# Patient Record
Sex: FEMALE | Race: WHITE | NOT HISPANIC OR LATINO | Employment: FULL TIME | URBAN - METROPOLITAN AREA
[De-identification: names, ages, dates, MRNs, and addresses within clinical notes are randomized per-mention and may not be internally consistent; named-entity substitution may affect disease eponyms.]

---

## 2017-01-03 ENCOUNTER — TELEPHONE (OUTPATIENT)
Dept: PSYCHIATRY | Facility: CLINIC | Age: 37
End: 2017-01-03

## 2017-01-03 DIAGNOSIS — F33.41 RECURRENT MAJOR DEPRESSION IN PARTIAL REMISSION: Primary | ICD-10-CM

## 2017-01-03 NOTE — TELEPHONE ENCOUNTER
Added patient to schedule for ECT tomorrow AM 1/4/17 after consulting with Dr. Boyce about patient reported worsening symptoms of depression. Patient in agreement with plan.

## 2017-01-04 ENCOUNTER — TELEPHONE (OUTPATIENT)
Dept: PSYCHIATRY | Facility: CLINIC | Age: 37
End: 2017-01-04

## 2017-01-04 DIAGNOSIS — F33.2 MAJOR DEPRESSIVE DISORDER, RECURRENT EPISODE, SEVERE, WITHOUT MENTION OF PSYCHOTIC BEHAVIOR: Primary | ICD-10-CM

## 2017-01-04 NOTE — TELEPHONE ENCOUNTER
Patient's father called clinic to notify of cancelled ECT treatment scheduled morning of 1/4/17. States he called [facility] early AM to notify staff there. Reports patient had an episode of nausea and vomiting overnight from increased anxiety. Plans to attend treatments scheduled 1/5/17 and 1/6/17 as of now. States he will notify both departments if plan changes.

## 2017-01-05 ENCOUNTER — ANESTHESIA EVENT (OUTPATIENT)
Dept: ELECTROPHYSIOLOGY | Facility: HOSPITAL | Age: 37
End: 2017-01-05
Payer: COMMERCIAL

## 2017-01-06 ENCOUNTER — ANESTHESIA (OUTPATIENT)
Dept: ELECTROPHYSIOLOGY | Facility: HOSPITAL | Age: 37
End: 2017-01-06
Payer: COMMERCIAL

## 2017-01-09 ENCOUNTER — SURGERY (OUTPATIENT)
Age: 37
End: 2017-01-09

## 2017-01-09 ENCOUNTER — ANESTHESIA EVENT (OUTPATIENT)
Dept: ELECTROPHYSIOLOGY | Facility: HOSPITAL | Age: 37
End: 2017-01-09
Payer: COMMERCIAL

## 2017-01-09 ENCOUNTER — ANESTHESIA (OUTPATIENT)
Dept: ELECTROPHYSIOLOGY | Facility: HOSPITAL | Age: 37
End: 2017-01-09
Payer: COMMERCIAL

## 2017-01-09 ENCOUNTER — HOSPITAL ENCOUNTER (OUTPATIENT)
Facility: HOSPITAL | Age: 37
Discharge: HOME | End: 2017-01-09
Attending: PSYCHIATRY & NEUROLOGY | Admitting: PSYCHIATRY & NEUROLOGY
Payer: COMMERCIAL

## 2017-01-09 VITALS
HEIGHT: 65 IN | BODY MASS INDEX: 26.49 KG/M2 | TEMPERATURE: 98 F | OXYGEN SATURATION: 99 % | RESPIRATION RATE: 18 BRPM | HEART RATE: 84 BPM | DIASTOLIC BLOOD PRESSURE: 81 MMHG | SYSTOLIC BLOOD PRESSURE: 114 MMHG | WEIGHT: 159 LBS

## 2017-01-09 DIAGNOSIS — F32.9 MAJOR DEPRESSIVE DISORDER: ICD-10-CM

## 2017-01-09 DIAGNOSIS — F33.41 MDD (MAJOR DEPRESSIVE DISORDER), RECURRENT, IN PARTIAL REMISSION: ICD-10-CM

## 2017-01-09 DIAGNOSIS — F33.41 RECURRENT MAJOR DEPRESSION IN PARTIAL REMISSION: Primary | ICD-10-CM

## 2017-01-09 LAB
B-HCG UR QL: NEGATIVE
CTP QC/QA: YES

## 2017-01-09 PROCEDURE — 90870 ELECTROCONVULSIVE THERAPY: CPT | Mod: ,,, | Performed by: PSYCHIATRY & NEUROLOGY

## 2017-01-09 PROCEDURE — 25000003 PHARM REV CODE 250: Performed by: PSYCHIATRY & NEUROLOGY

## 2017-01-09 PROCEDURE — 25000003 PHARM REV CODE 250: Performed by: STUDENT IN AN ORGANIZED HEALTH CARE EDUCATION/TRAINING PROGRAM

## 2017-01-09 PROCEDURE — 63600175 PHARM REV CODE 636 W HCPCS: Performed by: STUDENT IN AN ORGANIZED HEALTH CARE EDUCATION/TRAINING PROGRAM

## 2017-01-09 PROCEDURE — D9220A PRA ANESTHESIA: Mod: ,,, | Performed by: ANESTHESIOLOGY

## 2017-01-09 PROCEDURE — 63600175 PHARM REV CODE 636 W HCPCS: Performed by: PSYCHIATRY & NEUROLOGY

## 2017-01-09 PROCEDURE — 90870 ELECTROCONVULSIVE THERAPY: CPT | Performed by: ANESTHESIOLOGY

## 2017-01-09 PROCEDURE — 81025 URINE PREGNANCY TEST: CPT | Performed by: PSYCHIATRY & NEUROLOGY

## 2017-01-09 RX ORDER — KETOROLAC TROMETHAMINE 30 MG/ML
INJECTION, SOLUTION INTRAMUSCULAR; INTRAVENOUS
Status: DISCONTINUED
Start: 2017-01-09 | End: 2017-01-09 | Stop reason: HOSPADM

## 2017-01-09 RX ORDER — SUCCINYLCHOLINE CHLORIDE 20 MG/ML
INJECTION INTRAMUSCULAR; INTRAVENOUS
Status: DISCONTINUED
Start: 2017-01-09 | End: 2017-01-09 | Stop reason: HOSPADM

## 2017-01-09 RX ORDER — SODIUM CHLORIDE 9 MG/ML
INJECTION, SOLUTION INTRAVENOUS CONTINUOUS
Status: DISCONTINUED | OUTPATIENT
Start: 2017-01-10 | End: 2017-01-09 | Stop reason: HOSPADM

## 2017-01-09 RX ORDER — LIDOCAINE HYDROCHLORIDE 10 MG/ML
1 INJECTION, SOLUTION EPIDURAL; INFILTRATION; INTRACAUDAL; PERINEURAL ONCE
Status: COMPLETED | OUTPATIENT
Start: 2017-01-10 | End: 2017-01-09

## 2017-01-09 RX ORDER — ONDANSETRON 2 MG/ML
INJECTION INTRAMUSCULAR; INTRAVENOUS
Status: DISCONTINUED
Start: 2017-01-09 | End: 2017-01-09 | Stop reason: HOSPADM

## 2017-01-09 RX ORDER — METHOHEXITAL IN WATER/PF 100MG/10ML
SYRINGE (ML) INTRAVENOUS
Status: DISCONTINUED
Start: 2017-01-09 | End: 2017-01-09 | Stop reason: HOSPADM

## 2017-01-09 RX ORDER — LABETALOL HYDROCHLORIDE 5 MG/ML
INJECTION, SOLUTION INTRAVENOUS
Status: DISCONTINUED | OUTPATIENT
Start: 2017-01-09 | End: 2017-01-09

## 2017-01-09 RX ORDER — SODIUM CHLORIDE 0.9 % (FLUSH) 0.9 %
3 SYRINGE (ML) INJECTION
Status: DISCONTINUED | OUTPATIENT
Start: 2017-01-09 | End: 2017-01-09 | Stop reason: HOSPADM

## 2017-01-09 RX ORDER — KETOROLAC TROMETHAMINE 15 MG/ML
30 INJECTION, SOLUTION INTRAMUSCULAR; INTRAVENOUS EVERY 6 HOURS PRN
Status: DISCONTINUED | OUTPATIENT
Start: 2017-01-09 | End: 2017-01-09 | Stop reason: HOSPADM

## 2017-01-09 RX ORDER — SUCCINYLCHOLINE CHLORIDE 20 MG/ML
INJECTION INTRAMUSCULAR; INTRAVENOUS
Status: DISCONTINUED | OUTPATIENT
Start: 2017-01-09 | End: 2017-01-09

## 2017-01-09 RX ORDER — ONDANSETRON 4 MG/1
4 TABLET, FILM COATED ORAL EVERY 6 HOURS PRN
Status: DISCONTINUED | OUTPATIENT
Start: 2017-01-09 | End: 2017-01-09 | Stop reason: HOSPADM

## 2017-01-09 RX ORDER — SODIUM CHLORIDE 9 MG/ML
500 INJECTION, SOLUTION INTRAVENOUS ONCE
Status: DISCONTINUED | OUTPATIENT
Start: 2017-01-09 | End: 2017-01-09 | Stop reason: HOSPADM

## 2017-01-09 RX ADMIN — LABETALOL HYDROCHLORIDE 5 MG: 5 INJECTION INTRAVENOUS at 07:01

## 2017-01-09 RX ADMIN — SUCCINYLCHOLINE CHLORIDE 120 MG: 20 INJECTION, SOLUTION INTRAMUSCULAR; INTRAVENOUS at 07:01

## 2017-01-09 RX ADMIN — SODIUM CHLORIDE 500 ML: 0.9 INJECTION, SOLUTION INTRAVENOUS at 06:01

## 2017-01-09 RX ADMIN — ONDANSETRON HYDROCHLORIDE 4 MG: 4 TABLET, FILM COATED ORAL at 07:01

## 2017-01-09 RX ADMIN — METHOHEXITAL SODIUM 150 MG: 500 INJECTION, POWDER, LYOPHILIZED, FOR SOLUTION INTRAMUSCULAR; INTRAVENOUS; RECTAL at 07:01

## 2017-01-09 RX ADMIN — SODIUM CHLORIDE: 0.9 INJECTION, SOLUTION INTRAVENOUS at 07:01

## 2017-01-09 RX ADMIN — LIDOCAINE HYDROCHLORIDE 10 MG: 10 INJECTION, SOLUTION EPIDURAL; INFILTRATION; INTRACAUDAL; PERINEURAL at 06:01

## 2017-01-09 RX ADMIN — KETOROLAC TROMETHAMINE 30 MG: 15 INJECTION, SOLUTION INTRAMUSCULAR; INTRAVENOUS at 07:01

## 2017-01-09 RX ADMIN — Medication 500 MG: at 06:01

## 2017-01-09 NOTE — ANESTHESIA PROCEDURE NOTES
ECT    Treatment Number: 19  Procedure start time: 1/9/2017 7:30 AM  Timeout performed at: 1/9/2017 7:23 AM  Procedure end time: 1/9/2017 7:35 AM    Staffing  Anesthesiologist: JOSE MORRISON JR  CRNA/Resident: RASHI HOUSE  Performed by: resident/CRNA     Preanesthetic Checklist  The following were completed as part of the preanesthetic checklist: patient identified, procedural consent, pre-op evaluation, timeout performed, risks and benefits discussed, monitors and equipment checked, anesthesia consent given, oxygen available, suction available, hand hygiene performed and patient being monitored.    Setup & Induction  Patient Monitoring: heart rate, cardiac monitor, continuous pulse ox, continuous capnometry and NIBP  Patient preparation: bite block inserted, extremities padded, mandibular stabilization and patient hyperventilated    The patient was moved to the ECT therapy room after being assessed and consented for ECT. After standard ASA monitors were applied and timeout performed, the patient was adequately preoxygenated. After induction of general anesthesia, adequate oxygenation and ventilation were confirmed with pulse oxymetry and end tidal CO2 monitoring via bag-mask ventilation. End tidal CO2 was monitored throughout the case and moderate hyperventilation was performed prior to beginning ECT treatment. All extremities were padded, biteblock was inserted, and mandibular stabilization was done prior to initiating ECT therapy.    Procedure      Stimulus Number 3: Setting Number = III; 576 millicoulombs; Seizure Duration = 71 seconds        Recovery  After adequate recovery from general anesthesia, the patient was transported to recovery.  Baseline Blood Pressure: 121/81  Peak Blood Pressure: 189/102  Time to Recovery of Respirations: 4 minutes    ECT Findings  ECT associated findings of: Moderate Hypertension (SBP >160 or DBP >100)

## 2017-01-09 NOTE — H&P
"Allyssa Wright  : 1980   MRN: [id number]  Date: 2017     Psychiatry - ECT  History & Physical    Chief complaint: Major Depressive Disorder, recurrent, in partial remission   Procedure: ECT #19    SUBJECTIVE:     HPI:   Allyssa Wright is a 36 y.o. female with MDD (major depressive disorder), recurrent, in partial remission who presents for ECT. Patient had last ECT session on 16. Reports that she has not been doing too good since then.  States that she did not notice any difference following that treatment.  States that she plans to have 3 treatments performed this week as a "booster".  Patient reports that she has been receiving ECT since she was 17 and believes that she has had it performed about 140 times in the past.  Patient reports sleeping 10 hours per night.  States that her appetite is "too good".  Reports problems with binge eating.  Endorses feelings of helplessness. Denies SI/HI or AH/VH.    Patient reports that she is scheduled to have surgery next week to remove dermoid cysts from her ovaries.  Patient also reports recent medication changes.  States that her Effexor has been increased to 225 mg PO daily and that she is being weaned off her Wellbutrin and it is down to 150 mg PO daily.       Psychiatric Review of Systems:  Mood: "not too good"   Appetite: reports problems with binge eating  Psychomotor: none  Cognitive Impairment: mild to be expected with ECT  Insomnia: None  Psychosis: absent   Diurnal Variation: absent   Suicidal Ideation: absent     Medical Review Of Systems:  Constitutional: negative for chills, fevers and sweats  Eyes: negative for irritation and visual disturbance  Ears, nose, mouth, throat, and face: negative for nasal congestion and sore throat  Respiratory: negative for cough, sputum and wheezing  Cardiovascular: negative for chest pain and dyspnea  Gastrointestinal: negative for N/V  Musculoskeletal:negative for arthralgias and back pain    Current Medications: "   No current facility-administered medications on file prior to encounter.      Current Outpatient Prescriptions on File Prior to Encounter   Medication Sig Dispense Refill    buPROPion (WELLBUTRIN XL) 150 MG TB24 tablet Take 1 tablet (150 mg total) by mouth once daily. 90 tablet 1    dapsone 7.5 % GlwP Apply topically once daily.      famciclovir (FAMVIR) 500 MG tablet Take 1 tablet (500 mg total) by mouth 2 (two) times daily. 30 tablet 12    hydrOXYzine pamoate (VISTARIL) 25 MG Cap Take 1 capsule (25 mg total) by mouth every 8 (eight) hours as needed (anxiety). 90 capsule 1    quetiapine (SEROQUEL) 50 MG tablet Take 150 mg by mouth every evening.       spironolactone (ALDACTONE) 50 MG tablet 50 mg 2 (two) times daily. 50  mg qAM, 50 mg qHS.      thyroid (ARMOUR THYROID) 30 mg Tab Take 1 tablet (30 mg total) by mouth every morning. 30 tablet 11    trazodone (DESYREL) 100 MG tablet Take 300 mg by mouth every evening.       venlafaxine (EFFEXOR-XR) 75 MG 24 hr capsule Take 1 capsule (75 mg total) by mouth nightly. Take with 150 mg for total daily dose of 225 mg. (Patient taking differently: Take 150 mg by mouth nightly. Pt taking 250 mg) 30 capsule 2    ZOVIRAX 5 % Crea   5      Allergies:   Ampicillin; Erythromycin; Levaquin [levofloxacin]; Penicillins; Pristiq [desvenlafaxine]; Sulfa (sulfonamide antibiotics); and Azithromycin    Past Medical/Surgical History:   Past Medical History   Diagnosis Date    Anxiety     Depression     History of psychiatric hospitalization     HSV-1 (herpes simplex virus 1) infection     Hx of psychiatric care     Moderate depressed bipolar II disorder 06/13/2016     reports no history of bipolar    Obsessive-compulsive disorder     Psychiatric problem     Self-harming behavior     Therapy      Past Surgical History   Procedure Laterality Date    Breast augmentation      Ankle surgery Right       OBJECTIVE:     Vitals (pre-procedure):  Vitals:    01/09/17 0611   BP:  "(!) 128/56   Pulse: 88   Resp: 16   Temp: 98.3 °F (36.8 °C)        Labs/Imaging/Studies:   No results found for this or any previous visit (from the past 48 hour(s)).   No results found for: PHENYTOIN, PHENOBARB, VALPROATE, CBMZ    Physical Exam:   Gen: AAOx4, NAD  CV: RRR   Chest: Clear to ausculation, no wheezing  Abd: Soft, non-tender, non-distended   Ext: No clubbing, cyanosis      Mental Status Exam:   Appearance: normal weight, younger than stated age, neatly groomed, lying in bed  Behavior: friendly and cooperative  Speech: normal rate, normal volume  Mood: "not too good"  Affect: full and reactive   Thought Process: linear, goal directed   Thought Content: not suicidal or homicidal   Sensorium: grossly intact  Cognition: grossly intact  Insight: good  Judgment: good    ASSESSMENT/PLAN:     Allyssa Wright is a 36 y.o. female with MDD (major depressive disorder), recurrent, in partial remission who presents for ECT.    Recommendations:   Proceed with ECT #19.      Andreas Paulino MD  1/9/2017  "

## 2017-01-09 NOTE — ANESTHESIA RELEASE NOTE
"Anesthesia Release from PACU Note    Patient: Allyssa Wright    Procedure(s) Performed: Procedure(s) (LRB):  ELECTROCONVULSIVE THERAPY (ECT) - SINGLE SEIZURE (N/A)    Anesthesia type: GEN    Post pain: Adequate analgesia reported    Post assessment: no apparent anesthetic complications, tolerated procedure well and no evidence of recall    Post vital signs:   Visit Vitals    /81    Pulse 87    Temp 36.6 °C (97.8 °F) (Axillary)    Resp 18    Ht 5' 5" (1.651 m)    Wt 72.1 kg (159 lb)    SpO2 96%    Breastfeeding No    BMI 26.46 kg/m2       Level of consciousness: awake, alert and oriented    Nausea/Vomiting: no nausea/no vomiting    Complications: none    Airway Patency: patent    Respiratory: unassisted, spontaneous ventilation, room air    Cardiovascular: stable and blood pressure at baseline    Hydration: euvolemic    "

## 2017-01-09 NOTE — IP AVS SNAPSHOT
[address]  [place] [address]  Phone: [phone]           I have received a copy of my After Visit Summary and discharge instructions from [facility].    INSTRUCTIONS RECEIVED AND UNDERSTOOD BY:                     Patient/Patient Representative: ________________________________________________________________     Date/Time: ________________________________________________________________                     Instructions Given By: ________________________________________________________________     Date/Time: ________________________________________________________________

## 2017-01-09 NOTE — ANESTHESIA POSTPROCEDURE EVALUATION
"Anesthesia Post Evaluation    Patient: Allyssa Wright    Procedure(s) Performed: Procedure(s) (LRB):  ELECTROCONVULSIVE THERAPY (ECT) - SINGLE SEIZURE (N/A)    Final Anesthesia Type: general  Patient location during evaluation: PACU  Patient participation: Yes- Able to Participate  Level of consciousness: awake and alert  Post-procedure vital signs: reviewed and stable  Pain management: adequate  Airway patency: patent  PONV status at discharge: No PONV  Anesthetic complications: no      Cardiovascular status: blood pressure returned to baseline  Respiratory status: unassisted, spontaneous ventilation and room air  Hydration status: euvolemic  Follow-up not needed.        Visit Vitals    /81    Pulse 87    Temp 36.6 °C (97.8 °F) (Axillary)    Resp 18    Ht 5' 5" (1.651 m)    Wt 72.1 kg (159 lb)    SpO2 96%    Breastfeeding No    BMI 26.46 kg/m2       Pain/Roma Score: Pain Assessment Performed: Yes (1/9/2017  7:40 AM)  Presence of Pain: denies (1/9/2017  7:40 AM)  Roma Score: 9 (1/9/2017  7:40 AM)      "

## 2017-01-09 NOTE — ANESTHESIA PREPROCEDURE EVALUATION
01/09/2017  Pre-operative evaluation for Procedure(s):  ELECTROCONVULSIVE THERAPY (ECT) - SINGLE SEIZURE    Allyssa Wright is a 36 y.o. female with MDD, recurrent severe, who is scheduled for continuance of ECT. This is ECT #19. No no issues with last session.         Prev airway: - None on file    Patient Active Problem List   Diagnosis    MDD (major depressive disorder), recurrent, in partial remission    Major depressive disorder    Major depressive disorder in partial remission    Major depressive disorder, recurrent, in partial remission       Review of patient's allergies indicates:   Allergen Reactions    Ampicillin      Mom says so    Erythromycin     Levaquin [levofloxacin] Other (See Comments)     Depression side effects    Penicillins      Mom says so    Pristiq [desvenlafaxine]      psycotic      Sulfa (sulfonamide antibiotics)      Rash      Azithromycin Anxiety        Current Facility-Administered Medications on File Prior to Visit   Medication Dose Route Frequency Provider Last Rate Last Dose    [START ON 1/10/2017] 0.9%  NaCl infusion   Intravenous Continuous Andreas Paulino MD   500 mL at 01/09/17 0617    ketorolac (TORADOL) 30 mg/mL (1 mL) injection             ketorolac injection 30 mg  30 mg Intravenous Q6H PRN Andreas Paulino MD        methohexital in sterile water (PF) 100 mg/10 mL (10 mg/mL) Syrg             ondansetron 4 mg/2 mL injection             ondansetron tablet 4 mg  4 mg Oral Q6H PRN Andreas Paulino MD        sodium chloride 0.9% flush 3 mL  3 mL Intravenous PRN Felix Barboza MD        succinylcholine (ANECTINE) 20 mg/mL injection              Current Outpatient Prescriptions on File Prior to Visit   Medication Sig Dispense Refill    buPROPion (WELLBUTRIN XL) 150 MG TB24 tablet Take 1 tablet (150 mg total) by  mouth once daily. 90 tablet 1    dapsone 7.5 % GlwP Apply topically once daily.      famciclovir (FAMVIR) 500 MG tablet Take 1 tablet (500 mg total) by mouth 2 (two) times daily. 30 tablet 12    hydrOXYzine pamoate (VISTARIL) 25 MG Cap Take 1 capsule (25 mg total) by mouth every 8 (eight) hours as needed (anxiety). 90 capsule 1    quetiapine (SEROQUEL) 50 MG tablet Take 150 mg by mouth every evening.       spironolactone (ALDACTONE) 50 MG tablet 50 mg 2 (two) times daily. 50  mg qAM, 50 mg qHS.      thyroid (ARMOUR THYROID) 30 mg Tab Take 1 tablet (30 mg total) by mouth every morning. 30 tablet 11    trazodone (DESYREL) 100 MG tablet Take 300 mg by mouth every evening.       venlafaxine (EFFEXOR-XR) 75 MG 24 hr capsule Take 1 capsule (75 mg total) by mouth nightly. Take with 150 mg for total daily dose of 225 mg. (Patient taking differently: Take 150 mg by mouth nightly. Pt taking 250 mg) 30 capsule 2    ZOVIRAX 5 % Crea   5       Past Surgical History   Procedure Laterality Date    Breast augmentation      Ankle surgery Right        Social History     Social History    Marital status: Single     Spouse name: N/A    Number of children: N/A    Years of education: N/A     Occupational History    unemployed      Social History Main Topics    Smoking status: Former Smoker     Quit date: 4/6/2011    Smokeless tobacco: Not on file    Alcohol use No      Comment: Alcohol misuse in remote past    Drug use: No      Comment: Experimental use in high school    Sexual activity: Not on file     Other Topics Concern    Not on file     Social History Narrative    Pt has 1 older brother from an intact family until the death of her mother in 2012.  She completed 1 year of college, was never in the , and has never been employed.  She was engaged once, but never , has no children, and lives with her father plus 2 dogs.  She denies any hobbies and is spiritual but not Buddhism.  She does date and  returns to college during rare periods when depression and anxiety [place].         Vital Signs Range (Last 24H):  Temp:  [36.8 °C (98.3 °F)]   Pulse:  [88]   Resp:  [16]   BP: (128)/(56)   SpO2:  [100 %]       CBC: No results for input(s): WBC, RBC, HGB, HCT, PLT, MCV, MCH, MCHC in the last 72 hours.    CMP: No results for input(s): NA, K, CL, CO2, BUN, CREATININE, GLU, MG, PHOS, CALCIUM, ALBUMIN, PROT, ALKPHOS, ALT, AST, BILITOT in the last 72 hours.    INR  No results for input(s): INR, PROTIME, APTT in the last 72 hours.    Invalid input(s): PT      EKG:  Normal sinus rhythm  Normal ECG  When compared with ECG of 03-DEC-2015 11:38,  Questionable change in The axis  T wave inversion no longer evident in Inferior leads  Confirmed by Flaco Sr MD ([id number]) on 6/9/2016 1:30:13 PM    Referred By: SELF REFERRAL           Confirmed By:Flaco Sr MD      2D Echo: None on File      [facility] Pre-op Assessment    I have reviewed the Patient Summary Reports.     I have reviewed the Nursing Notes.   I have reviewed the Medications.     Review of Systems  Anesthesia Hx:  No problems with previous Anesthesia Denies Hx of Anesthetic complications  History of prior surgery of interest to airway management or planning: Previous anesthesia: General 7/22/2016 with general anesthesia.  Denies Family Hx of Anesthesia complications.   Denies Personal Hx of Anesthesia complications.   Social:  Former smoker-- quit 2011   Hematology/Oncology:  Hematology Normal   Oncology Normal     EENT/Dental:EENT/Dental Normal   Cardiovascular:  Cardiovascular Normal     Pulmonary:  Pulmonary Normal    Renal/:  Renal/ Normal     Hepatic/GI:  Hepatic/GI Normal    Musculoskeletal:  Musculoskeletal Normal    Neurological:  Neurology Normal    Endocrine:  Endocrine Normal    Dermatological:  Skin Normal    Psych:   Psychiatric History anxiety depression          Physical Exam  General:  Well nourished    Airway/Jaw/Neck:  Airway Findings: Mouth Opening: Normal  Tongue: Normal  General Airway Assessment: Adult  Mallampati: II  Improves to I with phonation.  TM Distance: Normal, at least 6 cm  Jaw/Neck Findings:  Neck ROM: Normal ROM     Eyes/Ears/Nose:  EYES/EARS/NOSE FINDINGS: Normal   Dental:  Dental Findings: In tact   Chest/Lungs:  Chest/Lungs Findings: Normal Respiratory Rate, Clear to auscultation     Heart/Vascular:  Heart Findings: Rate: Normal  Rhythm: Regular Rhythm  Sounds: Normal  Heart murmur: negative    Abdomen:  Abdomen Findings: Normal    Musculoskeletal:  Musculoskeletal Findings: Normal   Skin:  Skin Findings: Normal    Mental Status:  Mental Status Findings:  Cooperative, Alert and Oriented         Anesthesia Plan  Type of Anesthesia, risks & benefits discussed:  Anesthesia Type:  general  Patient's Preference:   Intra-op Monitoring Plan:   Intra-op Monitoring Plan Comments:   Post Op Pain Control Plan:   Post Op Pain Control Plan Comments:   Induction:   IV  Beta Blocker:  Patient is not currently on a Beta-Blocker (No further documentation required).       Informed Consent: Patient understands risks and agrees with Anesthesia plan.  Questions answered.   ASA Score: 2     Day of Surgery Review of History & Physical: I have interviewed and examined the patient. I have reviewed the patient's H&P dated:    H&P update referred to the provider.         Ready For Surgery From Anesthesia Perspective.

## 2017-01-09 NOTE — TRANSFER OF CARE
"Anesthesia Transfer of Care Note    Patient: Allyssa Wright    Procedure(s) Performed: Procedure(s) (LRB):  ELECTROCONVULSIVE THERAPY (ECT) - SINGLE SEIZURE (N/A)    Patient location: PACU    Anesthesia Type: general    Transport from OR: Transported from OR on 2-3 L/min O2 by NC with adequate spontaneous ventilation    Post pain: adequate analgesia    Post assessment: no apparent anesthetic complications    Post vital signs: stable    Level of consciousness: awake    Nausea/Vomiting: no nausea/vomiting    Complications: none          Last vitals:   Visit Vitals    /85    Pulse 82    Temp 36.6 °C (97.8 °F) (Axillary)    Resp 18    Ht 5' 5" (1.651 m)    Wt 72.1 kg (159 lb)    SpO2 100%    Breastfeeding No    BMI 26.46 kg/m2     "

## 2017-01-09 NOTE — IP AVS SNAPSHOT
[facility]  [address]  [facility] [address]  Phone: [phone]           Patient Discharge Instructions     Our goal is to set you up for success. This packet includes information on your condition, medications, and your home care. It will help you to care for yourself so you don't get sicker and need to go back to the hospital.     Please ask your nurse if you have any questions.        There are many details to remember when preparing to leave the hospital. Here is what you will need to do:    1. Take your medicine. If you are prescribed medications, review your Medication List in the following pages. You may have new medications to  at the pharmacy and others that you'll need to stop taking. Review the instructions for how and when to take your medications. Talk with your doctor or nurses if you are unsure of what to do.     2. Go to your follow-up appointments. Specific follow-up information is listed in the following pages. Your may be contacted by a transition nurse or clinical provider about future appointments. Be sure we have all of the phone numbers to reach you, if needed. Please contact your provider's office if you are unable to make an appointment.     3. Watch for warning signs. Your doctor or nurse will give you detailed warning signs to watch for and when to call for assistance. These instructions may also include educational information about your condition. If you experience any of warning signs to your health, call your doctor.               [facility]  Unless otherwise directed by your provider, please contact [facility], our nurse care line that is available for 24/7 assistance.     [phone] (toll-free)    Registered nurses in the [facility] provide clinical advisement, health education, appointment booking, and other advisory services.                    ** Verify the list of medication(s) below is accurate and up  to date. Carry this with you in case of emergency. If your medications have changed, please notify your healthcare provider.             Medication List      ASK your doctor about these medications        Additional Info                      buPROPion 150 MG TB24 tablet   Commonly known as:  WELLBUTRIN XL   Quantity:  90 tablet   Refills:  1   Dose:  150 mg    Instructions:  Take 1 tablet (150 mg total) by mouth once daily.     Begin Date    AM    Noon    PM    Bedtime       dapsone 7.5 % Glwp   Refills:  0    Instructions:  Apply topically once daily.     Begin Date    AM    Noon    PM    Bedtime       famciclovir 500 MG tablet   Commonly known as:  FAMVIR   Quantity:  30 tablet   Refills:  12   Dose:  500 mg    Instructions:  Take 1 tablet (500 mg total) by mouth 2 (two) times daily.     Begin Date    AM    Noon    PM    Bedtime       hydrOXYzine pamoate 25 MG Cap   Commonly known as:  VISTARIL   Quantity:  90 capsule   Refills:  1   Dose:  25 mg    Instructions:  Take 1 capsule (25 mg total) by mouth every 8 (eight) hours as needed (anxiety).     Begin Date    AM    Noon    PM    Bedtime       quetiapine 50 MG tablet   Commonly known as:  SEROQUEL   Refills:  0   Dose:  150 mg   Indications:  25 mg po in am    Instructions:  Take 150 mg by mouth every evening.     Begin Date    AM    Noon    PM    Bedtime       spironolactone 50 MG tablet   Commonly known as:  ALDACTONE   Refills:  0   Dose:  50 mg    Instructions:  50 mg 2 (two) times daily. 50  mg qAM, 50 mg qHS.     Begin Date    AM    Noon    PM    Bedtime       thyroid 30 mg Tab   Commonly known as:  ARMOUR THYROID   Quantity:  30 tablet   Refills:  11   Dose:  30 mg    Instructions:  Take 1 tablet (30 mg total) by mouth every morning.     Begin Date    AM    Noon    PM    Bedtime       trazodone 100 MG tablet   Commonly known as:  DESYREL   Refills:  0   Dose:  300 mg    Instructions:  Take 300 mg by mouth every evening.     Begin Date    AM    Noon    PM     Bedtime       venlafaxine 75 MG 24 hr capsule   Commonly known as:  EFFEXOR-XR   Quantity:  30 capsule   Refills:  2   Dose:  75 mg    Instructions:  Take 1 capsule (75 mg total) by mouth nightly. Take with 150 mg for total daily dose of 225 mg.     Begin Date    AM    Noon    PM    Bedtime       ZOVIRAX 5 % Crea   Refills:  5   Generic drug:  acyclovir 5%      Begin Date    AM    Noon    PM    Bedtime                  Please bring to all follow up appointments:    1. A copy of your discharge instructions.  2. All medicines you are currently taking in their original bottles.  3. Identification and insurance card.    Please arrive 15 minutes ahead of scheduled appointment time.    Please call 24 hours in advance if you must reschedule your appointment and/or time.        Your Future Surgeries/Procedures     Jan 11, 2017   Surgery with Shoaib Boyce Jr., MD   [facility]    [address] [address]   [phone]              Follow-up Information     Follow up with Shoaib Boyce Jr, MD On 1/11/2017.    Specialty:  Psychiatry    Why:  to [facility] for next ECT at 7am.     Contact information:    [address] [address]  [phone]            Discharge Instructions       E.C.T. Home Care Instructions    ACTIVITY LEVEL:    You may feel sleepy for several hours. It is best to rest until you are more awake and then gradually resume your normal activities in one day. It is recommended, because of the possibility of memory loss and slight confusion post ECT, that you rest in the company of a responsible adult. This confusion and memory loss is expected and should diminish over time. It is also recommended that you do not drive or operate any electrical appliances or machinery during the ECT treatment series. Ask your Doctor, at the time of your treatment, any questions you may have about specific activities.    DIET:    You may wish to  "start with liquids after your ECT treatment and gradually resume your normal diet. Do not consume alcoholic beverages during the ECT treatment series.    BATHING:    You may shower or bathe as desired.    MEDICATIONS:    Resume all home medications starting with the AM doses not taken prior to ECT treatment. If you experience a headache, it is okay to take your usual pain reliever.    WHEN TO CALL THE DOCTOR:     Headache, memory loss or confusion that does not begin to resolve within 24 hours.    RETURN APPOINTMENT:    Remember you may not eat or drink after midnight, but please take heart or high blood pressure medicines with a sip of water in the morning prior to leaving home.    FOR EMERGENCIES:    If any unusual problems or difficulties occur:    Contact the office [phone] Monday-Friday until 3:00 p.m. After hours, call the hospital  [phone] and ask for the Psychiatry Resident On-call.      Primary Diagnosis     Your primary diagnosis was:  Mood Problem      Admission Information     Date & Time Provider Department [facility]    1/9/2017  5:50 AM Shoaib Boyce Jr., MD [facility] [id number]      Care Providers     Provider Role Specialty Primary office phone    Shoaib Boyce Jr., MD Attending Provider Psychiatry [phone]    Shoaib Boyce Jr., MD Surgeon  Psychiatry [phone]      Your Vitals Were     BP Pulse Temp Resp Height Weight    116/82 85 97.8 °F (36.6 °C) (Axillary) 18 5' 5" (1.651 m) 72.1 kg (159 lb)    SpO2 BMI             95% 26.46 kg/m2         Recent Lab Values     No lab values to display.      Allergies as of 1/9/2017        Reactions    Ampicillin     Mom says so    Erythromycin     Levaquin [Levofloxacin] Other (See Comments)    Depression side effects    Penicillins     Mom says so    Pristiq [Desvenlafaxine]     psycotic    Sulfa (Sulfonamide Antibiotics)     Rash    Azithromycin Anxiety      Advance Directives     An advance directive is a " document which, in the event you are no longer able to make decisions for yourself, tells your healthcare team what kind of treatment you do or do not want to receive, or who you would like to make those decisions for you.  If you do not currently have an advance directive, [facility] encourages you to create one.  For more information call:  [phone]), [phone] ([phone]),  or log on to [email or web address].        Smoking Cessation     If you would like to quit smoking:   You may be eligible for free services if you are a [place] resident and started smoking cigarettes before September 1, 1988.  Call the Smoking Cessation Trust (SCT) toll free at (652) [phone] or ([phone]) 255-1924.   Call 6-259-QUIT-NOW if you do not meet the above criteria.            Language Assistance Services     ATTENTION: Language assistance services are available, free of charge. Please call [phone].      ATENCIÓN: Si habla español, tiene a rodriguez disposición servicios gratuitos de asistencia lingüística. Llame al [phone].     CHÚ Ý: N?u b?n nói Ti?ng Vi?t, có các d?ch v? h? tr? ngôn ng? mi?n phí dành cho b?n. G?i s? [phone].        [facility] Sign-Up     Activating your [facility] account is as easy as 1-2-3!     1) Visit [email or web address], select Sign Up Now, enter this activation code and your date of birth, then select Next.  [email or web address]  Expires: 2/23/2017  7:48 AM      2) Create a username and password to use when you visit [facility] in the future and select a security question in case you lose your password and select Next.    3) Enter your e-mail address and click Sign Up!    Additional Information  If you have questions, please e-mail [email or web address] or call [phone] to talk to our [facility] staff. Remember, [facility] is NOT to be used for urgent needs. For medical emergencies, dial 911.          [facility] complies with applicable Federal civil rights  laws and does not discriminate on the basis of race, color, national origin, age, disability, or sex.

## 2017-01-09 NOTE — PLAN OF CARE
Problem: Patient Care Overview  Goal: Plan of Care Review  Outcome: Outcome(s) achieved Date Met:  01/09/17     Discharge instructions given to patient and verbalized understanding. Patient stable, tolerating fluids. No complaints at this time. Dr. Abdalla notified for a release. Patient adequate for discharge.

## 2017-01-09 NOTE — DISCHARGE SUMMARY
Allyssa Wright  : 1980   MRN: [id number]  Date: 2017     Psychiatry - ECT  Discharge Summary    Admit Date: 2017  5:50 AM  Discharge Date: 2017    Attending Physician: Shoaib Boyce MD    Discharge Provider: Andreas Paulino MD    History of Present Illness: Allyssa Wright is a 36 y.o. female with Major Depressive Disorder, recurrent, in partial remission presented for ECT #19. See H&P dated 2017 for full HPI. For further details, see Dr. Boyce's pre-ECT evaluation.    Hospital Course: The patient tolerated the ECT treatment well without complication. Patient was stable post-procedure. See OP note dated 2017 for more details.     Disposition: Home or Self Care    Medications:  Current Discharge Medication List      CONTINUE these medications which have NOT CHANGED    Details   buPROPion (WELLBUTRIN XL) 150 MG TB24 tablet Take 1 tablet (150 mg total) by mouth once daily.  Qty: 90 tablet, Refills: 1      dapsone 7.5 % GlwP Apply topically once daily.      famciclovir (FAMVIR) 500 MG tablet Take 1 tablet (500 mg total) by mouth 2 (two) times daily.  Qty: 30 tablet, Refills: 12    Associated Diagnoses: Major depressive disorder, recurrent episode, moderate degree      hydrOXYzine pamoate (VISTARIL) 25 MG Cap Take 1 capsule (25 mg total) by mouth every 8 (eight) hours as needed (anxiety).  Qty: 90 capsule, Refills: 1      quetiapine (SEROQUEL) 50 MG tablet Take 150 mg by mouth every evening.       spironolactone (ALDACTONE) 50 MG tablet 50 mg 2 (two) times daily. 50  mg qAM, 50 mg qHS.      thyroid (ARMOUR THYROID) 30 mg Tab Take 1 tablet (30 mg total) by mouth every morning.  Qty: 30 tablet, Refills: 11    Associated Diagnoses: Major depressive disorder, recurrent episode, moderate degree      trazodone (DESYREL) 100 MG tablet Take 300 mg by mouth every evening.       venlafaxine (EFFEXOR-XR) 75 MG 24 hr capsule Take 1 capsule (75 mg total) by mouth nightly. Take with 150 mg for  total daily dose of 225 mg.  Qty: 30 capsule, Refills: 2      ZOVIRAX 5 % Crea Refills: 5           Status at Discharge: Alert and medically stable    Discharge Diagnoses:  Major Depressive Disorder, recurrent, in partial remission    Diet: Resume previous outpatient diet  Activity: Ambulate with assistance - patient is a fall risk  Instructions: Please do not eat or drink anything after midnight prior to procedure. Please do not drive on day of ECT.    Med Changes:  None    Next ECT:  1/11/17 for ECT #20    Andreas Paulino MD  PGY II  1/9/2017

## 2017-01-09 NOTE — OP NOTE
Allyssa Wright  : 1980   MRN: [id number]  Date: 2017       Psychiatry - ECT  Operative Note             Date of Admission: 2017  5:50 AM    Site: [facility] [address]    Attending: Shoaib Boyce MD  Residents: Andreas Paulino MD  Pre-op Diagnosis: MDD, recurrent, in partial remission    ECT Treatment Number: 19  Machine Type: Laiyaoyaota 5000    Patient Status: medically stable    Vitals (pre-procedure):  Vitals:    17 0611   BP: (!) 128/56   Pulse: 88   Resp: 16   Temp: 98.3 °F (36.8 °C)       Electrode Placement: Bitemporal    Stimulus Number Charge (mC) Level Pulse Width (msec) Frequency  (Hz) Duration of Stimulus (sec) Current (mA) Duration of Seizure (sec)   1 576 3 1 60 6 800 71                                   Complications: HTN    Maximum Blood Pressure: 189/102    Medications Given:  Caffeine 500 mg  PO  Before  Methohexital (Brevital).   150mg  IV  During   Succinylcholine (Anectine).   120mg  IV  During   Ondansetron (Zofran).   4mg  IV  During  Toradol 30mg IV During     Treatment Course:  Pt tolerate procedure well. After adequate recovery from general anesthesia, the patient was transported to recovery.    Post-op Diagnosis: Same as above    Recommended for next ECT:  Next ECT #20 on 17    Andreas Paulino  2017

## 2017-01-10 NOTE — ANESTHESIA PREPROCEDURE EVALUATION
01/10/2017  Allyssa Wright is a 36 y.o., female.    Pre-operative evaluation for Procedure(s) (LRB):  ELECTROCONVULSIVE THERAPY (ECT) - SINGLE SEIZURE (N/A)    Allyssa Wright is a 36 y.o. female with MDD, recurrent severe, who is scheduled for continuance of ECT. This is ECT #20. No no issues with last session.     Pt states slight head cold last week, no current issues. Pt last ate yesterday evening.       Patient Active Problem List   Diagnosis    MDD (major depressive disorder), recurrent, in partial remission    Major depressive disorder    Major depressive disorder in partial remission    Major depressive disorder, recurrent, in partial remission       Review of patient's allergies indicates:   Allergen Reactions    Ampicillin      Mom says so    Erythromycin     Levaquin [levofloxacin] Other (See Comments)     Depression side effects    Penicillins      Mom says so    Pristiq [desvenlafaxine]      psycotic      Sulfa (sulfonamide antibiotics)      Rash      Azithromycin Anxiety        No current facility-administered medications on file prior to encounter.      Current Outpatient Prescriptions on File Prior to Encounter   Medication Sig Dispense Refill    buPROPion (WELLBUTRIN XL) 150 MG TB24 tablet Take 1 tablet (150 mg total) by mouth once daily. 90 tablet 1    dapsone 7.5 % GlwP Apply topically once daily.      famciclovir (FAMVIR) 500 MG tablet Take 1 tablet (500 mg total) by mouth 2 (two) times daily. 30 tablet 12    hydrOXYzine pamoate (VISTARIL) 25 MG Cap Take 1 capsule (25 mg total) by mouth every 8 (eight) hours as needed (anxiety). 90 capsule 1    quetiapine (SEROQUEL) 50 MG tablet Take 150 mg by mouth every evening.       spironolactone (ALDACTONE) 50 MG tablet 50 mg 2 (two) times daily. 50  mg qAM, 50 mg qHS.      thyroid (ARMOUR THYROID) 30 mg Tab Take 1 tablet (30 mg  total) by mouth every morning. 30 tablet 11    trazodone (DESYREL) 100 MG tablet Take 300 mg by mouth every evening.       venlafaxine (EFFEXOR-XR) 75 MG 24 hr capsule Take 1 capsule (75 mg total) by mouth nightly. Take with 150 mg for total daily dose of 225 mg. (Patient taking differently: Take 150 mg by mouth nightly. Pt taking 250 mg) 30 capsule 2    ZOVIRAX 5 % Crea   5       Past Surgical History   Procedure Laterality Date    Breast augmentation      Ankle surgery Right        Social History     Social History    Marital status: Single     Spouse name: N/A    Number of children: N/A    Years of education: N/A     Occupational History    unemployed      Social History Main Topics    Smoking status: Former Smoker     Quit date: 4/6/2011    Smokeless tobacco: Not on file    Alcohol use No      Comment: Alcohol misuse in remote past    Drug use: No      Comment: Experimental use in high school    Sexual activity: Not on file     Other Topics Concern    Not on file     Social History Narrative    Pt has 1 older brother from an intact family until the death of her mother in 2012.  She completed 1 year of college, was never in the , and has never been employed.  She was engaged once, but never , has no children, and lives with her father plus 2 dogs.  She denies any hobbies and is spiritual but not Quaker.  She does date and returns to college during rare periods when depression and anxiety [place].         Vital Signs Range (Last 24H):  BP: ()/()   Arterial Line BP: ()/()         Diagnostic Studies:      EKG:  Normal sinus rhythm  Normal ECG  When compared with ECG of 03-DEC-2015 11:38,  Questionable change in The axis  T wave inversion no longer evident in Inferior leads  Confirmed by Flaco Sr MD ([id number]) on 6/9/2016 1:30:13 PM    Referred By: SELF REFERRAL           Confirmed By:Flaco Sr MD          [facility] Anesthesia Evaluation    I have reviewed the Patient Summary Reports.    I have  reviewed the Nursing Notes.   I have reviewed the Medications.     Review of Systems  Anesthesia Hx:  No problems with previous Anesthesia Denies Hx of Anesthetic complications  History of prior surgery of interest to airway management or planning: Previous anesthesia: General 7/22/2016 with general anesthesia.  Denies Family Hx of Anesthesia complications.   Denies Personal Hx of Anesthesia complications.   Social:  Former smoker-- quit 2011   Hematology/Oncology:  Hematology Normal   Oncology Normal     EENT/Dental:EENT/Dental Normal   Cardiovascular:  Cardiovascular Normal     Pulmonary:  Pulmonary Normal    Renal/:  Renal/ Normal     Hepatic/GI:  Hepatic/GI Normal    Musculoskeletal:  Musculoskeletal Normal    Neurological:  Neurology Normal    Endocrine:  Endocrine Normal    Dermatological:  Skin Normal    Psych:   Psychiatric History anxiety depression          Physical Exam  General:  Well nourished    Airway/Jaw/Neck:  Airway Findings: Mouth Opening: Normal Tongue: Normal  General Airway Assessment: Adult  Mallampati: II  Improves to I with phonation.  TM Distance: Normal, at least 6 cm  Jaw/Neck Findings:  Neck ROM: Normal ROM     Eyes/Ears/Nose:  EYES/EARS/NOSE FINDINGS: Normal   Dental:  Dental Findings: In tact   Chest/Lungs:  Chest/Lungs Findings: Normal Respiratory Rate, Clear to auscultation     Heart/Vascular:  Heart Findings: Rate: Normal  Rhythm: Regular Rhythm  Sounds: Normal  Heart murmur: negative    Abdomen:  Abdomen Findings: Normal    Musculoskeletal:  Musculoskeletal Findings: Normal   Skin:  Skin Findings: Normal    Mental Status:  Mental Status Findings:  Cooperative, Alert and Oriented         Anesthesia Plan  Type of Anesthesia, risks & benefits discussed:  Anesthesia Type:  general  Patient's Preference:   Intra-op Monitoring Plan:   Intra-op Monitoring Plan Comments:   Post Op Pain Control Plan:   Post Op Pain Control Plan Comments:   Induction:   IV  Beta Blocker:  Patient is not  currently on a Beta-Blocker (No further documentation required).       Informed Consent: Patient understands risks and agrees with Anesthesia plan.  Questions answered.   ASA Score: 2     Day of Surgery Review of History & Physical: I have interviewed and examined the patient. I have reviewed the patient's H&P dated:    H&P update referred to the provider.         Ready For Surgery From Anesthesia Perspective.

## 2017-01-11 ENCOUNTER — HOSPITAL ENCOUNTER (OUTPATIENT)
Facility: HOSPITAL | Age: 37
Discharge: HOME | End: 2017-01-11
Attending: PSYCHIATRY & NEUROLOGY | Admitting: PSYCHIATRY & NEUROLOGY
Payer: COMMERCIAL

## 2017-01-11 ENCOUNTER — SURGERY (OUTPATIENT)
Age: 37
End: 2017-01-11

## 2017-01-11 VITALS
HEART RATE: 81 BPM | OXYGEN SATURATION: 99 % | BODY MASS INDEX: 26.33 KG/M2 | RESPIRATION RATE: 18 BRPM | HEIGHT: 65 IN | SYSTOLIC BLOOD PRESSURE: 118 MMHG | TEMPERATURE: 98 F | WEIGHT: 158 LBS | DIASTOLIC BLOOD PRESSURE: 79 MMHG

## 2017-01-11 DIAGNOSIS — F33.9 RECURRENT MAJOR DEPRESSIVE DISORDER: ICD-10-CM

## 2017-01-11 DIAGNOSIS — F33.41 MAJOR DEPRESSIVE DISORDER, RECURRENT, IN PARTIAL REMISSION: Primary | ICD-10-CM

## 2017-01-11 LAB
B-HCG UR QL: NEGATIVE
CTP QC/QA: YES

## 2017-01-11 PROCEDURE — 81025 URINE PREGNANCY TEST: CPT | Performed by: PSYCHIATRY & NEUROLOGY

## 2017-01-11 PROCEDURE — D9220A PRA ANESTHESIA: Mod: ,,, | Performed by: ANESTHESIOLOGY

## 2017-01-11 PROCEDURE — 90870 ELECTROCONVULSIVE THERAPY: CPT | Mod: ,,, | Performed by: PSYCHIATRY & NEUROLOGY

## 2017-01-11 PROCEDURE — 90870 ELECTROCONVULSIVE THERAPY: CPT | Performed by: ANESTHESIOLOGY

## 2017-01-11 PROCEDURE — 63600175 PHARM REV CODE 636 W HCPCS

## 2017-01-11 PROCEDURE — 63600175 PHARM REV CODE 636 W HCPCS: Performed by: PSYCHIATRY & NEUROLOGY

## 2017-01-11 PROCEDURE — 25000003 PHARM REV CODE 250: Performed by: PSYCHIATRY & NEUROLOGY

## 2017-01-11 PROCEDURE — 25000003 PHARM REV CODE 250

## 2017-01-11 RX ORDER — LABETALOL HYDROCHLORIDE 5 MG/ML
INJECTION, SOLUTION INTRAVENOUS
Status: DISCONTINUED
Start: 2017-01-11 | End: 2017-01-11 | Stop reason: HOSPADM

## 2017-01-11 RX ORDER — SUCCINYLCHOLINE CHLORIDE 20 MG/ML
INJECTION INTRAMUSCULAR; INTRAVENOUS
Status: COMPLETED
Start: 2017-01-11 | End: 2017-01-11

## 2017-01-11 RX ORDER — ONDANSETRON 2 MG/ML
4 INJECTION INTRAMUSCULAR; INTRAVENOUS ONCE AS NEEDED
Status: COMPLETED | OUTPATIENT
Start: 2017-01-11 | End: 2017-01-11

## 2017-01-11 RX ORDER — LIDOCAINE HYDROCHLORIDE 10 MG/ML
1 INJECTION, SOLUTION EPIDURAL; INFILTRATION; INTRACAUDAL; PERINEURAL ONCE
Status: COMPLETED | OUTPATIENT
Start: 2017-01-12 | End: 2017-01-11

## 2017-01-11 RX ORDER — METHOHEXITAL IN WATER/PF 100MG/10ML
SYRINGE (ML) INTRAVENOUS
Status: COMPLETED
Start: 2017-01-11 | End: 2017-01-11

## 2017-01-11 RX ORDER — SODIUM CHLORIDE 9 MG/ML
500 INJECTION, SOLUTION INTRAVENOUS ONCE
Status: DISCONTINUED | OUTPATIENT
Start: 2017-01-11 | End: 2017-01-11 | Stop reason: HOSPADM

## 2017-01-11 RX ORDER — ONDANSETRON 2 MG/ML
INJECTION INTRAMUSCULAR; INTRAVENOUS
Status: DISCONTINUED
Start: 2017-01-11 | End: 2017-01-11 | Stop reason: HOSPADM

## 2017-01-11 RX ORDER — KETOROLAC TROMETHAMINE 30 MG/ML
INJECTION, SOLUTION INTRAMUSCULAR; INTRAVENOUS
Status: DISCONTINUED
Start: 2017-01-11 | End: 2017-01-11 | Stop reason: HOSPADM

## 2017-01-11 RX ORDER — SODIUM CHLORIDE 9 MG/ML
INJECTION, SOLUTION INTRAVENOUS CONTINUOUS
Status: DISCONTINUED | OUTPATIENT
Start: 2017-01-12 | End: 2017-01-11 | Stop reason: HOSPADM

## 2017-01-11 RX ORDER — KETOROLAC TROMETHAMINE 15 MG/ML
30 INJECTION, SOLUTION INTRAMUSCULAR; INTRAVENOUS ONCE AS NEEDED
Status: COMPLETED | OUTPATIENT
Start: 2017-01-11 | End: 2017-01-11

## 2017-01-11 RX ADMIN — KETOROLAC TROMETHAMINE 30 MG: 15 INJECTION, SOLUTION INTRAMUSCULAR; INTRAVENOUS at 08:01

## 2017-01-11 RX ADMIN — SODIUM CHLORIDE: 0.9 INJECTION, SOLUTION INTRAVENOUS at 08:01

## 2017-01-11 RX ADMIN — Medication 150 MG: at 08:01

## 2017-01-11 RX ADMIN — SODIUM CHLORIDE 500 ML: 0.9 INJECTION, SOLUTION INTRAVENOUS at 06:01

## 2017-01-11 RX ADMIN — SUCCINYLCHOLINE CHLORIDE 5 MG: 20 INJECTION, SOLUTION INTRAMUSCULAR; INTRAVENOUS at 08:01

## 2017-01-11 RX ADMIN — LIDOCAINE HYDROCHLORIDE 0.2 MG: 10 INJECTION, SOLUTION EPIDURAL; INFILTRATION; INTRACAUDAL; PERINEURAL at 06:01

## 2017-01-11 RX ADMIN — SUCCINYLCHOLINE CHLORIDE 120 MG: 20 INJECTION, SOLUTION INTRAMUSCULAR; INTRAVENOUS at 08:01

## 2017-01-11 RX ADMIN — ONDANSETRON 4 MG: 2 INJECTION INTRAMUSCULAR; INTRAVENOUS at 08:01

## 2017-01-11 RX ADMIN — Medication 500 MG: at 07:01

## 2017-01-11 NOTE — PLAN OF CARE
D/C instructions given to pt and family. Pt. Tolerating po fluids and denies pain and n/v at this time. IV dc'd. VSS. Family at bs for d/c. All consents and AVS in chart at time of discharge.

## 2017-01-11 NOTE — H&P
"Allyssa Wright  : 1980   MRN: [id number]  Date: 2017     Psychiatry - ECT  History & Physical    Chief complaint: Major Depressive Disorder, recurrent, in partial remission   Procedure: ECT #20    SUBJECTIVE:     HPI:   Allyssa Wright is a 36 y.o. female with MDD (major depressive disorder), recurrent, in partial remission who presents for ECT. Patient had last ECT session on 17.  Patient reports that she has been feeling a little bit better since her ECT treatment on Monday.  Patient states that she feels that her mood has improved.  Reports sleeping well throughout the night.  Patient continues to state that her appetite is "too good" and that she has issues with binge eating in the evening before bed.  Patient has not had any medication changes since Monday.  Patient reports some short term memory difficulty as well as problems with word-finding.  Patient denies any headaches, CP, SOB, fevers, chills, N/V.  Denies SI/HI or AH/VH.    Psychiatric Review of Systems:  Mood: "little bit better"  Appetite: reports problems with binge eating in the evening  Psychomotor: none  Cognitive Impairment: mild to be expected with ECT  Insomnia: None  Psychosis: absent   Diurnal Variation: absent   Suicidal Ideation: absent     Medical Review Of Systems:  Constitutional: negative for chills, fevers and sweats  Eyes: negative for irritation and visual disturbance  Ears, nose, mouth, throat, and face: negative for nasal congestion and sore throat  Respiratory: negative for cough, sputum and wheezing  Cardiovascular: negative for chest pain and dyspnea  Gastrointestinal: negative for N/V  Musculoskeletal:negative for arthralgias and back pain    Current Medications:   No current facility-administered medications on file prior to encounter.      Current Outpatient Prescriptions on File Prior to Encounter   Medication Sig Dispense Refill    buPROPion (WELLBUTRIN XL) 150 MG TB24 tablet Take 1 tablet (150 mg total) by " mouth once daily. 90 tablet 1    famciclovir (FAMVIR) 500 MG tablet Take 1 tablet (500 mg total) by mouth 2 (two) times daily. 30 tablet 12    hydrOXYzine pamoate (VISTARIL) 25 MG Cap Take 1 capsule (25 mg total) by mouth every 8 (eight) hours as needed (anxiety). 90 capsule 1    quetiapine (SEROQUEL) 50 MG tablet Take 150 mg by mouth 2 (two) times daily. 150 mg nightly and 25 mg in the morning      spironolactone (ALDACTONE) 50 MG tablet 50 mg 2 (two) times daily. 50  mg qAM, 50 mg qHS.      thyroid (ARMOUR THYROID) 30 mg Tab Take 1 tablet (30 mg total) by mouth every morning. 30 tablet 11    trazodone (DESYREL) 100 MG tablet Take 300 mg by mouth every evening.       venlafaxine (EFFEXOR-XR) 75 MG 24 hr capsule Take 1 capsule (75 mg total) by mouth nightly. Take with 150 mg for total daily dose of 225 mg. (Patient taking differently: Take 225 mg by mouth nightly. Pt taking 250 mg) 30 capsule 2    dapsone 7.5 % GlwP Apply topically once daily.      ZOVIRAX 5 % Crea   5      Allergies:   Ampicillin; Erythromycin; Levaquin [levofloxacin]; Penicillins; Pristiq [desvenlafaxine]; Sulfa (sulfonamide antibiotics); and Azithromycin    Past Medical/Surgical History:   Past Medical History   Diagnosis Date    Anxiety     Depression     History of psychiatric hospitalization     HSV-1 (herpes simplex virus 1) infection     Hx of psychiatric care     Moderate depressed bipolar II disorder 06/13/2016     reports no history of bipolar    Obsessive-compulsive disorder     Psychiatric problem     Self-harming behavior     Therapy      Past Surgical History   Procedure Laterality Date    Breast augmentation      Ankle surgery Right       OBJECTIVE:     Vitals (pre-procedure):  Vitals:    01/11/17 0627   BP: 107/63   Pulse: 85   Resp: 18   Temp: 97.9 °F (36.6 °C)        Labs/Imaging/Studies:   Recent Results (from the past 48 hour(s))   POCT urine pregnancy    Collection Time: 01/11/17  6:23 AM   Result Value Ref  "Range    POC Preg Test, Ur Negative Negative     Acceptable Yes       No results found for: PHENYTOIN, PHENOBARB, VALPROATE, CBMZ    Physical Exam:   Gen: AAOx4, NAD  CV: RRR, no murmurs  Chest: Clear to ausculation bilaterally, no wheezing  Abd: Soft, non-tender, non-distended   Ext: No clubbing, cyanosis      Mental Status Exam:   Appearance: normal weight, younger than stated age, neatly groomed, lying in bed  Behavior: friendly and cooperative  Speech: normal rate, normal volume, normal tone  Mood: "little bit better", improving  Affect: full and reactive   Thought Process: linear, goal directed   Thought Content: not suicidal or homicidal   Sensorium: grossly intact  Cognition: grossly intact  Insight: good  Judgment: good    ASSESSMENT/PLAN:     Allyssa Wright is a 36 y.o. female with MDD (major depressive disorder), recurrent, in partial remission who presents for ECT.    Recommendations:   Proceed with ECT #20.      Andreas Paulino MD  1/11/2017  "

## 2017-01-11 NOTE — IP AVS SNAPSHOT
[facility]  [address]  [facility] [address]  Phone: [phone]           Patient Discharge Instructions     Our goal is to set you up for success. This packet includes information on your condition, medications, and your home care. It will help you to care for yourself so you don't get sicker and need to go back to the hospital.     Please ask your nurse if you have any questions.        There are many details to remember when preparing to leave the hospital. Here is what you will need to do:    1. Take your medicine. If you are prescribed medications, review your Medication List in the following pages. You may have new medications to  at the pharmacy and others that you'll need to stop taking. Review the instructions for how and when to take your medications. Talk with your doctor or nurses if you are unsure of what to do.     2. Go to your follow-up appointments. Specific follow-up information is listed in the following pages. Your may be contacted by a transition nurse or clinical provider about future appointments. Be sure we have all of the phone numbers to reach you, if needed. Please contact your provider's office if you are unable to make an appointment.     3. Watch for warning signs. Your doctor or nurse will give you detailed warning signs to watch for and when to call for assistance. These instructions may also include educational information about your condition. If you experience any of warning signs to your health, call your doctor.               [facility]  Unless otherwise directed by your provider, please contact [facility], our nurse care line that is available for 24/7 assistance.     [phone] (toll-free)    Registered nurses in the [facility] provide clinical advisement, health education, appointment booking, and other advisory services.                    ** Verify the list of medication(s) below is accurate and up  to date. Carry this with you in case of emergency. If your medications have changed, please notify your healthcare provider.             Medication List      ASK your doctor about these medications        Additional Info                      buPROPion 150 MG TB24 tablet   Commonly known as:  WELLBUTRIN XL   Quantity:  90 tablet   Refills:  1   Dose:  150 mg    Instructions:  Take 1 tablet (150 mg total) by mouth once daily.     Begin Date    AM    Noon    PM    Bedtime       dapsone 7.5 % Glwp   Refills:  0    Instructions:  Apply topically once daily.     Begin Date    AM    Noon    PM    Bedtime       famciclovir 500 MG tablet   Commonly known as:  FAMVIR   Quantity:  30 tablet   Refills:  12   Dose:  500 mg    Instructions:  Take 1 tablet (500 mg total) by mouth 2 (two) times daily.     Begin Date    AM    Noon    PM    Bedtime       hydrOXYzine pamoate 25 MG Cap   Commonly known as:  VISTARIL   Quantity:  90 capsule   Refills:  1   Dose:  25 mg    Instructions:  Take 1 capsule (25 mg total) by mouth every 8 (eight) hours as needed (anxiety).     Begin Date    AM    Noon    PM    Bedtime       quetiapine 50 MG tablet   Commonly known as:  SEROQUEL   Refills:  0   Dose:  150 mg   Indications:  25 mg po in am    Instructions:  Take 150 mg by mouth 2 (two) times daily. 150 mg nightly and 25 mg in the morning     Begin Date    AM    Noon    PM    Bedtime       spironolactone 50 MG tablet   Commonly known as:  ALDACTONE   Refills:  0   Dose:  50 mg    Instructions:  50 mg 2 (two) times daily. 50  mg qAM, 50 mg qHS.     Begin Date    AM    Noon    PM    Bedtime       thyroid 30 mg Tab   Commonly known as:  ARMOUR THYROID   Quantity:  30 tablet   Refills:  11   Dose:  30 mg    Instructions:  Take 1 tablet (30 mg total) by mouth every morning.     Begin Date    AM    Noon    PM    Bedtime       trazodone 100 MG tablet   Commonly known as:  DESYREL   Refills:  0   Dose:  300 mg    Instructions:  Take 300 mg by mouth every  evening.     Begin Date    AM    Noon    PM    Bedtime       venlafaxine 75 MG 24 hr capsule   Commonly known as:  EFFEXOR-XR   Quantity:  30 capsule   Refills:  2   Dose:  75 mg    Instructions:  Take 1 capsule (75 mg total) by mouth nightly. Take with 150 mg for total daily dose of 225 mg.     Begin Date    AM    Noon    PM    Bedtime       ZOVIRAX 5 % Crea   Refills:  5   Generic drug:  acyclovir 5%      Begin Date    AM    Noon    PM    Bedtime                  Please bring to all follow up appointments:    1. A copy of your discharge instructions.  2. All medicines you are currently taking in their original bottles.  3. Identification and insurance card.    Please arrive 15 minutes ahead of scheduled appointment time.    Please call 24 hours in advance if you must reschedule your appointment and/or time.        Your Future Surgeries/Procedures     Jan 11, 2017   Surgery with Shoaib Boyce Jr., MD   [facility]    [address] [facility] [address]   [phone]            Jan 13, 2017   Surgery with Shoaib Boyce Jr., MD   [facility]    [address] [facility] [address]   [phone]                  Discharge Instructions         Understanding Electroconvulsive Therapy  Electroconvulsive therapy (ECT) is sometimes called shock therapy. This may sound painful, but ECT doesnt hurt. Its often the safest and best treatment for severe depression. It can treat other mental disorders as well.  What is electroconvulsive therapy?  ECT is used to treat people who are very depressed. Its mainly used when other treatments, such as antidepressant medications, have failed. Often, it may relieve feelings of sadness and despair in just a few days.  Common symptoms of major depression  Symptoms of major depression include:  · Feeling a deep sadness that doesnt go away  · Losing all pleasure in  life  · Feeling hopeless or helpless  · Feeling guilty  · Sleeping more or less than normal  · Eating more or less than normal  · Having headaches or stomachaches, or other pains that dont go away  · Feeling nervous, empty, or worthless  · Crying a great deal  · Thinking or talking about suicide or death  How is ECT therapy done?  Before an ECT treatment, youll receive anesthesia to keep you pain-free. Youll also be given medication to relax your muscles and control your heart rate. Your health care provider then places electrodes on your head. You may have one above each temple (bilateral ECT). Or, you may have electrodes on one temple and on your forehead (unilateral ECT). While you are asleep, your brain is stimulated very briefly with an electric current. This causes a  seizure, usually lasting less than a minute. Because you are under anesthesia, your body will not move even as your brain goes through great changes.  What are the risks?  When done properly, ECT is quite safe. Right after the treatment, you may be confused. This typically lasts for less than half an hour. You may have a headache or stiff muscles. But these symptoms often go away quickly. A more serious potential  side effect is memory loss. Commonly, patients have temporary difficulty remembering information that they learned recently, and may have little recall of the period during which they received treatment. Less commonly, patients may have permanent, spotty recall for significant past events or, rarely, loss of memory for larger blocks of time.  Looking to the future  In most cases, ECT does not cure depression, but it can improve symptoms for a period of time. You may need a series of ECT treatments to continue feeling the benefit. You may also need to take antidepressant medications to help prevent symptoms from returning. But with ongoing treatment, you can have a full and healthy life.  Resources  [facility]  858.312.5288  www.[facility].nih.gov  [facility]  206.353.8669  www.[facility].org     © 7914-5972 [employer]. [address] [place] [address], [place] [address]. All rights reserved. This information is not intended as a substitute for professional medical care. Always follow your healthcare professional's instructions.            Primary Diagnosis     Your primary diagnosis was:  Mood Problem      Admission Information     Date & Time Provider Department CSN    1/11/2017  5:50 AM Shoaib Boyce Jr., MD [facility] [id number]      Care Providers     Provider Role Specialty Primary office phone    Shoaib Boyce Jr., MD Attending Provider Psychiatry [phone]    Shoaib Boyce Jr., MD Surgeon  Psychiatry [phone]      Your Vitals Were     BP Pulse Temp Resp Height Weight    119/77 (BP Location: Left arm, Patient Position: Lying, BP Method: Automatic) 81 98.2 °F (36.8 °C) (Axillary) 18 5' 5" (1.651 m) 71.7 kg (158 lb)    SpO2 BMI             98% 26.29 kg/m2         Recent Lab Values     No lab values to display.      Allergies as of 1/11/2017        Reactions    Ampicillin     Mom says so    Erythromycin     Levaquin [Levofloxacin] Other (See Comments)    Depression side effects    Penicillins     Mom says so    Pristiq [Desvenlafaxine]     psycotic    Sulfa (Sulfonamide Antibiotics)     Rash    Azithromycin Anxiety      Advance Directives     An advance directive is a document which, in the event you are no longer able to make decisions for yourself, tells your healthcare team what kind of treatment you do or do not want to receive, or who you would like to make those decisions for you.  If you do not currently have an advance directive, [facility] encourages you to create one.  For more information call:  [phone]), [phone] ([phone]),  or log on to [email or web address].        Smoking Cessation     If you would like to quit smoking:   You " may be eligible for free services if you are a [place] resident and started smoking cigarettes before September 1, 1988.  Call the Smoking Cessation Trust (SCT) toll free at (509) 983-4019 or (991) 861-3380.   Call 1-800-QUIT-NOW if you do not meet the above criteria.            Language Assistance Services     ATTENTION: Language assistance services are available, free of charge. Please call [phone].      ATENCIÓN: Si habla español, tiene a rodriguez disposición servicios gratuitos de asistencia lingüística. Llame al [phone].     CHÚ Ý: N?u b?n nói Ti?ng Vi?t, có các d?ch v? h? tr? ngôn ng? mi?n phí dành cho b?n. G?i s? [phone].        [facility] Sign-Up     Activating your [facility] account is as easy as 1-2-3!     1) Visit [email or web address], select Sign Up Now, enter this activation code and your date of birth, then select Next.  [email or web address]  Expires: 2/23/2017  7:48 AM      2) Create a username and password to use when you visit [facility] in the future and select a security question in case you lose your password and select Next.    3) Enter your e-mail address and click Sign Up!    Additional Information  If you have questions, please e-mail [email or web address] or call [phone] to talk to our [facility] staff. Remember, [facility] is NOT to be used for urgent needs. For medical emergencies, dial 911.          [facility] complies with applicable Federal civil rights laws and does not discriminate on the basis of race, color, national origin, age, disability, or sex.

## 2017-01-11 NOTE — DISCHARGE INSTRUCTIONS
Understanding Electroconvulsive Therapy  Electroconvulsive therapy (ECT) is sometimes called shock therapy. This may sound painful, but ECT doesnt hurt. Its often the safest and best treatment for severe depression. It can treat other mental disorders as well.  What is electroconvulsive therapy?  ECT is used to treat people who are very depressed. Its mainly used when other treatments, such as antidepressant medications, have failed. Often, it may relieve feelings of sadness and despair in just a few days.  Common symptoms of major depression  Symptoms of major depression include:  · Feeling a deep sadness that doesnt go away  · Losing all pleasure in life  · Feeling hopeless or helpless  · Feeling guilty  · Sleeping more or less than normal  · Eating more or less than normal  · Having headaches or stomachaches, or other pains that dont go away  · Feeling nervous, empty, or worthless  · Crying a great deal  · Thinking or talking about suicide or death  How is ECT therapy done?  Before an ECT treatment, youll receive anesthesia to keep you pain-free. Youll also be given medication to relax your muscles and control your heart rate. Your health care provider then places electrodes on your head. You may have one above each temple (bilateral ECT). Or, you may have electrodes on one temple and on your forehead (unilateral ECT). While you are asleep, your brain is stimulated very briefly with an electric current. This causes a  seizure, usually lasting less than a minute. Because you are under anesthesia, your body will not move even as your brain goes through great changes.  What are the risks?  When done properly, ECT is quite safe. Right after the treatment, you may be confused. This typically lasts for less than half an hour. You may have a headache or stiff muscles. But these symptoms often go away quickly. A more serious potential  side effect is memory loss. Commonly, patients have temporary difficulty  remembering information that they learned recently, and may have little recall of the period during which they received treatment. Less commonly, patients may have permanent, spotty recall for significant past events or, rarely, loss of memory for larger blocks of time.  Looking to the future  In most cases, ECT does not cure depression, but it can improve symptoms for a period of time. You may need a series of ECT treatments to continue feeling the benefit. You may also need to take antidepressant medications to help prevent symptoms from returning. But with ongoing treatment, you can have a full and healthy life.  Resources  The National [employer] [facility] Mental [employer]  820.703.7317  www.nim.nih.gov  [facility]  716.339.8040  www.[facility].org     © 7335-4644 [employer]. [address] [place] [address], [place] [address]. All rights reserved. This information is not intended as a substitute for professional medical care. Always follow your healthcare professional's instructions.

## 2017-01-11 NOTE — ANESTHESIA RELEASE NOTE
"Anesthesia Release from PACU Note    Patient: Allyssa Wright    Procedure(s) Performed: Procedure(s) (LRB):  ELECTROCONVULSIVE THERAPY (ECT) - SINGLE SEIZURE (N/A)    Anesthesia type: general    Post pain: Adequate analgesia    Post assessment: no apparent anesthetic complications, tolerated procedure well and no evidence of recall    Last Vitals:   Visit Vitals    /76    Pulse 82    Temp 36.8 °C (98.2 °F) (Axillary)    Resp 18    Ht 5' 5" (1.651 m)    Wt 71.7 kg (158 lb)    SpO2 98%    Breastfeeding No    BMI 26.29 kg/m2       Post vital signs: stable    Level of consciousness: awake, alert  and oriented    Nausea/Vomiting: no nausea/no vomiting    Complications: none    Airway Patency: patent    Respiratory: unassisted, spontaneous ventilation, room air    Cardiovascular: stable and blood pressure at baseline    Hydration: euvolemic  "

## 2017-01-11 NOTE — ANESTHESIA POSTPROCEDURE EVALUATION
"Anesthesia Post Evaluation    Patient: Allyssa Wright    Procedure(s) Performed: Procedure(s) (LRB):  ELECTROCONVULSIVE THERAPY (ECT) - SINGLE SEIZURE (N/A)    Final Anesthesia Type: general  Patient location during evaluation: PACU  Patient participation: Yes- Able to Participate  Level of consciousness: awake and alert and oriented  Post-procedure vital signs: reviewed and stable  Pain management: adequate  Airway patency: patent  PONV status at discharge: No PONV  Anesthetic complications: no      Cardiovascular status: blood pressure returned to baseline and hemodynamically stable  Respiratory status: spontaneous ventilation, unassisted and room air  Hydration status: euvolemic  Follow-up not needed.        Visit Vitals    /76    Pulse 82    Temp 36.8 °C (98.2 °F) (Axillary)    Resp 18    Ht 5' 5" (1.651 m)    Wt 71.7 kg (158 lb)    SpO2 98%    Breastfeeding No    BMI 26.29 kg/m2       Pain/Roma Score: Pain Assessment Performed: Yes (1/11/2017  8:33 AM)  Presence of Pain: denies (1/11/2017  8:33 AM)  Roma Score: 10 (1/11/2017  8:33 AM)      "

## 2017-01-11 NOTE — ANESTHESIA PROCEDURE NOTES
ECT    Treatment Number: 20  Procedure start time: 1/11/2017 8:16 AM  Timeout performed at: 1/11/2017 8:16 AM  Procedure end time: 1/11/2017 8:25 AM    Staffing  Anesthesiologist: EMMA TOLENTINO  CRNA/Resident: INDIRA TUCKER  Performed by: resident/CRNA     Preanesthetic Checklist  The following were completed as part of the preanesthetic checklist: patient identified, procedural consent, pre-op evaluation, timeout performed, risks and benefits discussed, monitors and equipment checked, anesthesia consent given, oxygen available, suction available, hand hygiene performed and patient being monitored.    Setup & Induction  Patient Monitoring: heart rate, cardiac monitor, continuous pulse ox, continuous capnometry, NIBP and gas analyzer  Patient preparation: bite block inserted, extremities padded, mandibular stabilization and patient hyperventilated  Electrode Placement: Bitemporal    The patient was moved to the ECT therapy room after being assessed and consented for ECT. After standard ASA monitors were applied and timeout performed, the patient was adequately preoxygenated. After induction of general anesthesia, adequate oxygenation and ventilation were confirmed with pulse oxymetry and end tidal CO2 monitoring via bag-mask ventilation. End tidal CO2 was monitored throughout the case and moderate hyperventilation was performed prior to beginning ECT treatment. All extremities were padded, biteblock was inserted, and mandibular stabilization was done prior to initiating ECT therapy.    Procedure      Stimulus Number 3: Setting Number = III; 576 millicoulombs; Seizure Duration = 59 seconds        Recovery  After adequate recovery from general anesthesia, the patient was transported to recovery.  Baseline Blood Pressure: 121/76  Peak Blood Pressure: 152/90  Time to Recovery of Respirations: 5 minutes    ECT Findings  ECT associated findings of: None

## 2017-01-11 NOTE — DISCHARGE SUMMARY
Allyssa Wright  : 1980   MRN: [id number]  Date: 2017     Psychiatry - ECT  Discharge Summary    Admit Date: 2017  5:50 AM  Discharge Date: 2017    Attending Physician: Shoaib Boyce MD    Discharge Provider: Andreas Paulino MD    History of Present Illness: Allyssa Wright is a 36 y.o. female with Major Depressive Disorder, recurrent, in partial remission presented for ECT #20. See H&P dated 2017 for full HPI. For further details, see Dr. Boyce's pre-ECT evaluation.    Hospital Course: The patient tolerated the ECT treatment well without complication. Patient was stable post-procedure. See OP note dated 2017 for more details.     Disposition: Home or Self Care    Medications:  Current Discharge Medication List      CONTINUE these medications which have NOT CHANGED    Details   buPROPion (WELLBUTRIN XL) 150 MG TB24 tablet Take 1 tablet (150 mg total) by mouth once daily.  Qty: 90 tablet, Refills: 1      famciclovir (FAMVIR) 500 MG tablet Take 1 tablet (500 mg total) by mouth 2 (two) times daily.  Qty: 30 tablet, Refills: 12    Associated Diagnoses: Major depressive disorder, recurrent episode, moderate degree      hydrOXYzine pamoate (VISTARIL) 25 MG Cap Take 1 capsule (25 mg total) by mouth every 8 (eight) hours as needed (anxiety).  Qty: 90 capsule, Refills: 1      quetiapine (SEROQUEL) 50 MG tablet Take 150 mg by mouth 2 (two) times daily. 150 mg nightly and 25 mg in the morning      spironolactone (ALDACTONE) 50 MG tablet 50 mg 2 (two) times daily. 50  mg qAM, 50 mg qHS.      thyroid (ARMOUR THYROID) 30 mg Tab Take 1 tablet (30 mg total) by mouth every morning.  Qty: 30 tablet, Refills: 11    Associated Diagnoses: Major depressive disorder, recurrent episode, moderate degree      trazodone (DESYREL) 100 MG tablet Take 300 mg by mouth every evening.       venlafaxine (EFFEXOR-XR) 75 MG 24 hr capsule Take 1 capsule (75 mg total) by mouth nightly. Take with 150 mg for total  daily dose of 225 mg.  Qty: 30 capsule, Refills: 2      dapsone 7.5 % GlwP Apply topically once daily.      ZOVIRAX 5 % Crea Refills: 5           Status at Discharge: Alert and medically stable    Discharge Diagnoses:  Major Depressive Disorder, recurrent, in partial remission    Diet: Resume previous outpatient diet  Activity: Ambulate with assistance - patient is a fall risk  Instructions: Please do not eat or drink anything after midnight prior to procedure. Please do not drive on day of ECT.    Med Changes:  None    Next ECT:   ECT #21 on 1/13/17    Andreas Paulino MD  PGY II  1/11/2017

## 2017-01-11 NOTE — IP AVS SNAPSHOT
[address]  [place] [address]  Phone: [phone]           I have received a copy of my After Visit Summary and discharge instructions from [facility].    INSTRUCTIONS RECEIVED AND UNDERSTOOD BY:                     Patient/Patient Representative: ________________________________________________________________     Date/Time: ________________________________________________________________                     Instructions Given By: ________________________________________________________________     Date/Time: ________________________________________________________________

## 2017-01-11 NOTE — OP NOTE
Allyssa Wright  : 1980   MRN: [id number]  Date: 2017       Psychiatry - ECT  Operative Note             Date of Admission: 2017  5:50 AM    Site: [facility] [address]    Attending: Shoaib Boyce MD  Residents: Andreas Paulino MD  Pre-op Diagnosis: MDD, recurrent, in partial remission     ECT Treatment Number: 20  Machine Type: [employer][email or web address]    Patient Status: medically stable    Vitals (pre-procedure):  Vitals:    17 0627   BP: 107/63   Pulse: 85   Resp: 18   Temp: 97.9 °F (36.6 °C)       Electrode Placement: Bitemporal    Stimulus Number Charge (mC) Level Pulse Width (msec) Frequency  (Hz) Duration of Stimulus (sec) Current (mA) Duration of Seizure (sec)   1 576 3 1 60 6 800 59                                   Complications: HTN    Maximum Blood Pressure: 152/90    Medications Given:  Caffeine 500 mg  PO  Before  Methohexital (Brevital).   150mg  IV  During   Succinylcholine (Anectine).   120mg  IV  During   Ondansetron (Zofran).   4mg  IV  During  Toradol 30mg IV During   Labetalol 5 mg IV After    Treatment Course:  Pt tolerate procedure well. After adequate recovery from general anesthesia, the patient was transported to recovery.    Post-op Diagnosis: Same as above    Recommended for next ECT:  Next ECT #21 on 17    Andreas Paulino  2017

## 2017-01-11 NOTE — TRANSFER OF CARE
"Anesthesia Transfer of Care Note    Patient: Allyssa Wright    Procedure(s) Performed: Procedure(s) (LRB):  ELECTROCONVULSIVE THERAPY (ECT) - SINGLE SEIZURE (N/A)    Patient location: [facility]    Anesthesia Type: general    Transport from OR: Transported from OR on 6-10 L/min O2 by face mask with adequate spontaneous ventilation    Post pain: adequate analgesia    Post assessment: no apparent anesthetic complications    Post vital signs: stable    Level of consciousness: alert, oriented and awake    Nausea/Vomiting: no nausea/vomiting    Complications: none          Last vitals:   Visit Vitals    /65 (BP Location: Left arm, Patient Position: Lying, BP Method: Automatic)    Pulse 74    Temp 36.8 °C (98.2 °F) (Axillary)    Resp 18    Ht 5' 5" (1.651 m)    Wt 71.7 kg (158 lb)    SpO2 100%    Breastfeeding No    BMI 26.29 kg/m2     "

## 2017-01-13 ENCOUNTER — TELEPHONE (OUTPATIENT)
Dept: PSYCHIATRY | Facility: CLINIC | Age: 37
End: 2017-01-13

## 2017-01-13 NOTE — TELEPHONE ENCOUNTER
Patient's father called clinic afternoon of 1/13/17. States they had to cancel ECT treatment scheduled this morning due to Allyssa not feeling well in the AM. Nausea/  vomiting episode. Scheduled for surgery on Monday 1/16/17 with another doctor. Father told me they discussed a plan to resume ECT 2 weeks later on 1/30/17 once cleared.

## 2017-01-25 ENCOUNTER — TELEPHONE (OUTPATIENT)
Dept: PSYCHIATRY | Facility: CLINIC | Age: 37
End: 2017-01-25

## 2017-01-25 DIAGNOSIS — F33.41 RECURRENT MAJOR DEPRESSION IN PARTIAL REMISSION: Primary | ICD-10-CM

## 2017-01-25 NOTE — TELEPHONE ENCOUNTER
Received a call from Tania at Dr. Villasenor's office to notify Dr. Boyce that patient's surgery on 1/16/17 was able to be done successfully with no complications. He is clearing her to resume ECT treatments. Patient placed on the schedule 1/30/17. Spoke with patient as well and reports no complaints or concerns.

## 2017-01-30 ENCOUNTER — HOSPITAL ENCOUNTER (OUTPATIENT)
Facility: HOSPITAL | Age: 37
Discharge: HOME | End: 2017-01-30
Attending: PSYCHIATRY & NEUROLOGY | Admitting: PSYCHIATRY & NEUROLOGY
Payer: COMMERCIAL

## 2017-01-30 ENCOUNTER — ANESTHESIA EVENT (OUTPATIENT)
Dept: ELECTROPHYSIOLOGY | Facility: HOSPITAL | Age: 37
End: 2017-01-30
Payer: COMMERCIAL

## 2017-01-30 ENCOUNTER — ANESTHESIA (OUTPATIENT)
Dept: ELECTROPHYSIOLOGY | Facility: HOSPITAL | Age: 37
End: 2017-01-30
Payer: COMMERCIAL

## 2017-01-30 VITALS
RESPIRATION RATE: 18 BRPM | WEIGHT: 155 LBS | HEIGHT: 65 IN | SYSTOLIC BLOOD PRESSURE: 128 MMHG | TEMPERATURE: 98 F | HEART RATE: 94 BPM | BODY MASS INDEX: 25.83 KG/M2 | OXYGEN SATURATION: 100 % | DIASTOLIC BLOOD PRESSURE: 74 MMHG

## 2017-01-30 DIAGNOSIS — F33.41 MDD (MAJOR DEPRESSIVE DISORDER), RECURRENT, IN PARTIAL REMISSION: ICD-10-CM

## 2017-01-30 DIAGNOSIS — F33.40 RECURRENT MAJOR DEPRESSIVE DISORDER, IN REMISSION: Primary | ICD-10-CM

## 2017-01-30 PROCEDURE — 90870 ELECTROCONVULSIVE THERAPY: CPT | Performed by: ANESTHESIOLOGY

## 2017-01-30 PROCEDURE — 90870 ELECTROCONVULSIVE THERAPY: CPT | Mod: ,,, | Performed by: PSYCHIATRY & NEUROLOGY

## 2017-01-30 PROCEDURE — D9220A PRA ANESTHESIA: Mod: ,,, | Performed by: ANESTHESIOLOGY

## 2017-01-30 PROCEDURE — 63600175 PHARM REV CODE 636 W HCPCS

## 2017-01-30 PROCEDURE — 25000003 PHARM REV CODE 250

## 2017-01-30 PROCEDURE — 25000003 PHARM REV CODE 250: Performed by: PSYCHIATRY & NEUROLOGY

## 2017-01-30 RX ORDER — LIDOCAINE HYDROCHLORIDE 10 MG/ML
1 INJECTION, SOLUTION EPIDURAL; INFILTRATION; INTRACAUDAL; PERINEURAL ONCE
Status: DISCONTINUED | OUTPATIENT
Start: 2017-01-31 | End: 2017-01-30 | Stop reason: HOSPADM

## 2017-01-30 RX ORDER — METHOHEXITAL IN WATER/PF 100MG/10ML
SYRINGE (ML) INTRAVENOUS
Status: DISCONTINUED
Start: 2017-01-30 | End: 2017-01-30 | Stop reason: HOSPADM

## 2017-01-30 RX ORDER — SUCCINYLCHOLINE CHLORIDE 20 MG/ML
INJECTION INTRAMUSCULAR; INTRAVENOUS
Status: COMPLETED
Start: 2017-01-30 | End: 2017-01-30

## 2017-01-30 RX ORDER — SODIUM CHLORIDE 9 MG/ML
500 INJECTION, SOLUTION INTRAVENOUS ONCE
Status: DISCONTINUED | OUTPATIENT
Start: 2017-01-30 | End: 2017-01-30 | Stop reason: HOSPADM

## 2017-01-30 RX ORDER — LABETALOL HYDROCHLORIDE 5 MG/ML
INJECTION, SOLUTION INTRAVENOUS
Status: DISCONTINUED
Start: 2017-01-30 | End: 2017-01-30 | Stop reason: HOSPADM

## 2017-01-30 RX ORDER — METHOHEXITAL IN WATER/PF 100MG/10ML
SYRINGE (ML) INTRAVENOUS
Status: COMPLETED
Start: 2017-01-30 | End: 2017-01-30

## 2017-01-30 RX ORDER — ONDANSETRON 2 MG/ML
INJECTION INTRAMUSCULAR; INTRAVENOUS
Status: COMPLETED
Start: 2017-01-30 | End: 2017-01-30

## 2017-01-30 RX ORDER — OXYCODONE AND ACETAMINOPHEN 10; 325 MG/1; MG/1
1 TABLET ORAL EVERY 4 HOURS PRN
Status: ON HOLD | COMMUNITY
End: 2017-02-21 | Stop reason: HOSPADM

## 2017-01-30 RX ORDER — SODIUM CHLORIDE 9 MG/ML
INJECTION, SOLUTION INTRAVENOUS CONTINUOUS
Status: DISCONTINUED | OUTPATIENT
Start: 2017-01-31 | End: 2017-01-30 | Stop reason: HOSPADM

## 2017-01-30 RX ORDER — KETOROLAC TROMETHAMINE 30 MG/ML
INJECTION, SOLUTION INTRAMUSCULAR; INTRAVENOUS
Status: COMPLETED
Start: 2017-01-30 | End: 2017-01-30

## 2017-01-30 RX ADMIN — ONDANSETRON 4 MG: 2 INJECTION INTRAMUSCULAR; INTRAVENOUS at 08:01

## 2017-01-30 RX ADMIN — SUCCINYLCHOLINE CHLORIDE 120 MG: 20 INJECTION, SOLUTION INTRAMUSCULAR; INTRAVENOUS at 08:01

## 2017-01-30 RX ADMIN — SODIUM CHLORIDE 10 ML/HR: 0.9 INJECTION, SOLUTION INTRAVENOUS at 06:01

## 2017-01-30 RX ADMIN — Medication 500 MG: at 07:01

## 2017-01-30 RX ADMIN — KETOROLAC TROMETHAMINE 30 MG: 30 INJECTION, SOLUTION INTRAMUSCULAR; INTRAVENOUS at 08:01

## 2017-01-30 RX ADMIN — Medication 150 MG: at 08:01

## 2017-01-30 NOTE — ANESTHESIA PROCEDURE NOTES
ECT    Treatment Number: 21  Procedure start time: 1/30/2017 8:02 AM  Timeout performed at: 1/30/2017 8:01 AM  Procedure end time: 1/30/2017 8:13 AM    Staffing  Anesthesiologist: JUAN LOPEZ  CRNA/Resident: MITESH MOSS  Performed by: anesthesiologist and resident/CRNA     Preanesthetic Checklist  The following were completed as part of the preanesthetic checklist: patient identified, procedural consent, pre-op evaluation, timeout performed, risks and benefits discussed, monitors and equipment checked, anesthesia consent given, oxygen available, suction available, hand hygiene performed and patient being monitored.    Setup & Induction  Patient Monitoring: heart rate, cardiac monitor, continuous pulse ox, continuous capnometry, NIBP and gas analyzer  Patient preparation: bite block inserted, extremities padded, mandibular stabilization and patient hyperventilated  Electrode Placement: Bitemporal    The patient was moved to the ECT therapy room after being assessed and consented for ECT. After standard ASA monitors were applied and timeout performed, the patient was adequately preoxygenated. After induction of general anesthesia, adequate oxygenation and ventilation were confirmed with pulse oxymetry and end tidal CO2 monitoring via bag-mask ventilation. End tidal CO2 was monitored throughout the case and moderate hyperventilation was performed prior to beginning ECT treatment. All extremities were padded, biteblock was inserted, and mandibular stabilization was done prior to initiating ECT therapy.    Procedure  Stimulus Number 1: Setting Number = III; 576 millicoulombs; Seizure Duration = 80 seconds            Recovery  Baseline Blood Pressure: 121/71  Peak Blood Pressure: 190/91  Time to Recovery of Respirations: 2 minutes    ECT Findings  ECT associated findings of: Tachycardia - Sinus (HR >120)

## 2017-01-30 NOTE — TRANSFER OF CARE
"Anesthesia Transfer of Care Note    Patient: Allyssa Wright    Procedure(s) Performed: Procedure(s) (LRB):  ELECTROCONVULSIVE THERAPY (ECT) - SINGLE SEIZURE (N/A)    Patient location: [facility]    Anesthesia Type: general    Transport from OR: Transported from OR on room air with adequate spontaneous ventilation    Post pain: adequate analgesia    Post assessment: no apparent anesthetic complications    Post vital signs: stable    Level of consciousness: awake    Nausea/Vomiting: no nausea/vomiting    Complications: none          Last vitals:   Visit Vitals    BP (!) 118/52 (BP Location: Left arm, Patient Position: Lying, BP Method: Automatic)    Pulse 92    Temp 36.6 °C (97.9 °F) (Axillary)    Resp 16    Ht 5' 5" (1.651 m)    Wt 70.3 kg (155 lb)    LMP     SpO2 100%    Breastfeeding No    BMI 25.79 kg/m2     "

## 2017-01-30 NOTE — DISCHARGE INSTRUCTIONS
Understanding Electroconvulsive Therapy  Electroconvulsive therapy (ECT) is sometimes called shock therapy. This may sound painful, but ECT doesnt hurt. Its often the safest and best treatment for severe depression. It can treat other mental disorders as well.  What is electroconvulsive therapy?  ECT is used to treat people who are very depressed. Its mainly used when other treatments, such as antidepressant medications, have failed. Often, it may relieve feelings of sadness and despair in just a few days.  Common symptoms of major depression  Symptoms of major depression include:  · Feeling a deep sadness that doesnt go away  · Losing all pleasure in life  · Feeling hopeless or helpless  · Feeling guilty  · Sleeping more or less than normal  · Eating more or less than normal  · Having headaches or stomachaches, or other pains that dont go away  · Feeling nervous, empty, or worthless  · Crying a great deal  · Thinking or talking about suicide or death  How is ECT therapy done?  Before an ECT treatment, youll receive anesthesia to keep you pain-free. Youll also be given medication to relax your muscles and control your heart rate. Your health care provider then places electrodes on your head. You may have one above each temple (bilateral ECT). Or, you may have electrodes on one temple and on your forehead (unilateral ECT). While you are asleep, your brain is stimulated very briefly with an electric current. This causes a  seizure, usually lasting less than a minute. Because you are under anesthesia, your body will not move even as your brain goes through great changes.  What are the risks?  When done properly, ECT is quite safe. Right after the treatment, you may be confused. This typically lasts for less than half an hour. You may have a headache or stiff muscles. But these symptoms often go away quickly. A more serious potential  side effect is memory loss. Commonly, patients have temporary difficulty  remembering information that they learned recently, and may have little recall of the period during which they received treatment. Less commonly, patients may have permanent, spotty recall for significant past events or, rarely, loss of memory for larger blocks of time.  Looking to the future  In most cases, ECT does not cure depression, but it can improve symptoms for a period of time. You may need a series of ECT treatments to continue feeling the benefit. You may also need to take antidepressant medications to help prevent symptoms from returning. But with ongoing treatment, you can have a full and healthy life.  Resources  The [employer] [place] [employer]  417.574.1333  www.nim.nih.gov  [facility]  932.242.5638  www.[facility].org     © 6763-0941 [employer]. [address] [place] [address], [place] [address]. All rights reserved. This information is not intended as a substitute for professional medical care. Always follow your healthcare professional's instructions.

## 2017-01-30 NOTE — H&P
"Allyssa Wright  : 1980   MRN: [id number]  Date: 2017     Psychiatry - ECT  History & Physical    Chief complaint: Major Depressive Disorder, recurrent, in partial remission   Procedure: ECT #21    SUBJECTIVE:     HPI:   Allyssa Wright is a 36 y.o. female with MDD (major depressive disorder), recurrent, in partial remission who presents for ECT. Patient had last ECT session on 17.  Patient reports that she has been feeling "better" since her last ECT treatment.  Patient states that she feels that her mood has improved, and denies any current SI, HI, AVH, delusions, or paranoia.  Reports sleeping well throughout the night.  Patient continues to state that her appetite is "too good" and that she has issues with binge eating in the evening before bed.  Patient has not had any medication changes.  Patient reports some short term memory difficulty as well as problems with word-finding.  Patient denies any headaches, CP, SOB, fevers, chills, N/V.    Psychiatric Review of Systems:  Mood: "better"  Appetite: reports problems with binge eating in the evening  Psychomotor: none  Cognitive Impairment: mild to be expected with ECT  Insomnia: None  Psychosis: absent   Diurnal Variation: absent   Suicidal Ideation: absent     Medical Review Of Systems:  Constitutional: negative for chills, fevers and sweats  Eyes: negative for irritation and visual disturbance  Ears, nose, mouth, throat, and face: negative for nasal congestion and sore throat  Respiratory: negative for cough, sputum and wheezing  Cardiovascular: negative for chest pain and dyspnea  Gastrointestinal: negative for N/V  Musculoskeletal:negative for arthralgias and back pain    Current Medications:   No current facility-administered medications on file prior to encounter.      Current Outpatient Prescriptions on File Prior to Encounter   Medication Sig Dispense Refill    buPROPion (WELLBUTRIN XL) 150 MG TB24 tablet Take 1 tablet (150 mg total) by mouth " once daily. 90 tablet 1    dapsone 7.5 % GlwP Apply topically once daily.      famciclovir (FAMVIR) 500 MG tablet Take 1 tablet (500 mg total) by mouth 2 (two) times daily. 30 tablet 12    hydrOXYzine pamoate (VISTARIL) 25 MG Cap Take 1 capsule (25 mg total) by mouth every 8 (eight) hours as needed (anxiety). 90 capsule 1    quetiapine (SEROQUEL) 50 MG tablet Take 150 mg by mouth 2 (two) times daily. 150 mg nightly and 25 mg in the morning      spironolactone (ALDACTONE) 50 MG tablet 50 mg 2 (two) times daily. 50  mg qAM, 50 mg qHS.      thyroid (ARMOUR THYROID) 30 mg Tab Take 1 tablet (30 mg total) by mouth every morning. 30 tablet 11    trazodone (DESYREL) 100 MG tablet Take 300 mg by mouth every evening.       venlafaxine (EFFEXOR-XR) 75 MG 24 hr capsule Take 1 capsule (75 mg total) by mouth nightly. Take with 150 mg for total daily dose of 225 mg. (Patient taking differently: Take 225 mg by mouth nightly. Pt taking 250 mg) 30 capsule 2    ZOVIRAX 5 % Crea   5      Allergies:   Ampicillin; Erythromycin; Levaquin [levofloxacin]; Penicillins; Pristiq [desvenlafaxine]; Sulfa (sulfonamide antibiotics); and Azithromycin    Past Medical/Surgical History:   Past Medical History   Diagnosis Date    Anxiety     Depression     History of psychiatric hospitalization     HSV-1 (herpes simplex virus 1) infection     Hx of psychiatric care     Moderate depressed bipolar II disorder 06/13/2016     reports no history of bipolar    Obsessive-compulsive disorder     Psychiatric problem     Self-harming behavior     Therapy      Past Surgical History   Procedure Laterality Date    Breast augmentation      Ankle surgery Right     Ovarian cyst removal Bilateral       OBJECTIVE:     Vitals (pre-procedure):  Vitals:    01/30/17 0621   BP: 117/68   Pulse: 92   Resp: 16   Temp: 98.1 °F (36.7 °C)        Labs/Imaging/Studies:   No results found for this or any previous visit (from the past 48 hour(s)).   No results  "found for: PHENYTOIN, PHENOBARB, VALPROATE, CBMZ    Physical Exam:   Gen: AAOx4, NAD  CV: RRR, no murmurs  Chest: Clear to ausculation bilaterally, no wheezing  Abd: Soft, non-tender, non-distended   Ext: No clubbing, cyanosis      Mental Status Exam:   Appearance: normal weight, younger than stated age, neatly groomed, lying in bed  Behavior: friendly and cooperative  Speech: normal rate, normal volume, normal tone  Mood: "better"  Affect: full and reactive   Thought Process: linear, goal directed   Thought Content: not suicidal or homicidal   Sensorium: grossly intact  Cognition: grossly intact  Insight: good  Judgment: good    ASSESSMENT/PLAN:     Allyssa Wright is a 36 y.o. female with MDD (major depressive disorder), recurrent, in partial remission who presents for ECT.    Recommendations:   Proceed with ECT #21    Jeffy Pierre MD  1/30/2017  "

## 2017-01-30 NOTE — IP AVS SNAPSHOT
[facility]  [address]  [facility] [address]  Phone: [phone]           Patient Discharge Instructions     Our goal is to set you up for success. This packet includes information on your condition, medications, and your home care. It will help you to care for yourself so you don't get sicker and need to go back to the hospital.     Please ask your nurse if you have any questions.        There are many details to remember when preparing to leave the hospital. Here is what you will need to do:    1. Take your medicine. If you are prescribed medications, review your Medication List in the following pages. You may have new medications to  at the pharmacy and others that you'll need to stop taking. Review the instructions for how and when to take your medications. Talk with your doctor or nurses if you are unsure of what to do.     2. Go to your follow-up appointments. Specific follow-up information is listed in the following pages. Your may be contacted by a transition nurse or clinical provider about future appointments. Be sure we have all of the phone numbers to reach you, if needed. Please contact your provider's office if you are unable to make an appointment.     3. Watch for warning signs. Your doctor or nurse will give you detailed warning signs to watch for and when to call for assistance. These instructions may also include educational information about your condition. If you experience any of warning signs to your health, call your doctor.               [facility]  Unless otherwise directed by your provider, please contact [facility], our nurse care line that is available for 24/7 assistance.     [phone] (toll-free)    Registered nurses in the [facility] provide clinical advisement, health education, appointment booking, and other advisory services.                    ** Verify the list of medication(s) below is accurate and up  to date. Carry this with you in case of emergency. If your medications have changed, please notify your healthcare provider.             Medication List      CHANGE how you take these medications        Additional Info                      venlafaxine 75 MG 24 hr capsule   Commonly known as:  EFFEXOR-XR   Quantity:  30 capsule   Refills:  2   Dose:  75 mg   What changed:    - how much to take  - additional instructions    Instructions:  Take 1 capsule (75 mg total) by mouth nightly. Take with 150 mg for total daily dose of 225 mg.     Begin Date    AM    Noon    PM    Bedtime         CONTINUE taking these medications        Additional Info                      dapsone 7.5 % Glwp   Refills:  0    Instructions:  Apply topically once daily.     Begin Date    AM    Noon    PM    Bedtime       famciclovir 500 MG tablet   Commonly known as:  FAMVIR   Quantity:  30 tablet   Refills:  12   Dose:  500 mg    Instructions:  Take 1 tablet (500 mg total) by mouth 2 (two) times daily.     Begin Date    AM    Noon    PM    Bedtime       hydrOXYzine pamoate 25 MG Cap   Commonly known as:  VISTARIL   Quantity:  90 capsule   Refills:  1   Dose:  25 mg    Instructions:  Take 1 capsule (25 mg total) by mouth every 8 (eight) hours as needed (anxiety).     Begin Date    AM    Noon    PM    Bedtime       oxycodone-acetaminophen  mg per tablet   Commonly known as:  PERCOCET   Refills:  0   Dose:  1 tablet    Instructions:  Take 1 tablet by mouth every 4 (four) hours as needed for Pain.     Begin Date    AM    Noon    PM    Bedtime       quetiapine 50 MG tablet   Commonly known as:  SEROQUEL   Refills:  0   Dose:  150 mg   Indications:  25 mg po in am    Instructions:  Take 150 mg by mouth 2 (two) times daily. 150 mg nightly and 25 mg in the morning     Begin Date    AM    Noon    PM    Bedtime       spironolactone 50 MG tablet   Commonly known as:  ALDACTONE   Refills:  0   Dose:  50 mg    Instructions:  50 mg 2 (two) times daily. 50   mg qAM, 50 mg qHS.     Begin Date    AM    Noon    PM    Bedtime       thyroid Tab   Commonly known as:  ARMOUR THYROID   Quantity:  30 tablet   Refills:  11   Dose:  30 mg    Instructions:  Take 1 tablet (30 mg total) by mouth every morning.     Begin Date    AM    Noon    PM    Bedtime       trazodone 100 MG tablet   Commonly known as:  DESYREL   Refills:  0   Dose:  300 mg    Instructions:  Take 300 mg by mouth every evening.     Begin Date    AM    Noon    PM    Bedtime       ZOVIRAX 5 % Crea   Refills:  5   Generic drug:  acyclovir 5%      Begin Date    AM    Noon    PM    Bedtime         STOP taking these medications     buPROPion 150 MG TB24 tablet   Commonly known as:  WELLBUTRIN XL                  Please bring to all follow up appointments:    1. A copy of your discharge instructions.  2. All medicines you are currently taking in their original bottles.  3. Identification and insurance card.    Please arrive 15 minutes ahead of scheduled appointment time.    Please call 24 hours in advance if you must reschedule your appointment and/or time.        Follow-up Information     Follow up with [facility] On 2/21/2017.    Why:  for ECT    Contact information:    [facility] [address]  [facility] [address]          Discharge Instructions     Future Orders    Activity as tolerated     Diet general     Questions:    Total calories:      Fat restriction, if any:      Protein restriction, if any:      Na restriction, if any:      Fluid restriction:      Additional restrictions:          Discharge Instructions         Understanding Electroconvulsive Therapy  Electroconvulsive therapy (ECT) is sometimes called shock therapy. This may sound painful, but ECT doesnt hurt. Its often the safest and best treatment for severe depression. It can treat other mental disorders as well.  What is electroconvulsive therapy?  ECT is used to treat people who are very depressed. Its mainly used when other treatments,  such as antidepressant medications, have failed. Often, it may relieve feelings of sadness and despair in just a few days.  Common symptoms of major depression  Symptoms of major depression include:  · Feeling a deep sadness that doesnt go away  · Losing all pleasure in life  · Feeling hopeless or helpless  · Feeling guilty  · Sleeping more or less than normal  · Eating more or less than normal  · Having headaches or stomachaches, or other pains that dont go away  · Feeling nervous, empty, or worthless  · Crying a great deal  · Thinking or talking about suicide or death  How is ECT therapy done?  Before an ECT treatment, youll receive anesthesia to keep you pain-free. Youll also be given medication to relax your muscles and control your heart rate. Your health care provider then places electrodes on your head. You may have one above each temple (bilateral ECT). Or, you may have electrodes on one temple and on your forehead (unilateral ECT). While you are asleep, your brain is stimulated very briefly with an electric current. This causes a  seizure, usually lasting less than a minute. Because you are under anesthesia, your body will not move even as your brain goes through great changes.  What are the risks?  When done properly, ECT is quite safe. Right after the treatment, you may be confused. This typically lasts for less than half an hour. You may have a headache or stiff muscles. But these symptoms often go away quickly. A more serious potential  side effect is memory loss. Commonly, patients have temporary difficulty remembering information that they learned recently, and may have little recall of the period during which they received treatment. Less commonly, patients may have permanent, spotty recall for significant past events or, rarely, loss of memory for larger blocks of time.  Looking to the future  In most cases, ECT does not cure depression, but it can improve symptoms for a period of time. You  "may need a series of ECT treatments to continue feeling the benefit. You may also need to take antidepressant medications to help prevent symptoms from returning. But with ongoing treatment, you can have a full and healthy life.  Resources  The National [place] of Mental Health  349.519.8646  www.[facility].nih.gov  [facility]  223.574.6256  www.[email or web address].org     © 8683-7578 [employer]. [address] [place] [address], [place] [address]. All rights reserved. This information is not intended as a substitute for professional medical care. Always follow your healthcare professional's instructions.            Admission Information     Date & Time Provider Department CSN    1/30/2017  5:52 AM Shoaib Boyce Jr., MD [facility] [id number]      Care Providers     Provider Role Specialty Primary office phone    Shoaib Boyce Jr., MD Attending Provider Psychiatry [phone]    Shoaib Boyce Jr., MD Surgeon  Psychiatry [phone]      Your Vitals Were     BP Pulse Temp Resp Height Weight    128/74 94 98.1 °F (36.7 °C) (Oral) 18 5' 5" (1.651 m) 70.3 kg (155 lb)    SpO2 BMI             100% 25.79 kg/m2         Recent Lab Values     No lab values to display.      Allergies as of 1/30/2017        Reactions    Ampicillin     Mom says so    Erythromycin     Levaquin [Levofloxacin] Other (See Comments)    Depression side effects    Penicillins     Mom says so    Pristiq [Desvenlafaxine]     psycotic    Sulfa (Sulfonamide Antibiotics)     Rash    Azithromycin Anxiety      Advance Directives     An advance directive is a document which, in the event you are no longer able to make decisions for yourself, tells your healthcare team what kind of treatment you do or do not want to receive, or who you would like to make those decisions for you.  If you do not currently have an advance directive, [facility] encourages you to create one.  For more information call:  [phone] ([phone]), " [phone] ([phone]),  or log on to [email or web address].        Smoking Cessation     If you would like to quit smoking:   You may be eligible for free services if you are a [place] resident and started smoking cigarettes before September 1, 1988.  Call the Smoking Cessation Trust (SCT) toll free at (272) 812-3344 or (970) 890-3176.   Call -088-QUIT-NOW if you do not meet the above criteria.            Language Assistance Services     ATTENTION: Language assistance services are available, free of charge. Please call [phone].      ATENCIÓN: Si habla español, tiene a rodriguez disposición servicios gratuitos de asistencia lingüística. Llame al [phone].     CHÚ Ý: N?u b?n nói Ti?ng Vi?t, có các d?ch v? h? tr? ngôn ng? mi?n phí dành cho b?n. G?i s? [phone].        [facility] Sign-Up     Activating your [facility] account is as easy as 1-2-3!     1) Visit [email or web address], select Sign Up Now, enter this activation code and your date of birth, then select Next.  [email or web address]  Expires: 2/23/2017  7:48 AM      2) Create a username and password to use when you visit [facility] in the future and select a security question in case you lose your password and select Next.    3) Enter your e-mail address and click Sign Up!    Additional Information  If you have questions, please e-mail [email or web address] or call [phone] to talk to our [facility] staff. Remember, [facility] is NOT to be used for urgent needs. For medical emergencies, dial 911.          [facility] complies with applicable Federal civil rights laws and does not discriminate on the basis of race, color, national origin, age, disability, or sex.

## 2017-01-30 NOTE — ANESTHESIA POSTPROCEDURE EVALUATION
"Anesthesia Post Evaluation    Patient: Allyssa Wright    Procedure(s) Performed: Procedure(s) (LRB):  ELECTROCONVULSIVE THERAPY (ECT) - SINGLE SEIZURE (N/A)    Final Anesthesia Type: general  Patient location during evaluation: [facility]  Patient participation: Yes- Able to Participate  Level of consciousness: awake and alert  Post-procedure vital signs: reviewed and stable  Pain management: adequate  Airway patency: patent  PONV status at discharge: No PONV  Anesthetic complications: no      Cardiovascular status: blood pressure returned to baseline  Respiratory status: unassisted, spontaneous ventilation and room air  Hydration status: euvolemic  Follow-up not needed.        Visit Vitals    /73    Pulse 99    Temp 36.6 °C (97.9 °F) (Axillary)    Resp 16    Ht 5' 5" (1.651 m)    Wt 70.3 kg (155 lb)    LMP     SpO2 100%    Breastfeeding No    BMI 25.79 kg/m2       Pain/Roma Score: Pain Assessment Performed: Yes (1/30/2017  8:20 AM)  Presence of Pain: denies (1/30/2017  8:20 AM)  Roma Score: 10 (1/30/2017  8:20 AM)      "

## 2017-01-30 NOTE — DISCHARGE SUMMARY
Allyssa Wright  : 1980   MRN: [id number]  Date: 2017     Psychiatry - ECT  Discharge Summary    Admit Date: 2017  5:52 AM  Discharge Date: 2017    Attending Physician: Shoaib Boyce MD    Discharge Provider: Jeffy Pierre MD    History of Present Illness: Allyssa Wright is a 36 y.o. female with Major Depressive Disorder, recurrent, in partial remission presented for ECT #21. See H&P dated 2017 for full HPI. For further details, see Dr. Boyce's pre-ECT evaluation.    Hospital Course: The patient tolerated the ECT treatment well without complication. Patient was stable post-procedure. See OP note dated 2017 for more details.     Disposition: Home or Self Care    Medications:  Current Discharge Medication List      CONTINUE these medications which have NOT CHANGED    Details   dapsone 7.5 % GlwP Apply topically once daily.      famciclovir (FAMVIR) 500 MG tablet Take 1 tablet (500 mg total) by mouth 2 (two) times daily.  Qty: 30 tablet, Refills: 12    Associated Diagnoses: Major depressive disorder, recurrent episode, moderate degree      hydrOXYzine pamoate (VISTARIL) 25 MG Cap Take 1 capsule (25 mg total) by mouth every 8 (eight) hours as needed (anxiety).  Qty: 90 capsule, Refills: 1      oxycodone-acetaminophen (PERCOCET)  mg per tablet Take 1 tablet by mouth every 4 (four) hours as needed for Pain.      quetiapine (SEROQUEL) 50 MG tablet Take 150 mg by mouth 2 (two) times daily. 150 mg nightly and 25 mg in the morning      spironolactone (ALDACTONE) 50 MG tablet 50 mg 2 (two) times daily. 50  mg qAM, 50 mg qHS.      thyroid (ARMOUR THYROID) 30 mg Tab Take 1 tablet (30 mg total) by mouth every morning.  Qty: 30 tablet, Refills: 11    Associated Diagnoses: Major depressive disorder, recurrent episode, moderate degree      trazodone (DESYREL) 100 MG tablet Take 300 mg by mouth every evening.       venlafaxine (EFFEXOR-XR) 75 MG 24 hr capsule Take 1 capsule (75 mg total)  by mouth nightly. Take with 150 mg for total daily dose of 225 mg.  Qty: 30 capsule, Refills: 2      ZOVIRAX 5 % Crea Refills: 5         STOP taking these medications       buPROPion (WELLBUTRIN XL) 150 MG TB24 tablet Comments:   Reason for Stopping:             Status at Discharge: Alert and medically stable    Discharge Diagnoses:  Major Depressive Disorder, recurrent, in partial remission    Diet: Resume previous outpatient diet  Activity: Ambulate with assistance - patient is a fall risk  Instructions: Please do not eat or drink anything after midnight prior to procedure. Please do not drive on day of ECT.    Med Changes:  None    Next ECT:  Follow-up Information     Follow up with [facility] On 2/21/2017.    Why:  for ECT    Contact information:    [address] [place] [id number]         ECT #22 on 2/21/17    Jeffy Pierre MD  [facility]  PGY-2  1/30/2017 8:18 AM

## 2017-01-30 NOTE — ANESTHESIA PREPROCEDURE EVALUATION
01/30/2017  Allyssa Wright is a 36 y.o., female.    Pre-operative evaluation for Procedure(s) (LRB):  ELECTROCONVULSIVE THERAPY (ECT) - SINGLE SEIZURE (N/A)    Allyssa Wright is a 36 y.o. female with MDD, recurrent severe, who is scheduled for continuance of ECT. This is ECT #21. No no issues with last session.           Patient Active Problem List   Diagnosis    MDD (major depressive disorder), recurrent, in partial remission    Major depressive disorder    Major depressive disorder in partial remission    Major depressive disorder, recurrent, in partial remission    Recurrent major depressive disorder       Review of patient's allergies indicates:   Allergen Reactions    Ampicillin      Mom says so    Erythromycin     Levaquin [levofloxacin] Other (See Comments)     Depression side effects    Penicillins      Mom says so    Pristiq [desvenlafaxine]      psycotic      Sulfa (sulfonamide antibiotics)      Rash      Azithromycin Anxiety        No current facility-administered medications on file prior to encounter.      Current Outpatient Prescriptions on File Prior to Encounter   Medication Sig Dispense Refill    buPROPion (WELLBUTRIN XL) 150 MG TB24 tablet Take 1 tablet (150 mg total) by mouth once daily. 90 tablet 1    dapsone 7.5 % GlwP Apply topically once daily.      famciclovir (FAMVIR) 500 MG tablet Take 1 tablet (500 mg total) by mouth 2 (two) times daily. 30 tablet 12    hydrOXYzine pamoate (VISTARIL) 25 MG Cap Take 1 capsule (25 mg total) by mouth every 8 (eight) hours as needed (anxiety). 90 capsule 1    quetiapine (SEROQUEL) 50 MG tablet Take 150 mg by mouth 2 (two) times daily. 150 mg nightly and 25 mg in the morning      spironolactone (ALDACTONE) 50 MG tablet 50 mg 2 (two) times daily. 50  mg qAM, 50 mg qHS.      thyroid (ARMOUR THYROID) 30 mg Tab Take 1 tablet (30 mg total)  by mouth every morning. 30 tablet 11    trazodone (DESYREL) 100 MG tablet Take 300 mg by mouth every evening.       venlafaxine (EFFEXOR-XR) 75 MG 24 hr capsule Take 1 capsule (75 mg total) by mouth nightly. Take with 150 mg for total daily dose of 225 mg. (Patient taking differently: Take 225 mg by mouth nightly. Pt taking 250 mg) 30 capsule 2    ZOVIRAX 5 % Crea   5       Past Surgical History   Procedure Laterality Date    Breast augmentation      Ankle surgery Right     Ovarian cyst removal Bilateral        Social History     Social History    Marital status: Single     Spouse name: N/A    Number of children: N/A    Years of education: N/A     Occupational History    unemployed      Social History Main Topics    Smoking status: Former Smoker     Quit date: 4/6/2011    Smokeless tobacco: Not on file    Alcohol use No      Comment: Alcohol misuse in remote past    Drug use: No      Comment: Experimental use in high school    Sexual activity: Not on file     Other Topics Concern    Not on file     Social History Narrative    Pt has 1 older brother from an intact family until the death of her mother in 2012.  She completed 1 year of college, was never in the , and has never been employed.  She was engaged once, but never , has no children, and lives with her father plus 2 dogs.  She denies any hobbies and is spiritual but not Baptism.  She does date and returns to college during rare periods when depression and anxiety [place].         Vital Signs Range (Last 24H):  Temp:  [36.7 °C (98.1 °F)]   Pulse:  [92]   Resp:  [16]   BP: (117)/(68)   SpO2:  [98 %]         Diagnostic Studies:      EKG:  Normal sinus rhythm  Normal ECG  When compared with ECG of 03-DEC-2015 11:38,  Questionable change in The axis  T wave inversion no longer evident in Inferior leads  Confirmed by Flaco Sr MD ([id number]) on 6/9/2016 1:30:13 PM    Referred By: SELF REFERRAL           Confirmed By:Flaco Sr MD          [facility]  Anesthesia Evaluation    I have reviewed the Patient Summary Reports.    I have reviewed the Nursing Notes.   I have reviewed the Medications.     Review of Systems  Anesthesia Hx:  No problems with previous Anesthesia Denies Hx of Anesthetic complications  History of prior surgery of interest to airway management or planning: Previous anesthesia: General 7/22/2016 with general anesthesia.  Denies Family Hx of Anesthesia complications.   Denies Personal Hx of Anesthesia complications.   Social:  Former smoker-- quit 2011   Hematology/Oncology:  Hematology Normal   Oncology Normal     EENT/Dental:EENT/Dental Normal   Cardiovascular:  Cardiovascular Normal     Pulmonary:  Pulmonary Normal    Renal/:  Renal/ Normal     Hepatic/GI:  Hepatic/GI Normal    Musculoskeletal:  Musculoskeletal Normal    Neurological:  Neurology Normal    Endocrine:  Endocrine Normal    Dermatological:  Skin Normal    Psych:   Psychiatric History anxiety depression          Physical Exam  General:  Well nourished    Airway/Jaw/Neck:  Airway Findings: Mouth Opening: Normal Tongue: Normal  General Airway Assessment: Adult  Mallampati: II  Improves to I with phonation.  TM Distance: Normal, at least 6 cm  Jaw/Neck Findings:  Neck ROM: Normal ROM     Eyes/Ears/Nose:  EYES/EARS/NOSE FINDINGS: Normal   Dental:  Dental Findings: In tact   Chest/Lungs:  Chest/Lungs Findings: Normal Respiratory Rate, Clear to auscultation     Heart/Vascular:  Heart Findings: Rate: Normal  Rhythm: Regular Rhythm  Sounds: Normal  Heart murmur: negative    Abdomen:  Abdomen Findings: Normal    Musculoskeletal:  Musculoskeletal Findings: Normal   Skin:  Skin Findings: Normal    Mental Status:  Mental Status Findings:  Cooperative, Alert and Oriented         Anesthesia Plan  Type of Anesthesia, risks & benefits discussed:  Anesthesia Type:  general  Patient's Preference: same   Intra-op Monitoring Plan: standard ASA monitors  Intra-op Monitoring Plan Comments:   Post Op  Pain Control Plan:   Post Op Pain Control Plan Comments:   Induction:   IV  Beta Blocker:  Patient is not currently on a Beta-Blocker (No further documentation required).       Informed Consent: Patient understands risks and agrees with Anesthesia plan.  Questions answered. Anesthesia consent signed with patient.  ASA Score: 2     Day of Surgery Review of History & Physical:    H&P update referred to the provider.         Ready For Surgery From Anesthesia Perspective.

## 2017-01-30 NOTE — ANESTHESIA RELEASE NOTE
"Anesthesia Release from PACU Note    Patient: Allyssa Wright    Procedure(s) Performed: Procedure(s) (LRB):  ELECTROCONVULSIVE THERAPY (ECT) - SINGLE SEIZURE (N/A)    Anesthesia type: general    Post pain: Adequate analgesia    Post assessment: no apparent anesthetic complications, tolerated procedure well and no evidence of recall    Last Vitals:   Visit Vitals    /73    Pulse 99    Temp 36.6 °C (97.9 °F) (Axillary)    Resp 16    Ht 5' 5" (1.651 m)    Wt 70.3 kg (155 lb)    LMP     SpO2 100%    Breastfeeding No    BMI 25.79 kg/m2       Post vital signs: stable    Level of consciousness: awake, alert  and oriented    Nausea/Vomiting: no nausea/no vomiting    Complications: none    Airway Patency: patent    Respiratory: unassisted, spontaneous ventilation, room air    Cardiovascular: stable and blood pressure at baseline    Hydration: euvolemic  "

## 2017-01-30 NOTE — OP NOTE
Allyssa Wright  : 1980   MRN: [id number]  Date: 2017       Psychiatry - ECT  Operative Note             Date of Admission: 2017  5:52 AM    Site: [facility], [address]    Attending: Shoaib Boyce MD  Residents: Jeffy Pierre MD  Pre-op Diagnosis: MDD, recurrent, in partial remission     ECT Treatment Number: 21  Machine Type: [employer] 5000    Patient Status: medically stable    Vitals (pre-procedure):  Vitals:    17 0621   BP: 117/68   Pulse: 92   Resp: 16   Temp: 98.1 °F (36.7 °C)       Electrode Placement: Bitemporal    Stimulus Number Charge (mC) Level Pulse Width (msec) Frequency  (Hz) Duration of Stimulus (sec) Current (mA) Duration of Seizure (sec)   1 576 3 1 60 6 800 80                                   Complications: HTN    Maximum Blood Pressure: 190/91    Medications Given:  Caffeine 500 mg  PO  Before  Methohexital (Brevital).   150mg  IV  During   Succinylcholine (Anectine).   120mg  IV  During   Ondansetron (Zofran).   4mg  IV  During  Toradol 30mg IV During   Labetalol: none required    Treatment Course:  Pt tolerate procedure well. After adequate recovery from general anesthesia, the patient was transported to recovery.    Post-op Diagnosis: Same as above    Recommended for next ECT:  Next ECT #21 on 17    Jeffy Pierre MD  \[facility] [place] [facility]\""-[facility]  PGY-2  2017 8:15 AM

## 2017-02-21 ENCOUNTER — SURGERY (OUTPATIENT)
Age: 37
End: 2017-02-21

## 2017-02-21 ENCOUNTER — ANESTHESIA EVENT (OUTPATIENT)
Dept: ELECTROPHYSIOLOGY | Facility: HOSPITAL | Age: 37
End: 2017-02-21
Payer: COMMERCIAL

## 2017-02-21 ENCOUNTER — HOSPITAL ENCOUNTER (OUTPATIENT)
Facility: HOSPITAL | Age: 37
Discharge: HOME | End: 2017-02-21
Attending: PSYCHIATRY & NEUROLOGY | Admitting: PSYCHIATRY & NEUROLOGY
Payer: COMMERCIAL

## 2017-02-21 ENCOUNTER — ANESTHESIA (OUTPATIENT)
Dept: ELECTROPHYSIOLOGY | Facility: HOSPITAL | Age: 37
End: 2017-02-21
Payer: COMMERCIAL

## 2017-02-21 VITALS
OXYGEN SATURATION: 100 % | RESPIRATION RATE: 23 BRPM | HEART RATE: 93 BPM | WEIGHT: 155 LBS | HEIGHT: 65 IN | SYSTOLIC BLOOD PRESSURE: 114 MMHG | TEMPERATURE: 99 F | BODY MASS INDEX: 25.83 KG/M2 | DIASTOLIC BLOOD PRESSURE: 75 MMHG

## 2017-02-21 DIAGNOSIS — F33.41 RECURRENT MAJOR DEPRESSION IN PARTIAL REMISSION: Primary | ICD-10-CM

## 2017-02-21 DIAGNOSIS — F33.41 MDD (MAJOR DEPRESSIVE DISORDER), RECURRENT, IN PARTIAL REMISSION: ICD-10-CM

## 2017-02-21 LAB
B-HCG UR QL: NEGATIVE
CTP QC/QA: YES

## 2017-02-21 PROCEDURE — 25000003 PHARM REV CODE 250: Performed by: PSYCHIATRY & NEUROLOGY

## 2017-02-21 PROCEDURE — 63600175 PHARM REV CODE 636 W HCPCS: Performed by: ANESTHESIOLOGY

## 2017-02-21 PROCEDURE — 81025 URINE PREGNANCY TEST: CPT | Performed by: PSYCHIATRY & NEUROLOGY

## 2017-02-21 PROCEDURE — D9220A PRA ANESTHESIA: Mod: ,,, | Performed by: ANESTHESIOLOGY

## 2017-02-21 PROCEDURE — 25000003 PHARM REV CODE 250: Performed by: ANESTHESIOLOGY

## 2017-02-21 PROCEDURE — 90870 ELECTROCONVULSIVE THERAPY: CPT | Performed by: ANESTHESIOLOGY

## 2017-02-21 PROCEDURE — 90870 ELECTROCONVULSIVE THERAPY: CPT | Mod: ,,, | Performed by: PSYCHIATRY & NEUROLOGY

## 2017-02-21 RX ORDER — KETOROLAC TROMETHAMINE 30 MG/ML
INJECTION, SOLUTION INTRAMUSCULAR; INTRAVENOUS
Status: DISCONTINUED | OUTPATIENT
Start: 2017-02-21 | End: 2017-02-21

## 2017-02-21 RX ORDER — SUCCINYLCHOLINE CHLORIDE 20 MG/ML
INJECTION INTRAMUSCULAR; INTRAVENOUS
Status: DISCONTINUED | OUTPATIENT
Start: 2017-02-21 | End: 2017-02-21

## 2017-02-21 RX ORDER — SODIUM CHLORIDE 9 MG/ML
500 INJECTION, SOLUTION INTRAVENOUS ONCE
Status: DISCONTINUED | OUTPATIENT
Start: 2017-02-21 | End: 2017-02-21 | Stop reason: HOSPADM

## 2017-02-21 RX ORDER — METHOHEXITAL IN WATER/PF 100MG/10ML
SYRINGE (ML) INTRAVENOUS
Status: DISCONTINUED
Start: 2017-02-21 | End: 2017-02-21 | Stop reason: HOSPADM

## 2017-02-21 RX ORDER — NITROGLYCERIN 5 MG/ML
INJECTION, SOLUTION INTRAVENOUS
Status: DISCONTINUED | OUTPATIENT
Start: 2017-02-21 | End: 2017-02-21

## 2017-02-21 RX ORDER — NAFTIFINE HYDROCHLORIDE 1 MG/G
CREAM TOPICAL DAILY PRN
Status: ON HOLD | COMMUNITY
End: 2018-02-15 | Stop reason: CLARIF

## 2017-02-21 RX ORDER — KETOROLAC TROMETHAMINE 30 MG/ML
INJECTION, SOLUTION INTRAMUSCULAR; INTRAVENOUS
Status: DISCONTINUED
Start: 2017-02-21 | End: 2017-02-21 | Stop reason: HOSPADM

## 2017-02-21 RX ORDER — SUCCINYLCHOLINE CHLORIDE 20 MG/ML
INJECTION INTRAMUSCULAR; INTRAVENOUS
Status: DISCONTINUED
Start: 2017-02-21 | End: 2017-02-21 | Stop reason: HOSPADM

## 2017-02-21 RX ORDER — ONDANSETRON 2 MG/ML
INJECTION INTRAMUSCULAR; INTRAVENOUS
Status: DISCONTINUED | OUTPATIENT
Start: 2017-02-21 | End: 2017-02-21

## 2017-02-21 RX ORDER — LIDOCAINE HYDROCHLORIDE 10 MG/ML
1 INJECTION, SOLUTION EPIDURAL; INFILTRATION; INTRACAUDAL; PERINEURAL ONCE
Status: COMPLETED | OUTPATIENT
Start: 2017-02-22 | End: 2017-02-21

## 2017-02-21 RX ORDER — SODIUM CHLORIDE 9 MG/ML
INJECTION, SOLUTION INTRAVENOUS CONTINUOUS
Status: DISCONTINUED | OUTPATIENT
Start: 2017-02-22 | End: 2017-02-21 | Stop reason: HOSPADM

## 2017-02-21 RX ORDER — ONDANSETRON 2 MG/ML
INJECTION INTRAMUSCULAR; INTRAVENOUS
Status: DISCONTINUED
Start: 2017-02-21 | End: 2017-02-21 | Stop reason: HOSPADM

## 2017-02-21 RX ADMIN — SODIUM CHLORIDE: 0.9 INJECTION, SOLUTION INTRAVENOUS at 07:02

## 2017-02-21 RX ADMIN — KETOROLAC TROMETHAMINE 30 MG: 30 INJECTION, SOLUTION INTRAMUSCULAR; INTRAVENOUS at 07:02

## 2017-02-21 RX ADMIN — LIDOCAINE HYDROCHLORIDE 0.2 MG: 10 INJECTION, SOLUTION EPIDURAL; INFILTRATION; INTRACAUDAL; PERINEURAL at 06:02

## 2017-02-21 RX ADMIN — Medication 250 MG: at 06:02

## 2017-02-21 RX ADMIN — NITROGLYCERIN 50 MCG: 5 INJECTION, SOLUTION INTRAVENOUS at 07:02

## 2017-02-21 RX ADMIN — METHOHEXITAL SODIUM 150 MG: 500 INJECTION, POWDER, LYOPHILIZED, FOR SOLUTION INTRAMUSCULAR; INTRAVENOUS; RECTAL at 07:02

## 2017-02-21 RX ADMIN — ONDANSETRON 4 MG: 2 INJECTION, SOLUTION INTRAMUSCULAR; INTRAVENOUS at 07:02

## 2017-02-21 RX ADMIN — SUCCINYLCHOLINE CHLORIDE 120 MG: 20 INJECTION, SOLUTION INTRAMUSCULAR; INTRAVENOUS at 07:02

## 2017-02-21 RX ADMIN — SODIUM CHLORIDE 500 ML: 0.9 INJECTION, SOLUTION INTRAVENOUS at 06:02

## 2017-02-21 NOTE — ANESTHESIA PREPROCEDURE EVALUATION
02/21/2017  Allyssa Wright is a 37 y.o., female.    Pre-operative evaluation for Procedure(s) (LRB):  ELECTROCONVULSIVE THERAPY (ECT) - SINGLE SEIZURE (N/A)    Allyssa Wright is a 37 y.o. female with MDD, recurrent severe, who is scheduled for continuance of ECT. This is ECT #22. No no issues with last session.           Patient Active Problem List   Diagnosis    MDD (major depressive disorder), recurrent, in partial remission    Major depressive disorder    Major depressive disorder in partial remission    Major depressive disorder, recurrent, in partial remission    Recurrent major depressive disorder       Review of patient's allergies indicates:   Allergen Reactions    Ampicillin      Mom says so    Erythromycin     Levaquin [levofloxacin] Other (See Comments)     Depression side effects    Penicillins      Mom says so    Pristiq [desvenlafaxine]      psycotic      Sulfa (sulfonamide antibiotics)      Rash      Azithromycin Anxiety        Current Facility-Administered Medications on File Prior to Visit   Medication Dose Route Frequency Provider Last Rate Last Dose    [START ON 2/22/2017] 0.9%  NaCl infusion   Intravenous Continuous Jeffy Pierre MD        caffeine 250mg powder  250 mg Oral Once Jeffy Pierre MD        caffeine 250mg powder  250 mg Oral Once Jeffy Pierre MD        [START ON 2/22/2017] lidocaine (PF) 10 mg/ml (1%) injection 10 mg  1 mL Intradermal Once Jeffy Pierre MD         Current Outpatient Prescriptions on File Prior to Visit   Medication Sig Dispense Refill    dapsone 7.5 % GlwP Apply topically once daily.      famciclovir (FAMVIR) 500 MG tablet Take 1 tablet (500 mg total) by mouth 2 (two) times daily. 30 tablet 12    hydrOXYzine pamoate (VISTARIL) 25 MG Cap Take 1 capsule (25 mg total) by mouth every 8 (eight) hours as needed  (anxiety). 90 capsule 1    naftifine (NAFTIN) 1 % cream Apply topically daily as needed. Apply to feet      oxycodone-acetaminophen (PERCOCET)  mg per tablet Take 1 tablet by mouth every 4 (four) hours as needed for Pain.      quetiapine (SEROQUEL) 50 MG tablet Take 150 mg by mouth 2 (two) times daily. 150 mg nightly and 25 mg in the morning      spironolactone (ALDACTONE) 50 MG tablet 50 mg 2 (two) times daily. 50  mg qAM, 50 mg qHS.      thyroid (ARMOUR THYROID) 30 mg Tab Take 1 tablet (30 mg total) by mouth every morning. 30 tablet 11    trazodone (DESYREL) 100 MG tablet Take 300 mg by mouth every evening.       venlafaxine (EFFEXOR-XR) 75 MG 24 hr capsule Take 1 capsule (75 mg total) by mouth nightly. Take with 150 mg for total daily dose of 225 mg. (Patient taking differently: Take 225 mg by mouth nightly. Pt taking 250 mg) 30 capsule 2    ZOVIRAX 5 % Crea   5       Past Surgical History   Procedure Laterality Date    Breast augmentation      Ankle surgery Right     Ovarian cyst removal Bilateral        Social History     Social History    Marital status: Single     Spouse name: N/A    Number of children: N/A    Years of education: N/A     Occupational History    unemployed      Social History Main Topics    Smoking status: Former Smoker     Quit date: 4/6/2011    Smokeless tobacco: Not on file    Alcohol use No      Comment: Alcohol misuse in remote past    Drug use: No      Comment: Experimental use in high school    Sexual activity: Not on file     Other Topics Concern    Not on file     Social History Narrative    Pt has 1 older brother from an intact family until the death of her mother in 2012.  She completed 1 year of college, was never in the , and has never been employed.  She was engaged once, but never , has no children, and lives with her father plus 2 dogs.  She denies any hobbies and is spiritual but not Jehovah's witness.  She does date and returns to college  during rare periods when depression and anxiety [place].         Vital Signs Range (Last 24H):  Temp:  [36.8 °C (98.3 °F)]   Pulse:  [89]   Resp:  [18]   BP: (115)/(72)   SpO2:  [100 %]         Diagnostic Studies:      EKG:  Normal sinus rhythm  Normal ECG  When compared with ECG of 03-DEC-2015 11:38,  Questionable change in The axis  T wave inversion no longer evident in Inferior leads  Confirmed by Flaco Sr MD ([id number]) on 6/9/2016 1:30:13 PM    Referred By: SELF REFERRAL           Confirmed By:Flaco Sr MD          [facility] Pre-op Assessment    I have reviewed the Patient Summary Reports.     I have reviewed the Nursing Notes.   I have reviewed the Medications.     Review of Systems  Anesthesia Hx:  No problems with previous Anesthesia Denies Hx of Anesthetic complications  History of prior surgery of interest to airway management or planning: Previous anesthesia: General 7/22/2016 with general anesthesia.  Denies Family Hx of Anesthesia complications.   Denies Personal Hx of Anesthesia complications.   Social:  Former smoker-- quit 2011   Hematology/Oncology:  Hematology Normal   Oncology Normal     EENT/Dental:EENT/Dental Normal   Cardiovascular:  Cardiovascular Normal     Pulmonary:  Pulmonary Normal    Renal/:  Renal/ Normal     Hepatic/GI:  Hepatic/GI Normal    Musculoskeletal:  Musculoskeletal Normal    Neurological:  Neurology Normal    Endocrine:  Endocrine Normal    Dermatological:  Skin Normal    Psych:   Psychiatric History anxiety depression          Physical Exam  General:  Well nourished    Airway/Jaw/Neck:  Airway Findings: Mouth Opening: Normal Tongue: Normal  General Airway Assessment: Adult  Mallampati: II  Improves to I with phonation.  TM Distance: Normal, at least 6 cm  Jaw/Neck Findings:  Neck ROM: Normal ROM     Eyes/Ears/Nose:  EYES/EARS/NOSE FINDINGS: Normal   Dental:  Dental Findings: In tact   Chest/Lungs:  Chest/Lungs Findings: Normal Respiratory Rate, Clear to auscultation      Heart/Vascular:  Heart Findings: Rate: Normal  Rhythm: Regular Rhythm  Sounds: Normal  Heart murmur: negative    Abdomen:  Abdomen Findings: Normal    Musculoskeletal:  Musculoskeletal Findings: Normal   Skin:  Skin Findings: Normal    Mental Status:  Mental Status Findings:  Cooperative, Alert and Oriented         Anesthesia Plan  Type of Anesthesia, risks & benefits discussed:  Anesthesia Type:  general  Patient's Preference: same   Intra-op Monitoring Plan: standard ASA monitors  Intra-op Monitoring Plan Comments:   Post Op Pain Control Plan:   Post Op Pain Control Plan Comments:   Induction:   IV  Beta Blocker:  Patient is not currently on a Beta-Blocker (No further documentation required).       Informed Consent: Patient understands risks and agrees with Anesthesia plan.  Questions answered. Anesthesia consent signed with patient.  ASA Score: 2     Day of Surgery Review of History & Physical:    H&P update referred to the provider.         Ready For Surgery From Anesthesia Perspective.

## 2017-02-21 NOTE — ANESTHESIA PROCEDURE NOTES
ECT    Treatment Number: 22  Procedure start time: 2/21/2017 7:25 AM  Timeout performed at: 2/21/2017 7:10 AM  Procedure end time: 2/21/2017 7:27 AM    Staffing  Anesthesiologist: OLIVA SEWELL  CRNA/Resident: ROSSANA DHILLON  Performed by: resident/CRNA     Preanesthetic Checklist  The following were completed as part of the preanesthetic checklist: patient identified, procedural consent, pre-op evaluation, timeout performed, risks and benefits discussed, monitors and equipment checked, anesthesia consent given, oxygen available, suction available, hand hygiene performed and patient being monitored.    Setup & Induction  Patient Monitoring: heart rate, cardiac monitor, continuous pulse ox and NIBP  Patient preparation: bite block inserted, extremities padded, patient hyperventilated and mandibular stabilization  Electrode Placement: Bitemporal    The patient was moved to the ECT therapy room after being assessed and consented for ECT. After standard ASA monitors were applied and timeout performed, the patient was adequately preoxygenated. After induction of general anesthesia, adequate oxygenation and ventilation were confirmed with pulse oxymetry and end tidal CO2 monitoring via bag-mask ventilation. End tidal CO2 was monitored throughout the case and moderate hyperventilation was performed prior to beginning ECT treatment. All extremities were padded, biteblock was inserted, and mandibular stabilization was done prior to initiating ECT therapy.    Procedure      Stimulus Number 3: Setting Number = III; 576 millicoulombs; Seizure Duration = 74 seconds        Recovery  After adequate recovery from general anesthesia, the patient was transported to recovery.  Baseline Blood Pressure: 114/70  Peak Blood Pressure: 210/100  Time to Recovery of Respirations: 3 minutes    ECT Findings  ECT associated findings of: None

## 2017-02-21 NOTE — TRANSFER OF CARE
"Anesthesia Transfer of Care Note    Patient: Allyssa Wright    Procedure(s) Performed: Procedure(s) (LRB):  ELECTROCONVULSIVE THERAPY (ECT) - SINGLE SEIZURE (N/A)    Patient location: PACU    Anesthesia Type: general    Transport from OR: Transported from OR on 6-10 L/min O2 by face mask with adequate spontaneous ventilation    Post pain: adequate analgesia    Post assessment: no apparent anesthetic complications    Post vital signs: stable    Level of consciousness: awake and alert    Nausea/Vomiting: no nausea/vomiting    Complications: none          Last vitals:   Visit Vitals    /72 (BP Location: Left arm, Patient Position: Lying, BP Method: Automatic)    Pulse 89    Temp 36.8 °C (98.3 °F) (Oral)    Resp 18    Ht 5' 5" (1.651 m)    Wt 70.3 kg (155 lb)    SpO2 100%    Breastfeeding No    BMI 25.79 kg/m2     "

## 2017-02-21 NOTE — DISCHARGE INSTRUCTIONS
Home Care Instructions  E.C.T     ACTIVITY LEVEL:  You may feel sleepy for several hours. It is best to rest until you are more awake and then gradually resume  your normal activities in one day. It is recommended, because of the possibility of memory loss and slight  confusion post ECT, that you rest in the company of a responsible adult. This confusion and memory loss is  expected and should diminish over time. It is also recommended that you do not drive or operate any electrical  appliances or machinery during the ECT treatment series. Ask your Doctor, at the time of your treatment, any  questions you may have about specific activities.  DIET:  You may wish to start with liquids after your ECT treatment and gradually resume your normal diet. Do not  consume alcoholic beverages during the ECT treatment series.  BATHING:  You may shower or bathe as desired.  MEDICATIONS:  Resume all home medications starting with the AM doses not taken prior to ECT treatment. If you experience a  headache, it is okay to take your usual pain reliever.  WHEN TO CALL THE DOCTOR:   Headache, memory loss or confusion that does not begin to resolve within 24 hours.  RETURN APPOINTMENT:  Report to Day Surgery ([facility]) on Tuesday March 21, 2017.  Remember you may not eat or drink after midnight, but please take heart or high blood pressure  medicines with a sip of water in the morning prior to leaving home.  FOR EMERGENCIES:  If any unusual problems or difficulties occur, contact the office [phone] Monday-Friday until 3:00 p.m.  After hours, call the hospital  [phone] and ask for the Psychiatry Resident On-call.

## 2017-02-21 NOTE — PLAN OF CARE
Pt. AAOx3, post procedure void completed. Able to sit up at bedside with no complaints of N/V. Able to tolerate fluids with no issues. Discharge instructions discussed with patient and father. Verbalized understanding. All questions and concerns answered. Adequate for discharge at this time.

## 2017-02-21 NOTE — ANESTHESIA RELEASE NOTE
"Anesthesia Release from PACU Note    Patient: Allyssa Wright    Procedure(s) Performed: Procedure(s) (LRB):  ELECTROCONVULSIVE THERAPY (ECT) - SINGLE SEIZURE (N/A)    Anesthesia type: general    Post pain: Adequate analgesia    Post assessment: no apparent anesthetic complications, tolerated procedure well and no evidence of recall    Last Vitals:   Visit Vitals    /89 (BP Location: Right arm, Patient Position: Lying, BP Method: Automatic)    Pulse 95    Temp (!) 38 °C (100.4 °F) (Temporal)    Resp 17    Ht 5' 5" (1.651 m)    Wt 70.3 kg (155 lb)    SpO2 100%    Breastfeeding No    BMI 25.79 kg/m2       Post vital signs: stable    Level of consciousness: awake, alert  and oriented    Nausea/Vomiting: no nausea/no vomiting    Complications: none    Airway Patency: patent    Respiratory: unassisted    Cardiovascular: stable and blood pressure at baseline    Hydration: euvolemic  "

## 2017-02-21 NOTE — ANESTHESIA POSTPROCEDURE EVALUATION
"Anesthesia Post Evaluation    Patient: Allyssa Wright    Procedure(s) Performed: Procedure(s) (LRB):  ELECTROCONVULSIVE THERAPY (ECT) - SINGLE SEIZURE (N/A)    Final Anesthesia Type: general  Patient location during evaluation: PACU  Patient participation: Yes- Able to Participate  Level of consciousness: awake and alert and oriented  Post-procedure vital signs: reviewed and stable  Pain management: adequate  Airway patency: patent  PONV status at discharge: No PONV  Anesthetic complications: no      Cardiovascular status: blood pressure returned to baseline and hemodynamically stable  Respiratory status: spontaneous ventilation, unassisted and room air  Hydration status: euvolemic  Follow-up not needed.        Visit Vitals    /89 (BP Location: Right arm, Patient Position: Lying, BP Method: Automatic)    Pulse 95    Temp (!) 38 °C (100.4 °F) (Temporal)    Resp 17    Ht 5' 5" (1.651 m)    Wt 70.3 kg (155 lb)    SpO2 100%    Breastfeeding No    BMI 25.79 kg/m2       Pain/Roma Score: Pain Assessment Performed: Yes (2/21/2017  7:38 AM)  Presence of Pain: denies (2/21/2017  7:38 AM)  Roma Score: 9 (2/21/2017  7:38 AM)      "

## 2017-02-21 NOTE — H&P
"Allyssa Wright  : 1980   MRN: [id number]  Date: 2017     Psychiatry - ECT  History & Physical    Chief complaint: Major Depressive Disorder, recurrent, in partial remission   Procedure: ECT #22    SUBJECTIVE:     HPI:   Allyssa Wright is a 37 y.o. female with MDD (major depressive disorder), recurrent, in partial remission who presents for ECT. Patient had last ECT session on 17.  Patient reports that she has been feeling well since her last ECT treatment.  Patient states that she feels that her mood has improved, and denies any current SI, HI, AVH, delusions, or paranoia.  Reports sleeping throughout the night.  Patient continues to state that her appetite is "too good" and that she has issues with binge eating in the evening before bed.  Patient has not had any medication changes.  Patient reports some short term memory difficulty as well as problems with word-finding.  Patient denies any headaches, CP, SOB, fevers, chills, N/V.    Psychiatric Review of Systems:  Mood: "good"  Appetite: reports problems with binge eating in the evening  Psychomotor: none  Cognitive Impairment: mild to be expected with ECT  Insomnia: None  Psychosis: absent   Diurnal Variation: absent   Suicidal Ideation: absent     Medical Review Of Systems:  Constitutional: negative for chills, fevers and sweats  Eyes: negative for visual disturbance  Respiratory: negative for cough, sputum and wheezing  Cardiovascular: negative for chest pain, palpitations, and dyspnea  Gastrointestinal: negative for abdominal pain, N/V  Musculoskeletal:negative for arthralgias and back pain    Current Medications:   No current facility-administered medications on file prior to encounter.      Current Outpatient Prescriptions on File Prior to Encounter   Medication Sig Dispense Refill    dapsone 7.5 % GlwP Apply topically once daily.      famciclovir (FAMVIR) 500 MG tablet Take 1 tablet (500 mg total) by mouth 2 (two) times daily. 30 tablet 12 "    hydrOXYzine pamoate (VISTARIL) 25 MG Cap Take 1 capsule (25 mg total) by mouth every 8 (eight) hours as needed (anxiety). 90 capsule 1    quetiapine (SEROQUEL) 50 MG tablet Take 150 mg by mouth 2 (two) times daily. 150 mg nightly and 25 mg in the morning      spironolactone (ALDACTONE) 50 MG tablet 50 mg 2 (two) times daily. 50  mg qAM, 50 mg qHS.      thyroid (ARMOUR THYROID) 30 mg Tab Take 1 tablet (30 mg total) by mouth every morning. 30 tablet 11    trazodone (DESYREL) 100 MG tablet Take 300 mg by mouth every evening.       venlafaxine (EFFEXOR-XR) 75 MG 24 hr capsule Take 1 capsule (75 mg total) by mouth nightly. Take with 150 mg for total daily dose of 225 mg. (Patient taking differently: Take 225 mg by mouth nightly. Pt taking 250 mg) 30 capsule 2    oxycodone-acetaminophen (PERCOCET)  mg per tablet Take 1 tablet by mouth every 4 (four) hours as needed for Pain.      ZOVIRAX 5 % Crea   5      Allergies:   Ampicillin; Erythromycin; Levaquin [levofloxacin]; Penicillins; Pristiq [desvenlafaxine]; Sulfa (sulfonamide antibiotics); and Azithromycin    Past Medical/Surgical History:   Past Medical History   Diagnosis Date    Anxiety     Depression     History of psychiatric hospitalization     HSV-1 (herpes simplex virus 1) infection     Hx of psychiatric care     Moderate depressed bipolar II disorder 06/13/2016     reports no history of bipolar    Obsessive-compulsive disorder     Psychiatric problem     Self-harming behavior     Therapy      Past Surgical History   Procedure Laterality Date    Breast augmentation      Ankle surgery Right     Ovarian cyst removal Bilateral       OBJECTIVE:     Vitals (pre-procedure):  Vitals:    02/21/17 0614   BP: 115/72   Pulse: 89   Resp: 18   Temp: 98.3 °F (36.8 °C)        Labs/Imaging/Studies:   Recent Results (from the past 48 hour(s))   POCT urine pregnancy    Collection Time: 02/21/17  6:07 AM   Result Value Ref Range    POC Preg Test, Ur  "Negative Negative     Acceptable Yes       No results found for: PHENYTOIN, PHENOBARB, VALPROATE, CBMZ    Physical Exam:   Gen: AAOx4, NAD  CV: RRR, no murmurs  Chest: Clear to ausculation bilaterally, no wheezing  Abd: Soft, non-tender, non-distended   Ext: No clubbing, cyanosis      Mental Status Exam:   Appearance: normal weight, neatly groomed, lying in bed  Behavior: friendly and cooperative  Speech: Conversational rate, tone, and volume  Mood: "good"  Affect: full and reactive   Thought Process: linear, goal directed   Thought Content: not suicidal or homicidal   Sensorium: grossly intact  Cognition: grossly intact  Insight: good  Judgment: good    ASSESSMENT/PLAN:     Allyssa Wright is a 37 y.o. female with MDD (major depressive disorder), recurrent, in partial remission who presents for ECT.    Recommendations:   Proceed with ECT #22    Lenny Dukes MD  2/21/2017  "

## 2017-02-21 NOTE — OP NOTE
Allyssa Wright  : 1980   MRN: [id number]  Date: 2017       Psychiatry - ECT  Operative Note             Date of Admission: 2017  5:55 AM    Site: [facility], [address]    Attending: Shoaib Boyce MD  Residents: Lneny Dukes MD  Pre-op Diagnosis: MDD, recurrent, in partial remission     ECT Treatment Number: 22  Machine Type: [employer] 5000    Patient Status: medically stable    Vitals (pre-procedure):  Vitals:    17 0614   BP: 115/72   Pulse: 89   Resp: 18   Temp: 98.3 °F (36.8 °C)       Electrode Placement: Bitemporal    Stimulus Number Charge (mC) Level Pulse Width (msec) Frequency  (Hz) Duration of Stimulus (sec) Current (mA) Duration of Seizure (sec)   1 576 3 1 60 6 800 74                                   Complications: HTN    Maximum Blood Pressure: 202/132    Medications Given:  Caffeine 500 mg  PO  Before  Methohexital (Brevital).   160mg  IV  During   Succinylcholine (Anectine).   140mg  IV  During   Ondansetron (Zofran).   4mg  IV  During  Toradol 30mg IV During   Nitroglycerin 50 mcg IV During    Treatment Course:  Pt tolerated procedure well. After adequate recovery from general anesthesia, the patient was transported to recovery.    Post-op Diagnosis: Same as above    Recommended for next ECT:  Next ECT #23 on 3/21/17    Lenny Dukes MD  Psychiatry PGY-2    2017

## 2017-02-21 NOTE — DISCHARGE SUMMARY
Allyssa Wright  : 1980   MRN: [id number]  Date: 2017     Psychiatry - ECT  Discharge Summary    Admit Date: 2017  5:55 AM  Discharge Date: 2017    Attending Physician: Shoaib Boyce MD    Discharge Provider: Lenny Dukes MD    History of Present Illness: Allyssa Wright is a 37 y.o. female with Major Depressive Disorder, recurrent, in partial remission presented for ECT #22. See H&P dated 2017 for full HPI. For further details, see Dr. Boyce's pre-ECT evaluation.    Hospital Course: The patient tolerated the ECT treatment well without complication. Patient was stable post-procedure. See OP note dated 2017 for more details.     Disposition: Home or Self Care    Medications:  Current Discharge Medication List      CONTINUE these medications which have NOT CHANGED    Details   dapsone 7.5 % GlwP Apply topically once daily.      famciclovir (FAMVIR) 500 MG tablet Take 1 tablet (500 mg total) by mouth 2 (two) times daily.  Qty: 30 tablet, Refills: 12    Associated Diagnoses: Major depressive disorder, recurrent episode, moderate degree      hydrOXYzine pamoate (VISTARIL) 25 MG Cap Take 1 capsule (25 mg total) by mouth every 8 (eight) hours as needed (anxiety).  Qty: 90 capsule, Refills: 1      naftifine (NAFTIN) 1 % cream Apply topically daily as needed. Apply to feet      quetiapine (SEROQUEL) 50 MG tablet Take 150 mg by mouth 2 (two) times daily. 150 mg nightly and 25 mg in the morning      spironolactone (ALDACTONE) 50 MG tablet 50 mg 2 (two) times daily. 50  mg qAM, 50 mg qHS.      thyroid (ARMOUR THYROID) 30 mg Tab Take 1 tablet (30 mg total) by mouth every morning.  Qty: 30 tablet, Refills: 11    Associated Diagnoses: Major depressive disorder, recurrent episode, moderate degree      trazodone (DESYREL) 100 MG tablet Take 300 mg by mouth every evening.       venlafaxine (EFFEXOR-XR) 75 MG 24 hr capsule Take 1 capsule (75 mg total) by mouth nightly. Take with 150 mg for total  daily dose of 225 mg.  Qty: 30 capsule, Refills: 2      ZOVIRAX 5 % Crea Refills: 5         STOP taking these medications       oxycodone-acetaminophen (PERCOCET)  mg per tablet Comments:   Reason for Stopping:             Status at Discharge: Alert and medically stable    Discharge Diagnoses:  Major Depressive Disorder, recurrent, in partial remission    Diet: Resume previous outpatient diet  Activity: Ambulate with assistance - patient is a fall risk  Instructions: Please do not eat or drink anything after midnight prior to procedure. Please do not drive on day of ECT.    Med Changes:  None    Next ECT:  Follow-up Information     Follow up with [facility] On 3/21/2017.    Why:  for ECT    Contact information:    [address] [facility] [address]            Lenny Dukes MD  Psychiatry PGY-2  2/21/2017

## 2017-02-21 NOTE — ANESTHESIA POSTPROCEDURE EVALUATION
"Anesthesia Post Evaluation    Patient: Allyssa Wright    Procedure(s) Performed: Procedure(s) (LRB):  ELECTROCONVULSIVE THERAPY (ECT) - SINGLE SEIZURE (N/A)    Final Anesthesia Type: general  Patient location during evaluation: PACU  Patient participation: Yes- Able to Participate  Level of consciousness: awake and alert and oriented  Post-procedure vital signs: reviewed and stable  Pain management: adequate  Airway patency: patent  PONV status at discharge: No PONV  Anesthetic complications: no      Cardiovascular status: blood pressure returned to baseline and hemodynamically stable  Respiratory status: unassisted, spontaneous ventilation and room air  Hydration status: euvolemic  Follow-up not needed.        Visit Vitals    /75 (BP Location: Right arm, Patient Position: Lying, BP Method: Automatic)    Pulse 93    Temp 37.1 °C (98.8 °F) (Temporal)    Resp (!) 23    Ht 5' 5" (1.651 m)    Wt 70.3 kg (155 lb)    SpO2 100%    Breastfeeding No    BMI 25.79 kg/m2       Pain/Roma Score: Pain Assessment Performed: Yes (2/21/2017  8:13 AM)  Presence of Pain: denies (2/21/2017  8:13 AM)  Roma Score: 10 (2/21/2017  8:13 AM)  Modified Roma Score: 20 (2/21/2017  8:13 AM)      "

## 2017-02-21 NOTE — IP AVS SNAPSHOT
[facility]  [address]  [facility] [address]  Phone: [phone]           Patient Discharge Instructions     Our goal is to set you up for success. This packet includes information on your condition, medications, and your home care. It will help you to care for yourself so you don't get sicker and need to go back to the hospital.     Please ask your nurse if you have any questions.        There are many details to remember when preparing to leave the hospital. Here is what you will need to do:    1. Take your medicine. If you are prescribed medications, review your Medication List in the following pages. You may have new medications to  at the pharmacy and others that you'll need to stop taking. Review the instructions for how and when to take your medications. Talk with your doctor or nurses if you are unsure of what to do.     2. Go to your follow-up appointments. Specific follow-up information is listed in the following pages. Your may be contacted by a transition nurse or clinical provider about future appointments. Be sure we have all of the phone numbers to reach you, if needed. Please contact your provider's office if you are unable to make an appointment.     3. Watch for warning signs. Your doctor or nurse will give you detailed warning signs to watch for and when to call for assistance. These instructions may also include educational information about your condition. If you experience any of warning signs to your health, call your doctor.               [facility]  Unless otherwise directed by your provider, please contact [facility], our nurse care line that is available for 24/7 assistance.     [phone] (toll-free)    Registered nurses in the [facility] provide clinical advisement, health education, appointment booking, and other advisory services.                    ** Verify the list of medication(s) below is accurate and up  to date. Carry this with you in case of emergency. If your medications have changed, please notify your healthcare provider.             Medication List      CHANGE how you take these medications        Additional Info                      venlafaxine 75 MG 24 hr capsule   Commonly known as:  EFFEXOR-XR   Quantity:  30 capsule   Refills:  2   Dose:  75 mg   What changed:    - how much to take  - additional instructions    Instructions:  Take 1 capsule (75 mg total) by mouth nightly. Take with 150 mg for total daily dose of 225 mg.     Begin Date    AM    Noon    PM    Bedtime         CONTINUE taking these medications        Additional Info                      dapsone 7.5 % Glwp   Refills:  0    Instructions:  Apply topically once daily.     Begin Date    AM    Noon    PM    Bedtime       famciclovir 500 MG tablet   Commonly known as:  FAMVIR   Quantity:  30 tablet   Refills:  12   Dose:  500 mg    Instructions:  Take 1 tablet (500 mg total) by mouth 2 (two) times daily.     Begin Date    AM    Noon    PM    Bedtime       hydrOXYzine pamoate 25 MG Cap   Commonly known as:  VISTARIL   Quantity:  90 capsule   Refills:  1   Dose:  25 mg    Instructions:  Take 1 capsule (25 mg total) by mouth every 8 (eight) hours as needed (anxiety).     Begin Date    AM    Noon    PM    Bedtime       naftifine 1 % cream   Commonly known as:  NAFTIN   Refills:  0    Instructions:  Apply topically daily as needed. Apply to feet     Begin Date    AM    Noon    PM    Bedtime       quetiapine 50 MG tablet   Commonly known as:  SEROQUEL   Refills:  0   Dose:  150 mg   Indications:  25 mg po in am    Instructions:  Take 150 mg by mouth 2 (two) times daily. 150 mg nightly and 25 mg in the morning     Begin Date    AM    Noon    PM    Bedtime       spironolactone 50 MG tablet   Commonly known as:  ALDACTONE   Refills:  0   Dose:  50 mg    Instructions:  50 mg 2 (two) times daily. 50  mg qAM, 50 mg qHS.     Begin Date    AM    Noon    PM     Bedtime       thyroid Tab   Commonly known as:  ARMOUR THYROID   Quantity:  30 tablet   Refills:  11   Dose:  30 mg    Instructions:  Take 1 tablet (30 mg total) by mouth every morning.     Begin Date    AM    Noon    PM    Bedtime       trazodone 100 MG tablet   Commonly known as:  DESYREL   Refills:  0   Dose:  300 mg    Instructions:  Take 300 mg by mouth every evening.     Begin Date    AM    Noon    PM    Bedtime       ZOVIRAX 5 % Crea   Refills:  5   Generic drug:  acyclovir 5%      Begin Date    AM    Noon    PM    Bedtime         STOP taking these medications     oxycodone-acetaminophen  mg per tablet   Commonly known as:  PERCOCET                  Please bring to all follow up appointments:    1. A copy of your discharge instructions.  2. All medicines you are currently taking in their original bottles.  3. Identification and insurance card.    Please arrive 15 minutes ahead of scheduled appointment time.    Please call 24 hours in advance if you must reschedule your appointment and/or time.        Follow-up Information     Follow up with [facility] On 3/21/2017.    Why:  for ECT    Contact information:    [facility] [address]  [facility] [address]          Discharge Instructions     Future Orders    Diet general     Questions:    Total calories:      Fat restriction, if any:      Protein restriction, if any:      Na restriction, if any:      Fluid restriction:      Additional restrictions:          Discharge Instructions       Home Care Instructions  E.C.T     ACTIVITY LEVEL:  You may feel sleepy for several hours. It is best to rest until you are more awake and then gradually resume  your normal activities in one day. It is recommended, because of the possibility of memory loss and slight  confusion post ECT, that you rest in the company of a responsible adult. This confusion and memory loss is  expected and should diminish over time. It is also recommended that you do not drive or  "operate any electrical  appliances or machinery during the ECT treatment series. Ask your Doctor, at the time of your treatment, any  questions you may have about specific activities.  DIET:  You may wish to start with liquids after your ECT treatment and gradually resume your normal diet. Do not  consume alcoholic beverages during the ECT treatment series.  BATHING:  You may shower or bathe as desired.  MEDICATIONS:  Resume all home medications starting with the AM doses not taken prior to ECT treatment. If you experience a  headache, it is okay to take your usual pain reliever.  WHEN TO CALL THE DOCTOR:   Headache, memory loss or confusion that does not begin to resolve within 24 hours.  RETURN APPOINTMENT:  Report to Day Surgery ([facility]) on Tuesday March 21, 2017.  Remember you may not eat or drink after midnight, but please take heart or high blood pressure  medicines with a sip of water in the morning prior to leaving home.  FOR EMERGENCIES:  If any unusual problems or difficulties occur, contact the office [phone] Monday-Friday until 3:00 p.m.  After hours, call the hospital  [phone] and ask for the Psychiatry Resident On-call.        Primary Diagnosis     Your primary diagnosis was:  Mood Problem      Admission Information     Date & Time Provider Department CSN    2/21/2017  5:55 AM Shoaib Boyce Jr., MD [facility] [id number]      Care Providers     Provider Role Specialty Primary office phone    Shoaib Boyce Jr., MD Attending Provider Psychiatry [phone]    Shoaib Boyce Jr., MD Surgeon  Psychiatry [phone]      Your Vitals Were     BP Pulse Temp Resp Height Weight    131/89 (BP Location: Right arm, Patient Position: Lying, BP Method: Automatic) 95 100.4 °F (38 °C) (Temporal) 17 5' 5" (1.651 m) 70.3 kg (155 lb)    SpO2 BMI             100% 25.79 kg/m2         Recent Lab Values     No lab values to display.      Allergies as of 2/21/2017        " Reactions    Ampicillin     Mom says so    Erythromycin     Levaquin [Levofloxacin] Other (See Comments)    Depression side effects    Penicillins     Mom says so    Pristiq [Desvenlafaxine]     psycotic    Sulfa (Sulfonamide Antibiotics)     Rash    Azithromycin Anxiety      Advance Directives     An advance directive is a document which, in the event you are no longer able to make decisions for yourself, tells your healthcare team what kind of treatment you do or do not want to receive, or who you would like to make those decisions for you.  If you do not currently have an advance directive, [facility] encourages you to create one.  For more information call:  [phone]), [phone] ([phone]),  or log on to [email or web address].        Smoking Cessation     If you would like to quit smoking:   You may be eligible for free services if you are a [place] resident and started smoking cigarettes before September 1, 1988.  Call the Smoking Cessation Trust ([facility]) toll free at ([phone] or [phone] [phone].   Call 3-459-QUIT-NOW if you do not meet the above criteria.            Language Assistance Services     ATTENTION: Language assistance services are available, free of charge. Please call [phone].      ATENCIÓN: Si davela ravin, tiene a rodriguez disposición servicios gratuitos de asistencia lingüística. Llame al [phone].     [facility] Ý: N?u b?n nói Ti?ng Vi?t, có các d?ch v? h? tr? ngôn ng? mi?n phí dành cho b?n. G?i s? [phone].        [facility] Sign-Up     Activating your [facility] account is as easy as 1-2-3!     1) Visit [email or web address], select Sign Up Now, enter this activation code and your date of birth, then select Next.  [email or web address]  Expires: 2/23/2017  7:48 AM      2) Create a username and password to use when you visit [facility] in the future and select a security question in case you lose your password and select Next.    3) Enter your e-mail address and click  Sign Up!    Additional Information  If you have questions, please e-mail [email or web address] or call [phone] to talk to our [facility] staff. Remember, [facility] is NOT to be used for urgent needs. For medical emergencies, dial 911.          [facility] complies with applicable Federal civil rights laws and does not discriminate on the basis of race, color, national origin, age, disability, or sex.

## 2017-02-21 NOTE — ANESTHESIA RELEASE NOTE
"Anesthesia Release from PACU Note    Patient: Allyssa Wright    Procedure(s) Performed: Procedure(s) (LRB):  ELECTROCONVULSIVE THERAPY (ECT) - SINGLE SEIZURE (N/A)    Anesthesia type: general    Post pain: Adequate analgesia    Post assessment: no apparent anesthetic complications    Last Vitals:   Visit Vitals    /75 (BP Location: Right arm, Patient Position: Lying, BP Method: Automatic)    Pulse 93    Temp 37.1 °C (98.8 °F) (Temporal)    Resp (!) 23    Ht 5' 5" (1.651 m)    Wt 70.3 kg (155 lb)    SpO2 100%    Breastfeeding No    BMI 25.79 kg/m2       Post vital signs: stable    Level of consciousness: awake, alert  and oriented    Nausea/Vomiting: no nausea/no vomiting    Complications: none    Airway Patency: patent    Respiratory: unassisted, spontaneous ventilation, room air    Cardiovascular: stable and blood pressure at baseline    Hydration: euvolemic  "

## 2017-03-20 ENCOUNTER — TELEPHONE (OUTPATIENT)
Dept: PSYCHIATRY | Facility: CLINIC | Age: 37
End: 2017-03-20

## 2017-03-20 NOTE — TELEPHONE ENCOUNTER
Received a call from patient inquiring if she would need to postpone ECT procedure due to being put on Adderall 20mg BID by primary psychiatrist. Last dose this AM. Dr. Boyce notified and verbalized that patient can continue with scheduled ECT 3/21/17. Reviewed time to arrive and NPO status with patient. Verbalized understanding.

## 2017-03-21 ENCOUNTER — TELEPHONE (OUTPATIENT)
Dept: PSYCHIATRY | Facility: CLINIC | Age: 37
End: 2017-03-21

## 2017-03-21 ENCOUNTER — ANESTHESIA (OUTPATIENT)
Dept: ELECTROPHYSIOLOGY | Facility: HOSPITAL | Age: 37
End: 2017-03-21
Payer: COMMERCIAL

## 2017-03-21 ENCOUNTER — ANESTHESIA EVENT (OUTPATIENT)
Dept: ELECTROPHYSIOLOGY | Facility: HOSPITAL | Age: 37
End: 2017-03-21
Payer: COMMERCIAL

## 2017-03-21 NOTE — TELEPHONE ENCOUNTER
Received a call from patient today after lunch to reschedule ECT procedure originally scheduled for today 3/21/17 to be moved to 3/24/17. States she fell ill last night (off note this frequently happens, patient attributes symptoms to her anxiety). Candida in scheduling notified and adjustment made.

## 2017-03-24 ENCOUNTER — HOSPITAL ENCOUNTER (OUTPATIENT)
Facility: HOSPITAL | Age: 37
Discharge: HOME | End: 2017-03-24
Attending: PSYCHIATRY & NEUROLOGY | Admitting: PSYCHIATRY & NEUROLOGY
Payer: COMMERCIAL

## 2017-03-24 ENCOUNTER — SURGERY (OUTPATIENT)
Age: 37
End: 2017-03-24

## 2017-03-24 VITALS
TEMPERATURE: 98 F | OXYGEN SATURATION: 100 % | BODY MASS INDEX: 25.83 KG/M2 | HEIGHT: 65 IN | HEART RATE: 90 BPM | RESPIRATION RATE: 16 BRPM | DIASTOLIC BLOOD PRESSURE: 68 MMHG | SYSTOLIC BLOOD PRESSURE: 107 MMHG | WEIGHT: 155 LBS

## 2017-03-24 DIAGNOSIS — F33.9 EPISODE OF RECURRENT MAJOR DEPRESSIVE DISORDER, UNSPECIFIED DEPRESSION EPISODE SEVERITY: Primary | ICD-10-CM

## 2017-03-24 DIAGNOSIS — F33.41 MAJOR DEPRESSIVE DISORDER, RECURRENT, IN PARTIAL REMISSION: ICD-10-CM

## 2017-03-24 LAB
B-HCG UR QL: NEGATIVE
CTP QC/QA: YES

## 2017-03-24 PROCEDURE — 25000003 PHARM REV CODE 250: Performed by: PSYCHIATRY & NEUROLOGY

## 2017-03-24 PROCEDURE — 81025 URINE PREGNANCY TEST: CPT | Performed by: PSYCHIATRY & NEUROLOGY

## 2017-03-24 PROCEDURE — D9220A PRA ANESTHESIA: Mod: ,,, | Performed by: ANESTHESIOLOGY

## 2017-03-24 PROCEDURE — 25000003 PHARM REV CODE 250: Performed by: ANESTHESIOLOGY

## 2017-03-24 PROCEDURE — 90870 ELECTROCONVULSIVE THERAPY: CPT | Mod: ,,, | Performed by: PSYCHIATRY & NEUROLOGY

## 2017-03-24 PROCEDURE — 63600175 PHARM REV CODE 636 W HCPCS: Performed by: ANESTHESIOLOGY

## 2017-03-24 PROCEDURE — 90870 ELECTROCONVULSIVE THERAPY: CPT | Performed by: ANESTHESIOLOGY

## 2017-03-24 RX ORDER — ONDANSETRON 2 MG/ML
INJECTION INTRAMUSCULAR; INTRAVENOUS
Status: DISCONTINUED | OUTPATIENT
Start: 2017-03-24 | End: 2017-03-24

## 2017-03-24 RX ORDER — LIDOCAINE HYDROCHLORIDE 10 MG/ML
1 INJECTION, SOLUTION EPIDURAL; INFILTRATION; INTRACAUDAL; PERINEURAL ONCE
Status: COMPLETED | OUTPATIENT
Start: 2017-03-24 | End: 2017-03-24

## 2017-03-24 RX ORDER — SODIUM CHLORIDE 9 MG/ML
500 INJECTION, SOLUTION INTRAVENOUS ONCE
Status: DISCONTINUED | OUTPATIENT
Start: 2017-03-24 | End: 2017-03-24 | Stop reason: HOSPADM

## 2017-03-24 RX ORDER — SUCCINYLCHOLINE CHLORIDE 20 MG/ML
INJECTION INTRAMUSCULAR; INTRAVENOUS
Status: DISCONTINUED | OUTPATIENT
Start: 2017-03-24 | End: 2017-03-24

## 2017-03-24 RX ORDER — KETOROLAC TROMETHAMINE 30 MG/ML
INJECTION, SOLUTION INTRAMUSCULAR; INTRAVENOUS
Status: DISCONTINUED | OUTPATIENT
Start: 2017-03-24 | End: 2017-03-24

## 2017-03-24 RX ORDER — NITROGLYCERIN 5 MG/ML
INJECTION, SOLUTION INTRAVENOUS
Status: DISCONTINUED | OUTPATIENT
Start: 2017-03-24 | End: 2017-03-24

## 2017-03-24 RX ORDER — SODIUM CHLORIDE 9 MG/ML
INJECTION, SOLUTION INTRAVENOUS CONTINUOUS
Status: DISCONTINUED | OUTPATIENT
Start: 2017-03-24 | End: 2017-03-24 | Stop reason: HOSPADM

## 2017-03-24 RX ORDER — DEXTROAMPHETAMINE SACCHARATE, AMPHETAMINE ASPARTATE MONOHYDRATE, DEXTROAMPHETAMINE SULFATE AND AMPHETAMINE SULFATE 5; 5; 5; 5 MG/1; MG/1; MG/1; MG/1
20 CAPSULE, EXTENDED RELEASE ORAL 2 TIMES DAILY
COMMUNITY
End: 2017-10-03

## 2017-03-24 RX ADMIN — KETOROLAC TROMETHAMINE 30 MG: 30 INJECTION, SOLUTION INTRAMUSCULAR; INTRAVENOUS at 07:03

## 2017-03-24 RX ADMIN — ONDANSETRON 4 MG: 2 INJECTION, SOLUTION INTRAMUSCULAR; INTRAVENOUS at 07:03

## 2017-03-24 RX ADMIN — Medication 500 MG: at 07:03

## 2017-03-24 RX ADMIN — NITROGLYCERIN 50 MCG: 5 INJECTION, SOLUTION INTRAVENOUS at 07:03

## 2017-03-24 RX ADMIN — LIDOCAINE HYDROCHLORIDE 10 MG: 10 INJECTION, SOLUTION EPIDURAL; INFILTRATION; INTRACAUDAL; PERINEURAL at 07:03

## 2017-03-24 RX ADMIN — SUCCINYLCHOLINE CHLORIDE 120 MG: 20 INJECTION, SOLUTION INTRAMUSCULAR; INTRAVENOUS at 07:03

## 2017-03-24 RX ADMIN — SODIUM CHLORIDE: 0.9 INJECTION, SOLUTION INTRAVENOUS at 07:03

## 2017-03-24 RX ADMIN — SODIUM CHLORIDE 500 ML: 0.9 INJECTION, SOLUTION INTRAVENOUS at 07:03

## 2017-03-24 RX ADMIN — METHOHEXITAL SODIUM 150 MG: 500 INJECTION, POWDER, LYOPHILIZED, FOR SOLUTION INTRAMUSCULAR; INTRAVENOUS; RECTAL at 07:03

## 2017-03-24 NOTE — H&P
"Allyssa Wright  : 1980   MRN: [id number]  Date: 3/24/2017     Psychiatry - ECT  History & Physical    Chief complaint: Major Depressive Disorder, recurrent, in partial remission   Procedure: ECT #23    SUBJECTIVE:     HPI:   Allyssa Wright is a 37 y.o. female with MDD (major depressive disorder), recurrent, in partial remission who presents for ECT. Patient had last ECT session on 17.  Patient reports that she has been feeling fine since her last ECT treatment.  Patient was scheduled to return for ECT treatment on 3/21/17 but postponed due to being started on Adderall by her outpatient psychiatrist, Dr. Jc. Patient reports that she had been taking Adderall 20 mg PO BID for about 2 1/2 weeks to help with her energy level, however patient states that she noticed some increased thoughts of suicide while taking the medication.  Reports that the last time she had thoughts of suicide was on Wednesday but states that she did not have a plan and would not act on those thoughts.  States that she has now been off the Adderall for the past 2 days and that she has a follow up appointment with Dr. Jc next Thursday and will discuss whether or not she will continue the Adderall.  States that she has been sleeping about 12 hours per day which she states is too much so she is trying not to sleep as much.  Reports appetite is "too good" and continues to struggle with binge eating but reports that Adderall did help some with her binge eating.  Patient reports some mild cognitive problems with ECT.  Describes her mood as "okay" this morning.  Denies any SI/HI or AH/VH at this time.      Patient denies any headaches, CP, SOB, fevers, chills, N/V.    Psychiatric Review of Systems:  Mood: "fine"  Appetite: reports problems with binge eating in the evening, improved on Adderall  Psychomotor: none  Cognitive Impairment: mild to be expected with ECT  Insomnia: None  Psychosis: absent   Diurnal Variation: absent   Suicidal " Ideation: absent      Medical Review Of Systems:  Constitutional: negative for chills, fevers and sweats  Eyes: negative for visual disturbance  Respiratory: negative for cough, sputum and wheezing  Cardiovascular: negative for chest pain, palpitations, and dyspnea  Gastrointestinal: negative for abdominal pain, N/V  Musculoskeletal:negative for arthralgias and back pain    Current Medications:   No current facility-administered medications on file prior to encounter.      Current Outpatient Prescriptions on File Prior to Encounter   Medication Sig Dispense Refill    dapsone 7.5 % GlwP Apply topically once daily.      famciclovir (FAMVIR) 500 MG tablet Take 1 tablet (500 mg total) by mouth 2 (two) times daily. 30 tablet 12    hydrOXYzine pamoate (VISTARIL) 25 MG Cap Take 1 capsule (25 mg total) by mouth every 8 (eight) hours as needed (anxiety). 90 capsule 1    naftifine (NAFTIN) 1 % cream Apply topically daily as needed. Apply to feet      quetiapine (SEROQUEL) 50 MG tablet Take 150 mg by mouth 2 (two) times daily. 150 mg nightly and 25 mg in the morning      spironolactone (ALDACTONE) 50 MG tablet 50 mg 2 (two) times daily. 50  mg qAM, 50 mg qHS.      thyroid (ARMOUR THYROID) 30 mg Tab Take 1 tablet (30 mg total) by mouth every morning. 30 tablet 11    trazodone (DESYREL) 100 MG tablet Take 300 mg by mouth every evening.       venlafaxine (EFFEXOR-XR) 75 MG 24 hr capsule Take 1 capsule (75 mg total) by mouth nightly. Take with 150 mg for total daily dose of 225 mg. (Patient taking differently: Take 225 mg by mouth nightly. Pt taking 250 mg) 30 capsule 2    ZOVIRAX 5 % Crea   5      Allergies:   Ampicillin; Erythromycin; Levaquin [levofloxacin]; Penicillins; Pristiq [desvenlafaxine]; Sulfa (sulfonamide antibiotics); and Azithromycin    Past Medical/Surgical History:   Past Medical History:   Diagnosis Date    Anxiety     Depression     History of psychiatric hospitalization     HSV-1 (herpes simplex  "virus 1) infection     Hx of psychiatric care     Moderate depressed bipolar II disorder 06/13/2016    reports no history of bipolar    Obsessive-compulsive disorder     Psychiatric problem     Self-harming behavior     Therapy      Past Surgical History:   Procedure Laterality Date    ANKLE SURGERY Right     BREAST augmentation      OVARIAN CYST REMOVAL Bilateral       OBJECTIVE:     Vitals (pre-procedure):  Vitals:    03/24/17 0616   BP: 106/67   Pulse: 92   Resp: 16   Temp: 98.1 °F (36.7 °C)        Labs/Imaging/Studies:   Recent Results (from the past 48 hour(s))   POCT urine pregnancy    Collection Time: 03/24/17  6:37 AM   Result Value Ref Range    POC Preg Test, Ur Negative Negative     Acceptable Yes       No results found for: PHENYTOIN, PHENOBARB, VALPROATE, CBMZ    Physical Exam:   Gen: AAOx4, NAD  HEENT: NCAT, PERRL, EOMI  CV: RRR, no murmurs  Chest: Clear to ausculation bilaterally, no wheezing  Abd: Soft, non-tender, non-distended   Ext: No clubbing, cyanosis      Mental Status Exam:   Appearance: normal weight, neatly groomed, lying in bed  Behavior: friendly and cooperative  Speech: Conversational rate, tone, and volume  Mood: "fine"  Affect: full and reactive   Thought Process: linear, goal directed   Thought Content: not suicidal or homicidal   Sensorium: grossly intact  Cognition: grossly intact  Insight: good  Judgment: good    ASSESSMENT/PLAN:     Allyssa Wright is a 37 y.o. female with MDD (major depressive disorder), recurrent, in partial remission who presents for ECT.    Recommendations:   Proceed with ECT #23    Andreas Paulino MD  3/24/2017  "

## 2017-03-24 NOTE — ANESTHESIA POSTPROCEDURE EVALUATION
"Anesthesia Post Evaluation    Patient: Allyssa Wright    Procedure(s) Performed: Procedure(s) (LRB):  ELECTROCONVULSIVE THERAPY (ECT) - SINGLE SEIZURE (N/A)    Final Anesthesia Type: general  Patient location during evaluation: PACU  Patient participation: Yes- Able to Participate  Level of consciousness: awake and alert and oriented  Post-procedure vital signs: reviewed and stable  Pain management: adequate  Airway patency: patent  PONV status at discharge: No PONV  Anesthetic complications: no      Cardiovascular status: blood pressure returned to baseline and hemodynamically stable  Respiratory status: unassisted  Hydration status: euvolemic  Follow-up not needed.        Visit Vitals    /74    Pulse 96    Temp 36.7 °C (98.1 °F) (Oral)    Resp 17    Ht 5' 5" (1.651 m)    Wt 70.3 kg (155 lb)    SpO2 100%    Breastfeeding No    BMI 25.79 kg/m2       Pain/Roma Score: Pain Assessment Performed: Yes (3/24/2017  8:06 AM)  Presence of Pain: denies (3/24/2017  8:06 AM)      "

## 2017-03-24 NOTE — ANESTHESIA PREPROCEDURE EVALUATION
03/24/2017  Allyssa Wright is a 37 y.o., female.    Pre-operative evaluation for Procedure(s) (LRB):  ELECTROCONVULSIVE THERAPY (ECT) - SINGLE SEIZURE (N/A)    Allyssa Wright is a 37 y.o. female with MDD, recurrent severe, who is scheduled for continuance of ECT. This is ECT #23. No no issues with last session.           Patient Active Problem List   Diagnosis    MDD (major depressive disorder), recurrent, in partial remission    Major depressive disorder    Major depressive disorder in partial remission    Major depressive disorder, recurrent, in partial remission    Recurrent major depressive disorder       Review of patient's allergies indicates:   Allergen Reactions    Ampicillin      Mom says so    Erythromycin     Levaquin [levofloxacin] Other (See Comments)     Depression side effects    Penicillins      Mom says so    Pristiq [desvenlafaxine]      psycotic      Sulfa (sulfonamide antibiotics)      Rash      Azithromycin Anxiety        Current Facility-Administered Medications on File Prior to Visit   Medication Dose Route Frequency Provider Last Rate Last Dose    0.9%  NaCl infusion   Intravenous Continuous Lenny Dukes MD        caffeine 500mg powder  500 mg Oral On Call Procedure Lenny Dukes MD        lidocaine (PF) 10 mg/ml (1%) injection 10 mg  1 mL Intradermal Once Lenny Dukes MD         Current Outpatient Prescriptions on File Prior to Visit   Medication Sig Dispense Refill    dapsone 7.5 % GlwP Apply topically once daily.      dextroamphetamine-amphetamine (ADDERALL XR) 20 MG 24 hr capsule Take 20 mg by mouth 2 (two) times daily.      famciclovir (FAMVIR) 500 MG tablet Take 1 tablet (500 mg total) by mouth 2 (two) times daily. 30 tablet 12    hydrOXYzine pamoate (VISTARIL) 25 MG Cap Take 1 capsule (25 mg total) by mouth every 8 (eight) hours as needed  (anxiety). 90 capsule 1    naftifine (NAFTIN) 1 % cream Apply topically daily as needed. Apply to feet      quetiapine (SEROQUEL) 50 MG tablet Take 150 mg by mouth 2 (two) times daily. 150 mg nightly and 25 mg in the morning      spironolactone (ALDACTONE) 50 MG tablet 50 mg 2 (two) times daily. 50  mg qAM, 50 mg qHS.      thyroid (ARMOUR THYROID) 30 mg Tab Take 1 tablet (30 mg total) by mouth every morning. 30 tablet 11    trazodone (DESYREL) 100 MG tablet Take 300 mg by mouth every evening.       venlafaxine (EFFEXOR-XR) 75 MG 24 hr capsule Take 1 capsule (75 mg total) by mouth nightly. Take with 150 mg for total daily dose of 225 mg. (Patient taking differently: Take 225 mg by mouth nightly. Pt taking 250 mg) 30 capsule 2    ZOVIRAX 5 % Crea   5       Past Surgical History:   Procedure Laterality Date    ANKLE SURGERY Right     BREAST augmentation      OVARIAN CYST REMOVAL Bilateral        Social History     Social History    Marital status: Single     Spouse name: N/A    Number of children: N/A    Years of education: N/A     Occupational History    unemployed      Social History Main Topics    Smoking status: Former Smoker     Quit date: 4/6/2011    Smokeless tobacco: Not on file    Alcohol use No      Comment: Alcohol misuse in remote past    Drug use: No      Comment: Experimental use in high school    Sexual activity: Not on file     Other Topics Concern    Not on file     Social History Narrative    Pt has 1 older brother from an intact family until the death of her mother in 2012.  She completed 1 year of college, was never in the , and has never been employed.  She was engaged once, but never , has no children, and lives with her father plus 2 dogs.  She denies any hobbies and is spiritual but not Confucianist.  She does date and returns to college during rare periods when depression and anxiety [place].         Vital Signs Range (Last 24H):  Temp:  [36.7 °C (98.1 °F)]    Pulse:  [92]   Resp:  [16]   BP: (106)/(67)   SpO2:  [100 %]         Diagnostic Studies:      EKG:  Normal sinus rhythm  Normal ECG  When compared with ECG of 03-DEC-2015 11:38,  Questionable change in The axis  T wave inversion no longer evident in Inferior leads  Confirmed by Flaco Sr MD ([id number]) on 6/9/2016 1:30:13 PM    Referred By: SELF REFERRAL           Confirmed By:Flaco Sr MD          [facility] Pre-op Assessment    I have reviewed the Patient Summary Reports.     I have reviewed the Nursing Notes.   I have reviewed the Medications.     Review of Systems  Anesthesia Hx:  No problems with previous Anesthesia Denies Hx of Anesthetic complications  History of prior surgery of interest to airway management or planning: Previous anesthesia: General 7/22/2016 with general anesthesia.  Denies Family Hx of Anesthesia complications.   Denies Personal Hx of Anesthesia complications.   Social:  Former smoker-- quit 2011   Hematology/Oncology:  Hematology Normal   Oncology Normal     EENT/Dental:EENT/Dental Normal   Cardiovascular:  Cardiovascular Normal     Pulmonary:  Pulmonary Normal    Renal/:  Renal/ Normal     Hepatic/GI:  Hepatic/GI Normal    Musculoskeletal:  Musculoskeletal Normal    Neurological:  Neurology Normal    Endocrine:  Endocrine Normal    Dermatological:  Skin Normal    Psych:   Psychiatric History anxiety depression          Physical Exam  General:  Well nourished    Airway/Jaw/Neck:  Airway Findings: Mouth Opening: Normal Tongue: Normal  General Airway Assessment: Adult  Mallampati: II  Improves to I with phonation.  TM Distance: Normal, at least 6 cm  Jaw/Neck Findings:  Neck ROM: Normal ROM     Eyes/Ears/Nose:  EYES/EARS/NOSE FINDINGS: Normal   Dental:  Dental Findings: In tact   Chest/Lungs:  Chest/Lungs Findings: Normal Respiratory Rate, Clear to auscultation     Heart/Vascular:  Heart Findings: Rate: Normal  Rhythm: Regular Rhythm  Sounds: Normal  Heart murmur: negative    Abdomen:  Abdomen  Findings: Normal    Musculoskeletal:  Musculoskeletal Findings: Normal   Skin:  Skin Findings: Normal    Mental Status:  Mental Status Findings:  Cooperative, Alert and Oriented         Anesthesia Plan  Type of Anesthesia, risks & benefits discussed:  Anesthesia Type:  general  Patient's Preference: same   Intra-op Monitoring Plan: standard ASA monitors  Intra-op Monitoring Plan Comments:   Post Op Pain Control Plan:   Post Op Pain Control Plan Comments:   Induction:   IV  Beta Blocker:  Patient is not currently on a Beta-Blocker (No further documentation required).       Informed Consent: Patient understands risks and agrees with Anesthesia plan.  Questions answered. Anesthesia consent signed with patient.  ASA Score: 2     Day of Surgery Review of History & Physical:    H&P update referred to the provider.         Ready For Surgery From Anesthesia Perspective.

## 2017-03-24 NOTE — ANESTHESIA PROCEDURE NOTES
ECT    Treatment Number: 23  Procedure start time: 3/24/2017 7:44 AM  Timeout performed at: 3/24/2017 7:43 AM  Procedure end time: 3/24/2017 7:51 AM    Staffing  Anesthesiologist: TONY DELONG III  CRNA/Resident: ROSSANA DHILLON  Performed by: resident/CRNA     Preanesthetic Checklist  The following were completed as part of the preanesthetic checklist: patient identified, procedural consent, pre-op evaluation, timeout performed, risks and benefits discussed, monitors and equipment checked, anesthesia consent given, oxygen available, suction available, hand hygiene performed and patient being monitored.    Setup & Induction  Patient Monitoring: heart rate, continuous pulse ox and NIBP  Patient preparation: bite block inserted, extremities padded, mandibular stabilization and patient hyperventilated  Electrode Placement: Bitemporal    The patient was moved to the ECT therapy room after being assessed and consented for ECT. After standard ASA monitors were applied and timeout performed, the patient was adequately preoxygenated. After induction of general anesthesia, adequate oxygenation and ventilation were confirmed with pulse oxymetry and end tidal CO2 monitoring via bag-mask ventilation. End tidal CO2 was monitored throughout the case and moderate hyperventilation was performed prior to beginning ECT treatment. All extremities were padded, biteblock was inserted, and mandibular stabilization was done prior to initiating ECT therapy.    Procedure      Stimulus Number 3: Setting Number = III; 576 millicoulombs; Seizure Duration = 122 seconds        Recovery  Baseline Blood Pressure: 129/71  Peak Blood Pressure: 239/100  Time to Recovery of Respirations: 4 minutes    ECT Findings  ECT associated findings of: None

## 2017-03-24 NOTE — DISCHARGE SUMMARY
Allyssa Wright  : 1980   MRN: [id number]  Date: 3/24/2017     Psychiatry - ECT  Discharge Summary    Admit Date: 3/24/2017  5:50 AM  Discharge Date: 2017    Attending Physician: Ruel Benson MD    Discharge Provider: Andreas Paulino MD    History of Present Illness: Allyssa Wright is a 37 y.o. female with Major Depressive Disorder, recurrent, in partial remission presented for ECT #23. See H&P dated 2017 for full HPI. For further details, see Dr. Boyce's pre-ECT evaluation.    Hospital Course: The patient tolerated the ECT treatment well without complication. Patient was stable post-procedure. See OP note dated 2017 for more details.     Disposition: Home or Self Care    Medications:  Current Discharge Medication List      CONTINUE these medications which have NOT CHANGED    Details   dapsone 7.5 % GlwP Apply topically once daily.      dextroamphetamine-amphetamine (ADDERALL XR) 20 MG 24 hr capsule Take 20 mg by mouth 2 (two) times daily.      famciclovir (FAMVIR) 500 MG tablet Take 1 tablet (500 mg total) by mouth 2 (two) times daily.  Qty: 30 tablet, Refills: 12    Associated Diagnoses: Major depressive disorder, recurrent episode, moderate degree      hydrOXYzine pamoate (VISTARIL) 25 MG Cap Take 1 capsule (25 mg total) by mouth every 8 (eight) hours as needed (anxiety).  Qty: 90 capsule, Refills: 1      naftifine (NAFTIN) 1 % cream Apply topically daily as needed. Apply to feet      quetiapine (SEROQUEL) 50 MG tablet Take 150 mg by mouth 2 (two) times daily. 150 mg nightly and 25 mg in the morning      spironolactone (ALDACTONE) 50 MG tablet 50 mg 2 (two) times daily. 50  mg qAM, 50 mg qHS.      thyroid (ARMOUR THYROID) 30 mg Tab Take 1 tablet (30 mg total) by mouth every morning.  Qty: 30 tablet, Refills: 11    Associated Diagnoses: Major depressive disorder, recurrent episode, moderate degree      trazodone (DESYREL) 100 MG tablet Take 300 mg by mouth every evening.        venlafaxine (EFFEXOR-XR) 75 MG 24 hr capsule Take 1 capsule (75 mg total) by mouth nightly. Take with 150 mg for total daily dose of 225 mg.  Qty: 30 capsule, Refills: 2      ZOVIRAX 5 % Crea Refills: 5           Status at Discharge: Alert and medically stable    Discharge Diagnoses:  Major Depressive Disorder, recurrent, in partial remission    Diet: Resume previous outpatient diet  Activity: Ambulate with assistance - patient is a fall risk  Instructions: Please do not eat or drink anything after midnight prior to procedure. Please do not drive on day of ECT.    Med Changes:  None    Next ECT:  Follow-up Information     Follow up with [facility] On 4/18/2017.    Why:  [facility] to [facility] on 4/18/17 at 6:00 AM for next ECT    Contact information:    [address] [facility] [address]            Andreas Paulino MD  Psychiatry PGY-2  3/24/2017        STAFF NOTE:  I agree with above Discharge Summary as noted by Dr. Paulino.      IMP:  Major Depressive Disorder, Recurrent, In partial remission     REC:  Patient is to follow up with next ECT to be scheduled on 4/18/17 as noted.    I agreed pt should HOLD Adderall at this time due to AE of SI.  Can f/u with Dr. Jc, outpt psychiatrist, as scheduled.    Pt or family members were urged to call our ECT coordinator prn in meantime for any questions or concerns.

## 2017-03-24 NOTE — ANESTHESIA RELEASE NOTE
"Anesthesia Release from PACU Note    Patient: Allyssa Wright    Procedure(s) Performed: Procedure(s) (LRB):  ELECTROCONVULSIVE THERAPY (ECT) - SINGLE SEIZURE (N/A)    Anesthesia type: general    Post pain: Adequate analgesia    Post assessment: no apparent anesthetic complications    Last Vitals:   Visit Vitals    /74    Pulse 96    Temp 36.7 °C (98.1 °F) (Oral)    Resp 17    Ht 5' 5" (1.651 m)    Wt 70.3 kg (155 lb)    SpO2 100%    Breastfeeding No    BMI 25.79 kg/m2       Post vital signs: stable    Level of consciousness: awake, alert  and oriented    Nausea/Vomiting: no nausea/no vomiting    Complications: none    Airway Patency: patent    Respiratory: unassisted    Cardiovascular: stable and blood pressure at baseline    Hydration: euvolemic  "

## 2017-03-24 NOTE — TRANSFER OF CARE
"Anesthesia Transfer of Care Note    Patient: Allyssa Wright    Procedure(s) Performed: Procedure(s) (LRB):  ELECTROCONVULSIVE THERAPY (ECT) - SINGLE SEIZURE (N/A)    Patient location: PACU    Anesthesia Type: general    Transport from OR: Transported from OR on room air with adequate spontaneous ventilation    Post pain: adequate analgesia    Post assessment: no apparent anesthetic complications    Level of consciousness: awake    Nausea/Vomiting: no nausea/vomiting    Complications: none          Last vitals:   Visit Vitals    /67 (BP Location: Left arm, Patient Position: Lying, BP Method: Automatic)    Pulse 92    Temp 36.7 °C (98.1 °F) (Oral)    Resp 16    Ht 5' 5" (1.651 m)    Wt 70.3 kg (155 lb)    SpO2 100%    Breastfeeding No    BMI 25.79 kg/m2     "

## 2017-03-24 NOTE — IP AVS SNAPSHOT
[facility]  [address]  [facility] [address]  Phone: [phone]           Patient Discharge Instructions     Our goal is to set you up for success. This packet includes information on your condition, medications, and your home care. It will help you to care for yourself so you don't get sicker and need to go back to the hospital.     Please ask your nurse if you have any questions.        There are many details to remember when preparing to leave the hospital. Here is what you will need to do:    1. Take your medicine. If you are prescribed medications, review your Medication List in the following pages. You may have new medications to  at the pharmacy and others that you'll need to stop taking. Review the instructions for how and when to take your medications. Talk with your doctor or nurses if you are unsure of what to do.     2. Go to your follow-up appointments. Specific follow-up information is listed in the following pages. Your may be contacted by a transition nurse or clinical provider about future appointments. Be sure we have all of the phone numbers to reach you, if needed. Please contact your provider's office if you are unable to make an appointment.     3. Watch for warning signs. Your doctor or nurse will give you detailed warning signs to watch for and when to call for assistance. These instructions may also include educational information about your condition. If you experience any of warning signs to your health, call your doctor.               [facility]  Unless otherwise directed by your provider, please contact [facility], our nurse care line that is available for 24/7 assistance.     [phone] (toll-free)    Registered nurses in the [facility] provide clinical advisement, health education, appointment booking, and other advisory services.                    ** Verify the list of medication(s) below is accurate and up  to date. Carry this with you in case of emergency. If your medications have changed, please notify your healthcare provider.             Medication List      CHANGE how you take these medications        Additional Info                      venlafaxine 75 MG 24 hr capsule   Commonly known as:  EFFEXOR-XR   Quantity:  30 capsule   Refills:  2   Dose:  75 mg   What changed:    - how much to take  - additional instructions    Instructions:  Take 1 capsule (75 mg total) by mouth nightly. Take with 150 mg for total daily dose of 225 mg.     Begin Date    AM    Noon    PM    Bedtime         CONTINUE taking these medications        Additional Info                      dapsone 7.5 % Glwp   Refills:  0    Instructions:  Apply topically once daily.     Begin Date    AM    Noon    PM    Bedtime       dextroamphetamine-amphetamine 20 MG 24 hr capsule   Commonly known as:  ADDERALL XR   Refills:  0   Dose:  20 mg    Instructions:  Take 20 mg by mouth 2 (two) times daily.     Begin Date    AM    Noon    PM    Bedtime       famciclovir 500 MG tablet   Commonly known as:  FAMVIR   Quantity:  30 tablet   Refills:  12   Dose:  500 mg    Instructions:  Take 1 tablet (500 mg total) by mouth 2 (two) times daily.     Begin Date    AM    Noon    PM    Bedtime       hydrOXYzine pamoate 25 MG Cap   Commonly known as:  VISTARIL   Quantity:  90 capsule   Refills:  1   Dose:  25 mg    Instructions:  Take 1 capsule (25 mg total) by mouth every 8 (eight) hours as needed (anxiety).     Begin Date    AM    Noon    PM    Bedtime       naftifine 1 % cream   Commonly known as:  NAFTIN   Refills:  0    Instructions:  Apply topically daily as needed. Apply to feet     Begin Date    AM    Noon    PM    Bedtime       quetiapine 50 MG tablet   Commonly known as:  SEROQUEL   Refills:  0   Dose:  150 mg   Indications:  25 mg po in am    Instructions:  Take 150 mg by mouth 2 (two) times daily. 150 mg nightly and 25 mg in the morning     Begin Date    AM     Noon    PM    Bedtime       spironolactone 50 MG tablet   Commonly known as:  ALDACTONE   Refills:  0   Dose:  50 mg    Instructions:  50 mg 2 (two) times daily. 50  mg qAM, 50 mg qHS.     Begin Date    AM    Noon    PM    Bedtime       thyroid Tab   Commonly known as:  ARMOUR THYROID   Quantity:  30 tablet   Refills:  11   Dose:  30 mg    Instructions:  Take 1 tablet (30 mg total) by mouth every morning.     Begin Date    AM    Noon    PM    Bedtime       trazodone 100 MG tablet   Commonly known as:  DESYREL   Refills:  0   Dose:  300 mg    Instructions:  Take 300 mg by mouth every evening.     Begin Date    AM    Noon    PM    Bedtime       ZOVIRAX 5 % Crea   Refills:  5   Generic drug:  acyclovir 5%      Begin Date    AM    Noon    PM    Bedtime                  Please bring to all follow up appointments:    1. A copy of your discharge instructions.  2. All medicines you are currently taking in their original bottles.  3. Identification and insurance card.    Please arrive 15 minutes ahead of scheduled appointment time.    Please call 24 hours in advance if you must reschedule your appointment and/or time.        Follow-up Information     Follow up with [facility] On 4/18/2017.    Why:  [facility] on 4/18/17 at 6:00 AM for next ECT    Contact information:    [facility] [address]          Discharge Instructions     Future Orders    Activity as tolerated     Diet general     Questions:    Total calories:      Fat restriction, if any:      Protein restriction, if any:      Na restriction, if any:      Fluid restriction:      Additional restrictions:          Discharge Instructions         Understanding Electroconvulsive Therapy  Electroconvulsive therapy (ECT) is sometimes called shock therapy. This may sound painful, but ECT doesnt hurt. Its often the safest and best treatment for severe depression. It can treat other mental disorders as well.  What is  electroconvulsive therapy?  ECT is used to treat people who are very depressed. Its mainly used when other treatments, such as antidepressant medications, have failed. Often, it may relieve feelings of sadness and despair in just a few days.  Common symptoms of major depression  Symptoms of major depression include:  · Feeling a deep sadness that doesnt go away  · Losing all pleasure in life  · Feeling hopeless or helpless  · Feeling guilty  · Sleeping more or less than normal  · Eating more or less than normal  · Having headaches or stomachaches, or other pains that dont go away  · Feeling nervous, empty, or worthless  · Crying a great deal  · Thinking or talking about suicide or death  How is ECT therapy done?  Before an ECT treatment, youll receive anesthesia to keep you pain-free. Youll also be given medication to relax your muscles and control your heart rate. Your health care provider then places electrodes on your head. You may have one above each temple (bilateral ECT). Or, you may have electrodes on one temple and on your forehead (unilateral ECT). While you are asleep, your brain is stimulated very briefly with an electric current. This causes a  seizure, usually lasting less than a minute. Because you are under anesthesia, your body will not move even as your brain goes through great changes.  What are the risks?  When done properly, ECT is quite safe. Right after the treatment, you may be confused. This typically lasts for less than half an hour. You may have a headache or stiff muscles. But these symptoms often go away quickly. A more serious potential  side effect is memory loss. Commonly, patients have temporary difficulty remembering information that they learned recently, and may have little recall of the period during which they received treatment. Less commonly, patients may have permanent, spotty recall for significant past events or, rarely, loss of memory for larger blocks of  "time.  Looking to the future  In most cases, ECT does not cure depression, but it can improve symptoms for a period of time. You may need a series of ECT treatments to continue feeling the benefit. You may also need to take antidepressant medications to help prevent symptoms from returning. But with ongoing treatment, you can have a full and healthy life.  Resources  The National [place] of Mental Health  200.979.2193  www.[facility].nih.gov  [facility]  421.143.8346  www.[facility].org     Date Last Reviewed: 3/23/2015  © 4646-5359 [employer]. [address] [place] [address]. All rights reserved. This information is not intended as a substitute for professional medical care. Always follow your healthcare professional's instructions.            Admission Information     Date & Time Provider Department CSN    3/24/2017  5:50 AM Shoaib Boyce Jr., MD [facility] [id number]      Care Providers     Provider Role Specialty Primary office phone    Shoaib Boyce Jr., MD Attending Provider Psychiatry [phone]    Shoaib Boyce Jr., MD Surgeon  Psychiatry [phone]      Your Vitals Were     BP Pulse Temp Resp Height Weight    103/65 99 98.1 °F (36.7 °C) (Oral) 14 5' 5" (1.651 m) 70.3 kg (155 lb)    SpO2 BMI             100% 25.79 kg/m2         Recent Lab Values     No lab values to display.      Allergies as of 3/24/2017        Reactions    Ampicillin     Mom says so    Erythromycin     Levaquin [Levofloxacin] Other (See Comments)    Depression side effects    Penicillins     Mom says so    Pristiq [Desvenlafaxine]     psycotic    Sulfa (Sulfonamide Antibiotics)     Rash    Azithromycin Anxiety      Advance Directives     An advance directive is a document which, in the event you are no longer able to make decisions for yourself, tells your healthcare team what kind of treatment you do or do not want to receive, or who you would like to make those decisions for " you.  If you do not currently have an advance directive, [facility] encourages you to create one.  For more information call:  [phone] ([phone]), [phone] ([phone]),  or log on to [email or web address].        Smoking Cessation     If you would like to quit smoking:   You may be eligible for free services if you are a [place] resident and started smoking cigarettes before September 1, 1988.  Call the Smoking Cessation Trust (SCT) toll free at (686) 965-0869 or (516) 579-0473.   Call 7-047-QUIT-NOW if you do not meet the above criteria.            Language Assistance Services     ATTENTION: Language assistance services are available, free of charge. Please call [phone].      ATENCIÓN: Si habla ravin, tiene a rodriguez disposición servicios gratuitos de asistencia lingüística. Llame al [phone].     CHÚ Ý: N?u b?n nói Ti?ng Vi?t, có các d?ch v? h? tr? ngôn ng? mi?n phí dành cho b?n. G?i s? [phone].        [facility] Sign-Up     Activating your [facility] account is as easy as 1-2-3!     1) Visit [email or web address], select Sign Up Now, enter this activation code and your date of birth, then select Next.  [email or web address]  Expires: 5/8/2017  8:21 AM      2) Create a username and password to use when you visit [facility] in the future and select a security question in case you lose your password and select Next.    3) Enter your e-mail address and click Sign Up!    Additional Information  If you have questions, please e-mail [email or web address] or call [phone] to talk to our [facility] staff. Remember, [facility] is NOT to be used for urgent needs. For medical emergencies, dial 911.          [facility] complies with applicable Federal civil rights laws and does not discriminate on the basis of race, color, national origin, age, disability, or sex.

## 2017-03-24 NOTE — PLAN OF CARE
Problem: Patient Care Overview  Goal: Plan of Care Review  Outcome: Outcome(s) achieved Date Met:  03/24/17  Awake and alert. VSS. Denies pain or nausea. Tolerating liquids well. Voiding well. DC instructions given to patient and family and they verbalize understanding.

## 2017-03-24 NOTE — DISCHARGE INSTRUCTIONS
Understanding Electroconvulsive Therapy  Electroconvulsive therapy (ECT) is sometimes called shock therapy. This may sound painful, but ECT doesnt hurt. Its often the safest and best treatment for severe depression. It can treat other mental disorders as well.  What is electroconvulsive therapy?  ECT is used to treat people who are very depressed. Its mainly used when other treatments, such as antidepressant medications, have failed. Often, it may relieve feelings of sadness and despair in just a few days.  Common symptoms of major depression  Symptoms of major depression include:  · Feeling a deep sadness that doesnt go away  · Losing all pleasure in life  · Feeling hopeless or helpless  · Feeling guilty  · Sleeping more or less than normal  · Eating more or less than normal  · Having headaches or stomachaches, or other pains that dont go away  · Feeling nervous, empty, or worthless  · Crying a great deal  · Thinking or talking about suicide or death  How is ECT therapy done?  Before an ECT treatment, youll receive anesthesia to keep you pain-free. Youll also be given medication to relax your muscles and control your heart rate. Your health care provider then places electrodes on your head. You may have one above each temple (bilateral ECT). Or, you may have electrodes on one temple and on your forehead (unilateral ECT). While you are asleep, your brain is stimulated very briefly with an electric current. This causes a  seizure, usually lasting less than a minute. Because you are under anesthesia, your body will not move even as your brain goes through great changes.  What are the risks?  When done properly, ECT is quite safe. Right after the treatment, you may be confused. This typically lasts for less than half an hour. You may have a headache or stiff muscles. But these symptoms often go away quickly. A more serious potential  side effect is memory loss. Commonly, patients have temporary difficulty  remembering information that they learned recently, and may have little recall of the period during which they received treatment. Less commonly, patients may have permanent, spotty recall for significant past events or, rarely, loss of memory for larger blocks of time.  Looking to the future  In most cases, ECT does not cure depression, but it can improve symptoms for a period of time. You may need a series of ECT treatments to continue feeling the benefit. You may also need to take antidepressant medications to help prevent symptoms from returning. But with ongoing treatment, you can have a full and healthy life.  Resources  The National [place] of Mental Health  614.516.2050  www.[facility].nih.gov  [facility]  272.487.1092  www.[facility].org     Date Last Reviewed: 3/23/2015  © 0299-7850 [employer]. [address] [place] [address], [place] [address]. All rights reserved. This information is not intended as a substitute for professional medical care. Always follow your healthcare professional's instructions.

## 2017-03-24 NOTE — OP NOTE
Allyssa Wright  : 1980   MRN: [id number]  Date: 3/24/2017       Psychiatry - ECT  Operative Note             Date of Admission: 3/24/2017  5:50 AM    Site: [facility] [address]    Attending: Ruel Benson MD  Residents: Andreas Paulino MD  Pre-op Diagnosis: MDD, recurrent, in partial remission     ECT Treatment Number: 23  Machine Type: Bioinceptta 5000    Patient Status: medically stable    Vitals (pre-procedure):  Vitals:    17 0616   BP: 106/67   Pulse: 92   Resp: 16   Temp: 98.1 °F (36.7 °C)       Electrode Placement: Bitemporal    Stimulus Number Charge (mC) Level Pulse Width (msec) Frequency  (Hz) Duration of Stimulus (sec) Current (mA) Duration of Seizure (sec)   1 576 3 1 60 6 800 122                                   Complications: HTN    Maximum Blood Pressure: 239/134    Medications Given:  Caffeine 500 mg  PO  Before  Methohexital (Brevital).   150mg  IV  During   Succinylcholine (Anectine).   120mg  IV  During   Ondansetron (Zofran).   4mg  IV  During  Toradol 30mg IV During   Nitroglycerin 50 mcg IV During    Treatment Course:  Pt tolerated procedure well. After adequate recovery from general anesthesia, the patient was transported to recovery.    Post-op Diagnosis: Same as above    Recommended for next ECT:  17    Andreas Paulino MD  Psychiatry PGY-2    3/24/2017

## 2017-04-04 ENCOUNTER — OFFICE VISIT (OUTPATIENT)
Dept: PSYCHIATRY | Facility: CLINIC | Age: 37
End: 2017-04-04
Payer: COMMERCIAL

## 2017-04-04 VITALS
HEIGHT: 65 IN | SYSTOLIC BLOOD PRESSURE: 112 MMHG | BODY MASS INDEX: 25.49 KG/M2 | DIASTOLIC BLOOD PRESSURE: 69 MMHG | WEIGHT: 153 LBS | HEART RATE: 95 BPM

## 2017-04-04 DIAGNOSIS — F33.41 RECURRENT MAJOR DEPRESSION IN PARTIAL REMISSION: Primary | ICD-10-CM

## 2017-04-04 PROCEDURE — 99999 PR PBB SHADOW E&M-EST. PATIENT-LVL III: CPT | Mod: PBBFAC,,, | Performed by: PSYCHIATRY & NEUROLOGY

## 2017-04-04 PROCEDURE — 99213 OFFICE O/P EST LOW 20 MIN: CPT | Mod: S$GLB,,, | Performed by: PSYCHIATRY & NEUROLOGY

## 2017-04-08 NOTE — PROGRESS NOTES
Outpatient Psychiatry Follow-Up Visit (MD/NP)    4/4/2017    Clinical Status of Patient:  Outpatient (Ambulatory)    Chief Complaint:  Allyssa Wright is a 37 y.o. female who presents today for follow-up of depression and anxiety.  Met with patient and father.      Interval History and Content of Current Session:  Interim Events/Subjective Report/Content of Current Session:  Pt has been followed off and on in outpatient maintenance ECT since 2011.  She and her father returned for an office visit to discuss her plan of care unaffected by short-term memory loss on the day of a treatment.  Pt has been treated with nearly all classes of antidepressants plus augmentation strategies for adequate periods.  She has achieved remission of depression for brief periods before the medicines stop working or have to be discontinued due to adverse effects.  TMS was used with only partial benefit.  Remaining untried options including Vagal Nerve Stimulation and Deep Brain Implantation were discussed for the sake of completeness.    At the end of the session, it was decided that Pt will continue maintenance ECT at a frequency required to span treatments without relapse.  Medications will continue to be managed by her referring psychiatrist.  Behavioral interventions of early rising, bright light, and exercise were presented again, and Pt was encouraged to make these a part of her daily routine.    Psychotherapy:  · Target symptoms: recurrent depression  · Why chosen therapy is appropriate versus another modality: relevant to diagnosis, patient responds to this modality  · Outcome monitoring methods: self-report, observation, feedback from family  · Therapeutic intervention type: insight oriented psychotherapy, supportive psychotherapy, Family therapy with primary caregiver actively involved.  · Topics discussed/themes: stress related to medical comorbidities, building skills sets for symptom management, symptom recognition  · The  "patient's response to the intervention is motivated. The patient's progress toward treatment goals is not progressing.   · Duration of intervention: 32 minutes.    Review of Systems   · PSYCHIATRIC: Pertinant items are noted in the narrative.    Past Medical, Family and Social History: The patient's past medical, family and social history have been reviewed and updated as appropriate within the electronic medical record - see encounter notes.    Compliance: yes    Side effects: None    Risk Parameters:  Patient reports suicidal ideation: Pt describes thoughts of wanting to die without plan or desire for self-harm.  Patient reports no homicidal ideation  Patient reports no self-injurious behavior  Patient reports no violent behavior    Exam (detailed: at least 9 elements; comprehensive: all 15 elements)   Constitutional  Vitals:  Most recent vital signs, dated greater than 90 days prior to this appointment, were reviewed.   Vitals:    04/04/17 1541   BP: 112/69   Pulse: 95   Weight: 69.4 kg (153 lb)   Height: 5' 5" (1.651 m)        General:  age appropriate, well nourished, casually dressed     Musculoskeletal  Muscle Strength/Tone:  no dyskinesia, no dystonia, no tremor   Gait & Station:  non-ataxic     Psychiatric  Speech:  no latency; no press, spontaneous   Mood & Affect:  neutral  mood-congruent   Thought Process:  goal-directed, logical   Associations:  intact   Thought Content:  No hallucinations, delusions, or flight of ideas.  Content is depressed.  Occasional thoughts of death without plan.   Insight:  has awareness of illness   Judgement: behavior is adequate to circumstances   Orientation:  grossly intact   Memory: intact for content of interview   Language: grossly intact   Attention Span & Concentration:  able to focus   Fund of Knowledge:  intact and appropriate to age and level of education     Assessment and Diagnosis   Status/Progress: Based on the examination today, the patient's problem(s) is/are " inadequately controlled.  New problems have not been presented today.   Co-morbidities are not complicating management of the primary condition.  There are no active rule-out diagnoses for this patient at this time.     General Impression:  Pt will continue maintenance ECT with medication and behavioral augmentation.      ICD-10-CM ICD-9-CM   1. Recurrent major depression in partial remission F33.41 296.35       Intervention/Counseling/Treatment Plan   · Medication Management: Continue current medications.      Return to Clinic: as directed for ECT.

## 2017-04-10 ENCOUNTER — TELEPHONE (OUTPATIENT)
Dept: PSYCHIATRY | Facility: CLINIC | Age: 37
End: 2017-04-10

## 2017-04-10 NOTE — TELEPHONE ENCOUNTER
Patient called requesting ECT procedure to be rescheduled form Tuesday 4/18 to Thursday 4/20. Surgery scheduling notified.

## 2017-04-20 ENCOUNTER — HOSPITAL ENCOUNTER (OUTPATIENT)
Facility: HOSPITAL | Age: 37
Discharge: HOME | End: 2017-04-20
Attending: PSYCHIATRY & NEUROLOGY | Admitting: PSYCHIATRY & NEUROLOGY
Payer: COMMERCIAL

## 2017-04-20 ENCOUNTER — ANESTHESIA (OUTPATIENT)
Dept: ELECTROPHYSIOLOGY | Facility: HOSPITAL | Age: 37
End: 2017-04-20
Payer: COMMERCIAL

## 2017-04-20 ENCOUNTER — ANESTHESIA EVENT (OUTPATIENT)
Dept: ELECTROPHYSIOLOGY | Facility: HOSPITAL | Age: 37
End: 2017-04-20
Payer: COMMERCIAL

## 2017-04-20 ENCOUNTER — SURGERY (OUTPATIENT)
Age: 37
End: 2017-04-20

## 2017-04-20 VITALS
WEIGHT: 153 LBS | HEIGHT: 65 IN | OXYGEN SATURATION: 100 % | TEMPERATURE: 99 F | DIASTOLIC BLOOD PRESSURE: 50 MMHG | SYSTOLIC BLOOD PRESSURE: 107 MMHG | BODY MASS INDEX: 25.49 KG/M2 | HEART RATE: 73 BPM | RESPIRATION RATE: 16 BRPM

## 2017-04-20 DIAGNOSIS — F32.2 MDD (MAJOR DEPRESSIVE DISORDER), SEVERE: ICD-10-CM

## 2017-04-20 DIAGNOSIS — F33.41 MDD (MAJOR DEPRESSIVE DISORDER), RECURRENT, IN PARTIAL REMISSION: ICD-10-CM

## 2017-04-20 LAB
B-HCG UR QL: NEGATIVE
CTP QC/QA: YES

## 2017-04-20 PROCEDURE — 37000008 HC ANESTHESIA 1ST 15 MINUTES: Performed by: PSYCHIATRY & NEUROLOGY

## 2017-04-20 PROCEDURE — 90870 ELECTROCONVULSIVE THERAPY: CPT | Mod: ,,, | Performed by: PSYCHIATRY & NEUROLOGY

## 2017-04-20 PROCEDURE — 25000003 PHARM REV CODE 250: Performed by: STUDENT IN AN ORGANIZED HEALTH CARE EDUCATION/TRAINING PROGRAM

## 2017-04-20 PROCEDURE — 71000044 HC DOSC ROUTINE RECOVERY FIRST HOUR: Performed by: PSYCHIATRY & NEUROLOGY

## 2017-04-20 PROCEDURE — 25000003 PHARM REV CODE 250: Performed by: PSYCHIATRY & NEUROLOGY

## 2017-04-20 PROCEDURE — 25000003 PHARM REV CODE 250

## 2017-04-20 PROCEDURE — D9220A PRA ANESTHESIA: Mod: ,,, | Performed by: ANESTHESIOLOGY

## 2017-04-20 PROCEDURE — 37000009 HC ANESTHESIA EA ADD 15 MINS: Performed by: PSYCHIATRY & NEUROLOGY

## 2017-04-20 PROCEDURE — 63600175 PHARM REV CODE 636 W HCPCS

## 2017-04-20 PROCEDURE — 81025 URINE PREGNANCY TEST: CPT | Performed by: PSYCHIATRY & NEUROLOGY

## 2017-04-20 PROCEDURE — 90870 ELECTROCONVULSIVE THERAPY: CPT | Performed by: ANESTHESIOLOGY

## 2017-04-20 RX ORDER — LIDOCAINE HYDROCHLORIDE 10 MG/ML
1 INJECTION, SOLUTION EPIDURAL; INFILTRATION; INTRACAUDAL; PERINEURAL ONCE
Status: COMPLETED | OUTPATIENT
Start: 2017-04-20 | End: 2017-04-20

## 2017-04-20 RX ORDER — NITROGLYCERIN 5 MG/ML
INJECTION, SOLUTION INTRAVENOUS
Status: DISCONTINUED
Start: 2017-04-20 | End: 2017-04-20 | Stop reason: HOSPADM

## 2017-04-20 RX ORDER — SUCCINYLCHOLINE CHLORIDE 20 MG/ML
INJECTION INTRAMUSCULAR; INTRAVENOUS
Status: COMPLETED
Start: 2017-04-20 | End: 2017-04-20

## 2017-04-20 RX ORDER — LABETALOL HYDROCHLORIDE 5 MG/ML
INJECTION, SOLUTION INTRAVENOUS
Status: COMPLETED
Start: 2017-04-20 | End: 2017-04-20

## 2017-04-20 RX ORDER — KETOROLAC TROMETHAMINE 30 MG/ML
INJECTION, SOLUTION INTRAMUSCULAR; INTRAVENOUS
Status: COMPLETED
Start: 2017-04-20 | End: 2017-04-20

## 2017-04-20 RX ORDER — SODIUM CHLORIDE 9 MG/ML
INJECTION, SOLUTION INTRAVENOUS CONTINUOUS
Status: DISCONTINUED | OUTPATIENT
Start: 2017-04-20 | End: 2017-04-20 | Stop reason: HOSPADM

## 2017-04-20 RX ORDER — ONDANSETRON 2 MG/ML
INJECTION INTRAMUSCULAR; INTRAVENOUS
Status: COMPLETED
Start: 2017-04-20 | End: 2017-04-20

## 2017-04-20 RX ADMIN — ONDANSETRON 4 MG: 2 INJECTION INTRAMUSCULAR; INTRAVENOUS at 07:04

## 2017-04-20 RX ADMIN — LIDOCAINE HYDROCHLORIDE 10 MG: 10 INJECTION, SOLUTION EPIDURAL; INFILTRATION; INTRACAUDAL; PERINEURAL at 06:04

## 2017-04-20 RX ADMIN — KETOROLAC TROMETHAMINE 30 MG: 30 INJECTION, SOLUTION INTRAMUSCULAR; INTRAVENOUS at 07:04

## 2017-04-20 RX ADMIN — METHOHEXITAL SODIUM 150 MG: 500 INJECTION, POWDER, LYOPHILIZED, FOR SOLUTION INTRAMUSCULAR; INTRAVENOUS; RECTAL at 07:04

## 2017-04-20 RX ADMIN — Medication 500 MG: at 06:04

## 2017-04-20 RX ADMIN — SODIUM CHLORIDE 500 ML: 0.9 INJECTION, SOLUTION INTRAVENOUS at 06:04

## 2017-04-20 RX ADMIN — LABETALOL HYDROCHLORIDE 5 MG: 5 INJECTION, SOLUTION INTRAVENOUS at 07:04

## 2017-04-20 RX ADMIN — SUCCINYLCHOLINE CHLORIDE 100 MG: 20 INJECTION, SOLUTION INTRAMUSCULAR; INTRAVENOUS at 07:04

## 2017-04-20 NOTE — ANESTHESIA POSTPROCEDURE EVALUATION
"Anesthesia Post Evaluation    Patient: Allyssa Wright    Procedure(s) Performed: Procedure(s) (LRB):  ELECTROCONVULSIVE THERAPY (ECT) - SINGLE SEIZURE (N/A)    Final Anesthesia Type: general  Patient location during evaluation: [facility]  Patient participation: Yes- Able to Participate  Level of consciousness: awake and alert  Post-procedure vital signs: reviewed and stable  Pain management: adequate  Airway patency: patent  PONV status at discharge: No PONV  Anesthetic complications: no      Cardiovascular status: blood pressure returned to baseline  Respiratory status: unassisted  Hydration status: euvolemic  Follow-up not needed.        Visit Vitals    /66 (BP Location: Left arm, Patient Position: Lying, BP Method: Automatic)    Pulse 81    Temp 37.3 °C (99.1 °F) (Temporal)    Resp 16    Ht 5' 5" (1.651 m)    Wt 69.4 kg (153 lb)    SpO2 100%    Breastfeeding No    BMI 25.46 kg/m2       Pain/Roma Score: Pain Assessment Performed: Yes (4/20/2017  7:27 AM)  Presence of Pain: denies (4/20/2017  7:27 AM)  Roma Score: 7 (4/20/2017  7:27 AM)      "

## 2017-04-20 NOTE — ANESTHESIA RELEASE NOTE
"Anesthesia Release from PACU Note    Patient: Allyssa Wright    Procedure(s) Performed: Procedure(s) (LRB):  ELECTROCONVULSIVE THERAPY (ECT) - SINGLE SEIZURE (N/A)    Anesthesia type: general    Post pain: Adequate analgesia    Post assessment: no apparent anesthetic complications, tolerated procedure well and no evidence of recall    Last Vitals:   Visit Vitals    /66 (BP Location: Left arm, Patient Position: Lying, BP Method: Automatic)    Pulse 81    Temp 37.3 °C (99.1 °F) (Temporal)    Resp 16    Ht 5' 5" (1.651 m)    Wt 69.4 kg (153 lb)    SpO2 100%    Breastfeeding No    BMI 25.46 kg/m2       Post vital signs: stable    Level of consciousness: awake, alert  and oriented    Nausea/Vomiting: no nausea/no vomiting    Complications: none    Airway Patency: patent    Respiratory: unassisted    Cardiovascular: stable and blood pressure at baseline    Hydration: euvolemic  "

## 2017-04-20 NOTE — OP NOTE
Allyssa Wright  : 1980   MRN: [id number]  Date: 2017       Psychiatry - ECT  Operative Note             Date of Admission: 2017  5:50 AM    Site: [facility], [address]    Attending: Shoaib Boyce MD  Residents: Flaco Walsh MD  Pre-op Diagnosis: MDD, recurrent, in partial remission     ECT Treatment Number: 24  Machine Type: [employer] 5000    Patient Status: medically stable    Vitals (pre-procedure):  Vitals:    17 0615   BP: 97/62   Pulse: 74   Resp: 16   Temp: 98 °F (36.7 °C)       Electrode Placement: Bitemporal    Stimulus Number Charge (mC) Level Pulse Width (msec) Frequency  (Hz) Duration of Stimulus (sec) Current (mA) Duration of Seizure (sec)   1 576 3 1 60 6 800 65                                   Complications: HTN    Maximum Blood Pressure: 148/94    Medications Given:  Caffeine 500 mg  PO  Before  Methohexital (Brevital).   150mg  IV  During   Succinylcholine (Anectine).   100mg  IV  During   Ondansetron (Zofran).   4mg  IV  During  Toradol 30mg IV During   Labetalol 5 mg IV during    Treatment Course:  Patient tolerated procedure well. After adequate recovery from general anesthesia, the patient was transported to recovery.    Post-op Diagnosis: Same as above    Recommended for next ECT:  May 17 2017    Flaco Walsh IV, MD  PGY-2 Psychiatry, \[facility] [place] [facility]\""/[facility]  2017 7:20 AM

## 2017-04-20 NOTE — TRANSFER OF CARE
"Anesthesia Transfer of Care Note    Patient: Allyssa Wright    Procedure(s) Performed: Procedure(s) (LRB):  ELECTROCONVULSIVE THERAPY (ECT) - SINGLE SEIZURE (N/A)    Patient location: [facility]    Anesthesia Type: general    Transport from OR: Transported from OR on 6-10 L/min O2 by face mask with adequate spontaneous ventilation    Post pain: adequate analgesia    Post assessment: no apparent anesthetic complications    Post vital signs: stable    Level of consciousness: awake and alert    Nausea/Vomiting: no nausea/vomiting    Complications: none          Last vitals:   Visit Vitals    /73 (BP Location: Left arm, Patient Position: Lying, BP Method: Automatic)    Pulse 78    Temp 37.3 °C (99.1 °F) (Temporal)    Resp 17    Ht 5' 5" (1.651 m)    Wt 69.4 kg (153 lb)    SpO2 100%    Breastfeeding No    BMI 25.46 kg/m2     "

## 2017-04-20 NOTE — PLAN OF CARE
Discharge instructions reviewed with pt and father, handouts given, verbalized understanding with no further questions at this time. Next ECT scheduled for May 17, 2017, per AVS sheet with MD telephone number provided. VSS on RA, no pain or nausea noted, tolerating po fluids without difficulty, no other complaints noted. Fall precautions reviewed, PIV removed.

## 2017-04-20 NOTE — H&P
"Allyssa Wright  : 1980   MRN: [id number]  Date: 2017     Psychiatry - ECT  History & Physical    Chief complaint: Major Depressive Disorder, recurrent, in partial remission   Procedure: ECT #24    SUBJECTIVE:     HPI:     Ms. Wright is a 37 year old white female with past psychiatric history of MDD, recurrent, severe who returns for ECT. Met with patient and father at bedside. Patient has been feeling well lately, no medical issues. Patient takes spironolactone for hormone-induced acne and she briefly increased her dose in order to increase control of her acne, but it caused her to feel depressed so she stopped. This has been a problem for her in the past. Otherwise her mood has been stable. No issues with feeling suicidal. Has been compliant with medications, which are managed by Dr. Jc. She takes Adderal 20 mg PO daily (hasn't been taking lately), Effexor  mg PO qHS, trazodone 300 mg PO qHS, and Seroquel 150 mg PO qHS. She has been having a hard time falling asleep despite her regimen. I recommended discussing switching the Effexor to morning dosing with her primary psychiatrist. Has been having memory issues, which the patient and her father believe might be related to the monthly ECT. Reviewed consent with patient and answered all questions to the patient and her father's satisfaction, and she agreed to undergo ECT on this occasion.     Patient denies any headaches, CP, SOB, fevers, chills, N/V.    Psychiatric Review of Systems:   Mood: "ok; normal"  Appetite: reports problems with binge eating in the evening, improved on Adderall  Psychomotor: none  Cognitive Impairment: mild to be expected with ECT  Insomnia: None  Psychosis: absent   Diurnal Variation: absent   Suicidal Ideation: absent      Medical Review Of Systems:   Constitutional: negative for chills, fevers and sweats  Eyes: negative for visual disturbance  Respiratory: negative for cough, sputum and wheezing  Cardiovascular: negative " for chest pain, palpitations, and dyspnea  Gastrointestinal: negative for abdominal pain, N/V  Musculoskeletal:negative for arthralgias and back pain    Current Medications:   No current facility-administered medications on file prior to encounter.      Current Outpatient Prescriptions on File Prior to Encounter   Medication Sig Dispense Refill    dapsone 7.5 % GlwP Apply topically once daily.      famciclovir (FAMVIR) 500 MG tablet Take 1 tablet (500 mg total) by mouth 2 (two) times daily. 30 tablet 12    quetiapine (SEROQUEL) 50 MG tablet Take 150 mg by mouth 2 (two) times daily. 150 mg nightly and 25 mg in the morning      spironolactone (ALDACTONE) 50 MG tablet 50 mg 2 (two) times daily. 50  mg qAM, 50 mg qHS.      thyroid (ARMOUR THYROID) 30 mg Tab Take 1 tablet (30 mg total) by mouth every morning. 30 tablet 11    trazodone (DESYREL) 100 MG tablet Take 300 mg by mouth every evening.       venlafaxine (EFFEXOR-XR) 75 MG 24 hr capsule Take 1 capsule (75 mg total) by mouth nightly. Take with 150 mg for total daily dose of 225 mg. (Patient taking differently: Take 225 mg by mouth nightly. Pt taking 250 mg) 30 capsule 2    dextroamphetamine-amphetamine (ADDERALL XR) 20 MG 24 hr capsule Take 20 mg by mouth 2 (two) times daily.      hydrOXYzine pamoate (VISTARIL) 25 MG Cap Take 1 capsule (25 mg total) by mouth every 8 (eight) hours as needed (anxiety). 90 capsule 1    naftifine (NAFTIN) 1 % cream Apply topically daily as needed. Apply to feet      ZOVIRAX 5 % Crea   5      Allergies:   Ampicillin; Erythromycin; Levaquin [levofloxacin]; Penicillins; Pristiq [desvenlafaxine]; Sulfa (sulfonamide antibiotics); and Azithromycin    Past Medical/Surgical History:   Past Medical History:   Diagnosis Date    Anxiety     Depression     History of psychiatric hospitalization     HSV-1 (herpes simplex virus 1) infection     Hx of psychiatric care     Moderate depressed bipolar II disorder 06/13/2016    reports  "no history of bipolar    Obsessive-compulsive disorder     Psychiatric problem     Self-harming behavior     Therapy      Past Surgical History:   Procedure Laterality Date    ANKLE SURGERY Right     BREAST augmentation      OVARIAN CYST REMOVAL Bilateral       OBJECTIVE:     Vitals (pre-procedure):  Vitals:    04/20/17 0615   BP: 97/62   Pulse: 74   Resp: 16   Temp: 98 °F (36.7 °C)        Labs/Imaging/Studies:   No results found for this or any previous visit (from the past 48 hour(s)).   No results found for: PHENYTOIN, PHENOBARB, VALPROATE, CBMZ    Physical Exam:     General: resting in bed in NAD  HEENT: EOMI, benign OP  Respiratory: CTAB, unlabored breathing  Cardiovascular: RRR, no murmurs  Abdominal: abdomen soft, non-tender, non-distended  Extremities: no cyanosis or clubbing  Neurological: no involuntary movements observed, no focal neurological deficits        Mental Status Exam:   Appearance: normal weight, neatly groomed, lying in bed  Behavior: calm, cooperative; friendly  Speech: Conversational rate, tone, and volume  Mood: "ok; normal"  Affect: full; appropriate for stated mood  Thought Process: linear   Thought Perceptions: denied AVH  Thought Content: no SI or HI; no delusions appreciated  Sensorium: awake, alert  Cognition: concentration intact to conversation; memory intact conversation (no overt issues with recent or remote memory); fully oriented  Insight: good  Judgment: good    ASSESSMENT/PLAN:     Allyssa Wright is a 37 y.o. female with MDD (major depressive disorder), recurrent, in partial remission who presents for ECT.    Recommendations:   Proceed with ECT #24    Flaco Walsh IV, MD  PGY-2 Psychiatry, [facility]  04/20/2017 6:54 AM      "

## 2017-04-20 NOTE — ANESTHESIA PREPROCEDURE EVALUATION
04/20/2017    Pre-operative evaluation for Procedure(s) (LRB):  ELECTROCONVULSIVE THERAPY (ECT) - SINGLE SEIZURE (N/A)    Allyssa Wright is a 37 y.o. female with MDD, recurrent severe, who is scheduled for continuance of ECT. This is ECT #24. No no issues with last session. No recent URI.           Patient Active Problem List   Diagnosis    MDD (major depressive disorder), recurrent, in partial remission    Major depressive disorder    Major depressive disorder in partial remission    Major depressive disorder, recurrent, in partial remission    Recurrent major depressive disorder    MDD (major depressive disorder), severe       Review of patient's allergies indicates:   Allergen Reactions    Ampicillin      Mom says so    Erythromycin     Levaquin [levofloxacin] Other (See Comments)     Depression side effects    Penicillins      Mom says so    Pristiq [desvenlafaxine]      psycotic      Sulfa (sulfonamide antibiotics)      Rash      Azithromycin Anxiety        Current Facility-Administered Medications on File Prior to Visit   Medication Dose Route Frequency Provider Last Rate Last Dose    0.9%  NaCl infusion   Intravenous Continuous Flaco Walsh MD   500 mL at 04/20/17 0630    ketorolac (TORADOL) 30 mg/mL (1 mL) injection             nitroGLYCERIN 50 mg/10 mL (5 mg/mL) injection             ondansetron 4 mg/2 mL injection             succinylcholine (ANECTINE) 20 mg/mL injection              Current Outpatient Prescriptions on File Prior to Visit   Medication Sig Dispense Refill    brimonidine (MIRVASO) 0.33 % Gel Apply topically nightly as needed.      dapsone 7.5 % GlwP Apply topically once daily.      dextroamphetamine-amphetamine (ADDERALL XR) 20 MG 24 hr capsule Take 20 mg by mouth 2 (two) times daily.      famciclovir (FAMVIR) 500 MG tablet Take 1 tablet (500 mg total) by  mouth 2 (two) times daily. 30 tablet 12    hydrOXYzine pamoate (VISTARIL) 25 MG Cap Take 1 capsule (25 mg total) by mouth every 8 (eight) hours as needed (anxiety). 90 capsule 1    naftifine (NAFTIN) 1 % cream Apply topically daily as needed. Apply to feet      quetiapine (SEROQUEL) 50 MG tablet Take 150 mg by mouth 2 (two) times daily. 150 mg nightly and 25 mg in the morning      spironolactone (ALDACTONE) 50 MG tablet 50 mg 2 (two) times daily. 50  mg qAM, 50 mg qHS.      thyroid (ARMOUR THYROID) 30 mg Tab Take 1 tablet (30 mg total) by mouth every morning. 30 tablet 11    trazodone (DESYREL) 100 MG tablet Take 300 mg by mouth every evening.       venlafaxine (EFFEXOR-XR) 75 MG 24 hr capsule Take 1 capsule (75 mg total) by mouth nightly. Take with 150 mg for total daily dose of 225 mg. (Patient taking differently: Take 225 mg by mouth nightly. Pt taking 250 mg) 30 capsule 2    ZOVIRAX 5 % Crea   5       Past Surgical History:   Procedure Laterality Date    ANKLE SURGERY Right     BREAST augmentation      OVARIAN CYST REMOVAL Bilateral        Social History     Social History    Marital status: Single     Spouse name: N/A    Number of children: N/A    Years of education: N/A     Occupational History    unemployed      Social History Main Topics    Smoking status: Former Smoker     Quit date: 4/6/2011    Smokeless tobacco: Not on file    Alcohol use No      Comment: Alcohol misuse in remote past    Drug use: No      Comment: Experimental use in high school    Sexual activity: Not on file     Other Topics Concern    Not on file     Social History Narrative    Pt has 1 older brother from an intact family until the death of her mother in 2012.  She completed 1 year of college, was never in the , and has never been employed.  She was engaged once, but never , has no children, and lives with her father plus 2 dogs.  She denies any hobbies and is spiritual but not Episcopal.  She does  date and returns to college during rare periods when depression and anxiety [place].         Vital Signs Range (Last 24H):  Temp:  [36.7 °C (98 °F)]   Pulse:  [74]   Resp:  [16]   BP: (97)/(62)   SpO2:  [100 %]         Diagnostic Studies:      EKG:  Normal sinus rhythm  Normal ECG  When compared with ECG of 03-DEC-2015 11:38,  Questionable change in The axis  T wave inversion no longer evident in Inferior leads  Confirmed by Flaco Sr MD ([id number]) on 6/9/2016 1:30:13 PM    Referred By: SELF REFERRAL           Confirmed By:Flaco Sr MD          Pre-op Assessment    I have reviewed the Patient Summary Reports.     I have reviewed the Nursing Notes.   I have reviewed the Medications.     Review of Systems  Anesthesia Hx:  No problems with previous Anesthesia Denies Hx of Anesthetic complications  History of prior surgery of interest to airway management or planning: Previous anesthesia: General 7/22/2016 with general anesthesia.  Denies Family Hx of Anesthesia complications.   Denies Personal Hx of Anesthesia complications.   Social:  Former smoker-- quit 2011   Hematology/Oncology:  Hematology Normal   Oncology Normal     EENT/Dental:EENT/Dental Normal   Cardiovascular:  Cardiovascular Normal     Pulmonary:  Pulmonary Normal    Renal/:  Renal/ Normal     Hepatic/GI:  Hepatic/GI Normal    Musculoskeletal:  Musculoskeletal Normal    Neurological:  Neurology Normal    Endocrine:  Endocrine Normal    Dermatological:  Skin Normal    Psych:   Psychiatric History anxiety depression          Physical Exam  General:  Well nourished    Airway/Jaw/Neck:  Airway Findings: Mouth Opening: Normal Tongue: Normal  General Airway Assessment: Adult  Mallampati: II  Improves to I with phonation.  TM Distance: Normal, at least 6 cm  Jaw/Neck Findings:  Neck ROM: Normal ROM     Eyes/Ears/Nose:  EYES/EARS/NOSE FINDINGS: Normal   Dental:  Dental Findings: In tact   Chest/Lungs:  Chest/Lungs Findings: Normal Respiratory Rate, Clear to  auscultation     Heart/Vascular:  Heart Findings: Rate: Normal  Rhythm: Regular Rhythm  Sounds: Normal  Heart murmur: negative    Abdomen:  Abdomen Findings: Normal    Musculoskeletal:  Musculoskeletal Findings: Normal   Skin:  Skin Findings: Normal    Mental Status:  Mental Status Findings:  Cooperative, Alert and Oriented         Anesthesia Plan  Type of Anesthesia, risks & benefits discussed:  Anesthesia Type:  general  Patient's Preference: same   Intra-op Monitoring Plan: standard ASA monitors  Intra-op Monitoring Plan Comments:   Post Op Pain Control Plan:   Post Op Pain Control Plan Comments:   Induction:   IV  Beta Blocker:  Patient is not currently on a Beta-Blocker (No further documentation required).       Informed Consent: Patient understands risks and agrees with Anesthesia plan.  Questions answered. Anesthesia consent signed with patient.  ASA Score: 2     Day of Surgery Review of History & Physical:    H&P update referred to the provider.         Ready For Surgery From Anesthesia Perspective.

## 2017-04-20 NOTE — ANESTHESIA PROCEDURE NOTES
ECT    Treatment Number: 24  Procedure start time: 4/20/2017 7:15 AM  Timeout performed at: 4/20/2017 7:11 AM  Procedure end time: 4/20/2017 7:18 AM    Staffing  Anesthesiologist: SHARLENE BRICENO  CRNA/Resident: MITESH MOSS  Performed by: resident/CRNA     Preanesthetic Checklist  The following were completed as part of the preanesthetic checklist: patient identified, procedural consent, pre-op evaluation, timeout performed, risks and benefits discussed, monitors and equipment checked, anesthesia consent given, oxygen available, suction available, hand hygiene performed and patient being monitored.    Setup & Induction  Patient Monitoring: heart rate, cardiac monitor, continuous pulse ox, continuous capnometry and NIBP  Patient preparation: bite block inserted, extremities padded, mandibular stabilization and patient hyperventilated  Electrode Placement: Bitemporal    The patient was moved to the ECT therapy room after being assessed and consented for ECT. After standard ASA monitors were applied and timeout performed, the patient was adequately preoxygenated. After induction of general anesthesia, adequate oxygenation and ventilation were confirmed with pulse oxymetry and end tidal CO2 monitoring via bag-mask ventilation. End tidal CO2 was monitored throughout the case and moderate hyperventilation was performed prior to beginning ECT treatment. All extremities were padded, biteblock was inserted, and mandibular stabilization was done prior to initiating ECT therapy.    Procedure  Stimulus Number 1: Setting Number = III; 576 millicoulombs; Seizure Duration = 65 seconds            Recovery  After adequate recovery from general anesthesia, the patient was transported to recovery.  Peak Blood Pressure: 148/91  Time to Recovery of Respirations: 8 minutes    ECT Findings  ECT associated findings of: None

## 2017-04-20 NOTE — IP AVS SNAPSHOT
[facility]  [address]  [facility] [address]  Phone: [phone]           Patient Discharge Instructions   Our goal is to set you up for success. This packet includes information on your condition, medications, and your home care.  It will help you care for yourself to prevent having to return to the hospital.     Please ask your nurse if you have any questions.      There are many details to remember when preparing to leave the hospital. Here is what you will need to do:    1. Take your medicine. If you are prescribed medications, review your Medication List on the following pages. You may have new medications to  at the pharmacy and others that you'll need to stop taking. Review the instructions for how and when to take your medications. Talk with your doctor or nurses if you are unsure of what to do.     2. Go to your follow-up appointments. Specific follow-up information is listed in the following pages. Your may be contacted by a nurse or clinical provider about future appointments. Be sure we have all of the phone numbers to reach you. Please contact your provider's office if you are unable to make an appointment.     3. Watch for warning signs. Your doctor or nurse will give you detailed warning signs to watch for and when to call for assistance. These instructions may also include educational information about your condition. If you experience any of warning signs to your health, call your doctor.           [facility]  Unless otherwise directed by your provider, please   contact [facility], our nurse care line   that is available for 24/7 assistance.     [phone] (toll-free)     Registered nurses in the [facility]   provide: appointment scheduling, clinical advisement, health education, and other advisory services.                  ** Verify the list of medication(s) below is accurate and up to date. Carry this with you in case of  emergency. If your medications have changed, please notify your healthcare provider.             Medication List      CHANGE how you take these medications        Additional Info                      venlafaxine 75 MG 24 hr capsule   Commonly known as:  EFFEXOR-XR   Quantity:  30 capsule   Refills:  2   Dose:  75 mg   What changed:    - how much to take  - additional instructions    Instructions:  Take 1 capsule (75 mg total) by mouth nightly. Take with 150 mg for total daily dose of 225 mg.     Begin Date    AM    Noon    PM    Bedtime         CONTINUE taking these medications        Additional Info                      dapsone 7.5 % Glwp   Refills:  0    Instructions:  Apply topically once daily.     Begin Date    AM    Noon    PM    Bedtime       dextroamphetamine-amphetamine 20 MG 24 hr capsule   Commonly known as:  ADDERALL XR   Refills:  0   Dose:  20 mg    Instructions:  Take 20 mg by mouth 2 (two) times daily.     Begin Date    AM    Noon    PM    Bedtime       famciclovir 500 MG tablet   Commonly known as:  FAMVIR   Quantity:  30 tablet   Refills:  12   Dose:  500 mg    Instructions:  Take 1 tablet (500 mg total) by mouth 2 (two) times daily.     Begin Date    AM    Noon    PM    Bedtime       hydrOXYzine pamoate 25 MG Cap   Commonly known as:  VISTARIL   Quantity:  90 capsule   Refills:  1   Dose:  25 mg    Instructions:  Take 1 capsule (25 mg total) by mouth every 8 (eight) hours as needed (anxiety).     Begin Date    AM    Noon    PM    Bedtime       MIRVASO 0.33 % Gel   Refills:  0   Generic drug:  brimonidine    Instructions:  Apply topically nightly as needed.     Begin Date    AM    Noon    PM    Bedtime       naftifine 1 % cream   Commonly known as:  NAFTIN   Refills:  0    Instructions:  Apply topically daily as needed. Apply to feet     Begin Date    AM    Noon    PM    Bedtime       quetiapine 50 MG tablet   Commonly known as:  SEROQUEL   Refills:  0   Dose:  150 mg   Indications:  25 mg po in am     Instructions:  Take 150 mg by mouth 2 (two) times daily. 150 mg nightly and 25 mg in the morning     Begin Date    AM    Noon    PM    Bedtime       spironolactone 50 MG tablet   Commonly known as:  ALDACTONE   Refills:  0   Dose:  50 mg    Instructions:  50 mg 2 (two) times daily. 50  mg qAM, 50 mg qHS.     Begin Date    AM    Noon    PM    Bedtime       thyroid Tab   Commonly known as:  ARMOUR THYROID   Quantity:  30 tablet   Refills:  11   Dose:  30 mg    Instructions:  Take 1 tablet (30 mg total) by mouth every morning.     Begin Date    AM    Noon    PM    Bedtime       trazodone 100 MG tablet   Commonly known as:  DESYREL   Refills:  0   Dose:  300 mg    Instructions:  Take 300 mg by mouth every evening.     Begin Date    AM    Noon    PM    Bedtime       ZOVIRAX 5 % Crea   Refills:  5   Generic drug:  acyclovir 5%      Begin Date    AM    Noon    PM    Bedtime                  Please bring to all follow up appointments:    1. A copy of your discharge instructions.  2. All medicines you are currently taking in their original bottles.  3. Identification and insurance card.    Please arrive 15 minutes ahead of scheduled appointment time.    Please call 24 hours in advance if you must reschedule your appointment and/or time.        Follow-up Information     Follow up with with Dr. Boyce for ECT on May 17 2017.        Discharge Instructions     Future Orders    Activity as tolerated     Call MD for:  increased confusion or weakness     Call MD for:  persistent dizziness, light-headedness, or visual disturbances     Diet general     Questions:    Total calories:      Fat restriction, if any:      Protein restriction, if any:      Na restriction, if any:      Fluid restriction:      Additional restrictions:          Discharge Instructions           Understanding Electroconvulsive Therapy  Electroconvulsive therapy (ECT) is sometimes called shock therapy. This may sound painful, but ECT doesnt hurt. Its often  the safest and best treatment for severe depression. It can treat other mental disorders as well.  What is electroconvulsive therapy?  ECT is used to treat people who are very depressed. Its mainly used when other treatments, such as antidepressant medications, have failed. Often, it may relieve feelings of sadness and despair in just a few days.  Common symptoms of major depression  Symptoms of major depression include:  · Feeling a deep sadness that doesnt go away  · Losing all pleasure in life  · Feeling hopeless or helpless  · Feeling guilty  · Sleeping more or less than normal  · Eating more or less than normal  · Having headaches or stomachaches, or other pains that dont go away  · Feeling nervous, empty, or worthless  · Crying a great deal  · Thinking or talking about suicide or death  How is ECT therapy done?  Before an ECT treatment, youll receive anesthesia to keep you pain-free. Youll also be given medication to relax your muscles and control your heart rate. Your health care provider then places electrodes on your head. You may have one above each temple (bilateral ECT). Or, you may have electrodes on one temple and on your forehead (unilateral ECT). While you are asleep, your brain is stimulated very briefly with an electric current. This causes a  seizure, usually lasting less than a minute. Because you are under anesthesia, your body will not move even as your brain goes through great changes.  What are the risks?  When done properly, ECT is quite safe. Right after the treatment, you may be confused. This typically lasts for less than half an hour. You may have a headache or stiff muscles. But these symptoms often go away quickly. A more serious potential  side effect is memory loss. Commonly, patients have temporary difficulty remembering information that they learned recently, and may have little recall of the period during which they received treatment. Less commonly, patients  "may have permanent, spotty recall for significant past events or, rarely, loss of memory for larger blocks of time.  Looking to the future  In most cases, ECT does not cure depression, but it can improve symptoms for a period of time. You may need a series of ECT treatments to continue feeling the benefit. You may also need to take antidepressant medications to help prevent symptoms from returning. But with ongoing treatment, you can have a full and healthy life.  Resources  The National [place] of Mental Health  750.692.1571  www.[facility].nih.gov  [facility]  407.291.6542  [email or web address]     Date Last Reviewed: 3/23/2015  © 0874-9074 [employer]. [address] [place] [address], [place] [address]. All rights reserved. This information is not intended as a substitute for professional medical care. Always follow your healthcare professional's instructions.            Admission Information     Date & Time Provider Department CSN    4/20/2017  5:50 AM Shoaib Boyce Jr., MD [facility] [id number]      Care Providers     Provider Role Specialty Primary office phone    Shoaib Boyce Jr., MD Attending Provider Psychiatry [phone]    Shoaib Boyce Jr., MD Surgeon  Psychiatry [phone]      Your Vitals Were     BP Pulse Temp Resp Height Weight    112/73 (BP Location: Left arm, Patient Position: Lying, BP Method: Automatic) 78 99.1 °F (37.3 °C) (Temporal) 17 5' 5" (1.651 m) 69.4 kg (153 lb)    SpO2 BMI             100% 25.46 kg/m2         Recent Lab Values     No lab values to display.      Allergies as of 4/20/2017        Reactions    Ampicillin     Mom says so    Erythromycin     Levaquin [Levofloxacin] Other (See Comments)    Depression side effects    Penicillins     Mom says so    Pristiq [Desvenlafaxine]     psycotic    Sulfa (Sulfonamide Antibiotics)     Rash    Azithromycin Anxiety      Advance Directives     An advance directive is a document which, in the event you are " no longer able to make decisions for yourself, tells your healthcare team what kind of treatment you do or do not want to receive, or who you would like to make those decisions for you.  If you do not currently have an advance directive, [facility] encourages you to create one.  For more information call:  [phone] ([phone]), [phone] ([phone]),  or log on to [email or web address].        Smoking Cessation     If you would like to quit smoking:   You may be eligible for free services if you are a [place] resident and started smoking cigarettes before September 1, 1988.  Call the Smoking Cessation Trust (SCT) toll free at (590) 680-9059 or (259) 536-3725.   Call 6-199-QUIT-NOW if you do not meet the above criteria.   Contact us via email: [email or web address]   View our website for more information: [email or web address]        Language Assistance Services     ATTENTION: Language assistance services are available, free of charge. Please call [phone].      ATENCIÓN: Si habla español, tiene a rodriguez disposición servicios gratuitos de asistencia lingüística. Llame al [phone].     CHÚ Ý: N?u b?n nói Ti?ng Vi?t, có các d?ch v? h? tr? ngôn ng? mi?n phí dành cho b?n. G?i s? [phone].        [facility] Sign-Up     Activating your [facility] account is as easy as 1-2-3!     1) Visit [email or web address], select Sign Up Now, enter this activation code and your date of birth, then select Next.  [email or web address]  Expires: 5/8/2017  8:21 AM      2) Create a username and password to use when you visit [facility] in the future and select a security question in case you lose your password and select Next.    3) Enter your e-mail address and click Sign Up!    Additional Information  If you have questions, please e-mail [email or web address] or call [phone] to talk to our [facility] staff. Remember, [facility] is NOT to be used for urgent needs. For medical emergencies, dial 911.           [facility] complies with applicable Federal civil rights laws and does not discriminate on the basis of race, color, national origin, age, disability, or sex.

## 2017-04-26 DIAGNOSIS — F33.9 RECURRENT MAJOR DEPRESSIVE DISORDER, REMISSION STATUS UNSPECIFIED: Primary | ICD-10-CM

## 2017-05-17 ENCOUNTER — ANESTHESIA (OUTPATIENT)
Dept: ELECTROPHYSIOLOGY | Facility: HOSPITAL | Age: 37
End: 2017-05-17
Payer: COMMERCIAL

## 2017-05-17 ENCOUNTER — ANESTHESIA EVENT (OUTPATIENT)
Dept: ELECTROPHYSIOLOGY | Facility: HOSPITAL | Age: 37
End: 2017-05-17
Payer: COMMERCIAL

## 2017-05-17 ENCOUNTER — HOSPITAL ENCOUNTER (OUTPATIENT)
Facility: HOSPITAL | Age: 37
Discharge: HOME | End: 2017-05-17
Attending: PSYCHIATRY & NEUROLOGY | Admitting: PSYCHIATRY & NEUROLOGY
Payer: COMMERCIAL

## 2017-05-17 ENCOUNTER — SURGERY (OUTPATIENT)
Age: 37
End: 2017-05-17

## 2017-05-17 VITALS
DIASTOLIC BLOOD PRESSURE: 50 MMHG | TEMPERATURE: 99 F | RESPIRATION RATE: 20 BRPM | HEIGHT: 65 IN | HEART RATE: 76 BPM | BODY MASS INDEX: 25.33 KG/M2 | WEIGHT: 152 LBS | SYSTOLIC BLOOD PRESSURE: 120 MMHG | OXYGEN SATURATION: 100 %

## 2017-05-17 DIAGNOSIS — F33.41 MAJOR DEPRESSIVE DISORDER, RECURRENT, IN PARTIAL REMISSION: ICD-10-CM

## 2017-05-17 DIAGNOSIS — F33.9 RECURRENT MAJOR DEPRESSIVE DISORDER, REMISSION STATUS UNSPECIFIED: Primary | ICD-10-CM

## 2017-05-17 DIAGNOSIS — F33.41 MDD (MAJOR DEPRESSIVE DISORDER), RECURRENT, IN PARTIAL REMISSION: ICD-10-CM

## 2017-05-17 PROCEDURE — 63600175 PHARM REV CODE 636 W HCPCS

## 2017-05-17 PROCEDURE — 37000009 HC ANESTHESIA EA ADD 15 MINS: Performed by: PSYCHIATRY & NEUROLOGY

## 2017-05-17 PROCEDURE — 90870 ELECTROCONVULSIVE THERAPY: CPT | Performed by: ANESTHESIOLOGY

## 2017-05-17 PROCEDURE — 25000003 PHARM REV CODE 250: Performed by: PSYCHIATRY & NEUROLOGY

## 2017-05-17 PROCEDURE — D9220A PRA ANESTHESIA: Mod: ,,, | Performed by: ANESTHESIOLOGY

## 2017-05-17 PROCEDURE — 71000044 HC DOSC ROUTINE RECOVERY FIRST HOUR: Performed by: PSYCHIATRY & NEUROLOGY

## 2017-05-17 PROCEDURE — 25000003 PHARM REV CODE 250: Performed by: STUDENT IN AN ORGANIZED HEALTH CARE EDUCATION/TRAINING PROGRAM

## 2017-05-17 PROCEDURE — 90870 ELECTROCONVULSIVE THERAPY: CPT | Mod: ,,, | Performed by: PSYCHIATRY & NEUROLOGY

## 2017-05-17 PROCEDURE — 37000008 HC ANESTHESIA 1ST 15 MINUTES: Performed by: PSYCHIATRY & NEUROLOGY

## 2017-05-17 RX ORDER — SUCCINYLCHOLINE CHLORIDE 20 MG/ML
INJECTION INTRAMUSCULAR; INTRAVENOUS
Status: COMPLETED
Start: 2017-05-17 | End: 2017-05-17

## 2017-05-17 RX ORDER — SODIUM CHLORIDE 9 MG/ML
INJECTION, SOLUTION INTRAVENOUS CONTINUOUS
Status: DISCONTINUED | OUTPATIENT
Start: 2017-05-17 | End: 2017-05-17 | Stop reason: HOSPADM

## 2017-05-17 RX ORDER — KETOROLAC TROMETHAMINE 30 MG/ML
INJECTION, SOLUTION INTRAMUSCULAR; INTRAVENOUS
Status: COMPLETED
Start: 2017-05-17 | End: 2017-05-17

## 2017-05-17 RX ORDER — LABETALOL HYDROCHLORIDE 5 MG/ML
INJECTION, SOLUTION INTRAVENOUS
Status: DISCONTINUED
Start: 2017-05-17 | End: 2017-05-17 | Stop reason: HOSPADM

## 2017-05-17 RX ORDER — ONDANSETRON 2 MG/ML
INJECTION INTRAMUSCULAR; INTRAVENOUS
Status: COMPLETED
Start: 2017-05-17 | End: 2017-05-17

## 2017-05-17 RX ORDER — LABETALOL HYDROCHLORIDE 5 MG/ML
INJECTION, SOLUTION INTRAVENOUS
Status: DISCONTINUED | OUTPATIENT
Start: 2017-05-17 | End: 2017-05-17

## 2017-05-17 RX ORDER — LIDOCAINE HYDROCHLORIDE 10 MG/ML
1 INJECTION, SOLUTION EPIDURAL; INFILTRATION; INTRACAUDAL; PERINEURAL ONCE
Status: COMPLETED | OUTPATIENT
Start: 2017-05-17 | End: 2017-05-17

## 2017-05-17 RX ADMIN — SUCCINYLCHOLINE CHLORIDE 100 MG: 20 INJECTION, SOLUTION INTRAMUSCULAR; INTRAVENOUS at 08:05

## 2017-05-17 RX ADMIN — LIDOCAINE HYDROCHLORIDE 2 MG: 10 INJECTION, SOLUTION EPIDURAL; INFILTRATION; INTRACAUDAL; PERINEURAL at 07:05

## 2017-05-17 RX ADMIN — KETOROLAC TROMETHAMINE 30 MG: 30 INJECTION, SOLUTION INTRAMUSCULAR; INTRAVENOUS at 08:05

## 2017-05-17 RX ADMIN — LABETALOL HYDROCHLORIDE 5 MG: 5 INJECTION INTRAVENOUS at 08:05

## 2017-05-17 RX ADMIN — METHOHEXITAL SODIUM 150 MG: 500 INJECTION, POWDER, LYOPHILIZED, FOR SOLUTION INTRAMUSCULAR; INTRAVENOUS; RECTAL at 08:05

## 2017-05-17 RX ADMIN — SODIUM CHLORIDE 500 ML: 0.9 INJECTION, SOLUTION INTRAVENOUS at 07:05

## 2017-05-17 RX ADMIN — Medication 500 MG: at 08:05

## 2017-05-17 RX ADMIN — ONDANSETRON 4 MG: 2 INJECTION INTRAMUSCULAR; INTRAVENOUS at 08:05

## 2017-05-17 NOTE — ANESTHESIA POSTPROCEDURE EVALUATION
"Anesthesia Post Evaluation    Patient: Allyssa Wright    Procedure(s) Performed: Procedure(s) (LRB):  ELECTROCONVULSIVE THERAPY (ECT) - SINGLE SEIZURE (N/A)    Final Anesthesia Type: general  Patient location during evaluation: PACU  Patient participation: Yes- Able to Participate  Level of consciousness: awake and alert and oriented  Post-procedure vital signs: reviewed and stable  Pain management: adequate  Airway patency: patent  PONV status at discharge: No PONV  Anesthetic complications: no      Cardiovascular status: hemodynamically stable  Respiratory status: unassisted and spontaneous ventilation  Hydration status: euvolemic  Follow-up not needed.        Visit Vitals    BP (!) 125/47    Pulse 75    Temp 37.2 °C (99 °F) (Skin)    Resp 20    Ht 5' 5" (1.651 m)    Wt 68.9 kg (152 lb)    SpO2 100%    Breastfeeding No    BMI 25.29 kg/m2       Pain/Roma Score: Pain Assessment Performed: Yes (5/17/2017  9:00 AM)  Presence of Pain: denies (5/17/2017  9:00 AM)      "

## 2017-05-17 NOTE — PLAN OF CARE
Pre-op care complete. Father at bedside. Anesthesia consent witnessed, new surgery consent pending.

## 2017-05-17 NOTE — DISCHARGE SUMMARY
Allyssa Wright  : 1980   MRN: [id number]  Date: 2017     Psychiatry - ECT  Discharge Summary    Admit Date: 2017  7:07 AM  Discharge Date: 2017    Attending Physician: Shoaib Boyce MD    Discharge Provider: Delvis Farmer MD    History of Present Illness: Allyssa Wright is a 37 y.o. female with Major Depressive Disorder, recurrent, in partial remission presented for ECT #25. See H&P dated 2017 for full HPI. For further details, see Dr. Boyce's pre-ECT evaluation.    Hospital Course: The patient tolerated the ECT treatment well without complication. Patient was stable post-procedure. See OP note dated 2017 for more details.     Disposition: Home or Self Care    Medications:  Current Discharge Medication List      CONTINUE these medications which have NOT CHANGED    Details   dapsone 7.5 % GlwP Apply topically once daily.      famciclovir (FAMVIR) 500 MG tablet Take 1 tablet (500 mg total) by mouth 2 (two) times daily.  Qty: 30 tablet, Refills: 12    Associated Diagnoses: Major depressive disorder, recurrent episode, moderate degree      hydrOXYzine pamoate (VISTARIL) 25 MG Cap Take 1 capsule (25 mg total) by mouth every 8 (eight) hours as needed (anxiety).  Qty: 90 capsule, Refills: 1      quetiapine (SEROQUEL) 50 MG tablet Take 150 mg by mouth 2 (two) times daily. 150 mg nightly and 25 mg in the morning      spironolactone (ALDACTONE) 50 MG tablet 50 mg 2 (two) times daily. 50  mg qAM, 50 mg qHS.      thyroid (ARMOUR THYROID) 30 mg Tab Take 1 tablet (30 mg total) by mouth every morning.  Qty: 30 tablet, Refills: 11    Associated Diagnoses: Major depressive disorder, recurrent episode, moderate degree      trazodone (DESYREL) 100 MG tablet Take 300 mg by mouth every evening.       venlafaxine (EFFEXOR-XR) 75 MG 24 hr capsule Take 1 capsule (75 mg total) by mouth nightly. Take with 150 mg for total daily dose of 225 mg.  Qty: 30 capsule, Refills: 2      brimonidine (MIRVASO) 0.33  % Gel Apply topically nightly as needed.      dextroamphetamine-amphetamine (ADDERALL XR) 20 MG 24 hr capsule Take 20 mg by mouth 2 (two) times daily.      naftifine (NAFTIN) 1 % cream Apply topically daily as needed. Apply to feet      ZOVIRAX 5 % Crea Refills: 5           Status at Discharge: Alert and medically stable    Discharge Diagnoses:  Major Depressive Disorder, recurrent, in partial remission    Diet: Resume previous outpatient diet  Activity: Ambulate with assistance - patient is a fall risk  Instructions: Please do not eat or drink anything after midnight prior to procedure. Please do not drive on day of ECT.    Med Changes:  None    Next ECT:  June 13, 2017      Delvis Farmer MD  [facility]  PGY-2  Pager:  [phone]      05/17/2017 7:23 AM

## 2017-05-17 NOTE — PLAN OF CARE
Discharge instructions given and explained to patient and family with verbalization of understanding all instructions.  Patients v/s stable, denies n/v and tolerating po, rates pain level tolerable, IV removed, and family at bedside for patient discharge home.

## 2017-05-17 NOTE — ANESTHESIA PREPROCEDURE EVALUATION
05/17/2017    Pre-operative evaluation for Procedure(s) (LRB):  ELECTROCONVULSIVE THERAPY (ECT) - SINGLE SEIZURE (N/A)    Allyssa Wright is a 37 y.o. female with MDD, recurrent severe, who is scheduled for continuance of ECT. This is ECT #25. No no issues with last session. No recent URI.           Patient Active Problem List   Diagnosis    MDD (major depressive disorder), recurrent, in partial remission    Major depressive disorder    Major depressive disorder in partial remission    Major depressive disorder, recurrent, in partial remission    Recurrent major depressive disorder    MDD (major depressive disorder), severe       Review of patient's allergies indicates:   Allergen Reactions    Ampicillin      Mom says so    Erythromycin     Levaquin [levofloxacin] Other (See Comments)     Depression side effects    Penicillins      Mom says so    Pristiq [desvenlafaxine]      psycotic      Sulfa (sulfonamide antibiotics)      Rash      Azithromycin Anxiety        Current Outpatient Prescriptions on File Prior to Visit   Medication Sig Dispense Refill    brimonidine (MIRVASO) 0.33 % Gel Apply topically nightly as needed.      dapsone 7.5 % GlwP Apply topically once daily.      dextroamphetamine-amphetamine (ADDERALL XR) 20 MG 24 hr capsule Take 20 mg by mouth 2 (two) times daily.      famciclovir (FAMVIR) 500 MG tablet Take 1 tablet (500 mg total) by mouth 2 (two) times daily. 30 tablet 12    hydrOXYzine pamoate (VISTARIL) 25 MG Cap Take 1 capsule (25 mg total) by mouth every 8 (eight) hours as needed (anxiety). 90 capsule 1    naftifine (NAFTIN) 1 % cream Apply topically daily as needed. Apply to feet      quetiapine (SEROQUEL) 50 MG tablet Take 150 mg by mouth 2 (two) times daily. 150 mg nightly and 25 mg in the morning      spironolactone (ALDACTONE) 50 MG tablet 50 mg 2 (two) times  daily. 50  mg qAM, 50 mg qHS.      thyroid (ARMOUR THYROID) 30 mg Tab Take 1 tablet (30 mg total) by mouth every morning. 30 tablet 11    trazodone (DESYREL) 100 MG tablet Take 300 mg by mouth every evening.       venlafaxine (EFFEXOR-XR) 75 MG 24 hr capsule Take 1 capsule (75 mg total) by mouth nightly. Take with 150 mg for total daily dose of 225 mg. (Patient taking differently: Take 225 mg by mouth nightly. Pt taking 250 mg) 30 capsule 2    ZOVIRAX 5 % Crea   5     No current facility-administered medications on file prior to visit.        Past Surgical History:   Procedure Laterality Date    ANKLE SURGERY Right     BREAST augmentation      OVARIAN CYST REMOVAL Bilateral        Social History     Social History    Marital status: Single     Spouse name: N/A    Number of children: N/A    Years of education: N/A     Occupational History    unemployed      Social History Main Topics    Smoking status: Former Smoker     Quit date: 4/6/2011    Smokeless tobacco: Not on file    Alcohol use No      Comment: Alcohol misuse in remote past    Drug use: No      Comment: Experimental use in high school    Sexual activity: Not on file     Other Topics Concern    Not on file     Social History Narrative    Pt has 1 older brother from an intact family until the death of her mother in 2012.  She completed 1 year of college, was never in the , and has never been employed.  She was engaged once, but never , has no children, and lives with her father plus 2 dogs.  She denies any hobbies and is spiritual but not Spiritism.  She does date and returns to college during rare periods when depression and anxiety [place].         Vital Signs Range (Last 24H):  BP: ()/()   Arterial Line BP: ()/()         Diagnostic Studies:      EKG:  Normal sinus rhythm  Normal ECG  When compared with ECG of 03-DEC-2015 11:38,  Questionable change in The axis  T wave inversion no longer evident in Inferior leads  Confirmed by  Flaco Sr MD ([id number]) on 6/9/2016 1:30:13 PM    Referred By: SELF REFERRAL           Confirmed By:Flaco Sr MD          Pre-op Assessment    I have reviewed the Patient Summary Reports.     I have reviewed the Nursing Notes.   I have reviewed the Medications.     Review of Systems  Anesthesia Hx:  No problems with previous Anesthesia Denies Hx of Anesthetic complications  History of prior surgery of interest to airway management or planning: Previous anesthesia: General 7/22/2016 with general anesthesia.  Denies Family Hx of Anesthesia complications.   Denies Personal Hx of Anesthesia complications.   Social:  Former smoker-- quit 2011   Hematology/Oncology:  Hematology Normal   Oncology Normal     EENT/Dental:EENT/Dental Normal   Cardiovascular:  Cardiovascular Normal     Pulmonary:  Pulmonary Normal    Renal/:  Renal/ Normal     Hepatic/GI:  Hepatic/GI Normal    Musculoskeletal:  Musculoskeletal Normal    Neurological:  Neurology Normal    Endocrine:  Endocrine Normal    Dermatological:  Skin Normal    Psych:   Psychiatric History anxiety depression          Physical Exam  General:  Well nourished    Airway/Jaw/Neck:  Airway Findings: Mouth Opening: Normal Tongue: Normal  General Airway Assessment: Adult  Mallampati: II  Improves to I with phonation.  TM Distance: Normal, at least 6 cm  Jaw/Neck Findings:  Neck ROM: Normal ROM     Eyes/Ears/Nose:  EYES/EARS/NOSE FINDINGS: Normal   Dental:  Dental Findings: In tact   Chest/Lungs:  Chest/Lungs Findings: Normal Respiratory Rate, Clear to auscultation     Heart/Vascular:  Heart Findings: Rate: Normal  Rhythm: Regular Rhythm  Sounds: Normal  Heart murmur: negative    Abdomen:  Abdomen Findings: Normal    Musculoskeletal:  Musculoskeletal Findings: Normal   Skin:  Skin Findings: Normal    Mental Status:  Mental Status Findings:  Cooperative, Alert and Oriented         Anesthesia Plan  Type of Anesthesia, risks & benefits discussed:  Anesthesia Type:  general  Patient's  Preference: same   Intra-op Monitoring Plan: standard ASA monitors  Intra-op Monitoring Plan Comments:   Post Op Pain Control Plan:   Post Op Pain Control Plan Comments:   Induction:   IV  Beta Blocker:  Patient is not currently on a Beta-Blocker (No further documentation required).       Informed Consent: Patient understands risks and agrees with Anesthesia plan.  Questions answered. Anesthesia consent signed with patient.  ASA Score: 2     Day of Surgery Review of History & Physical:    H&P update referred to the provider.         Ready For Surgery From Anesthesia Perspective.

## 2017-05-17 NOTE — ANESTHESIA RELEASE NOTE
"Anesthesia Release from PACU Note    Patient: Allyssa Wright    Procedure(s) Performed: Procedure(s) (LRB):  ELECTROCONVULSIVE THERAPY (ECT) - SINGLE SEIZURE (N/A)    Anesthesia type: general    Post pain: Adequate analgesia    Post assessment: no apparent anesthetic complications    Last Vitals:   Visit Vitals    BP (!) 125/47    Pulse 75    Temp 37.2 °C (99 °F) (Skin)    Resp 20    Ht 5' 5" (1.651 m)    Wt 68.9 kg (152 lb)    SpO2 100%    Breastfeeding No    BMI 25.29 kg/m2       Post vital signs: stable    Level of consciousness: awake    Nausea/Vomiting: no nausea/no vomiting    Complications: none    Airway Patency: patent    Respiratory: unassisted    Cardiovascular: stable and blood pressure at baseline    Hydration: euvolemic  "

## 2017-05-17 NOTE — OP NOTE
Allyssa Wright  : 1980   MRN: [id number]  Date: 2017       Psychiatry - ECT  Operative Note             Date of Admission: 2017  7:07 AM    Site: [facility] [address]    Attending: Shoaib Boyce MD  Residents: Delvis Farmer MD  Pre-op Diagnosis: MDD, recurrent, in partial remission     ECT Treatment Number: 25  Machine Type: Mecta 5000    Patient Status: medically stable    Vitals (pre-procedure):  Vitals:    17 0730   BP: 107/63   Pulse: 69   Resp: 20   Temp: 98.5 °F (36.9 °C)       Electrode Placement: Bitemporal    Stimulus Number Charge (mC) Level Pulse Width (msec) Frequency  (Hz) Duration of Stimulus (sec) Current (mA) Duration of Seizure (sec)   1 576 3 1 60 6 800 67                                   Complications: HTN    Maximum Blood Pressure: 151/94  Medications Given:  Caffeine 500 mg  PO  Before  Methohexital (Brevital) 150mg  IV During   Succinylcholine (Anectine) 100mg IV During   Ondansetron (Zofran) 4mg IV During  Toradol 30mg IV During   Labetalol 5 mg IV during    Treatment Course:  Patient tolerated procedure well. After adequate recovery from general anesthesia, the patient was transported to recovery.    Post-op Diagnosis: Same as above    Recommended for next ECT:  4 weeks, 2017    Delvis Farmer MD  [facility]  PGY-2  Pager:  [phone]    2017 7:20 AM

## 2017-05-17 NOTE — TRANSFER OF CARE
"Anesthesia Transfer of Care Note    Patient: Allyssa Wright    Procedure(s) Performed: Procedure(s) (LRB):  ELECTROCONVULSIVE THERAPY (ECT) - SINGLE SEIZURE (N/A)    Patient location: PACU    Anesthesia Type: general    Transport from OR: Transported from OR on 6-10 L/min O2 by face mask with adequate spontaneous ventilation    Post pain: adequate analgesia    Post assessment: no apparent anesthetic complications and tolerated procedure well    Post vital signs: stable    Level of consciousness: awake and alert    Nausea/Vomiting: no nausea/vomiting    Complications: none          Last vitals:   Visit Vitals    /84 (BP Location: Right arm, Patient Position: Lying, BP Method: Automatic)    Pulse 73    Temp 37.2 °C (99 °F) (Skin)    Resp 20    Ht 5' 5" (1.651 m)    Wt 68.9 kg (152 lb)    SpO2 100%    Breastfeeding No    BMI 25.29 kg/m2     "

## 2017-05-17 NOTE — H&P
"Allyssa Wright  : 1980   MRN: [id number]  Date: 2017     Psychiatry - ECT  History & Physical    Chief complaint: Major Depressive Disorder, recurrent, in partial remission   Procedure: ECT #24    SUBJECTIVE:     HPI:     Ms. Wright is a 37 year old white female with past psychiatric history of MDD, recurrent, severe who returns for ECT. Met with patient and father at bedside. Patient has been feeling well lately, no medical issues. Patient reports "ok" mood.  Sometimes she feels the depression "creeping in" for a few days, but it resolves spontaneously.  Patient takes spironolactone for hormone-induced acne. This has been a problem for her in the past. Otherwise her mood has been stable. No issues with feeling suicidal.     Has been compliant with medications, which are managed by Dr. Jc. She saw him yesterday, and he is weaning her off of Adderall 2/2/ concerns of TD ("puckering lips").  She has been instructed to reduce from 20 mg to 10 mg for seven days.  If her mood remains stable, then she will discontinue it.  Continues to take Effexor  mg PO qHS, trazodone 300 mg PO qHS, and Seroquel 150 mg PO qHS.      Sleep and appetite are stable.  C/O short-term memory issues in which she feels her learning has been affected.  She feels that information does not "stick as well as it used to."      Reviewed consent with patient and answered all questions to the patient and her father's satisfaction, and she agreed to undergo ECT on this occasion.     Patient denies any headaches, CP, SOB, fevers, chills, N/V.    Psychiatric Review of Systems:   Mood: "ok"  Appetite: no issues  Psychomotor: none  Cognitive Impairment: mild to be expected with ECT  Insomnia: None  Psychosis: absent   Diurnal Variation: absent   Suicidal Ideation: absent      Medical Review Of Systems:   Constitutional: negative for chills, fevers and sweats  Eyes: negative for visual disturbance  Respiratory: negative for cough, sputum " and wheezing  Cardiovascular: negative for chest pain, palpitations, and dyspnea  Gastrointestinal: negative for abdominal pain, N/V  Musculoskeletal:negative for arthralgias and back pain    Current Medications:   No current facility-administered medications on file prior to encounter.      Current Outpatient Prescriptions on File Prior to Encounter   Medication Sig Dispense Refill    dapsone 7.5 % GlwP Apply topically once daily.      famciclovir (FAMVIR) 500 MG tablet Take 1 tablet (500 mg total) by mouth 2 (two) times daily. 30 tablet 12    hydrOXYzine pamoate (VISTARIL) 25 MG Cap Take 1 capsule (25 mg total) by mouth every 8 (eight) hours as needed (anxiety). 90 capsule 1    quetiapine (SEROQUEL) 50 MG tablet Take 150 mg by mouth 2 (two) times daily. 150 mg nightly and 25 mg in the morning      spironolactone (ALDACTONE) 50 MG tablet 50 mg 2 (two) times daily. 50  mg qAM, 50 mg qHS.      thyroid (ARMOUR THYROID) 30 mg Tab Take 1 tablet (30 mg total) by mouth every morning. 30 tablet 11    trazodone (DESYREL) 100 MG tablet Take 300 mg by mouth every evening.       venlafaxine (EFFEXOR-XR) 75 MG 24 hr capsule Take 1 capsule (75 mg total) by mouth nightly. Take with 150 mg for total daily dose of 225 mg. (Patient taking differently: Take 225 mg by mouth nightly. Pt taking 250 mg) 30 capsule 2    brimonidine (MIRVASO) 0.33 % Gel Apply topically nightly as needed.      dextroamphetamine-amphetamine (ADDERALL XR) 20 MG 24 hr capsule Take 20 mg by mouth 2 (two) times daily.      naftifine (NAFTIN) 1 % cream Apply topically daily as needed. Apply to feet      ZOVIRAX 5 % Crea   5      Allergies:   Ampicillin; Erythromycin; Levaquin [levofloxacin]; Penicillins; Pristiq [desvenlafaxine]; Sulfa (sulfonamide antibiotics); and Azithromycin    Past Medical/Surgical History:   Past Medical History:   Diagnosis Date    Anxiety     Depression     History of psychiatric hospitalization     HSV-1 (herpes simplex  "virus 1) infection     Hx of psychiatric care     Moderate depressed bipolar II disorder 06/13/2016    reports no history of bipolar    Obsessive-compulsive disorder     Psychiatric problem     Self-harming behavior     Therapy      Past Surgical History:   Procedure Laterality Date    ANKLE SURGERY Right     BREAST augmentation      OVARIAN CYST REMOVAL Bilateral       OBJECTIVE:     Vitals (pre-procedure):  Vitals:    05/17/17 0730   BP: 107/63   Pulse: 69   Resp: 20   Temp: 98.5 °F (36.9 °C)        Labs/Imaging/Studies:   No results found for this or any previous visit (from the past 48 hour(s)).   No results found for: PHENYTOIN, PHENOBARB, VALPROATE, CBMZ    Physical Exam:     General: resting in bed in NAD  HEENT: EOMI, benign OP  Respiratory: CTAB, unlabored breathing  Cardiovascular: RRR, no murmurs  Abdominal: abdomen soft, non-tender, non-distended, (+) BS  Extremities: no cyanosis or clubbing, no edema  Neurological: no involuntary movements observed, no focal neurological deficits    Mental Status Exam:   Appearance: normal weight, neatly groomed, lying in bed  Behavior: calm, cooperative; friendly  Speech: Conversational rate, tone, and volume  Mood: "ok"  Affect: full; appropriate for stated mood  Thought Process: linear   Thought Perceptions: denied AVH  Thought Content: no SI or HI; no delusions appreciated  Sensorium: awake, alert  Cognition: concentration intact to conversation; memory intact conversation (no overt issues with recent or remote memory); fully oriented  Insight: good  Judgment: good    ASSESSMENT/PLAN:     Allyssa Wright is a 37 y.o. female with MDD (major depressive disorder), recurrent, in partial remission who presents for ECT.    Recommendations:   Proceed with ECT #25    Delvis Farmer MD  [facility]  PGY-2  Pager:  [phone]    05/17/2017 6:54 AM      "

## 2017-05-17 NOTE — DISCHARGE INSTRUCTIONS
ACTIVITY LEVEL:  You may feel sleepy for several hours. It is best to rest until you are more awake and then gradually resume your normal activities in one day. It is recommended, because of the possibility of memory loss and slight confusion post ECT, that you rest in the company of a responsible adult. This confusion and memory loss is expected and should diminish over time. It is also recommended that you do not drive or operate any electrical appliances or machinery during the ECT treatment series. Ask your Doctor, at the time of your treatment, any questions you may have about specific activities.    DIET:  You may wish to start with liquids after your ECT treatment and gradually resume your normal diet. Do not consume alcoholic beverages during the ECT treatment series.    BATHING:  You may shower or bathe as desired.  MEDICATIONS:    Resume all home medications starting with the AM doses not taken prior to ECT treatment. If you experience a  headache, it is okay to take your usual pain reliever.  WHEN TO CALL THE DOCTOR:   Headache, memory loss or confusion that does not begin to resolve within 24 hours.

## 2017-05-17 NOTE — ANESTHESIA PROCEDURE NOTES
ECT    Treatment Number: 23  Procedure start time: 5/17/2017 8:49 AM  Timeout performed at: 5/17/2017 8:48 AM  Procedure end time: 5/17/2017 9:00 AM    Staffing  Anesthesiologist: YISEL NESS  CRNA/Resident: TONY HOLDEN  Performed by: resident/CRNA     Preanesthetic Checklist  The following were completed as part of the preanesthetic checklist: patient identified, procedural consent, pre-op evaluation, timeout performed, risks and benefits discussed, monitors and equipment checked, anesthesia consent given, oxygen available, suction available, hand hygiene performed and patient being monitored.    Setup & Induction  Patient Monitoring: heart rate, cardiac monitor, continuous pulse ox, continuous capnometry, NIBP and gas analyzer  Patient preparation: bite block inserted, extremities padded, mandibular stabilization and patient hyperventilated  Electrode Placement: Bitemporal    The patient was moved to the ECT therapy room after being assessed and consented for ECT. After standard ASA monitors were applied and timeout performed, the patient was adequately preoxygenated. After induction of general anesthesia, adequate oxygenation and ventilation were confirmed with pulse oxymetry and end tidal CO2 monitoring via bag-mask ventilation. End tidal CO2 was monitored throughout the case and moderate hyperventilation was performed prior to beginning ECT treatment. All extremities were padded, biteblock was inserted, and mandibular stabilization was done prior to initiating ECT therapy.    Procedure  Stimulus Number 1:             Recovery  After adequate recovery from general anesthesia, the patient was transported to recovery.  Peak Blood Pressure: 151/94    ECT Findings  ECT associated findings of: None

## 2017-05-17 NOTE — IP AVS SNAPSHOT
[facility]  [address]  [facility] [address]  Phone: [phone]           Patient Discharge Instructions   Our goal is to set you up for success. This packet includes information on your condition, medications, and your home care.  It will help you care for yourself to prevent having to return to the hospital.     Please ask your nurse if you have any questions.      There are many details to remember when preparing to leave the hospital. Here is what you will need to do:    1. Take your medicine. If you are prescribed medications, review your Medication List on the following pages. You may have new medications to  at the pharmacy and others that you'll need to stop taking. Review the instructions for how and when to take your medications. Talk with your doctor or nurses if you are unsure of what to do.     2. Go to your follow-up appointments. Specific follow-up information is listed in the following pages. Your may be contacted by a nurse or clinical provider about future appointments. Be sure we have all of the phone numbers to reach you. Please contact your provider's office if you are unable to make an appointment.     3. Watch for warning signs. Your doctor or nurse will give you detailed warning signs to watch for and when to call for assistance. These instructions may also include educational information about your condition. If you experience any of warning signs to your health, call your doctor.           [facility]  Unless otherwise directed by your provider, please   contact [facility], our nurse care line   that is available for 24/7 assistance.     [phone] (toll-free)     Registered nurses in the [facility]   provide: appointment scheduling, clinical advisement, health education, and other advisory services.                  ** Verify the list of medication(s) below is accurate and up to date. Carry this with you in case of  emergency. If your medications have changed, please notify your healthcare provider.             Medication List      CHANGE how you take these medications        Additional Info                      venlafaxine 75 MG 24 hr capsule   Commonly known as:  EFFEXOR-XR   Quantity:  30 capsule   Refills:  2   Dose:  75 mg   What changed:    - how much to take  - additional instructions    Instructions:  Take 1 capsule (75 mg total) by mouth nightly. Take with 150 mg for total daily dose of 225 mg.     Begin Date    AM    Noon    PM    Bedtime         CONTINUE taking these medications        Additional Info                      dapsone 7.5 % Glwp   Refills:  0    Instructions:  Apply topically once daily.     Begin Date    AM    Noon    PM    Bedtime       dextroamphetamine-amphetamine 20 MG 24 hr capsule   Commonly known as:  ADDERALL XR   Refills:  0   Dose:  20 mg    Instructions:  Take 20 mg by mouth 2 (two) times daily.     Begin Date    AM    Noon    PM    Bedtime       famciclovir 500 MG tablet   Commonly known as:  FAMVIR   Quantity:  30 tablet   Refills:  12   Dose:  500 mg    Instructions:  Take 1 tablet (500 mg total) by mouth 2 (two) times daily.     Begin Date    AM    Noon    PM    Bedtime       hydrOXYzine pamoate 25 MG Cap   Commonly known as:  VISTARIL   Quantity:  90 capsule   Refills:  1   Dose:  25 mg    Instructions:  Take 1 capsule (25 mg total) by mouth every 8 (eight) hours as needed (anxiety).     Begin Date    AM    Noon    PM    Bedtime       MIRVASO 0.33 % Gel   Refills:  0   Generic drug:  brimonidine    Instructions:  Apply topically nightly as needed.     Begin Date    AM    Noon    PM    Bedtime       naftifine 1 % cream   Commonly known as:  NAFTIN   Refills:  0    Instructions:  Apply topically daily as needed. Apply to feet     Begin Date    AM    Noon    PM    Bedtime       quetiapine 50 MG tablet   Commonly known as:  SEROQUEL   Refills:  0   Dose:  150 mg   Indications:  25 mg po in am     Instructions:  Take 150 mg by mouth 2 (two) times daily. 150 mg nightly and 25 mg in the morning     Begin Date    AM    Noon    PM    Bedtime       spironolactone 50 MG tablet   Commonly known as:  ALDACTONE   Refills:  0   Dose:  50 mg    Instructions:  50 mg 2 (two) times daily. 50  mg qAM, 50 mg qHS.     Begin Date    AM    Noon    PM    Bedtime       thyroid Tab   Commonly known as:  ARMOUR THYROID   Quantity:  30 tablet   Refills:  11   Dose:  30 mg    Instructions:  Take 1 tablet (30 mg total) by mouth every morning.     Begin Date    AM    Noon    PM    Bedtime       trazodone 100 MG tablet   Commonly known as:  DESYREL   Refills:  0   Dose:  300 mg    Instructions:  Take 300 mg by mouth every evening.     Begin Date    AM    Noon    PM    Bedtime       ZOVIRAX 5 % Crea   Refills:  5   Generic drug:  acyclovir 5%      Begin Date    AM    Noon    PM    Bedtime                  Please bring to all follow up appointments:    1. A copy of your discharge instructions.  2. All medicines you are currently taking in their original bottles.  3. Identification and insurance card.    Please arrive 15 minutes ahead of scheduled appointment time.    Please call 24 hours in advance if you must reschedule your appointment and/or time.        Follow-up Information     Follow up with next ECT is June 13, 2017.        Discharge Instructions     Future Orders    Activity as tolerated     Call MD for:  increased confusion or weakness     Call MD for:  persistent dizziness, light-headedness, or visual disturbances     Call MD for:     Scheduling Instructions:    Suicidal or homicidal ideation; change in behavior    Diet general     Questions:    Total calories:      Fat restriction, if any:      Protein restriction, if any:      Na restriction, if any:      Fluid restriction:      Additional restrictions:          Discharge Instructions         ACTIVITY LEVEL:  You may feel sleepy for several hours. It is best to rest until  "you are more awake and then gradually resume your normal activities in one day. It is recommended, because of the possibility of memory loss and slight confusion post ECT, that you rest in the company of a responsible adult. This confusion and memory loss is expected and should diminish over time. It is also recommended that you do not drive or operate any electrical appliances or machinery during the ECT treatment series. Ask your Doctor, at the time of your treatment, any questions you may have about specific activities.    DIET:  You may wish to start with liquids after your ECT treatment and gradually resume your normal diet. Do not consume alcoholic beverages during the ECT treatment series.    BATHING:  You may shower or bathe as desired.  MEDICATIONS:    Resume all home medications starting with the AM doses not taken prior to ECT treatment. If you experience a  headache, it is okay to take your usual pain reliever.  WHEN TO CALL THE DOCTOR:   Headache, memory loss or confusion that does not begin to resolve within 24 hours.          Primary Diagnosis     Your primary diagnosis was:  Mood Problem      Admission Information     Date & Time Provider Department CSN    5/17/2017  7:07 AM Shoaib Boyce Jr., MD [facility] [id number]      Care Providers     Provider Role Specialty Primary office phone    Shoaib Boyce Jr., MD Attending Provider Psychiatry [phone]    Shoaib Boyce Jr., MD Surgeon  Psychiatry [phone]      Your Vitals Were     BP Pulse Temp Resp Height Weight    125/47 75 99 °F (37.2 °C) (Skin) 20 5' 5" (1.651 m) 68.9 kg (152 lb)    SpO2 BMI             100% 25.29 kg/m2         Recent Lab Values     No lab values to display.      Allergies as of 5/17/2017        Reactions    Ampicillin     Mom says so    Erythromycin     Levaquin [Levofloxacin] Other (See Comments)    Depression side effects    Penicillins     Mom says so    Pristiq [Desvenlafaxine]     psycotic "    Sulfa (Sulfonamide Antibiotics)     Rash    Azithromycin Anxiety      Advance Directives     An advance directive is a document which, in the event you are no longer able to make decisions for yourself, tells your healthcare team what kind of treatment you do or do not want to receive, or who you would like to make those decisions for you.  If you do not currently have an advance directive, [facility] encourages you to create one.  For more information call:  [phone] ([phone]), [phone] ([phone]),  or log on to [email or web address].        Smoking Cessation     If you would like to quit smoking:   You may be eligible for free services if you are a [place] resident and started smoking cigarettes before September 1, 1988.  Call the Smoking Cessation Trust (SCT) toll free at (090) 959-2330 or (836) 196-1784.   Call 3-022-QUIT-NOW if you do not meet the above criteria.   Contact us via email: [email or web address]   View our website for more information: [email or web address]        Language Assistance Services     ATTENTION: Language assistance services are available, free of charge. Please call [phone].      ATENCIÓN: Si starr alicia, tiene a rodriguez disposición servicios gratuitos de asistencia lingüística. Llame al [phone].     CHÚ Ý: N?u b?n nói Ti?ng Vi?t, có các d?ch v? h? tr? ngôn ng? mi?n phí dành cho b?n. G?i s? [phone].        [facility] Sign-Up     Activating your [facility] account is as easy as 1-2-3!     1) Visit [email or web address], select Sign Up Now, enter this activation code and your date of birth, then select Next.  [email or web address]  Expires: 7/1/2017  9:07 AM      2) Create a username and password to use when you visit [facility] in the future and select a security question in case you lose your password and select Next.    3) Enter your e-mail address and click Sign Up!    Additional Information  If you have questions, please e-mail  [email or web address] or call [phone] to talk to our [facility] staff. Remember, [facility] is NOT to be used for urgent needs. For medical emergencies, dial 911.          [facility] complies with applicable Federal civil rights laws and does not discriminate on the basis of race, color, national origin, age, disability, or sex.

## 2017-06-13 ENCOUNTER — ANESTHESIA EVENT (OUTPATIENT)
Dept: ELECTROPHYSIOLOGY | Facility: HOSPITAL | Age: 37
End: 2017-06-13
Payer: COMMERCIAL

## 2017-06-13 ENCOUNTER — HOSPITAL ENCOUNTER (OUTPATIENT)
Facility: HOSPITAL | Age: 37
Discharge: HOME | End: 2017-06-13
Attending: PSYCHIATRY & NEUROLOGY | Admitting: PSYCHIATRY & NEUROLOGY
Payer: COMMERCIAL

## 2017-06-13 ENCOUNTER — ANESTHESIA (OUTPATIENT)
Dept: ELECTROPHYSIOLOGY | Facility: HOSPITAL | Age: 37
End: 2017-06-13
Payer: COMMERCIAL

## 2017-06-13 VITALS
DIASTOLIC BLOOD PRESSURE: 76 MMHG | HEIGHT: 65 IN | RESPIRATION RATE: 16 BRPM | OXYGEN SATURATION: 100 % | WEIGHT: 153 LBS | HEART RATE: 74 BPM | SYSTOLIC BLOOD PRESSURE: 111 MMHG | BODY MASS INDEX: 25.49 KG/M2 | TEMPERATURE: 98 F

## 2017-06-13 DIAGNOSIS — F33.41 MDD (MAJOR DEPRESSIVE DISORDER), RECURRENT, IN PARTIAL REMISSION: ICD-10-CM

## 2017-06-13 DIAGNOSIS — F32.9 MAJOR DEPRESSIVE DISORDER: ICD-10-CM

## 2017-06-13 DIAGNOSIS — F33.9 RECURRENT MAJOR DEPRESSIVE DISORDER, REMISSION STATUS UNSPECIFIED: Primary | ICD-10-CM

## 2017-06-13 LAB
B-HCG UR QL: NEGATIVE
CTP QC/QA: YES

## 2017-06-13 PROCEDURE — 90870 ELECTROCONVULSIVE THERAPY: CPT | Performed by: ANESTHESIOLOGY

## 2017-06-13 PROCEDURE — 71000044 HC DOSC ROUTINE RECOVERY FIRST HOUR: Performed by: PSYCHIATRY & NEUROLOGY

## 2017-06-13 PROCEDURE — 90870 ELECTROCONVULSIVE THERAPY: CPT | Mod: ,,, | Performed by: PSYCHIATRY & NEUROLOGY

## 2017-06-13 PROCEDURE — 25000003 PHARM REV CODE 250: Performed by: ANESTHESIOLOGY

## 2017-06-13 PROCEDURE — 63600175 PHARM REV CODE 636 W HCPCS: Performed by: ANESTHESIOLOGY

## 2017-06-13 PROCEDURE — 37000008 HC ANESTHESIA 1ST 15 MINUTES: Performed by: PSYCHIATRY & NEUROLOGY

## 2017-06-13 PROCEDURE — 81025 URINE PREGNANCY TEST: CPT | Performed by: PSYCHIATRY & NEUROLOGY

## 2017-06-13 PROCEDURE — 37000009 HC ANESTHESIA EA ADD 15 MINS: Performed by: PSYCHIATRY & NEUROLOGY

## 2017-06-13 PROCEDURE — 25000003 PHARM REV CODE 250: Performed by: PSYCHIATRY & NEUROLOGY

## 2017-06-13 PROCEDURE — D9220A PRA ANESTHESIA: Mod: ,,, | Performed by: ANESTHESIOLOGY

## 2017-06-13 RX ORDER — SODIUM CHLORIDE 9 MG/ML
INJECTION, SOLUTION INTRAVENOUS CONTINUOUS
Status: DISCONTINUED | OUTPATIENT
Start: 2017-06-13 | End: 2017-06-13 | Stop reason: HOSPADM

## 2017-06-13 RX ORDER — ONDANSETRON 2 MG/ML
INJECTION INTRAMUSCULAR; INTRAVENOUS
Status: DISCONTINUED
Start: 2017-06-13 | End: 2017-06-13 | Stop reason: HOSPADM

## 2017-06-13 RX ORDER — KETOROLAC TROMETHAMINE 30 MG/ML
INJECTION, SOLUTION INTRAMUSCULAR; INTRAVENOUS
Status: DISCONTINUED
Start: 2017-06-13 | End: 2017-06-13 | Stop reason: HOSPADM

## 2017-06-13 RX ORDER — SODIUM CHLORIDE 9 MG/ML
500 INJECTION, SOLUTION INTRAVENOUS ONCE
Status: DISCONTINUED | OUTPATIENT
Start: 2017-06-13 | End: 2017-06-13 | Stop reason: HOSPADM

## 2017-06-13 RX ORDER — SUCCINYLCHOLINE CHLORIDE 20 MG/ML
INJECTION INTRAMUSCULAR; INTRAVENOUS
Status: DISCONTINUED
Start: 2017-06-13 | End: 2017-06-13 | Stop reason: HOSPADM

## 2017-06-13 RX ORDER — SUCCINYLCHOLINE CHLORIDE 20 MG/ML
INJECTION INTRAMUSCULAR; INTRAVENOUS
Status: DISCONTINUED | OUTPATIENT
Start: 2017-06-13 | End: 2017-06-13

## 2017-06-13 RX ORDER — LIDOCAINE HYDROCHLORIDE 10 MG/ML
1 INJECTION, SOLUTION EPIDURAL; INFILTRATION; INTRACAUDAL; PERINEURAL ONCE
Status: COMPLETED | OUTPATIENT
Start: 2017-06-13 | End: 2017-06-13

## 2017-06-13 RX ORDER — KETOROLAC TROMETHAMINE 15 MG/ML
INJECTION, SOLUTION INTRAMUSCULAR; INTRAVENOUS
Status: DISCONTINUED | OUTPATIENT
Start: 2017-06-13 | End: 2017-06-13

## 2017-06-13 RX ORDER — ONDANSETRON 2 MG/ML
INJECTION INTRAMUSCULAR; INTRAVENOUS
Status: DISCONTINUED | OUTPATIENT
Start: 2017-06-13 | End: 2017-06-13

## 2017-06-13 RX ORDER — LABETALOL HYDROCHLORIDE 5 MG/ML
INJECTION, SOLUTION INTRAVENOUS
Status: DISCONTINUED
Start: 2017-06-13 | End: 2017-06-13 | Stop reason: HOSPADM

## 2017-06-13 RX ORDER — LABETALOL HYDROCHLORIDE 5 MG/ML
INJECTION, SOLUTION INTRAVENOUS
Status: DISCONTINUED | OUTPATIENT
Start: 2017-06-13 | End: 2017-06-13

## 2017-06-13 RX ADMIN — LIDOCAINE HYDROCHLORIDE 0.2 MG: 10 INJECTION, SOLUTION EPIDURAL; INFILTRATION; INTRACAUDAL; PERINEURAL at 06:06

## 2017-06-13 RX ADMIN — METHOHEXITAL SODIUM 150 MG: 500 INJECTION, POWDER, LYOPHILIZED, FOR SOLUTION INTRAMUSCULAR; INTRAVENOUS; RECTAL at 07:06

## 2017-06-13 RX ADMIN — KETOROLAC TROMETHAMINE 30 MG: 15 INJECTION, SOLUTION INTRAMUSCULAR; INTRAVENOUS at 07:06

## 2017-06-13 RX ADMIN — Medication 500 MG: at 07:06

## 2017-06-13 RX ADMIN — SUCCINYLCHOLINE CHLORIDE 100 MG: 20 INJECTION, SOLUTION INTRAMUSCULAR; INTRAVENOUS at 07:06

## 2017-06-13 RX ADMIN — ONDANSETRON 4 MG: 2 INJECTION, SOLUTION INTRAMUSCULAR; INTRAVENOUS at 07:06

## 2017-06-13 RX ADMIN — SODIUM CHLORIDE 500 ML: 0.9 INJECTION, SOLUTION INTRAVENOUS at 06:06

## 2017-06-13 RX ADMIN — LABETALOL HYDROCHLORIDE 10 MG: 5 INJECTION INTRAVENOUS at 07:06

## 2017-06-13 NOTE — DISCHARGE SUMMARY
Allyssa Wright  : 1980   MRN: [id number]  Date: 2017     Psychiatry - ECT  Discharge Summary    Admit Date: 2017  5:56 AM  Discharge Date: 2017    Attending Physician: Shoaib Boyce MD    Discharge Provider: Ming Sanchez MD    History of Present Illness: Allyssa Wright is a 37 y.o. female with Major Depressive Disorder, recurrent, in partial remission presented for ECT #26. See H&P dated 2017 for full HPI. For further details, see Dr. Boyce's pre-ECT evaluation.    Hospital Course: The patient tolerated the ECT treatment well without complication. Patient was stable post-procedure. See OP note dated 2017 for more details.     Disposition: Home or Self Care    Medications:  Current Discharge Medication List      CONTINUE these medications which have NOT CHANGED    Details   dapsone 7.5 % GlwP Apply topically once daily.      famciclovir (FAMVIR) 500 MG tablet Take 1 tablet (500 mg total) by mouth 2 (two) times daily.  Qty: 30 tablet, Refills: 12    Associated Diagnoses: Major depressive disorder, recurrent episode, moderate degree      hydrOXYzine pamoate (VISTARIL) 25 MG Cap Take 1 capsule (25 mg total) by mouth every 8 (eight) hours as needed (anxiety).  Qty: 90 capsule, Refills: 1      quetiapine (SEROQUEL) 50 MG tablet Take 150 mg by mouth 2 (two) times daily. 100 mg nightly and 25 mg in the morning      spironolactone (ALDACTONE) 50 MG tablet 50 mg 2 (two) times daily. 50  mg qAM, 50 mg qHS.      thyroid (ARMOUR THYROID) 30 mg Tab Take 1 tablet (30 mg total) by mouth every morning.  Qty: 30 tablet, Refills: 11    Associated Diagnoses: Major depressive disorder, recurrent episode, moderate degree      trazodone (DESYREL) 100 MG tablet Take 300 mg by mouth every evening.       venlafaxine (EFFEXOR-XR) 75 MG 24 hr capsule Take 1 capsule (75 mg total) by mouth nightly. Take with 150 mg for total daily dose of 225 mg.  Qty: 30 capsule, Refills: 2      brimonidine (MIRVASO)  0.33 % Gel Apply topically nightly as needed.      dextroamphetamine-amphetamine (ADDERALL XR) 20 MG 24 hr capsule Take 20 mg by mouth 2 (two) times daily.      naftifine (NAFTIN) 1 % cream Apply topically daily as needed. Apply to feet      ZOVIRAX 5 % Crea Refills: 5           Status at Discharge: Alert and medically stable    Discharge Diagnoses:  Major Depressive Disorder, recurrent, in partial remission    Diet: Resume previous outpatient diet  Activity: Ambulate with assistance - patient is a fall risk  Instructions: Please do not eat or drink anything after midnight prior to procedure. Please do not drive on day of ECT.    Med Changes:  None    Next ECT:  July 12 2017 06/13/2017 7:23 AM

## 2017-06-13 NOTE — ANESTHESIA RELEASE NOTE
"Anesthesia Release from PACU Note    Patient: Allyssa Wright    Procedure(s) Performed: Procedure(s) (LRB):  ELECTROCONVULSIVE THERAPY (ECT) - SINGLE SEIZURE (N/A)    Anesthesia type: general    Post pain: Adequate analgesia    Post assessment: no apparent anesthetic complications    Last Vitals:   Visit Vitals  /69 (BP Location: Left arm, Patient Position: Lying, BP Method: Automatic)   Pulse 77   Temp 36.9 °C (98.5 °F) (Oral)   Resp 16   Ht 5' 5" (1.651 m)   Wt 69.4 kg (153 lb)   SpO2 100%   Breastfeeding? No   BMI 25.46 kg/m²       Post vital signs: stable    Level of consciousness: awake and responds to stimulation    Nausea/Vomiting: no nausea/no vomiting    Complications: none    Airway Patency: patent    Respiratory: unassisted    Cardiovascular: stable and blood pressure at baseline    Hydration: euvolemic       "

## 2017-06-13 NOTE — DISCHARGE INSTRUCTIONS
Home Care Instructions  E.C.T.  ACTIVITY LEVEL:  You may feel sleepy for several hours. It is best to rest until you are more awake and then gradually resume  your normal activities in one day. It is recommended, because of the possibility of memory loss and slight  confusion post ECT, that you rest in the company of a responsible adult. This confusion and memory loss is  expected and should diminish over time. It is also recommended that you do not drive or operate any electrical  appliances or machinery during the ECT treatment series. Ask your Doctor, at the time of your treatment, any  questions you may have about specific activities.  DIET:  You may wish to start with liquids after your ECT treatment and gradually resume your normal diet. Do not  consume alcoholic beverages during the ECT treatment series.  BATHING:  You may shower or bathe as desired.  MEDICATIONS:  Resume all home medications starting with the AM doses not taken prior to ECT treatment. If you experience a  headache, it is okay to take your usual pain reliever.  WHEN TO CALL THE DOCTOR:   Headache, memory loss or confusion that does not begin to resolve within 24 hours.  RETURN APPOINTMENT:  Report to Day Surgery ([facility]) on (date) Wednesday, July 12.  Remember you may not eat or drink after midnight, but please take heart or high blood pressure  medicines with a sip of water in the morning prior to leaving home.    FOR EMERGENCIES:  If any unusual problems or difficulties occur, contact the office [phone] Monday-Friday until 3:00 p.m.  After hours, call the hospital  [phone] and ask for the Psychiatry Resident On-call.

## 2017-06-13 NOTE — OP NOTE
Allyssa Wright  : 1980   MRN: [id number]  Date: 2017       Psychiatry - ECT  Operative Note             Date of Admission: 2017  5:56 AM    Site: [facility], [address]    Attending: Shoaib Boyce MD  Residents: Ming Sanchez MD  Pre-op Diagnosis: MDD, recurrent, in partial remission     ECT Treatment Number: 26  Machine Type: [id number] 5000    Patient Status: medically stable    Vitals (pre-procedure):  Vitals:    17 0625   BP: (!) 99/57   Pulse: 91   Resp: 20   Temp: 98.4 °F (36.9 °C)       Electrode Placement: Bitemporal    Stimulus Number Charge (mC) Level Pulse Width (msec) Frequency  (Hz) Duration of Stimulus (sec) Current (mA) Duration of Seizure (sec)   1 576 3 1 60 6 800 131                                   Complications: HTN    Maximum Blood Pressure: 175/101  Medications Given:  Caffeine 500 mg  PO  Before  Methohexital (Brevital) 150mg  IV During   Succinylcholine (Anectine) 100mg IV During   Ondansetron (Zofran) 4mg IV During  Toradol 30mg IV During   Labetalol 10 mg IV during    Treatment Course:  Patient tolerated procedure well. After adequate recovery from general anesthesia, the patient was transported to recovery.    Post-op Diagnosis: Same as above    Recommended for next ECT:  2017    Ming Sanchez MD    2017 7:20 AM

## 2017-06-13 NOTE — ANESTHESIA POSTPROCEDURE EVALUATION
"Anesthesia Post Evaluation    Patient: Allyssa Wright    Procedure(s) Performed: Procedure(s) (LRB):  ELECTROCONVULSIVE THERAPY (ECT) - SINGLE SEIZURE (N/A)    Final Anesthesia Type: general  Patient location during evaluation: PACU  Patient participation: Yes- Able to Participate  Level of consciousness: awake and responds to stimulation  Post-procedure vital signs: reviewed and stable  Pain management: adequate  Airway patency: patent  PONV status at discharge: No PONV  Anesthetic complications: no      Cardiovascular status: blood pressure returned to baseline  Respiratory status: spontaneous ventilation  Hydration status: euvolemic  Follow-up not needed.        Visit Vitals  /69 (BP Location: Left arm, Patient Position: Lying, BP Method: Automatic)   Pulse 77   Temp 36.9 °C (98.5 °F) (Oral)   Resp 16   Ht 5' 5" (1.651 m)   Wt 69.4 kg (153 lb)   SpO2 100%   Breastfeeding? No   BMI 25.46 kg/m²       Pain/Roma Score: Pain Assessment Performed: Yes (6/13/2017  8:08 AM)  Presence of Pain: denies (6/13/2017  8:08 AM)  Roma Score: 10 (6/13/2017  8:08 AM)      "

## 2017-06-13 NOTE — TRANSFER OF CARE
"Anesthesia Transfer of Care Note    Patient: Allyssa Wright    Procedure(s) Performed: Procedure(s) (LRB):  ELECTROCONVULSIVE THERAPY (ECT) - SINGLE SEIZURE (N/A)    Patient location: PACU    Anesthesia Type: general    Transport from OR: Transported from OR on room air with adequate spontaneous ventilation    Post pain: adequate analgesia    Post assessment: no apparent anesthetic complications    Post vital signs: stable    Level of consciousness: awake and responds to stimulation    Nausea/Vomiting: no nausea/vomiting    Complications: none    Transfer of care protocol was followed      Last vitals:   Visit Vitals  /75 (BP Location: Right arm, Patient Position: Lying, BP Method: Automatic)   Pulse 79   Temp 36.9 °C (98.5 °F) (Oral)   Resp 16   Ht 5' 5" (1.651 m)   Wt 69.4 kg (153 lb)   SpO2 97%   Breastfeeding? No   BMI 25.46 kg/m²     "

## 2017-06-13 NOTE — ANESTHESIA PREPROCEDURE EVALUATION
06/13/2017    Pre-operative evaluation for Procedure(s) (LRB):  ELECTROCONVULSIVE THERAPY (ECT) - SINGLE SEIZURE (N/A)    Allyssa Wright is a 37 y.o. female with MDD, recurrent severe, who is scheduled for continuance of ECT. No issues with last session.           Patient Active Problem List   Diagnosis    MDD (major depressive disorder), recurrent, in partial remission    Major depressive disorder    Major depressive disorder in partial remission    Major depressive disorder, recurrent, in partial remission    Recurrent major depressive disorder    MDD (major depressive disorder), severe       Review of patient's allergies indicates:   Allergen Reactions    Ampicillin      Mom says so    Erythromycin     Levaquin [levofloxacin] Other (See Comments)     Depression side effects    Penicillins      Mom says so    Pristiq [desvenlafaxine]      psycotic      Sulfa (sulfonamide antibiotics)      Rash      Azithromycin Anxiety        Current Facility-Administered Medications on File Prior to Visit   Medication Dose Route Frequency Provider Last Rate Last Dose    0.9%  NaCl infusion   Intravenous Continuous Delvis Farmer MD   500 mL at 06/13/17 0630     Current Outpatient Prescriptions on File Prior to Visit   Medication Sig Dispense Refill    brimonidine (MIRVASO) 0.33 % Gel Apply topically nightly as needed.      dapsone 7.5 % GlwP Apply topically once daily.      dextroamphetamine-amphetamine (ADDERALL XR) 20 MG 24 hr capsule Take 20 mg by mouth 2 (two) times daily.      famciclovir (FAMVIR) 500 MG tablet Take 1 tablet (500 mg total) by mouth 2 (two) times daily. 30 tablet 12    hydrOXYzine pamoate (VISTARIL) 25 MG Cap Take 1 capsule (25 mg total) by mouth every 8 (eight) hours as needed (anxiety). (Patient taking differently: Take 50 mg by mouth every 8 (eight) hours as needed (anxiety).  ) 90 capsule 1    naftifine (NAFTIN) 1 % cream Apply topically daily as needed. Apply to feet      quetiapine (SEROQUEL) 50 MG tablet Take 150 mg by mouth 2 (two) times daily. 100 mg nightly and 25 mg in the morning      spironolactone (ALDACTONE) 50 MG tablet 50 mg 2 (two) times daily. 50  mg qAM, 50 mg qHS.      thyroid (ARMOUR THYROID) 30 mg Tab Take 1 tablet (30 mg total) by mouth every morning. 30 tablet 11    trazodone (DESYREL) 100 MG tablet Take 300 mg by mouth every evening.       venlafaxine (EFFEXOR-XR) 75 MG 24 hr capsule Take 1 capsule (75 mg total) by mouth nightly. Take with 150 mg for total daily dose of 225 mg. (Patient taking differently: Take 225 mg by mouth nightly. Pt taking 250 mg) 30 capsule 2    ZOVIRAX 5 % Crea   5       Past Surgical History:   Procedure Laterality Date    ANKLE SURGERY Right     BREAST augmentation      OVARIAN CYST REMOVAL Bilateral        Social History     Social History    Marital status: Single     Spouse name: N/A    Number of children: N/A    Years of education: N/A     Occupational History    unemployed      Social History Main Topics    Smoking status: Former Smoker     Quit date: 4/6/2011    Smokeless tobacco: Not on file    Alcohol use No      Comment: Alcohol misuse in remote past    Drug use: No      Comment: Experimental use in high school    Sexual activity: Not on file     Other Topics Concern    Not on file     Social History Narrative    Pt has 1 older brother from an intact family until the death of her mother in 2012.  She completed 1 year of college, was never in the , and has never been employed.  She was engaged once, but never , has no children, and lives with her father plus 2 dogs.  She denies any hobbies and is spiritual but not Mandaeism.  She does date and returns to college during rare periods when depression and anxiety [place].         Vital Signs Range (Last 24H):  Temp:  [36.9 °C (98.4 °F)]   Pulse:  [91]    Resp:  [20]   BP: (99)/(57)   SpO2:  [100 %]         Diagnostic Studies:      EKG:  Normal sinus rhythm  Normal ECG  When compared with ECG of 03-DEC-2015 11:38,  Questionable change in The axis  T wave inversion no longer evident in Inferior leads  Confirmed by Flaco Sr MD ([id number]) on 6/9/2016 1:30:13 PM    Referred By: SELF REFERRAL           Confirmed By:Flaco Sr MD          Pre-op Assessment    I have reviewed the Patient Summary Reports.     I have reviewed the Nursing Notes.   I have reviewed the Medications.     Review of Systems  Anesthesia Hx:  No problems with previous Anesthesia Denies Hx of Anesthetic complications  History of prior surgery of interest to airway management or planning: Previous anesthesia: General 7/22/2016 with general anesthesia.  Denies Family Hx of Anesthesia complications.   Denies Personal Hx of Anesthesia complications.   Social:  Former smoker-- quit 2011   Hematology/Oncology:  Hematology Normal   Oncology Normal     EENT/Dental:EENT/Dental Normal   Cardiovascular:  Cardiovascular Normal     Pulmonary:  Pulmonary Normal    Renal/:  Renal/ Normal     Hepatic/GI:  Hepatic/GI Normal    Musculoskeletal:  Musculoskeletal Normal    Neurological:  Neurology Normal    Endocrine:  Endocrine Normal    Dermatological:  Skin Normal    Psych:   Psychiatric History anxiety depression          Physical Exam  General:  Well nourished    Airway/Jaw/Neck:  Airway Findings: Mouth Opening: Normal Tongue: Normal  General Airway Assessment: Adult  Mallampati: II  Improves to I with phonation.  TM Distance: Normal, at least 6 cm  Jaw/Neck Findings:  Neck ROM: Normal ROM     Eyes/Ears/Nose:  EYES/EARS/NOSE FINDINGS: Normal   Dental:  Dental Findings: In tact   Chest/Lungs:  Chest/Lungs Findings: Normal Respiratory Rate, Clear to auscultation     Heart/Vascular:  Heart Findings: Rate: Normal  Rhythm: Regular Rhythm  Sounds: Normal  Heart murmur: negative    Abdomen:  Abdomen Findings: Normal     Musculoskeletal:  Musculoskeletal Findings: Normal   Skin:  Skin Findings: Normal    Mental Status:  Mental Status Findings:  Cooperative, Alert and Oriented         Anesthesia Plan  Type of Anesthesia, risks & benefits discussed:  Anesthesia Type:  general  Patient's Preference: same   Intra-op Monitoring Plan: standard ASA monitors  Intra-op Monitoring Plan Comments:   Post Op Pain Control Plan:   Post Op Pain Control Plan Comments:   Induction:   IV  Beta Blocker:  Patient is not currently on a Beta-Blocker (No further documentation required).       Informed Consent: Patient understands risks and agrees with Anesthesia plan.  Questions answered. Anesthesia consent signed with patient.  ASA Score: 2     Day of Surgery Review of History & Physical:    H&P update referred to the provider.         Ready For Surgery From Anesthesia Perspective.

## 2017-06-13 NOTE — H&P
"Allyssa Wright  : 1980   MRN: [id number]  Date: 2017     Psychiatry - ECT  History & Physical    Chief complaint: Major Depressive Disorder, recurrent, in partial remission   Procedure: ECT #26    SUBJECTIVE:     HPI:     Ms. Wright is a 37 year old white female with past psychiatric history of MDD, recurrent, severe who returns for ECT. Met with patient and father at bedside. Patient has been feeling well lately, no medical issues. Patient reports "ok" mood.  Sometimes she feels the depression "creeping in" for a few days, but it resolves spontaneously.  Patient takes spironolactone for hormone-induced acne. This has been a problem for her in the past. Otherwise her mood has been stable. No issues with feeling suicidal.     Has been compliant with medications, which are managed by Dr. Jc.  She is currently taking Adderall 20mg BID. Continues to take Effexor  mg PO qHS, trazodone 300 mg PO qHS, and Seroquel 150 mg PO qHS.      Sleep and appetite are stable.  C/O short-term memory issues in which she feels her learning has been affected.  She feels that information does not "stick as well as it used to."      Reviewed consent with patient and answered all questions to the patient and her father's satisfaction, and she agreed to undergo ECT on this occasion.     Psychiatric Review of Systems:   Mood: "ok"  Appetite: no issues  Psychomotor: none  Cognitive Impairment: mild to be expected with ECT  Insomnia: None  Psychosis: absent   Diurnal Variation: absent   Suicidal Ideation: absent      Medical Review Of Systems:   Constitutional: negative for chills, fevers and sweats  Eyes: negative for visual disturbance  Respiratory: negative for cough, sputum and wheezing  Cardiovascular: negative for chest pain, palpitations, and dyspnea  Gastrointestinal: negative for abdominal pain, N/V  Musculoskeletal:negative for arthralgias and back pain    Current Medications:   No current facility-administered " medications on file prior to encounter.      Current Outpatient Prescriptions on File Prior to Encounter   Medication Sig Dispense Refill    dapsone 7.5 % GlwP Apply topically once daily.      famciclovir (FAMVIR) 500 MG tablet Take 1 tablet (500 mg total) by mouth 2 (two) times daily. 30 tablet 12    hydrOXYzine pamoate (VISTARIL) 25 MG Cap Take 1 capsule (25 mg total) by mouth every 8 (eight) hours as needed (anxiety). (Patient taking differently: Take 50 mg by mouth every 8 (eight) hours as needed (anxiety). ) 90 capsule 1    quetiapine (SEROQUEL) 50 MG tablet Take 150 mg by mouth 2 (two) times daily. 100 mg nightly and 25 mg in the morning      spironolactone (ALDACTONE) 50 MG tablet 50 mg 2 (two) times daily. 50  mg qAM, 50 mg qHS.      thyroid (ARMOUR THYROID) 30 mg Tab Take 1 tablet (30 mg total) by mouth every morning. 30 tablet 11    trazodone (DESYREL) 100 MG tablet Take 300 mg by mouth every evening.       venlafaxine (EFFEXOR-XR) 75 MG 24 hr capsule Take 1 capsule (75 mg total) by mouth nightly. Take with 150 mg for total daily dose of 225 mg. (Patient taking differently: Take 225 mg by mouth nightly. Pt taking 250 mg) 30 capsule 2    brimonidine (MIRVASO) 0.33 % Gel Apply topically nightly as needed.      dextroamphetamine-amphetamine (ADDERALL XR) 20 MG 24 hr capsule Take 20 mg by mouth 2 (two) times daily.      naftifine (NAFTIN) 1 % cream Apply topically daily as needed. Apply to feet      ZOVIRAX 5 % Crea   5      Allergies:   Ampicillin; Erythromycin; Levaquin [levofloxacin]; Penicillins; Pristiq [desvenlafaxine]; Sulfa (sulfonamide antibiotics); and Azithromycin    Past Medical/Surgical History:   Past Medical History:   Diagnosis Date    Anxiety     Depression     History of psychiatric hospitalization     HSV-1 (herpes simplex virus 1) infection     Hx of psychiatric care     Moderate depressed bipolar II disorder 06/13/2016    reports no history of bipolar     "Obsessive-compulsive disorder     Psychiatric problem     Self-harming behavior     Therapy      Past Surgical History:   Procedure Laterality Date    ANKLE SURGERY Right     BREAST augmentation      OVARIAN CYST REMOVAL Bilateral       OBJECTIVE:     Vitals (pre-procedure):  Vitals:    06/13/17 0625   BP: (!) 99/57   Pulse: 91   Resp: 20   Temp: 98.4 °F (36.9 °C)        Labs/Imaging/Studies:   Recent Results (from the past 48 hour(s))   POCT urine pregnancy    Collection Time: 06/13/17  6:26 AM   Result Value Ref Range    POC Preg Test, Ur Negative Negative     Acceptable Yes       No results found for: PHENYTOIN, PHENOBARB, VALPROATE, CBMZ    Physical Exam:     General: resting in bed in NAD  HEENT: EOMI,  Respiratory: CTAB, unlabored breathing  Cardiovascular: RRR, no murmurs  Abdominal: abdomen soft, non-tender, non-distended, (+) BS  Extremities: no cyanosis or clubbing, no edema  Neurological: no involuntary movements observed, no focal neurological deficits    Mental Status Exam:   Appearance: normal weight, neatly groomed, lying in bed  Behavior: calm, cooperative; friendly  Speech: Conversational rate, tone, and volume  Mood: "ok"  Affect: full; appropriate for stated mood  Thought Process: linear   Thought Perceptions: denied AVH  Thought Content: no SI or HI; no delusions appreciated  Sensorium: awake, alert  Cognition: concentration intact to conversation; memory intact conversation (no overt issues with recent or remote memory); fully oriented  Insight: good  Judgment: good    ASSESSMENT/PLAN:     Allyssa Wright is a 37 y.o. female with MDD (major depressive disorder), recurrent, in partial remission who presents for ECT.    Recommendations:   Proceed with ECT #26    Ming Sanchez MD    06/13/2017 6:54 AM    "

## 2017-07-12 ENCOUNTER — SURGERY (OUTPATIENT)
Age: 37
End: 2017-07-12

## 2017-07-12 ENCOUNTER — ANESTHESIA (OUTPATIENT)
Dept: ELECTROPHYSIOLOGY | Facility: HOSPITAL | Age: 37
End: 2017-07-12
Payer: COMMERCIAL

## 2017-07-12 ENCOUNTER — ANESTHESIA EVENT (OUTPATIENT)
Dept: ELECTROPHYSIOLOGY | Facility: HOSPITAL | Age: 37
End: 2017-07-12
Payer: COMMERCIAL

## 2017-07-12 ENCOUNTER — HOSPITAL ENCOUNTER (OUTPATIENT)
Facility: HOSPITAL | Age: 37
Discharge: HOME | End: 2017-07-12
Attending: PSYCHIATRY & NEUROLOGY | Admitting: PSYCHIATRY & NEUROLOGY
Payer: COMMERCIAL

## 2017-07-12 VITALS
WEIGHT: 153 LBS | BODY MASS INDEX: 25.49 KG/M2 | HEIGHT: 65 IN | DIASTOLIC BLOOD PRESSURE: 79 MMHG | OXYGEN SATURATION: 97 % | SYSTOLIC BLOOD PRESSURE: 107 MMHG | HEART RATE: 74 BPM | TEMPERATURE: 98 F | RESPIRATION RATE: 18 BRPM

## 2017-07-12 DIAGNOSIS — F33.41 RECURRENT MAJOR DEPRESSIVE DISORDER, IN PARTIAL REMISSION: Primary | ICD-10-CM

## 2017-07-12 DIAGNOSIS — F33.9 MAJOR DEPRESSION, RECURRENT, CHRONIC: Primary | ICD-10-CM

## 2017-07-12 LAB
B-HCG UR QL: NEGATIVE
CTP QC/QA: YES

## 2017-07-12 PROCEDURE — 25000003 PHARM REV CODE 250: Performed by: PSYCHIATRY & NEUROLOGY

## 2017-07-12 PROCEDURE — 25000003 PHARM REV CODE 250: Performed by: STUDENT IN AN ORGANIZED HEALTH CARE EDUCATION/TRAINING PROGRAM

## 2017-07-12 PROCEDURE — 81025 URINE PREGNANCY TEST: CPT | Performed by: PSYCHIATRY & NEUROLOGY

## 2017-07-12 PROCEDURE — 71000045 HC DOSC ROUTINE RECOVERY EA ADD'L HR: Performed by: PSYCHIATRY & NEUROLOGY

## 2017-07-12 PROCEDURE — 37000009 HC ANESTHESIA EA ADD 15 MINS: Performed by: PSYCHIATRY & NEUROLOGY

## 2017-07-12 PROCEDURE — 71000044 HC DOSC ROUTINE RECOVERY FIRST HOUR: Performed by: PSYCHIATRY & NEUROLOGY

## 2017-07-12 PROCEDURE — 37000008 HC ANESTHESIA 1ST 15 MINUTES: Performed by: PSYCHIATRY & NEUROLOGY

## 2017-07-12 PROCEDURE — D9220A PRA ANESTHESIA: Mod: ,,, | Performed by: ANESTHESIOLOGY

## 2017-07-12 PROCEDURE — 90870 ELECTROCONVULSIVE THERAPY: CPT | Performed by: STUDENT IN AN ORGANIZED HEALTH CARE EDUCATION/TRAINING PROGRAM

## 2017-07-12 PROCEDURE — 63600175 PHARM REV CODE 636 W HCPCS: Performed by: STUDENT IN AN ORGANIZED HEALTH CARE EDUCATION/TRAINING PROGRAM

## 2017-07-12 RX ORDER — SODIUM CHLORIDE 9 MG/ML
INJECTION, SOLUTION INTRAVENOUS CONTINUOUS PRN
Status: DISCONTINUED | OUTPATIENT
Start: 2017-07-12 | End: 2017-07-12

## 2017-07-12 RX ORDER — ONDANSETRON 2 MG/ML
INJECTION INTRAMUSCULAR; INTRAVENOUS
Status: DISCONTINUED | OUTPATIENT
Start: 2017-07-12 | End: 2017-07-12

## 2017-07-12 RX ORDER — SUCCINYLCHOLINE CHLORIDE 20 MG/ML
INJECTION INTRAMUSCULAR; INTRAVENOUS
Status: DISCONTINUED
Start: 2017-07-12 | End: 2017-07-12 | Stop reason: HOSPADM

## 2017-07-12 RX ORDER — KETOROLAC TROMETHAMINE 30 MG/ML
INJECTION, SOLUTION INTRAMUSCULAR; INTRAVENOUS
Status: DISCONTINUED
Start: 2017-07-12 | End: 2017-07-12 | Stop reason: HOSPADM

## 2017-07-12 RX ORDER — LIDOCAINE HYDROCHLORIDE 10 MG/ML
1 INJECTION, SOLUTION EPIDURAL; INFILTRATION; INTRACAUDAL; PERINEURAL ONCE
Status: DISCONTINUED | OUTPATIENT
Start: 2017-07-12 | End: 2017-07-12 | Stop reason: HOSPADM

## 2017-07-12 RX ORDER — LABETALOL HYDROCHLORIDE 5 MG/ML
INJECTION, SOLUTION INTRAVENOUS
Status: DISCONTINUED | OUTPATIENT
Start: 2017-07-12 | End: 2017-07-12

## 2017-07-12 RX ORDER — KETOROLAC TROMETHAMINE 30 MG/ML
INJECTION, SOLUTION INTRAMUSCULAR; INTRAVENOUS
Status: DISCONTINUED | OUTPATIENT
Start: 2017-07-12 | End: 2017-07-12

## 2017-07-12 RX ORDER — ONDANSETRON 2 MG/ML
INJECTION INTRAMUSCULAR; INTRAVENOUS
Status: DISCONTINUED
Start: 2017-07-12 | End: 2017-07-12 | Stop reason: HOSPADM

## 2017-07-12 RX ORDER — SUCCINYLCHOLINE CHLORIDE 20 MG/ML
INJECTION INTRAMUSCULAR; INTRAVENOUS
Status: DISCONTINUED | OUTPATIENT
Start: 2017-07-12 | End: 2017-07-12

## 2017-07-12 RX ORDER — LABETALOL HYDROCHLORIDE 5 MG/ML
INJECTION, SOLUTION INTRAVENOUS
Status: DISCONTINUED
Start: 2017-07-12 | End: 2017-07-12 | Stop reason: HOSPADM

## 2017-07-12 RX ORDER — METHOHEXITAL IN WATER/PF 100MG/10ML
SYRINGE (ML) INTRAVENOUS
Status: DISCONTINUED
Start: 2017-07-12 | End: 2017-07-12 | Stop reason: HOSPADM

## 2017-07-12 RX ORDER — SODIUM CHLORIDE 9 MG/ML
INJECTION, SOLUTION INTRAVENOUS CONTINUOUS
Status: DISCONTINUED | OUTPATIENT
Start: 2017-07-12 | End: 2017-07-12 | Stop reason: HOSPADM

## 2017-07-12 RX ADMIN — ONDANSETRON 4 MG: 2 INJECTION, SOLUTION INTRAMUSCULAR; INTRAVENOUS at 08:07

## 2017-07-12 RX ADMIN — LABETALOL HYDROCHLORIDE 5 MG: 5 INJECTION INTRAVENOUS at 08:07

## 2017-07-12 RX ADMIN — KETOROLAC TROMETHAMINE 30 MG: 30 INJECTION, SOLUTION INTRAMUSCULAR; INTRAVENOUS at 08:07

## 2017-07-12 RX ADMIN — Medication 500 MG: at 08:07

## 2017-07-12 RX ADMIN — SUCCINYLCHOLINE CHLORIDE 120 MG: 20 INJECTION, SOLUTION INTRAMUSCULAR; INTRAVENOUS at 08:07

## 2017-07-12 RX ADMIN — METHOHEXITAL SODIUM 170 MG: 500 INJECTION, POWDER, LYOPHILIZED, FOR SOLUTION INTRAMUSCULAR; INTRAVENOUS; RECTAL at 08:07

## 2017-07-12 RX ADMIN — SODIUM CHLORIDE: 0.9 INJECTION, SOLUTION INTRAVENOUS at 08:07

## 2017-07-12 NOTE — PROGRESS NOTES
Notified Dr. JUAN Serna that patient has met all criteria for release from Anesthesia's care and VALENTINA Ariza MD, Staff Anesthesia, is not answering her phone.  Dr. JUAN Serna stated that he would release patient from Anesthesia's care.

## 2017-07-12 NOTE — PLAN OF CARE
Patient arrived from ECT Room with JAMI Forrest RN and MART Molina MD.  Patient stable.  Report received at this time.  Assumed care of patient at this time.

## 2017-07-12 NOTE — ANESTHESIA PREPROCEDURE EVALUATION
07/12/2017    Pre-operative evaluation for ELECTROCONVULSIVE THERAPY (ECT) - SINGLE SEIZURE (Bilateral)    Allyssa Wright is a 37 y.o. female with MDD presenting for ECT #27.    Past Medical History:   Diagnosis Date    Anxiety     Depression     History of psychiatric hospitalization     HSV-1 (herpes simplex virus 1) infection     Hx of psychiatric care     Moderate depressed bipolar II disorder 06/13/2016    reports no history of bipolar    Obsessive-compulsive disorder     Psychiatric problem     Self-harming behavior     Therapy      Past Surgical History:   Procedure Laterality Date    ANKLE SURGERY Right     BREAST augmentation      OVARIAN CYST REMOVAL Bilateral          Vital Signs Range (Last 24H):  BP: ()/()   Arterial Line BP: ()/()       CBC:   No results for input(s): WBC, RBC, HGB, HCT, PLT, MCV, MCH, MCHC in the last 720 hours.    CMP: No results for input(s): NA, K, CL, CO2, BUN, CREATININE, GLU, MG, PHOS, CALCIUM, ALBUMIN, PROT, ALKPHOS, ALT, AST, BILITOT in the last 720 hours.    INR:  No results for input(s): INR, PROTIME, APTT in the last 720 hours.    Invalid input(s): PT        Anesthesia Evaluation    I have reviewed the Patient Summary Reports.    I have reviewed the Nursing Notes.   I have reviewed the Medications.     Review of Systems  Anesthesia Hx:  No problems with previous Anesthesia Denies Hx of Anesthetic complications  History of prior surgery of interest to airway management or planning: Previous anesthesia: General 7/22/2016 with general anesthesia.  Denies Family Hx of Anesthesia complications.   Denies Personal Hx of Anesthesia complications.   Social:  Former smoker-- quit 2011   Hematology/Oncology:  Hematology Normal   Oncology Normal     EENT/Dental:EENT/Dental Normal   Cardiovascular:  Cardiovascular Normal     Pulmonary:  Pulmonary Normal     Renal/:  Renal/ Normal     Hepatic/GI:  Hepatic/GI Normal    Musculoskeletal:  Musculoskeletal Normal    Neurological:  Neurology Normal    Endocrine:  Endocrine Normal    Dermatological:  Skin Normal    Psych:   Psychiatric History anxiety depression          Physical Exam  General:  Well nourished    Airway/Jaw/Neck:  Airway Findings: Mouth Opening: Normal Tongue: Normal  General Airway Assessment: Adult  Mallampati: II  Improves to I with phonation.  TM Distance: Normal, at least 6 cm  Jaw/Neck Findings:  Neck ROM: Normal ROM     Eyes/Ears/Nose:  EYES/EARS/NOSE FINDINGS: Normal   Dental:  Dental Findings: In tact   Chest/Lungs:  Chest/Lungs Findings: Normal Respiratory Rate, Clear to auscultation     Heart/Vascular:  Heart Findings: Rate: Normal  Rhythm: Regular Rhythm  Sounds: Normal  Heart murmur: negative    Abdomen:  Abdomen Findings: Normal    Musculoskeletal:  Musculoskeletal Findings: Normal   Skin:  Skin Findings: Normal    Mental Status:  Mental Status Findings:  Cooperative, Alert and Oriented         Anesthesia Plan  Type of Anesthesia, risks & benefits discussed:  Anesthesia Type:  general  Patient's Preference: same   Intra-op Monitoring Plan: standard ASA monitors  Intra-op Monitoring Plan Comments:   Post Op Pain Control Plan:   Post Op Pain Control Plan Comments:   Induction:   IV  Beta Blocker:  Patient is not currently on a Beta-Blocker (No further documentation required).       Informed Consent: Patient understands risks and agrees with Anesthesia plan.  Questions answered. Anesthesia consent signed with patient.  ASA Score: 2     Day of Surgery Review of History & Physical:    H&P update referred to the provider.         Ready For Surgery From Anesthesia Perspective.

## 2017-07-12 NOTE — PROGRESS NOTES
Notified Dr. VALENTINA Ariza that patient has met all criteria for release from Anesthesia's care.  Dr. VALENTINA Ariza stated that he would release patient from Anesthesia's care.

## 2017-07-12 NOTE — OP NOTE
Allyssa Wright  : 1980   MRN: [id number]  Date: 2017       Psychiatry - ECT  Operative Note             Date of Admission: 2017  7:55 AM    Site: [facility], [address]    Attending: Shoaib Boyce MD  Residents: Bruce Leo DO  Pre-op Diagnosis: MDD, recurrent, in partial remission     ECT Treatment Number: 27  Machine Type: [employer] 5000    Patient Status: medically stable    Vitals (pre-procedure):  Vitals:    17 0812   BP: 120/79   Pulse: 78   Resp: 18   Temp: 98.2 °F (36.8 °C)       Electrode Placement: Bitemporal    Stimulus Number Charge (mC) Level Pulse Width (msec) Frequency  (Hz) Duration of Stimulus (sec) Current (mA) Duration of Seizure (sec)   1 576 3 1 60 6 800 64                                   Complications: HTN    Maximum Blood Pressure: 229/159  Medications Given:  Caffeine 500 mg  PO  Before  Methohexital (Brevital) 170mg  IV During   Succinylcholine (Anectine) 120mg IV During   Ondansetron (Zofran) 4mg IV During  Toradol 30mg IV During   Labetalol 15 mg IV during    Treatment Course:  Patient tolerated procedure well. After adequate recovery from general anesthesia, the patient was transported to recovery.    Post-op Diagnosis: Same as above    Recommended for next ECT:      Bruce Leo DO    2017 7:20 AM

## 2017-07-12 NOTE — ANESTHESIA POSTPROCEDURE EVALUATION
"Anesthesia Post Evaluation    Patient: Allyssa Wright    Procedure(s) Performed: Procedure(s) (LRB):  ELECTROCONVULSIVE THERAPY (ECT) - SINGLE SEIZURE (Bilateral)    Final Anesthesia Type: general  Patient location during evaluation: PACU  Patient participation: Yes- Able to Participate  Level of consciousness: awake and alert  Post-procedure vital signs: reviewed and stable  Pain management: adequate  Airway patency: patent  PONV status at discharge: No PONV  Anesthetic complications: no      Cardiovascular status: stable  Respiratory status: unassisted, spontaneous ventilation and room air  Hydration status: euvolemic  Follow-up not needed.        Visit Vitals  /79 (BP Location: Right arm, Patient Position: Lying, BP Method: cNIBP)   Pulse 74   Temp 36.5 °C (97.7 °F) (Temporal)   Resp 18   Ht 5' 5" (1.651 m)   Wt 69.4 kg (153 lb)   SpO2 97%   Breastfeeding? No   BMI 25.46 kg/m²       Pain/Roma Score: Pain Assessment Performed: Yes (7/12/2017  9:45 AM)  Presence of Pain: denies (7/12/2017  9:45 AM)  Roma Score: 4 (7/12/2017  9:00 AM)      "

## 2017-07-12 NOTE — TRANSFER OF CARE
"Anesthesia Transfer of Care Note    Patient: Allyssa Wright    Procedure(s) Performed: Procedure(s) (LRB):  ELECTROCONVULSIVE THERAPY (ECT) - SINGLE SEIZURE (Bilateral)    Patient location: PACU    Anesthesia Type: general    Transport from OR: Transported from OR on 6-10 L/min O2 by face mask with adequate spontaneous ventilation    Post pain: adequate analgesia    Post vital signs: stable    Level of consciousness: awake, oriented and alert    Nausea/Vomiting: no nausea/vomiting    Complications: none          Last vitals:   Visit Vitals  /79 (BP Location: Right arm, Patient Position: Lying, BP Method: Automatic)   Pulse 78   Temp 36.8 °C (98.2 °F) (Skin)   Resp 18   Ht 5' 5" (1.651 m)   Wt 69.4 kg (153 lb)   SpO2 100%   Breastfeeding? No   BMI 25.46 kg/m²     "

## 2017-07-12 NOTE — ANESTHESIA RELEASE NOTE
"Anesthesia Release from PACU Note    Patient: Allyssa Wright    Procedure(s) Performed: Procedure(s) (LRB):  ELECTROCONVULSIVE THERAPY (ECT) - SINGLE SEIZURE (Bilateral)    Anesthesia type: general    Post pain: Adequate analgesia    Post assessment: no apparent anesthetic complications    Last Vitals:   Visit Vitals  /79 (BP Location: Right arm, Patient Position: Lying, BP Method: cNIBP)   Pulse 74   Temp 36.5 °C (97.7 °F) (Temporal)   Resp 18   Ht 5' 5" (1.651 m)   Wt 69.4 kg (153 lb)   SpO2 97%   Breastfeeding? No   BMI 25.46 kg/m²       Post vital signs: stable    Level of consciousness: awake    Nausea/Vomiting: no nausea/no vomiting    Complications: none    Airway Patency: patent    Respiratory: unassisted    Cardiovascular: stable and blood pressure at baseline    Hydration: euvolemic  "

## 2017-07-12 NOTE — ANESTHESIA PROCEDURE NOTES
ECT    Treatment Number: 27  Procedure end time: 7/12/2017 9:02 AM    Procedure  Stimulus Number 1: Setting Number = I; 576 millicoulombs; Seizure Duration = 64 seconds            Recovery  Baseline Blood Pressure: 118/76  Peak Blood Pressure: 229/154  Time to Recovery of Respirations: 2 minutes    ECT Findings  ECT associated findings of: None and Moderate Hypertension (SBP >160 or DBP >100)

## 2017-07-12 NOTE — DISCHARGE SUMMARY
Allyssa Wright  : 1980   MRN: [id number]  Date: 2017     Psychiatry - ECT  Discharge Summary    Admit Date: 2017  7:55 AM  Discharge Date: 2017    Attending Physician: Shoaib Boyce MD    Discharge Provider: Bruce Leo DO    History of Present Illness: Allyssa Wright is a 37 y.o. female with Major Depressive Disorder, recurrent, in partial remission presented for ECT #27. See H&P dated 2017 for full HPI. For further details, see Dr. Boyce's pre-ECT evaluation.    Hospital Course: The patient tolerated the ECT treatment well without complication. Patient was stable post-procedure. See OP note dated 2017 for more details.     Disposition: Home or Self Care    Medications:  Current Discharge Medication List      CONTINUE these medications which have NOT CHANGED    Details   brimonidine (MIRVASO) 0.33 % Gel Apply topically nightly as needed.      dapsone 7.5 % GlwP Apply topically once daily.      dextroamphetamine-amphetamine (ADDERALL XR) 20 MG 24 hr capsule Take 20 mg by mouth 2 (two) times daily.      famciclovir (FAMVIR) 500 MG tablet Take 1 tablet (500 mg total) by mouth 2 (two) times daily.  Qty: 30 tablet, Refills: 12    Associated Diagnoses: Major depressive disorder, recurrent episode, moderate degree      hydrOXYzine pamoate (VISTARIL) 25 MG Cap Take 1 capsule (25 mg total) by mouth every 8 (eight) hours as needed (anxiety).  Qty: 90 capsule, Refills: 1      naftifine (NAFTIN) 1 % cream Apply topically daily as needed. Apply to feet      quetiapine (SEROQUEL) 50 MG tablet Take 150 mg by mouth 2 (two) times daily. 100 mg nightly and 25 mg in the morning      spironolactone (ALDACTONE) 50 MG tablet 50 mg 2 (two) times daily. 50  mg qAM, 50 mg qHS.      thyroid (ARMOUR THYROID) 30 mg Tab Take 1 tablet (30 mg total) by mouth every morning.  Qty: 30 tablet, Refills: 11    Associated Diagnoses: Major depressive disorder, recurrent episode, moderate degree       trazodone (DESYREL) 100 MG tablet Take 300 mg by mouth every evening.       venlafaxine (EFFEXOR-XR) 75 MG 24 hr capsule Take 1 capsule (75 mg total) by mouth nightly. Take with 150 mg for total daily dose of 225 mg.  Qty: 30 capsule, Refills: 2      ZOVIRAX 5 % Crea Refills: 5           Status at Discharge: Alert and medically stable    Discharge Diagnoses:  Major Depressive Disorder, recurrent, in partial remission    Diet: Resume previous outpatient diet  Activity: Ambulate with assistance - patient is a fall risk  Instructions: Please do not eat or drink anything after midnight prior to procedure. Please do not drive on day of ECT.    Med Changes:  None    Next ECT:  August 9th, 2017 07/12/2017 7:23 AM

## 2017-07-12 NOTE — H&P
"Allyssa Wright  : 1980   MRN: [id number]  Date: 2017     Psychiatry - ECT  History & Physical    Chief complaint: Major Depressive Disorder, recurrent, in partial remission   Procedure: ECT #27    SUBJECTIVE:     HPI:     Ms. Wright is a 37 year old white female with past psychiatric history of MDD, recurrent, severe who returns for ECT. This is her 27th ECT treatment. Her father is with her during interview. The patient states that her mood is "good" this morning, 8/10. She denies any physical complaints. Her appetite has been "too good" recently. The patient states that she has episodes of binge eating and this has gotten a bit worse since starting Remeron. She also feels like she is sleeping "too much". She feels very groggy in the morning.    The patient denies SI/HI/AVH. She states that she has experienced some cognitive difficulty due to the treatments. She has some mild memory loss and word-finding difficulty at times. She does not have severe difficulty with memory.    Psychiatric Review of Systems:   Mood: "ok"  Appetite: no issues  Psychomotor: none  Cognitive Impairment: mild to be expected with ECT  Insomnia: None  Psychosis: absent   Diurnal Variation: absent   Suicidal Ideation: absent      Medical Review Of Systems:   Constitutional: negative for chills, fevers and sweats  Eyes: negative for visual disturbance  Respiratory: negative for cough, sputum and wheezing  Cardiovascular: negative for chest pain, palpitations, and dyspnea  Gastrointestinal: negative for abdominal pain, N/V  Musculoskeletal:negative for arthralgias and back pain    Current Medications:   No current facility-administered medications on file prior to encounter.      Current Outpatient Prescriptions on File Prior to Encounter   Medication Sig Dispense Refill    brimonidine (MIRVASO) 0.33 % Gel Apply topically nightly as needed.      dapsone 7.5 % GlwP Apply topically once daily.      dextroamphetamine-amphetamine " (ADDERALL XR) 20 MG 24 hr capsule Take 20 mg by mouth 2 (two) times daily.      famciclovir (FAMVIR) 500 MG tablet Take 1 tablet (500 mg total) by mouth 2 (two) times daily. 30 tablet 12    hydrOXYzine pamoate (VISTARIL) 25 MG Cap Take 1 capsule (25 mg total) by mouth every 8 (eight) hours as needed (anxiety). (Patient taking differently: Take 50 mg by mouth every 8 (eight) hours as needed (anxiety). ) 90 capsule 1    naftifine (NAFTIN) 1 % cream Apply topically daily as needed. Apply to feet      quetiapine (SEROQUEL) 50 MG tablet Take 150 mg by mouth 2 (two) times daily. 100 mg nightly and 25 mg in the morning      spironolactone (ALDACTONE) 50 MG tablet 50 mg 2 (two) times daily. 50  mg qAM, 50 mg qHS.      thyroid (ARMOUR THYROID) 30 mg Tab Take 1 tablet (30 mg total) by mouth every morning. 30 tablet 11    trazodone (DESYREL) 100 MG tablet Take 300 mg by mouth every evening.       venlafaxine (EFFEXOR-XR) 75 MG 24 hr capsule Take 1 capsule (75 mg total) by mouth nightly. Take with 150 mg for total daily dose of 225 mg. (Patient taking differently: Take 225 mg by mouth nightly. Pt taking 250 mg) 30 capsule 2    ZOVIRAX 5 % Crea   5      Allergies:   Ampicillin; Erythromycin; Levaquin [levofloxacin]; Penicillins; Pristiq [desvenlafaxine]; Sulfa (sulfonamide antibiotics); and Azithromycin    Past Medical/Surgical History:   Past Medical History:   Diagnosis Date    Anxiety     Depression     History of psychiatric hospitalization     HSV-1 (herpes simplex virus 1) infection      of psychiatric care     Moderate depressed bipolar II disorder 06/13/2016    reports no history of bipolar    Obsessive-compulsive disorder     Psychiatric problem     Self-harming behavior     Therapy      Past Surgical History:   Procedure Laterality Date    ANKLE SURGERY Right     BREAST augmentation      OVARIAN CYST REMOVAL Bilateral       OBJECTIVE:     Vitals (pre-procedure):  Vitals:    07/12/17 0812   BP:  "120/79   Pulse: 78   Resp: 18   Temp: 98.2 °F (36.8 °C)        Labs/Imaging/Studies:   No results found for this or any previous visit (from the past 48 hour(s)).   No results found for: PHENYTOIN, PHENOBARB, VALPROATE, CBMZ    Physical Exam:     General: resting in bed in NAD  HEENT: EOMI,  Respiratory: CTAB, unlabored breathing  Cardiovascular: RRR, no murmurs  Abdominal: abdomen soft, non-tender, non-distended, (+) BS  Extremities: no cyanosis or clubbing, no edema  Neurological: no involuntary movements observed, no focal neurological deficits    Mental Status Exam:   Appearance: normal weight, neatly groomed, lying in bed  Behavior: calm, cooperative; friendly  Speech: Conversational rate, tone, and volume  Mood: "good"  Affect: full; appropriate for stated mood  Thought Process: linear   Thought Perceptions: denied AVH  Thought Content: no SI or HI; no delusions appreciated  Sensorium: awake, alert  Cognition: concentration intact to conversation; memory intact conversation (no overt issues with recent or remote memory); fully oriented  Insight: good  Judgment: good    ASSESSMENT/PLAN:     Allyssa Wright is a 37 y.o. female with MDD (major depressive disorder), recurrent, in partial remission who presents for ECT.    Recommendations:   Proceed with ECT #27    Nestor Baxter MD    07/12/2017 8:26 AM  "

## 2017-07-12 NOTE — DISCHARGE INSTRUCTIONS
Home Care Instructions  E.C.T.    ACTIVITY LEVEL: You may feel sleepy for several hours. It is best to rest until you are more awake and then gradually resume your normal activities in one day. It is recommended, because of the possibility of memory loss and slight confusion post ECT, that you rest in the company of a responsible adult. This confusion and memory loss is expected and should diminish over time. It is also recommended that you do not drive or operate any electrical appliances or machinery during the ECT treatment series. Ask your Doctor, at the time of your treatment, any questions you may have about specific activities.    DIET: You may wish to start with liquids after your ECT treatment and gradually resume your normal diet. Do not consume alcoholic beverages during the ECT treatment series.    BATHING: You may shower or bathe as desired.    MEDICATIONS: Resume all home medications starting with the AM doses not taken prior to ECT treatment. If you experience a headache, it is okay to take your usual pain reliever.    WHEN TO CALL THE DOCTOR: Headache, memory loss or confusion that does not begin to resolve within 24 hours.    [facility] on Wednesday, August, 9th 2017 for 6:00 AM.    Remember you may not eat or drink after midnight, but please take heart or high blood pressure medicines with a sip of water in the morning prior to leaving home.    FOR EMERGENCIES: If any unusual problems or difficulties occur, contact the office [phone] Monday-Friday until 3:00 p.m.  After hours, call the hospital  [phone] and ask for the Psychiatry Resident on-call.

## 2017-07-12 NOTE — PLAN OF CARE
Patient and patient's father received discharge instructions.  Patient and patient's father verbalized understanding of all instructions given and all questions were addressed prior to patient's discharge.  Patient's vital signs are stable and within patient's baseline.  Patient tolerated clear liquids PO.  Patient voided without difficulty in post-op.  Patient denies pain.  Patient denies nausea and vomiting at this time.  Patient meets all criteria for discharge at this time.  All required consents present in patient's chart upon patient's discharge.

## 2017-07-13 DIAGNOSIS — F33.9 RECURRENT MAJOR DEPRESSIVE DISORDER, REMISSION STATUS UNSPECIFIED: Primary | ICD-10-CM

## 2017-08-13 ENCOUNTER — ANESTHESIA EVENT (OUTPATIENT)
Dept: ELECTROPHYSIOLOGY | Facility: HOSPITAL | Age: 37
End: 2017-08-13
Payer: COMMERCIAL

## 2017-08-13 NOTE — ANESTHESIA PREPROCEDURE EVALUATION
Pre-operative evaluation for Procedure(s) (LRB):  ELECTROCONVULSIVE THERAPY (ECT) - SINGLE SEIZURE (N/A)       Allyssa Wright is a 37 y.o. female with MDD presenting for ECT #28.    Last ECT Medication doses:     Labetalol 15 mg  Succinylcholine 120mg  Odansetron 4 mg  Ketorolac 30 mg  Methohexital 170 mg    IV Access: Peripheral IV    Last Airway:      Present Prior to Hospital Arrival?: No; Placement Date: 04/20/17; Placement Time: 0713; Inserted by: Anesthesia Resident; Airway Device: Mask; Mask Ventilation: Easy    Patient Active Problem List   Diagnosis    MDD (major depressive disorder), recurrent, in partial remission    Major depressive disorder    Major depressive disorder in partial remission    Major depressive disorder, recurrent, in partial remission    Recurrent major depressive disorder    MDD (major depressive disorder), severe    Major depression, recurrent, chronic       Review of patient's allergies indicates:   Allergen Reactions    Ampicillin      Mom says so    Erythromycin     Levaquin [levofloxacin] Other (See Comments)     Depression side effects    Penicillins      Mom says so    Pristiq [desvenlafaxine]      psycotic      Sulfa (sulfonamide antibiotics)      Rash      Azithromycin Anxiety       No current facility-administered medications on file prior to encounter.      Current Outpatient Prescriptions on File Prior to Encounter   Medication Sig Dispense Refill    brimonidine (MIRVASO) 0.33 % Gel Apply topically nightly as needed.      dapsone 7.5 % GlwP Apply topically once daily.      dextroamphetamine-amphetamine (ADDERALL XR) 20 MG 24 hr capsule Take 20 mg by mouth 2 (two) times daily.      famciclovir (FAMVIR) 500 MG tablet Take 1 tablet (500 mg total) by mouth 2 (two) times daily. 30 tablet 12    hydrOXYzine pamoate (VISTARIL) 25 MG Cap Take 1 capsule (25 mg total) by mouth every 8 (eight) hours as needed (anxiety). (Patient taking differently: Take 50 mg by mouth  every 8 (eight) hours as needed (anxiety). ) 90 capsule 1    naftifine (NAFTIN) 1 % cream Apply topically daily as needed. Apply to feet      quetiapine (SEROQUEL) 50 MG tablet Take 150 mg by mouth 2 (two) times daily. 100 mg nightly and 25 mg in the morning      spironolactone (ALDACTONE) 50 MG tablet 50 mg 2 (two) times daily. 50  mg qAM, 50 mg qHS.      thyroid (ARMOUR THYROID) 30 mg Tab Take 1 tablet (30 mg total) by mouth every morning. 30 tablet 11    trazodone (DESYREL) 100 MG tablet Take 300 mg by mouth every evening.       venlafaxine (EFFEXOR-XR) 75 MG 24 hr capsule Take 1 capsule (75 mg total) by mouth nightly. Take with 150 mg for total daily dose of 225 mg. (Patient taking differently: Take 225 mg by mouth nightly. Pt taking 250 mg) 30 capsule 2    ZOVIRAX 5 % Crea   5       Past Surgical History:   Procedure Laterality Date    ANKLE SURGERY Right     BREAST augmentation      OVARIAN CYST REMOVAL Bilateral        Social History     Social History    Marital status: Single     Spouse name: N/A    Number of children: N/A    Years of education: N/A     Occupational History    unemployed      Social History Main Topics    Smoking status: Former Smoker     Quit date: 4/6/2011    Smokeless tobacco: Not on file    Alcohol use No      Comment: Alcohol misuse in remote past    Drug use: No      Comment: Experimental use in high school    Sexual activity: Not on file     Other Topics Concern    Not on file     Social History Narrative    Pt has 1 older brother from an intact family until the death of her mother in 2012.  She completed 1 year of college, was never in the , and has never been employed.  She was engaged once, but never , has no children, and lives with her father plus 2 dogs.  She denies any hobbies and is spiritual but not Congregation.  She does date and returns to college during rare periods when depression and anxiety [place].         Vital Signs Range (Last  24H):  BP: ()/()   Arterial Line BP: ()/()       CBC: No results for input(s): WBC, RBC, HGB, HCT, PLT, MCV, MCH, MCHC in the last 72 hours.    CMP: No results for input(s): NA, K, CL, CO2, BUN, CREATININE, GLU, MG, PHOS, CALCIUM, ALBUMIN, PROT, ALKPHOS, ALT, AST, BILITOT in the last 72 hours.    INR  No results for input(s): INR, PROTIME, APTT in the last 72 hours.    Invalid input(s): PT        Diagnostic Studies:      EK/8/16    Test Reason : F33.9  Vent. Rate : 084 BPM     Atrial Rate : 084 BPM     P-R Int : 154 ms          QRS Dur : 086 ms      QT Int : 378 ms       P-R-T Axes : 067 066 055 degrees     QTc Int : 446 ms    Normal sinus rhythm  Normal ECG  When compared with ECG of 03-DEC-2015 11:38,  Questionable change in The axis  T wave inversion no longer evident in Inferior leads  Confirmed by Flaco Sr MD ([id number]) on 2016 1:30:13 PM      2D Echo: None on file.       Anesthesia Evaluation    I have reviewed the Patient Summary Reports.    I have reviewed the Nursing Notes.   I have reviewed the Medications.     Review of Systems  Anesthesia Hx:  No problems with previous Anesthesia Denies Hx of Anesthetic complications  History of prior surgery of interest to airway management or planning: Previous anesthesia: General 2016 with general anesthesia.  Denies Family Hx of Anesthesia complications.   Denies Personal Hx of Anesthesia complications.   Social:  Former smoker-- quit    Hematology/Oncology:  Hematology Normal   Oncology Normal     EENT/Dental:EENT/Dental Normal   Cardiovascular:  Cardiovascular Normal     Pulmonary:  Pulmonary Normal    Renal/:  Renal/ Normal     Hepatic/GI:  Hepatic/GI Normal    Musculoskeletal:  Musculoskeletal Normal    Neurological:  Neurology Normal    Endocrine:  Endocrine Normal    Dermatological:  Skin Normal    Psych:   Psychiatric History anxiety depression          Physical Exam  General:  Well nourished    Airway/Jaw/Neck:  Airway Findings: Mouth  Opening: Normal Tongue: Normal  General Airway Assessment: Adult  Mallampati: II  Improves to I with phonation.  TM Distance: Normal, at least 6 cm  Jaw/Neck Findings:  Neck ROM: Normal ROM     Eyes/Ears/Nose:  EYES/EARS/NOSE FINDINGS: Normal   Dental:  Dental Findings: In tact   Chest/Lungs:  Chest/Lungs Findings: Normal Respiratory Rate, Clear to auscultation     Heart/Vascular:  Heart Findings: Rate: Normal  Rhythm: Regular Rhythm  Sounds: Normal  Heart murmur: negative    Abdomen:  Abdomen Findings: Normal    Musculoskeletal:  Musculoskeletal Findings: Normal   Skin:  Skin Findings: Normal    Mental Status:  Mental Status Findings:  Cooperative, Alert and Oriented         Anesthesia Plan  Type of Anesthesia, risks & benefits discussed:  Anesthesia Type:  general  Patient's Preference: same   Intra-op Monitoring Plan: standard ASA monitors  Intra-op Monitoring Plan Comments:   Post Op Pain Control Plan:   Post Op Pain Control Plan Comments:   Induction:   IV  Beta Blocker:  Patient is not currently on a Beta-Blocker (No further documentation required).       Informed Consent: Patient understands risks and agrees with Anesthesia plan.  Questions answered. Anesthesia consent signed with patient.  ASA Score: 3     Day of Surgery Review of History & Physical: I have interviewed and examined the patient. I have reviewed the patient's H&P dated:  There are no significant changes.  H&P update referred to the provider.         Ready For Surgery From Anesthesia Perspective.

## 2017-08-14 ENCOUNTER — ANESTHESIA (OUTPATIENT)
Dept: ELECTROPHYSIOLOGY | Facility: HOSPITAL | Age: 37
End: 2017-08-14
Payer: COMMERCIAL

## 2017-08-14 ENCOUNTER — SURGERY (OUTPATIENT)
Age: 37
End: 2017-08-14

## 2017-08-14 ENCOUNTER — HOSPITAL ENCOUNTER (OUTPATIENT)
Facility: HOSPITAL | Age: 37
Discharge: HOME | End: 2017-08-14
Attending: PSYCHIATRY & NEUROLOGY | Admitting: PSYCHIATRY & NEUROLOGY
Payer: COMMERCIAL

## 2017-08-14 VITALS
SYSTOLIC BLOOD PRESSURE: 108 MMHG | BODY MASS INDEX: 25.33 KG/M2 | WEIGHT: 152 LBS | TEMPERATURE: 98 F | HEIGHT: 65 IN | HEART RATE: 65 BPM | DIASTOLIC BLOOD PRESSURE: 75 MMHG | OXYGEN SATURATION: 100 % | RESPIRATION RATE: 18 BRPM

## 2017-08-14 DIAGNOSIS — F33.41 MDD (MAJOR DEPRESSIVE DISORDER), RECURRENT, IN PARTIAL REMISSION: Primary | ICD-10-CM

## 2017-08-14 DIAGNOSIS — F32.4 MAJOR DEPRESSION IN PARTIAL REMISSION: ICD-10-CM

## 2017-08-14 LAB
B-HCG UR QL: NEGATIVE
CTP QC/QA: YES

## 2017-08-14 PROCEDURE — 37000009 HC ANESTHESIA EA ADD 15 MINS: Performed by: PSYCHIATRY & NEUROLOGY

## 2017-08-14 PROCEDURE — 71000044 HC DOSC ROUTINE RECOVERY FIRST HOUR: Performed by: PSYCHIATRY & NEUROLOGY

## 2017-08-14 PROCEDURE — 63600175 PHARM REV CODE 636 W HCPCS: Performed by: STUDENT IN AN ORGANIZED HEALTH CARE EDUCATION/TRAINING PROGRAM

## 2017-08-14 PROCEDURE — 90870 ELECTROCONVULSIVE THERAPY: CPT | Mod: ,,, | Performed by: PSYCHIATRY & NEUROLOGY

## 2017-08-14 PROCEDURE — 81025 URINE PREGNANCY TEST: CPT | Performed by: ANESTHESIOLOGY

## 2017-08-14 PROCEDURE — 25000003 PHARM REV CODE 250: Performed by: STUDENT IN AN ORGANIZED HEALTH CARE EDUCATION/TRAINING PROGRAM

## 2017-08-14 PROCEDURE — 37000008 HC ANESTHESIA 1ST 15 MINUTES: Performed by: PSYCHIATRY & NEUROLOGY

## 2017-08-14 PROCEDURE — D9220A PRA ANESTHESIA: Mod: ,,, | Performed by: ANESTHESIOLOGY

## 2017-08-14 PROCEDURE — 90870 ELECTROCONVULSIVE THERAPY: CPT | Performed by: ANESTHESIOLOGY

## 2017-08-14 RX ORDER — METHOHEXITAL IN WATER/PF 100MG/10ML
SYRINGE (ML) INTRAVENOUS
Status: DISCONTINUED
Start: 2017-08-14 | End: 2017-08-14 | Stop reason: HOSPADM

## 2017-08-14 RX ORDER — ONDANSETRON 2 MG/ML
INJECTION INTRAMUSCULAR; INTRAVENOUS
Status: DISCONTINUED | OUTPATIENT
Start: 2017-08-14 | End: 2017-08-14

## 2017-08-14 RX ORDER — MIRTAZAPINE 7.5 MG/1
15 TABLET, FILM COATED ORAL NIGHTLY
Status: ON HOLD | COMMUNITY
End: 2018-01-22

## 2017-08-14 RX ORDER — SODIUM CHLORIDE 0.9 % (FLUSH) 0.9 %
3 SYRINGE (ML) INJECTION
Status: DISCONTINUED | OUTPATIENT
Start: 2017-08-14 | End: 2017-08-14 | Stop reason: HOSPADM

## 2017-08-14 RX ORDER — LABETALOL HYDROCHLORIDE 5 MG/ML
INJECTION, SOLUTION INTRAVENOUS
Status: DISCONTINUED
Start: 2017-08-14 | End: 2017-08-14 | Stop reason: HOSPADM

## 2017-08-14 RX ORDER — LABETALOL HYDROCHLORIDE 5 MG/ML
INJECTION, SOLUTION INTRAVENOUS
Status: DISCONTINUED | OUTPATIENT
Start: 2017-08-14 | End: 2017-08-14

## 2017-08-14 RX ORDER — SUCCINYLCHOLINE CHLORIDE 20 MG/ML
INJECTION INTRAMUSCULAR; INTRAVENOUS
Status: DISCONTINUED | OUTPATIENT
Start: 2017-08-14 | End: 2017-08-14

## 2017-08-14 RX ORDER — KETOROLAC TROMETHAMINE 30 MG/ML
INJECTION, SOLUTION INTRAMUSCULAR; INTRAVENOUS
Status: DISCONTINUED | OUTPATIENT
Start: 2017-08-14 | End: 2017-08-14

## 2017-08-14 RX ORDER — ONDANSETRON 2 MG/ML
INJECTION INTRAMUSCULAR; INTRAVENOUS
Status: DISCONTINUED
Start: 2017-08-14 | End: 2017-08-14 | Stop reason: HOSPADM

## 2017-08-14 RX ORDER — SUCCINYLCHOLINE CHLORIDE 20 MG/ML
INJECTION INTRAMUSCULAR; INTRAVENOUS
Status: DISCONTINUED
Start: 2017-08-14 | End: 2017-08-14 | Stop reason: HOSPADM

## 2017-08-14 RX ORDER — LIDOCAINE HYDROCHLORIDE 10 MG/ML
1 INJECTION, SOLUTION EPIDURAL; INFILTRATION; INTRACAUDAL; PERINEURAL ONCE
Status: DISCONTINUED | OUTPATIENT
Start: 2017-08-14 | End: 2017-08-14 | Stop reason: HOSPADM

## 2017-08-14 RX ORDER — KETOROLAC TROMETHAMINE 30 MG/ML
INJECTION, SOLUTION INTRAMUSCULAR; INTRAVENOUS
Status: DISCONTINUED
Start: 2017-08-14 | End: 2017-08-14 | Stop reason: HOSPADM

## 2017-08-14 RX ADMIN — Medication 500 MG: at 06:08

## 2017-08-14 RX ADMIN — KETOROLAC TROMETHAMINE 30 MG: 30 INJECTION, SOLUTION INTRAMUSCULAR; INTRAVENOUS at 07:08

## 2017-08-14 RX ADMIN — ONDANSETRON 4 MG: 2 INJECTION, SOLUTION INTRAMUSCULAR; INTRAVENOUS at 07:08

## 2017-08-14 RX ADMIN — SUCCINYLCHOLINE CHLORIDE 120 MG: 20 INJECTION, SOLUTION INTRAMUSCULAR; INTRAVENOUS at 07:08

## 2017-08-14 RX ADMIN — LABETALOL HYDROCHLORIDE 15 MG: 5 INJECTION INTRAVENOUS at 07:08

## 2017-08-14 RX ADMIN — METHOHEXITAL SODIUM 200 MG: 500 INJECTION, POWDER, LYOPHILIZED, FOR SOLUTION INTRAMUSCULAR; INTRAVENOUS; RECTAL at 07:08

## 2017-08-14 NOTE — DISCHARGE SUMMARY
Allyssa Wright  : 1980   MRN: [id number]  Date: 2017     Psychiatry - ECT  Discharge Summary    Admit Date: 2017  6:04 AM  Discharge Date: 2017    Attending Physician: Shoaib Boyce Jr., MD  Discharge Provider: Christina Baker MD    History of Present Illness: Allyssa Wright is a 37 y.o. female with Major Depressive Disorder, recurrent, in partial remission presented for ECT #28. See H&P dated 2017 for full HPI. For further details, see Dr. Boyce's pre-ECT evaluation.    Hospital Course: The patient tolerated the ECT treatment well without complication. Patient was stable post-procedure. See OP note dated 2017 for more details.     Disposition: Home or Self Care    Medications:  Current Discharge Medication List      CONTINUE these medications which have NOT CHANGED    Details   dapsone 7.5 % GlwP Apply topically once daily.      famciclovir (FAMVIR) 500 MG tablet Take 1 tablet (500 mg total) by mouth 2 (two) times daily.  Qty: 30 tablet, Refills: 12    Associated Diagnoses: Major depressive disorder, recurrent episode, moderate degree      hydrOXYzine pamoate (VISTARIL) 25 MG Cap Take 1 capsule (25 mg total) by mouth every 8 (eight) hours as needed (anxiety).  Qty: 90 capsule, Refills: 1      mirtazapine (REMERON) 7.5 MG Tab Take 7.5 mg by mouth every evening.      quetiapine (SEROQUEL) 50 MG tablet Take 100 mg by mouth 2 (two) times daily. 100 mg nightly and 25 mg in the morning      spironolactone (ALDACTONE) 50 MG tablet 50 mg 2 (two) times daily. 50  mg qAM, 50 mg qHS.      thyroid (ARMOUR THYROID) 30 mg Tab Take 1 tablet (30 mg total) by mouth every morning.  Qty: 30 tablet, Refills: 11    Associated Diagnoses: Major depressive disorder, recurrent episode, moderate degree      trazodone (DESYREL) 100 MG tablet Take 300 mg by mouth every evening.       venlafaxine (EFFEXOR-XR) 75 MG 24 hr capsule Take 1 capsule (75 mg total) by mouth nightly. Take with 150 mg  for total daily dose of 225 mg.  Qty: 30 capsule, Refills: 2      dextroamphetamine-amphetamine (ADDERALL XR) 20 MG 24 hr capsule Take 20 mg by mouth 2 (two) times daily.      naftifine (NAFTIN) 1 % cream Apply topically daily as needed. Apply to feet      ZOVIRAX 5 % Crea Refills: 5           Status at Discharge: Alert and medically stable    Discharge Diagnoses: Major Depressive Disorder, recurrent, in partial remission    Diet: Resume previous outpatient diet    Activity: Ambulate with assistance - patient is a fall risk    Instructions: Please do not eat or drink anything after midnight prior to procedure. Please do not drive on day of ECT.    Med Changes: None    Next ECT:  September 11th, 2017      Christina Baker MD  [facility] Psychiatry, PGY-2  08/14/2017 7:29 AM

## 2017-08-14 NOTE — DISCHARGE INSTRUCTIONS
Home Care Instructions  E.C.T.    ACTIVITY LEVEL: You may feel sleepy for several hours. It is best to rest until you are more awake and then gradually resume your normal activities in one day. It is recommended, because of the possibility of memory loss and slight confusion post ECT, that you rest in the company of a responsible adult. This confusion and memory loss is expected and should diminish over time. It is also recommended that you do not drive or operate any electrical appliances or machinery during the ECT treatment series. Ask your Doctor, at the time of your treatment, any questions you may have about specific activities.    DIET: You may wish to start with liquids after your ECT treatment and gradually resume your normal diet. Do not consume alcoholic beverages during the ECT treatment series.    BATHING: You may shower or bathe as desired.    MEDICATIONS: Resume all home medications starting with the AM doses not taken prior to ECT treatment. If you experience a headache, it is okay to take your usual pain reliever.    WHEN TO CALL THE DOCTOR: Headache, memory loss or confusion that does not begin to resolve within 24 hours.    [facility] on Monday, September 11, 2017 for 6:00 AM.    Remember you may not eat or drink after midnight, but please take heart or high blood pressure medicines with a sip of water in the morning prior to leaving home.    FOR EMERGENCIES: If any unusual problems or difficulties occur, contact the office [phone] Monday-Friday until 3:00 p.m.  After hours, call the hospital  [phone] and ask for the Psychiatry Resident on-call.

## 2017-08-14 NOTE — TRANSFER OF CARE
"Anesthesia Transfer of Care Note    Patient: Allyssa Wright    Procedure(s) Performed: Procedure(s) (LRB):  ELECTROCONVULSIVE THERAPY (ECT) - SINGLE SEIZURE (N/A)    Patient location: [facility]    Anesthesia Type: general    Transport from OR: Transported from OR on 6-10 L/min O2 by face mask with adequate spontaneous ventilation    Post pain: adequate analgesia    Post assessment: no apparent anesthetic complications    Post vital signs: stable    Level of consciousness: responds to stimulation    Nausea/Vomiting: no nausea/vomiting    Complications: none    Transfer of care protocol was followed      Last vitals:   Visit Vitals  /74 (BP Location: Left arm, Patient Position: Lying)   Pulse 79   Temp 36.9 °C (98.5 °F) (Oral)   Resp 16   Ht 5' 5" (1.651 m)   Wt 68.9 kg (152 lb)   SpO2 100%   Breastfeeding? No   BMI 25.29 kg/m²     "

## 2017-08-14 NOTE — ANESTHESIA POSTPROCEDURE EVALUATION
"Anesthesia Post Evaluation    Patient: Allyssa Wright    Procedure(s) Performed: Procedure(s) (LRB):  ELECTROCONVULSIVE THERAPY (ECT) - SINGLE SEIZURE (N/A)    Final Anesthesia Type: general  Patient location during evaluation: [facility]  Patient participation: No - Unable to Participate, Sedation  Level of consciousness: sedated  Post-procedure vital signs: reviewed and stable  Pain management: adequate  Airway patency: patent  PONV status at discharge: No PONV  Anesthetic complications: no      Cardiovascular status: hemodynamically stable and blood pressure returned to baseline  Respiratory status: unassisted, spontaneous ventilation and face mask  Hydration status: euvolemic  Follow-up not needed.        Visit Vitals  /74 (BP Location: Left arm, Patient Position: Lying)   Pulse 79   Temp 36.9 °C (98.5 °F) (Oral)   Resp 16   Ht 5' 5" (1.651 m)   Wt 68.9 kg (152 lb)   SpO2 100%   Breastfeeding? No   BMI 25.29 kg/m²       Pain/Roma Score: Pain Assessment Performed: Yes (8/14/2017  6:15 AM)  Presence of Pain: denies (8/14/2017  6:15 AM)      "

## 2017-08-14 NOTE — PLAN OF CARE
Patient arrived from ECT Room [facility] 27 with TRINITY Winter RN and MARY Kirkpatrick MD.  Patient stable.  Report received at this time.  Assumed care of patient at this time.

## 2017-08-14 NOTE — H&P
"Allyssa Wright  : 1980   MRN: [id number]  Date: 2017     Psychiatry - ECT  History & Physical    Chief complaint: Major Depressive Disorder, recurrent, in partial remission   Procedure: ECT #28    SUBJECTIVE:   HPI:     Ms. Wright is a 37 year old white female with past psychiatric history of MDD, recurrent, severe who returns for ECT. States in the interim since her last treatment a month ago her mood has been "good." Denies depressed mood, feelings of hopelessness, worthlessness and guilt. States on average her mood is about a 6/10. Denies any suicidal thoughts and AVH. States her main problems include anxiety and increased sleep. States her outpatient psychiatrist is planning to switch her from Seroquel to Clozaril due to developing signs of tardive dyskinesia. Patient's father who is at bedside agrees she has been stable at home and has been doing "ok."      Psychiatric Review of Systems:   Mood: "good"  Appetite: increased  Psychomotor: none  Cognitive Impairment: mild to be expected with ECT  Insomnia: None  Psychosis: absent   Diurnal Variation: absent   Suicidal Ideation: absent      Medical Review Of Systems:   Constitutional: negative for chills, fevers and sweats  Eyes: negative for visual disturbance  Respiratory: negative for cough, sputum and wheezing  Cardiovascular: negative for chest pain, palpitations, and dyspnea  Gastrointestinal: negative for abdominal pain, N/V  Musculoskeletal:negative for arthralgias and back pain    Current Medications:   No current facility-administered medications on file prior to encounter.      Current Outpatient Prescriptions on File Prior to Encounter   Medication Sig Dispense Refill    dapsone 7.5 % GlwP Apply topically once daily.      famciclovir (FAMVIR) 500 MG tablet Take 1 tablet (500 mg total) by mouth 2 (two) times daily. 30 tablet 12    hydrOXYzine pamoate (VISTARIL) 25 MG Cap Take 1 capsule (25 mg total) by mouth every 8 (eight) hours as needed " (anxiety). (Patient taking differently: Take 50 mg by mouth every 8 (eight) hours as needed (anxiety). ) 90 capsule 1    quetiapine (SEROQUEL) 50 MG tablet Take 100 mg by mouth 2 (two) times daily. 100 mg nightly and 25 mg in the morning      spironolactone (ALDACTONE) 50 MG tablet 50 mg 2 (two) times daily. 50  mg qAM, 50 mg qHS.      thyroid (ARMOUR THYROID) 30 mg Tab Take 1 tablet (30 mg total) by mouth every morning. 30 tablet 11    trazodone (DESYREL) 100 MG tablet Take 300 mg by mouth every evening.       venlafaxine (EFFEXOR-XR) 75 MG 24 hr capsule Take 1 capsule (75 mg total) by mouth nightly. Take with 150 mg for total daily dose of 225 mg. (Patient taking differently: Take 225 mg by mouth nightly. Pt taking 250 mg) 30 capsule 2    dextroamphetamine-amphetamine (ADDERALL XR) 20 MG 24 hr capsule Take 20 mg by mouth 2 (two) times daily.      naftifine (NAFTIN) 1 % cream Apply topically daily as needed. Apply to feet      ZOVIRAX 5 % Crea   5    [DISCONTINUED] brimonidine (MIRVASO) 0.33 % Gel Apply topically nightly as needed.        Allergies:   Ampicillin; Erythromycin; Levaquin [levofloxacin]; Penicillins; Pristiq [desvenlafaxine]; Sulfa (sulfonamide antibiotics); and Azithromycin    Past Medical/Surgical History:   Past Medical History:   Diagnosis Date    Anxiety     Depression     History of psychiatric hospitalization     HSV-1 (herpes simplex virus 1) infection     Hx of psychiatric care     Moderate depressed bipolar II disorder 06/13/2016    reports no history of bipolar    Obsessive-compulsive disorder     Psychiatric problem     Self-harming behavior     Therapy      Past Surgical History:   Procedure Laterality Date    ANKLE SURGERY Right     BREAST augmentation      OVARIAN CYST REMOVAL Bilateral       OBJECTIVE:     Vitals (pre-procedure):  Vitals:    08/14/17 0630   BP: 115/74   Pulse: 79   Resp: 16   Temp: 98.5 °F (36.9 °C)        Labs/Imaging/Studies:   Recent Results  "(from the past 48 hour(s))   POCT urine pregnancy    Collection Time: 08/14/17  6:53 AM   Result Value Ref Range    POC Preg Test, Ur Negative Negative     Acceptable Yes       No results found for: PHENYTOIN, PHENOBARB, VALPROATE, CBMZ    Physical Exam:     General: resting in bed in NAD  HEENT: EOMI  Respiratory: CTAB, unlabored breathing  Cardiovascular: RRR, no murmurs  Abdominal: abdomen soft, non-tender, non-distended, (+) BS  Extremities: no cyanosis or clubbing, no edema  Neurological: no involuntary movements observed, no focal neurological deficits    Mental Status Exam:   Appearance: normal weight, neatly groomed, lying in bed  Behavior: calm, cooperative; friendly  Speech: Conversational rate, tone, and volume  Mood: "good"  Affect: full; appropriate for stated mood  Thought Process: linear   Thought Perceptions: denied AVH  Thought Content: no SI or HI; no delusions appreciated  Sensorium: awake, alert  Cognition: concentration intact to conversation; memory intact conversation (no overt issues with recent or remote memory); fully oriented  Insight: good  Judgment: good    ASSESSMENT/PLAN:     Allyssa Wright is a 37 y.o. female with MDD (major depressive disorder), recurrent, in partial remission who presents for ECT.    Recommendations:   Proceed with ECT #28    Carmelo Núñez III, MD    08/14/2017   "

## 2017-08-14 NOTE — ANESTHESIA PROCEDURE NOTES
ECT    Treatment Number: 28  Procedure start time: 8/14/2017 7:18 AM  Timeout performed at: 8/14/2017 7:13 AM  Procedure end time: 8/14/2017 7:19 AM    Staffing  Anesthesiologist: ANIYA MONET  CRNA/Resident: LALITHA TUCKER  Performed by: anesthesiologist and resident/CRNA     Preanesthetic Checklist  The following were completed as part of the preanesthetic checklist: patient identified, procedural consent, pre-op evaluation, risks and benefits discussed, monitors and equipment checked, anesthesia consent given, oxygen available, suction available, hand hygiene performed and patient being monitored.    Setup & Induction  Patient Monitoring: heart rate, cardiac monitor, continuous pulse ox, continuous capnometry, NIBP and gas analyzer  Patient preparation: bite block inserted, extremities padded, mandibular stabilization and patient hyperventilated  Electrode Placement: Bitemporal    The patient was moved to the ECT therapy room after being assessed and consented for ECT. After standard ASA monitors were applied and timeout performed, the patient was adequately preoxygenated. After induction of general anesthesia, adequate oxygenation and ventilation were confirmed with pulse oxymetry and end tidal CO2 monitoring via bag-mask ventilation. End tidal CO2 was monitored throughout the case and moderate hyperventilation was performed prior to beginning ECT treatment. All extremities were padded, biteblock was inserted, and mandibular stabilization was done prior to initiating ECT therapy.    Procedure  Stimulus Number 1: Setting Number = III; 576 millicoulombs; Seizure Duration = 39 seconds            Recovery  After adequate recovery from general anesthesia, the patient was transported to recovery.  Baseline Blood Pressure: 120/80  Peak Blood Pressure: 143/102  Time to Recovery of Respirations: 3 minutes    ECT Findings  ECT associated findings of: None

## 2017-08-14 NOTE — OP NOTE
Allyssa Wright  : 1980   MRN: [id number]  Date: 2017       Psychiatry - ECT  Operative Note             Date of Admission: 2017  6:04 AM    Site: [facility], [address]    Attending: Shoaib Boyce Jr., MD  Residents: Christina Baker MD  Pre-op Diagnosis: MDD, recurrent, in partial remission     ECT Treatment Number: 28  Machine Type: [employer] 5000    Patient Status: medically stable    Vitals (pre-procedure):  Vitals:    17 0630   BP: 115/74   Pulse: 79   Resp: 16   Temp: 98.5 °F (36.9 °C)       Electrode Placement: Bitemporal    Stimulus Number Charge (mC) Level Pulse Width (msec) Frequency  (Hz) Duration of Stimulus (sec) Current (mA) Duration of Seizure (sec)   1 576 3 1 60 6 800 39                                   Complications: HTN    Maximum Blood Pressure: 143/102    Medications Given:  Caffeine 500 mg PO Before  Methohexital (Brevital) 200 mg IV During   Succinylcholine (Anectine) 120 mg IV During   Ondansetron (Zofran) 4 mg IV During  Toradol 30 mg IV During   Labetalol 15 mg IV during    Treatment Course: Patient tolerated procedure well. After adequate recovery from general anesthesia, the patient was transported to recovery.    Post-op Diagnosis: Same as above    Recommended for next ECT: 2017      Christina Baker MD  [facility] Psychiatry, PGY-2  2017 7:25 AM

## 2017-08-15 DIAGNOSIS — F33.9 RECURRENT MAJOR DEPRESSIVE DISORDER, REMISSION STATUS UNSPECIFIED: Primary | ICD-10-CM

## 2017-09-06 ENCOUNTER — TELEPHONE (OUTPATIENT)
Dept: PSYCHIATRY | Facility: CLINIC | Age: 37
End: 2017-09-06

## 2017-09-06 NOTE — TELEPHONE ENCOUNTER
Received a call from patient requesting to push back ECT procedure date from Monday 9/11 to Friday 9/15. Scheduling notified of changes to be made.

## 2017-09-13 ENCOUNTER — ANESTHESIA EVENT (OUTPATIENT)
Dept: ELECTROPHYSIOLOGY | Facility: HOSPITAL | Age: 37
End: 2017-09-13
Payer: COMMERCIAL

## 2017-09-13 NOTE — ANESTHESIA PREPROCEDURE EVALUATION
Pre-operative evaluation for Procedure(s) (LRB):  ELECTROCONVULSIVE THERAPY (ECT) - SINGLE SEIZURE (N/A)    Allyssa Wright is a 37 y.o. female PMH MDD who presents for ECT #29.    Last ECT Medication doses:      Labetalol 15 mg  Succinylcholine 120mg  Odansetron 4 mg  Ketorolac 30 mg  Methohexital 200 mg    Baseline Blood Pressure: 120/80  Peak Blood Pressure: 143/102    LDA: none     Prev airway: none on file    Drips: none    Patient Active Problem List   Diagnosis    MDD (major depressive disorder), recurrent, in partial remission    Major depressive disorder    Major depressive disorder in partial remission    Major depressive disorder, recurrent, in partial remission    Recurrent major depressive disorder    MDD (major depressive disorder), severe    Major depression, recurrent, chronic    Major depression in partial remission       Review of patient's allergies indicates:   Allergen Reactions    Ampicillin      Mom says so    Erythromycin     Levaquin [levofloxacin] Other (See Comments)     Depression side effects    Penicillins      Mom says so    Pristiq [desvenlafaxine]      psycotic      Sulfa (sulfonamide antibiotics)      Rash      Azithromycin Anxiety        No current facility-administered medications on file prior to encounter.      Current Outpatient Prescriptions on File Prior to Encounter   Medication Sig Dispense Refill    dapsone 7.5 % GlwP Apply topically once daily.      dextroamphetamine-amphetamine (ADDERALL XR) 20 MG 24 hr capsule Take 20 mg by mouth 2 (two) times daily.      famciclovir (FAMVIR) 500 MG tablet Take 1 tablet (500 mg total) by mouth 2 (two) times daily. 30 tablet 12    hydrOXYzine pamoate (VISTARIL) 25 MG Cap Take 1 capsule (25 mg total) by mouth every 8 (eight) hours as needed (anxiety). (Patient taking differently: Take 50 mg by mouth every 8 (eight) hours as needed (anxiety). ) 90 capsule 1    mirtazapine (REMERON) 7.5 MG Tab Take 7.5 mg by mouth every  evening.      naftifine (NAFTIN) 1 % cream Apply topically daily as needed. Apply to feet      quetiapine (SEROQUEL) 50 MG tablet Take 100 mg by mouth 2 (two) times daily. 100 mg nightly and 25 mg in the morning      spironolactone (ALDACTONE) 50 MG tablet 50 mg 2 (two) times daily. 50  mg qAM, 50 mg qHS.      thyroid (ARMOUR THYROID) 30 mg Tab Take 1 tablet (30 mg total) by mouth every morning. 30 tablet 11    trazodone (DESYREL) 100 MG tablet Take 300 mg by mouth every evening.       venlafaxine (EFFEXOR-XR) 75 MG 24 hr capsule Take 1 capsule (75 mg total) by mouth nightly. Take with 150 mg for total daily dose of 225 mg. (Patient taking differently: Take 225 mg by mouth nightly. Pt taking 250 mg) 30 capsule 2    ZOVIRAX 5 % Crea   5       Past Surgical History:   Procedure Laterality Date    ANKLE SURGERY Right     BREAST augmentation      OVARIAN CYST REMOVAL Bilateral        Social History     Social History    Marital status: Single     Spouse name: N/A    Number of children: N/A    Years of education: N/A     Occupational History    unemployed      Social History Main Topics    Smoking status: Former Smoker     Quit date: 4/6/2011    Smokeless tobacco: Not on file    Alcohol use No      Comment: Alcohol misuse in remote past    Drug use: No      Comment: Experimental use in high school    Sexual activity: Not on file     Other Topics Concern    Not on file     Social History Narrative    Pt has 1 older brother from an intact family until the death of her mother in 2012.  She completed 1 year of college, was never in the , and has never been employed.  She was engaged once, but never , has no children, and lives with her father plus 2 dogs.  She denies any hobbies and is spiritual but not Sabianist.  She does date and returns to college during rare periods when depression and anxiety [place].         Vital Signs Range (Last 24H):  BP: ()/()   Arterial Line BP: ()/()       CBC:  No results for input(s): WBC, RBC, HGB, HCT, PLT, MCV, MCH, MCHC in the last 72 hours.    CMP: No results for input(s): NA, K, CL, CO2, BUN, CREATININE, GLU, MG, PHOS, CALCIUM, ALBUMIN, PROT, ALKPHOS, ALT, AST, BILITOT in the last 72 hours.    INR  No results for input(s): INR, PROTIME, APTT in the last 72 hours.    Invalid input(s): PT        Diagnostic Studies: none      EK16  Normal sinus rhythm  Normal ECG    2D Echo: none          Anesthesia Evaluation    I have reviewed the Patient Summary Reports.    I have reviewed the Nursing Notes.   I have reviewed the Medications.     Review of Systems  Anesthesia Hx:  No problems with previous Anesthesia  History of prior surgery of interest to airway management or planning: Previous anesthesia: General 17 ECT with general anesthesia.  Procedure performed at an [facility]. Denies Family Hx of Anesthesia complications.   Denies Personal Hx of Anesthesia complications.   Social:  Former Smoker    Hematology/Oncology:  Hematology Normal   Oncology Normal     EENT/Dental:EENT/Dental Normal   Cardiovascular:   Denies Hypertension.  Denies MI.   Denies Dysrhythmias.   Denies Angina.    Pulmonary:  Pulmonary Normal    Renal/:  Renal/ Normal     Hepatic/GI:  Hepatic/GI Normal    Musculoskeletal:  Musculoskeletal Normal    Neurological:  Neurology Normal    Endocrine:   Hypothyroidism    Psych:   Psychiatric History          Physical Exam  General:  Well nourished       Chest/Lungs:  Chest/Lungs Clear    Heart/Vascular:  Heart Findings: Normal Heart murmur: negative    Abdomen:  Abdomen Findings: Normal    Musculoskeletal:  Musculoskeletal Findings: Normal   Skin:  Skin Findings: Normal    Mental Status:  Mental Status Findings:  Cooperative, Alert and Oriented         Anesthesia Plan  Type of Anesthesia, risks & benefits discussed:  Anesthesia Type:  general  Patient's Preference:   Intra-op Monitoring Plan: standard ASA monitors  Intra-op Monitoring Plan  Comments:   Post Op Pain Control Plan: IV/PO Opioids PRN  Post Op Pain Control Plan Comments:   Induction:   IV  Beta Blocker:  Patient is not currently on a Beta-Blocker (No further documentation required).       Informed Consent:    ASA Score: 3     Day of Surgery Review of History & Physical:            Ready For Surgery From Anesthesia Perspective.

## 2017-09-15 ENCOUNTER — ANESTHESIA (OUTPATIENT)
Dept: ELECTROPHYSIOLOGY | Facility: HOSPITAL | Age: 37
End: 2017-09-15
Payer: COMMERCIAL

## 2017-09-18 DIAGNOSIS — F33.41 MAJOR DEPRESSIVE DISORDER, RECURRENT EPISODE, IN PARTIAL REMISSION: Primary | ICD-10-CM

## 2017-09-19 ENCOUNTER — ANESTHESIA EVENT (OUTPATIENT)
Dept: ELECTROPHYSIOLOGY | Facility: HOSPITAL | Age: 37
End: 2017-09-19
Payer: COMMERCIAL

## 2017-09-19 NOTE — ANESTHESIA PREPROCEDURE EVALUATION
Pre-operative evaluation for Procedure(s) (LRB):  ELECTROCONVULSIVE THERAPY (ECT) - SINGLE SEIZURE (N/A)    Allyssa Wright is a 37 y.o. female PMH MDD who presents for ECT #29.    Last ECT Medication doses:   Methohexital 200 mg  Succinylcholine 120mg  Labetalol 15 mg  Odansetron 4 mg  Ketorolac 30 mg    Baseline Blood Pressure: 120/80  Peak Blood Pressure: 143/102    LDA: none     Prev airway: none on file    Drips: none    Patient Active Problem List   Diagnosis    MDD (major depressive disorder), recurrent, in partial remission    Major depressive disorder    Major depressive disorder in partial remission    Major depressive disorder, recurrent, in partial remission    Recurrent major depressive disorder    MDD (major depressive disorder), severe    Major depression, recurrent, chronic    Major depression in partial remission       Review of patient's allergies indicates:   Allergen Reactions    Ampicillin      Mom says so    Erythromycin     Levaquin [levofloxacin] Other (See Comments)     Depression side effects    Penicillins      Mom says so    Pristiq [desvenlafaxine]      psycotic      Sulfa (sulfonamide antibiotics)      Rash      Azithromycin Anxiety        Current Outpatient Prescriptions on File Prior to Visit   Medication Sig Dispense Refill    dapsone 7.5 % GlwP Apply topically once daily.      dextroamphetamine-amphetamine (ADDERALL XR) 20 MG 24 hr capsule Take 20 mg by mouth 2 (two) times daily.      famciclovir (FAMVIR) 500 MG tablet Take 1 tablet (500 mg total) by mouth 2 (two) times daily. 30 tablet 12    hydrOXYzine pamoate (VISTARIL) 25 MG Cap Take 1 capsule (25 mg total) by mouth every 8 (eight) hours as needed (anxiety). (Patient taking differently: Take 50 mg by mouth every 8 (eight) hours as needed (anxiety). ) 90 capsule 1    mirtazapine (REMERON) 7.5 MG Tab Take 7.5 mg by mouth every evening.      naftifine (NAFTIN) 1 % cream Apply topically daily as needed. Apply to  feet      quetiapine (SEROQUEL) 50 MG tablet Take 100 mg by mouth 2 (two) times daily. 100 mg nightly and 25 mg in the morning      spironolactone (ALDACTONE) 50 MG tablet 50 mg 2 (two) times daily. 50  mg qAM, 50 mg qHS.      thyroid (ARMOUR THYROID) 30 mg Tab Take 1 tablet (30 mg total) by mouth every morning. 30 tablet 11    trazodone (DESYREL) 100 MG tablet Take 300 mg by mouth every evening.       venlafaxine (EFFEXOR-XR) 75 MG 24 hr capsule Take 1 capsule (75 mg total) by mouth nightly. Take with 150 mg for total daily dose of 225 mg. (Patient taking differently: Take 225 mg by mouth nightly. Pt taking 250 mg) 30 capsule 2    ZOVIRAX 5 % Crea   5     No current facility-administered medications on file prior to visit.        Past Surgical History:   Procedure Laterality Date    ANKLE SURGERY Right     BREAST augmentation      OVARIAN CYST REMOVAL Bilateral        Social History     Social History    Marital status: Single     Spouse name: N/A    Number of children: N/A    Years of education: N/A     Occupational History    unemployed      Social History Main Topics    Smoking status: Former Smoker     Quit date: 4/6/2011    Smokeless tobacco: Not on file    Alcohol use No      Comment: Alcohol misuse in remote past    Drug use: No      Comment: Experimental use in high school    Sexual activity: Not on file     Other Topics Concern    Not on file     Social History Narrative    Pt has 1 older brother from an intact family until the death of her mother in 2012.  She completed 1 year of college, was never in the , and has never been employed.  She was engaged once, but never , has no children, and lives with her father plus 2 dogs.  She denies any hobbies and is spiritual but not Oriental orthodox.  She does date and returns to college during rare periods when depression and anxiety [place].         Vital Signs Range (Last 24H):  BP: ()/()   Arterial Line BP: ()/()       CBC: No results  for input(s): WBC, RBC, HGB, HCT, PLT, MCV, MCH, MCHC in the last 72 hours.    CMP: No results for input(s): NA, K, CL, CO2, BUN, CREATININE, GLU, MG, PHOS, CALCIUM, ALBUMIN, PROT, ALKPHOS, ALT, AST, BILITOT in the last 72 hours.    INR  No results for input(s): INR, PROTIME, APTT in the last 72 hours.    Invalid input(s): PT        Diagnostic Studies: none      EK16  Normal sinus rhythm  Normal ECG    2D Echo: none          Anesthesia Evaluation    I have reviewed the Patient Summary Reports.    I have reviewed the Nursing Notes.   I have reviewed the Medications.     Review of Systems  Anesthesia Hx:  No problems with previous Anesthesia  History of prior surgery of interest to airway management or planning: Previous anesthesia: General 17 ECT with general anesthesia.  Procedure performed at an [facility]. Denies Family Hx of Anesthesia complications.   Denies Personal Hx of Anesthesia complications.   Social:  Former Smoker    Hematology/Oncology:  Hematology Normal   Oncology Normal     EENT/Dental:EENT/Dental Normal   Cardiovascular:   Denies Hypertension.  Denies MI.   Denies Dysrhythmias.   Denies Angina.    Pulmonary:  Pulmonary Normal    Renal/:  Renal/ Normal     Hepatic/GI:  Hepatic/GI Normal    Musculoskeletal:  Musculoskeletal Normal    Neurological:  Neurology Normal    Endocrine:   Hypothyroidism    Psych:   Psychiatric History          Physical Exam  General:  Well nourished       Chest/Lungs:  Chest/Lungs Clear    Heart/Vascular:  Heart Findings: Normal Heart murmur: negative    Abdomen:  Abdomen Findings: Normal    Musculoskeletal:  Musculoskeletal Findings: Normal   Skin:  Skin Findings: Normal    Mental Status:  Mental Status Findings:  Cooperative, Alert and Oriented         Anesthesia Plan  Type of Anesthesia, risks & benefits discussed:  Anesthesia Type:  general  Patient's Preference:   Intra-op Monitoring Plan: standard ASA monitors  Intra-op Monitoring Plan Comments:    Post Op Pain Control Plan: IV/PO Opioids PRN  Post Op Pain Control Plan Comments:   Induction:   IV  Beta Blocker:  Patient is not currently on a Beta-Blocker (No further documentation required).       Informed Consent: Patient understands risks and agrees with Anesthesia plan.  Questions answered. Anesthesia consent signed with patient.  ASA Score: 2     Day of Surgery Review of History & Physical: I have interviewed and examined the patient. I have reviewed the patient's H&P dated:  There are no significant changes.  H&P update referred to the provider.         Ready For Surgery From Anesthesia Perspective.

## 2017-09-20 ENCOUNTER — SURGERY (OUTPATIENT)
Age: 37
End: 2017-09-20

## 2017-09-20 ENCOUNTER — ANESTHESIA (OUTPATIENT)
Dept: ELECTROPHYSIOLOGY | Facility: HOSPITAL | Age: 37
End: 2017-09-20
Payer: COMMERCIAL

## 2017-09-20 ENCOUNTER — HOSPITAL ENCOUNTER (OUTPATIENT)
Facility: HOSPITAL | Age: 37
Discharge: HOME | End: 2017-09-20
Attending: PSYCHIATRY & NEUROLOGY | Admitting: PSYCHIATRY & NEUROLOGY
Payer: COMMERCIAL

## 2017-09-20 VITALS
WEIGHT: 155 LBS | RESPIRATION RATE: 16 BRPM | HEIGHT: 65 IN | DIASTOLIC BLOOD PRESSURE: 83 MMHG | HEART RATE: 76 BPM | BODY MASS INDEX: 25.83 KG/M2 | TEMPERATURE: 98 F | OXYGEN SATURATION: 100 % | SYSTOLIC BLOOD PRESSURE: 110 MMHG

## 2017-09-20 DIAGNOSIS — F33.9 MAJOR DEPRESSIVE DISORDER, RECURRENT: ICD-10-CM

## 2017-09-20 DIAGNOSIS — F33.9 RECURRENT MAJOR DEPRESSIVE DISORDER, REMISSION STATUS UNSPECIFIED: Primary | ICD-10-CM

## 2017-09-20 LAB
B-HCG UR QL: NEGATIVE
CTP QC/QA: YES

## 2017-09-20 PROCEDURE — 63600175 PHARM REV CODE 636 W HCPCS: Performed by: STUDENT IN AN ORGANIZED HEALTH CARE EDUCATION/TRAINING PROGRAM

## 2017-09-20 PROCEDURE — 25000003 PHARM REV CODE 250: Performed by: STUDENT IN AN ORGANIZED HEALTH CARE EDUCATION/TRAINING PROGRAM

## 2017-09-20 PROCEDURE — D9220A PRA ANESTHESIA: Mod: ,,, | Performed by: ANESTHESIOLOGY

## 2017-09-20 PROCEDURE — 37000009 HC ANESTHESIA EA ADD 15 MINS: Performed by: PSYCHIATRY & NEUROLOGY

## 2017-09-20 PROCEDURE — 90870 ELECTROCONVULSIVE THERAPY: CPT | Performed by: STUDENT IN AN ORGANIZED HEALTH CARE EDUCATION/TRAINING PROGRAM

## 2017-09-20 PROCEDURE — 37000008 HC ANESTHESIA 1ST 15 MINUTES: Performed by: PSYCHIATRY & NEUROLOGY

## 2017-09-20 PROCEDURE — 71000044 HC DOSC ROUTINE RECOVERY FIRST HOUR: Performed by: PSYCHIATRY & NEUROLOGY

## 2017-09-20 PROCEDURE — 25000003 PHARM REV CODE 250: Performed by: PSYCHIATRY & NEUROLOGY

## 2017-09-20 RX ORDER — SODIUM CHLORIDE 9 MG/ML
INJECTION, SOLUTION INTRAVENOUS CONTINUOUS
Status: DISCONTINUED | OUTPATIENT
Start: 2017-09-20 | End: 2017-09-20 | Stop reason: HOSPADM

## 2017-09-20 RX ORDER — LABETALOL HYDROCHLORIDE 5 MG/ML
INJECTION, SOLUTION INTRAVENOUS
Status: DISCONTINUED | OUTPATIENT
Start: 2017-09-20 | End: 2017-09-20

## 2017-09-20 RX ORDER — LIDOCAINE HYDROCHLORIDE 10 MG/ML
1 INJECTION, SOLUTION EPIDURAL; INFILTRATION; INTRACAUDAL; PERINEURAL ONCE
Status: DISCONTINUED | OUTPATIENT
Start: 2017-09-20 | End: 2017-09-20 | Stop reason: HOSPADM

## 2017-09-20 RX ORDER — CLOZAPINE 50 MG/1
50 TABLET ORAL DAILY
COMMUNITY

## 2017-09-20 RX ORDER — SODIUM CHLORIDE 0.9 % (FLUSH) 0.9 %
3 SYRINGE (ML) INJECTION
Status: DISCONTINUED | OUTPATIENT
Start: 2017-09-20 | End: 2017-09-20 | Stop reason: HOSPADM

## 2017-09-20 RX ORDER — SUCCINYLCHOLINE CHLORIDE 20 MG/ML
INJECTION INTRAMUSCULAR; INTRAVENOUS
Status: DISCONTINUED | OUTPATIENT
Start: 2017-09-20 | End: 2017-09-20

## 2017-09-20 RX ORDER — SUCCINYLCHOLINE CHLORIDE 20 MG/ML
INJECTION INTRAMUSCULAR; INTRAVENOUS
Status: DISCONTINUED
Start: 2017-09-20 | End: 2017-09-20 | Stop reason: HOSPADM

## 2017-09-20 RX ORDER — LIDOCAINE HYDROCHLORIDE 10 MG/ML
1 INJECTION, SOLUTION EPIDURAL; INFILTRATION; INTRACAUDAL; PERINEURAL ONCE
Status: COMPLETED | OUTPATIENT
Start: 2017-09-20 | End: 2017-09-20

## 2017-09-20 RX ORDER — KETOROLAC TROMETHAMINE 30 MG/ML
INJECTION, SOLUTION INTRAMUSCULAR; INTRAVENOUS
Status: DISCONTINUED | OUTPATIENT
Start: 2017-09-20 | End: 2017-09-20

## 2017-09-20 RX ORDER — KETOROLAC TROMETHAMINE 30 MG/ML
INJECTION, SOLUTION INTRAMUSCULAR; INTRAVENOUS
Status: DISCONTINUED
Start: 2017-09-20 | End: 2017-09-20 | Stop reason: HOSPADM

## 2017-09-20 RX ORDER — ONDANSETRON 2 MG/ML
INJECTION INTRAMUSCULAR; INTRAVENOUS
Status: DISCONTINUED | OUTPATIENT
Start: 2017-09-20 | End: 2017-09-20

## 2017-09-20 RX ORDER — ONDANSETRON 2 MG/ML
INJECTION INTRAMUSCULAR; INTRAVENOUS
Status: DISCONTINUED
Start: 2017-09-20 | End: 2017-09-20 | Stop reason: HOSPADM

## 2017-09-20 RX ADMIN — ONDANSETRON 4 MG: 2 INJECTION, SOLUTION INTRAMUSCULAR; INTRAVENOUS at 09:09

## 2017-09-20 RX ADMIN — Medication 500 MG: at 08:09

## 2017-09-20 RX ADMIN — LABETALOL HYDROCHLORIDE 5 MG: 5 INJECTION INTRAVENOUS at 09:09

## 2017-09-20 RX ADMIN — KETOROLAC TROMETHAMINE 30 MG: 30 INJECTION, SOLUTION INTRAMUSCULAR; INTRAVENOUS at 09:09

## 2017-09-20 RX ADMIN — SUCCINYLCHOLINE CHLORIDE 120 MG: 20 INJECTION, SOLUTION INTRAMUSCULAR; INTRAVENOUS at 08:09

## 2017-09-20 RX ADMIN — METHOHEXITAL SODIUM 200 MG: 500 INJECTION, POWDER, LYOPHILIZED, FOR SOLUTION INTRAMUSCULAR; INTRAVENOUS; RECTAL at 08:09

## 2017-09-20 RX ADMIN — LABETALOL HYDROCHLORIDE 15 MG: 5 INJECTION INTRAVENOUS at 08:09

## 2017-09-20 RX ADMIN — LIDOCAINE HYDROCHLORIDE 10 MG: 10 INJECTION, SOLUTION EPIDURAL; INFILTRATION; INTRACAUDAL; PERINEURAL at 07:09

## 2017-09-20 NOTE — H&P
"Allyssa Wright  : 1980   MRN: [id number]  Date: 2017     Psychiatry - ECT  History & Physical    Chief complaint: Major Depressive Disorder, recurrent, in partial remission   Procedure: ECT #29    SUBJECTIVE:   HPI:     Ms. Wright is a 37 year old white female with past psychiatric history of MDD, recurrent, severe who returns for ECT. States that she was tapered off Seroquel to Clozaril. When she was on both medications, the patient said she had a couple of great weeks, but as the Seroquel got out of her system, she had a bad first week of September. Since then, she has stabilized out and says her mood is "6/10." She endorses too much sleep and too much eating, saying that she has a binge-eating disorder. Denies depressed mood, feelings of hopelessness, worthlessness and guilt. Denies SI/HI/AH/VH. Patient says that her memory is mildly impaired, but she thinks that the positive impact of ECT is worth the memory loss.    Psychiatric Review of Systems:   Mood: "6/10"  Appetite: increased  Psychomotor: none  Cognitive Impairment: mild to be expected with ECT  Insomnia: too much sleep  Psychosis: absent   Diurnal Variation: absent   Suicidal Ideation: absent      Medical Review Of Systems:   Constitutional: negative for chills, fevers and sweats  Eyes: negative for visual disturbance  Respiratory: negative for cough, sputum and wheezing  Cardiovascular: negative for chest pain, palpitations, and dyspnea  Gastrointestinal: negative for abdominal pain, N/V  Neurovascular: negative for headache, dizziness, weakness    Current Medications:   No current facility-administered medications on file prior to encounter.      Current Outpatient Prescriptions on File Prior to Encounter   Medication Sig Dispense Refill    dapsone 7.5 % GlwP Apply topically once daily.      famciclovir (FAMVIR) 500 MG tablet Take 1 tablet (500 mg total) by mouth 2 (two) times daily. 30 tablet 12    mirtazapine (REMERON) 7.5 MG Tab Take " 7.5 mg by mouth every evening.      naftifine (NAFTIN) 1 % cream Apply topically daily as needed. Apply to feet      spironolactone (ALDACTONE) 50 MG tablet 50 mg 2 (two) times daily. 50  mg qAM, 50 mg qHS.      thyroid (ARMOUR THYROID) 30 mg Tab Take 1 tablet (30 mg total) by mouth every morning. 30 tablet 11    trazodone (DESYREL) 100 MG tablet Take 300 mg by mouth every evening.       venlafaxine (EFFEXOR-XR) 75 MG 24 hr capsule Take 1 capsule (75 mg total) by mouth nightly. Take with 150 mg for total daily dose of 225 mg. (Patient taking differently: Take 225 mg by mouth nightly. Pt taking 250 mg) 30 capsule 2    dextroamphetamine-amphetamine (ADDERALL XR) 20 MG 24 hr capsule Take 20 mg by mouth 2 (two) times daily.      hydrOXYzine pamoate (VISTARIL) 25 MG Cap Take 1 capsule (25 mg total) by mouth every 8 (eight) hours as needed (anxiety). (Patient taking differently: Take 50 mg by mouth every 8 (eight) hours as needed (anxiety). ) 90 capsule 1    quetiapine (SEROQUEL) 50 MG tablet Take 100 mg by mouth 2 (two) times daily. 100 mg nightly and 25 mg in the morning      ZOVIRAX 5 % Crea   5      Allergies:   Ampicillin; Erythromycin; Levaquin [levofloxacin]; Penicillins; Pristiq [desvenlafaxine]; Sulfa (sulfonamide antibiotics); and Azithromycin    Past Medical/Surgical History:   Past Medical History:   Diagnosis Date    Anxiety     Depression     History of psychiatric hospitalization     HSV-1 (herpes simplex virus 1) infection     Hx of psychiatric care     Moderate depressed bipolar II disorder 06/13/2016    reports no history of bipolar    Obsessive-compulsive disorder     Psychiatric problem     Self-harming behavior     Therapy      Past Surgical History:   Procedure Laterality Date    ANKLE SURGERY Right     BREAST augmentation      OVARIAN CYST REMOVAL Bilateral       OBJECTIVE:     Vitals (pre-procedure):  Vitals:    09/20/17 0727   BP: 116/68   Pulse: 81   Resp: 16   Temp: 97.8 °F  "(36.6 °C)        Labs/Imaging/Studies:   Recent Results (from the past 48 hour(s))   POCT urine pregnancy    Collection Time: 09/20/17  7:40 AM   Result Value Ref Range    POC Preg Test, Ur Negative Negative     Acceptable Yes       No results found for: PHENYTOIN, PHENOBARB, VALPROATE, CBMZ    Physical Exam:     General: resting in bed in NAD  HEENT: EOMI  Respiratory: CTAB, unlabored breathing  Cardiovascular: RRR, no murmurs  Abdominal: abdomen soft, non-tender, non-distended, (+) BS  Extremities: no cyanosis or clubbing, no edema  Neurological: no involuntary movements observed, no focal neurological deficits    Mental Status Exam:   Appearance: normal weight, neatly groomed, lying in bed  Behavior: calm, cooperative; friendly  Speech: Conversational rate, tone, and volume  Mood: "6/10"  Affect: full; appropriate for stated mood  Thought Process: linear, goal-oriented  Thought Perceptions: denied AVH  Thought Content: no SI or HI; no delusions appreciated  Sensorium: awake, alert  Cognition: concentration intact to conversation; memory intact conversation (no overt issues with recent or remote memory); fully oriented  Insight: good  Judgment: good    ASSESSMENT/PLAN:     Allyssa Wright is a 37 y.o. female with MDD (major depressive disorder), recurrent, in partial remission who presents for ECT.    Recommendations:   Proceed with ECT #29    Hanane Remy MD  [facility]\"" Psychiatry, PGY-2  09/20/2017   "

## 2017-09-20 NOTE — ANESTHESIA POSTPROCEDURE EVALUATION
"Anesthesia Post Evaluation    Patient: Allyssa Wright    Procedure(s) Performed: Procedure(s) (LRB):  ELECTROCONVULSIVE THERAPY (ECT) - SINGLE SEIZURE (N/A)    Final Anesthesia Type: general  Patient location during evaluation: PACU  Patient participation: Yes- Able to Participate  Level of consciousness: awake and alert  Post-procedure vital signs: reviewed and stable  Pain management: adequate  Airway patency: patent  PONV status at discharge: No PONV  Anesthetic complications: no      Cardiovascular status: blood pressure returned to baseline  Respiratory status: unassisted, spontaneous ventilation and room air  Hydration status: euvolemic  Follow-up not needed.        Visit Vitals  /83   Pulse 76   Temp 36.7 °C (98.1 °F) (Temporal)   Resp 16   Ht 5' 5" (1.651 m)   Wt 70.3 kg (155 lb)   LMP  (LMP Unknown)   SpO2 100%   Breastfeeding? No   BMI 25.79 kg/m²       Pain/Roma Score: Pain Assessment Performed: Yes (9/20/2017  9:45 AM)  Presence of Pain: denies (9/20/2017  9:45 AM)  Roma Score: 10 (9/20/2017  9:45 AM)      "

## 2017-09-20 NOTE — DISCHARGE SUMMARY
Allyssa Wright  : 1980   MRN: [id number]  Date: 2017     Psychiatry - ECT  Discharge Summary    Admit Date: 2017  7:02 AM  Discharge Date: 2017    Attending Physician: Shoaib Boyce Jr., MD  Discharge Provider: Hanane Remy MD    History of Present Illness: Allyssa Wright is a 37 y.o. female with Major Depressive Disorder, recurrent, in partial remission presented for ECT #29. See H&P dated 2017 for full HPI. For further details, see Dr. Boyce's pre-ECT evaluation.    Hospital Course: The patient tolerated the ECT treatment well without complication. Patient was stable post-procedure. See OP note dated 2017 for more details.     Disposition: Home or Self Care    Medications:  Current Discharge Medication List      CONTINUE these medications which have NOT CHANGED    Details   clozapine (CLOZARIL) 50 MG tablet Take 50 mg by mouth once daily.      dapsone 7.5 % GlwP Apply topically once daily.      famciclovir (FAMVIR) 500 MG tablet Take 1 tablet (500 mg total) by mouth 2 (two) times daily.  Qty: 30 tablet, Refills: 12    Associated Diagnoses: Major depressive disorder, recurrent episode, moderate degree      mirtazapine (REMERON) 7.5 MG Tab Take 7.5 mg by mouth every evening.      naftifine (NAFTIN) 1 % cream Apply topically daily as needed. Apply to feet      spironolactone (ALDACTONE) 50 MG tablet 50 mg 2 (two) times daily. 50  mg qAM, 50 mg qHS.      thyroid (ARMOUR THYROID) 30 mg Tab Take 1 tablet (30 mg total) by mouth every morning.  Qty: 30 tablet, Refills: 11    Associated Diagnoses: Major depressive disorder, recurrent episode, moderate degree      trazodone (DESYREL) 100 MG tablet Take 300 mg by mouth every evening.       UNABLE TO FIND 2 (two) times daily. n-azetyl-cysteine      venlafaxine (EFFEXOR-XR) 75 MG 24 hr capsule Take 1 capsule (75 mg total) by mouth nightly. Take with 150 mg for total daily dose of 225 mg.  Qty: 30 capsule, Refills: 2       dextroamphetamine-amphetamine (ADDERALL XR) 20 MG 24 hr capsule Take 20 mg by mouth 2 (two) times daily.      hydrOXYzine pamoate (VISTARIL) 25 MG Cap Take 1 capsule (25 mg total) by mouth every 8 (eight) hours as needed (anxiety).  Qty: 90 capsule, Refills: 1      quetiapine (SEROQUEL) 50 MG tablet Take 100 mg by mouth 2 (two) times daily. 100 mg nightly and 25 mg in the morning      ZOVIRAX 5 % Crea Refills: 5           Status at Discharge: Alert and medically stable    Discharge Diagnoses: Major Depressive Disorder, recurrent, in partial remission    Diet: Resume previous outpatient diet    Activity: Ambulate with assistance - patient is a fall risk    Instructions: Please do not eat or drink anything after midnight prior to procedure. Please do not drive on day of ECT.    Med Changes: None    Next ECT:  October 25, 2017  Follow-up Information     [facility] In 5 weeks.    Specialty:  Emergency Medicine  Why:  for next ECT treatment  Contact information:  [facility] [address]  [facility] [address] [phone]               Hanane Remy MD  [facility] Psychiatry, PGY-2  09/20/2017 7:29 AM

## 2017-09-20 NOTE — ANESTHESIA PROCEDURE NOTES
ECT    Treatment Number: 29  Procedure start time: 9/20/2017 8:54 AM  Timeout performed at: 9/20/2017 8:54 AM  Staffing  Anesthesiologist: LORRIE MELENDEZ  CRNA/Resident: DAO RIBEIRO  Performed by: resident/CRNA and anesthesiologist     Preanesthetic Checklist  The following were completed as part of the preanesthetic checklist: patient identified, procedural consent, pre-op evaluation, timeout performed, risks and benefits discussed, monitors and equipment checked, anesthesia consent given, oxygen available, suction available, hand hygiene performed and patient being monitored.    Setup & Induction  Patient Monitoring: heart rate, cardiac monitor, continuous pulse ox, continuous capnometry and NIBP  Patient preparation: bite block inserted, extremities padded, mandibular stabilization and patient hyperventilated    The patient was moved to the ECT therapy room after being assessed and consented for ECT. After standard ASA monitors were applied and timeout performed, the patient was adequately preoxygenated. After induction of general anesthesia, adequate oxygenation and ventilation were confirmed with pulse oxymetry and end tidal CO2 monitoring via bag-mask ventilation. End tidal CO2 was monitored throughout the case and moderate hyperventilation was performed prior to beginning ECT treatment. All extremities were padded, biteblock was inserted, and mandibular stabilization was done prior to initiating ECT therapy.    Procedure      Stimulus Number 3: Setting Number = III; 576 millicoulombs; Seizure Duration = 21 seconds        Recovery  After adequate recovery from general anesthesia, the patient was transported to recovery.  Peak Blood Pressure: 157/74    ECT Findings  ECT associated findings of: None

## 2017-09-20 NOTE — PLAN OF CARE
Pt arrived amb a/a nad noted or reported. Pt gave informed consent and voiced intended procedure. Pt gave two verbal patient identifiers, allergies, NPO status and reported meds taken this am. Pt denies ha, sob or cp

## 2017-09-20 NOTE — TRANSFER OF CARE
"Anesthesia Transfer of Care Note    Patient: Allyssa Wright    Procedure(s) Performed: Procedure(s) (LRB):  ELECTROCONVULSIVE THERAPY (ECT) - SINGLE SEIZURE (N/A)    Patient location: PACU    Anesthesia Type: general    Transport from OR: Transported from OR on 6-10 L/min O2 by face mask with adequate spontaneous ventilation    Post pain: adequate analgesia    Post assessment: no apparent anesthetic complications    Post vital signs: stable    Level of consciousness: awake and alert    Nausea/Vomiting: no nausea/vomiting    Complications: none    Transfer of care protocol was followed      Last vitals:   Visit Vitals  /83 (BP Location: Left arm, Patient Position: Lying)   Pulse 70   Temp 37.2 °C (99 °F) (Temporal)   Resp 16   Ht 5' 5" (1.651 m)   Wt 70.3 kg (155 lb)   LMP  (LMP Unknown)   SpO2 99%   Breastfeeding? No   BMI 25.79 kg/m²     "

## 2017-09-20 NOTE — DISCHARGE INSTRUCTIONS
RETURN Wednesday, October 25, 2017 AT 6:00 A.M FOR NEXT TREATMENT.      Understanding Electroconvulsive Therapy  Electroconvulsive therapy (ECT) is sometimes called shock therapy. This may sound painful, but ECT doesnt hurt. Its often the safest and best treatment for severe depression. It can treat other mental disorders as well.  What is electroconvulsive therapy?  ECT is used to treat people who are very depressed. Its mainly used when other treatments, such as antidepressant medicines, have failed. Often it may relieve feelings of sadness and despair after 2 to 4 treatments.  Common symptoms of major depression  Symptoms of major depression include:  · Feeling a deep sadness that doesnt go away  · Losing all pleasure in life  · Feeling hopeless or helpless  · Feeling guilty  · Sleeping more or less than normal  · Eating more or less than normal  · Having headaches or stomachaches, or other pains that dont go away  · Feeling nervous, empty, or worthless  · Crying a great deal  · Thinking or talking about suicide or death  How is ECT therapy done?  Before an ECT treatment, youll be given anesthesia to keep you pain-free. Youll also be given medicine to make you sleep, relax your muscles and control your heart rate. Your healthcare provider then places electrodes on your head. You may have one above each temple (bilateral ECT). Or you may have electrodes on one temple and on your forehead (unilateral ECT). While you are asleep, your brain is stimulated very briefly with an electric current. This causes a seizure, usually lasting less than a minute. Because you are under anesthesia, your body will not move even as your brain goes through great changes.  What are the risks?  When done properly, ECT is quite safe. Right after the treatment, you may be confused. This often lasts for less than half an hour. You may have a headache or stiff muscles. But these symptoms often go away quickly. A more  serious possible side effect is memory loss. Commonly, people have short-term (temporary) trouble remembering information that they learned recently. And they may have little recall of the time when they received treatment. Less commonly, people may have long-lasting (permanent) spotty recall of major past events. In rare cases, there may be memory loss for larger blocks of time.  Looking to the future  In most cases, ECT doesnt cure depression. But it can improve symptoms for a period of time. You may need a series of ECT treatments to continue feeling the benefit. You may also need to take antidepressant medicines to help prevent symptoms from returning. But with ongoing treatment, you can have a full and healthy life.  Resources  · The National [employer] of Mental Health  420.306.7504  www.nimh.nih.gov  · [facility]  827.934.4622  www.[facility].org     Date Last Reviewed: 5/1/2017  © 8568-0663 [employer]. [address] [place] [address], [place] [address]. All rights reserved. This information is not intended as a substitute for professional medical care. Always follow your healthcare professional's instructions.

## 2017-09-20 NOTE — ANESTHESIA RELEASE NOTE
Anesthesia Release from PACU Note    Patient: Allyssa Wright    Procedure(s) Performed: Procedure(s) (LRB):  ELECTROCONVULSIVE THERAPY (ECT) - SINGLE SEIZURE (N/A)    Anesthesia type: general    Post pain: Adequate analgesia    Post assessment: no apparent anesthetic complications and tolerated procedure well    Last Vitals:   Vitals:    09/20/17 0945   BP: 110/83   Pulse: 76   Resp: 16   Temp: 36.7 °C (98.1 °F)         Post vital signs: stable    Level of consciousness: awake and alert     Nausea/Vomiting: no nausea/no vomiting    Complications: none    Airway Patency: patent    Respiratory: unassisted    Cardiovascular: stable and blood pressure at baseline    Hydration: euvolemic

## 2017-09-20 NOTE — OP NOTE
Allyssa Wright  : 1980   MRN: [id number]  Date: 2017       Psychiatry - ECT  Operative Note             Date of Admission: 2017  7:02 AM    Site: [facility], [address]    Attending: Shoaib Boyce Jr., MD  Residents: Hanane Remy MD  Pre-op Diagnosis: MDD, recurrent, in partial remission     ECT Treatment Number: 29  Machine Type: indico 5000    Patient Status: medically stable    Vitals (pre-procedure):  Vitals:    17 0727   BP: 116/68   Pulse: 81   Resp: 16   Temp: 97.8 °F (36.6 °C)       Electrode Placement: Bitemporal    Stimulus Number Charge (mC) Level Pulse Width (msec) Frequency  (Hz) Duration of Stimulus (sec) Current (mA) Duration of Seizure (sec)   1 576 3 1 60 6 800 56                                   Complications: HTN    Maximum Blood Pressure: 140/100    Medications Given:  Caffeine 500 mg PO Before  Methohexital (Brevital) 200 mg IV During   Succinylcholine (Anectine)120 mg IV During   Ondansetron (Zofran) 4 mg IV During  Toradol 30 mg IV During   Labetalol 20 mg IV during    Treatment Course: Patient tolerated procedure well. After adequate recovery from general anesthesia, the patient was transported to recovery.    Post-op Diagnosis: Same as above    Recommended for next ECT: 2017      Hanane Remy MD  [facility] Psychiatry, PGY-2  2017 7:25 AM

## 2017-10-02 ENCOUNTER — DOCUMENTATION ONLY (OUTPATIENT)
Dept: FAMILY MEDICINE | Facility: CLINIC | Age: 37
End: 2017-10-02

## 2017-10-02 NOTE — PROGRESS NOTES
Pre-Visit Chart Review  For Appointment Scheduled on (10/2)    Health Maintenance Due   Topic Date Due    TETANUS VACCINE  06/11/2017    Influenza Vaccine  08/01/2017

## 2017-10-03 ENCOUNTER — OFFICE VISIT (OUTPATIENT)
Dept: FAMILY MEDICINE | Facility: CLINIC | Age: 37
End: 2017-10-03
Payer: COMMERCIAL

## 2017-10-03 VITALS
HEIGHT: 65 IN | DIASTOLIC BLOOD PRESSURE: 68 MMHG | WEIGHT: 154.13 LBS | BODY MASS INDEX: 25.68 KG/M2 | HEART RATE: 95 BPM | SYSTOLIC BLOOD PRESSURE: 107 MMHG

## 2017-10-03 DIAGNOSIS — F33.41 MDD (MAJOR DEPRESSIVE DISORDER), RECURRENT, IN PARTIAL REMISSION: ICD-10-CM

## 2017-10-03 DIAGNOSIS — L70.8 OTHER ACNE: ICD-10-CM

## 2017-10-03 DIAGNOSIS — Z76.89 ESTABLISHING CARE WITH NEW DOCTOR, ENCOUNTER FOR: Primary | ICD-10-CM

## 2017-10-03 DIAGNOSIS — Z00.00 WELLNESS EXAMINATION: ICD-10-CM

## 2017-10-03 PROBLEM — F32.4 MAJOR DEPRESSION IN PARTIAL REMISSION: Status: RESOLVED | Noted: 2017-08-14 | Resolved: 2017-10-03

## 2017-10-03 PROBLEM — F33.9 RECURRENT MAJOR DEPRESSIVE DISORDER: Status: RESOLVED | Noted: 2017-01-11 | Resolved: 2017-10-03

## 2017-10-03 PROBLEM — F33.9 MAJOR DEPRESSION, RECURRENT, CHRONIC: Status: RESOLVED | Noted: 2017-07-12 | Resolved: 2017-10-03

## 2017-10-03 PROCEDURE — 99395 PREV VISIT EST AGE 18-39: CPT | Mod: S$GLB,,, | Performed by: FAMILY MEDICINE

## 2017-10-03 PROCEDURE — 99999 PR PBB SHADOW E&M-EST. PATIENT-LVL III: CPT | Mod: PBBFAC,,, | Performed by: FAMILY MEDICINE

## 2017-10-03 RX ORDER — VENLAFAXINE 100 MG/1
225 TABLET ORAL DAILY
Status: ON HOLD | COMMUNITY
End: 2018-06-25

## 2017-10-03 RX ORDER — DEXTROAMPHETAMINE SACCHARATE, AMPHETAMINE ASPARTATE, DEXTROAMPHETAMINE SULFATE AND AMPHETAMINE SULFATE 7.5; 7.5; 7.5; 7.5 MG/1; MG/1; MG/1; MG/1
30 TABLET ORAL 2 TIMES DAILY
COMMUNITY
Start: 2017-09-28

## 2017-10-03 NOTE — PROGRESS NOTES
Subjective:       Patient ID: Allyssa Wright is a 37 y.o. female.     Chief Complaint: Annual Exam (discuss metformin)     HPI      Pt presents to establish care with PCP. She has long history of mental illness and is being followed by a private practice psychiatrist. She had questions today about starting metformin as she has put on 30lbs over the past 3 years due to side effects of her anti-psychotic meds. She has a family history of DM in her father and mother but has never had high blood sugar. She has no other concerns with any of her medications. She reports no other problems or complaints.      Review of Systems   Constitutional: Negative for chills, diaphoresis, fatigue and fever.   HENT: Negative.    Respiratory: Negative for cough, chest tightness and shortness of breath.    Cardiovascular: Negative.  Negative for chest pain, palpitations and leg swelling.   Gastrointestinal: Negative.  Negative for abdominal pain, constipation, diarrhea, nausea and vomiting.   Endocrine: Negative.    Genitourinary: Negative for dysuria, frequency, hematuria and urgency.   Musculoskeletal: Negative for arthralgias and myalgias.   Skin: Negative.    Neurological: Negative for dizziness, syncope, weakness, light-headedness and headaches.   Psychiatric/Behavioral: Negative for self-injury, sleep disturbance and suicidal ideas.       Objective:      Physical Exam   Constitutional: She is oriented to person, place, and time. She appears well-developed and well-nourished.   HENT:   Head: Normocephalic and atraumatic.   Right Ear: External ear normal.   Left Ear: External ear normal.   Nose: Nose normal.   Mouth/Throat: Oropharynx is clear and moist.   Eyes: Conjunctivae and EOM are normal. Pupils are equal, round, and reactive to light.   Neck: Normal range of motion. Neck supple.   Cardiovascular: Normal rate, regular rhythm, normal heart sounds and intact distal pulses.    Pulmonary/Chest: Effort normal and breath sounds  normal.   Abdominal: Soft. Bowel sounds are normal. There is no tenderness. There is no guarding.   Musculoskeletal: Normal range of motion.   Neurological: She is alert and oriented to person, place, and time.   Psychiatric: She has a normal mood and affect. Her behavior is normal. Judgment and thought content normal.       Assessment:       1. Establishing care with new doctor, encounter for  2. Wellness examination  3. Other acne  4. MDD (major depressive disorder), recurrent, in partial remission    Plan:       1. Establishing care with new doctor, encounter for    2. Wellness examination  Patient has been advised to continue to maintain a healthy lifestyle, including regular exercise and consuming a well balanced diet.   - Lipid panel; Future  - Microalbumin/creatinine urine ratio; Future  - Comprehensive metabolic panel; Future  - Lipid panel  - Microalbumin/creatinine urine ratio  - Comprehensive metabolic panel  - Hemoglobin A1c; Future  - Hemoglobin A1c    3. Other acne  Condition currently stable. No changes to medication regimen on today.     4. MDD (major depressive disorder), recurrent, in partial remission  - dextroamphetamine-amphetamine 30 mg Tab; Take 30 mg by mouth 2 (two) times daily.   - venlafaxine (EFFEXOR) 100 MG Tab; Take 300 mg by mouth once daily.    Portions of this note were created using Dragon voice recognition software. There may be voice recognition errors found in the text, and attempts were made to correct these errors prior to signature    Kevin Lund MD    Family Medicine  10/3/2017

## 2017-10-03 NOTE — MEDICAL/APP STUDENT
Subjective:       Patient ID: Allyssa Wright is a 37 y.o. female.    Chief Complaint: Annual Exam (discuss metformin)    HPI     Pt presents to establish care with PCP. She has long history of mental illness and is being followed by a private practice psychiatrist. She had questions today about starting metformin as she has put on 30lbs over the past 3 years due to side effects of her anti-psychotic meds. She has a family history of DM in her father and mother but has never had high blood sugar. She has no other concerns with any of her medications. She reports no other problems or complaints.     Review of Systems   Constitutional: Negative for chills, diaphoresis, fatigue and fever.   HENT: Negative.    Respiratory: Negative for cough, chest tightness and shortness of breath.    Cardiovascular: Negative.  Negative for chest pain, palpitations and leg swelling.   Gastrointestinal: Negative.  Negative for abdominal pain, constipation, diarrhea, nausea and vomiting.   Endocrine: Negative.    Genitourinary: Negative for dysuria, frequency, hematuria and urgency.   Musculoskeletal: Negative for arthralgias and myalgias.   Skin: Negative.    Neurological: Negative for dizziness, syncope, weakness, light-headedness and headaches.   Psychiatric/Behavioral: Negative for self-injury, sleep disturbance and suicidal ideas.       Objective:      Physical Exam   Constitutional: She is oriented to person, place, and time. She appears well-developed and well-nourished.   HENT:   Head: Normocephalic and atraumatic.   Right Ear: External ear normal.   Left Ear: External ear normal.   Nose: Nose normal.   Mouth/Throat: Oropharynx is clear and moist.   Eyes: Conjunctivae and EOM are normal. Pupils are equal, round, and reactive to light.   Neck: Normal range of motion. Neck supple.   Cardiovascular: Normal rate, regular rhythm, normal heart sounds and intact distal pulses.    Pulmonary/Chest: Effort normal and breath sounds normal.    Abdominal: Soft. Bowel sounds are normal. There is no tenderness. There is no guarding.   Musculoskeletal: Normal range of motion.   Neurological: She is alert and oriented to person, place, and time.   Psychiatric: She has a normal mood and affect. Her behavior is normal. Judgment and thought content normal.       Assessment:       No diagnosis found.    Plan:       There are no diagnoses linked to this encounter.    1. Hypoglycemia  Lab work. CBC, CMP, TSH, blood sugar, HbA1c. Follow up in clinic in 6 months

## 2017-10-23 RX ORDER — METHOHEXITAL IN WATER/PF 100MG/10ML
SYRINGE (ML) INTRAVENOUS
Status: DISPENSED
Start: 2017-10-23 | End: 2017-10-23

## 2017-10-23 RX ORDER — ONDANSETRON 2 MG/ML
INJECTION INTRAMUSCULAR; INTRAVENOUS
Status: DISPENSED
Start: 2017-10-23 | End: 2017-10-23

## 2017-10-23 RX ORDER — SUCCINYLCHOLINE CHLORIDE 20 MG/ML
INJECTION INTRAMUSCULAR; INTRAVENOUS
Status: DISPENSED
Start: 2017-10-23 | End: 2017-10-23

## 2017-10-23 RX ORDER — KETOROLAC TROMETHAMINE 30 MG/ML
INJECTION, SOLUTION INTRAMUSCULAR; INTRAVENOUS
Status: DISPENSED
Start: 2017-10-23 | End: 2017-10-23

## 2017-10-24 ENCOUNTER — HOSPITAL ENCOUNTER (OUTPATIENT)
Facility: HOSPITAL | Age: 37
Discharge: HOME | End: 2017-10-24
Attending: PSYCHIATRY & NEUROLOGY | Admitting: PSYCHIATRY & NEUROLOGY
Payer: COMMERCIAL

## 2017-10-24 ENCOUNTER — SURGERY (OUTPATIENT)
Age: 37
End: 2017-10-24

## 2017-10-24 VITALS
HEART RATE: 75 BPM | WEIGHT: 155 LBS | BODY MASS INDEX: 25.83 KG/M2 | RESPIRATION RATE: 16 BRPM | OXYGEN SATURATION: 98 % | SYSTOLIC BLOOD PRESSURE: 107 MMHG | TEMPERATURE: 98 F | HEIGHT: 65 IN | DIASTOLIC BLOOD PRESSURE: 73 MMHG

## 2017-10-24 DIAGNOSIS — F33.9 RECURRENT MAJOR DEPRESSIVE DISORDER, REMISSION STATUS UNSPECIFIED: Primary | ICD-10-CM

## 2017-10-24 DIAGNOSIS — F33.41 MDD (MAJOR DEPRESSIVE DISORDER), RECURRENT, IN PARTIAL REMISSION: ICD-10-CM

## 2017-10-24 LAB
B-HCG UR QL: NEGATIVE
CTP QC/QA: YES

## 2017-10-24 PROCEDURE — 37000008 HC ANESTHESIA 1ST 15 MINUTES: Performed by: PSYCHIATRY & NEUROLOGY

## 2017-10-24 PROCEDURE — 63600175 PHARM REV CODE 636 W HCPCS: Performed by: ANESTHESIOLOGY

## 2017-10-24 PROCEDURE — 81025 URINE PREGNANCY TEST: CPT | Performed by: PSYCHIATRY & NEUROLOGY

## 2017-10-24 PROCEDURE — 25000003 PHARM REV CODE 250: Performed by: PSYCHIATRY & NEUROLOGY

## 2017-10-24 PROCEDURE — 37000009 HC ANESTHESIA EA ADD 15 MINS: Performed by: PSYCHIATRY & NEUROLOGY

## 2017-10-24 PROCEDURE — 90870 ELECTROCONVULSIVE THERAPY: CPT | Performed by: ANESTHESIOLOGY

## 2017-10-24 PROCEDURE — 71000044 HC DOSC ROUTINE RECOVERY FIRST HOUR: Performed by: PSYCHIATRY & NEUROLOGY

## 2017-10-24 PROCEDURE — 90870 ELECTROCONVULSIVE THERAPY: CPT | Mod: ,,, | Performed by: PSYCHIATRY & NEUROLOGY

## 2017-10-24 PROCEDURE — D9220A PRA ANESTHESIA: Mod: ,,, | Performed by: ANESTHESIOLOGY

## 2017-10-24 PROCEDURE — 25000003 PHARM REV CODE 250: Performed by: ANESTHESIOLOGY

## 2017-10-24 RX ORDER — LIDOCAINE HYDROCHLORIDE 10 MG/ML
1 INJECTION, SOLUTION EPIDURAL; INFILTRATION; INTRACAUDAL; PERINEURAL ONCE
Status: DISCONTINUED | OUTPATIENT
Start: 2017-10-25 | End: 2017-10-24 | Stop reason: HOSPADM

## 2017-10-24 RX ORDER — LABETALOL HYDROCHLORIDE 5 MG/ML
INJECTION, SOLUTION INTRAVENOUS
Status: DISCONTINUED | OUTPATIENT
Start: 2017-10-24 | End: 2017-10-24

## 2017-10-24 RX ORDER — KETOROLAC TROMETHAMINE 30 MG/ML
INJECTION, SOLUTION INTRAMUSCULAR; INTRAVENOUS
Status: DISCONTINUED | OUTPATIENT
Start: 2017-10-24 | End: 2017-10-24

## 2017-10-24 RX ORDER — ONDANSETRON 2 MG/ML
INJECTION INTRAMUSCULAR; INTRAVENOUS
Status: DISCONTINUED | OUTPATIENT
Start: 2017-10-24 | End: 2017-10-24

## 2017-10-24 RX ORDER — SUCCINYLCHOLINE CHLORIDE 20 MG/ML
INJECTION INTRAMUSCULAR; INTRAVENOUS
Status: DISCONTINUED | OUTPATIENT
Start: 2017-10-24 | End: 2017-10-24

## 2017-10-24 RX ORDER — SODIUM CHLORIDE 9 MG/ML
INJECTION, SOLUTION INTRAVENOUS CONTINUOUS
Status: DISCONTINUED | OUTPATIENT
Start: 2017-10-25 | End: 2017-10-24 | Stop reason: HOSPADM

## 2017-10-24 RX ADMIN — SODIUM CHLORIDE: 900 INJECTION, SOLUTION INTRAVENOUS at 08:10

## 2017-10-24 RX ADMIN — LABETALOL HYDROCHLORIDE 15 MG: 5 INJECTION INTRAVENOUS at 08:10

## 2017-10-24 RX ADMIN — METHOHEXITAL SODIUM 200 MG: 500 INJECTION, POWDER, LYOPHILIZED, FOR SOLUTION INTRAMUSCULAR; INTRAVENOUS; RECTAL at 08:10

## 2017-10-24 RX ADMIN — Medication 500 MG: at 08:10

## 2017-10-24 RX ADMIN — ONDANSETRON 4 MG: 2 INJECTION, SOLUTION INTRAMUSCULAR; INTRAVENOUS at 08:10

## 2017-10-24 RX ADMIN — KETOROLAC TROMETHAMINE 30 MG: 30 INJECTION, SOLUTION INTRAMUSCULAR; INTRAVENOUS at 08:10

## 2017-10-24 RX ADMIN — SUCCINYLCHOLINE CHLORIDE 120 MG: 20 INJECTION, SOLUTION INTRAMUSCULAR; INTRAVENOUS at 08:10

## 2017-10-24 NOTE — TRANSFER OF CARE
"Anesthesia Transfer of Care Note    Patient: Allyssa Wright    Procedure(s) Performed: Procedure(s) (LRB):  ELECTROCONVULSIVE THERAPY (ECT) - SINGLE SEIZURE (N/A)    Patient location: PACU    Anesthesia Type: general    Transport from OR: Transported from OR on 100% O2 by closed face mask with adequate spontaneous ventilation    Post pain: adequate analgesia    Post assessment: no apparent anesthetic complications    Post vital signs: stable    Level of consciousness: awake, alert and oriented    Nausea/Vomiting: no nausea/vomiting    Complications: none          Last vitals:   Visit Vitals  /72   Pulse 66   Temp 36.4 °C (97.6 °F) (Oral)   Resp 16   Ht 5' 5" (1.651 m)   Wt 70.3 kg (155 lb)   SpO2 100%   Breastfeeding? No   BMI 25.79 kg/m²     "

## 2017-10-24 NOTE — H&P
"Allyssa Wright  : 1980   MRN: [id number]  Date: 10/24/2017     Psychiatry - ECT  History & Physical    Chief complaint: Major Depressive Disorder, recurrent, in partial remission   Procedure: ECT #30    SUBJECTIVE:   HPI:     Ms. Wright is a 37 year old white female with past psychiatric history of MDD, recurrent, severe who returns for ECT #30. Her only medication changes since last ECT include her Clozaril being increased to 50 mg PO daily and her Effexor XR being decreased to 225 mg PO daily. She states that her mood has been "OK; 6-7/10" since last treatment, but she does endorse notable anergia since starting the Clozaril. She endorses some mild oversleeping and increased appetite. She denies depressed mood, feelings of hopelessness, worthlessness and guilt. Denies SI/HI/AH/VH. Patient says that her memory is mildly impaired (retrograde and verbal), but she continues to think that the positive impact of ECT is worth the memory loss. She denies any issues with dentition. She denies anything to eat or drink after midnight. She denies any acute medical complaints or notable medication AEs.     Psychiatric Review of Systems:   Mood: "OK; 6-10"  Appetite: increased  Psychomotor: none  Cognitive Impairment: mild to be expected with ECT  Insomnia: too much sleep  Psychosis: absent   Diurnal Variation: absent   Suicidal Ideation: absent      Medical Review Of Systems:   Constitutional: negative for chills, fevers and sweats  Eyes: negative for visual disturbance  Respiratory: negative for cough, sputum and wheezing  Cardiovascular: negative for chest pain, palpitations, and dyspnea  Gastrointestinal: negative for abdominal pain, N/V  Neurovascular: negative for headache, dizziness, weakness    Current Medications:   No current facility-administered medications on file prior to encounter.      Current Outpatient Prescriptions on File Prior to Encounter   Medication Sig Dispense Refill    clozapine (CLOZARIL) 50 " MG tablet Take 50 mg by mouth once daily.      dapsone 7.5 % GlwP Apply topically once daily.      famciclovir (FAMVIR) 500 MG tablet Take 1 tablet (500 mg total) by mouth 2 (two) times daily. 30 tablet 12    mirtazapine (REMERON) 7.5 MG Tab Take 7.5 mg by mouth every evening.      spironolactone (ALDACTONE) 50 MG tablet 50 mg 2 (two) times daily. 50  mg qAM, 50 mg qHS.      thyroid (ARMOUR THYROID) 30 mg Tab Take 1 tablet (30 mg total) by mouth every morning. 30 tablet 11    trazodone (DESYREL) 100 MG tablet Take 300 mg by mouth every evening.       hydrOXYzine pamoate (VISTARIL) 25 MG Cap Take 1 capsule (25 mg total) by mouth every 8 (eight) hours as needed (anxiety). (Patient taking differently: Take 50 mg by mouth every 8 (eight) hours as needed (anxiety). ) 90 capsule 1    naftifine (NAFTIN) 1 % cream Apply topically daily as needed. Apply to feet      UNABLE TO FIND 2 (two) times daily. n-azetyl-cysteine      ZOVIRAX 5 % Crea   5      Allergies:   Benzodiazepines; Ampicillin; Erythromycin; Levaquin [levofloxacin]; Penicillins; Pristiq [desvenlafaxine]; Sulfa (sulfonamide antibiotics); and Azithromycin    Past Medical/Surgical History:   Past Medical History:   Diagnosis Date    Anxiety     Depression     History of psychiatric hospitalization     HSV-1 (herpes simplex virus 1) infection     Hx of psychiatric care     Moderate depressed bipolar II disorder 06/13/2016    reports no history of bipolar    Obsessive-compulsive disorder     Psychiatric problem     Self-harming behavior     Therapy      Past Surgical History:   Procedure Laterality Date    ANKLE SURGERY Right     BREAST augmentation      OVARIAN CYST REMOVAL Bilateral       OBJECTIVE:     Vitals (pre-procedure):  Vitals:    10/24/17 0824   BP: 112/72   Pulse:    Resp:    Temp:         Labs/Imaging/Studies:   No results found for this or any previous visit (from the past 48 hour(s)).   No results found for: PHENYTOIN, PHENOBARB,  "VALPROATE, CBMZ    Physical Exam:     General: resting in bed in NAD  HEENT: EOMI  Respiratory: CTAB, unlabored breathing  Cardiovascular: RRR, no murmurs  Abdominal: abdomen soft, non-tender, non-distended, (+) BS  Extremities: no cyanosis or clubbing, no edema  Neurological: no involuntary movements observed, no focal neurological deficits    Mental Status Exam:   Appearance: normal weight, neatly groomed, lying in bed  Behavior: calm, cooperative; friendly  Speech: Conversational rate, tone, and volume  Mood: "OK; 6-7/10"  Affect: minimally constricted; appropriate for stated mood  Thought Process: linear, goal-oriented  Thought Perceptions: denied AVH  Thought Content: no SI or HI; no delusions appreciated  Sensorium: awake, alert  Cognition: concentration intact to conversation; memory intact conversation (no overt issues with recent or remote memory); fully oriented  Insight: good  Judgment: good    ASSESSMENT/PLAN:     Allyssa Wright is a 37 y.o. female with MDD (major depressive disorder), recurrent, in partial remission who presents for ECT.    Recommendations:   Proceed with ECT #30    Ernst Taylor MD  [facility] Psychiatry, PGY-4  10/24/2017   "

## 2017-10-24 NOTE — ANESTHESIA RELEASE NOTE
"Anesthesia Release from PACU Note    Patient: Allyssa Wright    Procedure(s) Performed: Procedure(s) (LRB):  ELECTROCONVULSIVE THERAPY (ECT) - SINGLE SEIZURE (N/A)    Anesthesia type: general    Post pain: Adequate analgesia    Post assessment: no apparent anesthetic complications    Last Vitals:   Visit Vitals  BP (!) 111/94 (BP Location: Right arm, Patient Position: Lying)   Pulse 72   Temp 36.6 °C (97.8 °F) (Temporal)   Resp 16   Ht 5' 5" (1.651 m)   Wt 70.3 kg (155 lb)   SpO2 100%   Breastfeeding? No   BMI 25.79 kg/m²       Post vital signs: stable    Level of consciousness: awake    Nausea/Vomiting: no nausea/no vomiting    Complications: none    Airway Patency: patent    Respiratory: unassisted    Cardiovascular: stable and blood pressure at baseline    Hydration: euvolemic  "

## 2017-10-24 NOTE — ANESTHESIA POSTPROCEDURE EVALUATION
"Anesthesia Post Evaluation    Patient: Allyssa Wright    Procedure(s) Performed: Procedure(s) (LRB):  ELECTROCONVULSIVE THERAPY (ECT) - SINGLE SEIZURE (N/A)    Final Anesthesia Type: general  Patient location during evaluation: PACU  Patient participation: Yes- Able to Participate  Level of consciousness: awake and alert  Post-procedure vital signs: reviewed and stable  Pain management: adequate  Airway patency: patent  PONV status at discharge: No PONV  Anesthetic complications: no      Cardiovascular status: blood pressure returned to baseline and hemodynamically stable  Respiratory status: unassisted  Hydration status: euvolemic  Follow-up not needed.        Visit Vitals  BP (!) 111/94 (BP Location: Right arm, Patient Position: Lying)   Pulse 72   Temp 36.6 °C (97.8 °F) (Temporal)   Resp 16   Ht 5' 5" (1.651 m)   Wt 70.3 kg (155 lb)   SpO2 100%   Breastfeeding? No   BMI 25.79 kg/m²       Pain/Roma Score: Pain Assessment Performed: Yes (10/24/2017  8:24 AM)  Presence of Pain: denies (10/24/2017  8:24 AM)      "

## 2017-10-24 NOTE — DISCHARGE INSTRUCTIONS
Understanding Electroconvulsive Therapy  Electroconvulsive therapy (ECT) is sometimes called shock therapy. This may sound painful, but ECT doesnt hurt. Its often the safest and best treatment for severe depression. It can treat other mental disorders as well.  What is electroconvulsive therapy?  ECT is used to treat people who are very depressed. Its mainly used when other treatments, such as antidepressant medicines, have failed. Often it may relieve feelings of sadness and despair after 2 to 4 treatments.  Common symptoms of major depression  Symptoms of major depression include:  · Feeling a deep sadness that doesnt go away  · Losing all pleasure in life  · Feeling hopeless or helpless  · Feeling guilty  · Sleeping more or less than normal  · Eating more or less than normal  · Having headaches or stomachaches, or other pains that dont go away  · Feeling nervous, empty, or worthless  · Crying a great deal  · Thinking or talking about suicide or death  How is ECT therapy done?  Before an ECT treatment, youll be given anesthesia to keep you pain-free. Youll also be given medicine to make you sleep, relax your muscles and control your heart rate. Your healthcare provider then places electrodes on your head. You may have one above each temple (bilateral ECT). Or you may have electrodes on one temple and on your forehead (unilateral ECT). While you are asleep, your brain is stimulated very briefly with an electric current. This causes a seizure, usually lasting less than a minute. Because you are under anesthesia, your body will not move even as your brain goes through great changes.  What are the risks?  When done properly, ECT is quite safe. Right after the treatment, you may be confused. This often lasts for less than half an hour. You may have a headache or stiff muscles. But these symptoms often go away quickly. A more serious possible side effect is memory loss. Commonly, people have short-term  (temporary) trouble remembering information that they learned recently. And they may have little recall of the time when they received treatment. Less commonly, people may have long-lasting (permanent) spotty recall of major past events. In rare cases, there may be memory loss for larger blocks of time.  Looking to the future  In most cases, ECT doesnt cure depression. But it can improve symptoms for a period of time. You may need a series of ECT treatments to continue feeling the benefit. You may also need to take antidepressant medicines to help prevent symptoms from returning. But with ongoing treatment, you can have a full and healthy life.  Resources  · The National [place] of Mental Health  843.893.9129  www.nimh.nih.gov  · [facility]  687.860.6973  www.[facility].org     Date Last Reviewed: 5/1/2017  © 0041-1858 [employer]. [address] [place] [address], [place] [address]. All rights reserved. This information is not intended as a substitute for professional medical care. Always follow your healthcare professional's instructions.

## 2017-10-24 NOTE — OP NOTE
Allyssa Wright  : 1980   MRN: [id number]  Date: 10/24/2017       Psychiatry - ECT  Operative Note             Date of Admission: 10/24/2017  5:44 AM    Site: [facility], [address]    Attending: Shoaib Boyce Jr., MD  Residents: Ernst Taylor MD  Pre-op Diagnosis: MDD, recurrent, in partial remission     ECT Treatment Number: 30  Machine Type: Mecta 5000    Patient Status: medically stable    Vitals (pre-procedure):  Vitals:    10/24/17 0824   BP: 112/72   Pulse:    Resp:    Temp:        Electrode Placement: Bitemporal    Stimulus Number Charge (mC) Level Pulse Width (msec) Frequency  (Hz) Duration of Stimulus (sec) Current (mA) Duration of Seizure (sec)   1 576 3 1 60 6 800 53                                   Complications: HTN    Maximum Blood Pressure: 151/98    Medications Given:  Caffeine 500 mg PO Before  Methohexital (Brevital) 200 mg IV During   Succinylcholine (Anectine)120 mg IV During   Ondansetron (Zofran) 4 mg IV During  Toradol 30 mg IV During   Labetalol 20 mg IV during    Treatment Course: Patient tolerated procedure well. After adequate recovery from general anesthesia, the patient was transported to recovery.    Post-op Diagnosis: Same as above    Recommended for next ECT: on 2017      Ernst Taylor MD  [facility] Psychiatry, PGY-4  10/24/2017 7:25 AM

## 2017-10-24 NOTE — DISCHARGE SUMMARY
Allyssa Wright  : 1980   MRN: [id number]  Date: 10/24/2017     Psychiatry - ECT  Discharge Summary    Admit Date: 10/24/2017  5:44 AM  Discharge Date: 10/24/2017    Attending Physician: Shoaib Boyce Jr., MD  Discharge Provider: Ernst Taylor MD    History of Present Illness: Allyssa Wright is a 37 y.o. female with Major Depressive Disorder, recurrent, in partial remission presented for ECT #30. See H&P dated 10/24/2017 for full HPI. For further details, see Dr. Boyce's pre-ECT evaluation.    Hospital Course: The patient tolerated the ECT treatment well without complication. Patient was stable post-procedure. See OP note dated 10/24/2017 for more details.     Disposition: Home or Self Care    Medications:  Current Discharge Medication List      CONTINUE these medications which have NOT CHANGED    Details   clozapine (CLOZARIL) 50 MG tablet Take 50 mg by mouth once daily.      dapsone 7.5 % GlwP Apply topically once daily.      famciclovir (FAMVIR) 500 MG tablet Take 1 tablet (500 mg total) by mouth 2 (two) times daily.  Qty: 30 tablet, Refills: 12    Associated Diagnoses: Major depressive disorder, recurrent episode, moderate degree      mirtazapine (REMERON) 7.5 MG Tab Take 7.5 mg by mouth every evening.      spironolactone (ALDACTONE) 50 MG tablet 50 mg 2 (two) times daily. 50  mg qAM, 50 mg qHS.      thyroid (ARMOUR THYROID) 30 mg Tab Take 1 tablet (30 mg total) by mouth every morning.  Qty: 30 tablet, Refills: 11    Associated Diagnoses: Major depressive disorder, recurrent episode, moderate degree      trazodone (DESYREL) 100 MG tablet Take 300 mg by mouth every evening.       Venlafaxine XR (EFFEXOR XR) 75 MG Tab Take 225 mg by mouth once daily.    Associated Diagnoses: MDD (major depressive disorder), recurrent, in partial remission      dextroamphetamine-amphetamine 30 mg Tab Take 30 mg by mouth 2 (two) times daily.     Associated Diagnoses: MDD (major depressive disorder), recurrent, in partial  remission      hydrOXYzine pamoate (VISTARIL) 25 MG Cap Take 1 capsule (25 mg total) by mouth every 8 (eight) hours as needed (anxiety).  Qty: 90 capsule, Refills: 1      naftifine (NAFTIN) 1 % cream Apply topically daily as needed. Apply to feet      UNABLE TO FIND 2 (two) times daily. n-azetyl-cysteine      ZOVIRAX 5 % Crea Refills: 5           Status at Discharge: Alert and medically stable    Discharge Diagnoses: Major Depressive Disorder, recurrent, in partial remission    Diet: Resume previous outpatient diet    Activity: Ambulate with assistance - patient is a fall risk    Instructions: Please do not eat or drink anything after midnight prior to procedure. Please do not drive on day of ECT.    Med Changes: None    Next ECT:  On November 28, 2017     Ernst Taylor MD  [facility] Psychiatry, PGY-4  10/24/2017 7:29 AM

## 2017-12-01 ENCOUNTER — ANESTHESIA (OUTPATIENT)
Dept: ELECTROPHYSIOLOGY | Facility: HOSPITAL | Age: 37
End: 2017-12-01
Payer: COMMERCIAL

## 2017-12-01 ENCOUNTER — ANESTHESIA EVENT (OUTPATIENT)
Dept: ELECTROPHYSIOLOGY | Facility: HOSPITAL | Age: 37
End: 2017-12-01
Payer: COMMERCIAL

## 2017-12-03 NOTE — ANESTHESIA PREPROCEDURE EVALUATION
[facility]  Anesthesia Pre-Operative Evaluation         Patient Name: Allyssa Wright  YOB: 1980  MRN: [id number]    SUBJECTIVE:     Pre-operative evaluation for Procedure(s) (LRB):  ELECTROCONVULSIVE THERAPY (ECT) - SINGLE SEIZURE (N/A)     12/03/2017    Allyssa Wright is a 37 y.o. female w/ a significant PMHx of MDD who presents for ECT #30.     Last ECT Medication doses:      Labetalol 15 mg  Succinylcholine 120mg  Odansetron 4 mg  Ketorolac 30 mg  Methohexital 200 mg     Baseline Blood Pressure: 120/80  Peak Blood Pressure: 143/102     LDA: none      Prev airway: none on file     Drips: none      Patient Active Problem List   Diagnosis    MDD (major depressive disorder), recurrent, in partial remission    Major depressive disorder, recurrent, in partial remission    MDD (major depressive disorder), severe    Major depressive disorder, recurrent    Other acne       Review of patient's allergies indicates:   Allergen Reactions    Benzodiazepines Other (See Comments)     Contraindicated while taking Clozapine    Ampicillin      Mom says so    Erythromycin     Levaquin [levofloxacin] Other (See Comments)     Depression side effects    Penicillins      Mom says so    Pristiq [desvenlafaxine]      psycotic      Sulfa (sulfonamide antibiotics)      Rash      Azithromycin Anxiety       Current Inpatient Medications:      No current facility-administered medications on file prior to encounter.      Current Outpatient Prescriptions on File Prior to Encounter   Medication Sig Dispense Refill    clozapine (CLOZARIL) 50 MG tablet Take 50 mg by mouth once daily.      dapsone 7.5 % GlwP Apply topically once daily.      dextroamphetamine-amphetamine 30 mg Tab Take 30 mg by mouth 2 (two) times daily.       famciclovir (FAMVIR) 500 MG tablet Take 1 tablet (500 mg total) by mouth 2 (two) times daily. 30 tablet 12    hydrOXYzine pamoate (VISTARIL) 25 MG Cap Take 1 capsule (25 mg  total) by mouth every 8 (eight) hours as needed (anxiety). (Patient taking differently: Take 50 mg by mouth every 8 (eight) hours as needed (anxiety). ) 90 capsule 1    mirtazapine (REMERON) 7.5 MG Tab Take 7.5 mg by mouth every evening.      naftifine (NAFTIN) 1 % cream Apply topically daily as needed. Apply to feet      spironolactone (ALDACTONE) 50 MG tablet 50 mg 2 (two) times daily. 50  mg qAM, 50 mg qHS.      thyroid (ARMOUR THYROID) 30 mg Tab Take 1 tablet (30 mg total) by mouth every morning. 30 tablet 11    trazodone (DESYREL) 100 MG tablet Take 300 mg by mouth every evening.       UNABLE TO FIND 2 (two) times daily. n-azetyl-cysteine      venlafaxine (EFFEXOR) 100 MG Tab Take 300 mg by mouth once daily.      ZOVIRAX 5 % Crea   5       Past Surgical History:   Procedure Laterality Date    ANKLE SURGERY Right     BREAST augmentation      OVARIAN CYST REMOVAL Bilateral        Social History     Social History    Marital status: Single     Spouse name: N/A    Number of children: N/A    Years of education: N/A     Occupational History    unemployed      Social History Main Topics    Smoking status: Former Smoker     Quit date: 4/6/2011    Smokeless tobacco: Not on file    Alcohol use No      Comment: Alcohol misuse in remote past    Drug use: No      Comment: Experimental use in high school    Sexual activity: Not on file     Other Topics Concern    Not on file     Social History Narrative    Pt has 1 older brother from an intact family until the death of her mother in 2012.  She completed 1 year of college, was never in the , and has never been employed.  She was engaged once, but never , has no children, and lives with her father plus 2 dogs.  She denies any hobbies and is spiritual but not Hindu.  She does date and returns to college during rare periods when depression and anxiety [place].       OBJECTIVE:     Vital Signs Range (Last 24H):  BP: ()/()   Arterial Line BP:  ()/()       CBC:   Lab Results   Component Value Date    WBC 6.83 06/08/2016    HGB 13.3 06/08/2016    HCT 40.3 06/08/2016    MCV 92 06/08/2016     06/08/2016       CMP:   CMP  Sodium   Date Value Ref Range Status   06/08/2016 137 136 - 145 mmol/L Final     Potassium   Date Value Ref Range Status   06/08/2016 3.8 3.5 - 5.1 mmol/L Final     Chloride   Date Value Ref Range Status   06/08/2016 101 95 - 110 mmol/L Final     CO2   Date Value Ref Range Status   06/08/2016 28 23 - 29 mmol/L Final     Glucose   Date Value Ref Range Status   06/08/2016 81 70 - 110 mg/dL Final     BUN, Bld   Date Value Ref Range Status   06/08/2016 10 6 - 20 mg/dL Final     Creatinine   Date Value Ref Range Status   06/08/2016 0.7 0.5 - 1.4 mg/dL Final   09/12/2012 0.6 0.2 - 1.4 mg/dl Final     Calcium   Date Value Ref Range Status   06/08/2016 9.5 8.7 - 10.5 mg/dL Final   09/12/2012 9.6 8.6 - 10.2 mg/dl Final     Total Protein   Date Value Ref Range Status   06/08/2016 7.3 6.0 - 8.4 g/dL Final     Albumin   Date Value Ref Range Status   06/08/2016 4.5 3.5 - 5.2 g/dL Final     Total Bilirubin   Date Value Ref Range Status   06/08/2016 0.4 0.1 - 1.0 mg/dL Final     Comment:     For infants and newborns, interpretation of results should be based  on gestational age, weight and in agreement with clinical  observations.  Premature Infant recommended reference ranges:  Up to 24 hours.............<8.0 mg/dL  Up to 48 hours............<12.0 mg/dL  3-5 days..................<15.0 mg/dL  6-29 days.................<15.0 mg/dL       Alkaline Phosphatase   Date Value Ref Range Status   06/08/2016 28 (L) 55 - 135 U/L Final   09/12/2012 31 23 - 119 UNIT/L Final     AST   Date Value Ref Range Status   06/08/2016 20 10 - 40 U/L Final   09/12/2012 22 10 - 30 UNIT/L Final     ALT   Date Value Ref Range Status   06/08/2016 14 10 - 44 U/L Final     Anion Gap   Date Value Ref Range Status   06/08/2016 8 8 - 16 mmol/L Final   09/12/2012 8 5 - 15 meq/L Final      eGFR if    Date Value Ref Range Status   06/08/2016 >60.0 >60 mL/min/1.73 m^2 Final     eGFR if non    Date Value Ref Range Status   06/08/2016 >60.0 >60 mL/min/1.73 m^2 Final     Comment:     Calculation used to obtain the estimated glomerular filtration  rate (eGFR) is the CKD-EPI equation. Since race is unknown   in our information system, the eGFR values for   -American and Non--American patients are given   for each creatinine result.         INR:  No results for input(s): INR, PROTIME, APTT in the last 72 hours.    Invalid input(s): PT    Diagnostic Studies: No relevant studies.    EKG: No recent studies available.    2D ECHO:  No results found for this or any previous visit.      ASSESSMENT/PLAN:         Anesthesia Evaluation    I have reviewed the Patient Summary Reports.    I have reviewed the Nursing Notes.   I have reviewed the Medications.     Review of Systems  Anesthesia Hx:  No problems with previous Anesthesia  History of prior surgery of interest to airway management or planning: Previous anesthesia: General 8/14/17 ECT with general anesthesia.  Procedure performed at an [facility]. Denies Family Hx of Anesthesia complications.   Denies Personal Hx of Anesthesia complications.   Social:  Former Smoker    Hematology/Oncology:  Hematology Normal   Oncology Normal     EENT/Dental:EENT/Dental Normal   Cardiovascular:   Denies Hypertension.  Denies MI.   Denies Dysrhythmias.   Denies Angina.    Pulmonary:  Pulmonary Normal    Renal/:  Renal/ Normal     Hepatic/GI:  Hepatic/GI Normal    Musculoskeletal:  Musculoskeletal Normal    Neurological:  Neurology Normal    Endocrine:   Hypothyroidism    Psych:   Psychiatric History          Physical Exam  General:  Well nourished    Airway/Jaw/Neck:  Airway Findings: Mouth Opening: Normal Tongue: Normal  General Airway Assessment: Adult  Mallampati: II  Improves to I with phonation.  TM Distance: Normal,  at least 6 cm  Jaw/Neck Findings:  Neck ROM: Normal ROM     Eyes/Ears/Nose:  EYES/EARS/NOSE FINDINGS: Normal   Dental:  Dental Findings: In tact   Chest/Lungs:  Chest/Lungs Clear    Heart/Vascular:  Heart Findings: Normal Heart murmur: negative    Abdomen:  Abdomen Findings: Normal    Musculoskeletal:  Musculoskeletal Findings: Normal   Skin:  Skin Findings: Normal    Mental Status:  Mental Status Findings:  Cooperative, Alert and Oriented         Anesthesia Plan  Type of Anesthesia, risks & benefits discussed:  Anesthesia Type:  general  Patient's Preference:   Intra-op Monitoring Plan: standard ASA monitors  Intra-op Monitoring Plan Comments:   Post Op Pain Control Plan: IV/PO Opioids PRN  Post Op Pain Control Plan Comments:   Induction:   IV  Beta Blocker:  Patient is not currently on a Beta-Blocker (No further documentation required).       Informed Consent: Patient understands risks and agrees with Anesthesia plan.  Questions answered. Anesthesia consent signed with patient.  ASA Score: 3     Day of Surgery Review of History & Physical:            Ready For Surgery From Anesthesia Perspective.

## 2017-12-11 ENCOUNTER — SURGERY (OUTPATIENT)
Age: 37
End: 2017-12-11

## 2017-12-11 ENCOUNTER — HOSPITAL ENCOUNTER (OUTPATIENT)
Facility: HOSPITAL | Age: 37
Discharge: HOME | End: 2017-12-11
Attending: PSYCHIATRY & NEUROLOGY | Admitting: PSYCHIATRY & NEUROLOGY
Payer: COMMERCIAL

## 2017-12-11 VITALS
TEMPERATURE: 98 F | DIASTOLIC BLOOD PRESSURE: 82 MMHG | OXYGEN SATURATION: 100 % | BODY MASS INDEX: 25.83 KG/M2 | SYSTOLIC BLOOD PRESSURE: 105 MMHG | WEIGHT: 155 LBS | HEIGHT: 65 IN | HEART RATE: 79 BPM | RESPIRATION RATE: 16 BRPM

## 2017-12-11 DIAGNOSIS — F33.9 MAJOR DEPRESSION, RECURRENT, CHRONIC: ICD-10-CM

## 2017-12-11 DIAGNOSIS — F33.41 MDD (MAJOR DEPRESSIVE DISORDER), RECURRENT, IN PARTIAL REMISSION: Primary | ICD-10-CM

## 2017-12-11 DIAGNOSIS — F33.9 RECURRENT MAJOR DEPRESSIVE DISORDER, REMISSION STATUS UNSPECIFIED: Primary | ICD-10-CM

## 2017-12-11 LAB
B-HCG UR QL: NEGATIVE
CTP QC/QA: YES

## 2017-12-11 PROCEDURE — 37000009 HC ANESTHESIA EA ADD 15 MINS: Performed by: PSYCHIATRY & NEUROLOGY

## 2017-12-11 PROCEDURE — 37000008 HC ANESTHESIA 1ST 15 MINUTES: Performed by: PSYCHIATRY & NEUROLOGY

## 2017-12-11 PROCEDURE — D9220A PRA ANESTHESIA: Mod: ,,, | Performed by: ANESTHESIOLOGY

## 2017-12-11 PROCEDURE — 25000003 PHARM REV CODE 250: Performed by: STUDENT IN AN ORGANIZED HEALTH CARE EDUCATION/TRAINING PROGRAM

## 2017-12-11 PROCEDURE — 71000033 HC RECOVERY, INTIAL HOUR: Performed by: PSYCHIATRY & NEUROLOGY

## 2017-12-11 PROCEDURE — 90870 ELECTROCONVULSIVE THERAPY: CPT | Mod: GC,,, | Performed by: PSYCHIATRY & NEUROLOGY

## 2017-12-11 PROCEDURE — 90870 ELECTROCONVULSIVE THERAPY: CPT | Performed by: STUDENT IN AN ORGANIZED HEALTH CARE EDUCATION/TRAINING PROGRAM

## 2017-12-11 PROCEDURE — 63600175 PHARM REV CODE 636 W HCPCS: Performed by: STUDENT IN AN ORGANIZED HEALTH CARE EDUCATION/TRAINING PROGRAM

## 2017-12-11 PROCEDURE — 25000003 PHARM REV CODE 250: Performed by: PSYCHIATRY & NEUROLOGY

## 2017-12-11 PROCEDURE — 81025 URINE PREGNANCY TEST: CPT | Performed by: PSYCHIATRY & NEUROLOGY

## 2017-12-11 RX ORDER — KETOROLAC TROMETHAMINE 30 MG/ML
INJECTION, SOLUTION INTRAMUSCULAR; INTRAVENOUS
Status: COMPLETED
Start: 2017-12-11 | End: 2017-12-11

## 2017-12-11 RX ORDER — SODIUM CHLORIDE 9 MG/ML
INJECTION, SOLUTION INTRAVENOUS CONTINUOUS
Status: DISCONTINUED | OUTPATIENT
Start: 2017-12-11 | End: 2017-12-11 | Stop reason: HOSPADM

## 2017-12-11 RX ORDER — LIDOCAINE HYDROCHLORIDE 10 MG/ML
1 INJECTION, SOLUTION EPIDURAL; INFILTRATION; INTRACAUDAL; PERINEURAL ONCE
Status: DISCONTINUED | OUTPATIENT
Start: 2017-12-11 | End: 2017-12-11 | Stop reason: HOSPADM

## 2017-12-11 RX ORDER — SODIUM CHLORIDE 0.9 % (FLUSH) 0.9 %
3 SYRINGE (ML) INJECTION
Status: DISCONTINUED | OUTPATIENT
Start: 2017-12-11 | End: 2017-12-11 | Stop reason: HOSPADM

## 2017-12-11 RX ORDER — LIDOCAINE HYDROCHLORIDE 10 MG/ML
1 INJECTION, SOLUTION EPIDURAL; INFILTRATION; INTRACAUDAL; PERINEURAL ONCE
Status: COMPLETED | OUTPATIENT
Start: 2017-12-11 | End: 2017-12-11

## 2017-12-11 RX ORDER — SUCCINYLCHOLINE CHLORIDE 20 MG/ML
INJECTION INTRAMUSCULAR; INTRAVENOUS
Status: DISCONTINUED | OUTPATIENT
Start: 2017-12-11 | End: 2017-12-11

## 2017-12-11 RX ORDER — SUCCINYLCHOLINE CHLORIDE 20 MG/ML
INJECTION INTRAMUSCULAR; INTRAVENOUS
Status: COMPLETED
Start: 2017-12-11 | End: 2017-12-11

## 2017-12-11 RX ORDER — ONDANSETRON 2 MG/ML
INJECTION INTRAMUSCULAR; INTRAVENOUS
Status: COMPLETED
Start: 2017-12-11 | End: 2017-12-11

## 2017-12-11 RX ORDER — METHOHEXITAL IN WATER/PF 100MG/10ML
SYRINGE (ML) INTRAVENOUS
Status: DISCONTINUED
Start: 2017-12-11 | End: 2017-12-11 | Stop reason: HOSPADM

## 2017-12-11 RX ORDER — SODIUM CHLORIDE 9 MG/ML
500 INJECTION, SOLUTION INTRAVENOUS ONCE
Status: DISCONTINUED | OUTPATIENT
Start: 2017-12-11 | End: 2017-12-11 | Stop reason: HOSPADM

## 2017-12-11 RX ORDER — KETOROLAC TROMETHAMINE 30 MG/ML
INJECTION, SOLUTION INTRAMUSCULAR; INTRAVENOUS
Status: DISCONTINUED | OUTPATIENT
Start: 2017-12-11 | End: 2017-12-11

## 2017-12-11 RX ORDER — ONDANSETRON 2 MG/ML
INJECTION INTRAMUSCULAR; INTRAVENOUS
Status: DISCONTINUED | OUTPATIENT
Start: 2017-12-11 | End: 2017-12-11

## 2017-12-11 RX ORDER — LABETALOL HYDROCHLORIDE 5 MG/ML
INJECTION, SOLUTION INTRAVENOUS
Status: DISCONTINUED | OUTPATIENT
Start: 2017-12-11 | End: 2017-12-11

## 2017-12-11 RX ADMIN — SUCCINYLCHOLINE CHLORIDE 120 MG: 20 INJECTION, SOLUTION INTRAMUSCULAR; INTRAVENOUS at 07:12

## 2017-12-11 RX ADMIN — SODIUM CHLORIDE: 0.9 INJECTION, SOLUTION INTRAVENOUS at 06:12

## 2017-12-11 RX ADMIN — LABETALOL HYDROCHLORIDE 15 MG: 5 INJECTION INTRAVENOUS at 07:12

## 2017-12-11 RX ADMIN — KETOROLAC TROMETHAMINE 30 MG: 30 INJECTION, SOLUTION INTRAMUSCULAR; INTRAVENOUS at 07:12

## 2017-12-11 RX ADMIN — ONDANSETRON 4 MG: 2 INJECTION, SOLUTION INTRAMUSCULAR; INTRAVENOUS at 07:12

## 2017-12-11 RX ADMIN — LIDOCAINE HYDROCHLORIDE 10 MG: 10 INJECTION, SOLUTION EPIDURAL; INFILTRATION; INTRACAUDAL; PERINEURAL at 06:12

## 2017-12-11 RX ADMIN — Medication 500 MG: at 06:12

## 2017-12-11 RX ADMIN — METHOHEXITAL SODIUM 200 MG: 500 INJECTION, POWDER, LYOPHILIZED, FOR SOLUTION INTRAMUSCULAR; INTRAVENOUS; RECTAL at 07:12

## 2017-12-11 NOTE — DISCHARGE INSTRUCTIONS
F/U January 22nd.     Understanding Electroconvulsive Therapy  Electroconvulsive therapy (ECT) is sometimes called shock therapy. This may sound painful, but ECT doesnt hurt. Its often the safest and best treatment for severe depression. It can treat other mental disorders as well.  What is electroconvulsive therapy?  ECT is used to treat people who are very depressed. Its mainly used when other treatments, such as antidepressant medicines, have failed. Often it may relieve feelings of sadness and despair after 2 to 4 treatments.  Common symptoms of major depression  Symptoms of major depression include:  · Feeling a deep sadness that doesnt go away  · Losing all pleasure in life  · Feeling hopeless or helpless  · Feeling guilty  · Sleeping more or less than normal  · Eating more or less than normal  · Having headaches or stomachaches, or other pains that dont go away  · Feeling nervous, empty, or worthless  · Crying a great deal  · Thinking or talking about suicide or death  How is ECT therapy done?  Before an ECT treatment, youll be given anesthesia to keep you pain-free. Youll also be given medicine to make you sleep, relax your muscles and control your heart rate. Your healthcare provider then places electrodes on your head. You may have one above each temple (bilateral ECT). Or you may have electrodes on one temple and on your forehead (unilateral ECT). While you are asleep, your brain is stimulated very briefly with an electric current. This causes a seizure, usually lasting less than a minute. Because you are under anesthesia, your body will not move even as your brain goes through great changes.  What are the risks?  When done properly, ECT is quite safe. Right after the treatment, you may be confused. This often lasts for less than half an hour. You may have a headache or stiff muscles. But these symptoms often go away quickly. A more serious possible side effect is memory loss. Commonly, people  have short-term (temporary) trouble remembering information that they learned recently. And they may have little recall of the time when they received treatment. Less commonly, people may have long-lasting (permanent) spotty recall of major past events. In rare cases, there may be memory loss for larger blocks of time.  Looking to the future  In most cases, ECT doesnt cure depression. But it can improve symptoms for a period of time. You may need a series of ECT treatments to continue feeling the benefit. You may also need to take antidepressant medicines to help prevent symptoms from returning. But with ongoing treatment, you can have a full and healthy life.  Resources  · The National [employer] of Mental Health  760.173.8090  www.nimh.nih.gov  · [facility]  965.477.8474  www.[facility].org     Date Last Reviewed: 5/1/2017  © 6308-6273 [employer]. [address] [place] [address], [place] [address]. All rights reserved. This information is not intended as a substitute for professional medical care. Always follow your healthcare professional's instructions.

## 2017-12-11 NOTE — ANESTHESIA POSTPROCEDURE EVALUATION
"Anesthesia Post Evaluation    Patient: Allyssa Wright    Procedure(s) Performed: Procedure(s) (LRB):  ELECTROCONVULSIVE THERAPY (ECT) - SINGLE SEIZURE (N/A)    Final Anesthesia Type: general  Patient location during evaluation: PACU  Patient participation: Yes- Able to Participate  Level of consciousness: awake and alert  Post-procedure vital signs: reviewed and stable  Pain management: adequate  Airway patency: patent  PONV status at discharge: No PONV  Anesthetic complications: no      Cardiovascular status: blood pressure returned to baseline and hemodynamically stable  Respiratory status: unassisted, spontaneous ventilation and room air  Hydration status: euvolemic  Follow-up not needed.        Visit Vitals  /77   Pulse 84   Temp 37.3 °C (99.1 °F) (Skin)   Resp 16   Ht 5' 5" (1.651 m)   Wt 70.3 kg (155 lb)   SpO2 100%   Breastfeeding? No   BMI 25.79 kg/m²       Pain/Roma Score: Pain Assessment Performed: Yes (12/11/2017  7:38 AM)  Presence of Pain: non-verbal indicators absent (12/11/2017  7:38 AM)  Roma Score: 10 (12/11/2017  7:50 AM)      "

## 2017-12-11 NOTE — OP NOTE
Allyssa Wright  : 1980   MRN: [id number]  Date: 2017       Psychiatry - ECT  Operative Note             Date of Admission: 2017  5:53 AM    Site: [facility] [address]    Attending: Shoaib Boyce Jr., MD  Residents: Robert Alvarez MD  Pre-op Diagnosis: MDD, recurrent, in partial remission     ECT Treatment Number: 31  Machine Type: Chef [employer]ovunque 5000    Patient Status: medically stable    Vitals (pre-procedure):  Vitals:    17 0613   BP: 120/74   Pulse: 102   Resp: 16   Temp: 97.7 °F (36.5 °C)       Electrode Placement: Bitemporal    Stimulus Number Charge (mC) Level Pulse Width (msec) Frequency  (Hz) Duration of Stimulus (sec) Current (mA) Duration of Seizure (sec)   1 576 3 1 60 6 800 55                                   Complications: HTN    Maximum Blood Pressure: 145/88    Medications Given:  Caffeine 500 mg PO Before  Methohexital (Brevital) 200 mg IV During   Succinylcholine (Anectine)120 mg IV During   Ondansetron (Zofran) 4 mg IV During  Toradol 30 mg IV During   Labetalol 15 mg IV during    Treatment Course: Patient tolerated procedure well. After adequate recovery from general anesthesia, the patient was transported to recovery.    Post-op Diagnosis: Same as above    Recommended for next ECT: on 2018      Robert Alvarez MD  [facility] Psychiatry, PGY-4  2017 7:25 AM

## 2017-12-11 NOTE — TRANSFER OF CARE
"Anesthesia Transfer of Care Note    Patient: Allyssa Wright    Procedure(s) Performed: Procedure(s) (LRB):  ELECTROCONVULSIVE THERAPY (ECT) - SINGLE SEIZURE (N/A)    Patient location: PACU    Anesthesia Type: general    Transport from OR: Transported from OR on 6-10 L/min O2 by face mask with adequate spontaneous ventilation    Post pain: adequate analgesia    Post assessment: no apparent anesthetic complications    Post vital signs: stable    Level of consciousness: awake and alert    Nausea/Vomiting: no nausea/vomiting    Complications: none    Transfer of care protocol was followed      Last vitals:   Visit Vitals  /86   Pulse 78   Temp 36.5 °C (97.7 °F) (Oral)   Resp 16   Ht 5' 5" (1.651 m)   Wt 70.3 kg (155 lb)   SpO2 100%   Breastfeeding? No   BMI 25.79 kg/m²     "

## 2017-12-11 NOTE — DISCHARGE SUMMARY
Allyssa Wright  : 1980   MRN: [id number]  Date: 2017     Psychiatry - ECT  Discharge Summary    Admit Date: 2017  5:53 AM  Discharge Date: 2017    Attending Physician: Shoaib Boyce Jr., MD  Discharge Provider: Robert Alvarez MD    History of Present Illness: Allyssa Wright is a 37 y.o. female with Major Depressive Disorder, recurrent, in partial remission presented for ECT #31. See H&P dated 2017 for full HPI. For further details, see Dr. Boyce's pre-ECT evaluation.    Hospital Course: The patient tolerated the ECT treatment well without complication. Patient was stable post-procedure. See OP note dated 2017 for more details.     Disposition: Home or Self Care    Medications:  Current Discharge Medication List      CONTINUE these medications     Details   clozapine (CLOZARIL) 100 MG tablet Take 100 mg by mouth once daily.      dapsone 7.5 % GlwP Apply topically once daily.      famciclovir (FAMVIR) 500 MG tablet Take 1 tablet (500 mg total) by mouth 2 (two) times daily.  Qty: 30 tablet, Refills: 12    Associated Diagnoses: Major depressive disorder, recurrent episode, moderate degree      mirtazapine (REMERON) 7.5 MG Tab Take 7.5 mg by mouth every evening.      spironolactone (ALDACTONE) 50 MG tablet 50 mg 2 (two) times daily. 50  mg qAM, 50 mg qHS.      thyroid (ARMOUR THYROID) 30 mg Tab Take 1 tablet (30 mg total) by mouth every morning.  Qty: 30 tablet, Refills: 11    Associated Diagnoses: Major depressive disorder, recurrent episode, moderate degree      trazodone (DESYREL) 100 MG tablet Take 300 mg by mouth every evening.       Venlafaxine XR (EFFEXOR XR) 75 MG Tab Take 225 mg by mouth once daily.    Associated Diagnoses: MDD (major depressive disorder), recurrent, in partial remission      dextroamphetamine-amphetamine 30 mg Tab Take 30 mg by mouth 2 (two) times daily.     Associated Diagnoses: MDD (major depressive disorder), recurrent, in partial remission       hydrOXYzine pamoate (VISTARIL) 25 MG Cap Take 1 capsule (25 mg total) by mouth every 8 (eight) hours as needed (anxiety).  Qty: 90 capsule, Refills: 1      naftifine (NAFTIN) 1 % cream Apply topically daily as needed. Apply to feet      UNABLE TO FIND 2 (two) times daily. n-azetyl-cysteine      ZOVIRAX 5 % Crea Refills: 5           Status at Discharge: Alert and medically stable    Discharge Diagnoses: Major Depressive Disorder, recurrent, in partial remission    Diet: Resume previous outpatient diet    Activity: Ambulate with assistance - patient is a fall risk    Instructions: Please do not eat or drink anything after midnight prior to procedure. Please do not drive on day of ECT.    Med Changes: None    Next ECT:  On January 22, 2018     Robert Alvarez MD  [facility], PGY-4  12/11/2017 7:29 AM

## 2017-12-11 NOTE — H&P
"Allyssa Wright  : 1980   MRN: [id number]  Date: 2017     Psychiatry - ECT  History & Physical    Chief complaint: Major Depressive Disorder, recurrent, in partial remission   Procedure: ECT #31    SUBJECTIVE:   HPI:     Ms. Wright is a 37 year old white female with past psychiatric history of MDD, recurrent, severe who returns for ECT #31. She states that her mood has been "good" since last treatment, but she does endorse increased anxiety. She notes that her Clozapine was increased to 100 mg PO daily per Dr. Jc. She notes that she will discuss this issue with her other psychiatrist, Dr. Jc, at their appointment tomorrow. Denies SI/HI/AH/VH. Patient says that her memory is mildly impaired (retrograde and verbal), but she continues to think that the positive impact of ECT is worth the memory loss. She denies any issues with dentition. She denies anything to eat or drink after midnight. She denies any acute medical complaints or notable medication AEs.     Psychiatric Review of Systems:   Mood: "good"  Appetite: no issues  Psychomotor: none  Cognitive Impairment: mild to be expected with ECT  Insomnia: absent  Psychosis: absent   Diurnal Variation: absent   Suicidal Ideation: absent      Medical Review Of Systems:   Constitutional: negative for chills, fevers and sweats  Eyes: negative for visual disturbance  Respiratory: negative for cough, sputum and wheezing  Cardiovascular: negative for chest pain, palpitations, and dyspnea  Gastrointestinal: negative for abdominal pain, N/V  Neurovascular: negative for headache, dizziness, weakness    Current Medications:   No current facility-administered medications on file prior to encounter.      Current Outpatient Prescriptions on File Prior to Encounter   Medication Sig Dispense Refill    clozapine (CLOZARIL) 50 MG tablet Take 100 mg by mouth once daily.       dapsone 7.5 % GlwP Apply topically once daily.      famciclovir (FAMVIR) 500 MG tablet Take 1 " tablet (500 mg total) by mouth 2 (two) times daily. 30 tablet 12    mirtazapine (REMERON) 7.5 MG Tab Take 7.5 mg by mouth every evening.      spironolactone (ALDACTONE) 50 MG tablet 50 mg 2 (two) times daily. 50  mg qAM, 50 mg qHS.      thyroid (ARMOUR THYROID) 30 mg Tab Take 1 tablet (30 mg total) by mouth every morning. 30 tablet 11    trazodone (DESYREL) 100 MG tablet Take 200 mg by mouth every evening.       UNABLE TO FIND 2 (two) times daily. n-azetyl-cysteine      venlafaxine (EFFEXOR) 100 MG Tab Take 300 mg by mouth once daily.      dextroamphetamine-amphetamine 30 mg Tab Take 30 mg by mouth 2 (two) times daily.       hydrOXYzine pamoate (VISTARIL) 25 MG Cap Take 1 capsule (25 mg total) by mouth every 8 (eight) hours as needed (anxiety). (Patient taking differently: Take 50 mg by mouth every 8 (eight) hours as needed (anxiety). ) 90 capsule 1    naftifine (NAFTIN) 1 % cream Apply topically daily as needed. Apply to feet      ZOVIRAX 5 % Crea   5      Allergies:   Benzodiazepines; Ampicillin; Erythromycin; Levaquin [levofloxacin]; Penicillins; Pristiq [desvenlafaxine]; Sulfa (sulfonamide antibiotics); and Azithromycin    Past Medical/Surgical History:   Past Medical History:   Diagnosis Date    Anxiety     Depression     History of psychiatric hospitalization     HSV-1 (herpes simplex virus 1) infection     Hx of psychiatric care     Moderate depressed bipolar II disorder 06/13/2016    reports no history of bipolar    Obsessive-compulsive disorder     Psychiatric problem     Self-harming behavior     Therapy      Past Surgical History:   Procedure Laterality Date    ANKLE SURGERY Right     BREAST augmentation      OVARIAN CYST REMOVAL Bilateral       OBJECTIVE:     Vitals (pre-procedure):  Vitals:    12/11/17 0613   BP: 120/74   Pulse: 102   Resp: 16   Temp: 97.7 °F (36.5 °C)        Labs/Imaging/Studies:   No results found for this or any previous visit (from the past 48 hour(s)).   No  "results found for: PHENYTOIN, PHENOBARB, VALPROATE, CBMZ    Physical Exam:     General: resting in bed in NAD  HEENT: EOMI  Respiratory: CTAB, unlabored breathing  Cardiovascular: RRR, no murmurs  Abdominal: abdomen soft, non-tender, non-distended, (+) BS  Extremities: no cyanosis or clubbing, no edema  Neurological: no involuntary movements observed, no focal neurological deficits    Mental Status Exam:   Appearance: normal weight, neatly groomed, lying in bed  Behavior: calm, cooperative; friendly  Speech: Conversational rate, tone, and volume  Mood: "good"  Affect: minimally constricted; appropriate for stated mood  Thought Process: linear, goal-oriented  Thought Perceptions: denied AVH  Thought Content: no SI or HI; no delusions appreciated  Sensorium: awake, alert  Cognition: concentration intact to conversation; memory intact conversation (no overt issues with recent or remote memory); fully oriented  Insight: good  Judgment: good    ASSESSMENT/PLAN:     Allyssa Wright is a 37 y.o. female with MDD (major depressive disorder), recurrent, in partial remission who presents for ECT.    Recommendations:   Proceed with ECT #31    Robert Alvarez MD  [facility] Psychiatry, PGY-4  12/11/2017   "

## 2017-12-11 NOTE — ANESTHESIA PROCEDURE NOTES
ECT    Treatment Number: 31  Procedure start time: 12/11/2017 7:23 AM  Timeout performed at: 12/11/2017 7:20 AM  Procedure end time: 12/11/2017 7:25 AM    Staffing  Anesthesiologist: CHARLEY ODOM JR  CRNA/Resident: WESTON ROGERS  Performed by: resident/CRNA     Preanesthetic Checklist  The following were completed as part of the preanesthetic checklist: patient identified, procedural consent, pre-op evaluation, timeout performed, risks and benefits discussed, monitors and equipment checked, anesthesia consent given, oxygen available, suction available, hand hygiene performed and patient being monitored.    Setup & Induction  Patient Monitoring: heart rate, cardiac monitor, continuous pulse ox, continuous capnometry, NIBP and gas analyzer  Patient preparation: bite block inserted, extremities padded, mandibular stabilization and patient hyperventilated  Electrode Placement: Bitemporal    The patient was moved to the ECT therapy room after being assessed and consented for ECT. After standard ASA monitors were applied and timeout performed, the patient was adequately preoxygenated. After induction of general anesthesia, adequate oxygenation and ventilation were confirmed with pulse oxymetry and end tidal CO2 monitoring via bag-mask ventilation. End tidal CO2 was monitored throughout the case and moderate hyperventilation was performed prior to beginning ECT treatment. All extremities were padded, biteblock was inserted, and mandibular stabilization was done prior to initiating ECT therapy.    Procedure  Stimulus Number 1: Setting Number = III; 576 millicoulombs; Seizure Duration = 55 seconds            Recovery  After adequate recovery from general anesthesia, the patient was transported to recovery.  Baseline Blood Pressure: 115/78  Peak Blood Pressure: 144/95  Time to Recovery of Respirations: 3 minutes    ECT Findings  ECT associated findings of: None

## 2017-12-11 NOTE — PLAN OF CARE
Discharge instructions reviewed w/ pt and father, verbalized understanding. Pt in NADN.No complaints at this time. Tolerated liquids w/ no issues. To be d/c'd home w/ family.

## 2018-01-15 ENCOUNTER — TELEPHONE (OUTPATIENT)
Dept: PSYCHIATRY | Facility: CLINIC | Age: 38
End: 2018-01-15

## 2018-01-15 NOTE — TELEPHONE ENCOUNTER
"Received a call from patient wanting to push up ECT procedure date that was originally scheduled for next week to this Wednesday 1/17/18. "I have been getting really depressed again over the past few days, and I want to catch it earlier rather than later. I do not know how to describe it other than I know I am getting depressed." Agreeable to this plan. Surgery scheduling notified of change request.   "

## 2018-01-16 ENCOUNTER — TELEPHONE (OUTPATIENT)
Dept: PSYCHIATRY | Facility: CLINIC | Age: 38
End: 2018-01-16

## 2018-01-16 ENCOUNTER — ANESTHESIA EVENT (OUTPATIENT)
Dept: ELECTROPHYSIOLOGY | Facility: HOSPITAL | Age: 38
End: 2018-01-16

## 2018-01-16 NOTE — TELEPHONE ENCOUNTER
Reached out to patient to create a plan for treatment scheduled tomorrow morning pending weather severity. Patient and father still plan to come tomorrow AM for treatment due to decline in patient condition. Given DOSC after hours number to notify surgery if plan changes or unexpected roadblocks occur in the AM. Patient to call me back after 8 AM to reschedule to Thursday if this occurs.

## 2018-01-16 NOTE — ANESTHESIA PREPROCEDURE EVALUATION
[facility]  Anesthesia Pre-Operative Evaluation         Patient Name: Allyssa Wright  YOB: 1980  MRN: [id number]    SUBJECTIVE:     Pre-operative evaluation for Procedure(s) (LRB):  ELECTROCONVULSIVE THERAPY (ECT) - SINGLE SEIZURE (N/A)     01/16/2018    Allyssa Wright is a 37 y.o. female w/ a significant PMHx of MDD who presents for ECT #32.     Last ECT Medication doses:      Labetalol 15 mg  Succinylcholine 120mg  Odansetron 4 mg  Ketorolac 30 mg  Methohexital 200 mg     Baseline Blood Pressure: 120/80  Peak Blood Pressure: 143/102       Patient Active Problem List   Diagnosis    MDD (major depressive disorder), recurrent, in partial remission    Major depressive disorder, recurrent, in partial remission    MDD (major depressive disorder), severe    Major depressive disorder, recurrent    Other acne    Major depression, recurrent, chronic       Review of patient's allergies indicates:   Allergen Reactions    Benzodiazepines Other (See Comments)     Contraindicated while taking Clozapine    Ampicillin      Mom says so    Erythromycin     Levaquin [levofloxacin] Other (See Comments)     Depression side effects    Penicillins      Mom says so    Pristiq [desvenlafaxine]      psycotic      Sulfa (sulfonamide antibiotics)      Rash      Azithromycin Anxiety       Current Inpatient Medications:      Current Outpatient Prescriptions on File Prior to Visit   Medication Sig Dispense Refill    clozapine (CLOZARIL) 50 MG tablet Take 100 mg by mouth once daily.       dapsone 7.5 % GlwP Apply topically once daily.      dextroamphetamine-amphetamine 30 mg Tab Take 30 mg by mouth 2 (two) times daily.       famciclovir (FAMVIR) 500 MG tablet Take 1 tablet (500 mg total) by mouth 2 (two) times daily. 30 tablet 12    hydrOXYzine pamoate (VISTARIL) 25 MG Cap Take 1 capsule (25 mg total) by mouth every 8 (eight) hours as needed (anxiety). (Patient taking differently: Take 50 mg by  mouth every 8 (eight) hours as needed (anxiety). ) 90 capsule 1    mirtazapine (REMERON) 7.5 MG Tab Take 7.5 mg by mouth every evening.      naftifine (NAFTIN) 1 % cream Apply topically daily as needed. Apply to feet      spironolactone (ALDACTONE) 50 MG tablet 50 mg 2 (two) times daily. 50  mg qAM, 50 mg qHS.      thyroid (ARMOUR THYROID) 30 mg Tab Take 1 tablet (30 mg total) by mouth every morning. 30 tablet 11    trazodone (DESYREL) 100 MG tablet Take 200 mg by mouth every evening.       UNABLE TO FIND 2 (two) times daily. n-azetyl-cysteine      venlafaxine (EFFEXOR) 100 MG Tab Take 300 mg by mouth once daily.      ZOVIRAX 5 % Crea   5     No current facility-administered medications on file prior to visit.        Past Surgical History:   Procedure Laterality Date    ANKLE SURGERY Right     BREAST augmentation      OVARIAN CYST REMOVAL Bilateral        Social History     Social History    Marital status: Single     Spouse name: N/A    Number of children: N/A    Years of education: N/A     Occupational History    unemployed      Social History Main Topics    Smoking status: Former Smoker     Quit date: 4/6/2011    Smokeless tobacco: Not on file    Alcohol use No      Comment: Alcohol misuse in remote past    Drug use: No      Comment: Experimental use in high school    Sexual activity: Not on file     Other Topics Concern    Not on file     Social History Narrative    Pt has 1 older brother from an intact family until the death of her mother in 2012.  She completed 1 year of college, was never in the , and has never been employed.  She was engaged once, but never , has no children, and lives with her father plus 2 dogs.  She denies any hobbies and is spiritual but not Christian.  She does date and returns to college during rare periods when depression and anxiety [place].       OBJECTIVE:     Vital Signs Range (Last 24H):  BP: ()/()   Arterial Line BP: ()/()       CBC:   Lab  Results   Component Value Date    WBC 6.83 06/08/2016    HGB 13.3 06/08/2016    HCT 40.3 06/08/2016    MCV 92 06/08/2016     06/08/2016       CMP:   CMP  Sodium   Date Value Ref Range Status   06/08/2016 137 136 - 145 mmol/L Final     Potassium   Date Value Ref Range Status   06/08/2016 3.8 3.5 - 5.1 mmol/L Final     Chloride   Date Value Ref Range Status   06/08/2016 101 95 - 110 mmol/L Final     CO2   Date Value Ref Range Status   06/08/2016 28 23 - 29 mmol/L Final     Glucose   Date Value Ref Range Status   06/08/2016 81 70 - 110 mg/dL Final     BUN, Bld   Date Value Ref Range Status   06/08/2016 10 6 - 20 mg/dL Final     Creatinine   Date Value Ref Range Status   06/08/2016 0.7 0.5 - 1.4 mg/dL Final   09/12/2012 0.6 0.2 - 1.4 mg/dl Final     Calcium   Date Value Ref Range Status   06/08/2016 9.5 8.7 - 10.5 mg/dL Final   09/12/2012 9.6 8.6 - 10.2 mg/dl Final     Total Protein   Date Value Ref Range Status   06/08/2016 7.3 6.0 - 8.4 g/dL Final     Albumin   Date Value Ref Range Status   06/08/2016 4.5 3.5 - 5.2 g/dL Final     Total Bilirubin   Date Value Ref Range Status   06/08/2016 0.4 0.1 - 1.0 mg/dL Final     Comment:     For infants and newborns, interpretation of results should be based  on gestational age, weight and in agreement with clinical  observations.  Premature Infant recommended reference ranges:  Up to 24 hours.............<8.0 mg/dL  Up to 48 hours............<12.0 mg/dL  3-5 days..................<15.0 mg/dL  6-29 days.................<15.0 mg/dL       Alkaline Phosphatase   Date Value Ref Range Status   06/08/2016 28 (L) 55 - 135 U/L Final   09/12/2012 31 23 - 119 UNIT/L Final     AST   Date Value Ref Range Status   06/08/2016 20 10 - 40 U/L Final   09/12/2012 22 10 - 30 UNIT/L Final     ALT   Date Value Ref Range Status   06/08/2016 14 10 - 44 U/L Final     Anion Gap   Date Value Ref Range Status   06/08/2016 8 8 - 16 mmol/L Final   09/12/2012 8 5 - 15 meq/L Final     eGFR if   American   Date Value Ref Range Status   06/08/2016 >60.0 >60 mL/min/1.73 m^2 Final     eGFR if non    Date Value Ref Range Status   06/08/2016 >60.0 >60 mL/min/1.73 m^2 Final     Comment:     Calculation used to obtain the estimated glomerular filtration  rate (eGFR) is the CKD-EPI equation. Since race is unknown   in our information system, the eGFR values for   -American and Non--American patients are given   for each creatinine result.         INR:  No results for input(s): PT, INR, PROTIME, APTT in the last 72 hours.    Diagnostic Studies: No relevant studies.    EKG: No recent studies available.    2D ECHO:  No results found for this or any previous visit.      ASSESSMENT/PLAN:         Pre-op Assessment    I have reviewed the Patient Summary Reports.     I have reviewed the Nursing Notes.   I have reviewed the Medications.     Review of Systems  Anesthesia Hx:  No problems with previous Anesthesia  History of prior surgery of interest to airway management or planning: Previous anesthesia: General 8/14/17 ECT with general anesthesia.  Procedure performed at an [facility]. Denies Family Hx of Anesthesia complications.   Denies Personal Hx of Anesthesia complications.   Social:  Former Smoker    Hematology/Oncology:  Hematology Normal   Oncology Normal     EENT/Dental:EENT/Dental Normal   Cardiovascular:   Denies Hypertension.  Denies MI.   Denies Dysrhythmias.   Denies Angina.    Pulmonary:  Pulmonary Normal    Renal/:  Renal/ Normal     Hepatic/GI:  Hepatic/GI Normal    Musculoskeletal:  Musculoskeletal Normal    Neurological:  Neurology Normal    Endocrine:   Hypothyroidism    Psych:   Psychiatric History          Physical Exam  General:  Well nourished    Airway/Jaw/Neck:  Airway Findings: Mouth Opening: Normal Tongue: Normal  General Airway Assessment: Adult  Mallampati: II  Improves to I with phonation.  TM Distance: Normal, at least 6 cm  Jaw/Neck Findings:  Neck  ROM: Normal ROM     Eyes/Ears/Nose:  EYES/EARS/NOSE FINDINGS: Normal   Dental:  Dental Findings: In tact   Chest/Lungs:  Chest/Lungs Clear    Heart/Vascular:  Heart Findings: Normal Heart murmur: negative    Abdomen:  Abdomen Findings: Normal    Musculoskeletal:  Musculoskeletal Findings: Normal   Skin:  Skin Findings: Normal    Mental Status:  Mental Status Findings:  Cooperative, Alert and Oriented         Anesthesia Plan  Type of Anesthesia, risks & benefits discussed:  Anesthesia Type:  general  Patient's Preference:   Intra-op Monitoring Plan: standard ASA monitors  Intra-op Monitoring Plan Comments:   Post Op Pain Control Plan: IV/PO Opioids PRN  Post Op Pain Control Plan Comments:   Induction:   IV  Beta Blocker:  Patient is not currently on a Beta-Blocker (No further documentation required).       Informed Consent: Patient understands risks and agrees with Anesthesia plan.  Questions answered. Anesthesia consent signed with patient.  ASA Score: 2     Day of Surgery Review of History & Physical:            Ready For Surgery From Anesthesia Perspective.

## 2018-01-17 DIAGNOSIS — F33.9 RECURRENT MAJOR DEPRESSIVE DISORDER, REMISSION STATUS UNSPECIFIED: Primary | ICD-10-CM

## 2018-01-22 ENCOUNTER — HOSPITAL ENCOUNTER (OUTPATIENT)
Facility: HOSPITAL | Age: 38
Discharge: HOME | End: 2018-01-22
Attending: PSYCHIATRY & NEUROLOGY | Admitting: PSYCHIATRY & NEUROLOGY
Payer: COMMERCIAL

## 2018-01-22 ENCOUNTER — ANESTHESIA EVENT (OUTPATIENT)
Dept: ELECTROPHYSIOLOGY | Facility: HOSPITAL | Age: 38
End: 2018-01-22
Payer: COMMERCIAL

## 2018-01-22 ENCOUNTER — ANESTHESIA (OUTPATIENT)
Dept: ELECTROPHYSIOLOGY | Facility: HOSPITAL | Age: 38
End: 2018-01-22
Payer: COMMERCIAL

## 2018-01-22 VITALS
TEMPERATURE: 98 F | HEIGHT: 65 IN | RESPIRATION RATE: 16 BRPM | HEART RATE: 84 BPM | OXYGEN SATURATION: 100 % | BODY MASS INDEX: 25.83 KG/M2 | SYSTOLIC BLOOD PRESSURE: 112 MMHG | WEIGHT: 155 LBS | DIASTOLIC BLOOD PRESSURE: 61 MMHG

## 2018-01-22 DIAGNOSIS — F33.41 RECURRENT MAJOR DEPRESSION IN PARTIAL REMISSION: ICD-10-CM

## 2018-01-22 DIAGNOSIS — F33.41 MDD (MAJOR DEPRESSIVE DISORDER), RECURRENT, IN PARTIAL REMISSION: ICD-10-CM

## 2018-01-22 LAB
B-HCG UR QL: NEGATIVE
CTP QC/QA: YES

## 2018-01-22 PROCEDURE — 25000003 PHARM REV CODE 250: Performed by: STUDENT IN AN ORGANIZED HEALTH CARE EDUCATION/TRAINING PROGRAM

## 2018-01-22 PROCEDURE — 90870 ELECTROCONVULSIVE THERAPY: CPT | Mod: ,,, | Performed by: PSYCHIATRY & NEUROLOGY

## 2018-01-22 PROCEDURE — 71000044 HC DOSC ROUTINE RECOVERY FIRST HOUR: Performed by: PSYCHIATRY & NEUROLOGY

## 2018-01-22 PROCEDURE — 90870 ELECTROCONVULSIVE THERAPY: CPT | Performed by: STUDENT IN AN ORGANIZED HEALTH CARE EDUCATION/TRAINING PROGRAM

## 2018-01-22 PROCEDURE — 25000003 PHARM REV CODE 250: Performed by: PSYCHIATRY & NEUROLOGY

## 2018-01-22 PROCEDURE — 37000009 HC ANESTHESIA EA ADD 15 MINS: Performed by: PSYCHIATRY & NEUROLOGY

## 2018-01-22 PROCEDURE — 81025 URINE PREGNANCY TEST: CPT | Performed by: ANESTHESIOLOGY

## 2018-01-22 PROCEDURE — D9220A PRA ANESTHESIA: Mod: ,,, | Performed by: ANESTHESIOLOGY

## 2018-01-22 PROCEDURE — 63600175 PHARM REV CODE 636 W HCPCS: Performed by: STUDENT IN AN ORGANIZED HEALTH CARE EDUCATION/TRAINING PROGRAM

## 2018-01-22 PROCEDURE — 37000008 HC ANESTHESIA 1ST 15 MINUTES: Performed by: PSYCHIATRY & NEUROLOGY

## 2018-01-22 RX ORDER — LABETALOL HYDROCHLORIDE 5 MG/ML
INJECTION, SOLUTION INTRAVENOUS
Status: DISCONTINUED | OUTPATIENT
Start: 2018-01-22 | End: 2018-01-22

## 2018-01-22 RX ORDER — KETOROLAC TROMETHAMINE 30 MG/ML
INJECTION, SOLUTION INTRAMUSCULAR; INTRAVENOUS
Status: DISCONTINUED | OUTPATIENT
Start: 2018-01-22 | End: 2018-01-22

## 2018-01-22 RX ORDER — MIRTAZAPINE 15 MG/1
15 TABLET, FILM COATED ORAL NIGHTLY
Qty: 90 TABLET | Refills: 0 | Status: ON HOLD | OUTPATIENT
Start: 2018-01-22 | End: 2018-06-25

## 2018-01-22 RX ORDER — LIDOCAINE HYDROCHLORIDE 10 MG/ML
1 INJECTION, SOLUTION EPIDURAL; INFILTRATION; INTRACAUDAL; PERINEURAL ONCE
Status: COMPLETED | OUTPATIENT
Start: 2018-01-23 | End: 2018-01-22

## 2018-01-22 RX ORDER — KETOROLAC TROMETHAMINE 30 MG/ML
INJECTION, SOLUTION INTRAMUSCULAR; INTRAVENOUS
Status: COMPLETED
Start: 2018-01-22 | End: 2018-01-22

## 2018-01-22 RX ORDER — SODIUM CHLORIDE 0.9 % (FLUSH) 0.9 %
3 SYRINGE (ML) INJECTION
Status: DISCONTINUED | OUTPATIENT
Start: 2018-01-22 | End: 2018-01-22 | Stop reason: HOSPADM

## 2018-01-22 RX ORDER — KETOROLAC TROMETHAMINE 30 MG/ML
30 INJECTION, SOLUTION INTRAMUSCULAR; INTRAVENOUS ONCE AS NEEDED
Status: DISCONTINUED | OUTPATIENT
Start: 2018-01-23 | End: 2018-01-22 | Stop reason: HOSPADM

## 2018-01-22 RX ORDER — SODIUM CHLORIDE 9 MG/ML
125 INJECTION, SOLUTION INTRAVENOUS CONTINUOUS
Status: DISCONTINUED | OUTPATIENT
Start: 2018-01-23 | End: 2018-01-22 | Stop reason: HOSPADM

## 2018-01-22 RX ORDER — ONDANSETRON 2 MG/ML
INJECTION INTRAMUSCULAR; INTRAVENOUS
Status: DISCONTINUED | OUTPATIENT
Start: 2018-01-22 | End: 2018-01-22

## 2018-01-22 RX ORDER — SUCCINYLCHOLINE CHLORIDE 20 MG/ML
INJECTION INTRAMUSCULAR; INTRAVENOUS
Status: DISCONTINUED | OUTPATIENT
Start: 2018-01-22 | End: 2018-01-22

## 2018-01-22 RX ORDER — SUCCINYLCHOLINE CHLORIDE 20 MG/ML
INJECTION INTRAMUSCULAR; INTRAVENOUS
Status: COMPLETED
Start: 2018-01-22 | End: 2018-01-22

## 2018-01-22 RX ORDER — LABETALOL HYDROCHLORIDE 5 MG/ML
INJECTION, SOLUTION INTRAVENOUS
Status: COMPLETED
Start: 2018-01-22 | End: 2018-01-22

## 2018-01-22 RX ORDER — ONDANSETRON 2 MG/ML
INJECTION INTRAMUSCULAR; INTRAVENOUS
Status: COMPLETED
Start: 2018-01-22 | End: 2018-01-22

## 2018-01-22 RX ORDER — ONDANSETRON 2 MG/ML
4 INJECTION INTRAMUSCULAR; INTRAVENOUS ONCE AS NEEDED
Status: DISCONTINUED | OUTPATIENT
Start: 2018-01-23 | End: 2018-01-22 | Stop reason: HOSPADM

## 2018-01-22 RX ADMIN — ONDANSETRON 4 MG: 2 INJECTION, SOLUTION INTRAMUSCULAR; INTRAVENOUS at 07:01

## 2018-01-22 RX ADMIN — LABETALOL HYDROCHLORIDE 5 MG: 5 INJECTION INTRAVENOUS at 07:01

## 2018-01-22 RX ADMIN — KETOROLAC TROMETHAMINE 30 MG: 30 INJECTION, SOLUTION INTRAMUSCULAR; INTRAVENOUS at 07:01

## 2018-01-22 RX ADMIN — SUCCINYLCHOLINE CHLORIDE 120 MG: 20 INJECTION, SOLUTION INTRAMUSCULAR; INTRAVENOUS at 07:01

## 2018-01-22 RX ADMIN — LIDOCAINE HYDROCHLORIDE 1 MG: 10 INJECTION, SOLUTION EPIDURAL; INFILTRATION; INTRACAUDAL; PERINEURAL at 07:01

## 2018-01-22 RX ADMIN — Medication 500 MG: at 07:01

## 2018-01-22 RX ADMIN — SODIUM CHLORIDE 125 ML/HR: 0.9 INJECTION, SOLUTION INTRAVENOUS at 07:01

## 2018-01-22 RX ADMIN — METHOHEXITAL SODIUM 200 MG: 500 INJECTION, POWDER, LYOPHILIZED, FOR SOLUTION INTRAMUSCULAR; INTRAVENOUS; RECTAL at 07:01

## 2018-01-22 NOTE — H&P
"Allyssa Wright  : 1980   MRN: [id number]  Date: 2018     Electroconvulsive Therapy  History & Physical    Chief complaint: MDD, recurrent, severe  Procedure: ECT #32    SUBJECTIVE:     HPI:   Allyssa Wright is a 37 y.o. female with MDD recurrent, severe who presents for ECT. Please see Dr. Boyce's full pre-ECT evaluation for further details.     Pt reports that she feels like her mood has dipped since her last treatment and she feels like the depression is relapsing. Describes decreased energy, motivation, increased appetite and anhedonia. She denies suicidal thoughts and is still able to smile on interview, but just doesn't feel well. Dr. Jc recently increased her Remeron from 7.5 to 15, and while this increase has helped her mood, it has made her more hungry and she already suffers from binge eating disorder. She denies AVH. Pt inquiring about having next ECT treatment closer than 6 weeks away because she wants to get ahead of the depression. Reports mild ST memory issues, stable.  No physical complaints today.     The patient does not need any prescriptions and has not had any med changes since meeting with Dr. Boyce. The patient has not had anything by mouth since midnight and is ready for ECT.    Psychiatric Review of Systems:  Mood: "starting to dip", relapsing depression  Appetite: increased  Psychomotor: none  Cognitive Impairment: mild ST memory issues  Insomnia: absent  Psychosis: absent   Diurnal Variation: absent   Suicidal Ideation: absent      Medical Review Of Systems:  CONST: no fever or chills  RESP: no cough or SOB  CV: no chest pain or palpitations  GI: no abd pain, nausea, or vomiting  NEURO: mild ST memory issues, stable      Current Medications:   No current facility-administered medications on file prior to encounter.      Current Outpatient Prescriptions on File Prior to Encounter   Medication Sig Dispense Refill    clozapine (CLOZARIL) 50 MG tablet Take 100 mg by mouth " once daily.       dapsone 7.5 % GlwP Apply topically once daily.      dextroamphetamine-amphetamine 30 mg Tab Take 30 mg by mouth 2 (two) times daily.       famciclovir (FAMVIR) 500 MG tablet Take 1 tablet (500 mg total) by mouth 2 (two) times daily. 30 tablet 12    hydrOXYzine pamoate (VISTARIL) 25 MG Cap Take 1 capsule (25 mg total) by mouth every 8 (eight) hours as needed (anxiety). (Patient taking differently: Take 50 mg by mouth every 8 (eight) hours as needed (anxiety). ) 90 capsule 1    mirtazapine (REMERON) 7.5 MG Tab Take 15 mg by mouth every evening.       naftifine (NAFTIN) 1 % cream Apply topically daily as needed. Apply to feet      spironolactone (ALDACTONE) 50 MG tablet 50 mg 2 (two) times daily. 50  mg qAM, 50 mg qHS.      thyroid (ARMOUR THYROID) 30 mg Tab Take 1 tablet (30 mg total) by mouth every morning. 30 tablet 11    trazodone (DESYREL) 100 MG tablet Take 150 mg by mouth every evening.       UNABLE TO FIND 2 (two) times daily. n-azetyl-cysteine      venlafaxine (EFFEXOR) 100 MG Tab Take 300 mg by mouth once daily.      ZOVIRAX 5 % Crea   5      Allergies:   Benzodiazepines; Ampicillin; Erythromycin; Levaquin [levofloxacin]; Penicillins; Pristiq [desvenlafaxine]; Sulfa (sulfonamide antibiotics); and Azithromycin    Past Medical/Surgical History:   Past Medical History:   Diagnosis Date    Anxiety     Depression     History of psychiatric hospitalization     HSV-1 (herpes simplex virus 1) infection      of psychiatric care     Moderate depressed bipolar II disorder 06/13/2016    reports no history of bipolar    Obsessive-compulsive disorder     Psychiatric problem     Self-harming behavior     Therapy      Past Surgical History:   Procedure Laterality Date    ANKLE SURGERY Right     BREAST augmentation      OVARIAN CYST REMOVAL Bilateral       OBJECTIVE:     Vitals (pre-procedure):  Vitals:    01/22/18 0645   BP: 95/62   Pulse: 80   Resp: 16   Temp: 97.8 °F (36.6 °C)  "       Labs/Imaging/Studies:   Recent Results (from the past 48 hour(s))   POCT urine pregnancy    Collection Time: 01/22/18  6:52 AM   Result Value Ref Range    POC Preg Test, Ur Negative Negative     Acceptable Yes       No results found for: PHENYTOIN, PHENOBARB, VALPROATE, CBMZ    Physical Exam:   General: well nourished; NAD  Orientation: oriented to person, place, and time  Mental Status: alert and responsive, see below  HEENT: normocephalic, no lesions or obvious abnormality  Chest: breath sounds clear and equal bilaterally  Heart: RRR; no M/R/G noted   Abdomen: soft, non-tender, no palpable masses  Extremities: no c/c/e, atraumatic    Mental Status Exam:   Appearance: normal weight, neatly groomed, lying in bed  Behavior: calm, cooperative; friendly  Speech: Conversational rate, tone, and volume  Mood: "starting to dip", depressed  Affect: minimally constricted; able to smile  Thought Process: linear, goal-oriented  Thought Perceptions: denied AVH  Thought Content: no SI or HI; no delusions appreciated  Sensorium: awake, alert  Cognition: concentration intact to conversation; memory intact conversation (no overt issues with recent or remote memory); fully oriented  Insight: good  Judgment: good    ASSESSMENT/PLAN:     Allyssa Wright is a 37 y.o. female with MDD recurrent severe who presents for ECT.    Recommendations:   Proceed with ECT #32.      Kale Bender MD  [facility]  01/22/2018 7:42 AM  "

## 2018-01-22 NOTE — OP NOTE
Allyssa Wright  : 1980   MRN: [id number]  Date: 2018    Electroconvulsive Therapy  Procedure Note    Date of Admission: 2018  5:53 AM    Site: [facility] [address]    Attending: Shoaib Boyce Jr., MD   Residents: Kale Bender MD  Pre-procedure Diagnosis: MDD (major depressive disorder), recurrent, in partial remission   ECT Treatment Number: 32  Machine Type: Goal Zerota 5000    Patient Status: Medically stable    Vitals (pre-procedure):  Vitals:    18 0809   BP: 115/84   Pulse: 80   Resp: 16   Temp:        Electrode Placement: Bitemporal    Stimulus Number Charge (mC) Level Pulse Width (msec) Frequency  (Hz) Duration of Stimulus (sec) Current (mA) Duration of Seizure (sec)   1 576 3 1 60 6 800 54                                   Complications: Hypertension    Maximum Blood Pressure: 148/90    Medications Given:  Caffeine 500 mg PO Before  Methohexital (Brevital) 200 mg IV During   Succinylcholine (Anectine)120 mg IV During   Ondansetron (Zofran) 4 mg IV During  Toradol 30 mg IV During   Labetalol 15 mg IV during    Treatment Course:  Patient tolerated procedure well. After adequate recovery from general anesthesia, the patient was transported to recovery.    Post-op Diagnosis: Same as above    Recommended for next ECT:  No changes      Kale Bender MD  [facility]  2018 8:14 AM

## 2018-01-22 NOTE — ANESTHESIA PREPROCEDURE EVALUATION
[facility]  Anesthesia Pre-Operative Evaluation         Patient Name: Allyssa Wright  YOB: 1980  MRN: [id number]    SUBJECTIVE:     Pre-operative evaluation for Procedure(s) (LRB):  ELECTROCONVULSIVE THERAPY (ECT) - SINGLE SEIZURE (N/A)     01/22/2018    Allyssa Wright is a 37 y.o. female w/ a significant PMHx of MDD who presents for ECT #32.     Last ECT Medication doses:     Methohexital 200 mg  Succinylcholine 120mg  Labetalol 15 mg  Odansetron 4 mg  Ketorolac 30 mg       Patient Active Problem List   Diagnosis    MDD (major depressive disorder), recurrent, in partial remission    Major depressive disorder, recurrent, in partial remission    MDD (major depressive disorder), severe    Major depressive disorder, recurrent    Other acne    Major depression, recurrent, chronic       Review of patient's allergies indicates:   Allergen Reactions    Benzodiazepines Other (See Comments)     Contraindicated while taking Clozapine    Ampicillin      Mom says so    Erythromycin     Levaquin [levofloxacin] Other (See Comments)     Depression side effects    Penicillins      Mom says so    Pristiq [desvenlafaxine]      psycotic      Sulfa (sulfonamide antibiotics)      Rash      Azithromycin Anxiety       Current Inpatient Medications:      Current Outpatient Prescriptions on File Prior to Visit   Medication Sig Dispense Refill    clozapine (CLOZARIL) 50 MG tablet Take 100 mg by mouth once daily.       dapsone 7.5 % GlwP Apply topically once daily.      dextroamphetamine-amphetamine 30 mg Tab Take 30 mg by mouth 2 (two) times daily.       famciclovir (FAMVIR) 500 MG tablet Take 1 tablet (500 mg total) by mouth 2 (two) times daily. 30 tablet 12    hydrOXYzine pamoate (VISTARIL) 25 MG Cap Take 1 capsule (25 mg total) by mouth every 8 (eight) hours as needed (anxiety). (Patient taking differently: Take 50 mg by mouth every 8 (eight) hours as needed (anxiety). ) 90 capsule 1     mirtazapine (REMERON) 7.5 MG Tab Take 7.5 mg by mouth every evening.      naftifine (NAFTIN) 1 % cream Apply topically daily as needed. Apply to feet      spironolactone (ALDACTONE) 50 MG tablet 50 mg 2 (two) times daily. 50  mg qAM, 50 mg qHS.      thyroid (ARMOUR THYROID) 30 mg Tab Take 1 tablet (30 mg total) by mouth every morning. 30 tablet 11    trazodone (DESYREL) 100 MG tablet Take 200 mg by mouth every evening.       UNABLE TO FIND 2 (two) times daily. n-azetyl-cysteine      venlafaxine (EFFEXOR) 100 MG Tab Take 300 mg by mouth once daily.      ZOVIRAX 5 % Crea   5     No current facility-administered medications on file prior to visit.        Past Surgical History:   Procedure Laterality Date    ANKLE SURGERY Right     BREAST augmentation      OVARIAN CYST REMOVAL Bilateral        Social History     Social History    Marital status: Single     Spouse name: N/A    Number of children: N/A    Years of education: N/A     Occupational History    unemployed      Social History Main Topics    Smoking status: Former Smoker     Quit date: 4/6/2011    Smokeless tobacco: Not on file    Alcohol use No      Comment: Alcohol misuse in remote past    Drug use: No      Comment: Experimental use in high school    Sexual activity: Not on file     Other Topics Concern    Not on file     Social History Narrative    Pt has 1 older brother from an intact family until the death of her mother in 2012.  She completed 1 year of college, was never in the , and has never been employed.  She was engaged once, but never , has no children, and lives with her father plus 2 dogs.  She denies any hobbies and is spiritual but not Congregational.  She does date and returns to college during rare periods when depression and anxiety [place].       OBJECTIVE:     Vital Signs Range (Last 24H):  BP: ()/()   Arterial Line BP: ()/()       CBC:   Lab Results   Component Value Date    WBC 6.83 06/08/2016    HGB 13.3  06/08/2016    HCT 40.3 06/08/2016    MCV 92 06/08/2016     06/08/2016       CMP:   CMP  Sodium   Date Value Ref Range Status   06/08/2016 137 136 - 145 mmol/L Final     Potassium   Date Value Ref Range Status   06/08/2016 3.8 3.5 - 5.1 mmol/L Final     Chloride   Date Value Ref Range Status   06/08/2016 101 95 - 110 mmol/L Final     CO2   Date Value Ref Range Status   06/08/2016 28 23 - 29 mmol/L Final     Glucose   Date Value Ref Range Status   06/08/2016 81 70 - 110 mg/dL Final     BUN, Bld   Date Value Ref Range Status   06/08/2016 10 6 - 20 mg/dL Final     Creatinine   Date Value Ref Range Status   06/08/2016 0.7 0.5 - 1.4 mg/dL Final   09/12/2012 0.6 0.2 - 1.4 mg/dl Final     Calcium   Date Value Ref Range Status   06/08/2016 9.5 8.7 - 10.5 mg/dL Final   09/12/2012 9.6 8.6 - 10.2 mg/dl Final     Total Protein   Date Value Ref Range Status   06/08/2016 7.3 6.0 - 8.4 g/dL Final     Albumin   Date Value Ref Range Status   06/08/2016 4.5 3.5 - 5.2 g/dL Final     Total Bilirubin   Date Value Ref Range Status   06/08/2016 0.4 0.1 - 1.0 mg/dL Final     Comment:     For infants and newborns, interpretation of results should be based  on gestational age, weight and in agreement with clinical  observations.  Premature Infant recommended reference ranges:  Up to 24 hours.............<8.0 mg/dL  Up to 48 hours............<12.0 mg/dL  3-5 days..................<15.0 mg/dL  6-29 days.................<15.0 mg/dL       Alkaline Phosphatase   Date Value Ref Range Status   06/08/2016 28 (L) 55 - 135 U/L Final   09/12/2012 31 23 - 119 UNIT/L Final     AST   Date Value Ref Range Status   06/08/2016 20 10 - 40 U/L Final   09/12/2012 22 10 - 30 UNIT/L Final     ALT   Date Value Ref Range Status   06/08/2016 14 10 - 44 U/L Final     Anion Gap   Date Value Ref Range Status   06/08/2016 8 8 - 16 mmol/L Final   09/12/2012 8 5 - 15 meq/L Final     eGFR if    Date Value Ref Range Status   06/08/2016 >60.0 >60  mL/min/1.73 m^2 Final     eGFR if non    Date Value Ref Range Status   06/08/2016 >60.0 >60 mL/min/1.73 m^2 Final     Comment:     Calculation used to obtain the estimated glomerular filtration  rate (eGFR) is the CKD-EPI equation. Since race is unknown   in our information system, the eGFR values for   -American and Non--American patients are given   for each creatinine result.         INR:  No results for input(s): PT, INR, PROTIME, APTT in the last 72 hours.    Diagnostic Studies: No relevant studies.    EKG: No recent studies available.    2D ECHO:  No results found for this or any previous visit.      ASSESSMENT/PLAN:         Pre-op Assessment    I have reviewed the Patient Summary Reports.     I have reviewed the Nursing Notes.   I have reviewed the Medications.     Review of Systems  Anesthesia Hx:  No problems with previous Anesthesia  History of prior surgery of interest to airway management or planning: Previous anesthesia: General 8/14/17 ECT with general anesthesia.  Procedure performed at an [facility]. Denies Family Hx of Anesthesia complications.   Denies Personal Hx of Anesthesia complications.   Social:  Former Smoker    Hematology/Oncology:  Hematology Normal   Oncology Normal     EENT/Dental:EENT/Dental Normal   Cardiovascular:   Denies Hypertension.  Denies MI.   Denies Dysrhythmias.   Denies Angina.    Pulmonary:  Pulmonary Normal    Renal/:  Renal/ Normal     Hepatic/GI:  Hepatic/GI Normal    Musculoskeletal:  Musculoskeletal Normal    Neurological:  Neurology Normal    Endocrine:   Hypothyroidism    Psych:   Psychiatric History          Physical Exam  General:  Well nourished    Airway/Jaw/Neck:  Airway Findings: Mouth Opening: Normal Tongue: Normal  General Airway Assessment: Adult  Mallampati: II  Improves to I with phonation.  TM Distance: Normal, at least 6 cm  Jaw/Neck Findings:  Neck ROM: Normal ROM     Eyes/Ears/Nose:  EYES/EARS/NOSE FINDINGS:  Normal   Dental:  Dental Findings: In tact   Chest/Lungs:  Chest/Lungs Clear    Heart/Vascular:  Heart Findings: Normal Heart murmur: negative    Abdomen:  Abdomen Findings: Normal    Musculoskeletal:  Musculoskeletal Findings: Normal   Skin:  Skin Findings: Normal    Mental Status:  Mental Status Findings:  Cooperative, Alert and Oriented         Anesthesia Plan  Type of Anesthesia, risks & benefits discussed:  Anesthesia Type:  general  Patient's Preference:   Intra-op Monitoring Plan: standard ASA monitors  Intra-op Monitoring Plan Comments:   Post Op Pain Control Plan: IV/PO Opioids PRN  Post Op Pain Control Plan Comments:   Induction:   IV  Beta Blocker:  Patient is not currently on a Beta-Blocker (No further documentation required).       Informed Consent: Patient understands risks and agrees with Anesthesia plan.  Questions answered. Anesthesia consent signed with patient.  ASA Score: 2     Day of Surgery Review of History & Physical:    H&P update referred to the provider.         Ready For Surgery From Anesthesia Perspective.

## 2018-01-22 NOTE — DISCHARGE INSTRUCTIONS

## 2018-01-22 NOTE — TRANSFER OF CARE
"Anesthesia Transfer of Care Note    Patient: Allyssa Wright    Procedure(s) Performed: Procedure(s) (LRB):  ELECTROCONVULSIVE THERAPY (ECT) - SINGLE SEIZURE (N/A)    Patient location: PACU    Anesthesia Type: general    Transport from OR: Transported from OR on 6-10 L/min O2 by face mask with adequate spontaneous ventilation    Post pain: adequate analgesia    Post assessment: no apparent anesthetic complications    Post vital signs: stable    Level of consciousness: responds to stimulation    Nausea/Vomiting: no nausea/vomiting    Complications: none    Transfer of care protocol was followed      Last vitals:   Visit Vitals  BP 95/62 (BP Location: Right arm, Patient Position: Lying)   Pulse 80   Temp 36.6 °C (97.8 °F) (Oral)   Resp 16   Ht 5' 5" (1.651 m)   Wt 70.3 kg (155 lb)   SpO2 99%   BMI 25.79 kg/m²     "

## 2018-01-22 NOTE — ANESTHESIA PROCEDURE NOTES
ECT    Treatment Number: 32  Procedure start time: 1/22/2018 7:48 AM  Timeout performed at: 1/22/2018 7:47 AM  Staffing  Anesthesiologist: TONY DELONG III  CRNA/Resident: DAO RIBEIRO    Preanesthetic Checklist  The following were completed as part of the preanesthetic checklist: patient identified, procedural consent, pre-op evaluation, timeout performed, risks and benefits discussed, monitors and equipment checked, anesthesia consent given, oxygen available, suction available, hand hygiene performed and patient being monitored.    Setup & Induction  Patient Monitoring: heart rate, cardiac monitor, continuous pulse ox, continuous capnometry, gas analyzer and NIBP  Patient preparation: bite block inserted, mandibular stabilization, patient hyperventilated and extremities padded    The patient was moved to the ECT therapy room after being assessed and consented for ECT. After standard ASA monitors were applied and timeout performed, the patient was adequately preoxygenated. After induction of general anesthesia, adequate oxygenation and ventilation were confirmed with pulse oxymetry and end tidal CO2 monitoring via bag-mask ventilation. End tidal CO2 was monitored throughout the case and moderate hyperventilation was performed prior to beginning ECT treatment. All extremities were padded, biteblock was inserted, and mandibular stabilization was done prior to initiating ECT therapy.    Procedure  Stimulus Number 1: Setting Number = III; 576 millicoulombs; Seizure Duration = 54 seconds            Recovery  After adequate recovery from general anesthesia, the patient was transported to recovery.  Baseline Blood Pressure: 95/62  Peak Blood Pressure: 148/98    ECT Findings  ECT associated findings of: None

## 2018-01-22 NOTE — ANESTHESIA POSTPROCEDURE EVALUATION
"Anesthesia Post Evaluation    Patient: Allyssa Wright    Procedure(s) Performed: Procedure(s) (LRB):  ELECTROCONVULSIVE THERAPY (ECT) - SINGLE SEIZURE (N/A)    Final Anesthesia Type: general  Patient location during evaluation: PACU  Patient participation: Yes- Able to Participate  Level of consciousness: awake and alert and oriented  Post-procedure vital signs: reviewed and stable  Pain management: adequate  PONV status at discharge: No PONV  Anesthetic complications: no      Cardiovascular status: blood pressure returned to baseline and hemodynamically stable  Respiratory status: unassisted, room air and spontaneous ventilation  Follow-up not needed.        Visit Vitals  /61 (BP Location: Right arm, Patient Position: Sitting)   Pulse 84   Temp 36.6 °C (97.9 °F) (Skin)   Resp 16   Ht 5' 5" (1.651 m)   Wt 70.3 kg (155 lb)   SpO2 100%   BMI 25.79 kg/m²       Pain/Roma Score: Pain Assessment Performed: Yes (1/22/2018  8:45 AM)  Presence of Pain: denies (1/22/2018  8:45 AM)  Roma Score: 10 (1/22/2018  8:30 AM)      "

## 2018-01-23 DIAGNOSIS — F33.9 RECURRENT MAJOR DEPRESSIVE DISORDER, REMISSION STATUS UNSPECIFIED: Primary | ICD-10-CM

## 2018-02-06 DIAGNOSIS — F33.9 RECURRENT MAJOR DEPRESSIVE DISORDER, REMISSION STATUS UNSPECIFIED: Primary | ICD-10-CM

## 2018-02-06 DIAGNOSIS — Z12.39 SCREENING FOR BREAST CANCER: Primary | ICD-10-CM

## 2018-02-14 ENCOUNTER — ANESTHESIA EVENT (OUTPATIENT)
Dept: ELECTROPHYSIOLOGY | Facility: HOSPITAL | Age: 38
End: 2018-02-14
Payer: COMMERCIAL

## 2018-02-14 NOTE — ANESTHESIA PREPROCEDURE EVALUATION
[facility]  Anesthesia Pre-Operative Evaluation         Patient Name: Allyssa Wright  YOB: 1980  MRN: [id number]    SUBJECTIVE:     Pre-operative evaluation for Procedure(s) (LRB):  ELECTROCONVULSIVE THERAPY (ECT) - SINGLE SEIZURE (N/A)     02/14/2018    Allyssa Wright is a 38 y.o. female w/ a significant PMHx of MDD who presents for ECT #33     Last ECT Medication doses:     Methohexital 200 mg  Succinylcholine 120mg  Labetalol 15 mg  Odansetron 4 mg  Ketorolac 30 mg       Patient Active Problem List   Diagnosis    MDD (major depressive disorder), recurrent, in partial remission    Major depressive disorder, recurrent, in partial remission    MDD (major depressive disorder), severe    Major depressive disorder, recurrent    Other acne    Major depression, recurrent, chronic    Recurrent major depression in partial remission       Review of patient's allergies indicates:   Allergen Reactions    Benzodiazepines Other (See Comments)     Contraindicated while taking Clozapine    Ampicillin      Mom says so    Erythromycin     Levaquin [levofloxacin] Other (See Comments)     Depression side effects    Penicillins      Mom says so    Pristiq [desvenlafaxine]      psycotic      Sulfa (sulfonamide antibiotics)      Rash      Azithromycin Anxiety       Current Inpatient Medications:      Current Outpatient Prescriptions on File Prior to Visit   Medication Sig Dispense Refill    clozapine (CLOZARIL) 50 MG tablet Take 100 mg by mouth once daily.       dapsone 7.5 % GlwP Apply topically once daily.      dextroamphetamine-amphetamine 30 mg Tab Take 30 mg by mouth 2 (two) times daily.       famciclovir (FAMVIR) 500 MG tablet Take 1 tablet (500 mg total) by mouth 2 (two) times daily. 30 tablet 12    hydrOXYzine pamoate (VISTARIL) 25 MG Cap Take 1 capsule (25 mg total) by mouth every 8 (eight) hours as needed (anxiety). (Patient taking differently: Take 50 mg by mouth every 8  (eight) hours as needed (anxiety). ) 90 capsule 1    mirtazapine (REMERON) 15 MG tablet Take 1 tablet (15 mg total) by mouth every evening. 90 tablet 0    naftifine (NAFTIN) 1 % cream Apply topically daily as needed. Apply to feet      spironolactone (ALDACTONE) 50 MG tablet 50 mg 2 (two) times daily. 50  mg qAM, 50 mg qHS.      thyroid (ARMOUR THYROID) 30 mg Tab Take 1 tablet (30 mg total) by mouth every morning. 30 tablet 11    trazodone (DESYREL) 100 MG tablet Take 150 mg by mouth every evening.       UNABLE TO FIND 2 (two) times daily. n-azetyl-cysteine      venlafaxine (EFFEXOR) 100 MG Tab Take 300 mg by mouth once daily.      ZOVIRAX 5 % Crea   5     No current facility-administered medications on file prior to visit.        Past Surgical History:   Procedure Laterality Date    ANKLE SURGERY Right     BREAST augmentation      OVARIAN CYST REMOVAL Bilateral        Social History     Social History    Marital status: Single     Spouse name: N/A    Number of children: N/A    Years of education: N/A     Occupational History    unemployed      Social History Main Topics    Smoking status: Former Smoker     Quit date: 4/6/2011    Smokeless tobacco: Not on file    Alcohol use No      Comment: Alcohol misuse in remote past    Drug use: No      Comment: Experimental use in high school    Sexual activity: Not on file     Other Topics Concern    Not on file     Social History Narrative    Pt has 1 older brother from an intact family until the death of her mother in 2012.  She completed 1 year of college, was never in the , and has never been employed.  She was engaged once, but never , has no children, and lives with her father plus 2 dogs.  She denies any hobbies and is spiritual but not Adventist.  She does date and returns to college during rare periods when depression and anxiety [place].       OBJECTIVE:     Vital Signs Range (Last 24H):  BP: ()/()   Arterial Line BP: ()/()        CBC:   Lab Results   Component Value Date    WBC 6.83 06/08/2016    HGB 13.3 06/08/2016    HCT 40.3 06/08/2016    MCV 92 06/08/2016     06/08/2016       CMP:   CMP  Sodium   Date Value Ref Range Status   06/08/2016 137 136 - 145 mmol/L Final     Potassium   Date Value Ref Range Status   06/08/2016 3.8 3.5 - 5.1 mmol/L Final     Chloride   Date Value Ref Range Status   06/08/2016 101 95 - 110 mmol/L Final     CO2   Date Value Ref Range Status   06/08/2016 28 23 - 29 mmol/L Final     Glucose   Date Value Ref Range Status   06/08/2016 81 70 - 110 mg/dL Final     BUN, Bld   Date Value Ref Range Status   06/08/2016 10 6 - 20 mg/dL Final     Creatinine   Date Value Ref Range Status   06/08/2016 0.7 0.5 - 1.4 mg/dL Final   09/12/2012 0.6 0.2 - 1.4 mg/dl Final     Calcium   Date Value Ref Range Status   06/08/2016 9.5 8.7 - 10.5 mg/dL Final   09/12/2012 9.6 8.6 - 10.2 mg/dl Final     Total Protein   Date Value Ref Range Status   06/08/2016 7.3 6.0 - 8.4 g/dL Final     Albumin   Date Value Ref Range Status   06/08/2016 4.5 3.5 - 5.2 g/dL Final     Total Bilirubin   Date Value Ref Range Status   06/08/2016 0.4 0.1 - 1.0 mg/dL Final     Comment:     For infants and newborns, interpretation of results should be based  on gestational age, weight and in agreement with clinical  observations.  Premature Infant recommended reference ranges:  Up to 24 hours.............<8.0 mg/dL  Up to 48 hours............<12.0 mg/dL  3-5 days..................<15.0 mg/dL  6-29 days.................<15.0 mg/dL       Alkaline Phosphatase   Date Value Ref Range Status   06/08/2016 28 (L) 55 - 135 U/L Final   09/12/2012 31 23 - 119 UNIT/L Final     AST   Date Value Ref Range Status   06/08/2016 20 10 - 40 U/L Final   09/12/2012 22 10 - 30 UNIT/L Final     ALT   Date Value Ref Range Status   06/08/2016 14 10 - 44 U/L Final     Anion Gap   Date Value Ref Range Status   06/08/2016 8 8 - 16 mmol/L Final   09/12/2012 8 5 - 15 meq/L Final      eGFR if    Date Value Ref Range Status   06/08/2016 >60.0 >60 mL/min/1.73 m^2 Final     eGFR if non    Date Value Ref Range Status   06/08/2016 >60.0 >60 mL/min/1.73 m^2 Final     Comment:     Calculation used to obtain the estimated glomerular filtration  rate (eGFR) is the CKD-EPI equation. Since race is unknown   in our information system, the eGFR values for   -American and Non--American patients are given   for each creatinine result.         INR:  No results for input(s): PT, INR, PROTIME, APTT in the last 72 hours.    Diagnostic Studies: No relevant studies.    EKG: No recent studies available.    2D ECHO:  No results found for this or any previous visit.      ASSESSMENT/PLAN:         Pre-op Assessment    I have reviewed the Patient Summary Reports.     I have reviewed the Nursing Notes.   I have reviewed the Medications.     Review of Systems  Anesthesia Hx:  No problems with previous Anesthesia  History of prior surgery of interest to airway management or planning: Previous anesthesia: General 8/14/17 ECT with general anesthesia.  Procedure performed at an [facility]. Denies Family Hx of Anesthesia complications.   Denies Personal Hx of Anesthesia complications.   Social:  Former Smoker    Hematology/Oncology:  Hematology Normal   Oncology Normal     EENT/Dental:EENT/Dental Normal   Cardiovascular:   Denies Hypertension.  Denies MI.   Denies Dysrhythmias.   Denies Angina.    Pulmonary:  Pulmonary Normal    Renal/:  Renal/ Normal     Hepatic/GI:  Hepatic/GI Normal    Musculoskeletal:  Musculoskeletal Normal    Neurological:  Neurology Normal    Endocrine:   Hypothyroidism    Psych:   Psychiatric History          Physical Exam  General:  Well nourished    Airway/Jaw/Neck:  Airway Findings: Mouth Opening: Normal Tongue: Normal  General Airway Assessment: Adult  Mallampati: II  Improves to I with phonation.  TM Distance: Normal, at least 6 cm  Jaw/Neck  Findings:  Neck ROM: Normal ROM     Eyes/Ears/Nose:  EYES/EARS/NOSE FINDINGS: Normal   Dental:  Dental Findings: In tact   Chest/Lungs:  Chest/Lungs Clear    Heart/Vascular:  Heart Findings: Normal Heart murmur: negative    Abdomen:  Abdomen Findings: Normal    Musculoskeletal:  Musculoskeletal Findings: Normal   Skin:  Skin Findings: Normal    Mental Status:  Mental Status Findings:  Cooperative, Alert and Oriented         Anesthesia Plan  Type of Anesthesia, risks & benefits discussed:  Anesthesia Type:  general  Patient's Preference:   Intra-op Monitoring Plan: standard ASA monitors  Intra-op Monitoring Plan Comments:   Post Op Pain Control Plan: IV/PO Opioids PRN  Post Op Pain Control Plan Comments:   Induction:   IV  Beta Blocker:  Patient is not currently on a Beta-Blocker (No further documentation required).       Informed Consent: Patient understands risks and agrees with Anesthesia plan.  Questions answered. Anesthesia consent signed with patient.  ASA Score: 2     Day of Surgery Review of History & Physical:    H&P update referred to the provider.         Ready For Surgery From Anesthesia Perspective.

## 2018-02-15 ENCOUNTER — SURGERY (OUTPATIENT)
Age: 38
End: 2018-02-15

## 2018-02-15 ENCOUNTER — ANESTHESIA (OUTPATIENT)
Dept: ELECTROPHYSIOLOGY | Facility: HOSPITAL | Age: 38
End: 2018-02-15
Payer: COMMERCIAL

## 2018-02-15 ENCOUNTER — HOSPITAL ENCOUNTER (OUTPATIENT)
Facility: HOSPITAL | Age: 38
Discharge: HOME | End: 2018-02-15
Attending: PSYCHIATRY & NEUROLOGY | Admitting: PSYCHIATRY & NEUROLOGY
Payer: COMMERCIAL

## 2018-02-15 VITALS
WEIGHT: 155 LBS | DIASTOLIC BLOOD PRESSURE: 81 MMHG | RESPIRATION RATE: 20 BRPM | SYSTOLIC BLOOD PRESSURE: 109 MMHG | OXYGEN SATURATION: 100 % | HEART RATE: 71 BPM | BODY MASS INDEX: 25.83 KG/M2 | HEIGHT: 65 IN | TEMPERATURE: 98 F

## 2018-02-15 DIAGNOSIS — F33.9 RECURRENT MAJOR DEPRESSIVE DISORDER, REMISSION STATUS UNSPECIFIED: Primary | ICD-10-CM

## 2018-02-15 DIAGNOSIS — F33.41 MDD (MAJOR DEPRESSIVE DISORDER), RECURRENT, IN PARTIAL REMISSION: ICD-10-CM

## 2018-02-15 DIAGNOSIS — F33.9 MAJOR DEPRESSIVE DISORDER, RECURRENT: ICD-10-CM

## 2018-02-15 LAB
B-HCG UR QL: NEGATIVE
CTP QC/QA: YES

## 2018-02-15 PROCEDURE — 90870 ELECTROCONVULSIVE THERAPY: CPT | Performed by: ANESTHESIOLOGY

## 2018-02-15 PROCEDURE — 63600175 PHARM REV CODE 636 W HCPCS: Performed by: ANESTHESIOLOGY

## 2018-02-15 PROCEDURE — 90870 ELECTROCONVULSIVE THERAPY: CPT | Mod: ,,, | Performed by: PSYCHIATRY & NEUROLOGY

## 2018-02-15 PROCEDURE — 25000003 PHARM REV CODE 250: Performed by: ANESTHESIOLOGY

## 2018-02-15 PROCEDURE — 37000009 HC ANESTHESIA EA ADD 15 MINS: Performed by: PSYCHIATRY & NEUROLOGY

## 2018-02-15 PROCEDURE — 37000008 HC ANESTHESIA 1ST 15 MINUTES: Performed by: PSYCHIATRY & NEUROLOGY

## 2018-02-15 PROCEDURE — 25000003 PHARM REV CODE 250: Performed by: PSYCHIATRY & NEUROLOGY

## 2018-02-15 PROCEDURE — 81025 URINE PREGNANCY TEST: CPT | Performed by: PSYCHIATRY & NEUROLOGY

## 2018-02-15 PROCEDURE — 71000044 HC DOSC ROUTINE RECOVERY FIRST HOUR: Performed by: PSYCHIATRY & NEUROLOGY

## 2018-02-15 PROCEDURE — D9220A PRA ANESTHESIA: Mod: ,,, | Performed by: ANESTHESIOLOGY

## 2018-02-15 RX ORDER — LABETALOL HYDROCHLORIDE 5 MG/ML
INJECTION, SOLUTION INTRAVENOUS
Status: COMPLETED
Start: 2018-02-15 | End: 2018-02-15

## 2018-02-15 RX ORDER — KETOROLAC TROMETHAMINE 30 MG/ML
INJECTION, SOLUTION INTRAMUSCULAR; INTRAVENOUS
Status: COMPLETED
Start: 2018-02-15 | End: 2018-02-15

## 2018-02-15 RX ORDER — ONDANSETRON 2 MG/ML
INJECTION INTRAMUSCULAR; INTRAVENOUS
Status: DISCONTINUED | OUTPATIENT
Start: 2018-02-15 | End: 2018-02-15

## 2018-02-15 RX ORDER — SUCCINYLCHOLINE CHLORIDE 20 MG/ML
INJECTION INTRAMUSCULAR; INTRAVENOUS
Status: DISCONTINUED | OUTPATIENT
Start: 2018-02-15 | End: 2018-02-15

## 2018-02-15 RX ORDER — LIDOCAINE HYDROCHLORIDE 10 MG/ML
1 INJECTION, SOLUTION EPIDURAL; INFILTRATION; INTRACAUDAL; PERINEURAL ONCE
Status: COMPLETED | OUTPATIENT
Start: 2018-02-15 | End: 2018-02-15

## 2018-02-15 RX ORDER — SODIUM CHLORIDE 9 MG/ML
INJECTION, SOLUTION INTRAVENOUS CONTINUOUS
Status: DISCONTINUED | OUTPATIENT
Start: 2018-02-15 | End: 2018-02-15 | Stop reason: HOSPADM

## 2018-02-15 RX ORDER — KETOROLAC TROMETHAMINE 30 MG/ML
INJECTION, SOLUTION INTRAMUSCULAR; INTRAVENOUS
Status: DISCONTINUED | OUTPATIENT
Start: 2018-02-15 | End: 2018-02-15

## 2018-02-15 RX ORDER — LABETALOL HYDROCHLORIDE 5 MG/ML
INJECTION, SOLUTION INTRAVENOUS
Status: DISCONTINUED | OUTPATIENT
Start: 2018-02-15 | End: 2018-02-15

## 2018-02-15 RX ORDER — SUCCINYLCHOLINE CHLORIDE 20 MG/ML
INJECTION INTRAMUSCULAR; INTRAVENOUS
Status: COMPLETED
Start: 2018-02-15 | End: 2018-02-15

## 2018-02-15 RX ORDER — ONDANSETRON 2 MG/ML
INJECTION INTRAMUSCULAR; INTRAVENOUS
Status: COMPLETED
Start: 2018-02-15 | End: 2018-02-15

## 2018-02-15 RX ORDER — SODIUM CHLORIDE 0.9 % (FLUSH) 0.9 %
3 SYRINGE (ML) INJECTION
Status: DISCONTINUED | OUTPATIENT
Start: 2018-02-15 | End: 2018-02-15 | Stop reason: HOSPADM

## 2018-02-15 RX ADMIN — SODIUM CHLORIDE: 0.9 INJECTION, SOLUTION INTRAVENOUS at 06:02

## 2018-02-15 RX ADMIN — LABETALOL HYDROCHLORIDE 30 MG: 5 INJECTION INTRAVENOUS at 07:02

## 2018-02-15 RX ADMIN — SUCCINYLCHOLINE CHLORIDE 120 MG: 20 INJECTION, SOLUTION INTRAMUSCULAR; INTRAVENOUS at 07:02

## 2018-02-15 RX ADMIN — KETOROLAC TROMETHAMINE 30 MG: 30 INJECTION, SOLUTION INTRAMUSCULAR; INTRAVENOUS at 07:02

## 2018-02-15 RX ADMIN — LIDOCAINE HYDROCHLORIDE 0.1 MG: 10 INJECTION, SOLUTION EPIDURAL; INFILTRATION; INTRACAUDAL; PERINEURAL at 06:02

## 2018-02-15 RX ADMIN — Medication 500 MG: at 06:02

## 2018-02-15 RX ADMIN — ONDANSETRON 4 MG: 2 INJECTION, SOLUTION INTRAMUSCULAR; INTRAVENOUS at 07:02

## 2018-02-15 RX ADMIN — METHOHEXITAL SODIUM 200 MG: 500 INJECTION, POWDER, LYOPHILIZED, FOR SOLUTION INTRAMUSCULAR; INTRAVENOUS; RECTAL at 07:02

## 2018-02-15 NOTE — DISCHARGE SUMMARY
Allyssa Wright  : 1980   MRN: [id number]  Date: 2/15/2018     Electroconvulsive Therapy  Discharge Summary    Admit Date: 2/15/2018  5:50 AM  Discharge Date: 02/15/2018    Attending Physician: Shoaib Boyce Jr., MD   Discharge Provider: Hanane Remy MD    History of Present Illness: Allyssa Wright is a 38 y.o. female with MDD in partial remission presented for ECT #33. See H&P dated 02/15/2018 for full HPI. For further details, see Dr. Boyce's pre-ECT evaluation.    Hospital Course: The patient tolerated the ECT treatment well without complication. Patient was stable post-procedure. See OP note dated 02/15/2018 for more details.     Disposition: Home or Self Care    Medications:  Current Discharge Medication List      CONTINUE these medications which have NOT CHANGED    Details   clozapine (CLOZARIL) 50 MG tablet Take 100 mg by mouth once daily.       dapsone 7.5 % GlwP Apply topically once daily.      famciclovir (FAMVIR) 500 MG tablet Take 1 tablet (500 mg total) by mouth 2 (two) times daily.  Qty: 30 tablet, Refills: 12    Associated Diagnoses: Major depressive disorder, recurrent episode, moderate degree      hydrOXYzine pamoate (VISTARIL) 25 MG Cap Take 1 capsule (25 mg total) by mouth every 8 (eight) hours as needed (anxiety).  Qty: 90 capsule, Refills: 1      mirtazapine (REMERON) 15 MG tablet Take 1 tablet (15 mg total) by mouth every evening.  Qty: 90 tablet, Refills: 0      spironolactone (ALDACTONE) 50 MG tablet 50 mg 2 (two) times daily. 50  mg qAM, 50 mg qHS.      thyroid (ARMOUR THYROID) 30 mg Tab Take 1 tablet (30 mg total) by mouth every morning.  Qty: 30 tablet, Refills: 11    Associated Diagnoses: Major depressive disorder, recurrent episode, moderate degree      trazodone (DESYREL) 100 MG tablet Take 150 mg by mouth every evening.       UNABLE TO FIND 2 (two) times daily. n-azetyl-cysteine      venlafaxine (EFFEXOR) 100 MG Tab Take 300 mg by mouth once daily.    Associated  Diagnoses: MDD (major depressive disorder), recurrent, in partial remission      dextroamphetamine-amphetamine 30 mg Tab Take 30 mg by mouth 2 (two) times daily.     Associated Diagnoses: MDD (major depressive disorder), recurrent, in partial remission      ZOVIRAX 5 % Crea Refills: 5           Status at Discharge: Alert and medically stable    Discharge Diagnoses: Major Depressive Disorder, Recurrent  Diet: Resume previous outpatient diet  Activity: Ambulate with assistance  Instructions: Please do not eat or drink anything after midnight prior to procedure. Please do not drive on day of ECT.    Med Changes:  Will f/u with Dr. Jc on 2/22    Next ECT:  2/19/18 and 2/21/18    Hanane Remy MD  [facility]  02/15/2018 8:19 AM

## 2018-02-15 NOTE — H&P
"Allyssa Wright  : 1980   MRN: [id number]  Date: 2/15/2018     Electroconvulsive Therapy  History & Physical    Chief complaint: MDD, recurrent, severe  Procedure: ECT #33    SUBJECTIVE:     HPI:   Allyssa Wright is a 38 y.o. female with MDD recurrent, severe who presents for ECT. Please see Dr. Boyce's full pre-ECT evaluation for further details.     Pt reports that she feels like her mood has been very depressed, the worst it has been in a while. She says it is weird because usually when she is that depressed she does not sleep; however, she thinks the combination of Remeron and Clozaril has kept her sleeping. Her appetite is so so. Overall, she complains of significant decreased energy, motivation and anhedonia. She denies suicidal thoughts and smiles and laughs once throughout the interview.    Denies SI/HI/AH/VH.    Since the last ECT, the patient went up on her Remeron from 15mg to 30mg and then went back down because she did not like how it made her feel. She would like to stop the Remeron all together, but she is unable to get ahold of Dr. Jc because he is on vacation.    Patient would like to have a mini booster series of three ECTs to get her feeling a lot better; she had a mini booster series some time in the past that worked well for her.    Psychiatric Review of Systems:  Mood: "really depressed"  Appetite: so so  Psychomotor: retardation  Cognitive Impairment: mild ST memory issues  Insomnia: absent  Psychosis: absent   Diurnal Variation: absent   Suicidal Ideation: absent      Medical Review Of Systems:  CONST: no fever or chills  RESP: no cough or SOB  CV: no chest pain or palpitations  GI: no abd pain, nausea, or vomiting  NEURO: mild ST memory issues, stable      Current Medications:   No current facility-administered medications on file prior to encounter.      Current Outpatient Prescriptions on File Prior to Encounter   Medication Sig Dispense Refill    clozapine (CLOZARIL) 50 MG " tablet Take 100 mg by mouth once daily.       dapsone 7.5 % GlwP Apply topically once daily.      famciclovir (FAMVIR) 500 MG tablet Take 1 tablet (500 mg total) by mouth 2 (two) times daily. 30 tablet 12    hydrOXYzine pamoate (VISTARIL) 25 MG Cap Take 1 capsule (25 mg total) by mouth every 8 (eight) hours as needed (anxiety). (Patient taking differently: Take 50 mg by mouth every 8 (eight) hours as needed (anxiety). ) 90 capsule 1    mirtazapine (REMERON) 15 MG tablet Take 1 tablet (15 mg total) by mouth every evening. 90 tablet 0    spironolactone (ALDACTONE) 50 MG tablet 50 mg 2 (two) times daily. 50  mg qAM, 50 mg qHS.      thyroid (ARMOUR THYROID) 30 mg Tab Take 1 tablet (30 mg total) by mouth every morning. 30 tablet 11    trazodone (DESYREL) 100 MG tablet Take 150 mg by mouth every evening.       UNABLE TO FIND 2 (two) times daily. n-azetyl-cysteine      venlafaxine (EFFEXOR) 100 MG Tab Take 300 mg by mouth once daily.      dextroamphetamine-amphetamine 30 mg Tab Take 30 mg by mouth 2 (two) times daily.       ZOVIRAX 5 % Crea   5    [DISCONTINUED] naftifine (NAFTIN) 1 % cream Apply topically daily as needed. Apply to feet        Allergies:   Benzodiazepines; Ampicillin; Erythromycin; Levaquin [levofloxacin]; Penicillins; Pristiq [desvenlafaxine]; Sulfa (sulfonamide antibiotics); and Azithromycin    Past Medical/Surgical History:   Past Medical History:   Diagnosis Date    Anxiety     Depression     History of psychiatric hospitalization     HSV-1 (herpes simplex virus 1) infection      of psychiatric care     Moderate depressed bipolar II disorder 06/13/2016    reports no history of bipolar    Obsessive-compulsive disorder     Psychiatric problem     Self-harming behavior     Therapy      Past Surgical History:   Procedure Laterality Date    ANKLE SURGERY Right     BREAST augmentation      OVARIAN CYST REMOVAL Bilateral       OBJECTIVE:     Vitals (pre-procedure):  Vitals:     "02/15/18 0620   BP: 108/65   Pulse: 79   Resp: 20   Temp: 98.7 °F (37.1 °C)        Labs/Imaging/Studies:   No results found for this or any previous visit (from the past 48 hour(s)).   No results found for: PHENYTOIN, PHENOBARB, VALPROATE, CBMZ    Physical Exam:   General: well nourished; NAD  Orientation: oriented to person, place, and time  Mental Status: alert and responsive, see below  HEENT: normocephalic, no lesions or obvious abnormality  Chest: breath sounds clear and equal bilaterally  Heart: RRR; no M/R/G noted   Abdomen: soft, non-tender, no palpable masses  Extremities: no c/c/e, atraumatic    Mental Status Exam:   Appearance: normal weight, neatly groomed, lying in bed  Behavior: calm, cooperative; friendly  Speech: Conversational rate, tone, and volume  Mood: "really depressed"  Affect: minimally constricted; able to smile once  Thought Process: linear, goal-oriented  Thought Perceptions: denied AVH  Thought Content: no SI or HI; no delusions appreciated  Sensorium: awake, alert  Cognition: concentration intact to conversation; memory intact conversation (no overt issues with recent or remote memory); fully oriented  Insight: good  Judgment: good    ASSESSMENT/PLAN:     Allyssa Wright is a 38 y.o. female with MDD recurrent severe who presents for ECT.    Recommendations:   Proceed with ECT #33.      Hanane Remy MD  [facility]  02/15/2018 7:42 AM  "

## 2018-02-15 NOTE — ANESTHESIA POSTPROCEDURE EVALUATION
"Anesthesia Post Evaluation    Patient: Allyssa Wright    Procedure(s) Performed: Procedure(s) (LRB):  ELECTROCONVULSIVE THERAPY (ECT) - SINGLE SEIZURE (N/A)    Final Anesthesia Type: general  Patient location during evaluation: PACU  Patient participation: Yes- Able to Participate  Level of consciousness: awake and alert  Post-procedure vital signs: reviewed and stable  Pain management: adequate  Airway patency: patent  PONV status at discharge: No PONV  Anesthetic complications: no      Cardiovascular status: blood pressure returned to baseline and hemodynamically stable  Respiratory status: unassisted, spontaneous ventilation and room air  Hydration status: euvolemic  Follow-up not needed.        Visit Vitals  /81 (BP Location: Right arm, Patient Position: Lying)   Pulse 71   Temp 36.9 °C (98.4 °F) (Temporal)   Resp 20   Ht 5' 5" (1.651 m)   Wt 70.3 kg (155 lb)   SpO2 100%   Breastfeeding? No   BMI 25.79 kg/m²       Pain/Roma Score: Pain Assessment Performed: Yes (2/15/2018  7:35 AM)  Presence of Pain: non-verbal indicators absent (2/15/2018  7:35 AM)  Roma Score: 10 (2/15/2018  7:49 AM)  Modified Roma Score: 20 (2/15/2018  6:21 AM)      "

## 2018-02-15 NOTE — OP NOTE
Allyssa Wright  : 1980   MRN: [id number]  Date: 2/15/2018    Electroconvulsive Therapy  Procedure Note    Date of Admission: 2/15/2018  5:50 AM    Site: [facility], [address]    Attending: Shoaib Boyce Jr., MD   Residents: Hanane Remy MD  Pre-procedure Diagnosis: Major Depressive Disorder, Recurrent  ECT Treatment Number: 33  Machine Type: [employer] 5000    Patient Status: Medically stable    Vitals (pre-procedure):  Vitals:    02/15/18 0620   BP: 108/65   Pulse: 79   Resp: 20   Temp: 98.7 °F (37.1 °C)       Electrode Placement: Bitemporal    Stimulus Number Charge (mC) Level Pulse Width (msec) Frequency  (Hz) Duration of Stimulus (sec) Current (mA) Duration of Seizure (sec)   1 576 3 1 60 6 800 53                                   Complications: Hypertension    Maximum Blood Pressure: 164/86    Medications Given:  Caffeine 500 mg PO Before  Methohexital (Brevital) 200 mg IV During   Succinylcholine (Anectine) 120 mg IV During   Ondansetron (Zofran) 4 mg IV During  Toradol 30 mg IV During   Labetalol 30 mg IV during    Treatment Course:  Patient tolerated procedure well. After adequate recovery from general anesthesia, the patient was transported to recovery.    Post-op Diagnosis: Same as above    Recommended for next ECT:  No changes      Hanane Remy MD  [facility]  02/15/2018 8:14 AM

## 2018-02-15 NOTE — ANESTHESIA PROCEDURE NOTES
ECT    Treatment Number: 35  Procedure start time: 2/15/2018 7:15 AM  Timeout performed at: 2/15/2018 7:10 AM  Procedure end time: 2/15/2018 7:26 AM    Staffing  Anesthesiologist: CHARLEY ODOM JR  CRNA/Resident: CELESTE GRAHAM  Performed by: resident/CRNA and anesthesiologist     Preanesthetic Checklist  The following were completed as part of the preanesthetic checklist: patient identified, procedural consent, pre-op evaluation, timeout performed, risks and benefits discussed, monitors and equipment checked, anesthesia consent given, oxygen available, suction available, hand hygiene performed and patient being monitored.    Setup & Induction  Patient Monitoring: heart rate, cardiac monitor, continuous pulse ox, continuous capnometry and NIBP  Patient preparation: bite block inserted, extremities padded, mandibular stabilization and patient hyperventilated  Electrode Placement: Bitemporal    The patient was moved to the ECT therapy room after being assessed and consented for ECT. After standard ASA monitors were applied and timeout performed, the patient was adequately preoxygenated. After induction of general anesthesia, adequate oxygenation and ventilation were confirmed with pulse oxymetry and end tidal CO2 monitoring via bag-mask ventilation. End tidal CO2 was monitored throughout the case and moderate hyperventilation was performed prior to beginning ECT treatment. All extremities were padded, biteblock was inserted, and mandibular stabilization was done prior to initiating ECT therapy.    Procedure      Stimulus Number 3: Setting Number = III; 576 millicoulombs; Seizure Duration = 53 seconds        Recovery  After adequate recovery from general anesthesia, the patient was transported to recovery.  Baseline Blood Pressure: 116/76  Peak Blood Pressure: 164/86    ECT Findings  ECT associated findings of: Visible Seizure and Moderate Hypertension (SBP >160 or DBP >100)

## 2018-02-15 NOTE — TRANSFER OF CARE
"Anesthesia Transfer of Care Note    Patient: Allyssa Wright    Procedure(s) Performed: Procedure(s) (LRB):  ELECTROCONVULSIVE THERAPY (ECT) - SINGLE SEIZURE (N/A)    Patient location: PACU    Anesthesia Type: general    Transport from OR: Transported from OR on 2-3 L/min O2 by NC with adequate spontaneous ventilation    Post pain: adequate analgesia    Post assessment: no apparent anesthetic complications    Post vital signs: stable    Level of consciousness: awake    Nausea/Vomiting: no nausea/vomiting    Complications: none    Transfer of care protocol was followed      Last vitals:   Visit Vitals  BP (!) 118/57   Pulse 73   Temp 37 °C (98.6 °F) (Temporal)   Resp 20   Ht 5' 5" (1.651 m)   Wt 70.3 kg (155 lb)   SpO2 100%   Breastfeeding? No   BMI 25.79 kg/m²     "

## 2018-02-16 ENCOUNTER — ANESTHESIA EVENT (OUTPATIENT)
Dept: ELECTROPHYSIOLOGY | Facility: HOSPITAL | Age: 38
End: 2018-02-16
Payer: COMMERCIAL

## 2018-02-19 ENCOUNTER — ANESTHESIA (OUTPATIENT)
Dept: ELECTROPHYSIOLOGY | Facility: HOSPITAL | Age: 38
End: 2018-02-19
Payer: COMMERCIAL

## 2018-02-19 ENCOUNTER — HOSPITAL ENCOUNTER (OUTPATIENT)
Facility: HOSPITAL | Age: 38
Discharge: HOME | End: 2018-02-19
Attending: PSYCHIATRY & NEUROLOGY | Admitting: PSYCHIATRY & NEUROLOGY
Payer: COMMERCIAL

## 2018-02-19 ENCOUNTER — SURGERY (OUTPATIENT)
Age: 38
End: 2018-02-19

## 2018-02-19 VITALS
SYSTOLIC BLOOD PRESSURE: 114 MMHG | TEMPERATURE: 99 F | OXYGEN SATURATION: 97 % | WEIGHT: 155 LBS | HEART RATE: 76 BPM | RESPIRATION RATE: 16 BRPM | HEIGHT: 65 IN | BODY MASS INDEX: 25.83 KG/M2 | DIASTOLIC BLOOD PRESSURE: 84 MMHG

## 2018-02-19 DIAGNOSIS — F33.41 MDD (MAJOR DEPRESSIVE DISORDER), RECURRENT, IN PARTIAL REMISSION: ICD-10-CM

## 2018-02-19 LAB
B-HCG UR QL: NEGATIVE
CTP QC/QA: YES

## 2018-02-19 PROCEDURE — 71000044 HC DOSC ROUTINE RECOVERY FIRST HOUR: Performed by: PSYCHIATRY & NEUROLOGY

## 2018-02-19 PROCEDURE — 25000003 PHARM REV CODE 250: Performed by: ANESTHESIOLOGY

## 2018-02-19 PROCEDURE — 25000003 PHARM REV CODE 250: Performed by: PSYCHIATRY & NEUROLOGY

## 2018-02-19 PROCEDURE — D9220A PRA ANESTHESIA: Mod: ,,, | Performed by: ANESTHESIOLOGY

## 2018-02-19 PROCEDURE — 37000009 HC ANESTHESIA EA ADD 15 MINS: Performed by: PSYCHIATRY & NEUROLOGY

## 2018-02-19 PROCEDURE — 81025 URINE PREGNANCY TEST: CPT | Performed by: PSYCHIATRY & NEUROLOGY

## 2018-02-19 PROCEDURE — 37000008 HC ANESTHESIA 1ST 15 MINUTES: Performed by: PSYCHIATRY & NEUROLOGY

## 2018-02-19 PROCEDURE — 90870 ELECTROCONVULSIVE THERAPY: CPT | Performed by: ANESTHESIOLOGY

## 2018-02-19 PROCEDURE — 90870 ELECTROCONVULSIVE THERAPY: CPT | Mod: ,,, | Performed by: PSYCHIATRY & NEUROLOGY

## 2018-02-19 PROCEDURE — 63600175 PHARM REV CODE 636 W HCPCS: Performed by: ANESTHESIOLOGY

## 2018-02-19 RX ORDER — SUCCINYLCHOLINE CHLORIDE 20 MG/ML
INJECTION INTRAMUSCULAR; INTRAVENOUS
Status: COMPLETED
Start: 2018-02-19 | End: 2018-02-19

## 2018-02-19 RX ORDER — ONDANSETRON 2 MG/ML
INJECTION INTRAMUSCULAR; INTRAVENOUS
Status: COMPLETED
Start: 2018-02-19 | End: 2018-02-19

## 2018-02-19 RX ORDER — LABETALOL HYDROCHLORIDE 5 MG/ML
INJECTION, SOLUTION INTRAVENOUS
Status: DISCONTINUED | OUTPATIENT
Start: 2018-02-19 | End: 2018-02-19

## 2018-02-19 RX ORDER — LIDOCAINE HYDROCHLORIDE 10 MG/ML
1 INJECTION, SOLUTION EPIDURAL; INFILTRATION; INTRACAUDAL; PERINEURAL ONCE
Status: COMPLETED | OUTPATIENT
Start: 2018-02-19 | End: 2018-02-19

## 2018-02-19 RX ORDER — SODIUM CHLORIDE 9 MG/ML
INJECTION, SOLUTION INTRAVENOUS CONTINUOUS
Status: DISCONTINUED | OUTPATIENT
Start: 2018-02-19 | End: 2018-02-19 | Stop reason: HOSPADM

## 2018-02-19 RX ORDER — LABETALOL HYDROCHLORIDE 5 MG/ML
INJECTION, SOLUTION INTRAVENOUS
Status: COMPLETED
Start: 2018-02-19 | End: 2018-02-19

## 2018-02-19 RX ORDER — ONDANSETRON 2 MG/ML
INJECTION INTRAMUSCULAR; INTRAVENOUS
Status: DISCONTINUED | OUTPATIENT
Start: 2018-02-19 | End: 2018-02-19

## 2018-02-19 RX ORDER — KETOROLAC TROMETHAMINE 30 MG/ML
INJECTION, SOLUTION INTRAMUSCULAR; INTRAVENOUS
Status: COMPLETED
Start: 2018-02-19 | End: 2018-02-19

## 2018-02-19 RX ORDER — SUCCINYLCHOLINE CHLORIDE 20 MG/ML
INJECTION INTRAMUSCULAR; INTRAVENOUS
Status: DISCONTINUED | OUTPATIENT
Start: 2018-02-19 | End: 2018-02-19

## 2018-02-19 RX ORDER — LIDOCAINE HYDROCHLORIDE 10 MG/ML
1 INJECTION, SOLUTION EPIDURAL; INFILTRATION; INTRACAUDAL; PERINEURAL ONCE
Status: DISCONTINUED | OUTPATIENT
Start: 2018-02-19 | End: 2018-02-19 | Stop reason: HOSPADM

## 2018-02-19 RX ORDER — KETOROLAC TROMETHAMINE 30 MG/ML
INJECTION, SOLUTION INTRAMUSCULAR; INTRAVENOUS
Status: DISCONTINUED | OUTPATIENT
Start: 2018-02-19 | End: 2018-02-19

## 2018-02-19 RX ORDER — SODIUM CHLORIDE 0.9 % (FLUSH) 0.9 %
3 SYRINGE (ML) INJECTION
Status: DISCONTINUED | OUTPATIENT
Start: 2018-02-19 | End: 2018-02-19 | Stop reason: HOSPADM

## 2018-02-19 RX ADMIN — ONDANSETRON 4 MG: 2 INJECTION, SOLUTION INTRAMUSCULAR; INTRAVENOUS at 07:02

## 2018-02-19 RX ADMIN — Medication 500 MG: at 06:02

## 2018-02-19 RX ADMIN — SUCCINYLCHOLINE CHLORIDE 120 MG: 20 INJECTION, SOLUTION INTRAMUSCULAR; INTRAVENOUS at 07:02

## 2018-02-19 RX ADMIN — METHOHEXITAL SODIUM 200 MG: 500 INJECTION, POWDER, LYOPHILIZED, FOR SOLUTION INTRAMUSCULAR; INTRAVENOUS; RECTAL at 07:02

## 2018-02-19 RX ADMIN — LIDOCAINE HYDROCHLORIDE 0.2 MG: 10 INJECTION, SOLUTION EPIDURAL; INFILTRATION; INTRACAUDAL; PERINEURAL at 06:02

## 2018-02-19 RX ADMIN — SODIUM CHLORIDE: 9 INJECTION, SOLUTION INTRAVENOUS at 06:02

## 2018-02-19 RX ADMIN — LABETALOL HYDROCHLORIDE 30 MG: 5 INJECTION INTRAVENOUS at 07:02

## 2018-02-19 RX ADMIN — KETOROLAC TROMETHAMINE 30 MG: 30 INJECTION, SOLUTION INTRAMUSCULAR; INTRAVENOUS at 07:02

## 2018-02-19 NOTE — TRANSFER OF CARE
"Anesthesia Transfer of Care Note    Patient: Allyssa Wright    Procedure(s) Performed: Procedure(s) (LRB):  ELECTROCONVULSIVE THERAPY (ECT) - SINGLE SEIZURE (N/A)    Patient location: PACU    Anesthesia Type: general    Transport from OR: Transported from OR on 2-3 L/min O2 by NC with adequate spontaneous ventilation    Post pain: adequate analgesia    Post assessment: no apparent anesthetic complications    Post vital signs: stable    Level of consciousness: awake    Nausea/Vomiting: no nausea/vomiting    Complications: none    Transfer of care protocol was followed      Last vitals:   Visit Vitals  /73 (BP Location: Left arm, Patient Position: Lying)   Pulse 87   Temp 36.8 °C (98.3 °F) (Oral)   Resp 20   Ht 5' 5" (1.651 m)   Wt 70.3 kg (155 lb)   SpO2 100%   Breastfeeding? No   BMI 25.79 kg/m²     "

## 2018-02-19 NOTE — H&P
"Allyssa Wright  : 1980   MRN: [id number]  Date: 2018     Electroconvulsive Therapy  History & Physical    Chief complaint: MDD, recurrent,in partial remission Procedure: ECT #34    SUBJECTIVE:     HPI:   Allyssa Wright is a 38 y.o. female with MDD, recurrent, in partial remission who presents for ECT. The patient reports she remains depressed but her mood has improved somewhat since her last treatment.  She does not feel back to her baseline yet.  Please see Dr. Boyce's full pre-ECT evaluation for further details. The patient does not need any prescriptions and has not had any med changes since meeting with Dr. Boyce. The patient has not had anything by mouth since midnight and is ready for ECT.    Psychiatric Review of Systems:  Mood: "4/10"  Appetite: No problem  Psychomotor: No problem  Cognitive Impairment: None  Insomnia: None  Psychosis: None  Diurnal Variation: None  Suicidal Ideation: Absent    Medical Review Of Systems:  Constitutional: negative for fatigue  Respiratory: negative for cough  Cardiovascular: negative for chest pain  Gastrointestinal: negative for abdominal pain  Musculoskeletal:negative for muscle weakness    Current Medications:   No current facility-administered medications on file prior to encounter.      Current Outpatient Prescriptions on File Prior to Encounter   Medication Sig Dispense Refill    clozapine (CLOZARIL) 50 MG tablet Take 100 mg by mouth once daily.       dapsone 7.5 % GlwP Apply topically once daily.      dextroamphetamine-amphetamine 30 mg Tab Take 30 mg by mouth 2 (two) times daily.       famciclovir (FAMVIR) 500 MG tablet Take 1 tablet (500 mg total) by mouth 2 (two) times daily. 30 tablet 12    hydrOXYzine pamoate (VISTARIL) 25 MG Cap Take 1 capsule (25 mg total) by mouth every 8 (eight) hours as needed (anxiety). (Patient taking differently: Take 50 mg by mouth every 8 (eight) hours as needed (anxiety). ) 90 capsule 1    mirtazapine (REMERON) 15 MG " tablet Take 1 tablet (15 mg total) by mouth every evening. (Patient taking differently: Take 7.5 mg by mouth every evening. ) 90 tablet 0    spironolactone (ALDACTONE) 50 MG tablet 50 mg 2 (two) times daily. 50  mg qAM, 50 mg qHS.      thyroid (ARMOUR THYROID) 30 mg Tab Take 1 tablet (30 mg total) by mouth every morning. 30 tablet 11    trazodone (DESYREL) 100 MG tablet Take 200 mg by mouth every evening.       UNABLE TO FIND 2 (two) times daily. n-azetyl-cysteine      venlafaxine (EFFEXOR) 100 MG Tab Take 300 mg by mouth once daily.      ZOVIRAX 5 % Crea   5      Allergies:   Benzodiazepines; Ampicillin; Erythromycin; Levaquin [levofloxacin]; Penicillins; Pristiq [desvenlafaxine]; Sulfa (sulfonamide antibiotics); and Azithromycin    Past Medical/Surgical History:   Past Medical History:   Diagnosis Date    Anxiety     Depression     History of psychiatric hospitalization     HSV-1 (herpes simplex virus 1) infection     Hx of psychiatric care     Moderate depressed bipolar II disorder 06/13/2016    reports no history of bipolar    Obsessive-compulsive disorder     Psychiatric problem     Self-harming behavior     Therapy      Past Surgical History:   Procedure Laterality Date    ANKLE SURGERY Right     BREAST augmentation      OVARIAN CYST REMOVAL Bilateral       OBJECTIVE:     Vitals (pre-procedure):  Vitals:    02/19/18 0613   BP: 118/73   Pulse: 87   Resp: 20   Temp: 98.3 °F (36.8 °C)        Labs/Imaging/Studies:   Recent Results (from the past 48 hour(s))   POCT urine pregnancy    Collection Time: 02/19/18  6:04 AM   Result Value Ref Range    POC Preg Test, Ur Negative Negative     Acceptable Yes       No results found for: PHENYTOIN, PHENOBARB, VALPROATE, CBMZ    Physical Exam:   Gen: AAOx4, NAD  HEENT: MMM, PERRL, EOMI, O/P clear  CV: RRR, S1/S2 nml, no M/R/G  Chest: CTAB, no R/R/W, unlabored breathing  Abd: S/NT/ND, +BS, no HSM  Ext: No C/C/E, pulse 2+ throughout  Neuro: CN  II-XII grossly intact, no focal deficits    Mental Status Exam:   Appearance: unremarkable, age appropriate, casually dressed, neatly groomed  Behavior: friendly and cooperative, eye contact normal  Speech: normal tone, normal rate, normal pitch, normal volume  Mood: depressed  Affect: Normal   Thought Process: normal and logical  Thought Content: normal, no suicidality, no homicidality, delusions, or paranoia  Cognition: grossly intact  Insight: fair  Judgment: fair    ASSESSMENT/PLAN:     Allyssa Wright is a 38 y.o. female with MDD, recurrent, in partial remission who presents for ECT.    Recommendations:   Proceed with ECT #34.      Karen Wlash MD  2/19/2018

## 2018-02-19 NOTE — ANESTHESIA POSTPROCEDURE EVALUATION
"Anesthesia Post Evaluation    Patient: Allyssa Wright    Procedure(s) Performed: Procedure(s) (LRB):  ELECTROCONVULSIVE THERAPY (ECT) - SINGLE SEIZURE (N/A)    Final Anesthesia Type: general  Patient location during evaluation: PACU  Patient participation: Yes- Able to Participate  Level of consciousness: awake and alert and oriented  Post-procedure vital signs: reviewed and stable  Pain management: adequate  Airway patency: patent  PONV status at discharge: No PONV  Anesthetic complications: no      Cardiovascular status: stable  Respiratory status: unassisted, spontaneous ventilation and room air  Hydration status: euvolemic  Follow-up not needed.        Visit Vitals  /73 (BP Location: Left arm, Patient Position: Lying)   Pulse 78   Temp 36.7 °C (98.1 °F) (Temporal)   Resp 16   Ht 5' 5" (1.651 m)   Wt 70.3 kg (155 lb)   SpO2 100%   Breastfeeding? No   BMI 25.79 kg/m²       Pain/Roma Score: Pain Assessment Performed: Yes (2/19/2018  7:25 AM)  Presence of Pain: non-verbal indicators absent (2/19/2018  7:25 AM)  Roma Score: 7 (2/19/2018  7:30 AM)      "

## 2018-02-19 NOTE — OP NOTE
Allyssa Wright  : 1980   MRN: [id number]  Date: 2018    Electroconvulsive Therapy  Procedure Note    Date of Admission: 2018  5:53 AM    Site: [facility], [address]    Attending: Shoaib Boyce Jr., MD   Residents: Karen Walsh MD  Pre-procedure Diagnosis: MDD, recurrent, in partial remission                     ECT Treatment Number: 34  Machine Type: [employer] 5000    Patient Status: Medically stable    Vitals (pre-procedure):  Vitals:    18 0613   BP: 118/73   Pulse: 87   Resp: 20   Temp: 98.3 °F (36.8 °C)       Electrode Placement: Bitemporal    Stimulus Number Charge (mC) Level Pulse Width (msec) Frequency  (Hz) Duration of Stimulus (sec) Current (mA) Duration of Seizure (sec)   1 576 3 1 60 6 800 17   1 576 3 1 60 6 800 101                         Complications: Hypertension    Maximum Blood Pressure: 143/101    Medications Given:  Caffeine 500 mg PO Before  Methohexital (Brevital) 200 mg IV During   Succinylcholine (Anectine) 120 mg IV During   Ondansetron (Zofran) 4 mg IV During  Toradol 30 mg IV During   Labetalol 30 mg IV during    Treatment Course:  Patient tolerated procedure well. After adequate recovery from general anesthesia, the patient was transported to recovery.    Post-op Diagnosis: Same as above    Recommended for next ECT:  18      Karen Walsh MD  [facility]  [phone]

## 2018-02-19 NOTE — DISCHARGE INSTRUCTIONS
Home Care Instructions  E.C.T.    ACTIVITY LEVEL: You may feel sleepy for several hours. It is best to rest until you are more awake and then gradually resume your normal activities in one day. It is recommended, because of the possibility of memory loss and slight confusion post ECT, that you rest in the company of a responsible adult. This confusion and memory loss is expected and should diminish over time. It is also recommended that you do not drive or operate any electrical appliances or machinery during the ECT treatment series. Ask your Doctor, at the time of your treatment, any questions you may have about specific activities.    DIET: You may wish to start with liquids after your ECT treatment and gradually resume your normal diet. Do not consume alcoholic beverages during the ECT treatment series.    BATHING: You may shower or bathe as desired.    MEDICATIONS: Resume all home medications starting with the AM doses not taken prior to ECT treatment. If you experience a headache, it is okay to take your usual pain reliever.    WHEN TO CALL THE DOCTOR: Headache, memory loss or confusion that does not begin to resolve within 24 hours.    [facility] on February 21st, 2018 for 6:00 AM.    Remember you may not eat or drink after midnight, but please take heart or high blood pressure medicines with a sip of water in the morning prior to leaving home.    FOR EMERGENCIES: If any unusual problems or difficulties occur, contact the office [phone] Monday-Friday until 3:00 p.m.  After hours, call the hospital  [phone] and ask for the Psychiatry Resident on-call.

## 2018-02-19 NOTE — ANESTHESIA RELEASE NOTE
"Anesthesia Release from PACU Note    Patient: Allyssa Wright    Procedure(s) Performed: Procedure(s) (LRB):  ELECTROCONVULSIVE THERAPY (ECT) - SINGLE SEIZURE (N/A)    Anesthesia type: GEN    Post pain: Adequate analgesia reported    Post assessment: no apparent anesthetic complications, tolerated procedure well and no evidence of recall    Post vital signs: /73 (BP Location: Left arm, Patient Position: Lying)   Pulse 78   Temp 36.7 °C (98.1 °F) (Temporal)   Resp 16   Ht 5' 5" (1.651 m)   Wt 70.3 kg (155 lb)   SpO2 100%   Breastfeeding? No   BMI 25.79 kg/m²     Level of consciousness: awake, alert and oriented    Nausea/Vomiting: no nausea/no vomiting    Complications: none    Airway Patency: patent    Respiratory: unassisted, spontaneous ventilation, room air    Cardiovascular: stable and blood pressure at baseline    Hydration: euvolemic    "

## 2018-02-19 NOTE — PLAN OF CARE
Patient and patient's father received discharge instructions.  Patient and patient's father verbalized understanding of all instructions given and all questions were addressed prior to patient's discharge.  Patient's vital signs are stable and within patient's baseline.  Patient refuses clear liquids PO.  Patient denies pain.  Patient denies nausea and vomiting at this time.  Patient meets all criteria for discharge at this time.  All required consents present in patient's chart upon patient's discharge.

## 2018-02-19 NOTE — ANESTHESIA PROCEDURE NOTES
ECT    Treatment Number: 34  Procedure start time: 2/19/2018 7:06 AM  Timeout performed at: 2/19/2018 7:00 AM  Procedure end time: 2/19/2018 7:10 AM    Staffing  Anesthesiologist: TONY GARCIA JR  CRNA/Resident: CELESTE GRAHAM  Performed by: anesthesiologist and resident/CRNA     Preanesthetic Checklist  The following were completed as part of the preanesthetic checklist: patient identified, procedural consent, pre-op evaluation, timeout performed, risks and benefits discussed, monitors and equipment checked, anesthesia consent given, oxygen available, suction available, hand hygiene performed and patient being monitored.    Setup & Induction  Patient Monitoring: heart rate, cardiac monitor, continuous pulse ox, continuous capnometry and NIBP  Patient preparation: bite block inserted, extremities padded, mandibular stabilization and patient hyperventilated  Electrode Placement: Bitemporal    The patient was moved to the ECT therapy room after being assessed and consented for ECT. After standard ASA monitors were applied and timeout performed, the patient was adequately preoxygenated. After induction of general anesthesia, adequate oxygenation and ventilation were confirmed with pulse oxymetry and end tidal CO2 monitoring via bag-mask ventilation. End tidal CO2 was monitored throughout the case and moderate hyperventilation was performed prior to beginning ECT treatment. All extremities were padded, biteblock was inserted, and mandibular stabilization was done prior to initiating ECT therapy.    Procedure      Stimulus Number 3: Setting Number = III; 576 millicoulombs; Seizure Duration = 61 (first seizure 17s, second seizure 61) seconds        Recovery  After adequate recovery from general anesthesia, the patient was transported to recovery.  Baseline Blood Pressure: 116/77  Peak Blood Pressure: 149/103  Time to Recovery of Respirations: 3 minutes    ECT Findings  ECT associated findings of: Moderate  Hypertension (SBP >160 or DBP >100) and Visible Seizure

## 2018-02-19 NOTE — DISCHARGE SUMMARY
Allyssa Wright  : 1980   MRN: [id number]  Date: 2018     Electroconvulsive Therapy  Discharge Summary    Admit Date: 2018  5:53 AM  Discharge Date: 2018    Attending Physician: Shoaib Boyce Jr., MD   Discharge Provider: Karen Walsh MD    History of Present Illness: Allyssa Wright is a 38 y.o. female with MDD, recurrent, severe presented for ECT #34. See H&P dated 2018 for full HPI. For further details, see Dr. Boyce's pre-ECT evaluation.    Hospital Course: The patient tolerated the ECT treatment well without complication. Patient was stable post-procedure. See OP note dated 2018 for more details.     Disposition: Home or Self Care    Medications:  Current Discharge Medication List      CONTINUE these medications which have NOT CHANGED    Details   clozapine (CLOZARIL) 50 MG tablet Take 100 mg by mouth once daily.       dapsone 7.5 % GlwP Apply topically once daily.      dextroamphetamine-amphetamine 30 mg Tab Take 30 mg by mouth 2 (two) times daily.     Associated Diagnoses: MDD (major depressive disorder), recurrent, in partial remission      famciclovir (FAMVIR) 500 MG tablet Take 1 tablet (500 mg total) by mouth 2 (two) times daily.  Qty: 30 tablet, Refills: 12    Associated Diagnoses: Major depressive disorder, recurrent episode, moderate degree      hydrOXYzine pamoate (VISTARIL) 25 MG Cap Take 1 capsule (25 mg total) by mouth every 8 (eight) hours as needed (anxiety).  Qty: 90 capsule, Refills: 1      mirtazapine (REMERON) 15 MG tablet Take 1 tablet (15 mg total) by mouth every evening.  Qty: 90 tablet, Refills: 0      spironolactone (ALDACTONE) 50 MG tablet 50 mg 2 (two) times daily. 50  mg qAM, 50 mg qHS.      thyroid (ARMOUR THYROID) 30 mg Tab Take 1 tablet (30 mg total) by mouth every morning.  Qty: 30 tablet, Refills: 11    Associated Diagnoses: Major depressive disorder, recurrent episode, moderate degree      trazodone (DESYREL) 100 MG tablet Take 200 mg  by mouth every evening.       UNABLE TO FIND 2 (two) times daily. n-azetyl-cysteine      venlafaxine (EFFEXOR) 100 MG Tab Take 300 mg by mouth once daily.    Associated Diagnoses: MDD (major depressive disorder), recurrent, in partial remission      ZOVIRAX 5 % Crea Refills: 5           Status at Discharge: Alert and medically stable    Discharge Diagnoses:  MDD, recurrent, in partial remission    Diet: Resume previous outpatient diet  Activity: Ambulate with assistance  Instructions: Please do not eat or drink anything after midnight prior to procedure. Please do not drive on day of ECT.    Med Changes:  None    Next ECT:  2/21/18    Karen Walsh MD  2/19/2018

## 2018-02-19 NOTE — PLAN OF CARE
Patient arrived from ECT Room (DOS 27) with JAMI Forrest RN and RAD Pelletier MD.  Patient stable.  Report received at this time.  Assumed care of patient at this time.

## 2018-02-20 ENCOUNTER — ANESTHESIA EVENT (OUTPATIENT)
Dept: ELECTROPHYSIOLOGY | Facility: HOSPITAL | Age: 38
End: 2018-02-20

## 2018-02-20 NOTE — ANESTHESIA PREPROCEDURE EVALUATION
[facility]  Anesthesia Pre-Operative Evaluation         Patient Name: Allyssa Wright  YOB: 1980  MRN: [id number]    SUBJECTIVE:     Pre-operative evaluation for Procedure(s) (LRB):  ELECTROCONVULSIVE THERAPY (ECT) - SINGLE SEIZURE (N/A)     02/20/2018    Allyssa Wright is a 38 y.o. female w/ a significant PMHx of MDD who presents for ECT #35     Last ECT Medication doses:     Methohexital 200 mg  Succinylcholine 120mg  Labetalol 30 mg  Odansetron 4 mg  Ketorolac 30 mg       Patient Active Problem List   Diagnosis    MDD (major depressive disorder), recurrent, in partial remission    Major depressive disorder, recurrent, in partial remission    MDD (major depressive disorder), severe    Major depressive disorder, recurrent    Other acne    Major depression, recurrent, chronic    Recurrent major depression in partial remission       Review of patient's allergies indicates:   Allergen Reactions    Benzodiazepines Other (See Comments)     Contraindicated while taking Clozapine    Ampicillin      Mom says so    Erythromycin     Levaquin [levofloxacin] Other (See Comments)     Depression side effects    Penicillins      Mom says so    Pristiq [desvenlafaxine]      psycotic      Sulfa (sulfonamide antibiotics)      Rash      Azithromycin Anxiety       Current Inpatient Medications:      Current Outpatient Prescriptions on File Prior to Visit   Medication Sig Dispense Refill    clozapine (CLOZARIL) 50 MG tablet Take 100 mg by mouth once daily.       dapsone 7.5 % GlwP Apply topically once daily.      dextroamphetamine-amphetamine 30 mg Tab Take 30 mg by mouth 2 (two) times daily.       famciclovir (FAMVIR) 500 MG tablet Take 1 tablet (500 mg total) by mouth 2 (two) times daily. 30 tablet 12    hydrOXYzine pamoate (VISTARIL) 25 MG Cap Take 1 capsule (25 mg total) by mouth every 8 (eight) hours as needed (anxiety). (Patient taking differently: Take 50 mg by mouth every 8  (eight) hours as needed (anxiety). ) 90 capsule 1    mirtazapine (REMERON) 15 MG tablet Take 1 tablet (15 mg total) by mouth every evening. (Patient taking differently: Take 7.5 mg by mouth every evening. ) 90 tablet 0    spironolactone (ALDACTONE) 50 MG tablet 50 mg 2 (two) times daily. 50  mg qAM, 50 mg qHS.      thyroid (ARMOUR THYROID) 30 mg Tab Take 1 tablet (30 mg total) by mouth every morning. 30 tablet 11    trazodone (DESYREL) 100 MG tablet Take 200 mg by mouth every evening.       UNABLE TO FIND 2 (two) times daily. n-azetyl-cysteine      venlafaxine (EFFEXOR) 100 MG Tab Take 300 mg by mouth once daily.      ZOVIRAX 5 % Crea   5     No current facility-administered medications on file prior to visit.        Past Surgical History:   Procedure Laterality Date    ANKLE SURGERY Right     BREAST augmentation      OVARIAN CYST REMOVAL Bilateral        Social History     Social History    Marital status: Single     Spouse name: N/A    Number of children: N/A    Years of education: N/A     Occupational History    unemployed      Social History Main Topics    Smoking status: Former Smoker     Quit date: 4/6/2011    Smokeless tobacco: Never Used    Alcohol use No      Comment: Alcohol misuse in remote past    Drug use: No      Comment: Experimental use in high school    Sexual activity: Not on file     Other Topics Concern    Not on file     Social History Narrative    Pt has 1 older brother from an intact family until the death of her mother in 2012.  She completed 1 year of college, was never in the , and has never been employed.  She was engaged once, but never , has no children, and lives with her father plus 2 dogs.  She denies any hobbies and is spiritual but not Catholic.  She does date and returns to college during rare periods when depression and anxiety [place].       OBJECTIVE:     Vital Signs Range (Last 24H):  BP: ()/()   Arterial Line BP: ()/()       CBC:   Lab Results    Component Value Date    WBC 6.83 06/08/2016    HGB 13.3 06/08/2016    HCT 40.3 06/08/2016    MCV 92 06/08/2016     06/08/2016       CMP:   CMP  Sodium   Date Value Ref Range Status   06/08/2016 137 136 - 145 mmol/L Final     Potassium   Date Value Ref Range Status   06/08/2016 3.8 3.5 - 5.1 mmol/L Final     Chloride   Date Value Ref Range Status   06/08/2016 101 95 - 110 mmol/L Final     CO2   Date Value Ref Range Status   06/08/2016 28 23 - 29 mmol/L Final     Glucose   Date Value Ref Range Status   06/08/2016 81 70 - 110 mg/dL Final     BUN, Bld   Date Value Ref Range Status   06/08/2016 10 6 - 20 mg/dL Final     Creatinine   Date Value Ref Range Status   06/08/2016 0.7 0.5 - 1.4 mg/dL Final   09/12/2012 0.6 0.2 - 1.4 mg/dl Final     Calcium   Date Value Ref Range Status   06/08/2016 9.5 8.7 - 10.5 mg/dL Final   09/12/2012 9.6 8.6 - 10.2 mg/dl Final     Total Protein   Date Value Ref Range Status   06/08/2016 7.3 6.0 - 8.4 g/dL Final     Albumin   Date Value Ref Range Status   06/08/2016 4.5 3.5 - 5.2 g/dL Final     Total Bilirubin   Date Value Ref Range Status   06/08/2016 0.4 0.1 - 1.0 mg/dL Final     Comment:     For infants and newborns, interpretation of results should be based  on gestational age, weight and in agreement with clinical  observations.  Premature Infant recommended reference ranges:  Up to 24 hours.............<8.0 mg/dL  Up to 48 hours............<12.0 mg/dL  3-5 days..................<15.0 mg/dL  6-29 days.................<15.0 mg/dL       Alkaline Phosphatase   Date Value Ref Range Status   06/08/2016 28 (L) 55 - 135 U/L Final   09/12/2012 31 23 - 119 UNIT/L Final     AST   Date Value Ref Range Status   06/08/2016 20 10 - 40 U/L Final   09/12/2012 22 10 - 30 UNIT/L Final     ALT   Date Value Ref Range Status   06/08/2016 14 10 - 44 U/L Final     Anion Gap   Date Value Ref Range Status   06/08/2016 8 8 - 16 mmol/L Final   09/12/2012 8 5 - 15 meq/L Final     eGFR if     Date Value Ref Range Status   06/08/2016 >60.0 >60 mL/min/1.73 m^2 Final     eGFR if non    Date Value Ref Range Status   06/08/2016 >60.0 >60 mL/min/1.73 m^2 Final     Comment:     Calculation used to obtain the estimated glomerular filtration  rate (eGFR) is the CKD-EPI equation. Since race is unknown   in our information system, the eGFR values for   -American and Non--American patients are given   for each creatinine result.         INR:  No results for input(s): PT, INR, PROTIME, APTT in the last 72 hours.    Diagnostic Studies: No relevant studies.    EKG: No recent studies available.    2D ECHO:  No results found for this or any previous visit.      ASSESSMENT/PLAN:         Pre-op Assessment    I have reviewed the Patient Summary Reports.     I have reviewed the Nursing Notes.   I have reviewed the Medications.     Review of Systems  Anesthesia Hx:  No problems with previous Anesthesia  History of prior surgery of interest to airway management or planning: Previous anesthesia: General 8/14/17 ECT with general anesthesia.  Procedure performed at an [facility]. Denies Family Hx of Anesthesia complications.   Denies Personal Hx of Anesthesia complications.   Social:  Former Smoker    Hematology/Oncology:  Hematology Normal   Oncology Normal     EENT/Dental:EENT/Dental Normal   Cardiovascular:   Denies Hypertension.  Denies MI.   Denies Dysrhythmias.   Denies Angina.    Pulmonary:  Pulmonary Normal    Renal/:  Renal/ Normal     Hepatic/GI:  Hepatic/GI Normal    Musculoskeletal:  Musculoskeletal Normal    Neurological:  Neurology Normal    Endocrine:   Hypothyroidism    Psych:   Psychiatric History          Physical Exam  General:  Well nourished    Airway/Jaw/Neck:  Airway Findings: Mouth Opening: Normal Tongue: Normal  General Airway Assessment: Adult  Mallampati: II  Improves to I with phonation.  TM Distance: Normal, at least 6 cm  Jaw/Neck Findings:  Neck ROM: Normal  ROM     Eyes/Ears/Nose:  EYES/EARS/NOSE FINDINGS: Normal   Dental:  Dental Findings: In tact   Chest/Lungs:  Chest/Lungs Clear    Heart/Vascular:  Heart Findings: Normal Heart murmur: negative    Abdomen:  Abdomen Findings: Normal    Musculoskeletal:  Musculoskeletal Findings: Normal   Skin:  Skin Findings: Normal    Mental Status:  Mental Status Findings:  Cooperative, Alert and Oriented         Anesthesia Plan  Type of Anesthesia, risks & benefits discussed:  Anesthesia Type:  general  Patient's Preference:   Intra-op Monitoring Plan: standard ASA monitors  Intra-op Monitoring Plan Comments:   Post Op Pain Control Plan: IV/PO Opioids PRN  Post Op Pain Control Plan Comments:   Induction:   IV  Beta Blocker:  Patient is not currently on a Beta-Blocker (No further documentation required).       Informed Consent: Patient understands risks and agrees with Anesthesia plan.  Questions answered. Anesthesia consent signed with patient.  ASA Score: 2     Day of Surgery Review of History & Physical:    H&P update referred to the provider.         Ready For Surgery From Anesthesia Perspective.

## 2018-02-21 ENCOUNTER — TELEPHONE (OUTPATIENT)
Dept: PSYCHIATRY | Facility: CLINIC | Age: 38
End: 2018-02-21

## 2018-02-21 DIAGNOSIS — F33.9 RECURRENT MAJOR DEPRESSIVE DISORDER, REMISSION STATUS UNSPECIFIED: Primary | ICD-10-CM

## 2018-02-21 NOTE — TELEPHONE ENCOUNTER
Patient did not arrive to ECT procedure scheduled this AM. Left voicemail for patient to call back to reschedule.

## 2018-02-22 ENCOUNTER — ANESTHESIA EVENT (OUTPATIENT)
Dept: ELECTROPHYSIOLOGY | Facility: HOSPITAL | Age: 38
End: 2018-02-22
Payer: COMMERCIAL

## 2018-02-22 NOTE — ANESTHESIA PREPROCEDURE EVALUATION
02/22/2018  Allyssa Wright is a 38 y.o., female w/ a significant PMHx of MDD   who presents for:    Pre-operative evaluation for Procedure(s) (LRB):  ELECTROCONVULSIVE THERAPY (ECT) - SINGLE SEIZURE (N/A)      Patient Active Problem List   Diagnosis    MDD (major depressive disorder), recurrent, in partial remission    Major depressive disorder, recurrent, in partial remission    MDD (major depressive disorder), severe    Major depressive disorder, recurrent    Other acne    Major depression, recurrent, chronic    Recurrent major depression in partial remission       Review of patient's allergies indicates:   Allergen Reactions    Benzodiazepines Other (See Comments)     Contraindicated while taking Clozapine    Ampicillin      Mom says so    Erythromycin     Levaquin [levofloxacin] Other (See Comments)     Depression side effects    Penicillins      Mom says so    Pristiq [desvenlafaxine]      psycotic      Sulfa (sulfonamide antibiotics)      Rash      Azithromycin Anxiety        No current facility-administered medications on file prior to encounter.      Current Outpatient Prescriptions on File Prior to Encounter   Medication Sig Dispense Refill    clozapine (CLOZARIL) 50 MG tablet Take 100 mg by mouth once daily.       dapsone 7.5 % GlwP Apply topically once daily.      dextroamphetamine-amphetamine 30 mg Tab Take 30 mg by mouth 2 (two) times daily.       famciclovir (FAMVIR) 500 MG tablet Take 1 tablet (500 mg total) by mouth 2 (two) times daily. 30 tablet 12    hydrOXYzine pamoate (VISTARIL) 25 MG Cap Take 1 capsule (25 mg total) by mouth every 8 (eight) hours as needed (anxiety). (Patient taking differently: Take 50 mg by mouth every 8 (eight) hours as needed (anxiety). ) 90 capsule 1    mirtazapine (REMERON) 15 MG tablet Take 1 tablet (15 mg total) by mouth every evening.  (Patient taking differently: Take 7.5 mg by mouth every evening. ) 90 tablet 0    spironolactone (ALDACTONE) 50 MG tablet 50 mg 2 (two) times daily. 50  mg qAM, 50 mg qHS.      thyroid (ARMOUR THYROID) 30 mg Tab Take 1 tablet (30 mg total) by mouth every morning. 30 tablet 11    trazodone (DESYREL) 100 MG tablet Take 200 mg by mouth every evening.       UNABLE TO FIND 2 (two) times daily. n-azetyl-cysteine      venlafaxine (EFFEXOR) 100 MG Tab Take 300 mg by mouth once daily.      ZOVIRAX 5 % Crea   5       Past Surgical History:   Procedure Laterality Date    ANKLE SURGERY Right     BREAST augmentation      OVARIAN CYST REMOVAL Bilateral        Social History     Social History    Marital status: Single     Spouse name: N/A    Number of children: N/A    Years of education: N/A     Occupational History    unemployed      Social History Main Topics    Smoking status: Former Smoker     Quit date: 4/6/2011    Smokeless tobacco: Never Used    Alcohol use No      Comment: Alcohol misuse in remote past    Drug use: No      Comment: Experimental use in high school    Sexual activity: Not on file     Other Topics Concern    Not on file     Social History Narrative    Pt has 1 older brother from an intact family until the death of her mother in 2012.  She completed 1 year of college, was never in the , and has never been employed.  She was engaged once, but never , has no children, and lives with her father plus 2 dogs.  She denies any hobbies and is spiritual but not Sabianist.  She does date and returns to college during rare periods when depression and anxiety [place].         Vital Signs Range (Last 24H):  BP: ()/()   Arterial Line BP: ()/()       CBC: No results for input(s): WBC, RBC, HGB, HCT, PLT, MCV, MCH, MCHC in the last 72 hours.    CMP: No results for input(s): NA, K, CL, CO2, BUN, CREATININE, GLU, MG, PHOS, CALCIUM, ALBUMIN, PROT, ALKPHOS, ALT, AST, BILITOT in the last 72  hours.    INR  No results for input(s): PT, INR, PROTIME, APTT in the last 72 hours.          Anesthesia Evaluation    I have reviewed the Patient Summary Reports.    I have reviewed the Nursing Notes.   I have reviewed the Medications.     Review of Systems  Anesthesia Hx:  No problems with previous Anesthesia  History of prior surgery of interest to airway management or planning: Previous anesthesia: General 8/14/17 ECT with general anesthesia.  Procedure performed at an [facility]. Denies Family Hx of Anesthesia complications.   Denies Personal Hx of Anesthesia complications.   Social:  Former Smoker    Hematology/Oncology:  Hematology Normal   Oncology Normal     EENT/Dental:EENT/Dental Normal   Cardiovascular:   Denies Hypertension.  Denies MI.   Denies Dysrhythmias.   Denies Angina. ECG has been reviewed.    Pulmonary:  Pulmonary Normal    Renal/:  Renal/ Normal     Hepatic/GI:  Hepatic/GI Normal    Musculoskeletal:  Musculoskeletal Normal    Neurological:  Neurology Normal    Endocrine:   Hypothyroidism    Psych:   Psychiatric History          Physical Exam  General:  Well nourished    Airway/Jaw/Neck:  Airway Findings: Mouth Opening: Normal Tongue: Normal  General Airway Assessment: Adult  Mallampati: II  Improves to I with phonation.  TM Distance: Normal, at least 6 cm  Jaw/Neck Findings:  Neck ROM: Normal ROM     Eyes/Ears/Nose:  EYES/EARS/NOSE FINDINGS: Normal   Dental:  Dental Findings: In tact   Chest/Lungs:  Chest/Lungs Findings: Normal Respiratory Rate     Heart/Vascular:  Heart Findings: Rate: Normal        Mental Status:  Mental Status Findings:  Cooperative, Alert and Oriented         Anesthesia Plan  Type of Anesthesia, risks & benefits discussed:  Anesthesia Type:  general  Patient's Preference:   Intra-op Monitoring Plan: standard ASA monitors  Intra-op Monitoring Plan Comments:   Post Op Pain Control Plan:   Post Op Pain Control Plan Comments:   Induction:   IV  Beta Blocker:  Patient  is not currently on a Beta-Blocker (No further documentation required).       Informed Consent: Patient understands risks and agrees with Anesthesia plan.  Questions answered. Anesthesia consent signed with patient.  ASA Score: 2     Day of Surgery Review of History & Physical:    H&P update referred to the surgeon.         Ready For Surgery From Anesthesia Perspective.

## 2018-02-23 ENCOUNTER — HOSPITAL ENCOUNTER (OUTPATIENT)
Facility: HOSPITAL | Age: 38
Discharge: HOME | End: 2018-02-23
Attending: PSYCHIATRY & NEUROLOGY | Admitting: PSYCHIATRY & NEUROLOGY
Payer: COMMERCIAL

## 2018-02-23 ENCOUNTER — ANESTHESIA (OUTPATIENT)
Dept: ELECTROPHYSIOLOGY | Facility: HOSPITAL | Age: 38
End: 2018-02-23
Payer: COMMERCIAL

## 2018-02-23 VITALS
OXYGEN SATURATION: 100 % | DIASTOLIC BLOOD PRESSURE: 65 MMHG | SYSTOLIC BLOOD PRESSURE: 111 MMHG | BODY MASS INDEX: 25.68 KG/M2 | TEMPERATURE: 99 F | WEIGHT: 154.13 LBS | HEART RATE: 75 BPM | RESPIRATION RATE: 17 BRPM | HEIGHT: 65 IN

## 2018-02-23 DIAGNOSIS — F33.2 MAJOR DEPRESSIVE DISORDER, RECURRENT SEVERE WITHOUT PSYCHOTIC FEATURES: ICD-10-CM

## 2018-02-23 DIAGNOSIS — F33.41 MAJOR DEPRESSIVE DISORDER, RECURRENT, IN PARTIAL REMISSION: ICD-10-CM

## 2018-02-23 LAB
B-HCG UR QL: NEGATIVE
CTP QC/QA: YES

## 2018-02-23 PROCEDURE — 71000044 HC DOSC ROUTINE RECOVERY FIRST HOUR: Performed by: PSYCHIATRY & NEUROLOGY

## 2018-02-23 PROCEDURE — D9220A PRA ANESTHESIA: Mod: ,,, | Performed by: ANESTHESIOLOGY

## 2018-02-23 PROCEDURE — 90870 ELECTROCONVULSIVE THERAPY: CPT | Performed by: ANESTHESIOLOGY

## 2018-02-23 PROCEDURE — 25000003 PHARM REV CODE 250: Performed by: ANESTHESIOLOGY

## 2018-02-23 PROCEDURE — 25000003 PHARM REV CODE 250: Performed by: PSYCHIATRY & NEUROLOGY

## 2018-02-23 PROCEDURE — 90870 ELECTROCONVULSIVE THERAPY: CPT | Mod: ,,, | Performed by: PSYCHIATRY & NEUROLOGY

## 2018-02-23 PROCEDURE — 37000008 HC ANESTHESIA 1ST 15 MINUTES: Performed by: PSYCHIATRY & NEUROLOGY

## 2018-02-23 PROCEDURE — 63600175 PHARM REV CODE 636 W HCPCS: Performed by: ANESTHESIOLOGY

## 2018-02-23 PROCEDURE — 37000009 HC ANESTHESIA EA ADD 15 MINS: Performed by: PSYCHIATRY & NEUROLOGY

## 2018-02-23 PROCEDURE — 81025 URINE PREGNANCY TEST: CPT | Performed by: PSYCHIATRY & NEUROLOGY

## 2018-02-23 RX ORDER — SUCCINYLCHOLINE CHLORIDE 20 MG/ML
INJECTION INTRAMUSCULAR; INTRAVENOUS
Status: DISCONTINUED | OUTPATIENT
Start: 2018-02-23 | End: 2018-02-23

## 2018-02-23 RX ORDER — KETOROLAC TROMETHAMINE 30 MG/ML
INJECTION, SOLUTION INTRAMUSCULAR; INTRAVENOUS
Status: DISCONTINUED | OUTPATIENT
Start: 2018-02-23 | End: 2018-02-23

## 2018-02-23 RX ORDER — ONDANSETRON HYDROCHLORIDE 2 MG/ML
INJECTION, SOLUTION INTRAMUSCULAR; INTRAVENOUS
Status: DISCONTINUED | OUTPATIENT
Start: 2018-02-23 | End: 2018-02-23

## 2018-02-23 RX ORDER — KETOROLAC TROMETHAMINE 30 MG/ML
INJECTION, SOLUTION INTRAMUSCULAR; INTRAVENOUS
Status: COMPLETED
Start: 2018-02-23 | End: 2018-02-23

## 2018-02-23 RX ORDER — ESMOLOL HYDROCHLORIDE 10 MG/ML
INJECTION INTRAVENOUS
Status: DISCONTINUED | OUTPATIENT
Start: 2018-02-23 | End: 2018-02-23

## 2018-02-23 RX ORDER — LIDOCAINE HYDROCHLORIDE 10 MG/ML
1 INJECTION, SOLUTION EPIDURAL; INFILTRATION; INTRACAUDAL; PERINEURAL ONCE
Status: COMPLETED | OUTPATIENT
Start: 2018-02-24 | End: 2018-02-23

## 2018-02-23 RX ORDER — ONDANSETRON 2 MG/ML
INJECTION INTRAMUSCULAR; INTRAVENOUS
Status: COMPLETED
Start: 2018-02-23 | End: 2018-02-23

## 2018-02-23 RX ORDER — SODIUM CHLORIDE 9 MG/ML
125 INJECTION, SOLUTION INTRAVENOUS CONTINUOUS
Status: DISCONTINUED | OUTPATIENT
Start: 2018-02-24 | End: 2018-02-23 | Stop reason: HOSPADM

## 2018-02-23 RX ORDER — LABETALOL HYDROCHLORIDE 5 MG/ML
INJECTION, SOLUTION INTRAVENOUS
Status: DISCONTINUED | OUTPATIENT
Start: 2018-02-23 | End: 2018-02-23

## 2018-02-23 RX ORDER — SODIUM CHLORIDE 9 MG/ML
INJECTION, SOLUTION INTRAVENOUS CONTINUOUS PRN
Status: DISCONTINUED | OUTPATIENT
Start: 2018-02-23 | End: 2018-02-23

## 2018-02-23 RX ORDER — SUCCINYLCHOLINE CHLORIDE 20 MG/ML
INJECTION INTRAMUSCULAR; INTRAVENOUS
Status: COMPLETED
Start: 2018-02-23 | End: 2018-02-23

## 2018-02-23 RX ADMIN — KETOROLAC TROMETHAMINE 30 MG: 30 INJECTION, SOLUTION INTRAMUSCULAR; INTRAVENOUS at 07:02

## 2018-02-23 RX ADMIN — Medication 500 MG: at 06:02

## 2018-02-23 RX ADMIN — SUCCINYLCHOLINE CHLORIDE 120 MG: 20 INJECTION, SOLUTION INTRAMUSCULAR; INTRAVENOUS at 07:02

## 2018-02-23 RX ADMIN — SODIUM CHLORIDE: 0.9 INJECTION, SOLUTION INTRAVENOUS at 07:02

## 2018-02-23 RX ADMIN — LIDOCAINE HYDROCHLORIDE 0.1 MG: 10 INJECTION, SOLUTION EPIDURAL; INFILTRATION; INTRACAUDAL; PERINEURAL at 06:02

## 2018-02-23 RX ADMIN — SODIUM CHLORIDE 125 ML/HR: 9 INJECTION, SOLUTION INTRAVENOUS at 06:02

## 2018-02-23 RX ADMIN — LABETALOL HYDROCHLORIDE 25 MG: 5 INJECTION INTRAVENOUS at 07:02

## 2018-02-23 RX ADMIN — ONDANSETRON 4 MG: 2 INJECTION, SOLUTION INTRAMUSCULAR; INTRAVENOUS at 07:02

## 2018-02-23 RX ADMIN — ESMOLOL HYDROCHLORIDE 20 MG: 10 INJECTION INTRAVENOUS at 08:02

## 2018-02-23 RX ADMIN — METHOHEXITAL SODIUM 200 MG: 500 INJECTION, POWDER, LYOPHILIZED, FOR SOLUTION INTRAMUSCULAR; INTRAVENOUS; RECTAL at 07:02

## 2018-02-23 NOTE — ANESTHESIA POSTPROCEDURE EVALUATION
"Anesthesia Post Evaluation    Patient: Allyssa Wright    Procedure(s) Performed: Procedure(s) (LRB):  ELECTROCONVULSIVE THERAPY (ECT) - SINGLE SEIZURE (N/A)    Final Anesthesia Type: general  Patient location during evaluation: [facility]  Patient participation: Yes- Able to Participate  Level of consciousness: awake  Post-procedure vital signs: reviewed and stable  Pain management: adequate  Airway patency: patent  PONV status at discharge: No PONV  Anesthetic complications: no      Cardiovascular status: blood pressure returned to baseline and hemodynamically stable  Respiratory status: unassisted  Hydration status: euvolemic  Follow-up not needed.        Visit Vitals  /65   Pulse 75   Temp 37 °C (98.6 °F) (Temporal)   Resp 17   Ht 5' 5" (1.651 m)   Wt 69.9 kg (154 lb 1.6 oz)   SpO2 100%   Breastfeeding? No   BMI 25.64 kg/m²       Pain/Roma Score: Pain Assessment Performed: Yes (2/23/2018  8:37 AM)  Presence of Pain: denies (2/23/2018  8:37 AM)  Roma Score: 10 (2/23/2018  8:37 AM)  Modified Roma Score: 20 (2/23/2018  8:37 AM)      "

## 2018-02-23 NOTE — OP NOTE
Allyssa Wright  : 1980   MRN: [id number]  Date: 2018    Electroconvulsive Therapy  Procedure Note    Date of Admission: 2018  5:53 AM    Site: [facility] [address]    Attending: Ruel Benson MD  Residents: Hanane Remy MD  Pre-procedure Diagnosis: MDD, recurrent, in partial remission                       ECT Treatment Number: 35  Machine Type: Finelineta 5000    Patient Status: Medically stable    Vitals (pre-procedure):  Vitals:    18 0622   BP: 111/65   Pulse: 95   Resp: 20   Temp: 97.9 °F (36.6 °C)       Electrode Placement: Bitemporal    Stimulus Number Charge (mC) Level Pulse Width (msec) Frequency  (Hz) Duration of Stimulus (sec) Current (mA) Duration of Seizure (sec)   1 576 3 1 60 6 800 40                                   Complications: Hypertension    Maximum Blood Pressure: 145/84    Medications Given:  Caffeine 500 mg PO Before  Methohexital (Brevital) 200 mg IV During   Succinylcholine (Anectine) 120 mg IV During   Ondansetron (Zofran) 4 mg IV During  Toradol 30 mg IV During   Labetalol 25 mg IV during    Treatment Course:  Patient tolerated procedure well. After adequate recovery from general anesthesia, the patient was transported to recovery.    Post-op Diagnosis: Same as above    Recommended for next ECT:  18    Hanane Remy MD  [facility] PGY2  [phone]      STAFF NOTE:  I agree with above procedure note as stated.  Pt tolerated procedure well overall.

## 2018-02-23 NOTE — ANESTHESIA RELEASE NOTE
"Anesthesia Release from PACU Note    Patient: Allyssa Wright    Procedure(s) Performed: Procedure(s) (LRB):  ELECTROCONVULSIVE THERAPY (ECT) - SINGLE SEIZURE (N/A)    Anesthesia type: general    Post pain: Adequate analgesia    Post assessment: no apparent anesthetic complications, tolerated procedure well and no evidence of recall    Last Vitals:   Visit Vitals  /65   Pulse 75   Temp 37 °C (98.6 °F) (Temporal)   Resp 17   Ht 5' 5" (1.651 m)   Wt 69.9 kg (154 lb 1.6 oz)   SpO2 100%   Breastfeeding? No   BMI 25.64 kg/m²       Post vital signs: stable    Level of consciousness: awake    Nausea/Vomiting: no nausea/no vomiting    Complications: none    Airway Patency: patent    Respiratory: unassisted, spontaneous ventilation, room air    Cardiovascular: stable and blood pressure at baseline    Hydration: euvolemic  "

## 2018-02-23 NOTE — TRANSFER OF CARE
"Anesthesia Transfer of Care Note    Patient: Allyssa Wright    Procedure(s) Performed: Procedure(s) (LRB):  ELECTROCONVULSIVE THERAPY (ECT) - SINGLE SEIZURE (N/A)    Patient location: [facility]    Anesthesia Type: general    Transport from OR: Transported from OR on room air with adequate spontaneous ventilation    Post pain: adequate analgesia    Post assessment: no apparent anesthetic complications    Post vital signs: stable    Level of consciousness: awake and alert    Nausea/Vomiting: no nausea/vomiting    Complications: none    Transfer of care protocol was followed      Last vitals:   Visit Vitals  /65 (BP Location: Left arm, Patient Position: Sitting)   Pulse 95   Temp 36.6 °C (97.9 °F) (Skin)   Resp 20   Ht 5' 5" (1.651 m)   Wt 69.9 kg (154 lb 1.6 oz)   SpO2 100%   Breastfeeding? No   BMI 25.64 kg/m²     "

## 2018-02-23 NOTE — DISCHARGE INSTRUCTIONS
Home Care Instructions  E.C.T.      ACTIVITY LEVEL:  You may feel sleepy for several hours. It is best to rest until you are more awake and then gradually resume your normal activities in one day. It is recommended, because of the possibility of memory loss and slight confusion post ECT, that you rest in the company of a responsible adult. This confusion and memory loss is expected and should diminish over time. It is also recommended that you do not drive or operate any electrical appliances or machinery during the ECT treatment series. Ask your Doctor, at the time of your treatment, any questions you may have about specific activities.    DIET:  You may wish to start with liquids after your ECT treatment and gradually resume your normal diet. Do not consume alcoholic beverages during the ECT treatment series.    BATHING:  You may shower or bathe as desired.    MEDICATIONS:  Resume all home medications starting with the AM doses not taken prior to ECT treatment. If you experience a headache, it is okay to take your usual pain reliever.  WHEN TO CALL THE DOCTOR:   Headache, memory loss or confusion that does not begin to resolve within 24 hours.      RETURN APPOINTMENT:  Report to Day [facility] ([facility]) on (date)_______________________ for (time)_________________.  Remember you may not eat or drink after midnight, but please take heart or high blood pressure medicines with a sip of water in the morning prior to leaving home.    FOR EMERGENCIES:  If any unusual problems or difficulties occur, contact the office [phone] Monday-Friday until 3:00 p.m. After hours, call the hospital  [phone] and ask for the Psychiatry Resident On-call.

## 2018-02-23 NOTE — PLAN OF CARE
Discharge instructions given to patient and father.  Verbalized understanding.  Denies pain. Tolerating PO fluids. Surgical and anesthesia consents in chart.

## 2018-02-23 NOTE — ANESTHESIA POSTPROCEDURE EVALUATION
"Anesthesia Post Evaluation    Patient: Allyssa Wright    Procedure(s) Performed: Procedure(s) (LRB):  ELECTROCONVULSIVE THERAPY (ECT) - SINGLE SEIZURE (N/A)    Final Anesthesia Type: general  Patient location during evaluation: PACU  Patient participation: Yes- Able to Participate  Level of consciousness: awake and alert  Post-procedure vital signs: reviewed and stable  Pain management: adequate  Airway patency: patent  PONV status at discharge: No PONV  Anesthetic complications: no      Cardiovascular status: stable  Respiratory status: unassisted and spontaneous ventilation  Hydration status: euvolemic  Follow-up not needed.        Visit Vitals  /65   Pulse 75   Temp 37 °C (98.6 °F) (Temporal)   Resp 17   Ht 5' 5" (1.651 m)   Wt 69.9 kg (154 lb 1.6 oz)   SpO2 100%   Breastfeeding? No   BMI 25.64 kg/m²       Pain/Roma Score: Pain Assessment Performed: Yes (2/23/2018  8:37 AM)  Presence of Pain: denies (2/23/2018  8:37 AM)  Roma Score: 10 (2/23/2018  8:37 AM)  Modified Roma Score: 20 (2/23/2018  8:37 AM)      "

## 2018-02-23 NOTE — ANESTHESIA PROCEDURE NOTES
ECT    Treatment Number: 35  Procedure start time: 2/23/2018 8:00 AM  Timeout performed at: 2/23/2018 7:50 AM  Procedure end time: 2/23/2018 8:05 AM    Staffing  Anesthesiologist: EMMA TOLENTINO  CRNA/Resident: NAUN ROSAS  Performed by: resident/CRNA and anesthesiologist     Preanesthetic Checklist  The following were completed as part of the preanesthetic checklist: patient identified, procedural consent, pre-op evaluation, timeout performed, risks and benefits discussed, monitors and equipment checked, anesthesia consent given, oxygen available, suction available, hand hygiene performed and patient being monitored.    Setup & Induction  Patient Monitoring: heart rate, continuous pulse ox, continuous capnometry, NIBP and cardiac monitor  Patient preparation: bite block inserted, extremities padded, mandibular stabilization and patient hyperventilated  Electrode Placement: Bitemporal    The patient was moved to the ECT therapy room after being assessed and consented for ECT. After standard ASA monitors were applied and timeout performed, the patient was adequately preoxygenated. After induction of general anesthesia, adequate oxygenation and ventilation were confirmed with pulse oxymetry and end tidal CO2 monitoring via bag-mask ventilation. End tidal CO2 was monitored throughout the case and moderate hyperventilation was performed prior to beginning ECT treatment. All extremities were padded, biteblock was inserted, and mandibular stabilization was done prior to initiating ECT therapy.    Procedure  Stimulus Number 1: Setting Number = III; 576 millicoulombs; Seizure Duration = 40 seconds            Recovery  After adequate recovery from general anesthesia, the patient was transported to recovery.  Baseline Blood Pressure: 105/67  Peak Blood Pressure: 145/84  Time to Recovery of Respirations: 7 minutes    ECT Findings  ECT associated findings of: None

## 2018-02-23 NOTE — DISCHARGE SUMMARY
Allyssa Wright  : 1980   MRN: [id number]  Date: 2018     Electroconvulsive Therapy  Discharge Summary    Admit Date: 2018  5:53 AM  Discharge Date: 2018    Attending Physician: Ruel Benson MD  Discharge Provider: Hanane Remy MD    History of Present Illness: Allyssa Wright is a 38 y.o. female with MDD, recurrent, severe, presented for ECT #35. See H&P dated 2018 for full HPI. For further details, see Dr. Boyce's pre-ECT evaluation.    Hospital Course: The patient tolerated the ECT treatment well without complication. Patient was stable post-procedure. See OP note dated 2018 for more details.     Disposition: Home or Self Care    Medications:  Current Discharge Medication List      CONTINUE these medications which have NOT CHANGED    Details   clozapine (CLOZARIL) 50 MG tablet Take 100 mg by mouth once daily.       dapsone 7.5 % GlwP Apply topically once daily.      dextroamphetamine-amphetamine 30 mg Tab Take 30 mg by mouth 2 (two) times daily.     Associated Diagnoses: MDD (major depressive disorder), recurrent, in partial remission      famciclovir (FAMVIR) 500 MG tablet Take 1 tablet (500 mg total) by mouth 2 (two) times daily.  Qty: 30 tablet, Refills: 12    Associated Diagnoses: Major depressive disorder, recurrent episode, moderate degree      mirtazapine (REMERON) 15 MG tablet Take 1 tablet (15 mg total) by mouth every evening.  Qty: 90 tablet, Refills: 0      spironolactone (ALDACTONE) 50 MG tablet 50 mg 2 (two) times daily. 50  mg qAM, 50 mg qHS.      thyroid (ARMOUR THYROID) 30 mg Tab Take 1 tablet (30 mg total) by mouth every morning.  Qty: 30 tablet, Refills: 11    Associated Diagnoses: Major depressive disorder, recurrent episode, moderate degree      trazodone (DESYREL) 100 MG tablet Take 200 mg by mouth every evening.       venlafaxine (EFFEXOR) 100 MG Tab Take 225 mg by mouth once daily.     Associated Diagnoses: MDD (major depressive disorder),  recurrent, in partial remission      hydrOXYzine pamoate (VISTARIL) 25 MG Cap Take 1 capsule (25 mg total) by mouth every 8 (eight) hours as needed (anxiety).  Qty: 90 capsule, Refills: 1      UNABLE TO FIND 2 (two) times daily. n-azetyl-cysteine      ZOVIRAX 5 % Crea Refills: 5           Status at Discharge: Alert and medically stable    Discharge Diagnoses:  MDD, recurrent, in partial remission    Diet: Resume previous outpatient diet  Activity: Ambulate with assistance  Instructions: Please do not eat or drink anything after midnight prior to procedure. Please do not drive on day of ECT.    Med Changes:  None    Next ECT:  2/26/18    Hanane Remy MD  2/23/2018       STAFF NOTE:  I agree with above Discharge Summary as noted by Dr. Remy.      IMP:  Major Depressive Disorder, Recurrent, In partial remission     REC:  Patient is to follow up with next ECT to be scheduled 2/26/2018 as stated.    Pt or family members were urged to call our ECT coordinator prn in meantime for any questions or concerns.

## 2018-02-25 ENCOUNTER — ANESTHESIA EVENT (OUTPATIENT)
Dept: ELECTROPHYSIOLOGY | Facility: HOSPITAL | Age: 38
End: 2018-02-25

## 2018-02-26 DIAGNOSIS — F33.41 MAJOR DEPRESSIVE DISORDER, RECURRENT EPISODE, IN PARTIAL REMISSION: Primary | ICD-10-CM

## 2018-02-26 NOTE — ANESTHESIA PREPROCEDURE EVALUATION
02/25/2018    Pre-operative evaluation for ELECTROCONVULSIVE THERAPY (ECT) - SINGLE SEIZURE (N/A)    Allyssa Wright is a 38 y.o. female with a pmhx of MDD who is presenting for ECT #36.    Past Medical History:   Diagnosis Date    Anxiety     Depression     History of psychiatric hospitalization     HSV-1 (herpes simplex virus 1) infection     Hx of psychiatric care     Moderate depressed bipolar II disorder 06/13/2016    reports no history of bipolar    Obsessive-compulsive disorder     Psychiatric problem     Self-harming behavior     Therapy      Past Surgical History:   Procedure Laterality Date    ANKLE SURGERY Right     BREAST augmentation      OVARIAN CYST REMOVAL Bilateral          Vital Signs Range (Last 24H):  BP: ()/()   Arterial Line BP: ()/()       CBC:   No results for input(s): WBC, RBC, HGB, HCT, PLT, MCV, MCH, MCHC in the last 720 hours.    CMP: No results for input(s): NA, K, CL, CO2, BUN, CREATININE, GLU, MG, PHOS, CALCIUM, ALBUMIN, PROT, ALKPHOS, ALT, AST, BILITOT in the last 720 hours.    INR:  No results for input(s): PT, INR, PROTIME, APTT in the last 720 hours.       Pre-op Assessment    I have reviewed the Patient Summary Reports.     I have reviewed the Nursing Notes.   I have reviewed the Medications.     Review of Systems  Anesthesia Hx:  No problems with previous Anesthesia  History of prior surgery of interest to airway management or planning: Previous anesthesia: General 8/14/17 ECT with general anesthesia.  Procedure performed at an [facility]. Denies Family Hx of Anesthesia complications.   Denies Personal Hx of Anesthesia complications.   Social:  Former Smoker    Hematology/Oncology:  Hematology Normal   Oncology Normal     EENT/Dental:EENT/Dental Normal   Cardiovascular:   Denies Hypertension.  Denies MI.   Denies Dysrhythmias.   Denies Angina. ECG has  been reviewed.    Pulmonary:  Pulmonary Normal    Renal/:  Renal/ Normal     Hepatic/GI:  Hepatic/GI Normal    Musculoskeletal:  Musculoskeletal Normal    Neurological:  Neurology Normal    Endocrine:   Hypothyroidism    Psych:   Psychiatric History          Physical Exam  General:  Well nourished    Airway/Jaw/Neck:  Airway Findings: Mouth Opening: Normal Tongue: Normal  General Airway Assessment: Adult  Mallampati: II  Improves to I with phonation.  TM Distance: Normal, at least 6 cm  Jaw/Neck Findings:  Neck ROM: Normal ROM     Eyes/Ears/Nose:  EYES/EARS/NOSE FINDINGS: Normal   Dental:  Dental Findings: In tact   Chest/Lungs:  Chest/Lungs Findings: Normal Respiratory Rate     Heart/Vascular:  Heart Findings: Rate: Normal        Mental Status:  Mental Status Findings:  Cooperative, Alert and Oriented

## 2018-02-28 ENCOUNTER — ANESTHESIA EVENT (OUTPATIENT)
Dept: ELECTROPHYSIOLOGY | Facility: HOSPITAL | Age: 38
End: 2018-02-28
Payer: COMMERCIAL

## 2018-02-28 NOTE — DISCHARGE SUMMARY
Allyssa Wright  : 1980   MRN: [id number]  Date: 3/1/2018     Electroconvulsive Therapy  Discharge Summary    Admit Date: 3/1/2018  5:48 AM  Discharge Date: 2018    Attending Physician: Shoaib Boyce Jr., MD   Discharge Provider: Justine Lopez MD    History of Present Illness: Allyssa Wright is a 38 y.o. female with MDD, recurrent, severe, in partial remission who presented for ECT #36. See H&P dated 2018 for full HPI. For further details, see Dr. Boyce's pre-ECT evaluation.    Hospital Course: The patient tolerated the ECT treatment well without complication. Patient was stable post-procedure. See OP note dated 2018 for more details.     Disposition: Home or Self Care    Medications:  Current Discharge Medication List      CONTINUE these medications which have NOT CHANGED    Details   clozapine (CLOZARIL) 50 MG tablet Take 100 mg by mouth once daily.       dapsone 7.5 % GlwP Apply topically once daily.      dextroamphetamine-amphetamine 30 mg Tab Take 30 mg by mouth 2 (two) times daily.     Associated Diagnoses: MDD (major depressive disorder), recurrent, in partial remission      famciclovir (FAMVIR) 500 MG tablet Take 1 tablet (500 mg total) by mouth 2 (two) times daily.  Qty: 30 tablet, Refills: 12    Associated Diagnoses: Major depressive disorder, recurrent episode, moderate degree      hydrOXYzine pamoate (VISTARIL) 25 MG Cap Take 1 capsule (25 mg total) by mouth every 8 (eight) hours as needed (anxiety).  Qty: 90 capsule, Refills: 1      mirtazapine (REMERON) 15 MG tablet Take 1 tablet (15 mg total) by mouth every evening.  Qty: 90 tablet, Refills: 0      spironolactone (ALDACTONE) 50 MG tablet 50 mg 2 (two) times daily. 50  mg qAM, 50 mg qHS.      thyroid (ARMOUR THYROID) 30 mg Tab Take 1 tablet (30 mg total) by mouth every morning.  Qty: 30 tablet, Refills: 11    Associated Diagnoses: Major depressive disorder, recurrent episode, moderate degree      trazodone (DESYREL) 100  MG tablet Take 200 mg by mouth every evening.       UNABLE TO FIND 2 (two) times daily. n-azetyl-cysteine      venlafaxine (EFFEXOR) 100 MG Tab Take 225 mg by mouth once daily.     Associated Diagnoses: MDD (major depressive disorder), recurrent, in partial remission      vortioxetine (TRINTELLIX) 5 mg Tab Take 2.5 mg by mouth.      ZOVIRAX 5 % Crea Refills: 5           Status at Discharge: Alert and medically stable    Discharge Diagnoses:  MDD, recurrent, severe, in partial remission  Diet: Resume previous outpatient diet  Activity: Ambulate with assistance  Instructions: Please do not eat or drink anything after midnight prior to procedure. Please do not drive on day of ECT.    Med Changes:  None    Next ECT:  Follow-up Information     [facility].    Specialty:  Emergency Medicine  Why:  for ECT  Contact information:  [address]  [facility] [address] [phone]               Justine Lopez MD  3/1/2018

## 2018-02-28 NOTE — OP NOTE
Allyssa Wright   : 1980   MRN: [id number]  Date: 3/1/2018    Electroconvulsive Therapy  Operative Note    Date of Admission: 3/1/2018  5:48 AM    Site: [facility], [address]    Attending: Shoaib Boyce Jr., MD   Residents: Justine Lopez MD  Pre-op Diagnosis: MDD recurrent, in partial remission  ECT Treatment Number: 36  Machine Type: Cardiac Dimensionsta 5000    Patient Status: Medically stable    Vitals (pre-procedure):  Vitals:    18 0639   BP: 119/71   Pulse: 83   Resp: 18   Temp: 98.4 °F (36.9 °C)       Electrode Placement: Bitemporal    Stimulus Number Charge (mC) Level Pulse Width (msec) Frequency  (Hz) Duration of Stimulus (sec) Current (mA) Duration of Seizure (sec)   1 576 3 1 60 6 800 74                                   Complications: Hypertension    Maximum Blood Pressure: 148/86    Medications Given:  Caffeine 500 mg PO Before  Methohexital (Brevital) 200 mg IV During   Succinylcholine (Anectine) 120 mg IV During   Ondansetron (Zofran) 4 mg IV During  Toradol 30 mg IV During   Esmolol 120 mg IV during    Treatment Course:  Patient tolerated procedure well. After adequate recovery from general anesthesia, the patient was transported to recovery.    Post-op Diagnosis: Same as above    Recommended for next ECT:  No changes      Justine Lopez MD  3/1/2018

## 2018-02-28 NOTE — H&P
"Allyssa Wright  : 1980   MRN: [id number]  Date: 3/1/2018     Electroconvulsive Therapy  History & Physical    Chief complaint: MDD, recurrent, in partial remission  Procedure: ECT #36    SUBJECTIVE:     HPI:   Allyssa Wright is a 38 y.o. female with MDD, recurrent, in partial remission who presents for ECT. Please see Dr. Boyce's full pre-ECT evaluation for further details.     The patient reports that her mood is improved since starting what she calls her "booster series." States that she has been "fine" since starting, better with the booster series but not as well as she had hoped. Wondering if not taking the adderall the day of and the day before ECT sometime  She has started Tritellix 2.5mg for one week with intent to uptitrate to 5mg.     The patient denies any changes in dentition.   The patient does not need any prescriptions and has not had any med changes (follows with Dr. Jc) since meeting with Dr. Boyce.   The patient has not had anything by mouth since midnight and is ready for ECT.    Psychiatric Review of Systems:  Mood: "fine" better since starting ect  Appetite: No problem   Psychomotor: No problem   Cognitive Impairment: minimal memory problems-not worse than normal   Insomnia: None   Psychosis: None   Diurnal Variation: None   Suicidal Ideation: Absent     Medical Review Of Systems:  HEENT: denies headaches, dental problems  Cv: denies chest pain  Resp: Denies shortness of breath, cough  Gi: denies stomach pain, N/V/D/C  MSK: denies pain of muscles or joints    Current Medications:   No current facility-administered medications on file prior to encounter.      Current Outpatient Prescriptions on File Prior to Encounter   Medication Sig Dispense Refill    clozapine (CLOZARIL) 50 MG tablet Take 100 mg by mouth once daily.       dapsone 7.5 % GlwP Apply topically once daily.      dextroamphetamine-amphetamine 30 mg Tab Take 30 mg by mouth 2 (two) times daily.       famciclovir " (FAMVIR) 500 MG tablet Take 1 tablet (500 mg total) by mouth 2 (two) times daily. 30 tablet 12    hydrOXYzine pamoate (VISTARIL) 25 MG Cap Take 1 capsule (25 mg total) by mouth every 8 (eight) hours as needed (anxiety). (Patient taking differently: Take 50 mg by mouth every 8 (eight) hours as needed (anxiety). ) 90 capsule 1    mirtazapine (REMERON) 15 MG tablet Take 1 tablet (15 mg total) by mouth every evening. (Patient taking differently: Take 7.5 mg by mouth every evening. ) 90 tablet 0    spironolactone (ALDACTONE) 50 MG tablet 50 mg 2 (two) times daily. 50  mg qAM, 50 mg qHS.      thyroid (ARMOUR THYROID) 30 mg Tab Take 1 tablet (30 mg total) by mouth every morning. 30 tablet 11    trazodone (DESYREL) 100 MG tablet Take 200 mg by mouth every evening.       UNABLE TO FIND 2 (two) times daily. n-azetyl-cysteine      venlafaxine (EFFEXOR) 100 MG Tab Take 225 mg by mouth once daily.       ZOVIRAX 5 % Crea   5      Allergies:   Benzodiazepines; Ampicillin; Erythromycin; Levaquin [levofloxacin]; Penicillins; Pristiq [desvenlafaxine]; Sulfa (sulfonamide antibiotics); and Azithromycin    Past Medical/Surgical History:   Past Medical History:   Diagnosis Date    Anxiety     Depression     History of psychiatric hospitalization     HSV-1 (herpes simplex virus 1) infection     Hx of psychiatric care     Moderate depressed bipolar II disorder 06/13/2016    reports no history of bipolar    Obsessive-compulsive disorder     Psychiatric problem     Self-harming behavior     Therapy      Past Surgical History:   Procedure Laterality Date    ANKLE SURGERY Right     BREAST augmentation      OVARIAN CYST REMOVAL Bilateral       OBJECTIVE:     Vitals (pre-procedure):  There were no vitals filed for this visit.     Labs/Imaging/Studies:   No results found for this or any previous visit (from the past 48 hour(s)).   No results found for: PHENYTOIN, PHENOBARB, VALPROATE, CBMZ    Physical Exam:   Gen: NAD, age  "appropriate  HEENT: MMM, PERRL, EOMI, O/P clear   CV: RRR, S1/S2 nml, no M/R/G  Chest: CTAB, no R/R/W, unlabored breathing   Abd: S/NT/ND, +BS  Ext: No gross deformities or lesions  Neuro: CN II-XII grossly intact, no focal deficits    Mental Status Exam:   Appearance: no acute distress, age appropriate, casually dressed, neatly groomed  Behavior: friendly and cooperative, eye contact normal  Speech: normal tone, normal rate, normal pitch, normal volume  Mood: "fine"   Affect: reactive  Thought Process: linear and logical  Thought Content: no suicidality, no homicidality, delusions, or paranoia  Cognition: grossly intact  Insight: fair  Judgment: fair    ASSESSMENT/PLAN:     Allyssa Wright is a 38 y.o. female with MDD, recurrent, in partial remission who presents for ECT.    Recommendations:   Proceed with ECT #36.      Justine Lopez MD  3/1/2018      "

## 2018-03-01 ENCOUNTER — HOSPITAL ENCOUNTER (OUTPATIENT)
Facility: HOSPITAL | Age: 38
Discharge: HOME | End: 2018-03-01
Attending: PSYCHIATRY & NEUROLOGY | Admitting: PSYCHIATRY & NEUROLOGY
Payer: COMMERCIAL

## 2018-03-01 ENCOUNTER — ANESTHESIA (OUTPATIENT)
Dept: ELECTROPHYSIOLOGY | Facility: HOSPITAL | Age: 38
End: 2018-03-01
Payer: COMMERCIAL

## 2018-03-01 ENCOUNTER — SURGERY (OUTPATIENT)
Age: 38
End: 2018-03-01

## 2018-03-01 VITALS
WEIGHT: 155 LBS | BODY MASS INDEX: 25.83 KG/M2 | TEMPERATURE: 99 F | OXYGEN SATURATION: 100 % | RESPIRATION RATE: 18 BRPM | SYSTOLIC BLOOD PRESSURE: 107 MMHG | HEART RATE: 79 BPM | HEIGHT: 65 IN | DIASTOLIC BLOOD PRESSURE: 69 MMHG

## 2018-03-01 DIAGNOSIS — F33.9 RECURRENT MAJOR DEPRESSIVE DISORDER, REMISSION STATUS UNSPECIFIED: Primary | ICD-10-CM

## 2018-03-01 DIAGNOSIS — F33.41 MDD (MAJOR DEPRESSIVE DISORDER), RECURRENT, IN PARTIAL REMISSION: ICD-10-CM

## 2018-03-01 DIAGNOSIS — F33.2 MDD (MAJOR DEPRESSIVE DISORDER), RECURRENT EPISODE, SEVERE: ICD-10-CM

## 2018-03-01 LAB
B-HCG UR QL: NEGATIVE
CTP QC/QA: YES

## 2018-03-01 PROCEDURE — 81025 URINE PREGNANCY TEST: CPT | Performed by: PSYCHIATRY & NEUROLOGY

## 2018-03-01 PROCEDURE — 90870 ELECTROCONVULSIVE THERAPY: CPT | Mod: ,,, | Performed by: PSYCHIATRY & NEUROLOGY

## 2018-03-01 PROCEDURE — 63600175 PHARM REV CODE 636 W HCPCS: Performed by: ANESTHESIOLOGY

## 2018-03-01 PROCEDURE — 90870 ELECTROCONVULSIVE THERAPY: CPT | Performed by: ANESTHESIOLOGY

## 2018-03-01 PROCEDURE — 25000003 PHARM REV CODE 250: Performed by: ANESTHESIOLOGY

## 2018-03-01 PROCEDURE — D9220A PRA ANESTHESIA: Mod: ,,, | Performed by: ANESTHESIOLOGY

## 2018-03-01 PROCEDURE — 25000003 PHARM REV CODE 250: Performed by: PSYCHIATRY & NEUROLOGY

## 2018-03-01 PROCEDURE — 37000008 HC ANESTHESIA 1ST 15 MINUTES: Performed by: PSYCHIATRY & NEUROLOGY

## 2018-03-01 PROCEDURE — 71000044 HC DOSC ROUTINE RECOVERY FIRST HOUR: Performed by: PSYCHIATRY & NEUROLOGY

## 2018-03-01 PROCEDURE — 37000009 HC ANESTHESIA EA ADD 15 MINS: Performed by: PSYCHIATRY & NEUROLOGY

## 2018-03-01 RX ORDER — ONDANSETRON 2 MG/ML
INJECTION INTRAMUSCULAR; INTRAVENOUS
Status: DISCONTINUED | OUTPATIENT
Start: 2018-03-01 | End: 2018-03-01

## 2018-03-01 RX ORDER — KETOROLAC TROMETHAMINE 30 MG/ML
30 INJECTION, SOLUTION INTRAMUSCULAR; INTRAVENOUS ONCE
Status: DISCONTINUED | OUTPATIENT
Start: 2018-03-01 | End: 2018-03-01

## 2018-03-01 RX ORDER — SUCCINYLCHOLINE CHLORIDE 20 MG/ML
INJECTION INTRAMUSCULAR; INTRAVENOUS
Status: COMPLETED
Start: 2018-03-01 | End: 2018-03-01

## 2018-03-01 RX ORDER — SODIUM CHLORIDE 9 MG/ML
INJECTION, SOLUTION INTRAVENOUS CONTINUOUS
Status: DISCONTINUED | OUTPATIENT
Start: 2018-03-02 | End: 2018-03-01 | Stop reason: HOSPADM

## 2018-03-01 RX ORDER — SUCCINYLCHOLINE CHLORIDE 20 MG/ML
INJECTION INTRAMUSCULAR; INTRAVENOUS
Status: DISCONTINUED | OUTPATIENT
Start: 2018-03-01 | End: 2018-03-01

## 2018-03-01 RX ORDER — KETOROLAC TROMETHAMINE 30 MG/ML
INJECTION, SOLUTION INTRAMUSCULAR; INTRAVENOUS
Status: COMPLETED
Start: 2018-03-01 | End: 2018-03-01

## 2018-03-01 RX ORDER — LIDOCAINE HYDROCHLORIDE 10 MG/ML
1 INJECTION, SOLUTION EPIDURAL; INFILTRATION; INTRACAUDAL; PERINEURAL ONCE
Status: COMPLETED | OUTPATIENT
Start: 2018-03-02 | End: 2018-03-01

## 2018-03-01 RX ORDER — ONDANSETRON 2 MG/ML
4 INJECTION INTRAMUSCULAR; INTRAVENOUS ONCE
Status: DISCONTINUED | OUTPATIENT
Start: 2018-03-01 | End: 2018-03-01

## 2018-03-01 RX ORDER — KETOROLAC TROMETHAMINE 30 MG/ML
INJECTION, SOLUTION INTRAMUSCULAR; INTRAVENOUS
Status: DISCONTINUED | OUTPATIENT
Start: 2018-03-01 | End: 2018-03-01

## 2018-03-01 RX ORDER — ONDANSETRON 2 MG/ML
INJECTION INTRAMUSCULAR; INTRAVENOUS
Status: COMPLETED
Start: 2018-03-01 | End: 2018-03-01

## 2018-03-01 RX ADMIN — SUCCINYLCHOLINE CHLORIDE 120 MG: 20 INJECTION, SOLUTION INTRAMUSCULAR; INTRAVENOUS at 07:03

## 2018-03-01 RX ADMIN — SODIUM CHLORIDE: 9 INJECTION, SOLUTION INTRAVENOUS at 07:03

## 2018-03-01 RX ADMIN — KETOROLAC TROMETHAMINE 30 MG: 30 INJECTION, SOLUTION INTRAMUSCULAR; INTRAVENOUS at 07:03

## 2018-03-01 RX ADMIN — SODIUM CHLORIDE 500 ML: 9 INJECTION, SOLUTION INTRAVENOUS at 06:03

## 2018-03-01 RX ADMIN — Medication 120 MG: at 07:03

## 2018-03-01 RX ADMIN — ONDANSETRON 4 MG: 2 INJECTION, SOLUTION INTRAMUSCULAR; INTRAVENOUS at 07:03

## 2018-03-01 RX ADMIN — Medication 500 MG: at 06:03

## 2018-03-01 RX ADMIN — LIDOCAINE HYDROCHLORIDE 0.1 MG: 10 INJECTION, SOLUTION EPIDURAL; INFILTRATION; INTRACAUDAL; PERINEURAL at 06:03

## 2018-03-01 RX ADMIN — METHOHEXITAL SODIUM 200 MG: 500 INJECTION, POWDER, LYOPHILIZED, FOR SOLUTION INTRAMUSCULAR; INTRAVENOUS; RECTAL at 07:03

## 2018-03-01 NOTE — TRANSFER OF CARE
"Anesthesia Transfer of Care Note    Patient: Allyssa Wright    Procedure(s) Performed: Procedure(s) (LRB):  ELECTROCONVULSIVE THERAPY (ECT) - SINGLE SEIZURE (N/A)    Patient location: PACU    Transport from OR: Transported from OR on 6-10 L/min O2 by face mask with adequate spontaneous ventilation    Post pain: adequate analgesia    Post assessment: no apparent anesthetic complications    Post vital signs: stable    Level of consciousness: awake and alert    Nausea/Vomiting: no nausea/vomiting    Complications: none    Transfer of care protocol was followed      Last vitals:   Visit Vitals  /67   Pulse 80   Temp 37.1 °C (98.8 °F) (Temporal)   Resp 18   Ht 5' 5" (1.651 m)   Wt 70.3 kg (155 lb)   SpO2 100%   Breastfeeding? No   BMI 25.79 kg/m²     "

## 2018-03-01 NOTE — ANESTHESIA PREPROCEDURE EVALUATION
02/28/2018    Pre-operative evaluation for ELECTROCONVULSIVE THERAPY (ECT) - SINGLE SEIZURE (N/A)    Allyssa Wright is a 38 y.o. female with a pmhx of MDD who is presenting for ECT #37.    Past Medical History:   Diagnosis Date    Anxiety     Depression     History of psychiatric hospitalization     HSV-1 (herpes simplex virus 1) infection     Hx of psychiatric care     Moderate depressed bipolar II disorder 06/13/2016    reports no history of bipolar    Obsessive-compulsive disorder     Psychiatric problem     Self-harming behavior     Therapy      Past Surgical History:   Procedure Laterality Date    ANKLE SURGERY Right     BREAST augmentation      OVARIAN CYST REMOVAL Bilateral          Vital Signs Range (Last 24H):  BP: ()/()   Arterial Line BP: ()/()       CBC:   No results for input(s): WBC, RBC, HGB, HCT, PLT, MCV, MCH, MCHC in the last 720 hours.    CMP: No results for input(s): NA, K, CL, CO2, BUN, CREATININE, GLU, MG, PHOS, CALCIUM, ALBUMIN, PROT, ALKPHOS, ALT, AST, BILITOT in the last 720 hours.    INR:  No results for input(s): PT, INR, PROTIME, APTT in the last 720 hours.       Anesthesia Evaluation    I have reviewed the Patient Summary Reports.    I have reviewed the Nursing Notes.   I have reviewed the Medications.     Review of Systems  Anesthesia Hx:  No problems with previous Anesthesia  History of prior surgery of interest to airway management or planning: Previous anesthesia: General 8/14/17 ECT with general anesthesia.  Procedure performed at an [facility]. Denies Family Hx of Anesthesia complications.   Denies Personal Hx of Anesthesia complications.   Social:  Former Smoker    Hematology/Oncology:  Hematology Normal   Oncology Normal     EENT/Dental:EENT/Dental Normal   Cardiovascular:   Denies Hypertension.  Denies MI.   Denies Dysrhythmias.   Denies Angina. ECG has  been reviewed.    Pulmonary:  Pulmonary Normal    Renal/:  Renal/ Normal     Hepatic/GI:  Hepatic/GI Normal    Musculoskeletal:  Musculoskeletal Normal    Neurological:  Neurology Normal    Endocrine:   Hypothyroidism    Psych:   Psychiatric History          Physical Exam  General:  Well nourished    Airway/Jaw/Neck:  Airway Findings: Mouth Opening: Normal Tongue: Normal  General Airway Assessment: Adult  Mallampati: II  Improves to I with phonation.  TM Distance: Normal, at least 6 cm  Jaw/Neck Findings:  Neck ROM: Normal ROM     Eyes/Ears/Nose:  EYES/EARS/NOSE FINDINGS: Normal   Dental:  Dental Findings: In tact   Chest/Lungs:  Chest/Lungs Findings: Normal Respiratory Rate     Heart/Vascular:  Heart Findings: Rate: Normal        Mental Status:  Mental Status Findings:  Cooperative, Alert and Oriented         Anesthesia Plan  Type of Anesthesia, risks & benefits discussed:  Anesthesia Type:  general  Patient's Preference:   Intra-op Monitoring Plan:   Intra-op Monitoring Plan Comments:   Post Op Pain Control Plan:   Post Op Pain Control Plan Comments:   Induction:   IV  Beta Blocker:  Patient is not currently on a Beta-Blocker (No further documentation required).       Informed Consent: Patient understands risks and agrees with Anesthesia plan.  Questions answered. Anesthesia consent signed with patient.  ASA Score: 2     Day of Surgery Review of History & Physical:    H&P update referred to the provider.         Ready For Surgery From Anesthesia Perspective.

## 2018-03-01 NOTE — PLAN OF CARE
Discharge instructions given to patient and father. Educated patient on procedure and post op instructions, medications and when to follow up within designated time frame. Patient verbalized understanding. Patient denies pain and nausea at this time.

## 2018-03-01 NOTE — ANESTHESIA RELEASE NOTE
"Anesthesia Release from PACU Note    Patient: Allyssa Wright    Procedure(s) Performed: Procedure(s) (LRB):  ELECTROCONVULSIVE THERAPY (ECT) - SINGLE SEIZURE (N/A)    Anesthesia type: general    Post pain: Adequate analgesia    Post assessment: no apparent anesthetic complications    Last Vitals:   Visit Vitals  /67   Pulse 80   Temp 37.1 °C (98.8 °F) (Temporal)   Resp 18   Ht 5' 5" (1.651 m)   Wt 70.3 kg (155 lb)   SpO2 100%   Breastfeeding? No   BMI 25.79 kg/m²       Post vital signs: stable    Level of consciousness: awake, alert  and oriented    Nausea/Vomiting: no nausea/no vomiting    Complications: none    Airway Patency: patent    Respiratory: unassisted    Cardiovascular: stable and blood pressure at baseline    Hydration: euvolemic  "

## 2018-03-01 NOTE — ANESTHESIA PROCEDURE NOTES
ECT    Procedure start time: 3/1/2018 7:11 AM  Timeout performed at: 3/1/2018 7:10 AM  Procedure end time: 3/1/2018 7:25 AM    Staffing  Anesthesiologist: OLIVA SEWELL  CRNA/Resident: NEETA CHUNG  Performed by: resident/CRNA     Preanesthetic Checklist  The following were completed as part of the preanesthetic checklist: patient identified, procedural consent, pre-op evaluation, timeout performed, risks and benefits discussed, monitors and equipment checked, anesthesia consent given, oxygen available, suction available, hand hygiene performed and patient being monitored.    Setup & Induction  Patient Monitoring: heart rate, cardiac monitor, continuous pulse ox, NIBP and continuous capnometry  Patient preparation: bite block inserted and mandibular stabilization  Electrode Placement: Bitemporal    The patient was moved to the ECT therapy room after being assessed and consented for ECT. After standard ASA monitors were applied and timeout performed, the patient was adequately preoxygenated. After induction of general anesthesia, adequate oxygenation and ventilation were confirmed with pulse oxymetry and end tidal CO2 monitoring via bag-mask ventilation. End tidal CO2 was monitored throughout the case and moderate hyperventilation was performed prior to beginning ECT treatment. All extremities were padded, biteblock was inserted, and mandibular stabilization was done prior to initiating ECT therapy.    Procedure  Stimulus Number 1: 576 millicoulombs; Seizure Duration = 74 seconds      Stimulus Number 4:       Recovery  After adequate recovery from general anesthesia, the patient was transported to recovery.  Baseline Blood Pressure: 119/71  Peak Blood Pressure: 148/86    ECT Findings  ECT associated findings of: None

## 2018-03-01 NOTE — DISCHARGE INSTRUCTIONS
Understanding Electroconvulsive Therapy  Electroconvulsive therapy (ECT) is sometimes called shock therapy. This may sound painful, but ECT doesnt hurt. Its often the safest and best treatment for severe depression. It can treat other mental disorders as well.  What is electroconvulsive therapy?  ECT is used to treat people who are very depressed. Its mainly used when other treatments, such as antidepressant medicines, have failed. Often it may relieve feelings of sadness and despair after 2 to 4 treatments.  Common symptoms of major depression  Symptoms of major depression include:  · Feeling a deep sadness that doesnt go away  · Losing all pleasure in life  · Feeling hopeless or helpless  · Feeling guilty  · Sleeping more or less than normal  · Eating more or less than normal  · Having headaches or stomachaches, or other pains that dont go away  · Feeling nervous, empty, or worthless  · Crying a great deal  · Thinking or talking about suicide or death  How is ECT therapy done?  Before an ECT treatment, youll be given anesthesia to keep you pain-free. Youll also be given medicine to make you sleep, relax your muscles and control your heart rate. Your healthcare provider then places electrodes on your head. You may have one above each temple (bilateral ECT). Or you may have electrodes on one temple and on your forehead (unilateral ECT). While you are asleep, your brain is stimulated very briefly with an electric current. This causes a seizure, usually lasting less than a minute. Because you are under anesthesia, your body will not move even as your brain goes through great changes.  What are the risks?  When done properly, ECT is quite safe. Right after the treatment, you may be confused. This often lasts for less than half an hour. You may have a headache or stiff muscles. But these symptoms often go away quickly. A more serious possible side effect is memory loss. Commonly, people have short-term  (temporary) trouble remembering information that they learned recently. And they may have little recall of the time when they received treatment. Less commonly, people may have long-lasting (permanent) spotty recall of major past events. In rare cases, there may be memory loss for larger blocks of time.  Looking to the future  In most cases, ECT doesnt cure depression. But it can improve symptoms for a period of time. You may need a series of ECT treatments to continue feeling the benefit. You may also need to take antidepressant medicines to help prevent symptoms from returning. But with ongoing treatment, you can have a full and healthy life.  Resources  · The National [employer] of Mental [employer]  252.909.2753  www.nimh.nih.gov  · [facility]  635.999.1853  www.[facility].org     Date Last Reviewed: 5/1/2017  © 8354-9105 [employer] [address] [place] [address], [place] [address]. All rights reserved. This information is not intended as a substitute for professional medical care. Always follow your healthcare professional's instructions.

## 2018-03-01 NOTE — ANESTHESIA RELEASE NOTE
"Anesthesia Release from PACU Note    Patient: Allyssa Wright    Procedure(s) Performed: Procedure(s) (LRB):  ELECTROCONVULSIVE THERAPY (ECT) - SINGLE SEIZURE (N/A)    Anesthesia type: general    Post pain: Adequate analgesia    Post assessment: no apparent anesthetic complications, tolerated procedure well and no evidence of recall    Last Vitals:   Visit Vitals  /67   Pulse 80   Temp 37.1 °C (98.8 °F) (Temporal)   Resp 18   Ht 5' 5" (1.651 m)   Wt 70.3 kg (155 lb)   SpO2 100%   Breastfeeding? No   BMI 25.79 kg/m²       Post vital signs: stable    Level of consciousness: responds to stimulation    Nausea/Vomiting: no nausea/no vomiting    Complications: none    Airway Patency: patent    Respiratory: unassisted, spontaneous ventilation, room air    Cardiovascular: stable and blood pressure at baseline    Hydration: euvolemic  "

## 2018-03-01 NOTE — ANESTHESIA POSTPROCEDURE EVALUATION
"Anesthesia Post Evaluation    Patient: Allyssa Wright    Procedure(s) Performed: Procedure(s) (LRB):  ELECTROCONVULSIVE THERAPY (ECT) - SINGLE SEIZURE (N/A)    Final Anesthesia Type: general  Patient location during evaluation: PACU  Patient participation: Yes- Able to Participate  Level of consciousness: awake and alert  Post-procedure vital signs: reviewed and stable  Pain management: adequate  Airway patency: patent  PONV status at discharge: No PONV  Anesthetic complications: no      Cardiovascular status: blood pressure returned to baseline  Respiratory status: unassisted, spontaneous ventilation and room air  Hydration status: euvolemic  Follow-up not needed.        Visit Vitals  /67   Pulse 80   Temp 37.1 °C (98.8 °F) (Temporal)   Resp 18   Ht 5' 5" (1.651 m)   Wt 70.3 kg (155 lb)   SpO2 100%   Breastfeeding? No   BMI 25.79 kg/m²       Pain/Roma Score: Pain Assessment Performed: Yes (3/1/2018  7:39 AM)  Presence of Pain: denies (3/1/2018  7:39 AM)  Pain Rating Prior to Med Admin: 0 (3/1/2018  7:39 AM)  Pain Rating Post Med Admin: 0 (3/1/2018  7:39 AM)  Roma Score: 9 (3/1/2018  7:40 AM)      "

## 2018-03-01 NOTE — ANESTHESIA POSTPROCEDURE EVALUATION
"Anesthesia Post Evaluation    Patient: Allyssa Wright    Procedure(s) Performed: Procedure(s) (LRB):  ELECTROCONVULSIVE THERAPY (ECT) - SINGLE SEIZURE (N/A)    Final Anesthesia Type: general  Patient location during evaluation: PACU  Patient participation: Yes- Able to Participate  Level of consciousness: awake and alert  Post-procedure vital signs: reviewed and stable  Pain management: adequate  Airway patency: patent  PONV status at discharge: No PONV  Anesthetic complications: no      Cardiovascular status: blood pressure returned to baseline  Respiratory status: unassisted  Hydration status: euvolemic  Follow-up not needed.        Visit Vitals  /67   Pulse 80   Temp 37.1 °C (98.8 °F) (Temporal)   Resp 18   Ht 5' 5" (1.651 m)   Wt 70.3 kg (155 lb)   SpO2 100%   Breastfeeding? No   BMI 25.79 kg/m²       Pain/Roma Score: Pain Assessment Performed: Yes (3/1/2018  7:39 AM)  Presence of Pain: denies (3/1/2018  7:39 AM)  Pain Rating Prior to Med Admin: 0 (3/1/2018  7:39 AM)  Pain Rating Post Med Admin: 0 (3/1/2018  7:39 AM)  Roma Score: 9 (3/1/2018  7:40 AM)      "

## 2018-03-01 NOTE — ADDENDUM NOTE
Addendum  created 03/01/18 1014 by Lux Sahu MD    Anesthesia Intra Blocks edited, Child order released for a procedure order, Sign clinical note

## 2018-03-05 ENCOUNTER — ANESTHESIA EVENT (OUTPATIENT)
Dept: ELECTROPHYSIOLOGY | Facility: HOSPITAL | Age: 38
End: 2018-03-05
Payer: COMMERCIAL

## 2018-03-05 NOTE — ANESTHESIA PREPROCEDURE EVALUATION
03/06/2018    Pre-operative evaluation for ELECTROCONVULSIVE THERAPY (ECT) - SINGLE SEIZURE (N/A)    Allyssa Wright is a 38 y.o. female with a pmhx of MDD who is presenting for ECT #37.    Past Medical History:   Diagnosis Date    Anxiety     Depression     History of psychiatric hospitalization     HSV-1 (herpes simplex virus 1) infection     Hx of psychiatric care     Moderate depressed bipolar II disorder 06/13/2016    reports no history of bipolar    Obsessive-compulsive disorder     Psychiatric problem     Self-harming behavior     Therapy      Past Surgical History:   Procedure Laterality Date    ANKLE SURGERY Right     BREAST augmentation      OVARIAN CYST REMOVAL Bilateral          Vital Signs Range (Last 24H):  Temp:  [36.7 °C (98 °F)]   Pulse:  [80]   Resp:  [16]   BP: (111)/(67)   SpO2:  [100 %]       CBC:   No results for input(s): WBC, RBC, HGB, HCT, PLT, MCV, MCH, MCHC in the last 720 hours.    CMP: No results for input(s): NA, K, CL, CO2, BUN, CREATININE, GLU, MG, PHOS, CALCIUM, ALBUMIN, PROT, ALKPHOS, ALT, AST, BILITOT in the last 720 hours.    INR:  No results for input(s): PT, INR, PROTIME, APTT in the last 720 hours.       Anesthesia Evaluation    I have reviewed the Patient Summary Reports.    I have reviewed the Nursing Notes.   I have reviewed the Medications.     Review of Systems  Anesthesia Hx:  No problems with previous Anesthesia  History of prior surgery of interest to airway management or planning: Previous anesthesia: General 8/14/17 ECT with general anesthesia.  Procedure performed at an [facility]. Denies Family Hx of Anesthesia complications.   Denies Personal Hx of Anesthesia complications.   Social:  Former Smoker    Hematology/Oncology:  Hematology Normal   Oncology Normal     EENT/Dental:EENT/Dental Normal   Cardiovascular:   Denies Hypertension.  Denies  MI.   Denies Dysrhythmias.   Denies Angina. ECG has been reviewed.    Pulmonary:  Pulmonary Normal    Renal/:  Renal/ Normal     Hepatic/GI:  Hepatic/GI Normal    Musculoskeletal:  Musculoskeletal Normal    Neurological:  Neurology Normal    Endocrine:   Hypothyroidism    Psych:   Psychiatric History          Physical Exam  General:  Well nourished    Airway/Jaw/Neck:  Airway Findings: Mouth Opening: Normal Tongue: Normal  General Airway Assessment: Adult  Mallampati: II  Improves to I with phonation.  TM Distance: Normal, at least 6 cm  Jaw/Neck Findings:  Neck ROM: Normal ROM     Eyes/Ears/Nose:  EYES/EARS/NOSE FINDINGS: Normal   Dental:  Dental Findings: In tact   Chest/Lungs:  Chest/Lungs Findings: Normal Respiratory Rate     Heart/Vascular:  Heart Findings: Rate: Normal        Mental Status:  Mental Status Findings:  Cooperative, Alert and Oriented         Anesthesia Plan  Type of Anesthesia, risks & benefits discussed:  Anesthesia Type:  general  Patient's Preference:   Intra-op Monitoring Plan:   Intra-op Monitoring Plan Comments:   Post Op Pain Control Plan:   Post Op Pain Control Plan Comments:   Induction:   IV  Beta Blocker:  Patient is not currently on a Beta-Blocker (No further documentation required).       Informed Consent: Patient understands risks and agrees with Anesthesia plan.  Questions answered. Anesthesia consent signed with patient.  ASA Score: 2     Day of Surgery Review of History & Physical:    H&P update referred to the provider.         Ready For Surgery From Anesthesia Perspective.                                                                                                                  03/05/2018    Pre-operative evaluation for ELECTROCONVULSIVE THERAPY (ECT) - SINGLE SEIZURE (N/A)    Allyssa Wright is a 38 y.o. female with a pmhx of MDD who is presenting for ECT #38.    Past Medical History:   Diagnosis Date    Anxiety     Depression     History of psychiatric  hospitalization     HSV-1 (herpes simplex virus 1) infection     Hx of psychiatric care     Moderate depressed bipolar II disorder 06/13/2016    reports no history of bipolar    Obsessive-compulsive disorder     Psychiatric problem     Self-harming behavior     Therapy      Past Surgical History:   Procedure Laterality Date    ANKLE SURGERY Right     BREAST augmentation      OVARIAN CYST REMOVAL Bilateral      Vital Signs Range (Last 24H):  BP: ()/()   Arterial Line BP: ()/()       CBC:   No results for input(s): WBC, RBC, HGB, HCT, PLT, MCV, MCH, MCHC in the last 720 hours.    CMP: No results for input(s): NA, K, CL, CO2, BUN, CREATININE, GLU, MG, PHOS, CALCIUM, ALBUMIN, PROT, ALKPHOS, ALT, AST, BILITOT in the last 720 hours.    INR:  No results for input(s): PT, INR, PROTIME, APTT in the last 720 hours.       Anesthesia Evaluation    I have reviewed the Patient Summary Reports.    I have reviewed the Nursing Notes.   I have reviewed the Medications.     Review of Systems  Anesthesia Hx:  No problems with previous Anesthesia  History of prior surgery of interest to airway management or planning: Previous anesthesia: General 8/14/17 ECT with general anesthesia.  Procedure performed at an [facility]. Denies Family Hx of Anesthesia complications.   Denies Personal Hx of Anesthesia complications.   Social:  Former Smoker    Hematology/Oncology:  Hematology Normal   Oncology Normal     EENT/Dental:EENT/Dental Normal   Cardiovascular:   Denies Hypertension.  Denies MI.   Denies Dysrhythmias.   Denies Angina. ECG has been reviewed.    Pulmonary:  Pulmonary Normal    Renal/:  Renal/ Normal     Hepatic/GI:  Hepatic/GI Normal    Musculoskeletal:  Musculoskeletal Normal    Neurological:  Neurology Normal    Endocrine:   Hypothyroidism    Psych:   Psychiatric History          Physical Exam  General:  Well nourished    Airway/Jaw/Neck:  Airway Findings: Mouth Opening: Normal Tongue: Normal  General Airway  Assessment: Adult  Mallampati: II  Improves to I with phonation.  TM Distance: Normal, at least 6 cm  Jaw/Neck Findings:  Neck ROM: Normal ROM     Eyes/Ears/Nose:  EYES/EARS/NOSE FINDINGS: Normal   Dental:  Dental Findings: In tact   Chest/Lungs:  Chest/Lungs Findings: Normal Respiratory Rate     Heart/Vascular:  Heart Findings: Rate: Normal        Mental Status:  Mental Status Findings:  Cooperative, Alert and Oriented         Anesthesia Plan  Type of Anesthesia, risks & benefits discussed:  Anesthesia Type:  general  Patient's Preference:   Intra-op Monitoring Plan: standard ASA monitors  Intra-op Monitoring Plan Comments:   Post Op Pain Control Plan:   Post Op Pain Control Plan Comments:   Induction:   IV  Beta Blocker:  Patient is not currently on a Beta-Blocker (No further documentation required).       Informed Consent: Patient understands risks and agrees with Anesthesia plan.  Questions answered. Anesthesia consent signed with patient.  ASA Score: 2     Day of Surgery Review of History & Physical:    H&P update referred to the provider.         Ready For Surgery From Anesthesia Perspective.

## 2018-03-06 ENCOUNTER — HOSPITAL ENCOUNTER (OUTPATIENT)
Facility: HOSPITAL | Age: 38
Discharge: HOME | End: 2018-03-06
Attending: PSYCHIATRY & NEUROLOGY | Admitting: PSYCHIATRY & NEUROLOGY
Payer: COMMERCIAL

## 2018-03-06 ENCOUNTER — ANESTHESIA (OUTPATIENT)
Dept: ELECTROPHYSIOLOGY | Facility: HOSPITAL | Age: 38
End: 2018-03-06
Payer: COMMERCIAL

## 2018-03-06 VITALS
HEIGHT: 65 IN | DIASTOLIC BLOOD PRESSURE: 75 MMHG | RESPIRATION RATE: 16 BRPM | OXYGEN SATURATION: 100 % | TEMPERATURE: 98 F | HEART RATE: 83 BPM | WEIGHT: 155 LBS | SYSTOLIC BLOOD PRESSURE: 112 MMHG | BODY MASS INDEX: 25.83 KG/M2

## 2018-03-06 DIAGNOSIS — F33.9 RECURRENT MAJOR DEPRESSIVE DISORDER, REMISSION STATUS UNSPECIFIED: Primary | ICD-10-CM

## 2018-03-06 DIAGNOSIS — F33.41 MDD (MAJOR DEPRESSIVE DISORDER), RECURRENT, IN PARTIAL REMISSION: ICD-10-CM

## 2018-03-06 DIAGNOSIS — F32.9 MDD (MAJOR DEPRESSIVE DISORDER): ICD-10-CM

## 2018-03-06 LAB
B-HCG UR QL: NEGATIVE
CTP QC/QA: YES

## 2018-03-06 PROCEDURE — 37000009 HC ANESTHESIA EA ADD 15 MINS: Performed by: PSYCHIATRY & NEUROLOGY

## 2018-03-06 PROCEDURE — D9220A PRA ANESTHESIA: Mod: ,,, | Performed by: ANESTHESIOLOGY

## 2018-03-06 PROCEDURE — 81025 URINE PREGNANCY TEST: CPT | Performed by: PSYCHIATRY & NEUROLOGY

## 2018-03-06 PROCEDURE — 25000003 PHARM REV CODE 250: Performed by: STUDENT IN AN ORGANIZED HEALTH CARE EDUCATION/TRAINING PROGRAM

## 2018-03-06 PROCEDURE — 25000003 PHARM REV CODE 250: Performed by: PSYCHIATRY & NEUROLOGY

## 2018-03-06 PROCEDURE — 37000008 HC ANESTHESIA 1ST 15 MINUTES: Performed by: PSYCHIATRY & NEUROLOGY

## 2018-03-06 PROCEDURE — 90870 ELECTROCONVULSIVE THERAPY: CPT | Mod: ,,, | Performed by: PSYCHIATRY & NEUROLOGY

## 2018-03-06 PROCEDURE — 63600175 PHARM REV CODE 636 W HCPCS: Performed by: STUDENT IN AN ORGANIZED HEALTH CARE EDUCATION/TRAINING PROGRAM

## 2018-03-06 PROCEDURE — 71000044 HC DOSC ROUTINE RECOVERY FIRST HOUR: Performed by: PSYCHIATRY & NEUROLOGY

## 2018-03-06 PROCEDURE — 90870 ELECTROCONVULSIVE THERAPY: CPT | Performed by: STUDENT IN AN ORGANIZED HEALTH CARE EDUCATION/TRAINING PROGRAM

## 2018-03-06 RX ORDER — SUCCINYLCHOLINE CHLORIDE 20 MG/ML
INJECTION INTRAMUSCULAR; INTRAVENOUS
Status: COMPLETED
Start: 2018-03-06 | End: 2018-03-06

## 2018-03-06 RX ORDER — KETOROLAC TROMETHAMINE 30 MG/ML
INJECTION, SOLUTION INTRAMUSCULAR; INTRAVENOUS
Status: DISCONTINUED | OUTPATIENT
Start: 2018-03-06 | End: 2018-03-06

## 2018-03-06 RX ORDER — LIDOCAINE HYDROCHLORIDE 10 MG/ML
1 INJECTION, SOLUTION EPIDURAL; INFILTRATION; INTRACAUDAL; PERINEURAL ONCE
Status: COMPLETED | OUTPATIENT
Start: 2018-03-07 | End: 2018-03-06

## 2018-03-06 RX ORDER — SODIUM CHLORIDE 0.9 % (FLUSH) 0.9 %
3 SYRINGE (ML) INJECTION
Status: DISCONTINUED | OUTPATIENT
Start: 2018-03-06 | End: 2018-03-06 | Stop reason: HOSPADM

## 2018-03-06 RX ORDER — ESMOLOL HYDROCHLORIDE 10 MG/ML
INJECTION INTRAVENOUS
Status: COMPLETED
Start: 2018-03-06 | End: 2018-03-06

## 2018-03-06 RX ORDER — ESMOLOL HYDROCHLORIDE 10 MG/ML
INJECTION INTRAVENOUS
Status: DISCONTINUED | OUTPATIENT
Start: 2018-03-06 | End: 2018-03-06

## 2018-03-06 RX ORDER — SUCCINYLCHOLINE CHLORIDE 20 MG/ML
INJECTION INTRAMUSCULAR; INTRAVENOUS
Status: DISCONTINUED | OUTPATIENT
Start: 2018-03-06 | End: 2018-03-06

## 2018-03-06 RX ORDER — KETOROLAC TROMETHAMINE 30 MG/ML
INJECTION, SOLUTION INTRAMUSCULAR; INTRAVENOUS
Status: COMPLETED
Start: 2018-03-06 | End: 2018-03-06

## 2018-03-06 RX ORDER — SODIUM CHLORIDE 9 MG/ML
INJECTION, SOLUTION INTRAVENOUS CONTINUOUS
Status: DISCONTINUED | OUTPATIENT
Start: 2018-03-07 | End: 2018-03-06 | Stop reason: HOSPADM

## 2018-03-06 RX ORDER — ONDANSETRON 2 MG/ML
INJECTION INTRAMUSCULAR; INTRAVENOUS
Status: COMPLETED
Start: 2018-03-06 | End: 2018-03-06

## 2018-03-06 RX ORDER — SODIUM CHLORIDE 9 MG/ML
INJECTION, SOLUTION INTRAVENOUS CONTINUOUS PRN
Status: DISCONTINUED | OUTPATIENT
Start: 2018-03-06 | End: 2018-03-06

## 2018-03-06 RX ORDER — ONDANSETRON 2 MG/ML
INJECTION INTRAMUSCULAR; INTRAVENOUS
Status: DISCONTINUED | OUTPATIENT
Start: 2018-03-06 | End: 2018-03-06

## 2018-03-06 RX ADMIN — METHOHEXITAL SODIUM 200 MG: 500 INJECTION, POWDER, LYOPHILIZED, FOR SOLUTION INTRAMUSCULAR; INTRAVENOUS; RECTAL at 08:03

## 2018-03-06 RX ADMIN — SUCCINYLCHOLINE CHLORIDE 120 MG: 20 INJECTION, SOLUTION INTRAMUSCULAR; INTRAVENOUS at 08:03

## 2018-03-06 RX ADMIN — LIDOCAINE HYDROCHLORIDE 1 MG: 10 INJECTION, SOLUTION EPIDURAL; INFILTRATION; INTRACAUDAL; PERINEURAL at 06:03

## 2018-03-06 RX ADMIN — SODIUM CHLORIDE 500 ML: 9 INJECTION, SOLUTION INTRAVENOUS at 06:03

## 2018-03-06 RX ADMIN — KETOROLAC TROMETHAMINE 30 MG: 30 INJECTION, SOLUTION INTRAMUSCULAR; INTRAVENOUS at 08:03

## 2018-03-06 RX ADMIN — SODIUM CHLORIDE: 0.9 INJECTION, SOLUTION INTRAVENOUS at 08:03

## 2018-03-06 RX ADMIN — ONDANSETRON 4 MG: 2 INJECTION, SOLUTION INTRAMUSCULAR; INTRAVENOUS at 08:03

## 2018-03-06 RX ADMIN — Medication 500 MG: at 08:03

## 2018-03-06 RX ADMIN — ESMOLOL HYDROCHLORIDE 30 MG: 10 INJECTION INTRAVENOUS at 08:03

## 2018-03-06 NOTE — ANESTHESIA PROCEDURE NOTES
ECT    Staffing  Anesthesiologist: ZOIE ASHFORD  CRNA/Resident: PEYTON ZAVALA  Performed by: resident/CRNA     Preanesthetic Checklist  The following were completed as part of the preanesthetic checklist: patient identified, procedural consent, pre-op evaluation, timeout performed, risks and benefits discussed, monitors and equipment checked, anesthesia consent given, oxygen available, suction available, hand hygiene performed and patient being monitored.    Setup & Induction  Patient Monitoring: heart rate, cardiac monitor, continuous pulse ox, continuous capnometry, gas analyzer and NIBP  Patient preparation: patient hyperventilated, mandibular stabilization, extremities padded and bite block inserted  Electrode Placement: Bitemporal    The patient was moved to the ECT therapy room after being assessed and consented for ECT. After standard ASA monitors were applied and timeout performed, the patient was adequately preoxygenated. After induction of general anesthesia, adequate oxygenation and ventilation were confirmed with pulse oxymetry and end tidal CO2 monitoring via bag-mask ventilation. End tidal CO2 was monitored throughout the case and moderate hyperventilation was performed prior to beginning ECT treatment. All extremities were padded, biteblock was inserted, and mandibular stabilization was done prior to initiating ECT therapy.    Procedure  Stimulus Number 1: Setting Number = III; 576 millicoulombs; Seizure Duration = 65 seconds            Recovery  Baseline Blood Pressure: 115/76    ECT Findings  ECT associated findings of: Moderate Hypertension (SBP >160 or DBP >100)

## 2018-03-06 NOTE — DISCHARGE INSTRUCTIONS
Understanding Electroconvulsive Therapy  Electroconvulsive therapy (ECT) is sometimes called shock therapy. This may sound painful, but ECT doesnt hurt. Its often the safest and best treatment for severe depression. It can treat other mental disorders as well.  What is electroconvulsive therapy?  ECT is used to treat people who are very depressed. Its mainly used when other treatments, such as antidepressant medicines, have failed. Often it may relieve feelings of sadness and despair after 2 to 4 treatments.  Common symptoms of major depression  Symptoms of major depression include:  · Feeling a deep sadness that doesnt go away  · Losing all pleasure in life  · Feeling hopeless or helpless  · Feeling guilty  · Sleeping more or less than normal  · Eating more or less than normal  · Having headaches or stomachaches, or other pains that dont go away  · Feeling nervous, empty, or worthless  · Crying a great deal  · Thinking or talking about suicide or death  How is ECT therapy done?  Before an ECT treatment, youll be given anesthesia to keep you pain-free. Youll also be given medicine to make you sleep, relax your muscles and control your heart rate. Your healthcare provider then places electrodes on your head. You may have one above each temple (bilateral ECT). Or you may have electrodes on one temple and on your forehead (unilateral ECT). While you are asleep, your brain is stimulated very briefly with an electric current. This causes a seizure, usually lasting less than a minute. Because you are under anesthesia, your body will not move even as your brain goes through great changes.  What are the risks?  When done properly, ECT is quite safe. Right after the treatment, you may be confused. This often lasts for less than half an hour. You may have a headache or stiff muscles. But these symptoms often go away quickly. A more serious possible side effect is memory loss. Commonly, people have short-term  (temporary) trouble remembering information that they learned recently. And they may have little recall of the time when they received treatment. Less commonly, people may have long-lasting (permanent) spotty recall of major past events. In rare cases, there may be memory loss for larger blocks of time.  Looking to the future  In most cases, ECT doesnt cure depression. But it can improve symptoms for a period of time. You may need a series of ECT treatments to continue feeling the benefit. You may also need to take antidepressant medicines to help prevent symptoms from returning. But with ongoing treatment, you can have a full and healthy life.  Resources  · The [employer] [place] [employer]  766.909.3151  www.nimh.nih.gov  · [facility]  985.247.2972  www.[facility].org     Date Last Reviewed: 5/1/2017  © 5403-9528 [employer]. [address] [place] [address], [place] [address]. All rights reserved. This information is not intended as a substitute for professional medical care. Always follow your healthcare professional's instructions.

## 2018-03-06 NOTE — H&P
"Allyssa Wright  : 1980   MRN: [id number]  Date: 3/6/2018     Electroconvulsive Therapy  History & Physical    Chief complaint: MDD, recurrent, in partial remission  Procedure: ECT #37    SUBJECTIVE:     HPI:   Allyssa Wright is a 38 y.o. female with MDD, recurrent, in partial remission who presents for ECT. Please see Dr. Boyce's full pre-ECT evaluation for further details.     The patient reports that her mood is improved since starting what she calls her "booster series." States that she has been "6/10" since starting, better with the booster series but not as well as she had hoped.     The patient denies any changes in dentition.   The patient does not need any prescriptions and has not had any med changes (follows with Dr. Jc) since meeting with Dr. Boyce.   The patient has not had anything by mouth since midnight and is ready for ECT.    Psychiatric Review of Systems:  Mood: better since starting ect  Appetite: "A little bit too good"  Psychomotor: No problem   Cognitive Impairment: minimal memory problems-not worse than normal   Insomnia: None   Psychosis: None   Diurnal Variation: None   Suicidal Ideation: Absent     Medical Review Of Systems:  HEENT: denies headaches, dental problems  Cv: denies chest pain  Resp: Denies shortness of breath, cough  Gi: denies stomach pain, N/V/D/C  MSK: denies pain of muscles or joints    Current Medications:   No current facility-administered medications on file prior to encounter.      Current Outpatient Prescriptions on File Prior to Encounter   Medication Sig Dispense Refill    clozapine (CLOZARIL) 50 MG tablet Take 100 mg by mouth once daily.       dapsone 7.5 % GlwP Apply topically once daily.      dextroamphetamine-amphetamine 30 mg Tab Take 30 mg by mouth 2 (two) times daily.       famciclovir (FAMVIR) 500 MG tablet Take 1 tablet (500 mg total) by mouth 2 (two) times daily. 30 tablet 12    hydrOXYzine pamoate (VISTARIL) 25 MG Cap Take 1 capsule (25 " mg total) by mouth every 8 (eight) hours as needed (anxiety). (Patient taking differently: Take 50 mg by mouth every 8 (eight) hours as needed (anxiety). ) 90 capsule 1    mirtazapine (REMERON) 15 MG tablet Take 1 tablet (15 mg total) by mouth every evening. (Patient taking differently: Take 7.5 mg by mouth every evening. ) 90 tablet 0    spironolactone (ALDACTONE) 50 MG tablet 50 mg 2 (two) times daily. 50  mg qAM, 50 mg qHS.      thyroid (ARMOUR THYROID) 30 mg Tab Take 1 tablet (30 mg total) by mouth every morning. 30 tablet 11    trazodone (DESYREL) 100 MG tablet Take 200 mg by mouth every evening.       venlafaxine (EFFEXOR) 100 MG Tab Take 225 mg by mouth once daily.       vortioxetine (TRINTELLIX) 5 mg Tab Take 2.5 mg by mouth. Pt states she is now taking 5 mg      UNABLE TO FIND 2 (two) times daily. n-azetyl-cysteine      ZOVIRAX 5 % Crea   5      Allergies:   Benzodiazepines; Ampicillin; Erythromycin; Levaquin [levofloxacin]; Penicillins; Pristiq [desvenlafaxine]; Sulfa (sulfonamide antibiotics); and Azithromycin    Past Medical/Surgical History:   Past Medical History:   Diagnosis Date    Anxiety     Depression     History of psychiatric hospitalization     HSV-1 (herpes simplex virus 1) infection     Hx of psychiatric care     Moderate depressed bipolar II disorder 06/13/2016    reports no history of bipolar    Obsessive-compulsive disorder     Psychiatric problem     Self-harming behavior     Therapy      Past Surgical History:   Procedure Laterality Date    ANKLE SURGERY Right     BREAST augmentation      OVARIAN CYST REMOVAL Bilateral       OBJECTIVE:     Vitals (pre-procedure):  There were no vitals filed for this visit.     Labs/Imaging/Studies:   No results found for this or any previous visit (from the past 48 hour(s)).   No results found for: PHENYTOIN, PHENOBARB, VALPROATE, CBMZ    Physical Exam:   Gen: NAD, age appropriate  HEENT: MMM, PERRL, EOMI, O/P clear   CV: RRR, S1/S2  "nml, no M/R/G  Chest: CTAB, no R/R/W, unlabored breathing   Abd: S/NT/ND, +BS  Ext: No gross deformities or lesions  Neuro: CN II-XII grossly intact, no focal deficits    Mental Status Exam:   Appearance: no acute distress, age appropriate, casually dressed, neatly groomed  Behavior: friendly and cooperative, eye contact normal  Speech: normal tone, normal rate, normal pitch, normal volume  Mood: "6/10"   Affect: reactive  Thought Process: linear and logical  Thought Content: no suicidality, no homicidality, delusions, or paranoia  Cognition: grossly intact  Insight: fair  Judgment: fair    ASSESSMENT/PLAN:     Allyssa Wright is a 38 y.o. female with MDD, recurrent, in partial remission who presents for ECT.    Recommendations:   Proceed with ECT #37.      Justine Lopez MD  3/6/2018      "

## 2018-03-06 NOTE — ANESTHESIA POSTPROCEDURE EVALUATION
"Anesthesia Post Evaluation    Patient: Allyssa Wright    Procedure(s) Performed: Procedure(s) (LRB):  ELECTROCONVULSIVE THERAPY (ECT) - SINGLE SEIZURE (N/A)    Final Anesthesia Type: general  Patient location during evaluation: [facility]  Patient participation: Yes- Able to Participate  Level of consciousness: awake and alert and oriented  Post-procedure vital signs: reviewed and stable  Pain management: adequate  Airway patency: patent  PONV status at discharge: No PONV  Anesthetic complications: no      Cardiovascular status: blood pressure returned to baseline  Respiratory status: unassisted and spontaneous ventilation  Hydration status: euvolemic  Follow-up not needed.        Visit Vitals  /73   Pulse 84   Temp 36.7 °C (98 °F) (Temporal)   Resp 16   Ht 5' 5" (1.651 m)   Wt 70.3 kg (155 lb)   LMP  (LMP Unknown)   SpO2 99%   Breastfeeding? No   BMI 25.79 kg/m²       Pain/Roma Score: Pain Assessment Performed: Yes (3/6/2018  9:37 AM)  Presence of Pain: denies (3/6/2018  9:37 AM)  Roma Score: 10 (3/6/2018  9:37 AM)      "

## 2018-03-06 NOTE — DISCHARGE SUMMARY
Allyssa Wright  : 1980   MRN: [id number]  Date: 3/6/2018     Electroconvulsive Therapy  Discharge Summary    Admit Date: 3/6/2018  5:56 AM  Discharge Date: 2018    Attending Physician: Shoaib Boyce Jr., MD   Discharge Provider: Justine Lopez MD    History of Present Illness: Allyssa Wright is a 38 y.o. female with MDD, recurrent, severe, in partial remission who presented for ECT #37. See H&P dated 2018 for full HPI. For further details, see Dr. Boyce's pre-ECT evaluation.    Hospital Course: The patient tolerated the ECT treatment well without complication. Patient was stable post-procedure. See OP note dated 2018 for more details.     Disposition: Home or Self Care    Medications:  Current Discharge Medication List      CONTINUE these medications which have NOT CHANGED    Details   clozapine (CLOZARIL) 50 MG tablet Take 100 mg by mouth once daily.       dapsone 7.5 % GlwP Apply topically once daily.      dextroamphetamine-amphetamine 30 mg Tab Take 30 mg by mouth 2 (two) times daily.     Associated Diagnoses: MDD (major depressive disorder), recurrent, in partial remission      famciclovir (FAMVIR) 500 MG tablet Take 1 tablet (500 mg total) by mouth 2 (two) times daily.  Qty: 30 tablet, Refills: 12    Associated Diagnoses: Major depressive disorder, recurrent episode, moderate degree      hydrOXYzine pamoate (VISTARIL) 25 MG Cap Take 1 capsule (25 mg total) by mouth every 8 (eight) hours as needed (anxiety).  Qty: 90 capsule, Refills: 1      mirtazapine (REMERON) 15 MG tablet Take 1 tablet (15 mg total) by mouth every evening.  Qty: 90 tablet, Refills: 0      spironolactone (ALDACTONE) 50 MG tablet 50 mg 2 (two) times daily. 50  mg qAM, 50 mg qHS.      thyroid (ARMOUR THYROID) 30 mg Tab Take 1 tablet (30 mg total) by mouth every morning.  Qty: 30 tablet, Refills: 11    Associated Diagnoses: Major depressive disorder, recurrent episode, moderate degree      trazodone (DESYREL) 100  MG tablet Take 200 mg by mouth every evening.       venlafaxine (EFFEXOR) 100 MG Tab Take 225 mg by mouth once daily.     Associated Diagnoses: MDD (major depressive disorder), recurrent, in partial remission      vortioxetine (TRINTELLIX) 5 mg Tab Take 2.5 mg by mouth. Pt states she is now taking 5 mg      UNABLE TO FIND 2 (two) times daily. n-azetyl-cysteine      ZOVIRAX 5 % Crea Refills: 5           Status at Discharge: Alert and medically stable    Discharge Diagnoses:  MDD, recurrent, severe, in partial remission  Diet: Resume previous outpatient diet  Activity: Ambulate with assistance  Instructions: Please do not eat or drink anything after midnight prior to procedure. Please do not drive on day of ECT.    Med Changes:  None    Next ECT:  Follow-up Information     [facility] On 3/9/2018.    Specialty:  Emergency Medicine  Contact information:  [address] [place] [id number] [phone]               Justine Lopez MD  3/6/2018

## 2018-03-06 NOTE — OP NOTE
Allyssa Wright   : 1980   MRN: [id number]  Date: 3/6/2018    Electroconvulsive Therapy  Operative Note    Date of Admission: 3/6/2018  5:56 AM    Site: [facility], [address]    Attending: Shoaib Boyce Jr., MD   Residents: Justine Lopez MD  Pre-op Diagnosis: MDD recurrent, in partial remission  ECT Treatment Number: 37  Machine Type: Mecta 5000    Patient Status: Medically stable    Vitals (pre-procedure):  Vitals:    18 0629   BP: 111/67   Pulse: 80   Resp: 16   Temp: 98 °F (36.7 °C)       Electrode Placement: Bitemporal    Stimulus Number Charge (mC) Level Pulse Width (msec) Frequency  (Hz) Duration of Stimulus (sec) Current (mA) Duration of Seizure (sec)   1 576 3 1 60 6 800 65                                   Complications: Hypertension    Maximum Blood Pressure: 147/88    Medications Given:  Caffeine 500 mg PO Before  Methohexital (Brevital) 200 mg IV During   Succinylcholine (Anectine) 120 mg IV During   Ondansetron (Zofran) 4 mg IV During  Toradol 30 mg IV During   Esmolol 30 mg IV during    Treatment Course:  Patient tolerated procedure well. After adequate recovery from general anesthesia, the patient was transported to recovery.    Post-op Diagnosis: Same as above    Recommended for next ECT:  No changes      Justine Lopez MD  3/6/2018

## 2018-03-08 ENCOUNTER — TELEPHONE (OUTPATIENT)
Dept: PSYCHIATRY | Facility: CLINIC | Age: 38
End: 2018-03-08

## 2018-03-08 NOTE — TELEPHONE ENCOUNTER
Received a call from patient's father that they are requesting to push back ECT treatment from tomorrow to next Wednesday. Surgery scheduling notified.

## 2018-03-14 ENCOUNTER — ANESTHESIA EVENT (OUTPATIENT)
Dept: ELECTROPHYSIOLOGY | Facility: HOSPITAL | Age: 38
End: 2018-03-14
Payer: COMMERCIAL

## 2018-03-14 NOTE — ANESTHESIA PREPROCEDURE EVALUATION
03/14/2018    Pre-operative evaluation for ELECTROCONVULSIVE THERAPY (ECT) - SINGLE SEIZURE (N/A)    Allyssa Wright is a 38 y.o. female with a pmhx of MDD who is presenting for ECT #38.    Methohexital- 200 mg  Succinylcholine- 120 mg  Esmolol- 60 mg   Toradol/zofran     Past Medical History:   Diagnosis Date    Anxiety     Depression     History of psychiatric hospitalization     HSV-1 (herpes simplex virus 1) infection     Hx of psychiatric care     Moderate depressed bipolar II disorder 06/13/2016    reports no history of bipolar    Obsessive-compulsive disorder     Psychiatric problem     Self-harming behavior     Therapy      Past Surgical History:   Procedure Laterality Date    ANKLE SURGERY Right     BREAST augmentation      OVARIAN CYST REMOVAL Bilateral          Vital Signs Range (Last 24H):  BP: ()/()   Arterial Line BP: ()/()       CBC:   No results for input(s): WBC, RBC, HGB, HCT, PLT, MCV, MCH, MCHC in the last 720 hours.    CMP: No results for input(s): NA, K, CL, CO2, BUN, CREATININE, GLU, MG, PHOS, CALCIUM, ALBUMIN, PROT, ALKPHOS, ALT, AST, BILITOT in the last 720 hours.    INR:  No results for input(s): PT, INR, PROTIME, APTT in the last 720 hours.       Anesthesia Evaluation    I have reviewed the Patient Summary Reports.    I have reviewed the Nursing Notes.   I have reviewed the Medications.     Review of Systems  Anesthesia Hx:  No problems with previous Anesthesia  History of prior surgery of interest to airway management or planning: Previous anesthesia: General 8/14/17 ECT with general anesthesia.  Procedure performed at an [facility]. Denies Family Hx of Anesthesia complications.   Denies Personal Hx of Anesthesia complications.   Social:  Former Smoker    Hematology/Oncology:  Hematology Normal   Oncology Normal     EENT/Dental:EENT/Dental Normal    Cardiovascular:   Denies Hypertension.  Denies MI.   Denies Dysrhythmias.   Denies Angina. ECG has been reviewed.    Pulmonary:  Pulmonary Normal    Renal/:  Renal/ Normal     Hepatic/GI:  Hepatic/GI Normal    Musculoskeletal:  Musculoskeletal Normal    Neurological:  Neurology Normal    Endocrine:   Hypothyroidism    Psych:   Psychiatric History          Physical Exam  General:  Well nourished    Airway/Jaw/Neck:  Airway Findings: Mouth Opening: Normal Tongue: Normal  General Airway Assessment: Adult  Mallampati: II  Improves to I with phonation.  TM Distance: Normal, at least 6 cm  Jaw/Neck Findings:  Neck ROM: Normal ROM     Eyes/Ears/Nose:  EYES/EARS/NOSE FINDINGS: Normal   Dental:  Dental Findings: In tact   Chest/Lungs:  Chest/Lungs Findings: Normal Respiratory Rate     Heart/Vascular:  Heart Findings: Rate: Normal        Mental Status:  Mental Status Findings:  Cooperative, Alert and Oriented         Anesthesia Plan  Type of Anesthesia, risks & benefits discussed:  Anesthesia Type:  general  Patient's Preference:   Intra-op Monitoring Plan:   Intra-op Monitoring Plan Comments:   Post Op Pain Control Plan:   Post Op Pain Control Plan Comments:   Induction:   IV  Beta Blocker:  Patient is not currently on a Beta-Blocker (No further documentation required).       Informed Consent: Patient understands risks and agrees with Anesthesia plan.  Questions answered. Anesthesia consent signed with patient.  ASA Score: 2     Day of Surgery Review of History & Physical:    H&P update referred to the provider.         Ready For Surgery From Anesthesia Perspective.                                                                                                                  03/14/2018    Pre-operative evaluation for ELECTROCONVULSIVE THERAPY (ECT) - SINGLE SEIZURE (N/A)    Allyssa Wright is a 38 y.o. female with a pmhx of MDD who is presenting for ECT #38.    Past Medical History:   Diagnosis Date    Anxiety      Depression     History of psychiatric hospitalization     HSV-1 (herpes simplex virus 1) infection     Hx of psychiatric care     Moderate depressed bipolar II disorder 06/13/2016    reports no history of bipolar    Obsessive-compulsive disorder     Psychiatric problem     Self-harming behavior     Therapy      Past Surgical History:   Procedure Laterality Date    ANKLE SURGERY Right     BREAST augmentation      OVARIAN CYST REMOVAL Bilateral      Vital Signs Range (Last 24H):  BP: ()/()   Arterial Line BP: ()/()       CBC:   No results for input(s): WBC, RBC, HGB, HCT, PLT, MCV, MCH, MCHC in the last 720 hours.    CMP: No results for input(s): NA, K, CL, CO2, BUN, CREATININE, GLU, MG, PHOS, CALCIUM, ALBUMIN, PROT, ALKPHOS, ALT, AST, BILITOT in the last 720 hours.    INR:  No results for input(s): PT, INR, PROTIME, APTT in the last 720 hours.       Anesthesia Evaluation    I have reviewed the Patient Summary Reports.    I have reviewed the Nursing Notes.   I have reviewed the Medications.     Review of Systems  Anesthesia Hx:  No problems with previous Anesthesia  History of prior surgery of interest to airway management or planning: Previous anesthesia: General 8/14/17 ECT with general anesthesia.  Procedure performed at an [facility]. Denies Family Hx of Anesthesia complications.   Denies Personal Hx of Anesthesia complications.   Social:  Former Smoker    Hematology/Oncology:  Hematology Normal   Oncology Normal     EENT/Dental:EENT/Dental Normal   Cardiovascular:   Denies Hypertension.  Denies MI.   Denies Dysrhythmias.   Denies Angina. ECG has been reviewed.    Pulmonary:  Pulmonary Normal    Renal/:  Renal/ Normal     Hepatic/GI:  Hepatic/GI Normal    Musculoskeletal:  Musculoskeletal Normal    Neurological:  Neurology Normal    Endocrine:   Hypothyroidism    Psych:   Psychiatric History          Physical Exam  General:  Well nourished    Airway/Jaw/Neck:  Airway Findings: Mouth Opening:  Normal Tongue: Normal  General Airway Assessment: Adult  Mallampati: II  Improves to I with phonation.  TM Distance: Normal, at least 6 cm  Jaw/Neck Findings:  Neck ROM: Normal ROM     Eyes/Ears/Nose:  EYES/EARS/NOSE FINDINGS: Normal   Dental:  Dental Findings: In tact   Chest/Lungs:  Chest/Lungs Findings: Normal Respiratory Rate     Heart/Vascular:  Heart Findings: Rate: Normal        Mental Status:  Mental Status Findings:  Cooperative, Alert and Oriented         Anesthesia Plan  Type of Anesthesia, risks & benefits discussed:  Anesthesia Type:  general  Patient's Preference:   Intra-op Monitoring Plan: standard ASA monitors  Intra-op Monitoring Plan Comments:   Post Op Pain Control Plan:   Post Op Pain Control Plan Comments:   Induction:   IV  Beta Blocker:  Patient is not currently on a Beta-Blocker (No further documentation required).       Informed Consent: Patient understands risks and agrees with Anesthesia plan.  Questions answered. Anesthesia consent signed with patient.  ASA Score: 2     Day of Surgery Review of History & Physical:    H&P update referred to the provider.         Ready For Surgery From Anesthesia Perspective.

## 2018-03-15 ENCOUNTER — ANESTHESIA (OUTPATIENT)
Dept: ELECTROPHYSIOLOGY | Facility: HOSPITAL | Age: 38
End: 2018-03-15
Payer: COMMERCIAL

## 2018-03-15 ENCOUNTER — HOSPITAL ENCOUNTER (OUTPATIENT)
Facility: HOSPITAL | Age: 38
Discharge: HOME | End: 2018-03-15
Attending: PSYCHIATRY & NEUROLOGY | Admitting: PSYCHIATRY & NEUROLOGY
Payer: COMMERCIAL

## 2018-03-15 VITALS
DIASTOLIC BLOOD PRESSURE: 70 MMHG | HEIGHT: 65 IN | WEIGHT: 155 LBS | OXYGEN SATURATION: 100 % | SYSTOLIC BLOOD PRESSURE: 100 MMHG | TEMPERATURE: 98 F | RESPIRATION RATE: 20 BRPM | BODY MASS INDEX: 25.83 KG/M2 | HEART RATE: 92 BPM

## 2018-03-15 DIAGNOSIS — F33.41 MAJOR DEPRESSIVE DISORDER, RECURRENT EPISODE, IN PARTIAL REMISSION: ICD-10-CM

## 2018-03-15 DIAGNOSIS — F33.41 MDD (MAJOR DEPRESSIVE DISORDER), RECURRENT, IN PARTIAL REMISSION: ICD-10-CM

## 2018-03-15 LAB
B-HCG UR QL: NEGATIVE
CTP QC/QA: YES

## 2018-03-15 PROCEDURE — 37000009 HC ANESTHESIA EA ADD 15 MINS: Performed by: PSYCHIATRY & NEUROLOGY

## 2018-03-15 PROCEDURE — D9220A PRA ANESTHESIA: Mod: ,,, | Performed by: ANESTHESIOLOGY

## 2018-03-15 PROCEDURE — 37000008 HC ANESTHESIA 1ST 15 MINUTES: Performed by: PSYCHIATRY & NEUROLOGY

## 2018-03-15 PROCEDURE — 25000003 PHARM REV CODE 250: Performed by: STUDENT IN AN ORGANIZED HEALTH CARE EDUCATION/TRAINING PROGRAM

## 2018-03-15 PROCEDURE — 90870 ELECTROCONVULSIVE THERAPY: CPT | Performed by: ANESTHESIOLOGY

## 2018-03-15 PROCEDURE — 63600175 PHARM REV CODE 636 W HCPCS: Performed by: STUDENT IN AN ORGANIZED HEALTH CARE EDUCATION/TRAINING PROGRAM

## 2018-03-15 PROCEDURE — 71000044 HC DOSC ROUTINE RECOVERY FIRST HOUR: Performed by: PSYCHIATRY & NEUROLOGY

## 2018-03-15 PROCEDURE — 25000003 PHARM REV CODE 250: Performed by: PSYCHIATRY & NEUROLOGY

## 2018-03-15 PROCEDURE — 90870 ELECTROCONVULSIVE THERAPY: CPT | Mod: ,,, | Performed by: PSYCHIATRY & NEUROLOGY

## 2018-03-15 PROCEDURE — 81025 URINE PREGNANCY TEST: CPT | Performed by: PSYCHIATRY & NEUROLOGY

## 2018-03-15 RX ORDER — HYDROXYZINE PAMOATE 25 MG/1
50 CAPSULE ORAL EVERY 8 HOURS PRN
Qty: 30 CAPSULE | Refills: 0 | Status: ON HOLD | OUTPATIENT
Start: 2018-03-15 | End: 2018-04-03

## 2018-03-15 RX ORDER — SODIUM CHLORIDE 0.9 % (FLUSH) 0.9 %
3 SYRINGE (ML) INJECTION
Status: DISCONTINUED | OUTPATIENT
Start: 2018-03-15 | End: 2018-03-15 | Stop reason: HOSPADM

## 2018-03-15 RX ORDER — KETOROLAC TROMETHAMINE 30 MG/ML
INJECTION, SOLUTION INTRAMUSCULAR; INTRAVENOUS
Status: DISCONTINUED | OUTPATIENT
Start: 2018-03-15 | End: 2018-03-15

## 2018-03-15 RX ORDER — LIDOCAINE HYDROCHLORIDE 10 MG/ML
1 INJECTION, SOLUTION EPIDURAL; INFILTRATION; INTRACAUDAL; PERINEURAL ONCE
Status: DISCONTINUED | OUTPATIENT
Start: 2018-03-16 | End: 2018-03-15 | Stop reason: HOSPADM

## 2018-03-15 RX ORDER — ONDANSETRON 2 MG/ML
INJECTION INTRAMUSCULAR; INTRAVENOUS
Status: COMPLETED
Start: 2018-03-15 | End: 2018-03-15

## 2018-03-15 RX ORDER — ESMOLOL HYDROCHLORIDE 10 MG/ML
INJECTION INTRAVENOUS
Status: DISCONTINUED | OUTPATIENT
Start: 2018-03-15 | End: 2018-03-15

## 2018-03-15 RX ORDER — SODIUM CHLORIDE 9 MG/ML
INJECTION, SOLUTION INTRAVENOUS CONTINUOUS
Status: DISCONTINUED | OUTPATIENT
Start: 2018-03-16 | End: 2018-03-15 | Stop reason: HOSPADM

## 2018-03-15 RX ORDER — ONDANSETRON 2 MG/ML
INJECTION INTRAMUSCULAR; INTRAVENOUS
Status: DISCONTINUED | OUTPATIENT
Start: 2018-03-15 | End: 2018-03-15

## 2018-03-15 RX ORDER — ESMOLOL HYDROCHLORIDE 10 MG/ML
INJECTION INTRAVENOUS
Status: COMPLETED
Start: 2018-03-15 | End: 2018-03-15

## 2018-03-15 RX ORDER — SUCCINYLCHOLINE CHLORIDE 20 MG/ML
INJECTION INTRAMUSCULAR; INTRAVENOUS
Status: DISCONTINUED | OUTPATIENT
Start: 2018-03-15 | End: 2018-03-15

## 2018-03-15 RX ORDER — KETOROLAC TROMETHAMINE 30 MG/ML
INJECTION, SOLUTION INTRAMUSCULAR; INTRAVENOUS
Status: COMPLETED
Start: 2018-03-15 | End: 2018-03-15

## 2018-03-15 RX ORDER — ONDANSETRON 2 MG/ML
4 INJECTION INTRAMUSCULAR; INTRAVENOUS DAILY PRN
Status: DISCONTINUED | OUTPATIENT
Start: 2018-03-15 | End: 2018-03-15 | Stop reason: HOSPADM

## 2018-03-15 RX ADMIN — SUCCINYLCHOLINE CHLORIDE 120 MG: 20 INJECTION, SOLUTION INTRAMUSCULAR; INTRAVENOUS at 07:03

## 2018-03-15 RX ADMIN — ONDANSETRON 4 MG: 2 INJECTION, SOLUTION INTRAMUSCULAR; INTRAVENOUS at 07:03

## 2018-03-15 RX ADMIN — METHOHEXITAL SODIUM 200 MG: 500 INJECTION, POWDER, LYOPHILIZED, FOR SOLUTION INTRAMUSCULAR; INTRAVENOUS; RECTAL at 07:03

## 2018-03-15 RX ADMIN — ESMOLOL HYDROCHLORIDE 10 MG: 10 INJECTION INTRAVENOUS at 08:03

## 2018-03-15 RX ADMIN — Medication 500 MG: at 07:03

## 2018-03-15 RX ADMIN — SODIUM CHLORIDE 500 ML: 9 INJECTION, SOLUTION INTRAVENOUS at 06:03

## 2018-03-15 RX ADMIN — KETOROLAC TROMETHAMINE 30 MG: 30 INJECTION, SOLUTION INTRAMUSCULAR at 07:03

## 2018-03-15 NOTE — DISCHARGE SUMMARY
Allyssa Wright  : 1980   MRN: [id number]  Date: 3/15/2018     Electroconvulsive Therapy  Discharge Summary    Admit Date: 3/15/2018  5:49 AM  Discharge Date: 03/15/2018    Attending Physician: Shoaib Boyce Jr., MD   Discharge Provider: Justine Lopez MD    History of Present Illness: Allyssa Wright is a 38 y.o. female with MDD, recurrent, severe, in partial remission who presented for ECT #38. See H&P dated 03/15/2018 for full HPI. For further details, see Dr. Boyce's pre-ECT evaluation.    Hospital Course: The patient tolerated the ECT treatment well without complication. Patient was stable post-procedure. See OP note dated 03/15/2018 for more details.     Disposition: Home or Self Care    Medications:  Current Discharge Medication List      CONTINUE these medications which have CHANGED    Details   hydrOXYzine pamoate (VISTARIL) 25 MG Cap Take 2 capsules (50 mg total) by mouth every 8 (eight) hours as needed (anxiety).  Qty: 30 capsule, Refills: 0         CONTINUE these medications which have NOT CHANGED    Details   clozapine (CLOZARIL) 50 MG tablet Take 100 mg by mouth once daily.       dapsone 7.5 % GlwP Apply topically once daily.      dextroamphetamine-amphetamine 30 mg Tab Take 30 mg by mouth 2 (two) times daily.     Associated Diagnoses: MDD (major depressive disorder), recurrent, in partial remission      famciclovir (FAMVIR) 500 MG tablet Take 1 tablet (500 mg total) by mouth 2 (two) times daily.  Qty: 30 tablet, Refills: 12    Associated Diagnoses: Major depressive disorder, recurrent episode, moderate degree      mirtazapine (REMERON) 15 MG tablet Take 1 tablet (15 mg total) by mouth every evening.  Qty: 90 tablet, Refills: 0      spironolactone (ALDACTONE) 50 MG tablet 50 mg 2 (two) times daily. 50  mg qAM, 50 mg qHS.      thyroid (ARMOUR THYROID) 30 mg Tab Take 1 tablet (30 mg total) by mouth every morning.  Qty: 30 tablet, Refills: 11    Associated Diagnoses: Major depressive disorder,  recurrent episode, moderate degree      trazodone (DESYREL) 100 MG tablet Take 200 mg by mouth every evening.       venlafaxine (EFFEXOR) 100 MG Tab Take 225 mg by mouth once daily.     Associated Diagnoses: MDD (major depressive disorder), recurrent, in partial remission      vortioxetine (TRINTELLIX) 5 mg Tab Take 2.5 mg by mouth. Pt states she is now taking 5 mg      UNABLE TO FIND 2 (two) times daily. n-azetyl-cysteine      ZOVIRAX 5 % Crea Refills: 5           Status at Discharge: Alert and medically stable    Discharge Diagnoses:  MDD, recurrent, severe, in partial remission  Diet: Resume previous outpatient diet  Activity: Ambulate with assistance  Instructions: Please do not eat or drink anything after midnight prior to procedure. Please do not drive on day of ECT.    Med Changes:  None    Next ECT:  Follow-up Information     [facility] On 3/20/2018.    Specialty:  Emergency Medicine  Why:  at 6AM for ECT  Contact information:  [address] [place] [id number] [phone]               Justine Lopez MD  3/15/2018

## 2018-03-15 NOTE — ANESTHESIA PROCEDURE NOTES
ECT    Treatment Number: 38  Procedure start time: 3/15/2018 7:59 AM  Timeout performed at: 3/15/2018 7:59 AM  Procedure end time: 3/15/2018 8:02 AM    Staffing  Anesthesiologist: TONY DELONG III  CRNA/Resident: JUAN M RODGERS  Performed by: resident/CRNA     Preanesthetic Checklist  The following were completed as part of the preanesthetic checklist: patient identified, procedural consent, pre-op evaluation, timeout performed, risks and benefits discussed, monitors and equipment checked, anesthesia consent given, oxygen available, suction available, hand hygiene performed and patient being monitored.    Setup & Induction  Patient Monitoring: heart rate, cardiac monitor, continuous pulse ox and NIBP  Patient preparation: bite block inserted  Electrode Placement: Bitemporal    The patient was moved to the ECT therapy room after being assessed and consented for ECT. After standard ASA monitors were applied and timeout performed, the patient was adequately preoxygenated. After induction of general anesthesia, adequate oxygenation and ventilation were confirmed with pulse oxymetry and end tidal CO2 monitoring via bag-mask ventilation. End tidal CO2 was monitored throughout the case and moderate hyperventilation was performed prior to beginning ECT treatment. All extremities were padded, biteblock was inserted, and mandibular stabilization was done prior to initiating ECT therapy.    Procedure  Stimulus Number 1: Setting Number = III; 576 millicoulombs; Seizure Duration = 54 seconds            Recovery  Baseline Blood Pressure: 125/79  Peak Blood Pressure: 127/81    ECT Findings  ECT associated findings of: None

## 2018-03-15 NOTE — H&P
"Allyssa Wright  : 1980   MRN: [id number]  Date: 3/15/2018     Electroconvulsive Therapy  History & Physical     Chief complaint: MDD, recurrent, in partial remission  Procedure: ECT #38    SUBJECTIVE:     HPI:   Allyssa Wright is a 38 y.o. female with MDD, recurrent, in partial remission who presents for ECT. Please see Dr. Boyce's full pre-ECT evaluation for further details.     Dad is pt's ride. The patient reports that her mood is improved since starting what she calls her "booster series." States that she has been "5-6/10."  after she missed her tx last Friday, has been feeling a bit more anxious and not sure whether to attribute to missing ECT.      The patient denies any changes in dentition.   The patient has not had any med changes (follows with Dr. Jc) since last ECT but requests rx for vistaril.    The patient has not had anything by mouth since midnight and is ready for ECT.    Psychiatric Review of Systems:  Mood: better since starting ect  Appetite: "i tend to overeat in the evening," stable  Psychomotor: No problem   Cognitive Impairment: minimal memory problems-not worse than normal   Insomnia: None   Psychosis: None   Diurnal Variation: None   Suicidal Ideation: Absent     Medical Review Of Systems:  HEENT: denies headaches, dental problems  Cv: denies chest pain  Resp: Denies shortness of breath, cough  Gi: denies stomach pain, N/V/D/C  MSK: denies pain of muscles or joints    Current Medications:   No current facility-administered medications on file prior to encounter.      Current Outpatient Prescriptions on File Prior to Encounter   Medication Sig Dispense Refill    clozapine (CLOZARIL) 50 MG tablet Take 100 mg by mouth once daily.       dapsone 7.5 % GlwP Apply topically once daily.      dextroamphetamine-amphetamine 30 mg Tab Take 30 mg by mouth 2 (two) times daily.       famciclovir (FAMVIR) 500 MG tablet Take 1 tablet (500 mg total) by mouth 2 (two) times daily. 30 tablet 12 "    hydrOXYzine pamoate (VISTARIL) 25 MG Cap Take 1 capsule (25 mg total) by mouth every 8 (eight) hours as needed (anxiety). (Patient taking differently: Take 50 mg by mouth every 8 (eight) hours as needed (anxiety). ) 90 capsule 1    mirtazapine (REMERON) 15 MG tablet Take 1 tablet (15 mg total) by mouth every evening. (Patient taking differently: Take 7.5 mg by mouth every evening. ) 90 tablet 0    spironolactone (ALDACTONE) 50 MG tablet 50 mg 2 (two) times daily. 50  mg qAM, 50 mg qHS.      thyroid (ARMOUR THYROID) 30 mg Tab Take 1 tablet (30 mg total) by mouth every morning. 30 tablet 11    trazodone (DESYREL) 100 MG tablet Take 200 mg by mouth every evening.       UNABLE TO FIND 2 (two) times daily. n-azetyl-cysteine      venlafaxine (EFFEXOR) 100 MG Tab Take 225 mg by mouth once daily.       vortioxetine (TRINTELLIX) 5 mg Tab Take 2.5 mg by mouth. Pt states she is now taking 5 mg      ZOVIRAX 5 % Crea   5      Allergies:   Benzodiazepines; Ampicillin; Erythromycin; Levaquin [levofloxacin]; Penicillins; Pristiq [desvenlafaxine]; Sulfa (sulfonamide antibiotics); and Azithromycin    Past Medical/Surgical History:   Past Medical History:   Diagnosis Date    Anxiety     Depression     History of psychiatric hospitalization     HSV-1 (herpes simplex virus 1) infection     Hx of psychiatric care     Moderate depressed bipolar II disorder 06/13/2016    reports no history of bipolar    Obsessive-compulsive disorder     Psychiatric problem     Self-harming behavior     Therapy      Past Surgical History:   Procedure Laterality Date    ANKLE SURGERY Right     BREAST augmentation      OVARIAN CYST REMOVAL Bilateral       OBJECTIVE:     Vitals (pre-procedure):  There were no vitals filed for this visit.     Labs/Imaging/Studies:   No results found for this or any previous visit (from the past 48 hour(s)).   No results found for: PHENYTOIN, PHENOBARB, VALPROATE, CBMZ    Physical Exam:   Gen: NAD, age  "appropriate  HEENT: MMM, PERRL, EOMI, O/P clear   CV: RRR, S1/S2 nml, no M/R/G  Chest: CTAB, no R/R/W, unlabored breathing   Abd: S/NT/ND, +BS  Ext: No gross deformities or lesions  Neuro: CN II-XII grossly intact, no focal deficits    Mental Status Exam:   Appearance: no acute distress, age appropriate, casually dressed, neatly groomed  Behavior: friendly and cooperative, eye contact normal  Speech: normal tone, normal rate, normal pitch, normal volume  Mood: "6/10"   Affect: reactive  Thought Process: linear and logical  Thought Content: no suicidality, no homicidality, delusions, or paranoia  Cognition: grossly intact  Insight: fair  Judgment: fair    ASSESSMENT/PLAN:     Allyssa Wright is a 38 y.o. female with MDD, recurrent, in partial remission who presents for ECT.    Recommendations:   Proceed with ECT #38.      Justine Lopez MD  3/15/2018      "

## 2018-03-15 NOTE — TRANSFER OF CARE
"Anesthesia Transfer of Care Note    Patient: Allyssa Wright    Procedure(s) Performed: Procedure(s) (LRB):  ELECTROCONVULSIVE THERAPY (ECT) - SINGLE SEIZURE (N/A)    Patient location: [facility]    Anesthesia Type: general    Transport from OR: Transported from OR on room air with adequate spontaneous ventilation    Post pain: adequate analgesia    Post assessment: no apparent anesthetic complications    Post vital signs: stable    Level of consciousness: awake and alert    Nausea/Vomiting: no nausea/vomiting    Complications: none    Transfer of care protocol was followed      Last vitals:   Visit Vitals  /72 (BP Location: Left arm, Patient Position: Lying)   Pulse 102   Temp 36.7 °C (98 °F) (Temporal)   Resp 20   Ht 5' 5" (1.651 m)   Wt 70.3 kg (155 lb)   LMP  (LMP Unknown)   SpO2 99%   Breastfeeding? No   BMI 25.79 kg/m²     "

## 2018-03-15 NOTE — ANESTHESIA POSTPROCEDURE EVALUATION
"Anesthesia Post Evaluation    Patient: Allyssa Wright    Procedure(s) Performed: Procedure(s) (LRB):  ELECTROCONVULSIVE THERAPY (ECT) - SINGLE SEIZURE (N/A)    Final Anesthesia Type: general  Patient location during evaluation: PACU  Patient participation: Yes- Able to Participate  Level of consciousness: awake and alert and oriented  Post-procedure vital signs: reviewed and stable  Pain management: adequate  Airway patency: patent  PONV status at discharge: No PONV  Anesthetic complications: no      Cardiovascular status: blood pressure returned to baseline and hemodynamically stable  Respiratory status: unassisted, room air and spontaneous ventilation  Hydration status: euvolemic  Follow-up not needed.        Visit Vitals  /70   Pulse 92   Temp 36.7 °C (98 °F) (Temporal)   Resp 20   Ht 5' 5" (1.651 m)   Wt 70.3 kg (155 lb)   LMP  (LMP Unknown)   SpO2 100%   Breastfeeding? No   BMI 25.79 kg/m²       Pain/Roma Score: Pain Assessment Performed: Yes (3/15/2018  8:43 AM)  Presence of Pain: denies (3/15/2018  8:43 AM)  Roma Score: 10 (3/15/2018  8:09 AM)      "

## 2018-03-15 NOTE — OP NOTE
Allyssa Wright   : 1980   MRN: [id number]  Date: 3/15/2018    Electroconvulsive Therapy  Operative Note    Date of Admission: 3/15/2018  5:49 AM    Site: [facility], [address]    Attending: Shoaib Boyce Jr., MD   Residents: Justine Lopez MD  Pre-op Diagnosis: MDD recurrent, in partial remission  ECT Treatment Number: 38  Machine Type: [employer] 5000    Patient Status: Medically stable    Vitals (pre-procedure):  Vitals:    03/15/18 0632   BP: 109/70   Pulse: 79   Resp: 18   Temp: 97.9 °F (36.6 °C)       Electrode Placement: Bitemporal    Stimulus Number Charge (mC) Level Pulse Width (msec) Frequency  (Hz) Duration of Stimulus (sec) Current (mA) Duration of Seizure (sec)   1 576 3 1 60 6 800 54                                   Complications: none    Maximum Blood Pressure: 125/81    Medications Given:  Caffeine 500 mg PO Before  Methohexital (Brevital) 200 mg IV During   Succinylcholine (Anectine) 120 mg IV During   Ondansetron (Zofran) 4 mg IV During  Toradol 30 mg IV During   Esmolol 10 mg IV during    Treatment Course:  Patient tolerated procedure well. After adequate recovery from general anesthesia, the patient was transported to recovery.    Post-op Diagnosis: Same as above    Recommended for next ECT:  No changes      Justine Lopez MD  3/15/2018

## 2018-03-15 NOTE — DISCHARGE INSTRUCTIONS
Understanding Electroconvulsive Therapy  Electroconvulsive therapy (ECT) is sometimes called shock therapy. This may sound painful, but ECT doesnt hurt. Its often the safest and best treatment for severe depression. It can treat other mental disorders as well.  What is electroconvulsive therapy?  ECT is used to treat people who are very depressed. Its mainly used when other treatments, such as antidepressant medicines, have failed. Often it may relieve feelings of sadness and despair after 2 to 4 treatments.  Common symptoms of major depression  Symptoms of major depression include:  · Feeling a deep sadness that doesnt go away  · Losing all pleasure in life  · Feeling hopeless or helpless  · Feeling guilty  · Sleeping more or less than normal  · Eating more or less than normal  · Having headaches or stomachaches, or other pains that dont go away  · Feeling nervous, empty, or worthless  · Crying a great deal  · Thinking or talking about suicide or death  How is ECT therapy done?  Before an ECT treatment, youll be given anesthesia to keep you pain-free. Youll also be given medicine to make you sleep, relax your muscles and control your heart rate. Your healthcare provider then places electrodes on your head. You may have one above each temple (bilateral ECT). Or you may have electrodes on one temple and on your forehead (unilateral ECT). While you are asleep, your brain is stimulated very briefly with an electric current. This causes a seizure, usually lasting less than a minute. Because you are under anesthesia, your body will not move even as your brain goes through great changes.  What are the risks?  When done properly, ECT is quite safe. Right after the treatment, you may be confused. This often lasts for less than half an hour. You may have a headache or stiff muscles. But these symptoms often go away quickly. A more serious possible side effect is memory loss. Commonly, people have short-term  (temporary) trouble remembering information that they learned recently. And they may have little recall of the time when they received treatment. Less commonly, people may have long-lasting (permanent) spotty recall of major past events. In rare cases, there may be memory loss for larger blocks of time.  Looking to the future  In most cases, ECT doesnt cure depression. But it can improve symptoms for a period of time. You may need a series of ECT treatments to continue feeling the benefit. You may also need to take antidepressant medicines to help prevent symptoms from returning. But with ongoing treatment, you can have a full and healthy life.  Resources  · The National [place] of Mental Health  401.199.9037  www.nimh.nih.gov  · [facility]  300.629.8187  www.[facility].org     Date Last Reviewed: 5/1/2017  © 8518-7716 [employer]. [address] [place] [address], [place] [address]. All rights reserved. This information is not intended as a substitute for professional medical care. Always follow your healthcare professional's instructions.

## 2018-03-16 DIAGNOSIS — F33.9 RECURRENT MAJOR DEPRESSIVE DISORDER, REMISSION STATUS UNSPECIFIED: Primary | ICD-10-CM

## 2018-03-19 ENCOUNTER — ANESTHESIA EVENT (OUTPATIENT)
Dept: ELECTROPHYSIOLOGY | Facility: HOSPITAL | Age: 38
End: 2018-03-19
Payer: COMMERCIAL

## 2018-03-19 NOTE — ANESTHESIA PREPROCEDURE EVALUATION
03/19/2018  Pre-operative evaluation for Procedure(s) (LRB):  ELECTROCONVULSIVE THERAPY (ECT) - SINGLE SEIZURE (N/A)    Allyssa Wright is a 38 y.o. female with MDD who is presenting with the above procedure.     ECT #39    Previous medications  Methohexital 200 mg   Succinylcholine 120 mg  Zofran & Toradol  Combinations of Labetalol 25 mg & Esmolol 30 mg    Patient Active Problem List   Diagnosis    MDD (major depressive disorder), recurrent, in partial remission    Major depressive disorder, recurrent, in partial remission    MDD (major depressive disorder), severe    Major depressive disorder, recurrent    Other acne    Major depression, recurrent, chronic    Recurrent major depression in partial remission    Major depressive disorder, recurrent severe without psychotic features    MDD (major depressive disorder), recurrent episode, severe    MDD (major depressive disorder)    Major depressive disorder, recurrent episode, in partial remission       Review of patient's allergies indicates:   Allergen Reactions    Benzodiazepines Other (See Comments)     Contraindicated while taking Clozapine    Ampicillin      Mom says so    Erythromycin     Levaquin [levofloxacin] Other (See Comments)     Depression side effects    Penicillins      Mom says so    Pristiq [desvenlafaxine]      psycotic      Sulfa (sulfonamide antibiotics)      Rash      Azithromycin Anxiety       No current facility-administered medications on file prior to encounter.      Current Outpatient Prescriptions on File Prior to Encounter   Medication Sig Dispense Refill    clozapine (CLOZARIL) 50 MG tablet Take 100 mg by mouth once daily.       dapsone 7.5 % GlwP Apply topically once daily.      dextroamphetamine-amphetamine 30 mg Tab Take 30 mg by mouth 2 (two) times daily.       famciclovir (FAMVIR) 500 MG tablet Take 1  tablet (500 mg total) by mouth 2 (two) times daily. 30 tablet 12    hydrOXYzine pamoate (VISTARIL) 25 MG Cap Take 2 capsules (50 mg total) by mouth every 8 (eight) hours as needed (anxiety). 30 capsule 0    mirtazapine (REMERON) 15 MG tablet Take 1 tablet (15 mg total) by mouth every evening. (Patient taking differently: Take 7.5 mg by mouth every evening. ) 90 tablet 0    spironolactone (ALDACTONE) 50 MG tablet 50 mg 2 (two) times daily. 50  mg qAM, 50 mg qHS.      thyroid (ARMOUR THYROID) 30 mg Tab Take 1 tablet (30 mg total) by mouth every morning. 30 tablet 11    trazodone (DESYREL) 100 MG tablet Take 200 mg by mouth every evening.       UNABLE TO FIND 2 (two) times daily. n-azetyl-cysteine      venlafaxine (EFFEXOR) 100 MG Tab Take 225 mg by mouth once daily.       vortioxetine (TRINTELLIX) 5 mg Tab Take 2.5 mg by mouth. Pt states she is now taking 5 mg      ZOVIRAX 5 % Crea   5       Past Surgical History:   Procedure Laterality Date    ANKLE SURGERY Right     BREAST augmentation      OVARIAN CYST REMOVAL Bilateral        Social History     Social History    Marital status: Single     Spouse name: N/A    Number of children: N/A    Years of education: N/A     Occupational History    unemployed      Social History Main Topics    Smoking status: Former Smoker     Quit date: 4/6/2011    Smokeless tobacco: Never Used    Alcohol use Yes      Comment: rarely    Drug use: No      Comment: Experimental use in high school    Sexual activity: Not on file     Other Topics Concern    Not on file     Social History Narrative    Pt has 1 older brother from an intact family until the death of her mother in 2012.  She completed 1 year of college, was never in the , and has never been employed.  She was engaged once, but never , has no children, and lives with her father plus 2 dogs.  She denies any hobbies and is spiritual but not Hinduism.  She does date and returns to college during rare  periods when depression and anxiety [place].     EKG:  Vent. Rate : 084 BPM     Atrial Rate : 084 BPM     P-R Int : 154 ms          QRS Dur : 086 ms      QT Int : 378 ms       P-R-T Axes : 067 066 055 degrees     QTc Int : 446 ms    Normal sinus rhythm  Normal ECG  When compared with ECG of 03-DEC-2015 11:38,  Questionable change in The axis  T wave inversion no longer evident in Inferior leads  Confirmed by Flaco Sr MD ([id number]) on 6/9/2016 1:30:13 PM    Referred By: SELF REFERRAL           Confirmed By:Flaco Sr MD    Anesthesia Evaluation    I have reviewed the Patient Summary Reports.     I have reviewed the Medications.     Review of Systems  Anesthesia Hx:  No problems with previous Anesthesia  History of prior surgery of interest to airway management or planning: Previous anesthesia: General  Procedure performed at an [facility]. Airway issues documented on chart review include mask, easy  Denies Family Hx of Anesthesia complications.   Denies Personal Hx of Anesthesia complications.   Social:  Former Smoker    Hematology/Oncology:  Hematology Normal   Oncology Normal     EENT/Dental:EENT/Dental Normal   Cardiovascular:   Denies Hypertension.  Denies MI.   Denies Dysrhythmias.   Denies Angina. ECG has been reviewed.    Pulmonary:  Pulmonary Normal    Renal/:  Renal/ Normal     Hepatic/GI:  Hepatic/GI Normal    Musculoskeletal:  Musculoskeletal Normal    Neurological:  Neurology Normal    Endocrine:   Hypothyroidism    Psych:   Psychiatric History          Physical Exam  General:  Well nourished    Airway/Jaw/Neck:  Airway Findings: Mouth Opening: Normal Tongue: Normal  General Airway Assessment: Adult  Mallampati: II  Improves to I with phonation.  TM Distance: Normal, at least 6 cm  Jaw/Neck Findings:  Neck ROM: Normal ROM     Eyes/Ears/Nose:  EYES/EARS/NOSE FINDINGS: Normal   Dental:  Dental Findings: In tact   Chest/Lungs:  Chest/Lungs Findings: Normal Respiratory Rate     Heart/Vascular:  Heart  Findings: Rate: Normal        Mental Status:  Mental Status Findings:  Cooperative, Alert and Oriented         Anesthesia Plan  Type of Anesthesia, risks & benefits discussed:  Anesthesia Type:  general  Patient's Preference:   Intra-op Monitoring Plan: standard ASA monitors  Intra-op Monitoring Plan Comments:   Post Op Pain Control Plan:   Post Op Pain Control Plan Comments:   Induction:   IV  Beta Blocker:  Patient is not currently on a Beta-Blocker (No further documentation required).       Informed Consent: Patient understands risks and agrees with Anesthesia plan.  Questions answered. Anesthesia consent signed with patient.  ASA Score: 2     Day of Surgery Review of History & Physical:    H&P update referred to the surgeon.         Ready For Surgery From Anesthesia Perspective.

## 2018-03-20 ENCOUNTER — HOSPITAL ENCOUNTER (OUTPATIENT)
Facility: HOSPITAL | Age: 38
Discharge: HOME | End: 2018-03-20
Attending: PSYCHIATRY & NEUROLOGY | Admitting: PSYCHIATRY & NEUROLOGY
Payer: COMMERCIAL

## 2018-03-20 ENCOUNTER — ANESTHESIA (OUTPATIENT)
Dept: ELECTROPHYSIOLOGY | Facility: HOSPITAL | Age: 38
End: 2018-03-20
Payer: COMMERCIAL

## 2018-03-20 ENCOUNTER — SURGERY (OUTPATIENT)
Age: 38
End: 2018-03-20

## 2018-03-20 VITALS
BODY MASS INDEX: 25.83 KG/M2 | OXYGEN SATURATION: 100 % | RESPIRATION RATE: 16 BRPM | SYSTOLIC BLOOD PRESSURE: 122 MMHG | HEART RATE: 95 BPM | HEIGHT: 65 IN | TEMPERATURE: 98 F | WEIGHT: 155 LBS | DIASTOLIC BLOOD PRESSURE: 73 MMHG

## 2018-03-20 DIAGNOSIS — F33.41 MDD (MAJOR DEPRESSIVE DISORDER), RECURRENT, IN PARTIAL REMISSION: ICD-10-CM

## 2018-03-20 DIAGNOSIS — F33.9 RECURRENT MAJOR DEPRESSIVE DISORDER, REMISSION STATUS UNSPECIFIED: Primary | ICD-10-CM

## 2018-03-20 LAB
B-HCG UR QL: NEGATIVE
CTP QC/QA: YES

## 2018-03-20 PROCEDURE — 63600175 PHARM REV CODE 636 W HCPCS: Performed by: ANESTHESIOLOGY

## 2018-03-20 PROCEDURE — D9220A PRA ANESTHESIA: Mod: ,,, | Performed by: ANESTHESIOLOGY

## 2018-03-20 PROCEDURE — 90870 ELECTROCONVULSIVE THERAPY: CPT | Mod: ,,, | Performed by: PSYCHIATRY & NEUROLOGY

## 2018-03-20 PROCEDURE — 37000009 HC ANESTHESIA EA ADD 15 MINS: Performed by: PSYCHIATRY & NEUROLOGY

## 2018-03-20 PROCEDURE — 71000044 HC DOSC ROUTINE RECOVERY FIRST HOUR: Performed by: PSYCHIATRY & NEUROLOGY

## 2018-03-20 PROCEDURE — 81025 URINE PREGNANCY TEST: CPT | Performed by: PSYCHIATRY & NEUROLOGY

## 2018-03-20 PROCEDURE — 37000008 HC ANESTHESIA 1ST 15 MINUTES: Performed by: PSYCHIATRY & NEUROLOGY

## 2018-03-20 PROCEDURE — 25000003 PHARM REV CODE 250: Performed by: ANESTHESIOLOGY

## 2018-03-20 PROCEDURE — 25000003 PHARM REV CODE 250: Performed by: PSYCHIATRY & NEUROLOGY

## 2018-03-20 PROCEDURE — 90870 ELECTROCONVULSIVE THERAPY: CPT | Performed by: ANESTHESIOLOGY

## 2018-03-20 RX ORDER — SUCCINYLCHOLINE CHLORIDE 20 MG/ML
INJECTION INTRAMUSCULAR; INTRAVENOUS
Status: DISCONTINUED | OUTPATIENT
Start: 2018-03-20 | End: 2018-03-20

## 2018-03-20 RX ORDER — LIDOCAINE HYDROCHLORIDE 10 MG/ML
1 INJECTION, SOLUTION EPIDURAL; INFILTRATION; INTRACAUDAL; PERINEURAL ONCE
Status: COMPLETED | OUTPATIENT
Start: 2018-03-21 | End: 2018-03-20

## 2018-03-20 RX ORDER — KETOROLAC TROMETHAMINE 30 MG/ML
INJECTION, SOLUTION INTRAMUSCULAR; INTRAVENOUS
Status: DISCONTINUED | OUTPATIENT
Start: 2018-03-20 | End: 2018-03-20

## 2018-03-20 RX ORDER — KETOROLAC TROMETHAMINE 30 MG/ML
INJECTION, SOLUTION INTRAMUSCULAR; INTRAVENOUS
Status: COMPLETED
Start: 2018-03-20 | End: 2018-03-20

## 2018-03-20 RX ORDER — LABETALOL HYDROCHLORIDE 5 MG/ML
INJECTION, SOLUTION INTRAVENOUS
Status: DISCONTINUED
Start: 2018-03-20 | End: 2018-03-20 | Stop reason: HOSPADM

## 2018-03-20 RX ORDER — ONDANSETRON 2 MG/ML
INJECTION INTRAMUSCULAR; INTRAVENOUS
Status: DISCONTINUED | OUTPATIENT
Start: 2018-03-20 | End: 2018-03-20

## 2018-03-20 RX ORDER — SUCCINYLCHOLINE CHLORIDE 20 MG/ML
INJECTION INTRAMUSCULAR; INTRAVENOUS
Status: COMPLETED
Start: 2018-03-20 | End: 2018-03-20

## 2018-03-20 RX ORDER — ESMOLOL HYDROCHLORIDE 10 MG/ML
INJECTION INTRAVENOUS
Status: DISCONTINUED
Start: 2018-03-20 | End: 2018-03-20 | Stop reason: HOSPADM

## 2018-03-20 RX ORDER — ONDANSETRON 2 MG/ML
INJECTION INTRAMUSCULAR; INTRAVENOUS
Status: COMPLETED
Start: 2018-03-20 | End: 2018-03-20

## 2018-03-20 RX ORDER — SODIUM CHLORIDE 9 MG/ML
INJECTION, SOLUTION INTRAVENOUS CONTINUOUS
Status: DISCONTINUED | OUTPATIENT
Start: 2018-03-21 | End: 2018-03-20 | Stop reason: HOSPADM

## 2018-03-20 RX ADMIN — Medication 500 MG: at 06:03

## 2018-03-20 RX ADMIN — SODIUM CHLORIDE 500 ML: 9 INJECTION, SOLUTION INTRAVENOUS at 06:03

## 2018-03-20 RX ADMIN — Medication 20 MG: at 07:03

## 2018-03-20 RX ADMIN — KETOROLAC TROMETHAMINE 30 MG: 30 INJECTION, SOLUTION INTRAMUSCULAR at 07:03

## 2018-03-20 RX ADMIN — LIDOCAINE HYDROCHLORIDE 0.1 MG: 10 INJECTION, SOLUTION EPIDURAL; INFILTRATION; INTRACAUDAL; PERINEURAL at 06:03

## 2018-03-20 RX ADMIN — ONDANSETRON 4 MG: 2 INJECTION, SOLUTION INTRAMUSCULAR; INTRAVENOUS at 07:03

## 2018-03-20 RX ADMIN — METHOHEXITAL SODIUM 200 MG: 500 INJECTION, POWDER, LYOPHILIZED, FOR SOLUTION INTRAMUSCULAR; INTRAVENOUS; RECTAL at 07:03

## 2018-03-20 RX ADMIN — SUCCINYLCHOLINE CHLORIDE 120 MG: 20 INJECTION, SOLUTION INTRAMUSCULAR; INTRAVENOUS at 07:03

## 2018-03-20 RX ADMIN — SODIUM CHLORIDE: 9 INJECTION, SOLUTION INTRAVENOUS at 06:03

## 2018-03-20 NOTE — ANESTHESIA POSTPROCEDURE EVALUATION
"Anesthesia Post Evaluation    Patient: Allyssa Wright    Procedure(s) Performed: Procedure(s) (LRB):  ELECTROCONVULSIVE THERAPY (ECT) - SINGLE SEIZURE (N/A)    Final Anesthesia Type: general  Patient location during evaluation: PACU  Patient participation: Yes- Able to Participate  Level of consciousness: awake and alert  Post-procedure vital signs: reviewed and stable  Pain management: adequate  Airway patency: patent  PONV status at discharge: No PONV  Anesthetic complications: no      Cardiovascular status: blood pressure returned to baseline  Respiratory status: unassisted  Hydration status: euvolemic  Follow-up not needed.        Visit Vitals  /73 (BP Location: Left arm, Patient Position: Sitting)   Pulse 95   Temp 36.6 °C (97.9 °F) (Temporal)   Resp 16   Ht 5' 5" (1.651 m)   Wt 70.3 kg (155 lb)   LMP  (LMP Unknown)   SpO2 100%   Breastfeeding? No   BMI 25.79 kg/m²       Pain/Roma Score: Pain Assessment Performed: Yes (3/20/2018  8:15 AM)  Presence of Pain: denies (3/20/2018  8:15 AM)  Roma Score: 10 (3/20/2018  8:15 AM)  Modified Roma Score: 20 (3/20/2018  6:27 AM)      "

## 2018-03-20 NOTE — ANESTHESIA RELEASE NOTE
"Anesthesia Release from PACU Note    Patient: Allyssa Wright    Procedure(s) Performed: Procedure(s) (LRB):  ELECTROCONVULSIVE THERAPY (ECT) - SINGLE SEIZURE (N/A)    Anesthesia type: general    Post pain: Adequate analgesia    Post assessment: no apparent anesthetic complications, tolerated procedure well and no evidence of recall    Last Vitals:   Visit Vitals  /87 (BP Location: Left arm, Patient Position: Sitting)   Pulse 88   Temp 37 °C (98.6 °F) (Temporal)   Resp 16   Ht 5' 5" (1.651 m)   Wt 70.3 kg (155 lb)   LMP  (LMP Unknown)   SpO2 100%   Breastfeeding? No   BMI 25.79 kg/m²       Post vital signs: stable    Level of consciousness: awake and alert     Nausea/Vomiting: no nausea/no vomiting    Complications: none    Airway Patency: patent    Respiratory: unassisted, spontaneous ventilation, room air    Cardiovascular: stable and blood pressure at baseline    Hydration: euvolemic  "

## 2018-03-20 NOTE — DISCHARGE INSTRUCTIONS
Understanding Electroconvulsive Therapy  Electroconvulsive therapy (ECT) is sometimes called shock therapy. This may sound painful, but ECT doesnt hurt. Its often the safest and best treatment for severe depression. It can treat other mental disorders as well.  What is electroconvulsive therapy?  ECT is used to treat people who are very depressed. Its mainly used when other treatments, such as antidepressant medicines, have failed. Often it may relieve feelings of sadness and despair after 2 to 4 treatments.  Common symptoms of major depression  Symptoms of major depression include:  · Feeling a deep sadness that doesnt go away  · Losing all pleasure in life  · Feeling hopeless or helpless  · Feeling guilty  · Sleeping more or less than normal  · Eating more or less than normal  · Having headaches or stomachaches, or other pains that dont go away  · Feeling nervous, empty, or worthless  · Crying a great deal  · Thinking or talking about suicide or death  How is ECT therapy done?  Before an ECT treatment, youll be given anesthesia to keep you pain-free. Youll also be given medicine to make you sleep, relax your muscles and control your heart rate. Your healthcare provider then places electrodes on your head. You may have one above each temple (bilateral ECT). Or you may have electrodes on one temple and on your forehead (unilateral ECT). While you are asleep, your brain is stimulated very briefly with an electric current. This causes a seizure, usually lasting less than a minute. Because you are under anesthesia, your body will not move even as your brain goes through great changes.  What are the risks?  When done properly, ECT is quite safe. Right after the treatment, you may be confused. This often lasts for less than half an hour. You may have a headache or stiff muscles. But these symptoms often go away quickly. A more serious possible side effect is memory loss. Commonly, people have short-term  (temporary) trouble remembering information that they learned recently. And they may have little recall of the time when they received treatment. Less commonly, people may have long-lasting (permanent) spotty recall of major past events. In rare cases, there may be memory loss for larger blocks of time.  Looking to the future  In most cases, ECT doesnt cure depression. But it can improve symptoms for a period of time. You may need a series of ECT treatments to continue feeling the benefit. You may also need to take antidepressant medicines to help prevent symptoms from returning. But with ongoing treatment, you can have a full and healthy life.  Resources  · The National [place] of Mental Health  412.698.3719  www.nimh.nih.gov  · [facility]  584.226.2789  www.[facility].org     Date Last Reviewed: 5/1/2017  © 8510-7324 The "[employer] [place]. com. cn. [address] [place] [address], [place] [address]. All rights reserved. This information is not intended as a substitute for professional medical care. Always follow your healthcare professional's instructions.

## 2018-03-20 NOTE — OP NOTE
Allyssa Wright   : 1980   MRN: [id number]  Date: 3/20/2018    Electroconvulsive Therapy  Operative Note    Date of Admission: 3/20/2018  5:50 AM    Site: [facility], [address]    Attending: Shoaib Boyce Jr., MD   Residents: Justine Lopez MD  Pre-op Diagnosis: MDD recurrent, in partial remission  ECT Treatment Number: 39  Machine Type: Mecta 5000    Patient Status: Medically stable    Vitals (pre-procedure):  Vitals:    18 0627   BP: 112/70   Pulse:    Resp:    Temp:        Electrode Placement: Bitemporal    Stimulus Number Charge (mC) Level Pulse Width (msec) Frequency  (Hz) Duration of Stimulus (sec) Current (mA) Duration of Seizure (sec)   1 576 3 1 60 6 800 63                                   Complications: none    Maximum Blood Pressure: 127/77    Medications Given:  Caffeine 500 mg PO Before  Methohexital (Brevital) 200 mg IV During   Succinylcholine (Anectine) 120 mg IV During   Ondansetron (Zofran) 4 mg IV During  Toradol 30 mg IV During   Esmolol 20 mg IV during    Treatment Course:  Patient tolerated procedure well. After adequate recovery from general anesthesia, the patient was transported to recovery.    Post-op Diagnosis: Same as above    Recommended for next ECT:  No changes      Justine Lopez MD  3/20/2018

## 2018-03-20 NOTE — DISCHARGE SUMMARY
Allyssa Wright  : 1980   MRN: [id number]  Date: 3/20/2018     Electroconvulsive Therapy  Discharge Summary    Admit Date: 3/20/2018  5:50 AM  Discharge Date: 2018    Attending Physician: Shoaib Boyce Jr., MD   Discharge Provider: Justine Lopez MD    History of Present Illness: Allyssa Wright is a 38 y.o. female with MDD, recurrent, severe, in partial remission who presented for ECT #39. See H&P dated 2018 for full HPI. For further details, see Dr. Boyce's pre-ECT evaluation.    Hospital Course: The patient tolerated the ECT treatment well without complication. Patient was stable post-procedure. See OP note dated 2018 for more details.     Disposition: Home or Self Care    Medications:  Current Discharge Medication List      CONTINUE these medications which have NOT CHANGED    Details   clozapine (CLOZARIL) 50 MG tablet Take 100 mg by mouth once daily.       dapsone 7.5 % GlwP Apply topically once daily.      dextroamphetamine-amphetamine 30 mg Tab Take 30 mg by mouth 2 (two) times daily.     Associated Diagnoses: MDD (major depressive disorder), recurrent, in partial remission      famciclovir (FAMVIR) 500 MG tablet Take 1 tablet (500 mg total) by mouth 2 (two) times daily.  Qty: 30 tablet, Refills: 12    Associated Diagnoses: Major depressive disorder, recurrent episode, moderate degree      mirtazapine (REMERON) 15 MG tablet Take 1 tablet (15 mg total) by mouth every evening.  Qty: 90 tablet, Refills: 0      spironolactone (ALDACTONE) 50 MG tablet 50 mg 2 (two) times daily. 50  mg qAM, 50 mg qHS.      thyroid (ARMOUR THYROID) 30 mg Tab Take 1 tablet (30 mg total) by mouth every morning.  Qty: 30 tablet, Refills: 11    Associated Diagnoses: Major depressive disorder, recurrent episode, moderate degree      trazodone (DESYREL) 100 MG tablet Take 200 mg by mouth every evening.       UNABLE TO FIND 2 (two) times daily. n-azetyl-cysteine      venlafaxine (EFFEXOR) 100 MG Tab Take 225  mg by mouth once daily.     Associated Diagnoses: MDD (major depressive disorder), recurrent, in partial remission      vortioxetine (TRINTELLIX) 5 mg Tab Take 2.5 mg by mouth. Pt states she is now taking 5 mg      hydrOXYzine pamoate (VISTARIL) 25 MG Cap Take 2 capsules (50 mg total) by mouth every 8 (eight) hours as needed (anxiety).  Qty: 30 capsule, Refills: 0      ZOVIRAX 5 % Crea Refills: 5           Status at Discharge: Alert and medically stable    Discharge Diagnoses:  MDD, recurrent, severe, in partial remission  Diet: Resume previous outpatient diet  Activity: Ambulate with assistance  Instructions: Please do not eat or drink anything after midnight prior to procedure. Please do not drive on day of ECT.    Med Changes:  None    Next ECT:  Follow-up Information     [facility] at 06:00am On 4/3/2018.               Justine Lopez MD  3/20/2018

## 2018-03-20 NOTE — TRANSFER OF CARE
"Anesthesia Transfer of Care Note    Patient: Allyssa Wright    Procedure(s) Performed: Procedure(s) (LRB):  ELECTROCONVULSIVE THERAPY (ECT) - SINGLE SEIZURE (N/A)    Patient location: [facility]    Anesthesia Type: general    Transport from OR: Transported from OR on room air with adequate spontaneous ventilation    Post pain: adequate analgesia    Post assessment: no apparent anesthetic complications and tolerated procedure well    Post vital signs: stable    Level of consciousness: responds to stimulation and sedated    Nausea/Vomiting: no nausea/vomiting    Complications: none    Transfer of care protocol was followed      Last vitals:   Visit Vitals  /70   Pulse 60   Temp 36.4 °C (97.5 °F) (Skin)   Resp 20   Ht 5' 5" (1.651 m)   Wt 70.3 kg (155 lb)   LMP  (LMP Unknown)   SpO2 100%   Breastfeeding? No   BMI 25.79 kg/m²     "

## 2018-03-20 NOTE — ANESTHESIA PROCEDURE NOTES
ECT    Treatment Number: 39  Procedure start time: 3/20/2018 7:17 AM  Timeout performed at: 3/20/2018 7:15 AM  Procedure end time: 3/20/2018 7:23 AM    Staffing  Anesthesiologist: BETTY BARNES  CRNA/Resident: WESTON AYON  Performed by: resident/CRNA     Preanesthetic Checklist  The following were completed as part of the preanesthetic checklist: patient identified, procedural consent, pre-op evaluation, timeout performed, risks and benefits discussed, monitors and equipment checked, anesthesia consent given, oxygen available, suction available, hand hygiene performed and patient being monitored.    Setup & Induction  Patient Monitoring: heart rate, cardiac monitor, continuous capnometry, continuous pulse ox, NIBP and gas analyzer  Patient preparation: bite block inserted, extremities padded, mandibular stabilization and patient hyperventilated  Electrode Placement: Bitemporal    The patient was moved to the ECT therapy room after being assessed and consented for ECT. After standard ASA monitors were applied and timeout performed, the patient was adequately preoxygenated. After induction of general anesthesia, adequate oxygenation and ventilation were confirmed with pulse oxymetry and end tidal CO2 monitoring via bag-mask ventilation. End tidal CO2 was monitored throughout the case and moderate hyperventilation was performed prior to beginning ECT treatment. All extremities were padded, biteblock was inserted, and mandibular stabilization was done prior to initiating ECT therapy.    Procedure  Stimulus Number 1: Setting Number = I; 576 millicoulombs; Seizure Duration = 63 seconds            Recovery  After adequate recovery from general anesthesia, the patient was transported to recovery.  Baseline Blood Pressure: 123/80  Peak Blood Pressure: 127/77  Time to Recovery of Respirations: 3 minutes    ECT Findings  ECT associated findings of: Visible Seizure

## 2018-03-20 NOTE — ANESTHESIA POSTPROCEDURE EVALUATION
"Anesthesia Post Evaluation    Patient: Allyssa Wright    Procedure(s) Performed: Procedure(s) (LRB):  ELECTROCONVULSIVE THERAPY (ECT) - SINGLE SEIZURE (N/A)    Final Anesthesia Type: general  Patient location during evaluation: PACU  Patient participation: Yes- Able to Participate  Level of consciousness: awake and alert  Post-procedure vital signs: reviewed and stable  Pain management: adequate  Airway patency: patent  PONV status at discharge: No PONV  Anesthetic complications: no      Cardiovascular status: blood pressure returned to baseline and hemodynamically stable  Respiratory status: unassisted, spontaneous ventilation and room air  Hydration status: euvolemic  Follow-up not needed.        Visit Vitals  /87 (BP Location: Left arm, Patient Position: Sitting)   Pulse 88   Temp 37 °C (98.6 °F) (Temporal)   Resp 16   Ht 5' 5" (1.651 m)   Wt 70.3 kg (155 lb)   LMP  (LMP Unknown)   SpO2 100%   Breastfeeding? No   BMI 25.79 kg/m²       Pain/Roma Score: Pain Assessment Performed: Yes (3/20/2018  7:33 AM)  Presence of Pain: non-verbal indicators absent (3/20/2018  7:33 AM)  Roma Score: 8 (3/20/2018  7:33 AM)  Modified Roma Score: 20 (3/20/2018  6:27 AM)      "

## 2018-03-20 NOTE — H&P
"Allyssa Wright  : 1980   MRN: [id number]  Date: 3/20/2018     Electroconvulsive Therapy  History & Physical     Chief complaint: MDD, recurrent, in partial remission  Procedure: ECT #39    SUBJECTIVE:     HPI:   Allyssa Wright is a 38 y.o. female with MDD, recurrent, in partial remission who presents for ECT. Please see Dr. Boyce's full pre-ECT evaluation for further details.     Dad is pt's ride. The patient reports that her mood is improved since starting what she calls her "booster series." States that she has been "7/10."  Has been trying to exercise more, going to walk with dad or getting on the exercise bike.    The patient denies any changes in dentition.   The patient has not had any med changes (follows with Dr. Jc) since last ECT but requests rx for vistaril.    The patient has not had anything by mouth since midnight and is ready for ECT.    Psychiatric Review of Systems:  Mood: 7/10  Appetite: "i tend to overeat in the evening," stable  Psychomotor: No problem   Cognitive Impairment: minimal memory problems-not worse than normal   Insomnia: None   Psychosis: None   Diurnal Variation: None   Suicidal Ideation: Absent     Medical Review Of Systems:  HEENT: denies headaches, dental problems  Cv: denies chest pain  Resp: Denies shortness of breath, cough  Gi: denies stomach pain, N/V  MSK: denies pain of muscles or joints    Current Medications:   No current facility-administered medications on file prior to encounter.      Current Outpatient Prescriptions on File Prior to Encounter   Medication Sig Dispense Refill    clozapine (CLOZARIL) 50 MG tablet Take 100 mg by mouth once daily.       dapsone 7.5 % GlwP Apply topically once daily.      dextroamphetamine-amphetamine 30 mg Tab Take 30 mg by mouth 2 (two) times daily.       famciclovir (FAMVIR) 500 MG tablet Take 1 tablet (500 mg total) by mouth 2 (two) times daily. 30 tablet 12    mirtazapine (REMERON) 15 MG tablet Take 1 tablet (15 mg " total) by mouth every evening. (Patient taking differently: Take 7.5 mg by mouth every evening. ) 90 tablet 0    spironolactone (ALDACTONE) 50 MG tablet 50 mg 2 (two) times daily. 50  mg qAM, 50 mg qHS.      thyroid (ARMOUR THYROID) 30 mg Tab Take 1 tablet (30 mg total) by mouth every morning. 30 tablet 11    trazodone (DESYREL) 100 MG tablet Take 200 mg by mouth every evening.       UNABLE TO FIND 2 (two) times daily. n-azetyl-cysteine      venlafaxine (EFFEXOR) 100 MG Tab Take 225 mg by mouth once daily.       vortioxetine (TRINTELLIX) 5 mg Tab Take 2.5 mg by mouth. Pt states she is now taking 5 mg      hydrOXYzine pamoate (VISTARIL) 25 MG Cap Take 2 capsules (50 mg total) by mouth every 8 (eight) hours as needed (anxiety). 30 capsule 0    ZOVIRAX 5 % Crea   5      Allergies:   Benzodiazepines; Ampicillin; Erythromycin; Levaquin [levofloxacin]; Penicillins; Pristiq [desvenlafaxine]; Sulfa (sulfonamide antibiotics); and Azithromycin    Past Medical/Surgical History:   Past Medical History:   Diagnosis Date    Anxiety     Depression     History of psychiatric hospitalization     HSV-1 (herpes simplex virus 1) infection     Hx of psychiatric care     Moderate depressed bipolar II disorder 06/13/2016    reports no history of bipolar    Obsessive-compulsive disorder     Psychiatric problem     Self-harming behavior     Therapy      Past Surgical History:   Procedure Laterality Date    ANKLE SURGERY Right     BREAST augmentation      OVARIAN CYST REMOVAL Bilateral       OBJECTIVE:     Vitals (pre-procedure):  Vitals:    03/20/18 0627   BP: 112/70   Pulse:    Resp:    Temp:         Labs/Imaging/Studies:   Recent Results (from the past 48 hour(s))   POCT urine pregnancy    Collection Time: 03/20/18  6:24 AM   Result Value Ref Range    POC Preg Test, Ur Negative Negative     Acceptable Yes       No results found for: PHENYTOIN, PHENOBARB, VALPROATE, CBMZ    Physical Exam:   Gen: NAD, age  "appropriate  HEENT: MMM, PERRL, EOMI, O/P clear   CV: RRR, S1/S2 nml, no M/R/G  Chest: CTAB, no R/R/W, unlabored breathing   Abd: S/NT/ND, +BS  Ext: No gross deformities or lesions  Neuro: CN II-XII grossly intact, no focal deficits    Mental Status Exam:   Appearance: no acute distress, age appropriate, casually dressed, neatly groomed  Behavior: friendly and cooperative, eye contact normal  Speech: normal tone, normal rate, normal pitch, normal volume  Mood: "7/10"   Affect: reactive  Thought Process: linear and logical  Thought Content: no suicidality, no homicidality, delusions, or paranoia  Cognition: grossly intact  Insight: fair  Judgment: fair    ASSESSMENT/PLAN:     Allyssa Wright is a 38 y.o. female with MDD, recurrent, in partial remission who presents for ECT.    Recommendations:   Proceed with ECT #39.      Justine Lopez MD  3/20/2018      "

## 2018-03-20 NOTE — PLAN OF CARE
Discharge instructions reviewed with pt and father, handouts given verbalized understanding with no further questions at this time. Next ECT scheduled for April 3, 2018 per AVS sheet with MD telephone number provided. VSS on RA, no pain or nausea noted, tolerating po fluids without difficulty, no other complaints noted. Fall precautions reviewed, PIV removed.

## 2018-03-28 ENCOUNTER — TELEPHONE (OUTPATIENT)
Dept: PSYCHIATRY | Facility: CLINIC | Age: 38
End: 2018-03-28

## 2018-03-28 NOTE — TELEPHONE ENCOUNTER
----- Message from Jose Pham sent at 3/28/2018 11:37 AM CDT -----  Contact: Self [phone]  Pt said she is an ECT patient that is experiencing Anxiety. Pt would like to know is there anything else she can take other than hydrOXYzine pamoate (VISTARIL) 25 MG Cap that would not interfere with her ECT Testing that is scheduled for Tuesday 4/3/18?    Pt can be reached at [phone].

## 2018-03-28 NOTE — TELEPHONE ENCOUNTER
Spoke with Pt.  Medications for anxiety were discussed.  She was told that Vistaril and SSRI's are the only medicines compatible with ECT.

## 2018-04-03 ENCOUNTER — SURGERY (OUTPATIENT)
Age: 38
End: 2018-04-03

## 2018-04-03 ENCOUNTER — ANESTHESIA (OUTPATIENT)
Dept: ELECTROPHYSIOLOGY | Facility: HOSPITAL | Age: 38
End: 2018-04-03
Payer: COMMERCIAL

## 2018-04-03 ENCOUNTER — HOSPITAL ENCOUNTER (OUTPATIENT)
Facility: HOSPITAL | Age: 38
Discharge: HOME | End: 2018-04-03
Attending: PSYCHIATRY & NEUROLOGY | Admitting: PSYCHIATRY & NEUROLOGY
Payer: COMMERCIAL

## 2018-04-03 ENCOUNTER — ANESTHESIA EVENT (OUTPATIENT)
Dept: ELECTROPHYSIOLOGY | Facility: HOSPITAL | Age: 38
End: 2018-04-03
Payer: COMMERCIAL

## 2018-04-03 VITALS
TEMPERATURE: 99 F | WEIGHT: 155 LBS | BODY MASS INDEX: 25.83 KG/M2 | HEART RATE: 95 BPM | HEIGHT: 65 IN | SYSTOLIC BLOOD PRESSURE: 133 MMHG | DIASTOLIC BLOOD PRESSURE: 55 MMHG | OXYGEN SATURATION: 100 % | RESPIRATION RATE: 18 BRPM

## 2018-04-03 DIAGNOSIS — F20.9 SCHIZOPHRENIA: ICD-10-CM

## 2018-04-03 DIAGNOSIS — F33.41 MDD (MAJOR DEPRESSIVE DISORDER), RECURRENT, IN PARTIAL REMISSION: ICD-10-CM

## 2018-04-03 LAB
B-HCG UR QL: NEGATIVE
CTP QC/QA: YES

## 2018-04-03 PROCEDURE — 37000009 HC ANESTHESIA EA ADD 15 MINS: Performed by: PSYCHIATRY & NEUROLOGY

## 2018-04-03 PROCEDURE — 90870 ELECTROCONVULSIVE THERAPY: CPT | Mod: ,,, | Performed by: PSYCHIATRY & NEUROLOGY

## 2018-04-03 PROCEDURE — D9220A PRA ANESTHESIA: Mod: ,,, | Performed by: ANESTHESIOLOGY

## 2018-04-03 PROCEDURE — 37000008 HC ANESTHESIA 1ST 15 MINUTES: Performed by: PSYCHIATRY & NEUROLOGY

## 2018-04-03 PROCEDURE — 25000003 PHARM REV CODE 250: Performed by: STUDENT IN AN ORGANIZED HEALTH CARE EDUCATION/TRAINING PROGRAM

## 2018-04-03 PROCEDURE — 71000044 HC DOSC ROUTINE RECOVERY FIRST HOUR: Performed by: PSYCHIATRY & NEUROLOGY

## 2018-04-03 PROCEDURE — 25000003 PHARM REV CODE 250: Performed by: PSYCHIATRY & NEUROLOGY

## 2018-04-03 PROCEDURE — 90870 ELECTROCONVULSIVE THERAPY: CPT | Performed by: ANESTHESIOLOGY

## 2018-04-03 PROCEDURE — 63600175 PHARM REV CODE 636 W HCPCS: Performed by: STUDENT IN AN ORGANIZED HEALTH CARE EDUCATION/TRAINING PROGRAM

## 2018-04-03 PROCEDURE — 81025 URINE PREGNANCY TEST: CPT | Performed by: PSYCHIATRY & NEUROLOGY

## 2018-04-03 RX ORDER — SODIUM CHLORIDE 0.9 % (FLUSH) 0.9 %
3 SYRINGE (ML) INJECTION
Status: DISCONTINUED | OUTPATIENT
Start: 2018-04-03 | End: 2018-04-03 | Stop reason: HOSPADM

## 2018-04-03 RX ORDER — KETOROLAC TROMETHAMINE 30 MG/ML
INJECTION, SOLUTION INTRAMUSCULAR; INTRAVENOUS
Status: DISCONTINUED | OUTPATIENT
Start: 2018-04-03 | End: 2018-04-03

## 2018-04-03 RX ORDER — SUCCINYLCHOLINE CHLORIDE 20 MG/ML
INJECTION INTRAMUSCULAR; INTRAVENOUS
Status: COMPLETED
Start: 2018-04-03 | End: 2018-04-03

## 2018-04-03 RX ORDER — ESMOLOL HYDROCHLORIDE 10 MG/ML
INJECTION INTRAVENOUS
Status: COMPLETED
Start: 2018-04-03 | End: 2018-04-03

## 2018-04-03 RX ORDER — SUCCINYLCHOLINE CHLORIDE 20 MG/ML
INJECTION INTRAMUSCULAR; INTRAVENOUS
Status: DISCONTINUED | OUTPATIENT
Start: 2018-04-03 | End: 2018-04-03

## 2018-04-03 RX ORDER — HYDROXYZINE PAMOATE 50 MG/1
50 CAPSULE ORAL NIGHTLY PRN
Qty: 30 CAPSULE | Refills: 0 | Status: SHIPPED | OUTPATIENT
Start: 2018-04-03 | End: 2018-05-03

## 2018-04-03 RX ORDER — SODIUM CHLORIDE 9 MG/ML
INJECTION, SOLUTION INTRAVENOUS CONTINUOUS PRN
Status: DISCONTINUED | OUTPATIENT
Start: 2018-04-03 | End: 2018-04-03

## 2018-04-03 RX ORDER — ONDANSETRON 2 MG/ML
INJECTION INTRAMUSCULAR; INTRAVENOUS
Status: COMPLETED
Start: 2018-04-03 | End: 2018-04-03

## 2018-04-03 RX ORDER — ESMOLOL HYDROCHLORIDE 10 MG/ML
INJECTION INTRAVENOUS
Status: DISCONTINUED | OUTPATIENT
Start: 2018-04-03 | End: 2018-04-03

## 2018-04-03 RX ORDER — ONDANSETRON 2 MG/ML
INJECTION INTRAMUSCULAR; INTRAVENOUS
Status: DISCONTINUED | OUTPATIENT
Start: 2018-04-03 | End: 2018-04-03

## 2018-04-03 RX ORDER — KETOROLAC TROMETHAMINE 30 MG/ML
INJECTION, SOLUTION INTRAMUSCULAR; INTRAVENOUS
Status: COMPLETED
Start: 2018-04-03 | End: 2018-04-03

## 2018-04-03 RX ORDER — SODIUM CHLORIDE 9 MG/ML
INJECTION, SOLUTION INTRAVENOUS CONTINUOUS
Status: DISCONTINUED | OUTPATIENT
Start: 2018-04-04 | End: 2018-04-03 | Stop reason: HOSPADM

## 2018-04-03 RX ORDER — ONDANSETRON 2 MG/ML
4 INJECTION INTRAMUSCULAR; INTRAVENOUS DAILY PRN
Status: DISCONTINUED | OUTPATIENT
Start: 2018-04-04 | End: 2018-04-03 | Stop reason: HOSPADM

## 2018-04-03 RX ADMIN — METHOHEXITAL SODIUM 200 MG: 500 INJECTION, POWDER, LYOPHILIZED, FOR SOLUTION INTRAMUSCULAR; INTRAVENOUS; RECTAL at 08:04

## 2018-04-03 RX ADMIN — Medication 500 MG: at 07:04

## 2018-04-03 RX ADMIN — ESMOLOL HYDROCHLORIDE 30 MG: 10 INJECTION INTRAVENOUS at 08:04

## 2018-04-03 RX ADMIN — ONDANSETRON 4 MG: 2 INJECTION, SOLUTION INTRAMUSCULAR; INTRAVENOUS at 08:04

## 2018-04-03 RX ADMIN — SODIUM CHLORIDE: 0.9 INJECTION, SOLUTION INTRAVENOUS at 07:04

## 2018-04-03 RX ADMIN — SUCCINYLCHOLINE CHLORIDE 120 MG: 20 INJECTION, SOLUTION INTRAMUSCULAR; INTRAVENOUS at 08:04

## 2018-04-03 RX ADMIN — KETOROLAC TROMETHAMINE 30 MG: 30 INJECTION, SOLUTION INTRAMUSCULAR at 08:04

## 2018-04-03 NOTE — ANESTHESIA PREPROCEDURE EVALUATION
04/03/2018  Pre-operative evaluation for Procedure(s) (LRB):  ELECTROCONVULSIVE THERAPY (ECT) - SINGLE SEIZURE (N/A)    Allyssa Wright is a 38 y.o. female with MDD who is presenting with the above procedure.     ECT #40    Previous medications  Methohexital 200 mg   Succinylcholine 120 mg  Zofran & Toradol  Combinations of Labetalol 25 mg & Esmolol 30 mg    Patient Active Problem List   Diagnosis    MDD (major depressive disorder), recurrent, in partial remission    Major depressive disorder, recurrent, in partial remission    MDD (major depressive disorder), severe    Major depressive disorder, recurrent    Other acne    Major depression, recurrent, chronic    Recurrent major depression in partial remission    Major depressive disorder, recurrent severe without psychotic features    MDD (major depressive disorder), recurrent episode, severe    MDD (major depressive disorder)    Major depressive disorder, recurrent episode, in partial remission    Schizophrenia       Review of patient's allergies indicates:   Allergen Reactions    Benzodiazepines Other (See Comments)     Contraindicated while taking Clozapine    Ampicillin      Mom says so    Erythromycin     Levaquin [levofloxacin] Other (See Comments)     Depression side effects    Penicillins      Mom says so    Pristiq [desvenlafaxine]      psycotic      Sulfa (sulfonamide antibiotics)      Rash      Azithromycin Anxiety       Current Facility-Administered Medications on File Prior to Visit   Medication Dose Route Frequency Provider Last Rate Last Dose    [START ON 4/4/2018] 0.9%  NaCl infusion   Intravenous Continuous Terrence Quinones MD        esmolol (BREVIBLOC) 100 mg/10 mL (10 mg/mL) injection             ketorolac (TORADOL) 30 mg/mL (1 mL) injection             ondansetron 4 mg/2 mL injection             succinylcholine  (ANECTINE) 20 mg/mL injection              Current Outpatient Prescriptions on File Prior to Visit   Medication Sig Dispense Refill    clozapine (CLOZARIL) 50 MG tablet Take 100 mg by mouth once daily.       dapsone 7.5 % GlwP Apply topically once daily.      dextroamphetamine-amphetamine 30 mg Tab Take 30 mg by mouth 2 (two) times daily.       famciclovir (FAMVIR) 500 MG tablet Take 1 tablet (500 mg total) by mouth 2 (two) times daily. 30 tablet 12    hydrOXYzine pamoate (VISTARIL) 50 MG Cap Take 1 capsule (50 mg total) by mouth nightly as needed (anxiety). 30 capsule 0    mirtazapine (REMERON) 15 MG tablet Take 1 tablet (15 mg total) by mouth every evening. (Patient taking differently: Take 7.5 mg by mouth every evening. ) 90 tablet 0    spironolactone (ALDACTONE) 50 MG tablet 50 mg 2 (two) times daily. 50  mg qAM, 50 mg qHS.      thyroid (ARMOUR THYROID) 30 mg Tab Take 1 tablet (30 mg total) by mouth every morning. 30 tablet 11    trazodone (DESYREL) 100 MG tablet Take 200 mg by mouth every evening.       UNABLE TO FIND 2 (two) times daily. n-azetyl-cysteine      venlafaxine (EFFEXOR) 100 MG Tab Take 225 mg by mouth once daily.       ZOVIRAX 5 % Crea   5       Past Surgical History:   Procedure Laterality Date    ANKLE SURGERY Right     BREAST augmentation      OVARIAN CYST REMOVAL Bilateral        Social History     Social History    Marital status: Single     Spouse name: N/A    Number of children: N/A    Years of education: N/A     Occupational History    unemployed      Social History Main Topics    Smoking status: Former Smoker     Quit date: 4/6/2011    Smokeless tobacco: Never Used    Alcohol use Yes      Comment: rarely    Drug use: No      Comment: Experimental use in high school    Sexual activity: Not on file     Other Topics Concern    Not on file     Social History Narrative    Pt has 1 older brother from an intact family until the death of her mother in 2012.  She completed  1 year of college, was never in the , and has never been employed.  She was engaged once, but never , has no children, and lives with her father plus 2 dogs.  She denies any hobbies and is spiritual but not Zoroastrian.  She does date and returns to college during rare periods when depression and anxiety [place].     EKG:  Vent. Rate : 084 BPM     Atrial Rate : 084 BPM     P-R Int : 154 ms          QRS Dur : 086 ms      QT Int : 378 ms       P-R-T Axes : 067 066 055 degrees     QTc Int : 446 ms    Normal sinus rhythm  Normal ECG  When compared with ECG of 03-DEC-2015 11:38,  Questionable change in The axis  T wave inversion no longer evident in Inferior leads  Confirmed by Flaco Sr MD ([id number]) on 6/9/2016 1:30:13 PM    Referred By: SELF REFERRAL           Confirmed By:Flaco Sr MD    Pre-op Assessment    I have reviewed the Patient Summary Reports.      I have reviewed the Medications.     Review of Systems  Anesthesia Hx:  No problems with previous Anesthesia  History of prior surgery of interest to airway management or planning: Previous anesthesia: General  Procedure performed at an [facility] Facility. Airway issues documented on chart review include mask, easy  Denies Family Hx of Anesthesia complications.   Denies Personal Hx of Anesthesia complications.   Social:  Former Smoker    Hematology/Oncology:  Hematology Normal   Oncology Normal     EENT/Dental:EENT/Dental Normal   Cardiovascular:   Denies Hypertension.  Denies MI.   Denies Dysrhythmias.   Denies Angina. ECG has been reviewed.    Pulmonary:  Pulmonary Normal    Renal/:  Renal/ Normal     Hepatic/GI:  Hepatic/GI Normal    Musculoskeletal:  Musculoskeletal Normal    Neurological:  Neurology Normal    Endocrine:   Hypothyroidism    Psych:   Psychiatric History          Physical Exam  General:  Well nourished    Airway/Jaw/Neck:  Airway Findings: Mouth Opening: Normal Tongue: Normal  General Airway Assessment: Adult  Mallampati: II  Improves to  I with phonation.  TM Distance: Normal, at least 6 cm  Jaw/Neck Findings:  Neck ROM: Normal ROM     Eyes/Ears/Nose:  EYES/EARS/NOSE FINDINGS: Normal   Dental:  Dental Findings: In tact   Chest/Lungs:  Chest/Lungs Findings: Normal Respiratory Rate     Heart/Vascular:  Heart Findings: Rate: Normal        Mental Status:  Mental Status Findings:  Cooperative, Alert and Oriented         Anesthesia Plan  Type of Anesthesia, risks & benefits discussed:  Anesthesia Type:  general  Patient's Preference:   Intra-op Monitoring Plan: standard ASA monitors  Intra-op Monitoring Plan Comments:   Post Op Pain Control Plan:   Post Op Pain Control Plan Comments:   Induction:   IV  Beta Blocker:  Patient is not currently on a Beta-Blocker (No further documentation required).       Informed Consent: Patient understands risks and agrees with Anesthesia plan.  Questions answered. Anesthesia consent signed with patient.  ASA Score: 2     Day of Surgery Review of History & Physical:    H&P update referred to the surgeon.         Ready For Surgery From Anesthesia Perspective.

## 2018-04-03 NOTE — DISCHARGE INSTRUCTIONS
Understanding Electroconvulsive Therapy  Electroconvulsive therapy (ECT) is sometimes called shock therapy. This may sound painful, but ECT doesnt hurt. Its often the safest and best treatment for severe depression. It can treat other mental disorders as well.  What is electroconvulsive therapy?  ECT is used to treat people who are very depressed. Its mainly used when other treatments, such as antidepressant medicines, have failed. Often it may relieve feelings of sadness and despair after 2 to 4 treatments.  Common symptoms of major depression  Symptoms of major depression include:  · Feeling a deep sadness that doesnt go away  · Losing all pleasure in life  · Feeling hopeless or helpless  · Feeling guilty  · Sleeping more or less than normal  · Eating more or less than normal  · Having headaches or stomachaches, or other pains that dont go away  · Feeling nervous, empty, or worthless  · Crying a great deal  · Thinking or talking about suicide or death  How is ECT therapy done?  Before an ECT treatment, youll be given anesthesia to keep you pain-free. Youll also be given medicine to make you sleep, relax your muscles and control your heart rate. Your healthcare provider then places electrodes on your head. You may have one above each temple (bilateral ECT). Or you may have electrodes on one temple and on your forehead (unilateral ECT). While you are asleep, your brain is stimulated very briefly with an electric current. This causes a seizure, usually lasting less than a minute. Because you are under anesthesia, your body will not move even as your brain goes through great changes.  What are the risks?  When done properly, ECT is quite safe. Right after the treatment, you may be confused. This often lasts for less than half an hour. You may have a headache or stiff muscles. But these symptoms often go away quickly. A more serious possible side effect is memory loss. Commonly, people have short-term  (temporary) trouble remembering information that they learned recently. And they may have little recall of the time when they received treatment. Less commonly, people may have long-lasting (permanent) spotty recall of major past events. In rare cases, there may be memory loss for larger blocks of time.  Looking to the future  In most cases, ECT doesnt cure depression. But it can improve symptoms for a period of time. You may need a series of ECT treatments to continue feeling the benefit. You may also need to take antidepressant medicines to help prevent symptoms from returning. But with ongoing treatment, you can have a full and healthy life.  Resources  · The [employer] [place] [employer]  526.847.6117  www.[facility].nih.gov  · [facility]  112.213.2095  www.[facility].org       Types of Anesthesia  Your anesthesiologist is a key member of your surgical team. He or she gives you anesthetics (medications to keep you comfortable and decrease your awareness of surgery) and monitors your condition to keep you safe during surgery. You will have 1 of 3 kinds of anesthesia during your surgery.  Monitored anesthesia care (MAC)  · Often used for surgery that is short or not too invasive.  · Sedatives (medicines to relax you) are given through an IV (intravenous) line.  · The area around the surgical site is usually numbed with a local anesthetic.  · You may choose to remain awake or sleep lightly.  Regional anesthesia (sometimes called spinal epidural or evelyn block)  · Often used for surgery on the arms, legs, and abdomen. It is also used during childbirth.  · A specific region of your body is numbed by injecting anesthetic near nerves, near your spine, or near the operative site.  · You may also be given sedatives through an IV line to relax you.  · With regional anesthesia, you may choose to remain awake or sleep lightly.  General anesthesia  · Often used for extensive surgery, such as on the heart, brain, or  abdominal operation.  · Also used when the patient wants to be totally asleep.  · May be given as a gas that you breathe and as medicines that are injected through an IV line.  · Because you are asleep, you feel no pain and remember nothing of the surgery.  The risks and complications of anesthesia depend on your overall health. If you are healthy, the risks are low. The risks are higher for patients with heart or lung problems. Your anesthesiologist or nurse anesthetist will discuss the risks with you.

## 2018-04-03 NOTE — H&P
Allyssa Wright  : 1980   MRN: [id number]  Date: 4/3/2018     Electroconvulsive Therapy  History & Physical    Chief complaint: MDD, recurrent, in partial remission   Procedure: ECT #40    SUBJECTIVE:     HPI:   Allyssa Wright is a 38 y.o. female with <principal problem not specified>who presents for ECT. Please see Dr. Boyce's full pre-ECT evaluation for further details.     Pt reports she is feeling good this morning. She has been eating and sleeping well. Reports her mood is improving with ECT and she denies having any complications from it. Pt is requesting refill on vistaril.     The patient denies any changes in dentition.   The patient does not need any prescriptions and has not had any med changes since meeting with Dr. Boyce.   The patient has not had anything by mouth since midnight and is ready for ECT.    Psychiatric Review of Systems:  Mood: good  Appetite: No problem  Psychomotor: No problem  Cognitive Impairment: None  Insomnia: Mild  Psychosis: None  Diurnal Variation: None  Suicidal Ideation: Absent    Medical Review Of Systems:  Pertinent items are noted in HPI.    Current Medications:   No current facility-administered medications on file prior to encounter.      Current Outpatient Prescriptions on File Prior to Encounter   Medication Sig Dispense Refill    clozapine (CLOZARIL) 50 MG tablet Take 100 mg by mouth once daily.       dapsone 7.5 % GlwP Apply topically once daily.      famciclovir (FAMVIR) 500 MG tablet Take 1 tablet (500 mg total) by mouth 2 (two) times daily. 30 tablet 12    hydrOXYzine pamoate (VISTARIL) 25 MG Cap Take 2 capsules (50 mg total) by mouth every 8 (eight) hours as needed (anxiety). 30 capsule 0    mirtazapine (REMERON) 15 MG tablet Take 1 tablet (15 mg total) by mouth every evening. (Patient taking differently: Take 7.5 mg by mouth every evening. ) 90 tablet 0    spironolactone (ALDACTONE) 50 MG tablet 50 mg 2 (two) times daily. 50  mg qAM, 50 mg qHS.       thyroid (ARMOUR THYROID) 30 mg Tab Take 1 tablet (30 mg total) by mouth every morning. 30 tablet 11    trazodone (DESYREL) 100 MG tablet Take 200 mg by mouth every evening.       UNABLE TO FIND 2 (two) times daily. n-azetyl-cysteine      venlafaxine (EFFEXOR) 100 MG Tab Take 225 mg by mouth once daily.       dextroamphetamine-amphetamine 30 mg Tab Take 30 mg by mouth 2 (two) times daily.       ZOVIRAX 5 % Crea   5    [DISCONTINUED] vortioxetine (TRINTELLIX) 5 mg Tab Take 2.5 mg by mouth. Pt states she is now taking 5 mg        Allergies:   Benzodiazepines; Ampicillin; Erythromycin; Levaquin [levofloxacin]; Penicillins; Pristiq [desvenlafaxine]; Sulfa (sulfonamide antibiotics); and Azithromycin    Past Medical/Surgical History:   Past Medical History:   Diagnosis Date    Anxiety     Depression     History of psychiatric hospitalization     HSV-1 (herpes simplex virus 1) infection     Hx of psychiatric care     Moderate depressed bipolar II disorder 06/13/2016    reports no history of bipolar    Obsessive-compulsive disorder     Psychiatric problem     Schizophrenia 4/3/2018    Self-harming behavior     Therapy      Past Surgical History:   Procedure Laterality Date    ANKLE SURGERY Right     BREAST augmentation      OVARIAN CYST REMOVAL Bilateral       OBJECTIVE:     Vitals (pre-procedure):  Vitals:    04/03/18 0631   BP: 106/71   Pulse: 75   Resp: 18   Temp: 97.3 °F (36.3 °C)        Labs/Imaging/Studies:   Recent Results (from the past 48 hour(s))   POCT urine pregnancy    Collection Time: 04/03/18  6:26 AM   Result Value Ref Range    POC Preg Test, Ur Negative Negative     Acceptable Yes       No results found for: PHENYTOIN, PHENOBARB, VALPROATE, CBMZ    Physical Exam:   Gen: AAOx4, NAD  HEENT: MMM, PERRL, EOMI, O/P clear  CV: RRR, S1/S2 nml, no M/R/G  Chest: CTAB, no R/R/W, unlabored breathing  Abd: S/NT/ND, +BS, no HSM  Ext: No C/C/E, pulse 2+ throughout  Neuro: CN II-XII  grossly intact, no focal deficits    Mental Status Exam:   Appearance: unremarkable, age appropriate  Behavior: normal, cooperative  Speech: normal tone, normal rate, normal pitch, normal volume  Mood: euthymic  Affect: Normal   Thought Process: normal and logical  Thought Content: normal, no suicidality, no homicidality, delusions, or paranoia  Cognition: grossly intact  Insight: good  Judgment: good    ASSESSMENT/PLAN:     Allyssa Wright is a 38 y.o. female with MDD, recurrent, in partial remission who presents for ECT.    Recommendations:   Proceed with ECT #40.      Terrence Quinones MD  4/3/2018

## 2018-04-03 NOTE — TRANSFER OF CARE
"Anesthesia Transfer of Care Note    Patient: Allyssa Wright    Procedure(s) Performed: Procedure(s) (LRB):  ELECTROCONVULSIVE THERAPY (ECT) - SINGLE SEIZURE (N/A)    Patient location: [facility]    Anesthesia Type: other: ECT    Transport from OR: Transported from OR on room air with adequate spontaneous ventilation    Post pain: adequate analgesia    Post assessment: no apparent anesthetic complications    Post vital signs: stable    Level of consciousness: awake    Nausea/Vomiting: no nausea/vomiting    Complications: none    Transfer of care protocol was followed      Last vitals:   Visit Vitals  BP (!) 133/55   Pulse 95   Temp 37.1 °C (98.8 °F) (Temporal)   Resp 18   Ht 5' 5" (1.651 m)   Wt 70.3 kg (155 lb)   LMP  (LMP Unknown)   SpO2 100%   Breastfeeding? No   BMI 25.79 kg/m²     "

## 2018-04-03 NOTE — PLAN OF CARE
PT is AAOx4. VSS. NAD. IV discontinued. Discharge instructions given. Verbalized understanding. Discharged home with family.

## 2018-04-03 NOTE — OP NOTE
Allyssa Wright  : 1980   MRN: [id number]  Date: 4/3/2018    Electroconvulsive Therapy  Operative Note    Date of Admission: 4/3/2018  5:49 AM    Site: [facility], [address]    Attending: Shoaib Boyce Jr., MD   Residents: Terrence Quinones MD  Pre-op Diagnosis: MDD recurrent in partial remission   ECT Treatment Number: 40  Machine Type: Mecta 5000    Patient Status: Medically stable    Vitals (pre-procedure):  Vitals:    18 0631   BP: 106/71   Pulse: 75   Resp: 18   Temp: 97.3 °F (36.3 °C)       Electrode Placement: Bitemporal    Stimulus Number Charge (mC) Level Pulse Width (msec) Frequency  (Hz) Duration of Stimulus (sec) Current (mA) Duration of Seizure (sec)   1 576 3 1 60 6 800 72                                   Complications: Hypertension    Maximum Blood Pressure: 164/93    Medications Given:  Caffeine 500mg mg  IV  Before  Methohexital (Brevital).   200mg  IV  Before  Succinylcholine (Anectine).   120mg  IV  Before  Ondansetron (Zofran).   4mg  IV  Before  Esmolol (Brevibloc).   30mg  IV  During  Toradol 30mg IV during    Treatment Course:  Patient tolerated procedure well. After adequate recovery from general anesthesia, the patient was transported to recovery.    Post-op Diagnosis: Same as above    Recommended for next ECT:  No change - 2 weeks      Terrence Quinones MD  4/3/2018

## 2018-04-03 NOTE — ANESTHESIA PROCEDURE NOTES
ECT    Treatment Number: 40  Procedure start time: 4/3/2018 8:02 AM  Timeout performed at: 4/3/2018 8:00 AM  Procedure end time: 4/3/2018 8:47 AM    Staffing  Anesthesiologist: SALVADOR GALLEGOS  CRNA/Resident: CHIOMA EVERETT  Performed by: resident/CRNA     Preanesthetic Checklist  The following were completed as part of the preanesthetic checklist: patient identified, procedural consent, pre-op evaluation, timeout performed, risks and benefits discussed, monitors and equipment checked, anesthesia consent given, oxygen available, suction available, hand hygiene performed and patient being monitored.    Setup & Induction  Patient Monitoring: heart rate, cardiac monitor, continuous pulse ox, continuous capnometry and NIBP  Patient preparation: bite block inserted, extremities padded, mandibular stabilization and patient hyperventilated  Electrode Placement: Bitemporal    The patient was moved to the ECT therapy room after being assessed and consented for ECT. After standard ASA monitors were applied and timeout performed, the patient was adequately preoxygenated. After induction of general anesthesia, adequate oxygenation and ventilation were confirmed with pulse oxymetry and end tidal CO2 monitoring via bag-mask ventilation. End tidal CO2 was monitored throughout the case and moderate hyperventilation was performed prior to beginning ECT treatment. All extremities were padded, biteblock was inserted, and mandibular stabilization was done prior to initiating ECT therapy.    Procedure  Stimulus Number 1:             Recovery  After adequate recovery from general anesthesia, the patient was transported to recovery.  Baseline Blood Pressure: 106/67  Peak Blood Pressure: 164/93  Time to Recovery of Respirations: 5 minutes    ECT Findings  ECT associated findings of: Moderate Hypertension (SBP >160 or DBP >100)

## 2018-04-03 NOTE — DISCHARGE SUMMARY
Allyssa Wright  : 1980   MRN: [id number]  Date: 4/3/2018     Electroconvulsive Therapy  Discharge Summary    Admit Date: 4/3/2018  5:49 AM  Discharge Date: 2018    Attending Physician: Shoaib Boyce Jr., MD   Discharge Provider: Terrence Quinones MD    History of Present Illness: Allyssa Wright is a 38 y.o. female with MDD recurrent in partial remission presented for ECT #40. See H&P dated 2018 for full HPI. For further details, see Dr. Boyce's pre-ECT evaluation.    Hospital Course: The patient tolerated the ECT treatment well without complication. Patient was stable post-procedure. See OP note dated 2018 for more details.     Disposition: Home or Self Care    Medications:  Current Discharge Medication List      CONTINUE these medications which have NOT CHANGED    Details   clozapine (CLOZARIL) 50 MG tablet Take 100 mg by mouth once daily.       dapsone 7.5 % GlwP Apply topically once daily.      famciclovir (FAMVIR) 500 MG tablet Take 1 tablet (500 mg total) by mouth 2 (two) times daily.  Qty: 30 tablet, Refills: 12    Associated Diagnoses: Major depressive disorder, recurrent episode, moderate degree      hydrOXYzine pamoate (VISTARIL) 25 MG Cap Take 2 capsules (50 mg total) by mouth every 8 (eight) hours as needed (anxiety).  Qty: 30 capsule, Refills: 0      mirtazapine (REMERON) 15 MG tablet Take 1 tablet (15 mg total) by mouth every evening.  Qty: 90 tablet, Refills: 0      spironolactone (ALDACTONE) 50 MG tablet 50 mg 2 (two) times daily. 50  mg qAM, 50 mg qHS.      thyroid (ARMOUR THYROID) 30 mg Tab Take 1 tablet (30 mg total) by mouth every morning.  Qty: 30 tablet, Refills: 11    Associated Diagnoses: Major depressive disorder, recurrent episode, moderate degree      trazodone (DESYREL) 100 MG tablet Take 200 mg by mouth every evening.       UNABLE TO FIND 2 (two) times daily. n-azetyl-cysteine      venlafaxine (EFFEXOR) 100 MG Tab Take 225 mg by mouth once daily.      Associated Diagnoses: MDD (major depressive disorder), recurrent, in partial remission      dextroamphetamine-amphetamine 30 mg Tab Take 30 mg by mouth 2 (two) times daily.     Associated Diagnoses: MDD (major depressive disorder), recurrent, in partial remission      ZOVIRAX 5 % Crea Refills: 5           Status at Discharge: Alert and medically stable    Discharge Diagnoses: MDD recurrent in partial remission  Diet: Resume previous outpatient diet  Activity: Ambulate with assistance  Instructions: Please do not eat or drink anything after midnight prior to procedure. Please do not drive on day of ECT.    Med Changes:  None    Next ECT: 4/17/2018    Terrence Quinones MD  4/3/2018

## 2018-04-04 DIAGNOSIS — F33.9 RECURRENT MAJOR DEPRESSIVE DISORDER, REMISSION STATUS UNSPECIFIED: Primary | ICD-10-CM

## 2018-04-04 NOTE — ANESTHESIA POSTPROCEDURE EVALUATION
"Anesthesia Post Evaluation    Patient: Allyssa Wright    Procedure(s) Performed: Procedure(s) (LRB):  ELECTROCONVULSIVE THERAPY (ECT) - SINGLE SEIZURE (N/A)    Final Anesthesia Type: general  Patient location during evaluation: PACU  Patient participation: Yes- Able to Participate  Level of consciousness: awake and alert  Post-procedure vital signs: reviewed and stable  Pain management: adequate  Airway patency: patent  PONV status at discharge: No PONV  Anesthetic complications: no      Cardiovascular status: blood pressure returned to baseline  Respiratory status: unassisted  Hydration status: euvolemic  Follow-up not needed.        Visit Vitals  BP (!) 133/55   Pulse 95   Temp 37.1 °C (98.8 °F) (Temporal)   Resp 18   Ht 5' 5" (1.651 m)   Wt 70.3 kg (155 lb)   LMP  (LMP Unknown)   SpO2 100%   Breastfeeding? No   BMI 25.79 kg/m²       Pain/Roma Score: Pain Assessment Performed: Yes (4/3/2018  8:40 AM)  Presence of Pain: denies (4/3/2018  8:40 AM)  Roma Score: 10 (4/3/2018  8:40 AM)  Modified Roma Score: 19 (4/3/2018  8:40 AM)      "

## 2018-04-10 ENCOUNTER — TELEPHONE (OUTPATIENT)
Dept: PSYCHIATRY | Facility: CLINIC | Age: 38
End: 2018-04-10

## 2018-04-10 DIAGNOSIS — F33.9 RECURRENT MAJOR DEPRESSIVE DISORDER, REMISSION STATUS UNSPECIFIED: Primary | ICD-10-CM

## 2018-04-10 NOTE — TELEPHONE ENCOUNTER
----- Message from Yaima Dey RN sent at 4/10/2018 11:15 AM CDT -----  Contact: Father [phone]  Patient's father called. Patient is feeling depressed. She would prefer not to come in on Friday because she feels like she has a rapport with you and you know her history.     She has ECT scheduled for Tuesday but would like to talk to you before then if possible.    Thank you

## 2018-04-10 NOTE — TELEPHONE ENCOUNTER
Spoke with Pt and her father.  She is concerned that depression seems to have returned rapidly after her last session, even though she did well after the previous two treatments.     She was encouraged to keep appt on Friday.  In the meantime, she promises to exercise daily, decrease use of Vistaril, and stop eating sugar-free snacks which may be interacting with meds.     She will also keep appt on the books for Tuesday in case she wants to come both days.

## 2018-04-10 NOTE — TELEPHONE ENCOUNTER
"----- Message from Yaima Dey RN sent at 4/9/2018 10:27 AM CDT -----  Contact: Father  Patient's father called. He reports Allyssa feels she has "relapsed."     She has an ECT scheduled for 04/17/2018.    She is asking if you recommend she get an ECT this week as well?    Please advise.   "

## 2018-04-12 ENCOUNTER — ANESTHESIA EVENT (OUTPATIENT)
Dept: ELECTROPHYSIOLOGY | Facility: HOSPITAL | Age: 38
End: 2018-04-12
Payer: COMMERCIAL

## 2018-04-12 NOTE — ANESTHESIA PREPROCEDURE EVALUATION
04/12/2018  Pre-operative evaluation for Procedure(s) (LRB):  ELECTROCONVULSIVE THERAPY (ECT) - SINGLE SEIZURE (N/A)    Allyssa Wright is a 38 y.o. female with MDD who is presenting with the above procedure.     ECT #41    Previous medications  Methohexital 200 mg   Succinylcholine 120 mg  Zofran & Toradol  Combinations of Labetalol 25 mg & Esmolol 30 mg    Patient Active Problem List   Diagnosis    MDD (major depressive disorder), recurrent, in partial remission    Major depressive disorder, recurrent, in partial remission    MDD (major depressive disorder), severe    Major depressive disorder, recurrent    Other acne    Major depression, recurrent, chronic    Recurrent major depression in partial remission    Major depressive disorder, recurrent severe without psychotic features    MDD (major depressive disorder), recurrent episode, severe    MDD (major depressive disorder)    Major depressive disorder, recurrent episode, in partial remission    Schizophrenia       Review of patient's allergies indicates:   Allergen Reactions    Benzodiazepines Other (See Comments)     Contraindicated while taking Clozapine    Ampicillin      Mom says so    Erythromycin     Levaquin [levofloxacin] Other (See Comments)     Depression side effects    Penicillins      Mom says so    Pristiq [desvenlafaxine]      psycotic      Sulfa (sulfonamide antibiotics)      Rash      Azithromycin Anxiety       Current Outpatient Prescriptions on File Prior to Visit   Medication Sig Dispense Refill    clozapine (CLOZARIL) 50 MG tablet Take 100 mg by mouth once daily.       dapsone 7.5 % GlwP Apply topically once daily.      dextroamphetamine-amphetamine 30 mg Tab Take 30 mg by mouth 2 (two) times daily.       famciclovir (FAMVIR) 500 MG tablet Take 1 tablet (500 mg total) by mouth 2 (two) times daily. 30 tablet 12     hydrOXYzine pamoate (VISTARIL) 50 MG Cap Take 1 capsule (50 mg total) by mouth nightly as needed (anxiety). 30 capsule 0    mirtazapine (REMERON) 15 MG tablet Take 1 tablet (15 mg total) by mouth every evening. (Patient taking differently: Take 7.5 mg by mouth every evening. ) 90 tablet 0    spironolactone (ALDACTONE) 50 MG tablet 50 mg 2 (two) times daily. 50  mg qAM, 50 mg qHS.      thyroid (ARMOUR THYROID) 30 mg Tab Take 1 tablet (30 mg total) by mouth every morning. 30 tablet 11    trazodone (DESYREL) 100 MG tablet Take 200 mg by mouth every evening.       UNABLE TO FIND 2 (two) times daily. n-azetyl-cysteine      venlafaxine (EFFEXOR) 100 MG Tab Take 225 mg by mouth once daily.       ZOVIRAX 5 % Crea   5     No current facility-administered medications on file prior to visit.        Past Surgical History:   Procedure Laterality Date    ANKLE SURGERY Right     BREAST augmentation      OVARIAN CYST REMOVAL Bilateral        Social History     Social History    Marital status: Single     Spouse name: N/A    Number of children: N/A    Years of education: N/A     Occupational History    unemployed      Social History Main Topics    Smoking status: Former Smoker     Quit date: 4/6/2011    Smokeless tobacco: Never Used    Alcohol use Yes      Comment: rarely    Drug use: No      Comment: Experimental use in high school    Sexual activity: Not on file     Other Topics Concern    Not on file     Social History Narrative    Pt has 1 older brother from an intact family until the death of her mother in 2012.  She completed 1 year of college, was never in the , and has never been employed.  She was engaged once, but never , has no children, and lives with her father plus 2 dogs.  She denies any hobbies and is spiritual but not Mu-ism.  She does date and returns to college during rare periods when depression and anxiety [place].     EKG:  Vent. Rate : 084 BPM     Atrial Rate : 084  BPM     P-R Int : 154 ms          QRS Dur : 086 ms      QT Int : 378 ms       P-R-T Axes : 067 066 055 degrees     QTc Int : 446 ms    Normal sinus rhythm  Normal ECG  When compared with ECG of 03-DEC-2015 11:38,  Questionable change in The axis  T wave inversion no longer evident in Inferior leads  Confirmed by Flaco Sr MD ([id number]) on 6/9/2016 1:30:13 PM    Referred By: SELF REFERRAL           Confirmed By:Flaco Sr MD    Anesthesia Evaluation    I have reviewed the Patient Summary Reports.     I have reviewed the Medications.     Review of Systems  Anesthesia Hx:  No problems with previous Anesthesia  History of prior surgery of interest to airway management or planning: Previous anesthesia: General  Procedure performed at an [facility]. Airway issues documented on chart review include mask, easy  Denies Family Hx of Anesthesia complications.   Denies Personal Hx of Anesthesia complications.   Social:  Former Smoker    Hematology/Oncology:  Hematology Normal   Oncology Normal     EENT/Dental:EENT/Dental Normal   Cardiovascular:   Denies Hypertension.  Denies MI.   Denies Dysrhythmias.   Denies Angina. ECG has been reviewed.    Pulmonary:  Pulmonary Normal    Renal/:  Renal/ Normal     Hepatic/GI:  Hepatic/GI Normal    Musculoskeletal:  Musculoskeletal Normal    Neurological:  Neurology Normal    Endocrine:   Hypothyroidism    Psych:   Psychiatric History          Physical Exam  General:  Well nourished    Airway/Jaw/Neck:  Airway Findings: Mouth Opening: Normal Tongue: Normal  General Airway Assessment: Adult  Mallampati: II  Improves to I with phonation.  TM Distance: Normal, at least 6 cm  Jaw/Neck Findings:  Neck ROM: Normal ROM     Eyes/Ears/Nose:  EYES/EARS/NOSE FINDINGS: Normal   Dental:  Dental Findings: In tact   Chest/Lungs:  Chest/Lungs Findings: Normal Respiratory Rate     Heart/Vascular:  Heart Findings: Rate: Normal        Mental Status:  Mental Status Findings:  Cooperative, Alert and  Oriented         Anesthesia Plan  Type of Anesthesia, risks & benefits discussed:  Anesthesia Type:  general  Patient's Preference:   Intra-op Monitoring Plan: standard ASA monitors  Intra-op Monitoring Plan Comments:   Post Op Pain Control Plan:   Post Op Pain Control Plan Comments:   Induction:   IV  Beta Blocker:  Patient is not currently on a Beta-Blocker (No further documentation required).       Informed Consent: Patient understands risks and agrees with Anesthesia plan.  Questions answered. Anesthesia consent signed with patient.  ASA Score: 2     Day of Surgery Review of History & Physical:    H&P update referred to the surgeon.         Ready For Surgery From Anesthesia Perspective.

## 2018-04-13 ENCOUNTER — ANESTHESIA (OUTPATIENT)
Dept: ELECTROPHYSIOLOGY | Facility: HOSPITAL | Age: 38
End: 2018-04-13
Payer: COMMERCIAL

## 2018-04-13 ENCOUNTER — SURGERY (OUTPATIENT)
Age: 38
End: 2018-04-13

## 2018-04-13 ENCOUNTER — HOSPITAL ENCOUNTER (OUTPATIENT)
Facility: HOSPITAL | Age: 38
Discharge: HOME | End: 2018-04-13
Attending: PSYCHIATRY & NEUROLOGY | Admitting: PSYCHIATRY & NEUROLOGY
Payer: COMMERCIAL

## 2018-04-13 VITALS
HEART RATE: 87 BPM | RESPIRATION RATE: 18 BRPM | WEIGHT: 155 LBS | BODY MASS INDEX: 25.83 KG/M2 | HEIGHT: 65 IN | DIASTOLIC BLOOD PRESSURE: 74 MMHG | OXYGEN SATURATION: 100 % | TEMPERATURE: 98 F | SYSTOLIC BLOOD PRESSURE: 130 MMHG

## 2018-04-13 DIAGNOSIS — F33.1 MDD (MAJOR DEPRESSIVE DISORDER), RECURRENT EPISODE, MODERATE: ICD-10-CM

## 2018-04-13 DIAGNOSIS — F32.9 MDD (MAJOR DEPRESSIVE DISORDER): ICD-10-CM

## 2018-04-13 LAB
B-HCG UR QL: NEGATIVE
CTP QC/QA: YES

## 2018-04-13 PROCEDURE — 37000009 HC ANESTHESIA EA ADD 15 MINS: Performed by: PSYCHIATRY & NEUROLOGY

## 2018-04-13 PROCEDURE — 81025 URINE PREGNANCY TEST: CPT | Performed by: PSYCHIATRY & NEUROLOGY

## 2018-04-13 PROCEDURE — 37000008 HC ANESTHESIA 1ST 15 MINUTES: Performed by: PSYCHIATRY & NEUROLOGY

## 2018-04-13 PROCEDURE — 25000003 PHARM REV CODE 250: Performed by: ANESTHESIOLOGY

## 2018-04-13 PROCEDURE — 25000003 PHARM REV CODE 250: Performed by: PSYCHIATRY & NEUROLOGY

## 2018-04-13 PROCEDURE — 90870 ELECTROCONVULSIVE THERAPY: CPT | Mod: ,,, | Performed by: PSYCHIATRY & NEUROLOGY

## 2018-04-13 PROCEDURE — 63600175 PHARM REV CODE 636 W HCPCS: Performed by: ANESTHESIOLOGY

## 2018-04-13 PROCEDURE — 90870 ELECTROCONVULSIVE THERAPY: CPT | Performed by: ANESTHESIOLOGY

## 2018-04-13 PROCEDURE — 71000044 HC DOSC ROUTINE RECOVERY FIRST HOUR: Performed by: PSYCHIATRY & NEUROLOGY

## 2018-04-13 PROCEDURE — D9220A PRA ANESTHESIA: Mod: ,,, | Performed by: ANESTHESIOLOGY

## 2018-04-13 RX ORDER — SODIUM CHLORIDE 0.9 % (FLUSH) 0.9 %
3 SYRINGE (ML) INJECTION
Status: DISCONTINUED | OUTPATIENT
Start: 2018-04-13 | End: 2018-04-13 | Stop reason: HOSPADM

## 2018-04-13 RX ORDER — SUCCINYLCHOLINE CHLORIDE 20 MG/ML
INJECTION INTRAMUSCULAR; INTRAVENOUS
Status: DISCONTINUED | OUTPATIENT
Start: 2018-04-13 | End: 2018-04-13

## 2018-04-13 RX ORDER — KETOROLAC TROMETHAMINE 30 MG/ML
INJECTION, SOLUTION INTRAMUSCULAR; INTRAVENOUS
Status: DISCONTINUED | OUTPATIENT
Start: 2018-04-13 | End: 2018-04-13

## 2018-04-13 RX ORDER — ONDANSETRON 2 MG/ML
INJECTION INTRAMUSCULAR; INTRAVENOUS
Status: COMPLETED
Start: 2018-04-13 | End: 2018-04-13

## 2018-04-13 RX ORDER — ESMOLOL HYDROCHLORIDE 10 MG/ML
INJECTION INTRAVENOUS
Status: DISCONTINUED
Start: 2018-04-13 | End: 2018-04-13 | Stop reason: HOSPADM

## 2018-04-13 RX ORDER — LABETALOL HYDROCHLORIDE 5 MG/ML
INJECTION, SOLUTION INTRAVENOUS
Status: DISCONTINUED
Start: 2018-04-13 | End: 2018-04-13 | Stop reason: HOSPADM

## 2018-04-13 RX ORDER — SUCCINYLCHOLINE CHLORIDE 20 MG/ML
INJECTION INTRAMUSCULAR; INTRAVENOUS
Status: COMPLETED
Start: 2018-04-13 | End: 2018-04-13

## 2018-04-13 RX ORDER — SODIUM CHLORIDE 9 MG/ML
INJECTION, SOLUTION INTRAVENOUS CONTINUOUS
Status: DISCONTINUED | OUTPATIENT
Start: 2018-04-14 | End: 2018-04-13 | Stop reason: HOSPADM

## 2018-04-13 RX ORDER — ONDANSETRON 2 MG/ML
INJECTION INTRAMUSCULAR; INTRAVENOUS
Status: DISCONTINUED | OUTPATIENT
Start: 2018-04-13 | End: 2018-04-13

## 2018-04-13 RX ORDER — KETOROLAC TROMETHAMINE 30 MG/ML
INJECTION, SOLUTION INTRAMUSCULAR; INTRAVENOUS
Status: COMPLETED
Start: 2018-04-13 | End: 2018-04-13

## 2018-04-13 RX ADMIN — SODIUM CHLORIDE: 0.9 INJECTION, SOLUTION INTRAVENOUS at 07:04

## 2018-04-13 RX ADMIN — SODIUM CHLORIDE: 0.9 INJECTION, SOLUTION INTRAVENOUS at 06:04

## 2018-04-13 RX ADMIN — METHOHEXITAL SODIUM 200 MG: 500 INJECTION, POWDER, LYOPHILIZED, FOR SOLUTION INTRAMUSCULAR; INTRAVENOUS; RECTAL at 08:04

## 2018-04-13 RX ADMIN — SUCCINYLCHOLINE CHLORIDE 120 MG: 20 INJECTION, SOLUTION INTRAMUSCULAR; INTRAVENOUS at 08:04

## 2018-04-13 RX ADMIN — ONDANSETRON 4 MG: 2 INJECTION, SOLUTION INTRAMUSCULAR; INTRAVENOUS at 07:04

## 2018-04-13 RX ADMIN — KETOROLAC TROMETHAMINE 30 MG: 30 INJECTION, SOLUTION INTRAMUSCULAR at 07:04

## 2018-04-13 RX ADMIN — Medication 500 MG: at 06:04

## 2018-04-13 NOTE — TRANSFER OF CARE
"Anesthesia Transfer of Care Note    Patient: Allyssa Wright    Procedure(s) Performed: Procedure(s) (LRB):  ELECTROCONVULSIVE THERAPY (ECT) - SINGLE SEIZURE (N/A)    Patient location: [facility]    Anesthesia Type: general    Transport from OR: Transported from OR on room air with adequate spontaneous ventilation    Post pain: adequate analgesia    Post assessment: no apparent anesthetic complications and tolerated procedure well    Post vital signs: stable    Level of consciousness: awake, oriented and alert    Nausea/Vomiting: no nausea/vomiting    Complications: none    Transfer of care protocol was followed      Last vitals:   Visit Vitals  /68 (BP Location: Right arm, Patient Position: Lying)   Pulse 87   Temp 36.6 °C (97.9 °F) (Oral)   Resp 18   Ht 5' 5" (1.651 m)   Wt 70.3 kg (155 lb)   SpO2 100%   Breastfeeding? No   BMI 25.79 kg/m²     "

## 2018-04-13 NOTE — H&P
Allyssa Wright  : 1980   MRN: [id number]  Date: 2018     Electroconvulsive Therapy  History & Physical    Chief complaint: MDD (major depressive disorder), recurrent episode, moderate   Procedure: ECT #41    SUBJECTIVE:     HPI:   Allyssa Wright is a 38 y.o. female with MDD (major depressive disorder), recurrent episode, moderatewho presents for ECT.     Pt reports she has become depressed since last treatment. This has never happened following ECT. Has been lacking motivation, having trouble tending to ADL's. No other complaints. Denies SI/HI/AVH.    The patient denies any changes in dentition.   The patient does not need any prescriptions and has not had any med changes since meeting with Dr. Boyce.   The patient has not had anything by mouth since midnight and is ready for ECT.    Psychiatric Review of Systems:  Mood: Severe  Appetite: Moderate  Psychomotor: No problem  Cognitive Impairment: None  Insomnia: None  Psychosis: None  Diurnal Variation: None  Suicidal Ideation: Absent    Medical Review Of Systems:  A comprehensive review of systems was negative.    Current Medications:   No current facility-administered medications on file prior to encounter.      Current Outpatient Prescriptions on File Prior to Encounter   Medication Sig Dispense Refill    clozapine (CLOZARIL) 50 MG tablet Take 100 mg by mouth once daily.       dapsone 7.5 % GlwP Apply topically once daily.      famciclovir (FAMVIR) 500 MG tablet Take 1 tablet (500 mg total) by mouth 2 (two) times daily. 30 tablet 12    mirtazapine (REMERON) 15 MG tablet Take 1 tablet (15 mg total) by mouth every evening. (Patient taking differently: Take 7.5 mg by mouth every evening. ) 90 tablet 0    spironolactone (ALDACTONE) 50 MG tablet 50 mg 2 (two) times daily. 50  mg qAM, 50 mg qHS.      thyroid (ARMOUR THYROID) 30 mg Tab Take 1 tablet (30 mg total) by mouth every morning. 30 tablet 11    trazodone (DESYREL) 100 MG tablet Take 200 mg by  mouth every evening.       venlafaxine (EFFEXOR) 100 MG Tab Take 225 mg by mouth once daily.       dextroamphetamine-amphetamine 30 mg Tab Take 30 mg by mouth 2 (two) times daily.       hydrOXYzine pamoate (VISTARIL) 50 MG Cap Take 1 capsule (50 mg total) by mouth nightly as needed (anxiety). 30 capsule 0    UNABLE TO FIND 2 (two) times daily. n-azetyl-cysteine      ZOVIRAX 5 % Crea   5      Allergies:   Benzodiazepines; Ampicillin; Erythromycin; Levaquin [levofloxacin]; Penicillins; Pristiq [desvenlafaxine]; Sulfa (sulfonamide antibiotics); and Azithromycin    Past Medical/Surgical History:   Past Medical History:   Diagnosis Date    Anxiety     Depression     History of psychiatric hospitalization     HSV-1 (herpes simplex virus 1) infection     Hx of psychiatric care     Moderate depressed bipolar II disorder 06/13/2016    reports no history of bipolar    Obsessive-compulsive disorder     Psychiatric problem     Schizophrenia 4/3/2018    Self-harming behavior     Therapy      Past Surgical History:   Procedure Laterality Date    ANKLE SURGERY Right     BREAST augmentation      OVARIAN CYST REMOVAL Bilateral       OBJECTIVE:     Vitals (pre-procedure):  Vitals:    04/13/18 0841   BP: 130/74   Pulse: 87   Resp: 18   Temp: 97.9 °F (36.6 °C)        Labs/Imaging/Studies:   No results found for this or any previous visit (from the past 48 hour(s)).   No results found for: PHENYTOIN, PHENOBARB, VALPROATE, CBMZ    Physical Exam:   Gen: AAOx4, NAD  HEENT: MMM, PERRL, EOMI, O/P clear  CV: RRR, S1/S2 nml, no M/R/G  Chest: CTAB, no R/R/W, unlabored breathing  Abd: S/NT/ND, +BS, no HSM  Ext: No C/C/E, pulse 2+ throughout  Neuro: CN II-XII grossly intact, no focal deficits    Mental Status Exam:   Appearance: unremarkable, age appropriate  Behavior: normal, cooperative  Speech: normal tone, normal rate, normal pitch, normal volume  Mood: depressed  Affect: Normal   Thought Process: normal and  logical  Thought Content: normal, no suicidality, no homicidality, delusions, or paranoia  Cognition: grossly intact  Insight: good  Judgment: good    ASSESSMENT/PLAN:     Allyssa Wright is a 38 y.o. female with MDD (major depressive disorder), recurrent episode, moderate who presents for ECT.    Recommendations:   Proceed with ECT #41.      Ruel Benson MD  4/20/2018

## 2018-04-13 NOTE — ANESTHESIA POSTPROCEDURE EVALUATION
"Anesthesia Post Evaluation    Patient: Allyssa Wright    Procedure(s) Performed: Procedure(s) (LRB):  ELECTROCONVULSIVE THERAPY (ECT) - SINGLE SEIZURE (N/A)    Final Anesthesia Type: general  Patient location during evaluation: PACU  Patient participation: Yes- Able to Participate  Level of consciousness: awake and alert  Post-procedure vital signs: reviewed and stable  Pain management: adequate  Airway patency: patent  PONV status at discharge: No PONV  Anesthetic complications: no      Cardiovascular status: blood pressure returned to baseline  Respiratory status: unassisted, spontaneous ventilation and room air  Hydration status: euvolemic  Follow-up not needed.        Visit Vitals  /74 (BP Location: Right arm, Patient Position: Lying)   Pulse 87   Temp 36.6 °C (97.9 °F) (Temporal)   Resp 18   Ht 5' 5" (1.651 m)   Wt 70.3 kg (155 lb)   SpO2 100%   Breastfeeding? No   BMI 25.79 kg/m²       Pain/Roma Score: Pain Assessment Performed: Yes (4/13/2018  8:45 AM)  Presence of Pain: denies (4/13/2018  8:45 AM)  Roma Score: 10 (4/13/2018  8:45 AM)  Modified Roma Score: 20 (4/13/2018  6:49 AM)      "

## 2018-04-13 NOTE — OP NOTE
Allyssa Wright   : 1980   MRN: [id number]  Date: 2018    Electroconvulsive Therapy  Operative Note    Date of Admission: 2018  5:51 AM    Site: [facility], [address]    Attending: Ruel Benson MD  Residents: Ruel Benson MD  Pre-op Diagnosis: MDD (major depressive disorder), recurrent episode, moderate   ECT Treatment Number: 41  Machine Type: Wink 5000    Patient Status: Medically stable    Vitals (pre-procedure):  Vitals:    18 0841   BP: 130/74   Pulse: 87   Resp: 18   Temp: 97.9 °F (36.6 °C)       Electrode Placement: Bitemporal    Stimulus Number Charge (mC) Level Pulse Width (msec) Frequency  (Hz) Duration of Stimulus (sec) Current (mA) Duration of Seizure (sec)   1 576 3 1 60 6 800 68                                   Complications: None    Maximum Blood Pressure: 129/75    Medications Given:  Caffeine 500 mg  IV  Before  Methohexital (Brevital).   200mg  IV  Before  Succinylcholine (Anectine).   120mg  IV  Before  Ondansetron (Zofran).   4mg  IV  Before  Toradol 30mg IV before    Treatment Course:  Patient tolerated procedure well. After adequate recovery from general anesthesia, the patient was transported to recovery.    Post-op Diagnosis: Same as above    Recommended for next ECT:  No change      Ruel Benson MD  2018      STAFF NOTE:  I agree with above procedure note as stated.  Pt tolerated procedure well overall.

## 2018-04-13 NOTE — ADDENDUM NOTE
Addendum  created 04/13/18 0944 by Karen Pierre MD    Anesthesia Intra Blocks edited, Child order released for a procedure order, Sign clinical note

## 2018-04-13 NOTE — DISCHARGE SUMMARY
Allyssa Wright  : 1980   MRN: [id number]  Date: 2018     Electroconvulsive Therapy  Discharge Summary    Admit Date: 2018  5:51 AM  Discharge Date: 2018    Attending Physician: Ruel Benson MD  Discharge Provider: Terrence Quinones MD    History of Present Illness: Allyssa Wright is a 38 y.o. female with MDD presented for ECT #41. See H&P dated 2018 for full HPI. For further details, see Dr. Boyce's pre-ECT evaluation.    Hospital Course: The patient tolerated the ECT treatment well without complication. Patient was stable post-procedure. See OP note dated 2018 for more details.     Disposition: Home or Self Care    Medications:  Current Discharge Medication List      CONTINUE these medications which have NOT CHANGED    Details   clozapine (CLOZARIL) 50 MG tablet Take 100 mg by mouth once daily.       dapsone 7.5 % GlwP Apply topically once daily.      famciclovir (FAMVIR) 500 MG tablet Take 1 tablet (500 mg total) by mouth 2 (two) times daily.  Qty: 30 tablet, Refills: 12    Associated Diagnoses: Major depressive disorder, recurrent episode, moderate degree      mirtazapine (REMERON) 15 MG tablet Take 1 tablet (15 mg total) by mouth every evening.  Qty: 90 tablet, Refills: 0      spironolactone (ALDACTONE) 50 MG tablet 50 mg 2 (two) times daily. 50  mg qAM, 50 mg qHS.      thyroid (ARMOUR THYROID) 30 mg Tab Take 1 tablet (30 mg total) by mouth every morning.  Qty: 30 tablet, Refills: 11    Associated Diagnoses: Major depressive disorder, recurrent episode, moderate degree      trazodone (DESYREL) 100 MG tablet Take 200 mg by mouth every evening.       venlafaxine (EFFEXOR) 100 MG Tab Take 225 mg by mouth once daily.     Associated Diagnoses: MDD (major depressive disorder), recurrent, in partial remission      vortioxetine (TRINTELLIX) 5 mg Tab Take 2.5 mg by mouth nightly.      dextroamphetamine-amphetamine 30 mg Tab Take 30 mg by mouth 2 (two) times daily.     Associated  Diagnoses: MDD (major depressive disorder), recurrent, in partial remission      hydrOXYzine pamoate (VISTARIL) 50 MG Cap Take 1 capsule (50 mg total) by mouth nightly as needed (anxiety).  Qty: 30 capsule, Refills: 0      UNABLE TO FIND 2 (two) times daily. n-azetyl-cysteine      ZOVIRAX 5 % Crea Refills: 5           Status at Discharge: Alert and medically stable    Discharge Diagnoses:   Major Depressive Disorder, Recurrent, Moderate     Diet: Resume previous outpatient diet  Activity: Ambulate with assistance  Instructions: Please do not eat or drink anything after midnight prior to procedure. Please do not drive on day of ECT.    Med Changes:  None    Next ECT:  Tuesday, April 17 2018    Terrence Quinones MD  4/13/2018        STAFF NOTE:  I agree with above Discharge Summary as noted by Dr. Quinones.      IMP:  Major Depressive Disorder, Recurrent, Moderate    REC:  Patient is to follow up with next ECT to be scheduled 4/17/18 as noted.    Pt or family members were urged to call our ECT coordinator prn in meantime for any questions or concerns.      Ruel Benson MD  Psychiatry Staff   [facility]

## 2018-04-13 NOTE — ANESTHESIA RELEASE NOTE
"Anesthesia Release from PACU Note    Patient: Allyssa Wright    Procedure(s) Performed: Procedure(s) (LRB):  ELECTROCONVULSIVE THERAPY (ECT) - SINGLE SEIZURE (N/A)    Anesthesia type: general    Post pain: Adequate analgesia    Post assessment: no apparent anesthetic complications, tolerated procedure well and no evidence of recall    Last Vitals:   Visit Vitals  /74 (BP Location: Right arm, Patient Position: Lying)   Pulse 87   Temp 36.6 °C (97.9 °F) (Temporal)   Resp 18   Ht 5' 5" (1.651 m)   Wt 70.3 kg (155 lb)   SpO2 100%   Breastfeeding? No   BMI 25.79 kg/m²       Post vital signs: stable    Level of consciousness: awake, alert  and oriented    Nausea/Vomiting: no nausea/no vomiting    Complications: none    Airway Patency: patent    Respiratory: unassisted    Cardiovascular: stable and blood pressure at baseline    Hydration: euvolemic  "
oral

## 2018-04-13 NOTE — ANESTHESIA PROCEDURE NOTES
ECT    Treatment Number: 41  Procedure start time: 4/13/2018 8:05 AM  Timeout performed at: 4/13/2018 8:00 AM  Procedure end time: 4/13/2018 8:05 AM    Staffing  Anesthesiologist: CHARLEY ODOM JR  CRNA/Resident: MP JUARES  Performed by: resident/CRNA     Preanesthetic Checklist  The following were completed as part of the preanesthetic checklist: patient identified, procedural consent, pre-op evaluation, timeout performed, risks and benefits discussed, monitors and equipment checked, anesthesia consent given, oxygen available, suction available, hand hygiene performed and patient being monitored.    Setup & Induction  Patient Monitoring: heart rate, continuous pulse ox, continuous capnometry and NIBP  Patient preparation: bite block inserted, extremities padded, mandibular stabilization and patient hyperventilated  Electrode Placement: Bitemporal    The patient was moved to the ECT therapy room after being assessed and consented for ECT. After standard ASA monitors were applied and timeout performed, the patient was adequately preoxygenated. After induction of general anesthesia, adequate oxygenation and ventilation were confirmed with pulse oxymetry and end tidal CO2 monitoring via bag-mask ventilation. End tidal CO2 was monitored throughout the case and moderate hyperventilation was performed prior to beginning ECT treatment. All extremities were padded, biteblock was inserted, and mandibular stabilization was done prior to initiating ECT therapy.    Procedure  Stimulus Number 1: Setting Number = III; 576 millicoulombs; Seizure Duration = 68 seconds            Recovery  After adequate recovery from general anesthesia, the patient was transported to recovery.  Baseline Blood Pressure: 109/68  Peak Blood Pressure: 130/82  Time to Recovery of Respirations: 3 minutes    ECT Findings  ECT associated findings of: None

## 2018-04-17 ENCOUNTER — ANESTHESIA EVENT (OUTPATIENT)
Dept: ELECTROPHYSIOLOGY | Facility: HOSPITAL | Age: 38
End: 2018-04-17
Payer: COMMERCIAL

## 2018-04-17 ENCOUNTER — HOSPITAL ENCOUNTER (OUTPATIENT)
Facility: HOSPITAL | Age: 38
Discharge: HOME | End: 2018-04-17
Attending: PSYCHIATRY & NEUROLOGY | Admitting: PSYCHIATRY & NEUROLOGY
Payer: COMMERCIAL

## 2018-04-17 ENCOUNTER — ANESTHESIA (OUTPATIENT)
Dept: ELECTROPHYSIOLOGY | Facility: HOSPITAL | Age: 38
End: 2018-04-17
Payer: COMMERCIAL

## 2018-04-17 VITALS
DIASTOLIC BLOOD PRESSURE: 52 MMHG | WEIGHT: 155 LBS | OXYGEN SATURATION: 100 % | HEART RATE: 92 BPM | HEIGHT: 65 IN | RESPIRATION RATE: 18 BRPM | TEMPERATURE: 99 F | SYSTOLIC BLOOD PRESSURE: 112 MMHG | BODY MASS INDEX: 25.83 KG/M2

## 2018-04-17 DIAGNOSIS — F32.A DEPRESSION: ICD-10-CM

## 2018-04-17 DIAGNOSIS — F33.9 RECURRENT MAJOR DEPRESSIVE DISORDER, REMISSION STATUS UNSPECIFIED: Primary | ICD-10-CM

## 2018-04-17 DIAGNOSIS — F33.41 MDD (MAJOR DEPRESSIVE DISORDER), RECURRENT, IN PARTIAL REMISSION: ICD-10-CM

## 2018-04-17 LAB
B-HCG UR QL: NEGATIVE
CTP QC/QA: YES

## 2018-04-17 PROCEDURE — 90870 ELECTROCONVULSIVE THERAPY: CPT | Performed by: ANESTHESIOLOGY

## 2018-04-17 PROCEDURE — 25000003 PHARM REV CODE 250: Performed by: STUDENT IN AN ORGANIZED HEALTH CARE EDUCATION/TRAINING PROGRAM

## 2018-04-17 PROCEDURE — 90870 ELECTROCONVULSIVE THERAPY: CPT | Mod: ,,, | Performed by: PSYCHIATRY & NEUROLOGY

## 2018-04-17 PROCEDURE — 71000044 HC DOSC ROUTINE RECOVERY FIRST HOUR: Performed by: PSYCHIATRY & NEUROLOGY

## 2018-04-17 PROCEDURE — 37000008 HC ANESTHESIA 1ST 15 MINUTES: Performed by: PSYCHIATRY & NEUROLOGY

## 2018-04-17 PROCEDURE — 25000003 PHARM REV CODE 250: Performed by: PSYCHIATRY & NEUROLOGY

## 2018-04-17 PROCEDURE — D9220A PRA ANESTHESIA: Mod: ,,, | Performed by: ANESTHESIOLOGY

## 2018-04-17 PROCEDURE — 63600175 PHARM REV CODE 636 W HCPCS: Performed by: STUDENT IN AN ORGANIZED HEALTH CARE EDUCATION/TRAINING PROGRAM

## 2018-04-17 PROCEDURE — 81025 URINE PREGNANCY TEST: CPT | Performed by: PSYCHIATRY & NEUROLOGY

## 2018-04-17 PROCEDURE — 37000009 HC ANESTHESIA EA ADD 15 MINS: Performed by: PSYCHIATRY & NEUROLOGY

## 2018-04-17 RX ORDER — SUCCINYLCHOLINE CHLORIDE 20 MG/ML
INJECTION INTRAMUSCULAR; INTRAVENOUS
Status: DISCONTINUED | OUTPATIENT
Start: 2018-04-17 | End: 2018-04-17

## 2018-04-17 RX ORDER — ONDANSETRON 2 MG/ML
INJECTION INTRAMUSCULAR; INTRAVENOUS
Status: COMPLETED
Start: 2018-04-17 | End: 2018-04-17

## 2018-04-17 RX ORDER — ESMOLOL HYDROCHLORIDE 10 MG/ML
INJECTION INTRAVENOUS
Status: DISCONTINUED | OUTPATIENT
Start: 2018-04-17 | End: 2018-04-17

## 2018-04-17 RX ORDER — ESMOLOL HYDROCHLORIDE 10 MG/ML
INJECTION INTRAVENOUS
Status: COMPLETED
Start: 2018-04-17 | End: 2018-04-17

## 2018-04-17 RX ORDER — SODIUM CHLORIDE 9 MG/ML
INJECTION, SOLUTION INTRAVENOUS CONTINUOUS
Status: DISCONTINUED | OUTPATIENT
Start: 2018-04-18 | End: 2018-04-17 | Stop reason: HOSPADM

## 2018-04-17 RX ORDER — SODIUM CHLORIDE 0.9 % (FLUSH) 0.9 %
3 SYRINGE (ML) INJECTION
Status: DISCONTINUED | OUTPATIENT
Start: 2018-04-17 | End: 2018-04-17 | Stop reason: HOSPADM

## 2018-04-17 RX ORDER — SUCCINYLCHOLINE CHLORIDE 20 MG/ML
INJECTION INTRAMUSCULAR; INTRAVENOUS
Status: COMPLETED
Start: 2018-04-17 | End: 2018-04-17

## 2018-04-17 RX ORDER — ONDANSETRON 2 MG/ML
INJECTION INTRAMUSCULAR; INTRAVENOUS
Status: DISCONTINUED | OUTPATIENT
Start: 2018-04-17 | End: 2018-04-17

## 2018-04-17 RX ORDER — KETOROLAC TROMETHAMINE 30 MG/ML
INJECTION, SOLUTION INTRAMUSCULAR; INTRAVENOUS
Status: COMPLETED
Start: 2018-04-17 | End: 2018-04-17

## 2018-04-17 RX ORDER — KETOROLAC TROMETHAMINE 30 MG/ML
INJECTION, SOLUTION INTRAMUSCULAR; INTRAVENOUS
Status: DISCONTINUED | OUTPATIENT
Start: 2018-04-17 | End: 2018-04-17

## 2018-04-17 RX ADMIN — ONDANSETRON 4 MG: 2 INJECTION, SOLUTION INTRAMUSCULAR; INTRAVENOUS at 08:04

## 2018-04-17 RX ADMIN — ESMOLOL HYDROCHLORIDE 30 MG: 10 INJECTION INTRAVENOUS at 08:04

## 2018-04-17 RX ADMIN — Medication 500 MG: at 08:04

## 2018-04-17 RX ADMIN — METHOHEXITAL SODIUM 200 MG: 500 INJECTION, POWDER, LYOPHILIZED, FOR SOLUTION INTRAMUSCULAR; INTRAVENOUS; RECTAL at 08:04

## 2018-04-17 RX ADMIN — SUCCINYLCHOLINE CHLORIDE 120 MG: 20 INJECTION, SOLUTION INTRAMUSCULAR; INTRAVENOUS at 08:04

## 2018-04-17 RX ADMIN — KETOROLAC TROMETHAMINE 30 MG: 30 INJECTION, SOLUTION INTRAMUSCULAR at 08:04

## 2018-04-17 NOTE — ANESTHESIA PROCEDURE NOTES
ECT    Treatment Number: 42  Procedure start time: 4/17/2018 8:30 AM  Timeout performed at: 4/17/2018 8:20 AM  Procedure end time: 4/17/2018 8:32 AM    Staffing  Anesthesiologist: CHIOMA SKAGGS  CRNA/Resident: JUAN M RODGERS  Performed by: resident/CRNA     Preanesthetic Checklist  The following were completed as part of the preanesthetic checklist: patient identified, procedural consent, pre-op evaluation, timeout performed, risks and benefits discussed, monitors and equipment checked, anesthesia consent given, oxygen available, suction available, hand hygiene performed and patient being monitored.    Setup & Induction  Patient Monitoring: heart rate, cardiac monitor, continuous pulse ox and NIBP  Patient preparation: bite block inserted and extremities padded  Electrode Placement: Bitemporal    The patient was moved to the ECT therapy room after being assessed and consented for ECT. After standard ASA monitors were applied and timeout performed, the patient was adequately preoxygenated. After induction of general anesthesia, adequate oxygenation and ventilation were confirmed with pulse oxymetry and end tidal CO2 monitoring via bag-mask ventilation. End tidal CO2 was monitored throughout the case and moderate hyperventilation was performed prior to beginning ECT treatment. All extremities were padded, biteblock was inserted, and mandibular stabilization was done prior to initiating ECT therapy.    Procedure  Stimulus Number 1: Setting Number = III; 576 millicoulombs; Seizure Duration = 64 seconds            Recovery  Baseline Blood Pressure: 113/68  Peak Blood Pressure: 199/109    ECT Findings  ECT associated findings of: Moderate Hypertension (SBP >160 or DBP >100)

## 2018-04-17 NOTE — H&P
Allyssa Wright  : 1980   MRN: [id number]  Date: 2018     Electroconvulsive Therapy  History & Physical    Chief complaint: MDD (major depressive disorder), recurrent, in partial remission   Procedure: ECT #42    SUBJECTIVE:     HPI:   Allyssa Wright is a 38 y.o. female with MDD (major depressive disorder), recurrent, in partial remissionwho presents for ECT.     Pt feeling good this morning. Has no complaints. No issues since last ECT. Kathys SI/HI/AVH. Reports mood has improved since last ECT.    The patient denies any changes in dentition.   The patient does not need any prescriptions and has not had any med changes since meeting with Dr. Boyce.   The patient has not had anything by mouth since midnight and is ready for ECT.    Psychiatric Review of Systems:  Mood: Moderate  Appetite: No problem  Psychomotor: No problem  Cognitive Impairment: None  Insomnia: None  Psychosis: None  Diurnal Variation: None  Suicidal Ideation: Absent    Medical Review Of Systems:  Pertinent items are noted in HPI.    Current Medications:   No current facility-administered medications on file prior to encounter.      Current Outpatient Prescriptions on File Prior to Encounter   Medication Sig Dispense Refill    clozapine (CLOZARIL) 50 MG tablet Take 100 mg by mouth once daily.       dapsone 7.5 % GlwP Apply topically once daily.      dextroamphetamine-amphetamine 30 mg Tab Take 30 mg by mouth 2 (two) times daily.       famciclovir (FAMVIR) 500 MG tablet Take 1 tablet (500 mg total) by mouth 2 (two) times daily. 30 tablet 12    hydrOXYzine pamoate (VISTARIL) 50 MG Cap Take 1 capsule (50 mg total) by mouth nightly as needed (anxiety). 30 capsule 0    mirtazapine (REMERON) 15 MG tablet Take 1 tablet (15 mg total) by mouth every evening. (Patient taking differently: Take 7.5 mg by mouth every evening. ) 90 tablet 0    spironolactone (ALDACTONE) 50 MG tablet 50 mg 2 (two) times daily. 50  mg qAM, 50 mg qHS.      thyroid  (ARMOUR THYROID) 30 mg Tab Take 1 tablet (30 mg total) by mouth every morning. 30 tablet 11    trazodone (DESYREL) 100 MG tablet Take 200 mg by mouth every evening.       UNABLE TO FIND 2 (two) times daily. n-azetyl-cysteine      venlafaxine (EFFEXOR) 100 MG Tab Take 225 mg by mouth once daily.       ZOVIRAX 5 % Crea   5      Allergies:   Benzodiazepines; Ampicillin; Erythromycin; Levaquin [levofloxacin]; Penicillins; Pristiq [desvenlafaxine]; Sulfa (sulfonamide antibiotics); and Azithromycin    Past Medical/Surgical History:   Past Medical History:   Diagnosis Date    Anxiety     Depression     History of psychiatric hospitalization     HSV-1 (herpes simplex virus 1) infection     Hx of psychiatric care     Moderate depressed bipolar II disorder 06/13/2016    reports no history of bipolar    Obsessive-compulsive disorder     Psychiatric problem     Schizophrenia 4/3/2018    Self-harming behavior     Therapy      Past Surgical History:   Procedure Laterality Date    ANKLE SURGERY Right     BREAST augmentation      OVARIAN CYST REMOVAL Bilateral       OBJECTIVE:     Vitals (pre-procedure):  There were no vitals filed for this visit.     Labs/Imaging/Studies:   No results found for this or any previous visit (from the past 48 hour(s)).   No results found for: PHENYTOIN, PHENOBARB, VALPROATE, CBMZ    Physical Exam:   Gen: AAOx4, NAD  HEENT: MMM, PERRL, EOMI, O/P clear  CV: RRR, S1/S2 nml, no M/R/G  Chest: CTAB, no R/R/W, unlabored breathing  Abd: S/NT/ND, +BS, no HSM  Ext: No C/C/E, pulse 2+ throughout  Neuro: CN II-XII grossly intact, no focal deficits    Mental Status Exam:   Appearance: unremarkable, age appropriate  Behavior: normal, cooperative  Speech: normal tone, normal rate, normal pitch, normal volume  Mood: euthymic  Affect: Normal   Thought Process: normal and logical  Thought Content: normal, no suicidality, no homicidality, delusions, or paranoia  Cognition: grossly intact  Insight:  good  Judgment: good    ASSESSMENT/PLAN:     Allyssa Wright is a 38 y.o. female with MDD (major depressive disorder), recurrent, in partial remission who presents for ECT.    Recommendations:   Proceed with ECT #42.      Terrence Quinones MD  4/17/2018

## 2018-04-17 NOTE — TRANSFER OF CARE
"Anesthesia Transfer of Care Note    Patient: Allyssa Wright    Procedure(s) Performed: Procedure(s) (LRB):  ELECTROCONVULSIVE THERAPY (ECT) - SINGLE SEIZURE (N/A)    Patient location: PACU    Anesthesia Type: general    Transport from OR: Transported from OR on room air with adequate spontaneous ventilation    Post pain: adequate analgesia    Post assessment: no apparent anesthetic complications    Post vital signs: stable    Level of consciousness: awake and alert    Nausea/Vomiting: no nausea/vomiting    Complications: none          Last vitals:   Visit Vitals  /70 (BP Location: Right arm, Patient Position: Lying)   Pulse 81   Temp 36.6 °C (97.9 °F) (Oral)   Resp 16   Ht 5' 5" (1.651 m)   Wt 70.3 kg (155 lb)   LMP  (LMP Unknown)   SpO2 100%   Breastfeeding? No   BMI 25.79 kg/m²     "

## 2018-04-17 NOTE — ANESTHESIA PREPROCEDURE EVALUATION
04/17/2018  Pre-operative evaluation for Procedure(s) (LRB):  ELECTROCONVULSIVE THERAPY (ECT) - SINGLE SEIZURE (N/A)    Allyssa Wright is a 38 y.o. female with MDD who is presenting with the above procedure.     ECT #42    Previous medications  Methohexital 200 mg   Succinylcholine 120 mg  Zofran & Toradol      Patient Active Problem List   Diagnosis    MDD (major depressive disorder), recurrent, in partial remission    Major depressive disorder, recurrent, in partial remission    MDD (major depressive disorder), severe    Major depressive disorder, recurrent    Other acne    Major depression, recurrent, chronic    MDD (major depressive disorder), recurrent episode, moderate    Major depressive disorder, recurrent severe without psychotic features    MDD (major depressive disorder), recurrent episode, severe    MDD (major depressive disorder)    Major depressive disorder, recurrent episode, in partial remission    Schizophrenia       Review of patient's allergies indicates:   Allergen Reactions    Benzodiazepines Other (See Comments)     Contraindicated while taking Clozapine    Ampicillin      Mom says so    Erythromycin     Levaquin [levofloxacin] Other (See Comments)     Depression side effects    Penicillins      Mom says so    Pristiq [desvenlafaxine]      psycotic      Sulfa (sulfonamide antibiotics)      Rash      Azithromycin Anxiety       Current Outpatient Prescriptions on File Prior to Visit   Medication Sig Dispense Refill    clozapine (CLOZARIL) 50 MG tablet Take 100 mg by mouth once daily.       dapsone 7.5 % GlwP Apply topically once daily.      dextroamphetamine-amphetamine 30 mg Tab Take 30 mg by mouth 2 (two) times daily.       famciclovir (FAMVIR) 500 MG tablet Take 1 tablet (500 mg total) by mouth 2 (two) times daily. 30 tablet 12    hydrOXYzine pamoate (VISTARIL)  50 MG Cap Take 1 capsule (50 mg total) by mouth nightly as needed (anxiety). 30 capsule 0    mirtazapine (REMERON) 15 MG tablet Take 1 tablet (15 mg total) by mouth every evening. (Patient taking differently: Take 7.5 mg by mouth every evening. ) 90 tablet 0    spironolactone (ALDACTONE) 50 MG tablet 50 mg 2 (two) times daily. 50  mg qAM, 50 mg qHS.      thyroid (ARMOUR THYROID) 30 mg Tab Take 1 tablet (30 mg total) by mouth every morning. 30 tablet 11    trazodone (DESYREL) 100 MG tablet Take 200 mg by mouth every evening.       UNABLE TO FIND 2 (two) times daily. n-azetyl-cysteine      venlafaxine (EFFEXOR) 100 MG Tab Take 225 mg by mouth once daily.       vortioxetine (TRINTELLIX) 5 mg Tab Take 2.5 mg by mouth nightly.      ZOVIRAX 5 % Crea   5     No current facility-administered medications on file prior to visit.        Past Surgical History:   Procedure Laterality Date    ANKLE SURGERY Right     BREAST augmentation      OVARIAN CYST REMOVAL Bilateral        Social History     Social History    Marital status: Single     Spouse name: N/A    Number of children: N/A    Years of education: N/A     Occupational History    unemployed      Social History Main Topics    Smoking status: Former Smoker     Quit date: 4/6/2011    Smokeless tobacco: Never Used    Alcohol use Yes      Comment: rarely    Drug use: No      Comment: Experimental use in high school    Sexual activity: Not on file     Other Topics Concern    Not on file     Social History Narrative    Pt has 1 older brother from an intact family until the death of her mother in 2012.  She completed 1 year of college, was never in the , and has never been employed.  She was engaged once, but never , has no children, and lives with her father plus 2 dogs.  She denies any hobbies and is spiritual but not Mosque.  She does date and returns to college during rare periods when depression and anxiety [place].     EKG:  Vent. Rate :  084 BPM     Atrial Rate : 084 BPM     P-R Int : 154 ms          QRS Dur : 086 ms      QT Int : 378 ms       P-R-T Axes : 067 066 055 degrees     QTc Int : 446 ms    Normal sinus rhythm  Normal ECG  When compared with ECG of 03-DEC-2015 11:38,  Questionable change in The axis  T wave inversion no longer evident in Inferior leads  Confirmed by Flaco Sr MD ([id number]) on 6/9/2016 1:30:13 PM    Referred By: SELF REFERRAL           Confirmed By:Flaco Sr MD    Pre-op Assessment    I have reviewed the Patient Summary Reports.      I have reviewed the Medications.     Review of Systems  Anesthesia Hx:  No problems with previous Anesthesia  History of prior surgery of interest to airway management or planning: Previous anesthesia: General  Procedure performed at an [facility]. Airway issues documented on chart review include mask, easy  Denies Family Hx of Anesthesia complications.   Denies Personal Hx of Anesthesia complications.   Social:  Former Smoker    Hematology/Oncology:  Hematology Normal   Oncology Normal     EENT/Dental:EENT/Dental Normal   Cardiovascular:   Denies Hypertension.  Denies MI.   Denies Dysrhythmias.   Denies Angina. ECG has been reviewed.    Pulmonary:  Pulmonary Normal    Renal/:  Renal/ Normal     Hepatic/GI:  Hepatic/GI Normal    Musculoskeletal:  Musculoskeletal Normal    Neurological:  Neurology Normal    Endocrine:   Hypothyroidism    Psych:   Psychiatric History          Physical Exam  General:  Well nourished    Airway/Jaw/Neck:  Airway Findings: Mouth Opening: Normal Tongue: Normal  General Airway Assessment: Adult  Mallampati: II  Improves to I with phonation.  TM Distance: Normal, at least 6 cm  Jaw/Neck Findings:  Neck ROM: Normal ROM     Eyes/Ears/Nose:  EYES/EARS/NOSE FINDINGS: Normal   Dental:  Dental Findings: In tact   Chest/Lungs:  Chest/Lungs Findings: Normal Respiratory Rate     Heart/Vascular:  Heart Findings: Rate: Normal        Mental Status:  Mental Status Findings:   Cooperative, Alert and Oriented         Anesthesia Plan  Type of Anesthesia, risks & benefits discussed:  Anesthesia Type:  general  Patient's Preference:   Intra-op Monitoring Plan: standard ASA monitors  Intra-op Monitoring Plan Comments:   Post Op Pain Control Plan:   Post Op Pain Control Plan Comments:   Induction:   IV  Beta Blocker:  Patient is not currently on a Beta-Blocker (No further documentation required).       Informed Consent: Patient understands risks and agrees with Anesthesia plan.  Questions answered. Anesthesia consent signed with patient.  ASA Score: 2     Day of Surgery Review of History & Physical:    H&P update referred to the surgeon.         Ready For Surgery From Anesthesia Perspective.

## 2018-04-17 NOTE — DISCHARGE INSTRUCTIONS
Understanding Electroconvulsive Therapy  Electroconvulsive therapy (ECT) is sometimes called shock therapy. This may sound painful, but ECT doesnt hurt. Its often the safest and best treatment for severe depression. It can treat other mental disorders as well.  What is electroconvulsive therapy?  ECT is used to treat people who are very depressed. Its mainly used when other treatments, such as antidepressant medicines, have failed. Often it may relieve feelings of sadness and despair after 2 to 4 treatments.  Common symptoms of major depression  Symptoms of major depression include:  · Feeling a deep sadness that doesnt go away  · Losing all pleasure in life  · Feeling hopeless or helpless  · Feeling guilty  · Sleeping more or less than normal  · Eating more or less than normal  · Having headaches or stomachaches, or other pains that dont go away  · Feeling nervous, empty, or worthless  · Crying a great deal  · Thinking or talking about suicide or death  How is ECT therapy done?  Before an ECT treatment, youll be given anesthesia to keep you pain-free. Youll also be given medicine to make you sleep, relax your muscles and control your heart rate. Your healthcare provider then places electrodes on your head. You may have one above each temple (bilateral ECT). Or you may have electrodes on one temple and on your forehead (unilateral ECT). While you are asleep, your brain is stimulated very briefly with an electric current. This causes a seizure, usually lasting less than a minute. Because you are under anesthesia, your body will not move even as your brain goes through great changes.  What are the risks?  When done properly, ECT is quite safe. Right after the treatment, you may be confused. This often lasts for less than half an hour. You may have a headache or stiff muscles. But these symptoms often go away quickly. A more serious possible side effect is memory loss. Commonly, people have short-term  (temporary) trouble remembering information that they learned recently. And they may have little recall of the time when they received treatment. Less commonly, people may have long-lasting (permanent) spotty recall of major past events. In rare cases, there may be memory loss for larger blocks of time.  Looking to the future  In most cases, ECT doesnt cure depression. But it can improve symptoms for a period of time. You may need a series of ECT treatments to continue feeling the benefit. You may also need to take antidepressant medicines to help prevent symptoms from returning. But with ongoing treatment, you can have a full and healthy life.  Resources  · The National [employer] of Mental Health  671.977.2736  www.nimh.nih.gov  · [facility]  851.599.5145  www.[facility].org     Date Last Reviewed: 5/1/2017  © 4521-6009 [employer] [address] [place] [address], [place] [address]. All rights reserved. This information is not intended as a substitute for professional medical care. Always follow your healthcare professional's instructions.

## 2018-04-17 NOTE — OP NOTE
Allyssa Wright   : 1980   MRN: [id number]  Date: 2018    Electroconvulsive Therapy  Operative Note    Date of Admission: 2018  5:56 AM    Site: [facility], [address]    Attending: Shoaib Boyce Jr., MD   Residents: Terrence Quinones MD  Pre-op Diagnosis: MDD (major depressive disorder), recurrent, in partial remission    ECT Treatment Number: 42  Machine Type: Encore.fmta 5000    Patient Status: Medically stable    Vitals (pre-procedure):  Vitals:    18 0802   BP: 120/70   Pulse: 81   Resp: 16   Temp: 97.9 °F (36.6 °C)       Electrode Placement: Bitemporal    Stimulus Number Charge (mC) Level Pulse Width (msec) Frequency  (Hz) Duration of Stimulus (sec) Current (mA) Duration of Seizure (sec)   1 576 3 1 60 6 800 64                                   Complications: Hypertension    Maximum Blood Pressure: 199/109    Medications Given:  Caffeine 500 mg  IV  Before  Methohexital (Brevital).   200mg  IV  Before  Succinylcholine (Anectine).   120mg  IV  Before  Ondansetron (Zofran).   4mg  IV  Before  Esmolol (Brevibloc).   30mg  IV  Before  Toradol 30mg IV Before    Treatment Course:  Patient tolerated procedure well. After adequate recovery from general anesthesia, the patient was transported to recovery.    Post-op Diagnosis: Same as above    Recommended for next ECT:  No change      Terrence Quinones MD  2018

## 2018-04-17 NOTE — ANESTHESIA POSTPROCEDURE EVALUATION
"Anesthesia Post Evaluation    Patient: Allyssa Wright    Procedure(s) Performed: Procedure(s) (LRB):  ELECTROCONVULSIVE THERAPY (ECT) - SINGLE SEIZURE (N/A)    Final Anesthesia Type: general  Patient location during evaluation: PACU  Patient participation: Yes- Able to Participate  Level of consciousness: awake and alert  Post-procedure vital signs: reviewed and stable  Pain management: adequate  Airway patency: patent  PONV status at discharge: No PONV  Anesthetic complications: no      Cardiovascular status: blood pressure returned to baseline and stable  Respiratory status: unassisted  Hydration status: euvolemic  Follow-up not needed.        Visit Vitals  BP (!) 112/52   Pulse 92   Temp 37.2 °C (98.9 °F) (Temporal)   Resp 18   Ht 5' 5" (1.651 m)   Wt 70.3 kg (155 lb)   LMP  (LMP Unknown)   SpO2 100%   Breastfeeding? No   BMI 25.79 kg/m²       Pain/Roma Score: Pain Assessment Performed: Yes (4/17/2018  9:07 AM)  Presence of Pain: denies (4/17/2018  9:07 AM)  Roma Score: 9 (4/17/2018  8:40 AM)      "

## 2018-04-17 NOTE — DISCHARGE SUMMARY
Allyssa Wright  : 1980   MRN: [id number]  Date: 2018     Electroconvulsive Therapy  Discharge Summary    Admit Date: 2018  5:56 AM  Discharge Date: 2018    Attending Physician: Shoaib Boyce Jr., MD   Discharge Provider: Terrence Quinones MD    History of Present Illness: Allyssa Wright is a 38 y.o. female with MDD presented for ECT #42. See H&P dated 2018 for full HPI. For further details, see Dr. Boyce's pre-ECT evaluation.    Hospital Course: The patient tolerated the ECT treatment well without complication. Patient was stable post-procedure. See OP note dated 2018 for more details.     Disposition: Home or Self Care    Medications:  Current Discharge Medication List      CONTINUE these medications which have NOT CHANGED    Details   clozapine (CLOZARIL) 50 MG tablet Take 100 mg by mouth once daily.       dapsone 7.5 % GlwP Apply topically once daily.      famciclovir (FAMVIR) 500 MG tablet Take 1 tablet (500 mg total) by mouth 2 (two) times daily.  Qty: 30 tablet, Refills: 12    Associated Diagnoses: Major depressive disorder, recurrent episode, moderate degree      hydrOXYzine pamoate (VISTARIL) 50 MG Cap Take 1 capsule (50 mg total) by mouth nightly as needed (anxiety).  Qty: 30 capsule, Refills: 0      mirtazapine (REMERON) 15 MG tablet Take 1 tablet (15 mg total) by mouth every evening.  Qty: 90 tablet, Refills: 0      spironolactone (ALDACTONE) 50 MG tablet 50 mg 2 (two) times daily. 50  mg qAM, 50 mg qHS.      trazodone (DESYREL) 100 MG tablet Take 200 mg by mouth every evening.       UNABLE TO FIND 2 (two) times daily. n-azetyl-cysteine      venlafaxine (EFFEXOR) 100 MG Tab Take 225 mg by mouth once daily.     Associated Diagnoses: MDD (major depressive disorder), recurrent, in partial remission      vortioxetine (TRINTELLIX) 5 mg Tab Take 2.5 mg by mouth nightly.      ZOVIRAX 5 % Crea Refills: 5      dextroamphetamine-amphetamine 30 mg Tab Take 30 mg by mouth 2  (two) times daily.     Associated Diagnoses: MDD (major depressive disorder), recurrent, in partial remission      thyroid (ARMOUR THYROID) 30 mg Tab Take 1 tablet (30 mg total) by mouth every morning.  Qty: 30 tablet, Refills: 11    Associated Diagnoses: Major depressive disorder, recurrent episode, moderate degree           Status at Discharge: Alert and medically stable    Discharge Diagnoses:  MDD (major depressive disorder), recurrent, in partial remission  Diet: Resume previous outpatient diet  Activity: Ambulate with assistance  Instructions: Please do not eat or drink anything after midnight prior to procedure. Please do not drive on day of ECT.    Med Changes:  None    Next ECT:  April 24, 2018    Terrence Quinones MD  4/17/2018

## 2018-04-17 NOTE — H&P
Allyssa Wright   : 1980   MRN: [id number]  Date: 2018    Electroconvulsive Therapy  Operative Note    Date of Admission: 2018  5:56 AM    Site: [facility], [address]    Attending: Shoaib Boyce Jr., MD   Residents: Terrence Quinones MD  Pre-op Diagnosis: <principal problem not specified>ECT Treatment Number: 42  Machine Type: [employer]a 5000    Patient Status: Medically stable    Vitals (pre-procedure):  Vitals:    18 0802   BP: 120/70   Pulse: 81   Resp: 16   Temp: 97.9 °F (36.6 °C)       Electrode Placement: Bitemporal    Stimulus Number Charge (mC) Level Pulse Width (msec) Frequency  (Hz) Duration of Stimulus (sec) Current (mA) Duration of Seizure (sec)   1 576 3 1 60 6 800 64                                   Complications: Hypertension    Maximum Blood Pressure: 199/109    Medications Given:  Caffeine 500 mg  IV  Before  Methohexital (Brevital).   200mg  IV  Before  Succinylcholine (Anectine).   120mg  IV  Before  Ondansetron (Zofran).   4mg  IV  Before  Esmolol (Brevibloc).   30mg  IV  Before  Toradol 30mg IV Before    Treatment Course:  Patient tolerated procedure well. After adequate recovery from general anesthesia, the patient was transported to recovery.    Post-op Diagnosis: Same as above    Recommended for next ECT:  No change      Terrence Quinones MD  2018

## 2018-04-24 ENCOUNTER — ANESTHESIA (OUTPATIENT)
Dept: ELECTROPHYSIOLOGY | Facility: HOSPITAL | Age: 38
End: 2018-04-24
Payer: COMMERCIAL

## 2018-04-24 ENCOUNTER — SURGERY (OUTPATIENT)
Age: 38
End: 2018-04-24

## 2018-04-24 ENCOUNTER — HOSPITAL ENCOUNTER (OUTPATIENT)
Facility: HOSPITAL | Age: 38
Discharge: HOME | End: 2018-04-24
Attending: PSYCHIATRY & NEUROLOGY | Admitting: PSYCHIATRY & NEUROLOGY
Payer: COMMERCIAL

## 2018-04-24 ENCOUNTER — ANESTHESIA EVENT (OUTPATIENT)
Dept: ELECTROPHYSIOLOGY | Facility: HOSPITAL | Age: 38
End: 2018-04-24
Payer: COMMERCIAL

## 2018-04-24 VITALS
OXYGEN SATURATION: 98 % | HEART RATE: 100 BPM | TEMPERATURE: 99 F | RESPIRATION RATE: 17 BRPM | SYSTOLIC BLOOD PRESSURE: 112 MMHG | DIASTOLIC BLOOD PRESSURE: 82 MMHG

## 2018-04-24 DIAGNOSIS — F33.41 MDD (MAJOR DEPRESSIVE DISORDER), RECURRENT, IN PARTIAL REMISSION: ICD-10-CM

## 2018-04-24 DIAGNOSIS — F32.9 MDD (MAJOR DEPRESSIVE DISORDER): ICD-10-CM

## 2018-04-24 PROCEDURE — 90870 ELECTROCONVULSIVE THERAPY: CPT | Performed by: ANESTHESIOLOGY

## 2018-04-24 PROCEDURE — 71000044 HC DOSC ROUTINE RECOVERY FIRST HOUR: Performed by: PSYCHIATRY & NEUROLOGY

## 2018-04-24 PROCEDURE — D9220A PRA ANESTHESIA: Mod: ,,, | Performed by: ANESTHESIOLOGY

## 2018-04-24 PROCEDURE — 37000009 HC ANESTHESIA EA ADD 15 MINS: Performed by: PSYCHIATRY & NEUROLOGY

## 2018-04-24 PROCEDURE — 90870 ELECTROCONVULSIVE THERAPY: CPT | Mod: ,,, | Performed by: PSYCHIATRY & NEUROLOGY

## 2018-04-24 PROCEDURE — 63600175 PHARM REV CODE 636 W HCPCS: Performed by: STUDENT IN AN ORGANIZED HEALTH CARE EDUCATION/TRAINING PROGRAM

## 2018-04-24 PROCEDURE — 25000003 PHARM REV CODE 250: Performed by: PSYCHIATRY & NEUROLOGY

## 2018-04-24 PROCEDURE — 37000008 HC ANESTHESIA 1ST 15 MINUTES: Performed by: PSYCHIATRY & NEUROLOGY

## 2018-04-24 PROCEDURE — 25000003 PHARM REV CODE 250: Performed by: STUDENT IN AN ORGANIZED HEALTH CARE EDUCATION/TRAINING PROGRAM

## 2018-04-24 RX ORDER — ONDANSETRON 2 MG/ML
INJECTION INTRAMUSCULAR; INTRAVENOUS
Status: DISCONTINUED | OUTPATIENT
Start: 2018-04-24 | End: 2018-04-24

## 2018-04-24 RX ORDER — ESMOLOL HYDROCHLORIDE 10 MG/ML
INJECTION INTRAVENOUS
Status: DISCONTINUED | OUTPATIENT
Start: 2018-04-24 | End: 2018-04-24

## 2018-04-24 RX ORDER — SODIUM CHLORIDE 9 MG/ML
INJECTION, SOLUTION INTRAVENOUS CONTINUOUS
Status: DISCONTINUED | OUTPATIENT
Start: 2018-04-25 | End: 2018-04-24 | Stop reason: HOSPADM

## 2018-04-24 RX ORDER — ESMOLOL HYDROCHLORIDE 10 MG/ML
INJECTION INTRAVENOUS
Status: COMPLETED
Start: 2018-04-24 | End: 2018-04-24

## 2018-04-24 RX ORDER — SODIUM CHLORIDE 0.9 % (FLUSH) 0.9 %
3 SYRINGE (ML) INJECTION
Status: DISCONTINUED | OUTPATIENT
Start: 2018-04-24 | End: 2018-04-24 | Stop reason: HOSPADM

## 2018-04-24 RX ORDER — KETOROLAC TROMETHAMINE 30 MG/ML
INJECTION, SOLUTION INTRAMUSCULAR; INTRAVENOUS
Status: COMPLETED
Start: 2018-04-24 | End: 2018-04-24

## 2018-04-24 RX ORDER — SUCCINYLCHOLINE CHLORIDE 20 MG/ML
INJECTION INTRAMUSCULAR; INTRAVENOUS
Status: COMPLETED
Start: 2018-04-24 | End: 2018-04-24

## 2018-04-24 RX ORDER — SUCCINYLCHOLINE CHLORIDE 20 MG/ML
INJECTION INTRAMUSCULAR; INTRAVENOUS
Status: DISCONTINUED | OUTPATIENT
Start: 2018-04-24 | End: 2018-04-24

## 2018-04-24 RX ORDER — KETOROLAC TROMETHAMINE 30 MG/ML
INJECTION, SOLUTION INTRAMUSCULAR; INTRAVENOUS
Status: DISCONTINUED | OUTPATIENT
Start: 2018-04-24 | End: 2018-04-24

## 2018-04-24 RX ORDER — ONDANSETRON 2 MG/ML
INJECTION INTRAMUSCULAR; INTRAVENOUS
Status: COMPLETED
Start: 2018-04-24 | End: 2018-04-24

## 2018-04-24 RX ADMIN — Medication 500 MG: at 07:04

## 2018-04-24 RX ADMIN — KETOROLAC TROMETHAMINE 30 MG: 30 INJECTION, SOLUTION INTRAMUSCULAR at 08:04

## 2018-04-24 RX ADMIN — SODIUM CHLORIDE: 9 INJECTION, SOLUTION INTRAVENOUS at 07:04

## 2018-04-24 RX ADMIN — METHOHEXITAL SODIUM 200 MG: 500 INJECTION, POWDER, LYOPHILIZED, FOR SOLUTION INTRAMUSCULAR; INTRAVENOUS; RECTAL at 08:04

## 2018-04-24 RX ADMIN — ONDANSETRON 4 MG: 2 INJECTION, SOLUTION INTRAMUSCULAR; INTRAVENOUS at 08:04

## 2018-04-24 RX ADMIN — ESMOLOL HYDROCHLORIDE 30 MG: 10 INJECTION INTRAVENOUS at 08:04

## 2018-04-24 RX ADMIN — SUCCINYLCHOLINE CHLORIDE 120 MG: 20 INJECTION, SOLUTION INTRAMUSCULAR; INTRAVENOUS at 08:04

## 2018-04-24 NOTE — DISCHARGE SUMMARY
Allyssa Wright  : 1980   MRN: [id number]  Date: 2018     Electroconvulsive Therapy  Discharge Summary    Admit Date: 2018  5:52 AM  Discharge Date: 2018    Attending Physician: Shoaib Boyce Jr., MD   Discharge Provider: Terrence Quinones MD    History of Present Illness: Allyssa Wright is a 38 y.o. female with MDD presented for ECT #42. See H&P dated 2018 for full HPI. For further details, see Dr. Boyce's pre-ECT evaluation.    Hospital Course: The patient tolerated the ECT treatment well without complication. Patient was stable post-procedure. See OP note dated 2018 for more details.     Disposition: Home or Self Care    Medications:  Current Discharge Medication List      CONTINUE these medications which have NOT CHANGED    Details   clozapine (CLOZARIL) 50 MG tablet Take 100 mg by mouth once daily.       dapsone 7.5 % GlwP Apply topically once daily.      dextroamphetamine-amphetamine 30 mg Tab Take 30 mg by mouth 2 (two) times daily.     Associated Diagnoses: MDD (major depressive disorder), recurrent, in partial remission      famciclovir (FAMVIR) 500 MG tablet Take 1 tablet (500 mg total) by mouth 2 (two) times daily.  Qty: 30 tablet, Refills: 12    Associated Diagnoses: Major depressive disorder, recurrent episode, moderate degree      hydrOXYzine pamoate (VISTARIL) 50 MG Cap Take 1 capsule (50 mg total) by mouth nightly as needed (anxiety).  Qty: 30 capsule, Refills: 0      mirtazapine (REMERON) 15 MG tablet Take 1 tablet (15 mg total) by mouth every evening.  Qty: 90 tablet, Refills: 0      spironolactone (ALDACTONE) 50 MG tablet 50 mg 2 (two) times daily. 50  mg qAM, 50 mg qHS.      thyroid (ARMOUR THYROID) 30 mg Tab Take 1 tablet (30 mg total) by mouth every morning.  Qty: 30 tablet, Refills: 11    Associated Diagnoses: Major depressive disorder, recurrent episode, moderate degree      trazodone (DESYREL) 100 MG tablet Take 200 mg by mouth every evening.        UNABLE TO FIND 2 (two) times daily. n-azetyl-cysteine      venlafaxine (EFFEXOR) 100 MG Tab Take 225 mg by mouth once daily.     Associated Diagnoses: MDD (major depressive disorder), recurrent, in partial remission      vortioxetine (TRINTELLIX) 5 mg Tab Take 2.5 mg by mouth nightly.      ZOVIRAX 5 % Crea Refills: 5           Status at Discharge: Alert and medically stable    Discharge Diagnoses:  MDD (major depressive disorder), recurrent, in partial remission  Diet: Resume previous outpatient diet  Activity: Ambulate with assistance  Instructions: Please do not eat or drink anything after midnight prior to procedure. Please do not drive on day of ECT.    Med Changes:  None    Next ECT:  5/3/2018    Terrence Quinones MD  4/24/2018

## 2018-04-24 NOTE — PLAN OF CARE
Discharge instructions reviewed with pt and father, handouts given verbalized understanding with no further questions at this time. Next ECT scheduled for  MAY 3, 2018, per AVS sheet with MD telephone number provided. VSS on RA, no pain or nausea noted, tolerating sips of po fluids without difficulty, no other complaints noted. Fall precautions reviewed, PIV removed.

## 2018-04-24 NOTE — TRANSFER OF CARE
Anesthesia Transfer of Care Note    Patient: Allyssa Wright    Procedure(s) Performed: Procedure(s) (LRB):  ELECTROCONVULSIVE THERAPY (ECT) - SINGLE SEIZURE (N/A)    Patient location: [facility] [place]    Anesthesia Type: general    Transport from OR: Transported from OR on 6-10 L/min O2 by face mask with adequate spontaneous ventilation    Post pain: adequate analgesia    Post assessment: no apparent anesthetic complications    Post vital signs: stable    Level of consciousness: awake    Nausea/Vomiting: no nausea/vomiting    Complications: none    Transfer of care protocol was followed      Last vitals:   Visit Vitals  LMP  (LMP Unknown)   Breastfeeding? No

## 2018-04-24 NOTE — H&P
Allyssa Wright  : 1980   MRN: [id number]  Date: 2018     Electroconvulsive Therapy  History & Physical    Chief complaint: MDD (major depressive disorder)   Procedure: ECT #42    SUBJECTIVE:     HPI:   Allyssa Wright is a 38 y.o. female with MDD (major depressive disorder)who presents for ECT.     Pt reports she is feeling pretty good this morning. She did have some depressed mood on  and Monday but thinks this may because she drank EtOH on Saturday. Pt reports she did not drink enough to become inebriated, but did have several drinks. Reports she has some anxiety but says that she is always anxious and it is not above baseline. Pt has no other complaints this morning. Denies side effects from last ECT. Denies SI/HI/AVH.    The patient denies any changes in dentition.   The patient does not need any prescriptions and has not had any med changes since meeting with Dr. Boyce.   The patient has not had anything by mouth since midnight and is ready for ECT.    Psychiatric Review of Systems:  Mood: Mild  Appetite: No problem  Psychomotor: No problem  Cognitive Impairment: None  Insomnia: None  Psychosis: None  Diurnal Variation: None  Suicidal Ideation: Absent    Medical Review Of Systems:  A comprehensive review of systems was negative.    Current Medications:   No current facility-administered medications on file prior to encounter.      Current Outpatient Prescriptions on File Prior to Encounter   Medication Sig Dispense Refill    clozapine (CLOZARIL) 50 MG tablet Take 100 mg by mouth once daily.       dapsone 7.5 % GlwP Apply topically once daily.      dextroamphetamine-amphetamine 30 mg Tab Take 30 mg by mouth 2 (two) times daily.       famciclovir (FAMVIR) 500 MG tablet Take 1 tablet (500 mg total) by mouth 2 (two) times daily. 30 tablet 12    hydrOXYzine pamoate (VISTARIL) 50 MG Cap Take 1 capsule (50 mg total) by mouth nightly as needed (anxiety). 30 capsule 0    mirtazapine (REMERON) 15  MG tablet Take 1 tablet (15 mg total) by mouth every evening. (Patient taking differently: Take 7.5 mg by mouth every evening. ) 90 tablet 0    spironolactone (ALDACTONE) 50 MG tablet 50 mg 2 (two) times daily. 50  mg qAM, 50 mg qHS.      thyroid (ARMOUR THYROID) 30 mg Tab Take 1 tablet (30 mg total) by mouth every morning. 30 tablet 11    trazodone (DESYREL) 100 MG tablet Take 200 mg by mouth every evening.       UNABLE TO FIND 2 (two) times daily. n-azetyl-cysteine      venlafaxine (EFFEXOR) 100 MG Tab Take 225 mg by mouth once daily.       vortioxetine (TRINTELLIX) 5 mg Tab Take 2.5 mg by mouth nightly.      ZOVIRAX 5 % Crea   5      Allergies:   Benzodiazepines; Ampicillin; Erythromycin; Levaquin [levofloxacin]; Penicillins; Pristiq [desvenlafaxine]; Sulfa (sulfonamide antibiotics); and Azithromycin    Past Medical/Surgical History:   Past Medical History:   Diagnosis Date    Anxiety     Depression     History of psychiatric hospitalization     HSV-1 (herpes simplex virus 1) infection     Hx of psychiatric care     Moderate depressed bipolar II disorder 06/13/2016    reports no history of bipolar    Obsessive-compulsive disorder     Psychiatric problem     Schizophrenia 4/3/2018    Self-harming behavior     Therapy      Past Surgical History:   Procedure Laterality Date    ANKLE SURGERY Right     BREAST augmentation      OVARIAN CYST REMOVAL Bilateral       OBJECTIVE:     Vitals (pre-procedure):  There were no vitals filed for this visit.     Labs/Imaging/Studies:   No results found for this or any previous visit (from the past 48 hour(s)).   No results found for: PHENYTOIN, PHENOBARB, VALPROATE, CBMZ    Physical Exam:   Gen: AAOx4, NAD  HEENT: MMM, PERRL, EOMI, O/P clear  CV: RRR, S1/S2 nml, no M/R/G  Chest: CTAB, no R/R/W, unlabored breathing  Abd: S/NT/ND, +BS, no HSM  Ext: No C/C/E, pulse 2+ throughout  Neuro: CN II-XII grossly intact, no focal deficits    Mental Status Exam:    Appearance: unremarkable, age appropriate  Behavior: normal, cooperative  Speech: normal tone, normal rate, normal pitch, normal volume  Mood: euthymic  Affect: Normal   Thought Process: normal and logical  Thought Content: normal, no suicidality, no homicidality, delusions, or paranoia  Cognition: grossly intact  Insight: good  Judgment: good    ASSESSMENT/PLAN:     Allyssa Wright is a 38 y.o. female with MDD (major depressive disorder) who presents for ECT.    Recommendations:   Proceed with ECT #42.      Terrence Quinones MD  4/24/2018

## 2018-04-24 NOTE — ANESTHESIA POSTPROCEDURE EVALUATION
Anesthesia Post Evaluation    Patient: Allyssa Wright    Procedure(s) Performed: Procedure(s) (LRB):  ELECTROCONVULSIVE THERAPY (ECT) - SINGLE SEIZURE (N/A)    Final Anesthesia Type: general  Patient location during evaluation: PACU  Patient participation: Yes- Able to Participate  Level of consciousness: awake and alert  Post-procedure vital signs: reviewed and stable  Pain management: adequate  Airway patency: patent  PONV status at discharge: No PONV  Anesthetic complications: no      Cardiovascular status: blood pressure returned to baseline  Respiratory status: unassisted  Hydration status: euvolemic  Follow-up not needed.        Visit Vitals  /82 (BP Location: Left arm, Patient Position: Sitting)   Pulse 100   Temp 37.1 °C (98.8 °F) (Temporal)   Resp 17   LMP  (LMP Unknown)   SpO2 98%   Breastfeeding? No       Pain/Roma Score: Pain Assessment Performed: Yes (4/24/2018  9:08 AM)  Presence of Pain: denies (4/24/2018  9:08 AM)  Roma Score: 10 (4/24/2018  9:08 AM)

## 2018-04-24 NOTE — DISCHARGE INSTRUCTIONS
Understanding Electroconvulsive Therapy  Electroconvulsive therapy (ECT) is sometimes called shock therapy. This may sound painful, but ECT doesnt hurt. Its often the safest and best treatment for severe depression. It can treat other mental disorders as well.  What is electroconvulsive therapy?  ECT is used to treat people who are very depressed. Its mainly used when other treatments, such as antidepressant medicines, have failed. Often it may relieve feelings of sadness and despair after 2 to 4 treatments.  Common symptoms of major depression  Symptoms of major depression include:  · Feeling a deep sadness that doesnt go away  · Losing all pleasure in life  · Feeling hopeless or helpless  · Feeling guilty  · Sleeping more or less than normal  · Eating more or less than normal  · Having headaches or stomachaches, or other pains that dont go away  · Feeling nervous, empty, or worthless  · Crying a great deal  · Thinking or talking about suicide or death  How is ECT therapy done?  Before an ECT treatment, youll be given anesthesia to keep you pain-free. Youll also be given medicine to make you sleep, relax your muscles and control your heart rate. Your healthcare provider then places electrodes on your head. You may have one above each temple (bilateral ECT). Or you may have electrodes on one temple and on your forehead (unilateral ECT). While you are asleep, your brain is stimulated very briefly with an electric current. This causes a seizure, usually lasting less than a minute. Because you are under anesthesia, your body will not move even as your brain goes through great changes.  What are the risks?  When done properly, ECT is quite safe. Right after the treatment, you may be confused. This often lasts for less than half an hour. You may have a headache or stiff muscles. But these symptoms often go away quickly. A more serious possible side effect is memory loss. Commonly, people have short-term  (temporary) trouble remembering information that they learned recently. And they may have little recall of the time when they received treatment. Less commonly, people may have long-lasting (permanent) spotty recall of major past events. In rare cases, there may be memory loss for larger blocks of time.  Looking to the future  In most cases, ECT doesnt cure depression. But it can improve symptoms for a period of time. You may need a series of ECT treatments to continue feeling the benefit. You may also need to take antidepressant medicines to help prevent symptoms from returning. But with ongoing treatment, you can have a full and healthy life.  Resources  · The National [place] of [place] [employer]  873.361.2065  www.nimh.nih.gov  · [facility]  717.971.8469  www.[facility].org     Date Last Reviewed: 5/1/2017  © 4724-2026 [email or web address]. [address] [place] [address], [place] [address]. All rights reserved. This information is not intended as a substitute for professional medical care. Always follow your healthcare professional's instructions.

## 2018-04-24 NOTE — ANESTHESIA PROCEDURE NOTES
ECT    Treatment Number: 43  Procedure start time: 4/24/2018 8:19 AM  Timeout performed at: 4/24/2018 8:11 AM  Procedure end time: 4/24/2018 8:21 AM    Staffing  Anesthesiologist: BETTY BARNES  CRNA/Resident: JUAN M RODGERS  Performed by: resident/CRNA     Preanesthetic Checklist  The following were completed as part of the preanesthetic checklist: patient identified, procedural consent, pre-op evaluation, timeout performed, risks and benefits discussed, monitors and equipment checked, anesthesia consent given, oxygen available, suction available, hand hygiene performed and patient being monitored.    Setup & Induction  Patient Monitoring: heart rate, cardiac monitor, continuous pulse ox, continuous capnometry and NIBP  Patient preparation: bite block inserted and extremities padded  Electrode Placement: Bitemporal    The patient was moved to the ECT therapy room after being assessed and consented for ECT. After standard ASA monitors were applied and timeout performed, the patient was adequately preoxygenated. After induction of general anesthesia, adequate oxygenation and ventilation were confirmed with pulse oxymetry and end tidal CO2 monitoring via bag-mask ventilation. End tidal CO2 was monitored throughout the case and moderate hyperventilation was performed prior to beginning ECT treatment. All extremities were padded, biteblock was inserted, and mandibular stabilization was done prior to initiating ECT therapy.    Procedure  Stimulus Number 1: Setting Number = III; 576 millicoulombs; Seizure Duration = 55 seconds            Recovery  After adequate recovery from general anesthesia, the patient was transported to recovery.  Peak Blood Pressure: 177/96    ECT Findings  ECT associated findings of: Moderate Hypertension (SBP >160 or DBP >100)

## 2018-04-24 NOTE — ANESTHESIA PREPROCEDURE EVALUATION
04/24/2018  Pre-operative evaluation for Procedure(s) (LRB):  ELECTROCONVULSIVE THERAPY (ECT) - SINGLE SEIZURE (N/A)    Allyssa Wright is a 38 y.o. female with MDD who is presenting with the above procedure.     ECT #43    Previous medications:   Methohexital 200 mg   Succinylcholine 120 mg  Esmolol 30 mg  Zofran & Toradol      Patient Active Problem List   Diagnosis    MDD (major depressive disorder), recurrent, in partial remission    Major depressive disorder, recurrent, in partial remission    MDD (major depressive disorder), severe    Major depressive disorder, recurrent    Other acne    Major depression, recurrent, chronic    MDD (major depressive disorder), recurrent episode, moderate    Major depressive disorder, recurrent severe without psychotic features    MDD (major depressive disorder), recurrent episode, severe    MDD (major depressive disorder)    Major depressive disorder, recurrent episode, in partial remission    Schizophrenia    Depression       Review of patient's allergies indicates:   Allergen Reactions    Benzodiazepines Other (See Comments)     Contraindicated while taking Clozapine    Ampicillin      Mom says so    Erythromycin     Levaquin [levofloxacin] Other (See Comments)     Depression side effects    Penicillins      Mom says so    Pristiq [desvenlafaxine]      psycotic      Sulfa (sulfonamide antibiotics)      Rash      Azithromycin Anxiety       Current Outpatient Prescriptions on File Prior to Visit   Medication Sig Dispense Refill    clozapine (CLOZARIL) 50 MG tablet Take 100 mg by mouth once daily.       dapsone 7.5 % GlwP Apply topically once daily.      dextroamphetamine-amphetamine 30 mg Tab Take 30 mg by mouth 2 (two) times daily.       famciclovir (FAMVIR) 500 MG tablet Take 1 tablet (500 mg total) by mouth 2 (two) times daily. 30 tablet 12     hydrOXYzine pamoate (VISTARIL) 50 MG Cap Take 1 capsule (50 mg total) by mouth nightly as needed (anxiety). 30 capsule 0    mirtazapine (REMERON) 15 MG tablet Take 1 tablet (15 mg total) by mouth every evening. (Patient taking differently: Take 7.5 mg by mouth every evening. ) 90 tablet 0    spironolactone (ALDACTONE) 50 MG tablet 50 mg 2 (two) times daily. 50  mg qAM, 50 mg qHS.      thyroid (ARMOUR THYROID) 30 mg Tab Take 1 tablet (30 mg total) by mouth every morning. 30 tablet 11    trazodone (DESYREL) 100 MG tablet Take 200 mg by mouth every evening.       UNABLE TO FIND 2 (two) times daily. n-azetyl-cysteine      venlafaxine (EFFEXOR) 100 MG Tab Take 225 mg by mouth once daily.       vortioxetine (TRINTELLIX) 5 mg Tab Take 2.5 mg by mouth nightly.      ZOVIRAX 5 % Crea   5     No current facility-administered medications on file prior to visit.        Past Surgical History:   Procedure Laterality Date    ANKLE SURGERY Right     BREAST augmentation      OVARIAN CYST REMOVAL Bilateral        Social History     Social History    Marital status: Single     Spouse name: N/A    Number of children: N/A    Years of education: N/A     Occupational History    unemployed      Social History Main Topics    Smoking status: Former Smoker     Quit date: 4/6/2011    Smokeless tobacco: Never Used    Alcohol use Yes      Comment: rarely    Drug use: No      Comment: Experimental use in high school    Sexual activity: Not on file     Other Topics Concern    Not on file     Social History Narrative    Pt has 1 older brother from an intact family until the death of her mother in 2012.  She completed 1 year of college, was never in the , and has never been employed.  She was engaged once, but never , has no children, and lives with her father plus 2 dogs.  She denies any hobbies and is spiritual but not Restorationism.  She does date and returns to college during rare periods when depression and  anxiety [place].     EKG:  Vent. Rate : 084 BPM     Atrial Rate : 084 BPM     P-R Int : 154 ms          QRS Dur : 086 ms      QT Int : 378 ms       P-R-T Axes : 067 066 055 degrees     QTc Int : 446 ms    Normal sinus rhythm  Normal ECG  When compared with ECG of 03-DEC-2015 11:38,  Questionable change in The axis  T wave inversion no longer evident in Inferior leads  Confirmed by Flaco Sr MD ([id number]) on 6/9/2016 1:30:13 PM    Referred By: SELF REFERRAL           Confirmed By:Flaco Sr MD    Anesthesia Evaluation    I have reviewed the Patient Summary Reports.     I have reviewed the Medications.     Review of Systems  Anesthesia Hx:  No problems with previous Anesthesia  History of prior surgery of interest to airway management or planning: Previous anesthesia: General  Procedure performed at an [facility]. Airway issues documented on chart review include mask, easy  Denies Family Hx of Anesthesia complications.   Denies Personal Hx of Anesthesia complications.   Social:  Former Smoker    Hematology/Oncology:  Hematology Normal   Oncology Normal     EENT/Dental:EENT/Dental Normal   Cardiovascular:   Denies Hypertension.  Denies MI.   Denies Dysrhythmias.   Denies Angina. ECG has been reviewed.    Pulmonary:  Pulmonary Normal    Renal/:  Renal/ Normal     Hepatic/GI:  Hepatic/GI Normal    Musculoskeletal:  Musculoskeletal Normal    Neurological:  Neurology Normal    Endocrine:   Hypothyroidism    Psych:   Psychiatric History          Physical Exam  General:  Well nourished    Airway/Jaw/Neck:  Airway Findings: Mouth Opening: Normal Tongue: Normal  General Airway Assessment: Adult  Mallampati: II  Improves to I with phonation.  TM Distance: Normal, at least 6 cm  Jaw/Neck Findings:  Neck ROM: Normal ROM     Eyes/Ears/Nose:  EYES/EARS/NOSE FINDINGS: Normal   Dental:  Dental Findings: In tact   Chest/Lungs:  Chest/Lungs Findings: Normal Respiratory Rate     Heart/Vascular:  Heart Findings: Rate: Normal         Mental Status:  Mental Status Findings:  Cooperative, Alert and Oriented         Anesthesia Plan  Type of Anesthesia, risks & benefits discussed:  Anesthesia Type:  general  Patient's Preference:   Intra-op Monitoring Plan: standard ASA monitors  Intra-op Monitoring Plan Comments:   Post Op Pain Control Plan:   Post Op Pain Control Plan Comments:   Induction:   IV  Beta Blocker:  Patient is not currently on a Beta-Blocker (No further documentation required).       Informed Consent: Patient understands risks and agrees with Anesthesia plan.  Questions answered. Anesthesia consent signed with patient.  ASA Score: 3     Day of Surgery Review of History & Physical:    H&P update referred to the surgeon.         Ready For Surgery From Anesthesia Perspective.

## 2018-05-02 DIAGNOSIS — F33.9 RECURRENT MAJOR DEPRESSIVE DISORDER, REMISSION STATUS UNSPECIFIED: Primary | ICD-10-CM

## 2018-05-07 ENCOUNTER — ANESTHESIA EVENT (OUTPATIENT)
Dept: ELECTROPHYSIOLOGY | Facility: HOSPITAL | Age: 38
End: 2018-05-07
Payer: COMMERCIAL

## 2018-05-07 NOTE — ANESTHESIA PREPROCEDURE EVALUATION
05/07/2018  Pre-operative evaluation for Procedure(s) (LRB):  ELECTROCONVULSIVE THERAPY (ECT) - SINGLE SEIZURE (N/A)    Allyssa Wright is a 38 y.o. female with MDD who is presenting with the above procedure.     ECT #44    Previous medications:   Methohexital 200 mg   Succinylcholine 120 mg  Esmolol 30 mg  Zofran 4 mg  Toradol 30 mg    Peak /96      Patient Active Problem List   Diagnosis    MDD (major depressive disorder), recurrent, in partial remission    Major depressive disorder, recurrent, in partial remission    MDD (major depressive disorder), severe    Major depressive disorder, recurrent    Other acne    Major depression, recurrent, chronic    MDD (major depressive disorder), recurrent episode, moderate    Major depressive disorder, recurrent severe without psychotic features    MDD (major depressive disorder), recurrent episode, severe    MDD (major depressive disorder)    Major depressive disorder, recurrent episode, in partial remission    Schizophrenia    Depression       Review of patient's allergies indicates:   Allergen Reactions    Benzodiazepines Other (See Comments)     Contraindicated while taking Clozapine    Ampicillin      Mom says so    Erythromycin     Levaquin [levofloxacin] Other (See Comments)     Depression side effects    Penicillins      Mom says so    Pristiq [desvenlafaxine]      psycotic      Sulfa (sulfonamide antibiotics)      Rash      Azithromycin Anxiety       Current Outpatient Prescriptions on File Prior to Visit   Medication Sig Dispense Refill    clozapine (CLOZARIL) 50 MG tablet Take 100 mg by mouth once daily.       dapsone 7.5 % GlwP Apply topically once daily.      dextroamphetamine-amphetamine 30 mg Tab Take 30 mg by mouth 2 (two) times daily.       famciclovir (FAMVIR) 500 MG tablet Take 1 tablet (500 mg total) by mouth 2 (two)  times daily. 30 tablet 12    mirtazapine (REMERON) 15 MG tablet Take 1 tablet (15 mg total) by mouth every evening. (Patient taking differently: Take 7.5 mg by mouth every evening. ) 90 tablet 0    spironolactone (ALDACTONE) 50 MG tablet 50 mg 2 (two) times daily. 50  mg qAM, 50 mg qHS.      thyroid (ARMOUR THYROID) 30 mg Tab Take 1 tablet (30 mg total) by mouth every morning. 30 tablet 11    trazodone (DESYREL) 100 MG tablet Take 200 mg by mouth every evening.       UNABLE TO FIND 2 (two) times daily. n-azetyl-cysteine      venlafaxine (EFFEXOR) 100 MG Tab Take 225 mg by mouth once daily.       vortioxetine (TRINTELLIX) 5 mg Tab Take 2.5 mg by mouth nightly.      ZOVIRAX 5 % Crea   5     No current facility-administered medications on file prior to visit.        Past Surgical History:   Procedure Laterality Date    ANKLE SURGERY Right     BREAST augmentation      OVARIAN CYST REMOVAL Bilateral        Social History     Social History    Marital status: Single     Spouse name: N/A    Number of children: N/A    Years of education: N/A     Occupational History    unemployed      Social History Main Topics    Smoking status: Former Smoker     Quit date: 4/6/2011    Smokeless tobacco: Never Used    Alcohol use Yes      Comment: rarely    Drug use: No      Comment: Experimental use in high school    Sexual activity: Not on file     Other Topics Concern    Not on file     Social History Narrative    Pt has 1 older brother from an intact family until the death of her mother in 2012.  She completed 1 year of college, was never in the , and has never been employed.  She was engaged once, but never , has no children, and lives with her father plus 2 dogs.  She denies any hobbies and is spiritual but not Worship.  She does date and returns to college during rare periods when depression and anxiety [place].     EKG: No recent studies.     Anesthesia Evaluation    I have reviewed the Patient  Summary Reports.     I have reviewed the Medications.     Review of Systems  Anesthesia Hx:  No problems with previous Anesthesia  History of prior surgery of interest to airway management or planning: Previous anesthesia: General  Procedure performed at an [facility]. Airway issues documented on chart review include mask, easy  Denies Family Hx of Anesthesia complications.   Denies Personal Hx of Anesthesia complications.   Social:  Former Smoker    Hematology/Oncology:  Hematology Normal   Oncology Normal     EENT/Dental:EENT/Dental Normal   Cardiovascular:   Denies Hypertension.  Denies MI.   Denies Dysrhythmias.   Denies Angina. ECG has been reviewed.    Pulmonary:  Pulmonary Normal    Renal/:  Renal/ Normal     Hepatic/GI:  Hepatic/GI Normal    Musculoskeletal:  Musculoskeletal Normal    Neurological:  Neurology Normal    Endocrine:   Hypothyroidism    Psych:   Psychiatric History          Physical Exam  General:  Well nourished    Airway/Jaw/Neck:  Airway Findings: Mouth Opening: Normal Tongue: Normal  General Airway Assessment: Adult  Mallampati: II  Improves to I with phonation.  TM Distance: Normal, at least 6 cm  Jaw/Neck Findings:  Neck ROM: Normal ROM  Neck Findings: Normal    Eyes/Ears/Nose:  EYES/EARS/NOSE FINDINGS: Normal   Dental:  Dental Findings: In tact   Chest/Lungs:  Chest/Lungs Findings: Normal Respiratory Rate, Clear to auscultation     Heart/Vascular:  Heart Findings: Rate: Normal  Rhythm: Regular Rhythm  Sounds: Normal  Heart murmur: negative    Abdomen:  Abdomen Findings: Normal    Musculoskeletal:  Musculoskeletal Findings: Normal   Skin:  Skin Findings: Normal    Mental Status:  Mental Status Findings:  Cooperative, Alert and Oriented         Anesthesia Plan  Type of Anesthesia, risks & benefits discussed:  Anesthesia Type:  general  Patient's Preference:   Intra-op Monitoring Plan: standard ASA monitors  Intra-op Monitoring Plan Comments:   Post Op Pain Control Plan: multimodal  analgesia and per primary service following discharge from PACU  Post Op Pain Control Plan Comments:   Induction:   IV  Beta Blocker:  Patient is on a Beta-Blocker and has received one dose within the past 24 hours (No further documentation required).       Informed Consent: Patient understands risks and agrees with Anesthesia plan.  Questions answered. Anesthesia consent signed with patient.  ASA Score: 2     Day of Surgery Review of History & Physical:    H&P update referred to the provider.         Ready For Surgery From Anesthesia Perspective.

## 2018-05-08 ENCOUNTER — ANESTHESIA (OUTPATIENT)
Dept: ELECTROPHYSIOLOGY | Facility: HOSPITAL | Age: 38
End: 2018-05-08
Payer: COMMERCIAL

## 2018-05-08 ENCOUNTER — HOSPITAL ENCOUNTER (OUTPATIENT)
Facility: HOSPITAL | Age: 38
Discharge: HOME | End: 2018-05-08
Attending: PSYCHIATRY & NEUROLOGY | Admitting: PSYCHIATRY & NEUROLOGY
Payer: COMMERCIAL

## 2018-05-08 VITALS
BODY MASS INDEX: 25.83 KG/M2 | HEIGHT: 65 IN | RESPIRATION RATE: 16 BRPM | TEMPERATURE: 99 F | HEART RATE: 77 BPM | DIASTOLIC BLOOD PRESSURE: 74 MMHG | SYSTOLIC BLOOD PRESSURE: 115 MMHG | WEIGHT: 155 LBS | OXYGEN SATURATION: 100 %

## 2018-05-08 DIAGNOSIS — F33.41 MDD (MAJOR DEPRESSIVE DISORDER), RECURRENT, IN PARTIAL REMISSION: ICD-10-CM

## 2018-05-08 DIAGNOSIS — F32.9 MDD (MAJOR DEPRESSIVE DISORDER): ICD-10-CM

## 2018-05-08 DIAGNOSIS — F33.9 RECURRENT MAJOR DEPRESSIVE DISORDER, REMISSION STATUS UNSPECIFIED: Primary | ICD-10-CM

## 2018-05-08 LAB
B-HCG UR QL: NEGATIVE
CTP QC/QA: YES

## 2018-05-08 PROCEDURE — 71000044 HC DOSC ROUTINE RECOVERY FIRST HOUR: Performed by: PSYCHIATRY & NEUROLOGY

## 2018-05-08 PROCEDURE — 63600175 PHARM REV CODE 636 W HCPCS: Performed by: STUDENT IN AN ORGANIZED HEALTH CARE EDUCATION/TRAINING PROGRAM

## 2018-05-08 PROCEDURE — 90870 ELECTROCONVULSIVE THERAPY: CPT | Mod: ,,, | Performed by: PSYCHIATRY & NEUROLOGY

## 2018-05-08 PROCEDURE — D9220A PRA ANESTHESIA: Mod: ,,, | Performed by: ANESTHESIOLOGY

## 2018-05-08 PROCEDURE — 81025 URINE PREGNANCY TEST: CPT | Performed by: PSYCHIATRY & NEUROLOGY

## 2018-05-08 PROCEDURE — 25000003 PHARM REV CODE 250: Performed by: STUDENT IN AN ORGANIZED HEALTH CARE EDUCATION/TRAINING PROGRAM

## 2018-05-08 PROCEDURE — 37000009 HC ANESTHESIA EA ADD 15 MINS: Performed by: PSYCHIATRY & NEUROLOGY

## 2018-05-08 PROCEDURE — 37000008 HC ANESTHESIA 1ST 15 MINUTES: Performed by: PSYCHIATRY & NEUROLOGY

## 2018-05-08 PROCEDURE — 25000003 PHARM REV CODE 250: Performed by: PSYCHIATRY & NEUROLOGY

## 2018-05-08 PROCEDURE — 90870 ELECTROCONVULSIVE THERAPY: CPT | Performed by: STUDENT IN AN ORGANIZED HEALTH CARE EDUCATION/TRAINING PROGRAM

## 2018-05-08 RX ORDER — ESMOLOL HYDROCHLORIDE 10 MG/ML
INJECTION INTRAVENOUS
Status: DISCONTINUED | OUTPATIENT
Start: 2018-05-08 | End: 2018-05-08

## 2018-05-08 RX ORDER — SUCCINYLCHOLINE CHLORIDE 20 MG/ML
INJECTION INTRAMUSCULAR; INTRAVENOUS
Status: COMPLETED
Start: 2018-05-08 | End: 2018-05-08

## 2018-05-08 RX ORDER — SUCCINYLCHOLINE CHLORIDE 20 MG/ML
INJECTION INTRAMUSCULAR; INTRAVENOUS
Status: DISCONTINUED | OUTPATIENT
Start: 2018-05-08 | End: 2018-05-08

## 2018-05-08 RX ORDER — LIDOCAINE HYDROCHLORIDE 10 MG/ML
1 INJECTION, SOLUTION EPIDURAL; INFILTRATION; INTRACAUDAL; PERINEURAL ONCE
Status: DISCONTINUED | OUTPATIENT
Start: 2018-05-08 | End: 2018-05-08 | Stop reason: HOSPADM

## 2018-05-08 RX ORDER — SODIUM CHLORIDE 0.9 % (FLUSH) 0.9 %
3 SYRINGE (ML) INJECTION
Status: DISCONTINUED | OUTPATIENT
Start: 2018-05-08 | End: 2018-05-08 | Stop reason: HOSPADM

## 2018-05-08 RX ORDER — KETOROLAC TROMETHAMINE 30 MG/ML
INJECTION, SOLUTION INTRAMUSCULAR; INTRAVENOUS
Status: DISCONTINUED | OUTPATIENT
Start: 2018-05-08 | End: 2018-05-08

## 2018-05-08 RX ORDER — LABETALOL HYDROCHLORIDE 5 MG/ML
INJECTION, SOLUTION INTRAVENOUS
Status: DISCONTINUED
Start: 2018-05-08 | End: 2018-05-08 | Stop reason: HOSPADM

## 2018-05-08 RX ORDER — KETOROLAC TROMETHAMINE 30 MG/ML
INJECTION, SOLUTION INTRAMUSCULAR; INTRAVENOUS
Status: COMPLETED
Start: 2018-05-08 | End: 2018-05-08

## 2018-05-08 RX ORDER — SODIUM CHLORIDE 9 MG/ML
INJECTION, SOLUTION INTRAVENOUS CONTINUOUS
Status: DISCONTINUED | OUTPATIENT
Start: 2018-05-09 | End: 2018-05-08 | Stop reason: HOSPADM

## 2018-05-08 RX ORDER — ESMOLOL HYDROCHLORIDE 10 MG/ML
INJECTION INTRAVENOUS
Status: COMPLETED
Start: 2018-05-08 | End: 2018-05-08

## 2018-05-08 RX ADMIN — Medication 500 MG: at 07:05

## 2018-05-08 RX ADMIN — KETOROLAC TROMETHAMINE 30 MG: 30 INJECTION, SOLUTION INTRAMUSCULAR at 07:05

## 2018-05-08 RX ADMIN — METHOHEXITAL SODIUM 200 MG: 500 INJECTION, POWDER, LYOPHILIZED, FOR SOLUTION INTRAMUSCULAR; INTRAVENOUS; RECTAL at 07:05

## 2018-05-08 RX ADMIN — ESMOLOL HYDROCHLORIDE 60 MG: 10 INJECTION INTRAVENOUS at 07:05

## 2018-05-08 RX ADMIN — SUCCINYLCHOLINE CHLORIDE 120 MG: 20 INJECTION, SOLUTION INTRAMUSCULAR; INTRAVENOUS at 07:05

## 2018-05-08 NOTE — PLAN OF CARE
Patient tolerated procedure/anesthesia well, vss, no distress noted or reported. Denies pain, denies nausea. Discharge instructions and return date reviewed with patient and father, verbalized understanding, consent with chart.

## 2018-05-08 NOTE — TRANSFER OF CARE
"Anesthesia Transfer of Care Note    Patient: Allyssa Wright    Procedure(s) Performed: Procedure(s) (LRB):  ELECTROCONVULSIVE THERAPY (ECT) - SINGLE SEIZURE (N/A)    Patient location: [facility]    Anesthesia Type: general    Transport from OR: Transported from OR on 6-10 L/min O2 by face mask with adequate spontaneous ventilation    Post pain: adequate analgesia    Post assessment: no apparent anesthetic complications    Post vital signs: stable    Level of consciousness: awake    Nausea/Vomiting: no nausea/vomiting    Complications: none    Transfer of care protocol was followed      Last vitals:   Visit Vitals  /68 (BP Location: Right arm, Patient Position: Lying)   Pulse 70   Temp 36.8 °C (98.2 °F) (Oral)   Resp 16   Ht 5' 5" (1.651 m)   Wt 70.3 kg (155 lb)   LMP  (Within Years)   SpO2 100%   Breastfeeding? No   BMI 25.79 kg/m²     "

## 2018-05-08 NOTE — ANESTHESIA POSTPROCEDURE EVALUATION
"Anesthesia Post Evaluation    Patient: Allyssa Wright    Procedure(s) Performed: Procedure(s) (LRB):  ELECTROCONVULSIVE THERAPY (ECT) - SINGLE SEIZURE (N/A)    Final Anesthesia Type: general  Patient location during evaluation: PACU  Patient participation: Yes- Able to Participate  Level of consciousness: awake and alert  Post-procedure vital signs: reviewed and stable  Pain management: adequate  Airway patency: patent  PONV status at discharge: No PONV  Anesthetic complications: no      Cardiovascular status: blood pressure returned to baseline  Respiratory status: unassisted  Hydration status: euvolemic  Follow-up not needed.        Visit Vitals  /74   Pulse 77   Temp 37.1 °C (98.8 °F) (Temporal)   Resp 16   Ht 5' 5" (1.651 m)   Wt 70.3 kg (155 lb)   LMP  (Within Years)   SpO2 100%   Breastfeeding? No   BMI 25.79 kg/m²       Pain/Roma Score: Pain Assessment Performed: Yes (5/8/2018  8:37 AM)  Presence of Pain: denies (5/8/2018  8:37 AM)  Roma Score: 10 (5/8/2018  8:37 AM)      "

## 2018-05-08 NOTE — ANESTHESIA PROCEDURE NOTES
ECT    Treatment Number: 43  Procedure start time: 5/8/2018 7:51 AM  Timeout performed at: 5/8/2018 7:50 AM  Procedure end time: 5/8/2018 7:52 AM    Staffing  Anesthesiologist: SALVADOR GALLEGOS  CRNA/Resident: LALITHA TUCKER  Performed by: anesthesiologist and resident/CRNA     Preanesthetic Checklist  The following were completed as part of the preanesthetic checklist: patient identified, procedural consent, pre-op evaluation, timeout performed, risks and benefits discussed, monitors and equipment checked, anesthesia consent given, oxygen available, suction available, hand hygiene performed and patient being monitored.    Setup & Induction  Patient Monitoring: heart rate, cardiac monitor, continuous pulse ox, continuous capnometry, NIBP and gas analyzer  Patient preparation: bite block inserted, extremities padded, mandibular stabilization and patient hyperventilated  Electrode Placement: Bitemporal    The patient was moved to the ECT therapy room after being assessed and consented for ECT. After standard ASA monitors were applied and timeout performed, the patient was adequately preoxygenated. After induction of general anesthesia, adequate oxygenation and ventilation were confirmed with pulse oxymetry and end tidal CO2 monitoring via bag-mask ventilation. End tidal CO2 was monitored throughout the case and moderate hyperventilation was performed prior to beginning ECT treatment. All extremities were padded, biteblock was inserted, and mandibular stabilization was done prior to initiating ECT therapy.    Procedure  Stimulus Number 1: Setting Number = I; 576 millicoulombs; Seizure Duration = 53 seconds            Recovery  After adequate recovery from general anesthesia, the patient was transported to recovery.  Baseline Blood Pressure: 120/80  Peak Blood Pressure: 200/110  Time to Recovery of Respirations: 1 minutes    ECT Findings  ECT associated findings of: Moderate Hypertension (SBP >160 or DBP  >100)

## 2018-05-08 NOTE — DISCHARGE INSTRUCTIONS
Understanding Electroconvulsive Therapy  Electroconvulsive therapy (ECT) is sometimes called shock therapy. This may sound painful, but ECT doesnt hurt. Its often the safest and best treatment for severe depression. It can treat other mental disorders as well.  What is electroconvulsive therapy?  ECT is used to treat people who are very depressed. Its mainly used when other treatments, such as antidepressant medicines, have failed. Often it may relieve feelings of sadness and despair after 2 to 4 treatments.  Common symptoms of major depression  Symptoms of major depression include:  · Feeling a deep sadness that doesnt go away  · Losing all pleasure in life  · Feeling hopeless or helpless  · Feeling guilty  · Sleeping more or less than normal  · Eating more or less than normal  · Having headaches or stomachaches, or other pains that dont go away  · Feeling nervous, empty, or worthless  · Crying a great deal  · Thinking or talking about suicide or death  How is ECT therapy done?  Before an ECT treatment, youll be given anesthesia to keep you pain-free. Youll also be given medicine to make you sleep, relax your muscles and control your heart rate. Your healthcare provider then places electrodes on your head. You may have one above each temple (bilateral ECT). Or you may have electrodes on one temple and on your forehead (unilateral ECT). While you are asleep, your brain is stimulated very briefly with an electric current. This causes a seizure, usually lasting less than a minute. Because you are under anesthesia, your body will not move even as your brain goes through great changes.  What are the risks?  When done properly, ECT is quite safe. Right after the treatment, you may be confused. This often lasts for less than half an hour. You may have a headache or stiff muscles. But these symptoms often go away quickly. A more serious possible side effect is memory loss. Commonly, people have short-term  (temporary) trouble remembering information that they learned recently. And they may have little recall of the time when they received treatment. Less commonly, people may have long-lasting (permanent) spotty recall of major past events. In rare cases, there may be memory loss for larger blocks of time.  Looking to the future  In most cases, ECT doesnt cure depression. But it can improve symptoms for a period of time. You may need a series of ECT treatments to continue feeling the benefit. You may also need to take antidepressant medicines to help prevent symptoms from returning. But with ongoing treatment, you can have a full and healthy life.  Resources  · The National [place] of Mental Health  731.554.9667  www.nimh.nih.gov  · [facility]  622.978.8816  www.[facility].org     Date Last Reviewed: 5/1/2017  © 6356-0245 [employer]. [address] [place] [address], [place] [address]. All rights reserved. This information is not intended as a substitute for professional medical care. Always follow your healthcare professional's instructions.

## 2018-05-08 NOTE — DISCHARGE SUMMARY
Allyssa Wright  : 1980   MRN: [id number]  Date: 2018     Electroconvulsive Therapy  Discharge Summary    Admit Date: 2018  5:51 AM  Discharge Date: 2018    Attending Physician: Shoaib Boyce Jr., MD   Discharge Provider: Terrence Quinones MD    History of Present Illness: Allyssa Wright is a 38 y.o. female with MDD presented for ECT #43. See H&P dated 2018 for full HPI. For further details, see Dr. Boyce's pre-ECT evaluation.    Hospital Course: The patient tolerated the ECT treatment well without complication. Patient was stable post-procedure. See OP note dated 2018 for more details.     Disposition: Home or Self Care    Medications:  Current Discharge Medication List      CONTINUE these medications which have NOT CHANGED    Details   clozapine (CLOZARIL) 50 MG tablet Take 100 mg by mouth once daily.       dapsone 7.5 % GlwP Apply topically once daily.      famciclovir (FAMVIR) 500 MG tablet Take 1 tablet (500 mg total) by mouth 2 (two) times daily.  Qty: 30 tablet, Refills: 12    Associated Diagnoses: Major depressive disorder, recurrent episode, moderate degree      mirtazapine (REMERON) 15 MG tablet Take 1 tablet (15 mg total) by mouth every evening.  Qty: 90 tablet, Refills: 0      spironolactone (ALDACTONE) 50 MG tablet 50 mg 2 (two) times daily. 50  mg qAM, 50 mg qHS.      thyroid (ARMOUR THYROID) 30 mg Tab Take 1 tablet (30 mg total) by mouth every morning.  Qty: 30 tablet, Refills: 11    Associated Diagnoses: Major depressive disorder, recurrent episode, moderate degree      trazodone (DESYREL) 100 MG tablet Take 200 mg by mouth every evening.       UNABLE TO FIND 2 (two) times daily. n-azetyl-cysteine      venlafaxine (EFFEXOR) 100 MG Tab Take 225 mg by mouth once daily.     Associated Diagnoses: MDD (major depressive disorder), recurrent, in partial remission      vortioxetine (TRINTELLIX) 5 mg Tab Take 2.5 mg by mouth nightly.      dextroamphetamine-amphetamine  30 mg Tab Take 30 mg by mouth 2 (two) times daily.     Associated Diagnoses: MDD (major depressive disorder), recurrent, in partial remission      ZOVIRAX 5 % Crea Refills: 5           Status at Discharge: Alert and medically stable    Discharge Diagnoses:  MDD (major depressive disorder), recurrent, in partial remission  Diet: Resume previous outpatient diet  Activity: Ambulate with assistance  Instructions: Please do not eat or drink anything after midnight prior to procedure. Please do not drive on day of ECT.    Med Changes:  None    Next ECT:   May 29, 2018     Terrence Quinones MD  5/8/2018

## 2018-05-08 NOTE — OP NOTE
Allyssa Wright  : 1980   MRN: [id number]  Date: 2018    Electroconvulsive Therapy  Operative Note    Date of Admission: 2018  5:51 AM    Site: [facility], [address]    Attending: Shoaib Boyce Jr., MD   Residents: Terrence Quinones MD  Pre-op Diagnosis: MDD (major depressive disorder), recurrent, in partial remission   ECT Treatment Number: 43  Machine Type: BrightLineta 5000    Patient Status: Medically stable    Vitals (pre-procedure):  Vitals:    18 0635   BP: 103/68   Pulse: 70   Resp: 16   Temp: 98.2 °F (36.8 °C)       Electrode Placement: Bitemporal    Stimulus Number Charge (mC) Level Pulse Width (msec) Frequency  (Hz) Duration of Stimulus (sec) Current (mA) Duration of Seizure (sec)   1 576 3 1 60 6 800 53                                   Complications: Hypertension    Maximum Blood Pressure: 143/110    Medications Given:  Caffeine 500 mg  IV  Before  Methohexital (Brevital).   200mg  IV  Before  Succinylcholine (Anectine).   120mg  IV  Before  Esmolol (Brevibloc).   60mg  IV  Before  Toradol 30mg IV Before    Treatment Course:  Patient tolerated procedure well. After adequate recovery from general anesthesia, the patient was transported to recovery.    Post-op Diagnosis: Same as above    Recommended for next ECT:  No change      Terrence Quinones MD  2018

## 2018-05-08 NOTE — H&P
"Allyssa Wright  : 1980   MRN: [id number]  Date: 2018     Electroconvulsive Therapy  History & Physical    Chief complaint: MDD (major depressive disorder), recurrent, in partial remissionProcedure: ECT #43    SUBJECTIVE:     HPI:   Allyssa Wright is a 38 y.o. female with MDD (major depressive disorder), recurrent, in partial remissionwho presents for ECT.    This morning pt reprots she is feeling good. Reports she has not relapsed since her last ECT. Mood has been good. Describes appetite as "too good." Pt has been having some trouble falling asleep. No physical complaints this morning. Pt denies side effects from last ECT. Denies SI/HI/AVH.     The patient denies any changes in dentition.   The patient does not need any prescriptions and has not had any med changes since meeting with Dr. Boyce.   The patient has not had anything by mouth since midnight and is ready for ECT.    Psychiatric Review of Systems:  Mood: Mild  Appetite: No problem  Psychomotor: No problem  Cognitive Impairment: None  Insomnia: Moderate  Psychosis: None  Diurnal Variation: None  Suicidal Ideation: Absent    Medical Review Of Systems:  A comprehensive review of systems was negative.    Current Medications:   No current facility-administered medications on file prior to encounter.      Current Outpatient Prescriptions on File Prior to Encounter   Medication Sig Dispense Refill    clozapine (CLOZARIL) 50 MG tablet Take 100 mg by mouth once daily.       dapsone 7.5 % GlwP Apply topically once daily.      famciclovir (FAMVIR) 500 MG tablet Take 1 tablet (500 mg total) by mouth 2 (two) times daily. 30 tablet 12    mirtazapine (REMERON) 15 MG tablet Take 1 tablet (15 mg total) by mouth every evening. (Patient taking differently: Take 7.5 mg by mouth every evening. ) 90 tablet 0    spironolactone (ALDACTONE) 50 MG tablet 50 mg 2 (two) times daily. 50  mg qAM, 50 mg qHS.      thyroid (ARMOUR THYROID) 30 mg Tab Take 1 tablet (30 mg " total) by mouth every morning. 30 tablet 11    trazodone (DESYREL) 100 MG tablet Take 200 mg by mouth every evening.       UNABLE TO FIND 2 (two) times daily. n-azetyl-cysteine      venlafaxine (EFFEXOR) 100 MG Tab Take 225 mg by mouth once daily.       vortioxetine (TRINTELLIX) 5 mg Tab Take 2.5 mg by mouth nightly.      dextroamphetamine-amphetamine 30 mg Tab Take 30 mg by mouth 2 (two) times daily.       ZOVIRAX 5 % Crea   5      Allergies:   Benzodiazepines; Ampicillin; Erythromycin; Levaquin [levofloxacin]; Penicillins; Pristiq [desvenlafaxine]; Sulfa (sulfonamide antibiotics); and Azithromycin    Past Medical/Surgical History:   Past Medical History:   Diagnosis Date    Anxiety     Depression     History of psychiatric hospitalization     HSV-1 (herpes simplex virus 1) infection     Hx of psychiatric care     Moderate depressed bipolar II disorder 06/13/2016    reports no history of bipolar    Obsessive-compulsive disorder     Psychiatric problem     Schizophrenia 4/3/2018    Self-harming behavior     Therapy      Past Surgical History:   Procedure Laterality Date    ANKLE SURGERY Right     BREAST augmentation      OVARIAN CYST REMOVAL Bilateral       OBJECTIVE:     Vitals (pre-procedure):  Vitals:    05/08/18 0635   BP: 103/68   Pulse: 70   Resp: 16   Temp: 98.2 °F (36.8 °C)        Labs/Imaging/Studies:   No results found for this or any previous visit (from the past 48 hour(s)).   No results found for: PHENYTOIN, PHENOBARB, VALPROATE, CBMZ    Physical Exam:   Gen: AAOx4, NAD  HEENT: MMM, PERRL, EOMI, O/P clear  CV: RRR, S1/S2 nml, no M/R/G  Chest: CTAB, no R/R/W, unlabored breathing  Abd: S/NT/ND, +BS, no HSM  Ext: No C/C/E, pulse 2+ throughout  Neuro: CN II-XII grossly intact, no focal deficits    Mental Status Exam:   Appearance: unremarkable, age appropriate  Behavior: normal, cooperative  Speech: normal tone, normal rate, normal pitch, normal volume  Mood: euthymic  Affect: Normal    Thought Process: normal and logical  Thought Content: normal, no suicidality, no homicidality, delusions, or paranoia  Cognition: grossly intact  Insight: good  Judgment: good    ASSESSMENT/PLAN:     Allyssa Wright is a 38 y.o. female with MDD (major depressive disorder), recurrent, in partial remission who presents for ECT.    Recommendations:   Proceed with ECT #43.      Terrence Quinones MD  5/8/2018

## 2018-05-28 ENCOUNTER — ANESTHESIA EVENT (OUTPATIENT)
Dept: ELECTROPHYSIOLOGY | Facility: HOSPITAL | Age: 38
End: 2018-05-28
Payer: COMMERCIAL

## 2018-05-28 NOTE — ANESTHESIA PREPROCEDURE EVALUATION
05/28/2018  Pre-operative evaluation for Procedure(s) (LRB):  ELECTROCONVULSIVE THERAPY (ECT) - SINGLE SEIZURE (N/A)    Allyssa Wright is a 38 y.o. female with MDD who is presenting with the above procedure.     ECT #45    Previous medications:   Methohexital 200 mg   Succinylcholine 120 mg  Esmolol 60 mg  Toradol 30 mg      Patient Active Problem List   Diagnosis    MDD (major depressive disorder), recurrent, in partial remission    Major depressive disorder, recurrent, in partial remission    MDD (major depressive disorder), severe    Major depressive disorder, recurrent    Other acne    Major depression, recurrent, chronic    MDD (major depressive disorder), recurrent episode, moderate    Major depressive disorder, recurrent severe without psychotic features    MDD (major depressive disorder), recurrent episode, severe    MDD (major depressive disorder)    Major depressive disorder, recurrent episode, in partial remission    Schizophrenia    Depression       Review of patient's allergies indicates:   Allergen Reactions    Benzodiazepines Other (See Comments)     Contraindicated while taking Clozapine    Ampicillin      Mom says so    Erythromycin     Levaquin [levofloxacin] Other (See Comments)     Depression side effects    Penicillins      Mom says so    Pristiq [desvenlafaxine]      psycotic      Sulfa (sulfonamide antibiotics)      Rash      Azithromycin Anxiety       Current Outpatient Prescriptions on File Prior to Visit   Medication Sig Dispense Refill    clozapine (CLOZARIL) 50 MG tablet Take 100 mg by mouth once daily.       dapsone 7.5 % GlwP Apply topically once daily.      dextroamphetamine-amphetamine 30 mg Tab Take 30 mg by mouth 2 (two) times daily.       famciclovir (FAMVIR) 500 MG tablet Take 1 tablet (500 mg total) by mouth 2 (two) times daily. 30 tablet 12     mirtazapine (REMERON) 15 MG tablet Take 1 tablet (15 mg total) by mouth every evening. (Patient taking differently: Take 7.5 mg by mouth every evening. ) 90 tablet 0    spironolactone (ALDACTONE) 50 MG tablet 50 mg 2 (two) times daily. 50  mg qAM, 50 mg qHS.      thyroid (ARMOUR THYROID) 30 mg Tab Take 1 tablet (30 mg total) by mouth every morning. 30 tablet 11    trazodone (DESYREL) 100 MG tablet Take 200 mg by mouth every evening.       UNABLE TO FIND 2 (two) times daily. n-azetyl-cysteine      venlafaxine (EFFEXOR) 100 MG Tab Take 225 mg by mouth once daily.       vortioxetine (TRINTELLIX) 5 mg Tab Take 2.5 mg by mouth nightly.      ZOVIRAX 5 % Crea   5     No current facility-administered medications on file prior to visit.        Past Surgical History:   Procedure Laterality Date    ANKLE SURGERY Right     BREAST augmentation      OVARIAN CYST REMOVAL Bilateral        Social History     Social History    Marital status: Single     Spouse name: N/A    Number of children: N/A    Years of education: N/A     Occupational History    unemployed      Social History Main Topics    Smoking status: Former Smoker     Quit date: 4/6/2011    Smokeless tobacco: Never Used    Alcohol use Yes      Comment: rarely    Drug use: No      Comment: Experimental use in high school    Sexual activity: Not on file     Other Topics Concern    Not on file     Social History Narrative    Pt has 1 older brother from an intact family until the death of her mother in 2012.  She completed 1 year of college, was never in the , and has never been employed.  She was engaged once, but never , has no children, and lives with her father plus 2 dogs.  She denies any hobbies and is spiritual but not Denominational.  She does date and returns to college during rare periods when depression and anxiety [place].     EKG: No recent studies.     Anesthesia Evaluation    I have reviewed the Patient Summary Reports.     I have  reviewed the Medications.     Review of Systems  Anesthesia Hx:  No problems with previous Anesthesia  History of prior surgery of interest to airway management or planning: Previous anesthesia: General  Procedure performed at an [facility]. Airway issues documented on chart review include mask, easy  Denies Family Hx of Anesthesia complications.   Denies Personal Hx of Anesthesia complications.   Social:  Former Smoker    Hematology/Oncology:  Hematology Normal   Oncology Normal     EENT/Dental:EENT/Dental Normal   Cardiovascular:   Denies Hypertension.  Denies MI.   Denies Dysrhythmias.   Denies Angina. ECG has been reviewed.    Pulmonary:  Pulmonary Normal    Renal/:  Renal/ Normal     Hepatic/GI:  Hepatic/GI Normal    Musculoskeletal:  Musculoskeletal Normal    Neurological:  Neurology Normal    Endocrine:   Hypothyroidism    Psych:   Psychiatric History          Physical Exam  General:  Well nourished    Airway/Jaw/Neck:  Airway Findings: Mouth Opening: Normal Tongue: Normal  General Airway Assessment: Adult  Mallampati: II  Improves to I with phonation.  TM Distance: Normal, at least 6 cm  Jaw/Neck Findings:  Neck ROM: Normal ROM  Neck Findings: Normal    Eyes/Ears/Nose:  EYES/EARS/NOSE FINDINGS: Normal   Dental:  Dental Findings: In tact   Chest/Lungs:  Chest/Lungs Findings: Normal Respiratory Rate, Clear to auscultation     Heart/Vascular:  Heart Findings: Rate: Normal  Rhythm: Regular Rhythm  Sounds: Normal  Heart murmur: negative    Abdomen:  Abdomen Findings: Normal    Musculoskeletal:  Musculoskeletal Findings: Normal   Skin:  Skin Findings: Normal    Mental Status:  Mental Status Findings:  Cooperative, Alert and Oriented         Anesthesia Plan  Type of Anesthesia, risks & benefits discussed:  Anesthesia Type:  general  Patient's Preference:   Intra-op Monitoring Plan: standard ASA monitors  Intra-op Monitoring Plan Comments:   Post Op Pain Control Plan: multimodal analgesia and per primary  service following discharge from PACU  Post Op Pain Control Plan Comments:   Induction:   IV  Beta Blocker:  Patient is on a Beta-Blocker and has received one dose within the past 24 hours (No further documentation required).       Informed Consent: Patient understands risks and agrees with Anesthesia plan.  Questions answered. Anesthesia consent signed with patient.  ASA Score: 2     Day of Surgery Review of History & Physical:    H&P update referred to the provider.         Ready For Surgery From Anesthesia Perspective.

## 2018-05-29 ENCOUNTER — SURGERY (OUTPATIENT)
Age: 38
End: 2018-05-29

## 2018-05-29 ENCOUNTER — HOSPITAL ENCOUNTER (OUTPATIENT)
Facility: HOSPITAL | Age: 38
Discharge: HOME | End: 2018-05-29
Attending: PSYCHIATRY & NEUROLOGY | Admitting: PSYCHIATRY & NEUROLOGY
Payer: COMMERCIAL

## 2018-05-29 ENCOUNTER — ANESTHESIA (OUTPATIENT)
Dept: ELECTROPHYSIOLOGY | Facility: HOSPITAL | Age: 38
End: 2018-05-29
Payer: COMMERCIAL

## 2018-05-29 VITALS
OXYGEN SATURATION: 100 % | RESPIRATION RATE: 18 BRPM | TEMPERATURE: 99 F | SYSTOLIC BLOOD PRESSURE: 119 MMHG | DIASTOLIC BLOOD PRESSURE: 79 MMHG | HEART RATE: 65 BPM

## 2018-05-29 DIAGNOSIS — F32.9 MDD (MAJOR DEPRESSIVE DISORDER): ICD-10-CM

## 2018-05-29 DIAGNOSIS — F33.41 MDD (MAJOR DEPRESSIVE DISORDER), RECURRENT, IN PARTIAL REMISSION: ICD-10-CM

## 2018-05-29 DIAGNOSIS — F33.9 RECURRENT MAJOR DEPRESSIVE DISORDER, REMISSION STATUS UNSPECIFIED: Primary | ICD-10-CM

## 2018-05-29 LAB
B-HCG UR QL: NEGATIVE
CTP QC/QA: YES

## 2018-05-29 PROCEDURE — 71000044 HC DOSC ROUTINE RECOVERY FIRST HOUR: Performed by: PSYCHIATRY & NEUROLOGY

## 2018-05-29 PROCEDURE — D9220A PRA ANESTHESIA: Mod: ,,, | Performed by: ANESTHESIOLOGY

## 2018-05-29 PROCEDURE — 25000003 PHARM REV CODE 250: Performed by: STUDENT IN AN ORGANIZED HEALTH CARE EDUCATION/TRAINING PROGRAM

## 2018-05-29 PROCEDURE — 37000009 HC ANESTHESIA EA ADD 15 MINS: Performed by: PSYCHIATRY & NEUROLOGY

## 2018-05-29 PROCEDURE — 90870 ELECTROCONVULSIVE THERAPY: CPT | Mod: ,,, | Performed by: PSYCHIATRY & NEUROLOGY

## 2018-05-29 PROCEDURE — 37000008 HC ANESTHESIA 1ST 15 MINUTES: Performed by: PSYCHIATRY & NEUROLOGY

## 2018-05-29 PROCEDURE — 81025 URINE PREGNANCY TEST: CPT | Performed by: PSYCHIATRY & NEUROLOGY

## 2018-05-29 PROCEDURE — 90870 ELECTROCONVULSIVE THERAPY: CPT | Performed by: ANESTHESIOLOGY

## 2018-05-29 PROCEDURE — 25000003 PHARM REV CODE 250: Performed by: PSYCHIATRY & NEUROLOGY

## 2018-05-29 PROCEDURE — 63600175 PHARM REV CODE 636 W HCPCS: Performed by: STUDENT IN AN ORGANIZED HEALTH CARE EDUCATION/TRAINING PROGRAM

## 2018-05-29 RX ORDER — SUCCINYLCHOLINE CHLORIDE 20 MG/ML
INJECTION INTRAMUSCULAR; INTRAVENOUS
Status: DISCONTINUED | OUTPATIENT
Start: 2018-05-29 | End: 2018-05-29

## 2018-05-29 RX ORDER — ROCURONIUM BROMIDE 10 MG/ML
INJECTION, SOLUTION INTRAVENOUS
Status: DISCONTINUED
Start: 2018-05-29 | End: 2018-05-29 | Stop reason: WASHOUT

## 2018-05-29 RX ORDER — ESMOLOL HYDROCHLORIDE 10 MG/ML
INJECTION INTRAVENOUS
Status: DISCONTINUED
Start: 2018-05-29 | End: 2018-05-29 | Stop reason: HOSPADM

## 2018-05-29 RX ORDER — KETOROLAC TROMETHAMINE 30 MG/ML
INJECTION, SOLUTION INTRAMUSCULAR; INTRAVENOUS
Status: DISCONTINUED | OUTPATIENT
Start: 2018-05-29 | End: 2018-05-29

## 2018-05-29 RX ORDER — SUCCINYLCHOLINE CHLORIDE 20 MG/ML
INJECTION INTRAMUSCULAR; INTRAVENOUS
Status: COMPLETED
Start: 2018-05-29 | End: 2018-05-29

## 2018-05-29 RX ORDER — LABETALOL HYDROCHLORIDE 5 MG/ML
INJECTION, SOLUTION INTRAVENOUS
Status: COMPLETED
Start: 2018-05-29 | End: 2018-05-29

## 2018-05-29 RX ORDER — SODIUM CHLORIDE 0.9 % (FLUSH) 0.9 %
3 SYRINGE (ML) INJECTION
Status: DISCONTINUED | OUTPATIENT
Start: 2018-05-29 | End: 2018-05-29 | Stop reason: HOSPADM

## 2018-05-29 RX ORDER — LIDOCAINE HYDROCHLORIDE 10 MG/ML
1 INJECTION, SOLUTION EPIDURAL; INFILTRATION; INTRACAUDAL; PERINEURAL ONCE
Status: DISCONTINUED | OUTPATIENT
Start: 2018-05-29 | End: 2018-05-29 | Stop reason: HOSPADM

## 2018-05-29 RX ORDER — KETOROLAC TROMETHAMINE 30 MG/ML
INJECTION, SOLUTION INTRAMUSCULAR; INTRAVENOUS
Status: COMPLETED
Start: 2018-05-29 | End: 2018-05-29

## 2018-05-29 RX ORDER — LABETALOL HYDROCHLORIDE 5 MG/ML
INJECTION, SOLUTION INTRAVENOUS
Status: DISCONTINUED | OUTPATIENT
Start: 2018-05-29 | End: 2018-05-29

## 2018-05-29 RX ORDER — SODIUM CHLORIDE 9 MG/ML
INJECTION, SOLUTION INTRAVENOUS CONTINUOUS
Status: DISCONTINUED | OUTPATIENT
Start: 2018-05-30 | End: 2018-05-29 | Stop reason: HOSPADM

## 2018-05-29 RX ADMIN — LABETALOL HYDROCHLORIDE 10 MG: 5 INJECTION INTRAVENOUS at 07:05

## 2018-05-29 RX ADMIN — KETOROLAC TROMETHAMINE 30 MG: 30 INJECTION, SOLUTION INTRAMUSCULAR at 07:05

## 2018-05-29 RX ADMIN — METHOHEXITAL SODIUM 200 MG: 500 INJECTION, POWDER, LYOPHILIZED, FOR SOLUTION INTRAMUSCULAR; INTRAVENOUS; RECTAL at 08:05

## 2018-05-29 RX ADMIN — SUCCINYLCHOLINE CHLORIDE 120 MG: 20 INJECTION, SOLUTION INTRAMUSCULAR; INTRAVENOUS at 08:05

## 2018-05-29 RX ADMIN — Medication 500 MG: at 07:05

## 2018-05-29 RX ADMIN — LABETALOL HYDROCHLORIDE 10 MG: 5 INJECTION INTRAVENOUS at 08:05

## 2018-05-29 NOTE — ANESTHESIA PROCEDURE NOTES
ECT    Treatment Number: 44  Procedure start time: 5/29/2018 8:00 AM  Timeout performed at: 5/29/2018 8:00 AM  Procedure end time: 5/29/2018 8:06 AM    Staffing  Anesthesiologist: TOBIN CASTLE  CRNA/Resident: LALITHA TUCKER  Performed by: anesthesiologist and resident/CRNA     Preanesthetic Checklist  The following were completed as part of the preanesthetic checklist: patient identified, procedural consent, pre-op evaluation, timeout performed, risks and benefits discussed, monitors and equipment checked, anesthesia consent given, oxygen available, suction available, hand hygiene performed and patient being monitored.    Setup & Induction  Patient Monitoring: heart rate, cardiac monitor, continuous pulse ox, continuous capnometry, NIBP and gas analyzer  Patient preparation: patient hyperventilated, mandibular stabilization, extremities padded and bite block inserted  Electrode Placement: Bitemporal    The patient was moved to the ECT therapy room after being assessed and consented for ECT. After standard ASA monitors were applied and timeout performed, the patient was adequately preoxygenated. After induction of general anesthesia, adequate oxygenation and ventilation were confirmed with pulse oxymetry and end tidal CO2 monitoring via bag-mask ventilation. End tidal CO2 was monitored throughout the case and moderate hyperventilation was performed prior to beginning ECT treatment. All extremities were padded, biteblock was inserted, and mandibular stabilization was done prior to initiating ECT therapy.    Procedure  Stimulus Number 1: Setting Number = I; 576 millicoulombs; Seizure Duration = 36 seconds            Recovery  After adequate recovery from general anesthesia, the patient was transported to recovery.  Baseline Blood Pressure: 120/80  Peak Blood Pressure: 158/113  Time to Recovery of Respirations: 1 minutes    ECT Findings  ECT associated findings of: None

## 2018-05-29 NOTE — OP NOTE
Allyssa Wright  : 1980   MRN: [id number]  Date: 2018    Electroconvulsive Therapy  Operative Note    Date of Admission: 2018  6:02 AM    Site: [facility], [address]    Attending: Shoaib Boyce Jr., MD   Residents: Terrence Quinones MD  Pre-op Diagnosis: MDD (major depressive disorder), recurrent, in partial remission   ECT Treatment Number: 44  Machine Type: [employer] 5000    Patient Status: Medically stable    Vitals (pre-procedure):  There were no vitals filed for this visit.    Electrode Placement: Bitemporal    Stimulus Number Charge (mC) Level Pulse Width (msec) Frequency  (Hz) Duration of Stimulus (sec) Current (mA) Duration of Seizure (sec)   1 576 3 1 60 6 800 36                                   Complications: Hypertension    Maximum Blood Pressure: 158/113    Medications Given:  Caffeine 500 mg  IV  Before  Methohexital (Brevital).   200mg  IV  Before  Succinylcholine (Anectine).   120mg  IV  Before  Labetolol 30mg IV during  Toradol 30mg IV Before    Treatment Course:  Patient tolerated procedure well. After adequate recovery from general anesthesia, the patient was transported to recovery.    Post-op Diagnosis: Same as above    Recommended for next ECT:  No change      Terrence Quinones MD  2018

## 2018-05-29 NOTE — PROGRESS NOTES
Plan of care reviewed with pt, she verbalized understanding, pt progressing with plan of care, pt is aaox3, denies nausea & pain, tolerating PO, reviewed all DC instructions, home meds, when to call MD, when to follow-up, answered questions.

## 2018-05-29 NOTE — DISCHARGE SUMMARY
Allyssa Wright  : 1980   MRN: [id number]  Date: 2018     Electroconvulsive Therapy  Discharge Summary    Admit Date: 2018  6:02 AM  Discharge Date: 2018    Attending Physician: Shoaib Boyce Jr., MD   Discharge Provider: Terrence Quinones MD    History of Present Illness: Allyssa Wright is a 38 y.o. female with MDD presented for ECT #44. See H&P dated 2018 for full HPI. For further details, see Dr. Boyce's pre-ECT evaluation.    Hospital Course: The patient tolerated the ECT treatment well without complication. Patient was stable post-procedure. See OP note dated 2018 for more details.     Disposition: Home or Self Care    Medications:  Current Discharge Medication List      CONTINUE these medications which have NOT CHANGED    Details   clozapine (CLOZARIL) 50 MG tablet Take 100 mg by mouth once daily.       dapsone 7.5 % GlwP Apply topically once daily.      dextroamphetamine-amphetamine 30 mg Tab Take 30 mg by mouth 2 (two) times daily.     Associated Diagnoses: MDD (major depressive disorder), recurrent, in partial remission      famciclovir (FAMVIR) 500 MG tablet Take 1 tablet (500 mg total) by mouth 2 (two) times daily.  Qty: 30 tablet, Refills: 12    Associated Diagnoses: Major depressive disorder, recurrent episode, moderate degree      mirtazapine (REMERON) 15 MG tablet Take 1 tablet (15 mg total) by mouth every evening.  Qty: 90 tablet, Refills: 0      spironolactone (ALDACTONE) 50 MG tablet 50 mg 2 (two) times daily. 50  mg qAM, 50 mg qHS.      thyroid (ARMOUR THYROID) 30 mg Tab Take 1 tablet (30 mg total) by mouth every morning.  Qty: 30 tablet, Refills: 11    Associated Diagnoses: Major depressive disorder, recurrent episode, moderate degree      trazodone (DESYREL) 100 MG tablet Take 200 mg by mouth every evening.       UNABLE TO FIND 2 (two) times daily. n-azetyl-cysteine      venlafaxine (EFFEXOR) 100 MG Tab Take 225 mg by mouth once daily.     Associated  Diagnoses: MDD (major depressive disorder), recurrent, in partial remission      vortioxetine (TRINTELLIX) 5 mg Tab Take 2.5 mg by mouth nightly.      ZOVIRAX 5 % Crea Refills: 5           Status at Discharge: Alert and medically stable    Discharge Diagnoses:  MDD (major depressive disorder), recurrent, in partial remission  Diet: Resume previous outpatient diet  Activity: Ambulate with assistance  Instructions: Please do not eat or drink anything after midnight prior to procedure. Please do not drive on day of ECT.    Med Changes:  None    Next ECT:   June 18, 2018     Terrence Quinones MD  5/29/2018

## 2018-05-29 NOTE — DISCHARGE INSTRUCTIONS
Home Care Instructions E.C.T    ACTIVITY LEVEL:  You may feel sleepy for several hours. It is best to rest until you are more awake and then gradually resume your normal activities in one day. It is recommended, because of the possibility of memory loss and slight confusion post ECT, that you rest in the company of a responsible adult. This confusion and memory loss is expected and should diminish over time. It is also recommended that you do not drive or operate any electrical appliances or machinery during the ECT treatment series. Ask your Doctor, at the time of your treatment, anyquestions you may have about specific activities.    DIET:  You may wish to start with liquids after your ECT treatment and gradually resume your normal diet. Do not consume alcoholic beverages during the ECT treatment series.    BATHING:  You may shower or bathe as desired.    MEDICATIONS:  Resume all home medications starting with the AM doses not taken prior to ECT treatment. If you experience a headache, it is okay to take your usual pain reliever.    WHEN TO CALL THE DOCTOR:   Headache, memory loss or confusion that does not begin to resolve within 24 hours.    RETURN APPOINTMENT:  Report to Day Surgery ([facility]) on (date)__Monday, __June 18, 2018__ for (time)___0600_____. Remember you may not eat or drink after midnight, but please take heart or high blood pressure medicines with a sip of water in the morning prior to leaving home.    FOR EMERGENCIES:  If any unusual problems or difficulties occur, contact the office [phone] Monday-Friday until 3:00 p.m. After hours, call the hospital  [phone] and ask for the Psychiatry Resident On-call.

## 2018-05-29 NOTE — TRANSFER OF CARE
Anesthesia Transfer of Care Note    Patient: Allyssa Wright    Procedure(s) Performed: Procedure(s) (LRB):  ELECTROCONVULSIVE THERAPY (ECT) - SINGLE SEIZURE (N/A)    Patient location: [facility]    Anesthesia Type: general    Transport from OR: Transported from OR on 6-10 L/min O2 by face mask with adequate spontaneous ventilation    Post pain: adequate analgesia    Post assessment: no apparent anesthetic complications    Post vital signs: stable    Level of consciousness: awake    Nausea/Vomiting: no nausea/vomiting    Complications: none    Transfer of care protocol was followed      Last vitals:   Visit Vitals  Breastfeeding? No

## 2018-05-29 NOTE — H&P
"Allyssa Wright  : 1980   MRN: [id number]  Date: 2018     Electroconvulsive Therapy  History & Physical    Chief complaint: MDD (major depressive disorder), recurrent, in partial remissionProcedure: ECT #44    SUBJECTIVE:     HPI:   Allyssa Wright is a 38 y.o. female with MDD (major depressive disorder), recurrent, in partial remissionwho presents for ECT.    This morning pt reprots she is feeling good. Reports she has not relapsed since her last ECT. Mood has been good. Describes appetite as "too good." Pt has been sleeping well. No physical complaints this morning. Pt denies side effects from last ECT. Denies SI/HI/AVH.     The patient denies any changes in dentition.   The patient does not need any prescriptions and has not had any med changes since meeting with Dr. Boyce.   The patient has not had anything by mouth since midnight and is ready for ECT.    Psychiatric Review of Systems:  Mood: Mild  Appetite: No problem  Psychomotor: No problem  Cognitive Impairment: None  Insomnia: Moderate  Psychosis: None  Diurnal Variation: None  Suicidal Ideation: Absent    Medical Review Of Systems:  A comprehensive review of systems was negative.    Current Medications:   No current facility-administered medications on file prior to encounter.      Current Outpatient Prescriptions on File Prior to Encounter   Medication Sig Dispense Refill    clozapine (CLOZARIL) 50 MG tablet Take 100 mg by mouth once daily.       dapsone 7.5 % GlwP Apply topically once daily.      dextroamphetamine-amphetamine 30 mg Tab Take 30 mg by mouth 2 (two) times daily.       famciclovir (FAMVIR) 500 MG tablet Take 1 tablet (500 mg total) by mouth 2 (two) times daily. 30 tablet 12    mirtazapine (REMERON) 15 MG tablet Take 1 tablet (15 mg total) by mouth every evening. (Patient taking differently: Take 7.5 mg by mouth every evening. ) 90 tablet 0    spironolactone (ALDACTONE) 50 MG tablet 50 mg 2 (two) times daily. 50  mg qAM, 50 mg " qHS.      thyroid (ARMOUR THYROID) 30 mg Tab Take 1 tablet (30 mg total) by mouth every morning. 30 tablet 11    trazodone (DESYREL) 100 MG tablet Take 200 mg by mouth every evening.       UNABLE TO FIND 2 (two) times daily. n-azetyl-cysteine      venlafaxine (EFFEXOR) 100 MG Tab Take 225 mg by mouth once daily.       vortioxetine (TRINTELLIX) 5 mg Tab Take 2.5 mg by mouth nightly.      ZOVIRAX 5 % Crea   5      Allergies:   Benzodiazepines; Ampicillin; Erythromycin; Levaquin [levofloxacin]; Penicillins; Pristiq [desvenlafaxine]; Sulfa (sulfonamide antibiotics); and Azithromycin    Past Medical/Surgical History:   Past Medical History:   Diagnosis Date    Anxiety     Depression     History of psychiatric hospitalization     HSV-1 (herpes simplex virus 1) infection     Hx of psychiatric care     Moderate depressed bipolar II disorder 06/13/2016    reports no history of bipolar    Obsessive-compulsive disorder     Psychiatric problem     Schizophrenia 4/3/2018    Self-harming behavior     Therapy      Past Surgical History:   Procedure Laterality Date    ANKLE SURGERY Right     BREAST augmentation      OVARIAN CYST REMOVAL Bilateral       OBJECTIVE:     Vitals (pre-procedure):  There were no vitals filed for this visit.     Labs/Imaging/Studies:   No results found for this or any previous visit (from the past 48 hour(s)).   No results found for: PHENYTOIN, PHENOBARB, VALPROATE, CBMZ    Physical Exam:   Gen: AAOx4, NAD  HEENT: MMM, PERRL, EOMI, O/P clear  CV: RRR, S1/S2 nml, no M/R/G  Chest: CTAB, no R/R/W, unlabored breathing  Abd: S/NT/ND, +BS, no HSM  Ext: No C/C/E, pulse 2+ throughout  Neuro: CN II-XII grossly intact, no focal deficits    Mental Status Exam:   Appearance: unremarkable, age appropriate  Behavior: normal, cooperative  Speech: normal tone, normal rate, normal pitch, normal volume  Mood: euthymic  Affect: Normal   Thought Process: normal and logical  Thought Content: normal, no  suicidality, no homicidality, delusions, or paranoia  Cognition: grossly intact  Insight: good  Judgment: good    ASSESSMENT/PLAN:     Allyssa Wright is a 38 y.o. female with MDD (major depressive disorder), recurrent, in partial remission who presents for ECT.    Recommendations:   Proceed with ECT #44.      Terrence Quinones MD  5/29/2018

## 2018-05-30 NOTE — ANESTHESIA POSTPROCEDURE EVALUATION
Anesthesia Post Evaluation    Patient: Allyssa Wright    Procedure(s) Performed: Procedure(s) (LRB):  ELECTROCONVULSIVE THERAPY (ECT) - SINGLE SEIZURE (N/A)    Final Anesthesia Type: general  Patient location during evaluation: PACU  Patient participation: Yes- Able to Participate  Level of consciousness: awake and alert  Pain management: adequate  Airway patency: patent  PONV status at discharge: No PONV  Anesthetic complications: no      Cardiovascular status: blood pressure returned to baseline  Respiratory status: unassisted, spontaneous ventilation and room air  Hydration status: euvolemic  Follow-up not needed.        Visit Vitals  /79   Pulse 65   Temp 37 °C (98.6 °F) (Temporal)   Resp 18   SpO2 100%   Breastfeeding? No       Pain/Roma Score: Pain Assessment Performed: Yes (5/29/2018  8:55 AM)  Presence of Pain: denies (5/29/2018  8:55 AM)  Roma Score: 10 (5/29/2018  8:55 AM)

## 2018-06-13 DIAGNOSIS — F33.9 RECURRENT MAJOR DEPRESSIVE DISORDER, REMISSION STATUS UNSPECIFIED: Primary | ICD-10-CM

## 2018-06-18 ENCOUNTER — TELEPHONE (OUTPATIENT)
Dept: PSYCHIATRY | Facility: CLINIC | Age: 38
End: 2018-06-18

## 2018-06-18 NOTE — TELEPHONE ENCOUNTER
Spoke with patient's father Bill. Requesting patient's ECT procedure to be pushed back to next Monday 6/25/18 due to patient feeling ill this AM. Orders put in for new case request.

## 2018-06-25 ENCOUNTER — ANESTHESIA (OUTPATIENT)
Dept: ELECTROPHYSIOLOGY | Facility: HOSPITAL | Age: 38
End: 2018-06-25
Payer: COMMERCIAL

## 2018-06-25 ENCOUNTER — HOSPITAL ENCOUNTER (OUTPATIENT)
Facility: HOSPITAL | Age: 38
Discharge: HOME | End: 2018-06-25
Attending: PSYCHIATRY & NEUROLOGY | Admitting: PSYCHIATRY & NEUROLOGY
Payer: COMMERCIAL

## 2018-06-25 ENCOUNTER — ANESTHESIA EVENT (OUTPATIENT)
Dept: ELECTROPHYSIOLOGY | Facility: HOSPITAL | Age: 38
End: 2018-06-25
Payer: COMMERCIAL

## 2018-06-25 VITALS
HEIGHT: 65 IN | WEIGHT: 155 LBS | HEART RATE: 70 BPM | DIASTOLIC BLOOD PRESSURE: 84 MMHG | TEMPERATURE: 98 F | SYSTOLIC BLOOD PRESSURE: 120 MMHG | OXYGEN SATURATION: 100 % | RESPIRATION RATE: 16 BRPM | BODY MASS INDEX: 25.83 KG/M2

## 2018-06-25 DIAGNOSIS — F33.41 MDD (MAJOR DEPRESSIVE DISORDER), RECURRENT, IN PARTIAL REMISSION: Primary | ICD-10-CM

## 2018-06-25 DIAGNOSIS — F33.9 RECURRENT MAJOR DEPRESSIVE DISORDER, REMISSION STATUS UNSPECIFIED: Primary | ICD-10-CM

## 2018-06-25 LAB
B-HCG UR QL: NEGATIVE
CTP QC/QA: YES

## 2018-06-25 PROCEDURE — 25000003 PHARM REV CODE 250: Performed by: STUDENT IN AN ORGANIZED HEALTH CARE EDUCATION/TRAINING PROGRAM

## 2018-06-25 PROCEDURE — 71000044 HC DOSC ROUTINE RECOVERY FIRST HOUR: Performed by: PSYCHIATRY & NEUROLOGY

## 2018-06-25 PROCEDURE — D9220A PRA ANESTHESIA: Mod: ,,, | Performed by: ANESTHESIOLOGY

## 2018-06-25 PROCEDURE — 37000008 HC ANESTHESIA 1ST 15 MINUTES: Performed by: PSYCHIATRY & NEUROLOGY

## 2018-06-25 PROCEDURE — 63600175 PHARM REV CODE 636 W HCPCS: Performed by: STUDENT IN AN ORGANIZED HEALTH CARE EDUCATION/TRAINING PROGRAM

## 2018-06-25 PROCEDURE — 81025 URINE PREGNANCY TEST: CPT | Performed by: PSYCHIATRY & NEUROLOGY

## 2018-06-25 PROCEDURE — 37000009 HC ANESTHESIA EA ADD 15 MINS: Performed by: PSYCHIATRY & NEUROLOGY

## 2018-06-25 PROCEDURE — 25000003 PHARM REV CODE 250: Performed by: PSYCHIATRY & NEUROLOGY

## 2018-06-25 PROCEDURE — 90870 ELECTROCONVULSIVE THERAPY: CPT | Performed by: ANESTHESIOLOGY

## 2018-06-25 RX ORDER — LABETALOL HYDROCHLORIDE 5 MG/ML
INJECTION, SOLUTION INTRAVENOUS
Status: COMPLETED
Start: 2018-06-25 | End: 2018-06-25

## 2018-06-25 RX ORDER — SUCCINYLCHOLINE CHLORIDE 20 MG/ML
INJECTION INTRAMUSCULAR; INTRAVENOUS
Status: DISCONTINUED | OUTPATIENT
Start: 2018-06-25 | End: 2018-06-25

## 2018-06-25 RX ORDER — KETOROLAC TROMETHAMINE 30 MG/ML
INJECTION, SOLUTION INTRAMUSCULAR; INTRAVENOUS
Status: COMPLETED
Start: 2018-06-25 | End: 2018-06-25

## 2018-06-25 RX ORDER — SUCCINYLCHOLINE CHLORIDE 20 MG/ML
INJECTION INTRAMUSCULAR; INTRAVENOUS
Status: COMPLETED
Start: 2018-06-25 | End: 2018-06-25

## 2018-06-25 RX ORDER — KETOROLAC TROMETHAMINE 30 MG/ML
INJECTION, SOLUTION INTRAMUSCULAR; INTRAVENOUS
Status: DISCONTINUED | OUTPATIENT
Start: 2018-06-25 | End: 2018-06-25

## 2018-06-25 RX ORDER — MIRTAZAPINE 15 MG/1
15 TABLET, FILM COATED ORAL NIGHTLY
Qty: 90 TABLET | Refills: 0
Start: 2018-06-25

## 2018-06-25 RX ORDER — ONDANSETRON 2 MG/ML
INJECTION INTRAMUSCULAR; INTRAVENOUS
Status: DISCONTINUED | OUTPATIENT
Start: 2018-06-25 | End: 2018-06-25

## 2018-06-25 RX ORDER — LABETALOL HYDROCHLORIDE 5 MG/ML
INJECTION, SOLUTION INTRAVENOUS
Status: DISCONTINUED | OUTPATIENT
Start: 2018-06-25 | End: 2018-06-25

## 2018-06-25 RX ORDER — VENLAFAXINE 100 MG/1
300 TABLET ORAL DAILY
Start: 2018-06-25

## 2018-06-25 RX ORDER — DOXYCYCLINE 100 MG/1
50 CAPSULE ORAL DAILY
Status: ON HOLD | COMMUNITY
End: 2018-07-05 | Stop reason: CLARIF

## 2018-06-25 RX ORDER — SODIUM CHLORIDE 0.9 % (FLUSH) 0.9 %
3 SYRINGE (ML) INJECTION
Status: DISCONTINUED | OUTPATIENT
Start: 2018-06-25 | End: 2018-06-25 | Stop reason: HOSPADM

## 2018-06-25 RX ORDER — LIDOCAINE HYDROCHLORIDE 10 MG/ML
1 INJECTION, SOLUTION EPIDURAL; INFILTRATION; INTRACAUDAL; PERINEURAL ONCE
Status: COMPLETED | OUTPATIENT
Start: 2018-06-25 | End: 2018-06-25

## 2018-06-25 RX ORDER — VILAZODONE HYDROCHLORIDE 10 MG/1
5 TABLET ORAL DAILY
Qty: 15 TABLET | Refills: 11 | Status: ON HOLD
Start: 2018-06-25 | End: 2018-07-05 | Stop reason: CLARIF

## 2018-06-25 RX ORDER — SODIUM CHLORIDE 9 MG/ML
INJECTION, SOLUTION INTRAVENOUS CONTINUOUS
Status: DISCONTINUED | OUTPATIENT
Start: 2018-06-25 | End: 2018-06-25 | Stop reason: HOSPADM

## 2018-06-25 RX ORDER — ONDANSETRON 2 MG/ML
INJECTION INTRAMUSCULAR; INTRAVENOUS
Status: COMPLETED
Start: 2018-06-25 | End: 2018-06-25

## 2018-06-25 RX ADMIN — LIDOCAINE HYDROCHLORIDE: 10 INJECTION, SOLUTION EPIDURAL; INFILTRATION; INTRACAUDAL; PERINEURAL at 09:06

## 2018-06-25 RX ADMIN — Medication 500 MG: at 09:06

## 2018-06-25 RX ADMIN — KETOROLAC TROMETHAMINE 30 MG: 30 INJECTION, SOLUTION INTRAMUSCULAR at 09:06

## 2018-06-25 RX ADMIN — SUCCINYLCHOLINE CHLORIDE 120 MG: 20 INJECTION, SOLUTION INTRAMUSCULAR; INTRAVENOUS at 09:06

## 2018-06-25 RX ADMIN — LABETALOL HYDROCHLORIDE 20 MG: 5 INJECTION INTRAVENOUS at 09:06

## 2018-06-25 RX ADMIN — SODIUM CHLORIDE: 0.9 INJECTION, SOLUTION INTRAVENOUS at 09:06

## 2018-06-25 RX ADMIN — METHOHEXITAL SODIUM 200 MG: 500 INJECTION, POWDER, LYOPHILIZED, FOR SOLUTION INTRAMUSCULAR; INTRAVENOUS; RECTAL at 09:06

## 2018-06-25 RX ADMIN — ONDANSETRON 4 MG: 2 INJECTION, SOLUTION INTRAMUSCULAR; INTRAVENOUS at 09:06

## 2018-06-25 NOTE — OP NOTE
Allyssa Wright  : 1980   MRN: [id number]  Date: 2018    Electroconvulsive Therapy  Operative Note    Date of Admission: 2018  7:12 AM    Site: [facility], [address]    Attending: Shoaib Boyce Jr., MD   Residents: Christina Baker MD  Pre-op Diagnosis: MDD, recurrent, in partial remission    ECT Treatment Number: 45  Machine Type: [employer] 5000    Patient Status: Medically stable    Vitals (pre-procedure):  Vitals:    18 0924   BP: 122/75   Pulse: 80   Resp: 20   Temp: 97 °F (36.1 °C)       Electrode Placement: Bitemporal    Stimulus Number Charge (mC) Level Pulse Width (msec) Frequency  (Hz) Duration of Stimulus (sec) Current (mA) Duration of Seizure (sec)   1 576 3 1 60 6 800 76                                   Complications: Hypertension    Maximum Blood Pressure: 125/89    Medications Given:  Caffeine 500 mg IV before  Methohexital (Brevital) 200 mg  IV before  Succinylcholine (Anectine) 120 mg  IV before  Labetolol 20 mg IV during  Toradol 30 mg IV before    Treatment Course:  Patient tolerated procedure well. After adequate recovery from general anesthesia, the patient was transported to recovery.    Post-op Diagnosis: Same as above    Recommended for next ECT:       Christina Baker MD  2018

## 2018-06-25 NOTE — DISCHARGE INSTRUCTIONS
Understanding Electroconvulsive Therapy  Electroconvulsive therapy (ECT) is sometimes called shock therapy. This may sound painful, but ECT doesnt hurt. Its often the safest and best treatment for severe depression. It can treat other mental disorders as well.  What is electroconvulsive therapy?  ECT is used to treat people who are very depressed. Its mainly used when other treatments, such as antidepressant medicines, have failed. Often it may relieve feelings of sadness and despair after 2 to 4 treatments.  Common symptoms of major depression  Symptoms of major depression include:  · Feeling a deep sadness that doesnt go away  · Losing all pleasure in life  · Feeling hopeless or helpless  · Feeling guilty  · Sleeping more or less than normal  · Eating more or less than normal  · Having headaches or stomachaches, or other pains that dont go away  · Feeling nervous, empty, or worthless  · Crying a great deal  · Thinking or talking about suicide or death  How is ECT therapy done?  Before an ECT treatment, youll be given anesthesia to keep you pain-free. Youll also be given medicine to make you sleep, relax your muscles and control your heart rate. Your healthcare provider then places electrodes on your head. You may have one above each temple (bilateral ECT). Or you may have electrodes on one temple and on your forehead (unilateral ECT). While you are asleep, your brain is stimulated very briefly with an electric current. This causes a seizure, usually lasting less than a minute. Because you are under anesthesia, your body will not move even as your brain goes through great changes.  What are the risks?  When done properly, ECT is quite safe. Right after the treatment, you may be confused. This often lasts for less than half an hour. You may have a headache or stiff muscles. But these symptoms often go away quickly. A more serious possible side effect is memory loss. Commonly, people have short-term  (temporary) trouble remembering information that they learned recently. And they may have little recall of the time when they received treatment. Less commonly, people may have long-lasting (permanent) spotty recall of major past events. In rare cases, there may be memory loss for larger blocks of time.  Looking to the future  In most cases, ECT doesnt cure depression. But it can improve symptoms for a period of time. You may need a series of ECT treatments to continue feeling the benefit. You may also need to take antidepressant medicines to help prevent symptoms from returning. But with ongoing treatment, you can have a full and healthy life.  Resources  · The [employer] [place] [employer]  641.566.7921  www.[facility].nih.gov  · [facility]  850.165.7441  www.[facility].org     Date Last Reviewed: 5/1/2017 © 2000-2017 [employer]. [address] [place] [address]. All rights reserved. This information is not intended as a substitute for professional medical care. Always follow your healthcare professional's instructions.      PATIENT INSTRUCTIONS  POST-ANESTHESIA    IMMEDIATELY FOLLOWING SURGERY:  Do not drive or operate machinery for the first twenty four hours after surgery.  Do not make any important decisions for twenty four hours after surgery or while taking narcotic pain medications or sedatives.  If you develop intractable nausea and vomiting or a severe headache please notify your doctor immediately.    FOLLOW-UP:  Please make an appointment with your surgeon as instructed. You do not need to follow up with anesthesia unless specifically instructed to do so.      QUESTIONS?:  Please feel free to call your physician or the hospital  if you have any questions, and they will be happy to assist you.       [facility] Anesthesia Department  [address] [facility]  [phone]

## 2018-06-25 NOTE — ANESTHESIA PREPROCEDURE EVALUATION
06/25/2018  Allyssa Wright is a 38 y.o., female.    Pre-operative evaluation for Procedure(s) (LRB):  ELECTROCONVULSIVE THERAPY, CEREBRAL HEMISPHERE, UNILATERAL, 1 SEIZURE (Bilateral)    Allyssa Wright is a 38 y.o. female with MDD who is presenting with the above procedure.   ECT #46     Previous medications:   Methohexital 200 mg   Succinylcholine 120 mg  Labetalol 30 mg  Toradol 30 mg  Zofran 4mg      Patient Active Problem List   Diagnosis    MDD (major depressive disorder), recurrent, in partial remission    Major depressive disorder, recurrent, in partial remission    MDD (major depressive disorder), severe    Major depressive disorder, recurrent    Other acne    Major depression, recurrent, chronic    MDD (major depressive disorder), recurrent episode, moderate    Major depressive disorder, recurrent severe without psychotic features    MDD (major depressive disorder), recurrent episode, severe    MDD (major depressive disorder)    Major depressive disorder, recurrent episode, in partial remission    Schizophrenia    Depression       Review of patient's allergies indicates:   Allergen Reactions    Benzodiazepines Other (See Comments)     Contraindicated while taking Clozapine    Ampicillin      Mom says so    Erythromycin     Levaquin [levofloxacin] Other (See Comments)     Depression side effects    Penicillins      Mom says so    Pristiq [desvenlafaxine]      psycotic      Sulfa (sulfonamide antibiotics)      Rash      Azithromycin Anxiety        No current facility-administered medications on file prior to encounter.      Current Outpatient Prescriptions on File Prior to Encounter   Medication Sig Dispense Refill    clozapine (CLOZARIL) 50 MG tablet Take 100 mg by mouth once daily.       dapsone 7.5 % GlwP Apply topically once daily.      dextroamphetamine-amphetamine 30 mg  Tab Take 30 mg by mouth 2 (two) times daily.       famciclovir (FAMVIR) 500 MG tablet Take 1 tablet (500 mg total) by mouth 2 (two) times daily. 30 tablet 12    mirtazapine (REMERON) 15 MG tablet Take 1 tablet (15 mg total) by mouth every evening. (Patient taking differently: Take 7.5 mg by mouth every evening. ) 90 tablet 0    spironolactone (ALDACTONE) 50 MG tablet 50 mg 2 (two) times daily. 50  mg qAM, 50 mg qHS.      thyroid (ARMOUR THYROID) 30 mg Tab Take 1 tablet (30 mg total) by mouth every morning. 30 tablet 11    trazodone (DESYREL) 100 MG tablet Take 200 mg by mouth every evening.       UNABLE TO FIND 2 (two) times daily. n-azetyl-cysteine      venlafaxine (EFFEXOR) 100 MG Tab Take 225 mg by mouth once daily.       vortioxetine (TRINTELLIX) 5 mg Tab Take 2.5 mg by mouth nightly.      ZOVIRAX 5 % Crea   5       Past Surgical History:   Procedure Laterality Date    ANKLE SURGERY Right     BREAST augmentation      OVARIAN CYST REMOVAL Bilateral        Social History     Social History    Marital status: Single     Spouse name: N/A    Number of children: N/A    Years of education: N/A     Occupational History    unemployed      Social History Main Topics    Smoking status: Former Smoker     Quit date: 4/6/2011    Smokeless tobacco: Never Used    Alcohol use Yes      Comment: rarely    Drug use: No      Comment: Experimental use in high school    Sexual activity: Not on file     Other Topics Concern    Not on file     Social History Narrative    Pt has 1 older brother from an intact family until the death of her mother in 2012.  She completed 1 year of college, was never in the , and has never been employed.  She was engaged once, but never , has no children, and lives with her father plus 2 dogs.  She denies any hobbies and is spiritual but not Jehovah's witness.  She does date and returns to college during rare periods when depression and anxiety [place].         Vital Signs Range  (Last 24H):           Anesthesia Evaluation    I have reviewed the Patient Summary Reports.     I have reviewed the Medications.     Review of Systems  Anesthesia Hx:  No problems with previous Anesthesia  History of prior surgery of interest to airway management or planning: Previous anesthesia: General  Procedure performed at an [facility]. Airway issues documented on chart review include mask, easy  Denies Family Hx of Anesthesia complications.   Denies Personal Hx of Anesthesia complications.   Social:  Former Smoker    Hematology/Oncology:  Hematology Normal   Oncology Normal     EENT/Dental:EENT/Dental Normal   Cardiovascular:   Denies Hypertension.  Denies MI.   Denies Dysrhythmias.   Denies Angina. ECG has been reviewed.    Pulmonary:  Pulmonary Normal    Renal/:  Renal/ Normal     Hepatic/GI:  Hepatic/GI Normal    Musculoskeletal:  Musculoskeletal Normal    Neurological:  Neurology Normal    Endocrine:   Hypothyroidism    Psych:   Psychiatric History          Physical Exam  General:  Well nourished    Airway/Jaw/Neck:  Airway Findings: Mouth Opening: Normal Tongue: Normal  General Airway Assessment: Adult  Mallampati: II  Improves to I with phonation.  TM Distance: Normal, at least 6 cm  Jaw/Neck Findings:  Neck ROM: Normal ROM  Neck Findings: Normal    Eyes/Ears/Nose:  EYES/EARS/NOSE FINDINGS: Normal   Dental:  Dental Findings: In tact   Chest/Lungs:  Chest/Lungs Findings: Normal Respiratory Rate, Clear to auscultation     Heart/Vascular:  Heart Findings: Rate: Normal  Rhythm: Regular Rhythm  Sounds: Normal  Heart murmur: negative    Abdomen:  Abdomen Findings: Normal    Musculoskeletal:  Musculoskeletal Findings: Normal   Skin:  Skin Findings: Normal    Mental Status:  Mental Status Findings:  Cooperative, Alert and Oriented         Anesthesia Plan  Type of Anesthesia, risks & benefits discussed:  Anesthesia Type:  general  Patient's Preference:   Intra-op Monitoring Plan: standard ASA  monitors  Intra-op Monitoring Plan Comments:   Post Op Pain Control Plan: multimodal analgesia and per primary service following discharge from PACU  Post Op Pain Control Plan Comments:   Induction:   IV  Beta Blocker:  Patient is on a Beta-Blocker and has received one dose within the past 24 hours (No further documentation required).       Informed Consent: Patient understands risks and agrees with Anesthesia plan.  Questions answered. Anesthesia consent signed with patient.  ASA Score: 2     Day of Surgery Review of History & Physical:    H&P update referred to the provider.         Ready For Surgery From Anesthesia Perspective.

## 2018-06-25 NOTE — H&P
Allyssa Wright  : 1980   MRN: [id number]  Date: 2018     Electroconvulsive Therapy  History & Physical    Chief complaint: MDD, recurrent, in partial remission  Procedure: ECT #45    SUBJECTIVE:     HPI:   Allyssa Wright is a 38 y.o. female with MDD, recurrent, in partial remission who presents for ECT. Last ECT was on 18.     Pt presents for ECT this morning. She missed her appt last week due to feeling sick. She states that she has been feeling more depressed lately. She attributes this to increasing her dose of spironolactone a few weeks ago due to increased acne breakouts. She reports this has caused her to be more depressed in the past. She went back down to her usual dose when she noticed decrease in mood, however she is still having significant anhedonia, decreased energy, PMR. She also stopped taking her Trintellix about 1 week ago due to perceived hair loss. She was started on Viibryd by her outpatient psychiatrist Dr. Jc. She is currently taking Viibryd 5 mg qhs. She has been having some passive SI, but denies any active plans or intentions to harm herself. She feels safe at home, however thinks that she may need to increase frequency of ECT treatments this week to help relieve this episode of depression.     The patient denies any changes in dentition.   The patient does not need any prescriptions and has not had any med changes since meeting with Dr. Boyce.   The patient has not had anything by mouth since midnight and is ready for ECT.    Psychiatric Review of Systems:  Mood: Moderate  Appetite: No problem  Psychomotor: yes, PMR  Cognitive Impairment: None  Insomnia: None  Psychosis: None  Diurnal Variation: None  Suicidal Ideation: +Passive    Medical Review Of Systems:  A comprehensive review of systems was negative.    Current Medications:   No current facility-administered medications on file prior to encounter.      Current Outpatient Prescriptions on File Prior to Encounter    Medication Sig Dispense Refill    clozapine (CLOZARIL) 50 MG tablet Take 100 mg by mouth once daily.       dapsone 7.5 % GlwP Apply topically once daily.      dextroamphetamine-amphetamine 30 mg Tab Take 30 mg by mouth 2 (two) times daily.       famciclovir (FAMVIR) 500 MG tablet Take 1 tablet (500 mg total) by mouth 2 (two) times daily. 30 tablet 12    mirtazapine (REMERON) 15 MG tablet Take 1 tablet (15 mg total) by mouth every evening. (Patient taking differently: Take 7.5 mg by mouth every evening. ) 90 tablet 0    spironolactone (ALDACTONE) 50 MG tablet 50 mg 2 (two) times daily. 50  mg qAM, 50 mg qHS.      thyroid (ARMOUR THYROID) 30 mg Tab Take 1 tablet (30 mg total) by mouth every morning. 30 tablet 11    trazodone (DESYREL) 100 MG tablet Take 200 mg by mouth every evening.       UNABLE TO FIND 2 (two) times daily. n-azetyl-cysteine      venlafaxine (EFFEXOR) 100 MG Tab Take 225 mg by mouth once daily.       ZOVIRAX 5 % Crea   5    vortioxetine (TRINTELLIX) 5 mg Tab Take 2.5 mg by mouth nightly.     Taking differently than above:  Remeron 15 mg qhs  Effexor 300 mg daily  Discontinued Effexor  Started Viibryd 5 mg qhs     Allergies:   Benzodiazepines; Ampicillin; Erythromycin; Levaquin [levofloxacin]; Penicillins; Pristiq [desvenlafaxine]; Sulfa (sulfonamide antibiotics); and Azithromycin    Past Medical/Surgical History:   Past Medical History:   Diagnosis Date    Anxiety     Depression     History of psychiatric hospitalization     HSV-1 (herpes simplex virus 1) infection     Hx of psychiatric care     Moderate depressed bipolar II disorder 06/13/2016    reports no history of bipolar    Obsessive-compulsive disorder     Psychiatric problem     Schizophrenia 4/3/2018    Self-harming behavior     Therapy      Past Surgical History:   Procedure Laterality Date    ANKLE SURGERY Right     BREAST augmentation      OVARIAN CYST REMOVAL Bilateral       OBJECTIVE:     Vitals  "(pre-procedure):  Vitals:    06/25/18 0924   BP: 122/75   Pulse: 80   Resp: 20   Temp: 97 °F (36.1 °C)        Labs/Imaging/Studies:   No results found for this or any previous visit (from the past 48 hour(s)).   No results found for: PHENYTOIN, PHENOBARB, VALPROATE, CBMZ    Physical Exam:   Gen: AAOx4, NAD  HEENT: MMM, PERRL, EOMI, O/P clear  CV: RRR, S1/S2 nml, no M/R/G  Chest: CTAB, no R/R/W, unlabored breathing  Abd: S/NT/ND, +BS, no HSM  Ext: No C/C/E, pulse 2+ throughout  Neuro: CN II-XII grossly intact, no focal deficits    Mental Status Exam:   Appearance: age appropriate, well nourished, casually dressed  Behavior: friendly and cooperative, eye contact normal  Speech: normal tone, normal rate, normal pitch, normal volume  Mood: "not too good"  Affect: full, reactive  Thought Process: linear and oriented  Thought Content: +passive SI, no active SI, no HI or AVH  Cognition: grossly intact  Insight: good  Judgment: good    ASSESSMENT/PLAN:     Allyssa Wright is a 38 y.o. female with MDD, recurrent, in partial remission who presents for ECT.    Recommendations:   Proceed with ECT #45      Christina Baker MD  6/25/2018  "

## 2018-06-25 NOTE — TRANSFER OF CARE
"Anesthesia Transfer of Care Note    Patient: Allyssa Wright    Procedure(s) Performed: Procedure(s) (LRB):  ELECTROCONVULSIVE THERAPY, CEREBRAL HEMISPHERE, UNILATERAL, 1 SEIZURE (Bilateral)    Patient location: [facility]    Anesthesia Type: general    Transport from OR: Transported from OR on room air with adequate spontaneous ventilation    Post pain: adequate analgesia    Post assessment: no apparent anesthetic complications    Post vital signs: stable    Level of consciousness: sedated    Nausea/Vomiting: no nausea/vomiting    Complications: none    Transfer of care protocol was followed      Last vitals:   Visit Vitals  /78   Pulse 74   Temp 36.1 °C (97 °F) (Temporal)   Resp 16   Ht 5' 5" (1.651 m)   Wt 70.3 kg (155 lb)   SpO2 100%   Breastfeeding? No   BMI 25.79 kg/m²     "

## 2018-06-25 NOTE — DISCHARGE SUMMARY
Allyssa Wright  : 1980   MRN: [id number]  Date: 2018     Electroconvulsive Therapy  Discharge Summary    Admit Date: 2018  7:12 AM  Discharge Date: 2018    Attending Physician: Shoaib Boyce Jr., MD   Discharge Provider: Christina Baker MD    History of Present Illness: Allyssa Wright is a 38 y.o. female with MDD presented for ECT #45. See H&P dated 2018 for full HPI. For further details, see Dr. Boyce's pre-ECT evaluation.    Hospital Course: The patient tolerated the ECT treatment well without complication. Patient was stable post-procedure. See OP note dated 2018 for more details.     Disposition: Home or Self Care    Medications:  Current Discharge Medication List      START taking these medications    Details   vilazodone (VIIBRYD) 10 mg Tab tablet Take 0.5 tablets (5 mg total) by mouth once daily.  Qty: 15 tablet, Refills: 11         CONTINUE these medications which have CHANGED    Details   mirtazapine (REMERON) 15 MG tablet Take 1 tablet (15 mg total) by mouth every evening.  Qty: 90 tablet, Refills: 0      venlafaxine (EFFEXOR) 100 MG Tab Take 3 tablets (300 mg total) by mouth once daily.    Associated Diagnoses: MDD (major depressive disorder), recurrent, in partial remission         CONTINUE these medications which have NOT CHANGED    Details   clozapine (CLOZARIL) 50 MG tablet Take 100 mg by mouth once daily.       dapsone 7.5 % GlwP Apply topically once daily.      dextroamphetamine-amphetamine 30 mg Tab Take 30 mg by mouth 2 (two) times daily.     Associated Diagnoses: MDD (major depressive disorder), recurrent, in partial remission      doxycycline (VIBRAMYCIN) 100 MG Cap Take 50 mg by mouth once daily.      famciclovir (FAMVIR) 500 MG tablet Take 1 tablet (500 mg total) by mouth 2 (two) times daily.  Qty: 30 tablet, Refills: 12    Associated Diagnoses: Major depressive disorder, recurrent episode, moderate degree      spironolactone (ALDACTONE) 50  MG tablet 50 mg 2 (two) times daily. 50  mg qAM, 50 mg qHS.      thyroid (ARMOUR THYROID) 30 mg Tab Take 1 tablet (30 mg total) by mouth every morning.  Qty: 30 tablet, Refills: 11    Associated Diagnoses: Major depressive disorder, recurrent episode, moderate degree      trazodone (DESYREL) 100 MG tablet Take 200 mg by mouth every evening.       UNABLE TO FIND 2 (two) times daily. n-azetyl-cysteine      ZOVIRAX 5 % Crea Refills: 5         STOP taking these medications       vortioxetine (TRINTELLIX) 5 mg Tab Comments:   Reason for Stopping:             Status at Discharge: Alert and medically stable    Discharge Diagnoses: MDD, recurrent, in partial remission   Diet: Resume previous outpatient diet  Activity: Ambulate with assistance  Instructions: Please do not eat or drink anything after midnight prior to procedure. Please do not drive on day of ECT.    Med Changes:  None    Next ECT: 6/28/18  Follow-up Information     [facility] ECT On 6/28/2018.    Why:  ECT  Contact information:  [facility] [facility] [address]                  Christina Baker MD  6/25/2018

## 2018-06-26 NOTE — ANESTHESIA POSTPROCEDURE EVALUATION
"Anesthesia Post Evaluation    Patient: Allyssa Wright    Procedure(s) Performed: Procedure(s) (LRB):  ELECTROCONVULSIVE THERAPY, CEREBRAL HEMISPHERE, UNILATERAL, 1 SEIZURE (Bilateral)    Final Anesthesia Type: general  Patient location during evaluation: PACU  Patient participation: Yes- Able to Participate  Level of consciousness: awake and alert, oriented and awake  Post-procedure vital signs: reviewed and stable  Pain management: adequate  Airway patency: patent  PONV status at discharge: No PONV  Anesthetic complications: no      Cardiovascular status: blood pressure returned to baseline and hemodynamically stable  Respiratory status: unassisted, spontaneous ventilation and room air  Hydration status: euvolemic  Follow-up not needed.        Visit Vitals  /84   Pulse 70   Temp 36.6 °C (97.8 °F) (Temporal)   Resp 16   Ht 5' 5" (1.651 m)   Wt 70.3 kg (155 lb)   SpO2 100%   Breastfeeding? No   BMI 25.79 kg/m²       Pain/Roma Score: Pain Assessment Performed: Yes (6/25/2018 10:34 AM)  Presence of Pain: denies (6/25/2018 10:34 AM)  Roma Score: 10 (6/25/2018 10:19 AM)      "

## 2018-06-27 ENCOUNTER — ANESTHESIA EVENT (OUTPATIENT)
Dept: ELECTROPHYSIOLOGY | Facility: HOSPITAL | Age: 38
End: 2018-06-27
Payer: COMMERCIAL

## 2018-06-27 NOTE — ANESTHESIA PREPROCEDURE EVALUATION
06/27/2018  Allyssa Wright is a 38 y.o., female.    Pre-operative evaluation for Procedure(s) (LRB):  ELECTROCONVULSIVE THERAPY, CEREBRAL HEMISPHERE, UNILATERAL, 1 SEIZURE (Bilateral)    Allyssa Wright is a 38 y.o. female with MDD who is presenting with the above procedure.   ECT #47     Previous medications:   Methohexital 200 mg   Succinylcholine 120 mg  Labetalol 20 mg  Toradol 30 mg  Zofran 4mg      Patient Active Problem List   Diagnosis    MDD (major depressive disorder), recurrent, in partial remission    Major depressive disorder, recurrent, in partial remission    MDD (major depressive disorder), severe    Major depressive disorder, recurrent    Other acne    Major depression, recurrent, chronic    MDD (major depressive disorder), recurrent episode, moderate    Major depressive disorder, recurrent severe without psychotic features    MDD (major depressive disorder), recurrent episode, severe    MDD (major depressive disorder)    Major depressive disorder, recurrent episode, in partial remission    Schizophrenia    Depression       Review of patient's allergies indicates:   Allergen Reactions    Benzodiazepines Other (See Comments)     Contraindicated while taking Clozapine    Ampicillin      Mom says so    Erythromycin     Levaquin [levofloxacin] Other (See Comments)     Depression side effects    Penicillins      Mom says so    Pristiq [desvenlafaxine]      psycotic      Sulfa (sulfonamide antibiotics)      Rash      Azithromycin Anxiety        Current Outpatient Prescriptions on File Prior to Visit   Medication Sig Dispense Refill    clozapine (CLOZARIL) 50 MG tablet Take 100 mg by mouth once daily.       dapsone 7.5 % GlwP Apply topically once daily.      dextroamphetamine-amphetamine 30 mg Tab Take 30 mg by mouth 2 (two) times daily.       doxycycline (VIBRAMYCIN) 100 MG  Cap Take 50 mg by mouth once daily.      famciclovir (FAMVIR) 500 MG tablet Take 1 tablet (500 mg total) by mouth 2 (two) times daily. 30 tablet 12    mirtazapine (REMERON) 15 MG tablet Take 1 tablet (15 mg total) by mouth every evening. 90 tablet 0    spironolactone (ALDACTONE) 50 MG tablet 50 mg 2 (two) times daily. 50  mg qAM, 50 mg qHS.      thyroid (ARMOUR THYROID) 30 mg Tab Take 1 tablet (30 mg total) by mouth every morning. 30 tablet 11    trazodone (DESYREL) 100 MG tablet Take 200 mg by mouth every evening.       UNABLE TO FIND 2 (two) times daily. n-azetyl-cysteine      venlafaxine (EFFEXOR) 100 MG Tab Take 3 tablets (300 mg total) by mouth once daily.      vilazodone (VIIBRYD) 10 mg Tab tablet Take 0.5 tablets (5 mg total) by mouth once daily. 15 tablet 11    ZOVIRAX 5 % Crea   5     No current facility-administered medications on file prior to visit.        Past Surgical History:   Procedure Laterality Date    ANKLE SURGERY Right     BREAST augmentation      OVARIAN CYST REMOVAL Bilateral        Social History     Social History    Marital status: Single     Spouse name: N/A    Number of children: N/A    Years of education: N/A     Occupational History    unemployed      Social History Main Topics    Smoking status: Former Smoker     Quit date: 4/6/2011    Smokeless tobacco: Never Used    Alcohol use Yes      Comment: rarely    Drug use: No      Comment: Experimental use in high school    Sexual activity: Not on file     Other Topics Concern    Not on file     Social History Narrative    Pt has 1 older brother from an intact family until the death of her mother in 2012.  She completed 1 year of college, was never in the , and has never been employed.  She was engaged once, but never , has no children, and lives with her father plus 2 dogs.  She denies any hobbies and is spiritual but not Pentecostal.  She does date and returns to college during rare periods when  depression and anxiety [place].         Vital Signs Range (Last 24H):  BP: ()/()   Arterial Line BP: ()/()         Pre-op Assessment    I have reviewed the Patient Summary Reports.      I have reviewed the Medications.     Review of Systems  Anesthesia Hx:  No problems with previous Anesthesia  History of prior surgery of interest to airway management or planning: Previous anesthesia: General  Procedure performed at an [facility]. Airway issues documented on chart review include mask, easy  Denies Family Hx of Anesthesia complications.   Denies Personal Hx of Anesthesia complications.   Social:  Former Smoker    Hematology/Oncology:  Hematology Normal   Oncology Normal     EENT/Dental:EENT/Dental Normal   Cardiovascular:   Denies Hypertension.  Denies MI.   Denies Dysrhythmias.   Denies Angina. ECG has been reviewed.    Pulmonary:  Pulmonary Normal    Renal/:  Renal/ Normal     Hepatic/GI:  Hepatic/GI Normal    Musculoskeletal:  Musculoskeletal Normal    Neurological:  Neurology Normal    Endocrine:   Hypothyroidism    Psych:   Psychiatric History          Physical Exam  General:  Well nourished    Airway/Jaw/Neck:  Airway Findings: Mouth Opening: Normal Tongue: Normal  General Airway Assessment: Adult  Mallampati: II  Improves to I with phonation.  TM Distance: Normal, at least 6 cm  Jaw/Neck Findings:  Neck ROM: Normal ROM  Neck Findings: Normal    Eyes/Ears/Nose:  EYES/EARS/NOSE FINDINGS: Normal   Dental:  Dental Findings: In tact   Chest/Lungs:  Chest/Lungs Findings: Normal Respiratory Rate, Clear to auscultation     Heart/Vascular:  Heart Findings: Rate: Normal  Rhythm: Regular Rhythm  Sounds: Normal  Heart murmur: negative    Abdomen:  Abdomen Findings: Normal    Musculoskeletal:  Musculoskeletal Findings: Normal   Skin:  Skin Findings: Normal    Mental Status:  Mental Status Findings:  Cooperative, Alert and Oriented         Anesthesia Plan  Type of Anesthesia, risks & benefits discussed:  Anesthesia  Type:  general  Patient's Preference:   Intra-op Monitoring Plan: standard ASA monitors  Intra-op Monitoring Plan Comments:   Post Op Pain Control Plan:   Post Op Pain Control Plan Comments:   Induction:   IV  Beta Blocker:  Patient is not currently on a Beta-Blocker (No further documentation required).       Informed Consent: Patient understands risks and agrees with Anesthesia plan.  Questions answered. Anesthesia consent signed with patient.  ASA Score: 2     Day of Surgery Review of History & Physical:    H&P update referred to the provider.         Ready For Surgery From Anesthesia Perspective.

## 2018-06-28 ENCOUNTER — ANESTHESIA (OUTPATIENT)
Dept: ELECTROPHYSIOLOGY | Facility: HOSPITAL | Age: 38
End: 2018-06-28
Payer: COMMERCIAL

## 2018-06-28 ENCOUNTER — HOSPITAL ENCOUNTER (OUTPATIENT)
Facility: HOSPITAL | Age: 38
Discharge: HOME | End: 2018-06-28
Attending: PSYCHIATRY & NEUROLOGY | Admitting: PSYCHIATRY & NEUROLOGY
Payer: COMMERCIAL

## 2018-06-28 VITALS
WEIGHT: 155 LBS | HEART RATE: 77 BPM | SYSTOLIC BLOOD PRESSURE: 117 MMHG | HEIGHT: 65 IN | OXYGEN SATURATION: 100 % | BODY MASS INDEX: 25.83 KG/M2 | DIASTOLIC BLOOD PRESSURE: 64 MMHG | RESPIRATION RATE: 16 BRPM | TEMPERATURE: 99 F

## 2018-06-28 DIAGNOSIS — F33.41 MDD (MAJOR DEPRESSIVE DISORDER), RECURRENT, IN PARTIAL REMISSION: ICD-10-CM

## 2018-06-28 DIAGNOSIS — F33.9 RECURRENT MAJOR DEPRESSIVE DISORDER, REMISSION STATUS UNSPECIFIED: Primary | ICD-10-CM

## 2018-06-28 LAB
B-HCG UR QL: NEGATIVE
CTP QC/QA: YES

## 2018-06-28 PROCEDURE — 90870 ELECTROCONVULSIVE THERAPY: CPT | Mod: ,,, | Performed by: PSYCHIATRY & NEUROLOGY

## 2018-06-28 PROCEDURE — 71000044 HC DOSC ROUTINE RECOVERY FIRST HOUR: Performed by: PSYCHIATRY & NEUROLOGY

## 2018-06-28 PROCEDURE — 25000003 PHARM REV CODE 250: Performed by: SURGERY

## 2018-06-28 PROCEDURE — D9220A PRA ANESTHESIA: Mod: ,,, | Performed by: ANESTHESIOLOGY

## 2018-06-28 PROCEDURE — 25000003 PHARM REV CODE 250: Performed by: PSYCHIATRY & NEUROLOGY

## 2018-06-28 PROCEDURE — 37000009 HC ANESTHESIA EA ADD 15 MINS: Performed by: PSYCHIATRY & NEUROLOGY

## 2018-06-28 PROCEDURE — 37000008 HC ANESTHESIA 1ST 15 MINUTES: Performed by: PSYCHIATRY & NEUROLOGY

## 2018-06-28 PROCEDURE — 90870 ELECTROCONVULSIVE THERAPY: CPT | Performed by: ANESTHESIOLOGY

## 2018-06-28 PROCEDURE — 63600175 PHARM REV CODE 636 W HCPCS: Performed by: SURGERY

## 2018-06-28 PROCEDURE — 81025 URINE PREGNANCY TEST: CPT | Performed by: PSYCHIATRY & NEUROLOGY

## 2018-06-28 RX ORDER — LIDOCAINE HYDROCHLORIDE 10 MG/ML
1 INJECTION, SOLUTION EPIDURAL; INFILTRATION; INTRACAUDAL; PERINEURAL ONCE
Status: COMPLETED | OUTPATIENT
Start: 2018-06-28 | End: 2018-06-28

## 2018-06-28 RX ORDER — SUCCINYLCHOLINE CHLORIDE 20 MG/ML
INJECTION INTRAMUSCULAR; INTRAVENOUS
Status: DISCONTINUED | OUTPATIENT
Start: 2018-06-28 | End: 2018-06-28

## 2018-06-28 RX ORDER — KETOROLAC TROMETHAMINE 30 MG/ML
INJECTION, SOLUTION INTRAMUSCULAR; INTRAVENOUS
Status: DISCONTINUED | OUTPATIENT
Start: 2018-06-28 | End: 2018-06-28

## 2018-06-28 RX ORDER — LABETALOL HYDROCHLORIDE 5 MG/ML
INJECTION, SOLUTION INTRAVENOUS
Status: DISCONTINUED | OUTPATIENT
Start: 2018-06-28 | End: 2018-06-28

## 2018-06-28 RX ORDER — SODIUM CHLORIDE 9 MG/ML
125 INJECTION, SOLUTION INTRAVENOUS CONTINUOUS
Status: DISCONTINUED | OUTPATIENT
Start: 2018-06-28 | End: 2018-06-28 | Stop reason: HOSPADM

## 2018-06-28 RX ORDER — SODIUM CHLORIDE 0.9 % (FLUSH) 0.9 %
3 SYRINGE (ML) INJECTION
Status: DISCONTINUED | OUTPATIENT
Start: 2018-06-28 | End: 2018-06-28 | Stop reason: HOSPADM

## 2018-06-28 RX ORDER — VORTIOXETINE 5 MG/1
2.5 TABLET, FILM COATED ORAL DAILY
COMMUNITY

## 2018-06-28 RX ORDER — KETOROLAC TROMETHAMINE 30 MG/ML
INJECTION, SOLUTION INTRAMUSCULAR; INTRAVENOUS
Status: COMPLETED
Start: 2018-06-28 | End: 2018-06-28

## 2018-06-28 RX ORDER — SODIUM CHLORIDE 9 MG/ML
INJECTION, SOLUTION INTRAVENOUS CONTINUOUS
Status: DISCONTINUED | OUTPATIENT
Start: 2018-06-28 | End: 2018-06-28 | Stop reason: HOSPADM

## 2018-06-28 RX ORDER — ONDANSETRON 2 MG/ML
INJECTION INTRAMUSCULAR; INTRAVENOUS
Status: DISCONTINUED | OUTPATIENT
Start: 2018-06-28 | End: 2018-06-28

## 2018-06-28 RX ORDER — LIDOCAINE HYDROCHLORIDE 10 MG/ML
1 INJECTION, SOLUTION EPIDURAL; INFILTRATION; INTRACAUDAL; PERINEURAL ONCE
Status: DISCONTINUED | OUTPATIENT
Start: 2018-06-28 | End: 2018-06-28 | Stop reason: HOSPADM

## 2018-06-28 RX ADMIN — LIDOCAINE HYDROCHLORIDE 0.1 MG: 10 INJECTION, SOLUTION EPIDURAL; INFILTRATION; INTRACAUDAL; PERINEURAL at 06:06

## 2018-06-28 RX ADMIN — ONDANSETRON 4 MG: 2 INJECTION, SOLUTION INTRAMUSCULAR; INTRAVENOUS at 07:06

## 2018-06-28 RX ADMIN — Medication 500 MG: at 07:06

## 2018-06-28 RX ADMIN — SUCCINYLCHOLINE CHLORIDE 120 MG: 20 INJECTION, SOLUTION INTRAMUSCULAR; INTRAVENOUS at 07:06

## 2018-06-28 RX ADMIN — SODIUM CHLORIDE: 0.9 INJECTION, SOLUTION INTRAVENOUS at 07:06

## 2018-06-28 RX ADMIN — METHOHEXITAL SODIUM 200 MG: 500 INJECTION, POWDER, LYOPHILIZED, FOR SOLUTION INTRAMUSCULAR; INTRAVENOUS; RECTAL at 07:06

## 2018-06-28 RX ADMIN — SODIUM CHLORIDE 125 ML/HR: 0.9 INJECTION, SOLUTION INTRAVENOUS at 06:06

## 2018-06-28 RX ADMIN — KETOROLAC TROMETHAMINE 30 MG: 30 INJECTION, SOLUTION INTRAMUSCULAR at 07:06

## 2018-06-28 RX ADMIN — LABETALOL HYDROCHLORIDE 20 MG: 5 INJECTION INTRAVENOUS at 07:06

## 2018-06-28 NOTE — OP NOTE
Allyssa Wright  : 1980   MRN: [id number]  Date: 2018    Electroconvulsive Therapy  Operative Note    Date of Admission: 2018  5:51 AM    Site: [facility], [address]    Attending: Shoaib Boyce Jr., MD   Residents: Dedra Harkins MD  Pre-op Diagnosis: MDD, recurrent, in partial remission    ECT Treatment Number: 46  Machine Type: [id number] 5000    Patient Status: Medically stable    Vitals (pre-procedure):  Vitals:    18 0702   BP: 110/73   Pulse:    Resp:    Temp:        Electrode Placement: Bitemporal    Stimulus Number Charge (mC) Level Pulse Width (msec) Frequency  (Hz) Duration of Stimulus (sec) Current (mA) Duration of Seizure (sec)   1 576 3 1 60 6 800 26                                   Complications: Hypertension    Maximum Blood Pressure: 160/119    Medications Given:  Caffeine 500 mg IV before  Methohexital (Brevital) 200 mg  IV before  Succinylcholine (Anectine) 120 mg  IV before  Labetolol 20 mg IV during  Toradol 30 mg IV before  Zofran 4 mg IV during    Treatment Course:  Patient tolerated procedure well. After adequate recovery from general anesthesia, the patient was transported to recovery.    Post-op Diagnosis: Same as above    Recommended for next ECT:       Dedra Harkins MD  2018

## 2018-06-28 NOTE — ADDENDUM NOTE
Addendum  created 06/28/18 1002 by Ruel Sifuentes MD    Anesthesia Intra Blocks edited, Child order released for a procedure order, Sign clinical note

## 2018-06-28 NOTE — TRANSFER OF CARE
"Anesthesia Transfer of Care Note    Patient: Allyssa Wright    Procedure(s) Performed: Procedure(s) (LRB):  ELECTROCONVULSIVE THERAPY (ECT) - SINGLE SEIZURE (N/A)    Patient location: [facility]    Anesthesia Type: general    Transport from OR: Transported from OR on 6-10 L/min O2 by face mask with adequate spontaneous ventilation    Post pain: adequate analgesia    Post assessment: tolerated procedure well and no apparent anesthetic complications    Post vital signs: stable    Level of consciousness: awake and alert    Nausea/Vomiting: no nausea/vomiting    Complications: none    Transfer of care protocol was followed      Last vitals:   Visit Vitals  /73   Pulse 80   Temp 37 °C (98.6 °F) (Oral)   Resp 16   Ht 5' 5" (1.651 m)   Wt 70.3 kg (155 lb)   SpO2 100%   BMI 25.79 kg/m²     "

## 2018-06-28 NOTE — PLAN OF CARE
Problem: Patient Care Overview  Goal: Plan of Care Review  Outcome: Outcome(s) achieved Date Met: 06/28/18  Awake and alert. VSS. Denies pain or nausea. Tolerating liquids well. Voiding well. DC instructions given to patient and family and they verbalize understanding.

## 2018-06-28 NOTE — DISCHARGE SUMMARY
Allyssa Wright  : 1980   MRN: [id number]  Date: 2018     Electroconvulsive Therapy  Discharge Summary    Admit Date: 2018  5:51 AM  Discharge Date: 2018    Attending Physician: Shoaib Boyce Jr., MD   Discharge Provider: Dedra Harkins MD    History of Present Illness: Allyssa Wright is a 38 y.o. female with MDD presented for ECT #46. See H&P dated 2018 for full HPI. For further details, see Dr. Boyce's pre-ECT evaluation.    Hospital Course: The patient tolerated the ECT treatment well without complication. Patient was stable post-procedure. See OP note dated 2018 for more details.     Disposition: Home or Self Care    Medications:  Current Discharge Medication List      CONTINUE these medications which have NOT CHANGED    Details   clozapine (CLOZARIL) 50 MG tablet Take 100 mg by mouth once daily.       doxycycline (VIBRAMYCIN) 100 MG Cap Take 50 mg by mouth once daily.      famciclovir (FAMVIR) 500 MG tablet Take 1 tablet (500 mg total) by mouth 2 (two) times daily.  Qty: 30 tablet, Refills: 12    Associated Diagnoses: Major depressive disorder, recurrent episode, moderate degree      mirtazapine (REMERON) 15 MG tablet Take 1 tablet (15 mg total) by mouth every evening.  Qty: 90 tablet, Refills: 0      spironolactone (ALDACTONE) 50 MG tablet 50 mg 2 (two) times daily. 50  mg qAM, 50 mg qHS.      thyroid (ARMOUR THYROID) 30 mg Tab Take 1 tablet (30 mg total) by mouth every morning.  Qty: 30 tablet, Refills: 11    Associated Diagnoses: Major depressive disorder, recurrent episode, moderate degree      trazodone (DESYREL) 100 MG tablet Take 200 mg by mouth every evening.       UNABLE TO FIND 2 (two) times daily. n-azetyl-cysteine      venlafaxine (EFFEXOR) 100 MG Tab Take 3 tablets (300 mg total) by mouth once daily.    Associated Diagnoses: MDD (major depressive disorder), recurrent, in partial remission      vortioxetine (TRINTELLIX) 5 mg Tab Take 2.5 mg by mouth once  daily.      dapsone 7.5 % GlwP Apply topically once daily.      dextroamphetamine-amphetamine 30 mg Tab Take 30 mg by mouth 2 (two) times daily.     Associated Diagnoses: MDD (major depressive disorder), recurrent, in partial remission      vilazodone (VIIBRYD) 10 mg Tab tablet Take 0.5 tablets (5 mg total) by mouth once daily.  Qty: 15 tablet, Refills: 11      ZOVIRAX 5 % Crea Refills: 5           Status at Discharge: Alert and medically stable    Discharge Diagnoses: MDD, recurrent, in partial remission   Diet: Resume previous outpatient diet  Activity: Ambulate with assistance  Instructions: Please do not eat or drink anything after midnight prior to procedure. Please do not drive on day of ECT.    Med Changes:  None    Next ECT:   Follow-up Information     [facility]. Go on 7/5/2018.    Specialty:  Emergency Medicine  Why:  for ECT  Contact information:  [facility] [address]  [facility] [address] [phone]                  Dedra Harkins MD  6/28/2018

## 2018-06-28 NOTE — H&P
"Allyssa Wright  : 1980   MRN: [id number]  Date: 2018     Electroconvulsive Therapy  History & Physical    Chief complaint: MDD, recurrent, in partial remission  Procedure: ECT #46    SUBJECTIVE:     HPI:   Allyssa Wright is a 38 y.o. female with MDD, recurrent, in partial remission who presents for ECT. Last ECT was on 18.     Pt presents for ECT this morning. Says she noticed some improvement after her last treatment on 18 that lasted a couple days. She attributes her current depression to increasing her dose of spironolactone a few weeks ago due to increased acne breakouts, says she has decreased it "back to the dose that doesn't make me depressed." She stopped taking her Trintellix about 1.5 weeks ago due to perceived hair loss, was started on Viibryd by her outpatient psychiatrist Dr. Jc. She has been having some passive SI, but denies any active plans or intentions to harm herself. Encouraged to get out of the house and make plans for upcoming  holiday.    The patient denies any changes in dentition.   The patient does not need any prescriptions and has not had any med changes since meeting with Dr. Boyce.   The patient has not had anything by mouth since midnight and is ready for ECT.    Psychiatric Review of Systems:  Mood: Moderate  Appetite: No problem  Psychomotor: yes, PMR  Cognitive Impairment: None  Insomnia: None  Psychosis: None  Diurnal Variation: None  Suicidal Ideation: +Passive    Medical Review Of Systems:  A comprehensive review of systems was negative.    Current Medications:   No current facility-administered medications on file prior to encounter.      Current Outpatient Prescriptions on File Prior to Encounter   Medication Sig Dispense Refill    clozapine (CLOZARIL) 50 MG tablet Take 100 mg by mouth once daily.       doxycycline (VIBRAMYCIN) 100 MG Cap Take 50 mg by mouth once daily.      famciclovir (FAMVIR) 500 MG tablet Take 1 tablet (500 mg total) by " mouth 2 (two) times daily. 30 tablet 12    mirtazapine (REMERON) 15 MG tablet Take 1 tablet (15 mg total) by mouth every evening. 90 tablet 0    spironolactone (ALDACTONE) 50 MG tablet 50 mg 2 (two) times daily. 50  mg qAM, 50 mg qHS.      thyroid (ARMOUR THYROID) 30 mg Tab Take 1 tablet (30 mg total) by mouth every morning. 30 tablet 11    trazodone (DESYREL) 100 MG tablet Take 200 mg by mouth every evening.       UNABLE TO FIND 2 (two) times daily. n-azetyl-cysteine      venlafaxine (EFFEXOR) 100 MG Tab Take 3 tablets (300 mg total) by mouth once daily. (Patient taking differently: Take 225 mg by mouth once daily. )      dapsone 7.5 % GlwP Apply topically once daily.      dextroamphetamine-amphetamine 30 mg Tab Take 30 mg by mouth 2 (two) times daily.       vilazodone (VIIBRYD) 10 mg Tab tablet Take 0.5 tablets (5 mg total) by mouth once daily. 15 tablet 11    ZOVIRAX 5 % Crea   5     Allergies:   Benzodiazepines; Ampicillin; Erythromycin; Levaquin [levofloxacin]; Penicillins; Pristiq [desvenlafaxine]; Sulfa (sulfonamide antibiotics); and Azithromycin    Past Medical/Surgical History:   Past Medical History:   Diagnosis Date    Anxiety     Depression     History of psychiatric hospitalization     HSV-1 (herpes simplex virus 1) infection     Hx of psychiatric care     Moderate depressed bipolar II disorder 06/13/2016    reports no history of bipolar    Obsessive-compulsive disorder     Psychiatric problem     Schizophrenia 4/3/2018    Self-harming behavior     Therapy      Past Surgical History:   Procedure Laterality Date    ANKLE SURGERY Right     BREAST augmentation      OVARIAN CYST REMOVAL Bilateral       OBJECTIVE:     Vitals (pre-procedure):  There were no vitals filed for this visit.     Labs/Imaging/Studies:   Recent Results (from the past 48 hour(s))   POCT urine pregnancy    Collection Time: 06/28/18  6:45 AM   Result Value Ref Range    POC Preg Test, Ur Negative Negative     " Acceptable Yes       No results found for: PHENYTOIN, PHENOBARB, VALPROATE, CBMZ    Physical Exam:   Gen: AAOx4, NAD  HEENT: MMM, PERRL, EOMI, O/P clear  CV: RRR, S1/S2 nml, no M/R/G  Chest: CTAB, no R/R/W, unlabored breathing  Abd: S/NT/ND, +BS, no HSM  Ext: No C/C/E, pulse 2+ throughout  Neuro: CN II-XII grossly intact, no focal deficits    Mental Status Exam:   Appearance: age appropriate, well nourished, casually dressed  Behavior: friendly and cooperative, eye contact normal  Speech: normal tone, normal rate, normal pitch, normal volume  Mood: "still really down"  Affect: full, reactive  Thought Process: linear and oriented  Thought Content: +passive SI, no active SI, no HI or AVH  Cognition: grossly intact  Insight: good  Judgment: good    ASSESSMENT/PLAN:     Allyssa Wrgiht is a 38 y.o. female with MDD, recurrent, in partial remission who presents for ECT.    Recommendations:   Proceed with ECT #46      Dedra Harkins MD  6/28/2018  "

## 2018-06-28 NOTE — ANESTHESIA PROCEDURE NOTES
ECT    Treatment Number: 46  Procedure start time: 6/28/2018 7:55 AM  Timeout performed at: 6/28/2018 7:52 AM  Procedure end time: 6/28/2018 7:57 AM    Staffing  Anesthesiologist: KARINA ALVAREZ  CRNA/Resident: WESTON ROGERS  Other anesthesia staff: WESTON CUI  Performed by: other anesthesia staff     Preanesthetic Checklist  The following were completed as part of the preanesthetic checklist: patient identified, procedural consent, pre-op evaluation, timeout performed, risks and benefits discussed, monitors and equipment checked, anesthesia consent given, oxygen available, suction available, hand hygiene performed and patient being monitored.    Setup & Induction  Patient Monitoring: heart rate, cardiac monitor, continuous pulse ox, continuous capnometry, NIBP and gas analyzer  Patient preparation: bite block inserted, extremities padded, mandibular stabilization and patient hyperventilated  Electrode Placement: Bitemporal    The patient was moved to the ECT therapy room after being assessed and consented for ECT. After standard ASA monitors were applied and timeout performed, the patient was adequately preoxygenated. After induction of general anesthesia, adequate oxygenation and ventilation were confirmed with pulse oxymetry and end tidal CO2 monitoring via bag-mask ventilation. End tidal CO2 was monitored throughout the case and moderate hyperventilation was performed prior to beginning ECT treatment. All extremities were padded, biteblock was inserted, and mandibular stabilization was done prior to initiating ECT therapy.    Procedure  Stimulus Number 1: Setting Number = III; 576 millicoulombs; Seizure Duration = 26 seconds      Stimulus Number 4:       Recovery  After adequate recovery from general anesthesia, the patient was transported to recovery.  Baseline Blood Pressure: 110/73  Peak Blood Pressure: 160/119  Time to Recovery of Respirations: 4 minutes    ECT Findings  ECT  associated findings of: Moderate Hypertension (SBP >160 or DBP >100)    Additional Notes  Prolonged weakness, plan to change Succinylcholine dose to 100 mg for next ECT.

## 2018-06-28 NOTE — ANESTHESIA POSTPROCEDURE EVALUATION
"Anesthesia Post Evaluation    Patient: Allyssa Wright    Procedure(s) Performed: Procedure(s) (LRB):  ELECTROCONVULSIVE THERAPY (ECT) - SINGLE SEIZURE (N/A)    Final Anesthesia Type: general  Patient location during evaluation: PACU  Patient participation: Yes- Able to Participate  Level of consciousness: awake and alert and oriented  Post-procedure vital signs: reviewed and stable  Pain management: adequate  Airway patency: patent  PONV status at discharge: No PONV  Anesthetic complications: no      Cardiovascular status: blood pressure returned to baseline  Respiratory status: unassisted, spontaneous ventilation and room air  Hydration status: euvolemic  Follow-up not needed (Next ECT plan to change Succinylcholine dose to 100 mg as patient was weak on emergence.).        Visit Vitals  /81   Pulse 77   Temp 37.1 °C (98.8 °F) (Temporal)   Resp 16   Ht 5' 5" (1.651 m)   Wt 70.3 kg (155 lb)   SpO2 99%   BMI 25.79 kg/m²       Pain/Roma Score: Pain Assessment Performed: Yes (6/28/2018  8:12 AM)  Presence of Pain: denies (6/28/2018  8:12 AM)      "

## 2018-07-05 ENCOUNTER — ANESTHESIA (OUTPATIENT)
Dept: ELECTROPHYSIOLOGY | Facility: HOSPITAL | Age: 38
End: 2018-07-05
Payer: COMMERCIAL

## 2018-07-05 ENCOUNTER — HOSPITAL ENCOUNTER (OUTPATIENT)
Facility: HOSPITAL | Age: 38
Discharge: HOME | End: 2018-07-05
Attending: PSYCHIATRY & NEUROLOGY | Admitting: PSYCHIATRY & NEUROLOGY
Payer: COMMERCIAL

## 2018-07-05 ENCOUNTER — ANESTHESIA EVENT (OUTPATIENT)
Dept: ELECTROPHYSIOLOGY | Facility: HOSPITAL | Age: 38
End: 2018-07-05
Payer: COMMERCIAL

## 2018-07-05 ENCOUNTER — SURGERY (OUTPATIENT)
Age: 38
End: 2018-07-05

## 2018-07-05 VITALS
HEART RATE: 81 BPM | SYSTOLIC BLOOD PRESSURE: 115 MMHG | RESPIRATION RATE: 18 BRPM | OXYGEN SATURATION: 99 % | BODY MASS INDEX: 25.83 KG/M2 | WEIGHT: 155 LBS | TEMPERATURE: 99 F | DIASTOLIC BLOOD PRESSURE: 65 MMHG | HEIGHT: 65 IN

## 2018-07-05 DIAGNOSIS — F33.41 MDD (MAJOR DEPRESSIVE DISORDER), RECURRENT, IN PARTIAL REMISSION: Primary | ICD-10-CM

## 2018-07-05 DIAGNOSIS — F32.9 MDD (MAJOR DEPRESSIVE DISORDER): ICD-10-CM

## 2018-07-05 PROBLEM — Z51.5 COMFORT MEASURES ONLY STATUS: Status: ACTIVE | Noted: 2018-07-05

## 2018-07-05 LAB
B-HCG UR QL: NEGATIVE
CTP QC/QA: YES

## 2018-07-05 PROCEDURE — D9220A PRA ANESTHESIA: Mod: ,,, | Performed by: ANESTHESIOLOGY

## 2018-07-05 PROCEDURE — 90870 ELECTROCONVULSIVE THERAPY: CPT | Performed by: STUDENT IN AN ORGANIZED HEALTH CARE EDUCATION/TRAINING PROGRAM

## 2018-07-05 PROCEDURE — 63600175 PHARM REV CODE 636 W HCPCS: Performed by: STUDENT IN AN ORGANIZED HEALTH CARE EDUCATION/TRAINING PROGRAM

## 2018-07-05 PROCEDURE — 25000003 PHARM REV CODE 250: Performed by: PSYCHIATRY & NEUROLOGY

## 2018-07-05 PROCEDURE — 25000003 PHARM REV CODE 250: Performed by: STUDENT IN AN ORGANIZED HEALTH CARE EDUCATION/TRAINING PROGRAM

## 2018-07-05 PROCEDURE — 90870 ELECTROCONVULSIVE THERAPY: CPT | Mod: ,,, | Performed by: PSYCHIATRY & NEUROLOGY

## 2018-07-05 PROCEDURE — 71000044 HC DOSC ROUTINE RECOVERY FIRST HOUR: Performed by: PSYCHIATRY & NEUROLOGY

## 2018-07-05 PROCEDURE — 27100019 HC AMBU BAG ADULT/PED: Performed by: STUDENT IN AN ORGANIZED HEALTH CARE EDUCATION/TRAINING PROGRAM

## 2018-07-05 PROCEDURE — 37000009 HC ANESTHESIA EA ADD 15 MINS: Performed by: PSYCHIATRY & NEUROLOGY

## 2018-07-05 PROCEDURE — 81025 URINE PREGNANCY TEST: CPT | Performed by: PSYCHIATRY & NEUROLOGY

## 2018-07-05 PROCEDURE — 37000008 HC ANESTHESIA 1ST 15 MINUTES: Performed by: PSYCHIATRY & NEUROLOGY

## 2018-07-05 RX ORDER — LIDOCAINE HYDROCHLORIDE 10 MG/ML
1 INJECTION, SOLUTION EPIDURAL; INFILTRATION; INTRACAUDAL; PERINEURAL ONCE
Status: COMPLETED | OUTPATIENT
Start: 2018-07-05 | End: 2018-07-05

## 2018-07-05 RX ORDER — SODIUM CHLORIDE 9 MG/ML
INJECTION, SOLUTION INTRAVENOUS CONTINUOUS
Status: DISCONTINUED | OUTPATIENT
Start: 2018-07-05 | End: 2018-07-05 | Stop reason: HOSPADM

## 2018-07-05 RX ORDER — SUCCINYLCHOLINE CHLORIDE 20 MG/ML
INJECTION INTRAMUSCULAR; INTRAVENOUS
Status: DISCONTINUED | OUTPATIENT
Start: 2018-07-05 | End: 2018-07-05

## 2018-07-05 RX ORDER — KETOROLAC TROMETHAMINE 30 MG/ML
INJECTION, SOLUTION INTRAMUSCULAR; INTRAVENOUS
Status: DISCONTINUED | OUTPATIENT
Start: 2018-07-05 | End: 2018-07-05

## 2018-07-05 RX ORDER — SUCCINYLCHOLINE CHLORIDE 20 MG/ML
INJECTION INTRAMUSCULAR; INTRAVENOUS
Status: COMPLETED
Start: 2018-07-05 | End: 2018-07-05

## 2018-07-05 RX ORDER — LABETALOL HYDROCHLORIDE 5 MG/ML
INJECTION, SOLUTION INTRAVENOUS
Status: COMPLETED
Start: 2018-07-05 | End: 2018-07-05

## 2018-07-05 RX ORDER — ONDANSETRON 2 MG/ML
4 INJECTION INTRAMUSCULAR; INTRAVENOUS ONCE AS NEEDED
Status: DISCONTINUED | OUTPATIENT
Start: 2018-07-05 | End: 2018-07-05 | Stop reason: HOSPADM

## 2018-07-05 RX ORDER — KETOROLAC TROMETHAMINE 30 MG/ML
30 INJECTION, SOLUTION INTRAMUSCULAR; INTRAVENOUS ONCE AS NEEDED
Status: DISCONTINUED | OUTPATIENT
Start: 2018-07-05 | End: 2018-07-05 | Stop reason: HOSPADM

## 2018-07-05 RX ORDER — SODIUM CHLORIDE 0.9 % (FLUSH) 0.9 %
3 SYRINGE (ML) INJECTION
Status: DISCONTINUED | OUTPATIENT
Start: 2018-07-05 | End: 2018-07-05 | Stop reason: HOSPADM

## 2018-07-05 RX ORDER — ONDANSETRON 2 MG/ML
INJECTION INTRAMUSCULAR; INTRAVENOUS
Status: DISCONTINUED | OUTPATIENT
Start: 2018-07-05 | End: 2018-07-05

## 2018-07-05 RX ORDER — KETOROLAC TROMETHAMINE 30 MG/ML
INJECTION, SOLUTION INTRAMUSCULAR; INTRAVENOUS
Status: COMPLETED
Start: 2018-07-05 | End: 2018-07-05

## 2018-07-05 RX ORDER — ONDANSETRON 2 MG/ML
INJECTION INTRAMUSCULAR; INTRAVENOUS
Status: COMPLETED
Start: 2018-07-05 | End: 2018-07-05

## 2018-07-05 RX ORDER — LABETALOL HYDROCHLORIDE 5 MG/ML
INJECTION, SOLUTION INTRAVENOUS
Status: DISCONTINUED | OUTPATIENT
Start: 2018-07-05 | End: 2018-07-05

## 2018-07-05 RX ADMIN — METHOHEXITAL SODIUM 200 MG: 500 INJECTION, POWDER, LYOPHILIZED, FOR SOLUTION INTRAMUSCULAR; INTRAVENOUS; RECTAL at 07:07

## 2018-07-05 RX ADMIN — SODIUM CHLORIDE: 0.9 INJECTION, SOLUTION INTRAVENOUS at 06:07

## 2018-07-05 RX ADMIN — SUCCINYLCHOLINE CHLORIDE 120 MG: 20 INJECTION, SOLUTION INTRAMUSCULAR; INTRAVENOUS at 07:07

## 2018-07-05 RX ADMIN — LIDOCAINE HYDROCHLORIDE 10 MG: 10 INJECTION, SOLUTION EPIDURAL; INFILTRATION; INTRACAUDAL; PERINEURAL at 06:07

## 2018-07-05 RX ADMIN — ONDANSETRON 4 MG: 2 INJECTION, SOLUTION INTRAMUSCULAR; INTRAVENOUS at 07:07

## 2018-07-05 RX ADMIN — LABETALOL HYDROCHLORIDE 20 MG: 5 INJECTION INTRAVENOUS at 07:07

## 2018-07-05 RX ADMIN — Medication 500 MG: at 07:07

## 2018-07-05 RX ADMIN — KETOROLAC TROMETHAMINE 30 MG: 30 INJECTION, SOLUTION INTRAMUSCULAR at 07:07

## 2018-07-05 NOTE — H&P
"Allyssa Wright  : 1980   MRN: [id number]  Date: 2018     Electroconvulsive Therapy  History & Physical    Chief complaint: MDD, recurrent, in partial remission  Procedure: ECT #47    SUBJECTIVE:     HPI:   Allyssa Wright is a 38 y.o. female with MDD, recurrent, in partial remission who presents for ECT. Last ECT was on 18.     Pt presents for ECT this morning. She states that her mood is "good". Her last ECT treatment was on 2018. She denies any physical complaints this morning. She denies SI/HI/AVH. She states that she recently stopped taking Viibryd and restarted Trintelliex. Also, she recently decreased her dosages or Effexor and Mirtazapine. She states that her only medication side effect is some "hair falling out", which she believes is caused by the Trintellix.     The patient denies any changes in dentition.   The patient does not need any prescriptions and has not had any med changes since meeting with Dr. Boyce.   The patient has not had anything by mouth since midnight and is ready for ECT.    Psychiatric Review of Systems:  Mood: Moderate  Appetite: Increased  Psychomotor: No PMA or PMR  Cognitive Impairment: None  Insomnia: None  Psychosis: None  Diurnal Variation: None  Suicidal Ideation: No    Medical Review Of Systems:  A comprehensive review of systems was negative.    Current Medications:   No current facility-administered medications on file prior to encounter.      Current Outpatient Prescriptions on File Prior to Encounter   Medication Sig Dispense Refill    clozapine (CLOZARIL) 50 MG tablet Take 100 mg by mouth once daily.       dapsone 7.5 % GlwP Apply topically once daily.      famciclovir (FAMVIR) 500 MG tablet Take 1 tablet (500 mg total) by mouth 2 (two) times daily. 30 tablet 12    mirtazapine (REMERON) 15 MG tablet Take 1 tablet (15 mg total) by mouth every evening. (Patient taking differently: Take 7.5 mg by mouth every evening. ) 90 tablet 0    spironolactone " (ALDACTONE) 50 MG tablet 50 mg 2 (two) times daily. 50  mg qAM, 50 mg qHS.      thyroid (ARMOUR THYROID) 30 mg Tab Take 1 tablet (30 mg total) by mouth every morning. 30 tablet 11    trazodone (DESYREL) 100 MG tablet Take 200 mg by mouth every evening.       venlafaxine (EFFEXOR) 100 MG Tab Take 3 tablets (300 mg total) by mouth once daily. (Patient taking differently: Take 225 mg by mouth once daily. )      vortioxetine (TRINTELLIX) 5 mg Tab Take 2.5 mg by mouth once daily.      dextroamphetamine-amphetamine 30 mg Tab Take 30 mg by mouth 2 (two) times daily.       UNABLE TO FIND 2 (two) times daily. n-azetyl-cysteine      ZOVIRAX 5 % Crea   5    [DISCONTINUED] doxycycline (VIBRAMYCIN) 100 MG Cap Take 50 mg by mouth once daily.      [DISCONTINUED] vilazodone (VIIBRYD) 10 mg Tab tablet Take 0.5 tablets (5 mg total) by mouth once daily. 15 tablet 11     Allergies:   Benzodiazepines; Ampicillin; Erythromycin; Levaquin [levofloxacin]; Penicillins; Pristiq [desvenlafaxine]; Sulfa (sulfonamide antibiotics); and Azithromycin    Past Medical/Surgical History:   Past Medical History:   Diagnosis Date    Anxiety     Depression     History of psychiatric hospitalization     HSV-1 (herpes simplex virus 1) infection     Hx of psychiatric care     Moderate depressed bipolar II disorder 06/13/2016    reports no history of bipolar    Obsessive-compulsive disorder     Psychiatric problem     Schizophrenia 4/3/2018    Self-harming behavior     Therapy      Past Surgical History:   Procedure Laterality Date    ANKLE SURGERY Right     BREAST augmentation      OVARIAN CYST REMOVAL Bilateral       OBJECTIVE:     Vitals (pre-procedure):  Vitals:    07/05/18 0639   BP: 107/75   Pulse: 82   Resp: 16   Temp: 97.7 °F (36.5 °C)        Labs/Imaging/Studies:   Recent Results (from the past 48 hour(s))   POCT urine pregnancy    Collection Time: 07/05/18  6:37 AM   Result Value Ref Range    POC Preg Test, Ur Negative  "Negative     Acceptable Yes       No results found for: PHENYTOIN, PHENOBARB, VALPROATE, CBMZ    Physical Exam:   Gen: AAOx4, NAD  HEENT: MMM, PERRL, EOMI, O/P clear  CV: RRR, S1/S2 nml, no M/R/G  Chest: CTAB, no R/R/W, unlabored breathing  Abd: S/NT/ND, +BS, no HSM  Ext: No C/C/E, pulse 2+ throughout  Neuro: CN II-XII grossly intact, no focal deficits    Mental Status Exam:   Appearance: age appropriate, well nourished, casually dressed  Behavior: friendly and cooperative, eye contact normal  Speech: normal tone, normal rate, normal pitch, normal volume  Mood: "good"  Affect: full, reactive  Thought Process: linear and oriented  Thought Content: no SI, no HI or AVH  Cognition: grossly intact  Insight: good  Judgment: good    ASSESSMENT/PLAN:     Allyssa Wright is a 38 y.o. female with MDD, recurrent, in partial remission who presents for ECT.    Recommendations:   Proceed with ECT #47      Nestor Baxter MD  7/5/2018  "

## 2018-07-05 NOTE — OP NOTE
Allyssa Wright  : 1980   MRN: [id number]  Date: 2018    Electroconvulsive Therapy  Operative Note    Date of Admission: 2018  5:53 AM    Site: [facility], [address]    Attending: Shoaib Boyce Jr., MD   Residents: Gena Mcguire MD  Pre-op Diagnosis: MDD, recurrent, in partial remission    ECT Treatment Number: 47  Machine Type: Renmatix 5000    Patient Status: Medically stable    Vitals (pre-procedure):  Vitals:    18 0639   BP: 107/75   Pulse: 82   Resp: 16   Temp: 97.7 °F (36.5 °C)       Electrode Placement: Bitemporal    Stimulus Number Charge (mC) Level Pulse Width (msec) Frequency  (Hz) Duration of Stimulus (sec) Current (mA) Duration of Seizure (sec)   1 576 3 1 60 6 800 22                                   Complications: Hypertension    Maximum Blood Pressure: 149/100    Medications Given:  Caffeine 500 mg PO before  Methohexital (Brevital) 200 mg  IV before  Succinylcholine (Anectine) 120 mg  IV before  Labetolol 20 mg IV during  Toradol 30 mg IV before  Zofran 4 mg IV during    Treatment Course:  Patient tolerated procedure well. After adequate recovery from general anesthesia, the patient was transported to recovery.    Post-op Diagnosis: Same as above    Recommended for next ECT: ECT # 48 on 2018 ( 2 weeks)      Gena Mcguire MD  2018

## 2018-07-05 NOTE — ANESTHESIA PROCEDURE NOTES
ECT    Treatment Number: 47  Procedure start time: 7/5/2018 8:02 AM  Timeout performed at: 7/5/2018 7:56 AM  Procedure end time: 7/5/2018 8:06 AM    Staffing  Anesthesiologist: EMMA TOLENTINO  CRNA/Resident: INDIRA TUCKER  Performed by: resident/CRNA     Preanesthetic Checklist  The following were completed as part of the preanesthetic checklist: patient identified, procedural consent, pre-op evaluation, timeout performed, risks and benefits discussed, monitors and equipment checked, anesthesia consent given, oxygen available, suction available, hand hygiene performed and patient being monitored.    Setup & Induction  Patient Monitoring: heart rate, cardiac monitor, continuous pulse ox, continuous capnometry, NIBP and gas analyzer  Patient preparation: bite block inserted, extremities padded, mandibular stabilization and patient hyperventilated  Electrode Placement: Bitemporal    The patient was moved to the ECT therapy room after being assessed and consented for ECT. After standard ASA monitors were applied and timeout performed, the patient was adequately preoxygenated. After induction of general anesthesia, adequate oxygenation and ventilation were confirmed with pulse oxymetry and end tidal CO2 monitoring via bag-mask ventilation. End tidal CO2 was monitored throughout the case and moderate hyperventilation was performed prior to beginning ECT treatment. All extremities were padded, biteblock was inserted, and mandibular stabilization was done prior to initiating ECT therapy.    Procedure  Stimulus Number 1: Setting Number = III; 576 millicoulombs; Seizure Duration = 22 seconds  Stimulus Number 2:           Recovery  After adequate recovery from general anesthesia, the patient was transported to recovery.  Baseline Blood Pressure: 123/75  Peak Blood Pressure: 139/100  Time to Recovery of Respirations: 5 minutes

## 2018-07-05 NOTE — ANESTHESIA POSTPROCEDURE EVALUATION
"Anesthesia Post Evaluation    Patient: Allyssa Wright    Procedure(s) Performed: Procedure(s) (LRB):  ELECTROCONVULSIVE THERAPY (ECT) - SINGLE SEIZURE (N/A)    Final Anesthesia Type: general  Patient location during evaluation: PACU  Patient participation: Yes- Able to Participate  Level of consciousness: awake and alert  Post-procedure vital signs: reviewed and stable  Pain management: adequate  Airway patency: patent  PONV status at discharge: No PONV  Anesthetic complications: no      Cardiovascular status: stable  Respiratory status: unassisted and spontaneous ventilation  Hydration status: euvolemic  Follow-up not needed.        Visit Vitals  /65   Pulse 81   Temp 37 °C (98.6 °F) (Temporal)   Resp 18   Ht 5' 5" (1.651 m)   Wt 70.3 kg (155 lb)   SpO2 99%   BMI 25.79 kg/m²       Pain/Roma Score: Pain Assessment Performed: Yes (7/5/2018  8:50 AM)  Presence of Pain: denies (7/5/2018  8:50 AM)  Roma Score: 10 (7/5/2018  8:50 AM)      "

## 2018-07-05 NOTE — TRANSFER OF CARE
"Anesthesia Transfer of Care Note    Patient: Allyssa Wright    Procedure(s) Performed: Procedure(s) (LRB):  ELECTROCONVULSIVE THERAPY (ECT) - SINGLE SEIZURE (N/A)    Patient location: [facility]    Anesthesia Type: general    Transport from OR: Transported from OR on room air with adequate spontaneous ventilation    Post pain: adequate analgesia    Post assessment: no apparent anesthetic complications    Post vital signs: stable    Level of consciousness: awake    Nausea/Vomiting: no nausea/vomiting    Complications: none    Transfer of care protocol was followed      Last vitals:   Visit Vitals  /75 (BP Location: Left arm, Patient Position: Lying)   Pulse 82   Temp 36.5 °C (97.7 °F) (Temporal)   Resp 16   Ht 5' 5" (1.651 m)   Wt 70.3 kg (155 lb)   SpO2 100%   BMI 25.79 kg/m²     "

## 2018-07-05 NOTE — ANESTHESIA PREPROCEDURE EVALUATION
07/05/2018  Allyssa Wright is a 38 y.o., female.    Pre-operative evaluation for Procedure(s) (LRB):  ELECTROCONVULSIVE THERAPY (ECT) - SINGLE SEIZURE (N/A)    Allyssa Wright is a 38 y.o. female with MDD who is presenting with the above procedure.   ECT #48     Previous medications:   Methohexital 200 mg  -- POST PROCEDURE Addendum (7/5/18): Dr. Boyce would like to use 160 of methohexital for her next ect, we used 200mg and her sz wasn't as robust as he prefers.  Succinylcholine 120 mg  Labetalol 20 mg  Toradol 30 mg  Zofran 4mg      Patient Active Problem List   Diagnosis    MDD (major depressive disorder), recurrent, in partial remission    Major depressive disorder, recurrent, in partial remission    MDD (major depressive disorder), severe    Major depressive disorder, recurrent    Other acne    Major depression, recurrent, chronic    MDD (major depressive disorder), recurrent episode, moderate    Major depressive disorder, recurrent severe without psychotic features    MDD (major depressive disorder), recurrent episode, severe    MDD (major depressive disorder)    Major depressive disorder, recurrent episode, in partial remission    Schizophrenia    Depression       Review of patient's allergies indicates:   Allergen Reactions    Benzodiazepines Other (See Comments)     Contraindicated while taking Clozapine    Ampicillin      Mom says so    Erythromycin     Levaquin [levofloxacin] Other (See Comments)     Depression side effects    Penicillins      Mom says so    Pristiq [desvenlafaxine]      psycotic      Sulfa (sulfonamide antibiotics)      Rash      Azithromycin Anxiety        No current facility-administered medications on file prior to encounter.      Current Outpatient Prescriptions on File Prior to Encounter   Medication Sig Dispense Refill    clozapine (CLOZARIL) 50 MG  tablet Take 100 mg by mouth once daily.       dapsone 7.5 % GlwP Apply topically once daily.      dextroamphetamine-amphetamine 30 mg Tab Take 30 mg by mouth 2 (two) times daily.       doxycycline (VIBRAMYCIN) 100 MG Cap Take 50 mg by mouth once daily.      famciclovir (FAMVIR) 500 MG tablet Take 1 tablet (500 mg total) by mouth 2 (two) times daily. 30 tablet 12    mirtazapine (REMERON) 15 MG tablet Take 1 tablet (15 mg total) by mouth every evening. 90 tablet 0    spironolactone (ALDACTONE) 50 MG tablet 50 mg 2 (two) times daily. 50  mg qAM, 50 mg qHS.      thyroid (ARMOUR THYROID) 30 mg Tab Take 1 tablet (30 mg total) by mouth every morning. 30 tablet 11    trazodone (DESYREL) 100 MG tablet Take 200 mg by mouth every evening.       UNABLE TO FIND 2 (two) times daily. n-azetyl-cysteine      venlafaxine (EFFEXOR) 100 MG Tab Take 3 tablets (300 mg total) by mouth once daily. (Patient taking differently: Take 225 mg by mouth once daily. )      vilazodone (VIIBRYD) 10 mg Tab tablet Take 0.5 tablets (5 mg total) by mouth once daily. 15 tablet 11    vortioxetine (TRINTELLIX) 5 mg Tab Take 2.5 mg by mouth once daily.      ZOVIRAX 5 % Crea   5       Past Surgical History:   Procedure Laterality Date    ANKLE SURGERY Right     BREAST augmentation      OVARIAN CYST REMOVAL Bilateral        Social History     Social History    Marital status: Single     Spouse name: N/A    Number of children: N/A    Years of education: N/A     Occupational History    unemployed      Social History Main Topics    Smoking status: Former Smoker     Quit date: 4/6/2011    Smokeless tobacco: Never Used    Alcohol use Yes      Comment: rarely    Drug use: No      Comment: Experimental use in high school    Sexual activity: Not on file     Other Topics Concern    Not on file     Social History Narrative    Pt has 1 older brother from an intact family until the death of her mother in 2012.  She completed 1 year of college,  was never in the , and has never been employed.  She was engaged once, but never , has no children, and lives with her father plus 2 dogs.  She denies any hobbies and is spiritual but not Buddhist.  She does date and returns to college during rare periods when depression and anxiety [place].         Vital Signs Range (Last 24H):  BP: ()/()   Arterial Line BP: ()/()           Anesthesia Evaluation    I have reviewed the Patient Summary Reports.     I have reviewed the Medications.     Review of Systems  Anesthesia Hx:  No problems with previous Anesthesia  History of prior surgery of interest to airway management or planning: Previous anesthesia: General  Procedure performed at an [facility]. Airway issues documented on chart review include mask, easy  Denies Family Hx of Anesthesia complications.   Denies Personal Hx of Anesthesia complications.   Social:  Former Smoker    Hematology/Oncology:  Hematology Normal   Oncology Normal     EENT/Dental:EENT/Dental Normal   Cardiovascular:   Denies Hypertension.  Denies MI.   Denies Dysrhythmias.   Denies Angina. ECG has been reviewed.    Pulmonary:  Pulmonary Normal    Renal/:  Renal/ Normal     Hepatic/GI:  Hepatic/GI Normal    Musculoskeletal:  Musculoskeletal Normal    Neurological:  Neurology Normal    Endocrine:   Hypothyroidism    Psych:   Psychiatric History          Physical Exam  General:  Well nourished    Airway/Jaw/Neck:  Airway Findings: Mouth Opening: Normal Tongue: Normal  General Airway Assessment: Adult  Mallampati: II  Improves to I with phonation.  TM Distance: Normal, at least 6 cm  Jaw/Neck Findings:  Neck ROM: Normal ROM  Neck Findings: Normal    Eyes/Ears/Nose:  EYES/EARS/NOSE FINDINGS: Normal   Dental:  Dental Findings: In tact   Chest/Lungs:  Chest/Lungs Findings: Normal Respiratory Rate, Clear to auscultation     Heart/Vascular:  Heart Findings: Rate: Normal  Rhythm: Regular Rhythm  Sounds: Normal  Heart murmur: negative     Abdomen:  Abdomen Findings: Normal    Musculoskeletal:  Musculoskeletal Findings: Normal   Skin:  Skin Findings: Normal    Mental Status:  Mental Status Findings:  Cooperative, Alert and Oriented         Anesthesia Plan  Type of Anesthesia, risks & benefits discussed:  Anesthesia Type:  general  Patient's Preference:   Intra-op Monitoring Plan: standard ASA monitors  Intra-op Monitoring Plan Comments:   Post Op Pain Control Plan:   Post Op Pain Control Plan Comments:   Induction:   IV  Beta Blocker:  Patient is not currently on a Beta-Blocker (No further documentation required).       Informed Consent: Patient understands risks and agrees with Anesthesia plan.  Questions answered. Anesthesia consent signed with patient.  ASA Score: 2     Day of Surgery Review of History & Physical:    H&P update referred to the provider.         Ready For Surgery From Anesthesia Perspective.

## 2018-07-05 NOTE — DISCHARGE SUMMARY
Allyssa Wright  : 1980   MRN: [id number]  Date: 2018     Electroconvulsive Therapy  Discharge Summary    Admit Date: 2018  5:53 AM  Discharge Date: 2018    Attending Physician: Shoaib Boyce Jr., MD   Discharge Provider: Gena Mcguire MD    History of Present Illness: Allyssa Wright is a 38 y.o. female with MDD presented for ECT #47. See H&P dated 2018 for full HPI. For further details, see Dr. Boyce's pre-ECT evaluation.    Hospital Course: The patient tolerated the ECT treatment well without complication. Patient was stable post-procedure. See OP note dated 2018 for more details.     Disposition: Home or Self Care    Medications:  Current Discharge Medication List      CONTINUE these medications which have NOT CHANGED    Details   clozapine (CLOZARIL) 50 MG tablet Take 100 mg by mouth once daily.       dapsone 7.5 % GlwP Apply topically once daily.      famciclovir (FAMVIR) 500 MG tablet Take 1 tablet (500 mg total) by mouth 2 (two) times daily.  Qty: 30 tablet, Refills: 12    Associated Diagnoses: Major depressive disorder, recurrent episode, moderate degree      mirtazapine (REMERON) 15 MG tablet Take 1 tablet (15 mg total) by mouth every evening.  Qty: 90 tablet, Refills: 0      spironolactone (ALDACTONE) 50 MG tablet 50 mg 2 (two) times daily. 50  mg qAM, 50 mg qHS.      thyroid (ARMOUR THYROID) 30 mg Tab Take 1 tablet (30 mg total) by mouth every morning.  Qty: 30 tablet, Refills: 11    Associated Diagnoses: Major depressive disorder, recurrent episode, moderate degree      trazodone (DESYREL) 100 MG tablet Take 200 mg by mouth every evening.       venlafaxine (EFFEXOR) 100 MG Tab Take 3 tablets (300 mg total) by mouth once daily.    Associated Diagnoses: MDD (major depressive disorder), recurrent, in partial remission      vortioxetine (TRINTELLIX) 5 mg Tab Take 2.5 mg by mouth once daily.      dextroamphetamine-amphetamine 30 mg Tab Take 30 mg by mouth 2 (two) times  daily.     Associated Diagnoses: MDD (major depressive disorder), recurrent, in partial remission      UNABLE TO FIND 2 (two) times daily. n-azetyl-cysteine      ZOVIRAX 5 % Crea Refills: 5           Status at Discharge: Alert and medically stable    Discharge Diagnoses: MDD, recurrent, in partial remission   Diet: Resume previous outpatient diet  Activity: Ambulate with assistance  Instructions: Please do not eat or drink anything after midnight prior to procedure. Please do not drive on day of ECT.    Med Changes:  None    Next ECT: ECT #48 on July 18, 2018 ( 2 weeks)       Gena Mcguire MD  7/5/2018

## 2018-07-05 NOTE — DISCHARGE INSTRUCTIONS
Home Care Instructions  E.C.T.    ACTIVITY LEVEL:  You may feel sleepy for several hours. It is best to rest until you are more awake and then gradually resume your normal activities in one day. It is recommended, because of the possibility of memory loss and slight confusion post ECT, that you rest in the company of a responsible adult. This confusion and memory loss is expected and should diminish over time. It is also recommended that you do not drive or operate any electrical appliances or machinery during the ECT treatment series. Ask your Doctor, at the time of your treatment, any questions you may have about specific activities.    DIET:  You may wish to start with liquids after your ECT treatment and gradually resume your normal diet. Do not consume alcoholic beverages during the ECT treatment series.    BATHING:  You may shower or bathe as desired.    MEDICATIONS:  Resume all home medications starting with the AM doses not taken prior to ECT treatment. If you experience a headache, it is okay to take your usual pain reliever.    WHEN TO CALL THE DOCTOR:   Headache, memory loss or confusion that does not begin to resolve within 24 hours.    RETURN APPOINTMENT:  Report to Day Surgery ([facility]) on (date)_________07/18/2018______________ for (time)_______0600__________. Remember you may not eat or drink after midnight, but please take heart or high blood pressure medicines with a sip of water in the morning prior to leaving home.    FOR EMERGENCIES:  If any unusual problems or difficulties occur, contact the office [phone] Monday-Friday until 3:00 p.m. After hours, call the hospital  [phone] and ask for the Psychiatry Resident On-call.

## 2018-07-09 DIAGNOSIS — F33.9 RECURRENT MAJOR DEPRESSIVE DISORDER, REMISSION STATUS UNSPECIFIED: Primary | ICD-10-CM

## 2018-07-17 ENCOUNTER — ANESTHESIA EVENT (OUTPATIENT)
Dept: ELECTROPHYSIOLOGY | Facility: HOSPITAL | Age: 38
End: 2018-07-17
Payer: COMMERCIAL

## 2018-07-17 RX ORDER — ONDANSETRON 2 MG/ML
4 INJECTION INTRAMUSCULAR; INTRAVENOUS ONCE
Status: CANCELLED | OUTPATIENT
Start: 2018-07-18

## 2018-07-17 RX ORDER — LIDOCAINE HYDROCHLORIDE 10 MG/ML
1 INJECTION, SOLUTION EPIDURAL; INFILTRATION; INTRACAUDAL; PERINEURAL ONCE
Status: CANCELLED | OUTPATIENT
Start: 2018-07-18

## 2018-07-17 RX ORDER — SODIUM CHLORIDE 9 MG/ML
INJECTION, SOLUTION INTRAVENOUS CONTINUOUS
Status: CANCELLED | OUTPATIENT
Start: 2018-07-18

## 2018-07-17 RX ORDER — KETOROLAC TROMETHAMINE 30 MG/ML
30 INJECTION, SOLUTION INTRAMUSCULAR; INTRAVENOUS ONCE
Status: CANCELLED | OUTPATIENT
Start: 2018-07-18 | End: 2018-07-21

## 2018-07-17 NOTE — ANESTHESIA PREPROCEDURE EVALUATION
07/17/2018    Pre-operative evaluation for Procedure(s) (LRB):  ELECTROCONVULSIVE THERAPY (ECT) - SINGLE SEIZURE (N/A)    Allyssa Wright is a 38 y.o. female with MDD who is presenting with the above procedure.   ECT #49     Previous medications:   Methohexital 200 mg  -- POST PROCEDURE Addendum (7/5/18): Dr. Boyce would like to use 160 of methohexital for her next ect, we used 200mg and her sz wasn't as robust as he prefers.  Succinylcholine 120 mg  Labetalol 20 mg  Toradol 30 mg  Zofran 4mg      Patient Active Problem List   Diagnosis    MDD (major depressive disorder), recurrent, in partial remission    Major depressive disorder, recurrent, in partial remission    MDD (major depressive disorder), severe    Major depressive disorder, recurrent    Other acne    Major depression, recurrent, chronic    MDD (major depressive disorder), recurrent episode, moderate    Major depressive disorder, recurrent severe without psychotic features    MDD (major depressive disorder), recurrent episode, severe    MDD (major depressive disorder)    Major depressive disorder, recurrent episode, in partial remission    Schizophrenia    Depression    Comfort measures only status       Review of patient's allergies indicates:   Allergen Reactions    Benzodiazepines Other (See Comments)     Contraindicated while taking Clozapine    Ampicillin      Mom says so    Erythromycin     Levaquin [levofloxacin] Other (See Comments)     Depression side effects    Penicillins      Mom says so    Pristiq [desvenlafaxine]      psycotic      Sulfa (sulfonamide antibiotics)      Rash      Azithromycin Anxiety        Current Outpatient Prescriptions on File Prior to Visit   Medication Sig Dispense Refill    clozapine (CLOZARIL) 50 MG tablet Take 100 mg by mouth once daily.       dapsone 7.5 % GlwP Apply topically once  daily.      dextroamphetamine-amphetamine 30 mg Tab Take 30 mg by mouth 2 (two) times daily.       famciclovir (FAMVIR) 500 MG tablet Take 1 tablet (500 mg total) by mouth 2 (two) times daily. 30 tablet 12    mirtazapine (REMERON) 15 MG tablet Take 1 tablet (15 mg total) by mouth every evening. (Patient taking differently: Take 7.5 mg by mouth every evening. ) 90 tablet 0    spironolactone (ALDACTONE) 50 MG tablet 50 mg 2 (two) times daily. 50  mg qAM, 50 mg qHS.      thyroid (ARMOUR THYROID) 30 mg Tab Take 1 tablet (30 mg total) by mouth every morning. 30 tablet 11    trazodone (DESYREL) 100 MG tablet Take 200 mg by mouth every evening.       UNABLE TO FIND 2 (two) times daily. n-azetyl-cysteine      venlafaxine (EFFEXOR) 100 MG Tab Take 3 tablets (300 mg total) by mouth once daily. (Patient taking differently: Take 225 mg by mouth once daily. )      vortioxetine (TRINTELLIX) 5 mg Tab Take 2.5 mg by mouth once daily.      ZOVIRAX 5 % Crea   5     No current facility-administered medications on file prior to visit.        Past Surgical History:   Procedure Laterality Date    ANKLE SURGERY Right     BREAST augmentation      OVARIAN CYST REMOVAL Bilateral        Social History     Social History    Marital status: Single     Spouse name: N/A    Number of children: N/A    Years of education: N/A     Occupational History    unemployed      Social History Main Topics    Smoking status: Former Smoker     Quit date: 4/6/2011    Smokeless tobacco: Never Used    Alcohol use Yes      Comment: rarely    Drug use: No      Comment: Experimental use in high school    Sexual activity: Not on file     Other Topics Concern    Not on file     Social History Narrative    Pt has 1 older brother from an intact family until the death of her mother in 2012.  She completed 1 year of college, was never in the , and has never been employed.  She was engaged once, but never , has no children, and lives  with her father plus 2 dogs.  She denies any hobbies and is spiritual but not Shinto.  She does date and returns to college during rare periods when depression and anxiety [place].         Vital Signs Range (Last 24H):  BP: ()/()   Arterial Line BP: ()/()           Anesthesia Evaluation    I have reviewed the Patient Summary Reports.     I have reviewed the Medications.     Review of Systems  Anesthesia Hx:  No problems with previous Anesthesia  History of prior surgery of interest to airway management or planning: Previous anesthesia: General  Procedure performed at an [facility]. Airway issues documented on chart review include mask, easy  Denies Family Hx of Anesthesia complications.   Denies Personal Hx of Anesthesia complications.   Social:  Former Smoker    Hematology/Oncology:  Hematology Normal   Oncology Normal     EENT/Dental:EENT/Dental Normal   Cardiovascular:   Denies Hypertension.  Denies MI.   Denies Dysrhythmias.   Denies Angina. ECG has been reviewed.    Pulmonary:  Pulmonary Normal    Renal/:  Renal/ Normal     Hepatic/GI:  Hepatic/GI Normal    Musculoskeletal:  Musculoskeletal Normal    Neurological:  Neurology Normal    Endocrine:   Hypothyroidism    Psych:   Psychiatric History          Physical Exam  General:  Well nourished    Airway/Jaw/Neck:  Airway Findings: (Cold sore, upper lip) Mouth Opening: Normal Tongue: Normal  General Airway Assessment: Adult  Mallampati: II  Improves to I with phonation.  Jaw/Neck Findings:  Neck ROM: Normal ROM     Eyes/Ears/Nose:  EYES/EARS/NOSE FINDINGS: Normal   Dental:  Dental Findings: In tact   Chest/Lungs:  Chest/Lungs Findings: Clear to auscultation, Normal Respiratory Rate     Heart/Vascular:  Heart Findings: Rate: Normal  Rhythm: Regular Rhythm  Sounds: Normal     Abdomen:  Abdomen Findings: Normal    Musculoskeletal:  Musculoskeletal Findings: Normal   Skin:  Skin Findings: Normal    Mental Status:  Mental Status Findings:  Cooperative, Alert  and Oriented         Anesthesia Plan  Type of Anesthesia, risks & benefits discussed:  Anesthesia Type:  general  Patient's Preference:   Intra-op Monitoring Plan: standard ASA monitors  Intra-op Monitoring Plan Comments:   Post Op Pain Control Plan:   Post Op Pain Control Plan Comments:   Induction:   IV  Beta Blocker:  Patient is not currently on a Beta-Blocker (No further documentation required).       Informed Consent: Patient understands risks and agrees with Anesthesia plan.  Questions answered. Anesthesia consent signed with patient.  ASA Score: 2     Day of Surgery Review of History & Physical:  There are no significant changes.          Ready For Surgery From Anesthesia Perspective.

## 2018-07-18 ENCOUNTER — ANESTHESIA (OUTPATIENT)
Dept: ELECTROPHYSIOLOGY | Facility: HOSPITAL | Age: 38
End: 2018-07-18
Payer: COMMERCIAL

## 2018-07-18 ENCOUNTER — TELEPHONE (OUTPATIENT)
Dept: PSYCHIATRY | Facility: CLINIC | Age: 38
End: 2018-07-18

## 2018-07-18 NOTE — TELEPHONE ENCOUNTER
Received a call from patients father that patient was ill and requesting to reschedule to Monday 7/23/18. Scheduling notified.

## 2018-07-23 ENCOUNTER — HOSPITAL ENCOUNTER (OUTPATIENT)
Facility: HOSPITAL | Age: 38
Discharge: HOME | End: 2018-07-23
Attending: PSYCHIATRY & NEUROLOGY | Admitting: PSYCHIATRY & NEUROLOGY
Payer: COMMERCIAL

## 2018-07-23 VITALS
HEIGHT: 65 IN | TEMPERATURE: 99 F | RESPIRATION RATE: 16 BRPM | WEIGHT: 155 LBS | HEART RATE: 81 BPM | SYSTOLIC BLOOD PRESSURE: 117 MMHG | OXYGEN SATURATION: 100 % | DIASTOLIC BLOOD PRESSURE: 77 MMHG | BODY MASS INDEX: 25.83 KG/M2

## 2018-07-23 DIAGNOSIS — F33.9 RECURRENT MAJOR DEPRESSIVE DISORDER, REMISSION STATUS UNSPECIFIED: Primary | ICD-10-CM

## 2018-07-23 DIAGNOSIS — F33.41 MDD (MAJOR DEPRESSIVE DISORDER), RECURRENT, IN PARTIAL REMISSION: ICD-10-CM

## 2018-07-23 DIAGNOSIS — F33.9 MAJOR DEPRESSION, RECURRENT: ICD-10-CM

## 2018-07-23 LAB
B-HCG UR QL: NEGATIVE
CTP QC/QA: YES

## 2018-07-23 PROCEDURE — 81025 URINE PREGNANCY TEST: CPT | Performed by: ANESTHESIOLOGY

## 2018-07-23 PROCEDURE — 90870 ELECTROCONVULSIVE THERAPY: CPT | Mod: ,,, | Performed by: PSYCHIATRY & NEUROLOGY

## 2018-07-23 PROCEDURE — 37000009 HC ANESTHESIA EA ADD 15 MINS: Performed by: PSYCHIATRY & NEUROLOGY

## 2018-07-23 PROCEDURE — 63600175 PHARM REV CODE 636 W HCPCS: Performed by: STUDENT IN AN ORGANIZED HEALTH CARE EDUCATION/TRAINING PROGRAM

## 2018-07-23 PROCEDURE — 90870 ELECTROCONVULSIVE THERAPY: CPT | Performed by: ANESTHESIOLOGY

## 2018-07-23 PROCEDURE — D9220A PRA ANESTHESIA: Mod: ,,, | Performed by: ANESTHESIOLOGY

## 2018-07-23 PROCEDURE — 25000003 PHARM REV CODE 250: Performed by: STUDENT IN AN ORGANIZED HEALTH CARE EDUCATION/TRAINING PROGRAM

## 2018-07-23 PROCEDURE — 37000008 HC ANESTHESIA 1ST 15 MINUTES: Performed by: PSYCHIATRY & NEUROLOGY

## 2018-07-23 RX ORDER — SUCCINYLCHOLINE CHLORIDE 20 MG/ML
INJECTION INTRAMUSCULAR; INTRAVENOUS
Status: COMPLETED
Start: 2018-07-23 | End: 2018-07-23

## 2018-07-23 RX ORDER — ONDANSETRON 2 MG/ML
INJECTION INTRAMUSCULAR; INTRAVENOUS
Status: COMPLETED
Start: 2018-07-23 | End: 2018-07-23

## 2018-07-23 RX ORDER — SODIUM CHLORIDE 9 MG/ML
INJECTION, SOLUTION INTRAVENOUS CONTINUOUS
Status: DISCONTINUED | OUTPATIENT
Start: 2018-07-23 | End: 2018-07-23 | Stop reason: HOSPADM

## 2018-07-23 RX ORDER — KETOROLAC TROMETHAMINE 30 MG/ML
INJECTION, SOLUTION INTRAMUSCULAR; INTRAVENOUS
Status: COMPLETED
Start: 2018-07-23 | End: 2018-07-23

## 2018-07-23 RX ORDER — KETOROLAC TROMETHAMINE 30 MG/ML
30 INJECTION, SOLUTION INTRAMUSCULAR; INTRAVENOUS ONCE
Status: COMPLETED | OUTPATIENT
Start: 2018-07-23 | End: 2018-07-23

## 2018-07-23 RX ORDER — SUCCINYLCHOLINE CHLORIDE 20 MG/ML
INJECTION INTRAMUSCULAR; INTRAVENOUS
Status: DISCONTINUED | OUTPATIENT
Start: 2018-07-23 | End: 2018-07-23

## 2018-07-23 RX ORDER — ONDANSETRON 2 MG/ML
4 INJECTION INTRAMUSCULAR; INTRAVENOUS ONCE
Status: COMPLETED | OUTPATIENT
Start: 2018-07-23 | End: 2018-07-23

## 2018-07-23 RX ORDER — LIDOCAINE HYDROCHLORIDE 10 MG/ML
1 INJECTION, SOLUTION EPIDURAL; INFILTRATION; INTRACAUDAL; PERINEURAL ONCE
Status: COMPLETED | OUTPATIENT
Start: 2018-07-23 | End: 2018-07-23

## 2018-07-23 RX ADMIN — SUCCINYLCHOLINE CHLORIDE 160 MG: 20 INJECTION, SOLUTION INTRAMUSCULAR; INTRAVENOUS at 07:07

## 2018-07-23 RX ADMIN — Medication 500 MG: at 06:07

## 2018-07-23 RX ADMIN — LIDOCAINE HYDROCHLORIDE 10 MG: 10 INJECTION, SOLUTION EPIDURAL; INFILTRATION; INTRACAUDAL; PERINEURAL at 07:07

## 2018-07-23 RX ADMIN — ONDANSETRON 4 MG: 2 INJECTION INTRAMUSCULAR; INTRAVENOUS at 07:07

## 2018-07-23 RX ADMIN — SODIUM CHLORIDE: 0.9 INJECTION, SOLUTION INTRAVENOUS at 07:07

## 2018-07-23 RX ADMIN — KETOROLAC TROMETHAMINE 30 MG: 30 INJECTION, SOLUTION INTRAMUSCULAR; INTRAVENOUS at 07:07

## 2018-07-23 RX ADMIN — METHOHEXITAL SODIUM 160 MG: 500 INJECTION, POWDER, LYOPHILIZED, FOR SOLUTION INTRAMUSCULAR; INTRAVENOUS; RECTAL at 07:07

## 2018-07-23 NOTE — TRANSFER OF CARE
"Anesthesia Transfer of Care Note    Patient: Allyssa Wright    Procedure(s) Performed: Procedure(s) (LRB):  ELECTROCONVULSIVE THERAPY (ECT) - SINGLE SEIZURE (N/A)    Patient location: PACU    Anesthesia Type: MAC    Transport from OR: Transported from OR on 6-10 L/min O2 by face mask with adequate spontaneous ventilation    Post pain: adequate analgesia    Post assessment: no apparent anesthetic complications    Post vital signs: stable    Level of consciousness: awake and alert    Nausea/Vomiting: no nausea/vomiting    Complications: none    Transfer of care protocol was followed      Last vitals:   Visit Vitals  /77   Pulse 81   Temp 37.1 °C (98.8 °F) (Temporal)   Resp 16   Ht 5' 5" (1.651 m)   Wt 70.3 kg (155 lb)   SpO2 100%   BMI 25.79 kg/m²     "

## 2018-07-23 NOTE — ANESTHESIA RELEASE NOTE
"Anesthesia Release from PACU Note    Patient: Allyssa Wright    Procedure(s) Performed: Procedure(s) (LRB):  ELECTROCONVULSIVE THERAPY (ECT) - SINGLE SEIZURE (N/A)    Anesthesia type: MAC    Post pain: Adequate analgesia    Post assessment: no apparent anesthetic complications    Last Vitals:   Visit Vitals  /77   Pulse 81   Temp 37.1 °C (98.8 °F) (Temporal)   Resp 16   Ht 5' 5" (1.651 m)   Wt 70.3 kg (155 lb)   SpO2 100%   BMI 25.79 kg/m²       Post vital signs: stable    Level of consciousness: awake, alert  and oriented    Nausea/Vomiting: no nausea/no vomiting    Complications: none    Airway Patency: patent    Respiratory: unassisted    Cardiovascular: stable and blood pressure at baseline    Hydration: euvolemic  "

## 2018-07-23 NOTE — DISCHARGE SUMMARY
Allyssa Wright  : 1980   MRN: [id number]  Date: 2018     Electroconvulsive Therapy  Discharge Summary    Admit Date: 2018  5:49 AM  Discharge Date: 2018    Attending Physician: Shoaib Boyce Jr., MD   Discharge Provider: Marcel Laura MD    History of Present Illness: Allyssa Wright is a 38 y.o. female with MDD presented for ECT #48. See H&P dated 2018 for full HPI. For further details, see Dr. Boyce's pre-ECT evaluation.    Hospital Course: The patient tolerated the ECT treatment well without complication. Patient was stable post-procedure. See OP note dated 2018 for more details.     Disposition: Home or Self Care    Medications:  Current Discharge Medication List      CONTINUE these medications which have NOT CHANGED    Details   clozapine (CLOZARIL) 50 MG tablet Take 100 mg by mouth once daily.       dapsone 7.5 % GlwP Apply topically once daily.      famciclovir (FAMVIR) 500 MG tablet Take 1 tablet (500 mg total) by mouth 2 (two) times daily.  Qty: 30 tablet, Refills: 12    Associated Diagnoses: Major depressive disorder, recurrent episode, moderate degree      mirtazapine (REMERON) 15 MG tablet Take 1 tablet (15 mg total) by mouth every evening.  Qty: 90 tablet, Refills: 0      spironolactone (ALDACTONE) 50 MG tablet 50 mg 2 (two) times daily. 50  mg qAM, 50 mg qHS.      thyroid (ARMOUR THYROID) 30 mg Tab Take 1 tablet (30 mg total) by mouth every morning.  Qty: 30 tablet, Refills: 11    Associated Diagnoses: Major depressive disorder, recurrent episode, moderate degree      trazodone (DESYREL) 100 MG tablet Take 200 mg by mouth every evening.       UNABLE TO FIND 2 (two) times daily. n-azetyl-cysteine      venlafaxine (EFFEXOR) 100 MG Tab Take 3 tablets (300 mg total) by mouth once daily.    Associated Diagnoses: MDD (major depressive disorder), recurrent, in partial remission      vortioxetine (TRINTELLIX) 5 mg Tab Take 2.5 mg by mouth once daily.      ZOVIRAX 5 % Crea  Refills: 5      dextroamphetamine-amphetamine 30 mg Tab Take 30 mg by mouth 2 (two) times daily.     Associated Diagnoses: MDD (major depressive disorder), recurrent, in partial remission           Status at Discharge: Alert and medically stable    Discharge Diagnoses: MDD, recurrent, in partial remission   Diet: Resume previous outpatient diet  Activity: Ambulate with assistance  Instructions: Please do not eat or drink anything after midnight prior to procedure. Please do not drive on day of ECT.    Med Changes:  None    Next ECT: ECT #49 on Monday 8/6/18 in 2 weeks.       Marcel Laura MD  7/23/2018

## 2018-07-23 NOTE — ANESTHESIA POSTPROCEDURE EVALUATION
"Anesthesia Post Evaluation    Patient: Allyssa Wright    Procedure(s) Performed: Procedure(s) (LRB):  ELECTROCONVULSIVE THERAPY (ECT) - SINGLE SEIZURE (N/A)    Final Anesthesia Type: MAC  Patient location during evaluation: [facility]  Patient participation: Yes- Able to Participate  Level of consciousness: awake and alert  Post-procedure vital signs: reviewed and stable  Pain management: adequate  Airway patency: patent  PONV status at discharge: No PONV  Anesthetic complications: no      Cardiovascular status: hemodynamically stable  Respiratory status: unassisted, spontaneous ventilation and room air  Hydration status: euvolemic  Follow-up not needed.        Visit Vitals  /77   Pulse 81   Temp 37.1 °C (98.8 °F) (Temporal)   Resp 16   Ht 5' 5" (1.651 m)   Wt 70.3 kg (155 lb)   SpO2 100%   BMI 25.79 kg/m²       Pain/Roma Score: Pain Assessment Performed: Yes (7/23/2018  8:27 AM)  Presence of Pain: denies (7/23/2018  8:27 AM)      "

## 2018-07-23 NOTE — H&P
"Allyssa Wright  : 1980   MRN: [id number]  Date: 2018     Electroconvulsive Therapy  History & Physical    Chief complaint: MDD, recurrent, in partial remission  Procedure: ECT #48    SUBJECTIVE:     HPI:   Allyssa Wright is a 38 y.o. female with MDD, recurrent, in partial remission who presents for ECT. Last ECT was on 18.     Pt presents for ECT this morning. She states that her mood is "good". Her last ECT treatment was on 2018. She denies any physical complaints this morning. She denies SI/HI/AVH. She states she recently decreased her dosages or Effexor, otherwise denies any changes to medications. States overall she has been feeling a little anxious and "more OCD" which is resulting in her feeling more depressed. Had to miss her last ECT appointment a few days ago because she had been feeling sick.     The patient denies any changes in dentition.   The patient does not need any prescriptions today  The patient has not had anything by mouth since midnight and is ready for ECT.    Psychiatric Review of Systems:  Mood: "a little anxious"  Appetite: No problem  Psychomotor: No PMA or PMR  Cognitive Impairment: None  Insomnia: None  Psychosis: None  Diurnal Variation: None  Suicidal Ideation: No    Medical Review Of Systems:  A comprehensive review of systems was negative.    Current Medications:   No current facility-administered medications on file prior to encounter.      Current Outpatient Prescriptions on File Prior to Encounter   Medication Sig Dispense Refill    clozapine (CLOZARIL) 50 MG tablet Take 100 mg by mouth once daily.       dapsone 7.5 % GlwP Apply topically once daily.      famciclovir (FAMVIR) 500 MG tablet Take 1 tablet (500 mg total) by mouth 2 (two) times daily. 30 tablet 12    mirtazapine (REMERON) 15 MG tablet Take 1 tablet (15 mg total) by mouth every evening. (Patient taking differently: Take 7.5 mg by mouth every evening. ) 90 tablet 0    spironolactone (ALDACTONE) 50 " MG tablet 50 mg 2 (two) times daily. 50  mg qAM, 50 mg qHS.      thyroid (ARMOUR THYROID) 30 mg Tab Take 1 tablet (30 mg total) by mouth every morning. 30 tablet 11    trazodone (DESYREL) 100 MG tablet Take 200 mg by mouth every evening.       UNABLE TO FIND 2 (two) times daily. n-azetyl-cysteine      venlafaxine (EFFEXOR) 100 MG Tab Take 3 tablets (300 mg total) by mouth once daily. (Patient taking differently: Take 225 mg by mouth once daily. )      vortioxetine (TRINTELLIX) 5 mg Tab Take 2.5 mg by mouth once daily.      ZOVIRAX 5 % Crea   5    dextroamphetamine-amphetamine 30 mg Tab Take 30 mg by mouth 2 (two) times daily.        Allergies:   Benzodiazepines; Ampicillin; Erythromycin; Levaquin [levofloxacin]; Penicillins; Pristiq [desvenlafaxine]; Sulfa (sulfonamide antibiotics); and Azithromycin    Past Medical/Surgical History:   Past Medical History:   Diagnosis Date    Anxiety     Depression     History of psychiatric hospitalization     HSV-1 (herpes simplex virus 1) infection     Hx of psychiatric care     Moderate depressed bipolar II disorder 06/13/2016    reports no history of bipolar    Obsessive-compulsive disorder     Psychiatric problem     Schizophrenia 4/3/2018    Self-harming behavior     Therapy      Past Surgical History:   Procedure Laterality Date    ANKLE SURGERY Right     BREAST augmentation      OVARIAN CYST REMOVAL Bilateral       OBJECTIVE:     Vitals (pre-procedure):  Vitals:    07/23/18 0629   BP: 106/70   Pulse: 77   Resp: 16   Temp: 97.7 °F (36.5 °C)        Labs/Imaging/Studies:   Recent Results (from the past 48 hour(s))   POCT urine pregnancy    Collection Time: 07/23/18  6:19 AM   Result Value Ref Range    POC Preg Test, Ur Negative Negative     Acceptable Yes       No results found for: PHENYTOIN, PHENOBARB, VALPROATE, CBMZ    Physical Exam:   Gen: AAOx4, NAD  HEENT: MMM, PERRL, EOMI  CV: RRR, S1/S2 nml  Chest: CTAB, unlabored breathing  Abd:  "S/NT/ND, +BS  Ext: No C/C/E, pulse 2+ throughout  Neuro: CN II-XII grossly intact, no focal deficits    Mental Status Exam:   Appearance: age appropriate, well nourished, casually dressed  Behavior: friendly and cooperative, eye contact normal  Speech: normal tone, normal rate, normal pitch, normal volume  Mood: "a little anxious"  Affect: full, reactive  Thought Process: linear and oriented  Thought Content: no SI, no HI or AVH  Cognition: grossly intact  Insight: good  Judgment: good    ASSESSMENT/PLAN:     Allyssa Wright is a 38 y.o. female with MDD, recurrent, in partial remission who presents for ECT.    Recommendations:   Proceed with ECT #48      Marcel Laura MD  7/23/2018  "

## 2018-07-23 NOTE — OP NOTE
Allyssa Wright  : 1980   MRN: [id number]  Date: 2018    Electroconvulsive Therapy  Operative Note    Date of Admission: 2018  5:49 AM    Site: [facility], [address]    Attending: Shoaib Boyce Jr., MD   Residents: Marcel Laura MD  Pre-op Diagnosis: MDD, recurrent, in partial remission    ECT Treatment Number: 48  Machine Type: Q Holdingsta 5000    Patient Status: Medically stable    Vitals (pre-procedure):  Vitals:    18 0629   BP: 106/70   Pulse: 77   Resp: 16   Temp: 97.7 °F (36.5 °C)       Electrode Placement: Bitemporal    Stimulus Number Charge (mC) Level Pulse Width (msec) Frequency  (Hz) Duration of Stimulus (sec) Current (mA) Duration of Seizure (sec)   1 576 3 1 60 6 800 94                                   Complications: Hypertension    Maximum Blood Pressure: 161/76     Medications Given:  Caffeine 500 mg PO before  Methohexital (Brevital) 160 mg  IV before  Succinylcholine (Anectine) 160 mg  IV before    Treatment Course:  Patient tolerated procedure well. After adequate recovery from general anesthesia, the patient was transported to recovery.    Post-op Diagnosis: Same as above    Recommended for next ECT: ECT # 49 in 2 weeks 18.      Marcel Laura MD  2018

## 2018-07-23 NOTE — DISCHARGE INSTRUCTIONS
August 6th    Understanding Electroconvulsive Therapy  Electroconvulsive therapy (ECT) is sometimes called shock therapy. This may sound painful, but ECT doesnt hurt. Its often the safest and best treatment for severe depression. It can treat other mental disorders as well.  What is electroconvulsive therapy?  ECT is used to treat people who are very depressed. Its mainly used when other treatments, such as antidepressant medicines, have failed. Often it may relieve feelings of sadness and despair after 2 to 4 treatments.  Common symptoms of major depression  Symptoms of major depression include:  · Feeling a deep sadness that doesnt go away  · Losing all pleasure in life  · Feeling hopeless or helpless  · Feeling guilty  · Sleeping more or less than normal  · Eating more or less than normal  · Having headaches or stomachaches, or other pains that dont go away  · Feeling nervous, empty, or worthless  · Crying a great deal  · Thinking or talking about suicide or death  How is ECT therapy done?  Before an ECT treatment, youll be given anesthesia to keep you pain-free. Youll also be given medicine to make you sleep, relax your muscles and control your heart rate. Your healthcare provider then places electrodes on your head. You may have one above each temple (bilateral ECT). Or you may have electrodes on one temple and on your forehead (unilateral ECT). While you are asleep, your brain is stimulated very briefly with an electric current. This causes a seizure, usually lasting less than a minute. Because you are under anesthesia, your body will not move even as your brain goes through great changes.  What are the risks?  When done properly, ECT is quite safe. Right after the treatment, you may be confused. This often lasts for less than half an hour. You may have a headache or stiff muscles. But these symptoms often go away quickly. A more serious possible side effect is memory loss. Commonly, people have  short-term (temporary) trouble remembering information that they learned recently. And they may have little recall of the time when they received treatment. Less commonly, people may have long-lasting (permanent) spotty recall of major past events. In rare cases, there may be memory loss for larger blocks of time.  Looking to the future  In most cases, ECT doesnt cure depression. But it can improve symptoms for a period of time. You may need a series of ECT treatments to continue feeling the benefit. You may also need to take antidepressant medicines to help prevent symptoms from returning. But with ongoing treatment, you can have a full and healthy life.  Resources  · The National [place] of Mental Health  133.198.2891  www.nimh.nih.gov  · [facility]  667.609.5449  www.[facility].org     Date Last Reviewed: 5/1/2017  © 5937-2379 [employer]. [address] [place] [address], [place] [address]. All rights reserved. This information is not intended as a substitute for professional medical care. Always follow your healthcare professional's instructions.

## 2018-08-03 ENCOUNTER — ANESTHESIA EVENT (OUTPATIENT)
Dept: ELECTROPHYSIOLOGY | Facility: HOSPITAL | Age: 38
End: 2018-08-03
Payer: COMMERCIAL

## 2018-08-05 NOTE — ANESTHESIA PREPROCEDURE EVALUATION
08/05/2018  Allyssa Wright is a 38 y.o., female.  Pre-operative evaluation for Procedure(s) (LRB):  ELECTROCONVULSIVE THERAPY (ECT) - SINGLE SEIZURE (N/A)    Allyssa Wright is a 38 y.o. female with hypothyroidism, MDD who presents for the above procedure    ECT #50  Previous aneshesia  Methohexital 160mg  Succinylcholine 160mg  ketoralac 30mg  zofran 4mg    Prev airway: Easy mask      Patient Active Problem List   Diagnosis    MDD (major depressive disorder), recurrent, in partial remission    Major depressive disorder, recurrent, in partial remission    MDD (major depressive disorder), severe    Major depressive disorder, recurrent    Other acne    Major depression, recurrent, chronic    MDD (major depressive disorder), recurrent episode, moderate    Major depressive disorder, recurrent severe without psychotic features    MDD (major depressive disorder), recurrent episode, severe    MDD (major depressive disorder)    Major depressive disorder, recurrent episode, in partial remission    Schizophrenia    Depression    Comfort measures only status    Major depression, recurrent       Review of patient's allergies indicates:   Allergen Reactions    Benzodiazepines Other (See Comments)     Contraindicated while taking Clozapine    Ampicillin      Mom says so    Erythromycin     Levaquin [levofloxacin] Other (See Comments)     Depression side effects    Penicillins      Mom says so    Pristiq [desvenlafaxine]      psycotic      Sulfa (sulfonamide antibiotics)      Rash      Azithromycin Anxiety        No current facility-administered medications on file prior to encounter.      Current Outpatient Prescriptions on File Prior to Encounter   Medication Sig Dispense Refill    clozapine (CLOZARIL) 50 MG tablet Take 100 mg by mouth once daily.       dapsone 7.5 % GlwP Apply topically once  daily.      dextroamphetamine-amphetamine 30 mg Tab Take 30 mg by mouth 2 (two) times daily.       famciclovir (FAMVIR) 500 MG tablet Take 1 tablet (500 mg total) by mouth 2 (two) times daily. 30 tablet 12    mirtazapine (REMERON) 15 MG tablet Take 1 tablet (15 mg total) by mouth every evening. (Patient taking differently: Take 7.5 mg by mouth every evening. ) 90 tablet 0    spironolactone (ALDACTONE) 50 MG tablet 50 mg 2 (two) times daily. 50  mg qAM, 50 mg qHS.      thyroid (ARMOUR THYROID) 30 mg Tab Take 1 tablet (30 mg total) by mouth every morning. 30 tablet 11    trazodone (DESYREL) 100 MG tablet Take 200 mg by mouth every evening.       UNABLE TO FIND 2 (two) times daily. n-azetyl-cysteine      venlafaxine (EFFEXOR) 100 MG Tab Take 3 tablets (300 mg total) by mouth once daily. (Patient taking differently: Take 225 mg by mouth once daily. )      vortioxetine (TRINTELLIX) 5 mg Tab Take 2.5 mg by mouth once daily.      ZOVIRAX 5 % Crea   5       Past Surgical History:   Procedure Laterality Date    ANKLE SURGERY Right     BREAST augmentation      OVARIAN CYST REMOVAL Bilateral        Social History     Social History    Marital status: Single     Spouse name: N/A    Number of children: N/A    Years of education: N/A     Occupational History    unemployed      Social History Main Topics    Smoking status: Former Smoker     Quit date: 4/6/2011    Smokeless tobacco: Never Used    Alcohol use Yes      Comment: rarely    Drug use: No      Comment: Experimental use in high school    Sexual activity: Not on file     Other Topics Concern    Not on file     Social History Narrative    Pt has 1 older brother from an intact family until the death of her mother in 2012.  She completed 1 year of college, was never in the , and has never been employed.  She was engaged once, but never , has no children, and lives with her father plus 2 dogs.  She denies any hobbies and is spiritual but  not Shinto.  She does date and returns to college during rare periods when depression and anxiety [place].         Vital Signs Range (Last 24H):  BP: ()/()   Arterial Line BP: ()/()       Lab Results   Component Value Date    WBC 6.83 06/08/2016    HGB 13.3 06/08/2016    HCT 40.3 06/08/2016    MCV 92 06/08/2016     06/08/2016     CMP  Sodium   Date Value Ref Range Status   06/08/2016 137 136 - 145 mmol/L Final     Potassium   Date Value Ref Range Status   06/08/2016 3.8 3.5 - 5.1 mmol/L Final     Chloride   Date Value Ref Range Status   06/08/2016 101 95 - 110 mmol/L Final     CO2   Date Value Ref Range Status   06/08/2016 28 23 - 29 mmol/L Final     Glucose   Date Value Ref Range Status   06/08/2016 81 70 - 110 mg/dL Final     BUN, Bld   Date Value Ref Range Status   06/08/2016 10 6 - 20 mg/dL Final     Creatinine   Date Value Ref Range Status   06/08/2016 0.7 0.5 - 1.4 mg/dL Final   09/12/2012 0.6 0.2 - 1.4 mg/dl Final     Calcium   Date Value Ref Range Status   06/08/2016 9.5 8.7 - 10.5 mg/dL Final   09/12/2012 9.6 8.6 - 10.2 mg/dl Final     Total Protein   Date Value Ref Range Status   06/08/2016 7.3 6.0 - 8.4 g/dL Final     Albumin   Date Value Ref Range Status   06/08/2016 4.5 3.5 - 5.2 g/dL Final     Total Bilirubin   Date Value Ref Range Status   06/08/2016 0.4 0.1 - 1.0 mg/dL Final     Comment:     For infants and newborns, interpretation of results should be based  on gestational age, weight and in agreement with clinical  observations.  Premature Infant recommended reference ranges:  Up to 24 hours.............<8.0 mg/dL  Up to 48 hours............<12.0 mg/dL  3-5 days..................<15.0 mg/dL  6-29 days.................<15.0 mg/dL       Alkaline Phosphatase   Date Value Ref Range Status   06/08/2016 28 (L) 55 - 135 U/L Final   09/12/2012 31 23 - 119 UNIT/L Final     AST   Date Value Ref Range Status   06/08/2016 20 10 - 40 U/L Final   09/12/2012 22 10 - 30 UNIT/L Final     ALT   Date Value  Ref Range Status   06/08/2016 14 10 - 44 U/L Final     Anion Gap   Date Value Ref Range Status   06/08/2016 8 8 - 16 mmol/L Final   09/12/2012 8 5 - 15 meq/L Final     eGFR if    Date Value Ref Range Status   06/08/2016 >60.0 >60 mL/min/1.73 m^2 Final     eGFR if non    Date Value Ref Range Status   06/08/2016 >60.0 >60 mL/min/1.73 m^2 Final     Comment:     Calculation used to obtain the estimated glomerular filtration  rate (eGFR) is the CKD-EPI equation. Since race is unknown   in our information system, the eGFR values for   -American and Non--American patients are given   for each creatinine result.       No results found for: INR, PROTIME      Diagnostic Studies:  No relevant studies    EKG:  Vent. Rate : 084 BPM     Atrial Rate : 084 BPM     P-R Int : 154 ms          QRS Dur : 086 ms      QT Int : 378 ms       P-R-T Axes : 067 066 055 degrees     QTc Int : 446 ms    Normal sinus rhythm  Normal ECG  When compared with ECG of 03-DEC-2015 11:38,  Questionable change in The axis  T wave inversion no longer evident in Inferior leads  Confirmed by Flaco Sr MD ([id number]) on 6/9/2016 1:30:13 PM    2D Echo:  None      Anesthesia Evaluation    I have reviewed the Patient Summary Reports.    I have reviewed the Nursing Notes.   I have reviewed the Medications.     Review of Systems  Anesthesia Hx:  No problems with previous Anesthesia   Denies Personal Hx of Anesthesia complications.   Social:  Former Smoker    Hematology/Oncology:  Hematology Normal   Oncology Normal     EENT/Dental:EENT/Dental Normal   Cardiovascular:  Cardiovascular Normal     Pulmonary:  Pulmonary Normal    Renal/:  Renal/ Normal     Hepatic/GI:  Hepatic/GI Normal    Neurological:   Denies CVA.    Endocrine:   Hypothyroidism    Psych:   Psychiatric History depression          Physical Exam  General:  Well nourished    Airway/Jaw/Neck:  Airway Findings: Mouth Opening: Normal Tongue: Normal  General Airway  Assessment: Adult, Good  Mallampati: I  TM Distance: 4 - 6 cm  Jaw/Neck Findings:  Neck ROM: Normal ROM     Eyes/Ears/Nose:  EYES/EARS/NOSE FINDINGS: Normal   Dental:  Dental Findings: In tact   Chest/Lungs:  Chest/Lungs Clear    Heart/Vascular:  Heart Findings: Normal    Abdomen:  Abdomen Findings: Normal      Mental Status:  Mental Status Findings:  Cooperative, Alert and Oriented         Anesthesia Plan  Type of Anesthesia, risks & benefits discussed:  Anesthesia Type:  general  Patient's Preference:   Intra-op Monitoring Plan: standard ASA monitors  Intra-op Monitoring Plan Comments:   Post Op Pain Control Plan: multimodal analgesia  Post Op Pain Control Plan Comments:   Induction:   IV  Beta Blocker:  Patient is not currently on a Beta-Blocker (No further documentation required).       Informed Consent: Patient understands risks and agrees with Anesthesia plan.  Questions answered. Anesthesia consent signed with patient representative.  ASA Score: 2     Day of Surgery Review of History & Physical:    H&P update referred to the surgeon.         Ready For Surgery From Anesthesia Perspective.

## 2018-08-06 ENCOUNTER — ANESTHESIA (OUTPATIENT)
Dept: ELECTROPHYSIOLOGY | Facility: HOSPITAL | Age: 38
End: 2018-08-06
Payer: COMMERCIAL

## 2018-08-06 ENCOUNTER — SURGERY (OUTPATIENT)
Age: 38
End: 2018-08-06

## 2018-08-06 ENCOUNTER — HOSPITAL ENCOUNTER (OUTPATIENT)
Facility: HOSPITAL | Age: 38
Discharge: HOME | End: 2018-08-06
Attending: PSYCHIATRY & NEUROLOGY | Admitting: PSYCHIATRY & NEUROLOGY
Payer: COMMERCIAL

## 2018-08-06 VITALS
RESPIRATION RATE: 18 BRPM | OXYGEN SATURATION: 100 % | WEIGHT: 155 LBS | BODY MASS INDEX: 25.83 KG/M2 | TEMPERATURE: 98 F | SYSTOLIC BLOOD PRESSURE: 109 MMHG | DIASTOLIC BLOOD PRESSURE: 71 MMHG | HEART RATE: 72 BPM | HEIGHT: 65 IN

## 2018-08-06 DIAGNOSIS — F33.0 MDD (MAJOR DEPRESSIVE DISORDER), RECURRENT EPISODE, MILD: ICD-10-CM

## 2018-08-06 DIAGNOSIS — F33.9 RECURRENT MAJOR DEPRESSIVE DISORDER, REMISSION STATUS UNSPECIFIED: Primary | ICD-10-CM

## 2018-08-06 LAB
B-HCG UR QL: NEGATIVE
CTP QC/QA: YES

## 2018-08-06 PROCEDURE — 37000008 HC ANESTHESIA 1ST 15 MINUTES: Performed by: PSYCHIATRY & NEUROLOGY

## 2018-08-06 PROCEDURE — 25000003 PHARM REV CODE 250: Performed by: PSYCHIATRY & NEUROLOGY

## 2018-08-06 PROCEDURE — 90870 ELECTROCONVULSIVE THERAPY: CPT | Mod: ,,, | Performed by: PSYCHIATRY & NEUROLOGY

## 2018-08-06 PROCEDURE — 37000009 HC ANESTHESIA EA ADD 15 MINS: Performed by: PSYCHIATRY & NEUROLOGY

## 2018-08-06 PROCEDURE — 25000003 PHARM REV CODE 250: Performed by: STUDENT IN AN ORGANIZED HEALTH CARE EDUCATION/TRAINING PROGRAM

## 2018-08-06 PROCEDURE — 90870 ELECTROCONVULSIVE THERAPY: CPT | Performed by: STUDENT IN AN ORGANIZED HEALTH CARE EDUCATION/TRAINING PROGRAM

## 2018-08-06 PROCEDURE — 63600175 PHARM REV CODE 636 W HCPCS: Performed by: STUDENT IN AN ORGANIZED HEALTH CARE EDUCATION/TRAINING PROGRAM

## 2018-08-06 PROCEDURE — 71000044 HC DOSC ROUTINE RECOVERY FIRST HOUR: Performed by: PSYCHIATRY & NEUROLOGY

## 2018-08-06 PROCEDURE — 81025 URINE PREGNANCY TEST: CPT | Performed by: PSYCHIATRY & NEUROLOGY

## 2018-08-06 PROCEDURE — D9220A PRA ANESTHESIA: Mod: ,,, | Performed by: ANESTHESIOLOGY

## 2018-08-06 RX ORDER — ONDANSETRON 2 MG/ML
INJECTION INTRAMUSCULAR; INTRAVENOUS
Status: DISCONTINUED | OUTPATIENT
Start: 2018-08-06 | End: 2018-08-06

## 2018-08-06 RX ORDER — SODIUM CHLORIDE 0.9 % (FLUSH) 0.9 %
3 SYRINGE (ML) INJECTION
Status: DISCONTINUED | OUTPATIENT
Start: 2018-08-06 | End: 2018-08-06 | Stop reason: HOSPADM

## 2018-08-06 RX ORDER — LIDOCAINE HYDROCHLORIDE 10 MG/ML
1 INJECTION, SOLUTION EPIDURAL; INFILTRATION; INTRACAUDAL; PERINEURAL ONCE
Status: DISCONTINUED | OUTPATIENT
Start: 2018-08-06 | End: 2018-08-06 | Stop reason: HOSPADM

## 2018-08-06 RX ORDER — SODIUM CHLORIDE 9 MG/ML
INJECTION, SOLUTION INTRAVENOUS CONTINUOUS
Status: DISCONTINUED | OUTPATIENT
Start: 2018-08-06 | End: 2018-08-06 | Stop reason: HOSPADM

## 2018-08-06 RX ORDER — GLYCOPYRROLATE 0.2 MG/ML
INJECTION INTRAMUSCULAR; INTRAVENOUS
Status: DISCONTINUED
Start: 2018-08-06 | End: 2018-08-06 | Stop reason: HOSPADM

## 2018-08-06 RX ORDER — SUCCINYLCHOLINE CHLORIDE 20 MG/ML
INJECTION INTRAMUSCULAR; INTRAVENOUS
Status: COMPLETED
Start: 2018-08-06 | End: 2018-08-06

## 2018-08-06 RX ORDER — HYDRALAZINE HYDROCHLORIDE 20 MG/ML
INJECTION INTRAMUSCULAR; INTRAVENOUS
Status: DISCONTINUED | OUTPATIENT
Start: 2018-08-06 | End: 2018-08-06

## 2018-08-06 RX ORDER — KETOROLAC TROMETHAMINE 30 MG/ML
INJECTION, SOLUTION INTRAMUSCULAR; INTRAVENOUS
Status: DISCONTINUED | OUTPATIENT
Start: 2018-08-06 | End: 2018-08-06

## 2018-08-06 RX ORDER — LIDOCAINE HYDROCHLORIDE 10 MG/ML
1 INJECTION, SOLUTION EPIDURAL; INFILTRATION; INTRACAUDAL; PERINEURAL ONCE
Status: COMPLETED | OUTPATIENT
Start: 2018-08-06 | End: 2018-08-06

## 2018-08-06 RX ORDER — KETOROLAC TROMETHAMINE 30 MG/ML
INJECTION, SOLUTION INTRAMUSCULAR; INTRAVENOUS
Status: COMPLETED
Start: 2018-08-06 | End: 2018-08-06

## 2018-08-06 RX ORDER — SUCCINYLCHOLINE CHLORIDE 20 MG/ML
INJECTION INTRAMUSCULAR; INTRAVENOUS
Status: DISCONTINUED | OUTPATIENT
Start: 2018-08-06 | End: 2018-08-06

## 2018-08-06 RX ORDER — LABETALOL HYDROCHLORIDE 5 MG/ML
INJECTION, SOLUTION INTRAVENOUS
Status: DISCONTINUED
Start: 2018-08-06 | End: 2018-08-06 | Stop reason: HOSPADM

## 2018-08-06 RX ORDER — ONDANSETRON 2 MG/ML
INJECTION INTRAMUSCULAR; INTRAVENOUS
Status: COMPLETED
Start: 2018-08-06 | End: 2018-08-06

## 2018-08-06 RX ADMIN — ONDANSETRON 4 MG: 2 INJECTION, SOLUTION INTRAMUSCULAR; INTRAVENOUS at 07:08

## 2018-08-06 RX ADMIN — Medication 500 MG: at 07:08

## 2018-08-06 RX ADMIN — SODIUM CHLORIDE: 0.9 INJECTION, SOLUTION INTRAVENOUS at 06:08

## 2018-08-06 RX ADMIN — HYDRALAZINE HYDROCHLORIDE 20 MG: 20 INJECTION INTRAMUSCULAR; INTRAVENOUS at 07:08

## 2018-08-06 RX ADMIN — METHOHEXITAL SODIUM 160 MG: 500 INJECTION, POWDER, LYOPHILIZED, FOR SOLUTION INTRAMUSCULAR; INTRAVENOUS; RECTAL at 07:08

## 2018-08-06 RX ADMIN — KETOROLAC TROMETHAMINE 30 MG: 30 INJECTION, SOLUTION INTRAMUSCULAR at 07:08

## 2018-08-06 RX ADMIN — LIDOCAINE HYDROCHLORIDE 0.5 MG: 10 INJECTION, SOLUTION EPIDURAL; INFILTRATION; INTRACAUDAL; PERINEURAL at 06:08

## 2018-08-06 RX ADMIN — SUCCINYLCHOLINE CHLORIDE 120 MG: 20 INJECTION, SOLUTION INTRAMUSCULAR; INTRAVENOUS at 07:08

## 2018-08-06 NOTE — DISCHARGE INSTRUCTIONS
Understanding Electroconvulsive Therapy  Electroconvulsive therapy (ECT) is sometimes called shock therapy. This may sound painful, but ECT doesnt hurt. Its often the safest and best treatment for severe depression. It can treat other mental disorders as well.  What is electroconvulsive therapy?  ECT is used to treat people who are very depressed. Its mainly used when other treatments, such as antidepressant medicines, have failed. Often it may relieve feelings of sadness and despair after 2 to 4 treatments.  Common symptoms of major depression  Symptoms of major depression include:  · Feeling a deep sadness that doesnt go away  · Losing all pleasure in life  · Feeling hopeless or helpless  · Feeling guilty  · Sleeping more or less than normal  · Eating more or less than normal  · Having headaches or stomachaches, or other pains that dont go away  · Feeling nervous, empty, or worthless  · Crying a great deal  · Thinking or talking about suicide or death  How is ECT therapy done?  Before an ECT treatment, youll be given anesthesia to keep you pain-free. Youll also be given medicine to make you sleep, relax your muscles and control your heart rate. Your healthcare provider then places electrodes on your head. You may have one above each temple (bilateral ECT). Or you may have electrodes on one temple and on your forehead (unilateral ECT). While you are asleep, your brain is stimulated very briefly with an electric current. This causes a seizure, usually lasting less than a minute. Because you are under anesthesia, your body will not move even as your brain goes through great changes.  What are the risks?  When done properly, ECT is quite safe. Right after the treatment, you may be confused. This often lasts for less than half an hour. You may have a headache or stiff muscles. But these symptoms often go away quickly. A more serious possible side effect is memory loss. Commonly, people have short-term  (temporary) trouble remembering information that they learned recently. And they may have little recall of the time when they received treatment. Less commonly, people may have long-lasting (permanent) spotty recall of major past events. In rare cases, there may be memory loss for larger blocks of time.  Looking to the future  In most cases, ECT doesnt cure depression. But it can improve symptoms for a period of time. You may need a series of ECT treatments to continue feeling the benefit. You may also need to take antidepressant medicines to help prevent symptoms from returning. But with ongoing treatment, you can have a full and healthy life.  Resources  · The National [place] [employer]  213.604.5136  www.nim.nih.gov  · [facility]  891.850.7663  www.[facility].org     Date Last Reviewed: 5/1/2017  © 3856-9325 [employer]. [address] [place] [address]. All rights reserved. This information is not intended as a substitute for professional medical care. Always follow your healthcare professional's instructions.        Recovery After Procedural Sedation (Adult)  You have been given medicine by vein to make you sleep during your surgery. This may have included both a pain medicine and sleeping medicine. Most of the effects have worn off. But you may still have some drowsiness for the next 6 to 8 hours.  Home care  Follow these guidelines when you get home:  · For the next 8 hours, you should be watched by a responsible adult. This person should make sure your condition is not getting worse.  · Don't drink any alcohol for the next 24 hours.  · Don't drive, operate dangerous machinery, or make important business or personal decisions during the next 24 hours.  Note: Your healthcare provider may tell you not to take any medicine by mouth for pain or sleep in the next 4 hours. These medicines may react with the medicines you were given in the hospital. This could cause a much  stronger response than usual.  Follow-up care  Follow up with your healthcare provider if you are not alert and back to your usual level of activity within 12 hours.  When to seek medical advice  Call your healthcare provider right away if any of these occur:  · Drowsiness gets worse  · Weakness or dizziness gets worse  · Repeated vomiting  · You can't be awakened   Date Last Reviewed: 10/18/2016  © 3889-9329 [employer]. [address] [place] [address], [place] [address]. All rights reserved. This information is not intended as a substitute for professional medical care. Always follow your healthcare professional's instructions.

## 2018-08-06 NOTE — H&P
"Allyssa Wright  : 1980   MRN: [id number]  Date: 2018     Electroconvulsive Therapy  History & Physical    Chief complaint: MDD, recurrent, in partial remission  Procedure: ECT #49    SUBJECTIVE:     HPI:   Allyssa Wright is a 38 y.o. female with MDD, recurrent, in partial remission who presents for ECT. Last ECT was on 18.     Pt presents for ECT this morning. She states that her mood is "good". Her last ECT treatment was on 2018. She denies any physical complaints this morning. She denies SI/HI/AVH. She denies any recent medication changes. She states that she does not need any medications today.    The patient denies any changes in dentition.   The patient does not need any prescriptions today  The patient has not had anything by mouth since midnight and is ready for ECT.    Psychiatric Review of Systems:  Mood: "good"  Appetite: No problem  Psychomotor: No PMA or PMR  Cognitive Impairment: None  Insomnia: None  Psychosis: None  Diurnal Variation: None  Suicidal Ideation: No    Medical Review Of Systems:  A comprehensive review of systems was negative.    Current Medications:   No current facility-administered medications on file prior to encounter.      Current Outpatient Prescriptions on File Prior to Encounter   Medication Sig Dispense Refill    clozapine (CLOZARIL) 50 MG tablet Take 100 mg by mouth once daily.       dapsone 7.5 % GlwP Apply topically once daily.      famciclovir (FAMVIR) 500 MG tablet Take 1 tablet (500 mg total) by mouth 2 (two) times daily. 30 tablet 12    mirtazapine (REMERON) 15 MG tablet Take 1 tablet (15 mg total) by mouth every evening. (Patient taking differently: Take 7.5 mg by mouth every evening. ) 90 tablet 0    spironolactone (ALDACTONE) 50 MG tablet 50 mg 2 (two) times daily. 50  mg qAM, 50 mg qHS.      thyroid (ARMOUR THYROID) 30 mg Tab Take 1 tablet (30 mg total) by mouth every morning. 30 tablet 11    trazodone (DESYREL) 100 MG tablet Take 200 mg by " mouth every evening.       venlafaxine (EFFEXOR) 100 MG Tab Take 3 tablets (300 mg total) by mouth once daily. (Patient taking differently: Take 75 mg by mouth once daily. )      vortioxetine (TRINTELLIX) 5 mg Tab Take 2.5 mg by mouth once daily.      dextroamphetamine-amphetamine 30 mg Tab Take 30 mg by mouth 2 (two) times daily.       UNABLE TO FIND 2 (two) times daily. n-azetyl-cysteine      ZOVIRAX 5 % Crea   5     Allergies:   Benzodiazepines; Ampicillin; Erythromycin; Levaquin [levofloxacin]; Penicillins; Pristiq [desvenlafaxine]; Sulfa (sulfonamide antibiotics); and Azithromycin    Past Medical/Surgical History:   Past Medical History:   Diagnosis Date    Anxiety     Depression     History of psychiatric hospitalization     HSV-1 (herpes simplex virus 1) infection     Hx of psychiatric care     Moderate depressed bipolar II disorder 06/13/2016    reports no history of bipolar    Obsessive-compulsive disorder     Psychiatric problem     Schizophrenia 4/3/2018    Self-harming behavior     Therapy      Past Surgical History:   Procedure Laterality Date    ANKLE SURGERY Right     BREAST augmentation      OVARIAN CYST REMOVAL Bilateral       OBJECTIVE:     Vitals (pre-procedure):  Vitals:    08/06/18 0638   BP: 132/63   Pulse: 78   Resp: 16   Temp: 98.4 °F (36.9 °C)        Labs/Imaging/Studies:   Recent Results (from the past 48 hour(s))   POCT urine pregnancy    Collection Time: 08/06/18  6:34 AM   Result Value Ref Range    POC Preg Test, Ur Negative Negative     Acceptable Yes       No results found for: PHENYTOIN, PHENOBARB, VALPROATE, CBMZ    Physical Exam:   Gen: AAOx4, NAD  HEENT: MMM, PERRL, EOMI  CV: RRR, S1/S2 nml  Chest: CTAB, unlabored breathing  Abd: S/NT/ND, +BS  Ext: No C/C/E, pulse 2+ throughout  Neuro: CN II-XII grossly intact, no focal deficits    Mental Status Exam:   Appearance: age appropriate, well nourished, casually dressed  Behavior: friendly and  "cooperative, eye contact normal  Speech: normal tone, normal rate, normal pitch, normal volume  Mood: "good"  Affect: full, reactive  Thought Process: linear and oriented  Thought Content: no SI, no HI or AVH  Cognition: grossly intact  Insight: good  Judgment: good    ASSESSMENT/PLAN:     Allyssa Wright is a 38 y.o. female with MDD, recurrent, in partial remission who presents for ECT.    Recommendations:   Proceed with ECT #49      Nestor Baxtre MD  8/6/2018  "

## 2018-08-06 NOTE — ANESTHESIA PROCEDURE NOTES
ECT    Procedure start time: 8/6/2018 7:42 AM  Timeout performed at: 8/6/2018 7:41 AM  Procedure end time: 8/6/2018 7:54 AM    Staffing  Anesthesiologist: JOAQUIN KNIGHT  CRNA/Resident: BELL FLAHERTY  Performed by: anesthesiologist and resident/CRNA     Preanesthetic Checklist  The following were completed as part of the preanesthetic checklist: patient identified, procedural consent, pre-op evaluation, timeout performed, risks and benefits discussed, monitors and equipment checked, anesthesia consent given, oxygen available, suction available, hand hygiene performed and patient being monitored.    Setup & Induction  Patient Monitoring: heart rate, cardiac monitor, continuous pulse ox, continuous capnometry, gas analyzer and NIBP  Patient preparation: bite block inserted, extremities padded, patient hyperventilated and mandibular stabilization  Electrode Placement: Right Unilateral, Modified Right Unilateral and Bitemporal    The patient was moved to the ECT therapy room after being assessed and consented for ECT. After standard ASA monitors were applied and timeout performed, the patient was adequately preoxygenated. After induction of general anesthesia, adequate oxygenation and ventilation were confirmed with pulse oxymetry and end tidal CO2 monitoring via bag-mask ventilation. End tidal CO2 was monitored throughout the case and moderate hyperventilation was performed prior to beginning ECT treatment. All extremities were padded, biteblock was inserted, and mandibular stabilization was done prior to initiating ECT therapy.    Procedure  Stimulus Number 1: Setting Number = III; 288 millicoulombs; Seizure Duration = 83 seconds            Recovery  After adequate recovery from general anesthesia, the patient was transported to recovery.    ECT Findings  ECT associated findings of: None

## 2018-08-06 NOTE — OP NOTE
Allyssa Wright  : 1980   MRN: [id number]  Date: 2018    Electroconvulsive Therapy  Operative Note    Date of Admission: 2018  5:46 AM    Site: [facility], [address]    Attending: Shoaib Boyce Jr., MD   Residents: Nestor Baxter MD  Pre-op Diagnosis: MDD, recurrent, in partial remission    ECT Treatment Number: 49  Machine Type: Mecta 5000    Patient Status: Medically stable    Vitals (pre-procedure):  Vitals:    18 0638   BP: 132/63   Pulse: 78   Resp: 16   Temp: 98.4 °F (36.9 °C)       Electrode Placement: Bitemporal    Stimulus Number Charge (mC) Level Pulse Width (msec) Frequency  (Hz) Duration of Stimulus (sec) Current (mA) Duration of Seizure (sec)   1 288 3 1 60 3 800 73                                   Complications: Hypertension    Maximum Blood Pressure: 131/78     Medications Given:  Caffeine 500 mg PO before  Methohexital (Brevital) 160 mg  IV before  Succinylcholine (Anectine) 120 mg  IV before  Zofran 4 mg IV before  Toradol 30 mg IV before    Treatment Course:  Patient tolerated procedure well. After adequate recovery from general anesthesia, the patient was transported to recovery.    Post-op Diagnosis: Same as above    Recommended for next ECT: ECT # 50 in 2 weeks 18.      Nestor Baxter MD  2018

## 2018-08-06 NOTE — DISCHARGE SUMMARY
Allyssa Wright  : 1980   MRN: [id number]  Date: 2018     Electroconvulsive Therapy  Discharge Summary    Admit Date: 2018  5:46 AM  Discharge Date: 2018    Attending Physician: Shoaib Boyce Jr., MD   Discharge Provider: Nestor Baxter MD    History of Present Illness: Allyssa Wright is a 38 y.o. female with MDD presented for ECT #48. See H&P dated 2018 for full HPI. For further details, see Dr. Boyce's pre-ECT evaluation.    Hospital Course: The patient tolerated the ECT treatment well without complication. Patient was stable post-procedure. See OP note dated 2018 for more details.     Disposition: Home or Self Care    Medications:  Current Discharge Medication List      CONTINUE these medications which have NOT CHANGED    Details   clozapine (CLOZARIL) 50 MG tablet Take 100 mg by mouth once daily.       dapsone 7.5 % GlwP Apply topically once daily.      famciclovir (FAMVIR) 500 MG tablet Take 1 tablet (500 mg total) by mouth 2 (two) times daily.  Qty: 30 tablet, Refills: 12    Associated Diagnoses: Major depressive disorder, recurrent episode, moderate degree      mirtazapine (REMERON) 15 MG tablet Take 1 tablet (15 mg total) by mouth every evening.  Qty: 90 tablet, Refills: 0      spironolactone (ALDACTONE) 50 MG tablet 50 mg 2 (two) times daily. 50  mg qAM, 50 mg qHS.      thyroid (ARMOUR THYROID) 30 mg Tab Take 1 tablet (30 mg total) by mouth every morning.  Qty: 30 tablet, Refills: 11    Associated Diagnoses: Major depressive disorder, recurrent episode, moderate degree      trazodone (DESYREL) 100 MG tablet Take 200 mg by mouth every evening.       venlafaxine (EFFEXOR) 100 MG Tab Take 3 tablets (300 mg total) by mouth once daily.    Associated Diagnoses: MDD (major depressive disorder), recurrent, in partial remission      vortioxetine (TRINTELLIX) 5 mg Tab Take 2.5 mg by mouth once daily.      dextroamphetamine-amphetamine 30 mg Tab Take 30 mg by mouth 2 (two) times  daily.     Associated Diagnoses: MDD (major depressive disorder), recurrent, in partial remission      UNABLE TO FIND 2 (two) times daily. n-azetyl-cysteine      ZOVIRAX 5 % Crea Refills: 5           Status at Discharge: Alert and medically stable    Discharge Diagnoses: MDD, recurrent, in partial remission   Diet: Resume previous outpatient diet  Activity: Ambulate with assistance  Instructions: Please do not eat or drink anything after midnight prior to procedure. Please do not drive on day of ECT.    Med Changes:  None    Next ECT: ECT #50 on Thursday 8/9/18 in 3 days.       Nestor Baxter MD  8/6/2018

## 2018-08-06 NOTE — TRANSFER OF CARE
"Anesthesia Transfer of Care Note    Patient: Allyssa Wright    Procedure(s) Performed: Procedure(s) (LRB):  ELECTROCONVULSIVE THERAPY (ECT) - SINGLE SEIZURE (N/A)    Patient location: PACU    Anesthesia Type: general    Transport from OR: Transported from OR on 6-10 L/min O2 by face mask with adequate spontaneous ventilation    Post pain: adequate analgesia    Post assessment: no apparent anesthetic complications    Post vital signs: stable    Level of consciousness: awake    Nausea/Vomiting: no nausea/vomiting    Complications: none    Transfer of care protocol was followed      Last vitals:   Visit Vitals  /71 (BP Location: Left arm, Patient Position: Lying)   Pulse 72   Temp 36.6 °C (97.9 °F)   Resp 18   Ht 5' 5" (1.651 m)   Wt 70.3 kg (155 lb)   SpO2 100%   Breastfeeding? No   BMI 25.79 kg/m²     "

## 2018-08-07 NOTE — ANESTHESIA POSTPROCEDURE EVALUATION
"Anesthesia Post Evaluation    Patient: Allyssa Wright    Procedure(s) Performed: Procedure(s) (LRB):  ELECTROCONVULSIVE THERAPY (ECT) - SINGLE SEIZURE (N/A)    Final Anesthesia Type: general  Patient location during evaluation: PACU  Patient participation: Yes- Able to Participate  Level of consciousness: awake and alert  Post-procedure vital signs: reviewed and stable  Pain management: adequate  Airway patency: patent  PONV status at discharge: No PONV  Anesthetic complications: no      Cardiovascular status: hemodynamically stable  Respiratory status: unassisted  Hydration status: euvolemic  Follow-up not needed.        Visit Vitals  /71 (BP Location: Left arm, Patient Position: Lying)   Pulse 72   Temp 36.6 °C (97.9 °F)   Resp 18   Ht 5' 5" (1.651 m)   Wt 70.3 kg (155 lb)   SpO2 100%   Breastfeeding? No   BMI 25.79 kg/m²       Pain/Roma Score: Pain Assessment Performed: Yes (8/6/2018  8:44 AM)  Presence of Pain: denies (8/6/2018  8:44 AM)  Roma Score: 9 (8/6/2018  8:00 AM)      "

## 2018-08-09 ENCOUNTER — ANESTHESIA EVENT (OUTPATIENT)
Dept: ELECTROPHYSIOLOGY | Facility: HOSPITAL | Age: 38
End: 2018-08-09
Payer: COMMERCIAL

## 2018-08-09 ENCOUNTER — ANESTHESIA (OUTPATIENT)
Dept: ELECTROPHYSIOLOGY | Facility: HOSPITAL | Age: 38
End: 2018-08-09
Payer: COMMERCIAL

## 2018-08-09 ENCOUNTER — SURGERY (OUTPATIENT)
Age: 38
End: 2018-08-09

## 2018-08-09 ENCOUNTER — HOSPITAL ENCOUNTER (OUTPATIENT)
Facility: HOSPITAL | Age: 38
Discharge: HOME | End: 2018-08-09
Attending: PSYCHIATRY & NEUROLOGY | Admitting: PSYCHIATRY & NEUROLOGY
Payer: COMMERCIAL

## 2018-08-09 VITALS
OXYGEN SATURATION: 100 % | BODY MASS INDEX: 25.83 KG/M2 | TEMPERATURE: 98 F | HEIGHT: 65 IN | HEART RATE: 78 BPM | DIASTOLIC BLOOD PRESSURE: 79 MMHG | RESPIRATION RATE: 18 BRPM | SYSTOLIC BLOOD PRESSURE: 109 MMHG | WEIGHT: 155 LBS

## 2018-08-09 DIAGNOSIS — F33.41 MDD (MAJOR DEPRESSIVE DISORDER), RECURRENT, IN PARTIAL REMISSION: ICD-10-CM

## 2018-08-09 DIAGNOSIS — F33.9 RECURRENT MAJOR DEPRESSIVE DISORDER, REMISSION STATUS UNSPECIFIED: Primary | ICD-10-CM

## 2018-08-09 LAB
B-HCG UR QL: NEGATIVE
CTP QC/QA: YES

## 2018-08-09 PROCEDURE — 25000003 PHARM REV CODE 250: Performed by: STUDENT IN AN ORGANIZED HEALTH CARE EDUCATION/TRAINING PROGRAM

## 2018-08-09 PROCEDURE — 63600175 PHARM REV CODE 636 W HCPCS: Performed by: STUDENT IN AN ORGANIZED HEALTH CARE EDUCATION/TRAINING PROGRAM

## 2018-08-09 PROCEDURE — 25000003 PHARM REV CODE 250: Performed by: ANESTHESIOLOGY

## 2018-08-09 PROCEDURE — 81025 URINE PREGNANCY TEST: CPT | Performed by: ANESTHESIOLOGY

## 2018-08-09 PROCEDURE — D9220A PRA ANESTHESIA: Mod: ,,, | Performed by: ANESTHESIOLOGY

## 2018-08-09 PROCEDURE — 90870 ELECTROCONVULSIVE THERAPY: CPT | Mod: ,,, | Performed by: PSYCHIATRY & NEUROLOGY

## 2018-08-09 PROCEDURE — 90870 ELECTROCONVULSIVE THERAPY: CPT | Performed by: STUDENT IN AN ORGANIZED HEALTH CARE EDUCATION/TRAINING PROGRAM

## 2018-08-09 RX ORDER — ONDANSETRON 2 MG/ML
4 INJECTION INTRAMUSCULAR; INTRAVENOUS ONCE AS NEEDED
Status: CANCELLED | OUTPATIENT
Start: 2018-08-09 | End: 2018-08-09

## 2018-08-09 RX ORDER — KETOROLAC TROMETHAMINE 30 MG/ML
INJECTION, SOLUTION INTRAMUSCULAR; INTRAVENOUS
Status: DISCONTINUED | OUTPATIENT
Start: 2018-08-09 | End: 2018-08-09

## 2018-08-09 RX ORDER — HYDROMORPHONE HYDROCHLORIDE 1 MG/ML
0.2 INJECTION, SOLUTION INTRAMUSCULAR; INTRAVENOUS; SUBCUTANEOUS EVERY 5 MIN PRN
Status: DISCONTINUED | OUTPATIENT
Start: 2018-08-09 | End: 2018-08-09 | Stop reason: HOSPADM

## 2018-08-09 RX ORDER — LIDOCAINE HYDROCHLORIDE 10 MG/ML
1 INJECTION, SOLUTION EPIDURAL; INFILTRATION; INTRACAUDAL; PERINEURAL ONCE
Status: COMPLETED | OUTPATIENT
Start: 2018-08-09 | End: 2018-08-09

## 2018-08-09 RX ORDER — GLYCOPYRROLATE 0.2 MG/ML
INJECTION INTRAMUSCULAR; INTRAVENOUS
Status: DISCONTINUED
Start: 2018-08-09 | End: 2018-08-09 | Stop reason: HOSPADM

## 2018-08-09 RX ORDER — LIDOCAINE HYDROCHLORIDE 10 MG/ML
1 INJECTION, SOLUTION EPIDURAL; INFILTRATION; INTRACAUDAL; PERINEURAL ONCE
Status: DISCONTINUED | OUTPATIENT
Start: 2018-08-09 | End: 2018-08-09 | Stop reason: HOSPADM

## 2018-08-09 RX ORDER — SODIUM CHLORIDE 9 MG/ML
INJECTION, SOLUTION INTRAVENOUS CONTINUOUS
Status: DISCONTINUED | OUTPATIENT
Start: 2018-08-09 | End: 2018-08-09 | Stop reason: HOSPADM

## 2018-08-09 RX ORDER — SODIUM CHLORIDE 0.9 % (FLUSH) 0.9 %
3 SYRINGE (ML) INJECTION
Status: DISCONTINUED | OUTPATIENT
Start: 2018-08-09 | End: 2018-08-09 | Stop reason: HOSPADM

## 2018-08-09 RX ORDER — KETOROLAC TROMETHAMINE 30 MG/ML
INJECTION, SOLUTION INTRAMUSCULAR; INTRAVENOUS
Status: COMPLETED
Start: 2018-08-09 | End: 2018-08-09

## 2018-08-09 RX ORDER — SUCCINYLCHOLINE CHLORIDE 20 MG/ML
INJECTION INTRAMUSCULAR; INTRAVENOUS
Status: DISCONTINUED | OUTPATIENT
Start: 2018-08-09 | End: 2018-08-09

## 2018-08-09 RX ORDER — LABETALOL HYDROCHLORIDE 5 MG/ML
INJECTION, SOLUTION INTRAVENOUS
Status: DISCONTINUED
Start: 2018-08-09 | End: 2018-08-09 | Stop reason: HOSPADM

## 2018-08-09 RX ORDER — ONDANSETRON 2 MG/ML
INJECTION INTRAMUSCULAR; INTRAVENOUS
Status: COMPLETED
Start: 2018-08-09 | End: 2018-08-09

## 2018-08-09 RX ORDER — ONDANSETRON 2 MG/ML
4 INJECTION INTRAMUSCULAR; INTRAVENOUS DAILY PRN
Status: DISCONTINUED | OUTPATIENT
Start: 2018-08-09 | End: 2018-08-09 | Stop reason: HOSPADM

## 2018-08-09 RX ORDER — SUCCINYLCHOLINE CHLORIDE 20 MG/ML
INJECTION INTRAMUSCULAR; INTRAVENOUS
Status: COMPLETED
Start: 2018-08-09 | End: 2018-08-09

## 2018-08-09 RX ORDER — ONDANSETRON 2 MG/ML
INJECTION INTRAMUSCULAR; INTRAVENOUS
Status: DISCONTINUED | OUTPATIENT
Start: 2018-08-09 | End: 2018-08-09

## 2018-08-09 RX ORDER — KETOROLAC TROMETHAMINE 30 MG/ML
30 INJECTION, SOLUTION INTRAMUSCULAR; INTRAVENOUS ONCE AS NEEDED
Status: CANCELLED | OUTPATIENT
Start: 2018-08-09 | End: 2018-08-09

## 2018-08-09 RX ADMIN — SUCCINYLCHOLINE CHLORIDE 120 MG: 20 INJECTION, SOLUTION INTRAMUSCULAR; INTRAVENOUS at 07:08

## 2018-08-09 RX ADMIN — LIDOCAINE HYDROCHLORIDE 10 MG: 10 INJECTION, SOLUTION EPIDURAL; INFILTRATION; INTRACAUDAL; PERINEURAL at 06:08

## 2018-08-09 RX ADMIN — Medication 500 MG: at 06:08

## 2018-08-09 RX ADMIN — SODIUM CHLORIDE: 0.9 INJECTION, SOLUTION INTRAVENOUS at 06:08

## 2018-08-09 RX ADMIN — ONDANSETRON 4 MG: 2 INJECTION, SOLUTION INTRAMUSCULAR; INTRAVENOUS at 07:08

## 2018-08-09 RX ADMIN — METHOHEXITAL SODIUM 160 MG: 500 INJECTION, POWDER, LYOPHILIZED, FOR SOLUTION INTRAMUSCULAR; INTRAVENOUS; RECTAL at 07:08

## 2018-08-09 RX ADMIN — KETOROLAC TROMETHAMINE 30 MG: 30 INJECTION, SOLUTION INTRAMUSCULAR at 07:08

## 2018-08-09 NOTE — DISCHARGE SUMMARY
Allyssa Wright  : 1980   MRN: [id number]  Date: 2018       Psychiatry - ECT  Discharge Summary    Admit Date: 2018  5:45 AM  Discharge Date: 2018    Attending Physician: Shoaib Boyce Jr., MD   Discharge Provider: Pamella Viramontes MD    History of Present Illness: Allyssa Wright is a 38 y.o. female with MDD, recurrent, in partial remission presented for ECT #50. See H&P dated 2018 for full HPI.     Hospital Course: The pt tolerated the ECT treatment well with minimal complication. Pt was stable post-procedure. See OP note dated 2018 for more details.     Disposition: Home or Self Care    Medications:  Reconciled Home Medications:      Medication List      ASK your doctor about these medications    cloZAPine 50 MG tablet  Commonly known as:  CLOZARIL  Take 100 mg by mouth once daily.     dapsone 7.5 % Glwp  Apply topically once daily.     dextroamphetamine-amphetamine 30 mg Tab  Take 30 mg by mouth 2 (two) times daily.     famciclovir 500 MG tablet  Commonly known as:  FAMVIR  Take 1 tablet (500 mg total) by mouth 2 (two) times daily.     mirtazapine 15 MG tablet  Commonly known as:  REMERON  Take 1 tablet (15 mg total) by mouth every evening.     spironolactone 50 MG tablet  Commonly known as:  ALDACTONE  50 mg 2 (two) times daily. 50  mg qAM, 50 mg qHS.     thyroid (pork) Tab  Commonly known as:  ARMOUR THYROID  Take 1 tablet (30 mg total) by mouth every morning.     traZODone 100 MG tablet  Commonly known as:  DESYREL  Take 200 mg by mouth every evening.     TRINTELLIX 5 mg Tab  Generic drug:  vortioxetine  Take 2.5 mg by mouth once daily.     UNABLE TO FIND  2 (two) times daily. n-azetyl-cysteine     venlafaxine 100 MG Tab  Commonly known as:  EFFEXOR  Take 3 tablets (300 mg total) by mouth once daily.     ZOVIRAX 5 % Crea  Generic drug:  acyclovir 5%              Pt's Status at Discharge: alert and medically stable    Discharge Diagnoses:  MDD recurrent, in partial remission      Diet: Resume previous outpt diet  Activity: Ambulate with assistance - pt is a fall risk  Instructions: Please do not eat or drink anything after midnight prior to procedure. Please do not drive on day of ECT.     Med Changes:  None      Next ECT:    8/16/2018 for treatment #51      Pamella Viramontes M.D.  PGY 2 [facility]   8/9/2018

## 2018-08-09 NOTE — ANESTHESIA POSTPROCEDURE EVALUATION
"Anesthesia Post Evaluation    Patient: Allyssa Wright    Procedure(s) Performed: Procedure(s) (LRB):  ELECTROCONVULSIVE THERAPY (ECT) - SINGLE SEIZURE (N/A)    Final Anesthesia Type: general  Patient location during evaluation: PACU  Patient participation: Yes- Able to Participate  Level of consciousness: awake and alert and oriented  Post-procedure vital signs: reviewed and stable  Pain management: adequate  Airway patency: patent  PONV status at discharge: No PONV  Anesthetic complications: no      Cardiovascular status: stable  Respiratory status: unassisted  Hydration status: euvolemic  Follow-up not needed.        Visit Vitals  /79 (BP Location: Right arm, Patient Position: Lying)   Pulse 78   Temp 36.7 °C (98.1 °F) (Temporal)   Resp 18   Ht 5' 5" (1.651 m)   Wt 70.3 kg (155 lb)   SpO2 100%   BMI 25.79 kg/m²       Pain/Roma Score: Pain Assessment Performed: Yes (8/9/2018  8:05 AM)  Presence of Pain: denies (8/9/2018  8:05 AM)  Roma Score: 10 (8/9/2018  7:40 AM)      "

## 2018-08-09 NOTE — OP NOTE
Allyssa Wright  : 1980   MRN: [id number]  Date: 2018       Psychiatry - ECT  Operative Note             Date of Admission: 2018  5:45 AM    Site: [facility], [address]    Attending: Shoaib Boyce Jr., MD   Residents: Pamella Viramontes,   Pre-op Diagnosis: MDD recurrent in partial remission     ECT Treatment Number: 50  Machine Type: Mecta 5000    Patient Status: medically stable    Vitals (pre-procedure):  Vitals:    18 0618   BP: 110/66   Pulse: 79   Resp: 16   Temp: 97.5 °F (36.4 °C)       Electrode Placement: Bitemporal    Stimulus Number Charge (mC) Level Pulse Width (msec) Frequency  (Hz) Duration of Stimulus (sec) Current (mA) Duration of Seizure (sec)   1 576 III 1 60 6 800 64                                   Complications: Hypertension    Maximum Blood Pressure: 149/77    Medications Given:  Caffeine 500 mg PO before  Methohexital (Brevital) 160 mg  IV before  Succinylcholine (Anectine) 120 mg  IV before  Zofran 4 mg IV during  Toradol 30 mg IV during    Treatment Course:  Pt tolerate procedure well. After adequate recovery from general anesthesia, the patient was transported to recovery.    Post-op Diagnosis: Same as above    Recommended for next ECT:    Same as above     Pamella Viramontes   PGY 2 [facility]   2018

## 2018-08-09 NOTE — TRANSFER OF CARE
"Anesthesia Transfer of Care Note    Patient: Allyssa Wright    Procedure(s) Performed: Procedure(s) (LRB):  ELECTROCONVULSIVE THERAPY (ECT) - SINGLE SEIZURE (N/A)    Patient location: PACU    Anesthesia Type: general    Transport from OR: Transported from OR on 6-10 L/min O2 by face mask with adequate spontaneous ventilation    Post pain: adequate analgesia    Post assessment: no apparent anesthetic complications    Post vital signs: stable    Level of consciousness: awake and alert    Complications: none    Transfer of care protocol was followed      Last vitals:   Visit Vitals  /63 (BP Location: Right arm, Patient Position: Lying)   Pulse 85   Temp 36.7 °C (98.1 °F) (Temporal)   Resp 18   Ht 5' 5" (1.651 m)   Wt 70.3 kg (155 lb)   SpO2 99%   BMI 25.79 kg/m²     "

## 2018-08-09 NOTE — DISCHARGE INSTRUCTIONS
Home Care Instructions  E.C.T.    ACTIVITY LEVEL: You may feel sleepy for several hours. It is best to rest until you are more awake and then gradually resume your normal activities in one day. It is recommended, because of the possibility of memory loss and slight confusion post ECT, that you rest in the company of a responsible adult. This confusion and memory loss is expected and should diminish over time. It is also recommended that you do not drive or operate any electrical appliances or machinery during the ECT treatment series. Ask your Doctor, at the time of your treatment, any questions you may have about specific activities.    DIET: You may wish to start with liquids after your ECT treatment and gradually resume your normal diet. Do not consume alcoholic beverages during the ECT treatment series.    BATHING: You may shower or bathe as desired.    MEDICATIONS: Resume all home medications starting with the AM doses not taken prior to ECT treatment. If you experience a headache, it is okay to take your usual pain reliever.    WHEN TO CALL THE DOCTOR: Headache, memory loss or confusion that does not begin to resolve within 24 hours.    [facility]16[facility]18 for 6:00 AM.    Remember you may not eat or drink after midnight, but please take heart or high blood pressure medicines with a sip of water in the morning prior to leaving home.    FOR EMERGENCIES: If any unusual problems or difficulties occur, contact the office [phone] Monday-Friday until 3:00 p.m.  After hours, call the hospital  [phone] and ask for the Psychiatry Resident on-call.      Understanding Electroconvulsive Therapy  Electroconvulsive therapy (ECT) is sometimes called shock therapy. This may sound painful, but ECT doesnt hurt. Its often the safest and best treatment for severe depression. It can treat other mental disorders as well.  What is electroconvulsive therapy?  ECT is used to treat  people who are very depressed. Its mainly used when other treatments, such as antidepressant medicines, have failed. Often it may relieve feelings of sadness and despair after 2 to 4 treatments.  Common symptoms of major depression  Symptoms of major depression include:  · Feeling a deep sadness that doesnt go away  · Losing all pleasure in life  · Feeling hopeless or helpless  · Feeling guilty  · Sleeping more or less than normal  · Eating more or less than normal  · Having headaches or stomachaches, or other pains that dont go away  · Feeling nervous, empty, or worthless  · Crying a great deal  · Thinking or talking about suicide or death  How is ECT therapy done?  Before an ECT treatment, youll be given anesthesia to keep you pain-free. Youll also be given medicine to make you sleep, relax your muscles and control your heart rate. Your healthcare provider then places electrodes on your head. You may have one above each temple (bilateral ECT). Or you may have electrodes on one temple and on your forehead (unilateral ECT). While you are asleep, your brain is stimulated very briefly with an electric current. This causes a seizure, usually lasting less than a minute. Because you are under anesthesia, your body will not move even as your brain goes through great changes.  What are the risks?  When done properly, ECT is quite safe. Right after the treatment, you may be confused. This often lasts for less than half an hour. You may have a headache or stiff muscles. But these symptoms often go away quickly. A more serious possible side effect is memory loss. Commonly, people have short-term (temporary) trouble remembering information that they learned recently. And they may have little recall of the time when they received treatment. Less commonly, people may have long-lasting (permanent) spotty recall of major past events. In rare cases, there may be memory loss for larger blocks of time.  Looking to the  future  In most cases, ECT doesnt cure depression. But it can improve symptoms for a period of time. You may need a series of ECT treatments to continue feeling the benefit. You may also need to take antidepressant medicines to help prevent symptoms from returning. But with ongoing treatment, you can have a full and healthy life.  Resources  · The National [place] of Mental Health  368.701.5384  www.[facility].nih.gov  · [facility]  594.674.1924  www.[facility].org     Date Last Reviewed: 5/1/2017  © 6708-1249 [employer]. [address] [place] [address]. All rights reserved. This information is not intended as a substitute for professional medical care. Always follow your healthcare professional's instructions.        Recovery After Procedural Sedation (Adult)  You have been given medicine by vein to make you sleep during your surgery. This may have included both a pain medicine and sleeping medicine. Most of the effects have worn off. But you may still have some drowsiness for the next 6 to 8 hours.  Home care  Follow these guidelines when you get home:  · For the next 8 hours, you should be watched by a responsible adult. This person should make sure your condition is not getting worse.  · Don't drink any alcohol for the next 24 hours.  · Don't drive, operate dangerous machinery, or make important business or personal decisions during the next 24 hours.  Note: Your healthcare provider may tell you not to take any medicine by mouth for pain or sleep in the next 4 hours. These medicines may react with the medicines you were given in the hospital. This could cause a much stronger response than usual.  Follow-up care  Follow up with your healthcare provider if you are not alert and back to your usual level of activity within 12 hours.  When to seek medical advice  Call your healthcare provider right away if any of these occur:  · Drowsiness gets worse  · Weakness or dizziness gets  worse  · Repeated vomiting  · You can't be awakened   Date Last Reviewed: 10/18/2016  © 1479-2252 [employer]. [address] [place] [address], [place] [address]. All rights reserved. This information is not intended as a substitute for professional medical care. Always follow your healthcare professional's instructions.        Understanding Electroconvulsive Therapy  Electroconvulsive therapy (ECT) is sometimes called shock therapy. This may sound painful, but ECT doesnt hurt. Its often the safest and best treatment for severe depression. It can treat other mental disorders as well.  What is electroconvulsive therapy?  ECT is used to treat people who are very depressed. Its mainly used when other treatments, such as antidepressant medicines, have failed. Often it may relieve feelings of sadness and despair after 2 to 4 treatments.  Common symptoms of major depression  Symptoms of major depression include:  · Feeling a deep sadness that doesnt go away  · Losing all pleasure in life  · Feeling hopeless or helpless  · Feeling guilty  · Sleeping more or less than normal  · Eating more or less than normal  · Having headaches or stomachaches, or other pains that dont go away  · Feeling nervous, empty, or worthless  · Crying a great deal  · Thinking or talking about suicide or death  How is ECT therapy done?  Before an ECT treatment, youll be given anesthesia to keep you pain-free. Youll also be given medicine to make you sleep, relax your muscles and control your heart rate. Your healthcare provider then places electrodes on your head. You may have one above each temple (bilateral ECT). Or you may have electrodes on one temple and on your forehead (unilateral ECT). While you are asleep, your brain is stimulated very briefly with an electric current. This causes a seizure, usually lasting less than a minute. Because you are under anesthesia, your body will not move even as your brain goes through great  changes.  What are the risks?  When done properly, ECT is quite safe. Right after the treatment, you may be confused. This often lasts for less than half an hour. You may have a headache or stiff muscles. But these symptoms often go away quickly. A more serious possible side effect is memory loss. Commonly, people have short-term (temporary) trouble remembering information that they learned recently. And they may have little recall of the time when they received treatment. Less commonly, people may have long-lasting (permanent) spotty recall of major past events. In rare cases, there may be memory loss for larger blocks of time.  Looking to the future  In most cases, ECT doesnt cure depression. But it can improve symptoms for a period of time. You may need a series of ECT treatments to continue feeling the benefit. You may also need to take antidepressant medicines to help prevent symptoms from returning. But with ongoing treatment, you can have a full and healthy life.  Resources  · The National [place] of Mental Health  997.539.1080  www.nimh.nih.gov  · Mental Health [facility]  284.649.6248  www.[facility].org     Date Last Reviewed: 5/1/2017  © 6178-1627 [employer]. [address] [place] [address], [place] [address]. All rights reserved. This information is not intended as a substitute for professional medical care. Always follow your healthcare professional's instructions.

## 2018-08-09 NOTE — ANESTHESIA PREPROCEDURE EVALUATION
08/09/2018  Pre-operative evaluation for Procedure(s) (LRB):  ELECTROCONVULSIVE THERAPY (ECT) - SINGLE SEIZURE (N/A)    Allyssa Wright is a 38 y.o. female with PMhx of hypothyroidism and MDD who presents for    ECT #51    Prev airway: easy mask    Previous Anesthesia  Methohexital 160mg  Succinylcholine 120mg  Labetalol 20mg  ketoralac 30mg  zofran 4mg    Patient Active Problem List   Diagnosis    MDD (major depressive disorder), recurrent, in partial remission    Major depressive disorder, recurrent, in partial remission    MDD (major depressive disorder), severe    Major depressive disorder, recurrent    Other acne    Major depression, recurrent, chronic    MDD (major depressive disorder), recurrent episode, moderate    Major depressive disorder, recurrent severe without psychotic features    MDD (major depressive disorder), recurrent episode, severe    MDD (major depressive disorder)    Major depressive disorder, recurrent episode, in partial remission    Schizophrenia    Depression    Comfort measures only status    Major depression, recurrent    MDD (major depressive disorder), recurrent episode, mild       Review of patient's allergies indicates:   Allergen Reactions    Benzodiazepines Other (See Comments)     Contraindicated while taking Clozapine    Ampicillin      Mom says so    Erythromycin     Levaquin [levofloxacin] Other (See Comments)     Depression side effects    Penicillins      Mom says so    Pristiq [desvenlafaxine]      psycotic      Sulfa (sulfonamide antibiotics)      Rash      Azithromycin Anxiety        No current facility-administered medications on file prior to encounter.      Current Outpatient Prescriptions on File Prior to Encounter   Medication Sig Dispense Refill    clozapine (CLOZARIL) 50 MG tablet Take 100 mg by mouth once daily.       dapsone 7.5 %  GlwP Apply topically once daily.      dextroamphetamine-amphetamine 30 mg Tab Take 30 mg by mouth 2 (two) times daily.       famciclovir (FAMVIR) 500 MG tablet Take 1 tablet (500 mg total) by mouth 2 (two) times daily. 30 tablet 12    mirtazapine (REMERON) 15 MG tablet Take 1 tablet (15 mg total) by mouth every evening. (Patient taking differently: Take 7.5 mg by mouth every evening. ) 90 tablet 0    spironolactone (ALDACTONE) 50 MG tablet 50 mg 2 (two) times daily. 50  mg qAM, 50 mg qHS.      thyroid (ARMOUR THYROID) 30 mg Tab Take 1 tablet (30 mg total) by mouth every morning. 30 tablet 11    trazodone (DESYREL) 100 MG tablet Take 200 mg by mouth every evening.       UNABLE TO FIND 2 (two) times daily. n-azetyl-cysteine      venlafaxine (EFFEXOR) 100 MG Tab Take 3 tablets (300 mg total) by mouth once daily. (Patient taking differently: Take 75 mg by mouth once daily. )      vortioxetine (TRINTELLIX) 5 mg Tab Take 2.5 mg by mouth once daily.      ZOVIRAX 5 % Crea   5       Past Surgical History:   Procedure Laterality Date    ANKLE SURGERY Right     BREAST augmentation      OVARIAN CYST REMOVAL Bilateral        Social History     Social History    Marital status: Single     Spouse name: N/A    Number of children: N/A    Years of education: N/A     Occupational History    unemployed      Social History Main Topics    Smoking status: Former Smoker     Quit date: 4/6/2011    Smokeless tobacco: Never Used    Alcohol use Yes      Comment: rarely    Drug use: No      Comment: Experimental use in high school    Sexual activity: Not on file     Other Topics Concern    Not on file     Social History Narrative    Pt has 1 older brother from an intact family until the death of her mother in 2012.  She completed 1 year of college, was never in the , and has never been employed.  She was engaged once, but never , has no children, and lives with her father plus 2 dogs.  She denies any  hobbies and is spiritual but not Cheondoism.  She does date and returns to college during rare periods when depression and anxiety [place].         Vital Signs Range (Last 24H):  BP: ()/()   Arterial Line BP: ()/()       Lab Results   Component Value Date    WBC 6.83 06/08/2016    HGB 13.3 06/08/2016    HCT 40.3 06/08/2016    MCV 92 06/08/2016     06/08/2016     CMP  Sodium   Date Value Ref Range Status   06/08/2016 137 136 - 145 mmol/L Final     Potassium   Date Value Ref Range Status   06/08/2016 3.8 3.5 - 5.1 mmol/L Final     Chloride   Date Value Ref Range Status   06/08/2016 101 95 - 110 mmol/L Final     CO2   Date Value Ref Range Status   06/08/2016 28 23 - 29 mmol/L Final     Glucose   Date Value Ref Range Status   06/08/2016 81 70 - 110 mg/dL Final     BUN, Bld   Date Value Ref Range Status   06/08/2016 10 6 - 20 mg/dL Final     Creatinine   Date Value Ref Range Status   06/08/2016 0.7 0.5 - 1.4 mg/dL Final   09/12/2012 0.6 0.2 - 1.4 mg/dl Final     Calcium   Date Value Ref Range Status   06/08/2016 9.5 8.7 - 10.5 mg/dL Final   09/12/2012 9.6 8.6 - 10.2 mg/dl Final     Total Protein   Date Value Ref Range Status   06/08/2016 7.3 6.0 - 8.4 g/dL Final     Albumin   Date Value Ref Range Status   06/08/2016 4.5 3.5 - 5.2 g/dL Final     Total Bilirubin   Date Value Ref Range Status   06/08/2016 0.4 0.1 - 1.0 mg/dL Final     Comment:     For infants and newborns, interpretation of results should be based  on gestational age, weight and in agreement with clinical  observations.  Premature Infant recommended reference ranges:  Up to 24 hours.............<8.0 mg/dL  Up to 48 hours............<12.0 mg/dL  3-5 days..................<15.0 mg/dL  6-29 days.................<15.0 mg/dL       Alkaline Phosphatase   Date Value Ref Range Status   06/08/2016 28 (L) 55 - 135 U/L Final   09/12/2012 31 23 - 119 UNIT/L Final     AST   Date Value Ref Range Status   06/08/2016 20 10 - 40 U/L Final   09/12/2012 22 10 - 30 UNIT/L  Final     ALT   Date Value Ref Range Status   06/08/2016 14 10 - 44 U/L Final     Anion Gap   Date Value Ref Range Status   06/08/2016 8 8 - 16 mmol/L Final   09/12/2012 8 5 - 15 meq/L Final     eGFR if    Date Value Ref Range Status   06/08/2016 >60.0 >60 mL/min/1.73 m^2 Final     eGFR if non    Date Value Ref Range Status   06/08/2016 >60.0 >60 mL/min/1.73 m^2 Final     Comment:     Calculation used to obtain the estimated glomerular filtration  rate (eGFR) is the CKD-EPI equation. Since race is unknown   in our information system, the eGFR values for   -American and Non--American patients are given   for each creatinine result.       No results found for: INR, PROTIME        Diagnostic Studies:  No relevant studies    EKG:  Vent. Rate : 084 BPM     Atrial Rate : 084 BPM     P-R Int : 154 ms          QRS Dur : 086 ms      QT Int : 378 ms       P-R-T Axes : 067 066 055 degrees     QTc Int : 446 ms    Normal sinus rhythm  Normal ECG  When compared with ECG of 03-DEC-2015 11:38,  Questionable change in The axis  T wave inversion no longer evident in Inferior leads  Confirmed by Flaco Sr MD ([id number]) on 6/9/2016 1:30:13 PM    2D Echo:  None documented        Anesthesia Evaluation    I have reviewed the Patient Summary Reports.    I have reviewed the Nursing Notes.   I have reviewed the Medications.     Review of Systems  Anesthesia Hx:  No problems with previous Anesthesia  Denies Family Hx of Anesthesia complications.   Denies Personal Hx of Anesthesia complications.   Hematology/Oncology:  Hematology Normal   Oncology Normal     Cardiovascular:  Cardiovascular Normal                     Pulmonary:  Pulmonary Normal  Denies Asthma.  Denies Shortness of breath.    Renal/:  Renal/ Normal     Hepatic/GI:  Hepatic/GI Normal    Neurological:   Denies TIA. Denies CVA.    Endocrine:  Endocrine Normal    Psych:   Psychiatric History depression          Physical  Exam  General:  Well nourished    Airway/Jaw/Neck:  Airway Findings: Mouth Opening: Normal Tongue: Normal  General Airway Assessment: Adult, Good  Mallampati: I  TM Distance: Normal, at least 6 cm  Jaw/Neck Findings:  Neck ROM: Normal ROM      Dental:  Dental Findings: In tact   Chest/Lungs:  Chest/Lungs Clear    Heart/Vascular:  Heart Findings: Normal    Abdomen:  Abdomen Findings: Normal     Skin:  Skin Findings: Normal    Mental Status:  Mental Status Findings:  Cooperative, Alert and Oriented         Anesthesia Plan  Type of Anesthesia, risks & benefits discussed:  Anesthesia Type:  general  Patient's Preference:   Intra-op Monitoring Plan: standard ASA monitors  Intra-op Monitoring Plan Comments:   Post Op Pain Control Plan: multimodal analgesia  Post Op Pain Control Plan Comments:   Induction:   IV  Beta Blocker:  Patient is not currently on a Beta-Blocker (No further documentation required).       Informed Consent: Patient understands risks and agrees with Anesthesia plan.  Questions answered. Anesthesia consent signed with patient.  ASA Score: 2     Day of Surgery Review of History & Physical:            Ready For Surgery From Anesthesia Perspective.

## 2018-08-09 NOTE — ANESTHESIA PROCEDURE NOTES
ECT    Procedure start time: 8/9/2018 7:12 AM  Timeout performed at: 8/9/2018 7:11 AM  Procedure end time: 8/9/2018 7:18 AM    Staffing  Anesthesiologist: BILL PERDOMO  CRNA/Resident: BELL FLAHERTY  Performed by: anesthesiologist and resident/CRNA     Preanesthetic Checklist  The following were completed as part of the preanesthetic checklist: patient identified, procedural consent, pre-op evaluation, timeout performed, risks and benefits discussed, monitors and equipment checked, anesthesia consent given, oxygen available, suction available, hand hygiene performed and patient being monitored.    Setup & Induction  Patient Monitoring: heart rate, cardiac monitor, continuous pulse ox, continuous capnometry, NIBP and gas analyzer  Patient preparation: bite block inserted, extremities padded, mandibular stabilization and patient hyperventilated  Electrode Placement: Bitemporal    The patient was moved to the ECT therapy room after being assessed and consented for ECT. After standard ASA monitors were applied and timeout performed, the patient was adequately preoxygenated. After induction of general anesthesia, adequate oxygenation and ventilation were confirmed with pulse oxymetry and end tidal CO2 monitoring via bag-mask ventilation. End tidal CO2 was monitored throughout the case and moderate hyperventilation was performed prior to beginning ECT treatment. All extremities were padded, biteblock was inserted, and mandibular stabilization was done prior to initiating ECT therapy.    Procedure  Stimulus Number 1: Setting Number = III; 576 millicoulombs; Seizure Duration = 64 seconds      Stimulus Number 4:       Recovery  After adequate recovery from general anesthesia, the patient was transported to recovery.  Baseline Blood Pressure: 120/77  Peak Blood Pressure: 177/89    ECT Findings  ECT associated findings of: Visible Seizure

## 2018-08-13 ENCOUNTER — ANESTHESIA EVENT (OUTPATIENT)
Dept: ELECTROPHYSIOLOGY | Facility: HOSPITAL | Age: 38
End: 2018-08-13
Payer: COMMERCIAL

## 2018-08-13 DIAGNOSIS — F33.9 RECURRENT MAJOR DEPRESSIVE DISORDER, REMISSION STATUS UNSPECIFIED: Primary | ICD-10-CM

## 2018-08-13 NOTE — ANESTHESIA PREPROCEDURE EVALUATION
[facility]  Anesthesia Pre-Operative Evaluation         Patient Name: Allyssa Wright  YOB: 1980  MRN: [id number]    SUBJECTIVE:     08/13/2018    Allyssa Wright is a 38 y.o. female with hypothyroidism and MDD who presents for:    ECT #52    Pre-operative evaluation for Procedure(s) (LRB):  ELECTROCONVULSIVE THERAPY (ECT) - SINGLE SEIZURE (N/A)    Prev airway:   Easy mask    Previous Anesthesia:  1) Methohexital 160mg  2) Succinylcholine 120mg  4) Ketoralac 30mg  5) Ondansetron 4mg  - SBP peaked at ~170 and HR peaked at ~140 on the last ECT      Patient Active Problem List   Diagnosis    MDD (major depressive disorder), recurrent, in partial remission    Major depressive disorder, recurrent, in partial remission    MDD (major depressive disorder), severe    Major depressive disorder, recurrent    Other acne    Major depression, recurrent, chronic    MDD (major depressive disorder), recurrent episode, moderate    Major depressive disorder, recurrent severe without psychotic features    MDD (major depressive disorder), recurrent episode, severe    MDD (major depressive disorder)    Major depressive disorder, recurrent episode, in partial remission    Schizophrenia    Depression    Comfort measures only status    Major depression, recurrent    MDD (major depressive disorder), recurrent episode, mild       Review of patient's allergies indicates:   Allergen Reactions    Benzodiazepines Other (See Comments)     Contraindicated while taking Clozapine    Ampicillin      Mom says so    Erythromycin     Levaquin [levofloxacin] Other (See Comments)     Depression side effects    Penicillins      Mom says so    Pristiq [desvenlafaxine]      psycotic      Sulfa (sulfonamide antibiotics)      Rash      Azithromycin Anxiety       Current Inpatient Medications:      No current facility-administered medications on file prior to encounter.      Current Outpatient Medications on  File Prior to Encounter   Medication Sig Dispense Refill    clozapine (CLOZARIL) 50 MG tablet Take 100 mg by mouth once daily.       dapsone 7.5 % GlwP Apply topically once daily.      dextroamphetamine-amphetamine 30 mg Tab Take 30 mg by mouth 2 (two) times daily.       famciclovir (FAMVIR) 500 MG tablet Take 1 tablet (500 mg total) by mouth 2 (two) times daily. 30 tablet 12    mirtazapine (REMERON) 15 MG tablet Take 1 tablet (15 mg total) by mouth every evening. (Patient taking differently: Take 7.5 mg by mouth every evening. ) 90 tablet 0    spironolactone (ALDACTONE) 50 MG tablet 50 mg 2 (two) times daily. 50  mg qAM, 50 mg qHS.      thyroid (ARMOUR THYROID) 30 mg Tab Take 1 tablet (30 mg total) by mouth every morning. 30 tablet 11    trazodone (DESYREL) 100 MG tablet Take 200 mg by mouth every evening.       UNABLE TO FIND 2 (two) times daily. n-azetyl-cysteine      venlafaxine (EFFEXOR) 100 MG Tab Take 3 tablets (300 mg total) by mouth once daily. (Patient taking differently: Take 75 mg by mouth once daily. )      vortioxetine (TRINTELLIX) 5 mg Tab Take 2.5 mg by mouth once daily.      ZOVIRAX 5 % Crea   5       Past Surgical History:   Procedure Laterality Date    ANKLE SURGERY Right     BREAST augmentation      OVARIAN CYST REMOVAL Bilateral        Social History     Socioeconomic History    Marital status: Single     Spouse name: Not on file    Number of children: Not on file    Years of education: Not on file    Highest education level: Not on file   Social Needs    Financial resource strain: Not on file    Food insecurity - worry: Not on file    Food insecurity - inability: Not on file    Transportation needs - medical: Not on file    Transportation needs - non-medical: Not on file   Occupational History    Occupation: unemployed   Tobacco Use    Smoking status: Former Smoker     Last attempt to quit: 2011     Years since quittin.3    Smokeless tobacco: Never Used    Substance and Sexual Activity    Alcohol use: Yes     Comment: rarely    Drug use: No     Comment: Experimental use in high school    Sexual activity: Not on file   Other Topics Concern    Patient feels they ought to cut down on drinking/drug use Not Asked    Patient annoyed by others criticizing their drinking/drug use Not Asked    Patient has felt bad or guilty about drinking/drug use Not Asked    Patient has had a drink/used drugs as an eye opener in the AM Not Asked   Social History Narrative    Pt has 1 older brother from an intact family until the death of her mother in 2012.  She completed 1 year of college, was never in the , and has never been employed.  She was engaged once, but never , has no children, and lives with her father plus 2 dogs.  She denies any hobbies and is spiritual but not Jew.  She does date and returns to college during rare periods when depression and anxiety [place].       OBJECTIVE:     Vital Signs Range (Last 24H):  BP: ()/()   Arterial Line BP: ()/()       CBC:   No results for input(s): WBC, RBC, HGB, HCT, PLT, MCV, MCH, MCHC in the last 72 hours.    CMP: No results for input(s): NA, K, CL, CO2, BUN, CREATININE, GLU, MG, PHOS, CALCIUM, ALBUMIN, PROT, ALKPHOS, ALT, AST, BILITOT in the last 72 hours.    INR:  No results for input(s): PT, INR, PROTIME, APTT in the last 72 hours.    Diagnostic Studies:   No relevant studies.      EKG (6/8/2016):  Vent. Rate : 084 BPM     Atrial Rate : 084 BPM     P-R Int : 154 ms          QRS Dur : 086 ms      QT Int : 378 ms       P-R-T Axes : 067 066 055 degrees     QTc Int : 446 ms  Normal sinus rhythm  Normal ECG  When compared with ECG of 03-DEC-2015 11:38,  Questionable change in The axis  T wave inversion no longer evident in Inferior leads  Confirmed by Flaco Sr MD ([id number]) on 6/9/2016 1:30:13 PM    2D ECHO:  No results found for this or any previous visit.      ASSESSMENT/PLAN:     Anesthesia Evaluation    I have  reviewed the Patient Summary Reports.    I have reviewed the Nursing Notes.   I have reviewed the Medications.     Review of Systems  Anesthesia Hx:  No problems with previous Anesthesia  Denies Family Hx of Anesthesia complications.   Denies Personal Hx of Anesthesia complications.   Hematology/Oncology:  Hematology Normal   Oncology Normal     Cardiovascular:  Cardiovascular Normal                     Pulmonary:  Pulmonary Normal  Denies Asthma.  Denies Shortness of breath.    Renal/:  Renal/ Normal     Hepatic/GI:  Hepatic/GI Normal    Neurological:   Denies TIA. Denies CVA.    Endocrine:  Endocrine Normal    Psych:   Psychiatric History depression          Physical Exam  General:  Well nourished    Airway/Jaw/Neck:  Airway Findings: Mouth Opening: Normal Tongue: Normal  General Airway Assessment: Adult, Good  Mallampati: I  TM Distance: Normal, at least 6 cm  Jaw/Neck Findings:  Neck ROM: Normal ROM      Dental:  Dental Findings: In tact   Chest/Lungs:  Chest/Lungs Clear    Heart/Vascular:  Heart Findings: Normal    Abdomen:  Abdomen Findings: Normal     Skin:  Skin Findings: Normal    Mental Status:  Mental Status Findings:  Cooperative, Alert and Oriented         Anesthesia Plan  Type of Anesthesia, risks & benefits discussed:  Anesthesia Type:  general  Patient's Preference:   Intra-op Monitoring Plan: standard ASA monitors  Intra-op Monitoring Plan Comments:   Post Op Pain Control Plan: multimodal analgesia  Post Op Pain Control Plan Comments:   Induction:   IV  Beta Blocker:  Patient is not currently on a Beta-Blocker (No further documentation required).       Informed Consent: Patient understands risks and agrees with Anesthesia plan.  Questions answered. Anesthesia consent signed with patient.  ASA Score: 2     Day of Surgery Review of History & Physical:    H&P update referred to the provider.         Ready For Surgery From Anesthesia Perspective.

## 2018-08-14 ENCOUNTER — HOSPITAL ENCOUNTER (OUTPATIENT)
Facility: HOSPITAL | Age: 38
Discharge: HOME | End: 2018-08-14
Attending: PSYCHIATRY & NEUROLOGY | Admitting: PSYCHIATRY & NEUROLOGY
Payer: COMMERCIAL

## 2018-08-14 ENCOUNTER — ANESTHESIA (OUTPATIENT)
Dept: ELECTROPHYSIOLOGY | Facility: HOSPITAL | Age: 38
End: 2018-08-14
Payer: COMMERCIAL

## 2018-08-14 VITALS
DIASTOLIC BLOOD PRESSURE: 56 MMHG | TEMPERATURE: 98 F | BODY MASS INDEX: 25.83 KG/M2 | WEIGHT: 155 LBS | HEIGHT: 65 IN | SYSTOLIC BLOOD PRESSURE: 108 MMHG | RESPIRATION RATE: 17 BRPM | OXYGEN SATURATION: 100 % | HEART RATE: 78 BPM

## 2018-08-14 DIAGNOSIS — F33.41 MDD (MAJOR DEPRESSIVE DISORDER), RECURRENT, IN PARTIAL REMISSION: ICD-10-CM

## 2018-08-14 LAB
B-HCG UR QL: NEGATIVE
CTP QC/QA: YES

## 2018-08-14 PROCEDURE — 25000003 PHARM REV CODE 250: Performed by: STUDENT IN AN ORGANIZED HEALTH CARE EDUCATION/TRAINING PROGRAM

## 2018-08-14 PROCEDURE — 81025 URINE PREGNANCY TEST: CPT | Performed by: PSYCHIATRY & NEUROLOGY

## 2018-08-14 PROCEDURE — D9220A PRA ANESTHESIA: Mod: ,,, | Performed by: ANESTHESIOLOGY

## 2018-08-14 PROCEDURE — 90870 ELECTROCONVULSIVE THERAPY: CPT | Performed by: ANESTHESIOLOGY

## 2018-08-14 PROCEDURE — 90870 ELECTROCONVULSIVE THERAPY: CPT | Mod: ,,, | Performed by: PSYCHIATRY & NEUROLOGY

## 2018-08-14 PROCEDURE — 63600175 PHARM REV CODE 636 W HCPCS: Performed by: STUDENT IN AN ORGANIZED HEALTH CARE EDUCATION/TRAINING PROGRAM

## 2018-08-14 RX ORDER — ONDANSETRON 2 MG/ML
INJECTION INTRAMUSCULAR; INTRAVENOUS
Status: DISCONTINUED | OUTPATIENT
Start: 2018-08-14 | End: 2018-08-14

## 2018-08-14 RX ORDER — ONDANSETRON 2 MG/ML
INJECTION INTRAMUSCULAR; INTRAVENOUS
Status: COMPLETED
Start: 2018-08-14 | End: 2018-08-14

## 2018-08-14 RX ORDER — SODIUM CHLORIDE 0.9 % (FLUSH) 0.9 %
3 SYRINGE (ML) INJECTION
Status: DISCONTINUED | OUTPATIENT
Start: 2018-08-14 | End: 2018-08-14 | Stop reason: HOSPADM

## 2018-08-14 RX ORDER — LABETALOL HYDROCHLORIDE 5 MG/ML
INJECTION, SOLUTION INTRAVENOUS
Status: COMPLETED
Start: 2018-08-14 | End: 2018-08-14

## 2018-08-14 RX ORDER — SUCCINYLCHOLINE CHLORIDE 20 MG/ML
INJECTION INTRAMUSCULAR; INTRAVENOUS
Status: COMPLETED
Start: 2018-08-14 | End: 2018-08-14

## 2018-08-14 RX ORDER — KETOROLAC TROMETHAMINE 30 MG/ML
INJECTION, SOLUTION INTRAMUSCULAR; INTRAVENOUS
Status: COMPLETED
Start: 2018-08-14 | End: 2018-08-14

## 2018-08-14 RX ORDER — SODIUM CHLORIDE 9 MG/ML
INJECTION, SOLUTION INTRAVENOUS CONTINUOUS
Status: DISCONTINUED | OUTPATIENT
Start: 2018-08-14 | End: 2018-08-14 | Stop reason: HOSPADM

## 2018-08-14 RX ORDER — LABETALOL HYDROCHLORIDE 5 MG/ML
INJECTION, SOLUTION INTRAVENOUS
Status: DISCONTINUED | OUTPATIENT
Start: 2018-08-14 | End: 2018-08-14

## 2018-08-14 RX ORDER — ESMOLOL HYDROCHLORIDE 10 MG/ML
INJECTION INTRAVENOUS
Status: DISCONTINUED
Start: 2018-08-14 | End: 2018-08-14 | Stop reason: HOSPADM

## 2018-08-14 RX ORDER — LIDOCAINE HYDROCHLORIDE 10 MG/ML
1 INJECTION, SOLUTION EPIDURAL; INFILTRATION; INTRACAUDAL; PERINEURAL ONCE
Status: DISCONTINUED | OUTPATIENT
Start: 2018-08-14 | End: 2018-08-14 | Stop reason: HOSPADM

## 2018-08-14 RX ORDER — ONDANSETRON 2 MG/ML
4 INJECTION INTRAMUSCULAR; INTRAVENOUS ONCE AS NEEDED
Status: DISCONTINUED | OUTPATIENT
Start: 2018-08-14 | End: 2018-08-14 | Stop reason: HOSPADM

## 2018-08-14 RX ORDER — KETOROLAC TROMETHAMINE 30 MG/ML
30 INJECTION, SOLUTION INTRAMUSCULAR; INTRAVENOUS ONCE AS NEEDED
Status: COMPLETED | OUTPATIENT
Start: 2018-08-14 | End: 2018-08-14

## 2018-08-14 RX ORDER — SUCCINYLCHOLINE CHLORIDE 20 MG/ML
INJECTION INTRAMUSCULAR; INTRAVENOUS
Status: DISCONTINUED | OUTPATIENT
Start: 2018-08-14 | End: 2018-08-14

## 2018-08-14 RX ADMIN — METHOHEXITAL SODIUM 160 MG: 500 INJECTION, POWDER, LYOPHILIZED, FOR SOLUTION INTRAMUSCULAR; INTRAVENOUS; RECTAL at 07:08

## 2018-08-14 RX ADMIN — Medication 500 MG: at 06:08

## 2018-08-14 RX ADMIN — SUCCINYLCHOLINE CHLORIDE 120 MG: 20 INJECTION, SOLUTION INTRAMUSCULAR; INTRAVENOUS at 07:08

## 2018-08-14 RX ADMIN — ONDANSETRON 4 MG: 2 INJECTION, SOLUTION INTRAMUSCULAR; INTRAVENOUS at 07:08

## 2018-08-14 RX ADMIN — KETOROLAC TROMETHAMINE 30 MG: 30 INJECTION, SOLUTION INTRAMUSCULAR; INTRAVENOUS at 07:08

## 2018-08-14 RX ADMIN — LABETALOL HYDROCHLORIDE 10 MG: 5 INJECTION, SOLUTION INTRAVENOUS at 07:08

## 2018-08-14 RX ADMIN — SODIUM CHLORIDE: 0.9 INJECTION, SOLUTION INTRAVENOUS at 07:08

## 2018-08-14 NOTE — DISCHARGE SUMMARY
Allyssa Wright  : 1980   MRN: [id number]  Date: 2018       Psychiatry - ECT  Discharge Summary    Admit Date: 2018  5:50 AM  Discharge Date: 2018    Attending Physician: hSoaib Boyce Jr., MD   Discharge Provider: Pamella Viramontes MD    History of Present Illness: Allyssa Wright is a 38 y.o. female with MDD, recurrent, in partial remission  presented for ECT #51. See H&P dated 2018 for full HPI.     Hospital Course: The pt tolerated the ECT treatment well with minimal complication. Pt was stable post-procedure. See OP note dated 2018 for more details.     Disposition: Home or Self Care    Medications:  Reconciled Home Medications:      Medication List      ASK your doctor about these medications    cloZAPine 50 MG tablet  Commonly known as:  CLOZARIL  Take 100 mg by mouth once daily.     dapsone 7.5 % Glwp  Apply topically once daily.     dextroamphetamine-amphetamine 30 mg Tab  Take 30 mg by mouth 2 (two) times daily.     famciclovir 500 MG tablet  Commonly known as:  FAMVIR  Take 1 tablet (500 mg total) by mouth 2 (two) times daily.     mirtazapine 15 MG tablet  Commonly known as:  REMERON  Take 1 tablet (15 mg total) by mouth every evening.     spironolactone 50 MG tablet  Commonly known as:  ALDACTONE  50 mg 2 (two) times daily. 50  mg qAM, 50 mg qHS.     thyroid (pork) Tab  Commonly known as:  ARMOUR THYROID  Take 1 tablet (30 mg total) by mouth every morning.     traZODone 100 MG tablet  Commonly known as:  DESYREL  Take 200 mg by mouth every evening.     TRINTELLIX 5 mg Tab  Generic drug:  vortioxetine  Take 2.5 mg by mouth once daily.     UNABLE TO FIND  2 (two) times daily. n-azetyl-cysteine     venlafaxine 100 MG Tab  Commonly known as:  EFFEXOR  Take 3 tablets (300 mg total) by mouth once daily.     ZOVIRAX 5 % Crea  Generic drug:  acyclovir 5%              Pt's Status at Discharge: alert and medically stable    Discharge Diagnoses:    MDD, recurrent, in partial  remission      Diet: Resume previous outpt diet  Activity: Ambulate with assistance - pt is a fall risk  Instructions: Please do not eat or drink anything after midnight prior to procedure. Please do not drive on day of ECT.     Med Changes:  Changes made by outpatient provider Dr. Jc prior to ECT treatment: Effexor 225mg and Trentillex 2.5mg       Next ECT:    8/16/18, Thursday for treatment #52      Pamella Viramontes M.D.  PGY 2 [employer] [facility]  8/14/2018

## 2018-08-14 NOTE — DISCHARGE INSTRUCTIONS
Home Care Instructions  E.C.T.    ACTIVITY LEVEL:  You may feel sleepy for several hours. It is best to rest until you are more awake and then gradually resume your normal activities in one day. It is recommended, because of the possibility of memory loss and slight confusion post ECT, that you rest in the company of a responsible adult. This confusion and memory loss is expected and should diminish over time. It is also recommended that you do not drive or operate any electrical appliances or machinery during the ECT treatment series. Ask your Doctor, at the time of your treatment, any questions you may have about specific activities.    DIET:  You may wish to start with liquids after your ECT treatment and gradually resume your normal diet. Do not consume alcoholic beverages during the ECT treatment series.    BATHING:  You may shower or bathe as desired  .  MEDICATIONS:  Resume all home medications starting with the AM doses not taken prior to ECT treatment. If you experience a headache, it is okay to take your usual pain reliever.    WHEN TO CALL THE DOCTOR:   Headache, memory loss or confusion that does not begin to resolve within 24 hours  .  RETURN APPOINTMENT:  Report to Day Surgery ([facility]) on (date) Thursday 8/16/2018.    Remember you may not eat or drink after midnight, but please take heart or high blood pressure medicines with a sip of water in the morning prior to leaving home.    FOR EMERGENCIES:  If any unusual problems or difficulties occur, contact the office [phone] Monday-Friday until 3:00 p.m. After hours, call the hospital  [phone] and ask for the Psychiatry Resident On-call.

## 2018-08-14 NOTE — TRANSFER OF CARE
"Anesthesia Transfer of Care Note    Patient: Allyssa Wright    Procedure(s) Performed: Procedure(s) (LRB):  ELECTROCONVULSIVE THERAPY (ECT) - SINGLE SEIZURE (N/A)    Patient location: PACU    Anesthesia Type: general    Transport from OR: Transported from OR on 6-10 L/min O2 by face mask with adequate spontaneous ventilation    Post pain: adequate analgesia    Post assessment: no apparent anesthetic complications and tolerated procedure well    Post vital signs: stable    Level of consciousness: awake and alert    Nausea/Vomiting: no nausea/vomiting    Complications: none    Transfer of care protocol was followed      Last vitals:   Visit Vitals  /68   Pulse 94   Temp 37.1 °C (98.8 °F) (Temporal)   Resp 20   Ht 5' 5" (1.651 m)   Wt 70.3 kg (155 lb)   SpO2 100%   Breastfeeding? No   BMI 25.79 kg/m²     "

## 2018-08-14 NOTE — ANESTHESIA PROCEDURE NOTES
ECT    Treatment Number: 52  Procedure start time: 8/14/2018 7:24 AM  Timeout performed at: 8/14/2018 7:20 AM  Procedure end time: 8/14/2018 7:25 AM    Staffing  Anesthesiologist: Cesar Summers III, MD  CRNA/Resident: Michael Willams MD  Other anesthesia staff: Sanket Arango MD  Performed by: resident/CRNA     Preanesthetic Checklist  The following were completed as part of the preanesthetic checklist: patient identified, procedural consent, pre-op evaluation, timeout performed, risks and benefits discussed, monitors and equipment checked, anesthesia consent given, oxygen available, suction available, hand hygiene performed and patient being monitored.    Setup & Induction  Patient Monitoring: heart rate, continuous pulse ox, cardiac monitor, continuous capnometry, NIBP and gas analyzer  Patient preparation: bite block inserted, extremities padded, mandibular stabilization and patient hyperventilated  Electrode Placement: Bitemporal    The patient was moved to the ECT therapy room after being assessed and consented for ECT. After standard ASA monitors were applied and timeout performed, the patient was adequately preoxygenated. After induction of general anesthesia, adequate oxygenation and ventilation were confirmed with pulse oxymetry and end tidal CO2 monitoring via bag-mask ventilation. End tidal CO2 was monitored throughout the case and moderate hyperventilation was performed prior to beginning ECT treatment. All extremities were padded, biteblock was inserted, and mandibular stabilization was done prior to initiating ECT therapy.    Procedure  Stimulus Number 1: Setting Number = III; 576 millicoulombs; Seizure Duration = 51 seconds            Recovery  After adequate recovery from general anesthesia, the patient was transported to recovery.  Baseline Blood Pressure: 117/78  Peak Blood Pressure: 147/91  Time to Recovery of Respirations: 5 minutes    ECT Findings  ECT associated findings of:  None

## 2018-08-14 NOTE — OP NOTE
Allyssa Wright  : 1980   MRN: [id number]  Date: 2018    Electroconvulsive Therapy  Operative Note    Date of Admission: 2018  5:50 AM    Site: [facility], [address]    Attending: Shoaib Boyce Jr., MD   Residents: Pamella Viramontes MD  Pre-op Diagnosis: MDD, recurrent, in partial remission    ECT Treatment Number: 51  Machine Type: Mecta 5000    Patient Status: Medically stable    Vitals (pre-procedure):  Vitals:    18 0622   BP: 115/71   Pulse: 85   Resp: 20   Temp: 97.7 °F (36.5 °C)       Electrode Placement: Bitemporal    Stimulus Number Charge (mC) Level Pulse Width (msec) Frequency  (Hz) Duration of Stimulus (sec) Current (mA) Duration of Seizure (sec)   1 576 3 1 60 3 800 51                                   Complications: Hypertension    Maximum Blood Pressure: 147/91    Medications Given:  Caffeine 500 mg PO before  Methohexital (Brevital) 160 mg  IV before  Succinylcholine (Anectine) 120 mg  IV before  Labetalol 10mg IV before  Zofran 4 mg IV before  Toradol 30 mg IV before    Treatment Course:  Patient tolerated procedure well. After adequate recovery from general anesthesia, the patient was transported to recovery.    Post-op Diagnosis: MDD, recurrent, in partial remission      Recommended for next ECT: Same as above      Pamella Viramontes MD  PGY 2 [facility]   2018

## 2018-08-14 NOTE — H&P
Allyssa Wright  : 1980   MRN: [id number]  Date: 2018     Psychiatric - ECT  H&P     Chief complaint: MDD recurrent in partial remission     Procedure: ECT #51    SUBJECTIVE:   HPI:   Allyssa Wright is a 38 y.o. female withMDD recurrent in partial remission who presents for ECT #51. Last ECT treatment on 18     The pt does not have any physical complaints today. The pt does not need any prescriptions, reports two medication changes in effexor and Trintellex per private psychiatrist . Patient states that she has had increased anxiety and they have been working to titrate her medications since then.     The pt has not had anything by mouth since midnight, and is ready for ECT.    Denies lightheadedness, headache, changes in vision, chest pain, shortness of breath, abdominal pain, nausea, vomiting, diarrhea, constipation, dysuria, hematuria, hematochezia, numbness and weakness.       Psychiatric Review of Systems:  Mood: Mild  Appetite: No problem  Psychomotor: No problem  Cognitive Impairment: Mild  Insomnia: None  Psychosis: None  Diurnal Variation: None  Suicidal Ideation: Absent      Medical Review Of Systems:  Pertinent items are noted in HPI.    Current Medications:   No current facility-administered medications on file prior to encounter.      Current Outpatient Medications on File Prior to Encounter   Medication Sig Dispense Refill    clozapine (CLOZARIL) 50 MG tablet Take 100 mg by mouth once daily.       dapsone 7.5 % GlwP Apply topically once daily.      famciclovir (FAMVIR) 500 MG tablet Take 1 tablet (500 mg total) by mouth 2 (two) times daily. 30 tablet 12    mirtazapine (REMERON) 15 MG tablet Take 1 tablet (15 mg total) by mouth every evening. (Patient taking differently: Take 7.5 mg by mouth every evening. ) 90 tablet 0    spironolactone (ALDACTONE) 50 MG tablet 50 mg 2 (two) times daily. 50  mg qAM, 50 mg qHS.      thyroid (ARMOUR THYROID) 30 mg Tab Take 1 tablet (30 mg total) by  mouth every morning. 30 tablet 11    trazodone (DESYREL) 100 MG tablet Take 200 mg by mouth every evening.       UNABLE TO FIND 2 (two) times daily. n-azetyl-cysteine      venlafaxine (EFFEXOR) 100 MG Tab Take 3 tablets (300 mg total) by mouth once daily. (Patient taking differently: Take 150 mg by mouth once daily. )      vortioxetine (TRINTELLIX) 5 mg Tab Take 2.5 mg by mouth once daily.      dextroamphetamine-amphetamine 30 mg Tab Take 30 mg by mouth 2 (two) times daily.       ZOVIRAX 5 % Crea   5      Changes made by outpatient provider Dr. Jc prior to ECT treatment: Effexor 225mg and Trentillex 2.5mg         Allergies:   Review of patient's allergies indicates:   Allergen Reactions    Benzodiazepines Other (See Comments)     Contraindicated while taking Clozapine    Ampicillin      Mom says so    Erythromycin     Levaquin [levofloxacin] Other (See Comments)     Depression side effects    Penicillins      Mom says so    Pristiq [desvenlafaxine]      psycotic      Sulfa (sulfonamide antibiotics)      Rash      Azithromycin Anxiety        Past Medical/Surgical History:   Past Medical History:   Diagnosis Date    Anxiety     Depression     History of psychiatric hospitalization     HSV-1 (herpes simplex virus 1) infection      of psychiatric care     Moderate depressed bipolar II disorder 06/13/2016    reports no history of bipolar    Obsessive-compulsive disorder     Psychiatric problem     Schizophrenia 4/3/2018    Self-harming behavior     Therapy      Past Surgical History:   Procedure Laterality Date    ANKLE SURGERY Right     BREAST augmentation      OVARIAN CYST REMOVAL Bilateral           OBJECTIVE:   Vitals (pre-procedure):  Vitals:    08/14/18 0622   BP: 115/71   Pulse: 85   Resp: 20   Temp: 97.7 °F (36.5 °C)        Labs/Imaging/Studies:   Recent Results (from the past 48 hour(s))   POCT urine pregnancy    Collection Time: 08/14/18  6:38 AM   Result Value Ref Range    POC  "Preg Test, Ur Negative Negative     Acceptable Yes       No results found for: PHENYTOIN, PHENOBARB, VALPROATE, CBMZ      Physical Exam:   General: well nourished  Orientation: oriented to person, place, and time  Mental Status: alert and responsive  HEENT: normocephalic, no lesions or obvious abnormality  Chest: breath sounds clear and equal bilaterally  Heart: RRR. No murmurs  Abdomen: soft, non-, no palpable masses, +BS  Neuro: CN II-XII grossly intact     Mental Status Exam:   Appearance: age appropriate, well nourished, dressed in hospital gown  Behavior: friendly and cooperative, eye contact appropriate  Speech: normal tone, normal rate, normal pitch, normal volume  Mood: "better, but anxious"  Affect: full, reactive  Thought Process: linear and organized  Thought Content: no SI, no HI or AVH  Cognition: grossly intact  Insight: good  Judgment: appropriate for setting     ASSESSMENT/PLAN:   Allyssa Wright is a 38 y.o. female with MDD recurrent in partial remission who presents for ECT treatment #51.     Recommendations:   Proceed with ECT #51.    Pamella Viramontes M.D.  PGY 2 [facility]   8/14/2018  "

## 2018-08-14 NOTE — ANESTHESIA POSTPROCEDURE EVALUATION
"Anesthesia Post Evaluation    Patient: Allyssa Wright    Procedure(s) Performed: Procedure(s) (LRB):  ELECTROCONVULSIVE THERAPY (ECT) - SINGLE SEIZURE (N/A)    Final Anesthesia Type: general  Patient location during evaluation: PACU  Patient participation: Yes- Able to Participate  Level of consciousness: awake and alert and oriented  Post-procedure vital signs: reviewed and stable  Pain management: adequate  Airway patency: patent  PONV status at discharge: No PONV  Anesthetic complications: no      Cardiovascular status: blood pressure returned to baseline and hemodynamically stable  Respiratory status: unassisted and spontaneous ventilation  Hydration status: euvolemic  Follow-up not needed.        Visit Vitals  BP (!) 108/56   Pulse 78   Temp 36.8 °C (98.2 °F) (Temporal)   Resp 17   Ht 5' 5" (1.651 m)   Wt 70.3 kg (155 lb)   SpO2 100%   Breastfeeding? No   BMI 25.79 kg/m²       Pain/Roma Score: Pain Assessment Performed: Yes (8/14/2018  8:15 AM)  Presence of Pain: denies (8/14/2018  8:15 AM)  Roma Score: 10 (8/14/2018  7:47 AM)  Modified Roma Score: 20 (8/14/2018  6:34 AM)        "

## 2018-08-15 ENCOUNTER — TELEPHONE (OUTPATIENT)
Dept: PSYCHIATRY | Facility: CLINIC | Age: 38
End: 2018-08-15

## 2018-08-15 NOTE — TELEPHONE ENCOUNTER
Received a message from patient requesting to move ECT procedure from tomorrow to next week. Called and spoke with patient's father and rescheduled for Monday 8/20/18. Surgery scheduling notified.

## 2018-08-19 ENCOUNTER — ANESTHESIA EVENT (OUTPATIENT)
Dept: ELECTROPHYSIOLOGY | Facility: HOSPITAL | Age: 38
End: 2018-08-19
Payer: COMMERCIAL

## 2018-08-19 NOTE — ANESTHESIA PREPROCEDURE EVALUATION
[facility]  Anesthesia Pre-Operative Evaluation         Patient Name: Allyssa Wright  YOB: 1980  MRN: [id number]    SUBJECTIVE:     Pre-operative evaluation for Procedure(s) (LRB):  ELECTROCONVULSIVE THERAPY (ECT) - SINGLE SEIZURE (N/A)     08/19/2018    Allyssa Wright is a 38 y.o. female w/ a significant PMHx of HSV-1, hypothyroidism, and MDD who presents for ECT #53    LDA:     Prev airway: Easy mask    Previous Medications:    Methohexital 160 mg  Succinylcholine 120 mg  Labetalol 10 mg  Toradol 30 mg  Zofran 4 mg      Patient Active Problem List   Diagnosis    MDD (major depressive disorder), recurrent, in partial remission    Major depressive disorder, recurrent, in partial remission    MDD (major depressive disorder), severe    Major depressive disorder, recurrent    Other acne    Major depression, recurrent, chronic    MDD (major depressive disorder), recurrent episode, moderate    Major depressive disorder, recurrent severe without psychotic features    MDD (major depressive disorder), recurrent episode, severe    MDD (major depressive disorder)    Major depressive disorder, recurrent episode, in partial remission    Schizophrenia    Depression    Comfort measures only status    Major depression, recurrent    MDD (major depressive disorder), recurrent episode, mild       Review of patient's allergies indicates:   Allergen Reactions    Benzodiazepines Other (See Comments)     Contraindicated while taking Clozapine    Ampicillin      Mom says so    Erythromycin     Levaquin [levofloxacin] Other (See Comments)     Depression side effects    Penicillins      Mom says so    Pristiq [desvenlafaxine]      psycotic      Sulfa (sulfonamide antibiotics)      Rash      Azithromycin Anxiety       Current Outpatient Medications:  No current facility-administered  medications for this encounter.     Current Outpatient Medications:     clozapine (CLOZARIL) 50 MG tablet, Take 100 mg by mouth once daily. , Disp: , Rfl:     dapsone 7.5 % GlwP, Apply topically once daily., Disp: , Rfl:     dextroamphetamine-amphetamine 30 mg Tab, Take 30 mg by mouth 2 (two) times daily. , Disp: , Rfl:     famciclovir (FAMVIR) 500 MG tablet, Take 1 tablet (500 mg total) by mouth 2 (two) times daily., Disp: 30 tablet, Rfl: 12    mirtazapine (REMERON) 15 MG tablet, Take 1 tablet (15 mg total) by mouth every evening. (Patient taking differently: Take 7.5 mg by mouth every evening. ), Disp: 90 tablet, Rfl: 0    spironolactone (ALDACTONE) 50 MG tablet, 50 mg 2 (two) times daily. 50  mg qAM, 50 mg qHS., Disp: , Rfl:     thyroid (ARMOUR THYROID) 30 mg Tab, Take 1 tablet (30 mg total) by mouth every morning., Disp: 30 tablet, Rfl: 11    trazodone (DESYREL) 100 MG tablet, Take 200 mg by mouth every evening. , Disp: , Rfl:     UNABLE TO FIND, 2 (two) times daily. n-azetyl-cysteine, Disp: , Rfl:     venlafaxine (EFFEXOR) 100 MG Tab, Take 3 tablets (300 mg total) by mouth once daily. (Patient taking differently: Take 150 mg by mouth once daily. ), Disp: , Rfl:     vortioxetine (TRINTELLIX) 5 mg Tab, Take 2.5 mg by mouth once daily., Disp: , Rfl:     ZOVIRAX 5 % Crea, , Disp: , Rfl: 5    Past Surgical History:   Procedure Laterality Date    ANKLE SURGERY Right     BREAST augmentation      OVARIAN CYST REMOVAL Bilateral        Social History     Socioeconomic History    Marital status: Single     Spouse name: Not on file    Number of children: Not on file    Years of education: Not on file    Highest education level: Not on file   Social Needs    Financial resource strain: Not on file    Food insecurity - worry: Not on file    Food insecurity - inability: Not on file    Transportation needs - medical: Not on file    Transportation needs - non-medical: Not on file   Occupational History     Occupation: unemployed   Tobacco Use    Smoking status: Former Smoker     Last attempt to quit: 2011     Years since quittin.3    Smokeless tobacco: Never Used   Substance and Sexual Activity    Alcohol use: Yes     Comment: rarely    Drug use: No     Comment: Experimental use in high school    Sexual activity: Not on file   Other Topics Concern    Patient feels they ought to cut down on drinking/drug use Not Asked    Patient annoyed by others criticizing their drinking/drug use Not Asked    Patient has felt bad or guilty about drinking/drug use Not Asked    Patient has had a drink/used drugs as an eye opener in the AM Not Asked   Social History Narrative    Pt has 1 older brother from an intact family until the death of her mother in .  She completed 1 year of college, was never in the , and has never been employed.  She was engaged once, but never , has no children, and lives with her father plus 2 dogs.  She denies any hobbies and is spiritual but not Mormon.  She does date and returns to college during rare periods when depression and anxiety [place].       OBJECTIVE:     Vital Signs Range (Last 24H):  BP: ()/()   Arterial Line BP: ()/()       Significant Labs:  Lab Results   Component Value Date    WBC 6.83 2016    HGB 13.3 2016    HCT 40.3 2016     2016    ALT 14 2016    AST 20 2016     2016    K 3.8 2016     2016    CREATININE 0.7 2016    BUN 10 2016    CO2 28 2016    TSH 1.208 2016       Diagnostic Studies: No relevant studies.    EKG: No recent studies available.    2D ECHO:  No results found for this or any previous visit.      ASSESSMENT/PLAN:         Anesthesia Evaluation    I have reviewed the Patient Summary Reports.    I have reviewed the Nursing Notes.   I have reviewed the Medications.     Review of Systems  Anesthesia Hx:  No problems with previous Anesthesia   Denies Family Hx of Anesthesia complications.   Denies Personal Hx of Anesthesia complications.   Social:  Former Smoker, Alcohol Use    Hematology/Oncology:  Hematology Normal   Oncology Normal     EENT/Dental:EENT/Dental Normal   Cardiovascular:  Cardiovascular Normal                     Pulmonary:  Pulmonary Normal  Denies Asthma.  Denies Shortness of breath.    Renal/:  Renal/ Normal     Hepatic/GI:  Hepatic/GI Normal    Neurological:   Denies TIA. Denies CVA.    Endocrine:  Endocrine Normal    Psych:   Psychiatric History depression          Physical Exam  General:  Well nourished    Airway/Jaw/Neck:  Airway Findings: Mouth Opening: Normal Tongue: Normal  General Airway Assessment: Adult, Good  Mallampati: I  TM Distance: Normal, at least 6 cm  Jaw/Neck Findings:  Neck ROM: Normal ROM      Dental:  Dental Findings: In tact   Chest/Lungs:  Chest/Lungs Clear    Heart/Vascular:  Heart Findings: Normal    Abdomen:  Abdomen Findings: Normal      Mental Status:  Mental Status Findings:  Cooperative, Alert and Oriented         Anesthesia Plan  Type of Anesthesia, risks & benefits discussed:  Anesthesia Type:  general  Patient's Preference:   Intra-op Monitoring Plan: standard ASA monitors  Intra-op Monitoring Plan Comments:   Post Op Pain Control Plan: per primary service following discharge from PACU  Post Op Pain Control Plan Comments:   Induction:   IV  Beta Blocker:  Patient is not currently on a Beta-Blocker (No further documentation required).       Informed Consent: Patient understands risks and agrees with Anesthesia plan.  Questions answered. Anesthesia consent signed with patient.  ASA Score: 2     Day of Surgery Review of History & Physical:    H&P update referred to the provider.         Ready For Surgery From Anesthesia Perspective.

## 2018-08-20 ENCOUNTER — ANESTHESIA (OUTPATIENT)
Dept: ELECTROPHYSIOLOGY | Facility: HOSPITAL | Age: 38
End: 2018-08-20
Payer: COMMERCIAL

## 2018-08-20 ENCOUNTER — HOSPITAL ENCOUNTER (OUTPATIENT)
Facility: HOSPITAL | Age: 38
Discharge: HOME | End: 2018-08-20
Attending: PSYCHIATRY & NEUROLOGY | Admitting: PSYCHIATRY & NEUROLOGY
Payer: COMMERCIAL

## 2018-08-20 VITALS
HEART RATE: 72 BPM | RESPIRATION RATE: 20 BRPM | TEMPERATURE: 98 F | BODY MASS INDEX: 25.83 KG/M2 | HEIGHT: 65 IN | SYSTOLIC BLOOD PRESSURE: 111 MMHG | WEIGHT: 155 LBS | DIASTOLIC BLOOD PRESSURE: 72 MMHG | OXYGEN SATURATION: 98 %

## 2018-08-20 DIAGNOSIS — F33.9 RECURRENT MAJOR DEPRESSIVE DISORDER, REMISSION STATUS UNSPECIFIED: Primary | ICD-10-CM

## 2018-08-20 DIAGNOSIS — F33.41 MDD (MAJOR DEPRESSIVE DISORDER), RECURRENT, IN PARTIAL REMISSION: ICD-10-CM

## 2018-08-20 DIAGNOSIS — F32.9 MAJOR DEPRESSION: ICD-10-CM

## 2018-08-20 LAB
B-HCG UR QL: NEGATIVE
CTP QC/QA: YES

## 2018-08-20 PROCEDURE — 90870 ELECTROCONVULSIVE THERAPY: CPT | Mod: ,,, | Performed by: PSYCHIATRY & NEUROLOGY

## 2018-08-20 PROCEDURE — 63600175 PHARM REV CODE 636 W HCPCS: Performed by: STUDENT IN AN ORGANIZED HEALTH CARE EDUCATION/TRAINING PROGRAM

## 2018-08-20 PROCEDURE — 63600175 PHARM REV CODE 636 W HCPCS: Performed by: PSYCHIATRY & NEUROLOGY

## 2018-08-20 PROCEDURE — 25000003 PHARM REV CODE 250: Performed by: STUDENT IN AN ORGANIZED HEALTH CARE EDUCATION/TRAINING PROGRAM

## 2018-08-20 PROCEDURE — 81025 URINE PREGNANCY TEST: CPT | Performed by: PSYCHIATRY & NEUROLOGY

## 2018-08-20 PROCEDURE — 90870 ELECTROCONVULSIVE THERAPY: CPT | Performed by: ANESTHESIOLOGY

## 2018-08-20 PROCEDURE — 25000003 PHARM REV CODE 250: Performed by: PSYCHIATRY & NEUROLOGY

## 2018-08-20 PROCEDURE — D9220A PRA ANESTHESIA: Mod: ,,, | Performed by: ANESTHESIOLOGY

## 2018-08-20 RX ORDER — SUCCINYLCHOLINE CHLORIDE 20 MG/ML
INJECTION INTRAMUSCULAR; INTRAVENOUS
Status: DISCONTINUED | OUTPATIENT
Start: 2018-08-20 | End: 2018-08-21

## 2018-08-20 RX ORDER — KETOROLAC TROMETHAMINE 30 MG/ML
30 INJECTION, SOLUTION INTRAMUSCULAR; INTRAVENOUS ONCE AS NEEDED
Status: COMPLETED | OUTPATIENT
Start: 2018-08-20 | End: 2018-08-20

## 2018-08-20 RX ORDER — KETOROLAC TROMETHAMINE 30 MG/ML
INJECTION, SOLUTION INTRAMUSCULAR; INTRAVENOUS
Status: COMPLETED
Start: 2018-08-20 | End: 2018-08-20

## 2018-08-20 RX ORDER — LIDOCAINE HYDROCHLORIDE 10 MG/ML
1 INJECTION, SOLUTION EPIDURAL; INFILTRATION; INTRACAUDAL; PERINEURAL ONCE
Status: COMPLETED | OUTPATIENT
Start: 2018-08-20 | End: 2018-08-20

## 2018-08-20 RX ORDER — ONDANSETRON 2 MG/ML
4 INJECTION INTRAMUSCULAR; INTRAVENOUS DAILY PRN
Status: DISCONTINUED | OUTPATIENT
Start: 2018-08-20 | End: 2018-08-20 | Stop reason: HOSPADM

## 2018-08-20 RX ORDER — SODIUM CHLORIDE 9 MG/ML
INJECTION, SOLUTION INTRAVENOUS CONTINUOUS
Status: DISCONTINUED | OUTPATIENT
Start: 2018-08-20 | End: 2018-08-20 | Stop reason: HOSPADM

## 2018-08-20 RX ORDER — ONDANSETRON 2 MG/ML
4 INJECTION INTRAMUSCULAR; INTRAVENOUS ONCE AS NEEDED
Status: COMPLETED | OUTPATIENT
Start: 2018-08-20 | End: 2018-08-20

## 2018-08-20 RX ORDER — SODIUM CHLORIDE 0.9 % (FLUSH) 0.9 %
3 SYRINGE (ML) INJECTION
Status: DISCONTINUED | OUTPATIENT
Start: 2018-08-20 | End: 2018-08-20 | Stop reason: HOSPADM

## 2018-08-20 RX ORDER — SUCCINYLCHOLINE CHLORIDE 20 MG/ML
INJECTION INTRAMUSCULAR; INTRAVENOUS
Status: COMPLETED
Start: 2018-08-20 | End: 2018-08-20

## 2018-08-20 RX ORDER — ONDANSETRON 2 MG/ML
INJECTION INTRAMUSCULAR; INTRAVENOUS
Status: COMPLETED
Start: 2018-08-20 | End: 2018-08-20

## 2018-08-20 RX ORDER — LABETALOL HYDROCHLORIDE 5 MG/ML
INJECTION, SOLUTION INTRAVENOUS
Status: DISCONTINUED | OUTPATIENT
Start: 2018-08-20 | End: 2018-08-21

## 2018-08-20 RX ADMIN — METHOHEXITAL SODIUM 160 MG: 500 INJECTION, POWDER, LYOPHILIZED, FOR SOLUTION INTRAMUSCULAR; INTRAVENOUS; RECTAL at 07:08

## 2018-08-20 RX ADMIN — SODIUM CHLORIDE 500 ML: 0.9 INJECTION, SOLUTION INTRAVENOUS at 06:08

## 2018-08-20 RX ADMIN — LABETALOL HYDROCHLORIDE 10 MG: 5 INJECTION, SOLUTION INTRAVENOUS at 07:08

## 2018-08-20 RX ADMIN — SUCCINYLCHOLINE CHLORIDE 120 MG: 20 INJECTION, SOLUTION INTRAMUSCULAR; INTRAVENOUS at 07:08

## 2018-08-20 RX ADMIN — LIDOCAINE HYDROCHLORIDE 0.1 MG: 10 INJECTION, SOLUTION EPIDURAL; INFILTRATION; INTRACAUDAL; PERINEURAL at 06:08

## 2018-08-20 RX ADMIN — Medication 500 MG: at 06:08

## 2018-08-20 RX ADMIN — KETOROLAC TROMETHAMINE 30 MG: 30 INJECTION, SOLUTION INTRAMUSCULAR at 07:08

## 2018-08-20 RX ADMIN — ONDANSETRON 4 MG: 2 INJECTION, SOLUTION INTRAMUSCULAR; INTRAVENOUS at 07:08

## 2018-08-20 RX ADMIN — SODIUM CHLORIDE: 0.9 INJECTION, SOLUTION INTRAVENOUS at 07:08

## 2018-08-20 NOTE — ANESTHESIA POSTPROCEDURE EVALUATION
"Anesthesia Post Evaluation    Patient: Allyssa Wright    Procedure(s) Performed: Procedure(s) (LRB):  ELECTROCONVULSIVE THERAPY (ECT) - SINGLE SEIZURE (N/A)    Final Anesthesia Type: general  Patient location during evaluation: [facility]  Patient participation: Yes- Able to Participate  Level of consciousness: awake and alert and oriented  Post-procedure vital signs: reviewed and stable  Pain management: adequate  Airway patency: patent  PONV status at discharge: No PONV  Anesthetic complications: no      Cardiovascular status: blood pressure returned to baseline and hemodynamically stable  Respiratory status: unassisted, room air and spontaneous ventilation  Hydration status: euvolemic  Follow-up not needed.        Visit Vitals  /72 (BP Location: Right arm, Patient Position: Lying)   Pulse 72   Temp 36.8 °C (98.2 °F) (Temporal)   Resp 20   Ht 5' 5" (1.651 m)   Wt 70.3 kg (155 lb)   SpO2 98%   Breastfeeding? No   BMI 25.79 kg/m²       Pain/Roma Score: Pain Assessment Performed: Yes (8/20/2018  8:05 AM)  Presence of Pain: denies (8/20/2018  8:05 AM)  Roma Score: 10 (8/20/2018  7:41 AM)  Modified Roma Score: 20 (8/20/2018  6:27 AM)        "

## 2018-08-20 NOTE — H&P
"Allyssa Wright  : 1980   MRN: [id number]  Date: 2018     Psychiatric - ECT  H&P     Chief complaint: MDD recurrent in partial remission     Procedure: ECT #52    SUBJECTIVE:   HPI:   Allyssa Wright is a 38 y.o. female withMDD recurrent in partial remission who presents for ECT #52. Last ECT treatment on 18    The patient states that her mood is "good" this morning. She denies any recent medication changes. She denies SI/HI/AVH. She denies any physical complaints this morning. She denies any significant medication side effects.    The pt has not had anything by mouth since midnight, and is ready for ECT.    Denies lightheadedness, headache, changes in vision, chest pain, shortness of breath, abdominal pain, nausea, vomiting, diarrhea, constipation, dysuria, hematuria, hematochezia, numbness and weakness.       Psychiatric Review of Systems:  Mood: Mild  Appetite: No problem  Psychomotor: No problem  Cognitive Impairment: Mild  Insomnia: None  Psychosis: None  Diurnal Variation: None  Suicidal Ideation: Absent      Medical Review Of Systems:  Pertinent items are noted in HPI.    Current Medications:   No current facility-administered medications on file prior to encounter.      Current Outpatient Medications on File Prior to Encounter   Medication Sig Dispense Refill    clozapine (CLOZARIL) 50 MG tablet Take 50 mg by mouth once daily.       dapsone 7.5 % GlwP Apply topically once daily.      dextroamphetamine-amphetamine 30 mg Tab Take 30 mg by mouth 2 (two) times daily.       famciclovir (FAMVIR) 500 MG tablet Take 1 tablet (500 mg total) by mouth 2 (two) times daily. 30 tablet 12    mirtazapine (REMERON) 15 MG tablet Take 1 tablet (15 mg total) by mouth every evening. (Patient taking differently: Take 7.5 mg by mouth every evening. ) 90 tablet 0    spironolactone (ALDACTONE) 50 MG tablet 50 mg 2 (two) times daily. 50  mg qAM, 50 mg qHS.      thyroid (ARMOUR THYROID) 30 mg Tab Take 1 tablet (30 " mg total) by mouth every morning. 30 tablet 11    trazodone (DESYREL) 100 MG tablet Take 200 mg by mouth every evening.       UNABLE TO FIND 2 (two) times daily. n-azetyl-cysteine      venlafaxine (EFFEXOR) 100 MG Tab Take 3 tablets (300 mg total) by mouth once daily. (Patient taking differently: Take 225 mg by mouth once daily. )      vortioxetine (TRINTELLIX) 5 mg Tab Take 2.5 mg by mouth once daily.      ZOVIRAX 5 % Crea   5            Allergies:   Review of patient's allergies indicates:   Allergen Reactions    Benzodiazepines Other (See Comments)     Contraindicated while taking Clozapine    Ampicillin      Mom says so    Erythromycin     Levaquin [levofloxacin] Other (See Comments)     Depression side effects    Penicillins      Mom says so    Pristiq [desvenlafaxine]      psycotic      Sulfa (sulfonamide antibiotics)      Rash      Azithromycin Anxiety        Past Medical/Surgical History:   Past Medical History:   Diagnosis Date    Anxiety     Depression     History of psychiatric hospitalization     HSV-1 (herpes simplex virus 1) infection     Hx of psychiatric care     Moderate depressed bipolar II disorder 06/13/2016    reports no history of bipolar    Obsessive-compulsive disorder     Psychiatric problem     Schizophrenia 4/3/2018    Self-harming behavior     Therapy      Past Surgical History:   Procedure Laterality Date    ANKLE SURGERY Right     BREAST augmentation      OVARIAN CYST REMOVAL Bilateral           OBJECTIVE:   Vitals (pre-procedure):  Vitals:    08/20/18 0613   BP: 118/66   Pulse: 84   Resp: 18   Temp: 97.5 °F (36.4 °C)        Labs/Imaging/Studies:   Recent Results (from the past 48 hour(s))   POCT urine pregnancy    Collection Time: 08/20/18  6:05 AM   Result Value Ref Range    POC Preg Test, Ur Negative Negative     Acceptable Yes       No results found for: PHENYTOIN, PHENOBARB, VALPROATE, CBMZ      Physical Exam:   General: well  "nourished  Orientation: oriented to person, place, and time  Mental Status: alert and responsive  HEENT: normocephalic, no lesions or obvious abnormality  Chest: breath sounds clear and equal bilaterally  Heart: RRR. No murmurs  Abdomen: soft, non-, no palpable masses, +BS  Neuro: CN II-XII grossly intact     Mental Status Exam:   Appearance: age appropriate, well nourished, dressed in hospital gown  Behavior: friendly and cooperative, eye contact appropriate  Speech: normal tone, normal rate, normal pitch, normal volume  Mood: "good"  Affect: full, reactive  Thought Process: linear and organized  Thought Content: no SI, no HI or AVH  Cognition: grossly intact  Insight: good  Judgment: appropriate for setting     ASSESSMENT/PLAN:   Allyssa Wright is a 38 y.o. female with MDD recurrent in partial remission who presents for ECT treatment #52.     Recommendations:   Proceed with ECT #52.    Nestor Baxter M.D.  PGY 4 [employer]   8/20/2018  "

## 2018-08-20 NOTE — OP NOTE
Allyssa Wright  : 1980   MRN: [id number]  Date: 2018    Electroconvulsive Therapy  Operative Note    Date of Admission: 2018  5:48 AM    Site: [facility], [address]    Attending: Shoaib Boyce Jr., MD   Residents: Nestor Baxter MD  Pre-op Diagnosis: MDD, recurrent, in partial remission    ECT Treatment Number: 52  Machine Type: Mecta 5000    Patient Status: Medically stable    Vitals (pre-procedure):  Vitals:    18 0613   BP: 118/66   Pulse: 84   Resp: 18   Temp: 97.5 °F (36.4 °C)       Electrode Placement: Bitemporal    Stimulus Number Charge (mC) Level Pulse Width (msec) Frequency  (Hz) Duration of Stimulus (sec) Current (mA) Duration of Seizure (sec)   1 576 3 1 60 3 800 46                                   Complications: Hypertension    Maximum Blood Pressure: 156/90    Medications Given:  Caffeine 500 mg PO before  Methohexital (Brevital) 160 mg  IV before  Succinylcholine (Anectine) 120 mg  IV before  Labetalol 10 mg IV before  Zofran 4 mg IV before  Toradol 30 mg IV before    Treatment Course:  Patient tolerated procedure well. After adequate recovery from general anesthesia, the patient was transported to recovery.    Post-op Diagnosis: MDD, recurrent, in partial remission      Recommended for next ECT: Same as above      Nestor Baxter MD  PGY 4 [employer] [facility]   2018

## 2018-08-20 NOTE — DISCHARGE SUMMARY
Allyssa Wright  : 1980   MRN: [id number]  Date: 2018       Psychiatry - ECT  Discharge Summary    Admit Date: 2018  5:48 AM  Discharge Date: 2018    Attending Physician: Shoaib Boyce Jr., MD   Discharge Provider: Nestor Baxter MD    History of Present Illness: Allyssa Wright is a 38 y.o. female with MDD, recurrent, in partial remission  presented for ECT #52. See H&P dated 2018 for full HPI.     Hospital Course: The pt tolerated the ECT treatment well with minimal complication. Pt was stable post-procedure. See OP note dated 2018 for more details.     Disposition: Home or Self Care    Medications:  Reconciled Home Medications:      Medication List      ASK your doctor about these medications    cloZAPine 50 MG tablet  Commonly known as:  CLOZARIL  Take 50 mg by mouth once daily.     dapsone 7.5 % Glwp  Apply topically once daily.     dextroamphetamine-amphetamine 30 mg Tab  Take 30 mg by mouth 2 (two) times daily.     famciclovir 500 MG tablet  Commonly known as:  FAMVIR  Take 1 tablet (500 mg total) by mouth 2 (two) times daily.     mirtazapine 15 MG tablet  Commonly known as:  REMERON  Take 1 tablet (15 mg total) by mouth every evening.     spironolactone 50 MG tablet  Commonly known as:  ALDACTONE  50 mg 2 (two) times daily. 50  mg qAM, 50 mg qHS.     thyroid (pork) Tab  Commonly known as:  ARMOUR THYROID  Take 1 tablet (30 mg total) by mouth every morning.     traZODone 100 MG tablet  Commonly known as:  DESYREL  Take 200 mg by mouth every evening.     TRINTELLIX 5 mg Tab  Generic drug:  vortioxetine  Take 2.5 mg by mouth once daily.     UNABLE TO FIND  2 (two) times daily. n-azetyl-cysteine     venlafaxine 100 MG Tab  Commonly known as:  EFFEXOR  Take 3 tablets (300 mg total) by mouth once daily.     ZOVIRAX 5 % Crea  Generic drug:  acyclovir 5%              Pt's Status at Discharge: alert and medically stable    Discharge Diagnoses:    MDD, recurrent, in partial remission       Diet: Resume previous outpt diet  Activity: Ambulate with assistance - pt is a fall risk  Instructions: Please do not eat or drink anything after midnight prior to procedure. Please do not drive on day of ECT.     Med Changes:  None      Next ECT:    8/28/18, Tuesday for treatment #53      Nestor Baxter M.D.  PGY 4 [employer]  8/20/2018

## 2018-08-20 NOTE — DISCHARGE INSTRUCTIONS
E.C.T. Home Care Instructions    ACTIVITY LEVEL:    You may feel sleepy for several hours. It is best to rest until you are more awake and then gradually resume your normal activities in one day. It is recommended, because of the possibility of memory loss and slight confusion post ECT, that you rest in the company of a responsible adult. This confusion and memory loss is expected and should diminish over time. It is also recommended that you do not drive or operate any electrical appliances or machinery during the ECT treatment series. Ask your Doctor, at the time of your treatment, any questions you may have about specific activities.    DIET:    You may wish to start with liquids after your ECT treatment and gradually resume your normal diet. Do not consume alcoholic beverages during the ECT treatment series.    BATHING:    You may shower or bathe as desired.    MEDICATIONS:    Resume all home medications starting with the AM doses not taken prior to ECT treatment. If you experience a headache, it is okay to take your usual pain reliever.    WHEN TO CALL THE DOCTOR:     Headache, memory loss or confusion that does not begin to resolve within 24 hours.    RETURN APPOINTMENT: Tuesday August 28, 2018 @ 6am    Remember you may not eat or drink after midnight, but please take heart or high blood pressure medicines with a sip of water in the morning prior to leaving home.    FOR EMERGENCIES:    If any unusual problems or difficulties occur:    Contact the office [phone] Monday-Friday until 3:00 p.m. After hours, call the hospital  [phone] and ask for the Psychiatry Resident On-call.

## 2018-08-21 ENCOUNTER — ANESTHESIA EVENT (OUTPATIENT)
Dept: ELECTROPHYSIOLOGY | Facility: HOSPITAL | Age: 38
End: 2018-08-21

## 2018-08-21 NOTE — TRANSFER OF CARE
"Anesthesia Transfer of Care Note    Patient: Allyssa Wright    Procedure(s) Performed: Procedure(s) (LRB):  ELECTROCONVULSIVE THERAPY (ECT) - SINGLE SEIZURE (N/A)    Patient location: [facility] [place]    Anesthesia Type: general    Transport from OR: Transported from OR on 6-10 L/min O2 by face mask with adequate spontaneous ventilation    Post pain: adequate analgesia    Post assessment: no apparent anesthetic complications    Post vital signs: stable    Level of consciousness: awake and lethargic    Nausea/Vomiting: no nausea/vomiting    Complications: none    Transfer of care protocol was followed      Last vitals:   Visit Vitals  /72 (BP Location: Right arm, Patient Position: Lying)   Pulse 72   Temp 36.8 °C (98.2 °F) (Temporal)   Resp 20   Ht 5' 5" (1.651 m)   Wt 70.3 kg (155 lb)   SpO2 98%   Breastfeeding? No   BMI 25.79 kg/m²     "

## 2018-08-21 NOTE — ANESTHESIA POSTPROCEDURE EVALUATION
"Anesthesia Post Evaluation    Patient: Allyssa Wright    Procedure(s) Performed: Procedure(s) (LRB):  ELECTROCONVULSIVE THERAPY (ECT) - SINGLE SEIZURE (N/A)    Final Anesthesia Type: general  Patient location during evaluation: PACU  Patient participation: Yes- Able to Participate  Level of consciousness: awake and alert and oriented  Post-procedure vital signs: reviewed and stable  Pain management: adequate  Airway patency: patent  PONV status at discharge: No PONV  Anesthetic complications: no      Cardiovascular status: hemodynamically stable  Respiratory status: unassisted  Hydration status: euvolemic  Follow-up not needed.        Visit Vitals  /72 (BP Location: Right arm, Patient Position: Lying)   Pulse 72   Temp 36.8 °C (98.2 °F) (Temporal)   Resp 20   Ht 5' 5" (1.651 m)   Wt 70.3 kg (155 lb)   SpO2 98%   Breastfeeding? No   BMI 25.79 kg/m²       Pain/Roma Score: Pain Assessment Performed: Yes (8/20/2018  8:05 AM)  Presence of Pain: denies (8/20/2018  8:05 AM)  Roma Score: 10 (8/20/2018  7:41 AM)  Modified Roma Score: 20 (8/20/2018  6:27 AM)        "

## 2018-08-27 DIAGNOSIS — F33.9 RECURRENT MAJOR DEPRESSIVE DISORDER, REMISSION STATUS UNSPECIFIED: Primary | ICD-10-CM

## 2018-08-28 ENCOUNTER — TELEPHONE (OUTPATIENT)
Dept: PSYCHIATRY | Facility: CLINIC | Age: 38
End: 2018-08-28

## 2018-08-28 ENCOUNTER — ANESTHESIA (OUTPATIENT)
Dept: ELECTROPHYSIOLOGY | Facility: HOSPITAL | Age: 38
End: 2018-08-28

## 2018-08-28 NOTE — TELEPHONE ENCOUNTER
Returned patient's call. Father answered phone. Patient wanting a few more days to recover prior to having ECT treatment. Requesting to come in Friday. Cancelled treatment on 8/28/ and Put in a new request for 8/31.

## 2018-08-31 ENCOUNTER — HOSPITAL ENCOUNTER (OUTPATIENT)
Facility: HOSPITAL | Age: 38
Discharge: HOME | End: 2018-08-31
Attending: PSYCHIATRY & NEUROLOGY | Admitting: PSYCHIATRY & NEUROLOGY
Payer: COMMERCIAL

## 2018-08-31 ENCOUNTER — ANESTHESIA EVENT (OUTPATIENT)
Dept: ELECTROPHYSIOLOGY | Facility: HOSPITAL | Age: 38
End: 2018-08-31
Payer: COMMERCIAL

## 2018-08-31 ENCOUNTER — ANESTHESIA (OUTPATIENT)
Dept: ELECTROPHYSIOLOGY | Facility: HOSPITAL | Age: 38
End: 2018-08-31
Payer: COMMERCIAL

## 2018-08-31 VITALS
HEART RATE: 82 BPM | WEIGHT: 155 LBS | SYSTOLIC BLOOD PRESSURE: 104 MMHG | RESPIRATION RATE: 16 BRPM | HEIGHT: 65 IN | TEMPERATURE: 98 F | DIASTOLIC BLOOD PRESSURE: 72 MMHG | OXYGEN SATURATION: 98 % | BODY MASS INDEX: 25.83 KG/M2

## 2018-08-31 DIAGNOSIS — F33.41 MDD (MAJOR DEPRESSIVE DISORDER), RECURRENT, IN PARTIAL REMISSION: ICD-10-CM

## 2018-08-31 DIAGNOSIS — F33.9 RECURRENT MAJOR DEPRESSIVE DISORDER, REMISSION STATUS UNSPECIFIED: Primary | ICD-10-CM

## 2018-08-31 LAB
B-HCG UR QL: NEGATIVE
B-HCG UR QL: NEGATIVE
CTP QC/QA: YES
CTP QC/QA: YES

## 2018-08-31 PROCEDURE — 71000044 HC DOSC ROUTINE RECOVERY FIRST HOUR: Performed by: PSYCHIATRY & NEUROLOGY

## 2018-08-31 PROCEDURE — 25000003 PHARM REV CODE 250: Performed by: STUDENT IN AN ORGANIZED HEALTH CARE EDUCATION/TRAINING PROGRAM

## 2018-08-31 PROCEDURE — 37000008 HC ANESTHESIA 1ST 15 MINUTES: Performed by: PSYCHIATRY & NEUROLOGY

## 2018-08-31 PROCEDURE — 25000003 PHARM REV CODE 250: Performed by: PSYCHIATRY & NEUROLOGY

## 2018-08-31 PROCEDURE — 37000009 HC ANESTHESIA EA ADD 15 MINS: Performed by: PSYCHIATRY & NEUROLOGY

## 2018-08-31 PROCEDURE — 90870 ELECTROCONVULSIVE THERAPY: CPT | Mod: ,,, | Performed by: PSYCHIATRY & NEUROLOGY

## 2018-08-31 PROCEDURE — D9220A PRA ANESTHESIA: Mod: ,,, | Performed by: ANESTHESIOLOGY

## 2018-08-31 PROCEDURE — 63600175 PHARM REV CODE 636 W HCPCS: Performed by: STUDENT IN AN ORGANIZED HEALTH CARE EDUCATION/TRAINING PROGRAM

## 2018-08-31 PROCEDURE — 63600175 PHARM REV CODE 636 W HCPCS: Performed by: PSYCHIATRY & NEUROLOGY

## 2018-08-31 PROCEDURE — 81025 URINE PREGNANCY TEST: CPT | Performed by: PSYCHIATRY & NEUROLOGY

## 2018-08-31 PROCEDURE — 90870 ELECTROCONVULSIVE THERAPY: CPT | Performed by: ANESTHESIOLOGY

## 2018-08-31 RX ORDER — SUCCINYLCHOLINE CHLORIDE 20 MG/ML
INJECTION INTRAMUSCULAR; INTRAVENOUS
Status: DISCONTINUED | OUTPATIENT
Start: 2018-08-31 | End: 2018-08-31

## 2018-08-31 RX ORDER — KETOROLAC TROMETHAMINE 30 MG/ML
30 INJECTION, SOLUTION INTRAMUSCULAR; INTRAVENOUS ONCE AS NEEDED
Status: COMPLETED | OUTPATIENT
Start: 2018-08-31 | End: 2018-08-31

## 2018-08-31 RX ORDER — LABETALOL HYDROCHLORIDE 5 MG/ML
INJECTION, SOLUTION INTRAVENOUS
Status: DISCONTINUED
Start: 2018-08-31 | End: 2018-08-31 | Stop reason: HOSPADM

## 2018-08-31 RX ORDER — ONDANSETRON 2 MG/ML
INJECTION INTRAMUSCULAR; INTRAVENOUS
Status: COMPLETED
Start: 2018-08-31 | End: 2018-08-31

## 2018-08-31 RX ORDER — SODIUM CHLORIDE 9 MG/ML
INJECTION, SOLUTION INTRAVENOUS CONTINUOUS
Status: DISCONTINUED | OUTPATIENT
Start: 2018-09-01 | End: 2018-08-31 | Stop reason: HOSPADM

## 2018-08-31 RX ORDER — ONDANSETRON 2 MG/ML
4 INJECTION INTRAMUSCULAR; INTRAVENOUS ONCE AS NEEDED
Status: COMPLETED | OUTPATIENT
Start: 2018-08-31 | End: 2018-08-31

## 2018-08-31 RX ORDER — KETOROLAC TROMETHAMINE 30 MG/ML
INJECTION, SOLUTION INTRAMUSCULAR; INTRAVENOUS
Status: COMPLETED
Start: 2018-08-31 | End: 2018-08-31

## 2018-08-31 RX ORDER — SUCCINYLCHOLINE CHLORIDE 20 MG/ML
INJECTION INTRAMUSCULAR; INTRAVENOUS
Status: COMPLETED
Start: 2018-08-31 | End: 2018-08-31

## 2018-08-31 RX ORDER — LIDOCAINE HYDROCHLORIDE 10 MG/ML
1 INJECTION, SOLUTION EPIDURAL; INFILTRATION; INTRACAUDAL; PERINEURAL ONCE
Status: COMPLETED | OUTPATIENT
Start: 2018-09-01 | End: 2018-08-31

## 2018-08-31 RX ADMIN — KETOROLAC TROMETHAMINE 30 MG: 30 INJECTION, SOLUTION INTRAMUSCULAR at 08:08

## 2018-08-31 RX ADMIN — Medication 500 MG: at 08:08

## 2018-08-31 RX ADMIN — LIDOCAINE HYDROCHLORIDE 10 MG: 10 INJECTION, SOLUTION EPIDURAL; INFILTRATION; INTRACAUDAL at 06:08

## 2018-08-31 RX ADMIN — METHOHEXITAL SODIUM 160 MG: 500 INJECTION, POWDER, LYOPHILIZED, FOR SOLUTION INTRAMUSCULAR; INTRAVENOUS; RECTAL at 08:08

## 2018-08-31 RX ADMIN — ONDANSETRON 4 MG: 2 INJECTION, SOLUTION INTRAMUSCULAR; INTRAVENOUS at 08:08

## 2018-08-31 RX ADMIN — SODIUM CHLORIDE: 0.9 INJECTION, SOLUTION INTRAVENOUS at 08:08

## 2018-08-31 RX ADMIN — SUCCINYLCHOLINE CHLORIDE 120 MG: 20 INJECTION, SOLUTION INTRAMUSCULAR; INTRAVENOUS at 08:08

## 2018-08-31 NOTE — DISCHARGE INSTRUCTIONS
Home Care Instructions E.C.T    ACTIVITY LEVEL:  You may feel sleepy for several hours. It is best to rest until you are more awake and then gradually resume your normal activities in one day. It is recommended, because of the possibility of memory loss and slight confusion post ECT, that you rest in the company of a responsible adult. This confusion and memory loss is expected and should diminish over time. It is also recommended that you do not drive or operate any electrical appliances or machinery during the ECT treatment series. Ask your Doctor, at the time of your treatment, anyquestions you may have about specific activities.    DIET:  You may wish to start with liquids after your ECT treatment and gradually resume your normal diet. Do not consume alcoholic beverages during the ECT treatment series.    BATHING:  You may shower or bathe as desired.    MEDICATIONS:  Resume all home medications starting with the AM doses not taken prior to ECT treatment. If you experience a headache, it is okay to take your usual pain reliever.    WHEN TO CALL THE DOCTOR:   Headache, memory loss or confusion that does not begin to resolve within 24 hours.    RETURN APPOINTMENT:  Report to Day Surgery ([facility]) on (date)_______________________ for (time)_________________. Remember you may not eat or drink after midnight, but please take heart or high blood pressure medicines with a sip of water in the morning prior to leaving home.    FOR EMERGENCIES:  If any unusual problems or difficulties occur, contact the office [phone] Monday-Friday until 3:00 p.m. After hours, call the hospital  [phone] and ask for the Psychiatry Resident On-call.

## 2018-08-31 NOTE — H&P
"Allyssa Wright  : 1980   MRN: [id number]  Date: 2018     Psychiatric - ECT  H&P     Chief complaint: MDD recurrent in partial remission     Procedure: ECT #53    SUBJECTIVE:   HPI:   Allyssa Wright is a 38 y.o. female withMDD recurrent in partial remission who presents for ECT #53.    The patient states that her mood is "okay" this morning. She does endorse depression for the past two days, and expresses concern about the timing between her ECT treatments since she was initially scheduled for . She denies any recent medication changes. She denies SI/HI/AVH. She denies any physical complaints this morning. She denies any significant medication side effects.    The pt has not had anything by mouth since midnight, and is ready for ECT.    Denies lightheadedness, headache, changes in vision, chest pain, shortness of breath, abdominal pain, nausea, vomiting, diarrhea, constipation, dysuria, hematuria, hematochezia, numbness and weakness.       Psychiatric Review of Systems:  Mood: "Okay"  Appetite: No problem  Psychomotor: No problem  Cognitive Impairment: Mild  Insomnia: None  Psychosis: None  Diurnal Variation: None  Suicidal Ideation: Absent      Medical Review Of Systems:  Pertinent items are noted in HPI.    Current Medications:   No current facility-administered medications on file prior to encounter.      Current Outpatient Medications on File Prior to Encounter   Medication Sig Dispense Refill    clozapine (CLOZARIL) 50 MG tablet Take 50 mg by mouth once daily.       dapsone 7.5 % GlwP Apply topically once daily.      famciclovir (FAMVIR) 500 MG tablet Take 1 tablet (500 mg total) by mouth 2 (two) times daily. 30 tablet 12    mirtazapine (REMERON) 15 MG tablet Take 1 tablet (15 mg total) by mouth every evening. (Patient taking differently: Take 7.5 mg by mouth every evening. ) 90 tablet 0    spironolactone (ALDACTONE) 50 MG tablet 50 mg 2 (two) times daily. 50  mg qAM, 50 mg qHS.      " thyroid (ARMOUR THYROID) 30 mg Tab Take 1 tablet (30 mg total) by mouth every morning. 30 tablet 11    trazodone (DESYREL) 100 MG tablet Take 200 mg by mouth every evening.       UNABLE TO FIND 2 (two) times daily. n-azetyl-cysteine      venlafaxine (EFFEXOR) 100 MG Tab Take 3 tablets (300 mg total) by mouth once daily. (Patient taking differently: Take 225 mg by mouth once daily. )      vortioxetine (TRINTELLIX) 5 mg Tab Take 2.5 mg by mouth once daily.      dextroamphetamine-amphetamine 30 mg Tab Take 30 mg by mouth 2 (two) times daily.       ZOVIRAX 5 % Crea   5            Allergies:   Review of patient's allergies indicates:   Allergen Reactions    Benzodiazepines Other (See Comments)     Contraindicated while taking Clozapine    Ampicillin      Mom says so    Erythromycin     Levaquin [levofloxacin] Other (See Comments)     Depression side effects    Penicillins      Mom says so    Pristiq [desvenlafaxine]      psycotic      Sulfa (sulfonamide antibiotics)      Rash      Azithromycin Anxiety        Past Medical/Surgical History:   Past Medical History:   Diagnosis Date    Anxiety     Depression     History of psychiatric hospitalization     HSV-1 (herpes simplex virus 1) infection      of psychiatric care     Moderate depressed bipolar II disorder 06/13/2016    reports no history of bipolar    Obsessive-compulsive disorder     Psychiatric problem     Schizophrenia 4/3/2018    Self-harming behavior     Therapy      Past Surgical History:   Procedure Laterality Date    ANKLE SURGERY Right     BREAST augmentation      OVARIAN CYST REMOVAL Bilateral           OBJECTIVE:   Vitals (pre-procedure):  Vitals:    08/31/18 0617   BP: (!) 110/57   Pulse: 79   Resp: 16   Temp: 97.9 °F (36.6 °C)        Labs/Imaging/Studies:   Recent Results (from the past 48 hour(s))   POCT urine pregnancy    Collection Time: 08/31/18  5:56 AM   Result Value Ref Range    POC Preg Test, Ur Negative Negative     " Acceptable Yes    POCT urine pregnancy    Collection Time: 08/31/18  6:22 AM   Result Value Ref Range    POC Preg Test, Ur Negative Negative     Acceptable Yes       No results found for: PHENYTOIN, PHENOBARB, VALPROATE, CBMZ      Physical Exam:   General: well nourished  Orientation: oriented to person, place, and time  Mental Status: alert and responsive  HEENT: normocephalic, no lesions or obvious abnormality  Chest: breath sounds clear and equal bilaterally  Heart: RRR. No murmurs  Abdomen: soft, non-, no palpable masses, +BS  Neuro: CN II-XII grossly intact     Mental Status Exam:   Appearance: age appropriate, well nourished, dressed in hospital gown  Behavior: friendly and cooperative, eye contact appropriate  Speech: normal tone, normal rate, normal pitch, normal volume  Mood: "okay"  Affect: full, reactive  Thought Process: linear and organized  Thought Content: no SI, no HI or AVH  Cognition: grossly intact  Insight: good  Judgment: appropriate for setting     ASSESSMENT/PLAN:   Allyssa Wright is a 38 y.o. female with MDD recurrent in partial remission who presents for ECT treatment #53.     Recommendations:   Proceed with ECT #53.    Sunita Angulo DO  [facility] Psychiatry, PGY-2  8/31/2018  "

## 2018-08-31 NOTE — DISCHARGE SUMMARY
Allyssa Wright  : 1980   MRN: [id number]  Date: 2018       Psychiatry - ECT  Discharge Summary    Admit Date: 2018  5:42 AM  Discharge Date: 2018    Attending Physician: Ruel Benson MD  Discharge Provider: Sunita Angulo DO    History of Present Illness: Allyssa Wright is a 38 y.o. female with MDD, recurrent, in partial remission  presented for ECT #53. See H&P dated 2018 for full HPI.     Hospital Course: The pt tolerated the ECT treatment well with minimal complication. Pt was stable post-procedure. See OP note dated 2018 for more details.     Disposition: Home or Self Care    Medications:  Reconciled Home Medications:      Medication List      ASK your doctor about these medications    cloZAPine 50 MG tablet  Commonly known as:  CLOZARIL  Take 50 mg by mouth once daily.     dapsone 7.5 % Glwp  Apply topically once daily.     dextroamphetamine-amphetamine 30 mg Tab  Take 30 mg by mouth 2 (two) times daily.     famciclovir 500 MG tablet  Commonly known as:  FAMVIR  Take 1 tablet (500 mg total) by mouth 2 (two) times daily.     mirtazapine 15 MG tablet  Commonly known as:  REMERON  Take 1 tablet (15 mg total) by mouth every evening.     spironolactone 50 MG tablet  Commonly known as:  ALDACTONE  50 mg 2 (two) times daily. 50  mg qAM, 50 mg qHS.     thyroid (pork) Tab  Commonly known as:  ARMOUR THYROID  Take 1 tablet (30 mg total) by mouth every morning.     traZODone 100 MG tablet  Commonly known as:  DESYREL  Take 200 mg by mouth every evening.     TRINTELLIX 5 mg Tab  Generic drug:  vortioxetine  Take 2.5 mg by mouth once daily.     UNABLE TO FIND  2 (two) times daily. n-azetyl-cysteine     venlafaxine 100 MG Tab  Commonly known as:  EFFEXOR  Take 3 tablets (300 mg total) by mouth once daily.     ZOVIRAX 5 % Crea  Generic drug:  acyclovir 5%              Pt's Status at Discharge: alert and medically stable    Discharge Diagnoses:    Major Depressive Disorder, recurrent, in  partial remission      Diet: Resume previous outpt diet  Activity: Ambulate with assistance - pt is a fall risk  Instructions: Please do not eat or drink anything after midnight prior to procedure. Please do not drive on day of ECT.     Med Changes:  None    Next ECT:    9/6/18, Thursday for treatment #54      Sunita Angulo, DO  PGY 2 [employer] [facility]  8/31/2018         STAFF NOTE:  I agree with above Discharge Summary as noted by Dr. Angulo.        IMP:  Major Depressive Disorder, Recurrent, In partial remission     REC:  Patient is to follow up with next ECT to be scheduled 9/6/18 as noted.    Pt or family members were urged to call our ECT coordinator prn in meantime for any questions or concerns.        Ruel Benson MD  Psychiatry Staff   [facility]

## 2018-08-31 NOTE — ANESTHESIA POSTPROCEDURE EVALUATION
"Anesthesia Post Evaluation    Patient: Allyssa Wright    Procedure(s) Performed: Procedure(s) (LRB):  ELECTROCONVULSIVE THERAPY (ECT) - SINGLE SEIZURE (N/A)    Final Anesthesia Type: general  Patient location during evaluation: PACU  Patient participation: Yes- Able to Participate  Level of consciousness: awake and alert, oriented and awake  Post-procedure vital signs: reviewed and stable  Pain management: adequate  Airway patency: patent  PONV status at discharge: No PONV  Anesthetic complications: no      Cardiovascular status: blood pressure returned to baseline and stable  Respiratory status: unassisted, spontaneous ventilation and room air  Hydration status: euvolemic  Follow-up not needed.        Visit Vitals  BP (!) 110/57 (BP Location: Left arm, Patient Position: Lying)   Pulse 91   Temp 37 °C (98.6 °F) (Temporal)   Resp 16   Ht 5' 5" (1.651 m)   Wt 70.3 kg (155 lb)   SpO2 97%   Breastfeeding? No   BMI 25.79 kg/m²       Pain/Roma Score: Pain Assessment Performed: Yes (8/31/2018  8:55 AM)  Presence of Pain: denies (8/31/2018  6:19 AM)        "

## 2018-08-31 NOTE — ANESTHESIA RELEASE NOTE
"Anesthesia Release from PACU Note    Patient: Allyssa Wright    Procedure(s) Performed: Procedure(s) (LRB):  ELECTROCONVULSIVE THERAPY (ECT) - SINGLE SEIZURE (N/A)    Anesthesia type: general    Post pain: Adequate analgesia    Post assessment: no apparent anesthetic complications, tolerated procedure well and no evidence of recall    Last Vitals:   Visit Vitals  BP (!) 110/57 (BP Location: Left arm, Patient Position: Lying)   Pulse 91   Temp 37 °C (98.6 °F) (Temporal)   Resp 16   Ht 5' 5" (1.651 m)   Wt 70.3 kg (155 lb)   SpO2 97%   Breastfeeding? No   BMI 25.79 kg/m²       Post vital signs: stable    Level of consciousness: awake, alert  and oriented    Nausea/Vomiting: no nausea/no vomiting    Complications: none    Airway Patency: patent    Respiratory: unassisted, spontaneous ventilation, room air    Cardiovascular: stable and blood pressure at baseline    Hydration: euvolemic  "

## 2018-08-31 NOTE — ANESTHESIA PREPROCEDURE EVALUATION
2018    Pre-operative evaluation for ELECTROCONVULSIVE THERAPY (ECT) - SINGLE SEIZURE (N/A)    Allyssa Wright is a 38 y.o. female with a pmhx of MDD, HSV 1 and hypothyroidism who is presenting for ECT #54.     Previous Medications:  Methohexital 160mg  Succinylcholine 120mg  Labetalol 10mg  ketoralac 30mg  zofran 4mg     Past Surgical History:   Procedure Laterality Date    ANKLE SURGERY Right     BREAST augmentation      OVARIAN CYST REMOVAL Bilateral      Vital Signs Range (Last 24H):  BP: ()/()   Arterial Line BP: ()/()       CBC:   No results for input(s): WBC, RBC, HGB, HCT, PLT, MCV, MCH, MCHC in the last 720 hours.    CMP: No results for input(s): NA, K, CL, CO2, BUN, CREATININE, GLU, MG, PHOS, CALCIUM, ALBUMIN, PROT, ALKPHOS, ALT, AST, BILITOT in the last 720 hours.    INR:  No results for input(s): PT, INR, PROTIME, APTT in the last 720 hours.      Diagnostic Studies:      EKD Echo:      Anesthesia Evaluation    I have reviewed the Patient Summary Reports.    I have reviewed the Nursing Notes.   I have reviewed the Medications.     Review of Systems  Anesthesia Hx:  No problems with previous Anesthesia  Denies Family Hx of Anesthesia complications.   Denies Personal Hx of Anesthesia complications.   Social:  Former Smoker, Alcohol Use    Hematology/Oncology:  Hematology Normal   Oncology Normal     EENT/Dental:EENT/Dental Normal   Cardiovascular:  Cardiovascular Normal                     Pulmonary:  Pulmonary Normal  Denies Asthma.  Denies Shortness of breath.    Renal/:  Renal/ Normal     Hepatic/GI:  Hepatic/GI Normal    Neurological:   Denies TIA. Denies CVA.    Endocrine:  Endocrine Normal    Psych:   Psychiatric History depression          Physical Exam  General:  Well nourished    Airway/Jaw/Neck:  Airway Findings: Mouth Opening: Normal Tongue: Normal  General Airway  Assessment: Adult, Good  Mallampati: I  TM Distance: Normal, at least 6 cm  Jaw/Neck Findings:  Neck ROM: Normal ROM      Dental:  Dental Findings: In tact   Chest/Lungs:  Chest/Lungs Clear    Heart/Vascular:  Heart Findings: Normal    Abdomen:  Abdomen Findings: Normal      Mental Status:  Mental Status Findings:  Cooperative, Alert and Oriented         Anesthesia Plan  Type of Anesthesia, risks & benefits discussed:  Anesthesia Type:  general  Patient's Preference:   Intra-op Monitoring Plan: standard ASA monitors  Intra-op Monitoring Plan Comments:   Post Op Pain Control Plan: per primary service following discharge from PACU  Post Op Pain Control Plan Comments:   Induction:   IV  Beta Blocker:  Patient is not currently on a Beta-Blocker (No further documentation required).       Informed Consent: Patient understands risks and agrees with Anesthesia plan.  Questions answered. Anesthesia consent signed with patient.  ASA Score: 2     Day of Surgery Review of History & Physical:    H&P update referred to the provider.         Ready For Surgery From Anesthesia Perspective.

## 2018-08-31 NOTE — ANESTHESIA PROCEDURE NOTES
ECT    Treatment Number: 54  Procedure start time: 8/31/2018 8:45 AM  Timeout performed at: 8/31/2018 8:45 AM  Procedure end time: 8/31/2018 8:59 AM    Staffing  Anesthesiologist: Sheela Keith MD  CRNA/Resident: Tamir Burger MD  Performed by: resident/CRNA     Preanesthetic Checklist  The following were completed as part of the preanesthetic checklist: patient identified, procedural consent, pre-op evaluation, timeout performed, risks and benefits discussed, monitors and equipment checked, anesthesia consent given, oxygen available, suction available, hand hygiene performed and patient being monitored.    Setup & Induction  Patient Monitoring: heart rate, cardiac monitor, continuous pulse ox, NIBP, continuous capnometry and gas analyzer  Patient preparation: bite block inserted, mandibular stabilization, patient hyperventilated and extremities padded  Electrode Placement: Bitemporal    The patient was moved to the ECT therapy room after being assessed and consented for ECT. After standard ASA monitors were applied and timeout performed, the patient was adequately preoxygenated. After induction of general anesthesia, adequate oxygenation and ventilation were confirmed with pulse oxymetry and end tidal CO2 monitoring via bag-mask ventilation. End tidal CO2 was monitored throughout the case and moderate hyperventilation was performed prior to beginning ECT treatment. All extremities were padded, biteblock was inserted, and mandibular stabilization was done prior to initiating ECT therapy.    Procedure  Stimulus Number 1: Setting Number = II; 576 millicoulombs; Seizure Duration = 59 seconds            Recovery  After adequate recovery from general anesthesia, the patient was transported to recovery.  Baseline Blood Pressure: 127/61  Peak Blood Pressure: 159/101  Time to Recovery of Respirations: 7 minutes    ECT Findings  ECT associated findings of: Moderate Hypertension (SBP >160 or DBP  >100)

## 2018-09-05 ENCOUNTER — ANESTHESIA EVENT (OUTPATIENT)
Dept: ELECTROPHYSIOLOGY | Facility: HOSPITAL | Age: 38
End: 2018-09-05
Payer: COMMERCIAL

## 2018-09-05 NOTE — ANESTHESIA PREPROCEDURE EVALUATION
[facility]  Anesthesia Pre-Operative Evaluation         Patient Name: Allyssa Wright  YOB: 1980  MRN: [id number]    SUBJECTIVE:     Pre-operative evaluation for Procedure(s) (LRB):  ELECTROCONVULSIVE THERAPY (ECT) - SINGLE SEIZURE (N/A)  Scheduled for 9/6/2018    HPI 09/05/2018:  Allyssa Wright is a 38 y.o. female with hx of MDD, HSV1, and hypothyroidism presenting for ECT.    ECT#: 55    Previous # stimuli: 1    Previous meds:  Methohexital 160mg  Succinylcholine 120mg  Labetalol 0mg  Ketorolac 30mg  Ondansetron 4mg    Prev airway:   No prior records in Epic.    Oxygen/Ventilation Requirements:  On room air    Current Drips:  None documented.      Patient Active Problem List   Diagnosis    MDD (major depressive disorder), recurrent, in partial remission    Major depressive disorder, recurrent, in partial remission    MDD (major depressive disorder), severe    Major depressive disorder, recurrent    Other acne    Major depression, recurrent, chronic    MDD (major depressive disorder), recurrent episode, moderate    Major depressive disorder, recurrent severe without psychotic features    MDD (major depressive disorder), recurrent episode, severe    MDD (major depressive disorder)    Major depressive disorder, recurrent episode, in partial remission    Schizophrenia    Depression    Comfort measures only status    Major depression, recurrent    MDD (major depressive disorder), recurrent episode, mild    Major depression       Review of patient's allergies indicates:   Allergen Reactions    Benzodiazepines Other (See Comments)     Contraindicated while taking Clozapine    Ampicillin      Mom says so    Erythromycin     Levaquin [levofloxacin] Other (See Comments)     Depression side effects    Penicillins      Mom says so    Pristiq [desvenlafaxine]      psycotic      Sulfa (sulfonamide antibiotics)      Rash      Azithromycin Anxiety       Outpatient  Medications:  No current facility-administered medications on file prior to encounter.      Current Outpatient Medications on File Prior to Encounter   Medication Sig Dispense Refill    clozapine (CLOZARIL) 50 MG tablet Take 50 mg by mouth once daily.       dapsone 7.5 % GlwP Apply topically once daily.      dextroamphetamine-amphetamine 30 mg Tab Take 30 mg by mouth 2 (two) times daily.       famciclovir (FAMVIR) 500 MG tablet Take 1 tablet (500 mg total) by mouth 2 (two) times daily. 30 tablet 12    mirtazapine (REMERON) 15 MG tablet Take 1 tablet (15 mg total) by mouth every evening. (Patient taking differently: Take 7.5 mg by mouth every evening. ) 90 tablet 0    spironolactone (ALDACTONE) 50 MG tablet 50 mg 2 (two) times daily. 50  mg qAM, 50 mg qHS.      thyroid (ARMOUR THYROID) 30 mg Tab Take 1 tablet (30 mg total) by mouth every morning. 30 tablet 11    trazodone (DESYREL) 100 MG tablet Take 200 mg by mouth every evening.       UNABLE TO FIND 2 (two) times daily. n-azetyl-cysteine      venlafaxine (EFFEXOR) 100 MG Tab Take 3 tablets (300 mg total) by mouth once daily. (Patient taking differently: Take 225 mg by mouth once daily. )      vortioxetine (TRINTELLIX) 5 mg Tab Take 2.5 mg by mouth once daily.      ZOVIRAX 5 % Crea   5        Current Inpatient Medications:      Past Surgical History:   Procedure Laterality Date    ANKLE SURGERY Right     BREAST augmentation      OVARIAN CYST REMOVAL Bilateral        Social History     Socioeconomic History    Marital status: Single     Spouse name: Not on file    Number of children: Not on file    Years of education: Not on file    Highest education level: Not on file   Social Needs    Financial resource strain: Not on file    Food insecurity - worry: Not on file    Food insecurity - inability: Not on file    Transportation needs - medical: Not on file    Transportation needs - non-medical: Not on file   Occupational History    Occupation:  unemployed   Tobacco Use    Smoking status: Former Smoker     Last attempt to quit: 2011     Years since quittin.4    Smokeless tobacco: Never Used   Substance and Sexual Activity    Alcohol use: Yes     Comment: rarely    Drug use: No     Comment: Experimental use in high school    Sexual activity: Not on file   Other Topics Concern    Patient feels they ought to cut down on drinking/drug use Not Asked    Patient annoyed by others criticizing their drinking/drug use Not Asked    Patient has felt bad or guilty about drinking/drug use Not Asked    Patient has had a drink/used drugs as an eye opener in the AM Not Asked   Social History Narrative    Pt has 1 older brother from an intact family until the death of her mother in .  She completed 1 year of college, was never in the , and has never been employed.  She was engaged once, but never , has no children, and lives with her father plus 2 dogs.  She denies any hobbies and is spiritual but not Rastafari.  She does date and returns to college during rare periods when depression and anxiety [place].       OBJECTIVE:     Weight:  Wt Readings from Last 4 Encounters:   18 70.3 kg (155 lb)   18 70.3 kg (155 lb)   18 70.3 kg (155 lb)   18 70.3 kg (155 lb)       Vital Signs Range (Last 24H):  BP: ()/()   Arterial Line BP: ()/()       CBC:   Lab Results   Component Value Date    WBC 6.83 2016    HGB 13.3 2016    HCT 40.3 2016    MCV 92 2016     2016       CMP:     Chemistry        Component Value Date/Time     2016 1542    K 3.8 2016 1542     2016 1542    CO2 28 2016 1542    BUN 10 2016 1542    CREATININE 0.7 2016 1542    CREATININE 0.6 2012 1326    GLU 81 2016 1542        Component Value Date/Time    CALCIUM 9.5 2016 1542    CALCIUM 9.6 2012 1326    ALKPHOS 28 (L) 2016 1542    ALKPHOS 31 2012 1326     AST 20 2016 1542    AST 22 2012 1326    ALT 14 2016 1542    BILITOT 0.4 2016 1542    ESTGFRAFRICA >60.0 2016 1542    EGFRNONAA >60.0 2016 1542            INR:  No results found for: INR, PROTIME    Diagnostic Studies:  none    EK2016  Vent. Rate : 084 BPM     Atrial Rate : 084 BPM     P-R Int : 154 ms          QRS Dur : 086 ms      QT Int : 378 ms       P-R-T Axes : 067 066 055 degrees     QTc Int : 446 ms    Normal sinus rhythm  Normal ECG  When compared with ECG of 03-DEC-2015 11:38,  Questionable change in The axis  T wave inversion no longer evident in Inferior leads     2D Echo:  No results found for this or any previous visit.      ASSESSMENT/PLAN:         Anesthesia Evaluation    I have reviewed the Patient Summary Reports.    I have reviewed the Nursing Notes.   I have reviewed the Medications.     Review of Systems  Anesthesia Hx:  No problems with previous Anesthesia  Denies Family Hx of Anesthesia complications.   Denies Personal Hx of Anesthesia complications.   Social:  Former Smoker, Alcohol Use    Hematology/Oncology:  Hematology Normal   Oncology Normal     EENT/Dental:EENT/Dental Normal   Cardiovascular:  Cardiovascular Normal                     Pulmonary:  Pulmonary Normal  Denies Asthma.  Denies Shortness of breath.    Renal/:  Renal/ Normal     Hepatic/GI:  Hepatic/GI Normal    Neurological:   Denies TIA. Denies CVA.    Endocrine:  Endocrine Normal    Psych:   Psychiatric History depression          Physical Exam  General:  Well nourished    Airway/Jaw/Neck:  Airway Findings: Mouth Opening: Normal Tongue: Normal  General Airway Assessment: Adult, Good  Mallampati: I  TM Distance: Normal, at least 6 cm  Jaw/Neck Findings:  Neck ROM: Normal ROM      Dental:  Dental Findings: In tact   Chest/Lungs:  Chest/Lungs Clear    Heart/Vascular:  Heart Findings: Normal    Abdomen:  Abdomen Findings: Normal      Mental Status:  Mental Status Findings:   Cooperative, Alert and Oriented         Anesthesia Plan  Type of Anesthesia, risks & benefits discussed:  Anesthesia Type:  general  Patient's Preference:   Intra-op Monitoring Plan: standard ASA monitors  Intra-op Monitoring Plan Comments:   Post Op Pain Control Plan: per primary service following discharge from PACU  Post Op Pain Control Plan Comments:   Induction:   IV  Beta Blocker:  Patient is not currently on a Beta-Blocker (No further documentation required).       Informed Consent: Patient understands risks and agrees with Anesthesia plan.  Questions answered. Anesthesia consent signed with patient.  ASA Score: 2     Day of Surgery Review of History & Physical:    H&P update referred to the provider.         Ready For Surgery From Anesthesia Perspective.

## 2018-09-06 ENCOUNTER — HOSPITAL ENCOUNTER (OUTPATIENT)
Facility: HOSPITAL | Age: 38
Discharge: HOME | End: 2018-09-06
Attending: PSYCHIATRY & NEUROLOGY | Admitting: PSYCHIATRY & NEUROLOGY
Payer: COMMERCIAL

## 2018-09-06 ENCOUNTER — ANESTHESIA (OUTPATIENT)
Dept: ELECTROPHYSIOLOGY | Facility: HOSPITAL | Age: 38
End: 2018-09-06
Payer: COMMERCIAL

## 2018-09-06 VITALS
OXYGEN SATURATION: 100 % | TEMPERATURE: 98 F | SYSTOLIC BLOOD PRESSURE: 143 MMHG | HEIGHT: 65 IN | BODY MASS INDEX: 25.83 KG/M2 | RESPIRATION RATE: 16 BRPM | DIASTOLIC BLOOD PRESSURE: 69 MMHG | WEIGHT: 155 LBS | HEART RATE: 86 BPM

## 2018-09-06 DIAGNOSIS — F32.A DEPRESSION: ICD-10-CM

## 2018-09-06 DIAGNOSIS — F33.41 MDD (MAJOR DEPRESSIVE DISORDER), RECURRENT, IN PARTIAL REMISSION: ICD-10-CM

## 2018-09-06 PROCEDURE — 25000003 PHARM REV CODE 250: Performed by: STUDENT IN AN ORGANIZED HEALTH CARE EDUCATION/TRAINING PROGRAM

## 2018-09-06 PROCEDURE — 90870 ELECTROCONVULSIVE THERAPY: CPT | Mod: ,,, | Performed by: PSYCHIATRY & NEUROLOGY

## 2018-09-06 PROCEDURE — 90870 ELECTROCONVULSIVE THERAPY: CPT | Performed by: ANESTHESIOLOGY

## 2018-09-06 PROCEDURE — D9220A PRA ANESTHESIA: Mod: ,,, | Performed by: ANESTHESIOLOGY

## 2018-09-06 PROCEDURE — 63600175 PHARM REV CODE 636 W HCPCS: Performed by: STUDENT IN AN ORGANIZED HEALTH CARE EDUCATION/TRAINING PROGRAM

## 2018-09-06 RX ORDER — KETOROLAC TROMETHAMINE 30 MG/ML
INJECTION, SOLUTION INTRAMUSCULAR; INTRAVENOUS
Status: COMPLETED
Start: 2018-09-06 | End: 2018-09-06

## 2018-09-06 RX ORDER — ESMOLOL HYDROCHLORIDE 10 MG/ML
INJECTION INTRAVENOUS
Status: DISCONTINUED
Start: 2018-09-06 | End: 2018-09-06 | Stop reason: HOSPADM

## 2018-09-06 RX ORDER — KETOROLAC TROMETHAMINE 30 MG/ML
INJECTION, SOLUTION INTRAMUSCULAR; INTRAVENOUS
Status: DISCONTINUED | OUTPATIENT
Start: 2018-09-06 | End: 2018-09-06

## 2018-09-06 RX ORDER — SODIUM CHLORIDE 9 MG/ML
500 INJECTION, SOLUTION INTRAVENOUS ONCE
Status: COMPLETED | OUTPATIENT
Start: 2018-09-06 | End: 2018-09-06

## 2018-09-06 RX ORDER — SODIUM CHLORIDE 9 MG/ML
500 INJECTION, SOLUTION INTRAVENOUS ONCE
Status: DISCONTINUED | OUTPATIENT
Start: 2018-09-06 | End: 2018-09-06 | Stop reason: HOSPADM

## 2018-09-06 RX ORDER — ONDANSETRON 2 MG/ML
INJECTION INTRAMUSCULAR; INTRAVENOUS
Status: COMPLETED
Start: 2018-09-06 | End: 2018-09-06

## 2018-09-06 RX ORDER — SUCCINYLCHOLINE CHLORIDE 20 MG/ML
INJECTION INTRAMUSCULAR; INTRAVENOUS
Status: DISCONTINUED | OUTPATIENT
Start: 2018-09-06 | End: 2018-09-06

## 2018-09-06 RX ORDER — SODIUM CHLORIDE 0.9 % (FLUSH) 0.9 %
3 SYRINGE (ML) INJECTION
Status: DISCONTINUED | OUTPATIENT
Start: 2018-09-06 | End: 2018-09-06 | Stop reason: HOSPADM

## 2018-09-06 RX ORDER — LABETALOL HYDROCHLORIDE 5 MG/ML
INJECTION, SOLUTION INTRAVENOUS
Status: DISCONTINUED
Start: 2018-09-06 | End: 2018-09-06 | Stop reason: HOSPADM

## 2018-09-06 RX ORDER — ONDANSETRON 2 MG/ML
INJECTION INTRAMUSCULAR; INTRAVENOUS
Status: DISCONTINUED | OUTPATIENT
Start: 2018-09-06 | End: 2018-09-06

## 2018-09-06 RX ORDER — LIDOCAINE HYDROCHLORIDE 10 MG/ML
1 INJECTION, SOLUTION EPIDURAL; INFILTRATION; INTRACAUDAL; PERINEURAL ONCE
Status: COMPLETED | OUTPATIENT
Start: 2018-09-06 | End: 2018-09-06

## 2018-09-06 RX ORDER — SUCCINYLCHOLINE CHLORIDE 20 MG/ML
INJECTION INTRAMUSCULAR; INTRAVENOUS
Status: COMPLETED
Start: 2018-09-06 | End: 2018-09-06

## 2018-09-06 RX ADMIN — KETOROLAC TROMETHAMINE 30 MG: 30 INJECTION, SOLUTION INTRAMUSCULAR at 07:09

## 2018-09-06 RX ADMIN — SODIUM CHLORIDE 500 ML: 0.9 INJECTION, SOLUTION INTRAVENOUS at 06:09

## 2018-09-06 RX ADMIN — ONDANSETRON 4 MG: 2 INJECTION, SOLUTION INTRAMUSCULAR; INTRAVENOUS at 07:09

## 2018-09-06 RX ADMIN — Medication 500 MG: at 06:09

## 2018-09-06 RX ADMIN — METHOHEXITAL SODIUM 160 MG: 500 INJECTION, POWDER, LYOPHILIZED, FOR SOLUTION INTRAMUSCULAR; INTRAVENOUS; RECTAL at 07:09

## 2018-09-06 RX ADMIN — LIDOCAINE HYDROCHLORIDE 0.1 MG: 10 INJECTION, SOLUTION EPIDURAL; INFILTRATION; INTRACAUDAL at 06:09

## 2018-09-06 RX ADMIN — SUCCINYLCHOLINE CHLORIDE 120 MG: 20 INJECTION, SOLUTION INTRAMUSCULAR; INTRAVENOUS at 07:09

## 2018-09-06 NOTE — DISCHARGE SUMMARY
Allyssa Wright  : 1980   MRN: [id number]  Date: 2018       Psychiatry - ECT  Discharge Summary    Admit Date: 2018  5:50 AM  Discharge Date: 2018    Attending Physician: Shoaib Boyce MD  Discharge Provider: Divina Hickman MD    History of Present Illness: Allyssa Wright is a 38 y.o. female with MDD, recurrent, in partial remission  presented for ECT #54. See H&P dated 2018 for full HPI.     Hospital Course: The pt tolerated the ECT treatment well with minimal complication. Pt was stable post-procedure. See OP note dated 2018 for more details.     Disposition: Home or Self Care    Medications:  Reconciled Home Medications:      Medication List      ASK your doctor about these medications    cloZAPine 50 MG tablet  Commonly known as:  CLOZARIL  Take 50 mg by mouth once daily.     dapsone 7.5 % Glwp  Apply topically once daily.     dextroamphetamine-amphetamine 30 mg Tab  Take 30 mg by mouth 2 (two) times daily.     famciclovir 500 MG tablet  Commonly known as:  FAMVIR  Take 1 tablet (500 mg total) by mouth 2 (two) times daily.     mirtazapine 15 MG tablet  Commonly known as:  REMERON  Take 1 tablet (15 mg total) by mouth every evening.     spironolactone 50 MG tablet  Commonly known as:  ALDACTONE  50 mg 2 (two) times daily. 50  mg qAM, 50 mg qHS.     thyroid (pork) Tab  Commonly known as:  ARMOUR THYROID  Take 1 tablet (30 mg total) by mouth every morning.     traZODone 100 MG tablet  Commonly known as:  DESYREL  Take 200 mg by mouth every evening.     TRINTELLIX 5 mg Tab  Generic drug:  vortioxetine  Take 2.5 mg by mouth once daily.     UNABLE TO FIND  2 (two) times daily. n-azetyl-cysteine     venlafaxine 100 MG Tab  Commonly known as:  EFFEXOR  Take 3 tablets (300 mg total) by mouth once daily.     ZOVIRAX 5 % Crea  Generic drug:  acyclovir 5%              Pt's Status at Discharge: alert and medically stable    Discharge Diagnoses:    Major Depressive Disorder, recurrent, in  partial remission      Diet: Resume previous outpt diet  Activity: Ambulate with assistance - pt is a fall risk  Instructions: Please do not eat or drink anything after midnight prior to procedure. Please do not drive on day of ECT.     Med Changes:  None    Next ECT:  Friday, 9/14/18, for treatment #55      Divina Hickman MD  PGY 4 [employer] Psychiatry  9/6/2018

## 2018-09-06 NOTE — ANESTHESIA PROCEDURE NOTES
ECT    Treatment Number: 54  Procedure start time: 9/6/2018 7:20 AM  Timeout performed at: 9/6/2018 7:15 AM  Procedure end time: 9/6/2018 7:30 AM    Staffing  Anesthesiologist: Armin Marquez MD  CRNA/Resident: Jensen Ralph MD  Performed by: resident/CRNA     Preanesthetic Checklist  The following were completed as part of the preanesthetic checklist: patient identified, procedural consent, pre-op evaluation, timeout performed, risks and benefits discussed, monitors and equipment checked, anesthesia consent given, oxygen available, suction available, hand hygiene performed and patient being monitored.    Setup & Induction  Patient Monitoring: heart rate, continuous pulse ox, continuous capnometry, NIBP, gas analyzer and cardiac monitor  Patient preparation: bite block inserted, extremities padded, mandibular stabilization and patient hyperventilated  Electrode Placement: Bitemporal    The patient was moved to the ECT therapy room after being assessed and consented for ECT. After standard ASA monitors were applied and timeout performed, the patient was adequately preoxygenated. After induction of general anesthesia, adequate oxygenation and ventilation were confirmed with pulse oxymetry and end tidal CO2 monitoring via bag-mask ventilation. End tidal CO2 was monitored throughout the case and moderate hyperventilation was performed prior to beginning ECT treatment. All extremities were padded, biteblock was inserted, and mandibular stabilization was done prior to initiating ECT therapy.    Procedure  Stimulus Number 1: Setting Number = III; 528 millicoulombs; Seizure Duration = 75 seconds            Recovery  After adequate recovery from general anesthesia, the patient was transported to recovery.  Baseline Blood Pressure: 124/80  Peak Blood Pressure: 188/88  Time to Recovery of Respirations: 5 minutes    ECT Findings  ECT associated findings of: Moderate Hypertension (SBP >160 or DBP >100)

## 2018-09-06 NOTE — TRANSFER OF CARE
"Anesthesia Transfer of Care Note    Patient: Allyssa Wright    Procedure(s) Performed: Procedure(s) (LRB):  ELECTROCONVULSIVE THERAPY (ECT) - SINGLE SEIZURE (N/A)    Patient location: [facility]    Anesthesia Type: general    Transport from OR: Transported from OR on 6-10 L/min O2 by face mask with adequate spontaneous ventilation    Post pain: adequate analgesia    Post assessment: no apparent anesthetic complications    Post vital signs: stable    Level of consciousness: sedated    Nausea/Vomiting: no nausea/vomiting    Complications: none    Transfer of care protocol was followed      Last vitals:   Visit Vitals  /79 (BP Location: Right arm, Patient Position: Lying)   Pulse 90   Temp 36.7 °C (98.1 °F) (Temporal)   Resp 18   Ht 5' 5" (1.651 m)   Wt 70.3 kg (155 lb)   SpO2 99%   Breastfeeding? No   BMI 25.79 kg/m²     "

## 2018-09-06 NOTE — OP NOTE
Allyssa Wright  : 1980   MRN: [id number]  Date: 2018    Electroconvulsive Therapy  Operative Note    Date of Admission: 2018  5:50 AM    Site: [facility] [address]    Attending: Shoaib Boyce MD  Residents: Divina Hickman MD  Pre-op Diagnosis: MDD, recurrent, in partial remission    ECT Treatment Number: 54  Machine Type: Mecta 5000    Patient Status: Medically stable    Vitals (pre-procedure):  Vitals:    18 0629   BP: 108/68   Pulse: 74   Resp: 20   Temp: 97.7 °F (36.5 °C)       Electrode Placement: Bitemporal    Stimulus Number Charge (mC) Level Pulse Width (msec) Frequency  (Hz) Duration of Stimulus (sec) Current (mA) Duration of Seizure (sec)   1 528 3 1 60 3 800 75                                   Complications: Hypertension    Maximum Blood Pressure: 188/88    Medications Given:  Caffeine 500 mg PO before  Methohexital (Brevital) 160 mg  IV before  Succinylcholine (Anectine) 140 mg  IV before  Zofran 4 mg IV before  Toradol 30 mg IV before    Treatment Course:  Patient tolerated procedure well. After adequate recovery from general anesthesia, the patient was transported to recovery.    Post-op Diagnosis: Major Depressive Disorder, recurrent, in partial remission      Recommended for next ECT: Same as above.   Next treatment 18.       Divina Hickman MD  Psychiatry PGY-4  2018

## 2018-09-06 NOTE — DISCHARGE INSTRUCTIONS
Understanding Electroconvulsive Therapy  Electroconvulsive therapy (ECT) is sometimes called shock therapy. This may sound painful, but ECT doesnt hurt. Its often the safest and best treatment for severe depression. It can treat other mental disorders as well.  What is electroconvulsive therapy?  ECT is used to treat people who are very depressed. Its mainly used when other treatments, such as antidepressant medicines, have failed. Often it may relieve feelings of sadness and despair after 2 to 4 treatments.  Common symptoms of major depression  Symptoms of major depression include:  · Feeling a deep sadness that doesnt go away  · Losing all pleasure in life  · Feeling hopeless or helpless  · Feeling guilty  · Sleeping more or less than normal  · Eating more or less than normal  · Having headaches or stomachaches, or other pains that dont go away  · Feeling nervous, empty, or worthless  · Crying a great deal  · Thinking or talking about suicide or death  How is ECT therapy done?  Before an ECT treatment, youll be given anesthesia to keep you pain-free. Youll also be given medicine to make you sleep, relax your muscles and control your heart rate. Your healthcare provider then places electrodes on your head. You may have one above each temple (bilateral ECT). Or you may have electrodes on one temple and on your forehead (unilateral ECT). While you are asleep, your brain is stimulated very briefly with an electric current. This causes a seizure, usually lasting less than a minute. Because you are under anesthesia, your body will not move even as your brain goes through great changes.  What are the risks?  When done properly, ECT is quite safe. Right after the treatment, you may be confused. This often lasts for less than half an hour. You may have a headache or stiff muscles. But these symptoms often go away quickly. A more serious possible side effect is memory loss. Commonly, people have short-term  (temporary) trouble remembering information that they learned recently. And they may have little recall of the time when they received treatment. Less commonly, people may have long-lasting (permanent) spotty recall of major past events. In rare cases, there may be memory loss for larger blocks of time.  Looking to the future  In most cases, ECT doesnt cure depression. But it can improve symptoms for a period of time. You may need a series of ECT treatments to continue feeling the benefit. You may also need to take antidepressant medicines to help prevent symptoms from returning. But with ongoing treatment, you can have a full and healthy life.  Resources  · The National [place] of [place] [employer]  244.650.1339  www.nim.nih.gov  · [facility]  354.889.5600  www.[facility].org     Date Last Reviewed: 5/1/2017 © 2000-2017 [employer]. [address] [place] [address]. All rights reserved. This information is not intended as a substitute for professional medical care. Always follow your healthcare professional's instructions.        Anesthesia: General Anesthesia     You are watched continuously during your procedure by your anesthesia provider.     Youre due to have surgery. During surgery, youll be given medicine called anesthesia or anesthetic. This will keep you comfortable and pain-free. Your anesthesia provider will use general anesthesia.  What is general anesthesia?  General anesthesia puts you into a state like deep sleep. It goes into the bloodstream (IV anesthetics), into the lungs (gas anesthetics), or both. You feel nothing during the procedure. You will not remember it. During the procedure, the anesthesia provider monitors you continuously. He or she checks your heart rate and rhythm, blood pressure, breathing, and blood oxygen.  · IV anesthetics. IV anesthetics are given through an IV line in your arm. Theyre often given first. This is so you are asleep before a gas  anesthetic is started. Some kinds of IV anesthetics relieve pain. Others relax you. Your doctor will decide which kind is best in your case.  · Gas anesthetics. Gas anesthetics are breathed into the lungs. They are often used to keep you asleep. They can be given through a facemask or a tube placed in your larynx or trachea (breathing tube).  ¨ If you have a facemask, your anesthesia provider will most likely place it over your nose and mouth while youre still awake. Youll breathe oxygen through the mask as your IV anesthetic is started. Gas anesthetic may be added through the mask.  ¨ If you have a tube in the larynx or trachea, it will be inserted into your throat after youre asleep.  Anesthesia tools and medicines  You will likely have:  · IV anesthetics. These are put into an IV line into your bloodstream.  · Gas anesthetics. You breathe these anesthetics into your lungs, where they pass into your bloodstream.  · Pulse oximeter. This is a small clip that is attached to the end of your finger. This measures your blood oxygen level.  · Electrocardiography leads (electrodes). These are small sticky pads that are placed on your chest. They record your heart rate and rhythm.  · Blood pressure cuff. This reads your blood pressure.  Risks and possible complications  General anesthesia has some risks. These include:  · Breathing problems  · Nausea and vomiting  · Sore throat or hoarseness (usually temporary)  · Allergic reaction to the anesthetic  · Irregular heartbeat (rare)  · Cardiac arrest (rare)   Anesthesia safety  · Follow all instructions you are given for how long not to eat or drink before your procedure.  · Be sure your doctor knows what medicines and drugs you take. This includes over-the-counter medicines, herbs, supplements, alcohol or other drugs. You will be asked when those were last taken.  · Have an adult family member or friend drive you home after the procedure.  · For the first 24 hours after  your surgery:  ¨ Do not drive or use heavy equipment.  ¨ Do not make important decisions or sign legal documents. If important decisions or signing legal documents is necessary during the first 24 hours after surgery, have a trusted family member or spouse act on your behalf.  ¨ Avoid alcohol.  ¨ Have a responsible adult stay with you. He or she can watch for problems and help keep you safe.  Date Last Reviewed: 12/1/2016  © 1312-3153 [employer]. [address] [place] [address]. All rights reserved. This information is not intended as a substitute for professional medical care. Always follow your healthcare professional's instructions.

## 2018-09-06 NOTE — PLAN OF CARE
Client discharged to home with father and belongings. The patient is stable and in no apparent distress, vitals stable, no c/o  Nausea or vomiting, and is tolerating fluids. Discharge instructions and follow up care handouts given and explained; verbalized understanding. Discharge criteria meet. Patient escorted to private vehicle by [facility] via wheelchair.

## 2018-09-06 NOTE — ADDENDUM NOTE
Addendum  created 09/06/18 0824 by Jensen Ralph MD    Child order released for a procedure order, Intraprocedure Blocks edited, Intraprocedure Flowsheets edited, Intraprocedure Meds edited, Pend clinical note, Sign clinical note

## 2018-09-06 NOTE — ANESTHESIA POSTPROCEDURE EVALUATION
"Anesthesia Post Evaluation    Patient: Allyssa Wright    Procedure(s) Performed: Procedure(s) (LRB):  ELECTROCONVULSIVE THERAPY (ECT) - SINGLE SEIZURE (N/A)    Final Anesthesia Type: general  Patient location during evaluation: PACU  Patient participation: Yes- Able to Participate  Level of consciousness: awake and alert  Post-procedure vital signs: reviewed and stable  Pain management: adequate  Airway patency: patent  PONV status at discharge: No PONV  Anesthetic complications: no      Cardiovascular status: blood pressure returned to baseline  Respiratory status: unassisted  Hydration status: euvolemic  Follow-up not needed.        Visit Vitals  /73 (BP Location: Right arm, Patient Position: Lying)   Pulse 92   Temp 36.7 °C (98.1 °F) (Temporal)   Resp 18   Ht 5' 5" (1.651 m)   Wt 70.3 kg (155 lb)   SpO2 98%   Breastfeeding? No   BMI 25.79 kg/m²       Pain/Roma Score: Pain Assessment Performed: Yes (9/6/2018  7:51 AM)  Presence of Pain: denies (9/6/2018  7:51 AM)  Roma Score: 10 (9/6/2018  7:51 AM)  Modified Roma Score: 20 (9/6/2018  6:28 AM)        "

## 2018-09-06 NOTE — H&P
Allyssa Wright  : 1980   MRN: [id number]  Date: 2018     Psychiatric - ECT  H&P     Chief complaint: MDD recurrent in partial remission     Procedure: ECT #54    SUBJECTIVE:   HPI:   Allyssa Wright is a 38 y.o. female withMDD recurrent in partial remission who presents for ECT #54.    Pt friendly, but expressed a little concern and anxiety about having to return for ECT.  Noted that she had ECT on the  and her next was planned for following week but she could cancel if she felt okay.  She did on that Tuesday, then started feeling worse and rescheduled for last Friday.  Worried about having to come back weekly, worried that if she doesn't come weekly she'll start to feel depressed again.  Rated her mood as 5/10, anxiety still notable, sleep good - 8-9hrs/night.  She denies any recent medication changes except small dose change on trintellix. She denies SI/HI/AVH. She denies any physical complaints this morning. She denies any significant medication side effects.    The pt has not had anything by mouth since midnight, and is ready for ECT.    Denies lightheadedness, headache, changes in vision, chest pain, shortness of breath, abdominal pain, nausea, vomiting, diarrhea, constipation, dysuria, hematuria, hematochezia, numbness and weakness.       Psychiatric Review of Systems:  Mood: 5/10  Anxiety: notable, was anxious about coming despite this being her 54th ECT  Appetite: No problem  Psychomotor: No problem  Cognitive Impairment: Mild  Insomnia: None  Psychosis: None  Diurnal Variation: None  Suicidal Ideation: Absent      Medical Review Of Systems:  Pertinent items are noted in HPI.    Current Medications:   No current facility-administered medications on file prior to encounter.      Current Outpatient Medications on File Prior to Encounter   Medication Sig Dispense Refill    clozapine (CLOZARIL) 50 MG tablet Take 50 mg by mouth once daily.       dapsone 7.5 % GlwP Apply topically once daily.       famciclovir (FAMVIR) 500 MG tablet Take 1 tablet (500 mg total) by mouth 2 (two) times daily. 30 tablet 12    mirtazapine (REMERON) 15 MG tablet Take 1 tablet (15 mg total) by mouth every evening. (Patient taking differently: Take 7.5 mg by mouth every evening. ) 90 tablet 0    spironolactone (ALDACTONE) 50 MG tablet 50 mg 2 (two) times daily. 50  mg qAM, 50 mg qHS.      thyroid (ARMOUR THYROID) 30 mg Tab Take 1 tablet (30 mg total) by mouth every morning. 30 tablet 11    trazodone (DESYREL) 100 MG tablet Take 200 mg by mouth every evening.       UNABLE TO FIND 2 (two) times daily. n-azetyl-cysteine      venlafaxine (EFFEXOR) 100 MG Tab Take 3 tablets (300 mg total) by mouth once daily. (Patient taking differently: Take 225 mg by mouth once daily. )      vortioxetine (TRINTELLIX) 5 mg Tab Take 2.5 mg by mouth once daily.      dextroamphetamine-amphetamine 30 mg Tab Take 30 mg by mouth 2 (two) times daily.       ZOVIRAX 5 % Crea   5            Allergies:   Review of patient's allergies indicates:   Allergen Reactions    Benzodiazepines Other (See Comments)     Contraindicated while taking Clozapine    Ampicillin      Mom says so    Erythromycin     Levaquin [levofloxacin] Other (See Comments)     Depression side effects    Penicillins      Mom says so    Pristiq [desvenlafaxine]      psycotic      Sulfa (sulfonamide antibiotics)      Rash      Azithromycin Anxiety        Past Medical/Surgical History:   Past Medical History:   Diagnosis Date    Anxiety     Depression     History of psychiatric hospitalization     HSV-1 (herpes simplex virus 1) infection     Hx of psychiatric care     Moderate depressed bipolar II disorder 06/13/2016    reports no history of bipolar    Obsessive-compulsive disorder     Psychiatric problem     Schizophrenia 4/3/2018    Self-harming behavior     Therapy      Past Surgical History:   Procedure Laterality Date    ANKLE SURGERY Right     BREAST augmentation       OVARIAN CYST REMOVAL Bilateral           OBJECTIVE:   Vitals (pre-procedure):  Vitals:    09/06/18 0629   BP: 108/68   Pulse: 74   Resp: 20   Temp: 97.7 °F (36.5 °C)        Labs/Imaging/Studies:   No results found for this or any previous visit (from the past 48 hour(s)).   No results found for: PHENYTOIN, PHENOBARB, VALPROATE, CBMZ      Physical Exam:   General: well nourished  Orientation: oriented to person, place, and time  Mental Status: alert and responsive  HEENT: normocephalic, no lesions or obvious abnormality  Chest: breath sounds clear and equal bilaterally  Heart: RRR. No murmurs  Abdomen: soft, non-, no palpable masses, +BS  Neuro: CN II-XII grossly intact     Mental Status Exam:   Appearance: age appropriate, well nourished, dressed in hospital gown  Behavior: friendly and cooperative, eye contact appropriate  Speech: normal tone, normal rate, normal pitch, normal volume  Mood: 5/10  Affect: full, reactive  Thought Process: linear and organized  Thought Content: no SI, no HI or AVH  Cognition: grossly intact  Insight: good  Judgment: appropriate for setting     ASSESSMENT/PLAN:   Allyssa Wright is a 38 y.o. female with MDD recurrent in partial remission who presents for ECT treatment #54.     Recommendations:   Proceed with ECT #54.    Divina Hickman MD  [facility], PGY-4  9/6/2018

## 2018-09-10 DIAGNOSIS — F33.9 RECURRENT MAJOR DEPRESSIVE DISORDER, REMISSION STATUS UNSPECIFIED: Primary | ICD-10-CM

## 2018-09-13 ENCOUNTER — ANESTHESIA EVENT (OUTPATIENT)
Dept: ELECTROPHYSIOLOGY | Facility: HOSPITAL | Age: 38
End: 2018-09-13
Payer: COMMERCIAL

## 2018-09-14 ENCOUNTER — HOSPITAL ENCOUNTER (OUTPATIENT)
Facility: HOSPITAL | Age: 38
Discharge: HOME | End: 2018-09-14
Attending: PSYCHIATRY & NEUROLOGY | Admitting: PSYCHIATRY & NEUROLOGY
Payer: COMMERCIAL

## 2018-09-14 ENCOUNTER — ANESTHESIA (OUTPATIENT)
Dept: ELECTROPHYSIOLOGY | Facility: HOSPITAL | Age: 38
End: 2018-09-14
Payer: COMMERCIAL

## 2018-09-14 VITALS
SYSTOLIC BLOOD PRESSURE: 116 MMHG | HEIGHT: 65 IN | WEIGHT: 155 LBS | HEART RATE: 72 BPM | DIASTOLIC BLOOD PRESSURE: 74 MMHG | OXYGEN SATURATION: 99 % | TEMPERATURE: 98 F | BODY MASS INDEX: 25.83 KG/M2 | RESPIRATION RATE: 16 BRPM

## 2018-09-14 DIAGNOSIS — F33.41 RECURRENT MAJOR DEPRESSIVE DISORDER, IN PARTIAL REMISSION: Primary | ICD-10-CM

## 2018-09-14 DIAGNOSIS — F33.41 MDD (MAJOR DEPRESSIVE DISORDER), RECURRENT, IN PARTIAL REMISSION: ICD-10-CM

## 2018-09-14 DIAGNOSIS — F33.9 RECURRENT MAJOR DEPRESSIVE DISORDER, REMISSION STATUS UNSPECIFIED: Primary | ICD-10-CM

## 2018-09-14 DIAGNOSIS — F32.A DEPRESSION: ICD-10-CM

## 2018-09-14 LAB
B-HCG UR QL: NEGATIVE
CTP QC/QA: YES

## 2018-09-14 PROCEDURE — D9220A PRA ANESTHESIA: Mod: ,,, | Performed by: ANESTHESIOLOGY

## 2018-09-14 PROCEDURE — 90870 ELECTROCONVULSIVE THERAPY: CPT | Mod: ,,, | Performed by: PSYCHIATRY & NEUROLOGY

## 2018-09-14 PROCEDURE — 25000003 PHARM REV CODE 250: Performed by: STUDENT IN AN ORGANIZED HEALTH CARE EDUCATION/TRAINING PROGRAM

## 2018-09-14 PROCEDURE — 90870 ELECTROCONVULSIVE THERAPY: CPT | Performed by: ANESTHESIOLOGY

## 2018-09-14 PROCEDURE — 63600175 PHARM REV CODE 636 W HCPCS: Performed by: STUDENT IN AN ORGANIZED HEALTH CARE EDUCATION/TRAINING PROGRAM

## 2018-09-14 PROCEDURE — 81025 URINE PREGNANCY TEST: CPT | Performed by: PSYCHIATRY & NEUROLOGY

## 2018-09-14 RX ORDER — KETOROLAC TROMETHAMINE 30 MG/ML
INJECTION, SOLUTION INTRAMUSCULAR; INTRAVENOUS
Status: DISCONTINUED | OUTPATIENT
Start: 2018-09-14 | End: 2018-09-14

## 2018-09-14 RX ORDER — SUCCINYLCHOLINE CHLORIDE 20 MG/ML
INJECTION INTRAMUSCULAR; INTRAVENOUS
Status: DISCONTINUED | OUTPATIENT
Start: 2018-09-14 | End: 2018-09-14

## 2018-09-14 RX ORDER — ONDANSETRON 2 MG/ML
INJECTION INTRAMUSCULAR; INTRAVENOUS
Status: COMPLETED
Start: 2018-09-14 | End: 2018-09-14

## 2018-09-14 RX ORDER — SUCCINYLCHOLINE CHLORIDE 20 MG/ML
INJECTION INTRAMUSCULAR; INTRAVENOUS
Status: COMPLETED
Start: 2018-09-14 | End: 2018-09-14

## 2018-09-14 RX ORDER — LIDOCAINE HYDROCHLORIDE 10 MG/ML
1 INJECTION, SOLUTION EPIDURAL; INFILTRATION; INTRACAUDAL; PERINEURAL ONCE
Status: COMPLETED | OUTPATIENT
Start: 2018-09-14 | End: 2018-09-14

## 2018-09-14 RX ORDER — LABETALOL HYDROCHLORIDE 5 MG/ML
INJECTION, SOLUTION INTRAVENOUS
Status: DISCONTINUED | OUTPATIENT
Start: 2018-09-14 | End: 2018-09-14

## 2018-09-14 RX ORDER — HYDROXYZINE PAMOATE 25 MG/1
50 CAPSULE ORAL NIGHTLY PRN
Qty: 180 CAPSULE | Refills: 0 | Status: SHIPPED | OUTPATIENT
Start: 2018-09-14 | End: 2021-05-06

## 2018-09-14 RX ORDER — SODIUM CHLORIDE 9 MG/ML
500 INJECTION, SOLUTION INTRAVENOUS ONCE
Status: COMPLETED | OUTPATIENT
Start: 2018-09-14 | End: 2018-09-14

## 2018-09-14 RX ORDER — KETOROLAC TROMETHAMINE 30 MG/ML
INJECTION, SOLUTION INTRAMUSCULAR; INTRAVENOUS
Status: COMPLETED
Start: 2018-09-14 | End: 2018-09-14

## 2018-09-14 RX ORDER — ONDANSETRON 2 MG/ML
INJECTION INTRAMUSCULAR; INTRAVENOUS
Status: DISCONTINUED | OUTPATIENT
Start: 2018-09-14 | End: 2018-09-14

## 2018-09-14 RX ORDER — LABETALOL HYDROCHLORIDE 5 MG/ML
INJECTION, SOLUTION INTRAVENOUS
Status: COMPLETED
Start: 2018-09-14 | End: 2018-09-14

## 2018-09-14 RX ORDER — HYDROXYZINE PAMOATE 50 MG/1
50 CAPSULE ORAL NIGHTLY PRN
Status: DISCONTINUED | OUTPATIENT
Start: 2018-09-14 | End: 2018-09-14 | Stop reason: HOSPADM

## 2018-09-14 RX ORDER — SODIUM CHLORIDE 0.9 % (FLUSH) 0.9 %
3 SYRINGE (ML) INJECTION
Status: DISCONTINUED | OUTPATIENT
Start: 2018-09-14 | End: 2018-09-14 | Stop reason: HOSPADM

## 2018-09-14 RX ADMIN — LIDOCAINE HYDROCHLORIDE 0.2 MG: 10 INJECTION, SOLUTION EPIDURAL; INFILTRATION; INTRACAUDAL; PERINEURAL at 06:09

## 2018-09-14 RX ADMIN — SODIUM CHLORIDE 500 ML: 0.9 INJECTION, SOLUTION INTRAVENOUS at 06:09

## 2018-09-14 RX ADMIN — KETOROLAC TROMETHAMINE 30 MG: 30 INJECTION, SOLUTION INTRAMUSCULAR at 07:09

## 2018-09-14 RX ADMIN — SUCCINYLCHOLINE CHLORIDE 120 MG: 20 INJECTION, SOLUTION INTRAMUSCULAR; INTRAVENOUS at 08:09

## 2018-09-14 RX ADMIN — ONDANSETRON 4 MG: 2 INJECTION, SOLUTION INTRAMUSCULAR; INTRAVENOUS at 07:09

## 2018-09-14 RX ADMIN — Medication 500 MG: at 07:09

## 2018-09-14 RX ADMIN — LABETALOL HYDROCHLORIDE 10 MG: 5 INJECTION, SOLUTION INTRAVENOUS at 07:09

## 2018-09-14 RX ADMIN — METHOHEXITAL SODIUM 160 MG: 500 INJECTION, POWDER, LYOPHILIZED, FOR SOLUTION INTRAMUSCULAR; INTRAVENOUS; RECTAL at 08:09

## 2018-09-14 NOTE — OP NOTE
Allyssa Wright  : 1980   MRN: [id number]  Date: 2018    Electroconvulsive Therapy  Operative Note    Date of Admission: 2018  5:56 AM    Site: [facility], [address]    Attending: Ruel Benson MD  Residents: Divina Hickman MD  Pre-op Diagnosis: MDD, recurrent, in partial remission    ECT Treatment Number: 55  Machine Type: Mecta 5000    Patient Status: Medically stable    Vitals (pre-procedure):  Vitals:    18 0617   BP: 110/74   Pulse: 76   Resp: 20   Temp: 97.5 °F (36.4 °C)       Electrode Placement: Bitemporal    Stimulus Number Charge (mC) Level Pulse Width (msec) Frequency  (Hz) Duration of Stimulus (sec) Current (mA) Duration of Seizure (sec)   1 528 3 1 60 5.5 800 79                                   Complications: Hypertension    Maximum Blood Pressure: 148/86    Medications Given:  Caffeine 500 mg PO before  Methohexital (Brevital) 160 mg  IV before  Succinylcholine (Anectine) 120 mg  IV before  Labetolol 10mg IV during  Zofran 4 mg IV before  Toradol 30 mg IV before    Treatment Course:  Patient tolerated procedure well. After adequate recovery from general anesthesia, the patient was transported to recovery.    Post-op Diagnosis: Major Depressive Disorder, recurrent, in partial remission      Recommended for next ECT: Same as above.         Divina Hickman MD  Psychiatry PGY-4  2018

## 2018-09-14 NOTE — ANESTHESIA PREPROCEDURE EVALUATION
[facility]  Anesthesia Pre-Operative Evaluation         Patient Name: Allyssa Wright  YOB: 1980  MRN: [id number]    SUBJECTIVE:     Pre-operative evaluation for Procedure(s) (LRB):  ELECTROCONVULSIVE THERAPY (ECT) - SINGLE SEIZURE (N/A)  Scheduled for 9/14/2018    HPI 09/14/2018:  Allyssa Wright is a 38 y.o. female with hx of MDD, HSV1, and hypothyroidism presenting for ECT.    ECT#: 56    Previous # stimuli: 1    Previous meds:  Methohexital 160mg  Succinylcholine 120mg  Labetalol 0mg  Ketorolac 30mg  Ondansetron 4mg      Patient Active Problem List   Diagnosis    MDD (major depressive disorder), recurrent, in partial remission    Major depressive disorder, recurrent, in partial remission    MDD (major depressive disorder), severe    Major depressive disorder, recurrent    Other acne    Major depression, recurrent, chronic    MDD (major depressive disorder), recurrent episode, moderate    Major depressive disorder, recurrent severe without psychotic features    MDD (major depressive disorder), recurrent episode, severe    MDD (major depressive disorder)    Major depressive disorder, recurrent episode, in partial remission    Schizophrenia    Depression    Comfort measures only status    Major depression, recurrent    MDD (major depressive disorder), recurrent episode, mild    Major depression       Review of patient's allergies indicates:   Allergen Reactions    Benzodiazepines Other (See Comments)     Contraindicated while taking Clozapine    Ampicillin      Mom says so    Erythromycin     Levaquin [levofloxacin] Other (See Comments)     Depression side effects    Penicillins      Mom says so    Pristiq [desvenlafaxine]      psycotic      Sulfa (sulfonamide antibiotics)      Rash      Azithromycin Anxiety       Outpatient Medications:  Current Facility-Administered Medications on File Prior to Visit   Medication Dose Route Frequency Provider Last Rate Last  Dose    caffeine 500mg powder  500 mg Oral On Call Procedure Divina Hickman MD         Current Outpatient Medications on File Prior to Visit   Medication Sig Dispense Refill    clozapine (CLOZARIL) 50 MG tablet Take 50 mg by mouth once daily.       dapsone 7.5 % GlwP Apply topically once daily.      dextroamphetamine-amphetamine 30 mg Tab Take 30 mg by mouth 2 (two) times daily.       famciclovir (FAMVIR) 500 MG tablet Take 1 tablet (500 mg total) by mouth 2 (two) times daily. 30 tablet 12    mirtazapine (REMERON) 15 MG tablet Take 1 tablet (15 mg total) by mouth every evening. (Patient taking differently: Take 7.5 mg by mouth every evening. ) 90 tablet 0    spironolactone (ALDACTONE) 50 MG tablet 50 mg 2 (two) times daily. 50  mg qAM, 50 mg qHS.      thyroid (ARMOUR THYROID) 30 mg Tab Take 1 tablet (30 mg total) by mouth every morning. 30 tablet 11    trazodone (DESYREL) 100 MG tablet Take 200 mg by mouth every evening.       UNABLE TO FIND 2 (two) times daily. n-azetyl-cysteine      venlafaxine (EFFEXOR) 100 MG Tab Take 3 tablets (300 mg total) by mouth once daily. (Patient taking differently: Take 225 mg by mouth once daily. )      vortioxetine (TRINTELLIX) 5 mg Tab Take 2.5 mg by mouth once daily.      ZOVIRAX 5 % Crea   5        Current Inpatient Medications:        OBJECTIVE:     Weight:  Wt Readings from Last 4 Encounters:   09/14/18 70.3 kg (155 lb)   09/06/18 70.3 kg (155 lb)   08/31/18 70.3 kg (155 lb)   08/20/18 70.3 kg (155 lb)       Vital Signs Range (Last 24H):  Temp:  [36.4 °C (97.5 °F)]   Pulse:  [76]   Resp:  [20]   BP: (110)/(74)   SpO2:  [100 %]       CBC:   Lab Results   Component Value Date    WBC 6.83 06/08/2016    HGB 13.3 06/08/2016    HCT 40.3 06/08/2016    MCV 92 06/08/2016     06/08/2016       CMP:     Chemistry        Component Value Date/Time     06/08/2016 1542    K 3.8 06/08/2016 1542     06/08/2016 1542    CO2 28 06/08/2016 1542    BUN 10  2016 1542    CREATININE 0.7 2016 1542    CREATININE 0.6 2012 1326    GLU 81 2016 1542        Component Value Date/Time    CALCIUM 9.5 2016 1542    CALCIUM 9.6 2012 1326    ALKPHOS 28 (L) 2016 1542    ALKPHOS 31 2012 1326    AST 20 2016 1542    AST 22 2012 1326    ALT 14 2016 1542    BILITOT 0.4 2016 1542    ESTGFRAFRICA >60.0 2016 1542    EGFRNONAA >60.0 2016 1542            INR:  No results found for: INR, PROTIME    Diagnostic Studies:  none    EK2016  Vent. Rate : 084 BPM     Atrial Rate : 084 BPM     P-R Int : 154 ms          QRS Dur : 086 ms      QT Int : 378 ms       P-R-T Axes : 067 066 055 degrees     QTc Int : 446 ms    Normal sinus rhythm  Normal ECG  When compared with ECG of 03-DEC-2015 11:38,  Questionable change in The axis  T wave inversion no longer evident in Inferior leads     2D Echo:  No results found for this or any previous visit.      ASSESSMENT/PLAN:         Pre-op Assessment    I have reviewed the Patient Summary Reports.     I have reviewed the Nursing Notes.   I have reviewed the Medications.     Review of Systems  Anesthesia Hx:  No problems with previous Anesthesia  Denies Family Hx of Anesthesia complications.   Denies Personal Hx of Anesthesia complications.   Social:  Former Smoker, Alcohol Use    Hematology/Oncology:  Hematology Normal   Oncology Normal     EENT/Dental:EENT/Dental Normal   Cardiovascular:  Cardiovascular Normal                     Pulmonary:  Pulmonary Normal  Denies Asthma.  Denies Shortness of breath.    Renal/:  Renal/ Normal     Hepatic/GI:  Hepatic/GI Normal    Neurological:   Denies TIA. Denies CVA.    Endocrine:  Endocrine Normal    Psych:   Psychiatric History depression          Physical Exam  General:  Well nourished    Airway/Jaw/Neck:  Airway Findings: Mouth Opening: Normal Tongue: Normal  General Airway Assessment: Adult, Good  Mallampati: I  TM Distance:  Normal, at least 6 cm  Jaw/Neck Findings:  Neck ROM: Normal ROM      Dental:  Dental Findings: In tact   Chest/Lungs:  Chest/Lungs Clear    Heart/Vascular:  Heart Findings: Normal    Abdomen:  Abdomen Findings: Normal      Mental Status:  Mental Status Findings:  Cooperative, Alert and Oriented         Anesthesia Plan  Type of Anesthesia, risks & benefits discussed:  Anesthesia Type:  general  Patient's Preference:   Intra-op Monitoring Plan: standard ASA monitors  Intra-op Monitoring Plan Comments:   Post Op Pain Control Plan: per primary service following discharge from PACU  Post Op Pain Control Plan Comments:   Induction:   IV  Beta Blocker:  Patient is not currently on a Beta-Blocker (No further documentation required).       Informed Consent: Patient understands risks and agrees with Anesthesia plan.  Questions answered. Anesthesia consent signed with patient.  ASA Score: 2     Day of Surgery Review of History & Physical:    H&P update referred to the provider.         Ready For Surgery From Anesthesia Perspective.

## 2018-09-14 NOTE — H&P
"Allyssa Wright  : 1980   MRN: [id number]  Date: 2018     Psychiatric - ECT  H&P     Chief complaint: MDD recurrent in partial remission     Procedure: ECT #55    SUBJECTIVE:   HPI:   Allyssa Wright is a 38 y.o. female withMDD recurrent in partial remission who presents for ECT.    Pt friendly, a little brighter in affect compared to last week, a little less anxious.  Still worried somewhat about the spacing of ECT.  Was last seen last week.  She'd like to space the ECT to ~10 days, seems to have put a lot of thought into this.  Rated her mood as 5/10 (same as last week).  She asked about getting a prescription for vistaril PRN for the nights before ECT b/c she gets anxious.  She denied any recent medication changes except small dose change on trintellix. She denied SI/HI/AVH or any physical complaints this morning. She denied any significant medication side effects.    The pt has not had anything by mouth since midnight, and is ready for ECT.    Denies lightheadedness, headache, changes in vision, chest pain, shortness of breath, abdominal pain, nausea, vomiting, diarrhea, constipation, dysuria, hematuria, hematochezia, numbness and weakness.       Psychiatric Review of Systems:  Mood: 5/10  Anxiety: notable, anxious about returning for ECT each time  Appetite: No problem  Psychomotor: No problem  Cognitive Impairment: mild, tolerable  Insomnia: None, "a little too much", but usually 8hrs  Psychosis: None  Diurnal Variation: None  Suicidal Ideation: Absent      Medical Review Of Systems:  Pertinent items are noted in HPI.    Current Medications:   No current facility-administered medications on file prior to encounter.      Current Outpatient Medications on File Prior to Encounter   Medication Sig Dispense Refill    clozapine (CLOZARIL) 50 MG tablet Take 50 mg by mouth once daily.       dapsone 7.5 % GlwP Apply topically once daily.      famciclovir (FAMVIR) 500 MG tablet Take 1 tablet (500 mg total) by " mouth 2 (two) times daily. 30 tablet 12    mirtazapine (REMERON) 15 MG tablet Take 1 tablet (15 mg total) by mouth every evening. (Patient taking differently: Take 7.5 mg by mouth every evening. ) 90 tablet 0    spironolactone (ALDACTONE) 50 MG tablet 50 mg 2 (two) times daily. 50  mg qAM, 50 mg qHS.      thyroid (ARMOUR THYROID) 30 mg Tab Take 1 tablet (30 mg total) by mouth every morning. 30 tablet 11    trazodone (DESYREL) 100 MG tablet Take 200 mg by mouth every evening.       UNABLE TO FIND 2 (two) times daily. n-azetyl-cysteine      venlafaxine (EFFEXOR) 100 MG Tab Take 3 tablets (300 mg total) by mouth once daily. (Patient taking differently: Take 225 mg by mouth once daily. )      vortioxetine (TRINTELLIX) 5 mg Tab Take 2.5 mg by mouth once daily.      ZOVIRAX 5 % Crea   5    dextroamphetamine-amphetamine 30 mg Tab Take 30 mg by mouth 2 (two) times daily.               Allergies:   Review of patient's allergies indicates:   Allergen Reactions    Benzodiazepines Other (See Comments)     Contraindicated while taking Clozapine    Ampicillin      Mom says so    Erythromycin     Levaquin [levofloxacin] Other (See Comments)     Depression side effects    Penicillins      Mom says so    Pristiq [desvenlafaxine]      psycotic      Sulfa (sulfonamide antibiotics)      Rash      Azithromycin Anxiety        Past Medical/Surgical History:   Past Medical History:   Diagnosis Date    Anxiety     Depression     History of psychiatric hospitalization     HSV-1 (herpes simplex virus 1) infection     Hx of psychiatric care     Moderate depressed bipolar II disorder 06/13/2016    reports no history of bipolar    Obsessive-compulsive disorder     Psychiatric problem     Schizophrenia 4/3/2018    Self-harming behavior     Therapy      Past Surgical History:   Procedure Laterality Date    ANKLE SURGERY Right     BREAST augmentation      ELECTROCONVULSIVE THERAPY (ECT) - SINGLE SEIZURE N/A 8/31/2018     Performed by [facility] Jr[facility] E[facility]    ELECTROCONVULSIVE THERAPY (ECT) - SINGLE SEIZURE N/A 8/6/2018    Performed by [facility] at [facility] ECT    ELECTROCONVULSIVE THERAPY (ECT) - SINGLE SEIZURE N/A 7/23/2018    Performed by [facility] Jr[facility] at [facility] ECT    ELECTROCONVULSIVE THERAPY (ECT) - SINGLE SEIZURE N/A 7/5/2018    Performed by [facility] Jr[facility] at [facility] ECT    ELECTROCONVULSIVE THERAPY (ECT) - SINGLE SEIZURE N/A 6/28/2018    Performed by [facility]ib [facility] Jr[facility] at [facility] ECT    ELECTROCONVULSIVE THERAPY (ECT) - SINGLE SEIZURE N/A 6/13/2018    Performed by [facility] Jr[facility] at [facility] ECT    ELECTROCONVULSIVE THERAPY (ECT) - SINGLE SEIZURE N/A 5/29/2018    Performed by [facility]ib [facility] Jr[facility] at [facility] ECT    ELECTROCONVULSIVE THERAPY (ECT) - SINGLE SEIZURE N/A 5/8/2018    Performed by Sh[facility]ib [facility] Jr[facility] at [facility] ECT    ELECTROCONVULSIVE THERAPY (ECT) - SINGLE SEIZURE N/A 4/24/2018    Performed by Shoaib Boy[facility] Jr[facility] at [facility] ECT    ELECTROCONVULSIVE THERAPY (ECT) - SINGLE SEIZURE N/A 4/17/2018    Performed by Shoaib [facility] Jr[facility] at [facility] ECT    ELECTROCONVULSIVE THERAPY (ECT) - SINGLE SEIZURE N/A 4/13/2018    Performed by Shoaib Boyce Jr[facility] at [facility] ECT    ELECTROCONVULSIVE THERAPY (ECT) - SINGLE SEIZURE N/A 4/3/2018    Performed by Shoaib Boyce Jr[facility] at [facility] ECT    ELECTROCONVULSIVE THERAPY (ECT) - SINGLE SEIZURE N/A 3/20/2018    Performed by Shoaib Boy[facility] Jr[facility] at [facility] ECT    ELECTROCONVULSIVE THERAPY (ECT) - SINGLE SEIZURE N/A 3/15/2018    Performed by Shoaib Boyce Jr[facility] at [facility] ECT    ELECTROCONVULSIVE THERAPY (ECT) - SINGLE SEIZURE N/A 3/6/2018    Performed by Shoaib Boyce Jr[facility] at [facility] ECT    ELECTROCONVULSIVE THERAPY (ECT) - SINGLE SEIZURE N/A 3/1/2018    Performed by Shoaib Boyce Jr[facility] at [facility] ECT    ELECTROCONVULSIVE THERAPY (ECT) - SINGLE SEIZURE N/A 2/23/2018    Performed by  [facility]ib [facility] Jr[facility] at [facility] ECT    ELECTROCONVULSIVE THERAPY (ECT) - SINGLE SEIZURE N/A 2/19/2018    Performed by Shoaib Boyce Jr[facility] at [facility] ECT    ELECTROCONVULSIVE THERAPY (ECT) - SINGLE SEIZURE N/A 2/15/2018    Performed by Shoaib Boyce Jr[facility] at [facility] ECT    ELECTROCONVULSIVE THERAPY (ECT) - SINGLE SEIZURE N/A 1/22/2018    Performed by Shoaib Boyce Jr[facility] at [facility] ECT    ELECTROCONVULSIVE THERAPY (ECT) - SINGLE SEIZURE N/A 12/11/2017    Performed by Shoaib Boyce Jr[facility] at [facility] ECT    ELECTROCONVULSIVE THERAPY (ECT) - SINGLE SEIZURE N/A 10/24/2017    Performed by Shoaib Boyce Jr[facility] at [facility] ECT    ELECTROCONVULSIVE THERAPY (ECT) - SINGLE SEIZURE N/A 9/20/2017    Performed by Shoaib Boyce Jr., [facility] at [facility] ECT    ELECTROCONVULSIVE THERAPY (ECT) - SINGLE SEIZURE N/A 8/14/2017    Performed by Shoaib Boyce Jr., [facility] at [facility] ECT    ELECTROCONVULSIVE THERAPY (ECT) - SINGLE SEIZURE Bilateral 7/12/2017    Performed by Shoaib Boyce Jr., [facility] at [facility] ECT    ELECTROCONVULSIVE THERAPY (ECT) - SINGLE SEIZURE N/A 6/13/2017    Performed by Shoaib Boyce Jr., MD at [facility] ECT    ELECTROCONVULSIVE THERAPY (ECT) - SINGLE SEIZURE N/A 5/17/2017    Performed by Shoaib Boyce Jr., MD at [facility] ECT    ELECTROCONVULSIVE THERAPY (ECT) - SINGLE SEIZURE N/A 4/20/2017    Performed by Shoaib Boyce Jr., MD at [facility] ECT    ELECTROCONVULSIVE THERAPY (ECT) - SINGLE SEIZURE N/A 11/22/2016    Performed by Shoaib Boyce Jr., MD at [facility] ECT    ELECTROCONVULSIVE THERAPY (ECT) - SINGLE SEIZURE N/A 10/24/2016    Performed by Shoaib Boyce Jr., MD at [facility] ECT    ELECTROCONVULSIVE THERAPY (ECT) - SINGLE SEIZURE N/A 9/22/2016    Performed by Shoaib Boyce Jr., MD at [facility] ECT    ELECTROCONVULSIVE THERAPY (ECT) - SINGLE SEIZURE N/A 9/1/2016    Performed by Shoaib Boyce Jr., MD at [facility] ECT    ELECTROCONVULSIVE THERAPY (ECT) - SINGLE SEIZURE N/A 8/15/2016    Performed by  Sho[facility]ib Boyce Jr[facility] MD at [facility] ECT    ELECTROCONVULSIVE THERAPY (ECT) - SINGLE SEIZURE N/A 8/1/2016    Performed by Shoaib Boyce Jr., MD at [facility] ECT    ELECTROCONVULSIVE THERAPY (ECT) - SINGLE SEIZURE N/A 7/22/2016    Performed by Shaoib Boyce Jr., MD at [facility] ECT    ELECTROCONVULSIVE THERAPY (ECT) - SINGLE SEIZURE N/A 7/14/2016    Performed by Shoaib Boyce Jr., MD at [facility] ECT    ELECTROCONVULSIVE THERAPY (ECT) - SINGLE SEIZURE N/A 7/8/2016    Performed by Shoaib Boyce Jr., MD at [facility] ECT    ELECTROCONVULSIVE THERAPY (ECT) - SINGLE SEIZURE N/A 7/5/2016    Performed by Shoaib Boyce Jr., MD at [facility] ECT    ELECTROCONVULSIVE THERAPY (ECT) - SINGLE SEIZURE N/A 6/27/2016    Performed by Shoaib Boyce Jr., MD at [facility] ECT    ELECTROCONVULSIVE THERAPY (ECT) - SINGLE SEIZURE N/A 6/23/2016    Performed by Shoaib Boyce Jr., MD at [facility] ECT    ELECTROCONVULSIVE THERAPY (ECT) - SINGLE SEIZURE N/A 6/20/2016    Performed by Shoaib Boyce Jr., MD at [facility] ECT    ELECTROCONVULSIVE THERAPY (ECT) - SINGLE SEIZURE N/A 6/16/2016    Performed by Shoaib Boyce Jr., MD at [facility] ECT    ELECTROCONVULSIVE THERAPY (ECT) - SINGLE SEIZURE N/A 6/15/2016    Performed by Shoaib Boyce Jr., MD at [facility] ECT    ELECTROCONVULSIVE THERAPY (ECT) - SINGLE SEIZURE N/A 6/14/2016    Performed by Shoaib Boyce Jr., MD at [facility] ECT    ELECTROCONVULSIVE THERAPY (ECT) - SINGLE SEIZURE N/A 6/13/2016    Performed by Shoaib Boyce Jr., MD at [facility] ECT    ELECTROCONVULSIVE THERAPY (ECT) - SINGLE SEIZURE N/A 1/4/2016    Performed by Shoaib Boyce Jr., MD at [facility] ECT    ELECTROCONVULSIVE THERAPY, CEREBRAL HEMISPHERE, UNILATERAL, 1 SEIZURE Bilateral 6/25/2018    Performed by Shoaib Boyce Jr., MD at [facility] ECT    OVARIAN CYST REMOVAL Bilateral           OBJECTIVE:   Vitals (pre-procedure):  Vitals:    09/14/18 0617   BP: 110/74   Pulse: 76   Resp: 20   Temp: 97.5 °F (36.4 °C)         Labs/Imaging/Studies:   Recent Results (from the past 48 hour(s))   POCT urine pregnancy    Collection Time: 09/14/18  6:16 AM   Result Value Ref Range    POC Preg Test, Ur Negative Negative     Acceptable Yes       No results found for: PHENYTOIN, PHENOBARB, VALPROATE, CBMZ      Physical Exam:   General: well nourished  Orientation: oriented to person, place, and time  Mental Status: alert and responsive  HEENT: normocephalic, no lesions or obvious abnormality  Chest: breath sounds clear and equal bilaterally  Heart: RRR. No murmurs  Abdomen: soft, non-, no palpable masses, +BS  Neuro: CN II-XII grossly intact     Mental Status Exam:   Appearance: age appropriate, well nourished, dressed in hospital gown  Behavior: friendly and cooperative, eye contact appropriate  Speech: normal tone, normal rate, normal pitch, normal volume  Mood: 5/10  Affect: full, reactive  Thought Process: linear and organized  Thought Content: no SI, no HI or AVH  Cognition: grossly intact  Insight: good  Judgment: appropriate for setting     ASSESSMENT/PLAN:   Allyssa Wright is a 38 y.o. female with MDD recurrent in partial remission who presents for ECT.     Recommendations:   Proceed with ECT #55.    Divina Hickman MD  [employer] [facility], PGY-4  9/14/2018

## 2018-09-14 NOTE — DISCHARGE SUMMARY
Allyssa Wright  : 1980   MRN: [id number]  Date: 2018       Psychiatry - ECT  Discharge Summary    Admit Date: 2018  5:56 AM  Discharge Date: 2018    Attending Physician: Ruel Benson MD  Discharge Provider: Divina Hickman MD    History of Present Illness: Allyssa Wright is a 38 y.o. female with MDD, recurrent, in partial remission  presented for ECT #55. See H&P dated 2018 for full HPI.     Hospital Course: The pt tolerated the ECT treatment well with minimal complication. Pt was stable post-procedure. See OP note dated 2018 for more details.     Disposition: Home or Self Care    Medications:  Reconciled Home Medications:  (No change to Mirtazapine or Venlafaxine, incorrect below).     Medication List      START taking these medications    hydrOXYzine pamoate 25 MG Cap  Commonly known as:  VISTARIL  Take 2 capsules (50 mg total) by mouth nightly as needed (Take 25-50mg (1-2 caps) at night for anxiety prior to ECT).        CHANGE how you take these medications    mirtazapine 15 MG tablet  Commonly known as:  REMERON  Take 1 tablet (15 mg total) by mouth every evening.  What changed:  how much to take     venlafaxine 100 MG Tab  Commonly known as:  EFFEXOR  Take 3 tablets (300 mg total) by mouth once daily.  What changed:  how much to take        CONTINUE taking these medications    cloZAPine 50 MG tablet  Commonly known as:  CLOZARIL  Take 50 mg by mouth once daily.     dextroamphetamine-amphetamine 30 mg Tab  Take 30 mg by mouth 2 (two) times daily.     thyroid (pork) 30 mg Tab  Commonly known as:  ARMOUR THYROID  Take 1 tablet (30 mg total) by mouth every morning.     traZODone 100 MG tablet  Commonly known as:  DESYREL  Take 200 mg by mouth every evening.        ASK your doctor about these medications    dapsone 7.5 % Glwp  Commonly known as:  ACZONE  Apply topically once daily.     famciclovir 500 MG tablet  Commonly known as:  FAMVIR  Take 1 tablet (500 mg total) by mouth 2  (two) times daily.     spironolactone 50 MG tablet  Commonly known as:  ALDACTONE  50 mg 2 (two) times daily. 50  mg qAM, 50 mg qHS.     TRINTELLIX 5 mg Tab  Generic drug:  vortioxetine  Take 2.5 mg by mouth once daily.     UNABLE TO FIND  2 (two) times daily. n-azetyl-cysteine     ZOVIRAX 5 % Crea  Generic drug:  acyclovir 5%              Pt's Status at Discharge: alert and medically stable    Discharge Diagnoses:    Major Depressive Disorder, recurrent, in partial remission      Diet: Resume previous outpt diet  Activity: Ambulate with assistance - pt is a fall risk  Instructions: Please do not eat or drink anything after midnight prior to procedure. Please do not drive on day of ECT.     Med Changes:  None    Next ECT:  On Monday, 10/24/18, in 10 days    Follow-up Information     [facility] In 10 days.    Specialty:  Emergency Medicine  Why:  10/24/18  Contact information:  [address] [place] [id number] [phone]                 Divina Hickman MD  PGY 4 [employer]  9/14/2018

## 2018-09-14 NOTE — ANESTHESIA POSTPROCEDURE EVALUATION
"Anesthesia Post Evaluation    Patient: Allyssa Wright    Procedure(s) Performed: Procedure(s) (LRB):  ELECTROCONVULSIVE THERAPY (ECT) - SINGLE SEIZURE (N/A)    Final Anesthesia Type: general  Patient location during evaluation: PACU  Patient participation: Yes- Able to Participate  Level of consciousness: awake and alert  Post-procedure vital signs: reviewed and stable  Pain management: adequate  Airway patency: patent  PONV status at discharge: No PONV  Anesthetic complications: no      Cardiovascular status: blood pressure returned to baseline  Respiratory status: unassisted  Hydration status: euvolemic  Follow-up not needed.        Visit Vitals  /74 (BP Location: Left arm, Patient Position: Lying)   Pulse 72   Temp 36.7 °C (98.1 °F) (Temporal)   Resp 16   Ht 5' 5" (1.651 m)   Wt 70.3 kg (155 lb)   SpO2 99%   Breastfeeding? No   BMI 25.79 kg/m²       Pain/Roma Score: Pain Assessment Performed: Yes (9/14/2018  8:59 AM)  Presence of Pain: denies (9/14/2018  8:59 AM)  Roma Score: 10 (9/14/2018  8:42 AM)        "

## 2018-09-14 NOTE — ANESTHESIA PROCEDURE NOTES
ECT    Procedure start time: 9/14/2018 7:55 AM  Timeout performed at: 9/14/2018 7:50 AM  Procedure end time: 9/14/2018 7:57 AM    Staffing  Anesthesiologist: Darren Olsen MD  CRNA/Resident: Fredrick Green MD  Performed by: resident/CRNA     Preanesthetic Checklist  The following were completed as part of the preanesthetic checklist: patient identified, procedural consent, pre-op evaluation, timeout performed, risks and benefits discussed, monitors and equipment checked, anesthesia consent given, oxygen available, suction available, hand hygiene performed and patient being monitored.    Setup & Induction  Patient Monitoring: heart rate, continuous pulse ox, continuous capnometry, NIBP and cardiac monitor  Patient preparation: bite block inserted, extremities padded, mandibular stabilization and patient hyperventilated  Electrode Placement: Bitemporal    The patient was moved to the ECT therapy room after being assessed and consented for ECT. After standard ASA monitors were applied and timeout performed, the patient was adequately preoxygenated. After induction of general anesthesia, adequate oxygenation and ventilation were confirmed with pulse oxymetry and end tidal CO2 monitoring via bag-mask ventilation. End tidal CO2 was monitored throughout the case and moderate hyperventilation was performed prior to beginning ECT treatment. All extremities were padded, biteblock was inserted, and mandibular stabilization was done prior to initiating ECT therapy.    Procedure  Stimulus Number 1:             Recovery  After adequate recovery from general anesthesia, the patient was transported to recovery.

## 2018-09-24 ENCOUNTER — TELEPHONE (OUTPATIENT)
Dept: PSYCHIATRY | Facility: CLINIC | Age: 38
End: 2018-09-24

## 2018-09-24 ENCOUNTER — ANESTHESIA EVENT (OUTPATIENT)
Dept: ELECTROPHYSIOLOGY | Facility: HOSPITAL | Age: 38
End: 2018-09-24
Payer: COMMERCIAL

## 2018-09-24 ENCOUNTER — HOSPITAL ENCOUNTER (OUTPATIENT)
Facility: HOSPITAL | Age: 38
Discharge: HOME | End: 2018-09-24
Attending: PSYCHIATRY & NEUROLOGY | Admitting: PSYCHIATRY & NEUROLOGY
Payer: COMMERCIAL

## 2018-09-24 ENCOUNTER — ANESTHESIA (OUTPATIENT)
Dept: ELECTROPHYSIOLOGY | Facility: HOSPITAL | Age: 38
End: 2018-09-24
Payer: COMMERCIAL

## 2018-09-24 VITALS
DIASTOLIC BLOOD PRESSURE: 56 MMHG | WEIGHT: 155 LBS | HEART RATE: 69 BPM | BODY MASS INDEX: 25.83 KG/M2 | SYSTOLIC BLOOD PRESSURE: 110 MMHG | TEMPERATURE: 99 F | OXYGEN SATURATION: 99 % | RESPIRATION RATE: 16 BRPM | HEIGHT: 65 IN

## 2018-09-24 DIAGNOSIS — F33.9 RECURRENT MAJOR DEPRESSIVE DISORDER, REMISSION STATUS UNSPECIFIED: Primary | ICD-10-CM

## 2018-09-24 DIAGNOSIS — F32.A DEPRESSION: ICD-10-CM

## 2018-09-24 DIAGNOSIS — F33.41 MDD (MAJOR DEPRESSIVE DISORDER), RECURRENT, IN PARTIAL REMISSION: Primary | ICD-10-CM

## 2018-09-24 PROCEDURE — 25000003 PHARM REV CODE 250: Performed by: STUDENT IN AN ORGANIZED HEALTH CARE EDUCATION/TRAINING PROGRAM

## 2018-09-24 PROCEDURE — 63600175 PHARM REV CODE 636 W HCPCS: Performed by: UROLOGY

## 2018-09-24 PROCEDURE — 90870 ELECTROCONVULSIVE THERAPY: CPT | Mod: ,,, | Performed by: PSYCHIATRY & NEUROLOGY

## 2018-09-24 PROCEDURE — 25000003 PHARM REV CODE 250: Performed by: UROLOGY

## 2018-09-24 PROCEDURE — 90870 ELECTROCONVULSIVE THERAPY: CPT | Performed by: ANESTHESIOLOGY

## 2018-09-24 PROCEDURE — D9220A PRA ANESTHESIA: Mod: ,,, | Performed by: ANESTHESIOLOGY

## 2018-09-24 RX ORDER — ONDANSETRON 2 MG/ML
INJECTION INTRAMUSCULAR; INTRAVENOUS
Status: COMPLETED
Start: 2018-09-24 | End: 2018-09-24

## 2018-09-24 RX ORDER — SODIUM CHLORIDE 0.9 % (FLUSH) 0.9 %
3 SYRINGE (ML) INJECTION
Status: DISCONTINUED | OUTPATIENT
Start: 2018-09-24 | End: 2018-09-24 | Stop reason: HOSPADM

## 2018-09-24 RX ORDER — SODIUM CHLORIDE 9 MG/ML
INJECTION, SOLUTION INTRAVENOUS CONTINUOUS PRN
Status: DISCONTINUED | OUTPATIENT
Start: 2018-09-24 | End: 2018-09-24

## 2018-09-24 RX ORDER — SODIUM CHLORIDE 9 MG/ML
500 INJECTION, SOLUTION INTRAVENOUS ONCE
Status: COMPLETED | OUTPATIENT
Start: 2018-09-24 | End: 2018-09-24

## 2018-09-24 RX ORDER — LIDOCAINE HYDROCHLORIDE 10 MG/ML
1 INJECTION, SOLUTION EPIDURAL; INFILTRATION; INTRACAUDAL; PERINEURAL ONCE
Status: COMPLETED | OUTPATIENT
Start: 2018-09-24 | End: 2018-09-24

## 2018-09-24 RX ORDER — KETOROLAC TROMETHAMINE 30 MG/ML
INJECTION, SOLUTION INTRAMUSCULAR; INTRAVENOUS
Status: DISCONTINUED | OUTPATIENT
Start: 2018-09-24 | End: 2018-09-24

## 2018-09-24 RX ORDER — SUCCINYLCHOLINE CHLORIDE 20 MG/ML
INJECTION INTRAMUSCULAR; INTRAVENOUS
Status: DISCONTINUED | OUTPATIENT
Start: 2018-09-24 | End: 2018-09-24

## 2018-09-24 RX ORDER — SUCCINYLCHOLINE CHLORIDE 20 MG/ML
INJECTION INTRAMUSCULAR; INTRAVENOUS
Status: COMPLETED
Start: 2018-09-24 | End: 2018-09-24

## 2018-09-24 RX ORDER — KETOROLAC TROMETHAMINE 30 MG/ML
INJECTION, SOLUTION INTRAMUSCULAR; INTRAVENOUS
Status: COMPLETED
Start: 2018-09-24 | End: 2018-09-24

## 2018-09-24 RX ORDER — FENTANYL CITRATE 50 UG/ML
25 INJECTION, SOLUTION INTRAMUSCULAR; INTRAVENOUS EVERY 5 MIN PRN
Status: DISCONTINUED | OUTPATIENT
Start: 2018-09-24 | End: 2018-09-24 | Stop reason: HOSPADM

## 2018-09-24 RX ORDER — ONDANSETRON 2 MG/ML
INJECTION INTRAMUSCULAR; INTRAVENOUS
Status: DISCONTINUED | OUTPATIENT
Start: 2018-09-24 | End: 2018-09-24

## 2018-09-24 RX ADMIN — SUCCINYLCHOLINE CHLORIDE 120 MG: 20 INJECTION, SOLUTION INTRAMUSCULAR; INTRAVENOUS at 08:09

## 2018-09-24 RX ADMIN — SODIUM CHLORIDE 10 ML: 0.9 INJECTION, SOLUTION INTRAVENOUS at 08:09

## 2018-09-24 RX ADMIN — LIDOCAINE HYDROCHLORIDE 10 MG: 10 INJECTION, SOLUTION EPIDURAL; INFILTRATION; INTRACAUDAL at 08:09

## 2018-09-24 RX ADMIN — Medication 500 MG: at 08:09

## 2018-09-24 RX ADMIN — KETOROLAC TROMETHAMINE 30 MG: 30 INJECTION, SOLUTION INTRAMUSCULAR at 08:09

## 2018-09-24 RX ADMIN — SODIUM CHLORIDE: 0.9 INJECTION, SOLUTION INTRAVENOUS at 08:09

## 2018-09-24 RX ADMIN — ONDANSETRON 4 MG: 2 INJECTION, SOLUTION INTRAMUSCULAR; INTRAVENOUS at 08:09

## 2018-09-24 RX ADMIN — METHOHEXITAL SODIUM 160 MG: 500 INJECTION, POWDER, LYOPHILIZED, FOR SOLUTION INTRAMUSCULAR; INTRAVENOUS; RECTAL at 08:09

## 2018-09-24 NOTE — TRANSFER OF CARE
"Anesthesia Transfer of Care Note    Patient: Allyssa Wright    Procedure(s) Performed: Procedure(s) (LRB):  ELECTROCONVULSIVE THERAPY (ECT) - SINGLE SEIZURE (N/A)    Patient location: PACU    Anesthesia Type: general    Transport from OR: Transported from OR on 6-10 L/min O2 by face mask with adequate spontaneous ventilation    Post pain: adequate analgesia    Post assessment: tolerated procedure well and no apparent anesthetic complications    Post vital signs: stable    Level of consciousness: awake, alert and oriented    Nausea/Vomiting: no nausea/vomiting    Complications: none    Transfer of care protocol was followed      Last vitals:   Visit Vitals  /72 (BP Location: Left arm, Patient Position: Lying)   Pulse 68   Temp 37.1 °C (98.8 °F) (Temporal)   Resp 20   Ht 5' 5" (1.651 m)   Wt 70.3 kg (155 lb)   SpO2 100%   BMI 25.79 kg/m²     "

## 2018-09-24 NOTE — PLAN OF CARE
Patient tolerated procedure well.  Preparing for discharge.  Instructions include S/S of complications, when to seek medical attention, medication administration, F/U appointments and activity restrictions.  Patient and father verbalized understanding.  Peripheral IV removed prior to departure from unit by wheelchair.

## 2018-09-24 NOTE — DISCHARGE INSTRUCTIONS
Understanding Electroconvulsive Therapy  Electroconvulsive therapy (ECT) is sometimes called shock therapy. This may sound painful, but ECT doesnt hurt. Its often the safest and best treatment for severe depression. It can treat other mental disorders as well.  What is electroconvulsive therapy?  ECT is used to treat people who are very depressed. Its mainly used when other treatments, such as antidepressant medicines, have failed. Often it may relieve feelings of sadness and despair after 2 to 4 treatments.  Common symptoms of major depression  Symptoms of major depression include:  · Feeling a deep sadness that doesnt go away  · Losing all pleasure in life  · Feeling hopeless or helpless  · Feeling guilty  · Sleeping more or less than normal  · Eating more or less than normal  · Having headaches or stomachaches, or other pains that dont go away  · Feeling nervous, empty, or worthless  · Crying a great deal  · Thinking or talking about suicide or death  How is ECT therapy done?  Before an ECT treatment, youll be given anesthesia to keep you pain-free. Youll also be given medicine to make you sleep, relax your muscles and control your heart rate. Your healthcare provider then places electrodes on your head. You may have one above each temple (bilateral ECT). Or you may have electrodes on one temple and on your forehead (unilateral ECT). While you are asleep, your brain is stimulated very briefly with an electric current. This causes a seizure, usually lasting less than a minute. Because you are under anesthesia, your body will not move even as your brain goes through great changes.  What are the risks?  When done properly, ECT is quite safe. Right after the treatment, you may be confused. This often lasts for less than half an hour. You may have a headache or stiff muscles. But these symptoms often go away quickly. A more serious possible side effect is memory loss. Commonly, people have short-term  (temporary) trouble remembering information that they learned recently. And they may have little recall of the time when they received treatment. Less commonly, people may have long-lasting (permanent) spotty recall of major past events. In rare cases, there may be memory loss for larger blocks of time.  Looking to the future  In most cases, ECT doesnt cure depression. But it can improve symptoms for a period of time. You may need a series of ECT treatments to continue feeling the benefit. You may also need to take antidepressant medicines to help prevent symptoms from returning. But with ongoing treatment, you can have a full and healthy life.  Resources  · The National [place] of Mental Health  290.161.1879  www.nim.nih.gov  · [facility]  348.365.8281  www.[facility].org     Date Last Reviewed: 5/1/2017 © 2000-2017 [employer]. [address] [place] [address]. All rights reserved. This information is not intended as a substitute for professional medical care. Always follow your healthcare professional's instructions.      Anesthesia: General Anesthesia     You are watched continuously during your procedure by your anesthesia provider.     Youre due to have surgery. During surgery, youll be given medicine called anesthesia or anesthetic. This will keep you comfortable and pain-free. Your anesthesia provider will use general anesthesia.  What is general anesthesia?  General anesthesia puts you into a state like deep sleep. It goes into the bloodstream (IV anesthetics), into the lungs (gas anesthetics), or both. You feel nothing during the procedure. You will not remember it. During the procedure, the anesthesia provider monitors you continuously. He or she checks your heart rate and rhythm, blood pressure, breathing, and blood oxygen.  · IV anesthetics. IV anesthetics are given through an IV line in your arm. Theyre often given first. This is so you are asleep before a gas  anesthetic is started. Some kinds of IV anesthetics relieve pain. Others relax you. Your doctor will decide which kind is best in your case.  · Gas anesthetics. Gas anesthetics are breathed into the lungs. They are often used to keep you asleep. They can be given through a facemask or a tube placed in your larynx or trachea (breathing tube).  ¨ If you have a facemask, your anesthesia provider will most likely place it over your nose and mouth while youre still awake. Youll breathe oxygen through the mask as your IV anesthetic is started. Gas anesthetic may be added through the mask.  ¨ If you have a tube in the larynx or trachea, it will be inserted into your throat after youre asleep.  Anesthesia tools and medicines  You will likely have:  · IV anesthetics. These are put into an IV line into your bloodstream.  · Gas anesthetics. You breathe these anesthetics into your lungs, where they pass into your bloodstream.  · Pulse oximeter. This is a small clip that is attached to the end of your finger. This measures your blood oxygen level.  · Electrocardiography leads (electrodes). These are small sticky pads that are placed on your chest. They record your heart rate and rhythm.  · Blood pressure cuff. This reads your blood pressure.  Risks and possible complications  General anesthesia has some risks. These include:  · Breathing problems  · Nausea and vomiting  · Sore throat or hoarseness (usually temporary)  · Allergic reaction to the anesthetic  · Irregular heartbeat (rare)  · Cardiac arrest (rare)   Anesthesia safety  · Follow all instructions you are given for how long not to eat or drink before your procedure.  · Be sure your doctor knows what medicines and drugs you take. This includes over-the-counter medicines, herbs, supplements, alcohol or other drugs. You will be asked when those were last taken.  · Have an adult family member or friend drive you home after the procedure.  · For the first 24 hours after  your surgery:  ¨ Do not drive or use heavy equipment.  ¨ Do not make important decisions or sign legal documents. If important decisions or signing legal documents is necessary during the first 24 hours after surgery, have a trusted family member or spouse act on your behalf.  ¨ Avoid alcohol.  ¨ Have a responsible adult stay with you. He or she can watch for problems and help keep you safe.  Date Last Reviewed: 12/1/2016  © 2033-2009 [employer]. [address] [place] [address]. All rights reserved. This information is not intended as a substitute for professional medical care. Always follow your healthcare professional's instructions.

## 2018-09-24 NOTE — OP NOTE
Allyssa Wright  : 1980   MRN: [id number]  Date: 2018    Electroconvulsive Therapy  Operative Note    Date of Admission: 2018  5:52 AM    Site: [facility], [address]    Attending: Shoaib Boyce MD  Residents: Divina Hickman MD  Pre-op Diagnosis: MDD, recurrent, in partial remission    ECT Treatment Number: 56  Machine Type: Mecta 5000    Patient Status: Medically stable    Vitals (pre-procedure):  Vitals:    18 0735   BP: 105/61   Pulse: 77   Resp: 20   Temp: 98.3 °F (36.8 °C)       Electrode Placement: Bitemporal    Stimulus Number Charge (mC) Level Pulse Width (msec) Frequency  (Hz) Duration of Stimulus (sec) Current (mA) Duration of Seizure (sec)   1 576 3 1 60 6 800 44                                   Complications: Hypertension    Maximum Blood Pressure: 163/94    Medications Given:  Caffeine 500 mg PO before  Methohexital (Brevital) 160 mg  IV before  Succinylcholine (Anectine) 120 mg  IV before  Zofran 4 mg IV before  Toradol 30 mg IV before    Treatment Course:  Patient tolerated procedure well. After adequate recovery from general anesthesia, the patient was transported to recovery.    Post-op Diagnosis: Major Depressive Disorder, recurrent, in partial remission      Recommended for next ECT: Same as above.         Divina Hickman MD  Psychiatry PGY-4  2018

## 2018-09-24 NOTE — H&P
"Allyssa Wright  : 1980   MRN: [id number]  Date: 2018     Psychiatric - ECT  H&P     Chief complaint: MDD recurrent in partial remission     Procedure: ECT #56    SUBJECTIVE:   HPI:   Allyssa Wright is a 38 y.o. female withMDD recurrent in partial remission who presents for ECT.    Pt friendly, brighter in affect compared to last week and visibly less anxious.  Still worried a little about the spacing of ECT, but trying to be more relaxed about spacing.  Last ECT was 10 days ago.  We went through spacing since April 3.  Her spacing was as follows - 10 days, 12 days, 13 days, 21 days, 27 days, 7 days 14 days, 14 days, 3 days, 5 days, 6 days 11 days, 6 days, 8 days, 10 days.  Discussed spacing to 14 days for next time - 10/8.  Rated her mood as 6/10 (improved).  Admitted she's been a little less anxious w/improved mood and didn't need vistaril the last couple of nights.  No major recurrence of depression since 10 days ago.  No major stressors, but a constant stressor of worry about her mental health and not having a "normal" life, Dad not being around forever.  She denied any recent medication changes. She denied SI/HI/AVH or any physical complaints this morning. She denied any significant medication side effects.    The pt has not had anything by mouth since midnight, and is ready for ECT.    Denies lightheadedness, headache, changes in vision, chest pain, shortness of breath, abdominal pain, nausea, vomiting, diarrhea, constipation, dysuria, hematuria, hematochezia, numbness and weakness.       Psychiatric Review of Systems:  Mood: 6/10  Anxiety: improved  Appetite: No problem  Psychomotor: No problem  Cognitive Impairment: mild, tolerable  Insomnia: None, trouble getting up in the morning  Psychosis: None  Diurnal Variation: None  Suicidal Ideation: Absent      Medical Review Of Systems:  Pertinent items are noted in HPI.    Current Medications:   No current facility-administered medications on file prior " to encounter.      Current Outpatient Medications on File Prior to Encounter   Medication Sig Dispense Refill    clozapine (CLOZARIL) 50 MG tablet Take 50 mg by mouth once daily.       dapsone 7.5 % GlwP Apply topically once daily.      famciclovir (FAMVIR) 500 MG tablet Take 1 tablet (500 mg total) by mouth 2 (two) times daily. 30 tablet 12    mirtazapine (REMERON) 15 MG tablet Take 1 tablet (15 mg total) by mouth every evening. (Patient taking differently: Take 7.5 mg by mouth every evening. ) 90 tablet 0    spironolactone (ALDACTONE) 50 MG tablet 50 mg 2 (two) times daily. 50  mg qAM, 50 mg qHS.      thyroid (ARMOUR THYROID) 30 mg Tab Take 1 tablet (30 mg total) by mouth every morning. 30 tablet 11    trazodone (DESYREL) 100 MG tablet Take 200 mg by mouth every evening.       venlafaxine (EFFEXOR) 100 MG Tab Take 3 tablets (300 mg total) by mouth once daily. (Patient taking differently: Take 225 mg by mouth once daily. )      vortioxetine (TRINTELLIX) 5 mg Tab Take 2.5 mg by mouth once daily.      dextroamphetamine-amphetamine 30 mg Tab Take 30 mg by mouth 2 (two) times daily.       hydrOXYzine pamoate (VISTARIL) 25 MG Cap Take 2 capsules (50 mg total) by mouth nightly as needed (Take 25-50mg (1-2 caps) at night for anxiety prior to ECT). 180 capsule 0    UNABLE TO FIND 2 (two) times daily. n-azetyl-cysteine      ZOVIRAX 5 % Crea   5            Allergies:   Review of patient's allergies indicates:   Allergen Reactions    Benzodiazepines Other (See Comments)     Contraindicated while taking Clozapine    Ampicillin      Mom says so    Erythromycin     Levaquin [levofloxacin] Other (See Comments)     Depression side effects    Penicillins      Mom says so    Pristiq [desvenlafaxine]      psycotic      Sulfa (sulfonamide antibiotics)      Rash      Azithromycin Anxiety        Past Medical/Surgical History:   Past Medical History:   Diagnosis Date    Anxiety     Depression     History of  psychiatric hospitalization     HSV-1 (herpes simplex virus 1) infection     Hx of psychiatric care     Moderate depressed bipolar II disorder 06/13/2016    reports no history of bipolar    Obsessive-compulsive disorder     Psychiatric problem     Schizophrenia 4/3/2018    Self-harming behavior     Therapy      Past Surgical History:   Procedure Laterality Date    ANKLE SURGERY Right     BREAST augmentation      ELECTROCONVULSIVE THERAPY (ECT) - SINGLE SEIZURE N/A 8/31/2018    [facility]    ELECTROCONVULSIVE THERAPY (ECT) - SINGLE SEIZURE N/A 8/6/2018    [facility] ECT    ELECTROCONVULSIVE THERAPY (ECT) - SINGLE SEIZURE N/A 7/23/2018    Per[facility] ECT    ELECTROCONVULSIVE THERAPY (ECT) - SINGLE SEIZURE N/A 7/5/2018    Per[facility] by [facility] ECT    ELECTROCONVULSIVE THERAPY (ECT) - SINGLE SEIZURE N/A 6/28/2018    Per[facility] by [facility] ECT    ELECTROCONVULSIVE THERAPY (ECT) - SINGLE SEIZURE N/A 6/13/2018    Per[facility] by [facility] ECT    ELECTROCONVULSIVE THERAPY (ECT) - SINGLE SEIZURE N/A 5/29/2018    Per[facility] by [facility] ECT    ELECTROCONVULSIVE THERAPY (ECT) - SINGLE SEIZURE N/A 5/8/2018    Per[facility] by [facility] ECT    ELECTROCONVULSIVE THERAPY (ECT) - SINGLE SEIZURE N/A 4/24/2018    Per[facility] by [facility] ECT    ELECTROCONVULSIVE THERAPY (ECT) - SINGLE SEIZURE N/A 4/17/2018    Performed by [facility] ECT    ELECTROCONVULSIVE THERAPY (ECT) - SINGLE SEIZURE N/A 4/13/2018    Per[facility] by [facility] ECT    ELECTROCONVULSIVE THERAPY (ECT) - SINGLE SEIZURE N/A 4/3/2018    Performed by [facility] ECT    ELECTROCONVULSIVE THERAPY (ECT) - SINGLE SEIZURE N/A 3/20/2018     Per[facility] by [facility] ECT    ELECTROCONVULSIVE THERAPY (ECT) - SINGLE SEIZURE N/A 3/15/2018    Performed by Sh[facility]ib [facility] ECT    ELECTROCONVULSIVE THERAPY (ECT) - SINGLE SEIZURE N/A 3/6/2018    Performed by Shoaib [facility] Jr[facility] ECT    ELECTROCONVULSIVE THERAPY (ECT) - SINGLE SEIZURE N/A 3/1/2018    Performed by Sh[facility]ib [facility] ECT    ELECTROCONVULSIVE THERAPY (ECT) - SINGLE SEIZURE N/A 2/23/2018    Performed by Shoaib [facility] ECT    ELECTROCONVULSIVE THERAPY (ECT) - SINGLE SEIZURE N/A 2/19/2018    Performed by Shoaib [facility] ECT    ELECTROCONVULSIVE THERAPY (ECT) - SINGLE SEIZURE N/A 2/15/2018    Performed by Sh[facility]ib [facility] ECT    ELECTROCONVULSIVE THERAPY (ECT) - SINGLE SEIZURE N/A 1/22/2018    Performed by Shoaib [facility] Jr[facility] ECT    ELECTROCONVULSIVE THERAPY (ECT) - SINGLE SEIZURE N/A 12/11/2017    Performed by Shoaib Boy[facility] Jr[facility] ECT    ELECTROCONVULSIVE THERAPY (ECT) - SINGLE SEIZURE N/A 10/24/2017    Performed by Sh[facility]ib [facility] ECT    ELECTROCONVULSIVE THERAPY (ECT) - SINGLE SEIZURE N/A 9/20/2017    Performed by Sh[facility]ib [facility] ECT    ELECTROCONVULSIVE THERAPY (ECT) - SINGLE SEIZURE N/A 8/14/2017    Performed by Shoaib [facility] Jr[facility] at [facility] ECT    ELECTROCONVULSIVE THERAPY (ECT) - SINGLE SEIZURE Bilateral 7/12/2017    Performed by Shoaib [facility] Jr[facility] at [facility] ECT    ELECTROCONVULSIVE THERAPY (ECT) - SINGLE SEIZURE N/A 6/13/2017    Performed by Shoaib [facility] Jr[facility] at [facility] ECT    ELECTROCONVULSIVE THERAPY (ECT) - SINGLE SEIZURE N/A 5/17/2017    Performed by Shoaib [facility] Jr[facility] ECT    ELECTROCONVULSIVE THERAPY (ECT) - SINGLE SEIZURE N/A 4/20/2017    Performed by Shoaib [facility] Jr[facility] at [facility] ECT    ELECTROCONVULSIVE THERAPY (ECT) - SINGLE SEIZURE N/A 11/22/2016     Per[facility] by [facility] at [facility] ECT    ELECTROCONVULSIVE THERAPY (ECT) - SINGLE SEIZURE N/A 10/24/2016    Performed by Shoaib Boyce Jr[facility] at [facility] ECT    ELECTROCONVULSIVE THERAPY (ECT) - SINGLE SEIZURE N/A 9/22/2016    Performed by Shoaib Boyce Jr[facility] at [facility] ECT    ELECTROCONVULSIVE THERAPY (ECT) - SINGLE SEIZURE N/A 9/1/2016    Performed by Shoaib Boyce Jr[facility] at [facility] ECT    ELECTROCONVULSIVE THERAPY (ECT) - SINGLE SEIZURE N/A 8/15/2016    Performed by Shoaib Boyce Jr[facility] at [facility] ECT    ELECTROCONVULSIVE THERAPY (ECT) - SINGLE SEIZURE N/A 8/1/2016    Performed by Shoaib Boyce Jr[facility] at [facility] ECT    ELECTROCONVULSIVE THERAPY (ECT) - SINGLE SEIZURE N/A 7/22/2016    Performed by Shoaib Boyce Jr[facility] at [facility] ECT    ELECTROCONVULSIVE THERAPY (ECT) - SINGLE SEIZURE N/A 7/14/2016    Performed by Shoaib Boyce Jr[facility] at [facility] ECT    ELECTROCONVULSIVE THERAPY (ECT) - SINGLE SEIZURE N/A 7/8/2016    Performed by Shoaib Boyce Jr[facility] at [facility] ECT    ELECTROCONVULSIVE THERAPY (ECT) - SINGLE SEIZURE N/A 7/5/2016    Performed by Shoaib Boyce Jr[facility] at [facility] ECT    ELECTROCONVULSIVE THERAPY (ECT) - SINGLE SEIZURE N/A 6/27/2016    Performed by Shoaib Boyce Jr[facility] at [facility] ECT    ELECTROCONVULSIVE THERAPY (ECT) - SINGLE SEIZURE N/A 6/23/2016    Performed by Shoaib Boyce Jr[facility] at [facility] ECT    ELECTROCONVULSIVE THERAPY (ECT) - SINGLE SEIZURE N/A 6/20/2016    Performed by Shoaib Boyce Jr[facility] at [facility] ECT    ELECTROCONVULSIVE THERAPY (ECT) - SINGLE SEIZURE N/A 6/16/2016    Performed by Shoaib Boyce Jr[facility] at [facility] ECT    ELECTROCONVULSIVE THERAPY (ECT) - SINGLE SEIZURE N/A 6/15/2016    Performed by Sh[facility]ib [facility] Jr[facility] at [facility] ECT    ELECTROCONVULSIVE THERAPY (ECT) - SINGLE SEIZURE N/A 6/14/2016    Performed by Sh[facility]ib [facility] Jr[facility] at [facility] ECT    ELECTROCONVULSIVE THERAPY (ECT) - SINGLE SEIZURE N/A 6/13/2016    Performed by  [facility] at [facility] ECT    ELECTROCONVULSIVE THERAPY (ECT) - SINGLE SEIZURE N/A 1/4/2016    Performed by Sh[facility]ib [facility] Jr[facility] at [facility] ECT    ELECTROCONVULSIVE THERAPY, CEREBRAL HEMISPHERE, UNILATERAL, 1 SEIZURE Bilateral 6/25/2018    Performed by [facility] at [facility] ECT    OVARIAN CYST REMOVAL Bilateral           OBJECTIVE:   Vitals (pre-procedure):  Vitals:    09/24/18 0735   BP: 105/61   Pulse: 77   Resp: 20   Temp: 98.3 °F (36.8 °C)        Labs/Imaging/Studies:   No results found for this or any previous visit (from the past 48 hour(s)).   No results found for: PHENYTOIN, PHENOBARB, VALPROATE, CBMZ      Physical Exam:   General: well nourished  Orientation: oriented to person, place, and time  Mental Status: alert and responsive  HEENT: normocephalic, no lesions or obvious abnormality  Chest: breath sounds clear and equal bilaterally  Heart: RRR. No murmurs  Abdomen: soft, non-, no palpable masses, +BS  Neuro: CN II-XII grossly intact     Mental Status Exam:   Appearance: age appropriate, well nourished, dressed in hospital gown  Behavior: friendly and cooperative, eye contact appropriate  Speech: conversational tone, rate, pitch, volume  Mood: 6/10  Affect: full, reactive  Thought Process: linear and organized  Thought Content: no SI, no HI or AVH  Cognition: grossly intact  Insight: good  Judgment: appropriate for setting     ASSESSMENT/PLAN:   Allyssa Wright is a 38 y.o. female with MDD recurrent in partial remission who presents for ECT.     Recommendations:   Proceed with ECT #56.    Divina Hickman MD  [facility], PGY-4  9/24/2018

## 2018-09-24 NOTE — DISCHARGE SUMMARY
Allyssa Wright  : 1980   MRN: [id number]  Date: 2018       Psychiatry - ECT  Discharge Summary    Admit Date: 2018  5:52 AM  Discharge Date: 2018    Attending Physician: Shoaib Boyce MD  Discharge Provider: Divina Hickman MD    History of Present Illness: Allyssa Wright is a 38 y.o. female with MDD, recurrent, in partial remission  presented for ECT #56. See H&P dated 2018 for full HPI.     Hospital Course: The pt tolerated the ECT treatment well with minimal complication. Pt was stable post-procedure. See OP note dated 2018 for more details.     Disposition: Home or Self Care    Medications:  Reconciled Home Medications:  (No change to Mirtazapine or Venlafaxine, incorrect below).     Medication List      CHANGE how you take these medications    mirtazapine 15 MG tablet  Commonly known as:  REMERON  Take 1 tablet (15 mg total) by mouth every evening.  What changed:  how much to take     venlafaxine 100 MG Tab  Commonly known as:  EFFEXOR  Take 3 tablets (300 mg total) by mouth once daily.  What changed:  how much to take        CONTINUE taking these medications    cloZAPine 50 MG tablet  Commonly known as:  CLOZARIL  Take 50 mg by mouth once daily.     dextroamphetamine-amphetamine 30 mg Tab  Take 30 mg by mouth 2 (two) times daily.     hydrOXYzine pamoate 25 MG Cap  Commonly known as:  VISTARIL  Take 2 capsules (50 mg total) by mouth nightly as needed (Take 25-50mg (1-2 caps) at night for anxiety prior to ECT).     thyroid (pork) 30 mg Tab  Commonly known as:  ARMOUR THYROID  Take 1 tablet (30 mg total) by mouth every morning.     traZODone 100 MG tablet  Commonly known as:  DESYREL  Take 200 mg by mouth every evening.     TRINTELLIX 5 mg Tab  Generic drug:  vortioxetine  Take 2.5 mg by mouth once daily.        ASK your doctor about these medications    dapsone 7.5 % Glwp  Commonly known as:  ACZONE  Apply topically once daily.     famciclovir 500 MG tablet  Commonly known as:   FAMVIR  Take 1 tablet (500 mg total) by mouth 2 (two) times daily.     spironolactone 50 MG tablet  Commonly known as:  ALDACTONE  50 mg 2 (two) times daily. 50  mg qAM, 50 mg qHS.     UNABLE TO FIND  2 (two) times daily. n-azetyl-cysteine     ZOVIRAX 5 % Crea  Generic drug:  acyclovir 5%              Pt's Status at Discharge: alert and medically stable    Discharge Diagnoses:    Major Depressive Disorder, recurrent, in partial remission      Diet: Resume previous outpt diet  Activity: Ambulate with assistance - pt is a fall risk  Instructions: Please do not eat or drink anything after midnight prior to procedure. Please do not drive on day of ECT.     Med Changes:  None    Next ECT:  On Monday, 10/8/18, in 14 days    Follow-up Information     [facility] ECT On 10/8/2018.    Why:  On Monday, 10/8/18 for ECT                 Divina Hickman MD  PGY 4 [facility]  9/24/2018

## 2018-09-24 NOTE — ANESTHESIA PREPROCEDURE EVALUATION
[facility]  Anesthesia Pre-Operative Evaluation         Patient Name: Allyssa Wright  YOB: 1980  MRN: [id number]    SUBJECTIVE:     Pre-operative evaluation for Procedure(s) (LRB):  ELECTROCONVULSIVE THERAPY (ECT) - SINGLE SEIZURE (N/A)     09/24/2018    Allyssa Wright is a 38 y.o. female w/ a significant PMHx of poorly controlled MDD, hypothyroidism, HSV-1, currently on maintenance ECT.      ECT#: 57     Previous meds:  Methohexital 160mg  Succinylcholine 120mg  Labetalol 0mg  Ketorolac 30mg  Ondansetron 4mg    LDA: None documented.    Prev airway: Easy mask ventilation    Drips: None documented      Patient Active Problem List   Diagnosis    MDD (major depressive disorder), recurrent, in partial remission    Other acne    Comfort measures only status       Review of patient's allergies indicates:   Allergen Reactions    Benzodiazepines Other (See Comments)     Contraindicated while taking Clozapine    Ampicillin      Mom says so    Erythromycin     Levaquin [levofloxacin] Other (See Comments)     Depression side effects    Penicillins      Mom says so    Pristiq [desvenlafaxine]      psycotic      Sulfa (sulfonamide antibiotics)      Rash      Azithromycin Anxiety       Current Inpatient Medications:      No current facility-administered medications on file prior to encounter.      Current Outpatient Medications on File Prior to Encounter   Medication Sig Dispense Refill    clozapine (CLOZARIL) 50 MG tablet Take 50 mg by mouth once daily.       dapsone 7.5 % GlwP Apply topically once daily.      dextroamphetamine-amphetamine 30 mg Tab Take 30 mg by mouth 2 (two) times daily.       famciclovir (FAMVIR) 500 MG tablet Take 1 tablet (500 mg total) by mouth 2 (two) times daily. 30 tablet 12    hydrOXYzine pamoate (VISTARIL) 25 MG Cap Take 2 capsules (50 mg total) by mouth nightly as needed (Take 25-50mg (1-2 caps) at night for anxiety prior to ECT). 180 capsule 0     mirtazapine (REMERON) 15 MG tablet Take 1 tablet (15 mg total) by mouth every evening. (Patient taking differently: Take 7.5 mg by mouth every evening. ) 90 tablet 0    spironolactone (ALDACTONE) 50 MG tablet 50 mg 2 (two) times daily. 50  mg qAM, 50 mg qHS.      thyroid (ARMOUR THYROID) 30 mg Tab Take 1 tablet (30 mg total) by mouth every morning. 30 tablet 11    trazodone (DESYREL) 100 MG tablet Take 200 mg by mouth every evening.       UNABLE TO FIND 2 (two) times daily. n-azetyl-cysteine      venlafaxine (EFFEXOR) 100 MG Tab Take 3 tablets (300 mg total) by mouth once daily. (Patient taking differently: Take 225 mg by mouth once daily. )      vortioxetine (TRINTELLIX) 5 mg Tab Take 2.5 mg by mouth once daily.      ZOVIRAX 5 % Crea   5       Past Surgical History:   Procedure Laterality Date    ANKLE SURGERY Right     BREAST augmentation      ELECTROCONVULSIVE THERAPY (ECT) - SINGLE SEIZURE N/A 8/31/2018    Performed [facility]ib [facility] Jr[facility] ECT    ELECTROCONVULSIVE THERAPY (ECT) - SINGLE SEIZURE N/A 8/6/2018    [facility] ECT    ELECTROCONVULSIVE THERAPY (ECT) - SINGLE SEIZURE N/A 7/23/2018    Per[facility] ECT    ELECTROCONVULSIVE THERAPY (ECT) - SINGLE SEIZURE N/A 7/5/2018    Per[facility] by [facility] ECT    ELECTROCONVULSIVE THERAPY (ECT) - SINGLE SEIZURE N/A 6/28/2018    Per[facility] ECT    ELECTROCONVULSIVE THERAPY (ECT) - SINGLE SEIZURE N/A 6/13/2018    Per[facility] ECT    ELECTROCONVULSIVE THERAPY (ECT) - SINGLE SEIZURE N/A 5/29/2018    Per[facility] by [facility] ECT    ELECTROCONVULSIVE THERAPY (ECT) - SINGLE SEIZURE N/A 5/8/2018    Per[facility] by [facility] ECT    ELECTROCONVULSIVE THERAPY (ECT) - SINGLE SEIZURE N/A 4/24/2018    Per[facility]  ECT    ELECTROCONVULSIVE THERAPY (ECT) - SINGLE SEIZURE N/A 4/17/2018    Per[facility] ECT    ELECTROCONVULSIVE THERAPY (ECT) - SINGLE SEIZURE N/A 4/13/2018    Per[facility] ECT    ELECTROCONVULSIVE THERAPY (ECT) - SINGLE SEIZURE N/A 4/3/2018    Performed by [facility] ECT    ELECTROCONVULSIVE THERAPY (ECT) - SINGLE SEIZURE N/A 3/20/2018    Performed by [facility] ECT    ELECTROCONVULSIVE THERAPY (ECT) - SINGLE SEIZURE N/A 3/15/2018    Performed by [facility]ib [facility] ECT    ELECTROCONVULSIVE THERAPY (ECT) - SINGLE SEIZURE N/A 3/6/2018    Performed by Shoaib [facility] ECT    ELECTROCONVULSIVE THERAPY (ECT) - SINGLE SEIZURE N/A 3/1/2018    Performed by Sh[facility]ib [facility] ECT    ELECTROCONVULSIVE THERAPY (ECT) - SINGLE SEIZURE N/A 2/23/2018    Performed by Sh[facility]ib [facility] ECT    ELECTROCONVULSIVE THERAPY (ECT) - SINGLE SEIZURE N/A 2/19/2018    Performed by Sh[facility]ib [facility] ECT    ELECTROCONVULSIVE THERAPY (ECT) - SINGLE SEIZURE N/A 2/15/2018    Performed by [facility] ECT    ELECTROCONVULSIVE THERAPY (ECT) - SINGLE SEIZURE N/A 1/22/2018    Performed by [facility] ECT    ELECTROCONVULSIVE THERAPY (ECT) - SINGLE SEIZURE N/A 12/11/2017    Per[facility] by [facility] ECT    ELECTROCONVULSIVE THERAPY (ECT) - SINGLE SEIZURE N/A 10/24/2017    Per[facility] by [facility] ECT    ELECTROCONVULSIVE THERAPY (ECT) - SINGLE SEIZURE N/A 9/20/2017    Per[facility] by [facility] ECT    ELECTROCONVULSIVE THERAPY (ECT) - SINGLE SEIZURE N/A 8/14/2017    Per[facility] by [facility] ECT    ELECTROCONVULSIVE THERAPY (ECT) - SINGLE SEIZURE Bilateral 7/12/2017    Per[facility] ECT     ELECTROCONVULSIVE THERAPY (ECT) - SINGLE SEIZURE N/A 6/13/2017    [facility] ECT    ELECTROCONVULSIVE THERAPY (ECT) - SINGLE SEIZURE N/A 5/17/2017    Performed by [facility] at [facility] ECT    ELECTROCONVULSIVE THERAPY (ECT) - SINGLE SEIZURE N/A 4/20/2017    Performed by Shoaib [facility] at [facility] ECT    ELECTROCONVULSIVE THERAPY (ECT) - SINGLE SEIZURE N/A 11/22/2016    Performed by Shoaib [facility] Jr[facility] at [facility] ECT    ELECTROCONVULSIVE THERAPY (ECT) - SINGLE SEIZURE N/A 10/24/2016    Performed by Shoaib [facility] Jr[facility] at [facility] ECT    ELECTROCONVULSIVE THERAPY (ECT) - SINGLE SEIZURE N/A 9/22/2016    Performed by Shoaib [facility] Jr[facility] at [facility] ECT    ELECTROCONVULSIVE THERAPY (ECT) - SINGLE SEIZURE N/A 9/1/2016    Performed by Shoaib [facility] Jr[facility] at [facility] ECT    ELECTROCONVULSIVE THERAPY (ECT) - SINGLE SEIZURE N/A 8/15/2016    Performed by Shoaib [facility] Jr[facility] at [facility] ECT    ELECTROCONVULSIVE THERAPY (ECT) - SINGLE SEIZURE N/A 8/1/2016    Performed by Shoaib [facility] Jr[facility] at [facility] ECT    ELECTROCONVULSIVE THERAPY (ECT) - SINGLE SEIZURE N/A 7/22/2016    Performed by Shoaib [facility] Jr[facility] at [facility] ECT    ELECTROCONVULSIVE THERAPY (ECT) - SINGLE SEIZURE N/A 7/14/2016    Performed by Shoaib [facility] Jr[facility] at [facility] ECT    ELECTROCONVULSIVE THERAPY (ECT) - SINGLE SEIZURE N/A 7/8/2016    Performed by Sh[facility]ib [facility] at [facility] ECT    ELECTROCONVULSIVE THERAPY (ECT) - SINGLE SEIZURE N/A 7/5/2016    Performed by Shoaib [facility] Jr[facility] at [facility] ECT    ELECTROCONVULSIVE THERAPY (ECT) - SINGLE SEIZURE N/A 6/27/2016    Performed by Sh[facility]ib [facility] Jr[facility] at [facility] ECT    ELECTROCONVULSIVE THERAPY (ECT) - SINGLE SEIZURE N/A 6/23/2016    Performed by Sh[facility] ECT    ELECTROCONVULSIVE THERAPY (ECT) - SINGLE SEIZURE N/A 6/20/2016    Performed by [facility] ECT     ELECTROCONVULSIVE THERAPY (ECT) - SINGLE SEIZURE N/A 2016    [facility] ECT    ELECTROCONVULSIVE THERAPY (ECT) - SINGLE SEIZURE N/A 6/15/2016    Performed by Sh[facility] ECT    ELECTROCONVULSIVE THERAPY (ECT) - SINGLE SEIZURE N/A 2016    Performed by [facility] ECT    ELECTROCONVULSIVE THERAPY (ECT) - SINGLE SEIZURE N/A 2016    Performed by [facility] ECT    ELECTROCONVULSIVE THERAPY (ECT) - SINGLE SEIZURE N/A 2016    Performed by [facility] ECT    ELECTROCONVULSIVE THERAPY, CEREBRAL HEMISPHERE, UNILATERAL, 1 SEIZURE Bilateral 2018    Per[facility] ECT    OVARIAN CYST REMOVAL Bilateral        Social History     Socioeconomic History    Marital status: Single     Spouse name: Not on file    Number of children: Not on file    Years of education: Not on file    Highest education level: Not on file   Social Needs    Financial resource strain: Not on file    Food insecurity - worry: Not on file    Food insecurity - inability: Not on file    Transportation needs - medical: Not on file    Transportation needs - non-medical: Not on file   Occupational History    Occupation: unemployed   Tobacco Use    Smoking status: Former Smoker     Last attempt to quit: 2011     Years since quittin.4    Smokeless tobacco: Never Used   Substance and Sexual Activity    Alcohol use: Yes     Comment: rarely    Drug use: No     Comment: Experimental use in high school    Sexual activity: Not on file   Other Topics Concern    Patient feels they ought to cut down on drinking/drug use Not Asked    Patient annoyed by others criticizing their drinking/drug use Not Asked    Patient has felt bad or guilty about drinking/drug use Not Asked    Patient has had a drink/used drugs as an eye opener in the AM Not Asked   Social History  [place]    Pt has 1 older brother from an intact family until the death of her mother in 2012.  She completed 1 year of college, was never in the , and has never been employed.  She was engaged once, but never , has no children, and lives with her father plus 2 dogs.  She denies any hobbies and is spiritual but not Judaism.  She does date and returns to college during rare periods when depression and anxiety orlnado.       OBJECTIVE:     Vital Signs Range (Last 24H):  BP: ()/()   Arterial Line BP: ()/()       Significant Labs:  Lab Results   Component Value Date    WBC 6.83 06/08/2016    HGB 13.3 06/08/2016    HCT 40.3 06/08/2016     06/08/2016    ALT 14 06/08/2016    AST 20 06/08/2016     06/08/2016    K 3.8 06/08/2016     06/08/2016    CREATININE 0.7 06/08/2016    BUN 10 06/08/2016    CO2 28 06/08/2016    TSH 1.208 06/08/2016       Diagnostic Studies: No relevant studies.    EKG: No recent studies available.    2D ECHO:  No results found for this or any previous visit.      ASSESSMENT/PLAN:         Anesthesia Evaluation    I have reviewed the Patient Summary Reports.    I have reviewed the Nursing Notes.   I have reviewed the Medications.     Review of Systems  Anesthesia Hx:  History of prior surgery of interest to airway management or planning:       Physical Exam  General:  Well nourished    Airway/Jaw/Neck:  Airway Findings: Mouth Opening: Normal Tongue: Normal  Mallampati: II  TM Distance: Normal, at least 6 cm       Chest/Lungs:  Chest/Lungs Findings: Normal Respiratory Rate, Clear to auscultation     Heart/Vascular:  Heart Findings: Rate: Normal  Rhythm: Regular Rhythm             Anesthesia Plan  Type of Anesthesia, risks & benefits discussed:  Anesthesia Type:  general, MAC  Patient's Preference:   Intra-op Monitoring Plan: standard ASA monitors  Intra-op Monitoring Plan Comments:   Post Op Pain Control Plan: multimodal analgesia, IV/PO Opioids PRN and per primary  service following discharge from PACU  Post Op Pain Control Plan Comments:   Induction:   IV  Beta Blocker:  Patient is not currently on a Beta-Blocker (No further documentation required).       Informed Consent: Patient understands risks and agrees with Anesthesia plan.  Questions answered. Anesthesia consent signed with patient.  ASA Score: 2     Day of Surgery Review of History & Physical:            Ready For Surgery From Anesthesia Perspective.

## 2018-09-25 ENCOUNTER — PATIENT MESSAGE (OUTPATIENT)
Dept: ADMINISTRATIVE | Facility: OTHER | Age: 38
End: 2018-09-25

## 2018-09-25 NOTE — ANESTHESIA POSTPROCEDURE EVALUATION
"Anesthesia Post Evaluation    Patient: Allyssa Wright    Procedure(s) Performed: Procedure(s) (LRB):  ELECTROCONVULSIVE THERAPY (ECT) - SINGLE SEIZURE (N/A)    Final Anesthesia Type: general  Patient location during evaluation: PACU  Patient participation: Yes- Able to Participate  Level of consciousness: awake and alert  Post-procedure vital signs: reviewed and stable  Pain management: adequate  Airway patency: patent  PONV status at discharge: No PONV  Anesthetic complications: no      Cardiovascular status: blood pressure returned to baseline  Respiratory status: unassisted  Hydration status: euvolemic  Follow-up not needed.        Visit Vitals  BP (!) 110/56 (BP Location: Left arm, Patient Position: Lying)   Pulse 69   Temp 37.1 °C (98.8 °F) (Temporal)   Resp 16   Ht 5' 5" (1.651 m)   Wt 70.3 kg (155 lb)   SpO2 99%   BMI 25.79 kg/m²       Pain/Roma Score: Pain Assessment Performed: Yes (9/24/2018  9:48 AM)  Presence of Pain: denies (9/24/2018  9:48 AM)  Roma Score: 10 (9/24/2018  9:20 AM)        "

## 2018-10-03 ENCOUNTER — TELEPHONE (OUTPATIENT)
Dept: PSYCHIATRY | Facility: HOSPITAL | Age: 38
End: 2018-10-03

## 2018-10-03 NOTE — TELEPHONE ENCOUNTER
Received a voicemail from patient requesting to change ECT procedure date from 10/8/18 to Tuesday 10/9/18. Surgery scheduling notified to make adjustment. Left patient a voicemail confirming change.

## 2018-10-08 ENCOUNTER — ANESTHESIA EVENT (OUTPATIENT)
Dept: ELECTROPHYSIOLOGY | Facility: HOSPITAL | Age: 38
End: 2018-10-08
Payer: COMMERCIAL

## 2018-10-08 NOTE — ANESTHESIA PREPROCEDURE EVALUATION
[facility]  Anesthesia Pre-Operative Evaluation         Patient Name: Allyssa Wright  YOB: 1980  MRN: [id number]    SUBJECTIVE:     Pre-operative evaluation for Procedure(s) (LRB):  ELECTROCONVULSIVE THERAPY (ECT) - SINGLE SEIZURE (N/A)     10/08/2018    Allyssa Wright is a 38 y.o. female w/ a significant PMHx of poorly controlled MDD, hypothyroidism, HSV-1, currently on maintenance ECT.      ECT#: 58     Previous meds:  Methohexital 160mg  Succinylcholine 120mg  Ketorolac 30mg  Ondansetron 4mg    LDA: None documented.    Prev airway: Easy mask ventilation    Drips: None documented      Patient Active Problem List   Diagnosis    MDD (major depressive disorder), recurrent, in partial remission    Other acne    Comfort measures only status       Review of patient's allergies indicates:   Allergen Reactions    Benzodiazepines Other (See Comments)     Contraindicated while taking Clozapine    Ampicillin      Mom says so    Erythromycin     Levaquin [levofloxacin] Other (See Comments)     Depression side effects    Penicillins      Mom says so    Pristiq [desvenlafaxine]      psycotic      Sulfa (sulfonamide antibiotics)      Rash      Azithromycin Anxiety       Current Inpatient Medications:      No current facility-administered medications on file prior to encounter.      Current Outpatient Medications on File Prior to Encounter   Medication Sig Dispense Refill    clozapine (CLOZARIL) 50 MG tablet Take 50 mg by mouth once daily.       dapsone 7.5 % GlwP Apply topically once daily.      dextroamphetamine-amphetamine 30 mg Tab Take 30 mg by mouth 2 (two) times daily.       famciclovir (FAMVIR) 500 MG tablet Take 1 tablet (500 mg total) by mouth 2 (two) times daily. 30 tablet 12    hydrOXYzine pamoate (VISTARIL) 25 MG Cap Take 2 capsules (50 mg total) by mouth nightly as needed (Take 25-50mg (1-2 caps) at night for anxiety prior to ECT). 180 capsule 0    mirtazapine  (REMERON) 15 MG tablet Take 1 tablet (15 mg total) by mouth every evening. (Patient taking differently: Take 7.5 mg by mouth every evening. ) 90 tablet 0    spironolactone (ALDACTONE) 50 MG tablet 50 mg 2 (two) times daily. 50  mg qAM, 50 mg qHS.      thyroid (ARMOUR THYROID) 30 mg Tab Take 1 tablet (30 mg total) by mouth every morning. 30 tablet 11    trazodone (DESYREL) 100 MG tablet Take 200 mg by mouth every evening.       UNABLE TO FIND 2 (two) times daily. n-azetyl-cysteine      venlafaxine (EFFEXOR) 100 MG Tab Take 3 tablets (300 mg total) by mouth once daily. (Patient taking differently: Take 225 mg by mouth once daily. )      vortioxetine (TRINTELLIX) 5 mg Tab Take 2.5 mg by mouth once daily.      ZOVIRAX 5 % Crea   5       Past Surgical History:   Procedure Laterality Date    ANKLE SURGERY Right     BREAST augmentation      ELECTROCONVULSIVE THERAPY (ECT) - SINGLE SEIZURE N/A 8/31/2018    Performed by [facility]ib [facility] Jr., [facility] at [facility] ECT    ELECTROCONVULSIVE THERAPY (ECT) - SINGLE SEIZURE N/A 8/6/2018    Performed by [facility]ib [facility] Jr[facility] at [facility] ECT    ELECTROCONVULSIVE THERAPY (ECT) - SINGLE SEIZURE N/A 7/23/2018    Performed by [facility]ib [facility] Jr[facility] at [facility] ECT    ELECTROCONVULSIVE THERAPY (ECT) - SINGLE SEIZURE N/A 7/5/2018    Performed by [facility]ib [facility] Jr[facility] at [facility] ECT    ELECTROCONVULSIVE THERAPY (ECT) - SINGLE SEIZURE N/A 6/28/2018    Performed by [facility]ib [facility] Jr[facility] at [facility] ECT    ELECTROCONVULSIVE THERAPY (ECT) - SINGLE SEIZURE N/A 6/13/2018    Performed by Sh[facility]ib [facility] Jr[facility] at [facility] ECT    ELECTROCONVULSIVE THERAPY (ECT) - SINGLE SEIZURE N/A 5/29/2018    Performed by Sh[facility]ib [facility]ce Jr[facility] at [facility] ECT    ELECTROCONVULSIVE THERAPY (ECT) - SINGLE SEIZURE N/A 5/8/2018    Performed by Shoaib Boyce Jr[facility] at [facility] ECT    ELECTROCONVULSIVE THERAPY (ECT) - SINGLE SEIZURE N/A 4/24/2018    Performed by Sh[facility]ib Boyce Jr[facility] at [facility] ECT     ELECTROCONVULSIVE THERAPY (ECT) - SINGLE SEIZURE N/A 4/17/2018    [facility]    ELECTROCONVULSIVE THERAPY (ECT) - SINGLE SEIZURE N/A 4/13/2018    Performed by [facility]    ELECTROCONVULSIVE THERAPY (ECT) - SINGLE SEIZURE N/A 4/3/2018    Performed by [facility]ib [facility]    ELECTROCONVULSIVE THERAPY (ECT) - SINGLE SEIZURE N/A 3/20/2018    Performed by Sh[facility]ib [facility]    ELECTROCONVULSIVE THERAPY (ECT) - SINGLE SEIZURE N/A 3/15/2018    Performed by Sh[facility]ib [facility]    ELECTROCONVULSIVE THERAPY (ECT) - SINGLE SEIZURE N/A 3/6/2018    Performed by Shoaib Boy[facility] Jr[facility]    ELECTROCONVULSIVE THERAPY (ECT) - SINGLE SEIZURE N/A 3/1/2018    Performed by Shoaib Boy[facility] Jr[facility]    ELECTROCONVULSIVE THERAPY (ECT) - SINGLE SEIZURE N/A 2/23/2018    Performed by Shoaib Boy[facility]    ELECTROCONVULSIVE THERAPY (ECT) - SINGLE SEIZURE N/A 2/19/2018    Performed by Shoaib Boy[facility] Jr[facility]    ELECTROCONVULSIVE THERAPY (ECT) - SINGLE SEIZURE N/A 2/15/2018    Performed by Shoaib Boy[facility] Jr[facility]    ELECTROCONVULSIVE THERAPY (ECT) - SINGLE SEIZURE N/A 1/22/2018    Performed by Shoaib [facility]    ELECTROCONVULSIVE THERAPY (ECT) - SINGLE SEIZURE N/A 12/11/2017    Performed by Shoaib [facility] Jr[facility]    ELECTROCONVULSIVE THERAPY (ECT) - SINGLE SEIZURE N/A 10/24/2017    Performed by Shoaib Boy[facility] Jr[facility]    ELECTROCONVULSIVE THERAPY (ECT) - SINGLE SEIZURE N/A 9/20/2017    Performed by Shoaib Boy[facility] Jr[facility]    ELECTROCONVULSIVE THERAPY (ECT) - SINGLE SEIZURE N/A 8/14/2017    Performed by Shoaib Boyce Jr[facility]    ELECTROCONVULSIVE THERAPY (ECT) - SINGLE SEIZURE Bilateral 7/12/2017    Performed by Shoaib Boyce Jr[facility]     ELECTROCONVULSIVE THERAPY (ECT) - SINGLE SEIZURE N/A 6/13/2017    Performed by Shoaib Boyce Jr[facility]    ELECTROCONVULSIVE THERAPY (ECT) - SINGLE SEIZURE N/A 5/17/2017    Performed by Shoaib Boyce Jr[facility]    ELECTROCONVULSIVE THERAPY (ECT) - SINGLE SEIZURE N/A 4/20/2017    Performed by Shoaib Boyce Jr[facility]    ELECTROCONVULSIVE THERAPY (ECT) - SINGLE SEIZURE N/A 11/22/2016    Performed by Shoaib Boyce Jr[facility]    ELECTROCONVULSIVE THERAPY (ECT) - SINGLE SEIZURE N/A 10/24/2016    Performed by Shoaib Boyce Jr[facility]    ELECTROCONVULSIVE THERAPY (ECT) - SINGLE SEIZURE N/A 9/22/2016    Performed by Shoaib Boyce Jr[facility]    ELECTROCONVULSIVE THERAPY (ECT) - SINGLE SEIZURE N/A 9/1/2016    Performed by Shoaib Boyce Jr[facility]    ELECTROCONVULSIVE THERAPY (ECT) - SINGLE SEIZURE N/A 8/15/2016    Performed by Shoaib Boyce Jr[facility]    ELECTROCONVULSIVE THERAPY (ECT) - SINGLE SEIZURE N/A 8/1/2016    Performed by Shoaib Boyce Jr[facility]    ELECTROCONVULSIVE THERAPY (ECT) - SINGLE SEIZURE N/A 7/22/2016    Performed by Shoaib Boyce Jr[facility]    ELECTROCONVULSIVE THERAPY (ECT) - SINGLE SEIZURE N/A 7/14/2016    Performed by Shoaib Boyce Jr[facility]    ELECTROCONVULSIVE THERAPY (ECT) - SINGLE SEIZURE N/A 7/8/2016    Performed by Shoaib Boyce Jr[facility]    ELECTROCONVULSIVE THERAPY (ECT) - SINGLE SEIZURE N/A 7/5/2016    Performed by Shoaib Boyce Jr[facility]    ELECTROCONVULSIVE THERAPY (ECT) - SINGLE SEIZURE N/A 6/27/2016    Performed by Shoaib Boyce Jr[facility]    ELECTROCONVULSIVE THERAPY (ECT) - SINGLE SEIZURE N/A 6/23/2016    Performed by Shoaib Boyce Jr[facility]    ELECTROCONVULSIVE THERAPY (ECT) - SINGLE SEIZURE N/A 6/20/2016    Performed by Shoaib Boyce Jr[facility]     ELECTROCONVULSIVE THERAPY (ECT) - SINGLE SEIZURE N/A 2016    Performed by Shoaib Boyce Jr., MD at [facility] ECT    ELECTROCONVULSIVE THERAPY (ECT) - SINGLE SEIZURE N/A 6/15/2016    Performed by Shoaib Boyce Jr., MD at [facility] ECT    ELECTROCONVULSIVE THERAPY (ECT) - SINGLE SEIZURE N/A 2016    Performed by Shoaib Boyce Jr., MD at [facility]CT    ELECTROCONVULSIVE THERAPY (ECT) - SINGLE SEIZURE N/A 2016    Performed by Shoaib Boyce Jr., MD at [facility]    ELECTROCONVULSIVE THERAPY (ECT) - SINGLE SEIZURE N/A 2016    Performed by Shoaib Boyce Jr., MD at [facility] ECT    ELECTROCONVULSIVE THERAPY, CEREBRAL HEMISPHERE, UNILATERAL, 1 SEIZURE Bilateral 2018    Performed by Shoaib Boyce Jr., MD at [facility] ECT    OVARIAN CYST REMOVAL Bilateral        Social History     Socioeconomic History    Marital status: Single     Spouse name: Not on file    Number of children: Not on file    Years of education: Not on file    Highest education level: Not on file   Social Needs    Financial resource strain: Not on file    Food insecurity - worry: Not on file    Food insecurity - inability: Not on file    Transportation needs - medical: Not on file    Transportation needs - non-medical: Not on file   Occupational History    Occupation: unemployed   Tobacco Use    Smoking status: Former Smoker     Last attempt to quit: 2011     Years since quittin.5    Smokeless tobacco: Never Used   Substance and Sexual Activity    Alcohol use: Yes     Comment: rarely    Drug use: No     Comment: Experimental use in high school    Sexual activity: Not on file   Other Topics Concern    Patient feels they ought to cut down on drinking/drug use Not Asked    Patient annoyed by others criticizing their drinking/drug use Not Asked    Patient has felt bad or guilty about drinking/drug use Not Asked    Patient has had a drink/used drugs as an eye opener in the AM Not Asked   Social History  [place]    Pt has 1 older brother from an intact family until the death of her mother in 2012.  She completed 1 year of college, was never in the , and has never been employed.  She was engaged once, but never , has no children, and lives with her father plus 2 dogs.  She denies any hobbies and is spiritual but not Denominational.  She does date and returns to college during rare periods when depression and anxiety [place].       OBJECTIVE:     Vital Signs Range (Last 24H):  BP: ()/()   Arterial Line BP: ()/()       Significant Labs:  Lab Results   Component Value Date    WBC 6.83 06/08/2016    HGB 13.3 06/08/2016    HCT 40.3 06/08/2016     06/08/2016    ALT 14 06/08/2016    AST 20 06/08/2016     06/08/2016    K 3.8 06/08/2016     06/08/2016    CREATININE 0.7 06/08/2016    BUN 10 06/08/2016    CO2 28 06/08/2016    TSH 1.208 06/08/2016       Diagnostic Studies: No relevant studies.    EKG: No recent studies available.    2D ECHO:  No results found for this or any previous visit.      ASSESSMENT/PLAN:         Anesthesia Evaluation    I have reviewed the Patient Summary Reports.    I have reviewed the Nursing Notes.   I have reviewed the Medications.     Review of Systems  Anesthesia Hx:  History of prior surgery of interest to airway management or planning:       Physical Exam  General:  Well nourished    Airway/Jaw/Neck:  Airway Findings: Mouth Opening: Normal Tongue: Normal  Mallampati: II  TM Distance: Normal, at least 6 cm       Chest/Lungs:  Chest/Lungs Findings: Normal Respiratory Rate, Clear to auscultation     Heart/Vascular:  Heart Findings: Rate: Normal  Rhythm: Regular Rhythm             Anesthesia Plan  Type of Anesthesia, risks & benefits discussed:  Anesthesia Type:  general, MAC  Patient's Preference:   Intra-op Monitoring Plan: standard ASA monitors  Intra-op Monitoring Plan Comments:   Post Op Pain Control Plan: multimodal analgesia, IV/PO Opioids PRN and per primary  service following discharge from PACU  Post Op Pain Control Plan Comments:   Induction:   IV  Beta Blocker:  Patient is not currently on a Beta-Blocker (No further documentation required).       Informed Consent: Patient understands risks and agrees with Anesthesia plan.  Questions answered. Anesthesia consent signed with patient.  ASA Score: 2     Day of Surgery Review of History & Physical: I have interviewed and examined the patient. I have reviewed the patient's H&P dated:  There are no significant changes.          Ready For Surgery From Anesthesia Perspective.

## 2018-10-09 ENCOUNTER — ANESTHESIA (OUTPATIENT)
Dept: ELECTROPHYSIOLOGY | Facility: HOSPITAL | Age: 38
End: 2018-10-09
Payer: COMMERCIAL

## 2018-10-09 ENCOUNTER — TELEPHONE (OUTPATIENT)
Dept: PSYCHIATRY | Facility: CLINIC | Age: 38
End: 2018-10-09

## 2018-10-09 NOTE — TELEPHONE ENCOUNTER
Spoke with patient's father. Patient was ill this AM and is requesting for ECT treatment to be moved to tomorrow AM 10/10/18. Surgery scheduling notified.

## 2018-10-10 ENCOUNTER — HOSPITAL ENCOUNTER (OUTPATIENT)
Facility: HOSPITAL | Age: 38
Discharge: HOME | End: 2018-10-10
Attending: PSYCHIATRY & NEUROLOGY | Admitting: PSYCHIATRY & NEUROLOGY
Payer: COMMERCIAL

## 2018-10-10 VITALS
RESPIRATION RATE: 20 BRPM | HEIGHT: 65 IN | BODY MASS INDEX: 25.83 KG/M2 | WEIGHT: 155 LBS | OXYGEN SATURATION: 99 % | DIASTOLIC BLOOD PRESSURE: 70 MMHG | TEMPERATURE: 99 F | SYSTOLIC BLOOD PRESSURE: 136 MMHG | HEART RATE: 90 BPM

## 2018-10-10 DIAGNOSIS — F33.41 MDD (MAJOR DEPRESSIVE DISORDER), RECURRENT, IN PARTIAL REMISSION: Primary | ICD-10-CM

## 2018-10-10 DIAGNOSIS — F33.9 RECURRENT MAJOR DEPRESSIVE DISORDER, REMISSION STATUS UNSPECIFIED: Primary | ICD-10-CM

## 2018-10-10 DIAGNOSIS — F32.9 MDD (MAJOR DEPRESSIVE DISORDER): ICD-10-CM

## 2018-10-10 LAB
B-HCG UR QL: NEGATIVE
CTP QC/QA: YES

## 2018-10-10 PROCEDURE — 63600175 PHARM REV CODE 636 W HCPCS: Performed by: ANESTHESIOLOGY

## 2018-10-10 PROCEDURE — 81025 URINE PREGNANCY TEST: CPT | Performed by: PSYCHIATRY & NEUROLOGY

## 2018-10-10 PROCEDURE — 27100019 HC AMBU BAG ADULT/PED: Performed by: ANESTHESIOLOGY

## 2018-10-10 PROCEDURE — 25000003 PHARM REV CODE 250: Performed by: STUDENT IN AN ORGANIZED HEALTH CARE EDUCATION/TRAINING PROGRAM

## 2018-10-10 PROCEDURE — 25000003 PHARM REV CODE 250: Performed by: PSYCHIATRY & NEUROLOGY

## 2018-10-10 PROCEDURE — 90870 ELECTROCONVULSIVE THERAPY: CPT | Performed by: ANESTHESIOLOGY

## 2018-10-10 PROCEDURE — 25000003 PHARM REV CODE 250: Performed by: ANESTHESIOLOGY

## 2018-10-10 PROCEDURE — 90870 ELECTROCONVULSIVE THERAPY: CPT | Mod: ,,, | Performed by: PSYCHIATRY & NEUROLOGY

## 2018-10-10 PROCEDURE — D9220A PRA ANESTHESIA: Mod: ,,, | Performed by: ANESTHESIOLOGY

## 2018-10-10 RX ORDER — KETOROLAC TROMETHAMINE 30 MG/ML
30 INJECTION, SOLUTION INTRAMUSCULAR; INTRAVENOUS ONCE AS NEEDED
Status: DISCONTINUED | OUTPATIENT
Start: 2018-10-10 | End: 2018-10-10 | Stop reason: HOSPADM

## 2018-10-10 RX ORDER — ONDANSETRON 2 MG/ML
4 INJECTION INTRAMUSCULAR; INTRAVENOUS ONCE AS NEEDED
Status: DISCONTINUED | OUTPATIENT
Start: 2018-10-10 | End: 2018-10-10 | Stop reason: HOSPADM

## 2018-10-10 RX ORDER — ONDANSETRON 2 MG/ML
INJECTION INTRAMUSCULAR; INTRAVENOUS
Status: DISCONTINUED | OUTPATIENT
Start: 2018-10-10 | End: 2018-10-10

## 2018-10-10 RX ORDER — LIDOCAINE HYDROCHLORIDE 10 MG/ML
1 INJECTION, SOLUTION EPIDURAL; INFILTRATION; INTRACAUDAL; PERINEURAL ONCE
Status: COMPLETED | OUTPATIENT
Start: 2018-10-10 | End: 2018-10-10

## 2018-10-10 RX ORDER — SODIUM CHLORIDE 0.9 % (FLUSH) 0.9 %
3 SYRINGE (ML) INJECTION
Status: DISCONTINUED | OUTPATIENT
Start: 2018-10-10 | End: 2018-10-10 | Stop reason: HOSPADM

## 2018-10-10 RX ORDER — SUCCINYLCHOLINE CHLORIDE 20 MG/ML
INJECTION INTRAMUSCULAR; INTRAVENOUS
Status: COMPLETED
Start: 2018-10-10 | End: 2018-10-10

## 2018-10-10 RX ORDER — LIDOCAINE HYDROCHLORIDE 10 MG/ML
1 INJECTION, SOLUTION EPIDURAL; INFILTRATION; INTRACAUDAL; PERINEURAL ONCE
Status: DISCONTINUED | OUTPATIENT
Start: 2018-10-10 | End: 2018-10-10 | Stop reason: HOSPADM

## 2018-10-10 RX ORDER — ONDANSETRON 2 MG/ML
INJECTION INTRAMUSCULAR; INTRAVENOUS
Status: COMPLETED
Start: 2018-10-10 | End: 2018-10-10

## 2018-10-10 RX ORDER — SODIUM CHLORIDE 9 MG/ML
INJECTION, SOLUTION INTRAVENOUS CONTINUOUS
Status: DISCONTINUED | OUTPATIENT
Start: 2018-10-10 | End: 2018-10-10 | Stop reason: HOSPADM

## 2018-10-10 RX ORDER — SUCCINYLCHOLINE CHLORIDE 20 MG/ML
INJECTION INTRAMUSCULAR; INTRAVENOUS
Status: DISCONTINUED | OUTPATIENT
Start: 2018-10-10 | End: 2018-10-10

## 2018-10-10 RX ORDER — KETOROLAC TROMETHAMINE 30 MG/ML
INJECTION, SOLUTION INTRAMUSCULAR; INTRAVENOUS
Status: DISCONTINUED | OUTPATIENT
Start: 2018-10-10 | End: 2018-10-10

## 2018-10-10 RX ORDER — KETOROLAC TROMETHAMINE 30 MG/ML
INJECTION, SOLUTION INTRAMUSCULAR; INTRAVENOUS
Status: COMPLETED
Start: 2018-10-10 | End: 2018-10-10

## 2018-10-10 RX ADMIN — SUCCINYLCHOLINE CHLORIDE 170 MG: 20 INJECTION, SOLUTION INTRAMUSCULAR; INTRAVENOUS at 08:10

## 2018-10-10 RX ADMIN — Medication 500 MG: at 07:10

## 2018-10-10 RX ADMIN — LIDOCAINE HYDROCHLORIDE 10 MG: 10 INJECTION, SOLUTION EPIDURAL; INFILTRATION; INTRACAUDAL; PERINEURAL at 07:10

## 2018-10-10 RX ADMIN — METHOHEXITAL SODIUM 150 MG: 500 INJECTION, POWDER, LYOPHILIZED, FOR SOLUTION INTRAMUSCULAR; INTRAVENOUS; RECTAL at 08:10

## 2018-10-10 RX ADMIN — KETOROLAC TROMETHAMINE 30 MG: 30 INJECTION, SOLUTION INTRAMUSCULAR at 08:10

## 2018-10-10 RX ADMIN — SODIUM CHLORIDE: 0.9 INJECTION, SOLUTION INTRAVENOUS at 07:10

## 2018-10-10 RX ADMIN — ONDANSETRON 4 MG: 2 INJECTION, SOLUTION INTRAMUSCULAR; INTRAVENOUS at 08:10

## 2018-10-10 NOTE — ANESTHESIA RELEASE NOTE
"Anesthesia Release from PACU Note    Patient: Allyssa Wright    Procedure(s) Performed: Procedure(s) (LRB):  ELECTROCONVULSIVE THERAPY (ECT) - SINGLE SEIZURE (N/A)    Anesthesia type: GEN    Post pain: Adequate analgesia reported    Post assessment: no apparent anesthetic complications, tolerated procedure well and no evidence of recall    Post vital signs: /70 (BP Location: Left arm, Patient Position: Lying)   Pulse 90   Temp 37.3 °C (99.1 °F) (Temporal)   Resp 20   Ht 5' 5" (1.651 m)   Wt 70.3 kg (155 lb)   LMP  (LMP Unknown)   SpO2 99%   BMI 25.79 kg/m²     Level of consciousness: awake, alert and oriented    Nausea/Vomiting: no nausea/no vomiting    Complications: none    Airway Patency: patent    Respiratory: unassisted, spontaneous ventilation, room air    Cardiovascular: stable and blood pressure at baseline    Hydration: euvolemic    "

## 2018-10-10 NOTE — DISCHARGE SUMMARY
Allyssa Wright  : 1980   MRN: [id number]  Date: 10/10/2018     Electroconvulsive Therapy  Discharge Summary    Admit Date: 10/10/2018  6:25 AM  Discharge Date: 10/10/2018    Attending Physician: Shoaib Boyce Jr., MD   Discharge Provider: Rudy Atwood MD    History of Present Illness: Allyssa Wrgiht is a 38 y.o. female with MDD presented for ECT #57. See H&P dated 10/10/2018 for full HPI. For further details, see Dr. Boyce's pre-ECT evaluation.    Hospital Course: The patient tolerated the ECT treatment well without complication. Patient was stable post-procedure. See OP note dated 10/10/2018 for more details.     Disposition: Home or Self Care    Medications:  Current Discharge Medication List      CONTINUE these medications which have NOT CHANGED    Details   clozapine (CLOZARIL) 50 MG tablet Take 50 mg by mouth once daily.       dapsone 7.5 % GlwP Apply topically once daily.      famciclovir (FAMVIR) 500 MG tablet Take 1 tablet (500 mg total) by mouth 2 (two) times daily.  Qty: 30 tablet, Refills: 12    Associated Diagnoses: Major depressive disorder, recurrent episode, moderate degree      mirtazapine (REMERON) 15 MG tablet Take 1 tablet (15 mg total) by mouth every evening.  Qty: 90 tablet, Refills: 0      spironolactone (ALDACTONE) 50 MG tablet 50 mg 2 (two) times daily. 50  mg qAM, 50 mg qHS.      thyroid (ARMOUR THYROID) 30 mg Tab Take 1 tablet (30 mg total) by mouth every morning.  Qty: 30 tablet, Refills: 11    Associated Diagnoses: Major depressive disorder, recurrent episode, moderate degree      trazodone (DESYREL) 100 MG tablet Take 200 mg by mouth every evening.       UNABLE TO FIND 2 (two) times daily. n-azetyl-cysteine      venlafaxine (EFFEXOR) 100 MG Tab Take 3 tablets (300 mg total) by mouth once daily.    Associated Diagnoses: MDD (major depressive disorder), recurrent, in partial remission      vortioxetine (TRINTELLIX) 5 mg Tab Take 2.5 mg by mouth once daily.       dextroamphetamine-amphetamine 30 mg Tab Take 30 mg by mouth 2 (two) times daily.     Associated Diagnoses: MDD (major depressive disorder), recurrent, in partial remission      hydrOXYzine pamoate (VISTARIL) 25 MG Cap Take 2 capsules (50 mg total) by mouth nightly as needed (Take 25-50mg (1-2 caps) at night for anxiety prior to ECT).  Qty: 180 capsule, Refills: 0      ZOVIRAX 5 % Crea Refills: 5           Status at Discharge: Alert and medically stable    Discharge Diagnoses:  MDD  Diet: Resume previous outpatient diet  Activity: Ambulate with assistance  Instructions: Please do not eat or drink anything after midnight prior to procedure. Please do not drive on day of ECT.    Med Changes:  None    Next ECT:  Next Wednesday, October 17, 2018.     Rudy Atwood MD  10/10/2018

## 2018-10-10 NOTE — H&P
"Allyssa Wright  : 1980   MRN: [id number]  Date: 10/10/2018     Electroconvulsive Therapy  History & Physical    Chief complaint: MDD recurrent in partial remission   Procedure: ECT #57    SUBJECTIVE:     HPI:   Allyssa Wright is a 38 y.o. female with MDD who presents for ECT. The patient complains of recent exacerbation of her depression.  Pt states that she had an affective "relapse." States she is eating and sleeping well, just her mood has been down the last few weeks.  Pt has no physical complaints.  Please see Dr. Boyce's full pre-ECT evaluation for further details. The patient does not need any prescriptions and has not had any med changes since meeting with Dr. Boyce. The patient has not had anything by mouth since midnight and is ready for ECT.    Psychiatric Review of Systems:  Mood: 3/10  Anxiety: improved  Appetite: No problem  Psychomotor: No problem  Cognitive Impairment: mild, tolerable  Insomnia: None, trouble getting up in the morning  Psychosis: None  Diurnal Variation: None  Suicidal Ideation: Absent        Medical Review Of Systems:  Pertinent items are noted in HPI.    Current Medications:   No current facility-administered medications on file prior to encounter.      Current Outpatient Medications on File Prior to Encounter   Medication Sig Dispense Refill    clozapine (CLOZARIL) 50 MG tablet Take 50 mg by mouth once daily.       dapsone 7.5 % GlwP Apply topically once daily.      famciclovir (FAMVIR) 500 MG tablet Take 1 tablet (500 mg total) by mouth 2 (two) times daily. 30 tablet 12    mirtazapine (REMERON) 15 MG tablet Take 1 tablet (15 mg total) by mouth every evening. (Patient taking differently: Take 7.5 mg by mouth every evening. ) 90 tablet 0    spironolactone (ALDACTONE) 50 MG tablet 50 mg 2 (two) times daily. 50  mg qAM, 50 mg qHS.      thyroid (ARMOUR THYROID) 30 mg Tab Take 1 tablet (30 mg total) by mouth every morning. 30 tablet 11    trazodone (DESYREL) 100 MG " tablet Take 200 mg by mouth every evening.       UNABLE TO FIND 2 (two) times daily. n-azetyl-cysteine      venlafaxine (EFFEXOR) 100 MG Tab Take 3 tablets (300 mg total) by mouth once daily. (Patient taking differently: Take 225 mg by mouth once daily. )      vortioxetine (TRINTELLIX) 5 mg Tab Take 2.5 mg by mouth once daily.      dextroamphetamine-amphetamine 30 mg Tab Take 30 mg by mouth 2 (two) times daily.       hydrOXYzine pamoate (VISTARIL) 25 MG Cap Take 2 capsules (50 mg total) by mouth nightly as needed (Take 25-50mg (1-2 caps) at night for anxiety prior to ECT). 180 capsule 0    ZOVIRAX 5 % Crea   5      Allergies:   Benzodiazepines; Ampicillin; Erythromycin; Levaquin [levofloxacin]; Penicillins; Pristiq [desvenlafaxine]; Sulfa (sulfonamide antibiotics); and Azithromycin    Past Medical/Surgical History:   Past Medical History:   Diagnosis Date    Anxiety     Depression     History of psychiatric hospitalization     HSV-1 (herpes simplex virus 1) infection      of psychiatric care     Moderate depressed bipolar II disorder 06/13/2016    reports no history of bipolar    Obsessive-compulsive disorder     Psychiatric problem     Schizophrenia 4/3/2018    Self-harming behavior     Therapy      Past Surgical History:   Procedure Laterality Date    ANKLE SURGERY Right     BREAST augmentation      ELECTROCONVULSIVE THERAPY (ECT) - SINGLE SEIZURE N/A 8/31/2018    Performed by [facility]ib [facility] Jr., [facility] at [facility] ECT    ELECTROCONVULSIVE THERAPY (ECT) - SINGLE SEIZURE N/A 8/6/2018    Performed by Sh[facility]ib Boyce Jr., MD at [facility] ECT    ELECTROCONVULSIVE THERAPY (ECT) - SINGLE SEIZURE N/A 7/23/2018    Performed by Sh[facility]ib Boyce Jr., [facility] at [facility] ECT    ELECTROCONVULSIVE THERAPY (ECT) - SINGLE SEIZURE N/A 7/5/2018    Performed by Sh[facility]ib Boyce Jr., MD at [facility] ECT    ELECTROCONVULSIVE THERAPY (ECT) - SINGLE SEIZURE N/A 6/28/2018    Performed by Sh[facility]ib [facility]ce Jr[facility] MD at [facility] ECT     ELECTROCONVULSIVE THERAPY (ECT) - SINGLE SEIZURE N/A 6/13/2018    Performed by [facility] Jr[facility] at [facility] ECT    ELECTROCONVULSIVE THERAPY (ECT) - SINGLE SEIZURE N/A 5/29/2018    Performed by Shoaib Boyce Jr., MD at [facility] ECT    ELECTROCONVULSIVE THERAPY (ECT) - SINGLE SEIZURE N/A 5/8/2018    Performed by Shoaib Boyce Jr., MD at [facility] ECT    ELECTROCONVULSIVE THERAPY (ECT) - SINGLE SEIZURE N/A 4/24/2018    Performed by Shoaib Boyce Jr., MD at [facility] ECT    ELECTROCONVULSIVE THERAPY (ECT) - SINGLE SEIZURE N/A 4/17/2018    Performed by Shoaib Boyce Jr., MD at [facility] ECT    ELECTROCONVULSIVE THERAPY (ECT) - SINGLE SEIZURE N/A 4/13/2018    Performed by Shoaib Boyce Jr., MD at [facility] ECT    ELECTROCONVULSIVE THERAPY (ECT) - SINGLE SEIZURE N/A 4/3/2018    Performed by Shoaib Boyce Jr., MD at [facility] ECT    ELECTROCONVULSIVE THERAPY (ECT) - SINGLE SEIZURE N/A 3/20/2018    Performed by Shoaib Boyce Jr., MD at [facility] ECT    ELECTROCONVULSIVE THERAPY (ECT) - SINGLE SEIZURE N/A 3/15/2018    Performed by Shoaib Boyce Jr., MD at [facility] ECT    ELECTROCONVULSIVE THERAPY (ECT) - SINGLE SEIZURE N/A 3/6/2018    Performed by Shoaib Boyce Jr., MD at [facility] ECT    ELECTROCONVULSIVE THERAPY (ECT) - SINGLE SEIZURE N/A 3/1/2018    Performed by Shoaib Boyce Jr., MD at [facility] ECT    ELECTROCONVULSIVE THERAPY (ECT) - SINGLE SEIZURE N/A 2/23/2018    Performed by Shoaib Boyce Jr., MD at [facility] ECT    ELECTROCONVULSIVE THERAPY (ECT) - SINGLE SEIZURE N/A 2/19/2018    Performed by Shoaib Boyce Jr[facility] MD at [facility] ECT    ELECTROCONVULSIVE THERAPY (ECT) - SINGLE SEIZURE N/A 2/15/2018    Performed by Shoaib Boyce Jr., MD at [facility] ECT    ELECTROCONVULSIVE THERAPY (ECT) - SINGLE SEIZURE N/A 1/22/2018    Performed by Shoaib Boyce Jr[facility] MD at [facility] ECT    ELECTROCONVULSIVE THERAPY (ECT) - SINGLE SEIZURE N/A 12/11/2017    Performed by Shoaib Boyce Jr[facility] MD at [facility] ECT     ELECTROCONVULSIVE THERAPY (ECT) - SINGLE SEIZURE N/A 10/24/2017    Performed by Sh[facility]ib Boyce Jr[facility] at [facility] ECT    ELECTROCONVULSIVE THERAPY (ECT) - SINGLE SEIZURE N/A 9/20/2017    Performed by Shoaib Boyce Jr., MD at [facility] ECT    ELECTROCONVULSIVE THERAPY (ECT) - SINGLE SEIZURE N/A 8/14/2017    Performed by Shoaib Boyce Jr., MD at [facility] ECT    ELECTROCONVULSIVE THERAPY (ECT) - SINGLE SEIZURE Bilateral 7/12/2017    Performed by Shoaib Boyce Jr., MD at [facility] ECT    ELECTROCONVULSIVE THERAPY (ECT) - SINGLE SEIZURE N/A 6/13/2017    Performed by Shoaib Boyce Jr., MD at [facility] ECT    ELECTROCONVULSIVE THERAPY (ECT) - SINGLE SEIZURE N/A 5/17/2017    Performed by Shoaib Boyce Jr., MD at [facility] ECT    ELECTROCONVULSIVE THERAPY (ECT) - SINGLE SEIZURE N/A 4/20/2017    Performed by Shoaib Boyce Jr., MD at [facility] ECT    ELECTROCONVULSIVE THERAPY (ECT) - SINGLE SEIZURE N/A 11/22/2016    Performed by Shoaib Boyce Jr., MD at [facility] ECT    ELECTROCONVULSIVE THERAPY (ECT) - SINGLE SEIZURE N/A 10/24/2016    Performed by Shoaib Boyce Jr., MD at [facility] ECT    ELECTROCONVULSIVE THERAPY (ECT) - SINGLE SEIZURE N/A 9/22/2016    Performed by Shoaib Boyce Jr., MD at [facility] ECT    ELECTROCONVULSIVE THERAPY (ECT) - SINGLE SEIZURE N/A 9/1/2016    Performed by Shoaib Boyce Jr., MD at [facility] ECT    ELECTROCONVULSIVE THERAPY (ECT) - SINGLE SEIZURE N/A 8/15/2016    Performed by Shoaib Boyce Jr., MD at [facility] ECT    ELECTROCONVULSIVE THERAPY (ECT) - SINGLE SEIZURE N/A 8/1/2016    Performed by Shoaib Boyce Jr., MD at [facility] ECT    ELECTROCONVULSIVE THERAPY (ECT) - SINGLE SEIZURE N/A 7/22/2016    Performed by Shoaib Boyce Jr., MD at [facility] ECT    ELECTROCONVULSIVE THERAPY (ECT) - SINGLE SEIZURE N/A 7/14/2016    Performed by Shoaib Boyce Jr., MD at [facility] ECT    ELECTROCONVULSIVE THERAPY (ECT) - SINGLE SEIZURE N/A 7/8/2016    Performed by Shoaib Boyce Jr., MD at [facility] ECT     ELECTROCONVULSIVE THERAPY (ECT) - SINGLE SEIZURE N/A 7/5/2016    Performed by Shoaib Boyce Jr., MD at [facility] ECT    ELECTROCONVULSIVE THERAPY (ECT) - SINGLE SEIZURE N/A 6/27/2016    Performed by Shoaib Boyce Jr., MD at [facility] ECT    ELECTROCONVULSIVE THERAPY (ECT) - SINGLE SEIZURE N/A 6/23/2016    Performed by Shoaib Boyce Jr., MD at [facility] ECT    ELECTROCONVULSIVE THERAPY (ECT) - SINGLE SEIZURE N/A 6/20/2016    Performed by Shoaib Boyce Jr., MD at [facility] ECT    ELECTROCONVULSIVE THERAPY (ECT) - SINGLE SEIZURE N/A 6/16/2016    Performed by Shoaib Boyce Jr., MD at [facility] ECT    ELECTROCONVULSIVE THERAPY (ECT) - SINGLE SEIZURE N/A 6/15/2016    Performed by Shoaib Boyce Jr., MD at [facility] ECT    ELECTROCONVULSIVE THERAPY (ECT) - SINGLE SEIZURE N/A 6/14/2016    Performed by Shoaib Boyce Jr., MD at [facility] ECT    ELECTROCONVULSIVE THERAPY (ECT) - SINGLE SEIZURE N/A 6/13/2016    Performed by Shoaib Boyce Jr., MD at [facility] ECT    ELECTROCONVULSIVE THERAPY (ECT) - SINGLE SEIZURE N/A 1/4/2016    Performed by Shoaib Boyce Jr., MD at [facility] ECT    ELECTROCONVULSIVE THERAPY, CEREBRAL HEMISPHERE, UNILATERAL, 1 SEIZURE Bilateral 6/25/2018    Performed by Shoaib Boyce Jr., MD at [facility] ECT    OVARIAN CYST REMOVAL Bilateral       OBJECTIVE:     Vitals (pre-procedure):  Vitals:    10/10/18 0651   BP: 109/67   Pulse: 74   Resp: 16   Temp: 97.7 °F (36.5 °C)        Labs/Imaging/Studies:   Recent Results (from the past 48 hour(s))   POCT urine pregnancy    Collection Time: 10/10/18  6:53 AM   Result Value Ref Range    POC Preg Test, Ur Negative Negative     Acceptable Yes       No results found for: PHENYTOIN, PHENOBARB, VALPROATE, CBMZ    Physical Exam:   Gen: AAOx4, NAD  HEENT: MMM, PERRL, EOMI, O/P clear  CV: RRR, S1/S2 nml, no M/R/G  Chest: CTAB, no R/R/W, unlabored breathing  Abd: S/NT/ND, +BS, no HSM  Ext: No C/C/E, pulse 2+ throughout   Neuro: CN II-XII grossly intact, no focal  deficits    Mental Status Exam:   Appearance: age appropriate, well nourished, dressed in hospital gown  Behavior: friendly and cooperative, eye contact appropriate  Speech: conversational tone, rate, pitch, volume  Mood: 6/10  Affect: full, reactive  Thought Process: linear and organized  Thought Content: no SI, no HI or AVH  Cognition: grossly intact  Insight: good  Judgment: appropriate for setting        ASSESSMENT/PLAN:     Allyssa Wright is a 38 y.o. female with MDD who presents for ECT.    Recommendations:   Proceed with ECT #56.      Rudy Atwood MD  10/10/2018

## 2018-10-10 NOTE — OP NOTE
Allyssa Wright  : 1980   MRN: [id number]  Date: 10/10/2018    Electroconvulsive Therapy  Procedure Note    Date of Admission: 10/10/2018  6:25 AM    Site: [facility], [address]    Attending: Shoaib Boyce Jr., MD   Residents: Rudy Atwood MD  Pre-procedure Diagnosis: MDD  ECT Treatment Number: 57  Machine Type: Mecta 5000    Patient Status: Medically stable    Vitals (pre-procedure):  Vitals:    10/10/18 0651   BP: 109/67   Pulse: 74   Resp: 16   Temp: 97.7 °F (36.5 °C)       Electrode Placement: Bitemporal    Stimulus Number Charge (mC) Level Pulse Width (msec) Frequency  (Hz) Duration of Stimulus (sec) Current (mA) Duration of Seizure (sec)   1 576 3 1 60 6 800 75                                   Complications: Hypertension    Maximum Blood Pressure: 189/91    Medications Given:  Caffeine 500 mg PO before  Methohexital (Brevital) 150 mg  IV before  Succinylcholine (Anectine) 170 mg  IV before  Zofran 4 mg IV before  Toradol 30 mg IV before    Treatment Course:  Patient tolerated procedure well. After adequate recovery from general anesthesia, the patient was transported to recovery.    Post-op Diagnosis: Same as above    Recommended for next ECT:  Same as above.      Rudy Atwood MD  [facility]  10/10/2018  7:47 AM

## 2018-10-10 NOTE — DISCHARGE INSTRUCTIONS
E.C.T. Home Care Instructions    ACTIVITY LEVEL:    You may feel sleepy for several hours. It is best to rest until you are more awake and then gradually resume your normal activities in one day. It is recommended, because of the possibility of memory loss and slight confusion post ECT, that you rest in the company of a responsible adult. This confusion and memory loss is expected and should diminish over time. It is also recommended that you do not drive or operate any electrical appliances or machinery during the ECT treatment series. Ask your Doctor, at the time of your treatment, any questions you may have about specific activities.    DIET:    You may wish to start with liquids after your ECT treatment and gradually resume your normal diet. Do not consume alcoholic beverages during the ECT treatment series.    BATHING:    You may shower or bathe as desired.    MEDICATIONS:    Resume all home medications starting with the AM doses not taken prior to ECT treatment. If you experience a headache, it is okay to take your usual pain reliever.    WHEN TO CALL THE DOCTOR:     Headache, memory loss or confusion that does not begin to resolve within 24 hours.    RETURN APPOINTMENT:    Remember you may not eat or drink after midnight, but please take heart or high blood pressure medicines with a sip of water in the morning prior to leaving home.    FOR EMERGENCIES:    If any unusual problems or difficulties occur:    Contact the office [phone] Monday-Friday until 3:00 p.m. After hours, call the hospital  [phone] and ask for the Psychiatry Resident On-call.        Anesthesia: General Anesthesia     You are watched continuously during your procedure by your anesthesia provider.     Youre due to have surgery. During surgery, youll be given medicine called anesthesia or anesthetic. This will keep you comfortable and pain-free. Your anesthesia provider will use general anesthesia.  What is general  anesthesia?  General anesthesia puts you into a state like deep sleep. It goes into the bloodstream (IV anesthetics), into the lungs (gas anesthetics), or both. You feel nothing during the procedure. You will not remember it. During the procedure, the anesthesia provider monitors you continuously. He or she checks your heart rate and rhythm, blood pressure, breathing, and blood oxygen.  · IV anesthetics. IV anesthetics are given through an IV line in your arm. Theyre often given first. This is so you are asleep before a gas anesthetic is started. Some kinds of IV anesthetics relieve pain. Others relax you. Your doctor will decide which kind is best in your case.  · Gas anesthetics. Gas anesthetics are breathed into the lungs. They are often used to keep you asleep. They can be given through a facemask or a tube placed in your larynx or trachea (breathing tube).  ¨ If you have a facemask, your anesthesia provider will most likely place it over your nose and mouth while youre still awake. Youll breathe oxygen through the mask as your IV anesthetic is started. Gas anesthetic may be added through the mask.  ¨ If you have a tube in the larynx or trachea, it will be inserted into your throat after youre asleep.  Anesthesia tools and medicines  You will likely have:  · IV anesthetics. These are put into an IV line into your bloodstream.  · Gas anesthetics. You breathe these anesthetics into your lungs, where they pass into your bloodstream.  · Pulse oximeter. This is a small clip that is attached to the end of your finger. This measures your blood oxygen level.  · Electrocardiography leads (electrodes). These are small sticky pads that are placed on your chest. They record your heart rate and rhythm.  · Blood pressure cuff. This reads your blood pressure.  Risks and possible complications  General anesthesia has some risks. These include:  · Breathing problems  · Nausea and vomiting  · Sore throat or hoarseness  (usually temporary)  · Allergic reaction to the anesthetic  · Irregular heartbeat (rare)  · Cardiac arrest (rare)   Anesthesia safety  · Follow all instructions you are given for how long not to eat or drink before your procedure.  · Be sure your doctor knows what medicines and drugs you take. This includes over-the-counter medicines, herbs, supplements, alcohol or other drugs. You will be asked when those were last taken.  · Have an adult family member or friend drive you home after the procedure.  · For the first 24 hours after your surgery:  ¨ Do not drive or use heavy equipment.  ¨ Do not make important decisions or sign legal documents. If important decisions or signing legal documents is necessary during the first 24 hours after surgery, have a trusted family member or spouse act on your behalf.  ¨ Avoid alcohol.  ¨ Have a responsible adult stay with you. He or she can watch for problems and help keep you safe.  Date Last Reviewed: 12/1/2016  © 9480-2696 [employer]. [address] [place] [address], [place] [address]. All rights reserved. This information is not intended as a substitute for professional medical care. Always follow your healthcare professional's instructions.

## 2018-10-10 NOTE — ANESTHESIA POSTPROCEDURE EVALUATION
"Anesthesia Post Evaluation    Patient: Allyssa Wright    Procedure(s) Performed: Procedure(s) (LRB):  ELECTROCONVULSIVE THERAPY (ECT) - SINGLE SEIZURE (N/A)    Final Anesthesia Type: general  Patient location during evaluation: PACU  Patient participation: Yes- Able to Participate  Level of consciousness: awake and alert and oriented  Post-procedure vital signs: reviewed and stable  Pain management: adequate  Airway patency: patent  PONV status at discharge: No PONV  Anesthetic complications: no      Cardiovascular status: stable  Respiratory status: unassisted, spontaneous ventilation and room air  Hydration status: euvolemic  Follow-up not needed.        Visit Vitals  /70 (BP Location: Left arm, Patient Position: Lying)   Pulse 90   Temp 37.3 °C (99.1 °F) (Temporal)   Resp 20   Ht 5' 5" (1.651 m)   Wt 70.3 kg (155 lb)   LMP  (LMP Unknown)   SpO2 99%   BMI 25.79 kg/m²       Pain/Roma Score: Pain Assessment Performed: Yes (10/10/2018  8:41 AM)  Presence of Pain: denies (10/10/2018  8:58 AM)  Roma Score: 10 (10/10/2018  8:41 AM)        "

## 2018-10-10 NOTE — TRANSFER OF CARE
"Anesthesia Transfer of Care Note    Patient: Allyssa Wright    Procedure(s) Performed: Procedure(s) (LRB):  ELECTROCONVULSIVE THERAPY (ECT) - SINGLE SEIZURE (N/A)    Patient location: PACU    Anesthesia Type: general    Transport from OR: Transported from OR on 100% O2 by closed face mask with adequate spontaneous ventilation    Post pain: adequate analgesia    Post assessment: no apparent anesthetic complications and tolerated procedure well    Post vital signs: stable    Level of consciousness: awake, alert and oriented    Nausea/Vomiting: no nausea/vomiting    Complications: none          Last vitals:   Visit Vitals  BP (!) 138/90 (BP Location: Left arm, Patient Position: Lying)   Pulse 97   Temp 37.3 °C (99.1 °F) (Temporal)   Resp 18   Ht 5' 5" (1.651 m)   Wt 70.3 kg (155 lb)   LMP  (LMP Unknown)   SpO2 100%   BMI 25.79 kg/m²     "

## 2018-10-10 NOTE — ANESTHESIA PROCEDURE NOTES
ECT    Treatment Number: 58  Procedure start time: 10/10/2018 8:05 AM  Timeout performed at: 10/10/2018 8:05 AM  Procedure end time: 10/10/2018 8:15 AM    Staffing  Anesthesiologist: Cesar Orozco Jr., MD  CRNA/Resident: Suhas Joya MD  Performed by: resident/CRNA     Preanesthetic Checklist  The following were completed as part of the preanesthetic checklist: patient identified, procedural consent, pre-op evaluation, timeout performed, risks and benefits discussed, monitors and equipment checked, anesthesia consent given, oxygen available, suction available, hand hygiene performed and patient being monitored.    Setup & Induction  Patient Monitoring: heart rate, cardiac monitor, continuous pulse ox, continuous capnometry and NIBP  Patient preparation: bite block inserted, extremities padded, mandibular stabilization and patient hyperventilated  Electrode Placement: Bitemporal    The patient was moved to the ECT therapy room after being assessed and consented for ECT. After standard ASA monitors were applied and timeout performed, the patient was adequately preoxygenated. After induction of general anesthesia, adequate oxygenation and ventilation were confirmed with pulse oxymetry and end tidal CO2 monitoring via bag-mask ventilation. End tidal CO2 was monitored throughout the case and moderate hyperventilation was performed prior to beginning ECT treatment. All extremities were padded, biteblock was inserted, and mandibular stabilization was done prior to initiating ECT therapy.    Procedure  Stimulus Number 1: Setting Number = III; Seizure Duration = 75 seconds            Recovery  After adequate recovery from general anesthesia, the patient was transported to recovery.    ECT Findings  ECT associated findings of: Moderate Hypertension (SBP >160 or DBP >100)

## 2018-10-16 ENCOUNTER — ANESTHESIA EVENT (OUTPATIENT)
Dept: ELECTROPHYSIOLOGY | Facility: HOSPITAL | Age: 38
End: 2018-10-16
Payer: COMMERCIAL

## 2018-10-16 NOTE — ANESTHESIA PREPROCEDURE EVALUATION
[facility]  Anesthesia Pre-Operative Evaluation         Patient Name: Allyssa Wright  YOB: 1980  MRN: [id number]    SUBJECTIVE:     10/16/2018    Pre-operative evaluation for Procedure(s) (LRB):  ELECTROCONVULSIVE THERAPY (ECT) - SINGLE SEIZURE (N/A)    Allyssa Wright is a 38 y.o. female with hypothyroidism, HSV-1, and poorly- controlled MDD (currently on maintenance ECT).    Patient now presents for the above procedure(s).    ECT #: 59    Previous Meds:  - Methohexital 150mg  - Succinylcholine 170mg  - Ondansetron 4mg  - Ketorolac 30mg    Max SBP ~ 190      Previous airway:   None documented.      Patient Active Problem List   Diagnosis    MDD (major depressive disorder), recurrent, in partial remission    Other acne    Comfort measures only status    MDD (major depressive disorder)       Review of patient's allergies indicates:   Allergen Reactions    Benzodiazepines Other (See Comments)     Contraindicated while taking Clozapine    Ampicillin      Mom says so    Erythromycin     Levaquin [levofloxacin] Other (See Comments)     Depression side effects    Penicillins      Mom says so    Pristiq [desvenlafaxine]      psycotic      Sulfa (sulfonamide antibiotics)      Rash      Azithromycin Anxiety       Current Inpatient Medications:      No current facility-administered medications on file prior to encounter.      Current Outpatient Medications on File Prior to Encounter   Medication Sig Dispense Refill    clozapine (CLOZARIL) 50 MG tablet Take 50 mg by mouth once daily.       dapsone 7.5 % GlwP Apply topically once daily.      dextroamphetamine-amphetamine 30 mg Tab Take 30 mg by mouth 2 (two) times daily.       famciclovir (FAMVIR) 500 MG tablet Take 1 tablet (500 mg total) by mouth 2 (two) times daily. 30 tablet 12    hydrOXYzine pamoate (VISTARIL) 25 MG Cap Take 2 capsules (50 mg total) by mouth nightly as needed (Take 25-50mg (1-2 caps) at night for anxiety  prior to ECT). 180 capsule 0    mirtazapine (REMERON) 15 MG tablet Take 1 tablet (15 mg total) by mouth every evening. (Patient taking differently: Take 7.5 mg by mouth every evening. ) 90 tablet 0    spironolactone (ALDACTONE) 50 MG tablet 50 mg 2 (two) times daily. 50  mg qAM, 50 mg qHS.      thyroid (ARMOUR THYROID) 30 mg Tab Take 1 tablet (30 mg total) by mouth every morning. 30 tablet 11    trazodone (DESYREL) 100 MG tablet Take 200 mg by mouth every evening.       UNABLE TO FIND 2 (two) times daily. n-azetyl-cysteine      venlafaxine (EFFEXOR) 100 MG Tab Take 3 tablets (300 mg total) by mouth once daily. (Patient taking differently: Take 225 mg by mouth once daily. )      vortioxetine (TRINTELLIX) 5 mg Tab Take 2.5 mg by mouth once daily.      ZOVIRAX 5 % Crea   5       Past Surgical History:   Procedure Laterality Date    ANKLE SURGERY Right     BREAST augmentation      ELECTROCONVULSIVE THERAPY (ECT) - SINGLE SEIZURE N/A 8/31/2018    Performed by [facility]ib [facility] Jr., [facility] at [facility] ECT    ELECTROCONVULSIVE THERAPY (ECT) - SINGLE SEIZURE N/A 8/6/2018    Performed by Shoaib [facility]ce Jr., MD at [facility] ECT    ELECTROCONVULSIVE THERAPY (ECT) - SINGLE SEIZURE N/A 7/23/2018    Performed by Shoaib Boyce Jr., MD at [facility] ECT    ELECTROCONVULSIVE THERAPY (ECT) - SINGLE SEIZURE N/A 7/5/2018    Performed by Shoaib Boyce Jr., MD at [facility] ECT    ELECTROCONVULSIVE THERAPY (ECT) - SINGLE SEIZURE N/A 6/28/2018    Performed by Shoaib Boyce Jr., MD at [facility] ECT    ELECTROCONVULSIVE THERAPY (ECT) - SINGLE SEIZURE N/A 6/13/2018    Performed by Shoaib Boyce Jr., MD at [facility] ECT    ELECTROCONVULSIVE THERAPY (ECT) - SINGLE SEIZURE N/A 5/29/2018    Performed by Shoaib Boyce Jr., MD at [facility] ECT    ELECTROCONVULSIVE THERAPY (ECT) - SINGLE SEIZURE N/A 5/8/2018    Performed by Shoaib Boyce Jr., MD at [facility] ECT    ELECTROCONVULSIVE THERAPY (ECT) - SINGLE SEIZURE N/A 4/24/2018    Performed by  Shoaib Boyce Jr., MD at [facility] ECT    ELECTROCONVULSIVE THERAPY (ECT) - SINGLE SEIZURE N/A 4/17/2018    Performed by Shoaib Boyce Jr., MD at [facility] ECT    ELECTROCONVULSIVE THERAPY (ECT) - SINGLE SEIZURE N/A 4/13/2018    Performed by Shoaib Boyce Jr., MD at [facility] ECT    ELECTROCONVULSIVE THERAPY (ECT) - SINGLE SEIZURE N/A 4/3/2018    Performed by Shoaib Boyce Jr., MD at [facility] ECT    ELECTROCONVULSIVE THERAPY (ECT) - SINGLE SEIZURE N/A 3/20/2018    Performed by Shoaib Boyce Jr., MD at [facility] ECT    ELECTROCONVULSIVE THERAPY (ECT) - SINGLE SEIZURE N/A 3/15/2018    Performed by Shoaib Boyce Jr., MD at [facility] ECT    ELECTROCONVULSIVE THERAPY (ECT) - SINGLE SEIZURE N/A 3/6/2018    Performed by Shoaib Boyce Jr., MD at [facility] ECT    ELECTROCONVULSIVE THERAPY (ECT) - SINGLE SEIZURE N/A 3/1/2018    Performed by Shoaib Boyce Jr., MD at [facility] ECT    ELECTROCONVULSIVE THERAPY (ECT) - SINGLE SEIZURE N/A 2/23/2018    Performed by Shoaib Boyce Jr., MD at [facility] ECT    ELECTROCONVULSIVE THERAPY (ECT) - SINGLE SEIZURE N/A 2/19/2018    Performed by Shoaib Boyce Jr., MD at [facility] ECT    ELECTROCONVULSIVE THERAPY (ECT) - SINGLE SEIZURE N/A 2/15/2018    Performed by Shoaib Boyce Jr., MD at [facility] ECT    ELECTROCONVULSIVE THERAPY (ECT) - SINGLE SEIZURE N/A 1/22/2018    Performed by Shoaib Boyce Jr., MD at [facility] ECT    ELECTROCONVULSIVE THERAPY (ECT) - SINGLE SEIZURE N/A 12/11/2017    Performed by Shoaib Boyce Jr., MD at [facility] ECT    ELECTROCONVULSIVE THERAPY (ECT) - SINGLE SEIZURE N/A 10/24/2017    Performed by Shoaib Boyce Jr., MD at [facility] ECT    ELECTROCONVULSIVE THERAPY (ECT) - SINGLE SEIZURE N/A 9/20/2017    Performed by Shoaib Boyce Jr., MD at [facility] ECT    ELECTROCONVULSIVE THERAPY (ECT) - SINGLE SEIZURE N/A 8/14/2017    Performed by Shoaib Boyce Jr., MD at [facility] ECT    ELECTROCONVULSIVE THERAPY (ECT) - SINGLE SEIZURE Bilateral 7/12/2017    Performed by Shoaib  ASHKAN Boyce Jr., MD at [facility] ECT    ELECTROCONVULSIVE THERAPY (ECT) - SINGLE SEIZURE N/A 6/13/2017    Performed by Shoaib Boyce Jr., MD at [facility] ECT    ELECTROCONVULSIVE THERAPY (ECT) - SINGLE SEIZURE N/A 5/17/2017    Performed by Shoaib Boyce Jr., MD at [facility] ECT    ELECTROCONVULSIVE THERAPY (ECT) - SINGLE SEIZURE N/A 4/20/2017    Performed by Shoaib Boyce Jr., MD at [facility] ECT    ELECTROCONVULSIVE THERAPY (ECT) - SINGLE SEIZURE N/A 11/22/2016    Performed by Shoaib Boyce Jr., MD at [facility] ECT    ELECTROCONVULSIVE THERAPY (ECT) - SINGLE SEIZURE N/A 10/24/2016    Performed by Shoaib Boyce Jr., MD at [facility] ECT    ELECTROCONVULSIVE THERAPY (ECT) - SINGLE SEIZURE N/A 9/22/2016    Performed by Shoaib Boyce Jr., MD at [facility] ECT    ELECTROCONVULSIVE THERAPY (ECT) - SINGLE SEIZURE N/A 9/1/2016    Performed by Shoaib Boyce Jr., MD at [facility] ECT    ELECTROCONVULSIVE THERAPY (ECT) - SINGLE SEIZURE N/A 8/15/2016    Performed by Shoaib Boyce Jr., MD at [facility] ECT    ELECTROCONVULSIVE THERAPY (ECT) - SINGLE SEIZURE N/A 8/1/2016    Performed by Shoaib Boyce Jr., MD at [facility] ECT    ELECTROCONVULSIVE THERAPY (ECT) - SINGLE SEIZURE N/A 7/22/2016    Performed by Shoaib Boyce Jr., MD at [facility] ECT    ELECTROCONVULSIVE THERAPY (ECT) - SINGLE SEIZURE N/A 7/14/2016    Performed by Shoaib Boyce Jr., MD at [facility] ECT    ELECTROCONVULSIVE THERAPY (ECT) - SINGLE SEIZURE N/A 7/8/2016    Performed by Shoaib Boyce Jr., MD at [facility] ECT    ELECTROCONVULSIVE THERAPY (ECT) - SINGLE SEIZURE N/A 7/5/2016    Performed by Shoaib Boyce Jr., MD at [facility] ECT    ELECTROCONVULSIVE THERAPY (ECT) - SINGLE SEIZURE N/A 6/27/2016    Performed by Shoaib Boyce Jr., MD at [facility] ECT    ELECTROCONVULSIVE THERAPY (ECT) - SINGLE SEIZURE N/A 6/23/2016    Performed by Shoaib Boyce Jr., MD at [facility] ECT    ELECTROCONVULSIVE THERAPY (ECT) - SINGLE SEIZURE N/A 6/20/2016    Performed by Shoaib Boyce Jr.,  MD at [facility] ECT    ELECTROCONVULSIVE THERAPY (ECT) - SINGLE SEIZURE N/A 2016    Performed by Shoaib Boyce Jr., MD at [facility] ECT    ELECTROCONVULSIVE THERAPY (ECT) - SINGLE SEIZURE N/A 6/15/2016    Performed by Shoaib Boyce Jr., MD at [facility] ECT    ELECTROCONVULSIVE THERAPY (ECT) - SINGLE SEIZURE N/A 2016    Performed by Shoaib Boyce Jr., MD at [facility] ECT    ELECTROCONVULSIVE THERAPY (ECT) - SINGLE SEIZURE N/A 2016    Performed by Shoaib Boyce Jr., MD at [facility] ECT    ELECTROCONVULSIVE THERAPY (ECT) - SINGLE SEIZURE N/A 2016    Performed by Shoaib Boyce Jr., MD at [facility] ECT    ELECTROCONVULSIVE THERAPY, CEREBRAL HEMISPHERE, UNILATERAL, 1 SEIZURE Bilateral 2018    Performed by Sh[facility]ib [facility]ce Jr., [facility] at [facility] ECT    OVARIAN CYST REMOVAL Bilateral        Social History     Socioeconomic History    Marital status: Single     Spouse name: Not on file    Number of children: Not on file    Years of education: Not on file    Highest education level: Not on file   Social Needs    Financial resource strain: Not on file    Food insecurity - worry: Not on file    Food insecurity - inability: Not on file    Transportation needs - medical: Not on file    Transportation needs - non-medical: Not on file   Occupational History    Occupation: unemployed   Tobacco Use    Smoking status: Former Smoker     Last attempt to quit: 2011     Years since quittin.5    Smokeless tobacco: Never Used   Substance and Sexual Activity    Alcohol use: Yes     Comment: rarely    Drug use: No     Comment: Experimental use in high school    Sexual activity: Not on file   Other Topics Concern    Patient feels they ought to cut down on drinking/drug use Not Asked    Patient annoyed by others criticizing their drinking/drug use Not Asked    Patient has felt bad or guilty about drinking/drug use Not Asked    Patient has had a drink/used drugs as an eye opener in the AM Not Asked    Social History Narrative    Pt has 1 older brother from an intact family until the death of her mother in 2012.  She completed 1 year of college, was never in the , and has never been employed.  She was engaged once, but never , has no children, and lives with her father plus 2 dogs.  She denies any hobbies and is spiritual but not Islam.  She does date and returns to college during rare periods when depression and anxiety [place].       OBJECTIVE:     Vital Signs Range (Last 24H):  BP: ()/()   Arterial Line BP: ()/()       Significant Labs:  Lab Results   Component Value Date    WBC 6.83 06/08/2016    HGB 13.3 06/08/2016    HCT 40.3 06/08/2016     06/08/2016    ALT 14 06/08/2016    AST 20 06/08/2016     06/08/2016    K 3.8 06/08/2016     06/08/2016    CREATININE 0.7 06/08/2016    BUN 10 06/08/2016    CO2 28 06/08/2016    TSH 1.208 06/08/2016         Diagnostic Studies:   No relevant studies.      EKG:   No recent studies available.    2D ECHO:  No results found for this or any previous visit.      ASSESSMENT/PLAN:     Anesthesia Evaluation    I have reviewed the Patient Summary Reports.    I have reviewed the Nursing Notes.   I have reviewed the Medications.     Review of Systems  Anesthesia Hx:  No problems with previous Anesthesia  Denies Family Hx of Anesthesia complications.   Denies Personal Hx of Anesthesia complications.   Social:  Former Smoker, Alcohol Use    Hematology/Oncology:  Hematology Normal   Oncology Normal     EENT/Dental:EENT/Dental Normal   Cardiovascular:  Cardiovascular Normal                     Pulmonary:  Pulmonary Normal  Denies Asthma.  Denies Shortness of breath.    Renal/:  Renal/ Normal     Hepatic/GI:  Hepatic/GI Normal    Neurological:   Denies TIA. Denies CVA.    Endocrine:  Endocrine Normal    Psych:   Psychiatric History depression          Physical Exam  General:  Well nourished    Airway/Jaw/Neck:  Airway Findings: Mouth Opening: Normal  Tongue: Normal  General Airway Assessment: Adult, Good  Mallampati: I  TM Distance: Normal, at least 6 cm  Jaw/Neck Findings:  Neck ROM: Normal ROM      Dental:  Dental Findings: In tact   Chest/Lungs:  Chest/Lungs Clear    Heart/Vascular:  Heart Findings: Normal    Abdomen:  Abdomen Findings: Normal      Mental Status:  Mental Status Findings:  Cooperative, Alert and Oriented         Anesthesia Plan  Type of Anesthesia, risks & benefits discussed:  Anesthesia Type:  general, MAC  Patient's Preference:   Intra-op Monitoring Plan: standard ASA monitors  Intra-op Monitoring Plan Comments:   Post Op Pain Control Plan: multimodal analgesia, IV/PO Opioids PRN and per primary service following discharge from PACU  Post Op Pain Control Plan Comments:   Induction:   IV  Beta Blocker:  Patient is not currently on a Beta-Blocker (No further documentation required).       Informed Consent: Patient understands risks and agrees with Anesthesia plan.  Questions answered. Anesthesia consent signed with patient.  ASA Score: 2     Day of Surgery Review of History & Physical:  There are no significant changes. Significant changes noted: Surgeon notified.  H&P update referred to the provider.         Ready For Surgery From Anesthesia Perspective.

## 2018-10-17 ENCOUNTER — HOSPITAL ENCOUNTER (OUTPATIENT)
Facility: HOSPITAL | Age: 38
Discharge: HOME | End: 2018-10-17
Attending: PSYCHIATRY & NEUROLOGY | Admitting: PSYCHIATRY & NEUROLOGY
Payer: COMMERCIAL

## 2018-10-17 ENCOUNTER — ANESTHESIA (OUTPATIENT)
Dept: ELECTROPHYSIOLOGY | Facility: HOSPITAL | Age: 38
End: 2018-10-17
Payer: COMMERCIAL

## 2018-10-17 VITALS
HEIGHT: 65 IN | RESPIRATION RATE: 19 BRPM | OXYGEN SATURATION: 99 % | HEART RATE: 79 BPM | TEMPERATURE: 98 F | SYSTOLIC BLOOD PRESSURE: 117 MMHG | BODY MASS INDEX: 25.83 KG/M2 | DIASTOLIC BLOOD PRESSURE: 61 MMHG | WEIGHT: 155 LBS

## 2018-10-17 DIAGNOSIS — F33.9 RECURRENT MAJOR DEPRESSIVE DISORDER, REMISSION STATUS UNSPECIFIED: Primary | ICD-10-CM

## 2018-10-17 DIAGNOSIS — F33.41 MDD (MAJOR DEPRESSIVE DISORDER), RECURRENT, IN PARTIAL REMISSION: Primary | ICD-10-CM

## 2018-10-17 DIAGNOSIS — F32.9 MDD (MAJOR DEPRESSIVE DISORDER): ICD-10-CM

## 2018-10-17 LAB
B-HCG UR QL: NEGATIVE
CTP QC/QA: YES

## 2018-10-17 PROCEDURE — 81025 URINE PREGNANCY TEST: CPT | Performed by: PSYCHIATRY & NEUROLOGY

## 2018-10-17 PROCEDURE — D9220A PRA ANESTHESIA: Mod: ,,, | Performed by: ANESTHESIOLOGY

## 2018-10-17 PROCEDURE — 90870 ELECTROCONVULSIVE THERAPY: CPT | Mod: ,,, | Performed by: PSYCHIATRY & NEUROLOGY

## 2018-10-17 PROCEDURE — 63600175 PHARM REV CODE 636 W HCPCS: Performed by: STUDENT IN AN ORGANIZED HEALTH CARE EDUCATION/TRAINING PROGRAM

## 2018-10-17 PROCEDURE — 25000003 PHARM REV CODE 250: Performed by: STUDENT IN AN ORGANIZED HEALTH CARE EDUCATION/TRAINING PROGRAM

## 2018-10-17 PROCEDURE — 25000003 PHARM REV CODE 250: Performed by: ANESTHESIOLOGY

## 2018-10-17 PROCEDURE — 90870 ELECTROCONVULSIVE THERAPY: CPT | Performed by: ANESTHESIOLOGY

## 2018-10-17 RX ORDER — LIDOCAINE HYDROCHLORIDE 10 MG/ML
1 INJECTION, SOLUTION EPIDURAL; INFILTRATION; INTRACAUDAL; PERINEURAL ONCE
Status: DISCONTINUED | OUTPATIENT
Start: 2018-10-17 | End: 2018-10-17

## 2018-10-17 RX ORDER — SODIUM CHLORIDE 9 MG/ML
1000 INJECTION, SOLUTION INTRAVENOUS CONTINUOUS
Status: DISCONTINUED | OUTPATIENT
Start: 2018-10-17 | End: 2018-10-17

## 2018-10-17 RX ORDER — SUCCINYLCHOLINE CHLORIDE 20 MG/ML
INJECTION INTRAMUSCULAR; INTRAVENOUS
Status: DISCONTINUED | OUTPATIENT
Start: 2018-10-17 | End: 2018-10-17

## 2018-10-17 RX ORDER — ONDANSETRON 2 MG/ML
INJECTION INTRAMUSCULAR; INTRAVENOUS
Status: DISCONTINUED | OUTPATIENT
Start: 2018-10-17 | End: 2018-10-17

## 2018-10-17 RX ORDER — SODIUM CHLORIDE 9 MG/ML
INJECTION, SOLUTION INTRAVENOUS CONTINUOUS
Status: DISCONTINUED | OUTPATIENT
Start: 2018-10-17 | End: 2018-10-17 | Stop reason: HOSPADM

## 2018-10-17 RX ORDER — KETOROLAC TROMETHAMINE 30 MG/ML
INJECTION, SOLUTION INTRAMUSCULAR; INTRAVENOUS
Status: COMPLETED
Start: 2018-10-17 | End: 2018-10-17

## 2018-10-17 RX ORDER — SODIUM CHLORIDE 0.9 % (FLUSH) 0.9 %
3 SYRINGE (ML) INJECTION
Status: DISCONTINUED | OUTPATIENT
Start: 2018-10-17 | End: 2018-10-17 | Stop reason: HOSPADM

## 2018-10-17 RX ORDER — LIDOCAINE HYDROCHLORIDE 10 MG/ML
1 INJECTION, SOLUTION EPIDURAL; INFILTRATION; INTRACAUDAL; PERINEURAL ONCE
Status: COMPLETED | OUTPATIENT
Start: 2018-10-17 | End: 2018-10-17

## 2018-10-17 RX ORDER — LABETALOL HYDROCHLORIDE 5 MG/ML
INJECTION, SOLUTION INTRAVENOUS
Status: DISCONTINUED | OUTPATIENT
Start: 2018-10-17 | End: 2018-10-17

## 2018-10-17 RX ORDER — KETOROLAC TROMETHAMINE 30 MG/ML
30 INJECTION, SOLUTION INTRAMUSCULAR; INTRAVENOUS ONCE AS NEEDED
Status: DISCONTINUED | OUTPATIENT
Start: 2018-10-17 | End: 2018-10-17 | Stop reason: HOSPADM

## 2018-10-17 RX ORDER — ONDANSETRON 2 MG/ML
4 INJECTION INTRAMUSCULAR; INTRAVENOUS DAILY PRN
Status: DISCONTINUED | OUTPATIENT
Start: 2018-10-17 | End: 2018-10-17 | Stop reason: HOSPADM

## 2018-10-17 RX ORDER — KETOROLAC TROMETHAMINE 30 MG/ML
INJECTION, SOLUTION INTRAMUSCULAR; INTRAVENOUS
Status: DISCONTINUED | OUTPATIENT
Start: 2018-10-17 | End: 2018-10-17

## 2018-10-17 RX ORDER — ONDANSETRON 2 MG/ML
INJECTION INTRAMUSCULAR; INTRAVENOUS
Status: COMPLETED
Start: 2018-10-17 | End: 2018-10-17

## 2018-10-17 RX ORDER — SUCCINYLCHOLINE CHLORIDE 20 MG/ML
INJECTION INTRAMUSCULAR; INTRAVENOUS
Status: COMPLETED
Start: 2018-10-17 | End: 2018-10-17

## 2018-10-17 RX ORDER — ONDANSETRON 2 MG/ML
4 INJECTION INTRAMUSCULAR; INTRAVENOUS ONCE AS NEEDED
Status: DISCONTINUED | OUTPATIENT
Start: 2018-10-17 | End: 2018-10-17 | Stop reason: HOSPADM

## 2018-10-17 RX ADMIN — SODIUM CHLORIDE: 0.9 INJECTION, SOLUTION INTRAVENOUS at 08:10

## 2018-10-17 RX ADMIN — Medication 500 MG: at 09:10

## 2018-10-17 RX ADMIN — ONDANSETRON 4 MG: 2 INJECTION, SOLUTION INTRAMUSCULAR; INTRAVENOUS at 09:10

## 2018-10-17 RX ADMIN — KETOROLAC TROMETHAMINE 30 MG: 30 INJECTION, SOLUTION INTRAMUSCULAR at 09:10

## 2018-10-17 RX ADMIN — SUCCINYLCHOLINE CHLORIDE 170 MG: 20 INJECTION, SOLUTION INTRAMUSCULAR; INTRAVENOUS at 09:10

## 2018-10-17 RX ADMIN — METHOHEXITAL SODIUM 150 MG: 500 INJECTION, POWDER, LYOPHILIZED, FOR SOLUTION INTRAMUSCULAR; INTRAVENOUS; RECTAL at 09:10

## 2018-10-17 RX ADMIN — LABETALOL HYDROCHLORIDE 10 MG: 5 INJECTION, SOLUTION INTRAVENOUS at 09:10

## 2018-10-17 RX ADMIN — LIDOCAINE HYDROCHLORIDE 10 MG: 10 INJECTION, SOLUTION EPIDURAL; INFILTRATION; INTRACAUDAL; PERINEURAL at 07:10

## 2018-10-17 NOTE — TRANSFER OF CARE
"Anesthesia Transfer of Care Note    Patient: Allyssa Wright    Procedure(s) Performed: Procedure(s) (LRB):  ELECTROCONVULSIVE THERAPY (ECT) - SINGLE SEIZURE (N/A)    Patient location: PACU    Anesthesia Type: general    Transport from OR: Transported from OR on 6-10 L/min O2 by face mask with adequate spontaneous ventilation    Post pain: adequate analgesia    Post assessment: no apparent anesthetic complications and tolerated procedure well    Post vital signs: stable    Level of consciousness: responds to stimulation and sedated    Nausea/Vomiting: no nausea/vomiting    Complications: none    Transfer of care protocol was followed      Last vitals:   Visit Vitals  BP (!) 111/59   Pulse 76   Temp 37.1 °C (98.7 °F) (Oral)   Resp 18   Ht 5' 5" (1.651 m)   Wt 70.3 kg (155 lb)   LMP  (LMP Unknown)   SpO2 100%   BMI 25.79 kg/m²     "

## 2018-10-17 NOTE — DISCHARGE SUMMARY
Allyssa Wright  : 1980   MRN: [id number]  Date: 10/17/2018     Electroconvulsive Therapy  Discharge Summary    Admit Date: 10/17/2018  6:18 AM  Discharge Date: 10/17/2018    Attending Physician: Shoaib Boyce Jr., MD   Discharge Provider: Rudy Atwood MD    History of Present Illness: Allyssa Wright is a 38 y.o. female with MDD presented for ECT #58. See H&P dated 10/17/2018 for full HPI. For further details, see Dr. Boyce's pre-ECT evaluation.    Hospital Course: The patient tolerated the ECT treatment well without complication. Patient was stable post-procedure. See OP note dated 10/17/2018 for more details.     Disposition: Home or Self Care    Medications:  Current Discharge Medication List      CONTINUE these medications which have NOT CHANGED    Details   clozapine (CLOZARIL) 50 MG tablet Take 50 mg by mouth once daily.       dapsone 7.5 % GlwP Apply topically once daily.      famciclovir (FAMVIR) 500 MG tablet Take 1 tablet (500 mg total) by mouth 2 (two) times daily.  Qty: 30 tablet, Refills: 12    Associated Diagnoses: Major depressive disorder, recurrent episode, moderate degree      hydrOXYzine pamoate (VISTARIL) 25 MG Cap Take 2 capsules (50 mg total) by mouth nightly as needed (Take 25-50mg (1-2 caps) at night for anxiety prior to ECT).  Qty: 180 capsule, Refills: 0      mirtazapine (REMERON) 15 MG tablet Take 1 tablet (15 mg total) by mouth every evening.  Qty: 90 tablet, Refills: 0      spironolactone (ALDACTONE) 50 MG tablet 50 mg 2 (two) times daily. 50  mg qAM, 50 mg qHS.      thyroid (ARMOUR THYROID) 30 mg Tab Take 1 tablet (30 mg total) by mouth every morning.  Qty: 30 tablet, Refills: 11    Associated Diagnoses: Major depressive disorder, recurrent episode, moderate degree      trazodone (DESYREL) 100 MG tablet Take 200 mg by mouth every evening.       venlafaxine (EFFEXOR) 100 MG Tab Take 3 tablets (300 mg total) by mouth once daily.    Associated Diagnoses: MDD (major depressive  disorder), recurrent, in partial remission      vortioxetine (TRINTELLIX) 5 mg Tab Take 2.5 mg by mouth once daily.      dextroamphetamine-amphetamine 30 mg Tab Take 30 mg by mouth 2 (two) times daily.     Associated Diagnoses: MDD (major depressive disorder), recurrent, in partial remission      UNABLE TO FIND 2 (two) times daily. n-azetyl-cysteine      ZOVIRAX 5 % Crea Refills: 5           Status at Discharge: Alert and medically stable    Discharge Diagnoses:  MDD  Diet: Resume previous outpatient diet  Activity: Ambulate with assistance  Instructions: Please do not eat or drink anything after midnight prior to procedure. Please do not drive on day of ECT.    Med Changes:  None    Next ECT:  Next Wednesday, 10/24/18    Rudy Atwood MD  10/17/2018

## 2018-10-17 NOTE — H&P
Allyssa Wright  : 1980   MRN: [id number]  Date: 10/17/2018     Electroconvulsive Therapy  History & Physical    Chief complaint: MDD recurrent in partial remission  Procedure: ECT #58    SUBJECTIVE:     HPI:   Allyssa Wright is a 38 y.o. female with MDD who presents for ECT. The patient has no new physical complaints, continues to complain of recent exacerbation of her depression.  Pt feels her mood has improved from last week.  Pt concerned about the length of time between her Pt states she is eating and sleeping well.   Please see Dr. Boyce's full pre-ECT evaluation for further details. The patient does not need any prescriptions and has not had any med changes since meeting with Dr. Boyce. The patient has not had anything by mouth since midnight and is ready for ECT.    Psychiatric Review of Systems:  Mood: 6/10  Anxiety: improved  Appetite: No problem  Psychomotor: No problem  Cognitive Impairment: mild, tolerable  Insomnia: None, trouble getting up in the morning  Psychosis: None  Diurnal Variation: None  Suicidal Ideation: Absent    Medical Review Of Systems:  Pertinent items are noted in HPI.    Current Medications:   No current facility-administered medications on file prior to encounter.      Current Outpatient Medications on File Prior to Encounter   Medication Sig Dispense Refill    clozapine (CLOZARIL) 50 MG tablet Take 50 mg by mouth once daily.       dapsone 7.5 % GlwP Apply topically once daily.      dextroamphetamine-amphetamine 30 mg Tab Take 30 mg by mouth 2 (two) times daily.       famciclovir (FAMVIR) 500 MG tablet Take 1 tablet (500 mg total) by mouth 2 (two) times daily. 30 tablet 12    hydrOXYzine pamoate (VISTARIL) 25 MG Cap Take 2 capsules (50 mg total) by mouth nightly as needed (Take 25-50mg (1-2 caps) at night for anxiety prior to ECT). 180 capsule 0    mirtazapine (REMERON) 15 MG tablet Take 1 tablet (15 mg total) by mouth every evening. (Patient taking differently: Take  7.5 mg by mouth every evening. ) 90 tablet 0    spironolactone (ALDACTONE) 50 MG tablet 50 mg 2 (two) times daily. 50  mg qAM, 50 mg qHS.      thyroid (ARMOUR THYROID) 30 mg Tab Take 1 tablet (30 mg total) by mouth every morning. 30 tablet 11    trazodone (DESYREL) 100 MG tablet Take 200 mg by mouth every evening.       UNABLE TO FIND 2 (two) times daily. n-azetyl-cysteine      venlafaxine (EFFEXOR) 100 MG Tab Take 3 tablets (300 mg total) by mouth once daily. (Patient taking differently: Take 225 mg by mouth once daily. )      vortioxetine (TRINTELLIX) 5 mg Tab Take 2.5 mg by mouth once daily.      ZOVIRAX 5 % Crea   5      Allergies:   Benzodiazepines; Ampicillin; Erythromycin; Levaquin [levofloxacin]; Penicillins; Pristiq [desvenlafaxine]; Sulfa (sulfonamide antibiotics); and Azithromycin    Past Medical/Surgical History:   Past Medical History:   Diagnosis Date    Anxiety     Depression     History of psychiatric hospitalization     HSV-1 (herpes simplex virus 1) infection     Hx of psychiatric care     Moderate depressed bipolar II disorder 06/13/2016    reports no history of bipolar    Obsessive-compulsive disorder     Psychiatric problem     Schizophrenia 4/3/2018    Self-harming behavior     Therapy      Past Surgical History:   Procedure Laterality Date    ANKLE SURGERY Right     BREAST augmentation      ELECTROCONVULSIVE THERAPY (ECT) - SINGLE SEIZURE N/A 8/31/2018    Performed by Shoaib Boyce Jr., MD at [facility] ECT    ELECTROCONVULSIVE THERAPY (ECT) - SINGLE SEIZURE N/A 8/6/2018    Performed by Shoaib Boyce Jr., MD at [facility] ECT    ELECTROCONVULSIVE THERAPY (ECT) - SINGLE SEIZURE N/A 7/23/2018    Performed by Shoaib Boyce Jr., MD at [facility] ECT    ELECTROCONVULSIVE THERAPY (ECT) - SINGLE SEIZURE N/A 7/5/2018    Performed by Shoaib Boyce Jr., MD at [facility] ECT    ELECTROCONVULSIVE THERAPY (ECT) - SINGLE SEIZURE N/A 6/28/2018    Performed by Shoaib Boyce Jr., MD at  [facility] ECT    ELECTROCONVULSIVE THERAPY (ECT) - SINGLE SEIZURE N/A 6/13/2018    Performed by Shoaib Boyce Jr., MD at [facility] ECT    ELECTROCONVULSIVE THERAPY (ECT) - SINGLE SEIZURE N/A 5/29/2018    Performed by Shoaib Boyce Jr., MD at [facility] ECT    ELECTROCONVULSIVE THERAPY (ECT) - SINGLE SEIZURE N/A 5/8/2018    Performed by Shoaib Boyce Jr., MD at [facility] ECT    ELECTROCONVULSIVE THERAPY (ECT) - SINGLE SEIZURE N/A 4/24/2018    Performed by Shoaib Boyce Jr., MD at [facility] ECT    ELECTROCONVULSIVE THERAPY (ECT) - SINGLE SEIZURE N/A 4/17/2018    Performed by Shoaib Boyce Jr., MD at [facility] ECT    ELECTROCONVULSIVE THERAPY (ECT) - SINGLE SEIZURE N/A 4/13/2018    Performed by Shoaib Boyce Jr., MD at [facility] ECT    ELECTROCONVULSIVE THERAPY (ECT) - SINGLE SEIZURE N/A 4/3/2018    Performed by Shoaib Boyce Jr., MD at [facility] ECT    ELECTROCONVULSIVE THERAPY (ECT) - SINGLE SEIZURE N/A 3/20/2018    Performed by Shaoib Boyce Jr., MD at [facility] ECT    ELECTROCONVULSIVE THERAPY (ECT) - SINGLE SEIZURE N/A 3/15/2018    Performed by Shoaib Boyce Jr., MD at [facility] ECT    ELECTROCONVULSIVE THERAPY (ECT) - SINGLE SEIZURE N/A 3/6/2018    Performed by Shoaib Boyce Jr., MD at [facility] ECT    ELECTROCONVULSIVE THERAPY (ECT) - SINGLE SEIZURE N/A 3/1/2018    Performed by Shoaib Boyce Jr., MD at [facility] ECT    ELECTROCONVULSIVE THERAPY (ECT) - SINGLE SEIZURE N/A 2/23/2018    Performed by Shoaib Boyce Jr., MD at [facility] ECT    ELECTROCONVULSIVE THERAPY (ECT) - SINGLE SEIZURE N/A 2/19/2018    Performed by Shoaib Boyce Jr., MD at [facility] ECT    ELECTROCONVULSIVE THERAPY (ECT) - SINGLE SEIZURE N/A 2/15/2018    Performed by Shoaib Boyce Jr., MD at [facility] ECT    ELECTROCONVULSIVE THERAPY (ECT) - SINGLE SEIZURE N/A 1/22/2018    Performed by Shoaib Boyce Jr., MD at [facility] ECT    ELECTROCONVULSIVE THERAPY (ECT) - SINGLE SEIZURE N/A 12/11/2017    Performed by Shoaib Boyce Jr., MD at [facility] ECT     ELECTROCONVULSIVE THERAPY (ECT) - SINGLE SEIZURE N/A 10/24/2017    Performed by [facility]ib [facility]ce Jr[facility] MD at [facility] ECT    ELECTROCONVULSIVE THERAPY (ECT) - SINGLE SEIZURE N/A 9/20/2017    Performed by Shoaib Boyce Jr., MD at [facility] ECT    ELECTROCONVULSIVE THERAPY (ECT) - SINGLE SEIZURE N/A 8/14/2017    Performed by Shoaib Boyce Jr., MD at [facility] ECT    ELECTROCONVULSIVE THERAPY (ECT) - SINGLE SEIZURE Bilateral 7/12/2017    Performed by Shoaib Boyce Jr., MD at [facility] ECT    ELECTROCONVULSIVE THERAPY (ECT) - SINGLE SEIZURE N/A 6/13/2017    Performed by Shoaib Boyce Jr., MD at [facility] ECT    ELECTROCONVULSIVE THERAPY (ECT) - SINGLE SEIZURE N/A 5/17/2017    Performed by Shoaib Boyce Jr., MD at [facility] ECT    ELECTROCONVULSIVE THERAPY (ECT) - SINGLE SEIZURE N/A 4/20/2017    Performed by Shoaib Boyce Jr., MD at [facility] ECT    ELECTROCONVULSIVE THERAPY (ECT) - SINGLE SEIZURE N/A 11/22/2016    Performed by Shoaib Boyce Jr., MD at [facility] ECT    ELECTROCONVULSIVE THERAPY (ECT) - SINGLE SEIZURE N/A 10/24/2016    Performed by Shoaib Boyce Jr., MD at [facility] ECT    ELECTROCONVULSIVE THERAPY (ECT) - SINGLE SEIZURE N/A 9/22/2016    Performed by Shoaib Boyce Jr., MD at [facility] ECT    ELECTROCONVULSIVE THERAPY (ECT) - SINGLE SEIZURE N/A 9/1/2016    Performed by Shoaib Boyce Jr., MD at [facility] ECT    ELECTROCONVULSIVE THERAPY (ECT) - SINGLE SEIZURE N/A 8/15/2016    Performed by Shoaib Boyce Jr., MD at [facility] ECT    ELECTROCONVULSIVE THERAPY (ECT) - SINGLE SEIZURE N/A 8/1/2016    Performed by Shoaib Boyce Jr., MD at [facility] ECT    ELECTROCONVULSIVE THERAPY (ECT) - SINGLE SEIZURE N/A 7/22/2016    Performed by Shoaib Boyce Jr., MD at [facility] ECT    ELECTROCONVULSIVE THERAPY (ECT) - SINGLE SEIZURE N/A 7/14/2016    Performed by Shoaib Boyce Jr., MD at [facility] ECT    ELECTROCONVULSIVE THERAPY (ECT) - SINGLE SEIZURE N/A 7/8/2016    Performed by Shoaib Boyce Jr., MD at [facility] ECT     ELECTROCONVULSIVE THERAPY (ECT) - SINGLE SEIZURE N/A 7/5/2016    Performed by Shoaib Boyce Jr., MD at [facility] ECT    ELECTROCONVULSIVE THERAPY (ECT) - SINGLE SEIZURE N/A 6/27/2016    Performed by Shoaib Boyce Jr., MD at [facility] ECT    ELECTROCONVULSIVE THERAPY (ECT) - SINGLE SEIZURE N/A 6/23/2016    Performed by Shoaib Boyce Jr., MD at [facility] ECT    ELECTROCONVULSIVE THERAPY (ECT) - SINGLE SEIZURE N/A 6/20/2016    Performed by Shoaib Boyce Jr., MD at [facility] ECT    ELECTROCONVULSIVE THERAPY (ECT) - SINGLE SEIZURE N/A 6/16/2016    Performed by Shoaib Boyce Jr., MD at [facility] ECT    ELECTROCONVULSIVE THERAPY (ECT) - SINGLE SEIZURE N/A 6/15/2016    Performed by Shoaib Boyce Jr., MD at [facility] ECT    ELECTROCONVULSIVE THERAPY (ECT) - SINGLE SEIZURE N/A 6/14/2016    Performed by Shoaib Boyce Jr., MD at [facility] ECT    ELECTROCONVULSIVE THERAPY (ECT) - SINGLE SEIZURE N/A 6/13/2016    Performed by Shoaib Boyce Jr., MD at [facility] ECT    ELECTROCONVULSIVE THERAPY (ECT) - SINGLE SEIZURE N/A 1/4/2016    Performed by Shoaib Boyce Jr., MD at [facility] ECT    ELECTROCONVULSIVE THERAPY, CEREBRAL HEMISPHERE, UNILATERAL, 1 SEIZURE Bilateral 6/25/2018    Performed by Shoaib Boyce Jr., MD at [facility] ECT    OVARIAN CYST REMOVAL Bilateral       OBJECTIVE:     Vitals (pre-procedure):  There were no vitals filed for this visit.     Labs/Imaging/Studies:   No results found for this or any previous visit (from the past 48 hour(s)).   No results found for: PHENYTOIN, PHENOBARB, VALPROATE, CBMZ    Physical Exam:   Gen: AAOx4, NAD  HEENT: MMM, PERRL, EOMI, O/P clear  CV: RRR, S1/S2 nml, no M/R/G  Chest: CTAB, no R/R/W, unlabored breathing  Abd: S/NT/ND, +BS, no HSM  Ext: No C/C/E, pulse 2+ throughout  Neuro: CN II-XII grossly intact, no focal deficits    Mental Status Exam:   Appearance: age appropriate, well nourished, dressed in hospital gown  Behavior: friendly and cooperative, eye  contact appropriate  Speech: conversational tone, rate, pitch, volume  Mood: 6/10  Affect: full, reactive  Thought Process: linear and organized  Thought Content: no SI, no HI or AVH  Cognition: grossly intact  Insight: good  Judgment: appropriate for setting     ASSESSMENT/PLAN:     Allyssa Wright is a 38 y.o. female with <principal problem not specified> who presents for ECT.    Recommendations:   Proceed with ECT #58.      Rudy Atwood MD  10/17/2018

## 2018-10-17 NOTE — OP NOTE
Allyssa Wright  : 1980   MRN: [id number]  Date: 10/17/2018    Electroconvulsive Therapy  Procedure Note    Date of Admission: 10/17/2018  6:18 AM    Site: [facility], [address]    Attending: Shoaib Boyce Jr., MD   Residents: Rudy Atwood MD  Pre-procedure Diagnosis: MDD  ECT Treatment Number: 58  Machine Type: Mecta 5000    Patient Status: Medically stable    Vitals (pre-procedure):    Vitals:    10/17/18 0756   BP: (!) 111/59   Pulse:    Resp:    Temp:      Electrode Placement: Bitemporal    Stimulus Number Charge (mC) Level Pulse Width (msec) Frequency  (Hz) Duration of Stimulus (sec) Current (mA) Duration of Seizure (sec)   1 576 3 1 60 6 800 82                                   Complications: Hypertension    Maximum Blood Pressure: 193/104    Medications Given:  Caffeine 500 mg PO before  Methohexital (Brevital) 150 mg  IV before  Succinylcholine (Anectine) 170 mg  IV before  Zofran 4 mg IV before  Toradol 30 mg IV before  Labetalol 10 mg IV after    Treatment Course:  Patient tolerated procedure well. After adequate recovery from general anesthesia, the patient was transported to recovery.    Post-op Diagnosis: Same as above    Recommended for next ECT:  Same as above.      Rudy Atwood MD  [facility]  10/17/2018  7:53 AM

## 2018-10-17 NOTE — DISCHARGE INSTRUCTIONS
Understanding Electroconvulsive Therapy  Electroconvulsive therapy (ECT) is sometimes called shock therapy. This may sound painful, but ECT doesnt hurt. Its often the safest and best treatment for severe depression. It can treat other mental disorders as well.  What is electroconvulsive therapy?  ECT is used to treat people who are very depressed. Its mainly used when other treatments, such as antidepressant medicines, have failed. Often it may relieve feelings of sadness and despair after 2 to 4 treatments.  Common symptoms of major depression  Symptoms of major depression include:  · Feeling a deep sadness that doesnt go away  · Losing all pleasure in life  · Feeling hopeless or helpless  · Feeling guilty  · Sleeping more or less than normal  · Eating more or less than normal  · Having headaches or stomachaches, or other pains that dont go away  · Feeling nervous, empty, or worthless  · Crying a great deal  · Thinking or talking about suicide or death  How is ECT therapy done?  Before an ECT treatment, youll be given anesthesia to keep you pain-free. Youll also be given medicine to make you sleep, relax your muscles and control your heart rate. Your healthcare provider then places electrodes on your head. You may have one above each temple (bilateral ECT). Or you may have electrodes on one temple and on your forehead (unilateral ECT). While you are asleep, your brain is stimulated very briefly with an electric current. This causes a seizure, usually lasting less than a minute. Because you are under anesthesia, your body will not move even as your brain goes through great changes.  What are the risks?  When done properly, ECT is quite safe. Right after the treatment, you may be confused. This often lasts for less than half an hour. You may have a headache or stiff muscles. But these symptoms often go away quickly. A more serious possible side effect is memory loss. Commonly, people have short-term  (temporary) trouble remembering information that they learned recently. And they may have little recall of the time when they received treatment. Less commonly, people may have long-lasting (permanent) spotty recall of major past events. In rare cases, there may be memory loss for larger blocks of time.  Looking to the future  In most cases, ECT doesnt cure depression. But it can improve symptoms for a period of time. You may need a series of ECT treatments to continue feeling the benefit. You may also need to take antidepressant medicines to help prevent symptoms from returning. But with ongoing treatment, you can have a full and healthy life.  Resources  · The National [employer] of Mental Health  508.634.1580  www.nimh.nih.gov  · [facility]  297.641.2943  www.[facility].org     Date Last Reviewed: 5/1/2017  © 9184-9202 [employer]. [address] [place] [address], [place] [address]. All rights reserved. This information is not intended as a substitute for professional medical care. Always follow your healthcare professional's instructions.

## 2018-10-17 NOTE — ANESTHESIA POSTPROCEDURE EVALUATION
"Anesthesia Post Evaluation    Patient: Allyssa Wright    Procedure(s) Performed: Procedure(s) (LRB):  ELECTROCONVULSIVE THERAPY (ECT) - SINGLE SEIZURE (N/A)    Final Anesthesia Type: general  Patient location during evaluation: PACU  Patient participation: Yes- Able to Participate  Level of consciousness: awake and alert  Post-procedure vital signs: reviewed and stable  Pain management: adequate  Airway patency: patent  PONV status at discharge: No PONV  Anesthetic complications: no      Cardiovascular status: blood pressure returned to baseline  Respiratory status: unassisted  Hydration status: euvolemic  Follow-up not needed.        Visit Vitals  /72   Pulse 78   Temp 37.1 °C (98.8 °F) (Temporal)   Resp 14   Ht 5' 5" (1.651 m)   Wt 70.3 kg (155 lb)   LMP  (LMP Unknown)   SpO2 98%   BMI 25.79 kg/m²       Pain/Roma Score: Pain Assessment Performed: Yes (10/17/2018  9:51 AM)  Presence of Pain: non-verbal indicators absent (10/17/2018  9:51 AM)  Roma Score: 8 (10/17/2018  9:51 AM)        "

## 2018-10-24 ENCOUNTER — ANESTHESIA EVENT (OUTPATIENT)
Dept: ELECTROPHYSIOLOGY | Facility: HOSPITAL | Age: 38
End: 2018-10-24
Payer: COMMERCIAL

## 2018-10-24 ENCOUNTER — ANESTHESIA (OUTPATIENT)
Dept: ELECTROPHYSIOLOGY | Facility: HOSPITAL | Age: 38
End: 2018-10-24
Payer: COMMERCIAL

## 2018-10-24 ENCOUNTER — HOSPITAL ENCOUNTER (OUTPATIENT)
Facility: HOSPITAL | Age: 38
Discharge: HOME | End: 2018-10-24
Attending: PSYCHIATRY & NEUROLOGY | Admitting: PSYCHIATRY & NEUROLOGY
Payer: COMMERCIAL

## 2018-10-24 VITALS
HEIGHT: 65 IN | BODY MASS INDEX: 25.83 KG/M2 | TEMPERATURE: 99 F | WEIGHT: 155 LBS | OXYGEN SATURATION: 100 % | SYSTOLIC BLOOD PRESSURE: 109 MMHG | RESPIRATION RATE: 20 BRPM | DIASTOLIC BLOOD PRESSURE: 78 MMHG | HEART RATE: 79 BPM

## 2018-10-24 DIAGNOSIS — F32.9 MDD (MAJOR DEPRESSIVE DISORDER): Primary | ICD-10-CM

## 2018-10-24 DIAGNOSIS — F33.41 MDD (MAJOR DEPRESSIVE DISORDER), RECURRENT, IN PARTIAL REMISSION: ICD-10-CM

## 2018-10-24 LAB
B-HCG UR QL: NEGATIVE
CTP QC/QA: YES

## 2018-10-24 PROCEDURE — 90870 ELECTROCONVULSIVE THERAPY: CPT | Mod: ,,, | Performed by: PSYCHIATRY & NEUROLOGY

## 2018-10-24 PROCEDURE — 25000003 PHARM REV CODE 250: Performed by: STUDENT IN AN ORGANIZED HEALTH CARE EDUCATION/TRAINING PROGRAM

## 2018-10-24 PROCEDURE — 81025 URINE PREGNANCY TEST: CPT | Performed by: PSYCHIATRY & NEUROLOGY

## 2018-10-24 PROCEDURE — 25000003 PHARM REV CODE 250: Performed by: PSYCHIATRY & NEUROLOGY

## 2018-10-24 PROCEDURE — D9220A PRA ANESTHESIA: Mod: ,,, | Performed by: ANESTHESIOLOGY

## 2018-10-24 PROCEDURE — 90870 ELECTROCONVULSIVE THERAPY: CPT | Performed by: ANESTHESIOLOGY

## 2018-10-24 PROCEDURE — 63600175 PHARM REV CODE 636 W HCPCS: Performed by: PSYCHIATRY & NEUROLOGY

## 2018-10-24 PROCEDURE — 63600175 PHARM REV CODE 636 W HCPCS: Performed by: STUDENT IN AN ORGANIZED HEALTH CARE EDUCATION/TRAINING PROGRAM

## 2018-10-24 RX ORDER — ACETAMINOPHEN 500 MG
1000 TABLET ORAL ONCE
Status: DISCONTINUED | OUTPATIENT
Start: 2018-10-24 | End: 2018-10-24 | Stop reason: HOSPADM

## 2018-10-24 RX ORDER — KETOROLAC TROMETHAMINE 30 MG/ML
30 INJECTION, SOLUTION INTRAMUSCULAR; INTRAVENOUS ONCE
Status: COMPLETED | OUTPATIENT
Start: 2018-10-24 | End: 2018-10-24

## 2018-10-24 RX ORDER — ONDANSETRON 2 MG/ML
4 INJECTION INTRAMUSCULAR; INTRAVENOUS ONCE
Status: COMPLETED | OUTPATIENT
Start: 2018-10-24 | End: 2018-10-24

## 2018-10-24 RX ORDER — LIDOCAINE HYDROCHLORIDE 10 MG/ML
1 INJECTION, SOLUTION EPIDURAL; INFILTRATION; INTRACAUDAL; PERINEURAL ONCE
Status: COMPLETED | OUTPATIENT
Start: 2018-10-24 | End: 2018-10-24

## 2018-10-24 RX ORDER — SODIUM CHLORIDE 0.9 % (FLUSH) 0.9 %
3 SYRINGE (ML) INJECTION
Status: DISCONTINUED | OUTPATIENT
Start: 2018-10-24 | End: 2018-10-24 | Stop reason: HOSPADM

## 2018-10-24 RX ORDER — SODIUM CHLORIDE 9 MG/ML
INJECTION, SOLUTION INTRAVENOUS CONTINUOUS
Status: DISCONTINUED | OUTPATIENT
Start: 2018-10-24 | End: 2018-10-24 | Stop reason: HOSPADM

## 2018-10-24 RX ORDER — SUCCINYLCHOLINE CHLORIDE 20 MG/ML
INJECTION INTRAMUSCULAR; INTRAVENOUS
Status: DISCONTINUED | OUTPATIENT
Start: 2018-10-24 | End: 2018-10-24

## 2018-10-24 RX ORDER — SODIUM CHLORIDE 9 MG/ML
500 INJECTION, SOLUTION INTRAVENOUS ONCE
Status: DISCONTINUED | OUTPATIENT
Start: 2018-10-24 | End: 2018-10-24 | Stop reason: HOSPADM

## 2018-10-24 RX ADMIN — METHOHEXITAL SODIUM 150 MG: 500 INJECTION, POWDER, LYOPHILIZED, FOR SOLUTION INTRAMUSCULAR; INTRAVENOUS; RECTAL at 09:10

## 2018-10-24 RX ADMIN — SUCCINYLCHOLINE CHLORIDE 140 MG: 20 INJECTION, SOLUTION INTRAMUSCULAR; INTRAVENOUS at 09:10

## 2018-10-24 RX ADMIN — SODIUM CHLORIDE: 0.9 INJECTION, SOLUTION INTRAVENOUS at 08:10

## 2018-10-24 RX ADMIN — KETOROLAC TROMETHAMINE 30 MG: 30 INJECTION, SOLUTION INTRAMUSCULAR at 09:10

## 2018-10-24 RX ADMIN — ONDANSETRON 4 MG: 2 INJECTION, SOLUTION INTRAMUSCULAR; INTRAVENOUS at 09:10

## 2018-10-24 RX ADMIN — Medication 500 MG: at 08:10

## 2018-10-24 RX ADMIN — LIDOCAINE HYDROCHLORIDE 10 MG: 10 INJECTION, SOLUTION EPIDURAL; INFILTRATION; INTRACAUDAL at 08:10

## 2018-10-24 NOTE — ANESTHESIA PROCEDURE NOTES
ECT    Procedure start time: 10/24/2018 9:09 AM  Timeout performed at: 10/24/2018 9:08 AM  Procedure end time: 10/24/2018 9:20 AM    Staffing  Anesthesiologist: Sheela Keith MD  CRNA/Resident: Cesar Krueger MD  Performed by: resident/CRNA     Preanesthetic Checklist  The following were completed as part of the preanesthetic checklist: patient identified, procedural consent, pre-op evaluation, timeout performed, risks and benefits discussed, monitors and equipment checked, anesthesia consent given, oxygen available, suction available, hand hygiene performed and patient being monitored.    Setup & Induction  Patient Monitoring: heart rate, continuous pulse ox, cardiac monitor, continuous capnometry and NIBP  Patient preparation: bite block inserted, extremities padded, patient hyperventilated and mandibular stabilization  Electrode Placement: Bitemporal    The patient was moved to the ECT therapy room after being assessed and consented for ECT. After standard ASA monitors were applied and timeout performed, the patient was adequately preoxygenated. After induction of general anesthesia, adequate oxygenation and ventilation were confirmed with pulse oxymetry and end tidal CO2 monitoring via bag-mask ventilation. End tidal CO2 was monitored throughout the case and moderate hyperventilation was performed prior to beginning ECT treatment. All extremities were padded, biteblock was inserted, and mandibular stabilization was done prior to initiating ECT therapy.    Procedure  Stimulus Number 1:             Recovery  Baseline Blood Pressure: 134/66  Peak Blood Pressure: 154/99    ECT Findings  ECT associated findings of: None

## 2018-10-24 NOTE — OP NOTE
Allyssa Wright  : 1980   MRN: [id number]  Date: 10/24/2018    Electroconvulsive Therapy  Procedure Note    Date of Admission: 10/24/2018  6:27 AM    Site: [facility], [address]    Attending: Shoaib Boyce Jr., MD   Residents: Delvis Farmer MD  Pre-procedure Diagnosis: MDD recurrent in partial remission   ECT Treatment Number: 59  Machine Type: Mecta 5000    Patient Status: Medically stable    Vitals (pre-procedure):  Vitals:    10/24/18 0840   BP: 108/73   Pulse: 82   Resp: 16   Temp: 98.5 °F (36.9 °C)       Electrode Placement: Bitemporal    Stimulus Number Charge (mC) Level Pulse Width (msec) Frequency  (Hz) Duration of Stimulus (sec) Current (mA) Duration of Seizure (sec)   1 576 3 1 60 6 800 64                                   Complications: Hypertension    Maximum Blood Pressure: 143/83    Medications Given:  Caffeine 500 mg PO before  Methohexital (Brevital) 150 mg  IV before  Succinylcholine (Anectine) 140 mg  IV before  Zofran 4 mg IV before  Toradol 30 mg IV before  Labetalol 5 mg IV after      Treatment Course:  Patient tolerated procedure well. After adequate recovery from general anesthesia, the patient was transported to recovery.    Post-op Diagnosis: Same as above    Recommended for next ECT: 2018      Delvis Farmer MD  [facility]  PGY-4  Pager:  [phone]        10/24/2018

## 2018-10-24 NOTE — ANESTHESIA POSTPROCEDURE EVALUATION
"Anesthesia Post Evaluation    Patient: Allyssa Wright    Procedure(s) Performed: Procedure(s) (LRB):  ELECTROCONVULSIVE THERAPY (ECT) - SINGLE SEIZURE (N/A)    Final Anesthesia Type: general  Patient location during evaluation: PACU  Patient participation: Yes- Able to Participate  Level of consciousness: awake and alert  Post-procedure vital signs: reviewed and stable  Pain management: adequate  Airway patency: patent  PONV status at discharge: No PONV  Anesthetic complications: no      Cardiovascular status: blood pressure returned to baseline  Respiratory status: unassisted  Hydration status: euvolemic  Follow-up not needed.        Visit Vitals  /78   Pulse 79   Temp 37.2 °C (99 °F) (Temporal)   Resp 20   Ht 5' 5" (1.651 m)   Wt 70.3 kg (155 lb)   LMP  (LMP Unknown)   SpO2 100%   Breastfeeding? No   BMI 25.79 kg/m²       Pain/Roma Score: Pain Assessment Performed: Yes (10/24/2018 10:00 AM)  Presence of Pain: denies (10/24/2018 10:00 AM)  Roma Score: 10 (10/24/2018 10:00 AM)  Modified Roma Score: 19 (10/24/2018 10:00 AM)        "

## 2018-10-24 NOTE — TRANSFER OF CARE
"Anesthesia Transfer of Care Note    Patient: Allyssa Wright    Procedure(s) Performed: Procedure(s) (LRB):  ELECTROCONVULSIVE THERAPY (ECT) - SINGLE SEIZURE (N/A)    Patient location: PACU    Anesthesia Type: general    Transport from OR: Transported from OR on 6-10 L/min O2 by face mask with adequate spontaneous ventilation    Post pain: adequate analgesia    Post assessment: no apparent anesthetic complications and tolerated procedure well    Post vital signs: stable    Level of consciousness: awake and alert    Nausea/Vomiting: no nausea/vomiting    Complications: none          Last vitals:   Visit Vitals  /73 (BP Location: Right arm, Patient Position: Lying)   Pulse 82   Temp 36.9 °C (98.5 °F) (Oral)   Resp 16   Ht 5' 5" (1.651 m)   Wt 70.3 kg (155 lb)   LMP  (LMP Unknown)   SpO2 99%   Breastfeeding? No   BMI 25.79 kg/m²     "

## 2018-10-24 NOTE — H&P
Allyssa Wright  : 1980   MRN: [id number]  Date: 10/24/2018     Psychiatric - ECT  H&P     Chief complaint: MDD recurrent in partial remission  Procedure: ECT #59    SUBJECTIVE:   HPI:   Allyssa Wright is a 38 y.o. female with MDD recurrent in partial remission who presents for ECT. The patient has no new physical complaints, continues to complain of recent exacerbation of her depression. Pt concerned about the length of time between her ECT tx. Pt states she is eating and sleeping well.   Please see Dr. Boyce's full pre-ECT evaluation for further details. The patient does not need any prescriptions and has not had any med changes since meeting with Dr. Boyce. The patient has not had anything by mouth since midnight and is ready for ECT.      Psychiatric Review of Systems:  Mood: 5-10  Anxiety: improved  Appetite: No problem  Psychomotor: No problem  Cognitive Impairment: mild, tolerable  Insomnia: None, trouble getting up in the morning  Psychosis: None  Diurnal Variation: None  Suicidal Ideation: Absent    Medical Review Of Systems:  Pertinent items are noted in HPI.    Current Medications:   No current facility-administered medications on file prior to encounter.      Current Outpatient Medications on File Prior to Encounter   Medication Sig Dispense Refill    clozapine (CLOZARIL) 50 MG tablet Take 50 mg by mouth once daily.       dapsone 7.5 % GlwP Apply topically once daily.      dextroamphetamine-amphetamine 30 mg Tab Take 30 mg by mouth 2 (two) times daily.       famciclovir (FAMVIR) 500 MG tablet Take 1 tablet (500 mg total) by mouth 2 (two) times daily. 30 tablet 12    hydrOXYzine pamoate (VISTARIL) 25 MG Cap Take 2 capsules (50 mg total) by mouth nightly as needed (Take 25-50mg (1-2 caps) at night for anxiety prior to ECT). 180 capsule 0    mirtazapine (REMERON) 15 MG tablet Take 1 tablet (15 mg total) by mouth every evening. (Patient taking differently: Take 7.5 mg by mouth every evening.  ) 90 tablet 0    spironolactone (ALDACTONE) 50 MG tablet 50 mg 2 (two) times daily. 50  mg qAM, 50 mg qHS.      thyroid (ARMOUR THYROID) 30 mg Tab Take 1 tablet (30 mg total) by mouth every morning. 30 tablet 11    trazodone (DESYREL) 100 MG tablet Take 200 mg by mouth every evening.       UNABLE TO FIND 2 (two) times daily. n-azetyl-cysteine      venlafaxine (EFFEXOR) 100 MG Tab Take 3 tablets (300 mg total) by mouth once daily. (Patient taking differently: Take 225 mg by mouth once daily. )      vortioxetine (TRINTELLIX) 5 mg Tab Take 2.5 mg by mouth once daily.      ZOVIRAX 5 % Crea   5          Allergies:   Review of patient's allergies indicates:   Allergen Reactions    Benzodiazepines Other (See Comments)     Contraindicated while taking Clozapine    Ampicillin      Mom says so    Erythromycin     Levaquin [levofloxacin] Other (See Comments)     Depression side effects    Penicillins      Mom says so    Pristiq [desvenlafaxine]      psycotic      Sulfa (sulfonamide antibiotics)      Rash      Azithromycin Anxiety        Past Medical/Surgical History:   Past Medical History:   Diagnosis Date    Anxiety     Depression     History of psychiatric hospitalization     HSV-1 (herpes simplex virus 1) infection     Hx of psychiatric care     Moderate depressed bipolar II disorder 06/13/2016    reports no history of bipolar    Obsessive-compulsive disorder     Psychiatric problem     Schizophrenia 4/3/2018    Self-harming behavior     Therapy      Past Surgical History:   Procedure Laterality Date    ANKLE SURGERY Right     BREAST augmentation      ELECTROCONVULSIVE THERAPY (ECT) - SINGLE SEIZURE N/A 8/31/2018    Performed by [facility]ib [facility] Jr[facility] at [facility] ECT    ELECTROCONVULSIVE THERAPY (ECT) - SINGLE SEIZURE N/A 8/6/2018    Performed by [facility] at [facility] ECT    ELECTROCONVULSIVE THERAPY (ECT) - SINGLE SEIZURE N/A 7/23/2018    Performed by [facility] at  [facility] ECT    ELECTROCONVULSIVE THERAPY (ECT) - SINGLE SEIZURE N/A 7/5/2018    Performed by [facility] at [facility] ECT    ELECTROCONVULSIVE THERAPY (ECT) - SINGLE SEIZURE N/A 6/28/2018    Performed by [facility] Jr[facility] at [facility] ECT    ELECTROCONVULSIVE THERAPY (ECT) - SINGLE SEIZURE N/A 6/13/2018    Performed by [facility] Jr[facility] at [facility] ECT    ELECTROCONVULSIVE THERAPY (ECT) - SINGLE SEIZURE N/A 5/29/2018    Performed by [facility]ib [facility] Jr[facility] at [facility] ECT    ELECTROCONVULSIVE THERAPY (ECT) - SINGLE SEIZURE N/A 5/8/2018    Performed by Sh[facility]ib [facility] Jr[facility] at [facility] ECT    ELECTROCONVULSIVE THERAPY (ECT) - SINGLE SEIZURE N/A 4/24/2018    Performed by Sh[facility]ib [facility] Jr[facility] at [facility] ECT    ELECTROCONVULSIVE THERAPY (ECT) - SINGLE SEIZURE N/A 4/17/2018    Performed by Shoaib [facility]ce Jr[facility] at [facility] ECT    ELECTROCONVULSIVE THERAPY (ECT) - SINGLE SEIZURE N/A 4/13/2018    Performed by Sh[facility]ib [facility] Jr[facility] at [facility] ECT    ELECTROCONVULSIVE THERAPY (ECT) - SINGLE SEIZURE N/A 4/3/2018    Performed by Sh[facility]ib [facility] Jr[facility] at [facility] ECT    ELECTROCONVULSIVE THERAPY (ECT) - SINGLE SEIZURE N/A 3/20/2018    Performed by Sh[facility]ib [facility] Jr[facility] at [facility] ECT    ELECTROCONVULSIVE THERAPY (ECT) - SINGLE SEIZURE N/A 3/15/2018    Performed by Sh[facility]ib [facility] Jr[facility] at [facility] ECT    ELECTROCONVULSIVE THERAPY (ECT) - SINGLE SEIZURE N/A 3/6/2018    Performed by Sh[facility]ib [facility] Jr[facility] at [facility] ECT    ELECTROCONVULSIVE THERAPY (ECT) - SINGLE SEIZURE N/A 3/1/2018    Performed by [facility] Jr[facility] at [facility] ECT    ELECTROCONVULSIVE THERAPY (ECT) - SINGLE SEIZURE N/A 2/23/2018    Performed by Sh[facility]ib [facility] Jr[facility] at [facility] ECT    ELECTROCONVULSIVE THERAPY (ECT) - SINGLE SEIZURE N/A 2/19/2018    Performed by [facility]ib [facility] Jr[facility] at [facility] ECT    ELECTROCONVULSIVE THERAPY (ECT) - SINGLE SEIZURE N/A 2/15/2018    Performed by [facility]ib [facility] Jr[facility] at [facility] ECT     ELECTROCONVULSIVE THERAPY (ECT) - SINGLE SEIZURE N/A 1/22/2018    Performed by [facility] at [facility] ECT    ELECTROCONVULSIVE THERAPY (ECT) - SINGLE SEIZURE N/A 12/11/2017    Performed by [facility]ib [facility] Jr[facility] at [facility] ECT    ELECTROCONVULSIVE THERAPY (ECT) - SINGLE SEIZURE N/A 10/24/2017    Performed by [facility] Jr[facility] at [facility] ECT    ELECTROCONVULSIVE THERAPY (ECT) - SINGLE SEIZURE N/A 9/20/2017    Performed by [facility]ib [facility] Jr[facility] at [facility] ECT    ELECTROCONVULSIVE THERAPY (ECT) - SINGLE SEIZURE N/A 8/14/2017    Performed by Sh[facility]ib [facility] Jr[facility] at [facility] ECT    ELECTROCONVULSIVE THERAPY (ECT) - SINGLE SEIZURE Bilateral 7/12/2017    Performed by Shoaib Boyce Jr[facility] at [facility] ECT    ELECTROCONVULSIVE THERAPY (ECT) - SINGLE SEIZURE N/A 6/13/2017    Performed by Shoaib Boyce Jr[facility] at [facility] ECT    ELECTROCONVULSIVE THERAPY (ECT) - SINGLE SEIZURE N/A 5/17/2017    Performed by Sh[facility]ib [facility] Jr[facility] at [facility] ECT    ELECTROCONVULSIVE THERAPY (ECT) - SINGLE SEIZURE N/A 4/20/2017    Performed by Shoaib [facility] Jr[facility] at [facility] ECT    ELECTROCONVULSIVE THERAPY (ECT) - SINGLE SEIZURE N/A 11/22/2016    Performed by Shoaib Boyce Jr[facility] at [facility] ECT    ELECTROCONVULSIVE THERAPY (ECT) - SINGLE SEIZURE N/A 10/24/2016    Performed by Shoaib Boyce Jr[facility] at [facility] ECT    ELECTROCONVULSIVE THERAPY (ECT) - SINGLE SEIZURE N/A 9/22/2016    Performed by Shoaib Boyce Jr[facility] at [facility] ECT    ELECTROCONVULSIVE THERAPY (ECT) - SINGLE SEIZURE N/A 9/1/2016    Performed by Shoaib Boy[facility] Jr[facility] at [facility] ECT    ELECTROCONVULSIVE THERAPY (ECT) - SINGLE SEIZURE N/A 8/15/2016    Performed by Shoaib Boyce Jr[facility] at [facility] ECT    ELECTROCONVULSIVE THERAPY (ECT) - SINGLE SEIZURE N/A 8/1/2016    Performed by Sh[facility]ib Boy[facility] Jr[facility] at [facility] ECT    ELECTROCONVULSIVE THERAPY (ECT) - SINGLE SEIZURE N/A 7/22/2016    Performed by [facility]ib [facility] Jr[facility] at [facility] ECT     ELECTROCONVULSIVE THERAPY (ECT) - SINGLE SEIZURE N/A 7/14/2016    Performed by [facility]ib [facility] Jr[facility] at [facility] ECT    ELECTROCONVULSIVE THERAPY (ECT) - SINGLE SEIZURE N/A 7/8/2016    Performed by Shoaib Boyce Jr[facility] MD at [facility] ECT    ELECTROCONVULSIVE THERAPY (ECT) - SINGLE SEIZURE N/A 7/5/2016    Performed by Shoaib Boyce Jr[facility] at [facility] ECT    ELECTROCONVULSIVE THERAPY (ECT) - SINGLE SEIZURE N/A 6/27/2016    Performed by Shoaib Boyce Jr[facility] MD at [facility] ECT    ELECTROCONVULSIVE THERAPY (ECT) - SINGLE SEIZURE N/A 6/23/2016    Performed by Shoaib Boyce Jr[facility] at [facility] ECT    ELECTROCONVULSIVE THERAPY (ECT) - SINGLE SEIZURE N/A 6/20/2016    Performed by Shoaib Boyce Jr[facility] MD at [facility] ECT    ELECTROCONVULSIVE THERAPY (ECT) - SINGLE SEIZURE N/A 6/16/2016    Performed by Shoaib Boyce Jr[facility] MD at [facility] ECT    ELECTROCONVULSIVE THERAPY (ECT) - SINGLE SEIZURE N/A 6/15/2016    Performed by Shoaib Boyce Jr[facility] MD at [facility] ECT    ELECTROCONVULSIVE THERAPY (ECT) - SINGLE SEIZURE N/A 6/14/2016    Performed by Shoaib Boyce Jr[facility] MD at [facility] ECT    ELECTROCONVULSIVE THERAPY (ECT) - SINGLE SEIZURE N/A 6/13/2016    Performed by Shoaib Boyce Jr[facility] at [facility] ECT    ELECTROCONVULSIVE THERAPY (ECT) - SINGLE SEIZURE N/A 1/4/2016    Performed by Shoaib Boyce Jr[facility] MD at [facility] ECT    ELECTROCONVULSIVE THERAPY, CEREBRAL HEMISPHERE, UNILATERAL, 1 SEIZURE Bilateral 6/25/2018    Performed by [facility]ib [facility] Jr[facility] at [facility] ECT    OVARIAN CYST REMOVAL Bilateral           OBJECTIVE:   Vitals (pre-procedure):  There were no vitals filed for this visit.     Labs/Imaging/Studies:   No results found for this or any previous visit (from the past 48 hour(s)).   No results found for: PHENYTOIN, PHENOBARB, VALPROATE, CBMZ      Physical Exam:   Gen: AAOx4, NAD  HEENT: MMM, PERRL, EOMI, O/P clear  CV: RRR, S1/S2 nml, no M/R/G  Chest: CTAB, no R/R/W, unlabored breathing  Abd: S/NT/ND, +BS, no HSM  Ext: No  C/C/E, pulse 2+ throughout  Neuro: CN II-XII grossly intact, no focal deficits    Mental Status Exam:   Appearance: age appropriate, well nourished, dressed in hospital gown  Behavior: friendly and cooperative, eye contact appropriate  Speech: conversational tone, rate, pitch, volume  Mood: 5-6/10  Affect: full, reactive  Thought Process: linear and organized  Thought Content: no SI, no HI or AVH  Cognition: grossly intact  Insight: good  Judgment: appropriate for setting     ASSESSMENT/PLAN:   Allyssa Wright is a 38 y.o. female with MDD, R, in partial remission who presents for ECT.    Recommendations:   Proceed with ECT #59.      Robert Alvarez M.D.  10/24/2018

## 2018-10-24 NOTE — DISCHARGE SUMMARY
Allyssa Wright  : 1980   MRN: [id number]  Date: 10/24/2018     Electroconvulsive Therapy  Discharge Summary    Admit Date: 10/24/2018  6:27 AM  Discharge Date: 10/24/2018    Attending Physician: Shoaib Boyce Jr., MD   Discharge Provider: Delvis Farmer MD    History of Present Illness: Allyssa Wright is a 38 y.o. female with MDD, recurrent, in partial remission presented for ECT #59. See H&P dated 10/24/2018 for full HPI. For further details, see Dr. Boyce's pre-ECT evaluation.    Hospital Course: The patient tolerated the ECT treatment well without complication. Patient was stable post-procedure. See OP note dated 10/24/2018 for more details.     Disposition: Home or Self Care    Medications:  Current Discharge Medication List      CONTINUE these medications which have NOT CHANGED    Details   clozapine (CLOZARIL) 50 MG tablet Take 50 mg by mouth once daily.       dapsone 7.5 % GlwP Apply topically once daily.      famciclovir (FAMVIR) 500 MG tablet Take 1 tablet (500 mg total) by mouth 2 (two) times daily.  Qty: 30 tablet, Refills: 12    Associated Diagnoses: Major depressive disorder, recurrent episode, moderate degree      hydrOXYzine pamoate (VISTARIL) 25 MG Cap Take 2 capsules (50 mg total) by mouth nightly as needed (Take 25-50mg (1-2 caps) at night for anxiety prior to ECT).  Qty: 180 capsule, Refills: 0      mirtazapine (REMERON) 15 MG tablet Take 1 tablet (15 mg total) by mouth every evening.  Qty: 90 tablet, Refills: 0      spironolactone (ALDACTONE) 50 MG tablet 50 mg 2 (two) times daily. 50  mg qAM, 50 mg qHS.      thyroid (ARMOUR THYROID) 30 mg Tab Take 1 tablet (30 mg total) by mouth every morning.  Qty: 30 tablet, Refills: 11    Associated Diagnoses: Major depressive disorder, recurrent episode, moderate degree      trazodone (DESYREL) 100 MG tablet Take 200 mg by mouth every evening.       UNABLE TO FIND 2 (two) times daily. n-azetyl-cysteine      venlafaxine (EFFEXOR) 100 MG Tab Take 3  tablets (300 mg total) by mouth once daily.    Associated Diagnoses: MDD (major depressive disorder), recurrent, in partial remission      vortioxetine (TRINTELLIX) 5 mg Tab Take 2.5 mg by mouth once daily.      dextroamphetamine-amphetamine 30 mg Tab Take 30 mg by mouth 2 (two) times daily.     Associated Diagnoses: MDD (major depressive disorder), recurrent, in partial remission      ZOVIRAX 5 % Crea Refills: 5           Status at Discharge: Alert and medically stable    Discharge Diagnoses: MDD, recurrent, in partial remission  Diet: Resume previous outpatient diet  Activity: Ambulate with assistance  Instructions: Please do not eat or drink anything after midnight prior to procedure. Please do not drive on day of ECT.    Med Changes:  None    Next ECT: November 2, 2018    Delvis Farmer MD  10/24/2018

## 2018-10-24 NOTE — DISCHARGE INSTRUCTIONS
Understanding Electroconvulsive Therapy  Electroconvulsive therapy (ECT) is sometimes called shock therapy. This may sound painful, but ECT doesnt hurt. Its often the safest and best treatment for severe depression. It can treat other mental disorders as well.  What is electroconvulsive therapy?  ECT is used to treat people who are very depressed. Its mainly used when other treatments, such as antidepressant medicines, have failed. Often it may relieve feelings of sadness and despair after 2 to 4 treatments.  Common symptoms of major depression  Symptoms of major depression include:  · Feeling a deep sadness that doesnt go away  · Losing all pleasure in life  · Feeling hopeless or helpless  · Feeling guilty  · Sleeping more or less than normal  · Eating more or less than normal  · Having headaches or stomachaches, or other pains that dont go away  · Feeling nervous, empty, or worthless  · Crying a great deal  · Thinking or talking about suicide or death  How is ECT therapy done?  Before an ECT treatment, youll be given anesthesia to keep you pain-free. Youll also be given medicine to make you sleep, relax your muscles and control your heart rate. Your healthcare provider then places electrodes on your head. You may have one above each temple (bilateral ECT). Or you may have electrodes on one temple and on your forehead (unilateral ECT). While you are asleep, your brain is stimulated very briefly with an electric current. This causes a seizure, usually lasting less than a minute. Because you are under anesthesia, your body will not move even as your brain goes through great changes.  What are the risks?  When done properly, ECT is quite safe. Right after the treatment, you may be confused. This often lasts for less than half an hour. You may have a headache or stiff muscles. But these symptoms often go away quickly. A more serious possible side effect is memory loss. Commonly, people have short-term  (temporary) trouble remembering information that they learned recently. And they may have little recall of the time when they received treatment. Less commonly, people may have long-lasting (permanent) spotty recall of major past events. In rare cases, there may be memory loss for larger blocks of time.  Looking to the future  In most cases, ECT doesnt cure depression. But it can improve symptoms for a period of time. You may need a series of ECT treatments to continue feeling the benefit. You may also need to take antidepressant medicines to help prevent symptoms from returning. But with ongoing treatment, you can have a full and healthy life.  Resources  · The National [place] [employer]  305.486.3542  www.[facility].nih.gov  · [facility]  310.577.6089  www.[facility].org        PATIENT INSTRUCTIONS  POST-ANESTHESIA    IMMEDIATELY FOLLOWING SURGERY:  Do not drive or operate machinery for the first twenty four hours after surgery.  Do not make any important decisions for twenty four hours after surgery or while taking narcotic pain medications or sedatives.  If you develop intractable nausea and vomiting or a severe headache please notify your doctor immediately.    FOLLOW-UP:  Please make an appointment with your surgeon as instructed. You do not need to follow up with anesthesia unless specifically instructed to do so.    WOUND CARE INSTRUCTIONS (if applicable):  Keep a dry clean dressing on the anesthesia/puncture wound site if there is drainage.  Once the wound has quit draining you may leave it open to air.  Generally you should leave the bandage intact for twenty four hours unless there is drainage.  If the epidural site drains for more than 36-48 hours please call the anesthesia department.    QUESTIONS?:  Please feel free to call your physician or the hospital  if you have any questions, and they will be happy to assist you.       [facility] Anesthesia Department  [address]  [facility]  [phone]

## 2018-10-24 NOTE — ANESTHESIA PREPROCEDURE EVALUATION
[facility]  Anesthesia Pre-Operative Evaluation         Patient Name: Allyssa Wright  YOB: 1980  MRN: [id number]    SUBJECTIVE:     10/24/2018    Pre-operative evaluation for Procedure(s) (LRB):  ELECTROCONVULSIVE THERAPY (ECT) - SINGLE SEIZURE (N/A)    Allyssa Wright is a 38 y.o. female with hypothyroidism, HSV-1, and poorly- controlled MDD (currently on maintenance ECT).    Patient now presents for the above procedure(s).    ECT #: 60    Previous Meds:  - Methohexital 150mg  - Succinylcholine 170mg  - Ondansetron 4mg  - Ketorolac 30mg    Max SBP ~ 190      Previous airway:   None documented.      Patient Active Problem List   Diagnosis    MDD (major depressive disorder), recurrent, in partial remission    Other acne    Comfort measures only status    MDD (major depressive disorder)       Review of patient's allergies indicates:   Allergen Reactions    Benzodiazepines Other (See Comments)     Contraindicated while taking Clozapine    Ampicillin      Mom says so    Erythromycin     Levaquin [levofloxacin] Other (See Comments)     Depression side effects    Penicillins      Mom says so    Pristiq [desvenlafaxine]      psycotic      Sulfa (sulfonamide antibiotics)      Rash      Azithromycin Anxiety       Current Inpatient Medications:      Current Facility-Administered Medications on File Prior to Visit   Medication Dose Route Frequency Provider Last Rate Last Dose    0.9%  NaCl infusion   Intravenous Continuous Robert Alvarez MD        caffeine 500mg powder  500 mg Oral Once Robert Alvarez MD        lidocaine (PF) 10 mg/ml (1%) injection 10 mg  1 mL Intradermal Once Robert Alavrez MD         Current Outpatient Medications on File Prior to Visit   Medication Sig Dispense Refill    clozapine (CLOZARIL) 50 MG tablet Take 50 mg by mouth once daily.       dapsone 7.5 % GlwP Apply topically once daily.      dextroamphetamine-amphetamine 30 mg  Tab Take 30 mg by mouth 2 (two) times daily.       famciclovir (FAMVIR) 500 MG tablet Take 1 tablet (500 mg total) by mouth 2 (two) times daily. 30 tablet 12    hydrOXYzine pamoate (VISTARIL) 25 MG Cap Take 2 capsules (50 mg total) by mouth nightly as needed (Take 25-50mg (1-2 caps) at night for anxiety prior to ECT). 180 capsule 0    mirtazapine (REMERON) 15 MG tablet Take 1 tablet (15 mg total) by mouth every evening. (Patient taking differently: Take 7.5 mg by mouth every evening. ) 90 tablet 0    spironolactone (ALDACTONE) 50 MG tablet 50 mg 2 (two) times daily. 50  mg qAM, 50 mg qHS.      thyroid (ARMOUR THYROID) 30 mg Tab Take 1 tablet (30 mg total) by mouth every morning. 30 tablet 11    trazodone (DESYREL) 100 MG tablet Take 200 mg by mouth every evening.       UNABLE TO FIND 2 (two) times daily. n-azetyl-cysteine      venlafaxine (EFFEXOR) 100 MG Tab Take 3 tablets (300 mg total) by mouth once daily. (Patient taking differently: Take 225 mg by mouth once daily. )      vortioxetine (TRINTELLIX) 5 mg Tab Take 2.5 mg by mouth once daily.      ZOVIRAX 5 % Crea   5       Past Surgical History:   Procedure Laterality Date    ANKLE SURGERY Right     BREAST augmentation      ELECTROCONVULSIVE THERAPY (ECT) - SINGLE SEIZURE N/A 8/31/2018    Performed [facility]ib [facility] Jr[facility] E[facility]    ELECTROCONVULSIVE THERAPY (ECT) - SINGLE SEIZURE N/A 8/6/2018    [facility] at [facility] ECT    ELECTROCONVULSIVE THERAPY (ECT) - SINGLE SEIZURE N/A 7/23/2018    Performed by Shoiab [facility]ce Jr[facility] at [facility] ECT    ELECTROCONVULSIVE THERAPY (ECT) - SINGLE SEIZURE N/A 7/5/2018    Per[facility] at [facility] ECT    ELECTROCONVULSIVE THERAPY (ECT) - SINGLE SEIZURE N/A 6/28/2018    Per[facility] at [facility] ECT    ELECTROCONVULSIVE THERAPY (ECT) - SINGLE SEIZURE N/A 6/13/2018    [facility] at [facility] ECT    ELECTROCONVULSIVE  THERAPY (ECT) - SINGLE SEIZURE N/A 5/29/2018    Per[facility] at [facility] ECT    ELECTROCONVULSIVE THERAPY (ECT) - SINGLE SEIZURE N/A 5/8/2018    Per[facility] by [facility] at [facility] ECT    ELECTROCONVULSIVE THERAPY (ECT) - SINGLE SEIZURE N/A 4/24/2018    Performed by [facility] at [facility] ECT    ELECTROCONVULSIVE THERAPY (ECT) - SINGLE SEIZURE N/A 4/17/2018    Per[facility] by [facility] at [facility] ECT    ELECTROCONVULSIVE THERAPY (ECT) - SINGLE SEIZURE N/A 4/13/2018    Performed by Sh[facility] at [facility] ECT    ELECTROCONVULSIVE THERAPY (ECT) - SINGLE SEIZURE N/A 4/3/2018    Performed by Sh[facility] at [facility] ECT    ELECTROCONVULSIVE THERAPY (ECT) - SINGLE SEIZURE N/A 3/20/2018    Performed by Sh[facility]ib [facility] Jr[facility] at [facility] ECT    ELECTROCONVULSIVE THERAPY (ECT) - SINGLE SEIZURE N/A 3/15/2018    Performed by Shoaib [facility] Jr[facility] at [facility] ECT    ELECTROCONVULSIVE THERAPY (ECT) - SINGLE SEIZURE N/A 3/6/2018    Performed by Sh[facility]ib [facility] Jr[facility] at [facility] ECT    ELECTROCONVULSIVE THERAPY (ECT) - SINGLE SEIZURE N/A 3/1/2018    Performed by Sh[facility]ib [facility] Jr[facility] at [facility] ECT    ELECTROCONVULSIVE THERAPY (ECT) - SINGLE SEIZURE N/A 2/23/2018    Performed by Sh[facility]ib [facility] at [facility] ECT    ELECTROCONVULSIVE THERAPY (ECT) - SINGLE SEIZURE N/A 2/19/2018    Performed by Sh[facility]ib [facility] at [facility] ECT    ELECTROCONVULSIVE THERAPY (ECT) - SINGLE SEIZURE N/A 2/15/2018    Performed by Sh[facility]ib [facility] Jr[facility] at [facility] ECT    ELECTROCONVULSIVE THERAPY (ECT) - SINGLE SEIZURE N/A 1/22/2018    Performed by Sh[facility]ib [facility] at [facility] ECT    ELECTROCONVULSIVE THERAPY (ECT) - SINGLE SEIZURE N/A 12/11/2017    Performed by Sh[facility]ib [facility] Jr[facility] at [facility] ECT    ELECTROCONVULSIVE THERAPY (ECT) - SINGLE SEIZURE N/A 10/24/2017    Performed by Sh[facility]ib [facility] at [facility] ECT    ELECTROCONVULSIVE THERAPY (ECT) -  SINGLE SEIZURE N/A 9/20/2017    Performed by [facility] at [facility] ECT    ELECTROCONVULSIVE THERAPY (ECT) - SINGLE SEIZURE N/A 8/14/2017    Performed by Shoaib [facility] Jr[facility] at [facility] ECT    ELECTROCONVULSIVE THERAPY (ECT) - SINGLE SEIZURE Bilateral 7/12/2017    Performed by Shoaib [facility] Jr[facility] at [facility] ECT    ELECTROCONVULSIVE THERAPY (ECT) - SINGLE SEIZURE N/A 6/13/2017    Performed by Shoaib [facility] Jr[facility] at [facility] ECT    ELECTROCONVULSIVE THERAPY (ECT) - SINGLE SEIZURE N/A 5/17/2017    Performed by Shoaib [facility] Jr[facility] at [facility] ECT    ELECTROCONVULSIVE THERAPY (ECT) - SINGLE SEIZURE N/A 4/20/2017    Performed by Shoaib Boy[facility] Jr[facility] at [facility] ECT    ELECTROCONVULSIVE THERAPY (ECT) - SINGLE SEIZURE N/A 11/22/2016    Performed by Shoaib Boyce Jr[facility] at [facility] ECT    ELECTROCONVULSIVE THERAPY (ECT) - SINGLE SEIZURE N/A 10/24/2016    Performed by Shoaib Boyce Jr[facility] at [facility] ECT    ELECTROCONVULSIVE THERAPY (ECT) - SINGLE SEIZURE N/A 9/22/2016    Performed by Shoaib Boy[facility] Jr[facility] at [facility] ECT    ELECTROCONVULSIVE THERAPY (ECT) - SINGLE SEIZURE N/A 9/1/2016    Performed by Shoaib [facility] Jr[facility] at [facility] ECT    ELECTROCONVULSIVE THERAPY (ECT) - SINGLE SEIZURE N/A 8/15/2016    Performed by Shoaib Boyce Jr[facility] at [facility] ECT    ELECTROCONVULSIVE THERAPY (ECT) - SINGLE SEIZURE N/A 8/1/2016    Performed by Shoaib Boy[facility] Jr[facility] at [facility] ECT    ELECTROCONVULSIVE THERAPY (ECT) - SINGLE SEIZURE N/A 7/22/2016    Performed by Shoaib [facility] Jr[facility] at [facility] ECT    ELECTROCONVULSIVE THERAPY (ECT) - SINGLE SEIZURE N/A 7/14/2016    Performed by Shoaib [facility] Jr[facility] at [facility] ECT    ELECTROCONVULSIVE THERAPY (ECT) - SINGLE SEIZURE N/A 7/8/2016    Performed by Shoaib [facility] Jr[facility] at [facility] ECT    ELECTROCONVULSIVE THERAPY (ECT) - SINGLE SEIZURE N/A 7/5/2016    Performed by Shoaib [facility] Jr[facility] at [facility] ECT    ELECTROCONVULSIVE THERAPY (ECT) - SINGLE  SEIZURE N/A 2016    Performed [facility] at [facility] ECT    ELECTROCONVULSIVE THERAPY (ECT) - SINGLE SEIZURE N/A 2016    Performed by Shoaib [facility] Jr[facility] at [facility] ECT    ELECTROCONVULSIVE THERAPY (ECT) - SINGLE SEIZURE N/A 2016    Performed by Shoaib [facility] Jr[facility] at [facility] ECT    ELECTROCONVULSIVE THERAPY (ECT) - SINGLE SEIZURE N/A 2016    Performed by Shoaib [facility] Jr[facility] at [facility] ECT    ELECTROCONVULSIVE THERAPY (ECT) - SINGLE SEIZURE N/A 6/15/2016    Performed by Shoaib [facility] Jr[facility] at [facility] ECT    ELECTROCONVULSIVE THERAPY (ECT) - SINGLE SEIZURE N/A 2016    Performed by Shoaib [facility] Jr[facility] at [facility] ECT    ELECTROCONVULSIVE THERAPY (ECT) - SINGLE SEIZURE N/A 2016    Performed by Shoaib [facility] Jr[facility] at [facility] ECT    ELECTROCONVULSIVE THERAPY (ECT) - SINGLE SEIZURE N/A 2016    Performed by Sh[facility]ib [facility] Jr[facility] at [facility] ECT    ELECTROCONVULSIVE THERAPY, CEREBRAL HEMISPHERE, UNILATERAL, 1 SEIZURE Bilateral 2018    Per[facility] at [facility] ECT    OVARIAN CYST REMOVAL Bilateral        Social History     Socioeconomic History    Marital status: Single     Spouse name: Not on file    Number of children: Not on file    Years of education: Not on file    Highest education level: Not on file   Social Needs    Financial resource strain: Not on file    Food insecurity - worry: Not on file    Food insecurity - inability: Not on file    Transportation needs - medical: Not on file    Transportation needs - non-medical: Not on file   Occupational History    Occupation: unemployed   Tobacco Use    Smoking status: Former Smoker     Last attempt to quit: 2011     Years since quittin.5    Smokeless tobacco: Never Used   Substance and Sexual Activity    Alcohol use: Yes     Comment: rarely    Drug use: No     Comment: Experimental use in high school    Sexual activity: Not on file   Other Topics  Concern    Patient feels they ought to cut down on drinking/drug use Not Asked    Patient annoyed by others criticizing their drinking/drug use Not Asked    Patient has felt bad or guilty about drinking/drug use Not Asked    Patient has had a drink/used drugs as an eye opener in the AM Not Asked   Social History Narrative    Pt has 1 older brother from an intact family until the death of her mother in 2012.  She completed 1 year of college, was never in the , and has never been employed.  She was engaged once, but never , has no children, and lives with her father plus 2 dogs.  She denies any hobbies and is spiritual but not Muslim.  She does date and returns to college during rare periods when depression and anxiety [place].       OBJECTIVE:     Vital Signs Range (Last 24H):  BP: ()/()   Arterial Line BP: ()/()       Significant Labs:  Lab Results   Component Value Date    WBC 6.83 06/08/2016    HGB 13.3 06/08/2016    HCT 40.3 06/08/2016     06/08/2016    ALT 14 06/08/2016    AST 20 06/08/2016     06/08/2016    K 3.8 06/08/2016     06/08/2016    CREATININE 0.7 06/08/2016    BUN 10 06/08/2016    CO2 28 06/08/2016    TSH 1.208 06/08/2016         Diagnostic Studies:   No relevant studies.      EKG:   No recent studies available.    2D ECHO:  No results found for this or any previous visit.      ASSESSMENT/PLAN:     Pre-op Assessment    I have reviewed the Patient Summary Reports.     I have reviewed the Nursing Notes.   I have reviewed the Medications.     Review of Systems  Anesthesia Hx:  No problems with previous Anesthesia  Denies Family Hx of Anesthesia complications.   Denies Personal Hx of Anesthesia complications.   Social:  Former Smoker, Alcohol Use    Hematology/Oncology:  Hematology Normal   Oncology Normal     EENT/Dental:EENT/Dental Normal   Cardiovascular:  Cardiovascular Normal                     Pulmonary:  Pulmonary Normal  Denies Asthma.  Denies Shortness of  breath.    Renal/:  Renal/ Normal     Hepatic/GI:  Hepatic/GI Normal    Neurological:   Denies TIA. Denies CVA.    Endocrine:  Endocrine Normal    Psych:   Psychiatric History depression          Physical Exam  General:  Well nourished    Airway/Jaw/Neck:  Airway Findings: Mouth Opening: Normal Tongue: Normal  General Airway Assessment: Adult, Good  Mallampati: I  TM Distance: Normal, at least 6 cm  Jaw/Neck Findings:  Neck ROM: Normal ROM      Dental:  Dental Findings: In tact   Chest/Lungs:  Chest/Lungs Clear    Heart/Vascular:  Heart Findings: Normal    Abdomen:  Abdomen Findings: Normal      Mental Status:  Mental Status Findings:  Cooperative, Alert and Oriented         Anesthesia Plan  Type of Anesthesia, risks & benefits discussed:  Anesthesia Type:  general, MAC  Patient's Preference:   Intra-op Monitoring Plan: standard ASA monitors  Intra-op Monitoring Plan Comments:   Post Op Pain Control Plan: multimodal analgesia, IV/PO Opioids PRN and per primary service following discharge from PACU  Post Op Pain Control Plan Comments:   Induction:   IV  Beta Blocker:  Patient is not currently on a Beta-Blocker (No further documentation required).       Informed Consent: Patient understands risks and agrees with Anesthesia plan.  Questions answered. Anesthesia consent signed with patient.  ASA Score: 2     Day of Surgery Review of History & Physical:  There are no significant changes. Significant changes noted: Surgeon notified.  H&P update referred to the provider.         Ready For Surgery From Anesthesia Perspective.

## 2018-10-24 NOTE — PLAN OF CARE
PT is AAOx4. VSS. NAD. Denies pain or nausea. IV discontinued. Discharge instructions given, verbalized understanding. Discharged home with family.

## 2018-10-26 DIAGNOSIS — F33.9 RECURRENT MAJOR DEPRESSIVE DISORDER, REMISSION STATUS UNSPECIFIED: Primary | ICD-10-CM

## 2018-11-01 ENCOUNTER — ANESTHESIA EVENT (OUTPATIENT)
Dept: ELECTROPHYSIOLOGY | Facility: HOSPITAL | Age: 38
End: 2018-11-01
Payer: COMMERCIAL

## 2018-11-01 ENCOUNTER — TELEPHONE (OUTPATIENT)
Dept: PSYCHIATRY | Facility: CLINIC | Age: 38
End: 2018-11-01

## 2018-11-01 NOTE — TELEPHONE ENCOUNTER
Patient requesting to be moved from original ECT procedure date scheduled 11/2/18 to next week. Tuesday available. Surgery scheduling notified and Mr. Wright updated. ----- Message from Yaima Dey RN sent at 11/1/2018  8:52 AM CDT -----  Contact: [phone]   Dad called requesting ECT be moved to Monday or Tuesday of next week.   Please advise if this is possible. If not, they will keep their current appointment.     Thank you

## 2018-11-01 NOTE — ANESTHESIA PREPROCEDURE EVALUATION
[facility]  Anesthesia Pre-Operative Evaluation         Patient Name: Allyssa Wright  YOB: 1980  MRN: [id number]    SUBJECTIVE:     11/01/2018    Pre-operative evaluation for Procedure(s) (LRB):  ELECTROCONVULSIVE THERAPY (ECT) - SINGLE SEIZURE (N/A)    Allyssa Wright is a 38 y.o. female with hypothyroidism, HSV-1, and poorly- controlled MDD (currently on maintenance ECT).    Patient now presents for the above procedure(s).    ECT #: 61    Previous Meds:  - Methohexital 150mg  - Succinylcholine 140mg  - Ondansetron 4mg  - Ketorolac 30mg        Previous airway:   None documented.      Patient Active Problem List   Diagnosis    MDD (major depressive disorder), recurrent, in partial remission    Other acne    Comfort measures only status    MDD (major depressive disorder)       Review of patient's allergies indicates:   Allergen Reactions    Benzodiazepines Other (See Comments)     Contraindicated while taking Clozapine    Ampicillin      Mom says so    Erythromycin     Levaquin [levofloxacin] Other (See Comments)     Depression side effects    Penicillins      Mom says so    Pristiq [desvenlafaxine]      psycotic      Sulfa (sulfonamide antibiotics)      Rash      Azithromycin Anxiety       Current Inpatient Medications:      Current Outpatient Medications on File Prior to Visit   Medication Sig Dispense Refill    clozapine (CLOZARIL) 50 MG tablet Take 50 mg by mouth once daily.       dapsone 7.5 % GlwP Apply topically once daily.      dextroamphetamine-amphetamine 30 mg Tab Take 30 mg by mouth 2 (two) times daily.       famciclovir (FAMVIR) 500 MG tablet Take 1 tablet (500 mg total) by mouth 2 (two) times daily. 30 tablet 12    hydrOXYzine pamoate (VISTARIL) 25 MG Cap Take 2 capsules (50 mg total) by mouth nightly as needed (Take 25-50mg (1-2 caps) at night for anxiety prior to ECT). 180 capsule 0    mirtazapine (REMERON) 15 MG tablet Take 1 tablet (15 mg total) by  mouth every evening. (Patient taking differently: Take 7.5 mg by mouth every evening. ) 90 tablet 0    spironolactone (ALDACTONE) 50 MG tablet 50 mg 2 (two) times daily. 50  mg qAM, 50 mg qHS.      thyroid (ARMOUR THYROID) 30 mg Tab Take 1 tablet (30 mg total) by mouth every morning. 30 tablet 11    trazodone (DESYREL) 100 MG tablet Take 200 mg by mouth every evening.       UNABLE TO FIND 2 (two) times daily. n-azetyl-cysteine      venlafaxine (EFFEXOR) 100 MG Tab Take 3 tablets (300 mg total) by mouth once daily. (Patient taking differently: Take 225 mg by mouth once daily. )      vortioxetine (TRINTELLIX) 5 mg Tab Take 2.5 mg by mouth once daily.      ZOVIRAX 5 % Crea   5     No current facility-administered medications on file prior to visit.        Past Surgical History:   Procedure Laterality Date    ANKLE SURGERY Right     BREAST augmentation      ELECTROCONVULSIVE THERAPY (ECT) - SINGLE SEIZURE N/A 8/31/2018    Performed by Shoaib Boyce Jr., [facility] at [facility] ECT    ELECTROCONVULSIVE THERAPY (ECT) - SINGLE SEIZURE N/A 8/6/2018    Performed by Shoaib Boyce Jr., MD at [facility] ECT    ELECTROCONVULSIVE THERAPY (ECT) - SINGLE SEIZURE N/A 7/23/2018    Performed by Shoaib Boyce Jr., MD at [facility] ECT    ELECTROCONVULSIVE THERAPY (ECT) - SINGLE SEIZURE N/A 7/5/2018    Performed by Shoaib Boyce Jr., MD at [facility] ECT    ELECTROCONVULSIVE THERAPY (ECT) - SINGLE SEIZURE N/A 6/28/2018    Performed by Shoaib Boyce Jr., MD at [facility] ECT    ELECTROCONVULSIVE THERAPY (ECT) - SINGLE SEIZURE N/A 6/13/2018    Performed by Shoaib Boyce Jr., MD at [facility] ECT    ELECTROCONVULSIVE THERAPY (ECT) - SINGLE SEIZURE N/A 5/29/2018    Performed by Shoaib Boyce Jr., MD at [facility] ECT    ELECTROCONVULSIVE THERAPY (ECT) - SINGLE SEIZURE N/A 5/8/2018    Performed by Shoaib Boyce Jr., MD at [facility] ECT    ELECTROCONVULSIVE THERAPY (ECT) - SINGLE SEIZURE N/A 4/24/2018    Performed by [facility]ib [facility] Jr[facility] MD at  [facility] ECT    ELECTROCONVULSIVE THERAPY (ECT) - SINGLE SEIZURE N/A 4/17/2018    Performed by Sh[facility]ib [facility]ce Jr[facility] at [facility] ECT    ELECTROCONVULSIVE THERAPY (ECT) - SINGLE SEIZURE N/A 4/13/2018    Performed by Shoaib Boyce Jr[facility] at [facility] ECT    ELECTROCONVULSIVE THERAPY (ECT) - SINGLE SEIZURE N/A 4/3/2018    Performed by Shoaib Boyce Jr[facility] at [facility] ECT    ELECTROCONVULSIVE THERAPY (ECT) - SINGLE SEIZURE N/A 3/20/2018    Performed by Shoaib Boyce Jr[facility] at [facility] ECT    ELECTROCONVULSIVE THERAPY (ECT) - SINGLE SEIZURE N/A 3/15/2018    Performed by Shoaib Boyce Jr., MD at [facility] ECT    ELECTROCONVULSIVE THERAPY (ECT) - SINGLE SEIZURE N/A 3/6/2018    Performed by Shoaib Boyce Jr., [facility] at [facility] ECT    ELECTROCONVULSIVE THERAPY (ECT) - SINGLE SEIZURE N/A 3/1/2018    Performed by Shoaib Boyce Jr., [facility] at [facility] ECT    ELECTROCONVULSIVE THERAPY (ECT) - SINGLE SEIZURE N/A 2/23/2018    Performed by Shoaib Boyce Jr[facility] at [facility] ECT    ELECTROCONVULSIVE THERAPY (ECT) - SINGLE SEIZURE N/A 2/19/2018    Performed by Shoaib Boyce Jr., [facility] at [facility] ECT    ELECTROCONVULSIVE THERAPY (ECT) - SINGLE SEIZURE N/A 2/15/2018    Performed by Shoaib Boyce Jr[facility] at [facility] ECT    ELECTROCONVULSIVE THERAPY (ECT) - SINGLE SEIZURE N/A 1/22/2018    Performed by Shoaib Boyce Jr[facility] at [facility] ECT    ELECTROCONVULSIVE THERAPY (ECT) - SINGLE SEIZURE N/A 12/11/2017    Performed by Shoaib Boyce Jr[facility] at [facility] ECT    ELECTROCONVULSIVE THERAPY (ECT) - SINGLE SEIZURE N/A 10/24/2017    Performed by Sh[facility]ib Boyce Jr[facility] at [facility] ECT    ELECTROCONVULSIVE THERAPY (ECT) - SINGLE SEIZURE N/A 9/20/2017    Performed by Shoaib Boyce Jr[facility] at [facility] ECT    ELECTROCONVULSIVE THERAPY (ECT) - SINGLE SEIZURE N/A 8/14/2017    Performed by Sh[facility]ib Boyce Jr[facility] at [facility] ECT    ELECTROCONVULSIVE THERAPY (ECT) - SINGLE SEIZURE Bilateral 7/12/2017    Performed by Shoaib Boyce Jr[facility] at [facility] ECT     ELECTROCONVULSIVE THERAPY (ECT) - SINGLE SEIZURE N/A 6/13/2017    Performed by Sh[facility]ib [facility]ce Jr., MD at [facility] ECT    ELECTROCONVULSIVE THERAPY (ECT) - SINGLE SEIZURE N/A 5/17/2017    Performed by Shoaib Boyce Jr., MD at [facility] ECT    ELECTROCONVULSIVE THERAPY (ECT) - SINGLE SEIZURE N/A 4/20/2017    Performed by Shoaib Boyce Jr., MD at [facility] ECT    ELECTROCONVULSIVE THERAPY (ECT) - SINGLE SEIZURE N/A 11/22/2016    Performed by Shoaib Boyce Jr., MD at [facility] ECT    ELECTROCONVULSIVE THERAPY (ECT) - SINGLE SEIZURE N/A 10/24/2016    Performed by Shoaib Boyce Jr., MD at [facility] ECT    ELECTROCONVULSIVE THERAPY (ECT) - SINGLE SEIZURE N/A 9/22/2016    Performed by Shoaib Boyce Jr., MD at [facility] ECT    ELECTROCONVULSIVE THERAPY (ECT) - SINGLE SEIZURE N/A 9/1/2016    Performed by Shoaib Boyce Jr., MD at [facility] ECT    ELECTROCONVULSIVE THERAPY (ECT) - SINGLE SEIZURE N/A 8/15/2016    Performed by Shoaib Boyce Jr., MD at [facility] ECT    ELECTROCONVULSIVE THERAPY (ECT) - SINGLE SEIZURE N/A 8/1/2016    Performed by Shoaib Boyce Jr., MD at [facility] ECT    ELECTROCONVULSIVE THERAPY (ECT) - SINGLE SEIZURE N/A 7/22/2016    Performed by Shoaib Boyce Jr., MD at [facility] ECT    ELECTROCONVULSIVE THERAPY (ECT) - SINGLE SEIZURE N/A 7/14/2016    Performed by Shoaib Boyce Jr., MD at [facility] ECT    ELECTROCONVULSIVE THERAPY (ECT) - SINGLE SEIZURE N/A 7/8/2016    Performed by Shoaib Boyce Jr., MD at [facility] ECT    ELECTROCONVULSIVE THERAPY (ECT) - SINGLE SEIZURE N/A 7/5/2016    Performed by Shoaib Boyce Jr., MD at [facility] ECT    ELECTROCONVULSIVE THERAPY (ECT) - SINGLE SEIZURE N/A 6/27/2016    Performed by Shoaib Boyce Jr., MD at [facility] ECT    ELECTROCONVULSIVE THERAPY (ECT) - SINGLE SEIZURE N/A 6/23/2016    Performed by Shoaib Boyce Jr., MD at [facility] ECT    ELECTROCONVULSIVE THERAPY (ECT) - SINGLE SEIZURE N/A 6/20/2016    Performed by Shoaib Boyce Jr[facility] MD at [facility] ECT     ELECTROCONVULSIVE THERAPY (ECT) - SINGLE SEIZURE N/A 2016    Performed by Sh[facility]ib [facility] Jr[facility] MD at [facility] ECT    ELECTROCONVULSIVE THERAPY (ECT) - SINGLE SEIZURE N/A 6/15/2016    Performed by Shoaib Boyce Jr., MD at [facility] ECT    ELECTROCONVULSIVE THERAPY (ECT) - SINGLE SEIZURE N/A 2016    Performed by Shoaib Boyce Jr[facility] MD at [facility] ECT    ELECTROCONVULSIVE THERAPY (ECT) - SINGLE SEIZURE N/A 2016    Performed by Shoaib Boyce Jr[facility] MD at [facility] ECT    ELECTROCONVULSIVE THERAPY (ECT) - SINGLE SEIZURE N/A 2016    Performed by Shoaib Boyce Jr., MD at [facility] ECT    ELECTROCONVULSIVE THERAPY, CEREBRAL HEMISPHERE, UNILATERAL, 1 SEIZURE Bilateral 2018    Performed by Shoaib Boy[facility] Jr., MD at [facility] ECT    OVARIAN CYST REMOVAL Bilateral        Social History     Socioeconomic History    Marital status: Single     Spouse name: Not on file    Number of children: Not on file    Years of education: Not on file    Highest education level: Not on file   Social Needs    Financial resource strain: Not on file    Food insecurity - worry: Not on file    Food insecurity - inability: Not on file    Transportation needs - medical: Not on file    Transportation needs - non-medical: Not on file   Occupational History    Occupation: unemployed   Tobacco Use    Smoking status: Former Smoker     Last attempt to quit: 2011     Years since quittin.5    Smokeless tobacco: Never Used   Substance and Sexual Activity    Alcohol use: Yes     Comment: rarely    Drug use: No     Comment: Experimental use in high school    Sexual activity: Not on file   Other Topics Concern    Patient feels they ought to cut down on drinking/drug use Not Asked    Patient annoyed by others criticizing their drinking/drug use Not Asked    Patient has felt bad or guilty about drinking/drug use Not Asked    Patient has had a drink/used drugs as an eye opener in the AM Not Asked   Social History  [place]    Pt has 1 older brother from an intact family until the death of her mother in 2012.  She completed 1 year of college, was never in the , and has never been employed.  She was engaged once, but never , has no children, and lives with her father plus 2 dogs.  She denies any hobbies and is spiritual but not Methodist.  She does date and returns to college during rare periods when depression and anxiety [place].       OBJECTIVE:     Vital Signs Range (Last 24H):  BP: ()/()   Arterial Line BP: ()/()       Significant Labs:  Lab Results   Component Value Date    WBC 6.83 06/08/2016    HGB 13.3 06/08/2016    HCT 40.3 06/08/2016     06/08/2016    ALT 14 06/08/2016    AST 20 06/08/2016     06/08/2016    K 3.8 06/08/2016     06/08/2016    CREATININE 0.7 06/08/2016    BUN 10 06/08/2016    CO2 28 06/08/2016    TSH 1.208 06/08/2016         Diagnostic Studies:   No relevant studies.      EKG:   No recent studies available.    2D ECHO:  No results found for this or any previous visit.      ASSESSMENT/PLAN:     Pre-op Assessment    I have reviewed the Patient Summary Reports.     I have reviewed the Nursing Notes.   I have reviewed the Medications.     Review of Systems  Anesthesia Hx:  No problems with previous Anesthesia  Denies Family Hx of Anesthesia complications.   Denies Personal Hx of Anesthesia complications.   Social:  Former Smoker, Alcohol Use    Hematology/Oncology:  Hematology Normal   Oncology Normal     EENT/Dental:EENT/Dental Normal   Cardiovascular:  Cardiovascular Normal                     Pulmonary:  Pulmonary Normal  Denies Asthma.  Denies Shortness of breath.    Renal/:  Renal/ Normal     Hepatic/GI:  Hepatic/GI Normal    Neurological:   Denies TIA. Denies CVA.    Endocrine:  Endocrine Normal    Psych:   Psychiatric History depression          Physical Exam  General:  Well nourished    Airway/Jaw/Neck:  Airway Findings: Mouth Opening: Normal Tongue: Normal   General Airway Assessment: Adult, Good  Mallampati: I  TM Distance: Normal, at least 6 cm  Jaw/Neck Findings:  Neck ROM: Normal ROM      Dental:  Dental Findings: In tact   Chest/Lungs:  Chest/Lungs Clear    Heart/Vascular:  Heart Findings: Normal    Abdomen:  Abdomen Findings: Normal      Mental Status:  Mental Status Findings:  Cooperative, Alert and Oriented         Anesthesia Plan  Type of Anesthesia, risks & benefits discussed:  Anesthesia Type:  general, MAC  Patient's Preference:   Intra-op Monitoring Plan: standard ASA monitors  Intra-op Monitoring Plan Comments:   Post Op Pain Control Plan: multimodal analgesia, IV/PO Opioids PRN and per primary service following discharge from PACU  Post Op Pain Control Plan Comments:   Induction:   IV  Beta Blocker:  Patient is not currently on a Beta-Blocker (No further documentation required).       Informed Consent: Patient understands risks and agrees with Anesthesia plan.  Questions answered. Anesthesia consent signed with patient.  ASA Score: 2     Day of Surgery Review of History & Physical:  There are no significant changes. Significant changes noted: Surgeon notified.  H&P update referred to the provider.         Ready For Surgery From Anesthesia Perspective.

## 2018-11-02 ENCOUNTER — ANESTHESIA (OUTPATIENT)
Dept: ELECTROPHYSIOLOGY | Facility: HOSPITAL | Age: 38
End: 2018-11-02
Payer: COMMERCIAL

## 2018-11-06 ENCOUNTER — HOSPITAL ENCOUNTER (OUTPATIENT)
Facility: HOSPITAL | Age: 38
Discharge: HOME | End: 2018-11-06
Attending: PSYCHIATRY & NEUROLOGY | Admitting: PSYCHIATRY & NEUROLOGY
Payer: COMMERCIAL

## 2018-11-06 VITALS
WEIGHT: 155 LBS | RESPIRATION RATE: 17 BRPM | DIASTOLIC BLOOD PRESSURE: 77 MMHG | HEART RATE: 85 BPM | SYSTOLIC BLOOD PRESSURE: 108 MMHG | TEMPERATURE: 98 F | HEIGHT: 65 IN | BODY MASS INDEX: 25.83 KG/M2 | OXYGEN SATURATION: 99 %

## 2018-11-06 DIAGNOSIS — F32.9 MAJOR DEPRESSION: ICD-10-CM

## 2018-11-06 DIAGNOSIS — F33.9 RECURRENT MAJOR DEPRESSIVE DISORDER, REMISSION STATUS UNSPECIFIED: Primary | ICD-10-CM

## 2018-11-06 DIAGNOSIS — F33.41 MDD (MAJOR DEPRESSIVE DISORDER), RECURRENT, IN PARTIAL REMISSION: Primary | ICD-10-CM

## 2018-11-06 LAB
B-HCG UR QL: NEGATIVE
CTP QC/QA: YES

## 2018-11-06 PROCEDURE — 90870 ELECTROCONVULSIVE THERAPY: CPT | Mod: ,,, | Performed by: PSYCHIATRY & NEUROLOGY

## 2018-11-06 PROCEDURE — 25000003 PHARM REV CODE 250: Performed by: ANESTHESIOLOGY

## 2018-11-06 PROCEDURE — 90870 ELECTROCONVULSIVE THERAPY: CPT | Performed by: ANESTHESIOLOGY

## 2018-11-06 PROCEDURE — 25000003 PHARM REV CODE 250: Performed by: STUDENT IN AN ORGANIZED HEALTH CARE EDUCATION/TRAINING PROGRAM

## 2018-11-06 PROCEDURE — D9220A PRA ANESTHESIA: Mod: ,,, | Performed by: ANESTHESIOLOGY

## 2018-11-06 PROCEDURE — 27100019 HC AMBU BAG ADULT/PED: Performed by: ANESTHESIOLOGY

## 2018-11-06 PROCEDURE — 63600175 PHARM REV CODE 636 W HCPCS: Performed by: ANESTHESIOLOGY

## 2018-11-06 PROCEDURE — 81025 URINE PREGNANCY TEST: CPT | Performed by: PSYCHIATRY & NEUROLOGY

## 2018-11-06 RX ORDER — ONDANSETRON 2 MG/ML
INJECTION INTRAMUSCULAR; INTRAVENOUS
Status: COMPLETED
Start: 2018-11-06 | End: 2018-11-06

## 2018-11-06 RX ORDER — SUCCINYLCHOLINE CHLORIDE 20 MG/ML
INJECTION INTRAMUSCULAR; INTRAVENOUS
Status: COMPLETED
Start: 2018-11-06 | End: 2018-11-06

## 2018-11-06 RX ORDER — SUCCINYLCHOLINE CHLORIDE 20 MG/ML
INJECTION INTRAMUSCULAR; INTRAVENOUS
Status: DISCONTINUED | OUTPATIENT
Start: 2018-11-06 | End: 2018-11-06

## 2018-11-06 RX ORDER — KETOROLAC TROMETHAMINE 30 MG/ML
INJECTION, SOLUTION INTRAMUSCULAR; INTRAVENOUS
Status: DISCONTINUED | OUTPATIENT
Start: 2018-11-06 | End: 2018-11-06

## 2018-11-06 RX ORDER — SODIUM CHLORIDE 0.9 % (FLUSH) 0.9 %
3 SYRINGE (ML) INJECTION
Status: DISCONTINUED | OUTPATIENT
Start: 2018-11-06 | End: 2018-11-06 | Stop reason: HOSPADM

## 2018-11-06 RX ORDER — KETOROLAC TROMETHAMINE 30 MG/ML
INJECTION, SOLUTION INTRAMUSCULAR; INTRAVENOUS
Status: COMPLETED
Start: 2018-11-06 | End: 2018-11-06

## 2018-11-06 RX ORDER — SODIUM CHLORIDE 9 MG/ML
500 INJECTION, SOLUTION INTRAVENOUS ONCE
Status: DISCONTINUED | OUTPATIENT
Start: 2018-11-06 | End: 2018-11-06 | Stop reason: HOSPADM

## 2018-11-06 RX ORDER — ONDANSETRON 2 MG/ML
INJECTION INTRAMUSCULAR; INTRAVENOUS
Status: DISCONTINUED | OUTPATIENT
Start: 2018-11-06 | End: 2018-11-06

## 2018-11-06 RX ADMIN — SUCCINYLCHOLINE CHLORIDE 140 MG: 20 INJECTION, SOLUTION INTRAMUSCULAR; INTRAVENOUS at 08:11

## 2018-11-06 RX ADMIN — METHOHEXITAL SODIUM 150 MG: 500 INJECTION, POWDER, LYOPHILIZED, FOR SOLUTION INTRAMUSCULAR; INTRAVENOUS; RECTAL at 08:11

## 2018-11-06 RX ADMIN — ONDANSETRON 4 MG: 2 INJECTION, SOLUTION INTRAMUSCULAR; INTRAVENOUS at 08:11

## 2018-11-06 RX ADMIN — KETOROLAC TROMETHAMINE 30 MG: 30 INJECTION, SOLUTION INTRAMUSCULAR at 08:11

## 2018-11-06 RX ADMIN — Medication 500 MG: at 07:11

## 2018-11-06 NOTE — ANESTHESIA RELEASE NOTE
Anesthesia Release from PACU Note    Patient name: Allyssa Wright    Procedure(s): Procedure(s) (LRB):  ELECTROCONVULSIVE THERAPY (ECT) - SINGLE SEIZURE (N/A)    Anesthesia type: general    Post pain: adequate analgesia    Post assessment: no apparent complications    Last vitals:   Vitals:    11/06/18 0745   BP: (!) 117/55   Pulse: 80   Resp: 18   Temp: 37 °C (98.6 °F)       Post vital signs: stable    Level of consciousness: alert     Nausea/Vomiting: no nausea/no vomiting    Complications: none    Airway Patency:  patent    Respiratory: unassisted    Cardiovascular: stable and blood pressure at baseline    Hydration: euvolemic

## 2018-11-06 NOTE — DISCHARGE INSTRUCTIONS
Home Care Instructions  E.C.T.    ACTIVITY LEVEL:  You may feel sleepy for several hours. It is best to rest until you are more awake and then gradually resume your normal activities in one day. It is recommended, because of the possibility of memory loss and slight confusion post ECT, that you rest in the company of a responsible adult. This confusion and memory loss is expected and should diminish over time. It is also recommended that you do not drive or operate any electrical appliances or machinery during the ECT treatment series. Ask your Doctor, at the time of your treatment, any questions you may have about specific activities.    DIET:  You may wish to start with liquids after your ECT treatment and gradually resume your normal diet. Do not consume alcoholic beverages during the ECT treatment series.    BATHING:  You may shower or bathe as desired  .  MEDICATIONS:  Resume all home medications starting with the AM doses not taken prior to ECT treatment. If you experience a headache, it is okay to take your usual pain reliever.    WHEN TO CALL THE DOCTOR:   Headache, memory loss or confusion that does not begin to resolve within 24 hours  .  RETURN APPOINTMENT:  Report to Day Surgery ([facility]) on (date) 11/19/18 for (time) 0530.    Remember you may not eat or drink after midnight, but please take heart or high blood pressure medicines with a sip of water in the morning prior to leaving home.    FOR EMERGENCIES:  If any unusual problems or difficulties occur, contact the office [phone] Monday-Friday until 3:00 p.m. After hours, call the hospital  [phone] and ask for the Psychiatry Resident On-call.

## 2018-11-06 NOTE — ANESTHESIA PROCEDURE NOTES
ECT    Treatment Number: 61  Procedure start time: 11/6/2018 8:10 AM  Timeout performed at: 11/6/2018 8:10 AM  Procedure end time: 11/6/2018 8:30 AM    Staffing  Anesthesiologist: Darius Patricia MD  CRNA/Resident: Suhas Joya MD  Performed by: resident/CRNA     Preanesthetic Checklist  The following were completed as part of the preanesthetic checklist: patient identified, procedural consent, pre-op evaluation, timeout performed, risks and benefits discussed, monitors and equipment checked, anesthesia consent given, oxygen available, suction available, hand hygiene performed and patient being monitored.    Setup & Induction  Patient Monitoring: heart rate, cardiac monitor, continuous pulse ox, NIBP and continuous capnometry  Patient preparation: bite block inserted, extremities padded, mandibular stabilization and patient hyperventilated  Electrode Placement: Bitemporal    The patient was moved to the ECT therapy room after being assessed and consented for ECT. After standard ASA monitors were applied and timeout performed, the patient was adequately preoxygenated. After induction of general anesthesia, adequate oxygenation and ventilation were confirmed with pulse oxymetry and end tidal CO2 monitoring via bag-mask ventilation. End tidal CO2 was monitored throughout the case and moderate hyperventilation was performed prior to beginning ECT treatment. All extremities were padded, biteblock was inserted, and mandibular stabilization was done prior to initiating ECT therapy.    Procedure  Stimulus Number 1: Setting Number = III; Seizure Duration = 67 seconds            Recovery  After adequate recovery from general anesthesia, the patient was transported to recovery.    ECT Findings  ECT associated findings of: Moderate Hypertension (SBP >160 or DBP >100)

## 2018-11-06 NOTE — TRANSFER OF CARE
"Anesthesia Transfer of Care Note    Patient: Allyssa Wright    Procedure(s) Performed: Procedure(s) (LRB):  ELECTROCONVULSIVE THERAPY (ECT) - SINGLE SEIZURE (N/A)    Patient location: PACU    Anesthesia Type: general    Transport from OR: Transported from OR on 100% O2 by closed face mask with adequate spontaneous ventilation    Post pain: adequate analgesia    Post assessment: no apparent anesthetic complications and tolerated procedure well    Post vital signs: stable    Level of consciousness: awake, alert and oriented    Nausea/Vomiting: no nausea/vomiting    Complications: none          Last vitals:   Visit Vitals  BP (!) 117/55 (BP Location: Left arm, Patient Position: Lying)   Pulse 80   Temp 37 °C (98.6 °F) (Oral)   Resp 18   Ht 5' 5" (1.651 m)   Wt 70.3 kg (155 lb)   LMP  (LMP Unknown)   SpO2 100%   Breastfeeding? No   BMI 25.79 kg/m²     "

## 2018-11-06 NOTE — DISCHARGE SUMMARY
Allyssa Wright  : 1980   MRN: [id number]  Date: 2018     Electroconvulsive Therapy  Discharge Summary    Admit Date: 2018  6:02 AM  Discharge Date: 2018    Attending Physician: Shoaib Boyce Jr., MD   Discharge Provider: Belén Leo MD    History of Present Illness: Allyssa Wright is a 38 y.o. female with MDD, recurrent, in partial remission presented for ECT #60. See H&P dated 2018 for full HPI. For further details, see Dr. Boyce's pre-ECT evaluation.    Hospital Course: The patient tolerated the ECT treatment well without complication. Patient was stable post-procedure. See OP note dated 2018 for more details.     Disposition: Home or Self Care    Medications:  Current Discharge Medication List      CONTINUE these medications which have NOT CHANGED    Details   clozapine (CLOZARIL) 50 MG tablet Take 50 mg by mouth once daily.       dapsone 7.5 % GlwP Apply topically once daily.      famciclovir (FAMVIR) 500 MG tablet Take 1 tablet (500 mg total) by mouth 2 (two) times daily.  Qty: 30 tablet, Refills: 12    Associated Diagnoses: Major depressive disorder, recurrent episode, moderate degree      hydrOXYzine pamoate (VISTARIL) 25 MG Cap Take 2 capsules (50 mg total) by mouth nightly as needed (Take 25-50mg (1-2 caps) at night for anxiety prior to ECT).  Qty: 180 capsule, Refills: 0      mirtazapine (REMERON) 15 MG tablet Take 1 tablet (15 mg total) by mouth every evening.  Qty: 90 tablet, Refills: 0      spironolactone (ALDACTONE) 50 MG tablet 50 mg 2 (two) times daily. 50  mg qAM, 50 mg qHS.      thyroid (ARMOUR THYROID) 30 mg Tab Take 1 tablet (30 mg total) by mouth every morning.  Qty: 30 tablet, Refills: 11    Associated Diagnoses: Major depressive disorder, recurrent episode, moderate degree      trazodone (DESYREL) 100 MG tablet Take 200 mg by mouth every evening.       UNABLE TO FIND 2 (two) times daily. n-azetyl-cysteine      venlafaxine (EFFEXOR) 100 MG Tab Take 3  tablets (300 mg total) by mouth once daily.    Associated Diagnoses: MDD (major depressive disorder), recurrent, in partial remission      vortioxetine (TRINTELLIX) 5 mg Tab Take 2.5 mg by mouth once daily.      ZOVIRAX 5 % Crea Refills: 5      dextroamphetamine-amphetamine 30 mg Tab Take 30 mg by mouth 2 (two) times daily.     Associated Diagnoses: MDD (major depressive disorder), recurrent, in partial remission           Status at Discharge: Alert and medically stable    Discharge Diagnoses: MDD, recurrent, in partial remission  Diet: Resume previous outpatient diet  Activity: Ambulate with assistance  Instructions: Please do not eat or drink anything after midnight prior to procedure. Please do not drive on day of ECT.    Med Changes:  None    Next ECT: November 19, 2018    Belén Leo MD  11/6/2018

## 2018-11-06 NOTE — ANESTHESIA POSTPROCEDURE EVALUATION
"Anesthesia Post Evaluation    Patient: Allyssa Wright    Procedure(s) Performed: Procedure(s) (LRB):  ELECTROCONVULSIVE THERAPY (ECT) - SINGLE SEIZURE (N/A)    Final Anesthesia Type: general  Patient location during evaluation: PACU  Patient participation: Yes- Able to Participate  Level of consciousness: awake  Post-procedure vital signs: reviewed and stable  Pain management: adequate  Airway patency: patent  PONV status at discharge: No PONV  Anesthetic complications: no      Cardiovascular status: stable  Respiratory status: unassisted  Hydration status: euvolemic  Follow-up not needed.        Visit Vitals  BP (!) 117/55 (BP Location: Left arm, Patient Position: Lying)   Pulse 80   Temp 37 °C (98.6 °F) (Oral)   Resp 18   Ht 5' 5" (1.651 m)   Wt 70.3 kg (155 lb)   LMP  (LMP Unknown)   SpO2 100%   Breastfeeding? No   BMI 25.79 kg/m²       Pain/Roma Score: Pain Assessment Performed: Yes (11/6/2018  7:49 AM)  Presence of Pain: denies (11/6/2018  7:49 AM)        "

## 2018-11-06 NOTE — OP NOTE
Allyssa Wright  : 1980   MRN: [id number]  Date: 2018    Electroconvulsive Therapy  Procedure Note    Date of Admission: 2018  6:02 AM    Site: [facility], [address]    Attending: Shoaib Boyce Jr., MD   Residents: Belén Leo MD  Pre-procedure Diagnosis: MDD recurrent in partial remission   ECT Treatment Number: 60  Machine Type: Mecta 5000    Patient Status: Medically stable    Vitals (pre-procedure):  There were no vitals filed for this visit.    Electrode Placement: Bitemporal    Stimulus Number Charge (mC) Level Pulse Width (msec) Frequency  (Hz) Duration of Stimulus (sec) Current (mA) Duration of Seizure (sec)   1 576 3 1 60 6 800 67                                   Complications: Hypertension    Maximum Blood Pressure: 215/83    Medications Given:  Caffeine 500 mg PO before  Methohexital (Brevital) 150 mg  IV before  Succinylcholine (Anectine) 140 mg  IV before  Zofran 4 mg IV before  Toradol 30 mg IV before        Treatment Course:  Patient tolerated procedure well. After adequate recovery from general anesthesia, the patient was transported to recovery.    Post-op Diagnosis: Same as above    Recommended for next ECT: 2018      Belén Leo MD  [facility]  PGY-2          2018

## 2018-11-06 NOTE — H&P
Allyssa Wright  : 1980   MRN: [id number]  Date: 2018     Psychiatric - ECT  H&P     Chief complaint: MDD recurrent in partial remission  Procedure: ECT #60    SUBJECTIVE:   HPI:   Allyssa Wright is a 38 y.o. female with MDD recurrent in partial remission who presents for ECT. The patient has no new physical complaints. She denies an exacerbation of her depression since the last ECT treatment almost 2 weeks ago. Pt reports this is good considering it's the longest time she has had between sessions. Pt states she is eating and sleeping well.   Please see Dr. Boyce's full pre-ECT evaluation for further details. The patient does not need any prescriptions and has not had any med changes since meeting with Dr. Boyce. The patient has not had anything by mouth since midnight and is ready for ECT.      Psychiatric Review of Systems:  Mood: 6/10  Anxiety: improved  Appetite: No problem  Psychomotor: No problem  Cognitive Impairment: mild, tolerable  Insomnia: None, trouble getting up in the morning  Psychosis: None  Diurnal Variation: None  Suicidal Ideation: Absent    Medical Review Of Systems:  Pertinent items are noted in HPI.    Current Medications:   No current facility-administered medications on file prior to encounter.      Current Outpatient Medications on File Prior to Encounter   Medication Sig Dispense Refill    clozapine (CLOZARIL) 50 MG tablet Take 50 mg by mouth once daily.       dapsone 7.5 % GlwP Apply topically once daily.      dextroamphetamine-amphetamine 30 mg Tab Take 30 mg by mouth 2 (two) times daily.       famciclovir (FAMVIR) 500 MG tablet Take 1 tablet (500 mg total) by mouth 2 (two) times daily. 30 tablet 12    hydrOXYzine pamoate (VISTARIL) 25 MG Cap Take 2 capsules (50 mg total) by mouth nightly as needed (Take 25-50mg (1-2 caps) at night for anxiety prior to ECT). 180 capsule 0    mirtazapine (REMERON) 15 MG tablet Take 1 tablet (15 mg total) by mouth every evening. (Patient  taking differently: Take 7.5 mg by mouth every evening. ) 90 tablet 0    spironolactone (ALDACTONE) 50 MG tablet 50 mg 2 (two) times daily. 50  mg qAM, 50 mg qHS.      thyroid (ARMOUR THYROID) 30 mg Tab Take 1 tablet (30 mg total) by mouth every morning. 30 tablet 11    trazodone (DESYREL) 100 MG tablet Take 200 mg by mouth every evening.       UNABLE TO FIND 2 (two) times daily. n-azetyl-cysteine      venlafaxine (EFFEXOR) 100 MG Tab Take 3 tablets (300 mg total) by mouth once daily. (Patient taking differently: Take 225 mg by mouth once daily. )      vortioxetine (TRINTELLIX) 5 mg Tab Take 2.5 mg by mouth once daily.      ZOVIRAX 5 % Crea   5          Allergies:   Review of patient's allergies indicates:   Allergen Reactions    Benzodiazepines Other (See Comments)     Contraindicated while taking Clozapine    Ampicillin      Mom says so    Erythromycin     Levaquin [levofloxacin] Other (See Comments)     Depression side effects    Penicillins      Mom says so    Pristiq [desvenlafaxine]      psycotic      Sulfa (sulfonamide antibiotics)      Rash      Azithromycin Anxiety        Past Medical/Surgical History:   Past Medical History:   Diagnosis Date    Anxiety     Depression     History of psychiatric hospitalization     HSV-1 (herpes simplex virus 1) infection     Hx of psychiatric care     Moderate depressed bipolar II disorder 06/13/2016    reports no history of bipolar    Obsessive-compulsive disorder     Psychiatric problem     Schizophrenia 4/3/2018    Self-harming behavior     Therapy      Past Surgical History:   Procedure Laterality Date    ANKLE SURGERY Right     BREAST augmentation      ELECTROCONVULSIVE THERAPY (ECT) - SINGLE SEIZURE N/A 8/31/2018    Performed by [facility]ib [facility] Jr., [facility] at [facility] ECT    ELECTROCONVULSIVE THERAPY (ECT) - SINGLE SEIZURE N/A 8/6/2018    Performed by Sh[facility]ib [facility]ce Jr., MD at [facility] ECT    ELECTROCONVULSIVE THERAPY (ECT) - SINGLE SEIZURE N/A  7/23/2018    Performed by [facility]ib [facility] Jr., MD at [facility] ECT    ELECTROCONVULSIVE THERAPY (ECT) - SINGLE SEIZURE N/A 7/5/2018    Performed by Shoaib Boyce Jr., MD at [facility] ECT    ELECTROCONVULSIVE THERAPY (ECT) - SINGLE SEIZURE N/A 6/28/2018    Performed by Shoaib Boyce Jr., MD at [facility] ECT    ELECTROCONVULSIVE THERAPY (ECT) - SINGLE SEIZURE N/A 6/13/2018    Performed by Shoaib Boyce Jr., MD at [facility] ECT    ELECTROCONVULSIVE THERAPY (ECT) - SINGLE SEIZURE N/A 5/29/2018    Performed by Shoaib Boyce Jr., MD at [facility] ECT    ELECTROCONVULSIVE THERAPY (ECT) - SINGLE SEIZURE N/A 5/8/2018    Performed by Shoaib Boyce Jr., MD at [facility] ECT    ELECTROCONVULSIVE THERAPY (ECT) - SINGLE SEIZURE N/A 4/24/2018    Performed by Shoaib Boyce Jr., MD at [facility] ECT    ELECTROCONVULSIVE THERAPY (ECT) - SINGLE SEIZURE N/A 4/17/2018    Performed by Shoaib Boyce Jr., MD at [facility] ECT    ELECTROCONVULSIVE THERAPY (ECT) - SINGLE SEIZURE N/A 4/13/2018    Performed by Shoaib Boyce Jr., MD at [facility] ECT    ELECTROCONVULSIVE THERAPY (ECT) - SINGLE SEIZURE N/A 4/3/2018    Performed by Shoaib Boyce Jr., MD at [facility] ECT    ELECTROCONVULSIVE THERAPY (ECT) - SINGLE SEIZURE N/A 3/20/2018    Performed by Shoaib Boyce Jr., MD at [facility] ECT    ELECTROCONVULSIVE THERAPY (ECT) - SINGLE SEIZURE N/A 3/15/2018    Performed by Shoaib Boyce Jr., MD at [facility] ECT    ELECTROCONVULSIVE THERAPY (ECT) - SINGLE SEIZURE N/A 3/6/2018    Performed by Shoaib Boyce Jr., MD at [facility] ECT    ELECTROCONVULSIVE THERAPY (ECT) - SINGLE SEIZURE N/A 3/1/2018    Performed by Shoaib Boyce Jr., MD at [facility] ECT    ELECTROCONVULSIVE THERAPY (ECT) - SINGLE SEIZURE N/A 2/23/2018    Performed by Shoaib Boyce Jr., MD at [facility] ECT    ELECTROCONVULSIVE THERAPY (ECT) - SINGLE SEIZURE N/A 2/19/2018    Performed by Shoaib Boyce Jr., MD at [facility] ECT    ELECTROCONVULSIVE THERAPY (ECT) - SINGLE SEIZURE N/A 2/15/2018     Performed by Sh[facility]ib [facility] Jr., MD at [facility] ECT    ELECTROCONVULSIVE THERAPY (ECT) - SINGLE SEIZURE N/A 1/22/2018    Performed by Shoaib Boyce Jr., MD at [facility] ECT    ELECTROCONVULSIVE THERAPY (ECT) - SINGLE SEIZURE N/A 12/11/2017    Performed by Shoaib Boyce Jr., MD at [facility] ECT    ELECTROCONVULSIVE THERAPY (ECT) - SINGLE SEIZURE N/A 10/24/2017    Performed by Shoaib Boyce Jr., MD at [facility] ECT    ELECTROCONVULSIVE THERAPY (ECT) - SINGLE SEIZURE N/A 9/20/2017    Performed by Shoaib Boyce Jr., MD at [facility] ECT    ELECTROCONVULSIVE THERAPY (ECT) - SINGLE SEIZURE N/A 8/14/2017    Performed by Shoaib Boyce Jr., MD at [facility] ECT    ELECTROCONVULSIVE THERAPY (ECT) - SINGLE SEIZURE Bilateral 7/12/2017    Performed by Shoaib Boyce Jr., MD at [facility] ECT    ELECTROCONVULSIVE THERAPY (ECT) - SINGLE SEIZURE N/A 6/13/2017    Performed by Shoaib Boyce Jr., MD at [facility] ECT    ELECTROCONVULSIVE THERAPY (ECT) - SINGLE SEIZURE N/A 5/17/2017    Performed by Shoaib Boyce Jr., MD at [facility] ECT    ELECTROCONVULSIVE THERAPY (ECT) - SINGLE SEIZURE N/A 4/20/2017    Performed by Shoaib Boyce Jr., MD at [facility] ECT    ELECTROCONVULSIVE THERAPY (ECT) - SINGLE SEIZURE N/A 11/22/2016    Performed by Shoaib Boyce Jr., MD at [facility] ECT    ELECTROCONVULSIVE THERAPY (ECT) - SINGLE SEIZURE N/A 10/24/2016    Performed by Shoaib Boyce Jr., MD at [facility] ECT    ELECTROCONVULSIVE THERAPY (ECT) - SINGLE SEIZURE N/A 9/22/2016    Performed by Shoaib Boyce Jr., MD at [facility] ECT    ELECTROCONVULSIVE THERAPY (ECT) - SINGLE SEIZURE N/A 9/1/2016    Performed by Shoaib Boyce Jr., MD at [facility] ECT    ELECTROCONVULSIVE THERAPY (ECT) - SINGLE SEIZURE N/A 8/15/2016    Performed by Shoaib Boyce Jr., MD at [facility] ECT    ELECTROCONVULSIVE THERAPY (ECT) - SINGLE SEIZURE N/A 8/1/2016    Performed by Shoaib Boyce Jr., MD at [facility] ECT    ELECTROCONVULSIVE THERAPY (ECT) - SINGLE SEIZURE N/A 7/22/2016     Performed by Shoaib Boyce Jr., MD at [facility] ECT    ELECTROCONVULSIVE THERAPY (ECT) - SINGLE SEIZURE N/A 7/14/2016    Performed by Shoaib Boyce Jr., MD at [facility] ECT    ELECTROCONVULSIVE THERAPY (ECT) - SINGLE SEIZURE N/A 7/8/2016    Performed by Shoaib Boyce Jr., MD at [facility] ECT    ELECTROCONVULSIVE THERAPY (ECT) - SINGLE SEIZURE N/A 7/5/2016    Performed by Shoaib Boyce Jr., MD at [facility] ECT    ELECTROCONVULSIVE THERAPY (ECT) - SINGLE SEIZURE N/A 6/27/2016    Performed by Shoaib Boyce Jr., MD at [facility] ECT    ELECTROCONVULSIVE THERAPY (ECT) - SINGLE SEIZURE N/A 6/23/2016    Performed by Shoaib Boyce Jr., MD at [facility] ECT    ELECTROCONVULSIVE THERAPY (ECT) - SINGLE SEIZURE N/A 6/20/2016    Performed by Shoaib Boyce Jr., MD at [facility] ECT    ELECTROCONVULSIVE THERAPY (ECT) - SINGLE SEIZURE N/A 6/16/2016    Performed by Shoaib Boyce Jr., MD at [facility] ECT    ELECTROCONVULSIVE THERAPY (ECT) - SINGLE SEIZURE N/A 6/15/2016    Performed by Shoaib Boyce Jr., MD at [facility] ECT    ELECTROCONVULSIVE THERAPY (ECT) - SINGLE SEIZURE N/A 6/14/2016    Performed by Shoaib Boyce Jr., MD at [facility] ECT    ELECTROCONVULSIVE THERAPY (ECT) - SINGLE SEIZURE N/A 6/13/2016    Performed by Shoaib Boyce Jr., MD at [facility] ECT    ELECTROCONVULSIVE THERAPY (ECT) - SINGLE SEIZURE N/A 1/4/2016    Performed by Shoaib Boyce Jr., MD at [facility] ECT    ELECTROCONVULSIVE THERAPY, CEREBRAL HEMISPHERE, UNILATERAL, 1 SEIZURE Bilateral 6/25/2018    Performed by Sh[facility]ib Boyce Jr., MD at [facility] ECT    OVARIAN CYST REMOVAL Bilateral           OBJECTIVE:   Vitals (pre-procedure):  There were no vitals filed for this visit.     Labs/Imaging/Studies:   No results found for this or any previous visit (from the past 48 hour(s)).   No results found for: PHENYTOIN, PHENOBARB, VALPROATE, CBMZ      Physical Exam:   Gen: AAOx4, NAD  HEENT: MMM, PERRL, EOMI, O/P clear  CV: RRR, S1/S2 nml, no M/R/G  Chest: CTAB, no R/R/W,  unlabored breathing  Abd: S/NT/ND, +BS, no HSM  Ext: No C/C/E, pulse 2+ throughout  Neuro: CN II-XII grossly intact, no focal deficits    Mental Status Exam:   Appearance: age appropriate, well nourished, dressed in hospital gown  Behavior: friendly and cooperative, eye contact appropriate  Speech: conversational tone, rate, pitch, volume  Mood: 6/10  Affect: full, reactive  Thought Process: linear and organized  Thought Content: no SI, no HI or AVH  Cognition: grossly intact  Insight: good  Judgment: appropriate for setting     ASSESSMENT/PLAN:   Allyssa Wright is a 38 y.o. female with MDD, R, in partial remission who presents for ECT.    Recommendations:   Proceed with ECT #60.      Belén Leo M.D.  11/6/2018

## 2018-11-19 ENCOUNTER — ANESTHESIA EVENT (OUTPATIENT)
Dept: ELECTROPHYSIOLOGY | Facility: HOSPITAL | Age: 38
End: 2018-11-19
Payer: COMMERCIAL

## 2018-11-19 ENCOUNTER — ANESTHESIA (OUTPATIENT)
Dept: ELECTROPHYSIOLOGY | Facility: HOSPITAL | Age: 38
End: 2018-11-19
Payer: COMMERCIAL

## 2018-11-19 ENCOUNTER — HOSPITAL ENCOUNTER (OUTPATIENT)
Facility: HOSPITAL | Age: 38
Discharge: HOME | End: 2018-11-19
Attending: PSYCHIATRY & NEUROLOGY | Admitting: PSYCHIATRY & NEUROLOGY
Payer: COMMERCIAL

## 2018-11-19 VITALS
TEMPERATURE: 99 F | HEART RATE: 98 BPM | WEIGHT: 155 LBS | OXYGEN SATURATION: 99 % | DIASTOLIC BLOOD PRESSURE: 75 MMHG | BODY MASS INDEX: 25.83 KG/M2 | SYSTOLIC BLOOD PRESSURE: 126 MMHG | HEIGHT: 65 IN | RESPIRATION RATE: 20 BRPM

## 2018-11-19 DIAGNOSIS — F33.9 RECURRENT MAJOR DEPRESSIVE DISORDER, REMISSION STATUS UNSPECIFIED: Primary | ICD-10-CM

## 2018-11-19 DIAGNOSIS — F32.9 MDD (MAJOR DEPRESSIVE DISORDER): ICD-10-CM

## 2018-11-19 DIAGNOSIS — F33.41 MDD (MAJOR DEPRESSIVE DISORDER), RECURRENT, IN PARTIAL REMISSION: Primary | ICD-10-CM

## 2018-11-19 LAB
B-HCG UR QL: NEGATIVE
CTP QC/QA: YES

## 2018-11-19 PROCEDURE — 90870 ELECTROCONVULSIVE THERAPY: CPT | Performed by: ANESTHESIOLOGY

## 2018-11-19 PROCEDURE — 90870 ELECTROCONVULSIVE THERAPY: CPT | Mod: ,,, | Performed by: PSYCHIATRY & NEUROLOGY

## 2018-11-19 PROCEDURE — 63600175 PHARM REV CODE 636 W HCPCS: Performed by: STUDENT IN AN ORGANIZED HEALTH CARE EDUCATION/TRAINING PROGRAM

## 2018-11-19 PROCEDURE — D9220A PRA ANESTHESIA: Mod: ,,, | Performed by: ANESTHESIOLOGY

## 2018-11-19 PROCEDURE — 25000003 PHARM REV CODE 250: Performed by: PSYCHIATRY & NEUROLOGY

## 2018-11-19 PROCEDURE — 81025 URINE PREGNANCY TEST: CPT | Performed by: PSYCHIATRY & NEUROLOGY

## 2018-11-19 PROCEDURE — 25000003 PHARM REV CODE 250: Performed by: STUDENT IN AN ORGANIZED HEALTH CARE EDUCATION/TRAINING PROGRAM

## 2018-11-19 RX ORDER — LIDOCAINE HYDROCHLORIDE 10 MG/ML
1 INJECTION, SOLUTION EPIDURAL; INFILTRATION; INTRACAUDAL; PERINEURAL ONCE
Status: COMPLETED | OUTPATIENT
Start: 2018-11-19 | End: 2018-11-19

## 2018-11-19 RX ORDER — SODIUM CHLORIDE 9 MG/ML
500 INJECTION, SOLUTION INTRAVENOUS ONCE
Status: DISCONTINUED | OUTPATIENT
Start: 2018-11-19 | End: 2018-11-19 | Stop reason: HOSPADM

## 2018-11-19 RX ORDER — KETOROLAC TROMETHAMINE 30 MG/ML
INJECTION, SOLUTION INTRAMUSCULAR; INTRAVENOUS
Status: COMPLETED
Start: 2018-11-19 | End: 2018-11-19

## 2018-11-19 RX ORDER — SUCCINYLCHOLINE CHLORIDE 20 MG/ML
INJECTION INTRAMUSCULAR; INTRAVENOUS
Status: DISCONTINUED | OUTPATIENT
Start: 2018-11-19 | End: 2018-11-19

## 2018-11-19 RX ORDER — LABETALOL HYDROCHLORIDE 5 MG/ML
INJECTION, SOLUTION INTRAVENOUS
Status: DISCONTINUED | OUTPATIENT
Start: 2018-11-19 | End: 2018-11-19

## 2018-11-19 RX ORDER — HYDROMORPHONE HYDROCHLORIDE 1 MG/ML
0.2 INJECTION, SOLUTION INTRAMUSCULAR; INTRAVENOUS; SUBCUTANEOUS EVERY 5 MIN PRN
Status: DISCONTINUED | OUTPATIENT
Start: 2018-11-19 | End: 2018-11-19 | Stop reason: HOSPADM

## 2018-11-19 RX ORDER — ONDANSETRON 2 MG/ML
INJECTION INTRAMUSCULAR; INTRAVENOUS
Status: DISCONTINUED | OUTPATIENT
Start: 2018-11-19 | End: 2018-11-19

## 2018-11-19 RX ORDER — SUCCINYLCHOLINE CHLORIDE 20 MG/ML
INJECTION INTRAMUSCULAR; INTRAVENOUS
Status: COMPLETED
Start: 2018-11-19 | End: 2018-11-19

## 2018-11-19 RX ORDER — MEPERIDINE HYDROCHLORIDE 50 MG/ML
12.5 INJECTION INTRAMUSCULAR; INTRAVENOUS; SUBCUTANEOUS ONCE AS NEEDED
Status: DISCONTINUED | OUTPATIENT
Start: 2018-11-19 | End: 2018-11-19 | Stop reason: HOSPADM

## 2018-11-19 RX ORDER — KETOROLAC TROMETHAMINE 30 MG/ML
INJECTION, SOLUTION INTRAMUSCULAR; INTRAVENOUS
Status: DISCONTINUED | OUTPATIENT
Start: 2018-11-19 | End: 2018-11-19

## 2018-11-19 RX ORDER — ONDANSETRON 2 MG/ML
INJECTION INTRAMUSCULAR; INTRAVENOUS
Status: COMPLETED
Start: 2018-11-19 | End: 2018-11-19

## 2018-11-19 RX ORDER — ACETAMINOPHEN 500 MG
1000 TABLET ORAL ONCE
Status: DISCONTINUED | OUTPATIENT
Start: 2018-11-19 | End: 2018-11-19 | Stop reason: HOSPADM

## 2018-11-19 RX ORDER — SODIUM CHLORIDE 9 MG/ML
INJECTION, SOLUTION INTRAVENOUS CONTINUOUS
Status: DISCONTINUED | OUTPATIENT
Start: 2018-11-19 | End: 2018-11-19 | Stop reason: HOSPADM

## 2018-11-19 RX ADMIN — LIDOCAINE HYDROCHLORIDE 0.1 MG: 10 INJECTION, SOLUTION EPIDURAL; INFILTRATION; INTRACAUDAL; PERINEURAL at 06:11

## 2018-11-19 RX ADMIN — SUCCINYLCHOLINE CHLORIDE 140 MG: 20 INJECTION, SOLUTION INTRAMUSCULAR; INTRAVENOUS at 07:11

## 2018-11-19 RX ADMIN — ONDANSETRON 4 MG: 2 INJECTION, SOLUTION INTRAMUSCULAR; INTRAVENOUS at 07:11

## 2018-11-19 RX ADMIN — METHOHEXITAL SODIUM 150 MG: 500 INJECTION, POWDER, LYOPHILIZED, FOR SOLUTION INTRAMUSCULAR; INTRAVENOUS; RECTAL at 07:11

## 2018-11-19 RX ADMIN — Medication 500 MG: at 06:11

## 2018-11-19 RX ADMIN — SODIUM CHLORIDE: 0.9 INJECTION, SOLUTION INTRAVENOUS at 06:11

## 2018-11-19 RX ADMIN — KETOROLAC TROMETHAMINE 30 MG: 30 INJECTION, SOLUTION INTRAMUSCULAR at 07:11

## 2018-11-19 NOTE — ANESTHESIA PREPROCEDURE EVALUATION
11/19/2018    Pre-operative evaluation for ELECTROCONVULSIVE THERAPY (ECT) - SINGLE SEIZURE (N/A)    Allyssa Wright is a 38 y.o. female with hypothyroidism, HSV-1, and poorly- controlled MDD (currently on maintenance ECT).     Patient now presents for the above procedure(s).     ECT #: 62     Previous Meds:  - Methohexital 150mg  - Succinylcholine 140mg  - Ondansetron 4mg  - Ketorolac 30mg       Previous airway:   None documented.        Past Surgical History:   Procedure Laterality Date    ANKLE SURGERY Right     BREAST augmentation      ELECTROCONVULSIVE THERAPY (ECT) - SINGLE SEIZURE N/A 8/31/2018    Performed by [facility]ib [facility]ce Jr., MD at [facility] ECT    ELECTROCONVULSIVE THERAPY (ECT) - SINGLE SEIZURE N/A 8/6/2018    Performed by Sh[facility]ib Boyce Jr., MD at [facility] ECT    ELECTROCONVULSIVE THERAPY (ECT) - SINGLE SEIZURE N/A 7/23/2018    Performed by Shoaib Boyce Jr., MD at [facility] ECT    ELECTROCONVULSIVE THERAPY (ECT) - SINGLE SEIZURE N/A 7/5/2018    Performed by Sh[facility]ib Boyce Jr., MD at [facility] ECT    ELECTROCONVULSIVE THERAPY (ECT) - SINGLE SEIZURE N/A 6/28/2018    Performed by Shoaib Boyce Jr., MD at [facility] ECT    ELECTROCONVULSIVE THERAPY (ECT) - SINGLE SEIZURE N/A 6/13/2018    Performed by Shoaib Boyce Jr., MD at [facility] ECT    ELECTROCONVULSIVE THERAPY (ECT) - SINGLE SEIZURE N/A 5/29/2018    Performed by Sh[facility]ib Boyce Jr., MD at [facility] ECT    ELECTROCONVULSIVE THERAPY (ECT) - SINGLE SEIZURE N/A 5/8/2018    Performed by Shoaib Boyce Jr., MD at [facility] ECT    ELECTROCONVULSIVE THERAPY (ECT) - SINGLE SEIZURE N/A 4/24/2018    Performed by Shoaib Boyce Jr., MD at [facility] ECT    ELECTROCONVULSIVE THERAPY (ECT) - SINGLE SEIZURE N/A 4/17/2018    Performed by Sh[facility]ib Boyce Jr., MD at [facility] ECT    ELECTROCONVULSIVE THERAPY (ECT) - SINGLE SEIZURE N/A 4/13/2018    Performed by Shoaib  ASHKAN Boyce Jr., MD at [facility] ECT    ELECTROCONVULSIVE THERAPY (ECT) - SINGLE SEIZURE N/A 4/3/2018    Performed by Shoaib Boyce Jr., MD at [facility] ECT    ELECTROCONVULSIVE THERAPY (ECT) - SINGLE SEIZURE N/A 3/20/2018    Performed by Shoaib Boyce Jr., MD at [facility] ECT    ELECTROCONVULSIVE THERAPY (ECT) - SINGLE SEIZURE N/A 3/15/2018    Performed by Shoaib Boyce Jr., MD at [facility] ECT    ELECTROCONVULSIVE THERAPY (ECT) - SINGLE SEIZURE N/A 3/6/2018    Performed by Shoaib Boyce Jr., MD at [facility] ECT    ELECTROCONVULSIVE THERAPY (ECT) - SINGLE SEIZURE N/A 3/1/2018    Performed by Shoaib Boyce Jr., MD at [facility] ECT    ELECTROCONVULSIVE THERAPY (ECT) - SINGLE SEIZURE N/A 2/23/2018    Performed by Shoaib Boyce Jr., MD at [facility] ECT    ELECTROCONVULSIVE THERAPY (ECT) - SINGLE SEIZURE N/A 2/19/2018    Performed by Shoaib Boyce Jr., MD at [facility] ECT    ELECTROCONVULSIVE THERAPY (ECT) - SINGLE SEIZURE N/A 2/15/2018    Performed by Shoaib Boyce Jr., MD at [facility] ECT    ELECTROCONVULSIVE THERAPY (ECT) - SINGLE SEIZURE N/A 1/22/2018    Performed by Shoaib Boyce Jr., MD at [facility] ECT    ELECTROCONVULSIVE THERAPY (ECT) - SINGLE SEIZURE N/A 12/11/2017    Performed by Shoaib Boyce Jr., MD at [facility] ECT    ELECTROCONVULSIVE THERAPY (ECT) - SINGLE SEIZURE N/A 10/24/2017    Performed by Shoaib Boyce Jr., MD at [facility] ECT    ELECTROCONVULSIVE THERAPY (ECT) - SINGLE SEIZURE N/A 9/20/2017    Performed by Shoaib Boyce Jr., MD at [facility] ECT    ELECTROCONVULSIVE THERAPY (ECT) - SINGLE SEIZURE N/A 8/14/2017    Performed by Shoaib Boyce Jr., MD at [facility] ECT    ELECTROCONVULSIVE THERAPY (ECT) - SINGLE SEIZURE Bilateral 7/12/2017    Performed by Shoaib Boyce Jr., MD at [facility] ECT    ELECTROCONVULSIVE THERAPY (ECT) - SINGLE SEIZURE N/A 6/13/2017    Performed by Shoaib Boyce Jr., MD at [facility] ECT    ELECTROCONVULSIVE THERAPY (ECT) - SINGLE SEIZURE N/A 5/17/2017    Performed by Shoaib LUTHER  Carli Coker MD at [facility] ECT    ELECTROCONVULSIVE THERAPY (ECT) - SINGLE SEIZURE N/A 4/20/2017    Performed by Shoaib Boyce Jr., MD at [facility] ECT    ELECTROCONVULSIVE THERAPY (ECT) - SINGLE SEIZURE N/A 11/22/2016    Performed by Shoaib Boyce Jr., MD at [facility] ECT    ELECTROCONVULSIVE THERAPY (ECT) - SINGLE SEIZURE N/A 10/24/2016    Performed by Shoaib Boyce Jr., MD at [facility] ECT    ELECTROCONVULSIVE THERAPY (ECT) - SINGLE SEIZURE N/A 9/22/2016    Performed by Shoaib Boyce Jr., MD at [facility] ECT    ELECTROCONVULSIVE THERAPY (ECT) - SINGLE SEIZURE N/A 9/1/2016    Performed by Shoaib Boyce Jr., MD at [facility] ECT    ELECTROCONVULSIVE THERAPY (ECT) - SINGLE SEIZURE N/A 8/15/2016    Performed by Shoaib Boyce Jr., MD at [facility] ECT    ELECTROCONVULSIVE THERAPY (ECT) - SINGLE SEIZURE N/A 8/1/2016    Performed by Shoaib Boyce Jr., MD at [facility] ECT    ELECTROCONVULSIVE THERAPY (ECT) - SINGLE SEIZURE N/A 7/22/2016    Performed by Shoaib Boyce Jr., MD at [facility] ECT    ELECTROCONVULSIVE THERAPY (ECT) - SINGLE SEIZURE N/A 7/14/2016    Performed by Shoaib Boyce Jr., MD at [facility] ECT    ELECTROCONVULSIVE THERAPY (ECT) - SINGLE SEIZURE N/A 7/8/2016    Performed by Shoaib Boyce Jr., MD at [facility] ECT    ELECTROCONVULSIVE THERAPY (ECT) - SINGLE SEIZURE N/A 7/5/2016    Performed by Shoaib Boyce Jr., MD at [facility] ECT    ELECTROCONVULSIVE THERAPY (ECT) - SINGLE SEIZURE N/A 6/27/2016    Performed by Shoaib Boyce Jr., MD at [facility] ECT    ELECTROCONVULSIVE THERAPY (ECT) - SINGLE SEIZURE N/A 6/23/2016    Performed by Shoaib Boyce Jr., MD at [facility] ECT    ELECTROCONVULSIVE THERAPY (ECT) - SINGLE SEIZURE N/A 6/20/2016    Performed by Shoaib Boyce Jr., MD at [facility] ECT    ELECTROCONVULSIVE THERAPY (ECT) - SINGLE SEIZURE N/A 6/16/2016    Performed by Shoaib Boyce Jr., MD at [facility] ECT    ELECTROCONVULSIVE THERAPY (ECT) - SINGLE SEIZURE N/A 6/15/2016    Performed by Shoaib Boyce Jr., MD  at [facility] ECT    ELECTROCONVULSIVE THERAPY (ECT) - SINGLE SEIZURE N/A 2016    Performed by Shoaib Boyce Jr., MD at [facility] ECT    ELECTROCONVULSIVE THERAPY (ECT) - SINGLE SEIZURE N/A 2016    Performed by Shoaib Boyce Jr., MD at [facility] ECT    ELECTROCONVULSIVE THERAPY (ECT) - SINGLE SEIZURE N/A 2016    Performed by Shoaib Boyce Jr., MD at [facility] ECT    ELECTROCONVULSIVE THERAPY, CEREBRAL HEMISPHERE, UNILATERAL, 1 SEIZURE Bilateral 2018    Performed by Shoaib Boyce Jr., MD at [facility] ECT    OVARIAN CYST REMOVAL Bilateral          Vital Signs Range (Last 24H):  Temp:  [36.4 °C (97.5 °F)]   Pulse:  [78]   Resp:  [20]   BP: (129)/(72)   SpO2:  [100 %]       CBC:   No results for input(s): WBC, RBC, HGB, HCT, PLT, MCV, MCH, MCHC in the last 720 hours.    CMP: No results for input(s): NA, K, CL, CO2, BUN, CREATININE, GLU, MG, PHOS, CALCIUM, ALBUMIN, PROT, ALKPHOS, ALT, AST, BILITOT in the last 720 hours.    INR:  No results for input(s): PT, INR, PROTIME, APTT in the last 720 hours.      Diagnostic Studies:      EKD Echo:      Anesthesia Evaluation    I have reviewed the Patient Summary Reports.    I have reviewed the Nursing Notes.   I have reviewed the Medications.     Review of Systems  Anesthesia Hx:  No problems with previous Anesthesia  Denies Family Hx of Anesthesia complications.   Denies Personal Hx of Anesthesia complications.   Social:  Former Smoker, Alcohol Use    Hematology/Oncology:  Hematology Normal   Oncology Normal     EENT/Dental:EENT/Dental Normal   Cardiovascular:  Cardiovascular Normal                     Pulmonary:  Pulmonary Normal  Denies Asthma.  Denies Shortness of breath.    Renal/:  Renal/ Normal     Hepatic/GI:  Hepatic/GI Normal    Neurological:   Denies TIA. Denies CVA.    Endocrine:  Endocrine Normal    Psych:   Psychiatric History depression          Physical Exam  General:  Well nourished    Airway/Jaw/Neck:  Airway Findings: Mouth Opening:  Normal Tongue: Normal  General Airway Assessment: Adult, Good  Mallampati: I  TM Distance: Normal, at least 6 cm  Jaw/Neck Findings:  Neck ROM: Normal ROM      Dental:  Dental Findings: In tact   Chest/Lungs:  Chest/Lungs Clear    Heart/Vascular:  Heart Findings: Normal    Abdomen:  Abdomen Findings: Normal      Mental Status:  Mental Status Findings:  Cooperative, Alert and Oriented         Anesthesia Plan  Type of Anesthesia, risks & benefits discussed:  Anesthesia Type:  general, MAC  Patient's Preference:   Intra-op Monitoring Plan: standard ASA monitors  Intra-op Monitoring Plan Comments:   Post Op Pain Control Plan: multimodal analgesia, IV/PO Opioids PRN and per primary service following discharge from PACU  Post Op Pain Control Plan Comments:   Induction:   IV  Beta Blocker:  Patient is not currently on a Beta-Blocker (No further documentation required).       Informed Consent: Patient understands risks and agrees with Anesthesia plan.  Questions answered. Anesthesia consent signed with patient.  ASA Score: 2     Day of Surgery Review of History & Physical:  There are no significant changes. Significant changes noted: Surgeon notified.  H&P update referred to the provider.         Ready For Surgery From Anesthesia Perspective.

## 2018-11-19 NOTE — TRANSFER OF CARE
"Anesthesia Transfer of Care Note    Patient: Allyssa Wright    Procedure(s) Performed: Procedure(s) (LRB):  ELECTROCONVULSIVE THERAPY (ECT) - SINGLE SEIZURE (N/A)    Patient location: [facility]    Anesthesia Type: general    Transport from OR: Transported from OR on 6-10 L/min O2 by face mask with adequate spontaneous ventilation    Post pain: adequate analgesia    Post assessment: no apparent anesthetic complications    Post vital signs: stable    Level of consciousness: awake and responds to stimulation    Nausea/Vomiting: no nausea/vomiting (Emesis x1 on induction. Saturations 100% with slightly coarse breath sounds)    Complications: none    Transfer of care protocol was followed      Last vitals:   Visit Vitals  /75 (BP Location: Left arm, Patient Position: Lying)   Pulse 98   Temp 37 °C (98.6 °F) (Temporal)   Resp 20   Ht 5' 5" (1.651 m)   Wt 70.3 kg (155 lb)   LMP  (LMP Unknown)   SpO2 99%   Breastfeeding? No   BMI 25.79 kg/m²     "

## 2018-11-19 NOTE — DISCHARGE SUMMARY
Allyssa Wright  : 1980   MRN: [id number]  Date: 2018       Psychiatry - ECT  Discharge Summary    Admit Date: 2018  5:52 AM  Discharge Date: 2018    Attending Physician: Shoaib Boyce Jr., MD   Discharge Provider: Robert Alvarez MD    History of Present Illness: Allyssa Wright is a 38 y.o. female with MDD presented for ECT #61. See H&P dated 2018 for full HPI. See Dr Boyce's pre-ECT eval.    Hospital Course: The pt tolerated the ECT treatment well with minimal complication. Pt was stable post-procedure. See OP note dated 2018 for more details.     Disposition: Home or Self Care    Medications:  Reconciled Home Medications:      Medication List      ASK your doctor about these medications    cloZAPine 50 MG tablet  Commonly known as:  CLOZARIL  Take 50 mg by mouth once daily.     dapsone 7.5 % Glwp  Commonly known as:  ACZONE  Apply topically once daily.     dextroamphetamine-amphetamine 30 mg Tab  Take 30 mg by mouth 2 (two) times daily.     famciclovir 500 MG tablet  Commonly known as:  FAMVIR  Take 1 tablet (500 mg total) by mouth 2 (two) times daily.     hydrOXYzine pamoate 25 MG Cap  Commonly known as:  VISTARIL  Take 2 capsules (50 mg total) by mouth nightly as needed (Take 25-50mg (1-2 caps) at night for anxiety prior to ECT).     mirtazapine 15 MG tablet  Commonly known as:  REMERON  Take 1 tablet (15 mg total) by mouth every evening.     spironolactone 50 MG tablet  Commonly known as:  ALDACTONE  50 mg 2 (two) times daily. 50  mg qAM, 50 mg qHS.     thyroid (pork) 30 mg Tab  Commonly known as:  ARMOUR THYROID  Take 1 tablet (30 mg total) by mouth every morning.     traZODone 100 MG tablet  Commonly known as:  DESYREL  Take 200 mg by mouth every evening.     TRINTELLIX 5 mg Tab  Generic drug:  vortioxetine  Take 2.5 mg by mouth once daily.     UNABLE TO FIND  2 (two) times daily. n-azetyl-cysteine     venlafaxine 100 MG Tab  Commonly known as:  EFFEXOR  Take 3  tablets (300 mg total) by mouth once daily.     ZOVIRAX 5 % Crea  Generic drug:  acyclovir 5%              Pt's Status at Discharge: alert and medically stable    Discharge Diagnoses:  MDD, R, in partial remission    Diet: Resume previous outpt diet  Activity: Ambulate with assistance - pt is a fall risk  Instructions: Please do not eat or drink anything after midnight prior to procedure. Please do not drive on day of ECT.     Med Changes: None      Next ECT: 12/6/18      Robert Alvarez M.D.  11/19/2018

## 2018-11-19 NOTE — DISCHARGE INSTRUCTIONS
E.C.T. Home Care Instructions    ACTIVITY LEVEL:    You may feel sleepy for several hours. It is best to rest until you are more awake and then gradually resume your normal activities in one day. It is recommended, because of the possibility of memory loss and slight confusion post ECT, that you rest in the company of a responsible adult. This confusion and memory loss is expected and should diminish over time. It is also recommended that you do not drive or operate any electrical appliances or machinery during the ECT treatment series. Ask your Doctor, at the time of your treatment, any questions you may have about specific activities.    DIET:    You may wish to start with liquids after your ECT treatment and gradually resume your normal diet. Do not consume alcoholic beverages during the ECT treatment series.    BATHING:    You may shower or bathe as desired.    MEDICATIONS:    Resume all home medications starting with the AM doses not taken prior to ECT treatment. If you experience a headache, it is okay to take your usual pain reliever.    WHEN TO CALL THE DOCTOR:     Headache, memory loss or confusion that does not begin to resolve within 24 hours.    RETURN APPOINTMENT: December 6, 2018 at 6am    Remember you may not eat or drink after midnight, but please take heart or high blood pressure medicines with a sip of water in the morning prior to leaving home.    FOR EMERGENCIES:    If any unusual problems or difficulties occur:    Contact the office [phone] Monday-Friday until 3:00 p.m. After hours, call the hospital  [phone] and ask for the Psychiatry Resident On-call.      Anesthesia: General Anesthesia     You are watched continuously during your procedure by your anesthesia provider.     Youre due to have surgery. During surgery, youll be given medicine called anesthesia or anesthetic. This will keep you comfortable and pain-free. Your anesthesia provider will use general  anesthesia.  What is general anesthesia?  General anesthesia puts you into a state like deep sleep. It goes into the bloodstream (IV anesthetics), into the lungs (gas anesthetics), or both. You feel nothing during the procedure. You will not remember it. During the procedure, the anesthesia provider monitors you continuously. He or she checks your heart rate and rhythm, blood pressure, breathing, and blood oxygen.  · IV anesthetics. IV anesthetics are given through an IV line in your arm. Theyre often given first. This is so you are asleep before a gas anesthetic is started. Some kinds of IV anesthetics relieve pain. Others relax you. Your doctor will decide which kind is best in your case.  · Gas anesthetics. Gas anesthetics are breathed into the lungs. They are often used to keep you asleep. They can be given through a facemask or a tube placed in your larynx or trachea (breathing tube).  ¨ If you have a facemask, your anesthesia provider will most likely place it over your nose and mouth while youre still awake. Youll breathe oxygen through the mask as your IV anesthetic is started. Gas anesthetic may be added through the mask.  ¨ If you have a tube in the larynx or trachea, it will be inserted into your throat after youre asleep.  Anesthesia tools and medicines  You will likely have:  · IV anesthetics. These are put into an IV line into your bloodstream.  · Gas anesthetics. You breathe these anesthetics into your lungs, where they pass into your bloodstream.  · Pulse oximeter. This is a small clip that is attached to the end of your finger. This measures your blood oxygen level.  · Electrocardiography leads (electrodes). These are small sticky pads that are placed on your chest. They record your heart rate and rhythm.  · Blood pressure cuff. This reads your blood pressure.  Risks and possible complications  General anesthesia has some risks. These include:  · Breathing problems  · Nausea and vomiting  · Sore  throat or hoarseness (usually temporary)  · Allergic reaction to the anesthetic  · Irregular heartbeat (rare)  · Cardiac arrest (rare)   Anesthesia safety  · Follow all instructions you are given for how long not to eat or drink before your procedure.  · Be sure your doctor knows what medicines and drugs you take. This includes over-the-counter medicines, herbs, supplements, alcohol or other drugs. You will be asked when those were last taken.  · Have an adult family member or friend drive you home after the procedure.  · For the first 24 hours after your surgery:  ¨ Do not drive or use heavy equipment.  ¨ Do not make important decisions or sign legal documents. If important decisions or signing legal documents is necessary during the first 24 hours after surgery, have a trusted family member or spouse act on your behalf.  ¨ Avoid alcohol.  ¨ Have a responsible adult stay with you. He or she can watch for problems and help keep you safe.  Date Last Reviewed: 12/1/2016  © 1480-5474 [employer]. [address] [place] [address], [place] [address]. All rights reserved. This information is not intended as a substitute for professional medical care. Always follow your healthcare professional's instructions.

## 2018-11-19 NOTE — ANESTHESIA POSTPROCEDURE EVALUATION
"Anesthesia Post Evaluation    Patient: Allyssa Wright    Procedure(s) Performed: Procedure(s) (LRB):  ELECTROCONVULSIVE THERAPY (ECT) - SINGLE SEIZURE (N/A)    Final Anesthesia Type: general  Patient location during evaluation: PACU  Patient participation: Yes- Able to Participate  Level of consciousness: awake and alert  Post-procedure vital signs: reviewed and stable  Pain management: adequate  Airway patency: patent  PONV status at discharge: No PONV  Anesthetic complications: no      Cardiovascular status: blood pressure returned to baseline  Respiratory status: spontaneous ventilation and room air  Hydration status: euvolemic  Follow-up not needed.        Visit Vitals  /75 (BP Location: Left arm, Patient Position: Lying)   Pulse 98   Temp 37 °C (98.6 °F) (Temporal)   Resp 20   Ht 5' 5" (1.651 m)   Wt 70.3 kg (155 lb)   LMP  (LMP Unknown)   SpO2 99%   Breastfeeding? No   BMI 25.79 kg/m²       Pain/Roma Score: Pain Assessment Performed: Yes (11/19/2018  8:10 AM)  Presence of Pain: denies (11/19/2018  8:10 AM)  Roma Score: 10 (11/19/2018  8:10 AM)        "

## 2018-11-19 NOTE — OP NOTE
Allyssa Wright  : 1980   MRN: [id number]  Date: 2018       Psychiatry - ECT  Operative Note             Date of Admission: 2018  5:52 AM    Site: [facility], [address]    Attending: Shoaib Boyce Jr., MD   Residents: Robert lAvarez MD  Pre-op Diagnosis: MDD  ECT Treatment Number: 61  Machine Type: Mecta 5000    Patient Status: medically stable    Vitals (pre-procedure):  Vitals:    18 0617   BP: 129/72   Pulse: 78   Resp: 20   Temp: 97.5 °F (36.4 °C)       Electrode Placement: Bitemporal    Stimulus Number Charge (mC) Level Pulse Width (msec) Frequency  (Hz) Duration of Stimulus (sec) Current (mA) Duration of Seizure (sec)   1 576 3 1 60 6 800 59                                   Complications: Hypertension, Emesis    Maximum Blood Pressure: 185/99    Medications Given:  Caffeine 500 mg PO before  Methohexital (Brevital) 150 mg  IV before  Succinylcholine (Anectine) 140 mg  IV before  Zofran 4 mg IV before  Toradol 30 mg IV before    Treatment Course:  Pt tolerate procedure well. After adequate recovery from general anesthesia, the patient was transported to recovery.    Post-op Diagnosis: Same as above    Recommended for next ECT:  18    Robert Alvarez MD  2018

## 2018-11-19 NOTE — H&P
Allyssa Wright  : 1980   MRN: [id number]  Date: 2018     Psychiatric - ECT  H&P     Chief complaint: MDD, recurrent, in partial remission  Procedure: ECT #61    SUBJECTIVE:   HPI:   Allyssa Wright is a 38 y.o. female with MDD recurrent in partial remission who presents for ECT. The patient has no new physical complaints. She denies an exacerbation of her depression since the last ECT treatment almost 2 weeks ago.  Pt states she is eating and sleeping well.   Please see Dr. Boyce's full pre-ECT evaluation for further details. The patient does not need any prescriptions and has not had any med changes since meeting with Dr. Boyce. The patient has not had anything by mouth since midnight and is ready for ECT.      Psychiatric Review of Systems:  Mood: 610  Anxiety: No problem  Appetite: No problem  Psychomotor: No problem  Cognitive Impairment: mild, tolerable  Insomnia: None, trouble getting up in the morning  Psychosis: None  Diurnal Variation: None  Suicidal Ideation: Absent        Medical Review Of Systems:  Pertinent items are noted in HPI.    Current Medications:   No current facility-administered medications on file prior to encounter.      Current Outpatient Medications on File Prior to Encounter   Medication Sig Dispense Refill    clozapine (CLOZARIL) 50 MG tablet Take 50 mg by mouth once daily.       dapsone 7.5 % GlwP Apply topically once daily.      famciclovir (FAMVIR) 500 MG tablet Take 1 tablet (500 mg total) by mouth 2 (two) times daily. 30 tablet 12    hydrOXYzine pamoate (VISTARIL) 25 MG Cap Take 2 capsules (50 mg total) by mouth nightly as needed (Take 25-50mg (1-2 caps) at night for anxiety prior to ECT). 180 capsule 0    mirtazapine (REMERON) 15 MG tablet Take 1 tablet (15 mg total) by mouth every evening. (Patient taking differently: Take 7.5 mg by mouth every evening. ) 90 tablet 0    spironolactone (ALDACTONE) 50 MG tablet 50 mg 2 (two) times daily. 50  mg qAM, 50 mg qHS.       thyroid (ARMOUR THYROID) 30 mg Tab Take 1 tablet (30 mg total) by mouth every morning. 30 tablet 11    trazodone (DESYREL) 100 MG tablet Take 200 mg by mouth every evening.       UNABLE TO FIND 2 (two) times daily. n-azetyl-cysteine      venlafaxine (EFFEXOR) 100 MG Tab Take 3 tablets (300 mg total) by mouth once daily. (Patient taking differently: Take 225 mg by mouth once daily. )      vortioxetine (TRINTELLIX) 5 mg Tab Take 2.5 mg by mouth once daily.      ZOVIRAX 5 % Crea   5    dextroamphetamine-amphetamine 30 mg Tab Take 30 mg by mouth 2 (two) times daily.             Allergies:   Review of patient's allergies indicates:   Allergen Reactions    Benzodiazepines Other (See Comments)     Contraindicated while taking Clozapine    Ampicillin      Mom says so    Erythromycin     Levaquin [levofloxacin] Other (See Comments)     Depression side effects    Penicillins      Mom says so    Pristiq [desvenlafaxine]      psycotic      Sulfa (sulfonamide antibiotics)      Rash      Azithromycin Anxiety        Past Medical/Surgical History:   Past Medical History:   Diagnosis Date    Anxiety     Depression     History of psychiatric hospitalization     HSV-1 (herpes simplex virus 1) infection      of psychiatric care     Moderate depressed bipolar II disorder 06/13/2016    reports no history of bipolar    Obsessive-compulsive disorder     Psychiatric problem     Schizophrenia 4/3/2018    Self-harming behavior     Therapy      Past Surgical History:   Procedure Laterality Date    ANKLE SURGERY Right     BREAST augmentation      ELECTROCONVULSIVE THERAPY (ECT) - SINGLE SEIZURE N/A 8/31/2018    Performed by [facility] at [facility] ECT    ELECTROCONVULSIVE THERAPY (ECT) - SINGLE SEIZURE N/A 8/6/2018    Performed by [facility] Jr[facility] at [facility] ECT    ELECTROCONVULSIVE THERAPY (ECT) - SINGLE SEIZURE N/A 7/23/2018    Performed by [facility] Jr[facility] at [facility] ECT     ELECTROCONVULSIVE THERAPY (ECT) - SINGLE SEIZURE N/A 7/5/2018    Performed by [facility] at [facility] ECT    ELECTROCONVULSIVE THERAPY (ECT) - SINGLE SEIZURE N/A 6/28/2018    Performed by [facility]ib [facility] Jr[facility] at [facility] ECT    ELECTROCONVULSIVE THERAPY (ECT) - SINGLE SEIZURE N/A 6/13/2018    Performed by Sh[facility]ib [facility] Jr[facility] at [facility] ECT    ELECTROCONVULSIVE THERAPY (ECT) - SINGLE SEIZURE N/A 5/29/2018    Performed by Sh[facility]ib [facility] Jr[facility] at [facility] ECT    ELECTROCONVULSIVE THERAPY (ECT) - SINGLE SEIZURE N/A 5/8/2018    Performed by Shoaib Boyce Jr[facility] at [facility] ECT    ELECTROCONVULSIVE THERAPY (ECT) - SINGLE SEIZURE N/A 4/24/2018    Performed by Shoaib Boyce Jr[facility] at [facility] ECT    ELECTROCONVULSIVE THERAPY (ECT) - SINGLE SEIZURE N/A 4/17/2018    Performed by Shoaib Boyce Jr[facility] at [facility] ECT    ELECTROCONVULSIVE THERAPY (ECT) - SINGLE SEIZURE N/A 4/13/2018    Performed by Shoaib Boyce Jr[facility] at [facility] ECT    ELECTROCONVULSIVE THERAPY (ECT) - SINGLE SEIZURE N/A 4/3/2018    Performed by Shoaib Boyce Jr[facility] at [facility] ECT    ELECTROCONVULSIVE THERAPY (ECT) - SINGLE SEIZURE N/A 3/20/2018    Performed by Shoaib Boyce Jr[facility] at [facility] ECT    ELECTROCONVULSIVE THERAPY (ECT) - SINGLE SEIZURE N/A 3/15/2018    Performed by Shoaib Boyce Jr[facility] at [facility] ECT    ELECTROCONVULSIVE THERAPY (ECT) - SINGLE SEIZURE N/A 3/6/2018    Performed by Shoaib Boyce Jr[facility] at [facility] ECT    ELECTROCONVULSIVE THERAPY (ECT) - SINGLE SEIZURE N/A 3/1/2018    Performed by Shoaib Boyce Jr[facility] at [facility] ECT    ELECTROCONVULSIVE THERAPY (ECT) - SINGLE SEIZURE N/A 2/23/2018    Performed by Shoaib Boyce Jr[facility] at [facility] ECT    ELECTROCONVULSIVE THERAPY (ECT) - SINGLE SEIZURE N/A 2/19/2018    Performed by Shoaib Boyce Jr[facility] at [facility] ECT    ELECTROCONVULSIVE THERAPY (ECT) - SINGLE SEIZURE N/A 2/15/2018    Performed by Shoaib Boyce Jr[facility] at [facility] ECT    ELECTROCONVULSIVE  THERAPY (ECT) - SINGLE SEIZURE N/A 1/22/2018    Performed by [facility]ib Boyce Jr[facility] at [facility] ECT    ELECTROCONVULSIVE THERAPY (ECT) - SINGLE SEIZURE N/A 12/11/2017    Performed by Sh[facility]ib Boyce Jr[facility] at [facility] ECT    ELECTROCONVULSIVE THERAPY (ECT) - SINGLE SEIZURE N/A 10/24/2017    Performed by Sh[facility]ib Boyce Jr[facility] at [facility] ECT    ELECTROCONVULSIVE THERAPY (ECT) - SINGLE SEIZURE N/A 9/20/2017    Performed by Sh[facility]ib Boyce Jr[facility] at [facility] ECT    ELECTROCONVULSIVE THERAPY (ECT) - SINGLE SEIZURE N/A 8/14/2017    Performed by Sh[facility]ib Boyce Jr[facility] at [facility] ECT    ELECTROCONVULSIVE THERAPY (ECT) - SINGLE SEIZURE Bilateral 7/12/2017    Performed by Sh[facility]ib Boyce Jr[facility] at [facility] ECT    ELECTROCONVULSIVE THERAPY (ECT) - SINGLE SEIZURE N/A 6/13/2017    Performed by Shoaib Boyce Jr[facility] at [facility] ECT    ELECTROCONVULSIVE THERAPY (ECT) - SINGLE SEIZURE N/A 5/17/2017    Performed by Sh[facility]ib Boyce Jr[facility] at [facility] ECT    ELECTROCONVULSIVE THERAPY (ECT) - SINGLE SEIZURE N/A 4/20/2017    Performed by Sh[facility]ib Boyce Jr[facility] at [facility] ECT    ELECTROCONVULSIVE THERAPY (ECT) - SINGLE SEIZURE N/A 11/22/2016    Performed by Sh[facility]ib Boyce Jr[facility] at [facility] ECT    ELECTROCONVULSIVE THERAPY (ECT) - SINGLE SEIZURE N/A 10/24/2016    Performed by Sh[facility]ib Boyce Jr[facility] at [facility] ECT    ELECTROCONVULSIVE THERAPY (ECT) - SINGLE SEIZURE N/A 9/22/2016    Performed by Sh[facility]ib Boyce Jr[facility] at [facility] ECT    ELECTROCONVULSIVE THERAPY (ECT) - SINGLE SEIZURE N/A 9/1/2016    Performed by Sh[facility]ib Boyce Jr[facility] at [facility] ECT    ELECTROCONVULSIVE THERAPY (ECT) - SINGLE SEIZURE N/A 8/15/2016    Performed by Shoaib Boyce Jr[facility] at [facility] ECT    ELECTROCONVULSIVE THERAPY (ECT) - SINGLE SEIZURE N/A 8/1/2016    Performed by Shoaib Boyce Jr[facility] at [facility] ECT    ELECTROCONVULSIVE THERAPY (ECT) - SINGLE SEIZURE N/A 7/22/2016    Performed by Sh[facility]ib Boyce Jr[facility] at [facility] ECT    ELECTROCONVULSIVE  THERAPY (ECT) - SINGLE SEIZURE N/A 7/14/2016    Performed by Shoaib Boyce Jr., MD at [facility] ECT    ELECTROCONVULSIVE THERAPY (ECT) - SINGLE SEIZURE N/A 7/8/2016    Performed by Shoaib Boyce Jr., MD at [facility] ECT    ELECTROCONVULSIVE THERAPY (ECT) - SINGLE SEIZURE N/A 7/5/2016    Performed by Shoaib Boyce Jr., MD at [facility] ECT    ELECTROCONVULSIVE THERAPY (ECT) - SINGLE SEIZURE N/A 6/27/2016    Performed by Shoaib Boyce Jr., MD at [facility] ECT    ELECTROCONVULSIVE THERAPY (ECT) - SINGLE SEIZURE N/A 6/23/2016    Performed by Shoaib Boyce Jr., MD at [facility] ECT    ELECTROCONVULSIVE THERAPY (ECT) - SINGLE SEIZURE N/A 6/20/2016    Performed by Shoaib Boyce Jr., MD at [facility] ECT    ELECTROCONVULSIVE THERAPY (ECT) - SINGLE SEIZURE N/A 6/16/2016    Performed by Shoaib Boyce Jr., MD at [facility] ECT    ELECTROCONVULSIVE THERAPY (ECT) - SINGLE SEIZURE N/A 6/15/2016    Performed by Shoaib Boyce Jr., MD at [facility] ECT    ELECTROCONVULSIVE THERAPY (ECT) - SINGLE SEIZURE N/A 6/14/2016    Performed by Shoaib Boyce Jr., MD at [facility] ECT    ELECTROCONVULSIVE THERAPY (ECT) - SINGLE SEIZURE N/A 6/13/2016    Performed by Shoaib Boyce Jr[facility] MD at [facility] ECT    ELECTROCONVULSIVE THERAPY (ECT) - SINGLE SEIZURE N/A 1/4/2016    Performed by Shoaib Boyce Jr., MD at [facility] ECT    ELECTROCONVULSIVE THERAPY, CEREBRAL HEMISPHERE, UNILATERAL, 1 SEIZURE Bilateral 6/25/2018    Performed by Sh[facility]ib Boyce Jr., MD at [facility] ECT    OVARIAN CYST REMOVAL Bilateral           OBJECTIVE:   Vitals (pre-procedure):  Vitals:    11/19/18 0617   BP: 129/72   Pulse: 78   Resp: 20   Temp: 97.5 °F (36.4 °C)        Labs/Imaging/Studies:   Recent Results (from the past 48 hour(s))   POCT urine pregnancy    Collection Time: 11/19/18  6:23 AM   Result Value Ref Range    POC Preg Test, Ur Negative Negative     Acceptable Yes       No results found for: PHENYTOIN, PHENOBARB, VALPROATE, CBMZ      Physical Exam:   Gen:  AAOx4, NAD  HEENT: MMM, PERRL, EOMI, O/P clear  CV: RRR, S1/S2 nml, no M/R/G  Chest: CTAB, no R/R/W, unlabored breathing  Abd: S/NT/ND, +BS, no HSM  Ext: No C/C/E, pulse 2+ throughout  Neuro: CN II-XII grossly intact, no focal deficits     Mental Status Exam:   Appearance: age appropriate, well nourished, dressed in hospital gown  Behavior: friendly and cooperative, eye contact appropriate  Speech: conversational tone, rate, pitch, volume  Mood: 6/10  Affect: full, reactive  Thought Process: linear and organized  Thought Content: no SI, no HI or AVH  Cognition: grossly intact  Insight: good  Judgment: appropriate for setting       ASSESSMENT/PLAN:   Allyssa Wright is a 38 y.o. female with MDD, R, in partial remission who presents for ECT.      Recommendations:   Proceed with ECT #61.      Robert Alvarez M.D.  11/19/2018

## 2018-12-06 ENCOUNTER — ANESTHESIA (OUTPATIENT)
Dept: ELECTROPHYSIOLOGY | Facility: HOSPITAL | Age: 38
End: 2018-12-06
Payer: COMMERCIAL

## 2018-12-06 ENCOUNTER — HOSPITAL ENCOUNTER (OUTPATIENT)
Facility: HOSPITAL | Age: 38
Discharge: HOME | End: 2018-12-06
Attending: PSYCHIATRY & NEUROLOGY | Admitting: PSYCHIATRY & NEUROLOGY
Payer: COMMERCIAL

## 2018-12-06 ENCOUNTER — ANESTHESIA EVENT (OUTPATIENT)
Dept: ELECTROPHYSIOLOGY | Facility: HOSPITAL | Age: 38
End: 2018-12-06
Payer: COMMERCIAL

## 2018-12-06 VITALS
OXYGEN SATURATION: 97 % | BODY MASS INDEX: 24.16 KG/M2 | SYSTOLIC BLOOD PRESSURE: 118 MMHG | HEART RATE: 89 BPM | WEIGHT: 145 LBS | DIASTOLIC BLOOD PRESSURE: 72 MMHG | HEIGHT: 65 IN | TEMPERATURE: 99 F | RESPIRATION RATE: 16 BRPM

## 2018-12-06 DIAGNOSIS — F32.9 MAJOR DEPRESSIVE DISORDER: ICD-10-CM

## 2018-12-06 DIAGNOSIS — F33.9 RECURRENT MAJOR DEPRESSIVE DISORDER, REMISSION STATUS UNSPECIFIED: Primary | ICD-10-CM

## 2018-12-06 DIAGNOSIS — F33.41 MDD (MAJOR DEPRESSIVE DISORDER), RECURRENT, IN PARTIAL REMISSION: ICD-10-CM

## 2018-12-06 LAB
B-HCG UR QL: NEGATIVE
CTP QC/QA: YES

## 2018-12-06 PROCEDURE — 90870 ELECTROCONVULSIVE THERAPY: CPT | Performed by: ANESTHESIOLOGY

## 2018-12-06 PROCEDURE — D9220A PRA ANESTHESIA: Mod: ,,, | Performed by: ANESTHESIOLOGY

## 2018-12-06 PROCEDURE — 25000003 PHARM REV CODE 250: Performed by: PSYCHIATRY & NEUROLOGY

## 2018-12-06 PROCEDURE — 90870 ELECTROCONVULSIVE THERAPY: CPT | Mod: ,,, | Performed by: PSYCHIATRY & NEUROLOGY

## 2018-12-06 PROCEDURE — 37000009 HC ANESTHESIA EA ADD 15 MINS: Performed by: PSYCHIATRY & NEUROLOGY

## 2018-12-06 PROCEDURE — 63600175 PHARM REV CODE 636 W HCPCS: Performed by: SURGERY

## 2018-12-06 PROCEDURE — 81025 URINE PREGNANCY TEST: CPT | Performed by: PSYCHIATRY & NEUROLOGY

## 2018-12-06 PROCEDURE — 27100019 HC AMBU BAG ADULT/PED: Performed by: SURGERY

## 2018-12-06 PROCEDURE — 25000003 PHARM REV CODE 250: Performed by: SURGERY

## 2018-12-06 PROCEDURE — 71000044 HC DOSC ROUTINE RECOVERY FIRST HOUR: Performed by: PSYCHIATRY & NEUROLOGY

## 2018-12-06 PROCEDURE — 37000008 HC ANESTHESIA 1ST 15 MINUTES: Performed by: PSYCHIATRY & NEUROLOGY

## 2018-12-06 RX ORDER — ONDANSETRON 2 MG/ML
INJECTION INTRAMUSCULAR; INTRAVENOUS
Status: COMPLETED
Start: 2018-12-06 | End: 2018-12-06

## 2018-12-06 RX ORDER — SODIUM CHLORIDE 9 MG/ML
INJECTION, SOLUTION INTRAVENOUS CONTINUOUS
Status: DISCONTINUED | OUTPATIENT
Start: 2018-12-07 | End: 2018-12-06 | Stop reason: HOSPADM

## 2018-12-06 RX ORDER — LIDOCAINE HYDROCHLORIDE 10 MG/ML
1 INJECTION, SOLUTION EPIDURAL; INFILTRATION; INTRACAUDAL; PERINEURAL ONCE
Status: COMPLETED | OUTPATIENT
Start: 2018-12-07 | End: 2018-12-06

## 2018-12-06 RX ORDER — SODIUM CHLORIDE 0.9 % (FLUSH) 0.9 %
3 SYRINGE (ML) INJECTION
Status: DISCONTINUED | OUTPATIENT
Start: 2018-12-06 | End: 2018-12-06 | Stop reason: HOSPADM

## 2018-12-06 RX ORDER — KETOROLAC TROMETHAMINE 30 MG/ML
INJECTION, SOLUTION INTRAMUSCULAR; INTRAVENOUS
Status: COMPLETED
Start: 2018-12-06 | End: 2018-12-06

## 2018-12-06 RX ORDER — ONDANSETRON 2 MG/ML
INJECTION INTRAMUSCULAR; INTRAVENOUS
Status: DISCONTINUED | OUTPATIENT
Start: 2018-12-06 | End: 2018-12-06

## 2018-12-06 RX ORDER — SODIUM CHLORIDE 9 MG/ML
500 INJECTION, SOLUTION INTRAVENOUS ONCE
Status: DISCONTINUED | OUTPATIENT
Start: 2018-12-06 | End: 2018-12-06 | Stop reason: HOSPADM

## 2018-12-06 RX ORDER — KETOROLAC TROMETHAMINE 30 MG/ML
INJECTION, SOLUTION INTRAMUSCULAR; INTRAVENOUS
Status: DISCONTINUED | OUTPATIENT
Start: 2018-12-06 | End: 2018-12-06

## 2018-12-06 RX ORDER — SUCCINYLCHOLINE CHLORIDE 20 MG/ML
INJECTION INTRAMUSCULAR; INTRAVENOUS
Status: DISCONTINUED | OUTPATIENT
Start: 2018-12-06 | End: 2018-12-06

## 2018-12-06 RX ORDER — SUCCINYLCHOLINE CHLORIDE 20 MG/ML
INJECTION INTRAMUSCULAR; INTRAVENOUS
Status: COMPLETED
Start: 2018-12-06 | End: 2018-12-06

## 2018-12-06 RX ADMIN — METHOHEXITAL SODIUM 150 MG: 500 INJECTION, POWDER, LYOPHILIZED, FOR SOLUTION INTRAMUSCULAR; INTRAVENOUS; RECTAL at 08:12

## 2018-12-06 RX ADMIN — ONDANSETRON 4 MG: 2 INJECTION, SOLUTION INTRAMUSCULAR; INTRAVENOUS at 08:12

## 2018-12-06 RX ADMIN — Medication 500 MG: at 07:12

## 2018-12-06 RX ADMIN — SUCCINYLCHOLINE CHLORIDE 140 MG: 20 INJECTION, SOLUTION INTRAMUSCULAR; INTRAVENOUS at 08:12

## 2018-12-06 RX ADMIN — KETOROLAC TROMETHAMINE 30 MG: 30 INJECTION, SOLUTION INTRAMUSCULAR at 08:12

## 2018-12-06 RX ADMIN — SODIUM CHLORIDE: 0.9 INJECTION, SOLUTION INTRAVENOUS at 08:12

## 2018-12-06 RX ADMIN — LIDOCAINE HYDROCHLORIDE 1 MG: 10 INJECTION, SOLUTION EPIDURAL; INFILTRATION; INTRACAUDAL; PERINEURAL at 06:12

## 2018-12-06 RX ADMIN — SODIUM CHLORIDE 500 ML: 0.9 INJECTION, SOLUTION INTRAVENOUS at 06:12

## 2018-12-06 NOTE — PLAN OF CARE
Problem: Patient Care Overview  Goal: Plan of Care Review  Outcome: Outcome(s) achieved Date Met: 12/06/18  Awake and alert. VSS. Denies pain or nausea. Tolerating liquids well. Voiding well. DC instructions given to patient and family and they verbalize understanding.

## 2018-12-06 NOTE — DISCHARGE SUMMARY
Allyssa Wright  : 1980   MRN: [id number]  Date: 2018       Psychiatry - ECT  Discharge Summary    Admit Date: 2018  5:51 AM  Discharge Date: 2018    Attending Physician: Shoaib Boyce Jr., MD   Discharge Provider: Andreas Paulino MD, Cayetano Bronson MD    History of Present Illness: Allyssa Wright is a 38 y.o. female with MDD presented for ECT #62. See H&P dated 2018 for full HPI. See Dr Boyce's pre-ECT eval.    Hospital Course: The pt tolerated the ECT treatment well with minimal complication. Pt was stable post-procedure. See OP note dated 2018 for more details.     Disposition: Home or Self Care    Medications:  Reconciled Home Medications:      Medication List      ASK your doctor about these medications    cloZAPine 50 MG tablet  Commonly known as:  CLOZARIL  Take 50 mg by mouth once daily.     dapsone 7.5 % Glwp  Commonly known as:  ACZONE  Apply topically once daily.     dextroamphetamine-amphetamine 30 mg Tab  Take 30 mg by mouth 2 (two) times daily.     famciclovir 500 MG tablet  Commonly known as:  FAMVIR  Take 1 tablet (500 mg total) by mouth 2 (two) times daily.     hydrOXYzine pamoate 25 MG Cap  Commonly known as:  VISTARIL  Take 2 capsules (50 mg total) by mouth nightly as needed (Take 25-50mg (1-2 caps) at night for anxiety prior to ECT).     mirtazapine 15 MG tablet  Commonly known as:  REMERON  Take 1 tablet (15 mg total) by mouth every evening.     spironolactone 50 MG tablet  Commonly known as:  ALDACTONE  50 mg 2 (two) times daily. 50  mg qAM, 50 mg qHS.     thyroid (pork) 30 mg Tab  Commonly known as:  ARMOUR THYROID  Take 1 tablet (30 mg total) by mouth every morning.     traZODone 100 MG tablet  Commonly known as:  DESYREL  Take 200 mg by mouth every evening.     TRINTELLIX 5 mg Tab  Generic drug:  vortioxetine  Take 2.5 mg by mouth once daily.     UNABLE TO FIND  2 (two) times daily. n-azetyl-cysteine     venlafaxine 100 MG Tab  Commonly known as:   EFFEXOR  Take 3 tablets (300 mg total) by mouth once daily.     ZOVIRAX 5 % Crea  Generic drug:  acyclovir 5%              Pt's Status at Discharge: alert and medically stable    Discharge Diagnoses:  MDD, Recurrent, in partial remission    Diet: Resume previous outpt diet  Activity: Ambulate with assistance - pt is a fall risk  Instructions: Please do not eat or drink anything after midnight prior to procedure. Please do not drive on day of ECT.     Med Changes: None  Patient's medications managed by outpatient psychiatrist, Dr. Jc.  Patient reports next appointment on 12/10/18      Next ECT: 12/28/18      Andreas Paulino M.D.  12/6/2018

## 2018-12-06 NOTE — H&P
"Allyssa Wright  : 1980   MRN: [id number]  Date: 2018     Psychiatric - ECT  H&P     Chief complaint: MDD, recurrent, in partial remission  Procedure: ECT #62    SUBJECTIVE:   HPI:   Allyssa Wright is a 38 y.o. female with MDD recurrent in partial remission who presents for ECT. On my interview this morning, she reports that her mood has been doing good since last treatment. She denies an exacerbation of her depression since the last ECT treatment over 2 weeks ago. Reports some persistent anxiety and OCD symptoms which remain unchanged.  Pt states she is eating and sleeping well.   She reports compliance with her home medications: Clozaril, Trintellix, Effexor, Remeron, and Adderall which are prescribed by her outpatient psychiatrist Dr. Jc. She reports next follow up appointment with Dr. Jc on Monday 12/10/18. Denies SI.    Please see Dr. Boyce's full pre-ECT evaluation for further details.  The patient has not had anything by mouth since midnight and is ready for ECT.      Psychiatric Review of Systems:  Mood: "good"  Anxiety: Present, unchanged. No acute concerns  Appetite: No problem  Psychomotor: No problem  Cognitive Impairment: mild, tolerable  Insomnia: None, trouble getting up in the morning  Psychosis: None  Diurnal Variation: None  Suicidal Ideation: Absent    Medical Review Of Systems:  Pertinent items are noted in HPI.    Current Medications:   No current facility-administered medications on file prior to encounter.      Current Outpatient Medications on File Prior to Encounter   Medication Sig Dispense Refill    clozapine (CLOZARIL) 50 MG tablet Take 50 mg by mouth once daily.       dapsone 7.5 % GlwP Apply topically once daily.      famciclovir (FAMVIR) 500 MG tablet Take 1 tablet (500 mg total) by mouth 2 (two) times daily. 30 tablet 12    hydrOXYzine pamoate (VISTARIL) 25 MG Cap Take 2 capsules (50 mg total) by mouth nightly as needed (Take 25-50mg (1-2 caps) at night for " anxiety prior to ECT). 180 capsule 0    mirtazapine (REMERON) 15 MG tablet Take 1 tablet (15 mg total) by mouth every evening. (Patient taking differently: Take 7.5 mg by mouth every evening. ) 90 tablet 0    spironolactone (ALDACTONE) 50 MG tablet 50 mg 2 (two) times daily. 50  mg qAM, 50 mg qHS.      thyroid (ARMOUR THYROID) 30 mg Tab Take 1 tablet (30 mg total) by mouth every morning. 30 tablet 11    trazodone (DESYREL) 100 MG tablet Take 200 mg by mouth every evening.       UNABLE TO FIND 2 (two) times daily. n-azetyl-cysteine      venlafaxine (EFFEXOR) 100 MG Tab Take 3 tablets (300 mg total) by mouth once daily. (Patient taking differently: Take 225 mg by mouth once daily. )      vortioxetine (TRINTELLIX) 5 mg Tab Take 2.5 mg by mouth once daily.      dextroamphetamine-amphetamine 30 mg Tab Take 30 mg by mouth 2 (two) times daily.       ZOVIRAX 5 % Crea   5          Allergies:   Review of patient's allergies indicates:   Allergen Reactions    Benzodiazepines Other (See Comments)     Contraindicated while taking Clozapine    Ampicillin      Mom says so    Erythromycin     Levaquin [levofloxacin] Other (See Comments)     Depression side effects    Penicillins      Mom says so    Pristiq [desvenlafaxine]      psycotic      Sulfa (sulfonamide antibiotics)      Rash      Azithromycin Anxiety        Past Medical/Surgical History:   Past Medical History:   Diagnosis Date    Anxiety     Depression     History of psychiatric hospitalization     HSV-1 (herpes simplex virus 1) infection     Hx of psychiatric care     Moderate depressed bipolar II disorder 06/13/2016    reports no history of bipolar    Obsessive-compulsive disorder     Psychiatric problem     Schizophrenia 4/3/2018    Self-harming behavior     Therapy      Past Surgical History:   Procedure Laterality Date    ANKLE SURGERY Right     BREAST augmentation      ELECTROCONVULSIVE THERAPY (ECT) - SINGLE SEIZURE N/A 8/31/2018     Performed by [facility]ib [facility] Jr., [facility] at [facility]    ELECTROCONVULSIVE THERAPY (ECT) - SINGLE SEIZURE N/A 8/6/2018    Performed by Sh[facility]ib [facility] Jr[facility] MD at [facility]    ELECTROCONVULSIVE THERAPY (ECT) - SINGLE SEIZURE N/A 7/23/2018    Performed by Sh[facility]ib [facility] Jr[facility] MD at [facility]    ELECTROCONVULSIVE THERAPY (ECT) - SINGLE SEIZURE N/A 7/5/2018    Performed by Sh[facility]ib [facility] Jr[facility] MD at [facility]    ELECTROCONVULSIVE THERAPY (ECT) - SINGLE SEIZURE N/A 6/28/2018    Performed by Sh[facility]ib [facility]ce Jr[facility] MD at [facility]    ELECTROCONVULSIVE THERAPY (ECT) - SINGLE SEIZURE N/A 6/13/2018    Performed by Shoaib [facility]ce Jr[facility] MD at [facility]    ELECTROCONVULSIVE THERAPY (ECT) - SINGLE SEIZURE N/A 5/29/2018    Performed by Shoaib Boyce Jr[facility] MD at [facility]    ELECTROCONVULSIVE THERAPY (ECT) - SINGLE SEIZURE N/A 5/8/2018    Performed by Shoaib Boyce Jr., MD at [facility]    ELECTROCONVULSIVE THERAPY (ECT) - SINGLE SEIZURE N/A 4/24/2018    Performed by Shoaib Boyce Jr., MD at [facility]    ELECTROCONVULSIVE THERAPY (ECT) - SINGLE SEIZURE N/A 4/17/2018    Performed by Shoaib Boyce Jr., MD at [facility]    ELECTROCONVULSIVE THERAPY (ECT) - SINGLE SEIZURE N/A 4/13/2018    Performed by Shoaib Boyce Jr., MD at [facility]    ELECTROCONVULSIVE THERAPY (ECT) - SINGLE SEIZURE N/A 4/3/2018    Performed by Shoaib Boyce Jr., MD at [facility]    ELECTROCONVULSIVE THERAPY (ECT) - SINGLE SEIZURE N/A 3/20/2018    Performed by Shoaib Boyce Jr., MD at [facility]    ELECTROCONVULSIVE THERAPY (ECT) - SINGLE SEIZURE N/A 3/15/2018    Performed by Shoaib Boyce Jr., MD at [facility]    ELECTROCONVULSIVE THERAPY (ECT) - SINGLE SEIZURE N/A 3/6/2018    Performed by Shoaib Boyce Jr., MD at [facility]    ELECTROCONVULSIVE THERAPY (ECT) - SINGLE SEIZURE N/A 3/1/2018    Performed by Shoaib Boyce Jr., MD at [facility]    ELECTROCONVULSIVE THERAPY (ECT) - SINGLE SEIZURE N/A 2/23/2018    Performed by Shoaib  ASHKAN Boyce Jr., MD at [facility] ECT    ELECTROCONVULSIVE THERAPY (ECT) - SINGLE SEIZURE N/A 2/19/2018    Performed by Shoaib Boyce Jr., MD at [facility]    ELECTROCONVULSIVE THERAPY (ECT) - SINGLE SEIZURE N/A 2/15/2018    Performed by Shoaib Boyce Jr., MD at [facility]    ELECTROCONVULSIVE THERAPY (ECT) - SINGLE SEIZURE N/A 1/22/2018    Performed by Shoaib Boyce Jr., MD at [facility] E[facility]    ELECTROCONVULSIVE THERAPY (ECT) - SINGLE SEIZURE N/A 12/11/2017    Performed by Shoaib Boyce Jr., MD at [facility]    ELECTROCONVULSIVE THERAPY (ECT) - SINGLE SEIZURE N/A 10/24/2017    Performed by Shoaib Boyce Jr., MD at [facility]    ELECTROCONVULSIVE THERAPY (ECT) - SINGLE SEIZURE N/A 9/20/2017    Performed by Shoaib Boyce Jr., MD at [facility] E[facility]    ELECTROCONVULSIVE THERAPY (ECT) - SINGLE SEIZURE N/A 8/14/2017    Performed by Shoaib Boyce Jr., MD at [facility] ECT    ELECTROCONVULSIVE THERAPY (ECT) - SINGLE SEIZURE Bilateral 7/12/2017    Performed by Shoaib Boyce Jr., MD at [facility] E[facility]    ELECTROCONVULSIVE THERAPY (ECT) - SINGLE SEIZURE N/A 6/13/2017    Performed by Shoaib Boyce Jr., MD at [facility]    ELECTROCONVULSIVE THERAPY (ECT) - SINGLE SEIZURE N/A 5/17/2017    Performed by Shoaib Boyce Jr., MD at [facility]    ELECTROCONVULSIVE THERAPY (ECT) - SINGLE SEIZURE N/A 4/20/2017    Performed by Shoaib Boyce Jr., MD at [facility] E[facility]    ELECTROCONVULSIVE THERAPY (ECT) - SINGLE SEIZURE N/A 11/22/2016    Performed by Shoaib Boyce Jr., MD at [facility] E[facility]    ELECTROCONVULSIVE THERAPY (ECT) - SINGLE SEIZURE N/A 10/24/2016    Performed by Shoaib Boyce Jr., MD at [facility] E[facility]    ELECTROCONVULSIVE THERAPY (ECT) - SINGLE SEIZURE N/A 9/22/2016    Performed by Shoaib Boyce Jr., MD at [facility] E[facility]    ELECTROCONVULSIVE THERAPY (ECT) - SINGLE SEIZURE N/A 9/1/2016    Performed by Shoaib Boyce Jr., MD at [facility] ECT    ELECTROCONVULSIVE THERAPY (ECT) - SINGLE SEIZURE N/A 8/15/2016    Performed by Shoaib LUTHER  Carli Coker MD at [facility] ECT    ELECTROCONVULSIVE THERAPY (ECT) - SINGLE SEIZURE N/A 8/1/2016    Performed by Shoaib Boyce Jr., MD at [facility] ECT    ELECTROCONVULSIVE THERAPY (ECT) - SINGLE SEIZURE N/A 7/22/2016    Performed by Shoaib Boyce Jr., MD at [facility] ECT    ELECTROCONVULSIVE THERAPY (ECT) - SINGLE SEIZURE N/A 7/14/2016    Performed by Shoaib Boyce Jr., MD at [facility] ECT    ELECTROCONVULSIVE THERAPY (ECT) - SINGLE SEIZURE N/A 7/8/2016    Performed by Shoaib Boyce Jr., MD at [facility] ECT    ELECTROCONVULSIVE THERAPY (ECT) - SINGLE SEIZURE N/A 7/5/2016    Performed by Shoaib Boyce Jr., MD at [facility] ECT    ELECTROCONVULSIVE THERAPY (ECT) - SINGLE SEIZURE N/A 6/27/2016    Performed by Shoaib Boyce Jr., MD at [facility] ECT    ELECTROCONVULSIVE THERAPY (ECT) - SINGLE SEIZURE N/A 6/23/2016    Performed by Shoaib Boyce Jr., MD at [facility] ECT    ELECTROCONVULSIVE THERAPY (ECT) - SINGLE SEIZURE N/A 6/20/2016    Performed by Shoaib Boyce Jr., MD at [facility] ECT    ELECTROCONVULSIVE THERAPY (ECT) - SINGLE SEIZURE N/A 6/16/2016    Performed by Shoaib Boyce Jr., MD at [facility] ECT    ELECTROCONVULSIVE THERAPY (ECT) - SINGLE SEIZURE N/A 6/15/2016    Performed by Shoaib Boyce Jr., MD at [facility] ECT    ELECTROCONVULSIVE THERAPY (ECT) - SINGLE SEIZURE N/A 6/14/2016    Performed by Shoaib Boyce Jr., MD at [facility] ECT    ELECTROCONVULSIVE THERAPY (ECT) - SINGLE SEIZURE N/A 6/13/2016    Performed by Shoaib Boyce Jr., MD at [facility] ECT    ELECTROCONVULSIVE THERAPY (ECT) - SINGLE SEIZURE N/A 1/4/2016    Performed by Shoaib Boyce Jr., MD at [facility] ECT    ELECTROCONVULSIVE THERAPY, CEREBRAL HEMISPHERE, UNILATERAL, 1 SEIZURE Bilateral 6/25/2018    Performed by Shoaib Boyce Jr., MD at [facility] ECT    OVARIAN CYST REMOVAL Bilateral           OBJECTIVE:   Vitals (pre-procedure):  Vitals:    12/06/18 0626   BP: 103/71   Pulse: 74   Resp: 18   Temp: 97.8 °F (36.6 °C)        Labs/Imaging/Studies:  "  Recent Results (from the past 48 hour(s))   POCT urine pregnancy    Collection Time: 12/06/18  6:39 AM   Result Value Ref Range    POC Preg Test, Ur Negative Negative     Acceptable Yes       No results found for: PHENYTOIN, PHENOBARB, VALPROATE, CBMZ      Physical Exam:   Gen: AAOx4, NAD  HEENT: MMM, PERRL, EOMI, O/P clear  CV: RRR, S1/S2 nml  Chest: CTAB, no R/R/W, unlabored breathing  Abd: S/NT/ND, +BS, no HSM  Ext: No C/C/E, pulse 2+ throughout  Neuro: CN II-XII grossly intact, no focal deficits     Mental Status Exam:   Appearance: age appropriate, well nourished, dressed in hospital gown  Behavior: friendly and cooperative, eye contact appropriate  Speech: conversational tone, rate, pitch, volume  Mood: "good", pleasant  Affect: full, reactive  Thought Process: linear and organized  Thought Content: no SI, no HI or AVH  Cognition: grossly intact  Insight: good  Judgment: appropriate for setting       ASSESSMENT/PLAN:   Allyssa Wright is a 38 y.o. female with MDD, R, in partial remission who presents for ECT.    Recommendations:   Proceed with ECT #62      Andreas Paulino M.D.  [facility] PGY-4  12/6/2018  "

## 2018-12-06 NOTE — OP NOTE
Allyssa Wright  : 1980   MRN: [id number]  Date: 2018       Psychiatry - ECT  Operative Note             Date of Admission: 2018  5:51 AM    Site: [facility], [address]    Attending: Shoaib Boyce Jr., MD   Residents: Andreas Paulino MD, Cayetano Bronson MD  Pre-op Diagnosis: MDD  ECT Treatment Number: 62  Machine Type: [employer] [id number]    Patient Status: medically stable    Vitals (pre-procedure):  Vitals:    18 0626   BP: 103/71   Pulse: 74   Resp: 18   Temp: 97.8 °F (36.6 °C)       Electrode Placement: Bitemporal    Stimulus Number Charge (mC) Level Pulse Width (msec) Frequency  (Hz) Duration of Stimulus (sec) Current (mA) Duration of Seizure (sec)   1 576 3 1 60 6 800 92                                   Complications: Hypertension    Maximum Blood Pressure: 193/117    Medications Given:  Caffeine 500 mg PO before  Methohexital (Brevital) 150 mg  IV before  Succinylcholine (Anectine) 140 mg  IV before  Zofran 4 mg IV before  Toradol 30 mg IV before    Treatment Course:  Pt tolerate procedure well. After adequate recovery from general anesthesia, the patient was transported to recovery.    Post-op Diagnosis: Same as above    Recommended for next ECT:  18    Andreas Paulino MD  2018

## 2018-12-06 NOTE — ANESTHESIA PREPROCEDURE EVALUATION
12/06/2018    Pre-operative evaluation for ELECTROCONVULSIVE THERAPY (ECT) - SINGLE SEIZURE (N/A)    Allyssa Wright is a 38 y.o. female with hypothyroidism, HSV-1, and poorly- controlled MDD (currently on maintenance ECT).     Patient now presents for the above procedure(s).     ECT #: 63     Previous Meds:  - Methohexital 150mg  - Succinylcholine 140mg  - Ondansetron 4mg  - Ketorolac 30mg       Previous airway:   None documented.        Past Surgical History:   Procedure Laterality Date    ANKLE SURGERY Right     BREAST augmentation      ELECTROCONVULSIVE THERAPY (ECT) - SINGLE SEIZURE N/A 8/31/2018    Performed by Shoaib Boyce Jr., MD at [facility]    ELECTROCONVULSIVE THERAPY (ECT) - SINGLE SEIZURE N/A 8/6/2018    Performed by Shoaib Boyce Jr., MD at [facility]    ELECTROCONVULSIVE THERAPY (ECT) - SINGLE SEIZURE N/A 7/23/2018    Performed by Shoaib Boyce Jr., MD at [facility]    ELECTROCONVULSIVE THERAPY (ECT) - SINGLE SEIZURE N/A 7/5/2018    Performed by Shoaib Boyce Jr., MD at [facility]    ELECTROCONVULSIVE THERAPY (ECT) - SINGLE SEIZURE N/A 6/28/2018    Performed by Shoaib Boyce Jr., MD at [facility]    ELECTROCONVULSIVE THERAPY (ECT) - SINGLE SEIZURE N/A 6/13/2018    Performed by Shoaib Boyce Jr., MD at [facility]    ELECTROCONVULSIVE THERAPY (ECT) - SINGLE SEIZURE N/A 5/29/2018    Performed by Shoaib Boyce Jr., MD at [facility]    ELECTROCONVULSIVE THERAPY (ECT) - SINGLE SEIZURE N/A 5/8/2018    Performed by Shoaib Boyce Jr., MD at [facility]    ELECTROCONVULSIVE THERAPY (ECT) - SINGLE SEIZURE N/A 4/24/2018    Performed by Shoaib Boyce Jr., MD at [facility]    ELECTROCONVULSIVE THERAPY (ECT) - SINGLE SEIZURE N/A 4/17/2018    Performed by Shoaib Boyce Jr., MD at [facility]    ELECTROCONVULSIVE THERAPY (ECT) - SINGLE SEIZURE N/A 4/13/2018    Performed by Shoaib  ASHKAN Boyce Jr., MD at [facility]    ELECTROCONVULSIVE THERAPY (ECT) - SINGLE SEIZURE N/A 4/3/2018    Performed by Shoaib Boyce Jr., MD at [facility]    ELECTROCONVULSIVE THERAPY (ECT) - SINGLE SEIZURE N/A 3/20/2018    Performed by Shoaib Boyce Jr., MD at [facility]    ELECTROCONVULSIVE THERAPY (ECT) - SINGLE SEIZURE N/A 3/15/2018    Performed by Shoaib Boyce Jr., MD at [facility]    ELECTROCONVULSIVE THERAPY (ECT) - SINGLE SEIZURE N/A 3/6/2018    Performed by Shoaib Boyce Jr., MD at [facility]    ELECTROCONVULSIVE THERAPY (ECT) - SINGLE SEIZURE N/A 3/1/2018    Performed by Shoaib Boyce Jr., MD at [facility]    ELECTROCONVULSIVE THERAPY (ECT) - SINGLE SEIZURE N/A 2/23/2018    Performed by Shoaib Boyce Jr., MD at [facility]    ELECTROCONVULSIVE THERAPY (ECT) - SINGLE SEIZURE N/A 2/19/2018    Performed by Shoaib Boyce Jr., MD at [facility]    ELECTROCONVULSIVE THERAPY (ECT) - SINGLE SEIZURE N/A 2/15/2018    Performed by Shoaib Boyce Jr., MD at [facility]    ELECTROCONVULSIVE THERAPY (ECT) - SINGLE SEIZURE N/A 1/22/2018    Performed by Shoaib Boyce Jr., MD at [facility]    ELECTROCONVULSIVE THERAPY (ECT) - SINGLE SEIZURE N/A 12/11/2017    Performed by hSoaib Boyce Jr., MD at [facility]    ELECTROCONVULSIVE THERAPY (ECT) - SINGLE SEIZURE N/A 10/24/2017    Performed by Shoaib Boyce Jr., MD at [facility]    ELECTROCONVULSIVE THERAPY (ECT) - SINGLE SEIZURE N/A 9/20/2017    Performed by Shoaib Boyce Jr., MD at [facility]    ELECTROCONVULSIVE THERAPY (ECT) - SINGLE SEIZURE N/A 8/14/2017    Performed by Shoaib Boyce Jr., MD at [facility]    ELECTROCONVULSIVE THERAPY (ECT) - SINGLE SEIZURE Bilateral 7/12/2017    Performed by Shoaib Boyce Jr., MD at [facility]    ELECTROCONVULSIVE THERAPY (ECT) - SINGLE SEIZURE N/A 6/13/2017    Performed by Shoaib Boyce Jr., MD at [facility]    ELECTROCONVULSIVE THERAPY (ECT) - SINGLE SEIZURE N/A 5/17/2017    Performed by Shoaib LUTHER  Carli Coker MD at [facility]    ELECTROCONVULSIVE THERAPY (ECT) - SINGLE SEIZURE N/A 4/20/2017    Performed by Shoaib Boyce Jr., MD at [facility]    ELECTROCONVULSIVE THERAPY (ECT) - SINGLE SEIZURE N/A 11/22/2016    Performed by Shoaib Boyce Jr., MD at [facility]    ELECTROCONVULSIVE THERAPY (ECT) - SINGLE SEIZURE N/A 10/24/2016    Performed by Shoaib Boyce Jr., MD at [facility]    ELECTROCONVULSIVE THERAPY (ECT) - SINGLE SEIZURE N/A 9/22/2016    Performed by Shoaib Boyce Jr., MD at [facility]    ELECTROCONVULSIVE THERAPY (ECT) - SINGLE SEIZURE N/A 9/1/2016    Performed by Shoaib Boyce Jr., MD at [facility]    ELECTROCONVULSIVE THERAPY (ECT) - SINGLE SEIZURE N/A 8/15/2016    Performed by Shoaib Boyce Jr., MD at [facility]    ELECTROCONVULSIVE THERAPY (ECT) - SINGLE SEIZURE N/A 8/1/2016    Performed by Shoaib Boyce Jr., MD at [facility]    ELECTROCONVULSIVE THERAPY (ECT) - SINGLE SEIZURE N/A 7/22/2016    Performed by Shoaib Boyce Jr., MD at [facility]    ELECTROCONVULSIVE THERAPY (ECT) - SINGLE SEIZURE N/A 7/14/2016    Performed by Shoaib Boyce Jr., MD at [facility]    ELECTROCONVULSIVE THERAPY (ECT) - SINGLE SEIZURE N/A 7/8/2016    Performed by Shoaib Boyce Jr., MD at [facility]    ELECTROCONVULSIVE THERAPY (ECT) - SINGLE SEIZURE N/A 7/5/2016    Performed by Shoaib Boyce Jr., MD at [facility]    ELECTROCONVULSIVE THERAPY (ECT) - SINGLE SEIZURE N/A 6/27/2016    Performed by Shoaib Boyce Jr., MD at [facility]    ELECTROCONVULSIVE THERAPY (ECT) - SINGLE SEIZURE N/A 6/23/2016    Performed by Shoaib Boyce Jr., MD at [facility]    ELECTROCONVULSIVE THERAPY (ECT) - SINGLE SEIZURE N/A 6/20/2016    Performed by Shoaib Boyce Jr., MD at [facility]    ELECTROCONVULSIVE THERAPY (ECT) - SINGLE SEIZURE N/A 6/16/2016    Performed by Shoaib Boyce Jr., MD at [facility]    ELECTROCONVULSIVE THERAPY (ECT) - SINGLE SEIZURE N/A 6/15/2016    Performed by Shoaib Boyce Jr., MD  at [facility]    ELECTROCONVULSIVE THERAPY (ECT) - SINGLE SEIZURE N/A 2016    Performed by Shoaib Boyce Jr., MD at [facility]    ELECTROCONVULSIVE THERAPY (ECT) - SINGLE SEIZURE N/A 2016    Performed by Shoaib Boyce Jr., MD at [facility]    ELECTROCONVULSIVE THERAPY (ECT) - SINGLE SEIZURE N/A 2016    Performed by Shoaib Boyce Jr., MD at [facility]    ELECTROCONVULSIVE THERAPY, CEREBRAL HEMISPHERE, UNILATERAL, 1 SEIZURE Bilateral 2018    Performed by Shoaib Boy[facility] Jr., MD at [facility]    OVARIAN CYST REMOVAL Bilateral          Vital Signs Range (Last 24H):  BP: ()/()   Arterial Line BP: ()/()       CBC:   No results for input(s): WBC, RBC, HGB, HCT, PLT, MCV, MCH, MCHC in the last 720 hours.    CMP: No results for input(s): NA, K, CL, CO2, BUN, CREATININE, GLU, MG, PHOS, CALCIUM, ALBUMIN, PROT, ALKPHOS, ALT, AST, BILITOT in the last 720 hours.    INR:  No results for input(s): PT, INR, PROTIME, APTT in the last 720 hours.      Diagnostic Studies:      EKD Echo:      Pre-op Assessment    I have reviewed the Patient Summary Reports.     I have reviewed the Nursing Notes.   I have reviewed the Medications.     Review of Systems  Anesthesia Hx:  No problems with previous Anesthesia  Denies Family Hx of Anesthesia complications.   Denies Personal Hx of Anesthesia complications.   Social:  Former Smoker, Alcohol Use    Hematology/Oncology:  Hematology Normal   Oncology Normal     EENT/Dental:EENT/Dental Normal   Cardiovascular:  Cardiovascular Normal                     Pulmonary:  Pulmonary Normal  Denies Asthma.  Denies Shortness of breath.    Renal/:  Renal/ Normal     Hepatic/GI:  Hepatic/GI Normal    Neurological:   Denies TIA. Denies CVA.    Endocrine:  Endocrine Normal    Psych:   Psychiatric History depression          Physical Exam  General:  Well nourished    Airway/Jaw/Neck:  Airway Findings: Mouth Opening: Normal Tongue: Normal  General Airway Assessment: Adult,  Good  Mallampati: I  TM Distance: Normal, at least 6 cm  Jaw/Neck Findings:  Neck ROM: Normal ROM      Dental:  Dental Findings: In tact   Chest/Lungs:  Chest/Lungs Clear    Heart/Vascular:  Heart Findings: Normal    Abdomen:  Abdomen Findings: Normal      Mental Status:  Mental Status Findings:  Cooperative, Alert and Oriented         Anesthesia Plan  Type of Anesthesia, risks & benefits discussed:  Anesthesia Type:  general, MAC  Patient's Preference:   Intra-op Monitoring Plan: standard ASA monitors  Intra-op Monitoring Plan Comments:   Post Op Pain Control Plan: multimodal analgesia, IV/PO Opioids PRN and per primary service following discharge from PACU  Post Op Pain Control Plan Comments:   Induction:   IV  Beta Blocker:  Patient is not currently on a Beta-Blocker (No further documentation required).       Informed Consent: Patient understands risks and agrees with Anesthesia plan.  Questions answered. Anesthesia consent signed with patient.  ASA Score: 2     Day of Surgery Review of History & Physical:  There are no significant changes. Significant changes noted: Surgeon notified.  H&P update referred to the provider.         Ready For Surgery From Anesthesia Perspective.

## 2018-12-06 NOTE — ANESTHESIA POSTPROCEDURE EVALUATION
"Anesthesia Post Evaluation    Patient: Allyssa Wright    Procedure(s) Performed: Procedure(s) (LRB):  ELECTROCONVULSIVE THERAPY (ECT) - SINGLE SEIZURE (N/A)    Final Anesthesia Type: general  Patient location during evaluation: [facility]  Patient participation: Yes- Able to Participate  Level of consciousness: awake and alert  Post-procedure vital signs: reviewed and stable  Pain management: adequate  Airway patency: patent  PONV status at discharge: No PONV  Anesthetic complications: no      Cardiovascular status: blood pressure returned to baseline  Respiratory status: unassisted  Hydration status: euvolemic  Follow-up not needed.        Visit Vitals  /80   Pulse 96   Temp 37.2 °C (99 °F) (Temporal)   Resp 16   Ht 5' 5" (1.651 m)   Wt 65.8 kg (145 lb)   LMP  (LMP Unknown)   SpO2 100%   Breastfeeding? No   BMI 24.13 kg/m²       Pain/Roma Score: Pain Assessment Performed: Yes (12/6/2018  8:44 AM)  Presence of Pain: denies (12/6/2018  8:44 AM)        "

## 2018-12-06 NOTE — TRANSFER OF CARE
"Anesthesia Transfer of Care Note    Patient: Allyssa Wright    Procedure(s) Performed: Procedure(s) (LRB):  ELECTROCONVULSIVE THERAPY (ECT) - SINGLE SEIZURE (N/A)    Patient location: [facility]    Anesthesia Type: general    Transport from OR: Transported from OR on 6-10 L/min O2 by face mask with adequate spontaneous ventilation    Post pain: adequate analgesia    Post assessment: no apparent anesthetic complications    Post vital signs: stable    Level of consciousness: awake    Nausea/Vomiting: no nausea/vomiting    Complications: none    Transfer of care protocol was followed      Last vitals:   Visit Vitals  /71 (BP Location: Left arm, Patient Position: Sitting)   Pulse 74   Temp 36.6 °C (97.8 °F) (Oral)   Resp 18   Ht 5' 5" (1.651 m)   Wt 65.8 kg (145 lb)   LMP  (LMP Unknown)   SpO2 100%   Breastfeeding? No   BMI 24.13 kg/m²     "

## 2018-12-06 NOTE — ANESTHESIA POSTPROCEDURE EVALUATION
"Anesthesia Post Evaluation    Patient: Allyssa Wright    Procedure(s) Performed: Procedure(s) (LRB):  ELECTROCONVULSIVE THERAPY (ECT) - SINGLE SEIZURE (N/A)    Final Anesthesia Type: general  Patient location during evaluation: PACU  Patient participation: Yes- Able to Participate  Level of consciousness: awake and alert and oriented  Post-procedure vital signs: reviewed and stable  Pain management: adequate  Airway patency: patent  PONV status at discharge: No PONV  Anesthetic complications: no      Cardiovascular status: hemodynamically stable  Respiratory status: unassisted and spontaneous ventilation  Hydration status: euvolemic  Follow-up not needed.        Visit Vitals  /72   Pulse 89   Temp 37.2 °C (99 °F) (Temporal)   Resp 16   Ht 5' 5" (1.651 m)   Wt 65.8 kg (145 lb)   LMP  (LMP Unknown)   SpO2 97%   Breastfeeding? No   BMI 24.13 kg/m²       Pain/Roma Score: Pain Assessment Performed: Yes (12/6/2018  9:28 AM)  Presence of Pain: denies (12/6/2018  9:28 AM)  Roma Score: 10 (12/6/2018  9:02 AM)        "

## 2018-12-06 NOTE — ANESTHESIA PROCEDURE NOTES
ECT    Treatment Number: 63  Procedure start time: 12/6/2018 8:26 AM  Timeout performed at: 12/6/2018 8:20 AM  Procedure end time: 12/6/2018 8:28 AM    Staffing  Anesthesiologist: Renetta Carver MD  CRNA/Resident: Ruel Peterson MD    Preanesthetic Checklist  The following were completed as part of the preanesthetic checklist: patient identified, procedural consent, pre-op evaluation, timeout performed, risks and benefits discussed, monitors and equipment checked, anesthesia consent given, oxygen available, suction available, hand hygiene performed and patient being monitored.    Setup & Induction  Patient Monitoring: heart rate, cardiac monitor, continuous pulse ox, continuous capnometry, NIBP and gas analyzer  Patient preparation: extremities padded, bite block inserted, mandibular stabilization and patient hyperventilated  Electrode Placement: Bitemporal    The patient was moved to the ECT therapy room after being assessed and consented for ECT. After standard ASA monitors were applied and timeout performed, the patient was adequately preoxygenated. After induction of general anesthesia, adequate oxygenation and ventilation were confirmed with pulse oxymetry and end tidal CO2 monitoring via bag-mask ventilation. End tidal CO2 was monitored throughout the case and moderate hyperventilation was performed prior to beginning ECT treatment. All extremities were padded, biteblock was inserted, and mandibular stabilization was done prior to initiating ECT therapy.    Procedure  Stimulus Number 1: Setting Number = III; 576 millicoulombs; Seizure Duration = 92 seconds            Recovery  After adequate recovery from general anesthesia, the patient was transported to recovery.  Baseline Blood Pressure: 103/71  Peak Blood Pressure: 193/117  Time to Recovery of Respirations: 5 minutes    ECT Findings  ECT associated findings of: Moderate Hypertension (SBP >160 or DBP >100)

## 2018-12-27 ENCOUNTER — ANESTHESIA EVENT (OUTPATIENT)
Dept: ELECTROPHYSIOLOGY | Facility: HOSPITAL | Age: 38
End: 2018-12-27
Payer: COMMERCIAL

## 2018-12-27 NOTE — ANESTHESIA PREPROCEDURE EVALUATION
[facility]  Anesthesia Pre-Operative Evaluation         Patient Name: Allyssa Wright  YOB: 1980  MRN: [id number]    SUBJECTIVE:     Pre-operative evaluation for Procedure(s) (LRB):  ELECTROCONVULSIVE THERAPY (ECT) - SINGLE SEIZURE (N/A)     12/27/2018    Allyssa Wright is a 38 y.o. female w/ a significant PMHx of V  who presents for the above procedure.    ECT # 64    Previous Meds:  Previous Meds:  - Methohexital 150mg  - Succinylcholine 140mg  - Ondansetron 4mg  - Ketorolac 30mg      Patient Active Problem List   Diagnosis    MDD (major depressive disorder), recurrent, in partial remission    Other acne    Comfort measures only status    MDD (major depressive disorder)    Major depression    Major depressive disorder       Review of patient's allergies indicates:   Allergen Reactions    Benzodiazepines Other (See Comments)     Contraindicated while taking Clozapine    Ampicillin      Mom says so    Erythromycin     Levaquin [levofloxacin] Other (See Comments)     Depression side effects    Penicillins      Mom says so    Pristiq [desvenlafaxine]      psycotic      Sulfa (sulfonamide antibiotics)      Rash      Azithromycin Anxiety       Current Inpatient Medications:      No current facility-administered medications on file prior to encounter.      Current Outpatient Medications on File Prior to Encounter   Medication Sig Dispense Refill    clozapine (CLOZARIL) 50 MG tablet Take 50 mg by mouth once daily.       dapsone 7.5 % GlwP Apply topically once daily.      dextroamphetamine-amphetamine 30 mg Tab Take 30 mg by mouth 2 (two) times daily.       famciclovir (FAMVIR) 500 MG tablet Take 1 tablet (500 mg total) by mouth 2 (two) times daily. 30 tablet 12    hydrOXYzine pamoate (VISTARIL) 25 MG Cap Take 2 capsules (50 mg total) by mouth nightly as needed (Take 25-50mg (1-2 caps) at night for anxiety prior to ECT). 180 capsule 0    mirtazapine (REMERON) 15 MG tablet  Take 1 tablet (15 mg total) by mouth every evening. (Patient taking differently: Take 7.5 mg by mouth every evening. ) 90 tablet 0    spironolactone (ALDACTONE) 50 MG tablet 50 mg 2 (two) times daily. 50  mg qAM, 50 mg qHS.      thyroid (ARMOUR THYROID) 30 mg Tab Take 1 tablet (30 mg total) by mouth every morning. 30 tablet 11    trazodone (DESYREL) 100 MG tablet Take 200 mg by mouth every evening.       UNABLE TO FIND 2 (two) times daily. n-azetyl-cysteine      venlafaxine (EFFEXOR) 100 MG Tab Take 3 tablets (300 mg total) by mouth once daily. (Patient taking differently: Take 225 mg by mouth once daily. )      vortioxetine (TRINTELLIX) 5 mg Tab Take 2.5 mg by mouth once daily.      ZOVIRAX 5 % Crea   5       Past Surgical History:   Procedure Laterality Date    ANKLE SURGERY Right     BREAST augmentation      ELECTROCONVULSIVE THERAPY (ECT) - SINGLE SEIZURE N/A 12/6/2018    Performed by Shoaib Boyce Jr., MD at [facility]    ELECTROCONVULSIVE THERAPY (ECT) - SINGLE SEIZURE N/A 8/31/2018    Performed by [facility]ib [facility]ce Jr[facility] at [facility]    ELECTROCONVULSIVE THERAPY (ECT) - SINGLE SEIZURE N/A 8/6/2018    Performed by Sohaib Boyce Jr., MD at [facility]    ELECTROCONVULSIVE THERAPY (ECT) - SINGLE SEIZURE N/A 7/23/2018    Performed by Sh[facility]ib Boyce Jr[facility] at [facility]    ELECTROCONVULSIVE THERAPY (ECT) - SINGLE SEIZURE N/A 7/5/2018    Performed by Sh[facility]ib [facility] Jr[facility] at [facility]    ELECTROCONVULSIVE THERAPY (ECT) - SINGLE SEIZURE N/A 6/28/2018    Performed by Shoaib Boyce Jr[facility] at [facility]    ELECTROCONVULSIVE THERAPY (ECT) - SINGLE SEIZURE N/A 6/13/2018    Performed by Shoaib Boyce Jr[facility] at [facility]    ELECTROCONVULSIVE THERAPY (ECT) - SINGLE SEIZURE N/A 5/29/2018    Performed by Shoaib Boyce Jr[facility] at [facility]    ELECTROCONVULSIVE THERAPY (ECT) - SINGLE SEIZURE N/A 5/8/2018    Performed by Shoaib Boyce Jr[facility] at [facility]    ELECTROCONVULSIVE THERAPY  (ECT) - SINGLE SEIZURE N/A 4/24/2018    Performed by Shoaib Boyce Jr[facility] at [facility]    ELECTROCONVULSIVE THERAPY (ECT) - SINGLE SEIZURE N/A 4/17/2018    Performed by Shoaib Boyce Jr[facility] at [facility]    ELECTROCONVULSIVE THERAPY (ECT) - SINGLE SEIZURE N/A 4/13/2018    Performed by Shoaib Boyce Jr[facility] at [facility]    ELECTROCONVULSIVE THERAPY (ECT) - SINGLE SEIZURE N/A 4/3/2018    Performed by Shoaib Boyce Jr., MD at [facility]    ELECTROCONVULSIVE THERAPY (ECT) - SINGLE SEIZURE N/A 3/20/2018    Performed by Shoaib Boyce Jr., [facility] at [facility]    ELECTROCONVULSIVE THERAPY (ECT) - SINGLE SEIZURE N/A 3/15/2018    Performed by Shoaib Boyce Jr[facility] at [facility]    ELECTROCONVULSIVE THERAPY (ECT) - SINGLE SEIZURE N/A 3/6/2018    Performed by Shoaib Boyce Jr., [facility] at [facility]    ELECTROCONVULSIVE THERAPY (ECT) - SINGLE SEIZURE N/A 3/1/2018    Performed by Shoaib Boyce Jr., [facility] at [facility]    ELECTROCONVULSIVE THERAPY (ECT) - SINGLE SEIZURE N/A 2/23/2018    Performed by Shoaib Boyce Jr., [facility] at [facility]    ELECTROCONVULSIVE THERAPY (ECT) - SINGLE SEIZURE N/A 2/19/2018    Performed by Shoaib Boyce Jr[facility] at [facility]    ELECTROCONVULSIVE THERAPY (ECT) - SINGLE SEIZURE N/A 2/15/2018    Performed by Shoaib Boyce Jr[facility] at [facility]    ELECTROCONVULSIVE THERAPY (ECT) - SINGLE SEIZURE N/A 1/22/2018    Performed by Shoaib Boyce Jr[facility] at [facility]    ELECTROCONVULSIVE THERAPY (ECT) - SINGLE SEIZURE N/A 12/11/2017    Performed by Shoaib Boyce Jr[facility] at [facility]    ELECTROCONVULSIVE THERAPY (ECT) - SINGLE SEIZURE N/A 10/24/2017    Performed by Shoaib Boyce Jr[facility] at [facility]    ELECTROCONVULSIVE THERAPY (ECT) - SINGLE SEIZURE N/A 9/20/2017    Performed by Shoaib Boyce Jr[facility] at [facility]    ELECTROCONVULSIVE THERAPY (ECT) - SINGLE SEIZURE N/A 8/14/2017    Performed by Shoaib Boyce Jr[facility] at [facility]    ELECTROCONVULSIVE THERAPY (ECT) - SINGLE  SEIZURE Bilateral 7/12/2017    Performed by [facility] Jr[facility] at [facility]    ELECTROCONVULSIVE THERAPY (ECT) - SINGLE SEIZURE N/A 6/13/2017    Performed by Shoaib Boyce Jr[facility] at [facility]    ELECTROCONVULSIVE THERAPY (ECT) - SINGLE SEIZURE N/A 5/17/2017    Performed by Shoaib Boyce Jr[facility] at [facility]    ELECTROCONVULSIVE THERAPY (ECT) - SINGLE SEIZURE N/A 4/20/2017    Performed by Shoaib [facility] Jr[facility] at [facility]    ELECTROCONVULSIVE THERAPY (ECT) - SINGLE SEIZURE N/A 11/22/2016    Performed by Shoaib Boyce Jr[facility] at [facility]    ELECTROCONVULSIVE THERAPY (ECT) - SINGLE SEIZURE N/A 10/24/2016    Performed by Shoaib Boyce Jr[facility] at [facility]    ELECTROCONVULSIVE THERAPY (ECT) - SINGLE SEIZURE N/A 9/22/2016    Performed by Shoaib Boyce Jr[facility] at [facility]    ELECTROCONVULSIVE THERAPY (ECT) - SINGLE SEIZURE N/A 9/1/2016    Performed by Shoaib Boyce Jr[facility] at [facility]    ELECTROCONVULSIVE THERAPY (ECT) - SINGLE SEIZURE N/A 8/15/2016    Performed by Shoaib Boyce Jr[facility] at [facility]    ELECTROCONVULSIVE THERAPY (ECT) - SINGLE SEIZURE N/A 8/1/2016    Performed by Shoaib Boyce Jr[facility] at [facility]    ELECTROCONVULSIVE THERAPY (ECT) - SINGLE SEIZURE N/A 7/22/2016    Performed by Shoaib Boyce Jr[facility] at [facility]    ELECTROCONVULSIVE THERAPY (ECT) - SINGLE SEIZURE N/A 7/14/2016    Performed by Shoaib Boy[facility] Jr[facility] at [facility]    ELECTROCONVULSIVE THERAPY (ECT) - SINGLE SEIZURE N/A 7/8/2016    Performed by Shoaib Boyce Jr[facility] at [facility]    ELECTROCONVULSIVE THERAPY (ECT) - SINGLE SEIZURE N/A 7/5/2016    Performed by Shoaib Boyce Jr[facility] at [facility]    ELECTROCONVULSIVE THERAPY (ECT) - SINGLE SEIZURE N/A 6/27/2016    Performed by Shoaib [facility] Jr[facility] at [facility]    ELECTROCONVULSIVE THERAPY (ECT) - SINGLE SEIZURE N/A 6/23/2016    Performed by Sh[facility]ib [facility] Jr[facility] at [facility]    ELECTROCONVULSIVE THERAPY (ECT) - SINGLE SEIZURE  N/A 2016    Performed by [facility] at [facility]    ELECTROCONVULSIVE THERAPY (ECT) - SINGLE SEIZURE N/A 2016    Performed by Sh[facility]ib [facility] Jr[facility] at [facility]    ELECTROCONVULSIVE THERAPY (ECT) - SINGLE SEIZURE N/A 6/15/2016    Performed by Sh[facility]ib [facility] Jr[facility] at [facility]    ELECTROCONVULSIVE THERAPY (ECT) - SINGLE SEIZURE N/A 2016    Performed by Shoaib [facility] Jr[facility] at [facility]    ELECTROCONVULSIVE THERAPY (ECT) - SINGLE SEIZURE N/A 2016    Performed by Sh[facility]ib [facility] Jr[facility] at [facility]    ELECTROCONVULSIVE THERAPY (ECT) - SINGLE SEIZURE N/A 2016    Performed by [facility]ib [facility] Jr[facility] at [facility]    ELECTROCONVULSIVE THERAPY, CEREBRAL HEMISPHERE, UNILATERAL, 1 SEIZURE Bilateral 2018    Performed by [facility]ib [facility] Jr[facility] at [facility]    OVARIAN CYST REMOVAL Bilateral        Social History     Socioeconomic History    Marital status: Single     Spouse name: Not on file    Number of children: Not on file    Years of education: Not on file    Highest education level: Not on file   Social Needs    Financial resource strain: Not on file    Food insecurity - worry: Not on file    Food insecurity - inability: Not on file    Transportation needs - medical: Not on file    Transportation needs - non-medical: Not on file   Occupational History    Occupation: unemployed   Tobacco Use    Smoking status: Former Smoker     Last attempt to quit: 2011     Years since quittin.7    Smokeless tobacco: Never Used   Substance and Sexual Activity    Alcohol use: Yes     Comment: rarely    Drug use: No     Comment: Experimental use in high school    Sexual activity: Not on file   Other Topics Concern    Patient feels they ought to cut down on drinking/drug use Not Asked    Patient annoyed by others criticizing their drinking/drug use Not Asked    Patient has felt bad or guilty about drinking/drug use Not Asked    Patient has had a  drink/used drugs as an eye opener in the AM Not Asked   Social History Narrative    Pt has 1 older brother from an intact family until the death of her mother in 2012.  She completed 1 year of college, was never in the , and has never been employed.  She was engaged once, but never , has no children, and lives with her father plus 2 dogs.  She denies any hobbies and is spiritual but not Restoration.  She does date and returns to college during rare periods when depression and anxiety [place].       OBJECTIVE:     Vital Signs Range (Last 24H):  BP: ()/()   Arterial Line BP: ()/()       CBC:   No results for input(s): WBC, RBC, HGB, HCT, PLT, MCV, MCH, MCHC in the last 72 hours.    CMP: No results for input(s): NA, K, CL, CO2, BUN, CREATININE, GLU, MG, PHOS, CALCIUM, ALBUMIN, PROT, ALKPHOS, ALT, AST, BILITOT in the last 72 hours.    INR:  No results for input(s): PT, INR, PROTIME, APTT in the last 72 hours.    Diagnostic Studies: No relevant studies.    EKG: No recent studies available.    2D ECHO:  No results found for this or any previous visit.      ASSESSMENT/PLAN:         Anesthesia Evaluation    I have reviewed the Patient Summary Reports.    I have reviewed the Nursing Notes.   I have reviewed the Medications.     Review of Systems  Anesthesia Hx:  No problems with previous Anesthesia  Denies Family Hx of Anesthesia complications.   Denies Personal Hx of Anesthesia complications.   Social:  Former Smoker, Alcohol Use    Hematology/Oncology:  Hematology Normal   Oncology Normal     EENT/Dental:EENT/Dental Normal   Cardiovascular:  Cardiovascular Normal                     Pulmonary:  Pulmonary Normal  Denies Asthma.  Denies Shortness of breath.    Renal/:  Renal/ Normal     Hepatic/GI:  Hepatic/GI Normal    Neurological:   Denies TIA. Denies CVA.    Endocrine:  Endocrine Normal    Psych:   Psychiatric History depression          Physical Exam  General:  Well nourished    Airway/Jaw/Neck:  Airway  Findings: Mouth Opening: Normal Tongue: Normal  General Airway Assessment: Adult, Good  Mallampati: I  TM Distance: Normal, at least 6 cm  Jaw/Neck Findings:  Neck ROM: Normal ROM      Dental:  Dental Findings: In tact   Chest/Lungs:  Chest/Lungs Clear    Heart/Vascular:  Heart Findings: Normal    Abdomen:  Abdomen Findings: Normal      Mental Status:  Mental Status Findings:  Cooperative, Alert and Oriented         Anesthesia Plan  Type of Anesthesia, risks & benefits discussed:  Anesthesia Type:  general  Patient's Preference:   Intra-op Monitoring Plan: standard ASA monitors  Intra-op Monitoring Plan Comments:   Post Op Pain Control Plan: per primary service following discharge from PACU  Post Op Pain Control Plan Comments:   Induction:   IV  Beta Blocker:  Patient is not currently on a Beta-Blocker (No further documentation required).       Informed Consent: Patient understands risks and agrees with Anesthesia plan.  Questions answered. Anesthesia consent signed with patient.  ASA Score: 2     Day of Surgery Review of History & Physical:    H&P update referred to the surgeon.         Ready For Surgery From Anesthesia Perspective.

## 2018-12-28 ENCOUNTER — ANESTHESIA (OUTPATIENT)
Dept: ELECTROPHYSIOLOGY | Facility: HOSPITAL | Age: 38
End: 2018-12-28
Payer: COMMERCIAL

## 2018-12-28 ENCOUNTER — HOSPITAL ENCOUNTER (OUTPATIENT)
Facility: HOSPITAL | Age: 38
Discharge: HOME | End: 2018-12-28
Attending: PSYCHIATRY & NEUROLOGY | Admitting: PSYCHIATRY & NEUROLOGY
Payer: COMMERCIAL

## 2018-12-28 VITALS
HEART RATE: 90 BPM | TEMPERATURE: 99 F | BODY MASS INDEX: 24.16 KG/M2 | HEIGHT: 65 IN | RESPIRATION RATE: 20 BRPM | WEIGHT: 145 LBS | SYSTOLIC BLOOD PRESSURE: 116 MMHG | DIASTOLIC BLOOD PRESSURE: 70 MMHG | OXYGEN SATURATION: 97 %

## 2018-12-28 DIAGNOSIS — F32.9 MAJOR DEPRESSIVE DISORDER: ICD-10-CM

## 2018-12-28 DIAGNOSIS — F33.41 MDD (MAJOR DEPRESSIVE DISORDER), RECURRENT, IN PARTIAL REMISSION: ICD-10-CM

## 2018-12-28 DIAGNOSIS — F33.9 RECURRENT MAJOR DEPRESSIVE DISORDER, REMISSION STATUS UNSPECIFIED: Primary | ICD-10-CM

## 2018-12-28 LAB
B-HCG UR QL: NEGATIVE
CTP QC/QA: YES

## 2018-12-28 PROCEDURE — 63600175 PHARM REV CODE 636 W HCPCS: Performed by: ANESTHESIOLOGY

## 2018-12-28 PROCEDURE — 90870 ELECTROCONVULSIVE THERAPY: CPT | Performed by: ANESTHESIOLOGY

## 2018-12-28 PROCEDURE — D9220A PRA ANESTHESIA: ICD-10-PCS | Mod: ,,, | Performed by: ANESTHESIOLOGY

## 2018-12-28 PROCEDURE — 90870 ELECTROCONVULSIVE THERAPY: CPT | Mod: ,,, | Performed by: PSYCHIATRY & NEUROLOGY

## 2018-12-28 PROCEDURE — 25000003 PHARM REV CODE 250: Performed by: PSYCHIATRY & NEUROLOGY

## 2018-12-28 PROCEDURE — 81025 URINE PREGNANCY TEST: CPT | Performed by: PSYCHIATRY & NEUROLOGY

## 2018-12-28 PROCEDURE — 25000003 PHARM REV CODE 250: Performed by: ANESTHESIOLOGY

## 2018-12-28 PROCEDURE — D9220A PRA ANESTHESIA: Mod: ,,, | Performed by: ANESTHESIOLOGY

## 2018-12-28 RX ORDER — ESMOLOL HYDROCHLORIDE 10 MG/ML
INJECTION INTRAVENOUS
Status: DISCONTINUED | OUTPATIENT
Start: 2018-12-28 | End: 2018-12-28

## 2018-12-28 RX ORDER — SODIUM CHLORIDE 9 MG/ML
INJECTION, SOLUTION INTRAVENOUS CONTINUOUS
Status: DISCONTINUED | OUTPATIENT
Start: 2018-12-29 | End: 2018-12-28 | Stop reason: HOSPADM

## 2018-12-28 RX ORDER — DOXYCYCLINE 40 MG/1
40 CAPSULE ORAL DAILY
Status: ON HOLD | COMMUNITY
End: 2019-01-17 | Stop reason: HOSPADM

## 2018-12-28 RX ORDER — KETOROLAC TROMETHAMINE 30 MG/ML
INJECTION, SOLUTION INTRAMUSCULAR; INTRAVENOUS
Status: DISCONTINUED | OUTPATIENT
Start: 2018-12-28 | End: 2018-12-28

## 2018-12-28 RX ORDER — ONDANSETRON 2 MG/ML
INJECTION INTRAMUSCULAR; INTRAVENOUS
Status: DISCONTINUED | OUTPATIENT
Start: 2018-12-28 | End: 2018-12-28

## 2018-12-28 RX ORDER — SODIUM CHLORIDE 9 MG/ML
500 INJECTION, SOLUTION INTRAVENOUS ONCE
Status: DISCONTINUED | OUTPATIENT
Start: 2018-12-28 | End: 2018-12-28 | Stop reason: HOSPADM

## 2018-12-28 RX ORDER — SUCCINYLCHOLINE CHLORIDE 20 MG/ML
INJECTION INTRAMUSCULAR; INTRAVENOUS
Status: DISCONTINUED | OUTPATIENT
Start: 2018-12-28 | End: 2018-12-28

## 2018-12-28 RX ORDER — SODIUM CHLORIDE 0.9 % (FLUSH) 0.9 %
3 SYRINGE (ML) INJECTION
Status: DISCONTINUED | OUTPATIENT
Start: 2018-12-28 | End: 2018-12-28 | Stop reason: HOSPADM

## 2018-12-28 RX ORDER — LIDOCAINE HYDROCHLORIDE 10 MG/ML
1 INJECTION, SOLUTION EPIDURAL; INFILTRATION; INTRACAUDAL; PERINEURAL ONCE
Status: DISCONTINUED | OUTPATIENT
Start: 2018-12-29 | End: 2018-12-28 | Stop reason: HOSPADM

## 2018-12-28 RX ADMIN — METHOHEXITAL SODIUM 150 MG: 500 INJECTION, POWDER, LYOPHILIZED, FOR SOLUTION INTRAMUSCULAR; INTRAVENOUS; RECTAL at 09:12

## 2018-12-28 RX ADMIN — KETOROLAC TROMETHAMINE 30 MG: 30 INJECTION, SOLUTION INTRAMUSCULAR at 09:12

## 2018-12-28 RX ADMIN — SODIUM CHLORIDE: 0.9 INJECTION, SOLUTION INTRAVENOUS at 08:12

## 2018-12-28 RX ADMIN — Medication 500 MG: at 08:12

## 2018-12-28 RX ADMIN — ESMOLOL HYDROCHLORIDE 10 MG: 10 INJECTION INTRAVENOUS at 09:12

## 2018-12-28 RX ADMIN — SUCCINYLCHOLINE CHLORIDE 140 MG: 20 INJECTION, SOLUTION INTRAMUSCULAR; INTRAVENOUS at 09:12

## 2018-12-28 RX ADMIN — ONDANSETRON 4 MG: 2 INJECTION, SOLUTION INTRAMUSCULAR; INTRAVENOUS at 09:12

## 2018-12-28 NOTE — TRANSFER OF CARE
"Anesthesia Transfer of Care Note    Patient: Allyssa Wright    Procedure(s) Performed: Procedure(s) (LRB):  ELECTROCONVULSIVE THERAPY (ECT) - SINGLE SEIZURE (N/A)    Patient location: PACU    Anesthesia Type: general    Transport from OR: Transported from OR on 2-3 L/min O2 by NC with adequate spontaneous ventilation    Post pain: adequate analgesia    Post assessment: no apparent anesthetic complications    Post vital signs: stable    Level of consciousness: awake    Nausea/Vomiting: no nausea/vomiting    Complications: none    Transfer of care protocol was followed      Last vitals:   Visit Vitals  BP (!) 111/59 (BP Location: Left arm, Patient Position: Lying)   Pulse 77   Temp 36.4 °C (97.5 °F) (Temporal)   Resp 18   Ht 5' 5" (1.651 m)   Wt 65.8 kg (145 lb)   SpO2 98%   Breastfeeding? No   BMI 24.13 kg/m²     "

## 2018-12-28 NOTE — DISCHARGE SUMMARY
Allyssa Wright  : 1980   MRN: [id number]  Date: 2018       Psychiatry - ECT  Discharge Summary    Admit Date: 2018  5:58 AM  Discharge Date: 2018    Attending Physician: Ruel Benson MD  Discharge Provider: Andreas Paulino MD,     History of Present Illness: Allyssa Wright is a 38 y.o. female with MDD presented for ECT #63. See H&P dated 2018 for full HPI. See Dr Boyce's pre-ECT eval.    Hospital Course: The pt tolerated the ECT treatment well with minimal complication. Pt was stable post-procedure. See OP note dated 2018 for more details.     Disposition: Home or Self Care    Medications:  Reconciled Home Medications:      Medication List      ASK your doctor about these medications    cloZAPine 50 MG tablet  Commonly known as:  CLOZARIL  Take 50 mg by mouth once daily.     dapsone 7.5 % Glwp  Commonly known as:  ACZONE  Apply topically once daily.     dextroamphetamine-amphetamine 30 mg Tab  Take 30 mg by mouth 2 (two) times daily.     doxycycline 40 mg capsule  Commonly known as:  ORACEA  Take 40 mg by mouth once daily.     famciclovir 500 MG tablet  Commonly known as:  FAMVIR  Take 1 tablet (500 mg total) by mouth 2 (two) times daily.     hydrOXYzine pamoate 25 MG Cap  Commonly known as:  VISTARIL  Take 2 capsules (50 mg total) by mouth nightly as needed (Take 25-50mg (1-2 caps) at night for anxiety prior to ECT).     mirtazapine 15 MG tablet  Commonly known as:  REMERON  Take 1 tablet (15 mg total) by mouth every evening.     spironolactone 50 MG tablet  Commonly known as:  ALDACTONE  50 mg 2 (two) times daily. 50  mg qAM, 50 mg qHS.     thyroid (pork) 30 mg Tab  Commonly known as:  ARMOUR THYROID  Take 1 tablet (30 mg total) by mouth every morning.     traZODone 100 MG tablet  Commonly known as:  DESYREL  Take 200 mg by mouth every evening.     TRINTELLIX 5 mg Tab  Generic drug:  vortioxetine  Take 2.5 mg by mouth once daily.     UNABLE TO FIND  2 (two) times daily.  n-azetyl-cysteine     venlafaxine 100 MG Tab  Commonly known as:  EFFEXOR  Take 3 tablets (300 mg total) by mouth once daily.     ZOVIRAX 5 % Crea  Generic drug:  acyclovir 5%              Pt's Status at Discharge: alert and medically stable    Discharge Diagnoses:  MDD, Recurrent, in partial remission    Diet: Resume previous outpt diet  Activity: Ambulate with assistance - pt is a fall risk  Instructions: Please do not eat or drink anything after midnight prior to procedure. Please do not drive on day of ECT.     Med Changes:  Started Doxycycline 40 mg daily for acne  She also reported that Trintellix had been increased by Dr. Jc at her visit on 12/10/18, but states that dose was then dropped back down due to adverse effects.     Next ECT: 1/17/19      Andreas Paulino M.D.  [facility] PGY-4  12/28/2018

## 2018-12-28 NOTE — DISCHARGE INSTRUCTIONS
E.C.T. Home Care Instructions    ACTIVITY LEVEL:    You may feel sleepy for several hours. It is best to rest until you are more awake and then gradually resume your normal activities in one day. It is recommended, because of the possibility of memory loss and slight confusion post ECT, that you rest in the company of a responsible adult. This confusion and memory loss is expected and should diminish over time. It is also recommended that you do not drive or operate any electrical appliances or machinery during the ECT treatment series. Ask your Doctor, at the time of your treatment, any questions you may have about specific activities.    DIET:    You may wish to start with liquids after your ECT treatment and gradually resume your normal diet. Do not consume alcoholic beverages during the ECT treatment series.    BATHING:    You may shower or bathe as desired.    MEDICATIONS:    Resume all home medications starting with the AM doses not taken prior to ECT treatment. If you experience a headache, it is okay to take your usual pain reliever.    WHEN TO CALL THE DOCTOR:     Headache, memory loss or confusion that does not begin to resolve within 24 hours.    RETURN APPOINTMENT: January 17, 2019 @ 6am     Remember you may not eat or drink after midnight, but please take heart or high blood pressure medicines with a sip of water in the morning prior to leaving home.    FOR EMERGENCIES:    If any unusual problems or difficulties occur:    Contact the office [phone] Monday-Friday until 3:00 p.m. After hours, call the hospital  [phone] and ask for the Psychiatry Resident On-call.        Anesthesia: General Anesthesia     You are watched continuously during your procedure by your anesthesia provider.     Youre due to have surgery. During surgery, youll be given medicine called anesthesia or anesthetic. This will keep you comfortable and pain-free. Your anesthesia provider will use general  anesthesia.  What is general anesthesia?  General anesthesia puts you into a state like deep sleep. It goes into the bloodstream (IV anesthetics), into the lungs (gas anesthetics), or both. You feel nothing during the procedure. You will not remember it. During the procedure, the anesthesia provider monitors you continuously. He or she checks your heart rate and rhythm, blood pressure, breathing, and blood oxygen.  · IV anesthetics. IV anesthetics are given through an IV line in your arm. Theyre often given first. This is so you are asleep before a gas anesthetic is started. Some kinds of IV anesthetics relieve pain. Others relax you. Your doctor will decide which kind is best in your case.  · Gas anesthetics. Gas anesthetics are breathed into the lungs. They are often used to keep you asleep. They can be given through a facemask or a tube placed in your larynx or trachea (breathing tube).  ¨ If you have a facemask, your anesthesia provider will most likely place it over your nose and mouth while youre still awake. Youll breathe oxygen through the mask as your IV anesthetic is started. Gas anesthetic may be added through the mask.  ¨ If you have a tube in the larynx or trachea, it will be inserted into your throat after youre asleep.  Anesthesia tools and medicines  You will likely have:  · IV anesthetics. These are put into an IV line into your bloodstream.  · Gas anesthetics. You breathe these anesthetics into your lungs, where they pass into your bloodstream.  · Pulse oximeter. This is a small clip that is attached to the end of your finger. This measures your blood oxygen level.  · Electrocardiography leads (electrodes). These are small sticky pads that are placed on your chest. They record your heart rate and rhythm.  · Blood pressure cuff. This reads your blood pressure.  Risks and possible complications  General anesthesia has some risks. These include:  · Breathing problems  · Nausea and vomiting  · Sore  throat or hoarseness (usually temporary)  · Allergic reaction to the anesthetic  · Irregular heartbeat (rare)  · Cardiac arrest (rare)   Anesthesia safety  · Follow all instructions you are given for how long not to eat or drink before your procedure.  · Be sure your doctor knows what medicines and drugs you take. This includes over-the-counter medicines, herbs, supplements, alcohol or other drugs. You will be asked when those were last taken.  · Have an adult family member or friend drive you home after the procedure.  · For the first 24 hours after your surgery:  ¨ Do not drive or use heavy equipment.  ¨ Do not make important decisions or sign legal documents. If important decisions or signing legal documents is necessary during the first 24 hours after surgery, have a trusted family member or spouse act on your behalf.  ¨ Avoid alcohol.  ¨ Have a responsible adult stay with you. He or she can watch for problems and help keep you safe.  Date Last Reviewed: 12/1/2016  © 0131-5848 [employer]. [address] [place] [address], [place] [address]. All rights reserved. This information is not intended as a substitute for professional medical care. Always follow your healthcare professional's instructions.

## 2018-12-28 NOTE — OP NOTE
Allyssa Wright  : 1980   MRN: [id number]  Date: 2018       Psychiatry - ECT  Operative Note             Date of Admission: 2018  5:58 AM    Site: [facility] [address]    Attending: Ruel Benson MD   Residents: Andreas Paulino MD  Pre-op Diagnosis: MDD  ECT Treatment Number: 63  Machine Type: [email or web address].[email or web address] [id number]    Patient Status: medically stable    Vitals (pre-procedure):  Vitals:    18 0627   BP: (!) 111/59   Pulse: 77   Resp: 18   Temp: 97.5 °F (36.4 °C)       Electrode Placement: Bitemporal    Stimulus Number Charge (mC) Level Pulse Width (msec) Frequency  (Hz) Duration of Stimulus (sec) Current (mA) Duration of Seizure (sec)   1 576 3 1 60 6 800 95                                   Complications: Hypertension    Maximum Blood Pressure: 174/81    Medications Given:  Caffeine 500 mg PO before  Methohexital (Brevital) 150 mg  IV before  Succinylcholine (Anectine) 140 mg  IV before  Zofran 4 mg IV before  Toradol 30 mg IV before  Esmolol 10 mg IV After    Treatment Course:  Pt tolerate procedure well. After adequate recovery from general anesthesia, the patient was transported to recovery.    Post-op Diagnosis: Same as above    Recommended for next ECT:  19    Andreas Paulino MD  [facility] PGY-4  2018

## 2018-12-28 NOTE — ANESTHESIA PROCEDURE NOTES
ECT    Treatment Number: 64  Procedure start time: 12/28/2018 9:02 AM  Timeout performed at: 12/28/2018 9:01 AM  Procedure end time: 12/28/2018 9:11 AM    Staffing  Anesthesiologist: Raphael Anne MD  CRNA/Resident: Catherine Pelletier MD  Performed by: resident/CRNA     Preanesthetic Checklist  The following were completed as part of the preanesthetic checklist: patient identified, procedural consent, pre-op evaluation, timeout performed, risks and benefits discussed, monitors and equipment checked, anesthesia consent given, oxygen available, suction available, hand hygiene performed and patient being monitored.    Setup & Induction  Patient Monitoring: heart rate, cardiac monitor, continuous pulse ox, continuous capnometry, NIBP and gas analyzer  Patient preparation: extremities padded, bite block inserted, mandibular stabilization and patient hyperventilated  Electrode Placement: Bitemporal    The patient was moved to the ECT therapy room after being assessed and consented for ECT. After standard ASA monitors were applied and timeout performed, the patient was adequately preoxygenated. After induction of general anesthesia, adequate oxygenation and ventilation were confirmed with pulse oxymetry and end tidal CO2 monitoring via bag-mask ventilation. End tidal CO2 was monitored throughout the case and moderate hyperventilation was performed prior to beginning ECT treatment. All extremities were padded, biteblock was inserted, and mandibular stabilization was done prior to initiating ECT therapy.    Procedure  Stimulus Number 1: Setting Number = III; 576 millicoulombs; Seizure Duration = 95 seconds            Recovery  After adequate recovery from general anesthesia, the patient was transported to recovery.  Baseline Blood Pressure: 111/59  Peak Blood Pressure: 174/81  Time to Recovery of Respirations: 5 minutes    ECT Findings  ECT associated findings of: Moderate Hypertension (SBP >160 or DBP  >100)

## 2018-12-28 NOTE — H&P
"Allyssa Wright  : 1980   MRN: [id number]  Date: 2018     Psychiatric - ECT  H&P     Chief complaint: MDD, recurrent, in partial remission  Procedure: ECT #63    SUBJECTIVE:   HPI:   Allyssa Wright is a 38 y.o. female with MDD recurrent in partial remission who presents for ECT.     On my interview this morning, she reports feeling "pretty good" this morning.  States that she saw Dr. Jc on 12/10/18. She reports that he had increased her dose of Trintellix at her appointment, but states that she began feeling more anxious so dropped the dose back down.  She reports some mild issues with her memory following ECT.  She reports sleeping well.  Good appetite.  States that she has noticed some decreased energy over past 1-2 days.  States that she would like to continue with ECT every 3 weeks at this time and wishes to come on a Thursday for her next treatment. Denies SI.    Please see Dr. Boyce's full pre-ECT evaluation for further details.  The patient has not had anything by mouth since midnight and is ready for ECT.      Psychiatric Review of Systems:  Mood: "pretty good"  Anxiety: Present, unchanged. No acute concerns  Appetite: No problem  Psychomotor: No problem  Cognitive Impairment: mild, consistent with ECT  Insomnia: None  Psychosis: None  Diurnal Variation: None  Suicidal Ideation: Absent    Medical Review Of Systems:  Pertinent items are noted in HPI.    Current Medications:   No current facility-administered medications on file prior to encounter.      Current Outpatient Medications on File Prior to Encounter   Medication Sig Dispense Refill    clozapine (CLOZARIL) 50 MG tablet Take 50 mg by mouth once daily.       dapsone 7.5 % GlwP Apply topically once daily.      dextroamphetamine-amphetamine 30 mg Tab Take 30 mg by mouth 2 (two) times daily.       doxycycline (ORACEA) 40 mg capsule Take 40 mg by mouth once daily.      famciclovir (FAMVIR) 500 MG tablet Take 1 tablet (500 mg total) by " mouth 2 (two) times daily. 30 tablet 12    hydrOXYzine pamoate (VISTARIL) 25 MG Cap Take 2 capsules (50 mg total) by mouth nightly as needed (Take 25-50mg (1-2 caps) at night for anxiety prior to ECT). 180 capsule 0    mirtazapine (REMERON) 15 MG tablet Take 1 tablet (15 mg total) by mouth every evening. (Patient taking differently: Take 7.5 mg by mouth every evening. ) 90 tablet 0    spironolactone (ALDACTONE) 50 MG tablet 50 mg 2 (two) times daily. 50  mg qAM, 50 mg qHS.      thyroid (ARMOUR THYROID) 30 mg Tab Take 1 tablet (30 mg total) by mouth every morning. 30 tablet 11    trazodone (DESYREL) 100 MG tablet Take 200 mg by mouth every evening.       venlafaxine (EFFEXOR) 100 MG Tab Take 3 tablets (300 mg total) by mouth once daily. (Patient taking differently: Take 225 mg by mouth once daily. )      vortioxetine (TRINTELLIX) 5 mg Tab Take 2.5 mg by mouth once daily.      ZOVIRAX 5 % Crea   5    UNABLE TO FIND 2 (two) times daily. n-azetyl-cysteine            Allergies:   Review of patient's allergies indicates:   Allergen Reactions    Benzodiazepines Other (See Comments)     Contraindicated while taking Clozapine    Ampicillin      Mom says so    Erythromycin     Levaquin [levofloxacin] Other (See Comments)     Depression side effects    Penicillins      Mom says so    Pristiq [desvenlafaxine]      psycotic      Sulfa (sulfonamide antibiotics)      Rash      Azithromycin Anxiety        Past Medical/Surgical History:   Past Medical History:   Diagnosis Date    Anxiety     Depression     History of psychiatric hospitalization     HSV-1 (herpes simplex virus 1) infection      of psychiatric care     Moderate depressed bipolar II disorder 06/13/2016    reports no history of bipolar    Obsessive-compulsive disorder     Psychiatric problem     Schizophrenia 4/3/2018    Self-harming behavior     Therapy      Past Surgical History:   Procedure Laterality Date    ANKLE SURGERY Right      BREAST augmentation      ELECTROCONVULSIVE THERAPY (ECT) - SINGLE SEIZURE N/A 12/6/2018    Performed by [facility] Jr[facility] at [facility] ECT    ELECTROCONVULSIVE THERAPY (ECT) - SINGLE SEIZURE N/A 8/31/2018    Per[facility] by [facility] at [facility] ECT    ELECTROCONVULSIVE THERAPY (ECT) - SINGLE SEIZURE N/A 8/6/2018    Performed by [facility] at [facility] ECT    ELECTROCONVULSIVE THERAPY (ECT) - SINGLE SEIZURE N/A 7/23/2018    Performed by [facility] at [facility] ECT    ELECTROCONVULSIVE THERAPY (ECT) - SINGLE SEIZURE N/A 7/5/2018    Performed by [facility] at [facility] ECT    ELECTROCONVULSIVE THERAPY (ECT) - SINGLE SEIZURE N/A 6/28/2018    Performed by [facility] at [facility] ECT    ELECTROCONVULSIVE THERAPY (ECT) - SINGLE SEIZURE N/A 6/13/2018    Performed by [facility] ECT    ELECTROCONVULSIVE THERAPY (ECT) - SINGLE SEIZURE N/A 5/29/2018    Performed by [facility] at [facility] ECT    ELECTROCONVULSIVE THERAPY (ECT) - SINGLE SEIZURE N/A 5/8/2018    Performed by [facility] Jr[facility] at [facility] ECT    ELECTROCONVULSIVE THERAPY (ECT) - SINGLE SEIZURE N/A 4/24/2018    Performed by [facility]ib [facility] Jr[facility] at [facility] ECT    ELECTROCONVULSIVE THERAPY (ECT) - SINGLE SEIZURE N/A 4/17/2018    Performed by [facility] at [facility] ECT    ELECTROCONVULSIVE THERAPY (ECT) - SINGLE SEIZURE N/A 4/13/2018    Performed by [facility] at [facility] ECT    ELECTROCONVULSIVE THERAPY (ECT) - SINGLE SEIZURE N/A 4/3/2018    Performed by [facility] Jr[facility] at [facility] ECT    ELECTROCONVULSIVE THERAPY (ECT) - SINGLE SEIZURE N/A 3/20/2018    Performed by [facility]ib [facility] at [facility] ECT    ELECTROCONVULSIVE THERAPY (ECT) - SINGLE SEIZURE N/A 3/15/2018    Performed by [facility]ib [facility] at [facility] ECT    ELECTROCONVULSIVE THERAPY (ECT) - SINGLE SEIZURE N/A 3/6/2018    Performed by [facility]  ECT    ELECTROCONVULSIVE THERAPY (ECT) - SINGLE SEIZURE N/A 3/1/2018    [facility] ECT    ELECTROCONVULSIVE THERAPY (ECT) - SINGLE SEIZURE N/A 2/23/2018    Per[facility] ECT    ELECTROCONVULSIVE THERAPY (ECT) - SINGLE SEIZURE N/A 2/19/2018    Per[facility] ECT    ELECTROCONVULSIVE THERAPY (ECT) - SINGLE SEIZURE N/A 2/15/2018    Per[facility] ECT    ELECTROCONVULSIVE THERAPY (ECT) - SINGLE SEIZURE N/A 1/22/2018    Per[facility] by [facility] ECT    ELECTROCONVULSIVE THERAPY (ECT) - SINGLE SEIZURE N/A 12/11/2017    Per[facility] ECT    ELECTROCONVULSIVE THERAPY (ECT) - SINGLE SEIZURE N/A 10/24/2017    Per[facility] ECT    ELECTROCONVULSIVE THERAPY (ECT) - SINGLE SEIZURE N/A 9/20/2017    [facility] ECT    ELECTROCONVULSIVE THERAPY (ECT) - SINGLE SEIZURE N/A 8/14/2017    Per[facility] ECT    ELECTROCONVULSIVE THERAPY (ECT) - SINGLE SEIZURE Bilateral 7/12/2017    Per[facility] by [facility] ECT    ELECTROCONVULSIVE THERAPY (ECT) - SINGLE SEIZURE N/A 6/13/2017    Per[facility] by [facility] ECT    ELECTROCONVULSIVE THERAPY (ECT) - SINGLE SEIZURE N/A 5/17/2017    Per[facility] by [facility] ECT    ELECTROCONVULSIVE THERAPY (ECT) - SINGLE SEIZURE N/A 4/20/2017    Per[facility] by [facility] ECT    ELECTROCONVULSIVE THERAPY (ECT) - SINGLE SEIZURE N/A 11/22/2016    Per[facility] by [facility] ECT    ELECTROCONVULSIVE THERAPY (ECT) - SINGLE SEIZURE N/A 10/24/2016    Per[facility] by [facility] ECT    ELECTROCONVULSIVE THERAPY (ECT) - SINGLE SEIZURE N/A 9/22/2016    Per[facility] by [facility] ECT     ELECTROCONVULSIVE THERAPY (ECT) - SINGLE SEIZURE N/A 9/1/2016    Performed by [facility] Jr[facility] at [facility] ECT    ELECTROCONVULSIVE THERAPY (ECT) - SINGLE SEIZURE N/A 8/15/2016    Performed by Shoaib [facility] Jr[facility] at [facility] ECT    ELECTROCONVULSIVE THERAPY (ECT) - SINGLE SEIZURE N/A 8/1/2016    Performed by Shoaib Boy[facility] Jr[facility] at [facility] ECT    ELECTROCONVULSIVE THERAPY (ECT) - SINGLE SEIZURE N/A 7/22/2016    Performed by Shoaib Boyce Jr[facility] at [facility] ECT    ELECTROCONVULSIVE THERAPY (ECT) - SINGLE SEIZURE N/A 7/14/2016    Performed by Shoaib Boy[facility] Jr[facility] at [facility] ECT    ELECTROCONVULSIVE THERAPY (ECT) - SINGLE SEIZURE N/A 7/8/2016    Performed by Shoaib Boyce Jr[facility] at [facility] ECT    ELECTROCONVULSIVE THERAPY (ECT) - SINGLE SEIZURE N/A 7/5/2016    Performed by Shoaib Boyce Jr[facility] at [facility] ECT    ELECTROCONVULSIVE THERAPY (ECT) - SINGLE SEIZURE N/A 6/27/2016    Performed by Sh[facility]ib Boy[facility] Jr[facility] at [facility] ECT    ELECTROCONVULSIVE THERAPY (ECT) - SINGLE SEIZURE N/A 6/23/2016    Performed by Shoaib [facility] Jr[facility] at [facility] ECT    ELECTROCONVULSIVE THERAPY (ECT) - SINGLE SEIZURE N/A 6/20/2016    Performed by [facility] Jr[facility] at [facility] ECT    ELECTROCONVULSIVE THERAPY (ECT) - SINGLE SEIZURE N/A 6/16/2016    Performed by [facility] Jr[facility] at [facility] ECT    ELECTROCONVULSIVE THERAPY (ECT) - SINGLE SEIZURE N/A 6/15/2016    Performed by [facility] at [facility] ECT    ELECTROCONVULSIVE THERAPY (ECT) - SINGLE SEIZURE N/A 6/14/2016    Performed by Sh[facility] Jr[facility] at [facility] ECT    ELECTROCONVULSIVE THERAPY (ECT) - SINGLE SEIZURE N/A 6/13/2016    Performed by [facility] at [facility] ECT    ELECTROCONVULSIVE THERAPY (ECT) - SINGLE SEIZURE N/A 1/4/2016    Performed by [facility] at [facility] ECT    ELECTROCONVULSIVE THERAPY, CEREBRAL HEMISPHERE, UNILATERAL, 1 SEIZURE Bilateral 6/25/2018    Performed by Shoaib Boyce Jr[facility] MD at  "[facility] ECT    OVARIAN CYST REMOVAL Bilateral           OBJECTIVE:   Vitals (pre-procedure):  Vitals:    12/28/18 0627   BP: (!) 111/59   Pulse: 77   Resp: 18   Temp: 97.5 °F (36.4 °C)        Labs/Imaging/Studies:   Recent Results (from the past 48 hour(s))   POCT urine pregnancy    Collection Time: 12/28/18  6:33 AM   Result Value Ref Range    POC Preg Test, Ur Negative Negative     Acceptable Yes       No results found for: PHENYTOIN, PHENOBARB, VALPROATE, CBMZ      Physical Exam:   Gen: AAOx4, NAD  HEENT: MMM, PERRL, EOMI, O/P clear  CV: RRR, S1/S2 nml  Chest: CTAB, no R/R/W, unlabored breathing  Abd: S/NT/ND, +BS, no HSM  Ext: No C/C/E, pulse 2+ throughout  Neuro: CN II-XII grossly intact, no focal deficits     Mental Status Exam:   Appearance: age appropriate, well nourished, dressed in hospital gown  Behavior: friendly and cooperative, eye contact appropriate  Speech: conversational tone, rate, pitch, volume  Mood: "pretty good", pleasant  Affect: full, mood congruent  Thought Process: linear and organized  Thought Content: no SI, no HI or AVH  Cognition: grossly intact  Insight: good  Judgment: appropriate for setting       ASSESSMENT/PLAN:   Allyssa Wright is a 38 y.o. female with MDD, R, in partial remission who presents for ECT.    Recommendations:   Proceed with ECT #63      Andreas Paulino M.D.  [facility] PGY-4  12/28/2018  "

## 2018-12-28 NOTE — PLAN OF CARE
No apparent s&s of distress noted at this time, no complaints voiced at this time. Discharge instructions reviewed with patient/family/friend with good verbal feedback received. Patient ready for discharge

## 2019-01-01 NOTE — DISCHARGE INSTRUCTIONS
Understanding Electroconvulsive Therapy  Electroconvulsive therapy (ECT) is sometimes called shock therapy. This may sound painful, but ECT doesnt hurt. Its often the safest and best treatment for severe depression. It can treat other mental disorders as well.  What is electroconvulsive therapy?  ECT is used to treat people who are very depressed. Its mainly used when other treatments, such as antidepressant medications, have failed. Often, it may relieve feelings of sadness and despair in just a few days.  Common symptoms of major depression  Symptoms of major depression include:  · Feeling a deep sadness that doesnt go away  · Losing all pleasure in life  · Feeling hopeless or helpless  · Feeling guilty  · Sleeping more or less than normal  · Eating more or less than normal  · Having headaches or stomachaches, or other pains that dont go away  · Feeling nervous, empty, or worthless  · Crying a great deal  · Thinking or talking about suicide or death  How is ECT therapy done?  Before an ECT treatment, youll receive anesthesia to keep you pain-free. Youll also be given medication to relax your muscles and control your heart rate. Your health care provider then places electrodes on your head. You may have one above each temple (bilateral ECT). Or, you may have electrodes on one temple and on your forehead (unilateral ECT). While you are asleep, your brain is stimulated very briefly with an electric current. This causes a  seizure, usually lasting less than a minute. Because you are under anesthesia, your body will not move even as your brain goes through great changes.  What are the risks?  When done properly, ECT is quite safe. Right after the treatment, you may be confused. This typically lasts for less than half an hour. You may have a headache or stiff muscles. But these symptoms often go away quickly. A more serious potential  side effect is memory loss. Commonly, patients have temporary difficulty  remembering information that they learned recently, and may have little recall of the period during which they received treatment. Less commonly, patients may have permanent, spotty recall for significant past events or, rarely, loss of memory for larger blocks of time.  Looking to the future  In most cases, ECT does not cure depression, but it can improve symptoms for a period of time. You may need a series of ECT treatments to continue feeling the benefit. You may also need to take antidepressant medications to help prevent symptoms from returning. But with ongoing treatment, you can have a full and healthy life.  Resources  The National [place] [employer]  401.938.1021  www.[facility].nih.gov  [facility]  167.160.1525  www.[facility].org     Date Last Reviewed: 3/23/2015  © 3657-0222 [employer]. [address] [place] [address], [place] [address]. All rights reserved. This information is not intended as a substitute for professional medical care. Always follow your healthcare professional's instructions.         2019 08:33

## 2019-01-09 NOTE — ANESTHESIA POSTPROCEDURE EVALUATION
"Anesthesia Post Evaluation    Patient: Allyssa Wright    Procedure(s) Performed: Procedure(s) (LRB):  ELECTROCONVULSIVE THERAPY (ECT) - SINGLE SEIZURE (N/A)    Final Anesthesia Type: general  Patient location during evaluation: PACU  Patient participation: Yes- Able to Participate  Level of consciousness: awake and alert and oriented  Post-procedure vital signs: reviewed and stable  Pain management: adequate  Airway patency: patent  PONV status at discharge: No PONV  Anesthetic complications: no      Cardiovascular status: hemodynamically stable  Respiratory status: unassisted, spontaneous ventilation and room air  Hydration status: euvolemic  Follow-up not needed.        Visit Vitals  /70 (BP Location: Left arm, Patient Position: Lying)   Pulse 90   Temp 37.2 °C (98.9 °F)   Resp 20   Ht 5' 5" (1.651 m)   Wt 65.8 kg (145 lb)   SpO2 97%   Breastfeeding? No   BMI 24.13 kg/m²       Pain/Roma Score: No Data Recorded      "

## 2019-01-17 ENCOUNTER — ANESTHESIA (OUTPATIENT)
Dept: ELECTROPHYSIOLOGY | Facility: HOSPITAL | Age: 39
End: 2019-01-17
Payer: COMMERCIAL

## 2019-01-17 ENCOUNTER — ANESTHESIA EVENT (OUTPATIENT)
Dept: ELECTROPHYSIOLOGY | Facility: HOSPITAL | Age: 39
End: 2019-01-17
Payer: COMMERCIAL

## 2019-01-17 ENCOUNTER — HOSPITAL ENCOUNTER (OUTPATIENT)
Facility: HOSPITAL | Age: 39
Discharge: HOME | End: 2019-01-17
Attending: PSYCHIATRY & NEUROLOGY | Admitting: PSYCHIATRY & NEUROLOGY
Payer: COMMERCIAL

## 2019-01-17 VITALS
OXYGEN SATURATION: 100 % | DIASTOLIC BLOOD PRESSURE: 56 MMHG | RESPIRATION RATE: 18 BRPM | TEMPERATURE: 99 F | HEIGHT: 65 IN | BODY MASS INDEX: 24.66 KG/M2 | HEART RATE: 100 BPM | WEIGHT: 148 LBS | SYSTOLIC BLOOD PRESSURE: 118 MMHG

## 2019-01-17 DIAGNOSIS — F33.41 MDD (MAJOR DEPRESSIVE DISORDER), RECURRENT, IN PARTIAL REMISSION: Primary | ICD-10-CM

## 2019-01-17 LAB
B-HCG UR QL: NEGATIVE
CTP QC/QA: YES

## 2019-01-17 PROCEDURE — 25000003 PHARM REV CODE 250: Performed by: PSYCHIATRY & NEUROLOGY

## 2019-01-17 PROCEDURE — 90870 ELECTROCONVULSIVE THERAPY: CPT | Performed by: ANESTHESIOLOGY

## 2019-01-17 PROCEDURE — D9220A PRA ANESTHESIA: Mod: ,,, | Performed by: ANESTHESIOLOGY

## 2019-01-17 PROCEDURE — 81025 URINE PREGNANCY TEST: CPT | Performed by: PSYCHIATRY & NEUROLOGY

## 2019-01-17 PROCEDURE — 63600175 PHARM REV CODE 636 W HCPCS: Performed by: STUDENT IN AN ORGANIZED HEALTH CARE EDUCATION/TRAINING PROGRAM

## 2019-01-17 PROCEDURE — 90870 PR ELECTROCONVULSIVE THERAPY,1 SEIZ: ICD-10-PCS | Mod: ,,, | Performed by: PSYCHIATRY & NEUROLOGY

## 2019-01-17 PROCEDURE — D9220A PRA ANESTHESIA: ICD-10-PCS | Mod: ,,, | Performed by: ANESTHESIOLOGY

## 2019-01-17 PROCEDURE — 90870 ELECTROCONVULSIVE THERAPY: CPT | Mod: ,,, | Performed by: PSYCHIATRY & NEUROLOGY

## 2019-01-17 RX ORDER — ONDANSETRON 2 MG/ML
4 INJECTION INTRAMUSCULAR; INTRAVENOUS ONCE AS NEEDED
Status: DISCONTINUED | OUTPATIENT
Start: 2019-01-17 | End: 2019-01-17 | Stop reason: HOSPADM

## 2019-01-17 RX ORDER — SODIUM CHLORIDE 0.9 % (FLUSH) 0.9 %
3 SYRINGE (ML) INJECTION
Status: DISCONTINUED | OUTPATIENT
Start: 2019-01-17 | End: 2019-01-17 | Stop reason: HOSPADM

## 2019-01-17 RX ORDER — ONDANSETRON 2 MG/ML
INJECTION INTRAMUSCULAR; INTRAVENOUS
Status: DISCONTINUED | OUTPATIENT
Start: 2019-01-17 | End: 2019-01-17

## 2019-01-17 RX ORDER — SODIUM CHLORIDE 9 MG/ML
INJECTION, SOLUTION INTRAVENOUS ONCE
Status: COMPLETED | OUTPATIENT
Start: 2019-01-18 | End: 2019-01-17

## 2019-01-17 RX ORDER — KETOROLAC TROMETHAMINE 30 MG/ML
30 INJECTION, SOLUTION INTRAMUSCULAR; INTRAVENOUS ONCE AS NEEDED
Status: DISCONTINUED | OUTPATIENT
Start: 2019-01-17 | End: 2019-01-17 | Stop reason: HOSPADM

## 2019-01-17 RX ORDER — KETOROLAC TROMETHAMINE 30 MG/ML
INJECTION, SOLUTION INTRAMUSCULAR; INTRAVENOUS
Status: DISCONTINUED | OUTPATIENT
Start: 2019-01-17 | End: 2019-01-17

## 2019-01-17 RX ORDER — LIDOCAINE HYDROCHLORIDE 10 MG/ML
1 INJECTION, SOLUTION EPIDURAL; INFILTRATION; INTRACAUDAL; PERINEURAL ONCE
Status: COMPLETED | OUTPATIENT
Start: 2019-01-17 | End: 2019-01-17

## 2019-01-17 RX ADMIN — KETOROLAC TROMETHAMINE 30 MG: 30 INJECTION, SOLUTION INTRAMUSCULAR at 07:01

## 2019-01-17 RX ADMIN — ONDANSETRON 4 MG: 2 INJECTION, SOLUTION INTRAMUSCULAR; INTRAVENOUS at 07:01

## 2019-01-17 RX ADMIN — LIDOCAINE HYDROCHLORIDE 0.1 MG: 10 INJECTION, SOLUTION EPIDURAL; INFILTRATION; INTRACAUDAL; PERINEURAL at 06:01

## 2019-01-17 RX ADMIN — Medication 500 MG: at 06:01

## 2019-01-17 RX ADMIN — SODIUM CHLORIDE: 0.9 INJECTION, SOLUTION INTRAVENOUS at 06:01

## 2019-01-17 NOTE — H&P
"Allyssa Wright  : 1980   MRN: [id number]  Date: 2019     Psychiatric - ECT  H&P     Chief complaint: MDD, recurrent, in partial remission  Procedure: ECT #64    SUBJECTIVE:   HPI:   Allyssa Wright is a 38 y.o. female with MDD recurrent in partial remission who presents for ECT.     On my interview this morning, she reports feeling "good" this morning and rates her mood a 6/10. This morning she is accompanied by her father. Overall she reports doing well but started a new acne medication (doxycycline) that resulted in some "phsyical side effects" which also affected her "mentally". She discontinued the medication last Friday and has been feeling better both physically and mentally.  She continues to report some mild issues with her memory following ECT.  She reports sleeping well.  Good appetite.  She states that she had a good silvia and new years and spent it with her family. No reported SI today.    Please see Dr. Boyce's full pre-ECT evaluation for further details.  The patient has not had anything by mouth since midnight and is ready for ECT. She sees Dr. Jc outpatient and does not need any medication refills at this time.      Psychiatric Review of Systems:  Mood: "good", 6/10  Anxiety: Present, unchanged. No acute concerns  Appetite: No problem  Psychomotor: No problem  Cognitive Impairment: mild, consistent with ECT  Insomnia: None  Psychosis: None  Diurnal Variation: None  Suicidal Ideation: Absent    Medical Review Of Systems:  Pertinent items are noted in HPI.    Current Medications:   No current facility-administered medications on file prior to encounter.      Current Outpatient Medications on File Prior to Encounter   Medication Sig Dispense Refill    clozapine (CLOZARIL) 50 MG tablet Take 50 mg by mouth once daily.       dapsone 7.5 % GlwP Apply topically once daily.      famciclovir (FAMVIR) 500 MG tablet Take 1 tablet (500 mg total) by mouth 2 (two) times daily. 30 tablet 12    " hydrOXYzine pamoate (VISTARIL) 25 MG Cap Take 2 capsules (50 mg total) by mouth nightly as needed (Take 25-50mg (1-2 caps) at night for anxiety prior to ECT). 180 capsule 0    mirtazapine (REMERON) 15 MG tablet Take 1 tablet (15 mg total) by mouth every evening. (Patient taking differently: Take 7.5 mg by mouth every evening. ) 90 tablet 0    spironolactone (ALDACTONE) 50 MG tablet 50 mg 2 (two) times daily. 50  mg qAM, 50 mg qHS.      thyroid (ARMOUR THYROID) 30 mg Tab Take 1 tablet (30 mg total) by mouth every morning. 30 tablet 11    trazodone (DESYREL) 100 MG tablet Take 200 mg by mouth every evening.       UNABLE TO FIND 2 (two) times daily. n-azetyl-cysteine      venlafaxine (EFFEXOR) 100 MG Tab Take 3 tablets (300 mg total) by mouth once daily. (Patient taking differently: Take 225 mg by mouth once daily. )      vortioxetine (TRINTELLIX) 5 mg Tab Take 2.5 mg by mouth once daily.      ZOVIRAX 5 % Crea   5    dextroamphetamine-amphetamine 30 mg Tab Take 30 mg by mouth 2 (two) times daily.       doxycycline (ORACEA) 40 mg capsule Take 40 mg by mouth once daily.            Allergies:   Review of patient's allergies indicates:   Allergen Reactions    Benzodiazepines Other (See Comments)     Contraindicated while taking Clozapine    Ampicillin      Mom says so    Erythromycin     Levaquin [levofloxacin] Other (See Comments)     Depression side effects    Penicillins      Mom says so    Pristiq [desvenlafaxine]      psycotic      Sulfa (sulfonamide antibiotics)      Rash      Azithromycin Anxiety        Past Medical/Surgical History:   Past Medical History:   Diagnosis Date    Anxiety     Depression     History of psychiatric hospitalization     HSV-1 (herpes simplex virus 1) infection     Hx of psychiatric care     Moderate depressed bipolar II disorder 06/13/2016    reports no history of bipolar    Obsessive-compulsive disorder     Psychiatric problem     Schizophrenia 4/3/2018     Self-harming behavior     Therapy      Past Surgical History:   Procedure Laterality Date    ANKLE SURGERY Right     BREAST augmentation      ELECTROCONVULSIVE THERAPY (ECT) - SINGLE SEIZURE N/A 12/6/2018    Performed by [facility]ib [facility] Jr[facility] at [facility] ECT    ELECTROCONVULSIVE THERAPY (ECT) - SINGLE SEIZURE N/A 8/31/2018    Performed by [facility]ib [facility] Jr[facility] at [facility] ECT    ELECTROCONVULSIVE THERAPY (ECT) - SINGLE SEIZURE N/A 8/6/2018    Performed by [facility]ib [facility] Jr[facility] at [facility] ECT    ELECTROCONVULSIVE THERAPY (ECT) - SINGLE SEIZURE N/A 7/23/2018    Performed by [facility]ib [facility] Jr[facility] at [facility] ECT    ELECTROCONVULSIVE THERAPY (ECT) - SINGLE SEIZURE N/A 7/5/2018    Performed by [facility]ib [facility] Jr[facility] at [facility] ECT    ELECTROCONVULSIVE THERAPY (ECT) - SINGLE SEIZURE N/A 6/28/2018    Performed by [facility]ib [facility]ce Jr[facility] at [facility] ECT    ELECTROCONVULSIVE THERAPY (ECT) - SINGLE SEIZURE N/A 6/13/2018    Performed by [facility]ib [facility]ce Jr[facility] at [facility] ECT    ELECTROCONVULSIVE THERAPY (ECT) - SINGLE SEIZURE N/A 5/29/2018    Performed by Sh[facility]ib [facility]ce Jr[facility] at [facility] ECT    ELECTROCONVULSIVE THERAPY (ECT) - SINGLE SEIZURE N/A 5/8/2018    Performed by [facility]ib [facility] Jr[facility] at [facility] ECT    ELECTROCONVULSIVE THERAPY (ECT) - SINGLE SEIZURE N/A 4/24/2018    Performed by Sh[facility]ib Boyce Jr[facility] at [facility] ECT    ELECTROCONVULSIVE THERAPY (ECT) - SINGLE SEIZURE N/A 4/17/2018    Performed by Sh[facility]ib Boyce Jr[facility] at [facility] ECT    ELECTROCONVULSIVE THERAPY (ECT) - SINGLE SEIZURE N/A 4/13/2018    Performed by Sh[facility]ib Boyce Jr[facility] at [facility] ECT    ELECTROCONVULSIVE THERAPY (ECT) - SINGLE SEIZURE N/A 4/3/2018    Performed by Shoaib Boyce Jr[facility] at [facility] ECT    ELECTROCONVULSIVE THERAPY (ECT) - SINGLE SEIZURE N/A 3/20/2018    Performed by Sh[facility]ib Boyce Jr[facility] at [facility] ECT    ELECTROCONVULSIVE THERAPY (ECT) - SINGLE SEIZURE N/A 3/15/2018    Performed by Sh[facility]ib [facility]ce Jr[facility] at [facility]  ECT    ELECTROCONVULSIVE THERAPY (ECT) - SINGLE SEIZURE N/A 3/6/2018    Performed by [facility] at [facility] ECT    ELECTROCONVULSIVE THERAPY (ECT) - SINGLE SEIZURE N/A 3/1/2018    Performed by [facility]ib Boyce Jr[facility] at [facility] ECT    ELECTROCONVULSIVE THERAPY (ECT) - SINGLE SEIZURE N/A 2/23/2018    Performed by Sh[facility]ib Boyce Jr[facility] at [facility] ECT    ELECTROCONVULSIVE THERAPY (ECT) - SINGLE SEIZURE N/A 2/19/2018    Performed by Shoaib Boyce Jr[facility] at [facility] ECT    ELECTROCONVULSIVE THERAPY (ECT) - SINGLE SEIZURE N/A 2/15/2018    Performed by Shoaib Boyce Jr[facility] at [facility] ECT    ELECTROCONVULSIVE THERAPY (ECT) - SINGLE SEIZURE N/A 1/22/2018    Performed by Shoaib Boyce Jr[facility] at [facility] ECT    ELECTROCONVULSIVE THERAPY (ECT) - SINGLE SEIZURE N/A 12/11/2017    Performed by Sh[facility]ib Boyce Jr[facility] at [facility] ECT    ELECTROCONVULSIVE THERAPY (ECT) - SINGLE SEIZURE N/A 10/24/2017    Performed by Shoaib Boyce Jr[facility] at [facility] ECT    ELECTROCONVULSIVE THERAPY (ECT) - SINGLE SEIZURE N/A 9/20/2017    Performed by Shoaib Boyce Jr[facility] at [facility] ECT    ELECTROCONVULSIVE THERAPY (ECT) - SINGLE SEIZURE N/A 8/14/2017    Performed by Shoaib Boyce Jr[facility] at [facility] ECT    ELECTROCONVULSIVE THERAPY (ECT) - SINGLE SEIZURE Bilateral 7/12/2017    Performed by Shoaib Boyce Jr[facility] at [facility] ECT    ELECTROCONVULSIVE THERAPY (ECT) - SINGLE SEIZURE N/A 6/13/2017    Performed by Shoaib Boyce Jr[facility] at [facility] ECT    ELECTROCONVULSIVE THERAPY (ECT) - SINGLE SEIZURE N/A 5/17/2017    Performed by Shoaib Boyce Jr[facility] MD at [facility] ECT    ELECTROCONVULSIVE THERAPY (ECT) - SINGLE SEIZURE N/A 4/20/2017    Performed by Shoaib Boyce Jr[facility] MD at [facility] ECT    ELECTROCONVULSIVE THERAPY (ECT) - SINGLE SEIZURE N/A 11/22/2016    Performed by Shoaib Boyce Jr[facility] at [facility] ECT    ELECTROCONVULSIVE THERAPY (ECT) - SINGLE SEIZURE N/A 10/24/2016    Performed by Shoaib Boyce Jr[facility] MD at [facility] ECT     ELECTROCONVULSIVE THERAPY (ECT) - SINGLE SEIZURE N/A 9/22/2016    Performed by [facility] at [facility] ECT    ELECTROCONVULSIVE THERAPY (ECT) - SINGLE SEIZURE N/A 9/1/2016    Performed by Shoaib Boyce Jr., MD at [facility] ECT    ELECTROCONVULSIVE THERAPY (ECT) - SINGLE SEIZURE N/A 8/15/2016    Performed by Shoaib Boyce Jr., MD at [facility] ECT    ELECTROCONVULSIVE THERAPY (ECT) - SINGLE SEIZURE N/A 8/1/2016    Performed by Shoaib Boyce Jr[facility] MD at [facility] ECT    ELECTROCONVULSIVE THERAPY (ECT) - SINGLE SEIZURE N/A 7/22/2016    Performed by Shoaib Boyce Jr., MD at [facility] ECT    ELECTROCONVULSIVE THERAPY (ECT) - SINGLE SEIZURE N/A 7/14/2016    Performed by Shoaib Boyce Jr., [facility] at [facility] ECT    ELECTROCONVULSIVE THERAPY (ECT) - SINGLE SEIZURE N/A 7/8/2016    Performed by Shoaib Boyce Jr., MD at [facility] ECT    ELECTROCONVULSIVE THERAPY (ECT) - SINGLE SEIZURE N/A 7/5/2016    Performed by Shoaib Boyce Jr[facility] at [facility] ECT    ELECTROCONVULSIVE THERAPY (ECT) - SINGLE SEIZURE N/A 6/27/2016    Performed by Shoaib Boyce Jr[facility] at [facility] ECT    ELECTROCONVULSIVE THERAPY (ECT) - SINGLE SEIZURE N/A 6/23/2016    Performed by Shoaib Boyce Jr[facility] at [facility] ECT    ELECTROCONVULSIVE THERAPY (ECT) - SINGLE SEIZURE N/A 6/20/2016    Performed by Shoaib Boyce Jr[facility] at [facility] ECT    ELECTROCONVULSIVE THERAPY (ECT) - SINGLE SEIZURE N/A 6/16/2016    Performed by Shoaib Boyce Jr[facility] MD at [facility] ECT    ELECTROCONVULSIVE THERAPY (ECT) - SINGLE SEIZURE N/A 6/15/2016    Performed by Shoaib Boyce Jr[facility] MD at [facility] ECT    ELECTROCONVULSIVE THERAPY (ECT) - SINGLE SEIZURE N/A 6/14/2016    Performed by Shoaib Boyce Jr[facility] at [facility] ECT    ELECTROCONVULSIVE THERAPY (ECT) - SINGLE SEIZURE N/A 6/13/2016    Performed by Sh[facility]ib Boy[facility] Jr[facility] at [facility] ECT    ELECTROCONVULSIVE THERAPY (ECT) - SINGLE SEIZURE N/A 1/4/2016    Performed by [facility]ib [facility] Jr[facility] at [facility] ECT     "ELECTROCONVULSIVE THERAPY, CEREBRAL HEMISPHERE, UNILATERAL, 1 SEIZURE Bilateral 6/25/2018    Performed by [facility] at [facility] ECT    OVARIAN CYST REMOVAL Bilateral           OBJECTIVE:   Vitals (pre-procedure):  Vitals:    01/17/19 0617   BP: (!) 104/54   Pulse: 75   Resp: 18   Temp: 97.5 °F (36.4 °C)        Labs/Imaging/Studies:   Recent Results (from the past 48 hour(s))   POCT urine pregnancy    Collection Time: 01/17/19  6:29 AM   Result Value Ref Range    POC Preg Test, Ur Negative Negative     Acceptable Yes       No results found for: PHENYTOIN, PHENOBARB, VALPROATE, CBMZ      Physical Exam:   Gen: AAOx4, NAD  HEENT: MMM, PERRL, EOMI, O/P clear  CV: RRR, S1/S2 nml  Chest: CTAB, no R/R/W, unlabored breathing  Abd: S/NT/ND, +BS, no HSM  Ext: No C/C/E, pulse 2+ throughout  Neuro: CN II-XII grossly intact, no focal deficits     Mental Status Exam:   Appearance: age appropriate, well nourished, dressed in hospital gown  Behavior: friendly and cooperative, eye contact appropriate  Speech: conversational tone, rate, pitch, volume  Mood: " good", 6/10  Affect: full,, mood congruent, appropriate  Thought Process: linear and organized  Thought Content: no SI, no HI or AVH  Cognition: grossly intact  Insight: good  Judgment: appropriate for setting       ASSESSMENT/PLAN:   Allyssa Wright is a 38 y.o. female with MDD, R, in partial remission who presents for ECT.    Recommendations:   Proceed with ECT #64      Gena Mcguire M.D.  [facility] PGY-2  1/17/2019  "

## 2019-01-17 NOTE — DISCHARGE SUMMARY
Allyssa Wright  : 1980   MRN: [id number]  Date: 2019       Psychiatry - ECT  Discharge Summary    Admit Date: 2019  5:51 AM  Discharge Date: 2019    Attending Physician: Shoaib Boyce MD  Discharge Provider: Gena Mcguire MD,     History of Present Illness: Allyssa Wright is a 38 y.o. female with MDD presented for ECT #64. See H&P dated 2019 for full HPI. See Dr Boyce's pre-ECT eval.    Hospital Course: The pt tolerated the ECT treatment well with minimal complication. Pt was stable post-procedure. See OP note dated 2019 for more details.     Disposition: Home or Self Care    Medications:  Reconciled Home Medications:      Medication List      CHANGE how you take these medications    mirtazapine 15 MG tablet  Commonly known as:  REMERON  Take 1 tablet (15 mg total) by mouth every evening.  What changed:  how much to take     venlafaxine 100 MG Tab  Commonly known as:  EFFEXOR  Take 3 tablets (300 mg total) by mouth once daily.  What changed:  how much to take        CONTINUE taking these medications    cloZAPine 50 MG tablet  Commonly known as:  CLOZARIL  Take 50 mg by mouth once daily.     dapsone 7.5 % Glwp  Commonly known as:  ACZONE  Apply topically once daily.     dextroamphetamine-amphetamine 30 mg Tab  Take 30 mg by mouth 2 (two) times daily.     famciclovir 500 MG tablet  Commonly known as:  FAMVIR  Take 1 tablet (500 mg total) by mouth 2 (two) times daily.     hydrOXYzine pamoate 25 MG Cap  Commonly known as:  VISTARIL  Take 2 capsules (50 mg total) by mouth nightly as needed (Take 25-50mg (1-2 caps) at night for anxiety prior to ECT).     spironolactone 50 MG tablet  Commonly known as:  ALDACTONE  50 mg 2 (two) times daily. 50  mg qAM, 50 mg qHS.     thyroid (pork) 30 mg Tab  Commonly known as:  ARMOUR THYROID  Take 1 tablet (30 mg total) by mouth every morning.     traZODone 100 MG tablet  Commonly known as:  DESYREL  Take 200 mg by mouth every evening.      TRINTELLIX 5 mg Tab  Generic drug:  vortioxetine  Take 2.5 mg by mouth once daily.     UNABLE TO FIND  2 (two) times daily. n-azetyl-cysteine     ZOVIRAX 5 % Crea  Generic drug:  acyclovir 5%        STOP taking these medications    doxycycline 40 mg capsule  Commonly known as:  ORACEA              Pt's Status at Discharge: alert and medically stable    Discharge Diagnoses:  MDD, Recurrent, in partial remission    Diet: Resume previous outpt diet  Activity: Ambulate with assistance - pt is a fall risk  Instructions: Please do not eat or drink anything after midnight prior to procedure. Please do not drive on day of ECT.     Med Changes:  Discontinued  Doxycycline 40 mg daily for acne 2/2 AE      Next ECT: ECT #65 2/13/19  Follow-up Information     [facility] On 2/13/2019.    Specialty:  Emergency Medicine  Why:  ECT  Contact information:  [address]  [facility] [address] [phone]                 Gena Mcguire M.D.  [facility] PGY-2  1/17/2019

## 2019-01-17 NOTE — TRANSFER OF CARE
"Anesthesia Transfer of Care Note    Patient: Allyssa Wright    Procedure(s) Performed: Procedure(s) (LRB):  ELECTROCONVULSIVE THERAPY (ECT) - SINGLE SEIZURE (N/A)    Patient location: PACU    Anesthesia Type: general    Transport from OR: Transported from OR on 6-10 L/min O2 by face mask with adequate spontaneous ventilation    Post pain: adequate analgesia    Post assessment: no apparent anesthetic complications and tolerated procedure well    Post vital signs: stable    Level of consciousness: awake and alert    Nausea/Vomiting: no nausea/vomiting    Complications: none          Last vitals:   Visit Vitals  BP (!) 104/54 (BP Location: Left arm, Patient Position: Lying)   Pulse 75   Temp 36.4 °C (97.5 °F) (Skin)   Resp 18   Ht 5' 5" (1.651 m)   Wt 67.1 kg (148 lb)   SpO2 100%   Breastfeeding? No   BMI 24.63 kg/m²     "

## 2019-01-17 NOTE — ANESTHESIA PROCEDURE NOTES
ECT    Procedure start time: 1/17/2019 7:18 AM  Timeout performed at: 1/17/2019 7:15 AM  Procedure end time: 1/17/2019 7:20 AM    Staffing  Anesthesiologist: Patrica Irving MD  CRNA/Resident: Fredrick Green MD  Performed by: resident/CRNA     Preanesthetic Checklist  The following were completed as part of the preanesthetic checklist: patient identified, procedural consent, pre-op evaluation, timeout performed, risks and benefits discussed, monitors and equipment checked, anesthesia consent given, oxygen available, suction available, hand hygiene performed and patient being monitored.    Setup & Induction  Patient Monitoring: heart rate, cardiac monitor, continuous pulse ox, continuous capnometry and NIBP  Patient preparation: bite block inserted, extremities padded, mandibular stabilization and patient hyperventilated  Electrode Placement: Modified Right Unilateral    The patient was moved to the ECT therapy room after being assessed and consented for ECT. After standard ASA monitors were applied and timeout performed, the patient was adequately preoxygenated. After induction of general anesthesia, adequate oxygenation and ventilation were confirmed with pulse oxymetry and end tidal CO2 monitoring via bag-mask ventilation. End tidal CO2 was monitored throughout the case and moderate hyperventilation was performed prior to beginning ECT treatment. All extremities were padded, biteblock was inserted, and mandibular stabilization was done prior to initiating ECT therapy.    Procedure  Stimulus Number 1:             Recovery  After adequate recovery from general anesthesia, the patient was transported to recovery.    ECT Findings  ECT associated findings of: None

## 2019-01-17 NOTE — DISCHARGE INSTRUCTIONS
Understanding Electroconvulsive Therapy  Electroconvulsive therapy (ECT) is sometimes called shock therapy. This may sound painful, but ECT doesnt hurt. Its often the safest and best treatment for severe depression. It can treat other mental disorders as well.  What is electroconvulsive therapy?  ECT is used to treat people who are very depressed. Its mainly used when other treatments, such as antidepressant medicines, have failed. Often it may relieve feelings of sadness and despair after 2 to 4 treatments.  Common symptoms of major depression  Symptoms of major depression include:  · Feeling a deep sadness that doesnt go away  · Losing all pleasure in life  · Feeling hopeless or helpless  · Feeling guilty  · Sleeping more or less than normal  · Eating more or less than normal  · Having headaches or stomachaches, or other pains that dont go away  · Feeling nervous, empty, or worthless  · Crying a great deal  · Thinking or talking about suicide or death  How is ECT therapy done?  Before an ECT treatment, youll be given anesthesia to keep you pain-free. Youll also be given medicine to make you sleep, relax your muscles and control your heart rate. Your healthcare provider then places electrodes on your head. You may have one above each temple (bilateral ECT). Or you may have electrodes on one temple and on your forehead (unilateral ECT). While you are asleep, your brain is stimulated very briefly with an electric current. This causes a seizure, usually lasting less than a minute. Because you are under anesthesia, your body will not move even as your brain goes through great changes.  What are the risks?  When done properly, ECT is quite safe. Right after the treatment, you may be confused. This often lasts for less than half an hour. You may have a headache or stiff muscles. But these symptoms often go away quickly. A more serious possible side effect is memory loss. Commonly, people have short-term  (temporary) trouble remembering information that they learned recently. And they may have little recall of the time when they received treatment. Less commonly, people may have long-lasting (permanent) spotty recall of major past events. In rare cases, there may be memory loss for larger blocks of time.  Looking to the future  In most cases, ECT doesnt cure depression. But it can improve symptoms for a period of time. You may need a series of ECT treatments to continue feeling the benefit. You may also need to take antidepressant medicines to help prevent symptoms from returning. But with ongoing treatment, you can have a full and healthy life.  Resources  · The [employer] [place] [employer]  940.866.3149  www.nimh.nih.gov  · [facility]  866.400.2802  www.[facility].org     Date Last Reviewed: 5/1/2017  © 7101-3390 [employer], "[employer]. [address] [place] [address], [place] [address]. All rights reserved. This information is not intended as a substitute for professional medical care. Always follow your healthcare professional's instructions.

## 2019-01-17 NOTE — OP NOTE
Allyssa Wright  : 1980   MRN: [id number]  Date: 2019       Psychiatry - ECT  Operative Note             Date of Admission: 2019  5:51 AM    Site: [facility], [address]    Attending: Ruel Benson MD   Residents: Gena Mcguire MD  Pre-op Diagnosis: MDD  ECT Treatment Number: 64  Machine Type: Mecta 5000    Patient Status: medically stable    Vitals (pre-procedure):  Vitals:    19 0617   BP: (!) 104/54   Pulse: 75   Resp: 18   Temp: 97.5 °F (36.4 °C)       Electrode Placement: Bitemporal    Stimulus Number Charge (mC) Level Pulse Width (msec) Frequency  (Hz) Duration of Stimulus (sec) Current (mA) Duration of Seizure (sec)   1 576 3 1 60 6 800 72                                   Complications: Hypertension    Maximum Blood Pressure: 230/107    Medications Given:  Caffeine 500 mg PO before  Methohexital (Brevital) 150 mg  IV before  Succinylcholine (Anectine) 140 mg  IV before  Zofran 4 mg IV before  Toradol 30 mg IV before  Esmolol 20 mg IV After    Treatment Course:  Pt tolerate procedure well. After adequate recovery from general anesthesia, the patient was transported to recovery.    Post-op Diagnosis: Same as above    Recommended for next ECT:  19    Gena Mcguire MD  [facility] PGY-2  2019

## 2019-01-17 NOTE — PLAN OF CARE
Patient tolerated procedure/anesthesia well, vss, no distress noted or reported. Denies pain, denies nausea, tolerates PO. Discharge instructions reviewed with patient and father, verbalized understanding. Consents with chart. Waiting for ride

## 2019-01-17 NOTE — ANESTHESIA PREPROCEDURE EVALUATION
[facility]  Anesthesia Pre-Operative Evaluation         Patient Name: Allyssa Wright  YOB: 1980  MRN: [id number]    SUBJECTIVE:     Pre-operative evaluation for Procedure(s) (LRB):  ELECTROCONVULSIVE THERAPY (ECT) - SINGLE SEIZURE (N/A)     01/17/2019    Allyssa Wright is a 38 y.o. female w/ a significant PMHx of V  who presents for the above procedure.    ECT # 65    Previous Meds:  Previous Meds:  - Methohexital 150mg  - Succinylcholine 140mg  - Ondansetron 4mg  - Ketorolac 30mg      Patient Active Problem List   Diagnosis    MDD (major depressive disorder), recurrent, in partial remission    Other acne    Comfort measures only status    MDD (major depressive disorder)    Major depression    Major depressive disorder       Review of patient's allergies indicates:   Allergen Reactions    Benzodiazepines Other (See Comments)     Contraindicated while taking Clozapine    Ampicillin      Mom says so    Erythromycin     Levaquin [levofloxacin] Other (See Comments)     Depression side effects    Penicillins      Mom says so    Pristiq [desvenlafaxine]      psycotic      Sulfa (sulfonamide antibiotics)      Rash      Azithromycin Anxiety       Current Inpatient Medications:      Current Facility-Administered Medications on File Prior to Visit   Medication Dose Route Frequency Provider Last Rate Last Dose    [START ON 1/18/2019] 0.9%  NaCl infusion   Intravenous Once Gena Mcguire MD        caffeine 500mg powder  500 mg Oral On Call Procedure Gena Mcguire MD        ketorolac injection 30 mg  30 mg Intravenous Once PRN Gena Mcguire MD        lidocaine (PF) 10 mg/ml (1%) injection 10 mg  1 mL Intradermal Once Gena Mcguire MD        ondansetron injection 4 mg  4 mg Intravenous Once PRN Gena Mcguire MD         Current Outpatient Medications on File Prior to Visit   Medication Sig Dispense Refill    clozapine (CLOZARIL) 50 MG tablet Take 50 mg by  mouth once daily.       dapsone 7.5 % GlwP Apply topically once daily.      dextroamphetamine-amphetamine 30 mg Tab Take 30 mg by mouth 2 (two) times daily.       doxycycline (ORACEA) 40 mg capsule Take 40 mg by mouth once daily.      famciclovir (FAMVIR) 500 MG tablet Take 1 tablet (500 mg total) by mouth 2 (two) times daily. 30 tablet 12    hydrOXYzine pamoate (VISTARIL) 25 MG Cap Take 2 capsules (50 mg total) by mouth nightly as needed (Take 25-50mg (1-2 caps) at night for anxiety prior to ECT). 180 capsule 0    mirtazapine (REMERON) 15 MG tablet Take 1 tablet (15 mg total) by mouth every evening. (Patient taking differently: Take 7.5 mg by mouth every evening. ) 90 tablet 0    spironolactone (ALDACTONE) 50 MG tablet 50 mg 2 (two) times daily. 50  mg qAM, 50 mg qHS.      thyroid (ARMOUR THYROID) 30 mg Tab Take 1 tablet (30 mg total) by mouth every morning. 30 tablet 11    trazodone (DESYREL) 100 MG tablet Take 200 mg by mouth every evening.       UNABLE TO FIND 2 (two) times daily. n-azetyl-cysteine      venlafaxine (EFFEXOR) 100 MG Tab Take 3 tablets (300 mg total) by mouth once daily. (Patient taking differently: Take 225 mg by mouth once daily. )      vortioxetine (TRINTELLIX) 5 mg Tab Take 2.5 mg by mouth once daily.      ZOVIRAX 5 % Crea   5       Past Surgical History:   Procedure Laterality Date    ANKLE SURGERY Right     BREAST augmentation      ELECTROCONVULSIVE THERAPY (ECT) - SINGLE SEIZURE N/A 12/6/2018    Performed by [facility]ib [facility] Jr., [facility] at [facility] ECT    ELECTROCONVULSIVE THERAPY (ECT) - SINGLE SEIZURE N/A 8/31/2018    Performed by Sh[facility]ib [facility] Jr[facility] at [facility] ECT    ELECTROCONVULSIVE THERAPY (ECT) - SINGLE SEIZURE N/A 8/6/2018    Performed by Sh[facility] Jr[facility] at [facility] ECT    ELECTROCONVULSIVE THERAPY (ECT) - SINGLE SEIZURE N/A 7/23/2018    Performed by Sh[facility] Jr[facility] at [facility] ECT    ELECTROCONVULSIVE THERAPY (ECT) - SINGLE SEIZURE N/A 7/5/2018     Performed by Sh[facility]ib [facility] Jr., [facility] at [facility] ECT    ELECTROCONVULSIVE THERAPY (ECT) - SINGLE SEIZURE N/A 6/28/2018    Performed by Sh[facility]ib [facility] Jr[facility] at [facility] ECT    ELECTROCONVULSIVE THERAPY (ECT) - SINGLE SEIZURE N/A 6/13/2018    Performed by Sh[facility]ib [facility] Jr[facility] at [facility] ECT    ELECTROCONVULSIVE THERAPY (ECT) - SINGLE SEIZURE N/A 5/29/2018    Performed by Sh[facility]ib [facility] Jr[facility] at [facility] ECT    ELECTROCONVULSIVE THERAPY (ECT) - SINGLE SEIZURE N/A 5/8/2018    Performed by Shoaib Boy[facility] Jr[facility] at [facility] ECT    ELECTROCONVULSIVE THERAPY (ECT) - SINGLE SEIZURE N/A 4/24/2018    Performed by Shoaib Boyce Jr[facility] at [facility] ECT    ELECTROCONVULSIVE THERAPY (ECT) - SINGLE SEIZURE N/A 4/17/2018    Performed by Sh[facility]ib [facility] Jr[facility] at [facility] ECT    ELECTROCONVULSIVE THERAPY (ECT) - SINGLE SEIZURE N/A 4/13/2018    Performed by Shoaib Boyce Jr[facility] at [facility] ECT    ELECTROCONVULSIVE THERAPY (ECT) - SINGLE SEIZURE N/A 4/3/2018    Performed by Shoaib Boyce Jr., [facility] at [facility] ECT    ELECTROCONVULSIVE THERAPY (ECT) - SINGLE SEIZURE N/A 3/20/2018    Performed by Shoaib Boyce Jr., [facility] at [facility] ECT    ELECTROCONVULSIVE THERAPY (ECT) - SINGLE SEIZURE N/A 3/15/2018    Performed by Shoaib Boyce Jr., [facility] at [facility] ECT    ELECTROCONVULSIVE THERAPY (ECT) - SINGLE SEIZURE N/A 3/6/2018    Performed by Shoaib Boyce Jr., [facility] at [facility] ECT    ELECTROCONVULSIVE THERAPY (ECT) - SINGLE SEIZURE N/A 3/1/2018    Performed by Shoaib Boyce Jr., [facility] at [facility] ECT    ELECTROCONVULSIVE THERAPY (ECT) - SINGLE SEIZURE N/A 2/23/2018    Performed by Shoaib Boy[facility] Jr[facility] at [facility] ECT    ELECTROCONVULSIVE THERAPY (ECT) - SINGLE SEIZURE N/A 2/19/2018    Performed by Sh[facility]ib Boy[facility] Jr[facility] at [facility] ECT    ELECTROCONVULSIVE THERAPY (ECT) - SINGLE SEIZURE N/A 2/15/2018    Performed by Sh[facility]ib [facility] Jr[facility] at [facility] ECT    ELECTROCONVULSIVE THERAPY (ECT) - SINGLE SEIZURE N/A 1/22/2018    Performed by  Sho[facility]ib [facility]ce Jr., MD at [facility] ECT    ELECTROCONVULSIVE THERAPY (ECT) - SINGLE SEIZURE N/A 12/11/2017    Performed by Shoaib Boyce Jr., MD at [facility] ECT    ELECTROCONVULSIVE THERAPY (ECT) - SINGLE SEIZURE N/A 10/24/2017    Performed by Shoaib Boyce Jr., [facility] at [facility] ECT    ELECTROCONVULSIVE THERAPY (ECT) - SINGLE SEIZURE N/A 9/20/2017    Performed by Shoaib Boyce Jr., MD at [facility] ECT    ELECTROCONVULSIVE THERAPY (ECT) - SINGLE SEIZURE N/A 8/14/2017    Performed by Shoaib Boyce Jr., MD at [facility] ECT    ELECTROCONVULSIVE THERAPY (ECT) - SINGLE SEIZURE Bilateral 7/12/2017    Performed by Shoaib Boyce Jr., MD at [facility] ECT    ELECTROCONVULSIVE THERAPY (ECT) - SINGLE SEIZURE N/A 6/13/2017    Performed by Shoaib Boyce Jr., MD at [facility] ECT    ELECTROCONVULSIVE THERAPY (ECT) - SINGLE SEIZURE N/A 5/17/2017    Performed by Shoaib Boyce Jr., MD at [facility] ECT    ELECTROCONVULSIVE THERAPY (ECT) - SINGLE SEIZURE N/A 4/20/2017    Performed by Shoaib Boyce Jr., MD at [facility] ECT    ELECTROCONVULSIVE THERAPY (ECT) - SINGLE SEIZURE N/A 11/22/2016    Performed by Shoaib Boyce Jr., MD at [facility] ECT    ELECTROCONVULSIVE THERAPY (ECT) - SINGLE SEIZURE N/A 10/24/2016    Performed by Shoaib Boyce Jr., MD at [facility] ECT    ELECTROCONVULSIVE THERAPY (ECT) - SINGLE SEIZURE N/A 9/22/2016    Performed by Shoaib Boyce Jr., MD at [facility] ECT    ELECTROCONVULSIVE THERAPY (ECT) - SINGLE SEIZURE N/A 9/1/2016    Performed by Shoaib Boyce Jr., MD at [facility] ECT    ELECTROCONVULSIVE THERAPY (ECT) - SINGLE SEIZURE N/A 8/15/2016    Performed by Shoaib Boyce Jr., MD at [facility] ECT    ELECTROCONVULSIVE THERAPY (ECT) - SINGLE SEIZURE N/A 8/1/2016    Performed by Shoaib Boyce Jr., MD at [facility] E[facility]    ELECTROCONVULSIVE THERAPY (ECT) - SINGLE SEIZURE N/A 7/22/2016    Performed by Shoaib Boyce Jr., MD at [facility] ECT    ELECTROCONVULSIVE THERAPY (ECT) - SINGLE SEIZURE N/A 7/14/2016    Performed by  [facility] at [facility] ECT    ELECTROCONVULSIVE THERAPY (ECT) - SINGLE SEIZURE N/A 7/8/2016    Performed by [facility] Jr[facility] at [facility] ECT    ELECTROCONVULSIVE THERAPY (ECT) - SINGLE SEIZURE N/A 7/5/2016    Performed by [facility] Jr[facility] at [facility] ECT    ELECTROCONVULSIVE THERAPY (ECT) - SINGLE SEIZURE N/A 6/27/2016    Performed by [facility] Jr[facility] at [facility] ECT    ELECTROCONVULSIVE THERAPY (ECT) - SINGLE SEIZURE N/A 6/23/2016    Performed by Sh[facility]ib [facility] Jr[facility] at [facility] ECT    ELECTROCONVULSIVE THERAPY (ECT) - SINGLE SEIZURE N/A 6/20/2016    Performed by Sh[facility] Jr[facility] at [facility] E[facility]    ELECTROCONVULSIVE THERAPY (ECT) - SINGLE SEIZURE N/A 6/16/2016    Performed by Sh[facility]ib [facility] Jr[facility] at [facility] E[facility]    ELECTROCONVULSIVE THERAPY (ECT) - SINGLE SEIZURE N/A 6/15/2016    Performed by Sh[facility]ib [facility] Jr[facility] at [facility] E[facility]    ELECTROCONVULSIVE THERAPY (ECT) - SINGLE SEIZURE N/A 6/14/2016    Performed by Sh[facility]ib [facility] Jr[facility] at [facility] E[facility]    ELECTROCONVULSIVE THERAPY (ECT) - SINGLE SEIZURE N/A 6/13/2016    Performed by [facility] Jr[facility] at [facility] E[facility]    ELECTROCONVULSIVE THERAPY (ECT) - SINGLE SEIZURE N/A 1/4/2016    Performed by [facility] Jr[facility] at [facility]    ELECTROCONVULSIVE THERAPY, CEREBRAL HEMISPHERE, UNILATERAL, 1 SEIZURE Bilateral 6/25/2018    Performed by [facility] at [facility] E[facility]    OVARIAN CYST REMOVAL Bilateral        Social History     Socioeconomic History    Marital status: Single     Spouse name: Not on file    Number of children: Not on file    Years of education: Not on file    Highest education level: Not on file   Social Needs    Financial resource strain: Not on file    Food insecurity - worry: Not on file    Food insecurity - inability: Not on file    Transportation needs - medical: Not on file    Transportation needs - non-medical: Not on file   Occupational History    Occupation: unemployed   Tobacco  Use    Smoking status: Former Smoker     Last attempt to quit: 2011     Years since quittin.7    Smokeless tobacco: Never Used   Substance and Sexual Activity    Alcohol use: Yes     Comment: rarely    Drug use: No     Comment: Experimental use in high school    Sexual activity: Not on file   Other Topics Concern    Patient feels they ought to cut down on drinking/drug use Not Asked    Patient annoyed by others criticizing their drinking/drug use Not Asked    Patient has felt bad or guilty about drinking/drug use Not Asked    Patient has had a drink/used drugs as an eye opener in the AM Not Asked   Social History Narrative    Pt has 1 older brother from an intact family until the death of her mother in .  She completed 1 year of college, was never in the , and has never been employed.  She was engaged once, but never , has no children, and lives with her father plus 2 dogs.  She denies any hobbies and is spiritual but not Yarsani.  She does date and returns to college during rare periods when depression and anxiety [place].       OBJECTIVE:     Vital Signs Range (Last 24H):  Temp:  [36.4 °C (97.5 °F)]   Pulse:  [75]   Resp:  [18]   BP: (104)/(54)   SpO2:  [100 %]       CBC:   No results for input(s): WBC, RBC, HGB, HCT, PLT, MCV, MCH, MCHC in the last 72 hours.    CMP: No results for input(s): NA, K, CL, CO2, BUN, CREATININE, GLU, MG, PHOS, CALCIUM, ALBUMIN, PROT, ALKPHOS, ALT, AST, BILITOT in the last 72 hours.    INR:  No results for input(s): PT, INR, PROTIME, APTT in the last 72 hours.    Diagnostic Studies: No relevant studies.    EKG: No recent studies available.    2D ECHO:  No results found for this or any previous visit.      ASSESSMENT/PLAN:         Pre-op Assessment    I have reviewed the Patient Summary Reports.     I have reviewed the Nursing Notes.   I have reviewed the Medications.     Review of Systems  Anesthesia Hx:  No problems with previous Anesthesia  Denies  Family Hx of Anesthesia complications.   Denies Personal Hx of Anesthesia complications.   Social:  Former Smoker, Alcohol Use    Hematology/Oncology:  Hematology Normal   Oncology Normal     EENT/Dental:EENT/Dental Normal   Cardiovascular:  Cardiovascular Normal                     Pulmonary:  Pulmonary Normal  Denies Asthma.  Denies Shortness of breath.    Renal/:  Renal/ Normal     Hepatic/GI:  Hepatic/GI Normal    Neurological:   Denies TIA. Denies CVA.    Endocrine:  Endocrine Normal    Psych:   Psychiatric History depression          Physical Exam  General:  Well nourished    Airway/Jaw/Neck:  Airway Findings: Mouth Opening: Normal Tongue: Normal  General Airway Assessment: Adult, Good  Mallampati: I  TM Distance: Normal, at least 6 cm  Jaw/Neck Findings:  Neck ROM: Normal ROM      Dental:  Dental Findings: In tact   Chest/Lungs:  Chest/Lungs Clear    Heart/Vascular:  Heart Findings: Normal    Abdomen:  Abdomen Findings: Normal      Mental Status:  Mental Status Findings:  Cooperative, Alert and Oriented         Anesthesia Plan  Type of Anesthesia, risks & benefits discussed:  Anesthesia Type:  general  Patient's Preference:   Intra-op Monitoring Plan: standard ASA monitors  Intra-op Monitoring Plan Comments:   Post Op Pain Control Plan: per primary service following discharge from PACU  Post Op Pain Control Plan Comments:   Induction:   IV  Beta Blocker:  Patient is not currently on a Beta-Blocker (No further documentation required).       Informed Consent: Patient understands risks and agrees with Anesthesia plan.  Questions answered. Anesthesia consent signed with patient.  ASA Score: 2     Day of Surgery Review of History & Physical:    H&P update referred to the surgeon.         Ready For Surgery From Anesthesia Perspective.

## 2019-01-18 DIAGNOSIS — F33.9 RECURRENT MAJOR DEPRESSIVE DISORDER, REMISSION STATUS UNSPECIFIED: Primary | ICD-10-CM

## 2019-01-19 NOTE — ANESTHESIA POSTPROCEDURE EVALUATION
"Anesthesia Post Evaluation    Patient: Allyssa Wright    Procedure(s) Performed: Procedure(s) (LRB):  ELECTROCONVULSIVE THERAPY (ECT) - SINGLE SEIZURE (N/A)    Final Anesthesia Type: general  Patient location during evaluation: PACU  Patient participation: Yes- Able to Participate  Level of consciousness: awake and alert  Post-procedure vital signs: reviewed and stable  Pain management: adequate  Airway patency: patent  PONV status at discharge: No PONV  Anesthetic complications: no      Cardiovascular status: stable  Respiratory status: unassisted and spontaneous ventilation  Hydration status: euvolemic  Follow-up not needed.        Visit Vitals  BP (!) 118/56   Pulse 100   Temp 37 °C (98.6 °F) (Temporal)   Resp 18   Ht 5' 5" (1.651 m)   Wt 67.1 kg (148 lb)   SpO2 100%   Breastfeeding? No   BMI 24.63 kg/m²       Pain/Roma Score: Roma Score: 10 (1/17/2019  8:10 AM)        "

## 2019-02-12 ENCOUNTER — ANESTHESIA EVENT (OUTPATIENT)
Dept: ELECTROPHYSIOLOGY | Facility: HOSPITAL | Age: 39
End: 2019-02-12
Payer: COMMERCIAL

## 2019-02-12 NOTE — ANESTHESIA PREPROCEDURE EVALUATION
[facility]  Anesthesia Pre-Operative Evaluation         Patient Name: Allyssa Wright  YOB: 1980  MRN: [id number]    SUBJECTIVE:     Pre-operative evaluation for Procedure(s) (LRB):  ELECTROCONVULSIVE THERAPY (ECT) - SINGLE SEIZURE (N/A)     02/12/2019    Allyssa Wright is a 39 y.o. female w/ a significant PMHx of V  who presents for the above procedure.    ECT # 66    Previous Meds:  Previous Meds:  - Methohexital 150mg  - Succinylcholine 140mg  - Ondansetron 4mg  - Ketorolac 30mg      Patient Active Problem List   Diagnosis    MDD (major depressive disorder), recurrent, in partial remission    Other acne    Comfort measures only status    MDD (major depressive disorder)    Major depression    Major depressive disorder       Review of patient's allergies indicates:   Allergen Reactions    Benzodiazepines Other (See Comments)     Contraindicated while taking Clozapine    Ampicillin      Mom says so    Erythromycin     Levaquin [levofloxacin] Other (See Comments)     Depression side effects    Penicillins      Mom says so    Pristiq [desvenlafaxine]      psycotic      Sulfa (sulfonamide antibiotics)      Rash      Azithromycin Anxiety       Current Inpatient Medications:      Current Outpatient Medications on File Prior to Visit   Medication Sig Dispense Refill    clozapine (CLOZARIL) 50 MG tablet Take 50 mg by mouth once daily.       dapsone 7.5 % GlwP Apply topically once daily.      dextroamphetamine-amphetamine 30 mg Tab Take 30 mg by mouth 2 (two) times daily.       famciclovir (FAMVIR) 500 MG tablet Take 1 tablet (500 mg total) by mouth 2 (two) times daily. 30 tablet 12    hydrOXYzine pamoate (VISTARIL) 25 MG Cap Take 2 capsules (50 mg total) by mouth nightly as needed (Take 25-50mg (1-2 caps) at night for anxiety prior to ECT). 180 capsule 0    mirtazapine (REMERON) 15 MG tablet Take 1 tablet (15 mg total) by mouth every evening. (Patient taking differently:  Take 7.5 mg by mouth every evening. ) 90 tablet 0    spironolactone (ALDACTONE) 50 MG tablet 50 mg 2 (two) times daily. 50  mg qAM, 50 mg qHS.      thyroid (ARMOUR THYROID) 30 mg Tab Take 1 tablet (30 mg total) by mouth every morning. 30 tablet 11    trazodone (DESYREL) 100 MG tablet Take 200 mg by mouth every evening.       UNABLE TO FIND 2 (two) times daily. n-azetyl-cysteine      venlafaxine (EFFEXOR) 100 MG Tab Take 3 tablets (300 mg total) by mouth once daily. (Patient taking differently: Take 225 mg by mouth once daily. )      vortioxetine (TRINTELLIX) 5 mg Tab Take 2.5 mg by mouth once daily.      ZOVIRAX 5 % Crea   5     No current facility-administered medications on file prior to visit.        Past Surgical History:   Procedure Laterality Date    ANKLE SURGERY Right     BREAST augmentation      ELECTROCONVULSIVE THERAPY (ECT) - SINGLE SEIZURE N/A 12/6/2018    Performed by Sh[facility]ib Boyce Jr., [facility] at [facility] ECT    ELECTROCONVULSIVE THERAPY (ECT) - SINGLE SEIZURE N/A 8/31/2018    Performed by Shoaib Boyce Jr., MD at [facility] ECT    ELECTROCONVULSIVE THERAPY (ECT) - SINGLE SEIZURE N/A 8/6/2018    Performed by Shoaib Boyce Jr., MD at [facility] ECT    ELECTROCONVULSIVE THERAPY (ECT) - SINGLE SEIZURE N/A 7/23/2018    Performed by Shoaib Boyce Jr., MD at [facility] ECT    ELECTROCONVULSIVE THERAPY (ECT) - SINGLE SEIZURE N/A 7/5/2018    Performed by Shoaib Boyce Jr[facility] MD at [facility] ECT    ELECTROCONVULSIVE THERAPY (ECT) - SINGLE SEIZURE N/A 6/28/2018    Performed by Shoaib Boyce Jr., MD at [facility] ECT    ELECTROCONVULSIVE THERAPY (ECT) - SINGLE SEIZURE N/A 6/13/2018    Performed by Shoaib Boyce Jr., MD at [facility] ECT    ELECTROCONVULSIVE THERAPY (ECT) - SINGLE SEIZURE N/A 5/29/2018    Performed by Shoaib Boyce Jr., MD at [facility] ECT    ELECTROCONVULSIVE THERAPY (ECT) - SINGLE SEIZURE N/A 5/8/2018    Performed by Shoaib Boyce Jr., MD at [facility] ECT    ELECTROCONVULSIVE THERAPY (ECT) -  SINGLE SEIZURE N/A 4/24/2018    Performed by [facility]ib [facility] Jr[facility] at [facility] ECT    ELECTROCONVULSIVE THERAPY (ECT) - SINGLE SEIZURE N/A 4/17/2018    Performed by Shoaib Boyce Jr., MD at [facility] ECT    ELECTROCONVULSIVE THERAPY (ECT) - SINGLE SEIZURE N/A 4/13/2018    Performed by Shoaib Boyce Jr., MD at [facility] ECT    ELECTROCONVULSIVE THERAPY (ECT) - SINGLE SEIZURE N/A 4/3/2018    Performed by Shoaib Boyce Jr., MD at [facility] ECT    ELECTROCONVULSIVE THERAPY (ECT) - SINGLE SEIZURE N/A 3/20/2018    Performed by Shoaib Boyce Jr., MD at [facility] ECT    ELECTROCONVULSIVE THERAPY (ECT) - SINGLE SEIZURE N/A 3/15/2018    Performed by Shoaib Boyce Jr., MD at [facility] ECT    ELECTROCONVULSIVE THERAPY (ECT) - SINGLE SEIZURE N/A 3/6/2018    Performed by Shoaib Boyce Jr., MD at [facility] ECT    ELECTROCONVULSIVE THERAPY (ECT) - SINGLE SEIZURE N/A 3/1/2018    Performed by Shoaib Boyce Jr., MD at [facility] ECT    ELECTROCONVULSIVE THERAPY (ECT) - SINGLE SEIZURE N/A 2/23/2018    Performed by Shoaib Boyce Jr., MD at [facility] ECT    ELECTROCONVULSIVE THERAPY (ECT) - SINGLE SEIZURE N/A 2/19/2018    Performed by Shoaib Boyce Jr., MD at [facility] ECT    ELECTROCONVULSIVE THERAPY (ECT) - SINGLE SEIZURE N/A 2/15/2018    Performed by Shoaib Boyce Jr., MD at [facility] ECT    ELECTROCONVULSIVE THERAPY (ECT) - SINGLE SEIZURE N/A 1/22/2018    Performed by Shoaib Boyce Jr., MD at [facility] ECT    ELECTROCONVULSIVE THERAPY (ECT) - SINGLE SEIZURE N/A 12/11/2017    Performed by Shoaib Boyce Jr., MD at [facility] ECT    ELECTROCONVULSIVE THERAPY (ECT) - SINGLE SEIZURE N/A 10/24/2017    Performed by Shoaib Boyce Jr., MD at [facility] ECT    ELECTROCONVULSIVE THERAPY (ECT) - SINGLE SEIZURE N/A 9/20/2017    Performed by Shoaib Boyce Jr., MD at [facility] ECT    ELECTROCONVULSIVE THERAPY (ECT) - SINGLE SEIZURE N/A 8/14/2017    Performed by Shoaib Boyce Jr., MD at [facility] ECT    ELECTROCONVULSIVE THERAPY (ECT) - SINGLE SEIZURE  Bilateral 7/12/2017    Performed by Shoaib Boyce Jr., MD at [facility] ECT    ELECTROCONVULSIVE THERAPY (ECT) - SINGLE SEIZURE N/A 6/13/2017    Performed by Shoaib Boyce Jr., MD at [facility] ECT    ELECTROCONVULSIVE THERAPY (ECT) - SINGLE SEIZURE N/A 5/17/2017    Performed by Shoaib Boyce Jr., MD at [facility] ECT    ELECTROCONVULSIVE THERAPY (ECT) - SINGLE SEIZURE N/A 4/20/2017    Performed by Shoaib Boyce Jr., MD at [facility] ECT    ELECTROCONVULSIVE THERAPY (ECT) - SINGLE SEIZURE N/A 11/22/2016    Performed by Shoaib Boyce Jr., MD at [facility] ECT    ELECTROCONVULSIVE THERAPY (ECT) - SINGLE SEIZURE N/A 10/24/2016    Performed by Shoaib Boyce Jr., MD at [facility] ECT    ELECTROCONVULSIVE THERAPY (ECT) - SINGLE SEIZURE N/A 9/22/2016    Performed by Shoaib Boyce Jr., MD at [facility] ECT    ELECTROCONVULSIVE THERAPY (ECT) - SINGLE SEIZURE N/A 9/1/2016    Performed by Shoaib Boyce Jr., MD at [facility] ECT    ELECTROCONVULSIVE THERAPY (ECT) - SINGLE SEIZURE N/A 8/15/2016    Performed by Shoaib Boyce Jr., MD at [facility] ECT    ELECTROCONVULSIVE THERAPY (ECT) - SINGLE SEIZURE N/A 8/1/2016    Performed by Shoaib Boyce Jr., MD at [facility] ECT    ELECTROCONVULSIVE THERAPY (ECT) - SINGLE SEIZURE N/A 7/22/2016    Performed by Shoaib Boyce Jr., MD at [facility] ECT    ELECTROCONVULSIVE THERAPY (ECT) - SINGLE SEIZURE N/A 7/14/2016    Performed by Shoaib Boyce Jr., MD at [facility] ECT    ELECTROCONVULSIVE THERAPY (ECT) - SINGLE SEIZURE N/A 7/8/2016    Performed by Shoaib Boyce Jr., MD at [facility] ECT    ELECTROCONVULSIVE THERAPY (ECT) - SINGLE SEIZURE N/A 7/5/2016    Performed by Shoaib Boyce Jr., MD at [facility] ECT    ELECTROCONVULSIVE THERAPY (ECT) - SINGLE SEIZURE N/A 6/27/2016    Performed by Shoaib Boyce Jr., MD at [facility] ECT    ELECTROCONVULSIVE THERAPY (ECT) - SINGLE SEIZURE N/A 6/23/2016    Performed by Shoaib Boyce Jr., MD at [facility] ECT    ELECTROCONVULSIVE THERAPY (ECT) - SINGLE SEIZURE N/A  2016    Performed by Sh[facility]ib [facility]ce Jr., MD at [facility] ECT    ELECTROCONVULSIVE THERAPY (ECT) - SINGLE SEIZURE N/A 2016    Performed by Shoaib Boyce Jr., MD at [facility] ECT    ELECTROCONVULSIVE THERAPY (ECT) - SINGLE SEIZURE N/A 6/15/2016    Performed by Shoaib Boyce Jr., MD at [facility] ECT    ELECTROCONVULSIVE THERAPY (ECT) - SINGLE SEIZURE N/A 2016    Performed by Shoaib Boyce Jr., MD at [facility] ECT    ELECTROCONVULSIVE THERAPY (ECT) - SINGLE SEIZURE N/A 2016    Performed by Shoaib Boyce Jr., MD at [facility] ECT    ELECTROCONVULSIVE THERAPY (ECT) - SINGLE SEIZURE N/A 2016    Performed by Shoaib Boyce Jr., MD at [facility] ECT    ELECTROCONVULSIVE THERAPY, CEREBRAL HEMISPHERE, UNILATERAL, 1 SEIZURE Bilateral 2018    Performed by Sh[facility]ib [facility]ce Jr., MD at [facility] ECT    OVARIAN CYST REMOVAL Bilateral        Social History     Socioeconomic History    Marital status: Single     Spouse name: Not on file    Number of children: Not on file    Years of education: Not on file    Highest education level: Not on file   Social Needs    Financial resource strain: Not on file    Food insecurity - worry: Not on file    Food insecurity - inability: Not on file    Transportation needs - medical: Not on file    Transportation needs - non-medical: Not on file   Occupational History    Occupation: unemployed   Tobacco Use    Smoking status: Former Smoker     Last attempt to quit: 2011     Years since quittin.8    Smokeless tobacco: Never Used   Substance and Sexual Activity    Alcohol use: Yes     Comment: rarely    Drug use: No     Comment: Experimental use in high school    Sexual activity: Not on file   Other Topics Concern    Patient feels they ought to cut down on drinking/drug use Not Asked    Patient annoyed by others criticizing their drinking/drug use Not Asked    Patient has felt bad or guilty about drinking/drug use Not Asked    Patient has had a drink/used  drugs as an eye opener in the AM Not Asked   Social History Narrative    Pt has 1 older brother from an intact family until the death of her mother in 2012.  She completed 1 year of college, was never in the , and has never been employed.  She was engaged once, but never , has no children, and lives with her father plus 2 dogs.  She denies any hobbies and is spiritual but not Mormon.  She does date and returns to college during rare periods when depression and anxiety [place].       OBJECTIVE:     Vital Signs Range (Last 24H):  BP: ()/()   Arterial Line BP: ()/()       CBC:   No results for input(s): WBC, RBC, HGB, HCT, PLT, MCV, MCH, MCHC in the last 72 hours.    CMP: No results for input(s): NA, K, CL, CO2, BUN, CREATININE, GLU, MG, PHOS, CALCIUM, ALBUMIN, PROT, ALKPHOS, ALT, AST, BILITOT in the last 72 hours.    INR:  No results for input(s): PT, INR, PROTIME, APTT in the last 72 hours.    Diagnostic Studies: No relevant studies.    EKG: No recent studies available.    2D ECHO:  No results found for this or any previous visit.      ASSESSMENT/PLAN:         Anesthesia Evaluation    I have reviewed the Patient Summary Reports.    I have reviewed the Nursing Notes.   I have reviewed the Medications.     Review of Systems  Anesthesia Hx:  No problems with previous Anesthesia  Denies Family Hx of Anesthesia complications.   Denies Personal Hx of Anesthesia complications.   Social:  Former Smoker, Alcohol Use    Hematology/Oncology:  Hematology Normal   Oncology Normal     EENT/Dental:EENT/Dental Normal   Cardiovascular:  Cardiovascular Normal                     Pulmonary:  Pulmonary Normal  Denies Asthma.  Denies Shortness of breath.    Renal/:  Renal/ Normal     Hepatic/GI:  Hepatic/GI Normal    Neurological:   Denies TIA. Denies CVA.    Endocrine:  Endocrine Normal    Psych:   Psychiatric History depression          Physical Exam  General:  Well nourished    Airway/Jaw/Neck:  Airway Findings:  Mouth Opening: Normal Tongue: Normal  General Airway Assessment: Adult, Good  Mallampati: I  TM Distance: Normal, at least 6 cm  Jaw/Neck Findings:  Neck ROM: Normal ROM      Dental:  Dental Findings: In tact   Chest/Lungs:  Chest/Lungs Clear    Heart/Vascular:  Heart Findings: Normal    Abdomen:  Abdomen Findings: Normal      Mental Status:  Mental Status Findings:  Cooperative, Alert and Oriented         Anesthesia Plan  Type of Anesthesia, risks & benefits discussed:  Anesthesia Type:  general  Patient's Preference:   Intra-op Monitoring Plan: standard ASA monitors  Intra-op Monitoring Plan Comments:   Post Op Pain Control Plan: per primary service following discharge from PACU  Post Op Pain Control Plan Comments:   Induction:   IV  Beta Blocker:  Patient is not currently on a Beta-Blocker (No further documentation required).       Informed Consent: Patient understands risks and agrees with Anesthesia plan.  Questions answered. Anesthesia consent signed with patient.  ASA Score: 2     Day of Surgery Review of History & Physical:    H&P update referred to the surgeon.         Ready For Surgery From Anesthesia Perspective.

## 2019-02-13 ENCOUNTER — ANESTHESIA (OUTPATIENT)
Dept: ELECTROPHYSIOLOGY | Facility: HOSPITAL | Age: 39
End: 2019-02-13
Payer: COMMERCIAL

## 2019-02-13 ENCOUNTER — HOSPITAL ENCOUNTER (OUTPATIENT)
Facility: HOSPITAL | Age: 39
Discharge: HOME | End: 2019-02-13
Attending: PSYCHIATRY & NEUROLOGY | Admitting: PSYCHIATRY & NEUROLOGY
Payer: COMMERCIAL

## 2019-02-13 VITALS
RESPIRATION RATE: 70 BRPM | TEMPERATURE: 99 F | HEIGHT: 65 IN | OXYGEN SATURATION: 97 % | HEART RATE: 73 BPM | SYSTOLIC BLOOD PRESSURE: 103 MMHG | DIASTOLIC BLOOD PRESSURE: 61 MMHG | BODY MASS INDEX: 24.99 KG/M2 | WEIGHT: 150 LBS

## 2019-02-13 DIAGNOSIS — F33.41 MDD (MAJOR DEPRESSIVE DISORDER), RECURRENT, IN PARTIAL REMISSION: ICD-10-CM

## 2019-02-13 DIAGNOSIS — F32.2 MDD (MAJOR DEPRESSIVE DISORDER), SEVERE: Primary | ICD-10-CM

## 2019-02-13 DIAGNOSIS — F33.9 RECURRENT MAJOR DEPRESSIVE DISORDER, REMISSION STATUS UNSPECIFIED: Primary | ICD-10-CM

## 2019-02-13 LAB
B-HCG UR QL: NEGATIVE
CTP QC/QA: YES

## 2019-02-13 PROCEDURE — 90870 PR ELECTROCONVULSIVE THERAPY,1 SEIZ: ICD-10-PCS | Mod: ,,, | Performed by: PSYCHIATRY & NEUROLOGY

## 2019-02-13 PROCEDURE — 63600175 PHARM REV CODE 636 W HCPCS: Performed by: PSYCHIATRY & NEUROLOGY

## 2019-02-13 PROCEDURE — D9220A PRA ANESTHESIA: ICD-10-PCS | Mod: ,,, | Performed by: ANESTHESIOLOGY

## 2019-02-13 PROCEDURE — 63600175 PHARM REV CODE 636 W HCPCS: Performed by: STUDENT IN AN ORGANIZED HEALTH CARE EDUCATION/TRAINING PROGRAM

## 2019-02-13 PROCEDURE — 90870 ELECTROCONVULSIVE THERAPY: CPT | Mod: ,,, | Performed by: PSYCHIATRY & NEUROLOGY

## 2019-02-13 PROCEDURE — 81025 URINE PREGNANCY TEST: CPT | Performed by: PSYCHIATRY & NEUROLOGY

## 2019-02-13 PROCEDURE — 90870 ELECTROCONVULSIVE THERAPY: CPT | Performed by: ANESTHESIOLOGY

## 2019-02-13 PROCEDURE — 25000003 PHARM REV CODE 250: Performed by: PSYCHIATRY & NEUROLOGY

## 2019-02-13 PROCEDURE — 25000003 PHARM REV CODE 250: Performed by: STUDENT IN AN ORGANIZED HEALTH CARE EDUCATION/TRAINING PROGRAM

## 2019-02-13 PROCEDURE — D9220A PRA ANESTHESIA: Mod: ,,, | Performed by: ANESTHESIOLOGY

## 2019-02-13 RX ORDER — ONDANSETRON 2 MG/ML
4 INJECTION INTRAMUSCULAR; INTRAVENOUS ONCE AS NEEDED
Status: COMPLETED | OUTPATIENT
Start: 2019-02-13 | End: 2019-02-13

## 2019-02-13 RX ORDER — LABETALOL HYDROCHLORIDE 5 MG/ML
INJECTION, SOLUTION INTRAVENOUS
Status: DISCONTINUED | OUTPATIENT
Start: 2019-02-13 | End: 2019-02-13

## 2019-02-13 RX ORDER — KETOROLAC TROMETHAMINE 30 MG/ML
30 INJECTION, SOLUTION INTRAMUSCULAR; INTRAVENOUS ONCE AS NEEDED
Status: COMPLETED | OUTPATIENT
Start: 2019-02-13 | End: 2019-02-13

## 2019-02-13 RX ORDER — LABETALOL HCL 20 MG/4 ML
SYRINGE (ML) INTRAVENOUS
Status: COMPLETED
Start: 2019-02-13 | End: 2019-02-13

## 2019-02-13 RX ORDER — SODIUM CHLORIDE 9 MG/ML
INJECTION, SOLUTION INTRAVENOUS CONTINUOUS
Status: DISCONTINUED | OUTPATIENT
Start: 2019-02-13 | End: 2019-02-13 | Stop reason: HOSPADM

## 2019-02-13 RX ORDER — SUCCINYLCHOLINE CHLORIDE 20 MG/ML
INJECTION INTRAMUSCULAR; INTRAVENOUS
Status: COMPLETED
Start: 2019-02-13 | End: 2019-02-13

## 2019-02-13 RX ORDER — SUCCINYLCHOLINE CHLORIDE 20 MG/ML
INJECTION INTRAMUSCULAR; INTRAVENOUS
Status: DISCONTINUED | OUTPATIENT
Start: 2019-02-13 | End: 2019-02-13

## 2019-02-13 RX ORDER — LIDOCAINE HYDROCHLORIDE 10 MG/ML
1 INJECTION, SOLUTION EPIDURAL; INFILTRATION; INTRACAUDAL; PERINEURAL ONCE
Status: COMPLETED | OUTPATIENT
Start: 2019-02-13 | End: 2019-02-13

## 2019-02-13 RX ORDER — KETOROLAC TROMETHAMINE 30 MG/ML
INJECTION, SOLUTION INTRAMUSCULAR; INTRAVENOUS
Status: COMPLETED
Start: 2019-02-13 | End: 2019-02-13

## 2019-02-13 RX ORDER — ONDANSETRON 2 MG/ML
INJECTION INTRAMUSCULAR; INTRAVENOUS
Status: COMPLETED
Start: 2019-02-13 | End: 2019-02-13

## 2019-02-13 RX ADMIN — SUCCINYLCHOLINE CHLORIDE 140 MG: 20 INJECTION, SOLUTION INTRAMUSCULAR; INTRAVENOUS at 08:02

## 2019-02-13 RX ADMIN — LABETALOL HYDROCHLORIDE 10 MG: 5 INJECTION, SOLUTION INTRAVENOUS at 08:02

## 2019-02-13 RX ADMIN — SODIUM CHLORIDE 500 ML: 0.9 INJECTION, SOLUTION INTRAVENOUS at 07:02

## 2019-02-13 RX ADMIN — Medication 500 MG: at 07:02

## 2019-02-13 RX ADMIN — ONDANSETRON 4 MG: 2 INJECTION INTRAMUSCULAR; INTRAVENOUS at 08:02

## 2019-02-13 RX ADMIN — LIDOCAINE HYDROCHLORIDE 0.1 MG: 10 INJECTION, SOLUTION EPIDURAL; INFILTRATION; INTRACAUDAL; PERINEURAL at 07:02

## 2019-02-13 RX ADMIN — METHOHEXITAL SODIUM 150 MG: 500 INJECTION, POWDER, LYOPHILIZED, FOR SOLUTION INTRAMUSCULAR; INTRAVENOUS; RECTAL at 08:02

## 2019-02-13 RX ADMIN — KETOROLAC TROMETHAMINE 30 MG: 30 INJECTION, SOLUTION INTRAMUSCULAR; INTRAVENOUS at 08:02

## 2019-02-13 NOTE — ANESTHESIA POSTPROCEDURE EVALUATION
"Anesthesia Post Evaluation    Patient: Allyssa Wright    Procedure(s) Performed: Procedure(s) (LRB):  ELECTROCONVULSIVE THERAPY (ECT) - SINGLE SEIZURE (N/A)    Final Anesthesia Type: general  Patient location during evaluation: PACU  Patient participation: Yes- Able to Participate  Level of consciousness: awake and alert  Post-procedure vital signs: reviewed and stable  Pain management: adequate  Airway patency: patent  PONV status at discharge: No PONV  Anesthetic complications: no      Cardiovascular status: hemodynamically stable  Respiratory status: unassisted, spontaneous ventilation and room air  Hydration status: euvolemic  Follow-up not needed.        Visit Vitals  BP (!) 113/49 (BP Location: Right arm, Patient Position: Lying)   Pulse 74   Temp 37.1 °C (98.7 °F) (Oral)   Resp (!) 30   Ht 5' 5" (1.651 m)   Wt 68 kg (150 lb)   SpO2 97%   Breastfeeding? No   BMI 24.96 kg/m²       Pain/Roma Score: Roma Score: 8 (2/13/2019  8:30 AM)        "

## 2019-02-13 NOTE — DISCHARGE INSTRUCTIONS
Understanding Electroconvulsive Therapy  Electroconvulsive therapy (ECT) is sometimes called shock therapy. This may sound painful, but ECT doesnt hurt. Its often the safest and best treatment for severe depression. It can treat other mental disorders as well.  What is electroconvulsive therapy?  ECT is used to treat people who are very depressed. Its mainly used when other treatments, such as antidepressant medicines, have failed. Often it may relieve feelings of sadness and despair after 2 to 4 treatments.  Common symptoms of major depression  Symptoms of major depression include:  · Feeling a deep sadness that doesnt go away  · Losing all pleasure in life  · Feeling hopeless or helpless  · Feeling guilty  · Sleeping more or less than normal  · Eating more or less than normal  · Having headaches or stomachaches, or other pains that dont go away  · Feeling nervous, empty, or worthless  · Crying a great deal  · Thinking or talking about suicide or death  How is ECT therapy done?  Before an ECT treatment, youll be given anesthesia to keep you pain-free. Youll also be given medicine to make you sleep, relax your muscles and control your heart rate. Your healthcare provider then places electrodes on your head. You may have one above each temple (bilateral ECT). Or you may have electrodes on one temple and on your forehead (unilateral ECT). While you are asleep, your brain is stimulated very briefly with an electric current. This causes a seizure, usually lasting less than a minute. Because you are under anesthesia, your body will not move even as your brain goes through great changes.  What are the risks?  When done properly, ECT is quite safe. Right after the treatment, you may be confused. This often lasts for less than half an hour. You may have a headache or stiff muscles. But these symptoms often go away quickly. A more serious possible side effect is memory loss. Commonly, people have short-term  (temporary) trouble remembering information that they learned recently. And they may have little recall of the time when they received treatment. Less commonly, people may have long-lasting (permanent) spotty recall of major past events. In rare cases, there may be memory loss for larger blocks of time.  Looking to the future  In most cases, ECT doesnt cure depression. But it can improve symptoms for a period of time. You may need a series of ECT treatments to continue feeling the benefit. You may also need to take antidepressant medicines to help prevent symptoms from returning. But with ongoing treatment, you can have a full and healthy life.  Resources  · The National [place] of Mental Health  944.861.2471  www.nimh.nih.gov  · [facility]  780.270.4985  www.[facility] [[facility]].org     Date Last Reviewed: 5/1/2017  © 5939-1191 [employer]. [address] [place] [address], [place] [address]. All rights reserved. This information is not intended as a substitute for professional medical care. Always follow your healthcare professional's instructions.

## 2019-02-13 NOTE — OP NOTE
Allyssa Wright  : 1980   MRN: [id number]  Date: 2019       Psychiatry - ECT  Operative Note             Date of Admission: 2019  6:56 AM    Site: [facility], [address]    Attending: Shoaib Boyce MD   Residents: Flaco Walsh MD  Pre-op Diagnosis: MDD  ECT Treatment Number: 65  Machine Type: [employer]icta 5000    Patient Status: medically stable    Vitals (pre-procedure):  Vitals:    19 0742   BP: (!) 112/57   Pulse: 84   Resp: 18   Temp: 97.9 °F (36.6 °C)       Electrode Placement: Bitemporal    Stimulus Number Charge (mC) Level Pulse Width (msec) Frequency  (Hz) Duration of Stimulus (sec) Current (mA) Duration of Seizure (sec)   1 576 3 1 60 6 800 71s                                   Complications: Hypertension    Maximum Blood Pressure: 163/113    Medications Given:   Caffeine 500 mg PO before  Methohexital (Brevital) 150 mg  IV before  Succinylcholine (Anectine) 140 mg  IV before  Zofran 4 mg IV before  Toradol 30 mg IV before  Labetalol 20 mg IV during    Treatment Course:  Pt tolerate procedure well. After adequate recovery from general anesthesia, the patient was transported to recovery.    Post-op Diagnosis: Same as above    Recommended for next ECT:    2019    Patient seen and discussed with Dr. Boyce.     Flaco Walsh IV, MD  PGY-4 Psychiatry, [facility]  2019 8:28 AM

## 2019-02-13 NOTE — ANESTHESIA RELEASE NOTE
"Anesthesia Release from PACU Note    Patient: Allyssa Wright    Procedure(s) Performed: Procedure(s) (LRB):  ELECTROCONVULSIVE THERAPY (ECT) - SINGLE SEIZURE (N/A)    Anesthesia type: general    Post pain: Adequate analgesia    Post assessment: no apparent anesthetic complications, tolerated procedure well and no evidence of recall    Last Vitals:   Visit Vitals  /61 (BP Location: Right arm, Patient Position: Lying)   Pulse 73   Temp 37.3 °C (99.1 °F)   Resp (!) 70   Ht 5' 5" (1.651 m)   Wt 68 kg (150 lb)   SpO2 97%   Breastfeeding? No   BMI 24.96 kg/m²       Post vital signs: stable    Level of consciousness: awake, alert  and oriented    Nausea/Vomiting: no nausea/no vomiting    Complications: none    Airway Patency: patent    Respiratory: unassisted    Cardiovascular: stable and blood pressure at baseline    Hydration: euvolemic  "

## 2019-02-13 NOTE — H&P
"Allyssa Wright  : 1980   MRN: [id number]  Date: 2019     Psychiatric - ECT  H&P     Chief complaint: MDD, recurrent, in partial remission  Procedure: ECT #65    SUBJECTIVE:   HPI:   Allyssa Wright is a 39 y.o. female with MDD recurrent in partial remission who presents for ECT.     Patient reported feeling well this morning. Continues to see Dr. Jc as an outpatient and he manages her medications. She reported her mood has been good recently and she hasn't had any problems. Denied any depression. Father present for exam, he said she has been doing well lately also. She denied any physical or psychiatric complaints. Taking famciclovir for frequent oral herpes outbreaks and Aldactone for acne. Reported compliance with all psychiatric medications as prescribed by Dr. Jc. Noted that she has some sunitha-facial movements concerning for TD on Seroquel augmentation in the recent past and was switched to Clozaril by Dr. Jc. At present she has no issues with TD, she and her father denied any concerns. I witnessed no signs of TD on my exam.    Last ECT approximately 4 weeks ago. Please see Dr. Boyce's full pre-ECT evaluation for further details.  The patient said she has not had anything by mouth since before midnight and is ready for ECT. She sees Dr. Jc outpatient and does not need any medication refills at this time.      Psychiatric Review of Systems:  Mood: "good"  Anxiety: Present, unchanged. No acute concerns  Appetite: No problem  Psychomotor: No problem  Cognitive Impairment: mild, consistent with ECT  Insomnia: None  Psychosis: None  Diurnal Variation: None  Suicidal Ideation: Absent    Medical Review Of Systems:  Pertinent items are noted in HPI.    Current Medications:   No current facility-administered medications on file prior to encounter.      Current Outpatient Medications on File Prior to Encounter   Medication Sig Dispense Refill    clozapine (CLOZARIL) 50 MG tablet Take 50 mg by " mouth once daily.       dapsone 7.5 % GlwP Apply topically once daily.      famciclovir (FAMVIR) 500 MG tablet Take 1 tablet (500 mg total) by mouth 2 (two) times daily. 30 tablet 12    mirtazapine (REMERON) 15 MG tablet Take 1 tablet (15 mg total) by mouth every evening. (Patient taking differently: Take 7.5 mg by mouth every evening. ) 90 tablet 0    spironolactone (ALDACTONE) 50 MG tablet 50 mg 2 (two) times daily. 50  mg qAM, 50 mg qHS.      thyroid (ARMOUR THYROID) 30 mg Tab Take 1 tablet (30 mg total) by mouth every morning. 30 tablet 11    trazodone (DESYREL) 100 MG tablet Take 200 mg by mouth every evening.       UNABLE TO FIND 2 (two) times daily. n-azetyl-cysteine      venlafaxine (EFFEXOR) 100 MG Tab Take 3 tablets (300 mg total) by mouth once daily. (Patient taking differently: Take 225 mg by mouth once daily. )      vortioxetine (TRINTELLIX) 5 mg Tab Take 2.5 mg by mouth once daily.      dextroamphetamine-amphetamine 30 mg Tab Take 30 mg by mouth 2 (two) times daily.       hydrOXYzine pamoate (VISTARIL) 25 MG Cap Take 2 capsules (50 mg total) by mouth nightly as needed (Take 25-50mg (1-2 caps) at night for anxiety prior to ECT). 180 capsule 0    ZOVIRAX 5 % Crea   5          Allergies:   Review of patient's allergies indicates:   Allergen Reactions    Benzodiazepines Other (See Comments)     Contraindicated while taking Clozapine    Ampicillin      Mom says so    Erythromycin     Levaquin [levofloxacin] Other (See Comments)     Depression side effects    Penicillins      Mom says so    Pristiq [desvenlafaxine]      psycotic      Sulfa (sulfonamide antibiotics)      Rash      Azithromycin Anxiety        Past Medical/Surgical History:   Past Medical History:   Diagnosis Date    Anxiety     Depression     History of psychiatric hospitalization     HSV-1 (herpes simplex virus 1) infection     Hx of psychiatric care     Moderate depressed bipolar II disorder 06/13/2016    reports no  history of bipolar    Obsessive-compulsive disorder     Psychiatric problem     Schizophrenia 4/3/2018    Self-harming behavior     Therapy      Past Surgical History:   Procedure Laterality Date    ANKLE SURGERY Right     BREAST augmentation      ELECTROCONVULSIVE THERAPY (ECT) - SINGLE SEIZURE N/A 12/6/2018    [facility]    ELECTROCONVULSIVE THERAPY (ECT) - SINGLE SEIZURE N/A 8/31/2018    [facility]    ELECTROCONVULSIVE THERAPY (ECT) - SINGLE SEIZURE N/A 8/6/2018    [facility]    ELECTROCONVULSIVE THERAPY (ECT) - SINGLE SEIZURE N/A 7/23/2018    [facility]    ELECTROCONVULSIVE THERAPY (ECT) - SINGLE SEIZURE N/A 7/5/2018    [facility]    ELECTROCONVULSIVE THERAPY (ECT) - SINGLE SEIZURE N/A 6/28/2018    [facility]    ELECTROCONVULSIVE THERAPY (ECT) - SINGLE SEIZURE N/A 6/13/2018    [facility]    ELECTROCONVULSIVE THERAPY (ECT) - SINGLE SEIZURE N/A 5/29/2018    [facility]    ELECTROCONVULSIVE THERAPY (ECT) - SINGLE SEIZURE N/A 5/8/2018    [facility]    ELECTROCONVULSIVE THERAPY (ECT) - SINGLE SEIZURE N/A 4/24/2018    [facility]    ELECTROCONVULSIVE THERAPY (ECT) - SINGLE SEIZURE N/A 4/17/2018    [facility]    ELECTROCONVULSIVE THERAPY (ECT) - SINGLE SEIZURE N/A 4/13/2018    [facility]    ELECTROCONVULSIVE THERAPY (ECT) - SINGLE SEIZURE N/A 4/3/2018    [facility]    ELECTROCONVULSIVE THERAPY (ECT) - SINGLE SEIZURE N/A 3/20/2018    [facility]CT     ELECTROCONVULSIVE THERAPY (ECT) - SINGLE SEIZURE N/A 3/15/2018    [facility] ECT    ELECTROCONVULSIVE THERAPY (ECT) - SINGLE SEIZURE N/A 3/6/2018    Per[facility] ECT    ELECTROCONVULSIVE THERAPY (ECT) - SINGLE SEIZURE N/A 3/1/2018    Per[facility] ECT    ELECTROCONVULSIVE THERAPY (ECT) - SINGLE SEIZURE N/A 2/23/2018    Per[facility] ECT    ELECTROCONVULSIVE THERAPY (ECT) - SINGLE SEIZURE N/A 2/19/2018    Per[facility] by [facility] ECT    ELECTROCONVULSIVE THERAPY (ECT) - SINGLE SEIZURE N/A 2/15/2018    Per[facility] by [facility] ECT    ELECTROCONVULSIVE THERAPY (ECT) - SINGLE SEIZURE N/A 1/22/2018    Per[facility] ECT    ELECTROCONVULSIVE THERAPY (ECT) - SINGLE SEIZURE N/A 12/11/2017    Per[facility] by [facility] ECT    ELECTROCONVULSIVE THERAPY (ECT) - SINGLE SEIZURE N/A 10/24/2017    Per[facility] by [facility] ECT    ELECTROCONVULSIVE THERAPY (ECT) - SINGLE SEIZURE N/A 9/20/2017    Per[facility] ECT    ELECTROCONVULSIVE THERAPY (ECT) - SINGLE SEIZURE N/A 8/14/2017    Performed by [facility] ECT    ELECTROCONVULSIVE THERAPY (ECT) - SINGLE SEIZURE Bilateral 7/12/2017    Per[facility] by [facility] ECT    ELECTROCONVULSIVE THERAPY (ECT) - SINGLE SEIZURE N/A 6/13/2017    Performed by [facility] ECT    ELECTROCONVULSIVE THERAPY (ECT) - SINGLE SEIZURE N/A 5/17/2017    Performed by [facility] ECT    ELECTROCONVULSIVE THERAPY (ECT) - SINGLE SEIZURE N/A 4/20/2017    Performed by [facility] ECT    ELECTROCONVULSIVE THERAPY (ECT) - SINGLE SEIZURE N/A 11/22/2016    Per[facility] ECT     ELECTROCONVULSIVE THERAPY (ECT) - SINGLE SEIZURE N/A 10/24/2016    [facility] ECT    ELECTROCONVULSIVE THERAPY (ECT) - SINGLE SEIZURE N/A 9/22/2016    Per[facility] by [facility] ECT    ELECTROCONVULSIVE THERAPY (ECT) - SINGLE SEIZURE N/A 9/1/2016    Performed by [facility] at [facility] ECT    ELECTROCONVULSIVE THERAPY (ECT) - SINGLE SEIZURE N/A 8/15/2016    Performed by [facility] ECT    ELECTROCONVULSIVE THERAPY (ECT) - SINGLE SEIZURE N/A 8/1/2016    Performed by [facility] ECT    ELECTROCONVULSIVE THERAPY (ECT) - SINGLE SEIZURE N/A 7/22/2016    Performed by [facility] Jr[facility] at [facility] ECT    ELECTROCONVULSIVE THERAPY (ECT) - SINGLE SEIZURE N/A 7/14/2016    Performed by [facility] at [facility] ECT    ELECTROCONVULSIVE THERAPY (ECT) - SINGLE SEIZURE N/A 7/8/2016    Performed by [facility] ECT    ELECTROCONVULSIVE THERAPY (ECT) - SINGLE SEIZURE N/A 7/5/2016    Performed by [facility] at [facility] ECT    ELECTROCONVULSIVE THERAPY (ECT) - SINGLE SEIZURE N/A 6/27/2016    Performed by [facility] ECT    ELECTROCONVULSIVE THERAPY (ECT) - SINGLE SEIZURE N/A 6/23/2016    Performed by [facility] ECT    ELECTROCONVULSIVE THERAPY (ECT) - SINGLE SEIZURE N/A 6/20/2016    Performed by [facility] ECT    ELECTROCONVULSIVE THERAPY (ECT) - SINGLE SEIZURE N/A 6/16/2016    Performed by [facility] at [facility] ECT    ELECTROCONVULSIVE THERAPY (ECT) - SINGLE SEIZURE N/A 6/15/2016    Performed by [facility] ECT    ELECTROCONVULSIVE THERAPY (ECT) - SINGLE SEIZURE N/A 6/14/2016    Per[facility] by [facility] ECT    ELECTROCONVULSIVE THERAPY (ECT) - SINGLE SEIZURE N/A 6/13/2016    Per[facility] ECT     "ELECTROCONVULSIVE THERAPY (ECT) - SINGLE SEIZURE N/A 1/4/2016    Per[facility] E[facility]    ELECTROCONVULSIVE THERAPY, CEREBRAL HEMISPHERE, UNILATERAL, 1 SEIZURE Bilateral 6/25/2018    Per[facility] by [facility] at [facility] ECT    OVARIAN CYST REMOVAL Bilateral           OBJECTIVE:   Vitals (pre-procedure):  Vitals:    02/13/19 0742   BP: (!) 112/57   Pulse: 84   Resp: 18   Temp: 97.9 °F (36.6 °C)        Labs/Imaging/Studies:   Recent Results (from the past 48 hour(s))   POCT urine pregnancy    Collection Time: 02/13/19  7:30 AM   Result Value Ref Range    POC Preg Test, Ur Negative Negative     Acceptable Yes       No results found for: PHENYTOIN, PHENOBARB, VALPROATE, CBMZ      Physical Exam:   Gen: AAOx4, NAD  HEENT: MMM, EOMI, NCAT  CV: RRR, S1/S2 nml   Chest: CTAB, no R/R/W, unlabored breathing  Abd: S/NT/ND, +BS  Ext: No C/C/E  Neuro: CN II-XII grossly intact, no focal deficits; no signs of TD noted     Mental Status Exam:   Appearance: age appropriate, well nourished, dressed in hospital gown  Behavior: friendly and cooperative, eye contact appropriate  Speech: conversational tone, rate, pitch, volume  Mood: "good"  Affect: euthymic; congruent  Thought Process: linear and organized  Thought Content: no SI, no HI  Perceptual Disturbances: none reported  Cognition: grossly intact  Insight: good  Judgment: appropriate for setting       ASSESSMENT/PLAN:   Allyssa Wright is a 39 y.o. female with MDD, R, in partial remission who presents for ECT.    Recommendations:   Proceed with ECT #65    Patient seen and discussed with Dr. Boyce.       Flaco Walsh IV, MD  PGY-4 Psychiatry, [facility]  02/13/2019 8:05 AM        "

## 2019-02-13 NOTE — ANESTHESIA POSTPROCEDURE EVALUATION
"Anesthesia Post Evaluation    Patient: Allyssa Wright    Procedure(s) Performed: Procedure(s) (LRB):  ELECTROCONVULSIVE THERAPY (ECT) - SINGLE SEIZURE (N/A)    Final Anesthesia Type: general  Patient location during evaluation: PACU  Patient participation: Yes- Able to Participate  Level of consciousness: awake and alert  Post-procedure vital signs: reviewed and stable  Pain management: adequate  Airway patency: patent  PONV status at discharge: No PONV  Anesthetic complications: no      Cardiovascular status: hemodynamically stable  Respiratory status: unassisted, spontaneous ventilation and room air  Hydration status: euvolemic  Follow-up not needed.        Visit Vitals  /61 (BP Location: Right arm, Patient Position: Lying)   Pulse 73   Temp 37.3 °C (99.1 °F)   Resp (!) 70   Ht 5' 5" (1.651 m)   Wt 68 kg (150 lb)   SpO2 97%   Breastfeeding? No   BMI 24.96 kg/m²       Pain/Roma Score: Roma Score: 10 (2/13/2019  9:00 AM)        "

## 2019-02-13 NOTE — PLAN OF CARE
Patient is awake and alert.  No complaints of pain or nausea noted.  Discharge instructions given and explained. Next appt given for ECT 3/13/ 18.  Patient dressed and in wheelchair ready for discharge.

## 2019-02-13 NOTE — DISCHARGE SUMMARY
Allyssa Wright  : 1980   MRN: [id number]  Date: 2019       Psychiatry - ECT  Discharge Summary    Admit Date: 2019  6:56 AM  Discharge Date: 2019    Attending Physician: Shoaib Boyce MD  Discharge Provider: Flaco Walsh MD     History of Present Illness: Allyssa Wright is a 39 y.o. female with MDD presented for ECT #65. See H&P dated 2019 for full HPI. See Dr Boyce's pre-ECT eval.    Hospital Course: The pt tolerated the ECT treatment well with minimal complication. Pt was stable post-procedure. See OP note dated 2019 for more details.     Disposition: Home or Self Care    Medications:  Reconciled Home Medications:      Medication List      CHANGE how you take these medications    mirtazapine 15 MG tablet  Commonly known as:  REMERON  Take 1 tablet (15 mg total) by mouth every evening.  What changed:  how much to take     venlafaxine 100 MG Tab  Commonly known as:  EFFEXOR  Take 3 tablets (300 mg total) by mouth once daily.  What changed:  how much to take        CONTINUE taking these medications    cloZAPine 50 MG tablet  Commonly known as:  CLOZARIL  Take 50 mg by mouth once daily.     dapsone 7.5 % Glwp  Commonly known as:  ACZONE  Apply topically once daily.     dextroamphetamine-amphetamine 30 mg Tab  Take 30 mg by mouth 2 (two) times daily.     famciclovir 500 MG tablet  Commonly known as:  FAMVIR  Take 1 tablet (500 mg total) by mouth 2 (two) times daily.     hydrOXYzine pamoate 25 MG Cap  Commonly known as:  VISTARIL  Take 2 capsules (50 mg total) by mouth nightly as needed (Take 25-50mg (1-2 caps) at night for anxiety prior to ECT).     spironolactone 50 MG tablet  Commonly known as:  ALDACTONE  50 mg 2 (two) times daily. 50  mg qAM, 50 mg qHS.     thyroid (pork) 30 mg Tab  Commonly known as:  ARMOUR THYROID  Take 1 tablet (30 mg total) by mouth every morning.     traZODone 100 MG tablet  Commonly known as:  DESYREL  Take 200 mg by mouth every evening.     TRINTELLIX  5 mg Tab  Generic drug:  vortioxetine  Take 2.5 mg by mouth once daily.     UNABLE TO FIND  2 (two) times daily. n-azetyl-cysteine     ZOVIRAX 5 % Crea  Generic drug:  acyclovir 5%              Pt's Status at Discharge: alert and medically stable    Discharge Diagnoses:  MDD, Recurrent, in partial remission    Diet: Resume previous outpt diet  Activity: Ambulate with assistance - pt is a fall risk  Instructions: Please do not eat or drink anything after midnight prior to procedure. Please do not drive on day of ECT.     Med Changes:  none      Next ECT: ECT #66 March 13 2019          Flaco Walsh IV, MD  PGY-4 Psychiatry, [facility]  02/13/2019 8:37 AM

## 2019-02-27 ENCOUNTER — PATIENT MESSAGE (OUTPATIENT)
Dept: ADMINISTRATIVE | Facility: OTHER | Age: 39
End: 2019-02-27

## 2019-03-12 ENCOUNTER — TELEPHONE (OUTPATIENT)
Dept: PSYCHIATRY | Facility: CLINIC | Age: 39
End: 2019-03-12

## 2019-03-12 NOTE — TELEPHONE ENCOUNTER
Spoke with patient's father about rescheduling ECT procedure from 3/13/19 to 3/14/19 due to busy schedule on Wednesday. Agreed to change and would prefer the change to Thursday. Surgery scheduling notified.

## 2019-03-13 ENCOUNTER — ANESTHESIA EVENT (OUTPATIENT)
Dept: ELECTROPHYSIOLOGY | Facility: HOSPITAL | Age: 39
End: 2019-03-13
Payer: COMMERCIAL

## 2019-03-13 NOTE — ANESTHESIA PREPROCEDURE EVALUATION
[facility]  Anesthesia Pre-Operative Evaluation         Patient Name: Allyssa Wright  YOB: 1980  MRN: [id number]    SUBJECTIVE:     Pre-operative evaluation for Procedure(s) (LRB):  ELECTROCONVULSIVE THERAPY (ECT) - SINGLE SEIZURE (N/A)     03/13/2019    Allyssa Wright is a 39 y.o. female w/ a significant PMHx of MDD who presents for the above procedure.    ECT # 67    Previous Meds:  - Methohexital 150mg  - Succinylcholine 140mg  - Ondansetron 4mg  - Ketorolac 30mg  - zofran 4mg  - Labetalol 20mg      Patient Active Problem List   Diagnosis    MDD (major depressive disorder), recurrent, in partial remission    Other acne    Comfort measures only status    MDD (major depressive disorder)    Major depression    Major depressive disorder    MDD (major depressive disorder), severe       Review of patient's allergies indicates:   Allergen Reactions    Benzodiazepines Other (See Comments)     Contraindicated while taking Clozapine    Ampicillin      Mom says so    Erythromycin     Levaquin [levofloxacin] Other (See Comments)     Depression side effects    Penicillins      Mom says so    Pristiq [desvenlafaxine]      psycotic      Sulfa (sulfonamide antibiotics)      Rash      Azithromycin Anxiety       Current Inpatient Medications:      Current Outpatient Medications on File Prior to Visit   Medication Sig Dispense Refill    clozapine (CLOZARIL) 50 MG tablet Take 50 mg by mouth once daily.       dapsone 7.5 % GlwP Apply topically once daily.      dextroamphetamine-amphetamine 30 mg Tab Take 30 mg by mouth 2 (two) times daily.       famciclovir (FAMVIR) 500 MG tablet Take 1 tablet (500 mg total) by mouth 2 (two) times daily. 30 tablet 12    hydrOXYzine pamoate (VISTARIL) 25 MG Cap Take 2 capsules (50 mg total) by mouth nightly as needed (Take 25-50mg (1-2 caps) at night for anxiety prior to ECT). 180 capsule 0    mirtazapine (REMERON) 15 MG tablet Take 1 tablet (15 mg  total) by mouth every evening. (Patient taking differently: Take 7.5 mg by mouth every evening. ) 90 tablet 0    spironolactone (ALDACTONE) 50 MG tablet 50 mg 2 (two) times daily. 50  mg qAM, 50 mg qHS.      thyroid (ARMOUR THYROID) 30 mg Tab Take 1 tablet (30 mg total) by mouth every morning. 30 tablet 11    trazodone (DESYREL) 100 MG tablet Take 200 mg by mouth every evening.       UNABLE TO FIND 2 (two) times daily. n-azetyl-cysteine      venlafaxine (EFFEXOR) 100 MG Tab Take 3 tablets (300 mg total) by mouth once daily. (Patient taking differently: Take 225 mg by mouth once daily. )      vortioxetine (TRINTELLIX) 5 mg Tab Take 2.5 mg by mouth once daily.      ZOVIRAX 5 % Crea   5     No current facility-administered medications on file prior to visit.        Past Surgical History:   Procedure Laterality Date    ANKLE SURGERY Right     BREAST augmentation      ELECTROCONVULSIVE THERAPY (ECT) - SINGLE SEIZURE N/A 12/6/2018    Performed by Shoaib Boyce Jr., [facility] at [facility] ECT    ELECTROCONVULSIVE THERAPY (ECT) - SINGLE SEIZURE N/A 8/31/2018    Performed by Shoaib Boyce Jr., MD at [facility] ECT    ELECTROCONVULSIVE THERAPY (ECT) - SINGLE SEIZURE N/A 8/6/2018    Performed by Shoaib Boyce Jr., [facility] at [facility] ECT    ELECTROCONVULSIVE THERAPY (ECT) - SINGLE SEIZURE N/A 7/23/2018    Performed by Shoaib Boyce Jr., [facility] at [facility] ECT    ELECTROCONVULSIVE THERAPY (ECT) - SINGLE SEIZURE N/A 7/5/2018    Performed by Shoaib Boyce Jr., MD at [facility] ECT    ELECTROCONVULSIVE THERAPY (ECT) - SINGLE SEIZURE N/A 6/28/2018    Performed by Shoaib Boyce Jr., MD at [facility] ECT    ELECTROCONVULSIVE THERAPY (ECT) - SINGLE SEIZURE N/A 6/13/2018    Performed by Shoaib Boyce Jr., MD at [facility] ECT    ELECTROCONVULSIVE THERAPY (ECT) - SINGLE SEIZURE N/A 5/29/2018    Performed by Shoaib Boyce Jr., MD at [facility] ECT    ELECTROCONVULSIVE THERAPY (ECT) - SINGLE SEIZURE N/A 5/8/2018    Performed by Shoaib Boyce  MD [place] [facility] ECT    ELECTROCONVULSIVE THERAPY (ECT) - SINGLE SEIZURE N/A 4/24/2018    Performed by Shoaib Boyce Jr., MD at [facility] ECT    ELECTROCONVULSIVE THERAPY (ECT) - SINGLE SEIZURE N/A 4/17/2018    Performed by Shoaib Boyce Jr., [facility] at [facility] ECT    ELECTROCONVULSIVE THERAPY (ECT) - SINGLE SEIZURE N/A 4/13/2018    Performed by Shoaib Boyce Jr., [facility] at [facility] ECT    ELECTROCONVULSIVE THERAPY (ECT) - SINGLE SEIZURE N/A 4/3/2018    Performed by Shoaib Boyce Jr., [facility] at [facility] ECT    ELECTROCONVULSIVE THERAPY (ECT) - SINGLE SEIZURE N/A 3/20/2018    Performed by Shoaib Boyce Jr., MD at [facility] ECT    ELECTROCONVULSIVE THERAPY (ECT) - SINGLE SEIZURE N/A 3/15/2018    Performed by Shoaib Boyce Jr., [facility] at [facility] ECT    ELECTROCONVULSIVE THERAPY (ECT) - SINGLE SEIZURE N/A 3/6/2018    Performed by Shoaib Boyce Jr., [facility] at [facility] ECT    ELECTROCONVULSIVE THERAPY (ECT) - SINGLE SEIZURE N/A 3/1/2018    Performed by Shoaib Boyce Jr., [facility] at [facility] ECT    ELECTROCONVULSIVE THERAPY (ECT) - SINGLE SEIZURE N/A 2/23/2018    Performed by Shoaib Boyce Jr., [facility] at [facility] ECT    ELECTROCONVULSIVE THERAPY (ECT) - SINGLE SEIZURE N/A 2/19/2018    Performed by Shoaib Boyce Jr., [facility] at [facility] ECT    ELECTROCONVULSIVE THERAPY (ECT) - SINGLE SEIZURE N/A 2/15/2018    Performed by Shoaib Boyce Jr., [facility] at [facility] E[facility]    ELECTROCONVULSIVE THERAPY (ECT) - SINGLE SEIZURE N/A 1/22/2018    Performed by Shoaib Boyce Jr., [facility] at [facility] E[facility]    ELECTROCONVULSIVE THERAPY (ECT) - SINGLE SEIZURE N/A 12/11/2017    Performed by Shoaib Boyce Jr., [facility] at [facility] E[facility]    ELECTROCONVULSIVE THERAPY (ECT) - SINGLE SEIZURE N/A 10/24/2017    Performed by Shoaib Boyce Jr., [facility] at [facility] E[facility]    ELECTROCONVULSIVE THERAPY (ECT) - SINGLE SEIZURE N/A 9/20/2017    Performed by Shoaib Boyce Jr., MD at [facility] E[facility]    ELECTROCONVULSIVE THERAPY (ECT) - SINGLE SEIZURE N/A 8/14/2017    Performed by Shoaib Boyce Jr., MD at  [facility] ECT    ELECTROCONVULSIVE THERAPY (ECT) - SINGLE SEIZURE Bilateral 7/12/2017    Performed by Shoaib Boyce Jr., MD at [facility] ECT    ELECTROCONVULSIVE THERAPY (ECT) - SINGLE SEIZURE N/A 6/13/2017    Performed by Shoaib Boyce Jr., MD at [facility] ECT    ELECTROCONVULSIVE THERAPY (ECT) - SINGLE SEIZURE N/A 5/17/2017    Performed by Shoaib Boyce Jr., MD at [facility] ECT    ELECTROCONVULSIVE THERAPY (ECT) - SINGLE SEIZURE N/A 4/20/2017    Performed by Shoaib Boyce Jr., MD at [facility] ECT    ELECTROCONVULSIVE THERAPY (ECT) - SINGLE SEIZURE N/A 11/22/2016    Performed by Shoaib Boyce Jr., MD at [facility] ECT    ELECTROCONVULSIVE THERAPY (ECT) - SINGLE SEIZURE N/A 10/24/2016    Performed by Shoaib Boyce Jr., MD at [facility] ECT    ELECTROCONVULSIVE THERAPY (ECT) - SINGLE SEIZURE N/A 9/22/2016    Performed by Shoaib Boyce Jr., MD at [facility] ECT    ELECTROCONVULSIVE THERAPY (ECT) - SINGLE SEIZURE N/A 9/1/2016    Performed by Shoaib Boyce Jr., MD at [facility] ECT    ELECTROCONVULSIVE THERAPY (ECT) - SINGLE SEIZURE N/A 8/15/2016    Performed by Shoaib Boyce Jr., MD at [facility] ECT    ELECTROCONVULSIVE THERAPY (ECT) - SINGLE SEIZURE N/A 8/1/2016    Performed by Shoaib Boyce Jr., MD at [facility] ECT    ELECTROCONVULSIVE THERAPY (ECT) - SINGLE SEIZURE N/A 7/22/2016    Performed by Shoaib Boyce Jr., MD at [facility] ECT    ELECTROCONVULSIVE THERAPY (ECT) - SINGLE SEIZURE N/A 7/14/2016    Performed by Shoaib Boyce Jr., MD at [facility] ECT    ELECTROCONVULSIVE THERAPY (ECT) - SINGLE SEIZURE N/A 7/8/2016    Performed by Shoaib Boyce Jr., MD at [facility] ECT    ELECTROCONVULSIVE THERAPY (ECT) - SINGLE SEIZURE N/A 7/5/2016    Performed by Shoaib Boyce Jr., MD at [facility] ECT    ELECTROCONVULSIVE THERAPY (ECT) - SINGLE SEIZURE N/A 6/27/2016    Performed by Shoaib Boyce Jr., MD at [facility] ECT    ELECTROCONVULSIVE THERAPY (ECT) - SINGLE SEIZURE N/A 6/23/2016    Performed by Shoaib Boyce Jr., MD at [facility] ECT     ELECTROCONVULSIVE THERAPY (ECT) - SINGLE SEIZURE N/A 2016    Performed by [facility] Jr., [facility] at [facility] ECT    ELECTROCONVULSIVE THERAPY (ECT) - SINGLE SEIZURE N/A 2016    Performed by Shoaib Boyce Jr., MD at [facility] ECT    ELECTROCONVULSIVE THERAPY (ECT) - SINGLE SEIZURE N/A 6/15/2016    Performed by Shoaib Boyce Jr., MD at [facility] ECT    ELECTROCONVULSIVE THERAPY (ECT) - SINGLE SEIZURE N/A 2016    Performed by Shoaib Boyce Jr., MD at [facility] ECT    ELECTROCONVULSIVE THERAPY (ECT) - SINGLE SEIZURE N/A 2016    Performed by Shoaib Boyce Jr., MD at [facility] ECT    ELECTROCONVULSIVE THERAPY (ECT) - SINGLE SEIZURE N/A 2016    Performed by Shoaib Boyce Jr., MD at [facility] ECT    ELECTROCONVULSIVE THERAPY, CEREBRAL HEMISPHERE, UNILATERAL, 1 SEIZURE Bilateral 2018    Performed by Sh[facility]ib [facility] Jr., [facility] at [facility] ECT    OVARIAN CYST REMOVAL Bilateral        Social History     Socioeconomic History    Marital status: Single     Spouse name: Not on file    Number of children: Not on file    Years of education: Not on file    Highest education level: Not on file   Social Needs    Financial resource strain: Not on file    Food insecurity - worry: Not on file    Food insecurity - inability: Not on file    Transportation needs - medical: Not on file    Transportation needs - non-medical: Not on file   Occupational History    Occupation: unemployed   Tobacco Use    Smoking status: Former Smoker     Last attempt to quit: 2011     Years since quittin.9    Smokeless tobacco: Never Used   Substance and Sexual Activity    Alcohol use: Yes     Comment: rarely    Drug use: No     Comment: Experimental use in high school    Sexual activity: Not on file   Other Topics Concern    Patient feels they ought to cut down on drinking/drug use Not Asked    Patient annoyed by others criticizing their drinking/drug use Not Asked    Patient has felt bad or guilty about  drinking/drug use Not Asked    Patient has had a drink/used drugs as an eye opener in the AM Not Asked   Social History Narrative    Pt has 1 older brother from an intact family until the death of her mother in 2012.  She completed 1 year of college, was never in the , and has never been employed.  She was engaged once, but never , has no children, and lives with her father plus 2 dogs.  She denies any hobbies and is spiritual but not Mandaeism.  She does date and returns to college during rare periods when depression and anxiety [place].       OBJECTIVE:     Vital Signs Range (Last 24H):  BP: ()/()   Arterial Line BP: ()/()       CBC:   No results for input(s): WBC, RBC, HGB, HCT, PLT, MCV, MCH, MCHC in the last 72 hours.    CMP: No results for input(s): NA, K, CL, CO2, BUN, CREATININE, GLU, MG, PHOS, CALCIUM, ALBUMIN, PROT, ALKPHOS, ALT, AST, BILITOT in the last 72 hours.    INR:  No results for input(s): PT, INR, PROTIME, APTT in the last 72 hours.    Diagnostic Studies: No relevant studies.    EKG: No recent studies available.    2D ECHO:  No results found for this or any previous visit.      ASSESSMENT/PLAN:         Anesthesia Evaluation    I have reviewed the Patient Summary Reports.    I have reviewed the Nursing Notes.   I have reviewed the Medications.     Review of Systems  Anesthesia Hx:  No problems with previous Anesthesia  Denies Family Hx of Anesthesia complications.   Denies Personal Hx of Anesthesia complications.   Social:  Former Smoker, Alcohol Use    Hematology/Oncology:  Hematology Normal   Oncology Normal     EENT/Dental:EENT/Dental Normal   Cardiovascular:  Cardiovascular Normal                     Pulmonary:  Pulmonary Normal  Denies Asthma.  Denies Shortness of breath.    Renal/:  Renal/ Normal     Hepatic/GI:  Hepatic/GI Normal    Neurological:   Denies TIA. Denies CVA.    Endocrine:  Endocrine Normal    Psych:   Psychiatric History depression          Physical  Exam  General:  Well nourished    Airway/Jaw/Neck:  Airway Findings: Mouth Opening: Normal Tongue: Normal  General Airway Assessment: Adult  Mallampati: I  Jaw/Neck Findings:  Neck ROM: Normal ROM     Eyes/Ears/Nose:  EYES/EARS/NOSE FINDINGS: Normal   Dental:  Dental Findings: In tact   Chest/Lungs:  Chest/Lungs Findings: Clear to auscultation     Heart/Vascular:  Heart Findings: Rate: Normal  Rhythm: Regular Rhythm  Heart murmur: negative       Mental Status:  Mental Status Findings:  Cooperative, Alert and Oriented         Anesthesia Plan  Type of Anesthesia, risks & benefits discussed:  Anesthesia Type:  general  Patient's Preference:   Intra-op Monitoring Plan: standard ASA monitors  Intra-op Monitoring Plan Comments:   Post Op Pain Control Plan: per primary service following discharge from PACU  Post Op Pain Control Plan Comments:   Induction:   IV  Beta Blocker:  Patient is not currently on a Beta-Blocker (No further documentation required).       Informed Consent: Patient understands risks and agrees with Anesthesia plan.  Questions answered. Anesthesia consent signed with patient.  ASA Score: 2     Day of Surgery Review of History & Physical:            Ready For Surgery From Anesthesia Perspective.

## 2019-03-14 ENCOUNTER — ANESTHESIA (OUTPATIENT)
Dept: ELECTROPHYSIOLOGY | Facility: HOSPITAL | Age: 39
End: 2019-03-14
Payer: COMMERCIAL

## 2019-03-14 ENCOUNTER — HOSPITAL ENCOUNTER (OUTPATIENT)
Facility: HOSPITAL | Age: 39
Discharge: HOME | End: 2019-03-14
Attending: PSYCHIATRY & NEUROLOGY | Admitting: PSYCHIATRY & NEUROLOGY
Payer: COMMERCIAL

## 2019-03-14 VITALS
OXYGEN SATURATION: 97 % | BODY MASS INDEX: 24.99 KG/M2 | DIASTOLIC BLOOD PRESSURE: 60 MMHG | WEIGHT: 150 LBS | HEART RATE: 67 BPM | HEIGHT: 65 IN | RESPIRATION RATE: 16 BRPM | TEMPERATURE: 99 F | SYSTOLIC BLOOD PRESSURE: 108 MMHG

## 2019-03-14 DIAGNOSIS — F33.9 RECURRENT MAJOR DEPRESSIVE DISORDER, REMISSION STATUS UNSPECIFIED: Primary | ICD-10-CM

## 2019-03-14 DIAGNOSIS — F33.41 MDD (MAJOR DEPRESSIVE DISORDER), RECURRENT, IN PARTIAL REMISSION: Primary | ICD-10-CM

## 2019-03-14 DIAGNOSIS — F32.9 MAJOR DEPRESSIVE DISORDER: ICD-10-CM

## 2019-03-14 PROCEDURE — 25000003 PHARM REV CODE 250: Performed by: PSYCHIATRY & NEUROLOGY

## 2019-03-14 PROCEDURE — D9220A PRA ANESTHESIA: ICD-10-PCS | Mod: ,,, | Performed by: ANESTHESIOLOGY

## 2019-03-14 PROCEDURE — 90870 PR ELECTROCONVULSIVE THERAPY,1 SEIZ: ICD-10-PCS | Mod: ,,, | Performed by: PSYCHIATRY & NEUROLOGY

## 2019-03-14 PROCEDURE — 25000003 PHARM REV CODE 250: Performed by: STUDENT IN AN ORGANIZED HEALTH CARE EDUCATION/TRAINING PROGRAM

## 2019-03-14 PROCEDURE — 90870 ELECTROCONVULSIVE THERAPY: CPT | Performed by: ANESTHESIOLOGY

## 2019-03-14 PROCEDURE — 63600175 PHARM REV CODE 636 W HCPCS: Performed by: STUDENT IN AN ORGANIZED HEALTH CARE EDUCATION/TRAINING PROGRAM

## 2019-03-14 PROCEDURE — 90870 ELECTROCONVULSIVE THERAPY: CPT | Mod: ,,, | Performed by: PSYCHIATRY & NEUROLOGY

## 2019-03-14 PROCEDURE — D9220A PRA ANESTHESIA: Mod: ,,, | Performed by: ANESTHESIOLOGY

## 2019-03-14 RX ORDER — SODIUM CHLORIDE 0.9 % (FLUSH) 0.9 %
10 SYRINGE (ML) INJECTION
Status: ACTIVE | OUTPATIENT
Start: 2019-03-15

## 2019-03-14 RX ORDER — LIDOCAINE HYDROCHLORIDE 10 MG/ML
1 INJECTION, SOLUTION EPIDURAL; INFILTRATION; INTRACAUDAL; PERINEURAL ONCE
Status: COMPLETED | OUTPATIENT
Start: 2019-03-15 | End: 2019-07-29

## 2019-03-14 RX ORDER — LABETALOL HYDROCHLORIDE 5 MG/ML
INJECTION, SOLUTION INTRAVENOUS
Status: DISCONTINUED | OUTPATIENT
Start: 2019-03-14 | End: 2019-03-14

## 2019-03-14 RX ORDER — KETOROLAC TROMETHAMINE 30 MG/ML
INJECTION, SOLUTION INTRAMUSCULAR; INTRAVENOUS
Status: COMPLETED
Start: 2019-03-14 | End: 2019-03-14

## 2019-03-14 RX ORDER — SUCCINYLCHOLINE CHLORIDE 20 MG/ML
INJECTION INTRAMUSCULAR; INTRAVENOUS
Status: DISCONTINUED | OUTPATIENT
Start: 2019-03-14 | End: 2019-03-14

## 2019-03-14 RX ORDER — ONDANSETRON 2 MG/ML
INJECTION INTRAMUSCULAR; INTRAVENOUS
Status: COMPLETED
Start: 2019-03-14 | End: 2019-03-14

## 2019-03-14 RX ORDER — SODIUM CHLORIDE 9 MG/ML
500 INJECTION, SOLUTION INTRAVENOUS ONCE
Status: DISCONTINUED | OUTPATIENT
Start: 2019-03-14 | End: 2019-03-14 | Stop reason: HOSPADM

## 2019-03-14 RX ORDER — SUCCINYLCHOLINE CHLORIDE 20 MG/ML
INJECTION INTRAMUSCULAR; INTRAVENOUS
Status: COMPLETED
Start: 2019-03-14 | End: 2019-03-14

## 2019-03-14 RX ORDER — ONDANSETRON 2 MG/ML
INJECTION INTRAMUSCULAR; INTRAVENOUS
Status: DISCONTINUED | OUTPATIENT
Start: 2019-03-14 | End: 2019-03-14

## 2019-03-14 RX ORDER — SODIUM CHLORIDE 0.9 % (FLUSH) 0.9 %
3 SYRINGE (ML) INJECTION
Status: DISCONTINUED | OUTPATIENT
Start: 2019-03-14 | End: 2019-03-14 | Stop reason: HOSPADM

## 2019-03-14 RX ORDER — LABETALOL HCL 20 MG/4 ML
SYRINGE (ML) INTRAVENOUS
Status: COMPLETED
Start: 2019-03-14 | End: 2019-03-14

## 2019-03-14 RX ORDER — KETOROLAC TROMETHAMINE 30 MG/ML
INJECTION, SOLUTION INTRAMUSCULAR; INTRAVENOUS
Status: DISCONTINUED | OUTPATIENT
Start: 2019-03-14 | End: 2019-03-14

## 2019-03-14 RX ORDER — SODIUM CHLORIDE 9 MG/ML
INJECTION, SOLUTION INTRAVENOUS CONTINUOUS
Status: DISCONTINUED | OUTPATIENT
Start: 2019-03-15 | End: 2019-04-10

## 2019-03-14 RX ADMIN — LABETALOL HYDROCHLORIDE 20 MG: 5 INJECTION, SOLUTION INTRAVENOUS at 08:03

## 2019-03-14 RX ADMIN — SODIUM CHLORIDE: 0.9 INJECTION, SOLUTION INTRAVENOUS at 08:03

## 2019-03-14 RX ADMIN — ONDANSETRON 4 MG: 2 INJECTION, SOLUTION INTRAMUSCULAR; INTRAVENOUS at 08:03

## 2019-03-14 RX ADMIN — KETOROLAC TROMETHAMINE 30 MG: 30 INJECTION, SOLUTION INTRAMUSCULAR at 08:03

## 2019-03-14 RX ADMIN — Medication 500 MG: at 07:03

## 2019-03-14 RX ADMIN — METHOHEXITAL SODIUM 150 MG: 500 INJECTION, POWDER, LYOPHILIZED, FOR SOLUTION INTRAMUSCULAR; INTRAVENOUS; RECTAL at 08:03

## 2019-03-14 RX ADMIN — SUCCINYLCHOLINE CHLORIDE 140 MG: 20 INJECTION, SOLUTION INTRAMUSCULAR; INTRAVENOUS at 08:03

## 2019-03-14 NOTE — DISCHARGE SUMMARY
lAlyssa Wright  : 1980   MRN: [id number]  Date: 3/14/2019       Psychiatry - ECT  Discharge Summary    Admit Date: 3/14/2019  5:54 AM  Discharge Date: 2019    Attending Physician: Shoaib Boyce MD  Discharge Provider: Jeffy Pierre MD     History of Present Illness: Allyssa Wright is a 39 y.o. female with MDD presented for ECT #66. See H&P dated 2019 for full HPI. See Dr Boyce's pre-ECT eval.    Hospital Course: The pt tolerated the ECT treatment well with minimal complication. Pt was stable post-procedure. See OP note dated 2019 for more details.     Disposition: Home or Self Care    Medications:  Reconciled Home Medications:      Medication List      ASK your doctor about these medications    cloZAPine 50 MG tablet  Commonly known as:  CLOZARIL  Take 50 mg by mouth once daily.     dapsone 7.5 % Glwp  Commonly known as:  ACZONE  Apply topically once daily.     dextroamphetamine-amphetamine 30 mg Tab  Take 30 mg by mouth 2 (two) times daily.     famciclovir 500 MG tablet  Commonly known as:  FAMVIR  Take 1 tablet (500 mg total) by mouth 2 (two) times daily.     hydrOXYzine pamoate 25 MG Cap  Commonly known as:  VISTARIL  Take 2 capsules (50 mg total) by mouth nightly as needed (Take 25-50mg (1-2 caps) at night for anxiety prior to ECT).     mirtazapine 15 MG tablet  Commonly known as:  REMERON  Take 1 tablet (15 mg total) by mouth every evening.     spironolactone 50 MG tablet  Commonly known as:  ALDACTONE  50 mg 2 (two) times daily. 50  mg qAM, 50 mg qHS.     thyroid (pork) 30 mg Tab  Commonly known as:  ARMOUR THYROID  Take 1 tablet (30 mg total) by mouth every morning.     traZODone 100 MG tablet  Commonly known as:  DESYREL  Take 200 mg by mouth every evening.     TRINTELLIX 5 mg Tab  Generic drug:  vortioxetine  Take 2.5 mg by mouth once daily.     UNABLE TO FIND  2 (two) times daily. n-azetyl-cysteine     venlafaxine 100 MG Tab  Commonly known as:  EFFEXOR  Take 3 tablets (300 mg  total) by mouth once daily.     ZOVIRAX 5 % Crea  Generic drug:  acyclovir 5%              Pt's Status at Discharge: alert and medically stable    Discharge Diagnoses:  MDD, Recurrent, in partial remission    Diet: Resume previous outpt diet  Activity: Ambulate with assistance - pt is a fall risk  Instructions: Please do not eat or drink anything after midnight prior to procedure. Please do not drive on day of ECT.     Med Changes:  none      Next ECT (#67):  4/4/2019      Jeffy Pierre MD  [facility]  PGY-4  3/14/2019 8:29 AM

## 2019-03-14 NOTE — H&P
"Allyssa Wright  : 1980   MRN: [id number]  Date: 3/14/2019     Psychiatric - ECT  H&P     Chief complaint: MDD, recurrent, in partial remission  Procedure: ECT #66    SUBJECTIVE:   HPI:   Allyssa Wright is a 39 y.o. female with MDD recurrent in partial remission who presents for ECT.     Patient reported feeling well this morning. Continues to see Dr. Jc as an outpatient and he manages her medications. She reported her mood has been decreasing in the last month, and inquires if she should return more often for treatments. She denied any physical or psychiatric complaints. Chronic anxiety at baseline; denies panic attacks. Denies SI, HI, AVH, delusions, or paranoia. Taking famciclovir for frequent oral herpes outbreaks and Aldactone for acne. Reported compliance with all psychiatric medications as prescribed by Dr. Jc; no recent medication changes.     Last ECT approximately 4 weeks ago. Please see Dr. Boyce's full pre-ECT evaluation for further details.  The patient said she has not had anything by mouth since before midnight and is ready for ECT. She sees Dr. Jc outpatient and does not need any medication refills at this time.      Psychiatric Review of Systems:  Mood: "okay"  Anxiety: Present, unchanged. No acute concerns  Appetite: No problem  Psychomotor: No problem  Cognitive Impairment: mild, consistent with ECT  Insomnia: None  Psychosis: None  Diurnal Variation: None  Suicidal Ideation: Absent    Medical Review Of Systems:  Pertinent items are noted in HPI.    Current Medications:   No current facility-administered medications on file prior to encounter.      Current Outpatient Medications on File Prior to Encounter   Medication Sig Dispense Refill    clozapine (CLOZARIL) 50 MG tablet Take 50 mg by mouth once daily.       dapsone 7.5 % GlwP Apply topically once daily.      dextroamphetamine-amphetamine 30 mg Tab Take 30 mg by mouth 2 (two) times daily.       famciclovir (FAMVIR) 500 MG " tablet Take 1 tablet (500 mg total) by mouth 2 (two) times daily. 30 tablet 12    hydrOXYzine pamoate (VISTARIL) 25 MG Cap Take 2 capsules (50 mg total) by mouth nightly as needed (Take 25-50mg (1-2 caps) at night for anxiety prior to ECT). 180 capsule 0    mirtazapine (REMERON) 15 MG tablet Take 1 tablet (15 mg total) by mouth every evening. (Patient taking differently: Take 7.5 mg by mouth every evening. ) 90 tablet 0    spironolactone (ALDACTONE) 50 MG tablet 50 mg 2 (two) times daily. 50  mg qAM, 50 mg qHS.      thyroid (ARMOUR THYROID) 30 mg Tab Take 1 tablet (30 mg total) by mouth every morning. 30 tablet 11    trazodone (DESYREL) 100 MG tablet Take 200 mg by mouth every evening.       UNABLE TO FIND 2 (two) times daily. n-azetyl-cysteine      venlafaxine (EFFEXOR) 100 MG Tab Take 3 tablets (300 mg total) by mouth once daily. (Patient taking differently: Take 225 mg by mouth once daily. )      vortioxetine (TRINTELLIX) 5 mg Tab Take 2.5 mg by mouth once daily.      ZOVIRAX 5 % Crea   5          Allergies:   Review of patient's allergies indicates:   Allergen Reactions    Benzodiazepines Other (See Comments)     Contraindicated while taking Clozapine    Ampicillin      Mom says so    Erythromycin     Levaquin [levofloxacin] Other (See Comments)     Depression side effects    Penicillins      Mom says so    Pristiq [desvenlafaxine]      psycotic      Sulfa (sulfonamide antibiotics)      Rash      Azithromycin Anxiety        Past Medical/Surgical History:   Past Medical History:   Diagnosis Date    Anxiety     Depression     History of psychiatric hospitalization     HSV-1 (herpes simplex virus 1) infection      of psychiatric care     Moderate depressed bipolar II disorder 06/13/2016    reports no history of bipolar    Obsessive-compulsive disorder     Psychiatric problem     Schizophrenia 4/3/2018    Self-harming behavior     Therapy      Past Surgical History:   Procedure  Laterality Date    ANKLE SURGERY Right     BREAST augmentation      ELECTROCONVULSIVE THERAPY (ECT) - SINGLE SEIZURE N/A 12/6/2018    Performed by [facility]ib [facility]ce Jr[facility] at [facility]    ELECTROCONVULSIVE THERAPY (ECT) - SINGLE SEIZURE N/A 8/31/2018    Performed by [facility] Jr[facility] at [facility]    ELECTROCONVULSIVE THERAPY (ECT) - SINGLE SEIZURE N/A 8/6/2018    Performed by [facility] Jr[facility] at [facility]    ELECTROCONVULSIVE THERAPY (ECT) - SINGLE SEIZURE N/A 7/23/2018    Performed by [facility] Jr[facility] at [facility]    ELECTROCONVULSIVE THERAPY (ECT) - SINGLE SEIZURE N/A 7/5/2018    Performed by [facility] Jr[facility] at [facility]    ELECTROCONVULSIVE THERAPY (ECT) - SINGLE SEIZURE N/A 6/28/2018    Performed by Sh[facility]ib [facility] Jr[facility] at [facility]    ELECTROCONVULSIVE THERAPY (ECT) - SINGLE SEIZURE N/A 6/13/2018    Performed by Sh[facility]ib [facility] Jr[facility] at [facility]    ELECTROCONVULSIVE THERAPY (ECT) - SINGLE SEIZURE N/A 5/29/2018    Performed by Sh[facility]ib [facility] Jr[facility] at [facility]    ELECTROCONVULSIVE THERAPY (ECT) - SINGLE SEIZURE N/A 5/8/2018    Performed by Sh[facility]ib [facility]ce Jr[facility] at [facility]    ELECTROCONVULSIVE THERAPY (ECT) - SINGLE SEIZURE N/A 4/24/2018    Performed by Sh[facility]ib [facility] Jr[facility] at [facility]    ELECTROCONVULSIVE THERAPY (ECT) - SINGLE SEIZURE N/A 4/17/2018    Performed by Shoaib [facility] Jr[facility] at [facility]    ELECTROCONVULSIVE THERAPY (ECT) - SINGLE SEIZURE N/A 4/13/2018    Performed by Shoaib Boyce Jr[facility] at [facility]    ELECTROCONVULSIVE THERAPY (ECT) - SINGLE SEIZURE N/A 4/3/2018    Performed by Sh[facility]ib [facility] Jr[facility] at [facility]    ELECTROCONVULSIVE THERAPY (ECT) - SINGLE SEIZURE N/A 3/20/2018    Performed by Sh[facility]ib [facility] Jr[facility] at [facility]    ELECTROCONVULSIVE THERAPY (ECT) - SINGLE SEIZURE N/A 3/15/2018    Performed by [facility]ce Jr[facility] MD at [facility]    ELECTROCONVULSIVE THERAPY (ECT) - SINGLE SEIZURE N/A 3/6/2018     Performed by Sh[facility]ib [facility]ce Jr., MD at [facility]CT    ELECTROCONVULSIVE THERAPY (ECT) - SINGLE SEIZURE N/A 3/1/2018    Performed by Shoaib Boyce Jr., MD at [facility] ECT    ELECTROCONVULSIVE THERAPY (ECT) - SINGLE SEIZURE N/A 2/23/2018    Performed by Shoaib Boyce Jr., MD at [facility] ECT    ELECTROCONVULSIVE THERAPY (ECT) - SINGLE SEIZURE N/A 2/19/2018    Performed by Shoaib Boyce Jr., MD at [facility] ECT    ELECTROCONVULSIVE THERAPY (ECT) - SINGLE SEIZURE N/A 2/15/2018    Performed by Shoaib Boyce Jr., MD at [facility] E[facility]    ELECTROCONVULSIVE THERAPY (ECT) - SINGLE SEIZURE N/A 1/22/2018    Performed by Shoaib Boyce Jr., MD at [facility] ECT    ELECTROCONVULSIVE THERAPY (ECT) - SINGLE SEIZURE N/A 12/11/2017    Performed by Shoaib Boyce Jr., MD at [facility] E[facility]    ELECTROCONVULSIVE THERAPY (ECT) - SINGLE SEIZURE N/A 10/24/2017    Performed by Shoaib Boyce Jr., [facility] at [facility]    ELECTROCONVULSIVE THERAPY (ECT) - SINGLE SEIZURE N/A 9/20/2017    Performed by Shoaib Boyce Jr., MD at [facility]    ELECTROCONVULSIVE THERAPY (ECT) - SINGLE SEIZURE N/A 8/14/2017    Performed by Shoaib Boyce Jr[facility] MD at [facility] E[facility]    ELECTROCONVULSIVE THERAPY (ECT) - SINGLE SEIZURE Bilateral 7/12/2017    Performed by Shoaib Boyce Jr., MD at [facility] ECT    ELECTROCONVULSIVE THERAPY (ECT) - SINGLE SEIZURE N/A 6/13/2017    Performed by Shoaib Boyce Jr., MD at [facility] ECT    ELECTROCONVULSIVE THERAPY (ECT) - SINGLE SEIZURE N/A 5/17/2017    Performed by Shoaib Boyce Jr., MD at [facility] ECT    ELECTROCONVULSIVE THERAPY (ECT) - SINGLE SEIZURE N/A 4/20/2017    Performed by Shoaib Boyce Jr., MD at [facility] E[facility]    ELECTROCONVULSIVE THERAPY (ECT) - SINGLE SEIZURE N/A 11/22/2016    Performed by Shoaib Boyce Jr., MD at [facility] E[facility]    ELECTROCONVULSIVE THERAPY (ECT) - SINGLE SEIZURE N/A 10/24/2016    Performed by Shoaib Boyce Jr., MD at [facility] ECT    ELECTROCONVULSIVE THERAPY (ECT) - SINGLE SEIZURE N/A 9/22/2016     Performed by Sh[facility]ib [facility]ce Jr[facility] at [facility] ECT    ELECTROCONVULSIVE THERAPY (ECT) - SINGLE SEIZURE N/A 9/1/2016    Performed by Shoaib Boyce Jr[facility] MD at [facility] ECT    ELECTROCONVULSIVE THERAPY (ECT) - SINGLE SEIZURE N/A 8/15/2016    Performed by Shoaib Boyce Jr[facility] MD at [facility] E[facility]    ELECTROCONVULSIVE THERAPY (ECT) - SINGLE SEIZURE N/A 8/1/2016    Performed by Shoaib Boyce Jr[facility] MD at [facility] E[facility]    ELECTROCONVULSIVE THERAPY (ECT) - SINGLE SEIZURE N/A 7/22/2016    Performed by Shoaib Boyce Jr[facility] MD at [facility] E[facility]    ELECTROCONVULSIVE THERAPY (ECT) - SINGLE SEIZURE N/A 7/14/2016    Performed by Shoaib Boyce Jr[facility] MD at [facility] E[facility]    ELECTROCONVULSIVE THERAPY (ECT) - SINGLE SEIZURE N/A 7/8/2016    Performed by Shoaib Boyce Jr[facility] MD at [facility] E[facility]    ELECTROCONVULSIVE THERAPY (ECT) - SINGLE SEIZURE N/A 7/5/2016    Performed by Shoaib Boyce Jr[facility] MD at [facility] E[facility]    ELECTROCONVULSIVE THERAPY (ECT) - SINGLE SEIZURE N/A 6/27/2016    Performed by Shoaib Boyce Jr[facility] MD at [facility] E[facility]    ELECTROCONVULSIVE THERAPY (ECT) - SINGLE SEIZURE N/A 6/23/2016    Performed by Shoaib Boyce Jr[facility] MD at [facility] E[facility]    ELECTROCONVULSIVE THERAPY (ECT) - SINGLE SEIZURE N/A 6/20/2016    Performed by Shoaib Boyce Jr., MD at [facility] E[facility]    ELECTROCONVULSIVE THERAPY (ECT) - SINGLE SEIZURE N/A 6/16/2016    Performed by Shoaib Boyce Jr[facility] MD at [facility]    ELECTROCONVULSIVE THERAPY (ECT) - SINGLE SEIZURE N/A 6/15/2016    Performed by Shoaib Boyce Jr[facility] MD at [facility]    ELECTROCONVULSIVE THERAPY (ECT) - SINGLE SEIZURE N/A 6/14/2016    Performed by Shoaib Boyce Jr[facility] MD at [facility]    ELECTROCONVULSIVE THERAPY (ECT) - SINGLE SEIZURE N/A 6/13/2016    Performed by Shoaib Boyce Jr[facility] MD at [facility]    ELECTROCONVULSIVE THERAPY (ECT) - SINGLE SEIZURE N/A 1/4/2016    Performed by Shoaib Boyce Jr[facility] MD at [facility]    ELECTROCONVULSIVE THERAPY, CEREBRAL HEMISPHERE, UNILATERAL, 1 SEIZURE Bilateral  "6/25/2018    Per[facility] by [facility] at [facility]    OVARIAN CYST REMOVAL Bilateral           OBJECTIVE:   Vitals (pre-procedure):  There were no vitals filed for this visit.     Labs/Imaging/Studies:   No results found for this or any previous visit (from the past 48 hour(s)).   No results found for: PHENYTOIN, PHENOBARB, VALPROATE, CBMZ      Physical Exam:   Gen: AAOx4, NAD  HEENT: MMM, EOMI, NCAT  CV: RRR, S1/S2 nml   Chest: CTAB, no R/R/W, unlabored breathing  Abd: S/NT/ND, +BS  Ext: No C/C/E  Neuro: CN II-XII grossly intact, no focal deficits; no signs of TD noted     Mental Status Exam:   Appearance: age appropriate, well nourished, dressed in hospital gown  Behavior: friendly and cooperative, eye contact appropriate  Speech: conversational tone, rate, pitch, volume  Mood: "okay"  Affect: euthymic; congruent  Thought Process: linear and organized  Thought Content: no SI, no HI  Perceptual Disturbances: none reported  Cognition: grossly intact  Insight: good  Judgment: appropriate for setting       ASSESSMENT/PLAN:   Allyssa Wright is a 39 y.o. female with MDD, R, in partial remission who presents for ECT.    Recommendations:   Proceed with ECT #66    Patient seen and discussed with Dr. Boyce.       Jeffy Pierre MD  [facility]  PGY-4  3/14/2019 7:49 AM         "

## 2019-03-14 NOTE — OP NOTE
Allyssa Wright  : 1980   MRN: [id number]  Date: 3/14/2019       Psychiatry - ECT  Operative Note             Date of Admission: 3/14/2019  5:54 AM    Site: [facility], [address]    Attending: Shoaib Boyce MD   Residents: Jeffy Pierre MD  Pre-op Diagnosis: MDD  ECT Treatment Number: 66  Machine Type: CircuLiteta 5000    Patient Status: medically stable    Vitals (pre-procedure):  Vitals:    19 0801   BP: 120/74   Pulse: 76   Resp: 18   Temp: 97.8 °F (36.6 °C)       Electrode Placement: Bitemporal    Stimulus Number Charge (mC) Level Pulse Width (msec) Frequency  (Hz) Duration of Stimulus (sec) Current (mA) Duration of Seizure (sec)   1 576 3 1 60 6 800 59                                   Complications: Hypertension    Maximum Blood Pressure: 175/90    Medications Given:   Caffeine 500 mg PO before  Methohexital (Brevital) 150 mg  IV before  Succinylcholine (Anectine) 140 mg  IV before  Zofran 4 mg IV before  Toradol 30 mg IV before  Labetalol 20 mg IV during    Treatment Course:  Pt tolerate procedure well. After adequate recovery from general anesthesia, the patient was transported to recovery.    Post-op Diagnosis: Same as above    Recommended for next ECT: 19    Patient seen and discussed with Dr. Boyce.     Jeffy Pierre MD  [facility]  PGY-4  3/14/2019 8:29 AM

## 2019-03-14 NOTE — DISCHARGE INSTRUCTIONS
Understanding Electroconvulsive Therapy  Electroconvulsive therapy (ECT) is sometimes called shock therapy. This may sound painful, but ECT doesnt hurt. Its often the safest and best treatment for severe depression. It can treat other mental disorders as well.  What is electroconvulsive therapy?  ECT is used to treat people who are very depressed. Its mainly used when other treatments, such as antidepressant medicines, have failed. Often it may relieve feelings of sadness and despair after 2 to 4 treatments.  Common symptoms of major depression  Symptoms of major depression include:  · Feeling a deep sadness that doesnt go away  · Losing all pleasure in life  · Feeling hopeless or helpless  · Feeling guilty  · Sleeping more or less than normal  · Eating more or less than normal  · Having headaches or stomachaches, or other pains that dont go away  · Feeling nervous, empty, or worthless  · Crying a great deal  · Thinking or talking about suicide or death  How is ECT therapy done?  Before an ECT treatment, youll be given anesthesia to keep you pain-free. Youll also be given medicine to make you sleep, relax your muscles and control your heart rate. Your healthcare provider then places electrodes on your head. You may have one above each temple (bilateral ECT). Or you may have electrodes on one temple and on your forehead (unilateral ECT). While you are asleep, your brain is stimulated very briefly with an electric current. This causes a seizure, usually lasting less than a minute. Because you are under anesthesia, your body will not move even as your brain goes through great changes.  What are the risks?  When done properly, ECT is quite safe. Right after the treatment, you may be confused. This often lasts for less than half an hour. You may have a headache or stiff muscles. But these symptoms often go away quickly. A more serious possible side effect is memory loss. Commonly, people have short-term  (temporary) trouble remembering information that they learned recently. And they may have little recall of the time when they received treatment. Less commonly, people may have long-lasting (permanent) spotty recall of major past events. In rare cases, there may be memory loss for larger blocks of time.  Looking to the future  In most cases, ECT doesnt cure depression. But it can improve symptoms for a period of time. You may need a series of ECT treatments to continue feeling the benefit. You may also need to take antidepressant medicines to help prevent symptoms from returning. But with ongoing treatment, you can have a full and healthy life.  Resources  · The National [employer] of Mental Health  931.572.5657  www.[facility].nih.gov  · [facility]  297.448.7235  www.[facility].org     Date Last Reviewed: 5/1/2017 © 2000-2017 [employer]. [address] [place] [address]. All rights reserved. This information is not intended as a substitute for professional medical care. Always follow your healthcare professional's instructions.      Home Care Instructions  E.C.T.    ACTIVITY LEVEL: You may feel sleepy for several hours. It is best to rest until you are more awake and then gradually resume your normal activities in one day. It is recommended, because of the possibility of memory loss and slight confusion post ECT, that you rest in the company of a responsible adult. This confusion and memory loss is expected and should diminish over time. It is also recommended that you do not drive or operate any electrical appliances or machinery during the ECT treatment series. Ask your Doctor, at the time of your treatment, any questions you may have about specific activities.    DIET: You may wish to start with liquids after your ECT treatment and gradually resume your normal diet. Do not consume alcoholic beverages during the ECT treatment series.    BATHING: You may shower or bathe as  desired.    MEDICATIONS: Resume all home medications starting with the AM doses not taken prior to ECT treatment. If you experience a headache, it is okay to take your usual pain reliever.    WHEN TO CALL THE DOCTOR: Headache, memory loss or confusion that does not begin to resolve within 24 hours.    [facility]14/2019 for 6:00 AM.    Remember you may not eat or drink after midnight, but please take heart or high blood pressure medicines with a sip of water in the morning prior to leaving home.    FOR EMERGENCIES: If any unusual problems or difficulties occur, contact the office [phone] Monday-Friday until 3:00 p.m.  After hours, call the hospital  [phone] and ask for the Psychiatry Resident on-call.        Discharge Instructions: After Your Surgery  Youve just had surgery. During surgery, you were given medicine called anesthesia to keep you relaxed and free of pain. After surgery, you may have some pain or nausea. This is common. Here are some tips for feeling better and getting well after surgery.     Stay on schedule with your medicine.   Going home  Your healthcare provider will show you how to take care of yourself when you go home. He or she will also answer your questions. Have an adult family member or friend drive you home. For the first 24 hours after your surgery:  · Do not drive or use heavy equipment.  · Do not make important decisions or sign legal papers.  · Do not drink alcohol.  · Have someone stay with you, if needed. He or she can watch for problems and help keep you safe.  Be sure to go to all follow-up visits with your healthcare provider. And rest after your surgery for as long as your healthcare provider tells you to.  Coping with pain  If you have pain after surgery, pain medicine will help you feel better. Take it as told, before pain becomes severe. Also, ask your healthcare provider or pharmacist about other ways to control pain.  This might be with heat, ice, or relaxation. And follow any other instructions your surgeon or nurse gives you.  Tips for taking pain medicine  To get the best relief possible, remember these points:  · Pain medicines can upset your stomach. Taking them with a little food may help.  · Most pain relievers taken by mouth need at least 20 to 30 minutes to start to work.  · Taking medicine on a schedule can help you remember to take it. Try to time your medicine so that you can take it before starting an activity. This might be before you get dressed, go for a walk, or sit down for dinner.  · Constipation is a common side effect of pain medicines. Call your healthcare provider before taking any medicines such as laxatives or stool softeners to help ease constipation. Also ask if you should skip any foods. Drinking lots of fluids and eating foods such as fruits and vegetables that are high in fiber can also help. Remember, do not take laxatives unless your surgeon has prescribed them.  · Drinking alcohol and taking pain medicine can cause dizziness and slow your breathing. It can even be deadly. Do not drink alcohol while taking pain medicine.  · Pain medicine can make you react more slowly to things. Do not drive or run machinery while taking pain medicine.  Your healthcare provider may tell you to take acetaminophen to help ease your pain. Ask him or her how much you are supposed to take each day. Acetaminophen or other pain relievers may interact with your prescription medicines or other over-the-counter (OTC) medicines. Some prescription medicines have acetaminophen and other ingredients. Using both prescription and OTC acetaminophen for pain can cause you to overdose. Read the labels on your OTC medicines with care. This will help you to clearly know the list of ingredients, how much to take, and any warnings. It may also help you not take too much acetaminophen. If you have questions or do not understand the  information, ask your pharmacist or healthcare provider to explain it to you before you take the OTC medicine.  Managing nausea  Some people have an upset stomach after surgery. This is often because of anesthesia, pain, or pain medicine, or the stress of surgery. These tips will help you handle nausea and eat healthy foods as you get better. If you were on a special food plan before surgery, ask your healthcare provider if you should follow it while you get better. These tips may help:  · Do not push yourself to eat. Your body will tell you when to eat and how much.  · Start off with clear liquids and soup. They are easier to digest.  · Next try semi-solid foods, such as mashed potatoes, applesauce, and gelatin, as you feel ready.  · Slowly move to solid foods. Dont eat fatty, rich, or spicy foods at first.  · Do not force yourself to have 3 large meals a day. Instead eat smaller amounts more often.  · Take pain medicines with a small amount of solid food, such as crackers or toast, to avoid nausea.     Call your surgeon if  · You still have pain an hour after taking medicine. The medicine may not be strong enough.  · You feel too sleepy, dizzy, or groggy. The medicine may be too strong.  · You have side effects like nausea, vomiting, or skin changes, such as rash, itching, or hives.       If you have obstructive sleep apnea  You were given anesthesia medicine during surgery to keep you comfortable and free of pain. After surgery, you may have more apnea spells because of this medicine and other medicines you were given. The spells may last longer than usual.   At home:  · Keep using the continuous positive airway pressure (CPAP) device when you sleep. Unless your healthcare provider tells you not to, use it when you sleep, day or night. CPAP is a common device used to treat obstructive sleep apnea.  · Talk with your provider before taking any pain medicine, muscle relaxants, or sedatives. Your provider will tell  you about the possible dangers of taking these medicines.  Date Last Reviewed: 12/1/2016  © 4512-4238 [employer]. [address] [place] [address], [place] [address]. All rights reserved. This information is not intended as a substitute for professional medical care. Always follow your healthcare professional's instructions.

## 2019-03-14 NOTE — PLAN OF CARE
Patient and patient's father received discharge instructions.  Patient and patient's father verbalized understanding of all instructions given and all questions were addressed prior to patient's discharge.  Patient's vital signs are stable and within patient's baseline.  Patient tolerated clear liquids PO.  Patient voided urine without difficulty in post-op.  Patient denies pain.  Patient denies nausea and vomiting at this time.  Patient meets all criteria for discharge at this time.  All required consents present in patient's chart upon patient's discharge.

## 2019-03-14 NOTE — PLAN OF CARE
Patient arrived from ECT Room ([facility] 27) with JAMI Forrest RN and MARY Cat MD.  Patient stable.  Report received at this time.  Assumed care of patient at this time.

## 2019-03-14 NOTE — ANESTHESIA PROCEDURE NOTES
ECT    Procedure start time: 3/14/2019 8:25 AM  Timeout performed at: 3/14/2019 8:15 AM  Procedure end time: 3/14/2019 8:27 AM    Staffing  Anesthesiologist: Flaquita Franklin MD  CRNA/Resident: Za Cat MD  Performed by: resident/CRNA     Preanesthetic Checklist  The following were completed as part of the preanesthetic checklist: patient identified, procedural consent, pre-op evaluation, timeout performed, risks and benefits discussed, monitors and equipment checked, anesthesia consent given, oxygen available, suction available, hand hygiene performed and patient being monitored.    Setup & Induction  Patient Monitoring: heart rate, cardiac monitor, continuous pulse ox, continuous capnometry, NIBP and gas analyzer  Patient preparation: bite block inserted, extremities padded, mandibular stabilization and patient hyperventilated  Electrode Placement: Bitemporal    The patient was moved to the ECT therapy room after being assessed and consented for ECT. After standard ASA monitors were applied and timeout performed, the patient was adequately preoxygenated. After induction of general anesthesia, adequate oxygenation and ventilation were confirmed with pulse oxymetry and end tidal CO2 monitoring via bag-mask ventilation. End tidal CO2 was monitored throughout the case and moderate hyperventilation was performed prior to beginning ECT treatment. All extremities were padded, biteblock was inserted, and mandibular stabilization was done prior to initiating ECT therapy.    Procedure  Stimulus Number 1: Setting Number = III; 576 millicoulombs; Seizure Duration = 59 seconds    Stimulus Number 3:         Recovery  Baseline Blood Pressure: 124/70  Peak Blood Pressure: 165/90    ECT Findings  ECT associated findings of: Moderate Hypertension (SBP >160 or DBP >100)

## 2019-03-14 NOTE — ANESTHESIA POSTPROCEDURE EVALUATION
"Anesthesia Post Evaluation    Patient: Allyssa Wright    Procedure(s) Performed: Procedure(s) (LRB):  ELECTROCONVULSIVE THERAPY (ECT) - SINGLE SEIZURE (N/A)    Final Anesthesia Type: general  Patient location during evaluation: [facility]  Patient participation: Yes- Able to Participate  Level of consciousness: awake and alert  Post-procedure vital signs: reviewed and stable  Pain management: adequate  Airway patency: patent  PONV status at discharge: No PONV  Anesthetic complications: no      Cardiovascular status: hemodynamically stable  Respiratory status: unassisted, spontaneous ventilation and room air  Hydration status: euvolemic  Follow-up not needed.        Visit Vitals  /60 (BP Location: Right arm, Patient Position: Lying)   Pulse 67   Temp 37.3 °C (99.1 °F)   Resp 16   Ht 5' 5" (1.651 m)   Wt 68 kg (150 lb)   LMP  (Within Months)   SpO2 97%   Breastfeeding? No   BMI 24.96 kg/m²       Pain/Roma Score: Roma Score: 10 (3/14/2019  9:15 AM)        "

## 2019-03-14 NOTE — ANESTHESIA RELEASE NOTE
"Anesthesia Release from PACU Note    Patient: Allyssa Wright    Procedure(s) Performed: Procedure(s) (LRB):  ELECTROCONVULSIVE THERAPY (ECT) - SINGLE SEIZURE (N/A)    Anesthesia type: general    Post pain: Adequate analgesia    Post assessment: no apparent anesthetic complications and tolerated procedure well    Last Vitals:   Visit Vitals  /60 (BP Location: Right arm, Patient Position: Lying)   Pulse 67   Temp 37.3 °C (99.1 °F)   Resp 16   Ht 5' 5" (1.651 m)   Wt 68 kg (150 lb)   LMP  (Within Months)   SpO2 97%   Breastfeeding? No   BMI 24.96 kg/m²       Post vital signs: stable    Level of consciousness: awake and alert     Nausea/Vomiting: no nausea/no vomiting    Complications: none    Airway Patency: patent    Respiratory: unassisted, spontaneous ventilation, room air    Cardiovascular: stable and blood pressure at baseline    Hydration: euvolemic  "

## 2019-03-14 NOTE — TRANSFER OF CARE
"Anesthesia Transfer of Care Note    Patient: Allyssa Wright    Procedure(s) Performed: Procedure(s) (LRB):  ELECTROCONVULSIVE THERAPY (ECT) - SINGLE SEIZURE (N/A)    Patient location: PACU    Anesthesia Type: general    Transport from OR: Transported from OR on 6-10 L/min O2 by face mask with adequate spontaneous ventilation    Post pain: adequate analgesia    Post assessment: no apparent anesthetic complications    Post vital signs: stable    Level of consciousness: awake and alert    Complications: none    Transfer of care protocol was followed      Last vitals:   Visit Vitals  /74 (BP Location: Right arm, Patient Position: Lying)   Pulse 76   Temp 36.6 °C (97.8 °F) (Oral)   Resp 18   Ht 5' 5" (1.651 m)   Wt 68 kg (150 lb)   LMP  (Within Months)   SpO2 100%   Breastfeeding? No   BMI 24.96 kg/m²     "

## 2019-03-15 ENCOUNTER — PATIENT MESSAGE (OUTPATIENT)
Dept: ADMINISTRATIVE | Facility: OTHER | Age: 39
End: 2019-03-15

## 2019-04-02 ENCOUNTER — TELEPHONE (OUTPATIENT)
Dept: PSYCHIATRY | Facility: CLINIC | Age: 39
End: 2019-04-02

## 2019-04-02 ENCOUNTER — ANESTHESIA EVENT (OUTPATIENT)
Dept: ELECTROPHYSIOLOGY | Facility: HOSPITAL | Age: 39
End: 2019-04-02
Payer: COMMERCIAL

## 2019-04-02 NOTE — TELEPHONE ENCOUNTER
Patient's father called to request to move ECT date from 4/3/19 to 4/10/19 due to patient feeling improvement in symptoms. Wanting to push back a week. Discussed signs and symptoms to call to move appointment up need be. Surgery scheduling notified.

## 2019-04-02 NOTE — ANESTHESIA PREPROCEDURE EVALUATION
[facility]  Anesthesia Pre-Operative Evaluation         Patient Name: Allyssa Wright  YOB: 1980  MRN: [id number]    SUBJECTIVE:     Pre-operative evaluation for Procedure(s) (LRB):  ELECTROCONVULSIVE THERAPY (ECT) - SINGLE SEIZURE (N/A)     04/02/2019    Allyssa Wright is a 39 y.o. female w/ a significant PMHx of MDD who presents for the above procedure.    ECT # 67    Previous Meds:  - Methohexital 150mg  - Succinylcholine 140mg  - Ondansetron 4mg  - Ketorolac 30mg  - Labetalol 20mg      Patient Active Problem List   Diagnosis    MDD (major depressive disorder), recurrent, in partial remission    Other acne    Comfort measures only status    MDD (major depressive disorder)    Major depression    Major depressive disorder    MDD (major depressive disorder), severe       Review of patient's allergies indicates:   Allergen Reactions    Benzodiazepines Other (See Comments)     Contraindicated while taking Clozapine    Ampicillin      Mom says so    Erythromycin     Levaquin [levofloxacin] Other (See Comments)     Depression side effects    Penicillins      Mom says so    Pristiq [desvenlafaxine]      psycotic      Sulfa (sulfonamide antibiotics)      Rash      Azithromycin Anxiety       Current Inpatient Medications:      Current Outpatient Medications on File Prior to Visit   Medication Sig Dispense Refill    clozapine (CLOZARIL) 50 MG tablet Take 50 mg by mouth once daily.       dapsone 7.5 % GlwP Apply topically once daily.      dextroamphetamine-amphetamine 30 mg Tab Take 30 mg by mouth 2 (two) times daily.       famciclovir (FAMVIR) 500 MG tablet Take 1 tablet (500 mg total) by mouth 2 (two) times daily. 30 tablet 12    hydrOXYzine pamoate (VISTARIL) 25 MG Cap Take 2 capsules (50 mg total) by mouth nightly as needed (Take 25-50mg (1-2 caps) at night for anxiety prior to ECT). 180 capsule 0    mirtazapine (REMERON) 15 MG tablet Take 1 tablet (15 mg total) by  mouth every evening. (Patient taking differently: Take 7.5 mg by mouth every evening. ) 90 tablet 0    spironolactone (ALDACTONE) 50 MG tablet 50 mg 2 (two) times daily. 50  mg qAM, 50 mg qHS.      thyroid (ARMOUR THYROID) 30 mg Tab Take 1 tablet (30 mg total) by mouth every morning. 30 tablet 11    trazodone (DESYREL) 100 MG tablet Take 200 mg by mouth every evening.       UNABLE TO FIND 2 (two) times daily. n-azetyl-cysteine      venlafaxine (EFFEXOR) 100 MG Tab Take 3 tablets (300 mg total) by mouth once daily. (Patient taking differently: Take 225 mg by mouth once daily. )      vortioxetine (TRINTELLIX) 5 mg Tab Take 2.5 mg by mouth once daily.      ZOVIRAX 5 % Crea   5     Current Facility-Administered Medications on File Prior to Visit   Medication Dose Route Frequency Provider Last Rate Last Dose    0.9%  NaCl infusion   Intravenous Continuous Homa Colbert MD   Stopped at 03/14/19 0915    lidocaine (PF) 10 mg/ml (1%) injection 10 mg  1 mL Intradermal Once Homa Colbert MD        sodium chloride 0.9% flush 10 mL  10 mL Intra-Catheter PRN Homa Colbert MD           Past Surgical History:   Procedure Laterality Date    ANKLE SURGERY Right     BREAST augmentation      ELECTROCONVULSIVE THERAPY (ECT) - SINGLE SEIZURE N/A 12/6/2018    Performed by [facility]ib [facility] Jr[facility] at [facility] ECT    ELECTROCONVULSIVE THERAPY (ECT) - SINGLE SEIZURE N/A 8/31/2018    Performed by Shoaib Boyce Jr., MD at [facility] ECT    ELECTROCONVULSIVE THERAPY (ECT) - SINGLE SEIZURE N/A 8/6/2018    Performed by Shoaib Boyce Jr[facility] MD at [facility] ECT    ELECTROCONVULSIVE THERAPY (ECT) - SINGLE SEIZURE N/A 7/23/2018    Performed by Shoaib Boyce Jr., MD at [facility] ECT    ELECTROCONVULSIVE THERAPY (ECT) - SINGLE SEIZURE N/A 7/5/2018    Performed by Shoaib Boyce Jr., MD at [facility] ECT    ELECTROCONVULSIVE THERAPY (ECT) - SINGLE SEIZURE N/A 6/28/2018    Performed by Shoaib Boyce Jr., MD at [facility] ECT     ELECTROCONVULSIVE THERAPY (ECT) - SINGLE SEIZURE N/A 6/13/2018    Performed by [facility] Jr[facility] at [facility] ECT    ELECTROCONVULSIVE THERAPY (ECT) - SINGLE SEIZURE N/A 5/29/2018    Performed by Shoaib Boyce Jr., MD at [facility] ECT    ELECTROCONVULSIVE THERAPY (ECT) - SINGLE SEIZURE N/A 5/8/2018    Performed by Shoaib Boyce Jr[facility] at [facility] ECT    ELECTROCONVULSIVE THERAPY (ECT) - SINGLE SEIZURE N/A 4/24/2018    Performed by Shoaib Boyce Jr., [facility] at [facility] ECT    ELECTROCONVULSIVE THERAPY (ECT) - SINGLE SEIZURE N/A 4/17/2018    Performed by Shoaib Boyce Jr., [facility] at [facility] ECT    ELECTROCONVULSIVE THERAPY (ECT) - SINGLE SEIZURE N/A 4/13/2018    Performed by Shoaib Boyce Jr., [facility] at [facility] ECT    ELECTROCONVULSIVE THERAPY (ECT) - SINGLE SEIZURE N/A 4/3/2018    Performed by Shoaib Boyce Jr., [facility] at [facility] ECT    ELECTROCONVULSIVE THERAPY (ECT) - SINGLE SEIZURE N/A 3/20/2018    Performed by Shoaib Boyce Jr., [facility] at [facility] ECT    ELECTROCONVULSIVE THERAPY (ECT) - SINGLE SEIZURE N/A 3/15/2018    Performed by Shoaib Boyce Jr., [facility] at [facility] ECT    ELECTROCONVULSIVE THERAPY (ECT) - SINGLE SEIZURE N/A 3/6/2018    Performed by Shoaib Boyce Jr., [facility] at [facility] ECT    ELECTROCONVULSIVE THERAPY (ECT) - SINGLE SEIZURE N/A 3/1/2018    Performed by Shoaib Boyce Jr., [facility] at [facility] ECT    ELECTROCONVULSIVE THERAPY (ECT) - SINGLE SEIZURE N/A 2/23/2018    Performed by Shoaib Boyce Jr[facility] at [facility] ECT    ELECTROCONVULSIVE THERAPY (ECT) - SINGLE SEIZURE N/A 2/19/2018    Performed by Sh[facility]ib Boyce Jr[facility] at [facility] ECT    ELECTROCONVULSIVE THERAPY (ECT) - SINGLE SEIZURE N/A 2/15/2018    Performed by Sh[facility]ib Boyce Jr[facility] at [facility] ECT    ELECTROCONVULSIVE THERAPY (ECT) - SINGLE SEIZURE N/A 1/22/2018    Performed by Sh[facility]ib Boyce Jr[facility] at [facility] ECT    ELECTROCONVULSIVE THERAPY (ECT) - SINGLE SEIZURE N/A 12/11/2017    Performed by Sh[facility]ib Boyce Jr[facility] at [facility] ECT     ELECTROCONVULSIVE THERAPY (ECT) - SINGLE SEIZURE N/A 10/24/2017    Performed by [facility] Jr[facility] at [facility] ECT    ELECTROCONVULSIVE THERAPY (ECT) - SINGLE SEIZURE N/A 9/20/2017    Performed by Shoaib Boyce Jr., MD at [facility] ECT    ELECTROCONVULSIVE THERAPY (ECT) - SINGLE SEIZURE N/A 8/14/2017    Performed by Shoaib Boyce Jr., MD at [facility] ECT    ELECTROCONVULSIVE THERAPY (ECT) - SINGLE SEIZURE Bilateral 7/12/2017    Performed by Shoaib Boyce Jr., MD at [facility] ECT    ELECTROCONVULSIVE THERAPY (ECT) - SINGLE SEIZURE N/A 6/13/2017    Performed by Shoaib Boyce Jr., MD at [facility] ECT    ELECTROCONVULSIVE THERAPY (ECT) - SINGLE SEIZURE N/A 5/17/2017    Performed by Shoaib Boyce Jr., MD at [facility] ECT    ELECTROCONVULSIVE THERAPY (ECT) - SINGLE SEIZURE N/A 4/20/2017    Performed by Shoaib Boyce Jr., MD at [facility] ECT    ELECTROCONVULSIVE THERAPY (ECT) - SINGLE SEIZURE N/A 11/22/2016    Performed by Shoaib Boyce Jr., MD at [facility] ECT    ELECTROCONVULSIVE THERAPY (ECT) - SINGLE SEIZURE N/A 10/24/2016    Performed by Shoaib Boyce Jr., MD at [facility] ECT    ELECTROCONVULSIVE THERAPY (ECT) - SINGLE SEIZURE N/A 9/22/2016    Performed by Shoaib Boyce Jr., MD at [facility] ECT    ELECTROCONVULSIVE THERAPY (ECT) - SINGLE SEIZURE N/A 9/1/2016    Performed by Shoaib Boyce Jr., MD at [facility] ECT    ELECTROCONVULSIVE THERAPY (ECT) - SINGLE SEIZURE N/A 8/15/2016    Performed by Shoaib Boyce Jr[facility] MD at [facility] ECT    ELECTROCONVULSIVE THERAPY (ECT) - SINGLE SEIZURE N/A 8/1/2016    Performed by Shoaib Boyce Jr., MD at [facility] ECT    ELECTROCONVULSIVE THERAPY (ECT) - SINGLE SEIZURE N/A 7/22/2016    Performed by Shoaib Boyce Jr., MD at [facility] ECT    ELECTROCONVULSIVE THERAPY (ECT) - SINGLE SEIZURE N/A 7/14/2016    Performed by Shoaib Boyce Jr[facility] MD at [facility] ECT    ELECTROCONVULSIVE THERAPY (ECT) - SINGLE SEIZURE N/A 7/8/2016    Performed by Shoaib [facility] Jr[facility] MD at [facility] ECT     ELECTROCONVULSIVE THERAPY (ECT) - SINGLE SEIZURE N/A 2016    Performed by [facility] at [facility] ECT    ELECTROCONVULSIVE THERAPY (ECT) - SINGLE SEIZURE N/A 2016    Performed by Shoaib Boyce Jr., MD at [facility] ECT    ELECTROCONVULSIVE THERAPY (ECT) - SINGLE SEIZURE N/A 2016    Performed by Shoaib Boyce Jr., MD at [facility] ECT    ELECTROCONVULSIVE THERAPY (ECT) - SINGLE SEIZURE N/A 2016    Performed by Shoaib Boyce Jr[facility] MD at [facility] ECT    ELECTROCONVULSIVE THERAPY (ECT) - SINGLE SEIZURE N/A 2016    Performed by Shoaib Boyce Jr., MD at [facility] ECT    ELECTROCONVULSIVE THERAPY (ECT) - SINGLE SEIZURE N/A 6/15/2016    Performed by Shoaib Boyce Jr., MD at [facility] ECT    ELECTROCONVULSIVE THERAPY (ECT) - SINGLE SEIZURE N/A 2016    Performed by Shoaib Boyce Jr., MD at [facility] ECT    ELECTROCONVULSIVE THERAPY (ECT) - SINGLE SEIZURE N/A 2016    Performed by Shoaib Boyce Jr., MD at [facility] ECT    ELECTROCONVULSIVE THERAPY (ECT) - SINGLE SEIZURE N/A 2016    Performed by Shoaib Boyce Jr., MD at [facility] ECT    ELECTROCONVULSIVE THERAPY, CEREBRAL HEMISPHERE, UNILATERAL, 1 SEIZURE Bilateral 2018    Performed by [facility]ib [facility] Jr[facility] MD at [facility] ECT    OVARIAN CYST REMOVAL Bilateral        Social History     Socioeconomic History    Marital status: Single     Spouse name: Not on file    Number of children: Not on file    Years of education: Not on file    Highest education level: Not on file   Occupational History    Occupation: unemployed   Social Needs    Financial resource strain: Not on file    Food insecurity:     Worry: Not on file     Inability: Not on file    Transportation needs:     Medical: Not on file     Non-medical: Not on file   Tobacco Use    Smoking status: Former Smoker     Last attempt to quit: 2011     Years since quittin.9    Smokeless tobacco: Never Used   Substance and Sexual Activity    Alcohol use: Yes      Comment: rarely    Drug use: No     Comment: Experimental use in high school    Sexual activity: Not on file   Lifestyle    Physical activity:     Days per week: Not on file     Minutes per session: Not on file    Stress: Not on file   Relationships    Social connections:     Talks on phone: Not on file     Gets together: Not on file     Attends Mandaeism service: Not on file     Active member of club or organization: Not on file     Attends meetings of clubs or organizations: Not on file     Relationship status: Not on file    Intimate partner violence:     Fear of current or ex partner: Not on file     Emotionally abused: Not on file     Physically abused: Not on file     Forced sexual activity: Not on file   Other Topics Concern    Patient feels they ought to cut down on drinking/drug use Not Asked    Patient annoyed by others criticizing their drinking/drug use Not Asked    Patient has felt bad or guilty about drinking/drug use Not Asked    Patient has had a drink/used drugs as an eye opener in the AM Not Asked   Social History Narrative    Pt has 1 older brother from an intact family until the death of her mother in 2012.  She completed 1 year of college, was never in the , and has never been employed.  She was engaged once, but never , has no children, and lives with her father plus 2 dogs.  She denies any hobbies and is spiritual but not Mandaeism.  She does date and returns to college during rare periods when depression and anxiety [place].       OBJECTIVE:     Vital Signs Range (Last 24H):  BP: ()/()   Arterial Line BP: ()/()       CBC:   No results for input(s): WBC, RBC, HGB, HCT, PLT, MCV, MCH, MCHC in the last 72 hours.    CMP: No results for input(s): NA, K, CL, CO2, BUN, CREATININE, GLU, MG, PHOS, CALCIUM, ALBUMIN, PROT, ALKPHOS, ALT, AST, BILITOT in the last 72 hours.    INR:  No results for input(s): PT, INR, PROTIME, APTT in the last 72 hours.    Diagnostic Studies: No  relevant studies.    EKG: No recent studies available.    2D ECHO:  No results found for this or any previous visit.      ASSESSMENT/PLAN:         Pre-op Assessment    I have reviewed the Patient Summary Reports.     I have reviewed the Nursing Notes.   I have reviewed the Medications.     Review of Systems  Anesthesia Hx:  No problems with previous Anesthesia  Denies Family Hx of Anesthesia complications.   Denies Personal Hx of Anesthesia complications.   Social:  Former Smoker, Alcohol Use    Hematology/Oncology:  Hematology Normal   Oncology Normal     EENT/Dental:EENT/Dental Normal   Cardiovascular:  Cardiovascular Normal                     Pulmonary:  Pulmonary Normal  Denies Asthma.  Denies Shortness of breath.    Renal/:  Renal/ Normal     Hepatic/GI:  Hepatic/GI Normal    Neurological:   Denies TIA. Denies CVA.    Endocrine:  Endocrine Normal    Psych:   Psychiatric History depression          Physical Exam  General:  Well nourished    Airway/Jaw/Neck:  Airway Findings: Mouth Opening: Normal Tongue: Normal  General Airway Assessment: Adult  Mallampati: I  Jaw/Neck Findings:  Neck ROM: Normal ROM     Eyes/Ears/Nose:  EYES/EARS/NOSE FINDINGS: Normal   Dental:  Dental Findings: In tact   Chest/Lungs:  Chest/Lungs Findings: Clear to auscultation     Heart/Vascular:  Heart Findings: Rate: Normal  Rhythm: Regular Rhythm  Heart murmur: negative       Mental Status:  Mental Status Findings:  Cooperative, Alert and Oriented         Anesthesia Plan  Type of Anesthesia, risks & benefits discussed:  Anesthesia Type:  general  Patient's Preference:   Intra-op Monitoring Plan: standard ASA monitors  Intra-op Monitoring Plan Comments:   Post Op Pain Control Plan: per primary service following discharge from PACU, multimodal analgesia and IV/PO Opioids PRN  Post Op Pain Control Plan Comments:   Induction:   IV  Beta Blocker:  Patient is not currently on a Beta-Blocker (No further documentation required).        Informed Consent: Patient understands risks and agrees with Anesthesia plan.  Questions answered. Anesthesia consent signed with patient.  ASA Score: 2     Day of Surgery Review of History & Physical:  There are no significant changes.  H&P update referred to the provider.         Ready For Surgery From Anesthesia Perspective.

## 2019-04-03 ENCOUNTER — ANESTHESIA (OUTPATIENT)
Dept: ELECTROPHYSIOLOGY | Facility: HOSPITAL | Age: 39
End: 2019-04-03
Payer: COMMERCIAL

## 2019-04-10 ENCOUNTER — HOSPITAL ENCOUNTER (OUTPATIENT)
Facility: HOSPITAL | Age: 39
Discharge: HOME | End: 2019-04-10
Attending: PSYCHIATRY & NEUROLOGY | Admitting: PSYCHIATRY & NEUROLOGY
Payer: COMMERCIAL

## 2019-04-10 VITALS
BODY MASS INDEX: 24.16 KG/M2 | SYSTOLIC BLOOD PRESSURE: 126 MMHG | HEIGHT: 65 IN | DIASTOLIC BLOOD PRESSURE: 69 MMHG | HEART RATE: 90 BPM | RESPIRATION RATE: 20 BRPM | OXYGEN SATURATION: 99 % | TEMPERATURE: 98 F | WEIGHT: 145 LBS

## 2019-04-10 DIAGNOSIS — F32.9 MDD (MAJOR DEPRESSIVE DISORDER): ICD-10-CM

## 2019-04-10 DIAGNOSIS — F33.9 RECURRENT MAJOR DEPRESSIVE DISORDER, REMISSION STATUS UNSPECIFIED: Primary | ICD-10-CM

## 2019-04-10 DIAGNOSIS — F33.41 MDD (MAJOR DEPRESSIVE DISORDER), RECURRENT, IN PARTIAL REMISSION: ICD-10-CM

## 2019-04-10 PROCEDURE — 25000003 PHARM REV CODE 250: Performed by: STUDENT IN AN ORGANIZED HEALTH CARE EDUCATION/TRAINING PROGRAM

## 2019-04-10 PROCEDURE — 63600175 PHARM REV CODE 636 W HCPCS: Performed by: STUDENT IN AN ORGANIZED HEALTH CARE EDUCATION/TRAINING PROGRAM

## 2019-04-10 PROCEDURE — 90870 ELECTROCONVULSIVE THERAPY: CPT | Mod: ,,, | Performed by: PSYCHIATRY & NEUROLOGY

## 2019-04-10 PROCEDURE — D9220A PRA ANESTHESIA: Mod: ,,, | Performed by: ANESTHESIOLOGY

## 2019-04-10 PROCEDURE — 90870 PR ELECTROCONVULSIVE THERAPY,1 SEIZ: ICD-10-PCS | Mod: ,,, | Performed by: PSYCHIATRY & NEUROLOGY

## 2019-04-10 PROCEDURE — 90870 ELECTROCONVULSIVE THERAPY: CPT | Performed by: ANESTHESIOLOGY

## 2019-04-10 PROCEDURE — D9220A PRA ANESTHESIA: ICD-10-PCS | Mod: ,,, | Performed by: ANESTHESIOLOGY

## 2019-04-10 RX ORDER — KETOROLAC TROMETHAMINE 30 MG/ML
30 INJECTION, SOLUTION INTRAMUSCULAR; INTRAVENOUS ONCE
Status: COMPLETED | OUTPATIENT
Start: 2019-04-10 | End: 2019-04-10

## 2019-04-10 RX ORDER — IPRATROPIUM BROMIDE AND ALBUTEROL SULFATE 2.5; .5 MG/3ML; MG/3ML
3 SOLUTION RESPIRATORY (INHALATION) EVERY 6 HOURS PRN
Status: DISCONTINUED | OUTPATIENT
Start: 2019-04-10 | End: 2019-04-10 | Stop reason: HOSPADM

## 2019-04-10 RX ORDER — SUCCINYLCHOLINE CHLORIDE 20 MG/ML
INJECTION INTRAMUSCULAR; INTRAVENOUS
Status: COMPLETED
Start: 2019-04-10 | End: 2019-04-10

## 2019-04-10 RX ORDER — LABETALOL HCL 20 MG/4 ML
SYRINGE (ML) INTRAVENOUS
Status: DISCONTINUED
Start: 2019-04-10 | End: 2019-04-10 | Stop reason: HOSPADM

## 2019-04-10 RX ORDER — SUCCINYLCHOLINE CHLORIDE 20 MG/ML
INJECTION INTRAMUSCULAR; INTRAVENOUS
Status: DISCONTINUED | OUTPATIENT
Start: 2019-04-10 | End: 2019-04-10

## 2019-04-10 RX ORDER — LIDOCAINE HYDROCHLORIDE 10 MG/ML
1 INJECTION, SOLUTION EPIDURAL; INFILTRATION; INTRACAUDAL; PERINEURAL ONCE
Status: COMPLETED | OUTPATIENT
Start: 2019-04-10 | End: 2019-04-10

## 2019-04-10 RX ORDER — SODIUM CHLORIDE 0.9 % (FLUSH) 0.9 %
3 SYRINGE (ML) INJECTION
Status: DISCONTINUED | OUTPATIENT
Start: 2019-04-10 | End: 2019-04-10 | Stop reason: HOSPADM

## 2019-04-10 RX ORDER — ONDANSETRON 2 MG/ML
4 INJECTION INTRAMUSCULAR; INTRAVENOUS EVERY 6 HOURS PRN
Status: DISCONTINUED | OUTPATIENT
Start: 2019-04-10 | End: 2019-04-10 | Stop reason: HOSPADM

## 2019-04-10 RX ORDER — ONDANSETRON 2 MG/ML
4 INJECTION INTRAMUSCULAR; INTRAVENOUS ONCE
Status: COMPLETED | OUTPATIENT
Start: 2019-04-10 | End: 2019-04-10

## 2019-04-10 RX ORDER — KETOROLAC TROMETHAMINE 30 MG/ML
INJECTION, SOLUTION INTRAMUSCULAR; INTRAVENOUS
Status: COMPLETED
Start: 2019-04-10 | End: 2019-04-10

## 2019-04-10 RX ORDER — ONDANSETRON 2 MG/ML
INJECTION INTRAMUSCULAR; INTRAVENOUS
Status: COMPLETED
Start: 2019-04-10 | End: 2019-04-10

## 2019-04-10 RX ORDER — DIPHENHYDRAMINE HYDROCHLORIDE 50 MG/ML
25 INJECTION INTRAMUSCULAR; INTRAVENOUS ONCE AS NEEDED
Status: DISCONTINUED | OUTPATIENT
Start: 2019-04-10 | End: 2019-04-10 | Stop reason: HOSPADM

## 2019-04-10 RX ORDER — SODIUM CHLORIDE 9 MG/ML
INJECTION, SOLUTION INTRAVENOUS CONTINUOUS PRN
Status: DISCONTINUED | OUTPATIENT
Start: 2019-04-10 | End: 2019-04-10

## 2019-04-10 RX ORDER — SODIUM CHLORIDE 9 MG/ML
INJECTION, SOLUTION INTRAVENOUS CONTINUOUS
Status: DISCONTINUED | OUTPATIENT
Start: 2019-04-10 | End: 2019-04-10 | Stop reason: HOSPADM

## 2019-04-10 RX ADMIN — SUCCINYLCHOLINE CHLORIDE 140 MG: 20 INJECTION, SOLUTION INTRAMUSCULAR; INTRAVENOUS at 09:04

## 2019-04-10 RX ADMIN — SODIUM CHLORIDE 500 ML: 0.9 INJECTION, SOLUTION INTRAVENOUS at 08:04

## 2019-04-10 RX ADMIN — SODIUM CHLORIDE: 9 INJECTION, SOLUTION INTRAVENOUS at 08:04

## 2019-04-10 RX ADMIN — KETOROLAC TROMETHAMINE 30 MG: 30 INJECTION, SOLUTION INTRAMUSCULAR; INTRAVENOUS at 09:04

## 2019-04-10 RX ADMIN — METHOHEXITAL SODIUM 150 MG: 500 INJECTION, POWDER, LYOPHILIZED, FOR SOLUTION INTRAMUSCULAR; INTRAVENOUS; RECTAL at 09:04

## 2019-04-10 RX ADMIN — LIDOCAINE HYDROCHLORIDE 1 MG: 10 INJECTION, SOLUTION EPIDURAL; INFILTRATION; INTRACAUDAL; PERINEURAL at 08:04

## 2019-04-10 RX ADMIN — Medication 500 MG: at 08:04

## 2019-04-10 RX ADMIN — ONDANSETRON 4 MG: 2 INJECTION INTRAMUSCULAR; INTRAVENOUS at 09:04

## 2019-04-10 NOTE — DISCHARGE INSTRUCTIONS
Home Care Instructions  E.C.T.    ACTIVITY LEVEL: You may feel sleepy for several hours. It is best to rest until you are more awake and then gradually resume your normal activities in one day. It is recommended, because of the possibility of memory loss and slight confusion post ECT, that you rest in the company of a responsible adult. This confusion and memory loss is expected and should diminish over time. It is also recommended that you do not drive or operate any electrical appliances or machinery during the ECT treatment series. Ask your Doctor, at the time of your treatment, any questions you may have about specific activities.    DIET: You may wish to start with liquids after your ECT treatment and gradually resume your normal diet. Do not consume alcoholic beverages during the ECT treatment series.    BATHING: You may shower or bathe as desired.    MEDICATIONS: Resume all home medications starting with the AM doses not taken prior to ECT treatment. If you experience a headache, it is okay to take your usual pain reliever.    WHEN TO CALL THE DOCTOR: Headache, memory loss or confusion that does not begin to resolve within 24 hours.    [facility]2019 for 6:00 AM.    Remember you may not eat or drink after midnight, but please take heart or high blood pressure medicines with a sip of water in the morning prior to leaving home.    FOR EMERGENCIES: If any unusual problems or difficulties occur, contact the office [phone] Monday-Friday until 3:00 p.m.  After hours, call the hospital  [phone] and ask for the Psychiatry Resident on-call.          Discharge Instructions: After Your Surgery  Youve just had surgery. During surgery, you were given medicine called anesthesia to keep you relaxed and free of pain. After surgery, you may have some pain or nausea. This is common. Here are some tips for feeling better and getting well after surgery.     Stay on  schedule with your medicine.   Going home  Your healthcare provider will show you how to take care of yourself when you go home. He or she will also answer your questions. Have an adult family member or friend drive you home. For the first 24 hours after your surgery:  · Do not drive or use heavy equipment.  · Do not make important decisions or sign legal papers.  · Do not drink alcohol.  · Have someone stay with you, if needed. He or she can watch for problems and help keep you safe.  Be sure to go to all follow-up visits with your healthcare provider. And rest after your surgery for as long as your healthcare provider tells you to.  Coping with pain  If you have pain after surgery, pain medicine will help you feel better. Take it as told, before pain becomes severe. Also, ask your healthcare provider or pharmacist about other ways to control pain. This might be with heat, ice, or relaxation. And follow any other instructions your surgeon or nurse gives you.  Tips for taking pain medicine  To get the best relief possible, remember these points:  · Pain medicines can upset your stomach. Taking them with a little food may help.  · Most pain relievers taken by mouth need at least 20 to 30 minutes to start to work.  · Taking medicine on a schedule can help you remember to take it. Try to time your medicine so that you can take it before starting an activity. This might be before you get dressed, go for a walk, or sit down for dinner.  · Constipation is a common side effect of pain medicines. Call your healthcare provider before taking any medicines such as laxatives or stool softeners to help ease constipation. Also ask if you should skip any foods. Drinking lots of fluids and eating foods such as fruits and vegetables that are high in fiber can also help. Remember, do not take laxatives unless your surgeon has prescribed them.  · Drinking alcohol and taking pain medicine can cause dizziness and slow your breathing. It  can even be deadly. Do not drink alcohol while taking pain medicine.  · Pain medicine can make you react more slowly to things. Do not drive or run machinery while taking pain medicine.  Your healthcare provider may tell you to take acetaminophen to help ease your pain. Ask him or her how much you are supposed to take each day. Acetaminophen or other pain relievers may interact with your prescription medicines or other over-the-counter (OTC) medicines. Some prescription medicines have acetaminophen and other ingredients. Using both prescription and OTC acetaminophen for pain can cause you to overdose. Read the labels on your OTC medicines with care. This will help you to clearly know the list of ingredients, how much to take, and any warnings. It may also help you not take too much acetaminophen. If you have questions or do not understand the information, ask your pharmacist or healthcare provider to explain it to you before you take the OTC medicine.  Managing nausea  Some people have an upset stomach after surgery. This is often because of anesthesia, pain, or pain medicine, or the stress of surgery. These tips will help you handle nausea and eat healthy foods as you get better. If you were on a special food plan before surgery, ask your healthcare provider if you should follow it while you get better. These tips may help:  · Do not push yourself to eat. Your body will tell you when to eat and how much.  · Start off with clear liquids and soup. They are easier to digest.  · Next try semi-solid foods, such as mashed potatoes, applesauce, and gelatin, as you feel ready.  · Slowly move to solid foods. Dont eat fatty, rich, or spicy foods at first.  · Do not force yourself to have 3 large meals a day. Instead eat smaller amounts more often.  · Take pain medicines with a small amount of solid food, such as crackers or toast, to avoid nausea.     Call your surgeon if  · You still have pain an hour after taking  medicine. The medicine may not be strong enough.  · You feel too sleepy, dizzy, or groggy. The medicine may be too strong.  · You have side effects like nausea, vomiting, or skin changes, such as rash, itching, or hives.       If you have obstructive sleep apnea  You were given anesthesia medicine during surgery to keep you comfortable and free of pain. After surgery, you may have more apnea spells because of this medicine and other medicines you were given. The spells may last longer than usual.   At home:  · Keep using the continuous positive airway pressure (CPAP) device when you sleep. Unless your healthcare provider tells you not to, use it when you sleep, day or night. CPAP is a common device used to treat obstructive sleep apnea.  · Talk with your provider before taking any pain medicine, muscle relaxants, or sedatives. Your provider will tell you about the possible dangers of taking these medicines.  Date Last Reviewed: 12/1/2016  © 4983-1491 [employer]. [address] [place] [address], [place] [address]. All rights reserved. This information is not intended as a substitute for professional medical care. Always follow your healthcare professional's instructions.

## 2019-04-10 NOTE — H&P
"Allyssa Wright  : 1980   MRN: [id number]  Date: 4/10/2019     Electroconvulsive Therapy  History & Physical    Chief complaint: MDD, recurrent, in partial remission  Procedure: ECT #67    SUBJECTIVE:     HPI:   Allyssa Wright is a 39 y.o. female with MDD, recurrent, in partial remission who presents for ECT. The patient denies somatic complaints.  She denies changes to appetite or sleep.  Pt denies memory problems that have increased recently.  She describes her mood as okay.  Please see Dr. Boyce's full pre-ECT evaluation for further details. The patient does not need any prescriptions and has not had any med changes since meeting with Dr. Boyce. The patient has not had anything by mouth since midnight and is ready for ECT.    Psychiatric Review of Systems:  Mood: "okay"  Anxiety: Present, unchanged. No acute concerns  Appetite: No problem  Psychomotor: No problem  Cognitive Impairment: mild, consistent with ECT  Insomnia: None  Psychosis: None  Diurnal Variation: None  Suicidal Ideation: Absent    Medical Review Of Systems:  Pertinent items are noted in HPI.    Current Medications:   Current Facility-Administered Medications on File Prior to Encounter   Medication Dose Route Frequency Provider Last Rate Last Dose    lidocaine (PF) 10 mg/ml (1%) injection 10 mg  1 mL Intradermal Once Homa Colbert MD        sodium chloride 0.9% flush 10 mL  10 mL Intra-Catheter PRN Homa Colbert MD        [DISCONTINUED] 0.9%  NaCl infusion   Intravenous Continuous Homa Colbert MD   Stopped at 19 0907     Current Outpatient Medications on File Prior to Encounter   Medication Sig Dispense Refill    clozapine (CLOZARIL) 50 MG tablet Take 50 mg by mouth once daily.       dapsone 7.5 % GlwP Apply topically once daily.      dextroamphetamine-amphetamine 30 mg Tab Take 30 mg by mouth 2 (two) times daily.       famciclovir (FAMVIR) 500 MG tablet Take 1 tablet (500 mg total) by mouth 2 (two) times " daily. 30 tablet 12    hydrOXYzine pamoate (VISTARIL) 25 MG Cap Take 2 capsules (50 mg total) by mouth nightly as needed (Take 25-50mg (1-2 caps) at night for anxiety prior to ECT). 180 capsule 0    mirtazapine (REMERON) 15 MG tablet Take 1 tablet (15 mg total) by mouth every evening. (Patient taking differently: Take 7.5 mg by mouth every evening. ) 90 tablet 0    spironolactone (ALDACTONE) 50 MG tablet 50 mg 2 (two) times daily. 50  mg qAM, 50 mg qHS.      thyroid (ARMOUR THYROID) 30 mg Tab Take 1 tablet (30 mg total) by mouth every morning. 30 tablet 11    trazodone (DESYREL) 100 MG tablet Take 200 mg by mouth every evening.       UNABLE TO FIND 2 (two) times daily. n-azetyl-cysteine      venlafaxine (EFFEXOR) 100 MG Tab Take 3 tablets (300 mg total) by mouth once daily. (Patient taking differently: Take 225 mg by mouth once daily. )      vortioxetine (TRINTELLIX) 5 mg Tab Take 2.5 mg by mouth once daily.      ZOVIRAX 5 % Crea   5      Allergies:   Benzodiazepines; Ampicillin; Erythromycin; Levaquin [levofloxacin]; Penicillins; Pristiq [desvenlafaxine]; Sulfa (sulfonamide antibiotics); and Azithromycin    Past Medical/Surgical History:   Past Medical History:   Diagnosis Date    Anxiety     Depression     History of psychiatric hospitalization     HSV-1 (herpes simplex virus 1) infection     Hx of psychiatric care     Moderate depressed bipolar II disorder 06/13/2016    reports no history of bipolar    Obsessive-compulsive disorder     Psychiatric problem     Schizophrenia 4/3/2018    Self-harming behavior     Therapy      Past Surgical History:   Procedure Laterality Date    ANKLE SURGERY Right     BREAST augmentation      ELECTROCONVULSIVE THERAPY (ECT) - SINGLE SEIZURE N/A 12/6/2018    Performed [facility]ib [facility] Jr[facility]    ELECTROCONVULSIVE THERAPY (ECT) - SINGLE SEIZURE N/A 8/31/2018    Per[facility] by [facility] ECT    ELECTROCONVULSIVE THERAPY  (ECT) - SINGLE SEIZURE N/A 8/6/2018    Performed by [facility] at [facility] ECT    ELECTROCONVULSIVE THERAPY (ECT) - SINGLE SEIZURE N/A 7/23/2018    Performed by [facility] at [facility] ECT    ELECTROCONVULSIVE THERAPY (ECT) - SINGLE SEIZURE N/A 7/5/2018    Performed by [facility] at [facility] ECT    ELECTROCONVULSIVE THERAPY (ECT) - SINGLE SEIZURE N/A 6/28/2018    Performed by [facility] ECT    ELECTROCONVULSIVE THERAPY (ECT) - SINGLE SEIZURE N/A 6/13/2018    Performed by [facility] at [facility] ECT    ELECTROCONVULSIVE THERAPY (ECT) - SINGLE SEIZURE N/A 5/29/2018    Performed by Sh[facility]ib [facility] Jr[facility] at [facility] ECT    ELECTROCONVULSIVE THERAPY (ECT) - SINGLE SEIZURE N/A 5/8/2018    Performed by Sh[facility]ib [facility] Jr[facility] at [facility] ECT    ELECTROCONVULSIVE THERAPY (ECT) - SINGLE SEIZURE N/A 4/24/2018    Performed by Sh[facility]ib [facility] Jr[facility] at [facility] ECT    ELECTROCONVULSIVE THERAPY (ECT) - SINGLE SEIZURE N/A 4/17/2018    Performed by Sh[facility]ib [facility] Jr[facility] at [facility] ECT    ELECTROCONVULSIVE THERAPY (ECT) - SINGLE SEIZURE N/A 4/13/2018    Performed by Sh[facility]ib [facility] Jr[facility] at [facility] ECT    ELECTROCONVULSIVE THERAPY (ECT) - SINGLE SEIZURE N/A 4/3/2018    Performed by Sh[facility]ib [facility] Jr[facility] at [facility] ECT    ELECTROCONVULSIVE THERAPY (ECT) - SINGLE SEIZURE N/A 3/20/2018    Performed by [facility]ib [facility] Jr[facility] at [facility] ECT    ELECTROCONVULSIVE THERAPY (ECT) - SINGLE SEIZURE N/A 3/15/2018    Performed by Sh[facility]ib Boy[facility] Jr[facility] at [facility] ECT    ELECTROCONVULSIVE THERAPY (ECT) - SINGLE SEIZURE N/A 3/6/2018    Performed by [facility] Jr[facility] at [facility] ECT    ELECTROCONVULSIVE THERAPY (ECT) - SINGLE SEIZURE N/A 3/1/2018    Performed by [facility]ib [facility] Jr[facility] at [facility] ECT    ELECTROCONVULSIVE THERAPY (ECT) - SINGLE SEIZURE N/A 2/23/2018    Performed by [facility]ib [facility] Jr[facility] at [facility] ECT    ELECTROCONVULSIVE THERAPY (ECT) - SINGLE  SEIZURE N/A 2/19/2018    Performed by Sh[facility]ib Boy[facility] Jr[facility] MD at [facility] ECT    ELECTROCONVULSIVE THERAPY (ECT) - SINGLE SEIZURE N/A 2/15/2018    Performed by Shoaib Boyce Jr[facility] at [facility] ECT    ELECTROCONVULSIVE THERAPY (ECT) - SINGLE SEIZURE N/A 1/22/2018    Performed by Shoaib Boyce Jr[facility] at [facility] ECT    ELECTROCONVULSIVE THERAPY (ECT) - SINGLE SEIZURE N/A 12/11/2017    Performed by Shoaib Boyce Jr[facility] at [facility] ECT    ELECTROCONVULSIVE THERAPY (ECT) - SINGLE SEIZURE N/A 10/24/2017    Performed by Shoaib Boyce Jr[facility] at [facility] ECT    ELECTROCONVULSIVE THERAPY (ECT) - SINGLE SEIZURE N/A 9/20/2017    Performed by Shoaib Boyce Jr[facility] at [facility] ECT    ELECTROCONVULSIVE THERAPY (ECT) - SINGLE SEIZURE N/A 8/14/2017    Performed by Shoaib Boyce Jr[facility] at [facility] ECT    ELECTROCONVULSIVE THERAPY (ECT) - SINGLE SEIZURE Bilateral 7/12/2017    Performed by Shoaib Boyce Jr[facility] at [facility] ECT    ELECTROCONVULSIVE THERAPY (ECT) - SINGLE SEIZURE N/A 6/13/2017    Performed by Shoaib Boyce Jr[facility] at [facility] ECT    ELECTROCONVULSIVE THERAPY (ECT) - SINGLE SEIZURE N/A 5/17/2017    Performed by Shoaib Boyce Jr[facility] at [facility] ECT    ELECTROCONVULSIVE THERAPY (ECT) - SINGLE SEIZURE N/A 4/20/2017    Performed by Shoaib Boyce Jr[facility] at [facility] ECT    ELECTROCONVULSIVE THERAPY (ECT) - SINGLE SEIZURE N/A 11/22/2016    Performed by Shoaib Boyce Jr[facility] at [facility] ECT    ELECTROCONVULSIVE THERAPY (ECT) - SINGLE SEIZURE N/A 10/24/2016    Performed by Shoaib Boyce Jr., [facility] at [facility] ECT    ELECTROCONVULSIVE THERAPY (ECT) - SINGLE SEIZURE N/A 9/22/2016    Performed by Shoaib Boyce Jr[facility] at [facility] ECT    ELECTROCONVULSIVE THERAPY (ECT) - SINGLE SEIZURE N/A 9/1/2016    Performed by Shoaib Boyce Jr[facility] at [facility] ECT    ELECTROCONVULSIVE THERAPY (ECT) - SINGLE SEIZURE N/A 8/15/2016    Performed by Shoaib Boyce Jr[facility] at [facility] ECT    ELECTROCONVULSIVE THERAPY (ECT) - SINGLE  SEIZURE N/A 8/1/2016    Performed by [facility] at [facility] ECT    ELECTROCONVULSIVE THERAPY (ECT) - SINGLE SEIZURE N/A 7/22/2016    Performed by Shoaib Boyce Jr[facility] at [facility] ECT    ELECTROCONVULSIVE THERAPY (ECT) - SINGLE SEIZURE N/A 7/14/2016    Performed by Shoaib Boyce Jr[facility] at [facility] ECT    ELECTROCONVULSIVE THERAPY (ECT) - SINGLE SEIZURE N/A 7/8/2016    Performed by Shoaib Boyce Jr[facility] at [facility] ECT    ELECTROCONVULSIVE THERAPY (ECT) - SINGLE SEIZURE N/A 7/5/2016    Performed by Shoaib Boyce Jr[facility] at [facility] ECT    ELECTROCONVULSIVE THERAPY (ECT) - SINGLE SEIZURE N/A 6/27/2016    Performed by Shoaib Boyce Jr[facility] at [facility] ECT    ELECTROCONVULSIVE THERAPY (ECT) - SINGLE SEIZURE N/A 6/23/2016    Performed by Shoaib Boyce Jr[facility] at [facility] ECT    ELECTROCONVULSIVE THERAPY (ECT) - SINGLE SEIZURE N/A 6/20/2016    Performed by Shoaib Boyce Jr[facility] at [facility] ECT    ELECTROCONVULSIVE THERAPY (ECT) - SINGLE SEIZURE N/A 6/16/2016    Performed by Shoaib Boyce Jr[facility] at [facility] ECT    ELECTROCONVULSIVE THERAPY (ECT) - SINGLE SEIZURE N/A 6/15/2016    Performed by Shoaib Boyce Jr[facility] at [facility] ECT    ELECTROCONVULSIVE THERAPY (ECT) - SINGLE SEIZURE N/A 6/14/2016    Performed by Shoaib Boy[facility] Jr[facility] at [facility] ECT    ELECTROCONVULSIVE THERAPY (ECT) - SINGLE SEIZURE N/A 6/13/2016    Performed by Sh[facility]ib [facility] Jr[facility] at [facility] ECT    ELECTROCONVULSIVE THERAPY (ECT) - SINGLE SEIZURE N/A 1/4/2016    Performed by Sh[facility]ib [facility] Jr[facility] at [facility] ECT    ELECTROCONVULSIVE THERAPY, CEREBRAL HEMISPHERE, UNILATERAL, 1 SEIZURE Bilateral 6/25/2018    Performed by [facility]ib [facility] Jr[facility] at [facility] ECT    OVARIAN CYST REMOVAL Bilateral       OBJECTIVE:     Vitals (pre-procedure):  There were no vitals filed for this visit.     Labs/Imaging/Studies:   No results found for this or any previous visit (from the past 48 hour(s)).   No results found for: PHENYTOIN, PHENOBARB,  "VALPROATE, CBMZ    Physical Exam:   Gen: AAOx4, NAD  HEENT: MMM, PERRL, EOMI, O/P clear  CV: RRR, S1/S2 nml, no M/R/G  Chest: CTAB, no R/R/W, unlabored breathing  Abd: S/NT/ND, +BS, no HSM  Ext: No C/C/E, pulse 2+ throughout  Neuro: CN II-XII grossly intact, no focal deficits    Mental Status Exam:   Appearance: age appropriate, well nourished, dressed in hospital gown  Behavior: friendly and cooperative, eye contact appropriate  Speech: conversational tone, rate, pitch, volume  Mood: "okay"  Affect: euthymic; congruent  Thought Process: linear and organized  Thought Content: no SI, no HI  Perceptual Disturbances: none reported  Cognition: grossly intact  Insight: good  Judgment: appropriate for setting       ASSESSMENT/PLAN:     Allyssa Wright is a 39 y.o. female with MDD, recurrent, in partial remission who presents for ECT.    Recommendations:   Proceed with ECT #67.      Rudy Atwood MD  4/10/2019    "

## 2019-04-10 NOTE — DISCHARGE SUMMARY
Allyssa Wright  : 1980   MRN: [id number]  Date: 4/10/2019     Electroconvulsive Therapy  Discharge Summary    Admit Date: 4/10/2019  6:59 AM  Discharge Date: 04/10/2019    Attending Physician: Shoaib Boyce Jr., MD   Discharge Provider: Rudy Atwood MD    History of Present Illness: Allyssa Wright is a 39 y.o. female withMDD, recurrent, in partial remission presented for ECT #67. See H&P dated 04/10/2019 for full HPI. For further details, see Dr. Boyce's pre-ECT evaluation.    Hospital Course: The patient tolerated the ECT treatment well without complication. Patient was stable post-procedure. See OP note dated 04/10/2019 for more details.     Disposition: Home or Self Care    Medications:  Current Discharge Medication List      CONTINUE these medications which have NOT CHANGED    Details   clozapine (CLOZARIL) 50 MG tablet Take 50 mg by mouth once daily.       dapsone 7.5 % GlwP Apply topically once daily.      dextroamphetamine-amphetamine 30 mg Tab Take 30 mg by mouth 2 (two) times daily.     Associated Diagnoses: MDD (major depressive disorder), recurrent, in partial remission      famciclovir (FAMVIR) 500 MG tablet Take 1 tablet (500 mg total) by mouth 2 (two) times daily.  Qty: 30 tablet, Refills: 12    Associated Diagnoses: Major depressive disorder, recurrent episode, moderate degree      hydrOXYzine pamoate (VISTARIL) 25 MG Cap Take 2 capsules (50 mg total) by mouth nightly as needed (Take 25-50mg (1-2 caps) at night for anxiety prior to ECT).  Qty: 180 capsule, Refills: 0      mirtazapine (REMERON) 15 MG tablet Take 1 tablet (15 mg total) by mouth every evening.  Qty: 90 tablet, Refills: 0      spironolactone (ALDACTONE) 50 MG tablet 50 mg 2 (two) times daily. 50  mg qAM, 50 mg qHS.      thyroid (ARMOUR THYROID) 30 mg Tab Take 1 tablet (30 mg total) by mouth every morning.  Qty: 30 tablet, Refills: 11    Associated Diagnoses: Major depressive disorder, recurrent episode, moderate degree       trazodone (DESYREL) 100 MG tablet Take 200 mg by mouth every evening.       UNABLE TO FIND 2 (two) times daily. n-azetyl-cysteine      venlafaxine (EFFEXOR) 100 MG Tab Take 3 tablets (300 mg total) by mouth once daily.    Associated Diagnoses: MDD (major depressive disorder), recurrent, in partial remission      vortioxetine (TRINTELLIX) 5 mg Tab Take 2.5 mg by mouth once daily.      ZOVIRAX 5 % Crea Refills: 5           Status at Discharge: Alert and medically stable    Discharge Diagnoses:  MDD, recurrent, in partial remission    Diet: Resume previous outpatient diet  Activity: Ambulate with assistance  Instructions: Please do not eat or drink anything after midnight prior to procedure. Please do not drive on day of ECT.    Med Changes:  None    Next ECT:  5/8/19      Rudy Atwood MD  4/10/2019

## 2019-04-10 NOTE — OP NOTE
Allyssa Wright  : 1980   MRN: [id number]  Date: 4/10/2019    Electroconvulsive Therapy  Procedure Note    Date of Admission: 4/10/2019  6:59 AM    Site: [facility], [address]    Attending: Shoaib Boyce Jr., MD   Residents: Rudy Atwood MD  Pre-procedure Diagnosis: MDD, recurrent, in partial remission  ECT Treatment Number: 67  Machine Type: Mecta 5000    Patient Status: Medically stable    Vitals (pre-procedure):  There were no vitals filed for this visit.    Electrode Placement: Bitemporal    Stimulus Number Charge (mC) Level Pulse Width (msec) Frequency  (Hz) Duration of Stimulus (sec) Current (mA) Duration of Seizure (sec)   1 576 3 1 60 6 800 130                                   Complications: Hypertension    Maximum Blood Pressure: 199/109    Medications Given:  Caffeine 500 mg PO before  Methohexital (Brevital) 150 mg  IV before  Succinylcholine (Anectine) 140 mg  IV before  Zofran 4 mg IV before  Toradol 30 mg IV before      Treatment Course:  Patient tolerated procedure well. After adequate recovery from general anesthesia, the patient was transported to recovery.    Post-op Diagnosis: Same as above    Recommended for next ECT:  No med changes.      Rudy Atwood MD  [facility]  4/10/2019  7:27 AM

## 2019-04-10 NOTE — PLAN OF CARE
Patient arrived from ECT Room ([facility] 27).  Patient stable.  Report received at this time from JAMI Forrest RN and TRINITY Ralph MD.  Assumed care of patient at this time.

## 2019-04-10 NOTE — TRANSFER OF CARE
"Anesthesia Transfer of Care Note    Patient: Allyssa Wright    Procedure(s) Performed: Procedure(s) (LRB):  ELECTROCONVULSIVE THERAPY (ECT) - SINGLE SEIZURE (N/A)    Patient location: [facility]    Anesthesia Type: general    Transport from OR: Transported from OR on 6-10 L/min O2 by face mask with adequate spontaneous ventilation    Post pain: adequate analgesia    Post assessment: no apparent anesthetic complications and tolerated procedure well    Post vital signs: stable    Level of consciousness: awake    Nausea/Vomiting: no nausea/vomiting    Complications: none    Transfer of care protocol was followed      Last vitals:   Visit Vitals  /66 (BP Location: Right arm, Patient Position: Lying)   Pulse 75   Temp 36.7 °C (98.1 °F) (Oral)   Resp 17   Ht 5' 5" (1.651 m)   Wt 65.8 kg (145 lb)   LMP  (LMP Unknown)   SpO2 100%   Breastfeeding? No   BMI 24.13 kg/m²     "

## 2019-04-10 NOTE — PLAN OF CARE
Patient and patient's father received discharge instructions and prescriptions.  Patient and patient's father verbalized understanding of all instructions given and all questions were addressed prior to patient's discharge.  Patient's vital signs are stable and within patient's baseline.  Patient tolerated clear liquids PO.  Patient denies pain.  Patient denies nausea and vomiting at this time.  Patient meets all criteria for discharge at this time.  All required consents present in patient's chart upon patient's discharge.

## 2019-04-10 NOTE — ANESTHESIA PROCEDURE NOTES
ECT    Treatment Number: 67  Procedure start time: 4/10/2019 9:07 AM  Timeout performed at: 4/10/2019 8:55 AM  Procedure end time: 4/10/2019 9:08 AM    Staffing  Anesthesiologist: Raphael Anne MD  CRNA/Resident: Jensen Ralph MD  Performed by: anesthesiologist and resident/CRNA     Preanesthetic Checklist  The following were completed as part of the preanesthetic checklist: patient identified, procedural consent, pre-op evaluation, timeout performed, risks and benefits discussed, monitors and equipment checked, anesthesia consent given, oxygen available, suction available, hand hygiene performed and patient being monitored.    Setup & Induction  Patient Monitoring: heart rate, cardiac monitor, continuous pulse ox, continuous capnometry, NIBP and gas analyzer  Patient preparation: patient hyperventilated, mandibular stabilization, extremities padded and bite block inserted  Electrode Placement: Bitemporal    The patient was moved to the ECT therapy room after being assessed and consented for ECT. After standard ASA monitors were applied and timeout performed, the patient was adequately preoxygenated. After induction of general anesthesia, adequate oxygenation and ventilation were confirmed with pulse oxymetry and end tidal CO2 monitoring via bag-mask ventilation. End tidal CO2 was monitored throughout the case and moderate hyperventilation was performed prior to beginning ECT treatment. All extremities were padded, biteblock was inserted, and mandibular stabilization was done prior to initiating ECT therapy.    Procedure  Stimulus Number 1: Setting Number = III; 576 millicoulombs; Seizure Duration = 130 seconds            Recovery  After adequate recovery from general anesthesia, the patient was transported to recovery.  Baseline Blood Pressure: 128/66  Peak Blood Pressure: 199/109  Time to Recovery of Respirations: 5 minutes    ECT Findings  ECT associated findings of: Moderate Hypertension (SBP >160 or  DBP >100)

## 2019-05-07 ENCOUNTER — ANESTHESIA EVENT (OUTPATIENT)
Dept: ELECTROPHYSIOLOGY | Facility: HOSPITAL | Age: 39
End: 2019-05-07
Payer: COMMERCIAL

## 2019-05-07 NOTE — ANESTHESIA PREPROCEDURE EVALUATION
[facility]  Anesthesia Pre-Operative Evaluation         Patient Name: Allyssa Wright  YOB: 1980  MRN: [id number]    SUBJECTIVE:     Pre-operative evaluation for Procedure(s) (LRB):  ELECTROCONVULSIVE THERAPY (ECT) - SINGLE SEIZURE (N/A)     05/07/2019    Allyssa Wright is a 39 y.o. female w/ a significant PMHx of MDD who presents for the above procedure.    ECT # 68    Previous Meds:  - Methohexital 150mg  - Succinylcholine 140mg  - Ondansetron 4mg  - Ketorolac 30mg  - Labetalol 20mg      Patient Active Problem List   Diagnosis    MDD (major depressive disorder), recurrent, in partial remission    Other acne    Comfort measures only status    MDD (major depressive disorder)    Major depression    Major depressive disorder    MDD (major depressive disorder), severe       Review of patient's allergies indicates:   Allergen Reactions    Benzodiazepines Other (See Comments)     Contraindicated while taking Clozapine    Ampicillin      Mom says so    Erythromycin     Levaquin [levofloxacin] Other (See Comments)     Depression side effects    Penicillins      Mom says so    Pristiq [desvenlafaxine]      psycotic      Sulfa (sulfonamide antibiotics)      Rash      Azithromycin Anxiety       Current Inpatient Medications:      Current Outpatient Medications on File Prior to Visit   Medication Sig Dispense Refill    clozapine (CLOZARIL) 50 MG tablet Take 50 mg by mouth once daily.       dapsone 7.5 % GlwP Apply topically once daily.      dextroamphetamine-amphetamine 30 mg Tab Take 30 mg by mouth 2 (two) times daily.       famciclovir (FAMVIR) 500 MG tablet Take 1 tablet (500 mg total) by mouth 2 (two) times daily. 30 tablet 12    hydrOXYzine pamoate (VISTARIL) 25 MG Cap Take 2 capsules (50 mg total) by mouth nightly as needed (Take 25-50mg (1-2 caps) at night for anxiety prior to ECT). 180 capsule 0    mirtazapine (REMERON) 15 MG tablet Take 1 tablet (15 mg total) by  mouth every evening. (Patient taking differently: Take 7.5 mg by mouth every evening. ) 90 tablet 0    spironolactone (ALDACTONE) 50 MG tablet 50 mg 2 (two) times daily. 50  mg qAM, 50 mg qHS.      thyroid (ARMOUR THYROID) 30 mg Tab Take 1 tablet (30 mg total) by mouth every morning. 30 tablet 11    trazodone (DESYREL) 100 MG tablet Take 200 mg by mouth every evening.       UNABLE TO FIND 2 (two) times daily. n-azetyl-cysteine      venlafaxine (EFFEXOR) 100 MG Tab Take 3 tablets (300 mg total) by mouth once daily. (Patient taking differently: Take 225 mg by mouth once daily. )      vortioxetine (TRINTELLIX) 5 mg Tab Take 2.5 mg by mouth once daily.      ZOVIRAX 5 % Crea   5     Current Facility-Administered Medications on File Prior to Visit   Medication Dose Route Frequency Provider Last Rate Last Dose    lidocaine (PF) 10 mg/ml (1%) injection 10 mg  1 mL Intradermal Once Homa Colbert MD        sodium chloride 0.9% flush 10 mL  10 mL Intra-Catheter PRN Homa Colbert MD           Past Surgical History:   Procedure Laterality Date    ANKLE SURGERY Right     BREAST augmentation      ELECTROCONVULSIVE THERAPY (ECT) - SINGLE SEIZURE N/A 12/6/2018    Performed by [facility]ib [facility] Jr., [facility] at [facility] ECT    ELECTROCONVULSIVE THERAPY (ECT) - SINGLE SEIZURE N/A 8/31/2018    Performed by [facility]ib [facility] Jr[facility] at [facility] ECT    ELECTROCONVULSIVE THERAPY (ECT) - SINGLE SEIZURE N/A 8/6/2018    Performed by Sh[facility]ib [facility] Jr[facility] at [facility] ECT    ELECTROCONVULSIVE THERAPY (ECT) - SINGLE SEIZURE N/A 7/23/2018    Performed by Sh[facility]ib [facility]ce Jr[facility] at [facility] ECT    ELECTROCONVULSIVE THERAPY (ECT) - SINGLE SEIZURE N/A 7/5/2018    Performed by hSoaib Boyce Jr., MD at [facility] ECT    ELECTROCONVULSIVE THERAPY (ECT) - SINGLE SEIZURE N/A 6/28/2018    Performed by [facility] Jr[facility] at [facility] ECT    ELECTROCONVULSIVE THERAPY (ECT) - SINGLE SEIZURE N/A 6/13/2018    Performed by Shoaib Boyce Jr., MD  at [facility] ECT    ELECTROCONVULSIVE THERAPY (ECT) - SINGLE SEIZURE N/A 5/29/2018    Performed by Shoaib Boyce Jr., MD at [facility] ECT    ELECTROCONVULSIVE THERAPY (ECT) - SINGLE SEIZURE N/A 5/8/2018    Performed by Shoaib Boyce Jr., MD at [facility] ECT    ELECTROCONVULSIVE THERAPY (ECT) - SINGLE SEIZURE N/A 4/24/2018    Performed by Shoaib Boyce Jr., MD at [facility] ECT    ELECTROCONVULSIVE THERAPY (ECT) - SINGLE SEIZURE N/A 4/17/2018    Performed by Shoaib Boyce Jr., MD at [facility] ECT    ELECTROCONVULSIVE THERAPY (ECT) - SINGLE SEIZURE N/A 4/13/2018    Performed by Shoaib Boyce Jr., MD at [facility] ECT    ELECTROCONVULSIVE THERAPY (ECT) - SINGLE SEIZURE N/A 4/3/2018    Performed by Shoaib Boyce Jr., MD at [facility] ECT    ELECTROCONVULSIVE THERAPY (ECT) - SINGLE SEIZURE N/A 3/20/2018    Performed by Shoaib Boyce Jr., MD at [facility] ECT    ELECTROCONVULSIVE THERAPY (ECT) - SINGLE SEIZURE N/A 3/15/2018    Performed by Shoaib Boyce Jr., MD at [facility] ECT    ELECTROCONVULSIVE THERAPY (ECT) - SINGLE SEIZURE N/A 3/6/2018    Performed by Shoaib Boyce Jr., MD at [facility] ECT    ELECTROCONVULSIVE THERAPY (ECT) - SINGLE SEIZURE N/A 3/1/2018    Performed by Shoaib Boyce Jr., MD at [facility] ECT    ELECTROCONVULSIVE THERAPY (ECT) - SINGLE SEIZURE N/A 2/23/2018    Performed by Shoaib Boyce Jr., MD at [facility] ECT    ELECTROCONVULSIVE THERAPY (ECT) - SINGLE SEIZURE N/A 2/19/2018    Performed by Shoaib Boyce Jr., MD at [facility] ECT    ELECTROCONVULSIVE THERAPY (ECT) - SINGLE SEIZURE N/A 2/15/2018    Performed by Shoaib Boyce Jr., MD at [facility] ECT    ELECTROCONVULSIVE THERAPY (ECT) - SINGLE SEIZURE N/A 1/22/2018    Performed by Shoaib Boyce Jr., [facility] at [facility] ECT    ELECTROCONVULSIVE THERAPY (ECT) - SINGLE SEIZURE N/A 12/11/2017    Performed by Shoaib Boyce Jr., MD at [facility] ECT    ELECTROCONVULSIVE THERAPY (ECT) - SINGLE SEIZURE N/A 10/24/2017    Performed by Shoaib Boyce Jr., MD at [facility] ECT     ELECTROCONVULSIVE THERAPY (ECT) - SINGLE SEIZURE N/A 9/20/2017    Performed by Shoaib Boyce Jr., MD at [facility] ECT    ELECTROCONVULSIVE THERAPY (ECT) - SINGLE SEIZURE N/A 8/14/2017    Performed by Shoaib Boyce Jr., MD at [facility] ECT    ELECTROCONVULSIVE THERAPY (ECT) - SINGLE SEIZURE Bilateral 7/12/2017    Performed by Shoaib Boyce Jr., MD at [facility] ECT    ELECTROCONVULSIVE THERAPY (ECT) - SINGLE SEIZURE N/A 6/13/2017    Performed by Shoaib Boyce Jr., MD at [facility] ECT    ELECTROCONVULSIVE THERAPY (ECT) - SINGLE SEIZURE N/A 5/17/2017    Performed by Shoaib Boyce Jr., MD at [facility] ECT    ELECTROCONVULSIVE THERAPY (ECT) - SINGLE SEIZURE N/A 4/20/2017    Performed by Shoaib Boyce Jr., MD at [facility] ECT    ELECTROCONVULSIVE THERAPY (ECT) - SINGLE SEIZURE N/A 11/22/2016    Performed by Shoaib Boyce Jr., MD at [facility] ECT    ELECTROCONVULSIVE THERAPY (ECT) - SINGLE SEIZURE N/A 10/24/2016    Performed by Shoaib Boyce Jr., MD at [facility] ECT    ELECTROCONVULSIVE THERAPY (ECT) - SINGLE SEIZURE N/A 9/22/2016    Performed by Shoaib Boyce Jr., MD at [facility] ECT    ELECTROCONVULSIVE THERAPY (ECT) - SINGLE SEIZURE N/A 9/1/2016    Performed by Shoaib Boyce Jr., MD at [facility] ECT    ELECTROCONVULSIVE THERAPY (ECT) - SINGLE SEIZURE N/A 8/15/2016    Performed by Shoaib Boyce Jr., MD at [facility] ECT    ELECTROCONVULSIVE THERAPY (ECT) - SINGLE SEIZURE N/A 8/1/2016    Performed by Shoaib Boyce Jr., MD at [facility] ECT    ELECTROCONVULSIVE THERAPY (ECT) - SINGLE SEIZURE N/A 7/22/2016    Performed by Shoaib Boyce Jr., MD at [facility] ECT    ELECTROCONVULSIVE THERAPY (ECT) - SINGLE SEIZURE N/A 7/14/2016    Performed by Shoaib Boyce Jr., MD at [facility] ECT    ELECTROCONVULSIVE THERAPY (ECT) - SINGLE SEIZURE N/A 7/8/2016    Performed by Shoaib Boyce Jr., MD at [facility] ECT    ELECTROCONVULSIVE THERAPY (ECT) - SINGLE SEIZURE N/A 7/5/2016    Performed by Shoaib Boyce Jr., MD at [facility] ECT     ELECTROCONVULSIVE THERAPY (ECT) - SINGLE SEIZURE N/A 2016    Performed by [facility] Jr[facility] at [facility] ECT    ELECTROCONVULSIVE THERAPY (ECT) - SINGLE SEIZURE N/A 2016    Performed by Sh[facility]ib Boyce Jr[facility] MD at [facility] ECT    ELECTROCONVULSIVE THERAPY (ECT) - SINGLE SEIZURE N/A 2016    Performed by Sh[facility]ib Boyce Jr[facility] at [facility]    ELECTROCONVULSIVE THERAPY (ECT) - SINGLE SEIZURE N/A 2016    Performed by Sh[facility]ib [facility]ce Jr[facility] at [facility] E[facility]    ELECTROCONVULSIVE THERAPY (ECT) - SINGLE SEIZURE N/A 6/15/2016    Performed by Sh[facility]ib Boyce Jr[facility] at [facility]    ELECTROCONVULSIVE THERAPY (ECT) - SINGLE SEIZURE N/A 2016    Performed by Sh[facility]ib Boyce Jr[facility] at [facility]    ELECTROCONVULSIVE THERAPY (ECT) - SINGLE SEIZURE N/A 2016    Performed by Sh[facility]ib Boyce Jr[facility] at [facility] E[facility]    ELECTROCONVULSIVE THERAPY (ECT) - SINGLE SEIZURE N/A 2016    Performed by Sh[facility]ib Boyce Jr[facility] MD at [facility] ECT    ELECTROCONVULSIVE THERAPY, CEREBRAL HEMISPHERE, UNILATERAL, 1 SEIZURE Bilateral 2018    Performed by [facility]ib [facility] Jr[facility] at [facility] ECT    OVARIAN CYST REMOVAL Bilateral        Social History     Socioeconomic History    Marital status: Single     Spouse name: Not on file    Number of children: Not on file    Years of education: Not on file    Highest education level: Not on file   Occupational History    Occupation: unemployed   Social Needs    Financial resource strain: Not on file    Food insecurity:     Worry: Not on file     Inability: Not on file    Transportation needs:     Medical: Not on file     Non-medical: Not on file   Tobacco Use    Smoking status: Former Smoker     Last attempt to quit: 2011     Years since quittin.0    Smokeless tobacco: Never Used   Substance and Sexual Activity    Alcohol use: Yes     Comment: rarely    Drug use: No     Comment: Experimental use in high school    Sexual activity: Not on file   Lifestyle     Physical activity:     Days per week: Not on file     Minutes per session: Not on file    Stress: Not on file   Relationships    Social connections:     Talks on phone: Not on file     Gets together: Not on file     Attends Mandaen service: Not on file     Active member of club or organization: Not on file     Attends meetings of clubs or organizations: Not on file     Relationship status: Not on file   Other Topics Concern    Patient feels they ought to cut down on drinking/drug use Not Asked    Patient annoyed by others criticizing their drinking/drug use Not Asked    Patient has felt bad or guilty about drinking/drug use Not Asked    Patient has had a drink/used drugs as an eye opener in the AM Not Asked   Social History Narrative    Pt has 1 older brother from an intact family until the death of her mother in 2012.  She completed 1 year of college, was never in the , and has never been employed.  She was engaged once, but never , has no children, and lives with her father plus 2 dogs.  She denies any hobbies and is spiritual but not Mandaen.  She does date and returns to college during rare periods when depression and anxiety [place].       OBJECTIVE:     Vital Signs Range (Last 24H):  BP: ()/()   Arterial Line BP: ()/()       CBC:   No results for input(s): WBC, RBC, HGB, HCT, PLT, MCV, MCH, MCHC in the last 72 hours.    CMP: No results for input(s): NA, K, CL, CO2, BUN, CREATININE, GLU, MG, PHOS, CALCIUM, ALBUMIN, PROT, ALKPHOS, ALT, AST, BILITOT in the last 72 hours.    INR:  No results for input(s): PT, INR, PROTIME, APTT in the last 72 hours.    Diagnostic Studies: No relevant studies.    EKG: No recent studies available.    2D ECHO:  No results found for this or any previous visit.      ASSESSMENT/PLAN:         Pre-op Assessment    I have reviewed the Patient Summary Reports.     I have reviewed the Nursing Notes.   I have reviewed the Medications.     Review of  Systems  Anesthesia Hx:  No problems with previous Anesthesia  Denies Family Hx of Anesthesia complications.   Denies Personal Hx of Anesthesia complications.   Social:  Former Smoker, Alcohol Use    Hematology/Oncology:  Hematology Normal   Oncology Normal     EENT/Dental:EENT/Dental Normal   Cardiovascular:  Cardiovascular Normal                     Pulmonary:  Pulmonary Normal  Denies Asthma.  Denies Shortness of breath.    Renal/:  Renal/ Normal     Hepatic/GI:  Hepatic/GI Normal    Neurological:   Denies TIA. Denies CVA.    Endocrine:  Endocrine Normal    Psych:   Psychiatric History depression          Physical Exam  General:  Well nourished    Airway/Jaw/Neck:  Airway Findings: Mouth Opening: Normal Tongue: Normal  General Airway Assessment: Adult  Mallampati: I  Jaw/Neck Findings:  Neck ROM: Normal ROM     Eyes/Ears/Nose:  EYES/EARS/NOSE FINDINGS: Normal   Dental:  Dental Findings: In tact   Chest/Lungs:  Chest/Lungs Findings: Clear to auscultation     Heart/Vascular:  Heart Findings: Rate: Normal  Rhythm: Regular Rhythm  Heart murmur: negative       Mental Status:  Mental Status Findings:  Cooperative, Alert and Oriented         Anesthesia Plan  Type of Anesthesia, risks & benefits discussed:  Anesthesia Type:  general  Patient's Preference:   Intra-op Monitoring Plan: standard ASA monitors  Intra-op Monitoring Plan Comments:   Post Op Pain Control Plan: per primary service following discharge from PACU, multimodal analgesia and IV/PO Opioids PRN  Post Op Pain Control Plan Comments:   Induction:   IV  Beta Blocker:  Patient is not currently on a Beta-Blocker (No further documentation required).       Informed Consent: Patient understands risks and agrees with Anesthesia plan.  Questions answered. Anesthesia consent signed with patient.  ASA Score: 2     Day of Surgery Review of History & Physical:  There are no significant changes.  H&P update referred to the provider.         Ready For Surgery From  Anesthesia Perspective.

## 2019-05-08 ENCOUNTER — HOSPITAL ENCOUNTER (OUTPATIENT)
Facility: HOSPITAL | Age: 39
Discharge: HOME | End: 2019-05-08
Attending: PSYCHIATRY & NEUROLOGY | Admitting: PSYCHIATRY & NEUROLOGY
Payer: COMMERCIAL

## 2019-05-08 ENCOUNTER — ANESTHESIA (OUTPATIENT)
Dept: ELECTROPHYSIOLOGY | Facility: HOSPITAL | Age: 39
End: 2019-05-08
Payer: COMMERCIAL

## 2019-05-08 VITALS
TEMPERATURE: 98 F | WEIGHT: 145 LBS | BODY MASS INDEX: 24.16 KG/M2 | HEART RATE: 66 BPM | HEIGHT: 65 IN | OXYGEN SATURATION: 100 % | DIASTOLIC BLOOD PRESSURE: 57 MMHG | RESPIRATION RATE: 16 BRPM | SYSTOLIC BLOOD PRESSURE: 118 MMHG

## 2019-05-08 DIAGNOSIS — F33.41 MDD (MAJOR DEPRESSIVE DISORDER), RECURRENT, IN PARTIAL REMISSION: ICD-10-CM

## 2019-05-08 DIAGNOSIS — F32.9 MDD (MAJOR DEPRESSIVE DISORDER): ICD-10-CM

## 2019-05-08 LAB
B-HCG UR QL: NEGATIVE
CTP QC/QA: YES

## 2019-05-08 PROCEDURE — 25000003 PHARM REV CODE 250: Performed by: PSYCHIATRY & NEUROLOGY

## 2019-05-08 PROCEDURE — D9220A PRA ANESTHESIA: Mod: ,,, | Performed by: ANESTHESIOLOGY

## 2019-05-08 PROCEDURE — 90870 ELECTROCONVULSIVE THERAPY: CPT | Performed by: ANESTHESIOLOGY

## 2019-05-08 PROCEDURE — 25000003 PHARM REV CODE 250: Performed by: ANESTHESIOLOGY

## 2019-05-08 PROCEDURE — 90870 ELECTROCONVULSIVE THERAPY: CPT | Mod: ,,, | Performed by: PSYCHIATRY & NEUROLOGY

## 2019-05-08 PROCEDURE — D9220A PRA ANESTHESIA: ICD-10-PCS | Mod: ,,, | Performed by: ANESTHESIOLOGY

## 2019-05-08 PROCEDURE — 90870 PR ELECTROCONVULSIVE THERAPY,1 SEIZ: ICD-10-PCS | Mod: ,,, | Performed by: PSYCHIATRY & NEUROLOGY

## 2019-05-08 PROCEDURE — 81025 URINE PREGNANCY TEST: CPT | Performed by: PSYCHIATRY & NEUROLOGY

## 2019-05-08 PROCEDURE — 63600175 PHARM REV CODE 636 W HCPCS: Performed by: ANESTHESIOLOGY

## 2019-05-08 RX ORDER — ONDANSETRON 2 MG/ML
INJECTION INTRAMUSCULAR; INTRAVENOUS
Status: DISCONTINUED | OUTPATIENT
Start: 2019-05-08 | End: 2019-05-08

## 2019-05-08 RX ORDER — SODIUM CHLORIDE 9 MG/ML
INJECTION, SOLUTION INTRAVENOUS CONTINUOUS PRN
Status: DISCONTINUED | OUTPATIENT
Start: 2019-05-08 | End: 2019-05-08

## 2019-05-08 RX ORDER — SODIUM CHLORIDE 0.9 % (FLUSH) 0.9 %
10 SYRINGE (ML) INJECTION
Status: DISCONTINUED | OUTPATIENT
Start: 2019-05-08 | End: 2019-05-08 | Stop reason: HOSPADM

## 2019-05-08 RX ORDER — SUCCINYLCHOLINE CHLORIDE 20 MG/ML
INJECTION INTRAMUSCULAR; INTRAVENOUS
Status: DISCONTINUED | OUTPATIENT
Start: 2019-05-08 | End: 2019-05-08

## 2019-05-08 RX ORDER — KETOROLAC TROMETHAMINE 30 MG/ML
INJECTION, SOLUTION INTRAMUSCULAR; INTRAVENOUS
Status: DISCONTINUED | OUTPATIENT
Start: 2019-05-08 | End: 2019-05-08

## 2019-05-08 RX ORDER — SODIUM CHLORIDE 9 MG/ML
INJECTION, SOLUTION INTRAVENOUS CONTINUOUS
Status: DISCONTINUED | OUTPATIENT
Start: 2019-05-09 | End: 2019-05-08 | Stop reason: HOSPADM

## 2019-05-08 RX ORDER — SODIUM CHLORIDE 9 MG/ML
INJECTION, SOLUTION INTRAVENOUS CONTINUOUS
Status: DISCONTINUED | OUTPATIENT
Start: 2019-05-08 | End: 2019-05-08 | Stop reason: HOSPADM

## 2019-05-08 RX ORDER — LABETALOL HYDROCHLORIDE 5 MG/ML
INJECTION, SOLUTION INTRAVENOUS
Status: DISCONTINUED | OUTPATIENT
Start: 2019-05-08 | End: 2019-05-08

## 2019-05-08 RX ORDER — LIDOCAINE HYDROCHLORIDE 10 MG/ML
1 INJECTION, SOLUTION EPIDURAL; INFILTRATION; INTRACAUDAL; PERINEURAL ONCE
Status: DISCONTINUED | OUTPATIENT
Start: 2019-05-08 | End: 2019-05-08 | Stop reason: HOSPADM

## 2019-05-08 RX ADMIN — SODIUM CHLORIDE: 0.9 INJECTION, SOLUTION INTRAVENOUS at 08:05

## 2019-05-08 RX ADMIN — SODIUM CHLORIDE: 0.9 INJECTION, SOLUTION INTRAVENOUS at 07:05

## 2019-05-08 RX ADMIN — LABETALOL HYDROCHLORIDE 20 MG: 5 INJECTION, SOLUTION INTRAVENOUS at 08:05

## 2019-05-08 RX ADMIN — KETOROLAC TROMETHAMINE 30 MG: 30 INJECTION, SOLUTION INTRAMUSCULAR at 08:05

## 2019-05-08 RX ADMIN — METHOHEXITAL SODIUM 150 MG: 500 INJECTION, POWDER, LYOPHILIZED, FOR SOLUTION INTRAMUSCULAR; INTRAVENOUS; RECTAL at 08:05

## 2019-05-08 RX ADMIN — ONDANSETRON 4 MG: 2 INJECTION, SOLUTION INTRAMUSCULAR; INTRAVENOUS at 08:05

## 2019-05-08 RX ADMIN — Medication 500 MG: at 08:05

## 2019-05-08 RX ADMIN — SUCCINYLCHOLINE CHLORIDE 140 MG: 20 INJECTION, SOLUTION INTRAMUSCULAR; INTRAVENOUS at 08:05

## 2019-05-08 NOTE — DISCHARGE INSTRUCTIONS
Return June 5th    PATIENT INSTRUCTIONS  POST-ANESTHESIA    IMMEDIATELY FOLLOWING SURGERY:  Do not drive or operate machinery for the first twenty four hours after surgery.  Do not make any important decisions for twenty four hours after surgery or while taking narcotic pain medications or sedatives.  If you develop intractable nausea and vomiting or a severe headache please notify your doctor immediately.    FOLLOW-UP:  Please make an appointment with your surgeon as instructed. You do not need to follow up with anesthesia unless specifically instructed to do so.    WOUND CARE INSTRUCTIONS (if applicable):  Keep a dry clean dressing on the anesthesia/puncture wound site if there is drainage.  Once the wound has quit draining you may leave it open to air.  Generally you should leave the bandage intact for twenty four hours unless there is drainage.  If the epidural site drains for more than 36-48 hours please call the anesthesia department.    QUESTIONS?:  Please feel free to call your physician or the hospital  if you have any questions, and they will be happy to assist you.       [facility] Anesthesia Department  [address]  [phone]

## 2019-05-08 NOTE — ANESTHESIA PROCEDURE NOTES
ECT    Treatment Number: 68  Procedure start time: 5/8/2019 8:24 AM  Timeout performed at: 5/8/2019 8:23 AM  Procedure end time: 5/8/2019 8:53 AM    Staffing  Anesthesiologist: Renetta Carver MD  CRNA/Resident: Catherine Pelletier MD  Performed by: resident/CRNA     Preanesthetic Checklist  The following were completed as part of the preanesthetic checklist: patient identified, procedural consent, pre-op evaluation, timeout performed, risks and benefits discussed, monitors and equipment checked, anesthesia consent given, oxygen available, suction available, hand hygiene performed and patient being monitored.    Setup & Induction  Patient Monitoring: heart rate, cardiac monitor, continuous pulse ox, continuous capnometry, NIBP and gas analyzer  Patient preparation: bite block inserted, extremities padded, mandibular stabilization and patient hyperventilated  Electrode Placement: Bitemporal    The patient was moved to the ECT therapy room after being assessed and consented for ECT. After standard ASA monitors were applied and timeout performed, the patient was adequately preoxygenated. After induction of general anesthesia, adequate oxygenation and ventilation were confirmed with pulse oxymetry and end tidal CO2 monitoring via bag-mask ventilation. End tidal CO2 was monitored throughout the case and moderate hyperventilation was performed prior to beginning ECT treatment. All extremities were padded, biteblock was inserted, and mandibular stabilization was done prior to initiating ECT therapy.    Procedure  Stimulus Number 1: Setting Number = III; 576 millicoulombs; Seizure Duration = 94 seconds            Recovery  After adequate recovery from general anesthesia, the patient was transported to recovery.  Baseline Blood Pressure: 119/65  Peak Blood Pressure: 195/127  Time to Recovery of Respirations: 3 minutes    ECT Findings  ECT associated findings of: Moderate Hypertension (SBP >160 or DBP  >100)

## 2019-05-08 NOTE — H&P
"Allyssa Wright  : 1980   MRN: [id number]  Date: 2019     Electroconvulsive Therapy  History & Physical    Chief complaint: MDD, recurrent, in partial remission  Procedure: ECT #68    SUBJECTIVE:     HPI:   Allyssa Wright is a 39 y.o. female with MDD, recurrent, in partial remission who presents for ECT today. Patient reports she felt more confused than usual immediately following  her last treatment on 4/10. She believes this was due to seizure length being longer than usual. Also reports anxiety level being higher than normal for the days following last treatment.  Reports mood today is "ok" and has been stable since her last treatment. Denies somatic complaints.  Endorses good sleep and apetite.  Please see Dr. Boyce's full pre-ECT evaluation for further details. Reports taking medications as directed- medication management per Dr. Jewell. The patient does not need any prescriptions today and has not had any med changes since meeting with Dr. Boyce. The patient has not had anything by mouth since midnight and is ready for ECT.    Psychiatric Review of Systems:  Mood: "okay"  Anxiety: present, increased after last treatment   Appetite: "too good"   Psychomotor: No problem  Cognitive Impairment: mild, consistent with ECT, worsening immediately surrounding treatments  Insomnia: Denies   Psychosis: None  Diurnal Variation: None  Suicidal Ideation: Absent    Medical Review Of Systems:  Pertinent items are noted in HPI.    Current Medications:   Current Facility-Administered Medications on File Prior to Encounter   Medication Dose Route Frequency Provider Last Rate Last Dose    lidocaine (PF) 10 mg/ml (1%) injection 10 mg  1 mL Intradermal Once Homa Colbert MD        sodium chloride 0.9% flush 10 mL  10 mL Intra-Catheter PRN Homa Colbert MD         Current Outpatient Medications on File Prior to Encounter   Medication Sig Dispense Refill    clozapine (CLOZARIL) 50 MG tablet Take 50 mg by mouth " once daily.       dapsone 7.5 % GlwP Apply topically once daily.      famciclovir (FAMVIR) 500 MG tablet Take 1 tablet (500 mg total) by mouth 2 (two) times daily. 30 tablet 12    mirtazapine (REMERON) 15 MG tablet Take 1 tablet (15 mg total) by mouth every evening. (Patient taking differently: Take 7.5 mg by mouth every evening. ) 90 tablet 0    spironolactone (ALDACTONE) 50 MG tablet 50 mg 2 (two) times daily. 50  mg qAM, 50 mg qHS.      thyroid (ARMOUR THYROID) 30 mg Tab Take 1 tablet (30 mg total) by mouth every morning. 30 tablet 11    trazodone (DESYREL) 100 MG tablet Take 200 mg by mouth every evening.       UNABLE TO FIND 2 (two) times daily. n-azetyl-cysteine      venlafaxine (EFFEXOR) 100 MG Tab Take 3 tablets (300 mg total) by mouth once daily. (Patient taking differently: Take 225 mg by mouth once daily. )      vortioxetine (TRINTELLIX) 5 mg Tab Take 2.5 mg by mouth once daily.      dextroamphetamine-amphetamine 30 mg Tab Take 30 mg by mouth 2 (two) times daily.       hydrOXYzine pamoate (VISTARIL) 25 MG Cap Take 2 capsules (50 mg total) by mouth nightly as needed (Take 25-50mg (1-2 caps) at night for anxiety prior to ECT). 180 capsule 0    ZOVIRAX 5 % Crea   5      Allergies:   Benzodiazepines; Ampicillin; Erythromycin; Levaquin [levofloxacin]; Penicillins; Pristiq [desvenlafaxine]; Sulfa (sulfonamide antibiotics); and Azithromycin    Past Medical/Surgical History:   Past Medical History:   Diagnosis Date    Anxiety     Depression     History of psychiatric hospitalization     HSV-1 (herpes simplex virus 1) infection     Hx of psychiatric care     Moderate depressed bipolar II disorder 06/13/2016    reports no history of bipolar    Obsessive-compulsive disorder     Psychiatric problem     Schizophrenia 4/3/2018    Self-harming behavior     Therapy      Past Surgical History:   Procedure Laterality Date    ANKLE SURGERY Right     BREAST augmentation      ELECTROCONVULSIVE  THERAPY (ECT) - SINGLE SEIZURE N/A 12/6/2018    Performed by Sh[facility]ib Boyce Jr., [facility] at [facility] ECT    ELECTROCONVULSIVE THERAPY (ECT) - SINGLE SEIZURE N/A 8/31/2018    Performed by Sh[facility]ib [facility]ce Jr[facility] at [facility] ECT    ELECTROCONVULSIVE THERAPY (ECT) - SINGLE SEIZURE N/A 8/6/2018    Performed by Sh[facility]ib Boyce Jr[facility] at [facility] ECT    ELECTROCONVULSIVE THERAPY (ECT) - SINGLE SEIZURE N/A 7/23/2018    Performed by Sh[facility]ib [facility]ce Jr[facility] at [facility] ECT    ELECTROCONVULSIVE THERAPY (ECT) - SINGLE SEIZURE N/A 7/5/2018    Performed by Sh[facility]ib [facility] Jr[facility] at [facility] ECT    ELECTROCONVULSIVE THERAPY (ECT) - SINGLE SEIZURE N/A 6/28/2018    Performed by Sh[facility]ib Boyce Jr[facility] at [facility] ECT    ELECTROCONVULSIVE THERAPY (ECT) - SINGLE SEIZURE N/A 6/13/2018    Performed by Shoaib Boyce Jr[facility] at [facility] ECT    ELECTROCONVULSIVE THERAPY (ECT) - SINGLE SEIZURE N/A 5/29/2018    Performed by Shoaib Boyce Jr[facility] at [facility] ECT    ELECTROCONVULSIVE THERAPY (ECT) - SINGLE SEIZURE N/A 5/8/2018    Performed by Shoaib Boyce Jr[facility] at [facility] ECT    ELECTROCONVULSIVE THERAPY (ECT) - SINGLE SEIZURE N/A 4/24/2018    Performed by Shoaib Boyce Jr[facility] at [facility] ECT    ELECTROCONVULSIVE THERAPY (ECT) - SINGLE SEIZURE N/A 4/17/2018    Performed by Sh[facility]ib Boyce Jr[facility] at [facility] ECT    ELECTROCONVULSIVE THERAPY (ECT) - SINGLE SEIZURE N/A 4/13/2018    Performed by Shoaib Boyce Jr[facility] at [facility] ECT    ELECTROCONVULSIVE THERAPY (ECT) - SINGLE SEIZURE N/A 4/3/2018    Performed by Shoaib Boyce Jr[facility] at [facility] ECT    ELECTROCONVULSIVE THERAPY (ECT) - SINGLE SEIZURE N/A 3/20/2018    Performed by Shoaib Boyce Jr[facility] at [facility] ECT    ELECTROCONVULSIVE THERAPY (ECT) - SINGLE SEIZURE N/A 3/15/2018    Performed by Shoaib Boyce Jr[facility] at [facility] ECT    ELECTROCONVULSIVE THERAPY (ECT) - SINGLE SEIZURE N/A 3/6/2018    Performed by Shoaib Boyce Jr[facility] at [facility] ECT    ELECTROCONVULSIVE THERAPY (ECT) -  SINGLE SEIZURE N/A 3/1/2018    Performed by [facility]ib [facility]ce Jr[facility] at [facility] ECT    ELECTROCONVULSIVE THERAPY (ECT) - SINGLE SEIZURE N/A 2/23/2018    Performed by Shoaib Boyce Jr[facility] at [facility] ECT    ELECTROCONVULSIVE THERAPY (ECT) - SINGLE SEIZURE N/A 2/19/2018    Performed by Shoaib Boyce Jr[facility] at [facility] ECT    ELECTROCONVULSIVE THERAPY (ECT) - SINGLE SEIZURE N/A 2/15/2018    Performed by Shoaib Boyce Jr[facility] at [facility] ECT    ELECTROCONVULSIVE THERAPY (ECT) - SINGLE SEIZURE N/A 1/22/2018    Performed by Shoaib Boyce Jr[facility] at [facility] ECT    ELECTROCONVULSIVE THERAPY (ECT) - SINGLE SEIZURE N/A 12/11/2017    Performed by Shoaib Boyce Jr[facility] at [facility] ECT    ELECTROCONVULSIVE THERAPY (ECT) - SINGLE SEIZURE N/A 10/24/2017    Performed by Shoaib Boyce Jr[facility] at [facility] ECT    ELECTROCONVULSIVE THERAPY (ECT) - SINGLE SEIZURE N/A 9/20/2017    Performed by Shoaib Boyce Jr[facility] at [facility] ECT    ELECTROCONVULSIVE THERAPY (ECT) - SINGLE SEIZURE N/A 8/14/2017    Performed by Shoaib Boyce Jr[facility] at [facility] ECT    ELECTROCONVULSIVE THERAPY (ECT) - SINGLE SEIZURE Bilateral 7/12/2017    Performed by Shoaib Boyce Jr[facility] at [facility] ECT    ELECTROCONVULSIVE THERAPY (ECT) - SINGLE SEIZURE N/A 6/13/2017    Performed by Shoaib Boyce Jr[facility] at [facility] ECT    ELECTROCONVULSIVE THERAPY (ECT) - SINGLE SEIZURE N/A 5/17/2017    Performed by Shoaib Boyce Jr[facility] at [facility] ECT    ELECTROCONVULSIVE THERAPY (ECT) - SINGLE SEIZURE N/A 4/20/2017    Performed by Shoaib Boyce Jr[facility] at [facility] ECT    ELECTROCONVULSIVE THERAPY (ECT) - SINGLE SEIZURE N/A 11/22/2016    Performed by Shoaib Boyce Jr., [facility] at [facility] ECT    ELECTROCONVULSIVE THERAPY (ECT) - SINGLE SEIZURE N/A 10/24/2016    Performed by Shoaib Boyce Jr., [facility] at [facility] ECT    ELECTROCONVULSIVE THERAPY (ECT) - SINGLE SEIZURE N/A 9/22/2016    Performed by Shoaib Boyce Jr., [facility] at [facility] ECT    ELECTROCONVULSIVE THERAPY (ECT) -  SINGLE SEIZURE N/A 9/1/2016    Performed by [facility]ib Boyce Jr[facility] at [facility] ECT    ELECTROCONVULSIVE THERAPY (ECT) - SINGLE SEIZURE N/A 8/15/2016    Performed by Sohaib Boyce Jr., [facility] at [facility] ECT    ELECTROCONVULSIVE THERAPY (ECT) - SINGLE SEIZURE N/A 8/1/2016    Performed by Shoaib Boyce Jr[facility] at [facility] ECT    ELECTROCONVULSIVE THERAPY (ECT) - SINGLE SEIZURE N/A 7/22/2016    Performed by Shoaib Boyce Jr[facility] at [facility] ECT    ELECTROCONVULSIVE THERAPY (ECT) - SINGLE SEIZURE N/A 7/14/2016    Performed by Shoaib Boyce Jr[facility] at [facility] ECT    ELECTROCONVULSIVE THERAPY (ECT) - SINGLE SEIZURE N/A 7/8/2016    Performed by Shoaib Boyce Jr[facility] at [facility] ECT    ELECTROCONVULSIVE THERAPY (ECT) - SINGLE SEIZURE N/A 7/5/2016    Performed by Shoaib Boyce Jr[facility] at [facility] ECT    ELECTROCONVULSIVE THERAPY (ECT) - SINGLE SEIZURE N/A 6/27/2016    Performed by Shoaib Boyce Jr[facility] at [facility] ECT    ELECTROCONVULSIVE THERAPY (ECT) - SINGLE SEIZURE N/A 6/23/2016    Performed by Shoaib Boyce Jr[facility] at [facility] ECT    ELECTROCONVULSIVE THERAPY (ECT) - SINGLE SEIZURE N/A 6/20/2016    Performed by Shoaib Boyce Jr[facility] at [facility] ECT    ELECTROCONVULSIVE THERAPY (ECT) - SINGLE SEIZURE N/A 6/16/2016    Performed by Shoaib Boyce Jr[facility] at [facility] ECT    ELECTROCONVULSIVE THERAPY (ECT) - SINGLE SEIZURE N/A 6/15/2016    Performed by Shoaib Boyce Jr[facility] at [facility] ECT    ELECTROCONVULSIVE THERAPY (ECT) - SINGLE SEIZURE N/A 6/14/2016    Performed by Shoaib Boyce Jr[facility] at [facility] ECT    ELECTROCONVULSIVE THERAPY (ECT) - SINGLE SEIZURE N/A 6/13/2016    Performed by Shoaib Boyce Jr[facility] at [facility] ECT    ELECTROCONVULSIVE THERAPY (ECT) - SINGLE SEIZURE N/A 1/4/2016    Performed by Shoaib Boyce Jr[facility] at [facility] ECT    ELECTROCONVULSIVE THERAPY, CEREBRAL HEMISPHERE, UNILATERAL, 1 SEIZURE Bilateral 6/25/2018    Performed by Shoaib Boyce Jr., [facility] at [facility] ECT    OVARIAN CYST REMOVAL  "Bilateral       OBJECTIVE:     Vitals (pre-procedure):  Vitals:    05/08/19 0735   BP: 119/65   Pulse: 66   Resp: 20   Temp: 97.2 °F (36.2 °C)        Labs/Imaging/Studies:   Recent Results (from the past 48 hour(s))   POCT urine pregnancy    Collection Time: 05/08/19  7:40 AM   Result Value Ref Range    POC Preg Test, Ur Negative Negative     Acceptable Yes       No results found for: PHENYTOIN, PHENOBARB, VALPROATE, CBMZ    Physical Exam:   Gen: AAOx4, NAD  HEENT: MMM, PERRL, EOMI, O/P clear  CV: RRR, S1/S2 nml, no M/R/G  Chest: CTAB, no R/R/W, unlabored breathing  Abd: S/NT/ND, +BS, no HSM  Ext: No C/C/E, pulse 2+ throughout  Neuro: CN II-XII grossly intact, no focal deficits    Mental Status Exam:   Appearance: age appropriate, well nourished, dressed in hospital gown  Behavior: friendly and cooperative, eye contact appropriate  Speech: conversational tone, rate, pitch, volume  Mood: "okay"  Affect: euthymic; reactive   Thought Process: linear and organized  Thought Content: no SI, no HI, no AVH  Perceptual Disturbances: none reported  Cognition: grossly intact  Insight: fair   Judgment: appropriate for setting       ASSESSMENT/PLAN:     Allyssa Wright is a 39 y.o. female with MDD, recurrent, in partial remission who presents for ECT.    Recommendations:   Proceed with ECT #68.      Gena Dasilva MD  5/8/2019  "

## 2019-05-08 NOTE — DISCHARGE SUMMARY
Allyssa Wright  : 1980   MRN: [id number]  Date: 2019     Electroconvulsive Therapy  Discharge Summary    Admit Date: 2019  6:55 AM  Discharge Date: 2019    Attending Physician: Shoaib Boyce Jr., MD   Discharge Provider: Gena Dasilva MD    History of Present Illness: Allyssa Wright is a 39 y.o. female withMDD, recurrent, in partial remission presented for ECT #68. See H&P dated 2019 for full HPI. For further details, see Dr. Boyce's pre-ECT evaluation.    Hospital Course: The patient tolerated the ECT treatment well without complication. Patient was stable post-procedure. See OP note dated 2019 for more details.     Disposition: Home or Self Care    Medications:  Current Discharge Medication List      CONTINUE these medications which have NOT CHANGED    Details   clozapine (CLOZARIL) 50 MG tablet Take 50 mg by mouth once daily.       dapsone 7.5 % GlwP Apply topically once daily.      famciclovir (FAMVIR) 500 MG tablet Take 1 tablet (500 mg total) by mouth 2 (two) times daily.  Qty: 30 tablet, Refills: 12    Associated Diagnoses: Major depressive disorder, recurrent episode, moderate degree      mirtazapine (REMERON) 15 MG tablet Take 1 tablet (15 mg total) by mouth every evening.  Qty: 90 tablet, Refills: 0      spironolactone (ALDACTONE) 50 MG tablet 50 mg 2 (two) times daily. 50  mg qAM, 50 mg qHS.      thyroid (ARMOUR THYROID) 30 mg Tab Take 1 tablet (30 mg total) by mouth every morning.  Qty: 30 tablet, Refills: 11    Associated Diagnoses: Major depressive disorder, recurrent episode, moderate degree      trazodone (DESYREL) 100 MG tablet Take 200 mg by mouth every evening.       UNABLE TO FIND 2 (two) times daily. n-azetyl-cysteine      venlafaxine (EFFEXOR) 100 MG Tab Take 3 tablets (300 mg total) by mouth once daily.    Associated Diagnoses: MDD (major depressive disorder), recurrent, in partial remission      vortioxetine (TRINTELLIX) 5 mg Tab Take 2.5 mg by mouth  once daily.      dextroamphetamine-amphetamine 30 mg Tab Take 30 mg by mouth 2 (two) times daily.     Associated Diagnoses: MDD (major depressive disorder), recurrent, in partial remission      hydrOXYzine pamoate (VISTARIL) 25 MG Cap Take 2 capsules (50 mg total) by mouth nightly as needed (Take 25-50mg (1-2 caps) at night for anxiety prior to ECT).  Qty: 180 capsule, Refills: 0      ZOVIRAX 5 % Crea Refills: 5           Status at Discharge: Alert and medically stable    Discharge Diagnoses:  MDD, recurrent, in partial remission    Diet: Resume previous outpatient diet  Activity: Ambulate with assistance  Instructions: Please do not eat or drink anything after midnight prior to procedure. Please do not drive on day of ECT.    Med Changes:  None    Next ECT:  ECT #69 scheduled for 6/5/2019    Gena Dasilva MD  5/8/2019

## 2019-05-08 NOTE — ANESTHESIA POSTPROCEDURE EVALUATION
Anesthesia Post Evaluation    Patient: Allyssa Wright    Procedure(s) Performed: Procedure(s) (LRB):  ELECTROCONVULSIVE THERAPY (ECT) - SINGLE SEIZURE (N/A)    Final Anesthesia Type: general  Patient location during evaluation: PACU  Patient participation: Yes- Able to Participate  Level of consciousness: awake and alert and oriented  Post-procedure vital signs: reviewed and stable  Pain management: adequate  Airway patency: patent  PONV status at discharge: No PONV  Anesthetic complications: no      Cardiovascular status: blood pressure returned to baseline  Respiratory status: unassisted and spontaneous ventilation  Hydration status: euvolemic  Follow-up not needed.          Vitals Value Taken Time   /57 5/8/2019  9:53 AM   Temp 36.8 °C (98.2 °F) 5/8/2019  9:40 AM   Pulse 66 5/8/2019  9:43 AM   Resp 16 5/8/2019  9:40 AM   SpO2 99 % 5/8/2019  9:43 AM   Vitals shown include unvalidated device data.      No case tracking events are documented in the log.      Pain/Roma Score: Roma Score: 10 (5/8/2019  9:40 AM)

## 2019-05-08 NOTE — TRANSFER OF CARE
"Anesthesia Transfer of Care Note    Patient: Allyssa Wright    Procedure(s) Performed: Procedure(s) (LRB):  ELECTROCONVULSIVE THERAPY (ECT) - SINGLE SEIZURE (N/A)    Patient location: PACU    Anesthesia Type: general    Transport from OR: Transported from OR on 2-3 L/min O2 by NC with adequate spontaneous ventilation    Post pain: adequate analgesia    Post assessment: no apparent anesthetic complications    Post vital signs: stable    Level of consciousness: awake    Nausea/Vomiting: no nausea/vomiting    Complications: none    Transfer of care protocol was followed      Last vitals:   Visit Vitals  /68   Pulse 65   Temp 36.9 °C (98.4 °F) (Skin)   Resp 16   Ht 5' 5" (1.651 m)   Wt 65.8 kg (145 lb)   LMP  (LMP Unknown)   SpO2 100%   Breastfeeding? No   BMI 24.13 kg/m²     "

## 2019-05-08 NOTE — PLAN OF CARE
Discharge instructions reviewed w/ pt and father, verbalized understanding. Pt in NADN. No complaints at this time. Tolerated liquids w/ no issues. To be d/c'd home w/ father.

## 2019-05-13 DIAGNOSIS — F33.9 RECURRENT MAJOR DEPRESSIVE DISORDER, REMISSION STATUS UNSPECIFIED: Primary | ICD-10-CM

## 2019-05-15 ENCOUNTER — PATIENT MESSAGE (OUTPATIENT)
Dept: ADMINISTRATIVE | Facility: OTHER | Age: 39
End: 2019-05-15

## 2019-06-05 ENCOUNTER — ANESTHESIA EVENT (OUTPATIENT)
Dept: ELECTROPHYSIOLOGY | Facility: HOSPITAL | Age: 39
End: 2019-06-05
Payer: COMMERCIAL

## 2019-06-06 ENCOUNTER — ANESTHESIA (OUTPATIENT)
Dept: ELECTROPHYSIOLOGY | Facility: HOSPITAL | Age: 39
End: 2019-06-06
Payer: COMMERCIAL

## 2019-06-06 ENCOUNTER — HOSPITAL ENCOUNTER (OUTPATIENT)
Facility: HOSPITAL | Age: 39
Discharge: HOME | End: 2019-06-06
Attending: PSYCHIATRY & NEUROLOGY | Admitting: PSYCHIATRY & NEUROLOGY
Payer: COMMERCIAL

## 2019-06-06 VITALS
BODY MASS INDEX: 24.16 KG/M2 | OXYGEN SATURATION: 99 % | RESPIRATION RATE: 16 BRPM | SYSTOLIC BLOOD PRESSURE: 115 MMHG | HEIGHT: 65 IN | DIASTOLIC BLOOD PRESSURE: 83 MMHG | TEMPERATURE: 98 F | HEART RATE: 71 BPM | WEIGHT: 145 LBS

## 2019-06-06 DIAGNOSIS — F32.9 MAJOR DEPRESSION: ICD-10-CM

## 2019-06-06 DIAGNOSIS — F33.41 MDD (MAJOR DEPRESSIVE DISORDER), RECURRENT, IN PARTIAL REMISSION: ICD-10-CM

## 2019-06-06 LAB
B-HCG UR QL: NEGATIVE
CTP QC/QA: YES

## 2019-06-06 PROCEDURE — D9220A PRA ANESTHESIA: Mod: ,,, | Performed by: ANESTHESIOLOGY

## 2019-06-06 PROCEDURE — 25000003 PHARM REV CODE 250: Performed by: STUDENT IN AN ORGANIZED HEALTH CARE EDUCATION/TRAINING PROGRAM

## 2019-06-06 PROCEDURE — 25000003 PHARM REV CODE 250: Performed by: PSYCHIATRY & NEUROLOGY

## 2019-06-06 PROCEDURE — 90870 ELECTROCONVULSIVE THERAPY: CPT | Mod: ,,, | Performed by: PSYCHIATRY & NEUROLOGY

## 2019-06-06 PROCEDURE — D9220A PRA ANESTHESIA: ICD-10-PCS | Mod: ,,, | Performed by: ANESTHESIOLOGY

## 2019-06-06 PROCEDURE — 63600175 PHARM REV CODE 636 W HCPCS: Performed by: STUDENT IN AN ORGANIZED HEALTH CARE EDUCATION/TRAINING PROGRAM

## 2019-06-06 PROCEDURE — 81025 URINE PREGNANCY TEST: CPT | Performed by: PSYCHIATRY & NEUROLOGY

## 2019-06-06 PROCEDURE — 90870 PR ELECTROCONVULSIVE THERAPY,1 SEIZ: ICD-10-PCS | Mod: ,,, | Performed by: PSYCHIATRY & NEUROLOGY

## 2019-06-06 PROCEDURE — 90870 ELECTROCONVULSIVE THERAPY: CPT | Performed by: STUDENT IN AN ORGANIZED HEALTH CARE EDUCATION/TRAINING PROGRAM

## 2019-06-06 RX ORDER — ONDANSETRON 2 MG/ML
INJECTION INTRAMUSCULAR; INTRAVENOUS
Status: COMPLETED
Start: 2019-06-06 | End: 2019-06-06

## 2019-06-06 RX ORDER — SODIUM CHLORIDE 0.9 % (FLUSH) 0.9 %
10 SYRINGE (ML) INJECTION
Status: DISCONTINUED | OUTPATIENT
Start: 2019-06-06 | End: 2019-06-06 | Stop reason: HOSPADM

## 2019-06-06 RX ORDER — LABETALOL HYDROCHLORIDE 5 MG/ML
INJECTION, SOLUTION INTRAVENOUS
Status: DISCONTINUED | OUTPATIENT
Start: 2019-06-06 | End: 2019-06-06

## 2019-06-06 RX ORDER — SODIUM CHLORIDE 9 MG/ML
INJECTION, SOLUTION INTRAVENOUS CONTINUOUS
Status: DISCONTINUED | OUTPATIENT
Start: 2019-06-07 | End: 2019-06-06 | Stop reason: HOSPADM

## 2019-06-06 RX ORDER — KETOROLAC TROMETHAMINE 30 MG/ML
INJECTION, SOLUTION INTRAMUSCULAR; INTRAVENOUS
Status: DISCONTINUED | OUTPATIENT
Start: 2019-06-06 | End: 2019-06-06

## 2019-06-06 RX ORDER — SUCCINYLCHOLINE CHLORIDE 20 MG/ML
INJECTION INTRAMUSCULAR; INTRAVENOUS
Status: COMPLETED
Start: 2019-06-06 | End: 2019-06-06

## 2019-06-06 RX ORDER — ONDANSETRON 2 MG/ML
INJECTION INTRAMUSCULAR; INTRAVENOUS
Status: DISCONTINUED | OUTPATIENT
Start: 2019-06-06 | End: 2019-06-06

## 2019-06-06 RX ORDER — KETOROLAC TROMETHAMINE 30 MG/ML
INJECTION, SOLUTION INTRAMUSCULAR; INTRAVENOUS
Status: COMPLETED
Start: 2019-06-06 | End: 2019-06-06

## 2019-06-06 RX ORDER — SUCCINYLCHOLINE CHLORIDE 20 MG/ML
INJECTION INTRAMUSCULAR; INTRAVENOUS
Status: DISCONTINUED | OUTPATIENT
Start: 2019-06-06 | End: 2019-06-06

## 2019-06-06 RX ORDER — LIDOCAINE HYDROCHLORIDE 10 MG/ML
1 INJECTION, SOLUTION EPIDURAL; INFILTRATION; INTRACAUDAL; PERINEURAL ONCE
Status: DISCONTINUED | OUTPATIENT
Start: 2019-06-07 | End: 2019-06-06 | Stop reason: HOSPADM

## 2019-06-06 RX ADMIN — METHOHEXITAL SODIUM 150 MG: 500 INJECTION, POWDER, LYOPHILIZED, FOR SOLUTION INTRAMUSCULAR; INTRAVENOUS; RECTAL at 07:06

## 2019-06-06 RX ADMIN — SUCCINYLCHOLINE CHLORIDE 120 MG: 20 INJECTION, SOLUTION INTRAMUSCULAR; INTRAVENOUS at 07:06

## 2019-06-06 RX ADMIN — Medication 500 MG: at 06:06

## 2019-06-06 RX ADMIN — ONDANSETRON 4 MG: 2 INJECTION, SOLUTION INTRAMUSCULAR; INTRAVENOUS at 07:06

## 2019-06-06 RX ADMIN — SODIUM CHLORIDE 500 ML: 0.9 INJECTION, SOLUTION INTRAVENOUS at 06:06

## 2019-06-06 RX ADMIN — LABETALOL HYDROCHLORIDE 10 MG: 5 INJECTION, SOLUTION INTRAVENOUS at 07:06

## 2019-06-06 RX ADMIN — KETOROLAC TROMETHAMINE 30 MG: 30 INJECTION, SOLUTION INTRAMUSCULAR at 07:06

## 2019-06-06 NOTE — H&P
"Allyssa Wright  : 1980   MRN: [id number]  Date: 2019     Electroconvulsive Therapy  History & Physical    Chief complaint: MDD, recurrent, in partial remission  Procedure: ECT #69    SUBJECTIVE:     HPI:   Allyssa Wright is a 39 y.o. female with MDD, recurrent, in partial remission who presents for ECT today.Patient reports she has been doing "well" since last treatment. Sleeping well, and reports a stable appetite. Tolerating medications without adverse effects. Feels that the treatment schedule (every 4 weeks) is working well for her and would like to keep it that way right now. Has appointment with outpatient psychiatrist (Dr. Jc) in about 2 weeks.  Please see Dr. Boyce's full pre-ECT evaluation for further details. The patient does not need any prescriptions today and has not had any med changes. Denies any SI, HI, AVH, delusions, or paranoia. No somatic complaints this morning. The patient has not had anything by mouth since midnight and is ready for ECT.    Psychiatric Review of Systems:  Mood: "good"  Anxiety: denies  Appetite: "too good"   Psychomotor: No problem  Cognitive Impairment: mild, consistent with ECT, worsening immediately surrounding treatments  Insomnia: Denies   Psychosis: None  Diurnal Variation: None  Suicidal Ideation: Absent    Medical Review Of Systems:  Pertinent items are noted in HPI.    Current Medications:   Current Facility-Administered Medications on File Prior to Encounter   Medication Dose Route Frequency Provider Last Rate Last Dose    lidocaine (PF) 10 mg/ml (1%) injection 10 mg  1 mL Intradermal Once Homa Colbert MD        sodium chloride 0.9% flush 10 mL  10 mL Intra-Catheter PRN Homa Colbert MD         Current Outpatient Medications on File Prior to Encounter   Medication Sig Dispense Refill    clozapine (CLOZARIL) 50 MG tablet Take 50 mg by mouth once daily.       dapsone 7.5 % GlwP Apply topically once daily.      famciclovir (FAMVIR) 500 MG " tablet Take 1 tablet (500 mg total) by mouth 2 (two) times daily. 30 tablet 12    mirtazapine (REMERON) 15 MG tablet Take 1 tablet (15 mg total) by mouth every evening. (Patient taking differently: Take 7.5 mg by mouth every evening. ) 90 tablet 0    spironolactone (ALDACTONE) 50 MG tablet 50 mg 2 (two) times daily. 50  mg qAM, 50 mg qHS.      thyroid (ARMOUR THYROID) 30 mg Tab Take 1 tablet (30 mg total) by mouth every morning. 30 tablet 11    trazodone (DESYREL) 100 MG tablet Take 200 mg by mouth every evening.       UNABLE TO FIND 2 (two) times daily. n-azetyl-cysteine      venlafaxine (EFFEXOR) 100 MG Tab Take 3 tablets (300 mg total) by mouth once daily. (Patient taking differently: Take 225 mg by mouth once daily. )      vortioxetine (TRINTELLIX) 5 mg Tab Take 2.5 mg by mouth once daily.      dextroamphetamine-amphetamine 30 mg Tab Take 30 mg by mouth 2 (two) times daily.       hydrOXYzine pamoate (VISTARIL) 25 MG Cap Take 2 capsules (50 mg total) by mouth nightly as needed (Take 25-50mg (1-2 caps) at night for anxiety prior to ECT). 180 capsule 0    ZOVIRAX 5 % Crea   5      Allergies:   Benzodiazepines; Ampicillin; Erythromycin; Levaquin [levofloxacin]; Penicillins; Pristiq [desvenlafaxine]; Sulfa (sulfonamide antibiotics); and Azithromycin    Past Medical/Surgical History:   Past Medical History:   Diagnosis Date    Anxiety     Depression     History of psychiatric hospitalization     HSV-1 (herpes simplex virus 1) infection     Hx of psychiatric care     Moderate depressed bipolar II disorder 06/13/2016    reports no history of bipolar    Obsessive-compulsive disorder     Psychiatric problem     Schizophrenia 4/3/2018    Self-harming behavior     Therapy      Past Surgical History:   Procedure Laterality Date    ANKLE SURGERY Right     BREAST augmentation      ELECTROCONVULSIVE THERAPY (ECT) - SINGLE SEIZURE N/A 12/6/2018    Performed by [facility] Jr[facility] ECT     ELECTROCONVULSIVE THERAPY (ECT) - SINGLE SEIZURE N/A 8/31/2018    Performed by [facility] Jr[facility] at [facility] ECT    ELECTROCONVULSIVE THERAPY (ECT) - SINGLE SEIZURE N/A 8/6/2018    Performed by Sh[facility]ib Boyce Jr[facility] at [facility] ECT    ELECTROCONVULSIVE THERAPY (ECT) - SINGLE SEIZURE N/A 7/23/2018    Performed by Shoaib Boyce Jr[facility] at [facility] ECT    ELECTROCONVULSIVE THERAPY (ECT) - SINGLE SEIZURE N/A 7/5/2018    Performed by Shoaib Boyce Jr[facility] at [facility] ECT    ELECTROCONVULSIVE THERAPY (ECT) - SINGLE SEIZURE N/A 6/28/2018    Performed by Shoaib Boyce Jr[facility] at [facility] ECT    ELECTROCONVULSIVE THERAPY (ECT) - SINGLE SEIZURE N/A 6/13/2018    Performed by Shoaib Boyce Jr[facility] at [facility] ECT    ELECTROCONVULSIVE THERAPY (ECT) - SINGLE SEIZURE N/A 5/29/2018    Performed by Shoaib Boyce Jr[facility] at [facility] ECT    ELECTROCONVULSIVE THERAPY (ECT) - SINGLE SEIZURE N/A 5/8/2018    Performed by Shoaib Boyce Jr[facility] at [facility] ECT    ELECTROCONVULSIVE THERAPY (ECT) - SINGLE SEIZURE N/A 4/24/2018    Performed by Shoaib Boyce Jr[facility] at [facility] ECT    ELECTROCONVULSIVE THERAPY (ECT) - SINGLE SEIZURE N/A 4/17/2018    Performed by Shoaib Boyce Jr[facility] at [facility] ECT    ELECTROCONVULSIVE THERAPY (ECT) - SINGLE SEIZURE N/A 4/13/2018    Performed by Shoaib Boyce Jr[facility] at [facility] ECT    ELECTROCONVULSIVE THERAPY (ECT) - SINGLE SEIZURE N/A 4/3/2018    Performed by Shoaib Boyce Jr[facility] at [facility] ECT    ELECTROCONVULSIVE THERAPY (ECT) - SINGLE SEIZURE N/A 3/20/2018    Performed by Shoaib Boyce Jr[facility] at [facility] ECT    ELECTROCONVULSIVE THERAPY (ECT) - SINGLE SEIZURE N/A 3/15/2018    Performed by Shoaib Boyce Jr[facility] at [facility] ECT    ELECTROCONVULSIVE THERAPY (ECT) - SINGLE SEIZURE N/A 3/6/2018    Performed by Shoaib Boyce Jr[facility] at [facility] ECT    ELECTROCONVULSIVE THERAPY (ECT) - SINGLE SEIZURE N/A 3/1/2018    Performed by Sh[facility]ib Boyce Jr[facility] at [facility] ECT    ELECTROCONVULSIVE  THERAPY (ECT) - SINGLE SEIZURE N/A 2/23/2018    Performed by Shoaib Boyce Jr., MD at [facility] ECT    ELECTROCONVULSIVE THERAPY (ECT) - SINGLE SEIZURE N/A 2/19/2018    Performed by Shoaib Boyce Jr[facility] at [facility] ECT    ELECTROCONVULSIVE THERAPY (ECT) - SINGLE SEIZURE N/A 2/15/2018    Performed by Shoaib Boyce Jr[facility] at [facility] ECT    ELECTROCONVULSIVE THERAPY (ECT) - SINGLE SEIZURE N/A 1/22/2018    Performed by Shoaib Boyce Jr[facility] at [facility] ECT    ELECTROCONVULSIVE THERAPY (ECT) - SINGLE SEIZURE N/A 12/11/2017    Performed by Shoaib Boyce Jr., [facility] at [facility] ECT    ELECTROCONVULSIVE THERAPY (ECT) - SINGLE SEIZURE N/A 10/24/2017    Performed by Shoaib Boyce Jr., [facility] at [facility] ECT    ELECTROCONVULSIVE THERAPY (ECT) - SINGLE SEIZURE N/A 9/20/2017    Performed by Shoaib Boyce Jr[facility] at [facility] ECT    ELECTROCONVULSIVE THERAPY (ECT) - SINGLE SEIZURE N/A 8/14/2017    Performed by Shoaib Boyce Jr., [facility] at [facility] ECT    ELECTROCONVULSIVE THERAPY (ECT) - SINGLE SEIZURE Bilateral 7/12/2017    Performed by Shoaib Boyce Jr., MD at [facility] ECT    ELECTROCONVULSIVE THERAPY (ECT) - SINGLE SEIZURE N/A 6/13/2017    Performed by Shoaib Boyce Jr., [facility] at [facility] ECT    ELECTROCONVULSIVE THERAPY (ECT) - SINGLE SEIZURE N/A 5/17/2017    Performed by Shoaib Boyce Jr., MD at [facility] ECT    ELECTROCONVULSIVE THERAPY (ECT) - SINGLE SEIZURE N/A 4/20/2017    Performed by Shoaib Boyce Jr., MD at [facility] ECT    ELECTROCONVULSIVE THERAPY (ECT) - SINGLE SEIZURE N/A 11/22/2016    Performed by Shoaib Boyce Jr., MD at [facility] ECT    ELECTROCONVULSIVE THERAPY (ECT) - SINGLE SEIZURE N/A 10/24/2016    Performed by Shoaib Boyce Jr., MD at [facility] ECT    ELECTROCONVULSIVE THERAPY (ECT) - SINGLE SEIZURE N/A 9/22/2016    Performed by Shoaib Boyce Jr., MD at [facility] ECT    ELECTROCONVULSIVE THERAPY (ECT) - SINGLE SEIZURE N/A 9/1/2016    Performed by Shoaib Boyce Jr., MD at [facility] ECT    ELECTROCONVULSIVE  THERAPY (ECT) - SINGLE SEIZURE N/A 8/15/2016    Performed by Shoaib [facility] Jr[facility] at [facility] ECT    ELECTROCONVULSIVE THERAPY (ECT) - SINGLE SEIZURE N/A 8/1/2016    Performed by Shoaib Boyce Jr., MD at [facility] ECT    ELECTROCONVULSIVE THERAPY (ECT) - SINGLE SEIZURE N/A 7/22/2016    Performed by Shoaib Boyce Jr., [facility] at [facility] ECT    ELECTROCONVULSIVE THERAPY (ECT) - SINGLE SEIZURE N/A 7/14/2016    Performed by Shoaib Boyce Jr., [facility] at [facility] ECT    ELECTROCONVULSIVE THERAPY (ECT) - SINGLE SEIZURE N/A 7/8/2016    Performed by Shoaib Boyce Jr[facility] at [facility] ECT    ELECTROCONVULSIVE THERAPY (ECT) - SINGLE SEIZURE N/A 7/5/2016    Performed by Shoaib Boyce Jr., [facility] at [facility] ECT    ELECTROCONVULSIVE THERAPY (ECT) - SINGLE SEIZURE N/A 6/27/2016    Performed by Shoaib Boyce Jr., MD at [facility] ECT    ELECTROCONVULSIVE THERAPY (ECT) - SINGLE SEIZURE N/A 6/23/2016    Performed by Shoaib Boyce Jr[facility] at [facility] ECT    ELECTROCONVULSIVE THERAPY (ECT) - SINGLE SEIZURE N/A 6/20/2016    Performed by Shoaib Boyce Jr., [facility] at [facility] ECT    ELECTROCONVULSIVE THERAPY (ECT) - SINGLE SEIZURE N/A 6/16/2016    Performed by Shoaib Boyce Jr[facility] at [facility] ECT    ELECTROCONVULSIVE THERAPY (ECT) - SINGLE SEIZURE N/A 6/15/2016    Performed by Shoaib Boyce Jr[facility] at [facility] ECT    ELECTROCONVULSIVE THERAPY (ECT) - SINGLE SEIZURE N/A 6/14/2016    Performed by Shoaib Boyce Jr[facility] at [facility] ECT    ELECTROCONVULSIVE THERAPY (ECT) - SINGLE SEIZURE N/A 6/13/2016    Performed by Shoaib Boyce Jr[facility] at [facility] ECT    ELECTROCONVULSIVE THERAPY (ECT) - SINGLE SEIZURE N/A 1/4/2016    Performed by Shoaib Boyce Jr., [facility] at [facility] ECT    ELECTROCONVULSIVE THERAPY, CEREBRAL HEMISPHERE, UNILATERAL, 1 SEIZURE Bilateral 6/25/2018    Performed by Sh[facility]ib [facility]ce Jr[facility] at [facility] ECT    OVARIAN CYST REMOVAL Bilateral       OBJECTIVE:     Vitals (pre-procedure):  Vitals:    06/06/19 0614   BP: 114/68   Pulse: 75  "  Resp: 16   Temp: 98.4 °F (36.9 °C)        Labs/Imaging/Studies:   Recent Results (from the past 48 hour(s))   POCT urine pregnancy    Collection Time: 06/06/19  6:05 AM   Result Value Ref Range    POC Preg Test, Ur Negative Negative     Acceptable Yes       No results found for: PHENYTOIN, PHENOBARB, VALPROATE, CBMZ    Physical Exam:   Gen: AAOx4, NAD  HEENT: MMM, PERRL, EOMI, O/P clear  CV: RRR, S1/S2 nml, no M/R/G  Chest: CTAB, no R/R/W, unlabored breathing  Abd: S/NT/ND, +BS, no HSM  Ext: No C/C/E, pulse 2+ throughout  Neuro: CN II-XII grossly intact, no focal deficits    Mental Status Exam:   Appearance: age appropriate, well nourished, dressed in hospital gown  Behavior: friendly and cooperative, eye contact appropriate  Speech: conversational tone, rate, pitch, volume  Mood: "good"  Affect: euthymic; reactive   Thought Process: linear and organized  Thought Content: no SI, no HI, no AVH  Perceptual Disturbances: none reported  Cognition: grossly intact  Insight: fair   Judgment: appropriate for setting       ASSESSMENT/PLAN:     Allyssa Wright is a 39 y.o. female with MDD, recurrent, in partial remission who presents for ECT.    Recommendations:   Proceed with ECT #69.    Jeffy Pierre MD  [facility]  PGY-4  6/6/2019  "

## 2019-06-06 NOTE — ANESTHESIA PREPROCEDURE EVALUATION
[facility]  Anesthesia Pre-Operative Evaluation         Patient Name: Allyssa Wright  YOB: 1980  MRN: [id number]    SUBJECTIVE:     Pre-operative evaluation for Procedure(s) (LRB):  ELECTROCONVULSIVE THERAPY (ECT) - SINGLE SEIZURE (N/A)     06/06/2019    Allyssa Wright is a 39 y.o. female w/ a significant PMHx of MDD and schizophrenia who presents for the above procedure.    ECT # 69    Previous Meds:  - Methohexital 150mg  - Succinylcholine 140mg  - Ondansetron 4mg  - Ketorolac 30mg  - Labetalol 20mg      Patient Active Problem List   Diagnosis    MDD (major depressive disorder), recurrent, in partial remission    Other acne    Comfort measures only status    MDD (major depressive disorder)    Major depression    Major depressive disorder    MDD (major depressive disorder), severe       Review of patient's allergies indicates:   Allergen Reactions    Benzodiazepines Other (See Comments)     Contraindicated while taking Clozapine    Ampicillin      Mom says so    Erythromycin     Levaquin [levofloxacin] Other (See Comments)     Depression side effects    Penicillins      Mom says so    Pristiq [desvenlafaxine]      psycotic      Sulfa (sulfonamide antibiotics)      Rash      Azithromycin Anxiety       Current Inpatient Medications:      Current Facility-Administered Medications on File Prior to Visit   Medication Dose Route Frequency Provider Last Rate Last Dose    [START ON 6/7/2019] 0.9%  NaCl infusion   Intravenous Continuous Jeffy Pierre MD        [START ON 6/7/2019] caffeine 500mg powder  500 mg Oral On Call Procedure Jeffy Pierre MD        ketorolac (TORADOL) 30 mg/mL (1 mL) injection             lidocaine (PF) 10 mg/ml (1%) injection 10 mg  1 mL Intradermal Once Homa Colbert MD        [START ON 6/7/2019] lidocaine (PF) 10 mg/ml (1%) injection 10 mg  1 mL Intradermal Once Jeffy Pierre MD        ondansetron 4 mg/2 mL injection              sodium chloride 0.9% flush 10 mL  10 mL Intra-Catheter PRN Homa Colbert MD        succinylcholine (ANECTINE) 20 mg/mL injection              Current Outpatient Medications on File Prior to Visit   Medication Sig Dispense Refill    clozapine (CLOZARIL) 50 MG tablet Take 50 mg by mouth once daily.       dapsone 7.5 % GlwP Apply topically once daily.      dextroamphetamine-amphetamine 30 mg Tab Take 30 mg by mouth 2 (two) times daily.       famciclovir (FAMVIR) 500 MG tablet Take 1 tablet (500 mg total) by mouth 2 (two) times daily. 30 tablet 12    hydrOXYzine pamoate (VISTARIL) 25 MG Cap Take 2 capsules (50 mg total) by mouth nightly as needed (Take 25-50mg (1-2 caps) at night for anxiety prior to ECT). 180 capsule 0    mirtazapine (REMERON) 15 MG tablet Take 1 tablet (15 mg total) by mouth every evening. (Patient taking differently: Take 7.5 mg by mouth every evening. ) 90 tablet 0    spironolactone (ALDACTONE) 50 MG tablet 50 mg 2 (two) times daily. 50  mg qAM, 50 mg qHS.      thyroid (ARMOUR THYROID) 30 mg Tab Take 1 tablet (30 mg total) by mouth every morning. 30 tablet 11    trazodone (DESYREL) 100 MG tablet Take 200 mg by mouth every evening.       UNABLE TO FIND 2 (two) times daily. n-azetyl-cysteine      venlafaxine (EFFEXOR) 100 MG Tab Take 3 tablets (300 mg total) by mouth once daily. (Patient taking differently: Take 225 mg by mouth once daily. )      vortioxetine (TRINTELLIX) 5 mg Tab Take 2.5 mg by mouth once daily.      ZOVIRAX 5 % Crea   5       Past Surgical History:   Procedure Laterality Date    ANKLE SURGERY Right     BREAST augmentation      ELECTROCONVULSIVE THERAPY (ECT) - SINGLE SEIZURE N/A 12/6/2018    Performed by Sh[facility]ib Boyce Jr., [facility] at [facility] ECT    ELECTROCONVULSIVE THERAPY (ECT) - SINGLE SEIZURE N/A 8/31/2018    Performed by Sh[facility]ib [facility]ce Jr., MD at [facility] ECT    ELECTROCONVULSIVE THERAPY (ECT) - SINGLE SEIZURE N/A 8/6/2018    Performed by Shoaib  ASHKAN Boyce Jr., MD at [facility] ECT    ELECTROCONVULSIVE THERAPY (ECT) - SINGLE SEIZURE N/A 7/23/2018    Performed by Shoaib Boyce Jr., MD at [facility] ECT    ELECTROCONVULSIVE THERAPY (ECT) - SINGLE SEIZURE N/A 7/5/2018    Performed by Shoaib Boyce Jr., MD at [facility] ECT    ELECTROCONVULSIVE THERAPY (ECT) - SINGLE SEIZURE N/A 6/28/2018    Performed by Shoaib Boyce Jr., MD at [facility] ECT    ELECTROCONVULSIVE THERAPY (ECT) - SINGLE SEIZURE N/A 6/13/2018    Performed by Shoaib Boyce Jr., MD at [facility] ECT    ELECTROCONVULSIVE THERAPY (ECT) - SINGLE SEIZURE N/A 5/29/2018    Performed by Shoaib Boyce Jr., MD at [facility] ECT    ELECTROCONVULSIVE THERAPY (ECT) - SINGLE SEIZURE N/A 5/8/2018    Performed by Shoaib Boyce Jr., MD at [facility] ECT    ELECTROCONVULSIVE THERAPY (ECT) - SINGLE SEIZURE N/A 4/24/2018    Performed by Shoaib Boyce Jr., MD at [facility] ECT    ELECTROCONVULSIVE THERAPY (ECT) - SINGLE SEIZURE N/A 4/17/2018    Performed by Shoaib Boyce Jr., MD at [facility] ECT    ELECTROCONVULSIVE THERAPY (ECT) - SINGLE SEIZURE N/A 4/13/2018    Performed by Shoaib Byoce Jr., MD at [facility] ECT    ELECTROCONVULSIVE THERAPY (ECT) - SINGLE SEIZURE N/A 4/3/2018    Performed by Shoaib Boyce Jr., MD at [facility] ECT    ELECTROCONVULSIVE THERAPY (ECT) - SINGLE SEIZURE N/A 3/20/2018    Performed by Shoaib Boyce Jr., MD at [facility] ECT    ELECTROCONVULSIVE THERAPY (ECT) - SINGLE SEIZURE N/A 3/15/2018    Performed by Shoaib Boyce Jr., MD at [facility] ECT    ELECTROCONVULSIVE THERAPY (ECT) - SINGLE SEIZURE N/A 3/6/2018    Performed by Shoaib Boyce Jr., MD at [facility] ECT    ELECTROCONVULSIVE THERAPY (ECT) - SINGLE SEIZURE N/A 3/1/2018    Performed by Shoaib Boyce Jr., MD at [facility] ECT    ELECTROCONVULSIVE THERAPY (ECT) - SINGLE SEIZURE N/A 2/23/2018    Performed by Shoaib Boyce Jr., MD at [facility] ECT    ELECTROCONVULSIVE THERAPY (ECT) - SINGLE SEIZURE N/A 2/19/2018    Performed by Shoaib Boyce Jr., MD  at [facility] ECT    ELECTROCONVULSIVE THERAPY (ECT) - SINGLE SEIZURE N/A 2/15/2018    Performed by Shoaib Boyce Jr., MD at [facility] ECT    ELECTROCONVULSIVE THERAPY (ECT) - SINGLE SEIZURE N/A 1/22/2018    Performed by Shoaib Boyce Jr., MD at [facility] ECT    ELECTROCONVULSIVE THERAPY (ECT) - SINGLE SEIZURE N/A 12/11/2017    Performed by Shoaib Boyec Jr., MD at [facility] ECT    ELECTROCONVULSIVE THERAPY (ECT) - SINGLE SEIZURE N/A 10/24/2017    Performed by Shoaib Boyce Jr., MD at [facility] ECT    ELECTROCONVULSIVE THERAPY (ECT) - SINGLE SEIZURE N/A 9/20/2017    Performed by Shoaib Boyce Jr., MD at [facility] ECT    ELECTROCONVULSIVE THERAPY (ECT) - SINGLE SEIZURE N/A 8/14/2017    Performed by Shoaib Boyce Jr., MD at [facility] ECT    ELECTROCONVULSIVE THERAPY (ECT) - SINGLE SEIZURE Bilateral 7/12/2017    Performed by Shoaib Boyce Jr., MD at [facility] ECT    ELECTROCONVULSIVE THERAPY (ECT) - SINGLE SEIZURE N/A 6/13/2017    Performed by Shoaib Boyce Jr., MD at [facility] ECT    ELECTROCONVULSIVE THERAPY (ECT) - SINGLE SEIZURE N/A 5/17/2017    Performed by Shoaib Boyce Jr., MD at [facility] ECT    ELECTROCONVULSIVE THERAPY (ECT) - SINGLE SEIZURE N/A 4/20/2017    Performed by Shoaib Boyce Jr., MD at [facility] ECT    ELECTROCONVULSIVE THERAPY (ECT) - SINGLE SEIZURE N/A 11/22/2016    Performed by Shoaib Boyce Jr., MD at [facility] ECT    ELECTROCONVULSIVE THERAPY (ECT) - SINGLE SEIZURE N/A 10/24/2016    Performed by Shoaib Boyce Jr., MD at [facility] ECT    ELECTROCONVULSIVE THERAPY (ECT) - SINGLE SEIZURE N/A 9/22/2016    Performed by Shoaib Boyce Jr., MD at [facility] ECT    ELECTROCONVULSIVE THERAPY (ECT) - SINGLE SEIZURE N/A 9/1/2016    Performed by Shoaib Boyce Jr., MD at [facility] ECT    ELECTROCONVULSIVE THERAPY (ECT) - SINGLE SEIZURE N/A 8/15/2016    Performed by Shoaib Boyce Jr., MD at [facility] ECT    ELECTROCONVULSIVE THERAPY (ECT) - SINGLE SEIZURE N/A 8/1/2016    Performed by Shoaib Boyce Jr., MD at  [facility] ECT    ELECTROCONVULSIVE THERAPY (ECT) - SINGLE SEIZURE N/A 7/22/2016    Performed by Shoaib Boyce Jr., MD at [facility] ECT    ELECTROCONVULSIVE THERAPY (ECT) - SINGLE SEIZURE N/A 7/14/2016    Performed by Shoaib Boyce Jr., MD at [facility] ECT    ELECTROCONVULSIVE THERAPY (ECT) - SINGLE SEIZURE N/A 7/8/2016    Performed by Shoaib Boyce Jr., MD at [facility] ECT    ELECTROCONVULSIVE THERAPY (ECT) - SINGLE SEIZURE N/A 7/5/2016    Performed by Shoaib Boyce Jr., MD at [facility] ECT    ELECTROCONVULSIVE THERAPY (ECT) - SINGLE SEIZURE N/A 6/27/2016    Performed by Shoaib Boyce Jr., MD at [facility] ECT    ELECTROCONVULSIVE THERAPY (ECT) - SINGLE SEIZURE N/A 6/23/2016    Performed by Shoaib Boyce Jr., MD at [facility] ECT    ELECTROCONVULSIVE THERAPY (ECT) - SINGLE SEIZURE N/A 6/20/2016    Performed by Shoaib Boyce Jr., MD at [facility] ECT    ELECTROCONVULSIVE THERAPY (ECT) - SINGLE SEIZURE N/A 6/16/2016    Performed by Shoaib Boyce Jr., MD at [facility] ECT    ELECTROCONVULSIVE THERAPY (ECT) - SINGLE SEIZURE N/A 6/15/2016    Performed by Shoaib Boyce Jr., MD at [facility] ECT    ELECTROCONVULSIVE THERAPY (ECT) - SINGLE SEIZURE N/A 6/14/2016    Performed by Shoaib Boyce Jr., MD at [facility] ECT    ELECTROCONVULSIVE THERAPY (ECT) - SINGLE SEIZURE N/A 6/13/2016    Performed by Shoaib Boyce Jr., MD at [facility] ECT    ELECTROCONVULSIVE THERAPY (ECT) - SINGLE SEIZURE N/A 1/4/2016    Performed by Shoaib Boyce Jr., MD at [facility] ECT    ELECTROCONVULSIVE THERAPY, CEREBRAL HEMISPHERE, UNILATERAL, 1 SEIZURE Bilateral 6/25/2018    Performed by Shoaib Boyce Jr., MD at [facility] ECT    OVARIAN CYST REMOVAL Bilateral        Social History     Socioeconomic History    Marital status: Single     Spouse name: Not on file    Number of children: Not on file    Years of education: Not on file    Highest education level: Not on file   Occupational History    Occupation: unemployed   Social Needs    Financial resource  strain: Not on file    Food insecurity:     Worry: Not on file     Inability: Not on file    Transportation needs:     Medical: Not on file     Non-medical: Not on file   Tobacco Use    Smoking status: Former Smoker     Last attempt to quit: 2011     Years since quittin.1    Smokeless tobacco: Never Used   Substance and Sexual Activity    Alcohol use: Yes     Comment: rarely    Drug use: No     Comment: Experimental use in high school    Sexual activity: Not on file   Lifestyle    Physical activity:     Days per week: Not on file     Minutes per session: Not on file    Stress: Not on file   Relationships    Social connections:     Talks on phone: Not on file     Gets together: Not on file     Attends Tenriism service: Not on file     Active member of club or organization: Not on file     Attends meetings of clubs or organizations: Not on file     Relationship status: Not on file   Other Topics Concern    Patient feels they ought to cut down on drinking/drug use Not Asked    Patient annoyed by others criticizing their drinking/drug use Not Asked    Patient has felt bad or guilty about drinking/drug use Not Asked    Patient has had a drink/used drugs as an eye opener in the AM Not Asked   Social History Narrative    Pt has 1 older brother from an intact family until the death of her mother in .  She completed 1 year of college, was never in the , and has never been employed.  She was engaged once, but never , has no children, and lives with her father plus 2 dogs.  She denies any hobbies and is spiritual but not Tenriism.  She does date and returns to college during rare periods when depression and anxiety [place].       OBJECTIVE:     Vital Signs Range (Last 24H):  Temp:  [36.9 °C (98.4 °F)]   Pulse:  [75]   Resp:  [16]   BP: (114)/(68)   SpO2:  [98 %]       CBC:   No results for input(s): WBC, RBC, HGB, HCT, PLT, MCV, MCH, MCHC in the last 72 hours.    CMP: No results for  input(s): NA, K, CL, CO2, BUN, CREATININE, GLU, MG, PHOS, CALCIUM, ALBUMIN, PROT, ALKPHOS, ALT, AST, BILITOT in the last 72 hours.    INR:  No results for input(s): PT, INR, PROTIME, APTT in the last 72 hours.    Diagnostic Studies: No relevant studies.    EKG: No recent studies available.    2D ECHO:  No results found for this or any previous visit.      ASSESSMENT/PLAN:         Pre-op Assessment    I have reviewed the Patient Summary Reports.     I have reviewed the Nursing Notes.   I have reviewed the Medications.     Review of Systems  Anesthesia Hx:  No problems with previous Anesthesia  Denies Family Hx of Anesthesia complications.   Denies Personal Hx of Anesthesia complications.   Social:  Former Smoker, Alcohol Use    Hematology/Oncology:  Hematology Normal   Oncology Normal     EENT/Dental:EENT/Dental Normal   Cardiovascular:  Cardiovascular Normal                     Pulmonary:  Pulmonary Normal  Denies Asthma.  Denies Shortness of breath.    Renal/:  Renal/ Normal     Hepatic/GI:  Hepatic/GI Normal    Neurological:   Denies TIA. Denies CVA.    Endocrine:  Endocrine Normal    Psych:   Psychiatric History depression          Physical Exam  General:  Well nourished    Airway/Jaw/Neck:  Airway Findings: Mouth Opening: Normal Tongue: Normal  General Airway Assessment: Adult  Mallampati: I  Jaw/Neck Findings:  Neck ROM: Normal ROM     Eyes/Ears/Nose:  EYES/EARS/NOSE FINDINGS: Normal   Dental:  Dental Findings: In tact   Chest/Lungs:  Chest/Lungs Findings: Clear to auscultation     Heart/Vascular:  Heart Findings: Rate: Normal  Rhythm: Regular Rhythm  Heart murmur: negative       Mental Status:  Mental Status Findings:  Cooperative, Alert and Oriented         Anesthesia Plan  Type of Anesthesia, risks & benefits discussed:  Anesthesia Type:  general  Patient's Preference:   Intra-op Monitoring Plan: standard ASA monitors  Intra-op Monitoring Plan Comments:   Post Op Pain Control Plan: per primary service  following discharge from PACU, multimodal analgesia and IV/PO Opioids PRN  Post Op Pain Control Plan Comments:   Induction:   IV  Beta Blocker:  Patient is not currently on a Beta-Blocker (No further documentation required).       Informed Consent: Patient understands risks and agrees with Anesthesia plan.  Questions answered. Anesthesia consent signed with patient.  ASA Score: 2     Day of Surgery Review of History & Physical:  There are no significant changes.  H&P update referred to the provider.         Ready For Surgery From Anesthesia Perspective.

## 2019-06-06 NOTE — OP NOTE
Allyssa Wright  : 1980   MRN: [id number]  Date: 2019    Electroconvulsive Therapy  Procedure Note    Date of Admission: 2019  5:49 AM    Site: [facility] [address]    Attending: Shoaib Boyce Jr., MD   Residents: Jeffy Pierre MD  Pre-procedure Diagnosis: MDD, recurrent, in partial remission  ECT Treatment Number: 69  Machine Type: PopJamta 5000    Patient Status: Medically stable    Vitals (pre-procedure):  Vitals:    19 0614   BP: 114/68   Pulse: 75   Resp: 16   Temp: 98.4 °F (36.9 °C)       Electrode Placement: Bitemporal    Stimulus Number Charge (mC) Level Pulse Width (msec) Frequency  (Hz) Duration of Stimulus (sec) Current (mA) Duration of Seizure (sec)   1 176 3 1 60 1.841 800 99                                   Complications: Mild Hypertension    Maximum Blood Pressure: 145/95    Medications Given:  Caffeine 500 mg PO before  Methohexital (Brevital) 150 mg  IV before  Succinylcholine (Anectine) 140 mg  IV before  Labetolol 10mg IV before  Zofran 4 mg IV before  Toradol 30 mg IV before    Treatment Course:  Patient tolerated procedure well. After adequate recovery from general anesthesia, the patient was transported to recovery.    Post-op Diagnosis: Same as above    Recommended for next ECT:  No med changes. Next treatment 2019    Jeffy Pierre MD  [facility]  PGY-4  2019

## 2019-06-06 NOTE — TRANSFER OF CARE
"Anesthesia Transfer of Care Note    Patient: Allyssa Wright    Procedure(s) Performed: Procedure(s) (LRB):  ELECTROCONVULSIVE THERAPY (ECT) - SINGLE SEIZURE (N/A)    Patient location: PACU    Anesthesia Type: general    Transport from OR: Transported from OR on 2-3 L/min O2 by NC with adequate spontaneous ventilation    Post pain: adequate analgesia    Post assessment: no apparent anesthetic complications    Post vital signs: stable    Level of consciousness: awake and alert    Nausea/Vomiting: no nausea/vomiting    Complications: none    Transfer of care protocol was followed      Last vitals:   Visit Vitals  /63 (BP Location: Right arm, Patient Position: Lying)   Pulse 73   Temp 36.5 °C (97.7 °F) (Temporal)   Resp 16   Ht 5' 5" (1.651 m)   Wt 65.8 kg (145 lb)   SpO2 99%   Breastfeeding? No   BMI 24.13 kg/m²     "

## 2019-06-06 NOTE — DISCHARGE SUMMARY
Allyssa Wright  : 1980   MRN: [id number]  Date: 2019     Electroconvulsive Therapy  Discharge Summary    Admit Date: 2019  5:49 AM  Discharge Date: 2019    Attending Physician: Shoaib Boyce Jr., MD   Discharge Provider: Jeffy Pierre MD    History of Present Illness: Allyssa Wright is a 39 y.o. female withMDD, recurrent, in partial remission presented for ECT #69. See H&P dated 2019 for full HPI. For further details, see Dr. Boyce's pre-ECT evaluation.    Hospital Course: The patient tolerated the ECT treatment well without complication, but did have shortened stimulus duration. Patient was stable post-procedure. See OP note dated 2019 for more details.     Disposition: Home or Self Care    Medications:  Current Discharge Medication List      CONTINUE these medications which have NOT CHANGED    Details   clozapine (CLOZARIL) 50 MG tablet Take 50 mg by mouth once daily.       dapsone 7.5 % GlwP Apply topically once daily.      famciclovir (FAMVIR) 500 MG tablet Take 1 tablet (500 mg total) by mouth 2 (two) times daily.  Qty: 30 tablet, Refills: 12    Associated Diagnoses: Major depressive disorder, recurrent episode, moderate degree      mirtazapine (REMERON) 15 MG tablet Take 1 tablet (15 mg total) by mouth every evening.  Qty: 90 tablet, Refills: 0      spironolactone (ALDACTONE) 50 MG tablet 50 mg 2 (two) times daily. 50  mg qAM, 50 mg qHS.      thyroid (ARMOUR THYROID) 30 mg Tab Take 1 tablet (30 mg total) by mouth every morning.  Qty: 30 tablet, Refills: 11    Associated Diagnoses: Major depressive disorder, recurrent episode, moderate degree      trazodone (DESYREL) 100 MG tablet Take 200 mg by mouth every evening.       UNABLE TO FIND 2 (two) times daily. n-azetyl-cysteine      venlafaxine (EFFEXOR) 100 MG Tab Take 3 tablets (300 mg total) by mouth once daily.    Associated Diagnoses: MDD (major depressive disorder), recurrent, in partial remission      vortioxetine  (TRINTELLIX) 5 mg Tab Take 2.5 mg by mouth once daily.      dextroamphetamine-amphetamine 30 mg Tab Take 30 mg by mouth 2 (two) times daily.     Associated Diagnoses: MDD (major depressive disorder), recurrent, in partial remission      hydrOXYzine pamoate (VISTARIL) 25 MG Cap Take 2 capsules (50 mg total) by mouth nightly as needed (Take 25-50mg (1-2 caps) at night for anxiety prior to ECT).  Qty: 180 capsule, Refills: 0      ZOVIRAX 5 % Crea Refills: 5           Status at Discharge: Alert and medically stable    Discharge Diagnoses:  MDD, recurrent, in partial remission    Diet: Resume previous outpatient diet  Activity: Ambulate with assistance  Instructions: Please do not eat or drink anything after midnight prior to procedure. Please do not drive on day of ECT.    Med Changes:  None    Next ECT:  Follow-up Information     [facility] On 7/5/2019.    Specialty:  Emergency Medicine  Why:  ECT  Contact information:  [address] [place] [id number] [phone]             ECT #70 scheduled for 7/5/2019    Jeffy Pierre MD  [facility]  PGY-4  6/6/2019

## 2019-06-06 NOTE — ANESTHESIA POSTPROCEDURE EVALUATION
Anesthesia Post Evaluation    Patient: Allyssa Wright    Procedure(s) Performed: Procedure(s) (LRB):  ELECTROCONVULSIVE THERAPY (ECT) - SINGLE SEIZURE (N/A)    Final Anesthesia Type: general  Patient location during evaluation: PACU  Patient participation: Yes- Able to Participate  Level of consciousness: awake and alert and oriented  Post-procedure vital signs: reviewed and stable  Pain management: adequate  Airway patency: patent  PONV status at discharge: No PONV  Anesthetic complications: no      Cardiovascular status: hemodynamically stable  Respiratory status: unassisted and spontaneous ventilation  Hydration status: euvolemic  Follow-up not needed.          Vitals Value Taken Time   /83 6/6/2019  8:10 AM   Temp 36.5 °C (97.7 °F) 6/6/2019  8:10 AM   Pulse 71 6/6/2019  8:10 AM   Resp 16 6/6/2019  8:10 AM   SpO2 99 % 6/6/2019  8:10 AM         No case tracking events are documented in the log.      Pain/Roma Score: Roma Score: 10 (6/6/2019  8:11 AM)

## 2019-06-10 DIAGNOSIS — F33.9 RECURRENT MAJOR DEPRESSIVE DISORDER, REMISSION STATUS UNSPECIFIED: Primary | ICD-10-CM

## 2019-06-14 ENCOUNTER — PATIENT MESSAGE (OUTPATIENT)
Dept: ADMINISTRATIVE | Facility: OTHER | Age: 39
End: 2019-06-14

## 2019-06-21 ENCOUNTER — ANESTHESIA EVENT (OUTPATIENT)
Dept: ELECTROPHYSIOLOGY | Facility: HOSPITAL | Age: 39
End: 2019-06-21
Payer: COMMERCIAL

## 2019-06-23 NOTE — ANESTHESIA PREPROCEDURE EVALUATION
[facility]  Anesthesia Pre-Operative Evaluation         Patient Name: Allyssa Wright  YOB: 1980  MRN: [id number]    SUBJECTIVE:     Pre-operative evaluation for Procedure(s) (LRB):  ELECTROCONVULSIVE THERAPY (ECT) - SINGLE SEIZURE (N/A)     06/23/2019    Allyssa Wright is a 39 y.o. female w/ a significant PMHx of MDD and schizophrenia who presents for the above procedure.     ECT # 70.    Patient now presents for the above procedure(s).    Previous meds:  - Methohexital 150mg  - Succinylcholine 120mg  - Ondansetron 4mg  - Ketorolac 30mg  - Labetalol 10mg      Patient Active Problem List   Diagnosis    MDD (major depressive disorder), recurrent, in partial remission    Other acne    Comfort measures only status    MDD (major depressive disorder)    Major depression    Major depressive disorder    MDD (major depressive disorder), severe       Review of patient's allergies indicates:   Allergen Reactions    Benzodiazepines Other (See Comments)     Contraindicated while taking Clozapine    Ampicillin      Mom says so    Erythromycin     Levaquin [levofloxacin] Other (See Comments)     Depression side effects    Penicillins      Mom says so    Pristiq [desvenlafaxine]      psycotic      Sulfa (sulfonamide antibiotics)      Rash      Azithromycin Anxiety       Current Outpatient Medications:  No current facility-administered medications for this encounter.     Current Outpatient Medications:     clozapine (CLOZARIL) 50 MG tablet, Take 50 mg by mouth once daily. , Disp: , Rfl:     dapsone 7.5 % GlwP, Apply topically once daily., Disp: , Rfl:     dextroamphetamine-amphetamine 30 mg Tab, Take 30 mg by mouth 2 (two) times daily. , Disp: , Rfl:     famciclovir (FAMVIR) 500 MG tablet, Take 1 tablet (500 mg total) by mouth 2 (two) times daily., Disp: 30 tablet, Rfl: 12    hydrOXYzine pamoate (VISTARIL) 25 MG Cap, Take 2 capsules (50 mg total) by mouth nightly as needed (Take  25-50mg (1-2 caps) at night for anxiety prior to ECT)., Disp: 180 capsule, Rfl: 0    mirtazapine (REMERON) 15 MG tablet, Take 1 tablet (15 mg total) by mouth every evening. (Patient taking differently: Take 7.5 mg by mouth every evening. ), Disp: 90 tablet, Rfl: 0    spironolactone (ALDACTONE) 50 MG tablet, 50 mg 2 (two) times daily. 50  mg qAM, 50 mg qHS., Disp: , Rfl:     thyroid (ARMOUR THYROID) 30 mg Tab, Take 1 tablet (30 mg total) by mouth every morning., Disp: 30 tablet, Rfl: 11    trazodone (DESYREL) 100 MG tablet, Take 200 mg by mouth every evening. , Disp: , Rfl:     UNABLE TO FIND, 2 (two) times daily. n-azetyl-cysteine, Disp: , Rfl:     venlafaxine (EFFEXOR) 100 MG Tab, Take 3 tablets (300 mg total) by mouth once daily. (Patient taking differently: Take 225 mg by mouth once daily. ), Disp: , Rfl:     vortioxetine (TRINTELLIX) 5 mg Tab, Take 2.5 mg by mouth once daily., Disp: , Rfl:     ZOVIRAX 5 % Crea, , Disp: , Rfl: 5    Facility-Administered Medications Ordered in Other Encounters:     lidocaine (PF) 10 mg/ml (1%) injection 10 mg, 1 mL, Intradermal, Once, Homa Colbert MD    sodium chloride 0.9% flush 10 mL, 10 mL, Intra-Catheter, PRN, Homa Colbert MD    Past Surgical History:   Procedure Laterality Date    ANKLE SURGERY Right     BREAST augmentation      ELECTROCONVULSIVE THERAPY (ECT) - SINGLE SEIZURE N/A 12/6/2018    Performed by Sh[facility]ib Boyce Jr., [facility] at [facility] ECT    ELECTROCONVULSIVE THERAPY (ECT) - SINGLE SEIZURE N/A 8/31/2018    Performed by Shoaib Boyce Jr., MD at [facility] ECT    ELECTROCONVULSIVE THERAPY (ECT) - SINGLE SEIZURE N/A 8/6/2018    Performed by Shoaib Boyce Jr., MD at [facility] ECT    ELECTROCONVULSIVE THERAPY (ECT) - SINGLE SEIZURE N/A 7/23/2018    Performed by Shoaib Boyce Jr., MD at [facility] ECT    ELECTROCONVULSIVE THERAPY (ECT) - SINGLE SEIZURE N/A 7/5/2018    Performed by Shoaib Boyce Jr., MD at [facility] ECT    ELECTROCONVULSIVE THERAPY (ECT)  - SINGLE SEIZURE N/A 6/28/2018    Performed by Sh[facility]ib [facility] Jr., MD at [facility] ECT    ELECTROCONVULSIVE THERAPY (ECT) - SINGLE SEIZURE N/A 6/13/2018    Performed by Shoaib Boyce Jr., MD at [facility] ECT    ELECTROCONVULSIVE THERAPY (ECT) - SINGLE SEIZURE N/A 5/29/2018    Performed by Shoaib Boyce Jr., MD at [facility] ECT    ELECTROCONVULSIVE THERAPY (ECT) - SINGLE SEIZURE N/A 5/8/2018    Performed by Shoaib Boyce Jr., MD at [facility] ECT    ELECTROCONVULSIVE THERAPY (ECT) - SINGLE SEIZURE N/A 4/24/2018    Performed by Shoaib Boyce Jr., MD at [facility] ECT    ELECTROCONVULSIVE THERAPY (ECT) - SINGLE SEIZURE N/A 4/17/2018    Performed by Shoaib Boyce Jr., MD at [facility] ECT    ELECTROCONVULSIVE THERAPY (ECT) - SINGLE SEIZURE N/A 4/13/2018    Performed by Shoaib Boyce Jr., MD at [facility] ECT    ELECTROCONVULSIVE THERAPY (ECT) - SINGLE SEIZURE N/A 4/3/2018    Performed by Shoaib Boyce Jr., MD at [facility] ECT    ELECTROCONVULSIVE THERAPY (ECT) - SINGLE SEIZURE N/A 3/20/2018    Performed by Shoaib Boyce Jr., MD at [facility] ECT    ELECTROCONVULSIVE THERAPY (ECT) - SINGLE SEIZURE N/A 3/15/2018    Performed by Shoaib Boyce Jr., MD at [facility] ECT    ELECTROCONVULSIVE THERAPY (ECT) - SINGLE SEIZURE N/A 3/6/2018    Performed by Shoaib Boyce Jr., MD at [facility] ECT    ELECTROCONVULSIVE THERAPY (ECT) - SINGLE SEIZURE N/A 3/1/2018    Performed by Shoaib Boyce Jr., MD at [facility] ECT    ELECTROCONVULSIVE THERAPY (ECT) - SINGLE SEIZURE N/A 2/23/2018    Performed by Shoaib Boyce Jr., MD at [facility] ECT    ELECTROCONVULSIVE THERAPY (ECT) - SINGLE SEIZURE N/A 2/19/2018    Performed by Shoaib Boyce Jr., MD at [facility] ECT    ELECTROCONVULSIVE THERAPY (ECT) - SINGLE SEIZURE N/A 2/15/2018    Performed by Shoaib Boyce Jr., MD at [facility] ECT    ELECTROCONVULSIVE THERAPY (ECT) - SINGLE SEIZURE N/A 1/22/2018    Performed by Shoaib Boyce Jr., MD at [facility] ECT    ELECTROCONVULSIVE THERAPY (ECT) - SINGLE SEIZURE  N/A 12/11/2017    Performed by [facility] at [facility] ECT    ELECTROCONVULSIVE THERAPY (ECT) - SINGLE SEIZURE N/A 10/24/2017    Performed by Shoaib Boyce Jr[facility] MD at [facility] ECT    ELECTROCONVULSIVE THERAPY (ECT) - SINGLE SEIZURE N/A 9/20/2017    Performed by Shoaib Boyce Jr[facility] at [facility] ECT    ELECTROCONVULSIVE THERAPY (ECT) - SINGLE SEIZURE N/A 8/14/2017    Performed by Shoaib Boyce Jr[facility] at [facility] ECT    ELECTROCONVULSIVE THERAPY (ECT) - SINGLE SEIZURE Bilateral 7/12/2017    Performed by Shoaib Boyce Jr., MD at [facility] ECT    ELECTROCONVULSIVE THERAPY (ECT) - SINGLE SEIZURE N/A 6/13/2017    Performed by Shoaib Boyce Jr., [facility] at [facility] ECT    ELECTROCONVULSIVE THERAPY (ECT) - SINGLE SEIZURE N/A 5/17/2017    Performed by Shoaib Boyce Jr., MD at [facility] ECT    ELECTROCONVULSIVE THERAPY (ECT) - SINGLE SEIZURE N/A 4/20/2017    Performed by Shoaib Boyce Jr., MD at [facility] ECT    ELECTROCONVULSIVE THERAPY (ECT) - SINGLE SEIZURE N/A 11/22/2016    Performed by Shoaib Boyce Jr., MD at [facility] ECT    ELECTROCONVULSIVE THERAPY (ECT) - SINGLE SEIZURE N/A 10/24/2016    Performed by Shoaib Boyce Jr., MD at [facility] ECT    ELECTROCONVULSIVE THERAPY (ECT) - SINGLE SEIZURE N/A 9/22/2016    Performed by Shoaib Boyce Jr., MD at [facility] ECT    ELECTROCONVULSIVE THERAPY (ECT) - SINGLE SEIZURE N/A 9/1/2016    Performed by Shoaib Boyce Jr., MD at [facility] ECT    ELECTROCONVULSIVE THERAPY (ECT) - SINGLE SEIZURE N/A 8/15/2016    Performed by Shoaib Boyce Jr., MD at [facility] ECT    ELECTROCONVULSIVE THERAPY (ECT) - SINGLE SEIZURE N/A 8/1/2016    Performed by Shoaib Boyce Jr., MD at [facility] ECT    ELECTROCONVULSIVE THERAPY (ECT) - SINGLE SEIZURE N/A 7/22/2016    Performed by Shoaib Boyce Jr., MD at [facility] ECT    ELECTROCONVULSIVE THERAPY (ECT) - SINGLE SEIZURE N/A 7/14/2016    Performed by Shoaib Boyce Jr., MD at [facility] ECT    ELECTROCONVULSIVE THERAPY (ECT) - SINGLE SEIZURE N/A  2016    Performed by Shoaib [facility]ce Jr., MD at [facility] ECT    ELECTROCONVULSIVE THERAPY (ECT) - SINGLE SEIZURE N/A 2016    Performed by Shoaib Boyce Jr., MD at [facility] ECT    ELECTROCONVULSIVE THERAPY (ECT) - SINGLE SEIZURE N/A 2016    Performed by Shoaib Boyce Jr., MD at [facility] ECT    ELECTROCONVULSIVE THERAPY (ECT) - SINGLE SEIZURE N/A 2016    Performed by Shoaib Boyce Jr., MD at [facility] ECT    ELECTROCONVULSIVE THERAPY (ECT) - SINGLE SEIZURE N/A 2016    Performed by Shoaib Boyce Jr., MD at [facility] ECT    ELECTROCONVULSIVE THERAPY (ECT) - SINGLE SEIZURE N/A 2016    Performed by Shoaib Boyce Jr., MD at [facility] ECT    ELECTROCONVULSIVE THERAPY (ECT) - SINGLE SEIZURE N/A 6/15/2016    Performed by Shoaib Boyce Jr., MD at [facility] ECT    ELECTROCONVULSIVE THERAPY (ECT) - SINGLE SEIZURE N/A 2016    Performed by Shoaib Boyce Jr., MD at [facility] ECT    ELECTROCONVULSIVE THERAPY (ECT) - SINGLE SEIZURE N/A 2016    Performed by Shoaib Boyce Jr., MD at [facility] ECT    ELECTROCONVULSIVE THERAPY (ECT) - SINGLE SEIZURE N/A 2016    Performed by Shoaib Boyce Jr., MD at [facility] ECT    ELECTROCONVULSIVE THERAPY, CEREBRAL HEMISPHERE, UNILATERAL, 1 SEIZURE Bilateral 2018    Performed by Shoaib Boyce Jr., MD at [facility] ECT    OVARIAN CYST REMOVAL Bilateral        Social History     Socioeconomic History    Marital status: Single     Spouse name: Not on file    Number of children: Not on file    Years of education: Not on file    Highest education level: Not on file   Occupational History    Occupation: unemployed   Social Needs    Financial resource strain: Not on file    Food insecurity:     Worry: Not on file     Inability: Not on file    Transportation needs:     Medical: Not on file     Non-medical: Not on file   Tobacco Use    Smoking status: Former Smoker     Last attempt to quit: 2011     Years since quittin.2    Smokeless tobacco:  Never Used   Substance and Sexual Activity    Alcohol use: Yes     Comment: rarely    Drug use: No     Comment: Experimental use in high school    Sexual activity: Not on file   Lifestyle    Physical activity:     Days per week: Not on file     Minutes per session: Not on file    Stress: Not on file   Relationships    Social connections:     Talks on phone: Not on file     Gets together: Not on file     Attends Confucianism service: Not on file     Active member of club or organization: Not on file     Attends meetings of clubs or organizations: Not on file     Relationship status: Not on file   Other Topics Concern    Patient feels they ought to cut down on drinking/drug use Not Asked    Patient annoyed by others criticizing their drinking/drug use Not Asked    Patient has felt bad or guilty about drinking/drug use Not Asked    Patient has had a drink/used drugs as an eye opener in the AM Not Asked   Social History Narrative    Pt has 1 older brother from an intact family until the death of her mother in 2012.  She completed 1 year of college, was never in the , and has never been employed.  She was engaged once, but never , has no children, and lives with her father plus 2 dogs.  She denies any hobbies and is spiritual but not Confucianism.  She does date and returns to college during rare periods when depression and anxiety [place].       OBJECTIVE:     Vital Signs Range (Last 24H):  BP: ()/()   Arterial Line BP: ()/()       Significant Labs:  Lab Results   Component Value Date    WBC 6.83 06/08/2016    HGB 13.3 06/08/2016    HCT 40.3 06/08/2016     06/08/2016    ALT 14 06/08/2016    AST 20 06/08/2016     06/08/2016    K 3.8 06/08/2016     06/08/2016    CREATININE 0.7 06/08/2016    BUN 10 06/08/2016    CO2 28 06/08/2016    TSH 1.208 06/08/2016       Diagnostic Studies: No relevant studies.    EKG: No recent studies available.    2D ECHO:  No results found for this or any  previous visit.      ASSESSMENT/PLAN:         Anesthesia Evaluation    I have reviewed the Patient Summary Reports.    I have reviewed the Nursing Notes.   I have reviewed the Medications.     Review of Systems  Anesthesia Hx:  No problems with previous Anesthesia  Denies Family Hx of Anesthesia complications.   Denies Personal Hx of Anesthesia complications.   Social:  Former Smoker, Alcohol Use    Hematology/Oncology:  Hematology Normal   Oncology Normal     EENT/Dental:EENT/Dental Normal   Cardiovascular:  Cardiovascular Normal                     Pulmonary:  Pulmonary Normal  Denies Asthma.  Denies Shortness of breath.    Renal/:  Renal/ Normal     Hepatic/GI:  Hepatic/GI Normal    Neurological:   Denies TIA. Denies CVA.    Endocrine:  Endocrine Normal    Psych:   Psychiatric History depression          Physical Exam  General:  Well nourished    Airway/Jaw/Neck:  Airway Findings: Mouth Opening: Normal Tongue: Normal  General Airway Assessment: Adult  Mallampati: I  Jaw/Neck Findings:  Neck ROM: Normal ROM     Eyes/Ears/Nose:  EYES/EARS/NOSE FINDINGS: Normal   Dental:  Dental Findings: In tact   Chest/Lungs:  Chest/Lungs Findings: Clear to auscultation     Heart/Vascular:  Heart Findings: Rate: Normal  Rhythm: Regular Rhythm  Heart murmur: negative       Mental Status:  Mental Status Findings:  Cooperative, Alert and Oriented         Anesthesia Plan  Type of Anesthesia, risks & benefits discussed:  Anesthesia Type:  general  Patient's Preference:   Intra-op Monitoring Plan: standard ASA monitors  Intra-op Monitoring Plan Comments:   Post Op Pain Control Plan: per primary service following discharge from PACU, multimodal analgesia and IV/PO Opioids PRN  Post Op Pain Control Plan Comments:   Induction:   IV  Beta Blocker:  Patient is not currently on a Beta-Blocker (No further documentation required).       Informed Consent: Patient understands risks and agrees with Anesthesia plan.  Questions answered.  Anesthesia consent signed with patient.  ASA Score: 2     Day of Surgery Review of History & Physical:  There are no significant changes.  H&P update referred to the provider.     Anesthesia Plan Notes: NPO since 2100. No recent issues with previous ECTs. All questions/concerns addressed.        Ready For Surgery From Anesthesia Perspective.

## 2019-06-24 ENCOUNTER — ANESTHESIA (OUTPATIENT)
Dept: ELECTROPHYSIOLOGY | Facility: HOSPITAL | Age: 39
End: 2019-06-24
Payer: COMMERCIAL

## 2019-06-24 ENCOUNTER — HOSPITAL ENCOUNTER (OUTPATIENT)
Facility: HOSPITAL | Age: 39
Discharge: HOME | End: 2019-06-24
Attending: PSYCHIATRY & NEUROLOGY | Admitting: PSYCHIATRY & NEUROLOGY
Payer: COMMERCIAL

## 2019-06-24 VITALS
OXYGEN SATURATION: 100 % | DIASTOLIC BLOOD PRESSURE: 68 MMHG | BODY MASS INDEX: 24.16 KG/M2 | SYSTOLIC BLOOD PRESSURE: 110 MMHG | WEIGHT: 145 LBS | RESPIRATION RATE: 18 BRPM | TEMPERATURE: 99 F | HEART RATE: 76 BPM | HEIGHT: 65 IN

## 2019-06-24 DIAGNOSIS — F33.9 RECURRENT MAJOR DEPRESSIVE DISORDER, REMISSION STATUS UNSPECIFIED: Primary | ICD-10-CM

## 2019-06-24 DIAGNOSIS — F33.41 MDD (MAJOR DEPRESSIVE DISORDER), RECURRENT, IN PARTIAL REMISSION: ICD-10-CM

## 2019-06-24 DIAGNOSIS — F32.9 MDD (MAJOR DEPRESSIVE DISORDER): ICD-10-CM

## 2019-06-24 LAB
B-HCG UR QL: NEGATIVE
CTP QC/QA: YES

## 2019-06-24 PROCEDURE — 25000003 PHARM REV CODE 250: Performed by: STUDENT IN AN ORGANIZED HEALTH CARE EDUCATION/TRAINING PROGRAM

## 2019-06-24 PROCEDURE — D9220A PRA ANESTHESIA: Mod: ,,, | Performed by: ANESTHESIOLOGY

## 2019-06-24 PROCEDURE — 90870 ELECTROCONVULSIVE THERAPY: CPT | Mod: ,,, | Performed by: PSYCHIATRY & NEUROLOGY

## 2019-06-24 PROCEDURE — 25000003 PHARM REV CODE 250: Performed by: PSYCHIATRY & NEUROLOGY

## 2019-06-24 PROCEDURE — 90870 ELECTROCONVULSIVE THERAPY: CPT | Performed by: ANESTHESIOLOGY

## 2019-06-24 PROCEDURE — D9220A PRA ANESTHESIA: ICD-10-PCS | Mod: ,,, | Performed by: ANESTHESIOLOGY

## 2019-06-24 PROCEDURE — 63600175 PHARM REV CODE 636 W HCPCS: Performed by: STUDENT IN AN ORGANIZED HEALTH CARE EDUCATION/TRAINING PROGRAM

## 2019-06-24 PROCEDURE — 90870 PR ELECTROCONVULSIVE THERAPY,1 SEIZ: ICD-10-PCS | Mod: ,,, | Performed by: PSYCHIATRY & NEUROLOGY

## 2019-06-24 PROCEDURE — 81025 URINE PREGNANCY TEST: CPT | Performed by: PSYCHIATRY & NEUROLOGY

## 2019-06-24 RX ORDER — LIDOCAINE HYDROCHLORIDE 10 MG/ML
1 INJECTION, SOLUTION EPIDURAL; INFILTRATION; INTRACAUDAL; PERINEURAL ONCE
Status: COMPLETED | OUTPATIENT
Start: 2019-06-24 | End: 2019-06-24

## 2019-06-24 RX ORDER — SODIUM CHLORIDE 9 MG/ML
INJECTION, SOLUTION INTRAVENOUS CONTINUOUS
Status: DISCONTINUED | OUTPATIENT
Start: 2019-06-24 | End: 2019-06-24 | Stop reason: HOSPADM

## 2019-06-24 RX ORDER — SUCCINYLCHOLINE CHLORIDE 20 MG/ML
INJECTION INTRAMUSCULAR; INTRAVENOUS
Status: COMPLETED
Start: 2019-06-24 | End: 2019-06-24

## 2019-06-24 RX ORDER — KETOROLAC TROMETHAMINE 30 MG/ML
INJECTION, SOLUTION INTRAMUSCULAR; INTRAVENOUS
Status: COMPLETED
Start: 2019-06-24 | End: 2019-06-24

## 2019-06-24 RX ORDER — SODIUM CHLORIDE 0.9 % (FLUSH) 0.9 %
10 SYRINGE (ML) INJECTION
Status: DISCONTINUED | OUTPATIENT
Start: 2019-06-24 | End: 2019-06-24 | Stop reason: HOSPADM

## 2019-06-24 RX ORDER — ONDANSETRON 2 MG/ML
INJECTION INTRAMUSCULAR; INTRAVENOUS
Status: DISCONTINUED | OUTPATIENT
Start: 2019-06-24 | End: 2019-06-24

## 2019-06-24 RX ORDER — KETOROLAC TROMETHAMINE 30 MG/ML
INJECTION, SOLUTION INTRAMUSCULAR; INTRAVENOUS
Status: DISCONTINUED | OUTPATIENT
Start: 2019-06-24 | End: 2019-06-24

## 2019-06-24 RX ORDER — SUCCINYLCHOLINE CHLORIDE 20 MG/ML
INJECTION INTRAMUSCULAR; INTRAVENOUS
Status: DISCONTINUED | OUTPATIENT
Start: 2019-06-24 | End: 2019-06-24

## 2019-06-24 RX ORDER — ONDANSETRON 2 MG/ML
INJECTION INTRAMUSCULAR; INTRAVENOUS
Status: COMPLETED
Start: 2019-06-24 | End: 2019-06-24

## 2019-06-24 RX ORDER — LABETALOL HCL 20 MG/4 ML
SYRINGE (ML) INTRAVENOUS
Status: COMPLETED
Start: 2019-06-24 | End: 2019-06-24

## 2019-06-24 RX ORDER — LABETALOL HYDROCHLORIDE 5 MG/ML
INJECTION, SOLUTION INTRAVENOUS
Status: DISCONTINUED | OUTPATIENT
Start: 2019-06-24 | End: 2019-06-24

## 2019-06-24 RX ORDER — ONDANSETRON 2 MG/ML
4 INJECTION INTRAMUSCULAR; INTRAVENOUS ONCE AS NEEDED
Status: DISCONTINUED | OUTPATIENT
Start: 2019-06-24 | End: 2019-06-24 | Stop reason: HOSPADM

## 2019-06-24 RX ADMIN — LIDOCAINE HYDROCHLORIDE 0.1 MG: 10 INJECTION, SOLUTION EPIDURAL; INFILTRATION; INTRACAUDAL; PERINEURAL at 06:06

## 2019-06-24 RX ADMIN — ONDANSETRON 4 MG: 2 INJECTION, SOLUTION INTRAMUSCULAR; INTRAVENOUS at 07:06

## 2019-06-24 RX ADMIN — METHOHEXITAL SODIUM 150 MG: 500 INJECTION, POWDER, LYOPHILIZED, FOR SOLUTION INTRAMUSCULAR; INTRAVENOUS; RECTAL at 07:06

## 2019-06-24 RX ADMIN — SUCCINYLCHOLINE CHLORIDE 120 MG: 20 INJECTION, SOLUTION INTRAMUSCULAR; INTRAVENOUS at 07:06

## 2019-06-24 RX ADMIN — KETOROLAC TROMETHAMINE 30 MG: 30 INJECTION, SOLUTION INTRAMUSCULAR at 07:06

## 2019-06-24 RX ADMIN — SODIUM CHLORIDE: 0.9 INJECTION, SOLUTION INTRAVENOUS at 06:06

## 2019-06-24 RX ADMIN — Medication 500 MG: at 06:06

## 2019-06-24 RX ADMIN — LABETALOL HYDROCHLORIDE 10 MG: 5 INJECTION, SOLUTION INTRAVENOUS at 07:06

## 2019-06-24 NOTE — DISCHARGE INSTRUCTIONS
Home Care Instructions  E.C.T.    ACTIVITY LEVEL:  You may feel sleepy for several hours. It is best to rest until you are more awake and then gradually resume your normal activities in one day. It is recommended, because of the possibility of memory loss and slight confusion post ECT, that you rest in the company of a responsible adult. This confusion and memory loss is expected and should diminish over time. It is also recommended that you do not drive or operate any electrical appliances or machinery during the ECT treatment series. Ask your Doctor, at the time of your treatment, any questions you may have about specific activities.    DIET:  You may wish to start with liquids after your ECT treatment and gradually resume your normal diet. Do not consume alcoholic beverages during the ECT treatment series.    BATHING:  You may shower or bathe as desired.    MEDICATIONS:  Resume all home medications starting with the AM doses not taken prior to ECT treatment. If you experience a headache, it is okay to take your usual pain reliever.    WHEN TO CALL THE DOCTOR:   Headache, memory loss or confusion that does not begin to resolve within 24 hours.    RETURN APPOINTMENT:  Report to Day Surgery ([facility]) on (date)___________Thursday _June 27__________ for (time)_______0600__________. Remember you may not eat or drink after midnight, but please take heart or high blood pressure medicines with a sip of water in the morning prior to leaving home.    FOR EMERGENCIES:  If any unusual problems or difficulties occur, contact the office [phone] Monday-Friday until 3:00 p.m. After hours, call the hospital  [phone] and ask for the Psychiatry Resident On-call.

## 2019-06-24 NOTE — DISCHARGE SUMMARY
Allyssa Wright  : 1980   MRN: [id number]  Date: 2019     Electroconvulsive Therapy  Discharge Summary    Admit Date: 2019  5:49 AM  Discharge Date: 2019    Attending Physician: Shoaib Boyce Jr., MD   Discharge Provider: Cayetano Bronson MD    History of Present Illness: Allyssa Wright is a 39 y.o. female withMDD, recurrent, in partial remission presented for ECT #70. See H&P dated 2019 for full HPI. For further details, see Dr. Boyce's pre-ECT evaluation.    Hospital Course: The patient tolerated the ECT treatment well without complication, but did have shortened stimulus duration. Patient was stable post-procedure. See OP note dated 2019 for more details.     Disposition: Home or Self Care    Medications:  Current Discharge Medication List      CONTINUE these medications which have NOT CHANGED    Details   clozapine (CLOZARIL) 50 MG tablet Take 50 mg by mouth once daily.       dapsone 7.5 % GlwP Apply topically once daily.      famciclovir (FAMVIR) 500 MG tablet Take 1 tablet (500 mg total) by mouth 2 (two) times daily.  Qty: 30 tablet, Refills: 12    Associated Diagnoses: Major depressive disorder, recurrent episode, moderate degree      mirtazapine (REMERON) 15 MG tablet Take 1 tablet (15 mg total) by mouth every evening.  Qty: 90 tablet, Refills: 0      spironolactone (ALDACTONE) 50 MG tablet 50 mg 2 (two) times daily. 50  mg qAM, 50 mg qHS.      thyroid (ARMOUR THYROID) 30 mg Tab Take 1 tablet (30 mg total) by mouth every morning.  Qty: 30 tablet, Refills: 11    Associated Diagnoses: Major depressive disorder, recurrent episode, moderate degree      trazodone (DESYREL) 100 MG tablet Take 200 mg by mouth every evening.       UNABLE TO FIND 2 (two) times daily. n-azetyl-cysteine      venlafaxine (EFFEXOR) 100 MG Tab Take 3 tablets (300 mg total) by mouth once daily.    Associated Diagnoses: MDD (major depressive disorder), recurrent, in partial remission      vortioxetine  (TRINTELLIX) 5 mg Tab Take 2.5 mg by mouth once daily.      dextroamphetamine-amphetamine 30 mg Tab Take 30 mg by mouth 2 (two) times daily.     Associated Diagnoses: MDD (major depressive disorder), recurrent, in partial remission      hydrOXYzine pamoate (VISTARIL) 25 MG Cap Take 2 capsules (50 mg total) by mouth nightly as needed (Take 25-50mg (1-2 caps) at night for anxiety prior to ECT).  Qty: 180 capsule, Refills: 0      ZOVIRAX 5 % Crea Refills: 5           Status at Discharge: Alert and medically stable    Discharge Diagnoses:  MDD, recurrent, in partial remission    Diet: Resume previous outpatient diet  Activity: Ambulate with assistance  Instructions: Please do not eat or drink anything after midnight prior to procedure. Please do not drive on day of ECT.    Med Changes:  None    Next ECT:  ECT #71 scheduled for 6/27/2019    Cayetano Bronson MD  [facility]  PGY-2  6/24/2019

## 2019-06-24 NOTE — TRANSFER OF CARE
"Anesthesia Transfer of Care Note    Patient: Allyssa Wright    Procedure(s) Performed: Procedure(s) (LRB):  ELECTROCONVULSIVE THERAPY (ECT) - SINGLE SEIZURE (N/A)    Patient location: PACU    Anesthesia Type: general    Transport from OR: Transported from OR on 6-10 L/min O2 by face mask with adequate spontaneous ventilation    Post pain: adequate analgesia    Post assessment: no apparent anesthetic complications    Post vital signs: stable    Level of consciousness: awake and alert    Nausea/Vomiting: no nausea/vomiting    Complications: none    Transfer of care protocol was followed      Last vitals:   Visit Vitals  /68   Pulse 76   Temp 37.3 °C (99.1 °F) (Temporal)   Resp 18   Ht 5' 5" (1.651 m)   Wt 65.8 kg (145 lb)   SpO2 100%   Breastfeeding? No   BMI 24.13 kg/m²     "

## 2019-06-24 NOTE — H&P
"Allyssa Wright  : 1980   MRN: [id number]  Date: 2019     Electroconvulsive Therapy  History & Physical    Chief complaint: MDD, recurrent, in partial remission  Procedure: ECT #70    SUBJECTIVE:     HPI:   Allyssa Wright is a 39 y.o. female with MDD, recurrent, in partial remission who presents for ECT today.Patient reports she has been doing "not good" since last treatment. Reports a "relapse" in depressive, anxiety, OCD symptoms with depression most prominent. Feels her mood worsened ~8 days ago suddenly with no inciting factors and has remained consistently low since then. Denies SI/HI at any point. Sleeping well, and reports a stable appetite though sensation of bland food. Tolerating medications without adverse effects.Is concerned that the treatment schedule of  q4 weeks may need to be changed. Had appointment with outpatient psychiatrist (Dr. Jc) in last week, has med refills.  Please see Dr. Boyce's full pre-ECT evaluation for further details. The patient does not need any prescriptions today. Since last ECT she increased trintillix dose to 6.25 mg daily (from 5 mg) Denies any SI, HI, AVH, delusions, or paranoia. No somatic complaints this morning. The patient has not had anything by mouth since midnight and is ready for ECT.    Psychiatric Review of Systems:  Mood: "not good"  Anxiety: at baseline  Appetite: intact  Psychomotor: No problem  Cognitive Impairment: mild, consistent with ECT, worsening immediately surrounding treatments  Insomnia: Denies   Psychosis: None  Diurnal Variation: None  Suicidal Ideation: Absent    Medical Review Of Systems:  Pertinent items are noted in HPI.    Current Medications:   Current Facility-Administered Medications on File Prior to Encounter   Medication Dose Route Frequency Provider Last Rate Last Dose    lidocaine (PF) 10 mg/ml (1%) injection 10 mg  1 mL Intradermal Once Homa Colbert MD        sodium chloride 0.9% flush 10 mL  10 mL Intra-Catheter " RIAN Colbert MD         Current Outpatient Medications on File Prior to Encounter   Medication Sig Dispense Refill    clozapine (CLOZARIL) 50 MG tablet Take 50 mg by mouth once daily.       dapsone 7.5 % GlwP Apply topically once daily.      dextroamphetamine-amphetamine 30 mg Tab Take 30 mg by mouth 2 (two) times daily.       famciclovir (FAMVIR) 500 MG tablet Take 1 tablet (500 mg total) by mouth 2 (two) times daily. 30 tablet 12    hydrOXYzine pamoate (VISTARIL) 25 MG Cap Take 2 capsules (50 mg total) by mouth nightly as needed (Take 25-50mg (1-2 caps) at night for anxiety prior to ECT). 180 capsule 0    mirtazapine (REMERON) 15 MG tablet Take 1 tablet (15 mg total) by mouth every evening. (Patient taking differently: Take 7.5 mg by mouth every evening. ) 90 tablet 0    spironolactone (ALDACTONE) 50 MG tablet 50 mg 2 (two) times daily. 50  mg qAM, 50 mg qHS.      thyroid (ARMOUR THYROID) 30 mg Tab Take 1 tablet (30 mg total) by mouth every morning. 30 tablet 11    trazodone (DESYREL) 100 MG tablet Take 200 mg by mouth every evening.       UNABLE TO FIND 2 (two) times daily. n-azetyl-cysteine      venlafaxine (EFFEXOR) 100 MG Tab Take 3 tablets (300 mg total) by mouth once daily. (Patient taking differently: Take 225 mg by mouth once daily. )      vortioxetine (TRINTELLIX) 5 mg Tab Take 2.5 mg by mouth once daily.      ZOVIRAX 5 % Crea   5      Allergies:   Benzodiazepines; Ampicillin; Erythromycin; Levaquin [levofloxacin]; Penicillins; Pristiq [desvenlafaxine]; Sulfa (sulfonamide antibiotics); and Azithromycin    Past Medical/Surgical History:   Past Medical History:   Diagnosis Date    Anxiety     Depression     History of psychiatric hospitalization     HSV-1 (herpes simplex virus 1) infection     Hx of psychiatric care     Moderate depressed bipolar II disorder 06/13/2016    reports no history of bipolar    Obsessive-compulsive disorder     Psychiatric problem     Schizophrenia  4/3/2018    Self-harming behavior     Therapy      Past Surgical History:   Procedure Laterality Date    ANKLE SURGERY Right     BREAST augmentation      ELECTROCONVULSIVE THERAPY (ECT) - SINGLE SEIZURE N/A 12/6/2018    Performed by [facility]ib [facility]ce Jr., [facility]    ELECTROCONVULSIVE THERAPY (ECT) - SINGLE SEIZURE N/A 8/31/2018    Per[facility] by [facility]    ELECTROCONVULSIVE THERAPY (ECT) - SINGLE SEIZURE N/A 8/6/2018    Performed by [facility]    ELECTROCONVULSIVE THERAPY (ECT) - SINGLE SEIZURE N/A 7/23/2018    Performed by [facility]    ELECTROCONVULSIVE THERAPY (ECT) - SINGLE SEIZURE N/A 7/5/2018    Per[facility] by [facility]    ELECTROCONVULSIVE THERAPY (ECT) - SINGLE SEIZURE N/A 6/28/2018    Per[facility] by [facility]    ELECTROCONVULSIVE THERAPY (ECT) - SINGLE SEIZURE N/A 6/13/2018    Performed by [facility]    ELECTROCONVULSIVE THERAPY (ECT) - SINGLE SEIZURE N/A 5/29/2018    Performed by [facility]    ELECTROCONVULSIVE THERAPY (ECT) - SINGLE SEIZURE N/A 5/8/2018    Performed by [facility]    ELECTROCONVULSIVE THERAPY (ECT) - SINGLE SEIZURE N/A 4/24/2018    Performed by [facility]    ELECTROCONVULSIVE THERAPY (ECT) - SINGLE SEIZURE N/A 4/17/2018    Performed by [facility] Jr[facility]    ELECTROCONVULSIVE THERAPY (ECT) - SINGLE SEIZURE N/A 4/13/2018    Performed by [facility] Jr[facility]    ELECTROCONVULSIVE THERAPY (ECT) - SINGLE SEIZURE N/A 4/3/2018    Performed by [facility]    ELECTROCONVULSIVE THERAPY (ECT) - SINGLE SEIZURE N/A 3/20/2018    Performed by [facility]    ELECTROCONVULSIVE THERAPY (ECT) - SINGLE SEIZURE N/A 3/15/2018    Performed by Shoaib Boyce  MD [facility]    ELECTROCONVULSIVE THERAPY (ECT) - SINGLE SEIZURE N/A 3/6/2018    Performed by [facility] Jr[facility]    ELECTROCONVULSIVE THERAPY (ECT) - SINGLE SEIZURE N/A 3/1/2018    Performed by [facility]ib [facility] Jr[facility]    ELECTROCONVULSIVE THERAPY (ECT) - SINGLE SEIZURE N/A 2/23/2018    Performed by [facility]    ELECTROCONVULSIVE THERAPY (ECT) - SINGLE SEIZURE N/A 2/19/2018    Performed by [facility]    ELECTROCONVULSIVE THERAPY (ECT) - SINGLE SEIZURE N/A 2/15/2018    Performed by [facility]    ELECTROCONVULSIVE THERAPY (ECT) - SINGLE SEIZURE N/A 1/22/2018    Performed by [facility]    ELECTROCONVULSIVE THERAPY (ECT) - SINGLE SEIZURE N/A 12/11/2017    Performed by [facility]    ELECTROCONVULSIVE THERAPY (ECT) - SINGLE SEIZURE N/A 10/24/2017    Performed by [facility]    ELECTROCONVULSIVE THERAPY (ECT) - SINGLE SEIZURE N/A 9/20/2017    Performed by [facility]    ELECTROCONVULSIVE THERAPY (ECT) - SINGLE SEIZURE N/A 8/14/2017    Performed by [facility]    ELECTROCONVULSIVE THERAPY (ECT) - SINGLE SEIZURE Bilateral 7/12/2017    Performed by [facility]    ELECTROCONVULSIVE THERAPY (ECT) - SINGLE SEIZURE N/A 6/13/2017    Performed by [facility]    ELECTROCONVULSIVE THERAPY (ECT) - SINGLE SEIZURE N/A 5/17/2017    Performed by [facility]    ELECTROCONVULSIVE THERAPY (ECT) - SINGLE SEIZURE N/A 4/20/2017    Performed by [facility]    ELECTROCONVULSIVE THERAPY (ECT) - SINGLE SEIZURE N/A 11/22/2016    Performed by [facility]    ELECTROCONVULSIVE THERAPY (ECT) - SINGLE SEIZURE N/A 10/24/2016    Performed by Shoaib Boyce Jr.,  MD at [facility] ECT    ELECTROCONVULSIVE THERAPY (ECT) - SINGLE SEIZURE N/A 9/22/2016    Performed by Sh[facility]ib Boyce Jr[facility] at [facility]    ELECTROCONVULSIVE THERAPY (ECT) - SINGLE SEIZURE N/A 9/1/2016    Performed by Sh[facility]ib Boy[facility] Jr[facility] at [facility]    ELECTROCONVULSIVE THERAPY (ECT) - SINGLE SEIZURE N/A 8/15/2016    Performed by Sh[facility]ib Boyce Jr[facility] at [facility]    ELECTROCONVULSIVE THERAPY (ECT) - SINGLE SEIZURE N/A 8/1/2016    Performed by Shoaib Boyce Jr[facility] at [facility]    ELECTROCONVULSIVE THERAPY (ECT) - SINGLE SEIZURE N/A 7/22/2016    Performed by Shoaib Boyce Jr[facility] at [facility]    ELECTROCONVULSIVE THERAPY (ECT) - SINGLE SEIZURE N/A 7/14/2016    Performed by Shoaib Boyce Jr[facility] at [facility]    ELECTROCONVULSIVE THERAPY (ECT) - SINGLE SEIZURE N/A 7/8/2016    Performed by Shoaib Boyce Jr[facility] at [facility]    ELECTROCONVULSIVE THERAPY (ECT) - SINGLE SEIZURE N/A 7/5/2016    Performed by Shoaib Boyce Jr[facility] at [facility]    ELECTROCONVULSIVE THERAPY (ECT) - SINGLE SEIZURE N/A 6/27/2016    Performed by [facility]ib Boyce Jr[facility] at [facility]    ELECTROCONVULSIVE THERAPY (ECT) - SINGLE SEIZURE N/A 6/23/2016    Performed by Sh[facility]ib Boyce Jr[facility] at [facility]    ELECTROCONVULSIVE THERAPY (ECT) - SINGLE SEIZURE N/A 6/20/2016    Performed by Shoaib Boyce Jr[facility] at [facility]    ELECTROCONVULSIVE THERAPY (ECT) - SINGLE SEIZURE N/A 6/16/2016    Performed by Sh[facility]ib Boyce Jr[facility] at [facility]    ELECTROCONVULSIVE THERAPY (ECT) - SINGLE SEIZURE N/A 6/15/2016    Performed by [facility]ib [facility]ce Jr[facility] at [facility]    ELECTROCONVULSIVE THERAPY (ECT) - SINGLE SEIZURE N/A 6/14/2016    Performed by Sh[facility]ib Boyce Jr[facility] at [facility]    ELECTROCONVULSIVE THERAPY (ECT) - SINGLE SEIZURE N/A 6/13/2016    Performed by [facility]ib [facility] Jr[facility] at [facility]    ELECTROCONVULSIVE THERAPY (ECT) - SINGLE SEIZURE N/A 1/4/2016    Performed by [facility] Jr[facility] at [facility]     "ELECTROCONVULSIVE THERAPY, CEREBRAL HEMISPHERE, UNILATERAL, 1 SEIZURE Bilateral 6/25/2018    [facility]    OVARIAN CYST REMOVAL Bilateral       OBJECTIVE:     Vitals (pre-procedure):  Vitals:    06/24/19 0623   BP: 119/74   Pulse: 84   Resp: 18   Temp: 97.9 °F (36.6 °C)        Labs/Imaging/Studies:   Recent Results (from the past 48 hour(s))   POCT urine pregnancy    Collection Time: 06/24/19  6:11 AM   Result Value Ref Range    POC Preg Test, Ur Negative Negative     Acceptable Yes       No results found for: PHENYTOIN, PHENOBARB, VALPROATE, CBMZ    Physical Exam:   Gen: AAOx4, NAD  HEENT: MMM, PERRL, EOMI, O/P clear  CV: RRR, S1/S2 nml, no M/R/G  Chest: CTAB, no R/R/W, unlabored breathing  Abd: S/NT/ND, +BS, no HSM  Ext: No C/C/E, pulse 2+ throughout  Neuro: CN II-XII grossly intact, no focal deficits    Mental Status Exam:   Appearance: age appropriate, well nourished, dressed in hospital gown  Behavior: friendly and cooperative, eye contact appropriate  Speech: conversational tone, rate, pitch, volume  Mood: "not good"  Affect: appropriate, reactive   Thought Process: linear and organized  Thought Content: no SI, no HI, no AVH  Perceptual Disturbances: none reported  Cognition: grossly intact  Insight: fair   Judgment: appropriate for setting       ASSESSMENT/PLAN:     Allyssa Wright is a 39 y.o. female with MDD, recurrent, in partial remission who presents for ECT.    Recommendations:   Proceed with ECT #70.    Cayetano Bronson MD  [facility]  PGY-2  6/24/2019  "

## 2019-06-24 NOTE — OP NOTE
Allyssa Wright  : 1980   MRN: [id number]  Date: 2019    Electroconvulsive Therapy  Procedure Note    Date of Admission: 2019  5:49 AM    Site: [facility], [address]    Attending: Shoaib Boyce Jr., MD   Residents: Cayetano Bronson MD  Pre-procedure Diagnosis: MDD, recurrent, in partial remission  ECT Treatment Number: 70  Machine Type: Mecta 5000    Patient Status: Medically stable    Vitals (pre-procedure):  Vitals:    19 0623   BP: 119/74   Pulse: 84   Resp: 18   Temp: 97.9 °F (36.6 °C)       Electrode Placement: Bitemporal    Stimulus Number Charge (mC) Level Pulse Width (msec) Frequency  (Hz) Duration of Stimulus (sec) Current (mA) Duration of Seizure (sec)   1 576 3 1 60 6 800 43                                   Complications: Mild Hypertension    Maximum Blood Pressure: 130/88    Medications Given:  Caffeine 500 mg PO before  Methohexital (Brevital) 150 mg  IV before  Succinylcholine (Anectine) 140 mg  IV before  Labetolol 10mg IV before  Zofran 4 mg IV before  Toradol 30 mg IV before    Treatment Course:  Patient tolerated procedure well. After adequate recovery from general anesthesia, the patient was transported to recovery.    Post-op Diagnosis: Same as above    Recommended for next ECT:  No med changes. Next treatment 2019    Cayetano Bronson MD  [facility]  PGY-2  2019

## 2019-06-26 ENCOUNTER — ANESTHESIA EVENT (OUTPATIENT)
Dept: ELECTROPHYSIOLOGY | Facility: HOSPITAL | Age: 39
End: 2019-06-26
Payer: COMMERCIAL

## 2019-06-27 ENCOUNTER — HOSPITAL ENCOUNTER (OUTPATIENT)
Facility: HOSPITAL | Age: 39
Discharge: HOME | End: 2019-06-27
Attending: PSYCHIATRY & NEUROLOGY | Admitting: PSYCHIATRY & NEUROLOGY
Payer: COMMERCIAL

## 2019-06-27 ENCOUNTER — ANESTHESIA (OUTPATIENT)
Dept: ELECTROPHYSIOLOGY | Facility: HOSPITAL | Age: 39
End: 2019-06-27
Payer: COMMERCIAL

## 2019-06-27 VITALS
SYSTOLIC BLOOD PRESSURE: 111 MMHG | BODY MASS INDEX: 24.32 KG/M2 | TEMPERATURE: 98 F | DIASTOLIC BLOOD PRESSURE: 74 MMHG | RESPIRATION RATE: 18 BRPM | HEART RATE: 72 BPM | WEIGHT: 146 LBS | OXYGEN SATURATION: 100 % | HEIGHT: 65 IN

## 2019-06-27 DIAGNOSIS — F32.9 MDD (MAJOR DEPRESSIVE DISORDER): ICD-10-CM

## 2019-06-27 DIAGNOSIS — F33.41 MDD (MAJOR DEPRESSIVE DISORDER), RECURRENT, IN PARTIAL REMISSION: ICD-10-CM

## 2019-06-27 LAB
B-HCG UR QL: NEGATIVE
CTP QC/QA: YES

## 2019-06-27 PROCEDURE — D9220A PRA ANESTHESIA: Mod: ,,, | Performed by: ANESTHESIOLOGY

## 2019-06-27 PROCEDURE — 81025 URINE PREGNANCY TEST: CPT | Performed by: PSYCHIATRY & NEUROLOGY

## 2019-06-27 PROCEDURE — 63600175 PHARM REV CODE 636 W HCPCS: Performed by: STUDENT IN AN ORGANIZED HEALTH CARE EDUCATION/TRAINING PROGRAM

## 2019-06-27 PROCEDURE — 90870 ELECTROCONVULSIVE THERAPY: CPT | Performed by: ANESTHESIOLOGY

## 2019-06-27 PROCEDURE — 90870 PR ELECTROCONVULSIVE THERAPY,1 SEIZ: ICD-10-PCS | Mod: ,,, | Performed by: PSYCHIATRY & NEUROLOGY

## 2019-06-27 PROCEDURE — 25000003 PHARM REV CODE 250: Performed by: STUDENT IN AN ORGANIZED HEALTH CARE EDUCATION/TRAINING PROGRAM

## 2019-06-27 PROCEDURE — 90870 ELECTROCONVULSIVE THERAPY: CPT | Mod: ,,, | Performed by: PSYCHIATRY & NEUROLOGY

## 2019-06-27 PROCEDURE — D9220A PRA ANESTHESIA: ICD-10-PCS | Mod: ,,, | Performed by: ANESTHESIOLOGY

## 2019-06-27 PROCEDURE — 25000003 PHARM REV CODE 250: Performed by: PSYCHIATRY & NEUROLOGY

## 2019-06-27 RX ORDER — LABETALOL HYDROCHLORIDE 5 MG/ML
INJECTION, SOLUTION INTRAVENOUS
Status: DISCONTINUED | OUTPATIENT
Start: 2019-06-27 | End: 2019-06-27

## 2019-06-27 RX ORDER — SUCCINYLCHOLINE CHLORIDE 20 MG/ML
INJECTION INTRAMUSCULAR; INTRAVENOUS
Status: DISCONTINUED | OUTPATIENT
Start: 2019-06-27 | End: 2019-06-27

## 2019-06-27 RX ORDER — KETOROLAC TROMETHAMINE 30 MG/ML
INJECTION, SOLUTION INTRAMUSCULAR; INTRAVENOUS
Status: DISCONTINUED | OUTPATIENT
Start: 2019-06-27 | End: 2019-06-27

## 2019-06-27 RX ORDER — SODIUM CHLORIDE 9 MG/ML
INJECTION, SOLUTION INTRAVENOUS CONTINUOUS
Status: DISCONTINUED | OUTPATIENT
Start: 2019-06-27 | End: 2019-06-27 | Stop reason: HOSPADM

## 2019-06-27 RX ORDER — ONDANSETRON 2 MG/ML
4 INJECTION INTRAMUSCULAR; INTRAVENOUS ONCE AS NEEDED
Status: DISCONTINUED | OUTPATIENT
Start: 2019-06-27 | End: 2019-06-27 | Stop reason: HOSPADM

## 2019-06-27 RX ORDER — LABETALOL HCL 20 MG/4 ML
SYRINGE (ML) INTRAVENOUS
Status: DISCONTINUED
Start: 2019-06-27 | End: 2019-06-27 | Stop reason: HOSPADM

## 2019-06-27 RX ORDER — ONDANSETRON 2 MG/ML
INJECTION INTRAMUSCULAR; INTRAVENOUS
Status: DISCONTINUED | OUTPATIENT
Start: 2019-06-27 | End: 2019-06-27

## 2019-06-27 RX ORDER — ONDANSETRON 2 MG/ML
INJECTION INTRAMUSCULAR; INTRAVENOUS
Status: COMPLETED
Start: 2019-06-27 | End: 2019-06-27

## 2019-06-27 RX ORDER — LIDOCAINE HYDROCHLORIDE 10 MG/ML
1 INJECTION, SOLUTION EPIDURAL; INFILTRATION; INTRACAUDAL; PERINEURAL ONCE
Status: DISCONTINUED | OUTPATIENT
Start: 2019-06-27 | End: 2019-06-27 | Stop reason: HOSPADM

## 2019-06-27 RX ORDER — SODIUM CHLORIDE 0.9 % (FLUSH) 0.9 %
10 SYRINGE (ML) INJECTION
Status: DISCONTINUED | OUTPATIENT
Start: 2019-06-27 | End: 2019-06-27 | Stop reason: HOSPADM

## 2019-06-27 RX ORDER — SUCCINYLCHOLINE CHLORIDE 20 MG/ML
INJECTION INTRAMUSCULAR; INTRAVENOUS
Status: COMPLETED
Start: 2019-06-27 | End: 2019-06-27

## 2019-06-27 RX ADMIN — Medication 500 MG: at 06:06

## 2019-06-27 RX ADMIN — SUCCINYLCHOLINE CHLORIDE 120 MG: 20 INJECTION, SOLUTION INTRAMUSCULAR; INTRAVENOUS at 07:06

## 2019-06-27 RX ADMIN — KETOROLAC TROMETHAMINE 30 MG: 30 INJECTION, SOLUTION INTRAMUSCULAR at 07:06

## 2019-06-27 RX ADMIN — METHOHEXITAL SODIUM 120 MG: 500 INJECTION, POWDER, LYOPHILIZED, FOR SOLUTION INTRAMUSCULAR; INTRAVENOUS; RECTAL at 07:06

## 2019-06-27 RX ADMIN — ONDANSETRON 4 MG: 2 INJECTION, SOLUTION INTRAMUSCULAR; INTRAVENOUS at 07:06

## 2019-06-27 RX ADMIN — METHOHEXITAL SODIUM 50 MG: 500 INJECTION, POWDER, LYOPHILIZED, FOR SOLUTION INTRAMUSCULAR; INTRAVENOUS; RECTAL at 07:06

## 2019-06-27 RX ADMIN — LABETALOL HYDROCHLORIDE 10 MG: 5 INJECTION, SOLUTION INTRAVENOUS at 07:06

## 2019-06-27 NOTE — H&P
"Allyssa Wright  : 1980   MRN: [id number]  Date: 2019     Electroconvulsive Therapy  History & Physical    Chief complaint: MDD, recurrent, in partial remission  Procedure: ECT #71    SUBJECTIVE:     HPI:   Allyssa Wright is a 39 y.o. female with MDD, recurrent, in partial remission who presents for ECT today. Patient again reports she has been doing "not good" since last treatment, cites prominently depressive symptoms (says ECT typically does not relieve her OCD symptoms), describes depression as stable and unchanged from prior to las ECT several days ago. Denies SI/HI/AVH current at any point since last ECT. Sleeping well in general though some occasional trouble falling asleep, and reports a stable appetite though sensation of bland food. Tolerating medications without adverse effects.Is concerned/anxious that ECT may have stopped working for her. The patient does not need any prescriptions today nor has she had med changes. No somatic complaints this morning. The patient has not had anything by mouth since midnight and is ready for ECT.    Psychiatric Review of Systems:  Mood: "not good"  Anxiety: at baseline  Appetite: intact  Psychomotor: No problem  Cognitive Impairment: mild, consistent with ECT, worsening immediately surrounding treatments  Insomnia: Denies   Psychosis: None  Diurnal Variation: None  Suicidal Ideation: Absent    Medical Review Of Systems:  Pertinent items are noted in HPI.    Current Medications:   Current Facility-Administered Medications on File Prior to Encounter   Medication Dose Route Frequency Provider Last Rate Last Dose    lidocaine (PF) 10 mg/ml (1%) injection 10 mg  1 mL Intradermal Once Homa Colbert MD        sodium chloride 0.9% flush 10 mL  10 mL Intra-Catheter PRN Homa Colbert MD         Current Outpatient Medications on File Prior to Encounter   Medication Sig Dispense Refill    clozapine (CLOZARIL) 50 MG tablet Take 50 mg by mouth once daily.       " dapsone 7.5 % GlwP Apply topically once daily.      famciclovir (FAMVIR) 500 MG tablet Take 1 tablet (500 mg total) by mouth 2 (two) times daily. 30 tablet 12    mirtazapine (REMERON) 15 MG tablet Take 1 tablet (15 mg total) by mouth every evening. (Patient taking differently: Take 7.5 mg by mouth every evening. ) 90 tablet 0    spironolactone (ALDACTONE) 50 MG tablet 50 mg 2 (two) times daily. 50  mg qAM, 50 mg qHS.      thyroid (ARMOUR THYROID) 30 mg Tab Take 1 tablet (30 mg total) by mouth every morning. 30 tablet 11    trazodone (DESYREL) 100 MG tablet Take 200 mg by mouth every evening.       UNABLE TO FIND 2 (two) times daily. n-azetyl-cysteine      venlafaxine (EFFEXOR) 100 MG Tab Take 3 tablets (300 mg total) by mouth once daily. (Patient taking differently: Take 225 mg by mouth once daily. )      vortioxetine (TRINTELLIX) 5 mg Tab Take 2.5 mg by mouth once daily.      dextroamphetamine-amphetamine 30 mg Tab Take 30 mg by mouth 2 (two) times daily.       hydrOXYzine pamoate (VISTARIL) 25 MG Cap Take 2 capsules (50 mg total) by mouth nightly as needed (Take 25-50mg (1-2 caps) at night for anxiety prior to ECT). 180 capsule 0    ZOVIRAX 5 % Crea   5      Allergies:   Benzodiazepines; Ampicillin; Erythromycin; Levaquin [levofloxacin]; Penicillins; Pristiq [desvenlafaxine]; Sulfa (sulfonamide antibiotics); and Azithromycin    Past Medical/Surgical History:   Past Medical History:   Diagnosis Date    Anxiety     Depression     History of psychiatric hospitalization     HSV-1 (herpes simplex virus 1) infection     Hx of psychiatric care     Moderate depressed bipolar II disorder 06/13/2016    reports no history of bipolar    Obsessive-compulsive disorder     Psychiatric problem     Schizophrenia 4/3/2018    Self-harming behavior     Therapy      Past Surgical History:   Procedure Laterality Date    ANKLE SURGERY Right     BREAST augmentation      ELECTROCONVULSIVE THERAPY (ECT) - SINGLE  SEIZURE N/A 12/6/2018    Per[facility] by [facility]    ELECTROCONVULSIVE THERAPY (ECT) - SINGLE SEIZURE N/A 8/31/2018    Performed by [facility]    ELECTROCONVULSIVE THERAPY (ECT) - SINGLE SEIZURE N/A 8/6/2018    [facility]    ELECTROCONVULSIVE THERAPY (ECT) - SINGLE SEIZURE N/A 7/23/2018    Per[facility] by [facility]    ELECTROCONVULSIVE THERAPY (ECT) - SINGLE SEIZURE N/A 7/5/2018    Per[facility]    ELECTROCONVULSIVE THERAPY (ECT) - SINGLE SEIZURE N/A 6/28/2018    Per[facility]    ELECTROCONVULSIVE THERAPY (ECT) - SINGLE SEIZURE N/A 6/13/2018    Per[facility]    ELECTROCONVULSIVE THERAPY (ECT) - SINGLE SEIZURE N/A 5/29/2018    Per[facility] by [facility]    ELECTROCONVULSIVE THERAPY (ECT) - SINGLE SEIZURE N/A 5/8/2018    Per[facility] by [facility]    ELECTROCONVULSIVE THERAPY (ECT) - SINGLE SEIZURE N/A 4/24/2018    Per[facility] by [facility]    ELECTROCONVULSIVE THERAPY (ECT) - SINGLE SEIZURE N/A 4/17/2018    Per[facility] by [facility]    ELECTROCONVULSIVE THERAPY (ECT) - SINGLE SEIZURE N/A 4/13/2018    Per[facility] by [facility]    ELECTROCONVULSIVE THERAPY (ECT) - SINGLE SEIZURE N/A 4/3/2018    Performed by [facility]    ELECTROCONVULSIVE THERAPY (ECT) - SINGLE SEIZURE N/A 3/20/2018    Per[facility] by [facility]    ELECTROCONVULSIVE THERAPY (ECT) - SINGLE SEIZURE N/A 3/15/2018    Per[facility] by [facility]    ELECTROCONVULSIVE THERAPY (ECT) - SINGLE SEIZURE N/A 3/6/2018    Per[facility] by [facility]    ELECTROCONVULSIVE THERAPY (ECT) - SINGLE SEIZURE N/A  3/1/2018    Per[facility] by [facility] at [facility]    ELECTROCONVULSIVE THERAPY (ECT) - SINGLE SEIZURE N/A 2/23/2018    Per[facility] by [facility] at [facility]    ELECTROCONVULSIVE THERAPY (ECT) - SINGLE SEIZURE N/A 2/19/2018    Per[facility] by [facility]    ELECTROCONVULSIVE THERAPY (ECT) - SINGLE SEIZURE N/A 2/15/2018    Per[facility] by [facility]    ELECTROCONVULSIVE THERAPY (ECT) - SINGLE SEIZURE N/A 1/22/2018    Per[facility] by [facility]    ELECTROCONVULSIVE THERAPY (ECT) - SINGLE SEIZURE N/A 12/11/2017    Per[facility] by [facility]    ELECTROCONVULSIVE THERAPY (ECT) - SINGLE SEIZURE N/A 10/24/2017    Per[facility] by [facility]    ELECTROCONVULSIVE THERAPY (ECT) - SINGLE SEIZURE N/A 9/20/2017    Per[facility] by [facility]    ELECTROCONVULSIVE THERAPY (ECT) - SINGLE SEIZURE N/A 8/14/2017    Per[facility] by [facility]    ELECTROCONVULSIVE THERAPY (ECT) - SINGLE SEIZURE Bilateral 7/12/2017    Per[facility] by [facility]    ELECTROCONVULSIVE THERAPY (ECT) - SINGLE SEIZURE N/A 6/13/2017    Per[facility] by [facility]    ELECTROCONVULSIVE THERAPY (ECT) - SINGLE SEIZURE N/A 5/17/2017    Per[facility] by [facility]    ELECTROCONVULSIVE THERAPY (ECT) - SINGLE SEIZURE N/A 4/20/2017    Per[facility] by [facility]    ELECTROCONVULSIVE THERAPY (ECT) - SINGLE SEIZURE N/A 11/22/2016    Per[facility] by [facility] at [facility]    ELECTROCONVULSIVE THERAPY (ECT) - SINGLE SEIZURE N/A 10/24/2016    Per[facility] by [facility] at [facility]    ELECTROCONVULSIVE THERAPY (ECT) - SINGLE SEIZURE N/A 9/22/2016    Per[facility] by [facility] at [facility]    ELECTROCONVULSIVE THERAPY (ECT) - SINGLE SEIZURE N/A  9/1/2016    [facility]    ELECTROCONVULSIVE THERAPY (ECT) - SINGLE SEIZURE N/A 8/15/2016    Per[facility] by [facility]    ELECTROCONVULSIVE THERAPY (ECT) - SINGLE SEIZURE N/A 8/1/2016    Performed by [facility]    ELECTROCONVULSIVE THERAPY (ECT) - SINGLE SEIZURE N/A 7/22/2016    Performed by [facility]    ELECTROCONVULSIVE THERAPY (ECT) - SINGLE SEIZURE N/A 7/14/2016    Performed by [facility]    ELECTROCONVULSIVE THERAPY (ECT) - SINGLE SEIZURE N/A 7/8/2016    Performed by [facility]    ELECTROCONVULSIVE THERAPY (ECT) - SINGLE SEIZURE N/A 7/5/2016    Performed by [facility]    ELECTROCONVULSIVE THERAPY (ECT) - SINGLE SEIZURE N/A 6/27/2016    Performed by [facility]    ELECTROCONVULSIVE THERAPY (ECT) - SINGLE SEIZURE N/A 6/23/2016    Performed by [facility]    ELECTROCONVULSIVE THERAPY (ECT) - SINGLE SEIZURE N/A 6/20/2016    Per[facility] by [facility]    ELECTROCONVULSIVE THERAPY (ECT) - SINGLE SEIZURE N/A 6/16/2016    Per[facility] by [facility]    ELECTROCONVULSIVE THERAPY (ECT) - SINGLE SEIZURE N/A 6/15/2016    Performed by [facility]    ELECTROCONVULSIVE THERAPY (ECT) - SINGLE SEIZURE N/A 6/14/2016    Performed by [facility]    ELECTROCONVULSIVE THERAPY (ECT) - SINGLE SEIZURE N/A 6/13/2016    Per[facility]    ELECTROCONVULSIVE THERAPY (ECT) - SINGLE SEIZURE N/A 1/4/2016    Per[facility]    ELECTROCONVULSIVE THERAPY, CEREBRAL HEMISPHERE, UNILATERAL, 1 SEIZURE Bilateral 6/25/2018    Per[facility]    OVARIAN CYST REMOVAL Bilateral      "  OBJECTIVE:     Vitals (pre-procedure):  Vitals:    06/27/19 0621   BP: 121/70   Pulse: 80   Resp: 18   Temp: 97.7 °F (36.5 °C)        Labs/Imaging/Studies:   No results found for this or any previous visit (from the past 48 hour(s)).   No results found for: PHENYTOIN, PHENOBARB, VALPROATE, CBMZ    Physical Exam:   Gen: AAOx4, NAD  HEENT: MMM, PERRL, EOMI, O/P clear  CV: RRR, S1/S2 nml, no M/R/G  Chest: CTAB, no R/R/W, unlabored breathing  Abd: S/NT/ND, +BS, no HSM  Ext: No C/C/E, pulse 2+ throughout  Neuro: CN II-XII grossly intact, no focal deficits    Mental Status Exam:   Appearance: age appropriate, well nourished, dressed in hospital gown  Behavior: friendly and cooperative, eye contact appropriate  Speech: conversational tone, rate, pitch, volume  Mood: "not good"  Affect: appropriate, reactive   Thought Process: linear and organized  Thought Content: no SI, no HI, no AVH  Perceptual Disturbances: none reported  Cognition: grossly intact  Insight: fair   Judgment: appropriate for setting       ASSESSMENT/PLAN:     Allyssa Wright is a 39 y.o. female with MDD, recurrent, in partial remission who presents for ECT.    Recommendations:   Proceed with ECT #71.    Cayetano Bronson MD  [facility]  PGY-2  6/27/2019  "

## 2019-06-27 NOTE — OP NOTE
Allyssa Wright  : 1980   MRN: [id number]  Date: 2019    Electroconvulsive Therapy  Procedure Note    Date of Admission: 2019  5:44 AM    Site: [facility], [address]    Attending: Shoaib Boyce Jr., MD   Residents: Cayetano Bronson MD  Pre-procedure Diagnosis: MDD, recurrent, in partial remission  ECT Treatment Number: 71  Machine Type: Mecta 5000    Patient Status: Medically stable    Vitals (pre-procedure):  Vitals:    19 0621   BP: 121/70   Pulse: 80   Resp: 18   Temp: 97.7 °F (36.5 °C)       Electrode Placement: Bitemporal    Stimulus Number Charge (mC) Level Pulse Width (msec) Frequency  (Hz) Duration of Stimulus (sec) Current (mA) Duration of Seizure (sec)   1 576 3 1 60 6 800 45                                   Complications: Mild Hypertension    Maximum Blood Pressure: 146/96    Medications Given:  Caffeine 500 mg PO before  Methohexital (Brevital) 200 mg  IV before  Succinylcholine (Anectine) 140 mg  IV before  Labetolol 10mg IV before  Zofran 4 mg IV before  Toradol 30 mg IV before    Treatment Course:  Patient tolerated procedure well. After adequate recovery from general anesthesia, the patient was transported to recovery.    Post-op Diagnosis: Same as above    Recommended for next ECT:  No med changes. Next treatment 2019    Cayetano Bronson MD  [facility]  PGY-2  2019

## 2019-06-27 NOTE — DISCHARGE INSTRUCTIONS
Understanding Electroconvulsive Therapy  Electroconvulsive therapy (ECT) is sometimes called shock therapy. This may sound painful, but ECT doesnt hurt. Its often the safest and best treatment for severe depression. It can treat other mental disorders as well.  What is electroconvulsive therapy?  ECT is used to treat people who are very depressed. Its mainly used when other treatments, such as antidepressant medicines, have failed. Often it may relieve feelings of sadness and despair after 2 to 4 treatments.  Common symptoms of major depression  Symptoms of major depression include:  · Feeling a deep sadness that doesnt go away  · Losing all pleasure in life  · Feeling hopeless or helpless  · Feeling guilty  · Sleeping more or less than normal  · Eating more or less than normal  · Having headaches or stomachaches, or other pains that dont go away  · Feeling nervous, empty, or worthless  · Crying a great deal  · Thinking or talking about suicide or death  How is ECT therapy done?  Before an ECT treatment, youll be given anesthesia to keep you pain-free. Youll also be given medicine to make you sleep, relax your muscles and control your heart rate. Your healthcare provider then places electrodes on your head. You may have one above each temple (bilateral ECT). Or you may have electrodes on one temple and on your forehead (unilateral ECT). While you are asleep, your brain is stimulated very briefly with an electric current. This causes a seizure, usually lasting less than a minute. Because you are under anesthesia, your body will not move even as your brain goes through great changes.  What are the risks?  When done properly, ECT is quite safe. Right after the treatment, you may be confused. This often lasts for less than half an hour. You may have a headache or stiff muscles. But these symptoms often go away quickly. A more serious possible side effect is memory loss. Commonly, people have short-term  (temporary) trouble remembering information that they learned recently. And they may have little recall of the time when they received treatment. Less commonly, people may have long-lasting (permanent) spotty recall of major past events. In rare cases, there may be memory loss for larger blocks of time.  Looking to the future  In most cases, ECT doesnt cure depression. But it can improve symptoms for a period of time. You may need a series of ECT treatments to continue feeling the benefit. You may also need to take antidepressant medicines to help prevent symptoms from returning. But with ongoing treatment, you can have a full and healthy life.  Resources  · The [place] of [place] [employer]  636.469.5888  www.nimh.nih.gov  · [facility]  339.106.9525  www.[facility].org     Date Last Reviewed: 5/1/2017  © 0061-4102 [employer]. [address] [place] [address], [place] [address]. All rights reserved. This information is not intended as a substitute for professional medical care. Always follow your healthcare professional's instructions.

## 2019-06-27 NOTE — ANESTHESIA PREPROCEDURE EVALUATION
[facility]  Anesthesia Pre-Operative Evaluation         Patient Name: Allyssa Wright  YOB: 1980  MRN: [id number]    SUBJECTIVE:     Pre-operative evaluation for Procedure(s) (LRB):  ELECTROCONVULSIVE THERAPY (ECT) - SINGLE SEIZURE (N/A)     06/26/2019    Allyssa Wright is a 39 y.o. female w/ a significant PMHx of depression and schizophrenia, presents for ECT # 71.    Patient now presents for the above procedure(s).      LDA: None documented.    Prev airway: None documented.    Drips: None documented.      Patient Active Problem List   Diagnosis    MDD (major depressive disorder), recurrent, in partial remission    Other acne    Comfort measures only status    MDD (major depressive disorder)    Major depression    Major depressive disorder    MDD (major depressive disorder), severe       Review of patient's allergies indicates:   Allergen Reactions    Benzodiazepines Other (See Comments)     Contraindicated while taking Clozapine    Ampicillin      Mom says so    Erythromycin     Levaquin [levofloxacin] Other (See Comments)     Depression side effects    Penicillins      Mom says so    Pristiq [desvenlafaxine]      psycotic      Sulfa (sulfonamide antibiotics)      Rash      Azithromycin Anxiety       Current Outpatient Medications:  No current facility-administered medications for this encounter.     Current Outpatient Medications:     clozapine (CLOZARIL) 50 MG tablet, Take 50 mg by mouth once daily. , Disp: , Rfl:     dapsone 7.5 % GlwP, Apply topically once daily., Disp: , Rfl:     dextroamphetamine-amphetamine 30 mg Tab, Take 30 mg by mouth 2 (two) times daily. , Disp: , Rfl:     famciclovir (FAMVIR) 500 MG tablet, Take 1 tablet (500 mg total) by mouth 2 (two) times daily., Disp: 30 tablet, Rfl: 12    hydrOXYzine pamoate (VISTARIL) 25 MG Cap, Take 2 capsules (50 mg total) by mouth nightly as needed (Take 25-50mg (1-2 caps) at night for anxiety prior  to ECT)., Disp: 180 capsule, Rfl: 0    mirtazapine (REMERON) 15 MG tablet, Take 1 tablet (15 mg total) by mouth every evening. (Patient taking differently: Take 7.5 mg by mouth every evening. ), Disp: 90 tablet, Rfl: 0    spironolactone (ALDACTONE) 50 MG tablet, 50 mg 2 (two) times daily. 50  mg qAM, 50 mg qHS., Disp: , Rfl:     thyroid (ARMOUR THYROID) 30 mg Tab, Take 1 tablet (30 mg total) by mouth every morning., Disp: 30 tablet, Rfl: 11    trazodone (DESYREL) 100 MG tablet, Take 200 mg by mouth every evening. , Disp: , Rfl:     UNABLE TO FIND, 2 (two) times daily. n-azetyl-cysteine, Disp: , Rfl:     venlafaxine (EFFEXOR) 100 MG Tab, Take 3 tablets (300 mg total) by mouth once daily. (Patient taking differently: Take 225 mg by mouth once daily. ), Disp: , Rfl:     vortioxetine (TRINTELLIX) 5 mg Tab, Take 2.5 mg by mouth once daily., Disp: , Rfl:     ZOVIRAX 5 % Crea, , Disp: , Rfl: 5    Facility-Administered Medications Ordered in Other Encounters:     lidocaine (PF) 10 mg/ml (1%) injection 10 mg, 1 mL, Intradermal, Once, Homa Colbert MD    sodium chloride 0.9% flush 10 mL, 10 mL, Intra-Catheter, PRN, Homa Colbert MD    Past Surgical History:   Procedure Laterality Date    ANKLE SURGERY Right     BREAST augmentation      ELECTROCONVULSIVE THERAPY (ECT) - SINGLE SEIZURE N/A 12/6/2018    Performed by Shoaib Boyce Jr., [facility] at [facility] ECT    ELECTROCONVULSIVE THERAPY (ECT) - SINGLE SEIZURE N/A 8/31/2018    Performed by Shoaib Boyce Jr., MD at [facility] ECT    ELECTROCONVULSIVE THERAPY (ECT) - SINGLE SEIZURE N/A 8/6/2018    Performed by Shoaib Boyce Jr., MD at [facility] ECT    ELECTROCONVULSIVE THERAPY (ECT) - SINGLE SEIZURE N/A 7/23/2018    Performed by Shoaib Boyce Jr., MD at [facility] ECT    ELECTROCONVULSIVE THERAPY (ECT) - SINGLE SEIZURE N/A 7/5/2018    Performed by Shoaib Boyce Jr., MD at [facility] ECT    ELECTROCONVULSIVE THERAPY (ECT) - SINGLE SEIZURE N/A 6/28/2018    Performed  by Shoaib Boyce Jr., MD at [facility] ECT    ELECTROCONVULSIVE THERAPY (ECT) - SINGLE SEIZURE N/A 6/13/2018    Performed by Shoaib Boyce Jr., MD at [facility] ECT    ELECTROCONVULSIVE THERAPY (ECT) - SINGLE SEIZURE N/A 5/29/2018    Performed by Shoaib Boyce Jr., MD at [facility] ECT    ELECTROCONVULSIVE THERAPY (ECT) - SINGLE SEIZURE N/A 5/8/2018    Performed by Shoaib Boyce Jr., MD at [facility] ECT    ELECTROCONVULSIVE THERAPY (ECT) - SINGLE SEIZURE N/A 4/24/2018    Performed by Shoaib Boyce Jr., MD at [facility] ECT    ELECTROCONVULSIVE THERAPY (ECT) - SINGLE SEIZURE N/A 4/17/2018    Performed by Shoaib Boyce Jr., MD at [facility] ECT    ELECTROCONVULSIVE THERAPY (ECT) - SINGLE SEIZURE N/A 4/13/2018    Performed by Shoaib Boyce Jr., MD at [facility] ECT    ELECTROCONVULSIVE THERAPY (ECT) - SINGLE SEIZURE N/A 4/3/2018    Performed by Shoaib Boyce Jr., MD at [facility] ECT    ELECTROCONVULSIVE THERAPY (ECT) - SINGLE SEIZURE N/A 3/20/2018    Performed by Shoaib Boyce Jr., MD at [facility] ECT    ELECTROCONVULSIVE THERAPY (ECT) - SINGLE SEIZURE N/A 3/15/2018    Performed by Shoaib Boyce Jr., MD at [facility] ECT    ELECTROCONVULSIVE THERAPY (ECT) - SINGLE SEIZURE N/A 3/6/2018    Performed by Shoaib Boyce Jr., MD at [facility] ECT    ELECTROCONVULSIVE THERAPY (ECT) - SINGLE SEIZURE N/A 3/1/2018    Performed by Shoaib Boyce Jr., MD at [facility] ECT    ELECTROCONVULSIVE THERAPY (ECT) - SINGLE SEIZURE N/A 2/23/2018    Performed by Shoaib Boyce Jr., MD at [facility] ECT    ELECTROCONVULSIVE THERAPY (ECT) - SINGLE SEIZURE N/A 2/19/2018    Performed by Shoaib Boyce Jr., MD at [facility] ECT    ELECTROCONVULSIVE THERAPY (ECT) - SINGLE SEIZURE N/A 2/15/2018    Performed by Shoaib Boyce Jr., MD at [facility] ECT    ELECTROCONVULSIVE THERAPY (ECT) - SINGLE SEIZURE N/A 1/22/2018    Performed by Shoaib Boyce Jr., MD at [facility] ECT    ELECTROCONVULSIVE THERAPY (ECT) - SINGLE SEIZURE N/A 12/11/2017    Performed by Shoaib LUTHER  Carli Coker MD at [facility] ECT    ELECTROCONVULSIVE THERAPY (ECT) - SINGLE SEIZURE N/A 10/24/2017    Performed by Shoaib Boyce Jr., MD at [facility] ECT    ELECTROCONVULSIVE THERAPY (ECT) - SINGLE SEIZURE N/A 9/20/2017    Performed by Shoaib Boyce Jr., MD at [facility] ECT    ELECTROCONVULSIVE THERAPY (ECT) - SINGLE SEIZURE N/A 8/14/2017    Performed by Shoaib Boyce Jr., MD at [facility] ECT    ELECTROCONVULSIVE THERAPY (ECT) - SINGLE SEIZURE Bilateral 7/12/2017    Performed by Shoaib Boyce Jr., MD at [facility] ECT    ELECTROCONVULSIVE THERAPY (ECT) - SINGLE SEIZURE N/A 6/13/2017    Performed by Shoaib Boyce Jr., MD at [facility] ECT    ELECTROCONVULSIVE THERAPY (ECT) - SINGLE SEIZURE N/A 5/17/2017    Performed by Shoaib Boyce Jr., MD at [facility] ECT    ELECTROCONVULSIVE THERAPY (ECT) - SINGLE SEIZURE N/A 4/20/2017    Performed by Shoaib Boyce Jr., MD at [facility] ECT    ELECTROCONVULSIVE THERAPY (ECT) - SINGLE SEIZURE N/A 11/22/2016    Performed by Shoaib Boyce Jr., MD at [facility] ECT    ELECTROCONVULSIVE THERAPY (ECT) - SINGLE SEIZURE N/A 10/24/2016    Performed by Shoaib Boyce Jr., MD at [facility] ECT    ELECTROCONVULSIVE THERAPY (ECT) - SINGLE SEIZURE N/A 9/22/2016    Performed by Shoaib Boyce Jr., MD at [facility] ECT    ELECTROCONVULSIVE THERAPY (ECT) - SINGLE SEIZURE N/A 9/1/2016    Performed by Shoaib Boyce Jr., MD at [facility] ECT    ELECTROCONVULSIVE THERAPY (ECT) - SINGLE SEIZURE N/A 8/15/2016    Performed by Shoaib Boyce Jr., MD at [facility] ECT    ELECTROCONVULSIVE THERAPY (ECT) - SINGLE SEIZURE N/A 8/1/2016    Performed by Shoaib Boyce Jr., MD at [facility] ECT    ELECTROCONVULSIVE THERAPY (ECT) - SINGLE SEIZURE N/A 7/22/2016    Performed by Shoaib Boyce Jr., MD at [facility] ECT    ELECTROCONVULSIVE THERAPY (ECT) - SINGLE SEIZURE N/A 7/14/2016    Performed by Shoaib Boyce Jr., MD at [facility] ECT    ELECTROCONVULSIVE THERAPY (ECT) - SINGLE SEIZURE N/A 7/8/2016    Performed by Shoaib Boyce  MD [place] [facility] ECT    ELECTROCONVULSIVE THERAPY (ECT) - SINGLE SEIZURE N/A 2016    Performed by Shoaib Boyce Jr., MD at [facility] ECT    ELECTROCONVULSIVE THERAPY (ECT) - SINGLE SEIZURE N/A 2016    Performed by Shoaib Boyce Jr., MD at [facility] ECT    ELECTROCONVULSIVE THERAPY (ECT) - SINGLE SEIZURE N/A 2016    Performed by Shoaib Boyce Jr., MD at [facility] ECT    ELECTROCONVULSIVE THERAPY (ECT) - SINGLE SEIZURE N/A 2016    Performed by Shoaib Boyce Jr., MD at [facility] ECT    ELECTROCONVULSIVE THERAPY (ECT) - SINGLE SEIZURE N/A 2016    Performed by Shoaib Boyce Jr., MD at [facility] ECT    ELECTROCONVULSIVE THERAPY (ECT) - SINGLE SEIZURE N/A 6/15/2016    Performed by Shoaib Boyce Jr., MD at [facility] ECT    ELECTROCONVULSIVE THERAPY (ECT) - SINGLE SEIZURE N/A 2016    Performed by Shoaib Boyce Jr., MD at [facility] ECT    ELECTROCONVULSIVE THERAPY (ECT) - SINGLE SEIZURE N/A 2016    Performed by Shoaib Boyce Jr., MD at [facility] ECT    ELECTROCONVULSIVE THERAPY (ECT) - SINGLE SEIZURE N/A 2016    Performed by Shoaib Boyce Jr., MD at [facility] ECT    ELECTROCONVULSIVE THERAPY, CEREBRAL HEMISPHERE, UNILATERAL, 1 SEIZURE Bilateral 2018    Performed by [facility]ib [facility]ce Jr., MD at [facility] ECT    OVARIAN CYST REMOVAL Bilateral        Social History     Socioeconomic History    Marital status: Single     Spouse name: Not on file    Number of children: Not on file    Years of education: Not on file    Highest education level: Not on file   Occupational History    Occupation: unemployed   Social Needs    Financial resource strain: Not on file    Food insecurity:     Worry: Not on file     Inability: Not on file    Transportation needs:     Medical: Not on file     Non-medical: Not on file   Tobacco Use    Smoking status: Former Smoker     Last attempt to quit: 2011     Years since quittin.2    Smokeless tobacco: Never Used   Substance and Sexual Activity     Alcohol use: Yes     Comment: rarely    Drug use: No     Comment: Experimental use in high school    Sexual activity: Not on file   Lifestyle    Physical activity:     Days per week: Not on file     Minutes per session: Not on file    Stress: Not on file   Relationships    Social connections:     Talks on phone: Not on file     Gets together: Not on file     Attends Gnosticist service: Not on file     Active member of club or organization: Not on file     Attends meetings of clubs or organizations: Not on file     Relationship status: Not on file   Other Topics Concern    Patient feels they ought to cut down on drinking/drug use Not Asked    Patient annoyed by others criticizing their drinking/drug use Not Asked    Patient has felt bad or guilty about drinking/drug use Not Asked    Patient has had a drink/used drugs as an eye opener in the AM Not Asked   Social History Narrative    Pt has 1 older brother from an intact family until the death of her mother in 2012.  She completed 1 year of college, was never in the , and has never been employed.  She was engaged once, but never , has no children, and lives with her father plus 2 dogs.  She denies any hobbies and is spiritual but not Gnosticist.  She does date and returns to college during rare periods when depression and anxiety [place].       OBJECTIVE:     Vital Signs Range (Last 24H):  BP: ()/()   Arterial Line BP: ()/()       Significant Labs:  Lab Results   Component Value Date    WBC 6.83 06/08/2016    HGB 13.3 06/08/2016    HCT 40.3 06/08/2016     06/08/2016    ALT 14 06/08/2016    AST 20 06/08/2016     06/08/2016    K 3.8 06/08/2016     06/08/2016    CREATININE 0.7 06/08/2016    BUN 10 06/08/2016    CO2 28 06/08/2016    TSH 1.208 06/08/2016       Diagnostic Studies: No relevant studies.    EKG: No recent studies available.    2D ECHO:  No results found for this or any previous visit.          Anesthesia Evaluation    I  have reviewed the Patient Summary Reports.    I have reviewed the Nursing Notes.   I have reviewed the Medications.     Review of Systems  Cardiovascular:   Denies Pacemaker.  Denies Hypertension.  Denies Valvular problems/Murmurs.  Denies MI.  Denies CAD.    Denies CABG/stent.         Pulmonary:   Denies COPD.  Denies Asthma.    Renal/:   Denies Chronic Renal Disease.     Hepatic/GI:   Denies PUD. Denies GERD.    Neurological:   Seizures    Endocrine:   Denies Diabetes. Denies Hypothyroidism. Denies Hyperthyroidism.    Psych:   Psychiatric History          Physical Exam  General:  Well nourished    Airway/Jaw/Neck:  Airway Findings: Mouth Opening: Normal Tongue: Normal  General Airway Assessment: Adult  Mallampati: I  Jaw/Neck Findings:  Neck ROM: Normal ROM     Eyes/Ears/Nose:  EYES/EARS/NOSE FINDINGS: Normal   Dental:  Dental Findings: In tact   Chest/Lungs:  Chest/Lungs Findings: Clear to auscultation     Heart/Vascular:  Heart Findings: Rate: Normal  Rhythm: Regular Rhythm  Heart murmur: negative       Mental Status:  Mental Status Findings:  Cooperative, Alert and Oriented         Anesthesia Plan  Type of Anesthesia, risks & benefits discussed:  Anesthesia Type:  general  Patient's Preference:   Intra-op Monitoring Plan: standard ASA monitors  Intra-op Monitoring Plan Comments:   Post Op Pain Control Plan: multimodal analgesia, IV/PO Opioids PRN and per primary service following discharge from PACU  Post Op Pain Control Plan Comments:   Induction:   IV  Beta Blocker:  Patient is not currently on a Beta-Blocker (No further documentation required).       Informed Consent: Patient understands risks and agrees with Anesthesia plan.  Questions answered. Anesthesia consent signed with patient.  ASA Score: 2     Day of Surgery Review of History & Physical: I have interviewed and examined the patient. I have reviewed the patient's H&P dated:    H&P update referred to the provider.         Ready For Surgery From  Anesthesia Perspective.

## 2019-06-27 NOTE — ANESTHESIA POSTPROCEDURE EVALUATION
Anesthesia Post Evaluation    Patient: Allyssa Wright    Procedure(s) Performed: Procedure(s) (LRB):  ELECTROCONVULSIVE THERAPY (ECT) - SINGLE SEIZURE (N/A)    Final Anesthesia Type: general  Patient location during evaluation: PACU  Patient participation: Yes- Able to Participate  Level of consciousness: awake and alert and oriented  Post-procedure vital signs: reviewed and stable  Pain management: adequate  Airway patency: patent  PONV status at discharge: No PONV  Anesthetic complications: no      Cardiovascular status: hemodynamically stable  Respiratory status: unassisted and spontaneous ventilation  Hydration status: euvolemic  Follow-up not needed.              No case tracking events are documented in the log.      Pain/Roma Score: No data recorded

## 2019-06-27 NOTE — TRANSFER OF CARE
"Anesthesia Transfer of Care Note    Patient: Allyssa Wright    Procedure(s) Performed: Procedure(s) (LRB):  ELECTROCONVULSIVE THERAPY (ECT) - SINGLE SEIZURE (N/A)    Patient location: PACU    Anesthesia Type: general    Transport from OR: Transported from OR on 6-10 L/min O2 by face mask with adequate spontaneous ventilation    Post pain: adequate analgesia    Post assessment: no apparent anesthetic complications    Post vital signs: stable    Level of consciousness: awake and alert    Nausea/Vomiting: no nausea/vomiting    Complications: none    Transfer of care protocol was followed      Last vitals:   Visit Vitals  /67 (BP Location: Left arm, Patient Position: Lying)   Pulse 75   Temp 36.7 °C (98.1 °F) (Temporal)   Resp 18   Ht 5' 5" (1.651 m)   Wt 66.2 kg (146 lb)   SpO2 100%   Breastfeeding? No   BMI 24.30 kg/m²     "

## 2019-06-27 NOTE — DISCHARGE SUMMARY
Allyssa Wright  : 1980   MRN: [id number]  Date: 2019     Electroconvulsive Therapy  Discharge Summary    Admit Date: 2019  5:44 AM  Discharge Date: 2019    Attending Physician: Shoaib Boyce Jr., MD   Discharge Provider: Cayetano Bronson MD    History of Present Illness: Allyssa Wright is a 39 y.o. female withMDD, recurrent, in partial remission presented for ECT #71. See H&P dated 2019 for full HPI. For further details, see Dr. Boyce's pre-ECT evaluation.    Hospital Course: The patient tolerated the ECT treatment well without complication outside of mild expected HTN. Patient was stable post-procedure. See OP note dated 2019 for more details.     Disposition: Home or Self Care    Medications:  Current Discharge Medication List      CONTINUE these medications which have NOT CHANGED    Details   clozapine (CLOZARIL) 50 MG tablet Take 50 mg by mouth once daily.       dapsone 7.5 % GlwP Apply topically once daily.      famciclovir (FAMVIR) 500 MG tablet Take 1 tablet (500 mg total) by mouth 2 (two) times daily.  Qty: 30 tablet, Refills: 12    Associated Diagnoses: Major depressive disorder, recurrent episode, moderate degree      mirtazapine (REMERON) 15 MG tablet Take 1 tablet (15 mg total) by mouth every evening.  Qty: 90 tablet, Refills: 0      spironolactone (ALDACTONE) 50 MG tablet 50 mg 2 (two) times daily. 50  mg qAM, 50 mg qHS.      thyroid (ARMOUR THYROID) 30 mg Tab Take 1 tablet (30 mg total) by mouth every morning.  Qty: 30 tablet, Refills: 11    Associated Diagnoses: Major depressive disorder, recurrent episode, moderate degree      trazodone (DESYREL) 100 MG tablet Take 200 mg by mouth every evening.       UNABLE TO FIND 2 (two) times daily. n-azetyl-cysteine      venlafaxine (EFFEXOR) 100 MG Tab Take 3 tablets (300 mg total) by mouth once daily.    Associated Diagnoses: MDD (major depressive disorder), recurrent, in partial remission      vortioxetine (TRINTELLIX) 5 mg  Tab Take 2.5 mg by mouth once daily.      dextroamphetamine-amphetamine 30 mg Tab Take 30 mg by mouth 2 (two) times daily.     Associated Diagnoses: MDD (major depressive disorder), recurrent, in partial remission      hydrOXYzine pamoate (VISTARIL) 25 MG Cap Take 2 capsules (50 mg total) by mouth nightly as needed (Take 25-50mg (1-2 caps) at night for anxiety prior to ECT).  Qty: 180 capsule, Refills: 0      ZOVIRAX 5 % Crea Refills: 5           Status at Discharge: Alert and medically stable    Discharge Diagnoses:  MDD, recurrent, in partial remission    Diet: Resume previous outpatient diet  Activity: Ambulate with assistance  Instructions: Please do not eat or drink anything after midnight prior to procedure. Please do not drive on day of ECT.    Med Changes:  None    Next ECT:  ECT #72 scheduled for 07/01/2019    Cayetano Bronson MD  [facility]  PGY-2  6/27/2019

## 2019-06-28 DIAGNOSIS — F33.9 RECURRENT MAJOR DEPRESSIVE DISORDER, REMISSION STATUS UNSPECIFIED: Primary | ICD-10-CM

## 2019-06-28 NOTE — ANESTHESIA POSTPROCEDURE EVALUATION
Anesthesia Post Evaluation    Patient: Allyssa Wright    Procedure(s) Performed: Procedure(s) (LRB):  ELECTROCONVULSIVE THERAPY (ECT) - SINGLE SEIZURE (N/A)    Final Anesthesia Type: general  Patient location during evaluation: PACU  Patient participation: Yes- Able to Participate  Level of consciousness: awake and alert and oriented  Post-procedure vital signs: reviewed and stable  Pain management: adequate  Airway patency: patent  PONV status at discharge: No PONV  Anesthetic complications: no      Cardiovascular status: hemodynamically stable  Respiratory status: unassisted and spontaneous ventilation  Hydration status: euvolemic  Follow-up not needed.          Vitals Value Taken Time   /74 6/27/2019  8:16 AM   Temp 36.7 °C (98.1 °F) 6/27/2019  8:15 AM   Pulse 74 6/27/2019  8:16 AM   Resp 47 6/27/2019  8:16 AM   SpO2 100 % 6/27/2019  8:16 AM   Vitals shown include unvalidated device data.      No case tracking events are documented in the log.      Pain/Roma Score: Roma Score: 10 (6/27/2019  8:07 AM)

## 2019-06-30 ENCOUNTER — ANESTHESIA EVENT (OUTPATIENT)
Dept: ELECTROPHYSIOLOGY | Facility: HOSPITAL | Age: 39
End: 2019-06-30
Payer: COMMERCIAL

## 2019-07-01 ENCOUNTER — HOSPITAL ENCOUNTER (OUTPATIENT)
Facility: HOSPITAL | Age: 39
Discharge: HOME | End: 2019-07-01
Attending: PSYCHIATRY & NEUROLOGY | Admitting: PSYCHIATRY & NEUROLOGY
Payer: COMMERCIAL

## 2019-07-01 ENCOUNTER — ANESTHESIA (OUTPATIENT)
Dept: ELECTROPHYSIOLOGY | Facility: HOSPITAL | Age: 39
End: 2019-07-01
Payer: COMMERCIAL

## 2019-07-01 VITALS
RESPIRATION RATE: 20 BRPM | SYSTOLIC BLOOD PRESSURE: 109 MMHG | HEART RATE: 73 BPM | WEIGHT: 145 LBS | TEMPERATURE: 99 F | HEIGHT: 65 IN | BODY MASS INDEX: 24.16 KG/M2 | OXYGEN SATURATION: 99 % | DIASTOLIC BLOOD PRESSURE: 55 MMHG

## 2019-07-01 DIAGNOSIS — F33.9 RECURRENT MAJOR DEPRESSIVE DISORDER, REMISSION STATUS UNSPECIFIED: Primary | ICD-10-CM

## 2019-07-01 DIAGNOSIS — F32.9 MDD (MAJOR DEPRESSIVE DISORDER): ICD-10-CM

## 2019-07-01 PROCEDURE — 25000003 PHARM REV CODE 250: Performed by: PSYCHIATRY & NEUROLOGY

## 2019-07-01 PROCEDURE — D9220A PRA ANESTHESIA: ICD-10-PCS | Mod: ,,, | Performed by: ANESTHESIOLOGY

## 2019-07-01 PROCEDURE — 63600175 PHARM REV CODE 636 W HCPCS: Performed by: STUDENT IN AN ORGANIZED HEALTH CARE EDUCATION/TRAINING PROGRAM

## 2019-07-01 PROCEDURE — 90870 ELECTROCONVULSIVE THERAPY: CPT | Performed by: STUDENT IN AN ORGANIZED HEALTH CARE EDUCATION/TRAINING PROGRAM

## 2019-07-01 PROCEDURE — D9220A PRA ANESTHESIA: Mod: ,,, | Performed by: ANESTHESIOLOGY

## 2019-07-01 PROCEDURE — 25000003 PHARM REV CODE 250: Performed by: STUDENT IN AN ORGANIZED HEALTH CARE EDUCATION/TRAINING PROGRAM

## 2019-07-01 RX ORDER — SUCCINYLCHOLINE CHLORIDE 20 MG/ML
INJECTION INTRAMUSCULAR; INTRAVENOUS
Status: COMPLETED
Start: 2019-07-01 | End: 2019-07-01

## 2019-07-01 RX ORDER — LABETALOL HYDROCHLORIDE 5 MG/ML
INJECTION, SOLUTION INTRAVENOUS
Status: DISCONTINUED | OUTPATIENT
Start: 2019-07-01 | End: 2019-07-01

## 2019-07-01 RX ORDER — ESMOLOL HYDROCHLORIDE 10 MG/ML
INJECTION INTRAVENOUS
Status: DISCONTINUED | OUTPATIENT
Start: 2019-07-01 | End: 2019-07-01

## 2019-07-01 RX ORDER — ONDANSETRON 2 MG/ML
INJECTION INTRAMUSCULAR; INTRAVENOUS
Status: COMPLETED
Start: 2019-07-01 | End: 2019-07-01

## 2019-07-01 RX ORDER — SODIUM CHLORIDE 0.9 % (FLUSH) 0.9 %
3 SYRINGE (ML) INJECTION
Status: DISCONTINUED | OUTPATIENT
Start: 2019-07-01 | End: 2019-07-01 | Stop reason: HOSPADM

## 2019-07-01 RX ORDER — ONDANSETRON 2 MG/ML
INJECTION INTRAMUSCULAR; INTRAVENOUS
Status: DISCONTINUED | OUTPATIENT
Start: 2019-07-01 | End: 2019-07-01

## 2019-07-01 RX ORDER — KETOROLAC TROMETHAMINE 30 MG/ML
INJECTION, SOLUTION INTRAMUSCULAR; INTRAVENOUS
Status: DISCONTINUED | OUTPATIENT
Start: 2019-07-01 | End: 2019-07-01

## 2019-07-01 RX ORDER — SODIUM CHLORIDE 9 MG/ML
INJECTION, SOLUTION INTRAVENOUS CONTINUOUS
Status: DISCONTINUED | OUTPATIENT
Start: 2019-07-01 | End: 2019-07-01 | Stop reason: HOSPADM

## 2019-07-01 RX ORDER — ONDANSETRON 2 MG/ML
4 INJECTION INTRAMUSCULAR; INTRAVENOUS DAILY PRN
Status: DISCONTINUED | OUTPATIENT
Start: 2019-07-01 | End: 2019-07-01 | Stop reason: HOSPADM

## 2019-07-01 RX ORDER — SODIUM CHLORIDE 9 MG/ML
INJECTION, SOLUTION INTRAVENOUS CONTINUOUS PRN
Status: DISCONTINUED | OUTPATIENT
Start: 2019-07-01 | End: 2019-07-01

## 2019-07-01 RX ORDER — SUCCINYLCHOLINE CHLORIDE 20 MG/ML
INJECTION INTRAMUSCULAR; INTRAVENOUS
Status: DISCONTINUED | OUTPATIENT
Start: 2019-07-01 | End: 2019-07-01

## 2019-07-01 RX ORDER — KETOROLAC TROMETHAMINE 30 MG/ML
INJECTION, SOLUTION INTRAMUSCULAR; INTRAVENOUS
Status: COMPLETED
Start: 2019-07-01 | End: 2019-07-01

## 2019-07-01 RX ORDER — HYDROXYZINE PAMOATE 25 MG/1
25 CAPSULE ORAL NIGHTLY PRN
Qty: 60 CAPSULE | Refills: 2 | Status: ON HOLD | OUTPATIENT
Start: 2019-07-01 | End: 2019-09-09 | Stop reason: CLARIF

## 2019-07-01 RX ORDER — ESMOLOL HYDROCHLORIDE 10 MG/ML
INJECTION INTRAVENOUS
Status: COMPLETED
Start: 2019-07-01 | End: 2019-07-01

## 2019-07-01 RX ORDER — LIDOCAINE HYDROCHLORIDE 10 MG/ML
1 INJECTION, SOLUTION EPIDURAL; INFILTRATION; INTRACAUDAL; PERINEURAL ONCE
Status: COMPLETED | OUTPATIENT
Start: 2019-07-01 | End: 2019-07-01

## 2019-07-01 RX ADMIN — LABETALOL HYDROCHLORIDE 10 MG: 5 INJECTION, SOLUTION INTRAVENOUS at 08:07

## 2019-07-01 RX ADMIN — ESMOLOL HYDROCHLORIDE 40 MG: 10 INJECTION INTRAVENOUS at 08:07

## 2019-07-01 RX ADMIN — ONDANSETRON 4 MG: 2 INJECTION, SOLUTION INTRAMUSCULAR; INTRAVENOUS at 08:07

## 2019-07-01 RX ADMIN — SODIUM CHLORIDE: 0.9 INJECTION, SOLUTION INTRAVENOUS at 08:07

## 2019-07-01 RX ADMIN — METHOHEXITAL SODIUM 120 MG: 500 INJECTION, POWDER, LYOPHILIZED, FOR SOLUTION INTRAMUSCULAR; INTRAVENOUS; RECTAL at 08:07

## 2019-07-01 RX ADMIN — LIDOCAINE HYDROCHLORIDE 10 MG: 10 INJECTION, SOLUTION EPIDURAL; INFILTRATION; INTRACAUDAL; PERINEURAL at 06:07

## 2019-07-01 RX ADMIN — SUCCINYLCHOLINE CHLORIDE 120 MG: 20 INJECTION, SOLUTION INTRAMUSCULAR; INTRAVENOUS at 08:07

## 2019-07-01 RX ADMIN — KETOROLAC TROMETHAMINE 30 MG: 30 INJECTION, SOLUTION INTRAMUSCULAR at 08:07

## 2019-07-01 RX ADMIN — Medication 500 MG: at 06:07

## 2019-07-01 NOTE — ANESTHESIA PREPROCEDURE EVALUATION
[facility]  Anesthesia Pre-Operative Evaluation         Patient Name: Allyssa Wright  YOB: 1980  MRN: [id number]    SUBJECTIVE:     Pre-operative evaluation for Procedure(s) (LRB):  ELECTROCONVULSIVE THERAPY (ECT) - SINGLE SEIZURE (N/A)     07/01/2019    Allyssa Wright is a 39 y.o. female w/ a significant PMHx of depression and schizophrenia.    Patient now presents for the above procedure: ECT #72.    Previous ECT Anesthetic:  - Succinylcholine: 120 mg  - Methohexital: 120 mg  - Toradol: 30 mg  - Zofran: 4 mg  - Labetalol: 10 mg  - Esmolol: 0 mg    LDA: None documented.    Prev airway: None documented.    Drips: None documented.    Patient Active Problem List   Diagnosis    MDD (major depressive disorder), recurrent, in partial remission    Other acne    Comfort measures only status    MDD (major depressive disorder)    Major depression    Major depressive disorder    MDD (major depressive disorder), severe       Review of patient's allergies indicates:   Allergen Reactions    Benzodiazepines Other (See Comments)     Contraindicated while taking Clozapine    Ampicillin      Mom says so    Erythromycin     Levaquin [levofloxacin] Other (See Comments)     Depression side effects    Penicillins      Mom says so    Pristiq [desvenlafaxine]      psycotic      Sulfa (sulfonamide antibiotics)      Rash      Azithromycin Anxiety       Current Outpatient Medications:    Current Facility-Administered Medications:     0.9%  NaCl infusion, , Intravenous, Continuous, Cayetano Bronson MD    Facility-Administered Medications Ordered in Other Encounters:     lidocaine (PF) 10 mg/ml (1%) injection 10 mg, 1 mL, Intradermal, Once, Homa Colbert MD    sodium chloride 0.9% flush 10 mL, 10 mL, Intra-Catheter, PRN, Homa Colbert MD    Past Surgical History:   Procedure Laterality Date    ANKLE SURGERY Right     BREAST augmentation      ELECTROCONVULSIVE THERAPY (ECT) - SINGLE  SEIZURE N/A 12/6/2018    Performed by Sh[facility]ib [facility]ce Jr., [facility] at [facility] ECT    ELECTROCONVULSIVE THERAPY (ECT) - SINGLE SEIZURE N/A 8/31/2018    Performed by Shoaib [facility] Jr., MD at [facility] ECT    ELECTROCONVULSIVE THERAPY (ECT) - SINGLE SEIZURE N/A 8/6/2018    Performed by Shoaib Boyce Jr., [facility] at [facility] ECT    ELECTROCONVULSIVE THERAPY (ECT) - SINGLE SEIZURE N/A 7/23/2018    Performed by Shoaib Boyce Jr., MD at [facility] ECT    ELECTROCONVULSIVE THERAPY (ECT) - SINGLE SEIZURE N/A 7/5/2018    Performed by Shoaib Boyce Jr., MD at [facility] ECT    ELECTROCONVULSIVE THERAPY (ECT) - SINGLE SEIZURE N/A 6/28/2018    Performed by Shoaib Boyce Jr., MD at [facility] ECT    ELECTROCONVULSIVE THERAPY (ECT) - SINGLE SEIZURE N/A 6/13/2018    Performed by Shoaib Boyce Jr., MD at [facility] ECT    ELECTROCONVULSIVE THERAPY (ECT) - SINGLE SEIZURE N/A 5/29/2018    Performed by Shoaib Boyce Jr., [facility] at [facility] ECT    ELECTROCONVULSIVE THERAPY (ECT) - SINGLE SEIZURE N/A 5/8/2018    Performed by Shoaib Boyce Jr., MD at [facility] ECT    ELECTROCONVULSIVE THERAPY (ECT) - SINGLE SEIZURE N/A 4/24/2018    Performed by Shoaib Boyce Jr., MD at [facility] ECT    ELECTROCONVULSIVE THERAPY (ECT) - SINGLE SEIZURE N/A 4/17/2018    Performed by Shoaib Boyce Jr., MD at [facility] ECT    ELECTROCONVULSIVE THERAPY (ECT) - SINGLE SEIZURE N/A 4/13/2018    Performed by Shoaib Boyce Jr., MD at [facility] ECT    ELECTROCONVULSIVE THERAPY (ECT) - SINGLE SEIZURE N/A 4/3/2018    Performed by Shoaib Boyce Jr., MD at [facility] ECT    ELECTROCONVULSIVE THERAPY (ECT) - SINGLE SEIZURE N/A 3/20/2018    Performed by Shoaib Boyce Jr., MD at [facility] ECT    ELECTROCONVULSIVE THERAPY (ECT) - SINGLE SEIZURE N/A 3/15/2018    Performed by Shoaib Boyce Jr., MD at [facility] ECT    ELECTROCONVULSIVE THERAPY (ECT) - SINGLE SEIZURE N/A 3/6/2018    Performed by Shoaib Boyce Jr., MD at [facility] ECT    ELECTROCONVULSIVE THERAPY (ECT) - SINGLE SEIZURE N/A  3/1/2018    Performed by [facility] Jr[facility] at [facility] ECT    ELECTROCONVULSIVE THERAPY (ECT) - SINGLE SEIZURE N/A 2/23/2018    Performed by Shoaib Boyce Jr., MD at [facility] ECT    ELECTROCONVULSIVE THERAPY (ECT) - SINGLE SEIZURE N/A 2/19/2018    Performed by Shoaib Boyce Jr., MD at [facility] ECT    ELECTROCONVULSIVE THERAPY (ECT) - SINGLE SEIZURE N/A 2/15/2018    Performed by Shoaib Boyce Jr., MD at [facility] ECT    ELECTROCONVULSIVE THERAPY (ECT) - SINGLE SEIZURE N/A 1/22/2018    Performed by Shoaib Boyce Jr., MD at [facility] ECT    ELECTROCONVULSIVE THERAPY (ECT) - SINGLE SEIZURE N/A 12/11/2017    Performed by Shoaib Boyce Jr., MD at [facility] ECT    ELECTROCONVULSIVE THERAPY (ECT) - SINGLE SEIZURE N/A 10/24/2017    Performed by Shoaib Boyce Jr., MD at [facility] ECT    ELECTROCONVULSIVE THERAPY (ECT) - SINGLE SEIZURE N/A 9/20/2017    Performed by Shoaib Boyce Jr., MD at [facility] ECT    ELECTROCONVULSIVE THERAPY (ECT) - SINGLE SEIZURE N/A 8/14/2017    Performed by Shoaib Boyce Jr., MD at [facility] ECT    ELECTROCONVULSIVE THERAPY (ECT) - SINGLE SEIZURE Bilateral 7/12/2017    Performed by Shoaib Boyce Jr., MD at [facility] ECT    ELECTROCONVULSIVE THERAPY (ECT) - SINGLE SEIZURE N/A 6/13/2017    Performed by Shoaib Boyce Jr., MD at [facility] ECT    ELECTROCONVULSIVE THERAPY (ECT) - SINGLE SEIZURE N/A 5/17/2017    Performed by Shoaib Boyce Jr., MD at [facility] ECT    ELECTROCONVULSIVE THERAPY (ECT) - SINGLE SEIZURE N/A 4/20/2017    Performed by Shoaib Boyce Jr., MD at [facility] ECT    ELECTROCONVULSIVE THERAPY (ECT) - SINGLE SEIZURE N/A 11/22/2016    Performed by Shoaib Boyce Jr., MD at [facility] ECT    ELECTROCONVULSIVE THERAPY (ECT) - SINGLE SEIZURE N/A 10/24/2016    Performed by Shoaib Boyce Jr., MD at [facility] ECT    ELECTROCONVULSIVE THERAPY (ECT) - SINGLE SEIZURE N/A 9/22/2016    Performed by Shoaib Boyce Jr., MD at [facility] ECT    ELECTROCONVULSIVE THERAPY (ECT) - SINGLE SEIZURE N/A  9/1/2016    Performed by Shoaib Boyce Jr., MD at [facility] ECT    ELECTROCONVULSIVE THERAPY (ECT) - SINGLE SEIZURE N/A 8/15/2016    Performed by Shoaib Boyce Jr., MD at [facility] ECT    ELECTROCONVULSIVE THERAPY (ECT) - SINGLE SEIZURE N/A 8/1/2016    Performed by Shoaib Boyce Jr., MD at [facility] ECT    ELECTROCONVULSIVE THERAPY (ECT) - SINGLE SEIZURE N/A 7/22/2016    Performed by Shoaib Boyce Jr., MD at [facility] ECT    ELECTROCONVULSIVE THERAPY (ECT) - SINGLE SEIZURE N/A 7/14/2016    Performed by Shoaib Boyce Jr., MD at [facility] ECT    ELECTROCONVULSIVE THERAPY (ECT) - SINGLE SEIZURE N/A 7/8/2016    Performed by Shoaib Boyce Jr., MD at [facility] ECT    ELECTROCONVULSIVE THERAPY (ECT) - SINGLE SEIZURE N/A 7/5/2016    Performed by Shoaib Boyce Jr., MD at [facility] ECT    ELECTROCONVULSIVE THERAPY (ECT) - SINGLE SEIZURE N/A 6/27/2016    Performed by Shoaib Boyce Jr., MD at [facility] ECT    ELECTROCONVULSIVE THERAPY (ECT) - SINGLE SEIZURE N/A 6/23/2016    Performed by Shoaib Boyce Jr., MD at [facility] ECT    ELECTROCONVULSIVE THERAPY (ECT) - SINGLE SEIZURE N/A 6/20/2016    Performed by Shoaib Boyce Jr., MD at [facility] ECT    ELECTROCONVULSIVE THERAPY (ECT) - SINGLE SEIZURE N/A 6/16/2016    Performed by Shoaib Boyce Jr., MD at [facility] ECT    ELECTROCONVULSIVE THERAPY (ECT) - SINGLE SEIZURE N/A 6/15/2016    Performed by Shoaib Boyce Jr., MD at [facility] ECT    ELECTROCONVULSIVE THERAPY (ECT) - SINGLE SEIZURE N/A 6/14/2016    Performed by Shoaib Boyce Jr., MD at [facility] ECT    ELECTROCONVULSIVE THERAPY (ECT) - SINGLE SEIZURE N/A 6/13/2016    Performed by Shoaib Boyce Jr., MD at [facility] ECT    ELECTROCONVULSIVE THERAPY (ECT) - SINGLE SEIZURE N/A 1/4/2016    Performed by Shoaib Boyce Jr., MD at [facility] ECT    ELECTROCONVULSIVE THERAPY, CEREBRAL HEMISPHERE, UNILATERAL, 1 SEIZURE Bilateral 6/25/2018    Performed by Shoaib Boyce Jr., MD at [facility] ECT    OVARIAN CYST REMOVAL Bilateral        Social  History     Socioeconomic History    Marital status: Single     Spouse name: Not on file    Number of children: Not on file    Years of education: Not on file    Highest education level: Not on file   Occupational History    Occupation: unemployed   Social Needs    Financial resource strain: Not on file    Food insecurity:     Worry: Not on file     Inability: Not on file    Transportation needs:     Medical: Not on file     Non-medical: Not on file   Tobacco Use    Smoking status: Former Smoker     Last attempt to quit: 2011     Years since quittin.2    Smokeless tobacco: Never Used   Substance and Sexual Activity    Alcohol use: Yes     Comment: rarely    Drug use: No     Comment: Experimental use in high school    Sexual activity: Not on file   Lifestyle    Physical activity:     Days per week: Not on file     Minutes per session: Not on file    Stress: Not on file   Relationships    Social connections:     Talks on phone: Not on file     Gets together: Not on file     Attends Christianity service: Not on file     Active member of club or organization: Not on file     Attends meetings of clubs or organizations: Not on file     Relationship status: Not on file   Other Topics Concern    Patient feels they ought to cut down on drinking/drug use Not Asked    Patient annoyed by others criticizing their drinking/drug use Not Asked    Patient has felt bad or guilty about drinking/drug use Not Asked    Patient has had a drink/used drugs as an eye opener in the AM Not Asked   Social History Narrative    Pt has 1 older brother from an intact family until the death of her mother in .  She completed 1 year of college, was never in the , and has never been employed.  She was engaged once, but never , has no children, and lives with her father plus 2 dogs.  She denies any hobbies and is spiritual but not Christianity.  She does date and returns to college during rare periods when  depression and anxiety [place].       OBJECTIVE:     Vital Signs Range (Last 24H):  Temp:  [36.6 °C (97.9 °F)]   Pulse:  [76]   Resp:  [16]   BP: (109)/(73)   SpO2:  [100 %]       Significant Labs:  Lab Results   Component Value Date    WBC 6.83 06/08/2016    HGB 13.3 06/08/2016    HCT 40.3 06/08/2016     06/08/2016    ALT 14 06/08/2016    AST 20 06/08/2016     06/08/2016    K 3.8 06/08/2016     06/08/2016    CREATININE 0.7 06/08/2016    BUN 10 06/08/2016    CO2 28 06/08/2016    TSH 1.208 06/08/2016       Diagnostic Studies: No relevant studies.    EKG: No recent studies available.    2D ECHO:  No results found for this or any previous visit.      ASSESSMENT/PLAN:     Anesthesia Evaluation    I have reviewed the Patient Summary Reports.    I have reviewed the Nursing Notes.   I have reviewed the Medications.     Review of Systems  Anesthesia Hx:  No problems with previous Anesthesia  Denies Family Hx of Anesthesia complications.   Denies Personal Hx of Anesthesia complications.   Social:  Non-Smoker, No Alcohol Use    Hematology/Oncology:        Denies Current/Recent Cancer   EENT/Dental:   denies chronic allergic rhinitis   Cardiovascular:   Denies Pacemaker.  Denies Hypertension.  Denies Valvular problems/Murmurs.  Denies MI.  Denies CAD.    Denies CABG/stent.  Denies Dysrhythmias.             Pulmonary:   Denies COPD.  Denies Asthma.  Denies Recent URI.  Denies Sleep Apnea.    Renal/:   Denies Chronic Renal Disease.     Hepatic/GI:   Denies PUD. Denies GERD. Denies Liver Disease.    Neurological:   Denies TIA. Denies CVA. Seizures    Endocrine:   Denies Diabetes. Denies Hypothyroidism. Denies Hyperthyroidism.    Psych:   Psychiatric History          Physical Exam  General:  Well nourished    Airway/Jaw/Neck:  Airway Findings: Mouth Opening: Normal Tongue: Normal  General Airway Assessment: Adult  Mallampati: I  Improves to II with phonation.  TM Distance: Normal, at least 6 cm  Jaw/Neck  Findings:  Neck ROM: Normal ROM     Eyes/Ears/Nose:  EYES/EARS/NOSE FINDINGS: Normal   Dental:  Dental Findings: In tact   Chest/Lungs:  Chest/Lungs Findings: Clear to auscultation     Heart/Vascular:  Heart Findings: Rate: Normal  Rhythm: Regular Rhythm  Sounds: Normal  Heart murmur: negative    Abdomen:  Abdomen Findings:  Normal, Soft, Nontender     Musculoskeletal:  Musculoskeletal Findings: Normal   Skin:  Skin Findings: Normal    Mental Status:  Mental Status Findings:  Cooperative, Alert and Oriented         Anesthesia Plan  Type of Anesthesia, risks & benefits discussed:  Anesthesia Type:  general  Patient's Preference:   Intra-op Monitoring Plan: standard ASA monitors  Intra-op Monitoring Plan Comments:   Post Op Pain Control Plan: multimodal analgesia, IV/PO Opioids PRN and per primary service following discharge from PACU  Post Op Pain Control Plan Comments:   Induction:   IV  Beta Blocker:  Patient is not currently on a Beta-Blocker (No further documentation required).       Informed Consent: Patient understands risks and agrees with Anesthesia plan.  Questions answered. Anesthesia consent signed with patient.  ASA Score: 2     Day of Surgery Review of History & Physical: I have interviewed and examined the patient. I have reviewed the patient's H&P dated:    H&P update referred to the provider.         Ready For Surgery From Anesthesia Perspective.

## 2019-07-01 NOTE — DISCHARGE SUMMARY
Allyssa Wright  : 1980   MRN: [id number]  Date: 2019               Electroconvulsive Therapy  Discharge Summary     Admit Date:   5:52 AM  Discharge Date: 2019     Attending Physician: Shoaib Boyce Jr., MD   Discharge Provider: Boaz Lo MD     History of Present Illness: Allyssa Wright is a 39 y.o. female withMDD, recurrent, in partial remission presented for ECT #72. See H&P dated 2019 for full HPI. For further details, see Dr. Boyce's pre-ECT evaluation.     Hospital Course: The patient tolerated the ECT treatment well without complication outside of mild expected HTN. Patient was stable post-procedure. See OP note dated 2019 for more details.      Disposition: Home or Self Care     Medications:       Current Discharge Medication List            CONTINUE these medications which have NOT CHANGED     Details   clozapine (CLOZARIL) 50 MG tablet Take 50 mg by mouth once daily.        dapsone 7.5 % GlwP Apply topically once daily.       famciclovir (FAMVIR) 500 MG tablet Take 1 tablet (500 mg total) by mouth 2 (two) times daily.  Qty: 30 tablet, Refills: 12     Associated Diagnoses: Major depressive disorder, recurrent episode, moderate degree       mirtazapine (REMERON) 15 MG tablet Take 1 tablet (15 mg total) by mouth every evening.  Qty: 90 tablet, Refills: 0       spironolactone (ALDACTONE) 50 MG tablet 50 mg 2 (two) times daily. 50  mg qAM, 50 mg qHS.       thyroid (ARMOUR THYROID) 30 mg Tab Take 1 tablet (30 mg total) by mouth every morning.  Qty: 30 tablet, Refills: 11     Associated Diagnoses: Major depressive disorder, recurrent episode, moderate degree       trazodone (DESYREL) 100 MG tablet Take 200 mg by mouth every evening.        UNABLE TO FIND 2 (two) times daily. n-azetyl-cysteine       venlafaxine (EFFEXOR) 100 MG Tab Take 3 tablets (300 mg total) by mouth once daily.     Associated Diagnoses: MDD (major depressive disorder), recurrent, in partial  remission       vortioxetine (TRINTELLIX) 5 mg Tab Take 2.5 mg by mouth once daily.       dextroamphetamine-amphetamine 30 mg Tab Take 30 mg by mouth 2 (two) times daily.      Associated Diagnoses: MDD (major depressive disorder), recurrent, in partial remission       hydrOXYzine pamoate (VISTARIL) 25 MG Cap Take 2 capsules (50 mg total) by mouth nightly as needed (Take 25-50mg (1-2 caps) at night for anxiety prior to ECT).  Qty: 180 capsule, Refills: 0       ZOVIRAX 5 % Crea Refills: 5              Status at Discharge: Alert and medically stable     Discharge Diagnoses:  MDD, recurrent, in partial remission     Diet: Resume previous outpatient diet  Activity: Ambulate with assistance  Instructions: Please do not eat or drink anything after midnight prior to procedure. Please do not drive on day of ECT.     Med Changes:  None     Next ECT:  ECT #73 scheduled for 07/08/2019     Boaz Lo MD  [facility]  PGY-2  7/1/2019

## 2019-07-01 NOTE — ANESTHESIA POSTPROCEDURE EVALUATION
Anesthesia Post Evaluation    Patient: Allyssa Wright    Procedure(s) Performed: Procedure(s) (LRB):  ELECTROCONVULSIVE THERAPY (ECT) - SINGLE SEIZURE (N/A)    Final Anesthesia Type: general  Patient location during evaluation: PACU  Patient participation: Yes- Able to Participate  Level of consciousness: awake and alert and oriented  Post-procedure vital signs: reviewed and stable  Pain management: adequate  Airway patency: patent  PONV status at discharge: No PONV  Anesthetic complications: no      Cardiovascular status: blood pressure returned to baseline  Respiratory status: unassisted  Hydration status: euvolemic  Follow-up not needed.          Vitals Value Taken Time   /55 7/1/2019  9:02 AM   Temp 37.1 °C (98.8 °F) 7/1/2019  9:15 AM   Pulse 73 7/1/2019  9:08 AM   Resp 20 7/1/2019  9:08 AM   SpO2 98 % 7/1/2019  9:08 AM   Vitals shown include unvalidated device data.      No case tracking events are documented in the log.      Pain/Roma Score: Roma Score: 10 (7/1/2019  9:15 AM)

## 2019-07-01 NOTE — HPI
"Allyssa Wright  : 1980   MRN: [id number]  Date: 2019               Electroconvulsive Therapy  History & Physical     Chief complaint: MDD, recurrent, in partial remission  Procedure: ECT #72     SUBJECTIVE:      HPI:   Allyssa Wright is a 39 y.o. female with MDD, recurrent, in partial remission who presents for ECT today. Patient again reports she has been doing "not good" since last treatment, cites prominently depressive symptoms (says ECT typically does not relieve her OCD symptoms), describes depression as stable and unchanged from prior to las ECT several days ago. Denies SI/HI/AVH current at any point since last ECT. Sleeping well in general, and reports a stable appetite though sensation of bland food. Tolerating medications without adverse effects. Remains concerned/anxious that ECT may have stopped working for her; currently states her mood is a "5" compared to feeling she can reach level "8" when she is responding well.  . The patient does not need any prescriptions today nor has she had med changes. No somatic complaints this morning. The patient has not had anything by mouth since midnight and is ready for ECT.     Psychiatric Review of Systems:  Mood: "not good"  Anxiety: at baseline  Appetite: intact  Psychomotor: No problem  Cognitive Impairment: mild, consistent with ECT, worsening immediately surrounding treatments  Insomnia: Denies   Psychosis: None  Diurnal Variation: None  Suicidal Ideation: Absent     Medical Review Of Systems:  Pertinent items are noted in HPI.     Current Medications:             Current Facility-Administered Medications on File Prior to Encounter   Medication Dose Route Frequency Provider Last Rate Last Dose    lidocaine (PF) 10 mg/ml (1%) injection 10 mg  1 mL Intradermal Once Homa Colbert MD        sodium chloride 0.9% flush 10 mL  10 mL Intra-Catheter PRN Homa Colbert MD                 Current Outpatient Medications on File Prior to Encounter "   Medication Sig Dispense Refill    clozapine (CLOZARIL) 50 MG tablet Take 50 mg by mouth once daily.         dapsone 7.5 % GlwP Apply topically once daily.        famciclovir (FAMVIR) 500 MG tablet Take 1 tablet (500 mg total) by mouth 2 (two) times daily. 30 tablet 12    mirtazapine (REMERON) 15 MG tablet Take 1 tablet (15 mg total) by mouth every evening. (Patient taking differently: Take 7.5 mg by mouth every evening. ) 90 tablet 0    spironolactone (ALDACTONE) 50 MG tablet 50 mg 2 (two) times daily. 50  mg qAM, 50 mg qHS.        thyroid (ARMOUR THYROID) 30 mg Tab Take 1 tablet (30 mg total) by mouth every morning. 30 tablet 11    trazodone (DESYREL) 100 MG tablet Take 200 mg by mouth every evening.         UNABLE TO FIND 2 (two) times daily. n-azetyl-cysteine        venlafaxine (EFFEXOR) 100 MG Tab Take 3 tablets (300 mg total) by mouth once daily. (Patient taking differently: Take 225 mg by mouth once daily. )        vortioxetine (TRINTELLIX) 5 mg Tab Take 2.5 mg by mouth once daily.        dextroamphetamine-amphetamine 30 mg Tab Take 30 mg by mouth 2 (two) times daily.         hydrOXYzine pamoate (VISTARIL) 25 MG Cap Take 2 capsules (50 mg total) by mouth nightly as needed (Take 25-50mg (1-2 caps) at night for anxiety prior to ECT). 180 capsule 0    ZOVIRAX 5 % Crea     5      Allergies:   Benzodiazepines; Ampicillin; Erythromycin; Levaquin [levofloxacin]; Penicillins; Pristiq [desvenlafaxine]; Sulfa (sulfonamide antibiotics); and Azithromycin     Past Medical/Surgical History:        Past Medical History:   Diagnosis Date    Anxiety      Depression      History of psychiatric hospitalization      HSV-1 (herpes simplex virus 1) infection      Hx of psychiatric care      Moderate depressed bipolar II disorder 06/13/2016     reports no history of bipolar    Obsessive-compulsive disorder      Psychiatric problem      Schizophrenia 4/3/2018    Self-harming behavior      Therapy               Past Surgical History:   Procedure Laterality Date    ANKLE SURGERY Right      BREAST augmentation        ELECTROCONVULSIVE THERAPY (ECT) - SINGLE SEIZURE N/A 12/6/2018     Per[facility]    ELECTROCONVULSIVE THERAPY (ECT) - SINGLE SEIZURE N/A 8/31/2018     Performed by [facility] E[facility]    ELECTROCONVULSIVE THERAPY (ECT) - SINGLE SEIZURE N/A 8/6/2018     Performed by [facility] ECT    ELECTROCONVULSIVE THERAPY (ECT) - SINGLE SEIZURE N/A 7/23/2018     Performed by [facility] ECT    ELECTROCONVULSIVE THERAPY (ECT) - SINGLE SEIZURE N/A 7/5/2018     Performed by [facility] ECT    ELECTROCONVULSIVE THERAPY (ECT) - SINGLE SEIZURE N/A 6/28/2018     Performed by [facility] ECT    ELECTROCONVULSIVE THERAPY (ECT) - SINGLE SEIZURE N/A 6/13/2018     Performed by [facility] ECT    ELECTROCONVULSIVE THERAPY (ECT) - SINGLE SEIZURE N/A 5/29/2018     Performed by [facility]ib [facility] ECT    ELECTROCONVULSIVE THERAPY (ECT) - SINGLE SEIZURE N/A 5/8/2018     Performed by [facility]ib [facility] ECT    ELECTROCONVULSIVE THERAPY (ECT) - SINGLE SEIZURE N/A 4/24/2018     Performed by [facility]ib [facility] ECT    ELECTROCONVULSIVE THERAPY (ECT) - SINGLE SEIZURE N/A 4/17/2018     Performed by [facility]ib [facility] ECT    ELECTROCONVULSIVE THERAPY (ECT) - SINGLE SEIZURE N/A 4/13/2018     Performed by [facility]ib [facility] ECT    ELECTROCONVULSIVE THERAPY (ECT) - SINGLE SEIZURE N/A 4/3/2018     Performed by [facility]ib [facility] ECT    ELECTROCONVULSIVE THERAPY (ECT) - SINGLE SEIZURE N/A 3/20/2018     Performed by Sh[facility]ib [facility] ECT    ELECTROCONVULSIVE THERAPY (ECT) - SINGLE SEIZURE N/A 3/15/2018     Performed by [facility] E[facility]     ELECTROCONVULSIVE THERAPY (ECT) - SINGLE SEIZURE N/A 3/6/2018     Performed by [facility] at [facility] ECT    ELECTROCONVULSIVE THERAPY (ECT) - SINGLE SEIZURE N/A 3/1/2018     Performed by Shoaib Boyce Jr[facility] at [facility] ECT    ELECTROCONVULSIVE THERAPY (ECT) - SINGLE SEIZURE N/A 2/23/2018     Performed by Shoaib Boyce Jr[facility] at [facility] ECT    ELECTROCONVULSIVE THERAPY (ECT) - SINGLE SEIZURE N/A 2/19/2018     Performed by Shoaib Boy[facility] Jr[facility] at [facility] ECT    ELECTROCONVULSIVE THERAPY (ECT) - SINGLE SEIZURE N/A 2/15/2018     Performed by Shoaib Boy[facility] Jr[facility] at [facility] ECT    ELECTROCONVULSIVE THERAPY (ECT) - SINGLE SEIZURE N/A 1/22/2018     Performed by Sh[facility]ib [facility] Jr[facility] at [facility] ECT    ELECTROCONVULSIVE THERAPY (ECT) - SINGLE SEIZURE N/A 12/11/2017     Performed by Shoaib Boy[facility] Jr[facility] at [facility] ECT    ELECTROCONVULSIVE THERAPY (ECT) - SINGLE SEIZURE N/A 10/24/2017     Performed by Shoaib Boyce Jr[facility] at [facility] ECT    ELECTROCONVULSIVE THERAPY (ECT) - SINGLE SEIZURE N/A 9/20/2017     Performed by Shoaib Boyce Jr[facility] at [facility] ECT    ELECTROCONVULSIVE THERAPY (ECT) - SINGLE SEIZURE N/A 8/14/2017     Performed by Shoaib Boyce Jr[facility] at [facility] ECT    ELECTROCONVULSIVE THERAPY (ECT) - SINGLE SEIZURE Bilateral 7/12/2017     Performed by Shoaib Boyce Jr., MD at [facility] ECT    ELECTROCONVULSIVE THERAPY (ECT) - SINGLE SEIZURE N/A 6/13/2017     Performed by Shoaib Boyce Jr., MD at [facility] ECT    ELECTROCONVULSIVE THERAPY (ECT) - SINGLE SEIZURE N/A 5/17/2017     Performed by Shoaib Boyce Jr[facility] at [facility] ECT    ELECTROCONVULSIVE THERAPY (ECT) - SINGLE SEIZURE N/A 4/20/2017     Performed by Shoaib Boyce Jr[facility] at [facility] ECT    ELECTROCONVULSIVE THERAPY (ECT) - SINGLE SEIZURE N/A 11/22/2016     Performed by Shoaib Boyce Jr[facility] at [facility] ECT    ELECTROCONVULSIVE THERAPY (ECT) - SINGLE SEIZURE N/A 10/24/2016     Performed by h[facility]ib Boyce Jr[facility] MD at  [facility] ECT    ELECTROCONVULSIVE THERAPY (ECT) - SINGLE SEIZURE N/A 9/22/2016     Performed by Shoaib Boyce Jr., [facility] at [facility] ECT    ELECTROCONVULSIVE THERAPY (ECT) - SINGLE SEIZURE N/A 9/1/2016     Performed by Shoaib Boyce Jr[facility] at [facility] ECT    ELECTROCONVULSIVE THERAPY (ECT) - SINGLE SEIZURE N/A 8/15/2016     Performed by Shoaib Boyce Jr., [facility] at [facility] ECT    ELECTROCONVULSIVE THERAPY (ECT) - SINGLE SEIZURE N/A 8/1/2016     Performed by Shoaib Boyce Jr[facility] at [facility] ECT    ELECTROCONVULSIVE THERAPY (ECT) - SINGLE SEIZURE N/A 7/22/2016     Performed by Shoaib Boyce Jr[facility] at [facility] ECT    ELECTROCONVULSIVE THERAPY (ECT) - SINGLE SEIZURE N/A 7/14/2016     Performed by Shoaib Boyce Jr[facility] at [facility] ECT    ELECTROCONVULSIVE THERAPY (ECT) - SINGLE SEIZURE N/A 7/8/2016     Performed by Shoaib Boy[facility] Jr[facility] at [facility] ECT    ELECTROCONVULSIVE THERAPY (ECT) - SINGLE SEIZURE N/A 7/5/2016     Performed by Shoaib Boyce Jr[facility] at [facility] ECT    ELECTROCONVULSIVE THERAPY (ECT) - SINGLE SEIZURE N/A 6/27/2016     Performed by Shoaib Boyce Jr[facility] at [facility] ECT    ELECTROCONVULSIVE THERAPY (ECT) - SINGLE SEIZURE N/A 6/23/2016     Performed by Sh[facility]ib Boy[facility] Jr[facility] at [facility] ECT    ELECTROCONVULSIVE THERAPY (ECT) - SINGLE SEIZURE N/A 6/20/2016     Performed by Shoaib Boyce Jr[facility] at [facility] ECT    ELECTROCONVULSIVE THERAPY (ECT) - SINGLE SEIZURE N/A 6/16/2016     Performed by Shoaib Boyce Jr[facility] at [facility] ECT    ELECTROCONVULSIVE THERAPY (ECT) - SINGLE SEIZURE N/A 6/15/2016     Performed by Sh[facility]ib Boyce Jr[facility] at [facility] ECT    ELECTROCONVULSIVE THERAPY (ECT) - SINGLE SEIZURE N/A 6/14/2016     Performed by Sh[facility]ib [facility] Jr[facility] at [facility] ECT    ELECTROCONVULSIVE THERAPY (ECT) - SINGLE SEIZURE N/A 6/13/2016     Performed by [facility] Jr[facility] at [facility] ECT    ELECTROCONVULSIVE THERAPY (ECT) - SINGLE SEIZURE N/A 1/4/2016     Performed by Shoaib [facility] Jr[facility] MD at  "[facility] ECT    ELECTROCONVULSIVE THERAPY, CEREBRAL HEMISPHERE, UNILATERAL, 1 SEIZURE Bilateral 6/25/2018     Performed by [facility] Jr[facility] at [facility] ECT    OVARIAN CYST REMOVAL Bilateral        OBJECTIVE:      Vitals (pre-procedure):      Vitals:     06/27/19 0621   BP: 121/70   Pulse: 80   Resp: 18   Temp: 97.7 °F (36.5 °C)         Labs/Imaging/Studies:   Recent Results   No results found for this or any previous visit (from the past 48 hour(s)).      No results found for: PHENYTOIN, PHENOBARB, VALPROATE, CBMZ     Physical Exam:   Gen: AAOx4, NAD  HEENT: MMM, PERRL, EOMI, O/P clear  CV: RRR, S1/S2 nml, no M/R/G  Chest: CTAB, no R/R/W, unlabored breathing  Abd: S/NT/ND, +BS, no HSM  Ext: No C/C/E, pulse 2+ throughout  Neuro: CN II-XII grossly intact, no focal deficits     Mental Status Exam:   Appearance: age appropriate, well nourished, dressed in hospital gown  Behavior: friendly and cooperative, eye contact appropriate  Speech: conversational tone, rate, pitch, volume  Mood: "not good"  Affect: appropriate, reactive   Thought Process: linear and organized  Thought Content: no SI, no HI, no AVH  Perceptual Disturbances: none reported  Cognition: grossly intact  Insight: fair   Judgment: appropriate for setting         ASSESSMENT/PLAN:      Allyssa Wright is a 39 y.o. female with MDD, recurrent, in partial remission who presents for ECT.     Recommendations:   Proceed with ECT #72.     Boaz Lo MD  [facility]  PGY-2    "

## 2019-07-01 NOTE — ANESTHESIA PROCEDURE NOTES
ECT    Treatment Number: 72  Procedure start time: 7/1/2019 8:15 AM  Timeout performed at: 7/1/2019 8:13 AM  Procedure end time: 7/1/2019 8:17 AM  Authorizing Provider: Ruel Horne Jr., MDPerforming Provider: Alpesh Dumont MD  Preanesthetic Checklist  The following were completed as part of the preanesthetic checklist: patient identified, procedural consent, pre-op evaluation, timeout performed, risks and benefits discussed, monitors and equipment checked, anesthesia consent given, oxygen available, suction available, hand hygiene performed and patient being monitored.    Setup & Induction  Patient Monitoring: heart rate, cardiac monitor, continuous pulse ox, NIBP and continuous capnometry  Patient preparation: patient hyperventilated, mandibular stabilization, extremities padded and bite block inserted  Electrode Placement: Bitemporal    The patient was moved to the ECT therapy room after being assessed and consented for ECT. After standard ASA monitors were applied and timeout performed, the patient was adequately preoxygenated. After induction of general anesthesia, adequate oxygenation and ventilation were confirmed with pulse oxymetry and end tidal CO2 monitoring via bag-mask ventilation. End tidal CO2 was monitored throughout the case and moderate hyperventilation was performed prior to beginning ECT treatment. All extremities were padded, biteblock was inserted, and mandibular stabilization was done prior to initiating ECT therapy.    Procedure      Stimulus Number 3: 576 millicoulombs; Seizure Duration = 55 seconds        Recovery  After adequate recovery from general anesthesia, the patient was transported to recovery.  Baseline Blood Pressure: 122/73  Peak Blood Pressure: 161/97  Time to Recovery of Respirations: 3 minutes    ECT Findings  ECT associated findings of: Moderate Hypertension (SBP >160 or DBP >100)

## 2019-07-01 NOTE — H&P
"Allyssa Wright  : 1980   MRN: [id number]  Date: 2019               Electroconvulsive Therapy  History & Physical     Chief complaint: MDD, recurrent, in partial remission  Procedure: ECT #72     SUBJECTIVE:      HPI:   Allyssa Wright is a 39 y.o. female with MDD, recurrent, in partial remission who presents for ECT today. Patient again reports she has been doing "alright" since last treatment, cites prominently depressive symptoms (says ECT typically does not relieve her OCD symptoms), describes depression as stable and unchanged from prior to las ECT several days ago. Denies SI/HI/AVH current at any point since last ECT. Sleeping well in general, and reports a stable appetite though sensation of bland food. Tolerating medications without adverse effects. Remains concerned/anxious that ECT may have stopped working for her; currently states her mood is a "5" compared to feeling she can reach level "8" when she is responding well.  . The patient does not need any prescriptions today nor has she had med changes. No somatic complaints this morning. The patient has not had anything by mouth since midnight and is ready for ECT.     Psychiatric Review of Systems:  Mood: "alright"  Anxiety: at baseline  Appetite: intact  Psychomotor: No problem  Cognitive Impairment: mild, consistent with ECT, worsening immediately surrounding treatments  Insomnia: Denies   Psychosis: None  Diurnal Variation: None  Suicidal Ideation: Absent     Medical Review Of Systems:  Pertinent items are noted in HPI.     Current Medications:             Current Facility-Administered Medications on File Prior to Encounter   Medication Dose Route Frequency Provider Last Rate Last Dose    lidocaine (PF) 10 mg/ml (1%) injection 10 mg  1 mL Intradermal Once Homa Colbert MD        sodium chloride 0.9% flush 10 mL  10 mL Intra-Catheter PRN Hmoa Colbert MD                 Current Outpatient Medications on File Prior to Encounter "   Medication Sig Dispense Refill    clozapine (CLOZARIL) 50 MG tablet Take 50 mg by mouth once daily.         dapsone 7.5 % GlwP Apply topically once daily.        famciclovir (FAMVIR) 500 MG tablet Take 1 tablet (500 mg total) by mouth 2 (two) times daily. 30 tablet 12    mirtazapine (REMERON) 15 MG tablet Take 1 tablet (15 mg total) by mouth every evening. (Patient taking differently: Take 7.5 mg by mouth every evening. ) 90 tablet 0    spironolactone (ALDACTONE) 50 MG tablet 50 mg 2 (two) times daily. 50  mg qAM, 50 mg qHS.        thyroid (ARMOUR THYROID) 30 mg Tab Take 1 tablet (30 mg total) by mouth every morning. 30 tablet 11    trazodone (DESYREL) 100 MG tablet Take 200 mg by mouth every evening.         UNABLE TO FIND 2 (two) times daily. n-azetyl-cysteine        venlafaxine (EFFEXOR) 100 MG Tab Take 3 tablets (300 mg total) by mouth once daily. (Patient taking differently: Take 225 mg by mouth once daily. )        vortioxetine (TRINTELLIX) 5 mg Tab Take 2.5 mg by mouth once daily.        dextroamphetamine-amphetamine 30 mg Tab Take 30 mg by mouth 2 (two) times daily.         hydrOXYzine pamoate (VISTARIL) 25 MG Cap Take 2 capsules (50 mg total) by mouth nightly as needed (Take 25-50mg (1-2 caps) at night for anxiety prior to ECT). 180 capsule 0    ZOVIRAX 5 % Crea     5      Allergies:   Benzodiazepines; Ampicillin; Erythromycin; Levaquin [levofloxacin]; Penicillins; Pristiq [desvenlafaxine]; Sulfa (sulfonamide antibiotics); and Azithromycin     Past Medical/Surgical History:        Past Medical History:   Diagnosis Date    Anxiety      Depression      History of psychiatric hospitalization      HSV-1 (herpes simplex virus 1) infection      Hx of psychiatric care      Moderate depressed bipolar II disorder 06/13/2016     reports no history of bipolar    Obsessive-compulsive disorder      Psychiatric problem      Schizophrenia 4/3/2018    Self-harming behavior      Therapy               Past Surgical History:   Procedure Laterality Date    ANKLE SURGERY Right      BREAST augmentation        ELECTROCONVULSIVE THERAPY (ECT) - SINGLE SEIZURE N/A 12/6/2018     Performed by [facility]ib [facility]ce Jr[facility] at [facility]    ELECTROCONVULSIVE THERAPY (ECT) - SINGLE SEIZURE N/A 8/31/2018     Performed by Sh[facility]ib [facility]ce Jr., MD at [facility] ECT    ELECTROCONVULSIVE THERAPY (ECT) - SINGLE SEIZURE N/A 8/6/2018     Performed by Shoaib Boyce Jr[facility] at [facility] ECT    ELECTROCONVULSIVE THERAPY (ECT) - SINGLE SEIZURE N/A 7/23/2018     Performed by Shoaib Boyce Jr., MD at [facility] ECT    ELECTROCONVULSIVE THERAPY (ECT) - SINGLE SEIZURE N/A 7/5/2018     Performed by Shoaib Boyce Jr[facility] MD at [facility] ECT    ELECTROCONVULSIVE THERAPY (ECT) - SINGLE SEIZURE N/A 6/28/2018     Performed by Shoaib Boyce Jr., MD at [facility] ECT    ELECTROCONVULSIVE THERAPY (ECT) - SINGLE SEIZURE N/A 6/13/2018     Performed by Shoaib Boyce Jr., MD at [facility] ECT    ELECTROCONVULSIVE THERAPY (ECT) - SINGLE SEIZURE N/A 5/29/2018     Performed by Shoaib Boyce Jr., MD at [facility] ECT    ELECTROCONVULSIVE THERAPY (ECT) - SINGLE SEIZURE N/A 5/8/2018     Performed by Shoaib Boyce Jr., MD at [facility] ECT    ELECTROCONVULSIVE THERAPY (ECT) - SINGLE SEIZURE N/A 4/24/2018     Performed by Shoaib Boyce Jr., MD at [facility] ECT    ELECTROCONVULSIVE THERAPY (ECT) - SINGLE SEIZURE N/A 4/17/2018     Performed by Shoaib Boyce Jr., MD at [facility] ECT    ELECTROCONVULSIVE THERAPY (ECT) - SINGLE SEIZURE N/A 4/13/2018     Performed by Shoaib Boyce Jr., MD at [facility] ECT    ELECTROCONVULSIVE THERAPY (ECT) - SINGLE SEIZURE N/A 4/3/2018     Performed by Shoaib Boyce Jr., MD at [facility] ECT    ELECTROCONVULSIVE THERAPY (ECT) - SINGLE SEIZURE N/A 3/20/2018     Performed by Shoaib Boyce Jr., MD at [facility] ECT    ELECTROCONVULSIVE THERAPY (ECT) - SINGLE SEIZURE N/A 3/15/2018     Performed by Shoaib Boyce Jr., MD at [facility] ECT     ELECTROCONVULSIVE THERAPY (ECT) - SINGLE SEIZURE N/A 3/6/2018     Performed by Sh[facility]ib Boyce Jr[facility] at [facility] ECT    ELECTROCONVULSIVE THERAPY (ECT) - SINGLE SEIZURE N/A 3/1/2018     Performed by Shoaib Boyce Jr., [facility] at [facility] ECT    ELECTROCONVULSIVE THERAPY (ECT) - SINGLE SEIZURE N/A 2/23/2018     Performed by Shoaib Boyce Jr., [facility] at [facility] ECT    ELECTROCONVULSIVE THERAPY (ECT) - SINGLE SEIZURE N/A 2/19/2018     Performed by Shoaib Boyce Jr., [facility] at [facility] ECT    ELECTROCONVULSIVE THERAPY (ECT) - SINGLE SEIZURE N/A 2/15/2018     Performed by Shoaib Boyce Jr[facility] at [facility] ECT    ELECTROCONVULSIVE THERAPY (ECT) - SINGLE SEIZURE N/A 1/22/2018     Performed by Shoaib Boyce Jr[facility] at [facility] ECT    ELECTROCONVULSIVE THERAPY (ECT) - SINGLE SEIZURE N/A 12/11/2017     Performed by Shoaib Boyce Jr[facility] at [facility] ECT    ELECTROCONVULSIVE THERAPY (ECT) - SINGLE SEIZURE N/A 10/24/2017     Performed by Shoaib Boyce Jr., [facility] at [facility] ECT    ELECTROCONVULSIVE THERAPY (ECT) - SINGLE SEIZURE N/A 9/20/2017     Performed by Shoaib Boyce Jr., [facility] at [facility] ECT    ELECTROCONVULSIVE THERAPY (ECT) - SINGLE SEIZURE N/A 8/14/2017     Performed by Shoaib Boyce Jr., MD at [facility] ECT    ELECTROCONVULSIVE THERAPY (ECT) - SINGLE SEIZURE Bilateral 7/12/2017     Performed by Shoaib Boyce Jr., MD at [facility] ECT    ELECTROCONVULSIVE THERAPY (ECT) - SINGLE SEIZURE N/A 6/13/2017     Performed by Shoaib Boyce Jr., MD at [facility] ECT    ELECTROCONVULSIVE THERAPY (ECT) - SINGLE SEIZURE N/A 5/17/2017     Performed by Shoaib Boyce Jr., MD at [facility] ECT    ELECTROCONVULSIVE THERAPY (ECT) - SINGLE SEIZURE N/A 4/20/2017     Performed by Shoaib Boyce Jr., [facility] at [facility] ECT    ELECTROCONVULSIVE THERAPY (ECT) - SINGLE SEIZURE N/A 11/22/2016     Performed by Shoaib Boyce Jr., MD at [facility] ECT    ELECTROCONVULSIVE THERAPY (ECT) - SINGLE SEIZURE N/A 10/24/2016     Performed by Shoaib Boyce Jr., MD at  [facility] ECT    ELECTROCONVULSIVE THERAPY (ECT) - SINGLE SEIZURE N/A 9/22/2016     Performed by [facility] Jr[facility] at [facility] ECT    ELECTROCONVULSIVE THERAPY (ECT) - SINGLE SEIZURE N/A 9/1/2016     Performed by Shoaib Boyce Jr., MD at [facility] ECT    ELECTROCONVULSIVE THERAPY (ECT) - SINGLE SEIZURE N/A 8/15/2016     Performed by Sh[facility]ib Boy[facility] Jr[facility] at [facility] ECT    ELECTROCONVULSIVE THERAPY (ECT) - SINGLE SEIZURE N/A 8/1/2016     Performed by Shoaib Boyce Jr., [facility] at [facility] ECT    ELECTROCONVULSIVE THERAPY (ECT) - SINGLE SEIZURE N/A 7/22/2016     Performed by Shoaib Boyce Jr., MD at [facility] ECT    ELECTROCONVULSIVE THERAPY (ECT) - SINGLE SEIZURE N/A 7/14/2016     Performed by Shoaib Boyce Jr., MD at [facility] ECT    ELECTROCONVULSIVE THERAPY (ECT) - SINGLE SEIZURE N/A 7/8/2016     Performed by Shoaib Boyce Jr., MD at [facility] ECT    ELECTROCONVULSIVE THERAPY (ECT) - SINGLE SEIZURE N/A 7/5/2016     Performed by Shoaib Boyce Jr., MD at [facility] ECT    ELECTROCONVULSIVE THERAPY (ECT) - SINGLE SEIZURE N/A 6/27/2016     Performed by Shoaib Boyce Jr., MD at [facility] ECT    ELECTROCONVULSIVE THERAPY (ECT) - SINGLE SEIZURE N/A 6/23/2016     Performed by Shoaib Boyce Jr., MD at [facility] ECT    ELECTROCONVULSIVE THERAPY (ECT) - SINGLE SEIZURE N/A 6/20/2016     Performed by Sh[facility]ib Boyce Jr., MD at [facility] ECT    ELECTROCONVULSIVE THERAPY (ECT) - SINGLE SEIZURE N/A 6/16/2016     Performed by Sh[facility]ib [facility]ce Jr., MD at [facility] ECT    ELECTROCONVULSIVE THERAPY (ECT) - SINGLE SEIZURE N/A 6/15/2016     Performed by Sh[facility]ib [facility] Jr., MD at [facility] ECT    ELECTROCONVULSIVE THERAPY (ECT) - SINGLE SEIZURE N/A 6/14/2016     Performed by Sh[facility] Jr[facility] MD at [facility] ECT    ELECTROCONVULSIVE THERAPY (ECT) - SINGLE SEIZURE N/A 6/13/2016     Performed by [facility] Jr[facility] MD at [facility] ECT    ELECTROCONVULSIVE THERAPY (ECT) - SINGLE SEIZURE N/A 1/4/2016     Performed by Shoaib Boyce Jr[facility] MD at  "[facility] ECT    ELECTROCONVULSIVE THERAPY, CEREBRAL HEMISPHERE, UNILATERAL, 1 SEIZURE Bilateral 6/25/2018     Performed by [facility]ib [facility] Jr[facility] at [facility] ECT    OVARIAN CYST REMOVAL Bilateral        OBJECTIVE:      Vitals (pre-procedure):      Vitals:     06/27/19 0621   BP: 121/70   Pulse: 80   Resp: 18   Temp: 97.7 °F (36.5 °C)         Labs/Imaging/Studies:   Recent Results   No results found for this or any previous visit (from the past 48 hour(s)).      No results found for: PHENYTOIN, PHENOBARB, VALPROATE, CBMZ     Physical Exam:   Gen: AAOx4, NAD  HEENT: MMM, PERRL, EOMI, O/P clear  CV: RRR, S1/S2 nml, no M/R/G  Chest: CTAB, no R/R/W, unlabored breathing  Abd: S/NT/ND, +BS, no HSM  Ext: No C/C/E, pulse 2+ throughout  Neuro: CN II-XII grossly intact, no focal deficits     Mental Status Exam:   Appearance: age appropriate, well nourished, dressed in hospital gown  Behavior: friendly and cooperative, eye contact appropriate  Speech: conversational tone, rate, pitch, volume  Mood: "alright"  Affect: appropriate, reactive   Thought Process: linear and organized  Thought Content: no SI, no HI, no AVH  Perceptual Disturbances: none reported  Cognition: grossly intact  Insight: fair   Judgment: appropriate for setting         ASSESSMENT/PLAN:      Allyssa Wright is a 39 y.o. female with MDD, recurrent, in partial remission who presents for ECT.     Recommendations:   Proceed with ECT #72.     Boaz Lo MD  [facility]  PGY-2    "

## 2019-07-01 NOTE — OP NOTE
Allyssa Wright  : 1980   MRN: [id number]  Date: 2019     Electroconvulsive Therapy  Procedure Note     Date of Admission: 2019 0552     Site: [facility], [address]     Attending: Shoaib Boyce Jr., MD   Residents: Boaz Lo MD  Pre-procedure Diagnosis: MDD, recurrent, in partial remission  ECT Treatment Number: 72  Machine Type: Mecta 5000     Patient Status: Medically stable     Vitals (pre-procedure):      Vitals:     19 0621   BP: 121/70   Pulse: 80   Resp: 18   Temp: 97.7 °F (36.5 °C)         Electrode Placement: Bitemporal     Stimulus Number Charge (mC) Level Pulse Width (msec) Frequency  (Hz) Duration of Stimulus (sec) Current (mA) Duration of Seizure (sec)   1 576 3 1 60 6 800 45                                                            Complications: Mild Hypertension     Maximum Blood Pressure: 146/96     Medications Given:  Caffeine 500 mg PO before  Methohexital (Brevital) 200 mg  IV before  Succinylcholine (Anectine) 140 mg  IV before  Labetolol 10mg IV before  Zofran 4 mg IV before  Toradol 30 mg IV before     Treatment Course:  Patient tolerated procedure well. After adequate recovery from general anesthesia, the patient was transported to recovery.     Post-op Diagnosis: Same as above     Recommended for next ECT:  No med changes. Next treatment 2019     Boaz Lo MD  [facility]  PGY-2  2019

## 2019-07-01 NOTE — TRANSFER OF CARE
"Anesthesia Transfer of Care Note    Patient: Allyssa Wright    Procedure(s) Performed: Procedure(s) (LRB):  ELECTROCONVULSIVE THERAPY (ECT) - SINGLE SEIZURE (N/A)    Patient location: [facility]    Anesthesia Type: other:    Transport from OR: Transported from OR on room air with adequate spontaneous ventilation    Post pain: adequate analgesia    Post assessment: no apparent anesthetic complications    Post vital signs: stable    Level of consciousness: awake    Nausea/Vomiting: no nausea/vomiting    Complications: none    Transfer of care protocol was followed      Last vitals:   Visit Vitals  /73 (BP Location: Left arm, Patient Position: Lying)   Pulse 76   Temp 36.6 °C (97.9 °F) (Temporal)   Resp 16   Ht 5' 5" (1.651 m)   Wt 65.8 kg (145 lb)   SpO2 100%   BMI 24.13 kg/m²     "

## 2019-07-07 ENCOUNTER — ANESTHESIA EVENT (OUTPATIENT)
Dept: ELECTROPHYSIOLOGY | Facility: HOSPITAL | Age: 39
End: 2019-07-07
Payer: COMMERCIAL

## 2019-07-07 NOTE — ANESTHESIA PREPROCEDURE EVALUATION
[facility]  Anesthesia Pre-Operative Evaluation         Patient Name: Allyssa Wright  YOB: 1980  MRN: [id number]    SUBJECTIVE:     Pre-operative evaluation for Procedure(s) (LRB):  ELECTROCONVULSIVE THERAPY (ECT) - SINGLE SEIZURE (N/A)     07/07/2019    Allyssa Wright is a 39 y.o. female w/ a significant PMHx of depression and schizophrenia.     Patient now presents for the above procedure: ECT #73     Previous ECT Anesthetic:  - Succinylcholine: 120 mg  - Methohexital: 120 mg  - Toradol: 30 mg  - Zofran: 4 mg  - Labetalol: 20 mg  - Esmolol: 40 mg    Patient now presents for the above procedure(s).      LDA: None documented.       Prev airway: None documented.    Drips: None documented.      Patient Active Problem List   Diagnosis    MDD (major depressive disorder), recurrent, in partial remission    Other acne    Comfort measures only status    MDD (major depressive disorder)    Major depression    Major depressive disorder    MDD (major depressive disorder), severe       Review of patient's allergies indicates:   Allergen Reactions    Benzodiazepines Other (See Comments)     Contraindicated while taking Clozapine    Ampicillin      Mom says so    Erythromycin     Levaquin [levofloxacin] Other (See Comments)     Depression side effects    Penicillins      Mom says so    Pristiq [desvenlafaxine]      psycotic      Sulfa (sulfonamide antibiotics)      Rash      Azithromycin Anxiety       Current Inpatient Medications:      Current Facility-Administered Medications on File Prior to Encounter   Medication Dose Route Frequency Provider Last Rate Last Dose    lidocaine (PF) 10 mg/ml (1%) injection 10 mg  1 mL Intradermal Once Homa Colbert MD        sodium chloride 0.9% flush 10 mL  10 mL Intra-Catheter PRN Homa Colbert MD         Current Outpatient Medications on File Prior to  Encounter   Medication Sig Dispense Refill    clozapine (CLOZARIL) 50 MG tablet Take 50 mg by mouth once daily.       dapsone 7.5 % GlwP Apply topically once daily.      dextroamphetamine-amphetamine 30 mg Tab Take 30 mg by mouth 2 (two) times daily.       famciclovir (FAMVIR) 500 MG tablet Take 1 tablet (500 mg total) by mouth 2 (two) times daily. 30 tablet 12    hydrOXYzine pamoate (VISTARIL) 25 MG Cap Take 2 capsules (50 mg total) by mouth nightly as needed (Take 25-50mg (1-2 caps) at night for anxiety prior to ECT). 180 capsule 0    hydrOXYzine pamoate (VISTARIL) 25 MG Cap Take 1 capsule (25 mg total) by mouth nightly as needed (anxiety/sleep). Take 1 to 2 pills as needed 60 capsule 2    mirtazapine (REMERON) 15 MG tablet Take 1 tablet (15 mg total) by mouth every evening. (Patient taking differently: Take 7.5 mg by mouth every evening. ) 90 tablet 0    spironolactone (ALDACTONE) 50 MG tablet 50 mg 2 (two) times daily. 50  mg qAM, 50 mg qHS.      thyroid (ARMOUR THYROID) 30 mg Tab Take 1 tablet (30 mg total) by mouth every morning. 30 tablet 11    trazodone (DESYREL) 100 MG tablet Take 200 mg by mouth every evening.       UNABLE TO FIND 2 (two) times daily. n-azetyl-cysteine      venlafaxine (EFFEXOR) 100 MG Tab Take 3 tablets (300 mg total) by mouth once daily. (Patient taking differently: Take 225 mg by mouth once daily. )      vortioxetine (TRINTELLIX) 5 mg Tab Take 2.5 mg by mouth once daily.      ZOVIRAX 5 % Crea   5       Past Surgical History:   Procedure Laterality Date    ANKLE SURGERY Right     BREAST augmentation      ELECTROCONVULSIVE THERAPY (ECT) - SINGLE SEIZURE N/A 12/6/2018    Performed by [facility] ECT    ELECTROCONVULSIVE THERAPY (ECT) - SINGLE SEIZURE N/A 8/31/2018    Performed by [facility] Jr[facility] at [facility] ECT    ELECTROCONVULSIVE THERAPY (ECT) - SINGLE SEIZURE N/A 8/6/2018    Performed by [facility] Jr[facility] at [facility] ECT     ELECTROCONVULSIVE THERAPY (ECT) - SINGLE SEIZURE N/A 7/23/2018    Performed by Shoaib Boyce Jr., MD at [facility] ECT    ELECTROCONVULSIVE THERAPY (ECT) - SINGLE SEIZURE N/A 7/5/2018    Performed by Shoaib Boyce Jr., MD at [facility] ECT    ELECTROCONVULSIVE THERAPY (ECT) - SINGLE SEIZURE N/A 6/28/2018    Performed by Shoaib Boyce Jr[facility] at [facility] ECT    ELECTROCONVULSIVE THERAPY (ECT) - SINGLE SEIZURE N/A 6/13/2018    Performed by Shoaib Boyce Jr[facility] at [facility] ECT    ELECTROCONVULSIVE THERAPY (ECT) - SINGLE SEIZURE N/A 5/29/2018    Performed by Shoaib Boyce Jr[facility] at [facility] ECT    ELECTROCONVULSIVE THERAPY (ECT) - SINGLE SEIZURE N/A 5/8/2018    Performed by Shoaib Boyce Jr[facility] at [facility] ECT    ELECTROCONVULSIVE THERAPY (ECT) - SINGLE SEIZURE N/A 4/24/2018    Performed by Shoaib Boyce Jr[facility] at [facility] ECT    ELECTROCONVULSIVE THERAPY (ECT) - SINGLE SEIZURE N/A 4/17/2018    Performed by Shoaib Boyce Jr[facility] MD at [facility] ECT    ELECTROCONVULSIVE THERAPY (ECT) - SINGLE SEIZURE N/A 4/13/2018    Performed by Shoaib Boyce Jr., MD at [facility] ECT    ELECTROCONVULSIVE THERAPY (ECT) - SINGLE SEIZURE N/A 4/3/2018    Performed by Shoaib Boyce Jr., MD at [facility] ECT    ELECTROCONVULSIVE THERAPY (ECT) - SINGLE SEIZURE N/A 3/20/2018    Performed by Shoaib Boyce Jr., MD at [facility] ECT    ELECTROCONVULSIVE THERAPY (ECT) - SINGLE SEIZURE N/A 3/15/2018    Performed by Shoaib Boyce Jr[facility] MD at [facility] ECT    ELECTROCONVULSIVE THERAPY (ECT) - SINGLE SEIZURE N/A 3/6/2018    Performed by Shoaib Boyce Jr[facility] MD at [facility] ECT    ELECTROCONVULSIVE THERAPY (ECT) - SINGLE SEIZURE N/A 3/1/2018    Performed by Shoaib Boyce Jr[facility] MD at [facility] ECT    ELECTROCONVULSIVE THERAPY (ECT) - SINGLE SEIZURE N/A 2/23/2018    Performed by Shoaib Boyce Jr[facility] MD at [facility] ECT    ELECTROCONVULSIVE THERAPY (ECT) - SINGLE SEIZURE N/A 2/19/2018    Performed by Shoaib Boyce Jr., MD at [facility] ECT    ELECTROCONVULSIVE  THERAPY (ECT) - SINGLE SEIZURE N/A 2/15/2018    Performed by Sh[facility]ib [facility] Jr[facility] at [facility] ECT    ELECTROCONVULSIVE THERAPY (ECT) - SINGLE SEIZURE N/A 1/22/2018    Performed by Shoaib [facility] Jr[facility] at [facility] ECT    ELECTROCONVULSIVE THERAPY (ECT) - SINGLE SEIZURE N/A 12/11/2017    Performed by Sh[facility]ib [facility]ce Jr[facility] at [facility] ECT    ELECTROCONVULSIVE THERAPY (ECT) - SINGLE SEIZURE N/A 10/24/2017    Performed by Shoaib [facility] Jr[facility] at [facility] ECT    ELECTROCONVULSIVE THERAPY (ECT) - SINGLE SEIZURE N/A 9/20/2017    Performed by Sh[facility]ib [facility]ce Jr[facility] at [facility] ECT    ELECTROCONVULSIVE THERAPY (ECT) - SINGLE SEIZURE N/A 8/14/2017    Performed by Sh[facility]ib [facility] Jr[facility] at [facility] ECT    ELECTROCONVULSIVE THERAPY (ECT) - SINGLE SEIZURE Bilateral 7/12/2017    Performed by Shoaib Boy[facility] Jr[facility] at [facility] ECT    ELECTROCONVULSIVE THERAPY (ECT) - SINGLE SEIZURE N/A 6/13/2017    Performed by Shoaib Boy[facility] Jr[facility] at [facility] ECT    ELECTROCONVULSIVE THERAPY (ECT) - SINGLE SEIZURE N/A 5/17/2017    Performed by Shoaib Boyce Jr[facility] at [facility] ECT    ELECTROCONVULSIVE THERAPY (ECT) - SINGLE SEIZURE N/A 4/20/2017    Performed by Shoaib [facility]ce Jr[facility] at [facility] ECT    ELECTROCONVULSIVE THERAPY (ECT) - SINGLE SEIZURE N/A 11/22/2016    Performed by Shoaib [facility] Jr[facility] at [facility] ECT    ELECTROCONVULSIVE THERAPY (ECT) - SINGLE SEIZURE N/A 10/24/2016    Performed by Shoaib Boyce Jr[facility] at [facility] ECT    ELECTROCONVULSIVE THERAPY (ECT) - SINGLE SEIZURE N/A 9/22/2016    Performed by Shoaib Boyce Jr[facility] at [facility] ECT    ELECTROCONVULSIVE THERAPY (ECT) - SINGLE SEIZURE N/A 9/1/2016    Performed by Shoaib Boyce Jr[facility] at [facility] ECT    ELECTROCONVULSIVE THERAPY (ECT) - SINGLE SEIZURE N/A 8/15/2016    Performed by Shoaib Boyce Jr[facility] at [facility] ECT    ELECTROCONVULSIVE THERAPY (ECT) - SINGLE SEIZURE N/A 8/1/2016    Performed by Sh[facility]ib [facility] Jr[facility] at [facility] ECT    ELECTROCONVULSIVE  THERAPY (ECT) - SINGLE SEIZURE N/A 7/22/2016    Per[facility] by [facility] at [facility] ECT    ELECTROCONVULSIVE THERAPY (ECT) - SINGLE SEIZURE N/A 7/14/2016    Performed by Shoaib Boyce Jr[facility] at [facility] ECT    ELECTROCONVULSIVE THERAPY (ECT) - SINGLE SEIZURE N/A 7/8/2016    Performed by Shoaib Boy[facility] Jr[facility] at [facility] ECT    ELECTROCONVULSIVE THERAPY (ECT) - SINGLE SEIZURE N/A 7/5/2016    Performed by Shoaib Boyce Jr[facility] at [facility] ECT    ELECTROCONVULSIVE THERAPY (ECT) - SINGLE SEIZURE N/A 6/27/2016    Performed by Shoaib [facility]ce Jr[facility] at [facility] ECT    ELECTROCONVULSIVE THERAPY (ECT) - SINGLE SEIZURE N/A 6/23/2016    Performed by Shoaib Boyce Jr[facility] at [facility] ECT    ELECTROCONVULSIVE THERAPY (ECT) - SINGLE SEIZURE N/A 6/20/2016    Performed by Shoaib Boyce Jr[facility] at [facility] ECT    ELECTROCONVULSIVE THERAPY (ECT) - SINGLE SEIZURE N/A 6/16/2016    Performed by Shoaib Boyce Jr[facility] at [facility] ECT    ELECTROCONVULSIVE THERAPY (ECT) - SINGLE SEIZURE N/A 6/15/2016    Performed by Shoaib Boyce Jr[facility] MD at [facility] ECT    ELECTROCONVULSIVE THERAPY (ECT) - SINGLE SEIZURE N/A 6/14/2016    Performed by Shoaib Boyce Jr[facility] MD at [facility] ECT    ELECTROCONVULSIVE THERAPY (ECT) - SINGLE SEIZURE N/A 6/13/2016    Performed by Shoaib Boyce Jr[facility] at [facility] ECT    ELECTROCONVULSIVE THERAPY (ECT) - SINGLE SEIZURE N/A 1/4/2016    Performed by Shoaib [facility] Jr[facility] MD at [facility] ECT    ELECTROCONVULSIVE THERAPY, CEREBRAL HEMISPHERE, UNILATERAL, 1 SEIZURE Bilateral 6/25/2018    Per[facility] by [facility] Jr[facility] at [facility] ECT    OVARIAN CYST REMOVAL Bilateral        Social History     Socioeconomic History    Marital status: Single     Spouse name: Not on file    Number of children: Not on file    Years of education: Not on file    Highest education level: Not on file   Occupational History    Occupation: unemployed   Social Needs    Financial resource strain: Not on file    Food  insecurity:     Worry: Not on file     Inability: Not on file    Transportation needs:     Medical: Not on file     Non-medical: Not on file   Tobacco Use    Smoking status: Former Smoker     Last attempt to quit: 2011     Years since quittin.2    Smokeless tobacco: Never Used   Substance and Sexual Activity    Alcohol use: Yes     Comment: rarely    Drug use: No     Comment: Experimental use in high school    Sexual activity: Not on file   Lifestyle    Physical activity:     Days per week: Not on file     Minutes per session: Not on file    Stress: Not on file   Relationships    Social connections:     Talks on phone: Not on file     Gets together: Not on file     Attends Taoist service: Not on file     Active member of club or organization: Not on file     Attends meetings of clubs or organizations: Not on file     Relationship status: Not on file   Other Topics Concern    Patient feels they ought to cut down on drinking/drug use Not Asked    Patient annoyed by others criticizing their drinking/drug use Not Asked    Patient has felt bad or guilty about drinking/drug use Not Asked    Patient has had a drink/used drugs as an eye opener in the AM Not Asked   Social History Narrative    Pt has 1 older brother from an intact family until the death of her mother in .  She completed 1 year of college, was never in the , and has never been employed.  She was engaged once, but never , has no children, and lives with her father plus 2 dogs.  She denies any hobbies and is spiritual but not Taoist.  She does date and returns to college during rare periods when depression and anxiety [place].       OBJECTIVE:     Vital Signs Range (Last 24H):  BP: ()/()   Arterial Line BP: ()/()       Significant Labs:  Lab Results   Component Value Date    WBC 6.83 2016    HGB 13.3 2016    HCT 40.3 2016     2016    ALT 14 2016    AST 20 2016      06/08/2016    K 3.8 06/08/2016     06/08/2016    CREATININE 0.7 06/08/2016    BUN 10 06/08/2016    CO2 28 06/08/2016    TSH 1.208 06/08/2016       Diagnostic Studies: No relevant studies.    EKG: No recent studies available.    2D ECHO:  No results found for this or any previous visit.      ASSESSMENT/PLAN:         Anesthesia Evaluation    I have reviewed the Patient Summary Reports.    I have reviewed the Nursing Notes.   I have reviewed the Medications.     Review of Systems  Anesthesia Hx:  No problems with previous Anesthesia  Denies Family Hx of Anesthesia complications.   Denies Personal Hx of Anesthesia complications.   Social:  Non-Smoker, No Alcohol Use    Hematology/Oncology:        Denies Current/Recent Cancer   EENT/Dental:   denies chronic allergic rhinitis   Cardiovascular:   Denies Pacemaker.  Denies Hypertension.  Denies Valvular problems/Murmurs.  Denies MI.  Denies CAD.    Denies CABG/stent.  Denies Dysrhythmias.             Pulmonary:   Denies COPD.  Denies Asthma.  Denies Recent URI.  Denies Sleep Apnea.    Renal/:   Denies Chronic Renal Disease.     Hepatic/GI:   Denies PUD. Denies GERD. Denies Liver Disease.    Neurological:   Denies TIA. Denies CVA. Seizures    Endocrine:   Denies Diabetes. Denies Hypothyroidism. Denies Hyperthyroidism.    Psych:   Psychiatric History          Physical Exam  General:  Well nourished    Airway/Jaw/Neck:  Airway Findings: Mouth Opening: Normal Tongue: Normal  General Airway Assessment: Adult  Mallampati: I  Improves to II with phonation.  TM Distance: Normal, at least 6 cm  Jaw/Neck Findings:  Neck ROM: Normal ROM     Eyes/Ears/Nose:  EYES/EARS/NOSE FINDINGS: Normal   Dental:  Dental Findings: In tact   Chest/Lungs:  Chest/Lungs Findings: Clear to auscultation     Heart/Vascular:  Heart Findings: Rate: Normal  Rhythm: Regular Rhythm  Sounds: Normal  Heart murmur: negative    Abdomen:  Abdomen Findings:  Normal, Soft, Nontender      Musculoskeletal:  Musculoskeletal Findings: Normal   Skin:  Skin Findings: Normal    Mental Status:  Mental Status Findings:  Cooperative, Alert and Oriented         Anesthesia Plan  Type of Anesthesia, risks & benefits discussed:  Anesthesia Type:  general  Patient's Preference:   Intra-op Monitoring Plan: standard ASA monitors  Intra-op Monitoring Plan Comments:   Post Op Pain Control Plan: multimodal analgesia, IV/PO Opioids PRN and per primary service following discharge from PACU  Post Op Pain Control Plan Comments:   Induction:   IV  Beta Blocker:  Patient is not currently on a Beta-Blocker (No further documentation required).       Informed Consent: Patient understands risks and agrees with Anesthesia plan.  Questions answered. Anesthesia consent signed with patient.  ASA Score: 2     Day of Surgery Review of History & Physical:    H&P update referred to the provider.         Ready For Surgery From Anesthesia Perspective.

## 2019-07-08 ENCOUNTER — ANESTHESIA (OUTPATIENT)
Dept: ELECTROPHYSIOLOGY | Facility: HOSPITAL | Age: 39
End: 2019-07-08
Payer: COMMERCIAL

## 2019-07-08 ENCOUNTER — HOSPITAL ENCOUNTER (OUTPATIENT)
Facility: HOSPITAL | Age: 39
Discharge: HOME | End: 2019-07-08
Attending: PSYCHIATRY & NEUROLOGY | Admitting: PSYCHIATRY & NEUROLOGY
Payer: COMMERCIAL

## 2019-07-08 VITALS
WEIGHT: 145 LBS | TEMPERATURE: 98 F | HEART RATE: 95 BPM | DIASTOLIC BLOOD PRESSURE: 89 MMHG | HEIGHT: 65 IN | BODY MASS INDEX: 24.16 KG/M2 | SYSTOLIC BLOOD PRESSURE: 122 MMHG | RESPIRATION RATE: 18 BRPM | OXYGEN SATURATION: 100 %

## 2019-07-08 DIAGNOSIS — F33.9 RECURRENT MAJOR DEPRESSIVE DISORDER, REMISSION STATUS UNSPECIFIED: Primary | ICD-10-CM

## 2019-07-08 DIAGNOSIS — F33.41 MDD (MAJOR DEPRESSIVE DISORDER), RECURRENT, IN PARTIAL REMISSION: ICD-10-CM

## 2019-07-08 DIAGNOSIS — F32.A DEPRESSION: ICD-10-CM

## 2019-07-08 LAB
B-HCG UR QL: NEGATIVE
CTP QC/QA: YES

## 2019-07-08 PROCEDURE — D9220A PRA ANESTHESIA: ICD-10-PCS | Mod: ,,, | Performed by: ANESTHESIOLOGY

## 2019-07-08 PROCEDURE — D9220A PRA ANESTHESIA: Mod: ,,, | Performed by: ANESTHESIOLOGY

## 2019-07-08 PROCEDURE — 90870 PR ELECTROCONVULSIVE THERAPY,1 SEIZ: ICD-10-PCS | Mod: ,,, | Performed by: PSYCHIATRY & NEUROLOGY

## 2019-07-08 PROCEDURE — 25000003 PHARM REV CODE 250: Performed by: PSYCHIATRY & NEUROLOGY

## 2019-07-08 PROCEDURE — 25000003 PHARM REV CODE 250: Performed by: STUDENT IN AN ORGANIZED HEALTH CARE EDUCATION/TRAINING PROGRAM

## 2019-07-08 PROCEDURE — 63600175 PHARM REV CODE 636 W HCPCS: Performed by: STUDENT IN AN ORGANIZED HEALTH CARE EDUCATION/TRAINING PROGRAM

## 2019-07-08 PROCEDURE — 90870 ELECTROCONVULSIVE THERAPY: CPT | Mod: ,,, | Performed by: PSYCHIATRY & NEUROLOGY

## 2019-07-08 PROCEDURE — 90870 ELECTROCONVULSIVE THERAPY: CPT | Performed by: ANESTHESIOLOGY

## 2019-07-08 PROCEDURE — 81025 URINE PREGNANCY TEST: CPT | Performed by: PSYCHIATRY & NEUROLOGY

## 2019-07-08 RX ORDER — LIDOCAINE HYDROCHLORIDE 10 MG/ML
1 INJECTION, SOLUTION EPIDURAL; INFILTRATION; INTRACAUDAL; PERINEURAL ONCE
Status: DISCONTINUED | OUTPATIENT
Start: 2019-07-08 | End: 2019-07-08

## 2019-07-08 RX ORDER — KETOROLAC TROMETHAMINE 30 MG/ML
INJECTION, SOLUTION INTRAMUSCULAR; INTRAVENOUS
Status: DISCONTINUED | OUTPATIENT
Start: 2019-07-08 | End: 2019-07-08

## 2019-07-08 RX ORDER — LIDOCAINE HYDROCHLORIDE 10 MG/ML
1 INJECTION, SOLUTION EPIDURAL; INFILTRATION; INTRACAUDAL; PERINEURAL ONCE
Status: COMPLETED | OUTPATIENT
Start: 2019-07-08 | End: 2019-07-08

## 2019-07-08 RX ORDER — SODIUM CHLORIDE 9 MG/ML
INJECTION, SOLUTION INTRAVENOUS CONTINUOUS
Status: DISCONTINUED | OUTPATIENT
Start: 2019-07-08 | End: 2019-07-08 | Stop reason: HOSPADM

## 2019-07-08 RX ORDER — SUCCINYLCHOLINE CHLORIDE 20 MG/ML
INJECTION INTRAMUSCULAR; INTRAVENOUS
Status: DISCONTINUED | OUTPATIENT
Start: 2019-07-08 | End: 2019-07-08

## 2019-07-08 RX ORDER — LABETALOL HYDROCHLORIDE 5 MG/ML
INJECTION, SOLUTION INTRAVENOUS
Status: DISCONTINUED | OUTPATIENT
Start: 2019-07-08 | End: 2019-07-08

## 2019-07-08 RX ORDER — SODIUM CHLORIDE 0.9 % (FLUSH) 0.9 %
10 SYRINGE (ML) INJECTION
Status: DISCONTINUED | OUTPATIENT
Start: 2019-07-08 | End: 2019-07-08 | Stop reason: HOSPADM

## 2019-07-08 RX ORDER — SODIUM CHLORIDE 9 MG/ML
INJECTION, SOLUTION INTRAVENOUS CONTINUOUS
Status: DISCONTINUED | OUTPATIENT
Start: 2019-07-08 | End: 2019-07-08

## 2019-07-08 RX ORDER — ONDANSETRON 2 MG/ML
INJECTION INTRAMUSCULAR; INTRAVENOUS
Status: DISCONTINUED | OUTPATIENT
Start: 2019-07-08 | End: 2019-07-08

## 2019-07-08 RX ORDER — LIDOCAINE HYDROCHLORIDE 10 MG/ML
1 INJECTION, SOLUTION EPIDURAL; INFILTRATION; INTRACAUDAL; PERINEURAL ONCE
Status: DISCONTINUED | OUTPATIENT
Start: 2019-07-08 | End: 2019-07-08 | Stop reason: HOSPADM

## 2019-07-08 RX ADMIN — SUCCINYLCHOLINE CHLORIDE 120 MG: 20 INJECTION, SOLUTION INTRAMUSCULAR; INTRAVENOUS at 08:07

## 2019-07-08 RX ADMIN — LABETALOL HYDROCHLORIDE 10 MG: 5 INJECTION, SOLUTION INTRAVENOUS at 08:07

## 2019-07-08 RX ADMIN — SODIUM CHLORIDE: 0.9 INJECTION, SOLUTION INTRAVENOUS at 06:07

## 2019-07-08 RX ADMIN — ONDANSETRON 4 MG: 2 INJECTION, SOLUTION INTRAMUSCULAR; INTRAVENOUS at 08:07

## 2019-07-08 RX ADMIN — KETOROLAC TROMETHAMINE 30 MG: 30 INJECTION, SOLUTION INTRAMUSCULAR at 08:07

## 2019-07-08 RX ADMIN — Medication 500 MG: at 07:07

## 2019-07-08 RX ADMIN — METHOHEXITAL SODIUM 120 MG: 500 INJECTION, POWDER, LYOPHILIZED, FOR SOLUTION INTRAMUSCULAR; INTRAVENOUS; RECTAL at 08:07

## 2019-07-08 RX ADMIN — LIDOCAINE HYDROCHLORIDE 0.1 MG: 10 INJECTION, SOLUTION EPIDURAL; INFILTRATION; INTRACAUDAL; PERINEURAL at 06:07

## 2019-07-08 NOTE — H&P
lAlyssa Wright  : 1980   MRN: [id number]  Date: 2019     Electroconvulsive Therapy  History & Physical    Chief complaint: MDD, recurrent, partial remission  Procedure: ECT #73    SUBJECTIVE:     HPI:   Allyssa Wright is a 39 y.o. female with <principal problem not specified>who presents for ECT. The patient complains of depression, with slide back to low mood over the past 4 to 5 days. Feels like treatments may need to be more frequent than weekly. Please see Dr. Boyce's full pre-ECT evaluation for further details. The patient does not need any prescriptions and has not had any med changes since meeting with Dr. Boyce. The patient has not had anything by mouth since midnight and is ready for ECT.    Psychiatric Review of Systems:  Mood: depressed  Appetite: No problem  Psychomotor: No problem  Cognitive Impairment: Mild  Insomnia: None  Psychosis: None  Diurnal Variation: None  Suicidal Ideation: Absent    Medical Review Of Systems:  Pertinent items are noted in HPI.    Current Medications:   Current Facility-Administered Medications on File Prior to Encounter   Medication Dose Route Frequency Provider Last Rate Last Dose    lidocaine (PF) 10 mg/ml (1%) injection 10 mg  1 mL Intradermal Once Homa Colbert MD        sodium chloride 0.9% flush 10 mL  10 mL Intra-Catheter PRN Homa Colbert MD         Current Outpatient Medications on File Prior to Encounter   Medication Sig Dispense Refill    clozapine (CLOZARIL) 50 MG tablet Take 50 mg by mouth once daily.       dapsone 7.5 % GlwP Apply topically once daily.      famciclovir (FAMVIR) 500 MG tablet Take 1 tablet (500 mg total) by mouth 2 (two) times daily. 30 tablet 12    mirtazapine (REMERON) 15 MG tablet Take 1 tablet (15 mg total) by mouth every evening. (Patient taking differently: Take 7.5 mg by mouth every evening. ) 90 tablet 0    spironolactone (ALDACTONE) 50 MG tablet 50 mg 2 (two) times daily. 50  mg qAM, 50 mg qHS.       thyroid (ARMOUR THYROID) 30 mg Tab Take 1 tablet (30 mg total) by mouth every morning. 30 tablet 11    trazodone (DESYREL) 100 MG tablet Take 200 mg by mouth every evening.       UNABLE TO FIND 2 (two) times daily. n-azetyl-cysteine      venlafaxine (EFFEXOR) 100 MG Tab Take 3 tablets (300 mg total) by mouth once daily. (Patient taking differently: Take 225 mg by mouth once daily. )      vortioxetine (TRINTELLIX) 5 mg Tab Take 2.5 mg by mouth once daily.      dextroamphetamine-amphetamine 30 mg Tab Take 30 mg by mouth 2 (two) times daily.       hydrOXYzine pamoate (VISTARIL) 25 MG Cap Take 2 capsules (50 mg total) by mouth nightly as needed (Take 25-50mg (1-2 caps) at night for anxiety prior to ECT). 180 capsule 0    hydrOXYzine pamoate (VISTARIL) 25 MG Cap Take 1 capsule (25 mg total) by mouth nightly as needed (anxiety/sleep). Take 1 to 2 pills as needed 60 capsule 2    ZOVIRAX 5 % Crea   5      Allergies:   Benzodiazepines; Ampicillin; Erythromycin; Levaquin [levofloxacin]; Penicillins; Pristiq [desvenlafaxine]; Sulfa (sulfonamide antibiotics); and Azithromycin    Past Medical/Surgical History:   Past Medical History:   Diagnosis Date    Anxiety     Depression     History of psychiatric hospitalization     HSV-1 (herpes simplex virus 1) infection     Hx of psychiatric care     Moderate depressed bipolar II disorder 06/13/2016    reports no history of bipolar    Obsessive-compulsive disorder     Psychiatric problem     Schizophrenia 4/3/2018    Self-harming behavior     Therapy      Past Surgical History:   Procedure Laterality Date    ANKLE SURGERY Right     BREAST augmentation      ELECTROCONVULSIVE THERAPY (ECT) - SINGLE SEIZURE N/A 12/6/2018    Performed by [facility] E[facility]    ELECTROCONVULSIVE THERAPY (ECT) - SINGLE SEIZURE N/A 8/31/2018    Per[facility] by [facility] Jr[facility] at [facility] ECT    ELECTROCONVULSIVE THERAPY (ECT) - SINGLE SEIZURE N/A 8/6/2018     Performed by [facility] at [facility] ECT    ELECTROCONVULSIVE THERAPY (ECT) - SINGLE SEIZURE N/A 7/23/2018    Performed by [facility]ib [facility] at [facility] ECT    ELECTROCONVULSIVE THERAPY (ECT) - SINGLE SEIZURE N/A 7/5/2018    Performed by [facility] at [facility] ECT    ELECTROCONVULSIVE THERAPY (ECT) - SINGLE SEIZURE N/A 6/28/2018    Performed by [facility] at [facility] ECT    ELECTROCONVULSIVE THERAPY (ECT) - SINGLE SEIZURE N/A 6/13/2018    Performed by [facility]ib [facility] at [facility] ECT    ELECTROCONVULSIVE THERAPY (ECT) - SINGLE SEIZURE N/A 5/29/2018    Performed by Sh[facility]ib [facility] Jr[facility] at [facility] ECT    ELECTROCONVULSIVE THERAPY (ECT) - SINGLE SEIZURE N/A 5/8/2018    Performed by Sh[facility]ib [facility] Jr[facility] at [facility] ECT    ELECTROCONVULSIVE THERAPY (ECT) - SINGLE SEIZURE N/A 4/24/2018    Performed by Sh[facility]ib [facility] Jr[facility] at [facility] ECT    ELECTROCONVULSIVE THERAPY (ECT) - SINGLE SEIZURE N/A 4/17/2018    Performed by Sh[facility]ib [facility] Jr[facility] at [facility] ECT    ELECTROCONVULSIVE THERAPY (ECT) - SINGLE SEIZURE N/A 4/13/2018    Performed by Shoaib Boyce Jr[facility] at [facility] ECT    ELECTROCONVULSIVE THERAPY (ECT) - SINGLE SEIZURE N/A 4/3/2018    Performed by Sh[facility]ib Boy[facility] Jr[facility] at [facility] ECT    ELECTROCONVULSIVE THERAPY (ECT) - SINGLE SEIZURE N/A 3/20/2018    Performed by Shoaib Boyce Jr[facility] at [facility] ECT    ELECTROCONVULSIVE THERAPY (ECT) - SINGLE SEIZURE N/A 3/15/2018    Performed by Shoaib Boyce Jr[facility] at [facility] ECT    ELECTROCONVULSIVE THERAPY (ECT) - SINGLE SEIZURE N/A 3/6/2018    Performed by Sh[facility]ib Boy[facility] Jr[facility] at [facility] ECT    ELECTROCONVULSIVE THERAPY (ECT) - SINGLE SEIZURE N/A 3/1/2018    Performed by Shoaib Boy[facility] Jr[facility] at [facility] ECT    ELECTROCONVULSIVE THERAPY (ECT) - SINGLE SEIZURE N/A 2/23/2018    Performed by Sh[facility]ib [facility] Jr[facility] at [facility] ECT    ELECTROCONVULSIVE THERAPY (ECT) - SINGLE SEIZURE N/A 2/19/2018    Performed by  [facility]ib [facility] Jr[facility] at [facility] ECT    ELECTROCONVULSIVE THERAPY (ECT) - SINGLE SEIZURE N/A 2/15/2018    Performed by Sh[facility]ib [facility] Jr[facility] at [facility] ECT    ELECTROCONVULSIVE THERAPY (ECT) - SINGLE SEIZURE N/A 1/22/2018    Performed by Shoaib Boyce Jr[facility] at [facility] ECT    ELECTROCONVULSIVE THERAPY (ECT) - SINGLE SEIZURE N/A 12/11/2017    Performed by Shoaib Boy[facility] Jr[facility] at [facility] ECT    ELECTROCONVULSIVE THERAPY (ECT) - SINGLE SEIZURE N/A 10/24/2017    Performed by Shoaib Boyce Jr[facility] at [facility] ECT    ELECTROCONVULSIVE THERAPY (ECT) - SINGLE SEIZURE N/A 9/20/2017    Performed by Shoaib Boyce Jr[facility] at [facility] ECT    ELECTROCONVULSIVE THERAPY (ECT) - SINGLE SEIZURE N/A 8/14/2017    Performed by Shoaib Boyce Jr[facility] at [facility] ECT    ELECTROCONVULSIVE THERAPY (ECT) - SINGLE SEIZURE Bilateral 7/12/2017    Performed by Shoaib Boyce Jr[facility] at [facility] ECT    ELECTROCONVULSIVE THERAPY (ECT) - SINGLE SEIZURE N/A 6/13/2017    Performed by Shoaib Boyce Jr[facility] at [facility] ECT    ELECTROCONVULSIVE THERAPY (ECT) - SINGLE SEIZURE N/A 5/17/2017    Performed by Shoaib Boyce Jr[facility] at [facility] ECT    ELECTROCONVULSIVE THERAPY (ECT) - SINGLE SEIZURE N/A 4/20/2017    Performed by Shoaib Boyce Jr[facility] at [facility] ECT    ELECTROCONVULSIVE THERAPY (ECT) - SINGLE SEIZURE N/A 11/22/2016    Performed by Shoaib Boyce Jr[facility] at [facility] ECT    ELECTROCONVULSIVE THERAPY (ECT) - SINGLE SEIZURE N/A 10/24/2016    Performed by Shoaib Boyce Jr., [facility] at [facility] ECT    ELECTROCONVULSIVE THERAPY (ECT) - SINGLE SEIZURE N/A 9/22/2016    Performed by Shoaib Boyce Jr[facility] at [facility] ECT    ELECTROCONVULSIVE THERAPY (ECT) - SINGLE SEIZURE N/A 9/1/2016    Performed by Shoaib Boyce Jr[facility] at [facility] ECT    ELECTROCONVULSIVE THERAPY (ECT) - SINGLE SEIZURE N/A 8/15/2016    Performed by Shoaib Boyce Jr[facility] at [facility] ECT    ELECTROCONVULSIVE THERAPY (ECT) - SINGLE SEIZURE N/A 8/1/2016    Performed by  Shoaib Boyce Jr., MD at [facility] ECT    ELECTROCONVULSIVE THERAPY (ECT) - SINGLE SEIZURE N/A 7/22/2016    Performed by Shoaib Boyce Jr., [facility] at [facility] ECT    ELECTROCONVULSIVE THERAPY (ECT) - SINGLE SEIZURE N/A 7/14/2016    Performed by Shoaib Boyce Jr[facility] at [facility] ECT    ELECTROCONVULSIVE THERAPY (ECT) - SINGLE SEIZURE N/A 7/8/2016    Performed by Shoaib Boyce Jr[facility] at [facility] ECT    ELECTROCONVULSIVE THERAPY (ECT) - SINGLE SEIZURE N/A 7/5/2016    Performed by Shoaib Boyce Jr[facility] at [facility] ECT    ELECTROCONVULSIVE THERAPY (ECT) - SINGLE SEIZURE N/A 6/27/2016    Performed by Shoaib Boyce Jr[facility] at [facility] ECT    ELECTROCONVULSIVE THERAPY (ECT) - SINGLE SEIZURE N/A 6/23/2016    Performed by Shoaib Boyce Jr[facility] at [facility] ECT    ELECTROCONVULSIVE THERAPY (ECT) - SINGLE SEIZURE N/A 6/20/2016    Performed by Shoaib Boyce Jr[facility] at [facility] ECT    ELECTROCONVULSIVE THERAPY (ECT) - SINGLE SEIZURE N/A 6/16/2016    Performed by Shoaib Boyce Jr[facility] at [facility] ECT    ELECTROCONVULSIVE THERAPY (ECT) - SINGLE SEIZURE N/A 6/15/2016    Performed by Shoaib Boyce Jr[facility] at [facility] ECT    ELECTROCONVULSIVE THERAPY (ECT) - SINGLE SEIZURE N/A 6/14/2016    Performed by Shoaib Boyce Jr[facility] at [facility] ECT    ELECTROCONVULSIVE THERAPY (ECT) - SINGLE SEIZURE N/A 6/13/2016    Performed by Sh[facility]ib Boyce Jr[facility] at [facility] ECT    ELECTROCONVULSIVE THERAPY (ECT) - SINGLE SEIZURE N/A 1/4/2016    Performed by [facility]ib Boyce Jr[facility] at [facility] ECT    ELECTROCONVULSIVE THERAPY, CEREBRAL HEMISPHERE, UNILATERAL, 1 SEIZURE Bilateral 6/25/2018    Performed by [facility] Jr[facility] at [facility] ECT    OVARIAN CYST REMOVAL Bilateral       OBJECTIVE:     Vitals (pre-procedure):  Vitals:    07/08/19 0618   BP: 113/65   Pulse: 83   Resp: 18   Temp: 97.8 °F (36.6 °C)        Labs/Imaging/Studies:   Recent Results (from the past 48 hour(s))   POCT urine pregnancy    Collection Time: 07/08/19  6:49 AM   Result  Value Ref Range    POC Preg Test, Ur Negative Negative     Acceptable Yes       No results found for: PHENYTOIN, PHENOBARB, VALPROATE, CBMZ    Physical Exam:   Gen: AAOx4, NAD  HEENT: MMM, PERRL, EOMI, O/P clear  CV: RRR, S1/S2 nml, no M/R/G  Chest: CTAB, no R/R/W, unlabored breathing  Abd: S/NT/ND, +BS, no HSM  Ext: No C/C/E, pulse 2+ throughout  Neuro: CN II-XII grossly intact, no focal deficits    Mental Status Exam:   Appearance: unremarkable, age appropriate, neatly groomed  Behavior: normal, cooperative  Speech: normal tone, normal rate, normal pitch, normal volume  Mood: depressed  Affect: reactive   Thought Process: normal and logical  Thought Content: normal, no suicidality, no homicidality, delusions, or paranoia  Cognition: grossly intact  Insight: good  Judgment: good    ASSESSMENT/PLAN:     Allyssa Wright is a 39 y.o. female with <principal problem not specified> who presents for ECT.    Recommendations:   Proceed with ECT #73.    Boaz Lo MD  [facility]  PGY-2

## 2019-07-08 NOTE — ADDENDUM NOTE
Addendum  created 07/08/19 1015 by Phoenix Farmer MD    Intraprocedure Event deleted, Intraprocedure Event edited, Intraprocedure Meds edited

## 2019-07-08 NOTE — ANESTHESIA POSTPROCEDURE EVALUATION
Anesthesia Post Evaluation    Patient: Allyssa Wright    Procedure(s) Performed: Procedure(s) (LRB):  ELECTROCONVULSIVE THERAPY (ECT) - SINGLE SEIZURE (N/A)    Final Anesthesia Type: general  Patient location during evaluation: PACU  Patient participation: Yes- Able to Participate  Level of consciousness: awake and alert  Post-procedure vital signs: reviewed and stable  Pain management: adequate  Airway patency: patent  PONV status at discharge: No PONV  Anesthetic complications: no      Cardiovascular status: blood pressure returned to baseline and hemodynamically stable  Respiratory status: unassisted and spontaneous ventilation  Hydration status: euvolemic  Follow-up not needed.          Vitals Value Taken Time   /80 7/8/2019  9:31 AM   Temp 36.6 °C (97.8 °F) 7/8/2019  8:50 AM   Pulse 95 7/8/2019  8:50 AM   Resp 18 7/8/2019  8:50 AM   SpO2 100 % 7/8/2019  8:50 AM   Vitals shown include unvalidated device data.      No case tracking events are documented in the log.      Pain/Roma Score: Roma Score: 9 (7/8/2019  9:00 AM)

## 2019-07-08 NOTE — DISCHARGE SUMMARY
Allyssa Wright  : 1980   MRN: [id number]  Date: 2019     Electroconvulsive Therapy  Discharge Summary    Admit Date: 2019  5:48 AM  Discharge Date: 2019    Attending Physician: Shoaib Boyce Jr., MD   Discharge Provider: Boaz Lo MD    History of Present Illness: Allyssa Wrgiht is a 39 y.o. female with Major Depressive Disorder  presented for ECT #73. See H&P dated 2019 for full HPI. For further details, see Dr. Boyce's pre-ECT evaluation.    Hospital Course: The patient tolerated the ECT treatment well without complication. Patient was stable post-procedure. See OP note dated 2019 for more details.     Disposition: Home or Self Care    Medications:  Current Discharge Medication List      CONTINUE these medications which have NOT CHANGED    Details   clozapine (CLOZARIL) 50 MG tablet Take 50 mg by mouth once daily.       dapsone 7.5 % GlwP Apply topically once daily.      famciclovir (FAMVIR) 500 MG tablet Take 1 tablet (500 mg total) by mouth 2 (two) times daily.  Qty: 30 tablet, Refills: 12    Associated Diagnoses: Major depressive disorder, recurrent episode, moderate degree      mirtazapine (REMERON) 15 MG tablet Take 1 tablet (15 mg total) by mouth every evening.  Qty: 90 tablet, Refills: 0      spironolactone (ALDACTONE) 50 MG tablet 50 mg 2 (two) times daily. 50  mg qAM, 50 mg qHS.      thyroid (ARMOUR THYROID) 30 mg Tab Take 1 tablet (30 mg total) by mouth every morning.  Qty: 30 tablet, Refills: 11    Associated Diagnoses: Major depressive disorder, recurrent episode, moderate degree      trazodone (DESYREL) 100 MG tablet Take 200 mg by mouth every evening.       UNABLE TO FIND 2 (two) times daily. n-azetyl-cysteine      venlafaxine (EFFEXOR) 100 MG Tab Take 3 tablets (300 mg total) by mouth once daily.    Associated Diagnoses: MDD (major depressive disorder), recurrent, in partial remission      vortioxetine (TRINTELLIX) 5 mg Tab Take 2.5 mg by mouth once daily.       dextroamphetamine-amphetamine 30 mg Tab Take 30 mg by mouth 2 (two) times daily.     Associated Diagnoses: MDD (major depressive disorder), recurrent, in partial remission      !! hydrOXYzine pamoate (VISTARIL) 25 MG Cap Take 2 capsules (50 mg total) by mouth nightly as needed (Take 25-50mg (1-2 caps) at night for anxiety prior to ECT).  Qty: 180 capsule, Refills: 0      !! hydrOXYzine pamoate (VISTARIL) 25 MG Cap Take 1 capsule (25 mg total) by mouth nightly as needed (anxiety/sleep). Take 1 to 2 pills as needed  Qty: 60 capsule, Refills: 2      ZOVIRAX 5 % Crea Refills: 5       !! - Potential duplicate medications found. Please discuss with provider.        Status at Discharge: Alert and medically stable    Discharge Diagnoses:  <principal problem not specified>  Diet: Resume previous outpatient diet  Activity: Ambulate with assistance  Instructions: Please do not eat or drink anything after midnight prior to procedure. Please do not drive on day of ECT.    Med Changes:  None    Next ECT:  7/11/2019    Boaz Lo MD  [facility]  PGY-2

## 2019-07-08 NOTE — OP NOTE
Allyssa Wright  : 1980   MRN: [id number]  Date: 2019    Electroconvulsive Therapy  Procedure Note    Date of Admission: 2019  5:48 AM    Site: [facility], [address]    Attending: Shoaib Boyce Jr., MD   Residents: Boaz Lo MD  Pre-procedure Diagnosis: <principal problem not specified>ECT Treatment Number: 73  Machine Type: Mecta 5000    Patient Status: Medically stable    Vitals (pre-procedure):  Vitals:    19 0618   BP: 113/65   Pulse: 83   Resp: 18   Temp: 97.8 °F (36.6 °C)       Electrode Placement: Bitemporal    Stimulus Number Charge (mC) Level Pulse Width (msec) Frequency  (Hz) Duration of Stimulus (sec) Current (mA) Duration of Seizure (min:sec)   1 576 3 1 60 6 800 1:18                                   Complications: Hypertension    Maximum Blood Pressure: 193/117    Medications Given:  Caffeine 500 mg   Methohexital (Brevital).   120mg  IV    Succinylcholine (Anectine).   120  IV    Ondansetron (Zofran).   4  IV    Esmolol (Brevibloc).   20  IV    Ketorolac 30mg    Treatment Course:  Patient tolerated procedure well. After adequate recovery from general anesthesia, the patient was transported to recovery.    Post-op Diagnosis: Same as above    Recommended for next ECT:  19      Boaz Lo MD  [facility]  PGY-2  2019

## 2019-07-10 ENCOUNTER — ANESTHESIA EVENT (OUTPATIENT)
Dept: ELECTROPHYSIOLOGY | Facility: HOSPITAL | Age: 39
End: 2019-07-10
Payer: COMMERCIAL

## 2019-07-10 NOTE — ANESTHESIA PREPROCEDURE EVALUATION
[facility]  Anesthesia Pre-Operative Evaluation         Patient Name: Allyssa Wright  YOB: 1980  MRN: [id number]    SUBJECTIVE:     Pre-operative evaluation for Procedure(s) (LRB):  ELECTROCONVULSIVE THERAPY (ECT) - SINGLE SEIZURE (N/A)     07/10/2019    Allyssa Wright is a 39 y.o. female w/ a significant PMHx of depression and schizophrenia.     Patient now presents for the above procedure: ECT #74     Previous ECT Anesthetic:  - Succinylcholine: 120 mg  - Methohexital: 120 mg  - Toradol: 30 mg  - Zofran: 4 mg  - Labetalol: 10 mg      Patient now presents for the above procedure(s).      LDA: None documented.       Prev airway: None documented.    Drips: None documented.      Patient Active Problem List   Diagnosis    MDD (major depressive disorder), recurrent, in partial remission    Other acne    Comfort measures only status    MDD (major depressive disorder)    Major depression    Major depressive disorder    MDD (major depressive disorder), severe    Depression       Review of patient's allergies indicates:   Allergen Reactions    Benzodiazepines Other (See Comments)     Contraindicated while taking Clozapine    Ampicillin      Mom says so    Erythromycin     Levaquin [levofloxacin] Other (See Comments)     Depression side effects    Penicillins      Mom says so    Pristiq [desvenlafaxine]      psycotic      Sulfa (sulfonamide antibiotics)      Rash      Azithromycin Anxiety       Current Inpatient Medications:      Current Outpatient Medications on File Prior to Visit   Medication Sig Dispense Refill    clozapine (CLOZARIL) 50 MG tablet Take 50 mg by mouth once daily.       dapsone 7.5 % GlwP Apply topically once daily.      dextroamphetamine-amphetamine 30 mg Tab Take 30 mg by mouth 2 (two) times daily.       famciclovir (FAMVIR) 500 MG tablet Take 1 tablet (500 mg total) by mouth 2  (two) times daily. 30 tablet 12    hydrOXYzine pamoate (VISTARIL) 25 MG Cap Take 2 capsules (50 mg total) by mouth nightly as needed (Take 25-50mg (1-2 caps) at night for anxiety prior to ECT). 180 capsule 0    hydrOXYzine pamoate (VISTARIL) 25 MG Cap Take 1 capsule (25 mg total) by mouth nightly as needed (anxiety/sleep). Take 1 to 2 pills as needed 60 capsule 2    mirtazapine (REMERON) 15 MG tablet Take 1 tablet (15 mg total) by mouth every evening. (Patient taking differently: Take 7.5 mg by mouth every evening. ) 90 tablet 0    spironolactone (ALDACTONE) 50 MG tablet 50 mg 2 (two) times daily. 50  mg qAM, 50 mg qHS.      thyroid (ARMOUR THYROID) 30 mg Tab Take 1 tablet (30 mg total) by mouth every morning. 30 tablet 11    trazodone (DESYREL) 100 MG tablet Take 200 mg by mouth every evening.       UNABLE TO FIND 2 (two) times daily. n-azetyl-cysteine      venlafaxine (EFFEXOR) 100 MG Tab Take 3 tablets (300 mg total) by mouth once daily. (Patient taking differently: Take 225 mg by mouth once daily. )      vortioxetine (TRINTELLIX) 5 mg Tab Take 2.5 mg by mouth once daily.      ZOVIRAX 5 % Crea   5     Current Facility-Administered Medications on File Prior to Visit   Medication Dose Route Frequency Provider Last Rate Last Dose    lidocaine (PF) 10 mg/ml (1%) injection 10 mg  1 mL Intradermal Once Homa Colbert MD        sodium chloride 0.9% flush 10 mL  10 mL Intra-Catheter PRN Homa Colbert MD           Past Surgical History:   Procedure Laterality Date    ANKLE SURGERY Right     BREAST augmentation      ELECTROCONVULSIVE THERAPY (ECT) - SINGLE SEIZURE N/A 12/6/2018    Performed by [facility]ib [facility] Jr[facility] ECT    ELECTROCONVULSIVE THERAPY (ECT) - SINGLE SEIZURE N/A 8/31/2018    Performed by Sh[facility]ib [facility] Jr[facility] at [facility] ECT    ELECTROCONVULSIVE THERAPY (ECT) - SINGLE SEIZURE N/A 8/6/2018    Performed by [facility]ib [facility] Jr[facility] at [facility] ECT    ELECTROCONVULSIVE THERAPY  (ECT) - SINGLE SEIZURE N/A 7/23/2018    Performed by [facility] Jr[facility] at [facility] ECT    ELECTROCONVULSIVE THERAPY (ECT) - SINGLE SEIZURE N/A 7/5/2018    Performed by Shoaib Boyce Jr., MD at [facility] ECT    ELECTROCONVULSIVE THERAPY (ECT) - SINGLE SEIZURE N/A 6/28/2018    Performed by Shoaib [facility] Jr[facility] at [facility] ECT    ELECTROCONVULSIVE THERAPY (ECT) - SINGLE SEIZURE N/A 6/13/2018    Performed by Shoaib Boyce Jr[facility] at [facility] ECT    ELECTROCONVULSIVE THERAPY (ECT) - SINGLE SEIZURE N/A 5/29/2018    Performed by Shoaib Boyce Jr[facility] at [facility] ECT    ELECTROCONVULSIVE THERAPY (ECT) - SINGLE SEIZURE N/A 5/8/2018    Performed by Shoaib Boyce Jr., [facility] at [facility] ECT    ELECTROCONVULSIVE THERAPY (ECT) - SINGLE SEIZURE N/A 4/24/2018    Performed by Shoaib Boyce Jr., [facility] at [facility] ECT    ELECTROCONVULSIVE THERAPY (ECT) - SINGLE SEIZURE N/A 4/17/2018    Performed by Shoaib Boyce Jr., [facility] at [facility] ECT    ELECTROCONVULSIVE THERAPY (ECT) - SINGLE SEIZURE N/A 4/13/2018    Performed by Shoaib Boyce Jr., MD at [facility] ECT    ELECTROCONVULSIVE THERAPY (ECT) - SINGLE SEIZURE N/A 4/3/2018    Performed by Shoaib Boyce Jr., MD at [facility] ECT    ELECTROCONVULSIVE THERAPY (ECT) - SINGLE SEIZURE N/A 3/20/2018    Performed by Shoaib Boyce Jr., MD at [facility] ECT    ELECTROCONVULSIVE THERAPY (ECT) - SINGLE SEIZURE N/A 3/15/2018    Performed by Shoaib Boyce Jr., MD at [facility] ECT    ELECTROCONVULSIVE THERAPY (ECT) - SINGLE SEIZURE N/A 3/6/2018    Performed by Shoaib Boyce Jr., MD at [facility] ECT    ELECTROCONVULSIVE THERAPY (ECT) - SINGLE SEIZURE N/A 3/1/2018    Performed by Shoaib Boyce Jr., MD at [facility] ECT    ELECTROCONVULSIVE THERAPY (ECT) - SINGLE SEIZURE N/A 2/23/2018    Performed by Shoaib Boyce Jr., MD at [facility] ECT    ELECTROCONVULSIVE THERAPY (ECT) - SINGLE SEIZURE N/A 2/19/2018    Performed by Shoaib Boyce Jr., MD at [facility] ECT    ELECTROCONVULSIVE THERAPY (ECT) - SINGLE  SEIZURE N/A 2/15/2018    Performed by Sh[facility]ib [facility] Jr[facility] at [facility] ECT    ELECTROCONVULSIVE THERAPY (ECT) - SINGLE SEIZURE N/A 1/22/2018    Performed by Shoaib Boyce Jr., MD at [facility] ECT    ELECTROCONVULSIVE THERAPY (ECT) - SINGLE SEIZURE N/A 12/11/2017    Performed by Shoaib Boyce Jr., MD at [facility] ECT    ELECTROCONVULSIVE THERAPY (ECT) - SINGLE SEIZURE N/A 10/24/2017    Performed by Shoaib Boyce Jr[facility] MD at [facility] ECT    ELECTROCONVULSIVE THERAPY (ECT) - SINGLE SEIZURE N/A 9/20/2017    Performed by Shoaib Boyce Jr., MD at [facility] ECT    ELECTROCONVULSIVE THERAPY (ECT) - SINGLE SEIZURE N/A 8/14/2017    Performed by Shoaib Boyce Jr., MD at [facility] ECT    ELECTROCONVULSIVE THERAPY (ECT) - SINGLE SEIZURE Bilateral 7/12/2017    Performed by Shoaib Boyce Jr., MD at [facility] ECT    ELECTROCONVULSIVE THERAPY (ECT) - SINGLE SEIZURE N/A 6/13/2017    Performed by Shoaib Boyce Jr., MD at [facility] ECT    ELECTROCONVULSIVE THERAPY (ECT) - SINGLE SEIZURE N/A 5/17/2017    Performed by Shoaib Boyce Jr., MD at [facility] ECT    ELECTROCONVULSIVE THERAPY (ECT) - SINGLE SEIZURE N/A 4/20/2017    Performed by Shoaib Boyce Jr., MD at [facility] ECT    ELECTROCONVULSIVE THERAPY (ECT) - SINGLE SEIZURE N/A 11/22/2016    Performed by Shoaib Boyce Jr., MD at [facility] ECT    ELECTROCONVULSIVE THERAPY (ECT) - SINGLE SEIZURE N/A 10/24/2016    Performed by Shoaib Boyce Jr., MD at [facility] ECT    ELECTROCONVULSIVE THERAPY (ECT) - SINGLE SEIZURE N/A 9/22/2016    Performed by Shoaib Boyce Jr., MD at [facility] ECT    ELECTROCONVULSIVE THERAPY (ECT) - SINGLE SEIZURE N/A 9/1/2016    Performed by Shoaib Boyce Jr., MD at [facility] ECT    ELECTROCONVULSIVE THERAPY (ECT) - SINGLE SEIZURE N/A 8/15/2016    Performed by Shoaib Boyce Jr., MD at [facility] ECT    ELECTROCONVULSIVE THERAPY (ECT) - SINGLE SEIZURE N/A 8/1/2016    Performed by Shoaib Boyce Jr., MD at [facility] ECT    ELECTROCONVULSIVE THERAPY (ECT) - SINGLE  SEIZURE N/A 7/22/2016    Performed by Shoaib Boyce Jr., MD at [facility] ECT    ELECTROCONVULSIVE THERAPY (ECT) - SINGLE SEIZURE N/A 7/14/2016    Performed by Shoaib Boyce Jr., MD at [facility] ECT    ELECTROCONVULSIVE THERAPY (ECT) - SINGLE SEIZURE N/A 7/8/2016    Performed by Shoaib Boyce Jr., MD at [facility] ECT    ELECTROCONVULSIVE THERAPY (ECT) - SINGLE SEIZURE N/A 7/5/2016    Performed by Shoaib Boyce Jr., MD at [facility] ECT    ELECTROCONVULSIVE THERAPY (ECT) - SINGLE SEIZURE N/A 6/27/2016    Performed by Shoaib Boyce Jr., MD at [facility] ECT    ELECTROCONVULSIVE THERAPY (ECT) - SINGLE SEIZURE N/A 6/23/2016    Performed by Shoaib Boyce Jr., MD at [facility] ECT    ELECTROCONVULSIVE THERAPY (ECT) - SINGLE SEIZURE N/A 6/20/2016    Performed by Shoaib Boyce Jr., MD at [facility] ECT    ELECTROCONVULSIVE THERAPY (ECT) - SINGLE SEIZURE N/A 6/16/2016    Performed by Shoaib Boyce Jr., MD at [facility] ECT    ELECTROCONVULSIVE THERAPY (ECT) - SINGLE SEIZURE N/A 6/15/2016    Performed by Shoaib Boyce Jr., MD at [facility] ECT    ELECTROCONVULSIVE THERAPY (ECT) - SINGLE SEIZURE N/A 6/14/2016    Performed by Shoaib Boyce Jr., MD at [facility] ECT    ELECTROCONVULSIVE THERAPY (ECT) - SINGLE SEIZURE N/A 6/13/2016    Performed by Shoaib Boyce Jr[facility] at [facility] ECT    ELECTROCONVULSIVE THERAPY (ECT) - SINGLE SEIZURE N/A 1/4/2016    Performed by Shoaib Boyce Jr., MD at [facility] ECT    ELECTROCONVULSIVE THERAPY, CEREBRAL HEMISPHERE, UNILATERAL, 1 SEIZURE Bilateral 6/25/2018    Performed by Sh[facility]ib [facility] Jr[facility] at [facility] ECT    OVARIAN CYST REMOVAL Bilateral        Social History     Socioeconomic History    Marital status: Single     Spouse name: Not on file    Number of children: Not on file    Years of education: Not on file    Highest education level: Not on file   Occupational History    Occupation: unemployed   Social Needs    Financial resource strain: Not on file    Food insecurity:     Worry: Not  on file     Inability: Not on file    Transportation needs:     Medical: Not on file     Non-medical: Not on file   Tobacco Use    Smoking status: Former Smoker     Last attempt to quit: 2011     Years since quittin.2    Smokeless tobacco: Never Used   Substance and Sexual Activity    Alcohol use: Yes     Comment: rarely    Drug use: No     Comment: Experimental use in high school    Sexual activity: Not on file   Lifestyle    Physical activity:     Days per week: Not on file     Minutes per session: Not on file    Stress: Not on file   Relationships    Social connections:     Talks on phone: Not on file     Gets together: Not on file     Attends Confucianism service: Not on file     Active member of club or organization: Not on file     Attends meetings of clubs or organizations: Not on file     Relationship status: Not on file   Other Topics Concern    Patient feels they ought to cut down on drinking/drug use Not Asked    Patient annoyed by others criticizing their drinking/drug use Not Asked    Patient has felt bad or guilty about drinking/drug use Not Asked    Patient has had a drink/used drugs as an eye opener in the AM Not Asked   Social History Narrative    Pt has 1 older brother from an intact family until the death of her mother in .  She completed 1 year of college, was never in the , and has never been employed.  She was engaged once, but never , has no children, and lives with her father plus 2 dogs.  She denies any hobbies and is spiritual but not Confucianism.  She does date and returns to college during rare periods when depression and anxiety [place].       OBJECTIVE:     Vital Signs Range (Last 24H):  BP: ()/()   Arterial Line BP: ()/()       Significant Labs:  Lab Results   Component Value Date    WBC 6.83 2016    HGB 13.3 2016    HCT 40.3 2016     2016    ALT 14 2016    AST 20 2016     2016    K 3.8 2016      06/08/2016    CREATININE 0.7 06/08/2016    BUN 10 06/08/2016    CO2 28 06/08/2016    TSH 1.208 06/08/2016       Diagnostic Studies: No relevant studies.    EKG: No recent studies available.    2D ECHO:  No results found for this or any previous visit.      ASSESSMENT/PLAN:         Pre-op Assessment    I have reviewed the Patient Summary Reports.     I have reviewed the Nursing Notes.   I have reviewed the Medications.     Review of Systems  Anesthesia Hx:  No problems with previous Anesthesia  Denies Family Hx of Anesthesia complications.   Denies Personal Hx of Anesthesia complications.   Social:  Non-Smoker, No Alcohol Use    Hematology/Oncology:        Denies Current/Recent Cancer   EENT/Dental:   denies chronic allergic rhinitis   Cardiovascular:   Denies Pacemaker.  Denies Hypertension.  Denies Valvular problems/Murmurs.  Denies MI.  Denies CAD.    Denies CABG/stent.  Denies Dysrhythmias.             Pulmonary:   Denies COPD.  Denies Asthma.  Denies Recent URI.  Denies Sleep Apnea.    Renal/:   Denies Chronic Renal Disease.     Hepatic/GI:   Denies PUD. Denies GERD. Denies Liver Disease.    Neurological:   Denies TIA. Denies CVA. Seizures    Endocrine:   Denies Diabetes. Denies Hypothyroidism. Denies Hyperthyroidism.    Psych:   Psychiatric History          Physical Exam  General:  Well nourished    Airway/Jaw/Neck:  Airway Findings: Mouth Opening: Normal Tongue: Normal  General Airway Assessment: Adult  Mallampati: I  Improves to II with phonation.  TM Distance: Normal, at least 6 cm  Jaw/Neck Findings:  Neck ROM: Normal ROM     Eyes/Ears/Nose:  EYES/EARS/NOSE FINDINGS: Normal   Dental:  Dental Findings: In tact   Chest/Lungs:  Chest/Lungs Findings: Clear to auscultation     Heart/Vascular:  Heart Findings: Rate: Normal  Rhythm: Regular Rhythm  Sounds: Normal  Heart murmur: negative    Abdomen:  Abdomen Findings:  Normal, Soft, Nontender     Musculoskeletal:  Musculoskeletal Findings: Normal    Skin:  Skin Findings: Normal    Mental Status:  Mental Status Findings:  Cooperative, Alert and Oriented         Anesthesia Plan  Type of Anesthesia, risks & benefits discussed:  Anesthesia Type:  general  Patient's Preference:   Intra-op Monitoring Plan: standard ASA monitors  Intra-op Monitoring Plan Comments:   Post Op Pain Control Plan: per primary service following discharge from PACU  Post Op Pain Control Plan Comments:   Induction:   IV  Beta Blocker:  Patient is not currently on a Beta-Blocker (No further documentation required).       Informed Consent: Patient understands risks and agrees with Anesthesia plan.  Questions answered. Anesthesia consent signed with patient.  ASA Score: 2     Day of Surgery Review of History & Physical:    H&P update referred to the provider.         Ready For Surgery From Anesthesia Perspective.

## 2019-07-11 ENCOUNTER — ANESTHESIA (OUTPATIENT)
Dept: ELECTROPHYSIOLOGY | Facility: HOSPITAL | Age: 39
End: 2019-07-11
Payer: COMMERCIAL

## 2019-07-11 ENCOUNTER — HOSPITAL ENCOUNTER (OUTPATIENT)
Facility: HOSPITAL | Age: 39
Discharge: HOME | End: 2019-07-11
Attending: PSYCHIATRY & NEUROLOGY | Admitting: PSYCHIATRY & NEUROLOGY
Payer: COMMERCIAL

## 2019-07-11 VITALS
TEMPERATURE: 98 F | OXYGEN SATURATION: 98 % | SYSTOLIC BLOOD PRESSURE: 108 MMHG | WEIGHT: 145 LBS | BODY MASS INDEX: 24.16 KG/M2 | HEART RATE: 80 BPM | HEIGHT: 65 IN | DIASTOLIC BLOOD PRESSURE: 75 MMHG | RESPIRATION RATE: 20 BRPM

## 2019-07-11 DIAGNOSIS — F33.9 RECURRENT MAJOR DEPRESSIVE DISORDER, REMISSION STATUS UNSPECIFIED: Primary | ICD-10-CM

## 2019-07-11 DIAGNOSIS — F32.9 MAJOR DEPRESSIVE DISORDER: ICD-10-CM

## 2019-07-11 DIAGNOSIS — F33.41 MDD (MAJOR DEPRESSIVE DISORDER), RECURRENT, IN PARTIAL REMISSION: ICD-10-CM

## 2019-07-11 LAB
B-HCG UR QL: NEGATIVE
CTP QC/QA: YES

## 2019-07-11 PROCEDURE — 90870 ELECTROCONVULSIVE THERAPY: CPT | Mod: ,,, | Performed by: PSYCHIATRY & NEUROLOGY

## 2019-07-11 PROCEDURE — 90870 PR ELECTROCONVULSIVE THERAPY,1 SEIZ: ICD-10-PCS | Mod: ,,, | Performed by: PSYCHIATRY & NEUROLOGY

## 2019-07-11 PROCEDURE — 25000003 PHARM REV CODE 250: Performed by: SURGERY

## 2019-07-11 PROCEDURE — 25000003 PHARM REV CODE 250: Performed by: STUDENT IN AN ORGANIZED HEALTH CARE EDUCATION/TRAINING PROGRAM

## 2019-07-11 PROCEDURE — D9220A PRA ANESTHESIA: ICD-10-PCS | Mod: ,,, | Performed by: ANESTHESIOLOGY

## 2019-07-11 PROCEDURE — 63600175 PHARM REV CODE 636 W HCPCS: Performed by: SURGERY

## 2019-07-11 PROCEDURE — 90870 ELECTROCONVULSIVE THERAPY: CPT | Performed by: SURGERY

## 2019-07-11 PROCEDURE — D9220A PRA ANESTHESIA: Mod: ,,, | Performed by: ANESTHESIOLOGY

## 2019-07-11 PROCEDURE — 81025 URINE PREGNANCY TEST: CPT | Performed by: PSYCHIATRY & NEUROLOGY

## 2019-07-11 RX ORDER — KETOROLAC TROMETHAMINE 30 MG/ML
INJECTION, SOLUTION INTRAMUSCULAR; INTRAVENOUS
Status: COMPLETED
Start: 2019-07-11 | End: 2019-07-11

## 2019-07-11 RX ORDER — SODIUM CHLORIDE 9 MG/ML
INJECTION, SOLUTION INTRAVENOUS CONTINUOUS
Status: DISCONTINUED | OUTPATIENT
Start: 2019-07-11 | End: 2019-07-11 | Stop reason: HOSPADM

## 2019-07-11 RX ORDER — KETOROLAC TROMETHAMINE 30 MG/ML
INJECTION, SOLUTION INTRAMUSCULAR; INTRAVENOUS
Status: DISCONTINUED | OUTPATIENT
Start: 2019-07-11 | End: 2019-07-11

## 2019-07-11 RX ORDER — LABETALOL HCL 20 MG/4 ML
SYRINGE (ML) INTRAVENOUS
Status: DISCONTINUED
Start: 2019-07-11 | End: 2019-07-11 | Stop reason: WASHOUT

## 2019-07-11 RX ORDER — ONDANSETRON 2 MG/ML
INJECTION INTRAMUSCULAR; INTRAVENOUS
Status: COMPLETED
Start: 2019-07-11 | End: 2019-07-11

## 2019-07-11 RX ORDER — SUCCINYLCHOLINE CHLORIDE 20 MG/ML
INJECTION INTRAMUSCULAR; INTRAVENOUS
Status: COMPLETED
Start: 2019-07-11 | End: 2019-07-11

## 2019-07-11 RX ORDER — LABETALOL HYDROCHLORIDE 5 MG/ML
INJECTION, SOLUTION INTRAVENOUS
Status: DISCONTINUED | OUTPATIENT
Start: 2019-07-11 | End: 2019-07-11

## 2019-07-11 RX ORDER — ESMOLOL HYDROCHLORIDE 10 MG/ML
INJECTION INTRAVENOUS
Status: DISCONTINUED
Start: 2019-07-11 | End: 2019-07-11 | Stop reason: HOSPADM

## 2019-07-11 RX ORDER — ESMOLOL HYDROCHLORIDE 10 MG/ML
INJECTION INTRAVENOUS
Status: DISCONTINUED
Start: 2019-07-11 | End: 2019-07-11 | Stop reason: WASHOUT

## 2019-07-11 RX ORDER — LABETALOL HCL 20 MG/4 ML
SYRINGE (ML) INTRAVENOUS
Status: COMPLETED
Start: 2019-07-11 | End: 2019-07-11

## 2019-07-11 RX ORDER — SUCCINYLCHOLINE CHLORIDE 20 MG/ML
INJECTION INTRAMUSCULAR; INTRAVENOUS
Status: DISCONTINUED | OUTPATIENT
Start: 2019-07-11 | End: 2019-07-11

## 2019-07-11 RX ORDER — ONDANSETRON 2 MG/ML
INJECTION INTRAMUSCULAR; INTRAVENOUS
Status: DISCONTINUED | OUTPATIENT
Start: 2019-07-11 | End: 2019-07-11

## 2019-07-11 RX ORDER — SODIUM CHLORIDE 0.9 % (FLUSH) 0.9 %
10 SYRINGE (ML) INJECTION
Status: DISCONTINUED | OUTPATIENT
Start: 2019-07-11 | End: 2019-07-11 | Stop reason: HOSPADM

## 2019-07-11 RX ORDER — LIDOCAINE HYDROCHLORIDE 10 MG/ML
1 INJECTION, SOLUTION EPIDURAL; INFILTRATION; INTRACAUDAL; PERINEURAL ONCE
Status: DISCONTINUED | OUTPATIENT
Start: 2019-07-11 | End: 2019-07-11 | Stop reason: HOSPADM

## 2019-07-11 RX ORDER — SUCCINYLCHOLINE CHLORIDE 20 MG/ML
INJECTION INTRAMUSCULAR; INTRAVENOUS
Status: DISCONTINUED
Start: 2019-07-11 | End: 2019-07-11 | Stop reason: WASHOUT

## 2019-07-11 RX ORDER — ONDANSETRON 2 MG/ML
INJECTION INTRAMUSCULAR; INTRAVENOUS
Status: DISCONTINUED
Start: 2019-07-11 | End: 2019-07-11 | Stop reason: WASHOUT

## 2019-07-11 RX ORDER — KETOROLAC TROMETHAMINE 30 MG/ML
INJECTION, SOLUTION INTRAMUSCULAR; INTRAVENOUS
Status: DISCONTINUED
Start: 2019-07-11 | End: 2019-07-11 | Stop reason: WASHOUT

## 2019-07-11 RX ADMIN — SODIUM CHLORIDE 500 ML: 0.9 INJECTION, SOLUTION INTRAVENOUS at 06:07

## 2019-07-11 RX ADMIN — SODIUM CHLORIDE: 0.9 INJECTION, SOLUTION INTRAVENOUS at 07:07

## 2019-07-11 RX ADMIN — Medication 500 MG: at 06:07

## 2019-07-11 RX ADMIN — LABETALOL HYDROCHLORIDE 10 MG: 5 INJECTION, SOLUTION INTRAVENOUS at 07:07

## 2019-07-11 RX ADMIN — METHOHEXITAL SODIUM 120 MG: 500 INJECTION, POWDER, LYOPHILIZED, FOR SOLUTION INTRAMUSCULAR; INTRAVENOUS; RECTAL at 07:07

## 2019-07-11 RX ADMIN — KETOROLAC TROMETHAMINE 30 MG: 30 INJECTION, SOLUTION INTRAMUSCULAR at 07:07

## 2019-07-11 RX ADMIN — ONDANSETRON 4 MG: 2 INJECTION, SOLUTION INTRAMUSCULAR; INTRAVENOUS at 07:07

## 2019-07-11 RX ADMIN — SUCCINYLCHOLINE CHLORIDE 120 MG: 20 INJECTION, SOLUTION INTRAMUSCULAR; INTRAVENOUS at 07:07

## 2019-07-11 NOTE — DISCHARGE SUMMARY
Allyssa Wright  : 1980   MRN: [id number]  Date: 2019     Electroconvulsive Therapy  Discharge Summary    Admit Date: 2019  5:50 AM  Discharge Date: 2019    Attending Physician: Shoaib Boyce Jr., MD   Discharge Provider: Cesar Lo MD    History of Present Illness: Allyssa Wright is a 39 y.o. female with Major Depressive Disorder presented for ECT #74. See H&P dated 2019 for full HPI. For further details, see Dr. Boyce's pre-ECT evaluation.    Hospital Course: The patient tolerated the ECT treatment well without complication. Patient was stable post-procedure. See OP note dated 2019 for more details.     Disposition: Home or Self Care    Medications:  Current Discharge Medication List      CONTINUE these medications which have NOT CHANGED    Details   clozapine (CLOZARIL) 50 MG tablet Take 50 mg by mouth once daily.       dapsone 7.5 % GlwP Apply topically once daily.      famciclovir (FAMVIR) 500 MG tablet Take 1 tablet (500 mg total) by mouth 2 (two) times daily.  Qty: 30 tablet, Refills: 12    Associated Diagnoses: Major depressive disorder, recurrent episode, moderate degree      mirtazapine (REMERON) 15 MG tablet Take 1 tablet (15 mg total) by mouth every evening.  Qty: 90 tablet, Refills: 0      spironolactone (ALDACTONE) 50 MG tablet 50 mg 2 (two) times daily. 50  mg qAM, 50 mg qHS.      thyroid (ARMOUR THYROID) 30 mg Tab Take 1 tablet (30 mg total) by mouth every morning.  Qty: 30 tablet, Refills: 11    Associated Diagnoses: Major depressive disorder, recurrent episode, moderate degree      trazodone (DESYREL) 100 MG tablet Take 200 mg by mouth every evening.       UNABLE TO FIND 2 (two) times daily. n-azetyl-cysteine      venlafaxine (EFFEXOR) 100 MG Tab Take 3 tablets (300 mg total) by mouth once daily.    Associated Diagnoses: MDD (major depressive disorder), recurrent, in partial remission      vortioxetine (TRINTELLIX) 5 mg Tab Take 2.5 mg by mouth once daily.       dextroamphetamine-amphetamine 30 mg Tab Take 30 mg by mouth 2 (two) times daily.     Associated Diagnoses: MDD (major depressive disorder), recurrent, in partial remission      !! hydrOXYzine pamoate (VISTARIL) 25 MG Cap Take 2 capsules (50 mg total) by mouth nightly as needed (Take 25-50mg (1-2 caps) at night for anxiety prior to ECT).  Qty: 180 capsule, Refills: 0      !! hydrOXYzine pamoate (VISTARIL) 25 MG Cap Take 1 capsule (25 mg total) by mouth nightly as needed (anxiety/sleep). Take 1 to 2 pills as needed  Qty: 60 capsule, Refills: 2      ZOVIRAX 5 % Crea Refills: 5       !! - Potential duplicate medications found. Please discuss with provider.        Status at Discharge: Alert and medically stable    Discharge Diagnoses:  Major Depressive Disorder, recurrent, in partial remission  Diet: Resume previous outpatient diet  Activity: Ambulate with assistance  Instructions: Please do not eat or drink anything after midnight prior to procedure. Please do not drive on day of ECT.    Med Changes:  None    Next ECT:  7/15/2019     Boaz Lo MD  [employer] [facility]  PGY-2

## 2019-07-11 NOTE — ANESTHESIA POSTPROCEDURE EVALUATION
Anesthesia Post Evaluation    Patient: Allyssa Wright    Procedure(s) Performed: Procedure(s) (LRB):  ELECTROCONVULSIVE THERAPY (ECT) - SINGLE SEIZURE (N/A)    Final Anesthesia Type: general  Patient location during evaluation: PACU  Patient participation: Yes- Able to Participate  Level of consciousness: awake and alert and oriented  Post-procedure vital signs: reviewed and stable  Pain management: adequate  Airway patency: patent  PONV status at discharge: No PONV  Anesthetic complications: no      Cardiovascular status: hemodynamically stable  Respiratory status: unassisted and spontaneous ventilation  Hydration status: euvolemic  Follow-up not needed.          Vitals Value Taken Time   /68 7/11/2019  8:02 AM   Temp 36.8 °C (98.2 °F) 7/11/2019  7:32 AM   Pulse 79 7/11/2019  8:09 AM   Resp 20 7/11/2019  8:00 AM   SpO2 98 % 7/11/2019  8:09 AM   Vitals shown include unvalidated device data.      No case tracking events are documented in the log.      Pain/Roma Score: Roma Score: 10 (7/11/2019  8:02 AM)

## 2019-07-11 NOTE — ANESTHESIA PROCEDURE NOTES
ECT    Treatment Number: 74  Procedure start time: 7/11/2019 7:20 AM  Timeout performed at: 7/11/2019 7:19 AM    Staffing  Authorizing Provider: Renetta Carver MD  Performing Provider: Ruel Peterson MD    Preanesthetic Checklist  The following were completed as part of the preanesthetic checklist: patient identified, procedural consent, pre-op evaluation, timeout performed, risks and benefits discussed, monitors and equipment checked, anesthesia consent given, oxygen available, suction available, hand hygiene performed and patient being monitored.    Setup & Induction  Patient Monitoring: heart rate, cardiac monitor, continuous pulse ox, continuous capnometry, NIBP and gas analyzer  Patient preparation: bite block inserted, extremities padded, mandibular stabilization and patient hyperventilated  Electrode Placement: Bitemporal    The patient was moved to the ECT therapy room after being assessed and consented for ECT. After standard ASA monitors were applied and timeout performed, the patient was adequately preoxygenated. After induction of general anesthesia, adequate oxygenation and ventilation were confirmed with pulse oxymetry and end tidal CO2 monitoring via bag-mask ventilation. End tidal CO2 was monitored throughout the case and moderate hyperventilation was performed prior to beginning ECT treatment. All extremities were padded, biteblock was inserted, and mandibular stabilization was done prior to initiating ECT therapy.    Procedure  Stimulus Number 1: Setting Number = I; 576 millicoulombs; Seizure Duration = 79 seconds            Recovery  Baseline Blood Pressure: 131/70  Peak Blood Pressure: 150/91    ECT Findings  ECT associated findings of: None               ambulatory

## 2019-07-11 NOTE — H&P
"Allyssa Wright  : 1980   MRN: [id number]  Date: 2019     Electroconvulsive Therapy  History & Physical    Chief complaint: Major Depressive Disorder, recurrent, partial remission  Procedure: ECT #74    SUBJECTIVE:     HPI:   Allyssa Wright is a 39 y.o. female with MDD who presents for ECT. The patient complains of some lingering depression since last treatment, though she is overall feeling better than a week ago. No recent medicine changes.  No difficulty falling asleep, has been over eating.  Has some concern that she may need more frequent treatments.  Father is bed side and indicates he has seen some improvement in mood since last treatment.  Please see Dr. Boyce's full pre-ECT evaluation for further details. The patient does not need any prescriptions and has not had any med changes since meeting with Dr. Boyce. The patient has not had anything by mouth since midnight and is ready for ECT.    Psychiatric Review of Systems:  Mood: depressed," a little better"  Appetite: No problem  Psychomotor: No problem  Cognitive Impairment: Mild  Insomnia: None  Psychosis: None  Diurnal Variation: None  Suicidal Ideation: Absent    Medical Review Of Systems:  Pertinent items are noted in HPI.    Current Medications:   Current Facility-Administered Medications on File Prior to Encounter   Medication Dose Route Frequency Provider Last Rate Last Dose    lidocaine (PF) 10 mg/ml (1%) injection 10 mg  1 mL Intradermal Once Homa Colbert MD        sodium chloride 0.9% flush 10 mL  10 mL Intra-Catheter PRN Homa Colbert MD         Current Outpatient Medications on File Prior to Encounter   Medication Sig Dispense Refill    clozapine (CLOZARIL) 50 MG tablet Take 50 mg by mouth once daily.       dapsone 7.5 % GlwP Apply topically once daily.      famciclovir (FAMVIR) 500 MG tablet Take 1 tablet (500 mg total) by mouth 2 (two) times daily. 30 tablet 12    mirtazapine (REMERON) 15 MG tablet Take 1 tablet " (15 mg total) by mouth every evening. (Patient taking differently: Take 7.5 mg by mouth every evening. ) 90 tablet 0    spironolactone (ALDACTONE) 50 MG tablet 50 mg 2 (two) times daily. 50  mg qAM, 50 mg qHS.      thyroid (ARMOUR THYROID) 30 mg Tab Take 1 tablet (30 mg total) by mouth every morning. 30 tablet 11    trazodone (DESYREL) 100 MG tablet Take 200 mg by mouth every evening.       UNABLE TO FIND 2 (two) times daily. n-azetyl-cysteine      venlafaxine (EFFEXOR) 100 MG Tab Take 3 tablets (300 mg total) by mouth once daily. (Patient taking differently: Take 225 mg by mouth once daily. )      vortioxetine (TRINTELLIX) 5 mg Tab Take 2.5 mg by mouth once daily.      dextroamphetamine-amphetamine 30 mg Tab Take 30 mg by mouth 2 (two) times daily.       hydrOXYzine pamoate (VISTARIL) 25 MG Cap Take 2 capsules (50 mg total) by mouth nightly as needed (Take 25-50mg (1-2 caps) at night for anxiety prior to ECT). 180 capsule 0    hydrOXYzine pamoate (VISTARIL) 25 MG Cap Take 1 capsule (25 mg total) by mouth nightly as needed (anxiety/sleep). Take 1 to 2 pills as needed 60 capsule 2    ZOVIRAX 5 % Crea   5      Allergies:   Benzodiazepines; Ampicillin; Erythromycin; Levaquin [levofloxacin]; Penicillins; Pristiq [desvenlafaxine]; Sulfa (sulfonamide antibiotics); and Azithromycin    Past Medical/Surgical History:   Past Medical History:   Diagnosis Date    Anxiety     Depression     History of psychiatric hospitalization     HSV-1 (herpes simplex virus 1) infection     Hx of psychiatric care     Moderate depressed bipolar II disorder 06/13/2016    reports no history of bipolar    Obsessive-compulsive disorder     Psychiatric problem     Schizophrenia 4/3/2018    Self-harming behavior     Therapy      Past Surgical History:   Procedure Laterality Date    ANKLE SURGERY Right     BREAST augmentation      ELECTROCONVULSIVE THERAPY (ECT) - SINGLE SEIZURE N/A 12/6/2018    Performed by Shoaib Boyce  MD [place] [facility] ECT    ELECTROCONVULSIVE THERAPY (ECT) - SINGLE SEIZURE N/A 8/31/2018    Performed by Shoaib Boyce Jr., MD at [facility] ECT    ELECTROCONVULSIVE THERAPY (ECT) - SINGLE SEIZURE N/A 8/6/2018    Performed by Shoaib Boyce Jr., [facility] at [facility] ECT    ELECTROCONVULSIVE THERAPY (ECT) - SINGLE SEIZURE N/A 7/23/2018    Performed by Shoaib Boyce Jr., [facility] at [facility] ECT    ELECTROCONVULSIVE THERAPY (ECT) - SINGLE SEIZURE N/A 7/5/2018    Performed by Shoaib Boyce Jr., [facility] at [facility] ECT    ELECTROCONVULSIVE THERAPY (ECT) - SINGLE SEIZURE N/A 6/28/2018    Performed by Shoaib Boyce Jr., [facility] at [facility] ECT    ELECTROCONVULSIVE THERAPY (ECT) - SINGLE SEIZURE N/A 6/13/2018    Performed by Shoaib Boyce Jr., [facility] at [facility] ECT    ELECTROCONVULSIVE THERAPY (ECT) - SINGLE SEIZURE N/A 5/29/2018    Performed by Shoaib Boyce Jr., [facility] at [facility] ECT    ELECTROCONVULSIVE THERAPY (ECT) - SINGLE SEIZURE N/A 5/8/2018    Performed by Shoaib Boyce Jr., [facility] at [facility] ECT    ELECTROCONVULSIVE THERAPY (ECT) - SINGLE SEIZURE N/A 4/24/2018    Performed by Shoaib Boyce Jr., [facility] at [facility] ECT    ELECTROCONVULSIVE THERAPY (ECT) - SINGLE SEIZURE N/A 4/17/2018    Performed by Shoaib Boyce Jr., [facility] at [facility] ECT    ELECTROCONVULSIVE THERAPY (ECT) - SINGLE SEIZURE N/A 4/13/2018    Performed by Shoaib Boyce Jr., [facility] at [facility] ECT    ELECTROCONVULSIVE THERAPY (ECT) - SINGLE SEIZURE N/A 4/3/2018    Performed by Shoaib Boyce Jr., [facility] at [facility] ECT    ELECTROCONVULSIVE THERAPY (ECT) - SINGLE SEIZURE N/A 3/20/2018    Performed by Shoaib Boyce Jr., [facility] at [facility] ECT    ELECTROCONVULSIVE THERAPY (ECT) - SINGLE SEIZURE N/A 3/15/2018    Performed by Shoaib Boyce Jr., [facility] at [facility] ECT    ELECTROCONVULSIVE THERAPY (ECT) - SINGLE SEIZURE N/A 3/6/2018    Performed by Shoaib Boyce Jr., [facility] at [facility] ECT    ELECTROCONVULSIVE THERAPY (ECT) - SINGLE SEIZURE N/A 3/1/2018    Performed by Shoaib Boyce Jr., [facility] at [facility] ECT     ELECTROCONVULSIVE THERAPY (ECT) - SINGLE SEIZURE N/A 2/23/2018    Performed by Shoaib Boyce Jr[facility] at [facility] ECT    ELECTROCONVULSIVE THERAPY (ECT) - SINGLE SEIZURE N/A 2/19/2018    Performed by Shoaib Boyce Jr., MD at [facility] ECT    ELECTROCONVULSIVE THERAPY (ECT) - SINGLE SEIZURE N/A 2/15/2018    Performed by Shoaib Boyce Jr., MD at [facility] ECT    ELECTROCONVULSIVE THERAPY (ECT) - SINGLE SEIZURE N/A 1/22/2018    Performed by Shoaib Boyce Jr., MD at [facility] ECT    ELECTROCONVULSIVE THERAPY (ECT) - SINGLE SEIZURE N/A 12/11/2017    Performed by Shoaib Boyce Jr., MD at [facility] ECT    ELECTROCONVULSIVE THERAPY (ECT) - SINGLE SEIZURE N/A 10/24/2017    Performed by Shoaib Boyce Jr., MD at [facility] ECT    ELECTROCONVULSIVE THERAPY (ECT) - SINGLE SEIZURE N/A 9/20/2017    Performed by Shoaib Boyce Jr., MD at [facility] ECT    ELECTROCONVULSIVE THERAPY (ECT) - SINGLE SEIZURE N/A 8/14/2017    Performed by Shoaib Boyce Jr., MD at [facility] ECT    ELECTROCONVULSIVE THERAPY (ECT) - SINGLE SEIZURE Bilateral 7/12/2017    Performed by Shoaib Boyce Jr., MD at [facility] ECT    ELECTROCONVULSIVE THERAPY (ECT) - SINGLE SEIZURE N/A 6/13/2017    Performed by Shoaib Boyce Jr., MD at [facility] ECT    ELECTROCONVULSIVE THERAPY (ECT) - SINGLE SEIZURE N/A 5/17/2017    Performed by Shoaib Boyce Jr., MD at [facility] ECT    ELECTROCONVULSIVE THERAPY (ECT) - SINGLE SEIZURE N/A 4/20/2017    Performed by Shoaib Boyce Jr., MD at [facility] ECT    ELECTROCONVULSIVE THERAPY (ECT) - SINGLE SEIZURE N/A 11/22/2016    Performed by Shoaib Boyce Jr., MD at [facility] ECT    ELECTROCONVULSIVE THERAPY (ECT) - SINGLE SEIZURE N/A 10/24/2016    Performed by Shoaib Boyce Jr., MD at [facility] ECT    ELECTROCONVULSIVE THERAPY (ECT) - SINGLE SEIZURE N/A 9/22/2016    Performed by Shoaib Boyce Jr., MD at [facility] ECT    ELECTROCONVULSIVE THERAPY (ECT) - SINGLE SEIZURE N/A 9/1/2016    Performed by Shoaib Boyce Jr., MD at [facility] ECT     ELECTROCONVULSIVE THERAPY (ECT) - SINGLE SEIZURE N/A 8/15/2016    Performed by Shoaib Boyce Jr., [facility] at [facility] ECT    ELECTROCONVULSIVE THERAPY (ECT) - SINGLE SEIZURE N/A 8/1/2016    Performed by Shoaib Boyce Jr., [facility] at [facility] ECT    ELECTROCONVULSIVE THERAPY (ECT) - SINGLE SEIZURE N/A 7/22/2016    Performed by Shoaib Boyce Jr., [facility] at [facility] ECT    ELECTROCONVULSIVE THERAPY (ECT) - SINGLE SEIZURE N/A 7/14/2016    Performed by Shoaib Boyce Jr., [facility] at [facility] ECT    ELECTROCONVULSIVE THERAPY (ECT) - SINGLE SEIZURE N/A 7/8/2016    Performed by Shoaib Boyce Jr., [facility] at [facility] ECT    ELECTROCONVULSIVE THERAPY (ECT) - SINGLE SEIZURE N/A 7/5/2016    Performed by Shoaib Boyce Jr., [facility] at [facility] ECT    ELECTROCONVULSIVE THERAPY (ECT) - SINGLE SEIZURE N/A 6/27/2016    Performed by Shoaib Boyce Jr., [facility] at [facility] ECT    ELECTROCONVULSIVE THERAPY (ECT) - SINGLE SEIZURE N/A 6/23/2016    Performed by Shoaib Boyce Jr., [facility] at [facility] ECT    ELECTROCONVULSIVE THERAPY (ECT) - SINGLE SEIZURE N/A 6/20/2016    Performed by Shoaib Boyce Jr., [facility] at [facility] ECT    ELECTROCONVULSIVE THERAPY (ECT) - SINGLE SEIZURE N/A 6/16/2016    Performed by Shoaib Boyce Jr., [facility] at [facility] ECT    ELECTROCONVULSIVE THERAPY (ECT) - SINGLE SEIZURE N/A 6/15/2016    Performed by Shoaib Boyce Jr., [facility] at [facility] ECT    ELECTROCONVULSIVE THERAPY (ECT) - SINGLE SEIZURE N/A 6/14/2016    Performed by Shoaib Boyce Jr., [facility] at [facility] ECT    ELECTROCONVULSIVE THERAPY (ECT) - SINGLE SEIZURE N/A 6/13/2016    Performed by Shoaib Boyce Jr., [facility] at [facility] ECT    ELECTROCONVULSIVE THERAPY (ECT) - SINGLE SEIZURE N/A 1/4/2016    Performed by Shoaib Boyce Jr., [facility] at [facility] ECT    ELECTROCONVULSIVE THERAPY, CEREBRAL HEMISPHERE, UNILATERAL, 1 SEIZURE Bilateral 6/25/2018    Performed by Shoaib Boyce Jr., [facility] at [facility] ECT    OVARIAN CYST REMOVAL Bilateral       OBJECTIVE:     Vitals (pre-procedure):  Vitals:    07/11/19 0628   BP:  115/68   Pulse: 85   Resp: 17   Temp: 97.9 °F (36.6 °C)        Labs/Imaging/Studies:   No results found for this or any previous visit (from the past 48 hour(s)).   No results found for: PHENYTOIN, PHENOBARB, VALPROATE, CBMZ    Physical Exam:   Gen: AAOx4, NAD  HEENT: MMM, PERRL, EOMI, O/P clear  CV: RRR, S1/S2 nml, no M/R/G  Chest: CTAB, no R/R/W, unlabored breathing  Abd: S/NT/ND, +BS, no HSM  Ext: No C/C/E, pulse 2+ throughout  Neuro: CN II-XII grossly intact, no focal deficits    Mental Status Exam:   Appearance: unremarkable, age appropriate, normal weight, well nourished  Behavior: normal, cooperative, eye contact normal  Speech: normal tone, normal rate, normal pitch, normal volume, spontaneous  Mood: depressed  Affect: Mildly constricted   Thought Process: normal and logical  Thought Content: normal, no suicidality, no homicidality, delusions, or paranoia  Cognition: grossly intact  Insight: fair  Judgment: good    ASSESSMENT/PLAN:     Allyssa Wright is a 39 y.o. female with Major Depressive Disorder who presents for ECT.    Recommendations:   Proceed with ECT #74.    Boaz Lo MD  [facility]  PGY-2

## 2019-07-11 NOTE — OP NOTE
Allyssa Wright  : 1980   MRN: [id number]  Date: 2019    Electroconvulsive Therapy  Procedure Note    Date of Admission: 2019  5:50 AM    Site: [facility], [address]    Attending: Shoaib Boyce Jr., MD   Residents: Boaz Lo MD  Pre-procedure Diagnosis: Major Depressive Disorder, Recurrent, partial remission  ECT Treatment Number: 74    Machine Type: Mecta 5000    Patient Status: Medically stable    Vitals (pre-procedure):  Vitals:    19 0800   BP: 112/79   Pulse: 73   Resp: 20   Temp:        Electrode Placement: Bitemporal    Stimulus Number Charge (mC) Level Pulse Width (msec) Frequency  (Hz) Duration of Stimulus (sec) Current (mA) Duration of Seizure (sec)   1 576 3 1 60 6 800 79                                   Complications: Hypertension    Maximum Blood Pressure: 150/91    Medications Given:  Caffeine 500mg  Methohexital (Brevital).   120mg  IV    Succinylcholine (Anectine).   120mg  IV    Ondansetron (Zofran).   4mg  IV    Labetalol (Trandate).   10mg  IV    Ketorolac 30mg    Treatment Course:  Patient tolerated procedure well. After adequate recovery from general anesthesia, the patient was transported to recovery.    Post-op Diagnosis: Same as above    Recommended for next ECT:  7/15/18    Boaz Lo MD  [facility]  PGY-2

## 2019-07-11 NOTE — TRANSFER OF CARE
"Anesthesia Transfer of Care Note    Patient: Allyssa Wright    Procedure(s) Performed: Procedure(s) (LRB):  ELECTROCONVULSIVE THERAPY (ECT) - SINGLE SEIZURE (N/A)    Patient location: [facility]    Anesthesia Type: general    Transport from OR: Transported from OR on 6-10 L/min O2 by face mask with adequate spontaneous ventilation    Post pain: adequate analgesia    Post assessment: no apparent anesthetic complications    Post vital signs: stable    Level of consciousness: awake    Nausea/Vomiting: no nausea/vomiting    Complications: none    Transfer of care protocol was followed      Last vitals:   Visit Vitals  /68 (BP Location: Left arm, Patient Position: Lying)   Pulse 85   Temp (P) 36.8 °C (98.2 °F) (Skin)   Resp (P) 20   Ht 5' 5" (1.651 m)   Wt 65.8 kg (145 lb)   SpO2 (P) 100%   Breastfeeding? No   BMI 24.13 kg/m²     "

## 2019-07-11 NOTE — DISCHARGE INSTRUCTIONS
Please do not eat or drink anything after midnight prior to procedure. Please do not drive on day of ECT.    E.C.T. Home Care Instructions    ACTIVITY LEVEL:    You may feel sleepy for several hours. It is best to rest until you are more awake and then gradually resume your normal activities in one day. It is recommended, because of the possibility of memory loss and slight confusion post ECT, that you rest in the company of a responsible adult. This confusion and memory loss is expected and should diminish over time. It is also recommended that you do not drive or operate any electrical appliances or machinery during the ECT treatment series. Ask your Doctor, at the time of your treatment, any questions you may have about specific activities.    DIET:    You may wish to start with liquids after your ECT treatment and gradually resume your normal diet. Do not consume alcoholic beverages during the ECT treatment series.    BATHING:    You may shower or bathe as desired.    MEDICATIONS:    Resume all home medications starting with the AM doses not taken prior to ECT treatment. If you experience a headache, it is okay to take your usual pain reliever.    WHEN TO CALL THE DOCTOR:     Headache, memory loss or confusion that does not begin to resolve within 24 hours.    RETURN APPOINTMENT:    Remember you may not eat or drink after midnight, but please take heart or high blood pressure medicines with a sip of water in the morning prior to leaving home.    FOR EMERGENCIES:    If any unusual problems or difficulties occur:    Contact the office [phone] Monday-Friday until 3:00 p.m. After hours, call the hospital  [phone] and ask for the Psychiatry Resident On-call.       Anesthesia: General Anesthesia     You are watched continuously during your procedure by your anesthesia provider.     Youre due to have surgery. During surgery, youll be given medicine called anesthesia or anesthetic. This will  keep you comfortable and pain-free. Your anesthesia provider will use general anesthesia.  What is general anesthesia?  General anesthesia puts you into a state like deep sleep. It goes into the bloodstream (IV anesthetics), into the lungs (gas anesthetics), or both. You feel nothing during the procedure. You will not remember it. During the procedure, the anesthesia provider monitors you continuously. He or she checks your heart rate and rhythm, blood pressure, breathing, and blood oxygen.  · IV anesthetics. IV anesthetics are given through an IV line in your arm. Theyre often given first. This is so you are asleep before a gas anesthetic is started. Some kinds of IV anesthetics relieve pain. Others relax you. Your doctor will decide which kind is best in your case.  · Gas anesthetics. Gas anesthetics are breathed into the lungs. They are often used to keep you asleep. They can be given through a facemask or a tube placed in your larynx or trachea (breathing tube).  ¨ If you have a facemask, your anesthesia provider will most likely place it over your nose and mouth while youre still awake. Youll breathe oxygen through the mask as your IV anesthetic is started. Gas anesthetic may be added through the mask.  ¨ If you have a tube in the larynx or trachea, it will be inserted into your throat after youre asleep.  Anesthesia tools and medicines  You will likely have:  · IV anesthetics. These are put into an IV line into your bloodstream.  · Gas anesthetics. You breathe these anesthetics into your lungs, where they pass into your bloodstream.  · Pulse oximeter. This is a small clip that is attached to the end of your finger. This measures your blood oxygen level.  · Electrocardiography leads (electrodes). These are small sticky pads that are placed on your chest. They record your heart rate and rhythm.  · Blood pressure cuff. This reads your blood pressure.  Risks and possible complications  General anesthesia has  some risks. These include:  · Breathing problems  · Nausea and vomiting  · Sore throat or hoarseness (usually temporary)  · Allergic reaction to the anesthetic  · Irregular heartbeat (rare)  · Cardiac arrest (rare)   Anesthesia safety  · Follow all instructions you are given for how long not to eat or drink before your procedure.  · Be sure your doctor knows what medicines and drugs you take. This includes over-the-counter medicines, herbs, supplements, alcohol or other drugs. You will be asked when those were last taken.  · Have an adult family member or friend drive you home after the procedure.  · For the first 24 hours after your surgery:  ¨ Do not drive or use heavy equipment.  ¨ Do not make important decisions or sign legal documents. If important decisions or signing legal documents is necessary during the first 24 hours after surgery, have a trusted family member or spouse act on your behalf.  ¨ Avoid alcohol.  ¨ Have a responsible adult stay with you. He or she can watch for problems and help keep you safe.  Date Last Reviewed: 12/1/2016 © 2000-2017 [employer] [address] [place] [address], [place] [address]. All rights reserved. This information is not intended as a substitute for professional medical care. Always follow your healthcare professional's instructions.

## 2019-07-15 ENCOUNTER — ANESTHESIA EVENT (OUTPATIENT)
Dept: ELECTROPHYSIOLOGY | Facility: HOSPITAL | Age: 39
End: 2019-07-15
Payer: COMMERCIAL

## 2019-07-15 ENCOUNTER — HOSPITAL ENCOUNTER (OUTPATIENT)
Facility: HOSPITAL | Age: 39
End: 2019-07-15
Attending: PSYCHIATRY & NEUROLOGY | Admitting: PSYCHIATRY & NEUROLOGY
Payer: COMMERCIAL

## 2019-07-15 ENCOUNTER — ANESTHESIA (OUTPATIENT)
Dept: ELECTROPHYSIOLOGY | Facility: HOSPITAL | Age: 39
End: 2019-07-15
Payer: COMMERCIAL

## 2019-07-15 VITALS
HEART RATE: 80 BPM | RESPIRATION RATE: 20 BRPM | SYSTOLIC BLOOD PRESSURE: 106 MMHG | OXYGEN SATURATION: 100 % | WEIGHT: 145 LBS | BODY MASS INDEX: 24.16 KG/M2 | HEIGHT: 65 IN | TEMPERATURE: 99 F | DIASTOLIC BLOOD PRESSURE: 65 MMHG

## 2019-07-15 DIAGNOSIS — F32.9 MAJOR DEPRESSIVE DISORDER: ICD-10-CM

## 2019-07-15 DIAGNOSIS — F33.41 MDD (MAJOR DEPRESSIVE DISORDER), RECURRENT, IN PARTIAL REMISSION: ICD-10-CM

## 2019-07-15 DIAGNOSIS — F33.9 RECURRENT MAJOR DEPRESSIVE DISORDER, REMISSION STATUS UNSPECIFIED: Primary | ICD-10-CM

## 2019-07-15 LAB
B-HCG UR QL: NEGATIVE
CTP QC/QA: YES

## 2019-07-15 PROCEDURE — 25000003 PHARM REV CODE 250: Performed by: STUDENT IN AN ORGANIZED HEALTH CARE EDUCATION/TRAINING PROGRAM

## 2019-07-15 PROCEDURE — 90870 ELECTROCONVULSIVE THERAPY: CPT | Performed by: SURGERY

## 2019-07-15 PROCEDURE — D9220A PRA ANESTHESIA: ICD-10-PCS | Mod: ,,, | Performed by: ANESTHESIOLOGY

## 2019-07-15 PROCEDURE — 63600175 PHARM REV CODE 636 W HCPCS: Performed by: SURGERY

## 2019-07-15 PROCEDURE — 90870 ELECTROCONVULSIVE THERAPY: CPT | Mod: ,,, | Performed by: PSYCHIATRY & NEUROLOGY

## 2019-07-15 PROCEDURE — D9220A PRA ANESTHESIA: Mod: ,,, | Performed by: ANESTHESIOLOGY

## 2019-07-15 PROCEDURE — 90870 PR ELECTROCONVULSIVE THERAPY,1 SEIZ: ICD-10-PCS | Mod: ,,, | Performed by: PSYCHIATRY & NEUROLOGY

## 2019-07-15 PROCEDURE — 25000003 PHARM REV CODE 250: Performed by: SURGERY

## 2019-07-15 PROCEDURE — 81025 URINE PREGNANCY TEST: CPT | Performed by: PSYCHIATRY & NEUROLOGY

## 2019-07-15 RX ORDER — SODIUM CHLORIDE 9 MG/ML
INJECTION, SOLUTION INTRAVENOUS CONTINUOUS
Status: DISCONTINUED | OUTPATIENT
Start: 2019-07-15 | End: 2019-07-15 | Stop reason: HOSPADM

## 2019-07-15 RX ORDER — SUCCINYLCHOLINE CHLORIDE 20 MG/ML
INJECTION INTRAMUSCULAR; INTRAVENOUS
Status: DISCONTINUED | OUTPATIENT
Start: 2019-07-15 | End: 2019-07-15

## 2019-07-15 RX ORDER — SODIUM CHLORIDE 0.9 % (FLUSH) 0.9 %
10 SYRINGE (ML) INJECTION
Status: DISCONTINUED | OUTPATIENT
Start: 2019-07-15 | End: 2019-07-15 | Stop reason: HOSPADM

## 2019-07-15 RX ORDER — ONDANSETRON 2 MG/ML
INJECTION INTRAMUSCULAR; INTRAVENOUS
Status: DISCONTINUED | OUTPATIENT
Start: 2019-07-15 | End: 2019-07-15

## 2019-07-15 RX ORDER — KETOROLAC TROMETHAMINE 30 MG/ML
INJECTION, SOLUTION INTRAMUSCULAR; INTRAVENOUS
Status: DISCONTINUED | OUTPATIENT
Start: 2019-07-15 | End: 2019-07-15

## 2019-07-15 RX ORDER — LABETALOL HCL 20 MG/4 ML
SYRINGE (ML) INTRAVENOUS
Status: COMPLETED
Start: 2019-07-15 | End: 2019-07-15

## 2019-07-15 RX ORDER — LABETALOL HYDROCHLORIDE 5 MG/ML
INJECTION, SOLUTION INTRAVENOUS
Status: DISCONTINUED | OUTPATIENT
Start: 2019-07-15 | End: 2019-07-15

## 2019-07-15 RX ORDER — ONDANSETRON 2 MG/ML
INJECTION INTRAMUSCULAR; INTRAVENOUS
Status: COMPLETED
Start: 2019-07-15 | End: 2019-07-15

## 2019-07-15 RX ORDER — KETOROLAC TROMETHAMINE 30 MG/ML
INJECTION, SOLUTION INTRAMUSCULAR; INTRAVENOUS
Status: COMPLETED
Start: 2019-07-15 | End: 2019-07-15

## 2019-07-15 RX ORDER — SUCCINYLCHOLINE CHLORIDE 20 MG/ML
INJECTION INTRAMUSCULAR; INTRAVENOUS
Status: COMPLETED
Start: 2019-07-15 | End: 2019-07-15

## 2019-07-15 RX ORDER — LIDOCAINE HYDROCHLORIDE 10 MG/ML
1 INJECTION, SOLUTION EPIDURAL; INFILTRATION; INTRACAUDAL; PERINEURAL ONCE
Status: DISCONTINUED | OUTPATIENT
Start: 2019-07-15 | End: 2019-07-15 | Stop reason: HOSPADM

## 2019-07-15 RX ADMIN — Medication 500 MG: at 06:07

## 2019-07-15 RX ADMIN — ONDANSETRON 4 MG: 2 INJECTION, SOLUTION INTRAMUSCULAR; INTRAVENOUS at 07:07

## 2019-07-15 RX ADMIN — METHOHEXITAL SODIUM 120 MG: 500 INJECTION, POWDER, LYOPHILIZED, FOR SOLUTION INTRAMUSCULAR; INTRAVENOUS; RECTAL at 07:07

## 2019-07-15 RX ADMIN — KETOROLAC TROMETHAMINE 30 MG: 30 INJECTION, SOLUTION INTRAMUSCULAR at 07:07

## 2019-07-15 RX ADMIN — SUCCINYLCHOLINE CHLORIDE 120 MG: 20 INJECTION, SOLUTION INTRAMUSCULAR; INTRAVENOUS at 07:07

## 2019-07-15 RX ADMIN — SODIUM CHLORIDE: 0.9 INJECTION, SOLUTION INTRAVENOUS at 07:07

## 2019-07-15 RX ADMIN — SODIUM CHLORIDE 500 ML: 0.9 INJECTION, SOLUTION INTRAVENOUS at 06:07

## 2019-07-15 RX ADMIN — LABETALOL HYDROCHLORIDE 10 MG: 5 INJECTION, SOLUTION INTRAVENOUS at 07:07

## 2019-07-15 NOTE — ANESTHESIA PREPROCEDURE EVALUATION
[facility]  Anesthesia Pre-Operative Evaluation         Patient Name: Allyssa Wright  YOB: 1980  MRN: [id number]    SUBJECTIVE:     Pre-operative evaluation for Procedure(s) (LRB):  ELECTROCONVULSIVE THERAPY (ECT) - SINGLE SEIZURE (N/A)     07/15/2019    Allyssa Wright is a 39 y.o. female w/ a significant PMHx of depression and schizophrenia.  Last PO intake at 22:00 the previous night.       Patient now presents for the above procedure: ECT #75     Previous ECT Anesthetic:  - Succinylcholine: 120 mg  - Methohexital: 120 mg  - Toradol: 30 mg  - Zofran: 4 mg  - Labetalol: 10 mg      Patient now presents for the above procedure(s).      LDA: None documented.       Prev airway: None documented.    Drips: None documented.      Patient Active Problem List   Diagnosis    MDD (major depressive disorder), recurrent, in partial remission    Other acne    Comfort measures only status    MDD (major depressive disorder)    Major depression    Major depressive disorder    MDD (major depressive disorder), severe    Depression       Review of patient's allergies indicates:   Allergen Reactions    Benzodiazepines Other (See Comments)     Contraindicated while taking Clozapine    Ampicillin      Mom says so    Erythromycin     Levaquin [levofloxacin] Other (See Comments)     Depression side effects    Penicillins      Mom says so    Pristiq [desvenlafaxine]      psycotic      Sulfa (sulfonamide antibiotics)      Rash      Azithromycin Anxiety       Current Inpatient Medications:      Current Outpatient Medications on File Prior to Visit   Medication Sig Dispense Refill    clozapine (CLOZARIL) 50 MG tablet Take 50 mg by mouth once daily.       dapsone 7.5 % GlwP Apply topically once daily.      dextroamphetamine-amphetamine 30 mg Tab Take 30 mg by mouth 2 (two) times daily.       famciclovir (FAMVIR) 500 MG  tablet Take 1 tablet (500 mg total) by mouth 2 (two) times daily. 30 tablet 12    hydrOXYzine pamoate (VISTARIL) 25 MG Cap Take 2 capsules (50 mg total) by mouth nightly as needed (Take 25-50mg (1-2 caps) at night for anxiety prior to ECT). 180 capsule 0    hydrOXYzine pamoate (VISTARIL) 25 MG Cap Take 1 capsule (25 mg total) by mouth nightly as needed (anxiety/sleep). Take 1 to 2 pills as needed 60 capsule 2    mirtazapine (REMERON) 15 MG tablet Take 1 tablet (15 mg total) by mouth every evening. (Patient taking differently: Take 7.5 mg by mouth every evening. ) 90 tablet 0    spironolactone (ALDACTONE) 50 MG tablet 50 mg 2 (two) times daily. 50  mg qAM, 50 mg qHS.      thyroid (ARMOUR THYROID) 30 mg Tab Take 1 tablet (30 mg total) by mouth every morning. 30 tablet 11    trazodone (DESYREL) 100 MG tablet Take 200 mg by mouth every evening.       UNABLE TO FIND 2 (two) times daily. n-azetyl-cysteine      venlafaxine (EFFEXOR) 100 MG Tab Take 3 tablets (300 mg total) by mouth once daily. (Patient taking differently: Take 225 mg by mouth once daily. )      vortioxetine (TRINTELLIX) 5 mg Tab Take 2.5 mg by mouth once daily.      ZOVIRAX 5 % Crea   5     Current Facility-Administered Medications on File Prior to Visit   Medication Dose Route Frequency Provider Last Rate Last Dose    lidocaine (PF) 10 mg/ml (1%) injection 10 mg  1 mL Intradermal Once Homa Colbert MD        sodium chloride 0.9% flush 10 mL  10 mL Intra-Catheter PRN Homa Colbert MD           Past Surgical History:   Procedure Laterality Date    ANKLE SURGERY Right     BREAST augmentation      ELECTROCONVULSIVE THERAPY (ECT) - SINGLE SEIZURE N/A 12/6/2018    Performed by Sh[facility]ib [facility] Jr., [facility] at [facility] ECT    ELECTROCONVULSIVE THERAPY (ECT) - SINGLE SEIZURE N/A 8/31/2018    Performed by Sh[facility]ib [facility] Jr., MD at [facility] ECT    ELECTROCONVULSIVE THERAPY (ECT) - SINGLE SEIZURE N/A 8/6/2018    Performed by Shoaib Boyce Jr.  MD at [facility] ECT    ELECTROCONVULSIVE THERAPY (ECT) - SINGLE SEIZURE N/A 7/23/2018    Performed by Shoaib Boyce Jr., MD at [facility] ECT    ELECTROCONVULSIVE THERAPY (ECT) - SINGLE SEIZURE N/A 7/5/2018    Performed by Shoaib Boyce Jr., MD at [facility] ECT    ELECTROCONVULSIVE THERAPY (ECT) - SINGLE SEIZURE N/A 6/28/2018    Performed by Shoaib Boyce Jr., MD at [facility] ECT    ELECTROCONVULSIVE THERAPY (ECT) - SINGLE SEIZURE N/A 6/13/2018    Performed by Shoaib Boyce Jr., MD at [facility] ECT    ELECTROCONVULSIVE THERAPY (ECT) - SINGLE SEIZURE N/A 5/29/2018    Performed by Shoaib Boyce Jr., MD at [facility] ECT    ELECTROCONVULSIVE THERAPY (ECT) - SINGLE SEIZURE N/A 5/8/2018    Performed by Shoaib Boyce Jr., MD at [facility] ECT    ELECTROCONVULSIVE THERAPY (ECT) - SINGLE SEIZURE N/A 4/24/2018    Performed by Shoaib Boyce Jr., MD at [facility] ECT    ELECTROCONVULSIVE THERAPY (ECT) - SINGLE SEIZURE N/A 4/17/2018    Performed by Shoaib Boyce Jr., MD at [facility] ECT    ELECTROCONVULSIVE THERAPY (ECT) - SINGLE SEIZURE N/A 4/13/2018    Performed by Shoaib Boyce Jr., MD at [facility] ECT    ELECTROCONVULSIVE THERAPY (ECT) - SINGLE SEIZURE N/A 4/3/2018    Performed by Shoaib Boyce Jr., MD at [facility] ECT    ELECTROCONVULSIVE THERAPY (ECT) - SINGLE SEIZURE N/A 3/20/2018    Performed by Shoaib Boyce Jr., MD at [facility] ECT    ELECTROCONVULSIVE THERAPY (ECT) - SINGLE SEIZURE N/A 3/15/2018    Performed by Shoaib Boyce Jr., MD at [facility] ECT    ELECTROCONVULSIVE THERAPY (ECT) - SINGLE SEIZURE N/A 3/6/2018    Performed by Shaoib Boyce Jr., MD at [facility] ECT    ELECTROCONVULSIVE THERAPY (ECT) - SINGLE SEIZURE N/A 3/1/2018    Performed by Shoaib Boyce Jr., MD at [facility] ECT    ELECTROCONVULSIVE THERAPY (ECT) - SINGLE SEIZURE N/A 2/23/2018    Performed by Shoaib Boyce Jr., MD at [facility] ECT    ELECTROCONVULSIVE THERAPY (ECT) - SINGLE SEIZURE N/A 2/19/2018    Performed by Shoaib Boyce Jr., MD at [facility] ECT     ELECTROCONVULSIVE THERAPY (ECT) - SINGLE SEIZURE N/A 2/15/2018    Performed by Shoaib Boyce Jr., MD at [facility] ECT    ELECTROCONVULSIVE THERAPY (ECT) - SINGLE SEIZURE N/A 1/22/2018    Performed by Shoaib Boyce Jr., MD at [facility] ECT    ELECTROCONVULSIVE THERAPY (ECT) - SINGLE SEIZURE N/A 12/11/2017    Performed by Shoaib Boyce Jr., MD at [facility] ECT    ELECTROCONVULSIVE THERAPY (ECT) - SINGLE SEIZURE N/A 10/24/2017    Performed by Shoaib Boyce Jr., MD at [facility] ECT    ELECTROCONVULSIVE THERAPY (ECT) - SINGLE SEIZURE N/A 9/20/2017    Performed by Shoaib Boyce Jr., MD at [facility] ECT    ELECTROCONVULSIVE THERAPY (ECT) - SINGLE SEIZURE N/A 8/14/2017    Performed by Shoaib Boyce Jr., MD at [facility] ECT    ELECTROCONVULSIVE THERAPY (ECT) - SINGLE SEIZURE Bilateral 7/12/2017    Performed by Shoaib Boyce Jr., MD at [facility] ECT    ELECTROCONVULSIVE THERAPY (ECT) - SINGLE SEIZURE N/A 6/13/2017    Performed by Shoaib Boyce Jr., MD at [facility] ECT    ELECTROCONVULSIVE THERAPY (ECT) - SINGLE SEIZURE N/A 5/17/2017    Performed by Shoaib Boyce Jr., MD at [facility] ECT    ELECTROCONVULSIVE THERAPY (ECT) - SINGLE SEIZURE N/A 4/20/2017    Performed by Shoaib Boyce Jr., MD at [facility] ECT    ELECTROCONVULSIVE THERAPY (ECT) - SINGLE SEIZURE N/A 11/22/2016    Performed by Shoaib Boyce Jr., MD at [facility] ECT    ELECTROCONVULSIVE THERAPY (ECT) - SINGLE SEIZURE N/A 10/24/2016    Performed by Shoaib Boyce Jr., MD at [facility] ECT    ELECTROCONVULSIVE THERAPY (ECT) - SINGLE SEIZURE N/A 9/22/2016    Performed by Shoaib Boyce Jr., MD at [facility] ECT    ELECTROCONVULSIVE THERAPY (ECT) - SINGLE SEIZURE N/A 9/1/2016    Performed by Shoaib Boyce Jr., MD at [facility] ECT    ELECTROCONVULSIVE THERAPY (ECT) - SINGLE SEIZURE N/A 8/15/2016    Performed by Shoaib Boyce Jr., MD at [facility] ECT    ELECTROCONVULSIVE THERAPY (ECT) - SINGLE SEIZURE N/A 8/1/2016    Performed by Shoaib Boyce Jr., MD at [facility] ECT     ELECTROCONVULSIVE THERAPY (ECT) - SINGLE SEIZURE N/A 7/22/2016    Performed by Sh[facility]ib [facility] Jr., MD at [facility] ECT    ELECTROCONVULSIVE THERAPY (ECT) - SINGLE SEIZURE N/A 7/14/2016    Performed by Shoaib Boyce Jr., MD at [facility] ECT    ELECTROCONVULSIVE THERAPY (ECT) - SINGLE SEIZURE N/A 7/8/2016    Performed by Shoaib Boyce Jr., MD at [facility] ECT    ELECTROCONVULSIVE THERAPY (ECT) - SINGLE SEIZURE N/A 7/5/2016    Performed by Shoaib Boyce Jr., MD at [facility] ECT    ELECTROCONVULSIVE THERAPY (ECT) - SINGLE SEIZURE N/A 6/27/2016    Performed by Shoaib Boyce Jr., MD at [facility] ECT    ELECTROCONVULSIVE THERAPY (ECT) - SINGLE SEIZURE N/A 6/23/2016    Performed by Shoaib Boyce Jr., MD at [facility] ECT    ELECTROCONVULSIVE THERAPY (ECT) - SINGLE SEIZURE N/A 6/20/2016    Performed by Shoaib Boyce Jr., MD at [facility] ECT    ELECTROCONVULSIVE THERAPY (ECT) - SINGLE SEIZURE N/A 6/16/2016    Performed by Shoaib Boyce Jr., MD at [facility] ECT    ELECTROCONVULSIVE THERAPY (ECT) - SINGLE SEIZURE N/A 6/15/2016    Performed by Shoaib Boyce Jr., MD at [facility] ECT    ELECTROCONVULSIVE THERAPY (ECT) - SINGLE SEIZURE N/A 6/14/2016    Performed by Shoaib Boyce Jr., MD at [facility] ECT    ELECTROCONVULSIVE THERAPY (ECT) - SINGLE SEIZURE N/A 6/13/2016    Performed by Shoaib Boyce Jr., MD at [facility] ECT    ELECTROCONVULSIVE THERAPY (ECT) - SINGLE SEIZURE N/A 1/4/2016    Performed by Shoaib Boyce Jr., MD at [facility] ECT    ELECTROCONVULSIVE THERAPY, CEREBRAL HEMISPHERE, UNILATERAL, 1 SEIZURE Bilateral 6/25/2018    Performed by Shoaib [facility]ce Jr., MD at [facility] ECT    OVARIAN CYST REMOVAL Bilateral        Social History     Socioeconomic History    Marital status: Single     Spouse name: Not on file    Number of children: Not on file    Years of education: Not on file    Highest education level: Not on file   Occupational History    Occupation: unemployed   Social Needs    Financial resource strain: Not on  file    Food insecurity:     Worry: Not on file     Inability: Not on file    Transportation needs:     Medical: Not on file     Non-medical: Not on file   Tobacco Use    Smoking status: Former Smoker     Last attempt to quit: 2011     Years since quittin.2    Smokeless tobacco: Never Used   Substance and Sexual Activity    Alcohol use: Yes     Comment: rarely    Drug use: No     Comment: Experimental use in high school    Sexual activity: Not on file   Lifestyle    Physical activity:     Days per week: Not on file     Minutes per session: Not on file    Stress: Not on file   Relationships    Social connections:     Talks on phone: Not on file     Gets together: Not on file     Attends Gnosticism service: Not on file     Active member of club or organization: Not on file     Attends meetings of clubs or organizations: Not on file     Relationship status: Not on file   Other Topics Concern    Patient feels they ought to cut down on drinking/drug use Not Asked    Patient annoyed by others criticizing their drinking/drug use Not Asked    Patient has felt bad or guilty about drinking/drug use Not Asked    Patient has had a drink/used drugs as an eye opener in the AM Not Asked   Social History Narrative    Pt has 1 older brother from an intact family until the death of her mother in .  She completed 1 year of college, was never in the , and has never been employed.  She was engaged once, but never , has no children, and lives with her father plus 2 dogs.  She denies any hobbies and is spiritual but not Gnosticism.  She does date and returns to college during rare periods when depression and anxiety [place].       OBJECTIVE:     Vital Signs Range (Last 24H):  BP: ()/()   Arterial Line BP: ()/()       Significant Labs:  Lab Results   Component Value Date    WBC 6.83 2016    HGB 13.3 2016    HCT 40.3 2016     2016    ALT 14 2016    AST 20 2016      06/08/2016    K 3.8 06/08/2016     06/08/2016    CREATININE 0.7 06/08/2016    BUN 10 06/08/2016    CO2 28 06/08/2016    TSH 1.208 06/08/2016       Diagnostic Studies: No relevant studies.    EKG: No recent studies available.    2D ECHO:  No results found for this or any previous visit.      ASSESSMENT/PLAN:         Pre-op Assessment    I have reviewed the Patient Summary Reports.     I have reviewed the Nursing Notes.   I have reviewed the Medications.     Review of Systems  Anesthesia Hx:  No problems with previous Anesthesia  Denies Family Hx of Anesthesia complications.   Denies Personal Hx of Anesthesia complications.   Social:  Non-Smoker, No Alcohol Use    Hematology/Oncology:        Denies Current/Recent Cancer   EENT/Dental:   denies chronic allergic rhinitis   Cardiovascular:   Denies Pacemaker.  Denies Hypertension.  Denies Valvular problems/Murmurs.  Denies MI.  Denies CAD.    Denies CABG/stent.  Denies Dysrhythmias.             Pulmonary:   Denies COPD.  Denies Asthma.  Denies Recent URI.  Denies Sleep Apnea.    Renal/:   Denies Chronic Renal Disease.     Hepatic/GI:   Denies PUD. Denies GERD. Denies Liver Disease.    Neurological:   Denies TIA. Denies CVA. Seizures    Endocrine:   Denies Diabetes. Denies Hypothyroidism. Denies Hyperthyroidism.    Psych:   Psychiatric History          Physical Exam  General:  Well nourished    Airway/Jaw/Neck:  Airway Findings: Mouth Opening: Normal Tongue: Normal  General Airway Assessment: Adult  Mallampati: I  Improves to II with phonation.  TM Distance: Normal, at least 6 cm  Jaw/Neck Findings:  Neck ROM: Normal ROM     Eyes/Ears/Nose:  EYES/EARS/NOSE FINDINGS: Normal   Dental:  Dental Findings: In tact   Chest/Lungs:  Chest/Lungs Findings: Clear to auscultation     Heart/Vascular:  Heart Findings: Rate: Normal  Rhythm: Regular Rhythm  Sounds: Normal  Heart murmur: negative    Abdomen:  Abdomen Findings:  Normal, Soft, Nontender      Musculoskeletal:  Musculoskeletal Findings: Normal   Skin:  Skin Findings: Normal    Mental Status:  Mental Status Findings:  Cooperative, Alert and Oriented         Anesthesia Plan  Type of Anesthesia, risks & benefits discussed:  Anesthesia Type:  general  Patient's Preference:   Intra-op Monitoring Plan: standard ASA monitors  Intra-op Monitoring Plan Comments:   Post Op Pain Control Plan: per primary service following discharge from PACU  Post Op Pain Control Plan Comments:   Induction:   IV  Beta Blocker:  Patient is not currently on a Beta-Blocker (No further documentation required).       Informed Consent: Patient understands risks and agrees with Anesthesia plan.  Questions answered. Anesthesia consent signed with patient.  ASA Score: 2     Day of Surgery Review of History & Physical:    H&P update referred to the provider.         Ready For Surgery From Anesthesia Perspective.

## 2019-07-15 NOTE — ANESTHESIA POSTPROCEDURE EVALUATION
Anesthesia Post Evaluation    Patient: Allyssa Wright    Procedure(s) Performed: Procedure(s) (LRB):  ELECTROCONVULSIVE THERAPY (ECT) - SINGLE SEIZURE (N/A)    Final Anesthesia Type: general  Patient location during evaluation: PACU  Patient participation: Yes- Able to Participate  Level of consciousness: awake and alert  Post-procedure vital signs: reviewed and stable  Pain management: adequate  Airway patency: patent  PONV status at discharge: No PONV  Anesthetic complications: no      Cardiovascular status: blood pressure returned to baseline  Respiratory status: unassisted  Hydration status: euvolemic  Follow-up not needed.          Vitals Value Taken Time   /65 7/15/2019  8:10 AM   Temp 37 °C (98.6 °F) 7/15/2019  7:40 AM   Pulse 82 7/15/2019  8:13 AM   Resp 20 7/15/2019  8:10 AM   SpO2 98 % 7/15/2019  8:13 AM   Vitals shown include unvalidated device data.      No case tracking events are documented in the log.      Pain/Roma Score: Roma Score: 10 (7/15/2019  8:20 AM)

## 2019-07-15 NOTE — DISCHARGE SUMMARY
Allyssa Wright  : 1980   MRN: [id number]  Date: 7/15/2019     Electroconvulsive Therapy  Discharge Summary    Admit Date: 7/15/2019  5:56 AM  Discharge Date: 07/15/2019    Attending Physician: Shoaib Boyce Jr., MD   Discharge Provider: Boaz Lo MD    History of Present Illness: Allyssa Wright is a 39 y.o. female with Major Depressive Disorder, recurrent, in partial remission presented for ECT #75. See H&P dated 07/15/2019 for full HPI. For further details, see Dr. Boyce's pre-ECT evaluation.    Hospital Course: The patient tolerated the ECT treatment well without complication. Patient was stable post-procedure. See OP note dated 07/15/2019 for more details.     Disposition: Home or Self Care    Medications:  Current Discharge Medication List      CONTINUE these medications which have NOT CHANGED    Details   clozapine (CLOZARIL) 50 MG tablet Take 50 mg by mouth once daily.       dapsone 7.5 % GlwP Apply topically once daily.      famciclovir (FAMVIR) 500 MG tablet Take 1 tablet (500 mg total) by mouth 2 (two) times daily.  Qty: 30 tablet, Refills: 12    Associated Diagnoses: Major depressive disorder, recurrent episode, moderate degree      !! hydrOXYzine pamoate (VISTARIL) 25 MG Cap Take 2 capsules (50 mg total) by mouth nightly as needed (Take 25-50mg (1-2 caps) at night for anxiety prior to ECT).  Qty: 180 capsule, Refills: 0      !! hydrOXYzine pamoate (VISTARIL) 25 MG Cap Take 1 capsule (25 mg total) by mouth nightly as needed (anxiety/sleep). Take 1 to 2 pills as needed  Qty: 60 capsule, Refills: 2      mirtazapine (REMERON) 15 MG tablet Take 1 tablet (15 mg total) by mouth every evening.  Qty: 90 tablet, Refills: 0      spironolactone (ALDACTONE) 50 MG tablet 50 mg 2 (two) times daily. 50  mg qAM, 50 mg qHS.      thyroid (ARMOUR THYROID) 30 mg Tab Take 1 tablet (30 mg total) by mouth every morning.  Qty: 30 tablet, Refills: 11    Associated Diagnoses: Major depressive disorder, recurrent  episode, moderate degree      trazodone (DESYREL) 100 MG tablet Take 200 mg by mouth every evening.       UNABLE TO FIND 2 (two) times daily. n-azetyl-cysteine      venlafaxine (EFFEXOR) 100 MG Tab Take 3 tablets (300 mg total) by mouth once daily.    Associated Diagnoses: MDD (major depressive disorder), recurrent, in partial remission      vortioxetine (TRINTELLIX) 5 mg Tab Take 2.5 mg by mouth once daily.      dextroamphetamine-amphetamine 30 mg Tab Take 30 mg by mouth 2 (two) times daily.     Associated Diagnoses: MDD (major depressive disorder), recurrent, in partial remission      ZOVIRAX 5 % Crea Refills: 5       !! - Potential duplicate medications found. Please discuss with provider.        Status at Discharge: Alert and medically stable    Discharge Diagnoses:  Major Depressive Disorder, recurrent, in partial remission  Diet: Resume previous outpatient diet  Activity: Ambulate with assistance  Instructions: Please do not eat or drink anything after midnight prior to procedure. Please do not drive on day of ECT.    Med Changes:  None    Next ECT:  7/18/2019    Boaz Lo MD  [employer] [facility]  PGY-2

## 2019-07-15 NOTE — PLAN OF CARE
Patient tolerated procedure well.  VSS, no complaints of pain, tolerating po.  Preparing for discharge.  Patient to follow up on Thursday 7/18.  AVS reviewed with patient and family, who verbalized understanding of S/S of complications, activity restrictions, and general recovery.  IV to be removed prior to departure.

## 2019-07-15 NOTE — H&P
Allyssa Wright  : 1980   MRN: [id number]  Date: 7/15/2019     Electroconvulsive Therapy  History & Physical    Chief complaint: Major Depressive Disorder, recurrent, partial remission  Procedure: ECT #75    SUBJECTIVE:     HPI:   Allyssa Wright is a 39 y.o. female with Major Depressive Disorder who presents for ECT. The patient complains of depression, which has been improving gradually.  Still does not feel fully back to baseline, but now thinks she may only need 2 treatments this week (previously desired 3).  No changes in appetite or sleep, mild increase from baseline memory problems.  Father at bedside agrees that she has been a little brighter and appears to be responding. Please see Dr. Boyce's full pre-ECT evaluation for further details. The patient does not need any prescriptions and has not had any med changes since meeting with Dr. Boyce. The patient has not had anything by mouth since midnight and is ready for ECT.    Psychiatric Review of Systems:  Mood: improving, mildly depressed  Appetite: No problem  Psychomotor: No problem  Cognitive Impairment: Moderate  Insomnia: None  Psychosis: None  Diurnal Variation: None  Suicidal Ideation: Absent    Medical Review Of Systems:  Pertinent items are noted in HPI.    Current Medications:   Current Facility-Administered Medications on File Prior to Encounter   Medication Dose Route Frequency Provider Last Rate Last Dose    lidocaine (PF) 10 mg/ml (1%) injection 10 mg  1 mL Intradermal Once Homa Colbert MD        sodium chloride 0.9% flush 10 mL  10 mL Intra-Catheter PRN Homa Colbert MD         Current Outpatient Medications on File Prior to Encounter   Medication Sig Dispense Refill    clozapine (CLOZARIL) 50 MG tablet Take 50 mg by mouth once daily.       dapsone 7.5 % GlwP Apply topically once daily.      famciclovir (FAMVIR) 500 MG tablet Take 1 tablet (500 mg total) by mouth 2 (two) times daily. 30 tablet 12    hydrOXYzine pamoate  (VISTARIL) 25 MG Cap Take 2 capsules (50 mg total) by mouth nightly as needed (Take 25-50mg (1-2 caps) at night for anxiety prior to ECT). 180 capsule 0    hydrOXYzine pamoate (VISTARIL) 25 MG Cap Take 1 capsule (25 mg total) by mouth nightly as needed (anxiety/sleep). Take 1 to 2 pills as needed 60 capsule 2    mirtazapine (REMERON) 15 MG tablet Take 1 tablet (15 mg total) by mouth every evening. (Patient taking differently: Take 7.5 mg by mouth every evening. ) 90 tablet 0    spironolactone (ALDACTONE) 50 MG tablet 50 mg 2 (two) times daily. 50  mg qAM, 50 mg qHS.      thyroid (ARMOUR THYROID) 30 mg Tab Take 1 tablet (30 mg total) by mouth every morning. 30 tablet 11    trazodone (DESYREL) 100 MG tablet Take 200 mg by mouth every evening.       UNABLE TO FIND 2 (two) times daily. n-azetyl-cysteine      venlafaxine (EFFEXOR) 100 MG Tab Take 3 tablets (300 mg total) by mouth once daily. (Patient taking differently: Take 225 mg by mouth once daily. )      vortioxetine (TRINTELLIX) 5 mg Tab Take 2.5 mg by mouth once daily.      dextroamphetamine-amphetamine 30 mg Tab Take 30 mg by mouth 2 (two) times daily.       ZOVIRAX 5 % Crea   5      Allergies:   Benzodiazepines; Ampicillin; Erythromycin; Levaquin [levofloxacin]; Penicillins; Pristiq [desvenlafaxine]; Sulfa (sulfonamide antibiotics); and Azithromycin    Past Medical/Surgical History:   Past Medical History:   Diagnosis Date    Anxiety     Depression     History of psychiatric hospitalization     HSV-1 (herpes simplex virus 1) infection     Hx of psychiatric care     Moderate depressed bipolar II disorder 06/13/2016    reports no history of bipolar    Obsessive-compulsive disorder     Psychiatric problem     Schizophrenia 4/3/2018    Self-harming behavior     Therapy      Past Surgical History:   Procedure Laterality Date    ANKLE SURGERY Right     BREAST augmentation      ELECTROCONVULSIVE THERAPY (ECT) - SINGLE SEIZURE N/A 12/6/2018     Performed by [facility]ib [facility]ce Jr., [facility]    ELECTROCONVULSIVE THERAPY (ECT) - SINGLE SEIZURE N/A 8/31/2018    Performed by [facility]ib [facility] Jr[facility] at [facility]    ELECTROCONVULSIVE THERAPY (ECT) - SINGLE SEIZURE N/A 8/6/2018    Performed by [facility]ib [facility] Jr[facility]    ELECTROCONVULSIVE THERAPY (ECT) - SINGLE SEIZURE N/A 7/23/2018    Performed by [facility]    ELECTROCONVULSIVE THERAPY (ECT) - SINGLE SEIZURE N/A 7/5/2018    Performed by [facility]ib [facility]    ELECTROCONVULSIVE THERAPY (ECT) - SINGLE SEIZURE N/A 6/28/2018    Performed by [facility]    ELECTROCONVULSIVE THERAPY (ECT) - SINGLE SEIZURE N/A 6/13/2018    Performed by [facility]ib [facility] Jr[facility]    ELECTROCONVULSIVE THERAPY (ECT) - SINGLE SEIZURE N/A 5/29/2018    Performed by Sh[facility]ib [facility] Jr[facility]    ELECTROCONVULSIVE THERAPY (ECT) - SINGLE SEIZURE N/A 5/8/2018    Performed by Sh[facility]ib [facility] Jr[facility]    ELECTROCONVULSIVE THERAPY (ECT) - SINGLE SEIZURE N/A 4/24/2018    Performed by [facility]ib [facility] Jr[facility]    ELECTROCONVULSIVE THERAPY (ECT) - SINGLE SEIZURE N/A 4/17/2018    Performed by Sh[facility]ib [facility] Jr[facility]    ELECTROCONVULSIVE THERAPY (ECT) - SINGLE SEIZURE N/A 4/13/2018    Performed by Sh[facility]ib [facility] Jr[facility]    ELECTROCONVULSIVE THERAPY (ECT) - SINGLE SEIZURE N/A 4/3/2018    Performed by Sh[facility]ib [facility] Jr[facility]    ELECTROCONVULSIVE THERAPY (ECT) - SINGLE SEIZURE N/A 3/20/2018    Performed by Sh[facility]ib [facility]    ELECTROCONVULSIVE THERAPY (ECT) - SINGLE SEIZURE N/A 3/15/2018    Performed by Sh[facility]ib [facility] Jr[facility]    ELECTROCONVULSIVE THERAPY (ECT) - SINGLE SEIZURE N/A 3/6/2018    Performed by [facility]    ELECTROCONVULSIVE THERAPY (ECT) - SINGLE SEIZURE N/A 3/1/2018    Performed by Shoaib  ASHKAN [facility]ce Jr., [facility] at [facility]    ELECTROCONVULSIVE THERAPY (ECT) - SINGLE SEIZURE N/A 2/23/2018    Performed by [facility]ib [facility] Jr[facility] at [facility]    ELECTROCONVULSIVE THERAPY (ECT) - SINGLE SEIZURE N/A 2/19/2018    Performed by Sh[facility]ib [facility] Jr[facility] at [facility]    ELECTROCONVULSIVE THERAPY (ECT) - SINGLE SEIZURE N/A 2/15/2018    Performed by [facility]ib [facility] Jr[facility] at [facility]    ELECTROCONVULSIVE THERAPY (ECT) - SINGLE SEIZURE N/A 1/22/2018    Performed by Sh[facility]ib [facility] Jr[facility] at [facility]    ELECTROCONVULSIVE THERAPY (ECT) - SINGLE SEIZURE N/A 12/11/2017    Performed by Sh[facility]ib [facility] Jr[facility] at [facility]    ELECTROCONVULSIVE THERAPY (ECT) - SINGLE SEIZURE N/A 10/24/2017    Performed by Sh[facility]ib [facility] Jr[facility] at [facility]    ELECTROCONVULSIVE THERAPY (ECT) - SINGLE SEIZURE N/A 9/20/2017    Performed by Sh[facility]ib [facility] Jr[facility] at [facility]    ELECTROCONVULSIVE THERAPY (ECT) - SINGLE SEIZURE N/A 8/14/2017    Performed by Shoaib [facility] Jr[facility] at [facility]    ELECTROCONVULSIVE THERAPY (ECT) - SINGLE SEIZURE Bilateral 7/12/2017    Performed by Shoaib Boyce Jr[facility] at [facility]    ELECTROCONVULSIVE THERAPY (ECT) - SINGLE SEIZURE N/A 6/13/2017    Performed by Shoaib Boyce Jr[facility] at [facility]    ELECTROCONVULSIVE THERAPY (ECT) - SINGLE SEIZURE N/A 5/17/2017    Performed by Shoaib Boyce Jr[facility] at [facility]    ELECTROCONVULSIVE THERAPY (ECT) - SINGLE SEIZURE N/A 4/20/2017    Performed by Shoaib Boyce Jr[facility] at [facility]    ELECTROCONVULSIVE THERAPY (ECT) - SINGLE SEIZURE N/A 11/22/2016    Performed by Shoaib Boyce Jr[facility] at [facility]    ELECTROCONVULSIVE THERAPY (ECT) - SINGLE SEIZURE N/A 10/24/2016    Performed by Shoaib Boyce Jr[facility] at [facility]    ELECTROCONVULSIVE THERAPY (ECT) - SINGLE SEIZURE N/A 9/22/2016    Performed by Sh[facility]ib [facility] Jr[facility] at [facility]    ELECTROCONVULSIVE THERAPY (ECT) - SINGLE SEIZURE N/A 9/1/2016    Performed by Shoaib LUTHER  Carli Coker MD at [facility] ECT    ELECTROCONVULSIVE THERAPY (ECT) - SINGLE SEIZURE N/A 8/15/2016    Performed by Shoaib Boyce Jr[facility] MD at [facility]    ELECTROCONVULSIVE THERAPY (ECT) - SINGLE SEIZURE N/A 8/1/2016    Performed by Shoaib Boyce Jr[facility] MD at [facility]    ELECTROCONVULSIVE THERAPY (ECT) - SINGLE SEIZURE N/A 7/22/2016    Performed by Shoaib Boyce Jr., MD at [facility]    ELECTROCONVULSIVE THERAPY (ECT) - SINGLE SEIZURE N/A 7/14/2016    Performed by Shoaib Boyce Jr[facility] MD at [facility]    ELECTROCONVULSIVE THERAPY (ECT) - SINGLE SEIZURE N/A 7/8/2016    Performed by Shoaib Boyce Jr., MD at [facility]    ELECTROCONVULSIVE THERAPY (ECT) - SINGLE SEIZURE N/A 7/5/2016    Performed by Shoaib Boyce Jr., MD at [facility]    ELECTROCONVULSIVE THERAPY (ECT) - SINGLE SEIZURE N/A 6/27/2016    Performed by Shoaib Boyce Jr., MD at [facility]    ELECTROCONVULSIVE THERAPY (ECT) - SINGLE SEIZURE N/A 6/23/2016    Performed by Shoaib Boyce Jr., MD at [facility]    ELECTROCONVULSIVE THERAPY (ECT) - SINGLE SEIZURE N/A 6/20/2016    Performed by Shoaib Boyce Jr[facility] MD at [facility]    ELECTROCONVULSIVE THERAPY (ECT) - SINGLE SEIZURE N/A 6/16/2016    Performed by Shoaib Boyce Jr., MD at [facility]    ELECTROCONVULSIVE THERAPY (ECT) - SINGLE SEIZURE N/A 6/15/2016    Performed by Shoaib Boyce Jr[facility] MD at [facility]    ELECTROCONVULSIVE THERAPY (ECT) - SINGLE SEIZURE N/A 6/14/2016    Performed by Shoaib Boyce Jr[facility] MD at [facility]    ELECTROCONVULSIVE THERAPY (ECT) - SINGLE SEIZURE N/A 6/13/2016    Performed by Shoaib Boyce Jr[facility] MD at [facility]    ELECTROCONVULSIVE THERAPY (ECT) - SINGLE SEIZURE N/A 1/4/2016    Performed by Sh[facility]ib Boyce Jr[facility] MD at [facility]    ELECTROCONVULSIVE THERAPY, CEREBRAL HEMISPHERE, UNILATERAL, 1 SEIZURE Bilateral 6/25/2018    Performed by Sh[facility]ib Boyce Jr., MD at [facility]    OVARIAN CYST REMOVAL Bilateral       OBJECTIVE:     Vitals  (pre-procedure):  Vitals:    07/15/19 0623   BP: 111/69   Pulse: 80   Resp: 16   Temp: 97.7 °F (36.5 °C)        Labs/Imaging/Studies:   Recent Results (from the past 48 hour(s))   POCT urine pregnancy    Collection Time: 07/15/19  6:39 AM   Result Value Ref Range    POC Preg Test, Ur Negative Negative     Acceptable Yes       No results found for: PHENYTOIN, PHENOBARB, VALPROATE, CBMZ    Physical Exam:   Gen: AAOx4, NAD  HEENT: MMM, PERRL, EOMI, O/P clear  CV: RRR, S1/S2 nml, no M/R/G  Chest: CTAB, no R/R/W, unlabored breathing  Abd: S/NT/ND, +BS, no HSM  Ext: No C/C/E, pulse 2+ throughout  Neuro: CN II-XII grossly intact, no focal deficits    Mental Status Exam:   Appearance: unremarkable, age appropriate, neatly groomed  Behavior: normal, cooperative, eye contact normal  Speech: normal tone, normal rate, normal pitch, normal volume, spontaneous  Mood: depressed  Affect: Increasingly reactive, nearing full   Thought Process: normal and logical  Thought Content: normal, no suicidality, no homicidality, delusions, or paranoia  Cognition: grossly intact  Insight: fair  Judgment: good    ASSESSMENT/PLAN:     Allyssa Wright is a 39 y.o. female with Major Depressive Disorder, recurrent, in partial remission who presents for ECT.    Recommendations:   Proceed with ECT #75.      Boaz Lo MD  [facility]  PGY-2  7/15/2019

## 2019-07-15 NOTE — OP NOTE
Allyssa Wright  : 1980   MRN: [id number]  Date: 7/15/2019    Electroconvulsive Therapy  Procedure Note    Date of Admission: 7/15/2019  5:56 AM    Site: [facility], [address]    Attending: Shoaib Boyce Jr., MD   Residents: Boaz Lo MD  Pre-procedure Diagnosis: Major Depressive Disorder, recurrent, partial remission   ECT Treatment Number: 75  Machine Type: Mecta 5000    Patient Status: Medically stable    Vitals (pre-procedure):  Vitals:    07/15/19 0623   BP: 111/69   Pulse: 80   Resp: 16   Temp: 97.7 °F (36.5 °C)       Electrode Placement: Bitemporal    Stimulus Number Charge (mC) Level Pulse Width (msec) Frequency  (Hz) Duration of Stimulus (sec) Current (mA) Duration of Seizure (sec)   1 576 3 1 60 6 800 66                                   Complications: Hypertension    Maximum Blood Pressure: 191/106    Medications Given:  Caffeine 500 mg  IV    Methohexital (Brevital).   120 mg  IV    Succinylcholine (Anectine).   120 mg  IV    Ondansetron (Zofran).   4 mg  IV    Labetalol (Trandate).   20mg  IV    Ketorolac 30 mg    Treatment Course:  Patient tolerated procedure well. After adequate recovery from general anesthesia, the patient was transported to recovery.    Post-op Diagnosis: Same as above    Recommended for next ECT:  2019    Boaz Lo MD  [facility]  PGY-2  7/15/2019

## 2019-07-15 NOTE — ANESTHESIA PROCEDURE NOTES
ECT    Treatment Number: 75  Procedure start time: 7/15/2019 7:23 AM  Timeout performed at: 7/15/2019 7:18 AM  Procedure end time: 7/15/2019 7:27 AM      Staffing  Authorizing Provider: Melida Ortiz MD  Performing Provider: Ruel Peterson MD    Preanesthetic Checklist  The following were completed as part of the preanesthetic checklist: patient identified, procedural consent, pre-op evaluation, timeout performed, risks and benefits discussed, monitors and equipment checked, anesthesia consent given, oxygen available, suction available, hand hygiene performed and patient being monitored.    Setup & Induction  Patient Monitoring: heart rate, continuous pulse ox, cardiac monitor, continuous capnometry, NIBP and gas analyzer  Patient preparation: extremities padded, bite block inserted, mandibular stabilization and patient hyperventilated  Electrode Placement: Bitemporal    The patient was moved to the ECT therapy room after being assessed and consented for ECT. After standard ASA monitors were applied and timeout performed, the patient was adequately preoxygenated. After induction of general anesthesia, adequate oxygenation and ventilation were confirmed with pulse oxymetry and end tidal CO2 monitoring via bag-mask ventilation. End tidal CO2 was monitored throughout the case and moderate hyperventilation was performed prior to beginning ECT treatment. All extremities were padded, biteblock was inserted, and mandibular stabilization was done prior to initiating ECT therapy.    Procedure  Stimulus Number 1: 576 millicoulombs; Seizure Duration = 66 seconds            Recovery  After adequate recovery from general anesthesia, the patient was transported to recovery.  Baseline Blood Pressure: 122/85  Peak Blood Pressure: 191/106  Time to Recovery of Respirations: 5 minutes    ECT Findings  ECT associated findings of: Moderate Hypertension (SBP >160 or DBP >100)

## 2019-07-15 NOTE — TRANSFER OF CARE
"Anesthesia Transfer of Care Note    Patient: Allyssa Wright    Procedure(s) Performed: Procedure(s) (LRB):  ELECTROCONVULSIVE THERAPY (ECT) - SINGLE SEIZURE (N/A)    Patient location: [facility]    Anesthesia Type: general    Transport from OR: Transported from OR on 6-10 L/min O2 by face mask with adequate spontaneous ventilation    Post pain: adequate analgesia    Post assessment: no apparent anesthetic complications    Post vital signs: stable    Level of consciousness: awake and alert    Nausea/Vomiting: no nausea/vomiting    Complications: none    Transfer of care protocol was followed      Last vitals:   Visit Vitals  /69 (BP Location: Left arm, Patient Position: Lying)   Pulse 80   Temp 36.5 °C (97.7 °F) (Oral)   Resp 16   Ht 5' 5" (1.651 m)   Wt 65.8 kg (145 lb)   SpO2 100%   Breastfeeding? No   BMI 24.13 kg/m²     "

## 2019-07-17 ENCOUNTER — ANESTHESIA EVENT (OUTPATIENT)
Dept: ELECTROPHYSIOLOGY | Facility: HOSPITAL | Age: 39
End: 2019-07-17
Payer: COMMERCIAL

## 2019-07-17 NOTE — DISCHARGE INSTRUCTIONS
Home Care Instructions E.C.T    ACTIVITY LEVEL:  You may feel sleepy for several hours. It is best to rest until you are more awake and then gradually resume your normal activities in one day. It is recommended, because of the possibility of memory loss and slight confusion post ECT, that you rest in the company of a responsible adult. This confusion and memory loss is expected and should diminish over time. It is also recommended that you do not drive or operate any electrical appliances or machinery during the ECT treatment series. Ask your Doctor, at the time of your treatment, anyquestions you may have about specific activities.    DIET:  You may wish to start with liquids after your ECT treatment and gradually resume your normal diet. Do not consume alcoholic beverages during the ECT treatment series.    BATHING:  You may shower or bathe as desired.    MEDICATIONS:  Resume all home medications starting with the AM doses not taken prior to ECT treatment. If you experience a headache, it is okay to take your usual pain reliever.    WHEN TO CALL THE DOCTOR:   Headache, memory loss or confusion that does not begin to resolve within 24 hours.    RETURN APPOINTMENT:  Report to Day [facility] ([facility]) on (date)_______________________ for (time)_________________. Remember you may not eat or drink after midnight, but please take heart or high blood pressure medicines with a sip of water in the morning prior to leaving home.    FOR EMERGENCIES:  If any unusual problems or difficulties occur, contact the office [phone] Monday-Friday until 3:00 p.m.    
none

## 2019-07-17 NOTE — ANESTHESIA PREPROCEDURE EVALUATION
[facility]  Anesthesia Pre-Operative Evaluation         Patient Name: Allyssa Wright  YOB: 1980  MRN: [id number]    SUBJECTIVE:     Pre-operative evaluation for Procedure(s) (LRB):  ELECTROCONVULSIVE THERAPY (ECT) - SINGLE SEIZURE (N/A)     07/17/2019    Allyssa Wright is a 39 y.o. female w/ a significant PMHx of depression and aspiration event on prior ECT.      Patient now presents for the above procedure: ECT #76     Previous ECT Anesthetic:  - Succinylcholine: 120 mg  - Methohexital: 120 mg  - Toradol: 30 mg  - Zofran: 4 mg  - Labetalol: 10 mg      Patient now presents for the above procedure(s).      LDA: None documented.       Prev airway: None documented.    Drips: None documented.      Patient Active Problem List   Diagnosis    MDD (major depressive disorder), recurrent, in partial remission    Other acne    Comfort measures only status    MDD (major depressive disorder)    Major depression    Major depressive disorder    MDD (major depressive disorder), severe    Depression       Review of patient's allergies indicates:   Allergen Reactions    Benzodiazepines Other (See Comments)     Contraindicated while taking Clozapine    Ampicillin      Mom says so    Erythromycin     Levaquin [levofloxacin] Other (See Comments)     Depression side effects    Penicillins      Mom says so    Pristiq [desvenlafaxine]      psycotic      Sulfa (sulfonamide antibiotics)      Rash      Azithromycin Anxiety       Current Inpatient Medications:      Current Outpatient Medications on File Prior to Visit   Medication Sig Dispense Refill    clozapine (CLOZARIL) 50 MG tablet Take 50 mg by mouth once daily.       dapsone 7.5 % GlwP Apply topically once daily.      dextroamphetamine-amphetamine 30 mg Tab Take 30 mg by mouth 2 (two) times daily.       famciclovir (FAMVIR) 500 MG tablet Take 1 tablet (500 mg  total) by mouth 2 (two) times daily. 30 tablet 12    hydrOXYzine pamoate (VISTARIL) 25 MG Cap Take 2 capsules (50 mg total) by mouth nightly as needed (Take 25-50mg (1-2 caps) at night for anxiety prior to ECT). 180 capsule 0    hydrOXYzine pamoate (VISTARIL) 25 MG Cap Take 1 capsule (25 mg total) by mouth nightly as needed (anxiety/sleep). Take 1 to 2 pills as needed 60 capsule 2    mirtazapine (REMERON) 15 MG tablet Take 1 tablet (15 mg total) by mouth every evening. (Patient taking differently: Take 7.5 mg by mouth every evening. ) 90 tablet 0    spironolactone (ALDACTONE) 50 MG tablet 50 mg 2 (two) times daily. 50  mg qAM, 50 mg qHS.      thyroid (ARMOUR THYROID) 30 mg Tab Take 1 tablet (30 mg total) by mouth every morning. 30 tablet 11    trazodone (DESYREL) 100 MG tablet Take 200 mg by mouth every evening.       UNABLE TO FIND 2 (two) times daily. n-azetyl-cysteine      venlafaxine (EFFEXOR) 100 MG Tab Take 3 tablets (300 mg total) by mouth once daily. (Patient taking differently: Take 225 mg by mouth once daily. )      vortioxetine (TRINTELLIX) 5 mg Tab Take 2.5 mg by mouth once daily.      ZOVIRAX 5 % Crea   5     Current Facility-Administered Medications on File Prior to Visit   Medication Dose Route Frequency Provider Last Rate Last Dose    lidocaine (PF) 10 mg/ml (1%) injection 10 mg  1 mL Intradermal Once Homa Colbert MD        sodium chloride 0.9% flush 10 mL  10 mL Intra-Catheter PRN Homa Colbert MD           Past Surgical History:   Procedure Laterality Date    ANKLE SURGERY Right     BREAST augmentation      ELECTROCONVULSIVE THERAPY (ECT) - SINGLE SEIZURE N/A 12/6/2018    Performed by Sh[facility]ib Boyce Jr., [facility] at [facility] ECT    ELECTROCONVULSIVE THERAPY (ECT) - SINGLE SEIZURE N/A 8/31/2018    Performed by Sh[facility]ib [facility] Jr., MD at [facility] ECT    ELECTROCONVULSIVE THERAPY (ECT) - SINGLE SEIZURE N/A 8/6/2018    Performed by Sh[facility]ib Boyce Jr., MD at [facility] ECT     ELECTROCONVULSIVE THERAPY (ECT) - SINGLE SEIZURE N/A 7/23/2018    Performed by [facility]ib [facility] Jr[facility] at [facility] ECT    ELECTROCONVULSIVE THERAPY (ECT) - SINGLE SEIZURE N/A 7/5/2018    Performed by Shoaib Boyce Jr., MD at [facility] ECT    ELECTROCONVULSIVE THERAPY (ECT) - SINGLE SEIZURE N/A 6/28/2018    Performed by Shoaib Boyce Jr., MD at [facility] ECT    ELECTROCONVULSIVE THERAPY (ECT) - SINGLE SEIZURE N/A 6/13/2018    Performed by Shoaib Boyce Jr., MD at [facility] ECT    ELECTROCONVULSIVE THERAPY (ECT) - SINGLE SEIZURE N/A 5/29/2018    Performed by Shoaib Boyce Jr[facility] MD at [facility] ECT    ELECTROCONVULSIVE THERAPY (ECT) - SINGLE SEIZURE N/A 5/8/2018    Performed by Shoaib Boyce Jr., MD at [facility] ECT    ELECTROCONVULSIVE THERAPY (ECT) - SINGLE SEIZURE N/A 4/24/2018    Performed by Shoaib Boyce Jr., MD at [facility] ECT    ELECTROCONVULSIVE THERAPY (ECT) - SINGLE SEIZURE N/A 4/17/2018    Performed by Shoaib Boyce Jr., MD at [facility] ECT    ELECTROCONVULSIVE THERAPY (ECT) - SINGLE SEIZURE N/A 4/13/2018    Performed by Shoaib Boyce Jr., MD at [facility] ECT    ELECTROCONVULSIVE THERAPY (ECT) - SINGLE SEIZURE N/A 4/3/2018    Performed by Shoaib Boyce Jr., MD at [facility] ECT    ELECTROCONVULSIVE THERAPY (ECT) - SINGLE SEIZURE N/A 3/20/2018    Performed by Shoaib Boyce Jr., MD at [facility] ECT    ELECTROCONVULSIVE THERAPY (ECT) - SINGLE SEIZURE N/A 3/15/2018    Performed by Shoaib Boyce Jr., MD at [facility] ECT    ELECTROCONVULSIVE THERAPY (ECT) - SINGLE SEIZURE N/A 3/6/2018    Performed by Shoaib Boyce Jr., MD at [facility] ECT    ELECTROCONVULSIVE THERAPY (ECT) - SINGLE SEIZURE N/A 3/1/2018    Performed by Shoaib Boyce Jr., MD at [facility] ECT    ELECTROCONVULSIVE THERAPY (ECT) - SINGLE SEIZURE N/A 2/23/2018    Performed by Shoaib Boyce Jr., MD at [facility] ECT    ELECTROCONVULSIVE THERAPY (ECT) - SINGLE SEIZURE N/A 2/19/2018    Performed by Shoaib Boyce Jr., MD at [facility] ECT    ELECTROCONVULSIVE  THERAPY (ECT) - SINGLE SEIZURE N/A 2/15/2018    Performed by Shoaib Boyce Jr., MD at [facility] ECT    ELECTROCONVULSIVE THERAPY (ECT) - SINGLE SEIZURE N/A 1/22/2018    Performed by Shoaib Boyce Jr., MD at [facility] ECT    ELECTROCONVULSIVE THERAPY (ECT) - SINGLE SEIZURE N/A 12/11/2017    Performed by Shoaib Boyce Jr., MD at [facility] ECT    ELECTROCONVULSIVE THERAPY (ECT) - SINGLE SEIZURE N/A 10/24/2017    Performed by Shoaib Boyce Jr., MD at [facility] ECT    ELECTROCONVULSIVE THERAPY (ECT) - SINGLE SEIZURE N/A 9/20/2017    Performed by Shoaib Boyce Jr., MD at [facility] ECT    ELECTROCONVULSIVE THERAPY (ECT) - SINGLE SEIZURE N/A 8/14/2017    Performed by Shoaib Boyce Jr., MD at [facility] ECT    ELECTROCONVULSIVE THERAPY (ECT) - SINGLE SEIZURE Bilateral 7/12/2017    Performed by Shoaib Boyce Jr., MD at [facility] ECT    ELECTROCONVULSIVE THERAPY (ECT) - SINGLE SEIZURE N/A 6/13/2017    Performed by Shoaib Boyce Jr., MD at [facility] ECT    ELECTROCONVULSIVE THERAPY (ECT) - SINGLE SEIZURE N/A 5/17/2017    Performed by Shoaib Boyce Jr., MD at [facility] ECT    ELECTROCONVULSIVE THERAPY (ECT) - SINGLE SEIZURE N/A 4/20/2017    Performed by Shoaib Boyce Jr., MD at [facility] ECT    ELECTROCONVULSIVE THERAPY (ECT) - SINGLE SEIZURE N/A 11/22/2016    Performed by Shoaib Boyce Jr., MD at [facility] ECT    ELECTROCONVULSIVE THERAPY (ECT) - SINGLE SEIZURE N/A 10/24/2016    Performed by Shoaib Boyce Jr., MD at [facility] ECT    ELECTROCONVULSIVE THERAPY (ECT) - SINGLE SEIZURE N/A 9/22/2016    Performed by Shoaib Boyce Jr., [facility] at [facility] ECT    ELECTROCONVULSIVE THERAPY (ECT) - SINGLE SEIZURE N/A 9/1/2016    Performed by Shoaib Boyce Jr., MD at [facility] ECT    ELECTROCONVULSIVE THERAPY (ECT) - SINGLE SEIZURE N/A 8/15/2016    Performed by Shoaib Boyce Jr., MD at [facility] ECT    ELECTROCONVULSIVE THERAPY (ECT) - SINGLE SEIZURE N/A 8/1/2016    Performed by Shoaib Boyce Jr., MD at [facility] ECT    ELECTROCONVULSIVE  THERAPY (ECT) - SINGLE SEIZURE N/A 7/22/2016    Performed by [facility] Jr., MD at [facility] ECT    ELECTROCONVULSIVE THERAPY (ECT) - SINGLE SEIZURE N/A 7/14/2016    Performed by Shoaib Boyce Jr., MD at [facility] ECT    ELECTROCONVULSIVE THERAPY (ECT) - SINGLE SEIZURE N/A 7/8/2016    Performed by Shoaib Boyce Jr., MD at [facility] ECT    ELECTROCONVULSIVE THERAPY (ECT) - SINGLE SEIZURE N/A 7/5/2016    Performed by Shoaib Boyce Jr., MD at [facility] ECT    ELECTROCONVULSIVE THERAPY (ECT) - SINGLE SEIZURE N/A 6/27/2016    Performed by Shoaib Boyce Jr., MD at [facility] ECT    ELECTROCONVULSIVE THERAPY (ECT) - SINGLE SEIZURE N/A 6/23/2016    Performed by Shoaib Boyce Jr., MD at [facility] ECT    ELECTROCONVULSIVE THERAPY (ECT) - SINGLE SEIZURE N/A 6/20/2016    Performed by Shoaib Boyce Jr., MD at [facility] ECT    ELECTROCONVULSIVE THERAPY (ECT) - SINGLE SEIZURE N/A 6/16/2016    Performed by Shoaib Boyce Jr., MD at [facility] ECT    ELECTROCONVULSIVE THERAPY (ECT) - SINGLE SEIZURE N/A 6/15/2016    Performed by Shoaib Boyce Jr., MD at [facility] ECT    ELECTROCONVULSIVE THERAPY (ECT) - SINGLE SEIZURE N/A 6/14/2016    Performed by Shoaib Boyce Jr., [facility] at [facility] ECT    ELECTROCONVULSIVE THERAPY (ECT) - SINGLE SEIZURE N/A 6/13/2016    Performed by Shoaib Boyce Jr., MD at [facility] ECT    ELECTROCONVULSIVE THERAPY (ECT) - SINGLE SEIZURE N/A 1/4/2016    Performed by Shoaib Boyce Jr., MD at [facility] ECT    ELECTROCONVULSIVE THERAPY, CEREBRAL HEMISPHERE, UNILATERAL, 1 SEIZURE Bilateral 6/25/2018    Performed by [facility]ib [facility] Jr[facility] at [facility] ECT    OVARIAN CYST REMOVAL Bilateral        Social History     Socioeconomic History    Marital status: Single     Spouse name: Not on file    Number of children: Not on file    Years of education: Not on file    Highest education level: Not on file   Occupational History    Occupation: unemployed   Social Needs    Financial resource strain: Not on file    Food  insecurity:     Worry: Not on file     Inability: Not on file    Transportation needs:     Medical: Not on file     Non-medical: Not on file   Tobacco Use    Smoking status: Former Smoker     Last attempt to quit: 2011     Years since quittin.2    Smokeless tobacco: Never Used   Substance and Sexual Activity    Alcohol use: Yes     Comment: rarely    Drug use: No     Comment: Experimental use in high school    Sexual activity: Not on file   Lifestyle    Physical activity:     Days per week: Not on file     Minutes per session: Not on file    Stress: Not on file   Relationships    Social connections:     Talks on phone: Not on file     Gets together: Not on file     Attends Spiritism service: Not on file     Active member of club or organization: Not on file     Attends meetings of clubs or organizations: Not on file     Relationship status: Not on file   Other Topics Concern    Patient feels they ought to cut down on drinking/drug use Not Asked    Patient annoyed by others criticizing their drinking/drug use Not Asked    Patient has felt bad or guilty about drinking/drug use Not Asked    Patient has had a drink/used drugs as an eye opener in the AM Not Asked   Social History Narrative    Pt has 1 older brother from an intact family until the death of her mother in .  She completed 1 year of college, was never in the , and has never been employed.  She was engaged once, but never , has no children, and lives with her father plus 2 dogs.  She denies any hobbies and is spiritual but not Spiritism.  She does date and returns to college during rare periods when depression and anxiety [place].       OBJECTIVE:     Vital Signs Range (Last 24H):  BP: ()/()   Arterial Line BP: ()/()       Significant Labs:  Lab Results   Component Value Date    WBC 6.83 2016    HGB 13.3 2016    HCT 40.3 2016     2016    ALT 14 2016    AST 20 2016      06/08/2016    K 3.8 06/08/2016     06/08/2016    CREATININE 0.7 06/08/2016    BUN 10 06/08/2016    CO2 28 06/08/2016    TSH 1.208 06/08/2016       Diagnostic Studies: No relevant studies.    EKG: No recent studies available.    2D ECHO:  No results found for this or any previous visit.      ASSESSMENT/PLAN:         Anesthesia Evaluation    I have reviewed the Patient Summary Reports.    I have reviewed the Nursing Notes.   I have reviewed the Medications.     Review of Systems  Anesthesia Hx:  No problems with previous Anesthesia  History of prior surgery of interest to airway management or planning: Denies Family Hx of Anesthesia complications.   Denies Personal Hx of Anesthesia complications.   Social:  Non-Smoker, No Alcohol Use    Hematology/Oncology:        Denies Current/Recent Cancer   EENT/Dental:   denies chronic allergic rhinitis   Cardiovascular:   Denies Pacemaker.  Denies Hypertension.  Denies Valvular problems/Murmurs.  Denies MI.  Denies CAD.    Denies CABG/stent.  Denies Dysrhythmias.             Pulmonary:   Denies COPD.  Denies Asthma.  Denies Recent URI.  Denies Sleep Apnea.    Renal/:   Denies Chronic Renal Disease.     Hepatic/GI:   Denies PUD. Denies GERD. Denies Liver Disease.    Neurological:   Denies TIA. Denies CVA. Seizures    Endocrine:   Denies Diabetes. Denies Hypothyroidism. Denies Hyperthyroidism.    Psych:   Psychiatric History          Physical Exam  General:  Well nourished    Airway/Jaw/Neck:  Airway Findings: Mouth Opening: Normal Tongue: Normal  General Airway Assessment: Adult  Mallampati: I  Improves to II with phonation.  TM Distance: Normal, at least 6 cm  Jaw/Neck Findings:  Neck ROM: Normal ROM     Eyes/Ears/Nose:  EYES/EARS/NOSE FINDINGS: Normal   Dental:  Dental Findings: In tact   Chest/Lungs:  Chest/Lungs Findings: Clear to auscultation     Heart/Vascular:  Heart Findings: Rate: Normal  Rhythm: Regular Rhythm  Sounds: Normal  Heart murmur: negative     Abdomen:  Abdomen Findings:  Normal, Soft, Nontender     Musculoskeletal:  Musculoskeletal Findings: Normal   Skin:  Skin Findings: Normal    Mental Status:  Mental Status Findings:  Cooperative, Alert and Oriented         Anesthesia Plan  Type of Anesthesia, risks & benefits discussed:  Anesthesia Type:  general  Patient's Preference: General   Intra-op Monitoring Plan: standard ASA monitors  Intra-op Monitoring Plan Comments:   Post Op Pain Control Plan: per primary service following discharge from PACU  Post Op Pain Control Plan Comments:   Induction:   IV  Beta Blocker:  Patient is not currently on a Beta-Blocker (No further documentation required).       Informed Consent: Patient understands risks and agrees with Anesthesia plan.  Questions answered. Anesthesia consent signed with patient.  ASA Score: 3     Day of Surgery Review of History & Physical:    H&P update referred to the provider.     Anesthesia Plan Notes: Previous aspiration event noted but has been done via natural airway since and went well.        Ready For Surgery From Anesthesia Perspective.

## 2019-07-18 ENCOUNTER — ANESTHESIA (OUTPATIENT)
Dept: ELECTROPHYSIOLOGY | Facility: HOSPITAL | Age: 39
End: 2019-07-18
Payer: COMMERCIAL

## 2019-07-18 ENCOUNTER — HOSPITAL ENCOUNTER (OUTPATIENT)
Facility: HOSPITAL | Age: 39
Discharge: HOME | End: 2019-07-18
Attending: PSYCHIATRY & NEUROLOGY | Admitting: PSYCHIATRY & NEUROLOGY
Payer: COMMERCIAL

## 2019-07-18 VITALS
RESPIRATION RATE: 18 BRPM | HEART RATE: 83 BPM | BODY MASS INDEX: 24.16 KG/M2 | TEMPERATURE: 99 F | DIASTOLIC BLOOD PRESSURE: 78 MMHG | SYSTOLIC BLOOD PRESSURE: 121 MMHG | OXYGEN SATURATION: 96 % | HEIGHT: 65 IN | WEIGHT: 145 LBS

## 2019-07-18 DIAGNOSIS — F33.41 MDD (MAJOR DEPRESSIVE DISORDER), RECURRENT, IN PARTIAL REMISSION: ICD-10-CM

## 2019-07-18 DIAGNOSIS — F32.9 MDD (MAJOR DEPRESSIVE DISORDER): ICD-10-CM

## 2019-07-18 DIAGNOSIS — F33.1 MAJOR DEPRESSIVE DISORDER, RECURRENT EPISODE, MODERATE DEGREE: ICD-10-CM

## 2019-07-18 PROCEDURE — 90870 ELECTROCONVULSIVE THERAPY: CPT | Mod: ,,, | Performed by: PSYCHIATRY & NEUROLOGY

## 2019-07-18 PROCEDURE — D9220A PRA ANESTHESIA: ICD-10-PCS | Mod: ,,, | Performed by: ANESTHESIOLOGY

## 2019-07-18 PROCEDURE — 25000003 PHARM REV CODE 250: Performed by: PSYCHIATRY & NEUROLOGY

## 2019-07-18 PROCEDURE — 90870 PR ELECTROCONVULSIVE THERAPY,1 SEIZ: ICD-10-PCS | Mod: ,,, | Performed by: PSYCHIATRY & NEUROLOGY

## 2019-07-18 PROCEDURE — 25000003 PHARM REV CODE 250: Performed by: STUDENT IN AN ORGANIZED HEALTH CARE EDUCATION/TRAINING PROGRAM

## 2019-07-18 PROCEDURE — D9220A PRA ANESTHESIA: Mod: ,,, | Performed by: ANESTHESIOLOGY

## 2019-07-18 PROCEDURE — 63600175 PHARM REV CODE 636 W HCPCS: Performed by: STUDENT IN AN ORGANIZED HEALTH CARE EDUCATION/TRAINING PROGRAM

## 2019-07-18 PROCEDURE — 90870 ELECTROCONVULSIVE THERAPY: CPT | Performed by: STUDENT IN AN ORGANIZED HEALTH CARE EDUCATION/TRAINING PROGRAM

## 2019-07-18 RX ORDER — SODIUM CHLORIDE 0.9 % (FLUSH) 0.9 %
10 SYRINGE (ML) INJECTION
Status: DISCONTINUED | OUTPATIENT
Start: 2019-07-18 | End: 2019-07-18 | Stop reason: HOSPADM

## 2019-07-18 RX ORDER — LABETALOL HYDROCHLORIDE 5 MG/ML
INJECTION, SOLUTION INTRAVENOUS
Status: DISCONTINUED | OUTPATIENT
Start: 2019-07-18 | End: 2019-07-18

## 2019-07-18 RX ORDER — LIDOCAINE HYDROCHLORIDE 10 MG/ML
1 INJECTION, SOLUTION EPIDURAL; INFILTRATION; INTRACAUDAL; PERINEURAL ONCE
Status: DISCONTINUED | OUTPATIENT
Start: 2019-07-18 | End: 2019-07-18 | Stop reason: HOSPADM

## 2019-07-18 RX ORDER — THYROID 30 MG/1
30 TABLET ORAL EVERY MORNING
Qty: 30 TABLET | Refills: 11 | Status: SHIPPED | OUTPATIENT
Start: 2019-07-18

## 2019-07-18 RX ORDER — SUCCINYLCHOLINE CHLORIDE 20 MG/ML
INJECTION INTRAMUSCULAR; INTRAVENOUS
Status: COMPLETED
Start: 2019-07-18 | End: 2019-07-18

## 2019-07-18 RX ORDER — SODIUM CHLORIDE 9 MG/ML
INJECTION, SOLUTION INTRAVENOUS CONTINUOUS
Status: DISCONTINUED | OUTPATIENT
Start: 2019-07-19 | End: 2019-07-18 | Stop reason: HOSPADM

## 2019-07-18 RX ORDER — KETOROLAC TROMETHAMINE 30 MG/ML
INJECTION, SOLUTION INTRAMUSCULAR; INTRAVENOUS
Status: COMPLETED
Start: 2019-07-18 | End: 2019-07-18

## 2019-07-18 RX ORDER — ONDANSETRON 2 MG/ML
INJECTION INTRAMUSCULAR; INTRAVENOUS
Status: DISCONTINUED | OUTPATIENT
Start: 2019-07-18 | End: 2019-07-18

## 2019-07-18 RX ORDER — KETOROLAC TROMETHAMINE 30 MG/ML
INJECTION, SOLUTION INTRAMUSCULAR; INTRAVENOUS
Status: DISCONTINUED | OUTPATIENT
Start: 2019-07-18 | End: 2019-07-18

## 2019-07-18 RX ORDER — ONDANSETRON 2 MG/ML
INJECTION INTRAMUSCULAR; INTRAVENOUS
Status: COMPLETED
Start: 2019-07-18 | End: 2019-07-18

## 2019-07-18 RX ORDER — LABETALOL HCL 20 MG/4 ML
SYRINGE (ML) INTRAVENOUS
Status: COMPLETED
Start: 2019-07-18 | End: 2019-07-18

## 2019-07-18 RX ORDER — SUCCINYLCHOLINE CHLORIDE 20 MG/ML
INJECTION INTRAMUSCULAR; INTRAVENOUS
Status: DISCONTINUED | OUTPATIENT
Start: 2019-07-18 | End: 2019-07-18

## 2019-07-18 RX ORDER — SODIUM CHLORIDE 9 MG/ML
INJECTION, SOLUTION INTRAVENOUS CONTINUOUS
Status: DISCONTINUED | OUTPATIENT
Start: 2019-07-18 | End: 2019-07-18 | Stop reason: HOSPADM

## 2019-07-18 RX ORDER — ESMOLOL HYDROCHLORIDE 10 MG/ML
INJECTION INTRAVENOUS
Status: DISCONTINUED
Start: 2019-07-18 | End: 2019-07-18 | Stop reason: HOSPADM

## 2019-07-18 RX ADMIN — SUCCINYLCHOLINE CHLORIDE 120 MG: 20 INJECTION, SOLUTION INTRAMUSCULAR; INTRAVENOUS at 07:07

## 2019-07-18 RX ADMIN — Medication 500 MG: at 07:07

## 2019-07-18 RX ADMIN — ONDANSETRON 4 MG: 2 INJECTION, SOLUTION INTRAMUSCULAR; INTRAVENOUS at 07:07

## 2019-07-18 RX ADMIN — KETOROLAC TROMETHAMINE 30 MG: 30 INJECTION, SOLUTION INTRAMUSCULAR at 07:07

## 2019-07-18 RX ADMIN — METHOHEXITAL SODIUM 120 MG: 500 INJECTION, POWDER, LYOPHILIZED, FOR SOLUTION INTRAMUSCULAR; INTRAVENOUS; RECTAL at 07:07

## 2019-07-18 RX ADMIN — SODIUM CHLORIDE: 0.9 INJECTION, SOLUTION INTRAVENOUS at 06:07

## 2019-07-18 RX ADMIN — LABETALOL HYDROCHLORIDE 10 MG: 5 INJECTION, SOLUTION INTRAVENOUS at 07:07

## 2019-07-18 NOTE — ANESTHESIA POSTPROCEDURE EVALUATION
Anesthesia Post Evaluation    Patient: Allyssa Wright    Procedure(s) Performed: Procedure(s) (LRB):  ELECTROCONVULSIVE THERAPY (ECT) - SINGLE SEIZURE (N/A)    Final Anesthesia Type: general  Patient location during evaluation: PACU  Patient participation: Yes- Able to Participate  Level of consciousness: awake and alert  Post-procedure vital signs: reviewed and stable  Pain management: adequate  Airway patency: patent  PONV status at discharge: No PONV  Anesthetic complications: no      Cardiovascular status: blood pressure returned to baseline  Respiratory status: unassisted  Hydration status: euvolemic  Follow-up not needed.          Vitals Value Taken Time   /78 7/18/2019  8:00 AM   Temp 36.9 °C (98.5 °F) 7/18/2019  7:50 AM   Pulse 83 7/18/2019  8:16 AM   Resp 13 7/18/2019  8:16 AM   SpO2 96 % 7/18/2019  8:15 AM   Vitals shown include unvalidated device data.      No case tracking events are documented in the log.      Pain/Roma Score: Roma Score: 9 (7/18/2019  8:00 AM)

## 2019-07-18 NOTE — OP NOTE
Allyssa Wright  : 1980   MRN: [id number]  Date: 2019    Electroconvulsive Therapy  Procedure Note    Date of Admission: 2019  5:52 AM    Site: [facility], [address]    Attending: Shoaib Boyce Jr., MD   Residents: Boaz Lo MD  Pre-procedure Diagnosis: Major depressive disorder, recurrent, partial remission   ECT Treatment Number: 76  Machine Type: Mecta 5000    Patient Status: Medically stable    Vitals (pre-procedure):  Vitals:    19 0617   BP: 107/67   Pulse: 83   Resp: 18   Temp: 97.9 °F (36.6 °C)       Electrode Placement: Bitemporal    Stimulus Number Charge (mC) Level Pulse Width (msec) Frequency  (Hz) Duration of Stimulus (sec) Current (mA) Duration of Seizure (sec)   1 576 3 1 60 6 800 50                                   Complications: Hypertension    Maximum Blood Pressure: 200/114    Medications Given:  Caffeine 500 mg  IV    Methohexital (Brevital).   120 mg  IV    Succinylcholine (Anectine).   120 mg  IV    Ondansetron (Zofran).   4 mg  IV    Labetalol (Trandate).   20mg  IV    Ketorolac 30 mg    Treatment Course:  Patient tolerated procedure well. After adequate recovery from general anesthesia, the patient was transported to recovery.    Post-op Diagnosis: Same as above    Recommended for next ECT:  19      Boaz Lo MD  [facility]  PGY-2

## 2019-07-18 NOTE — ANESTHESIA PROCEDURE NOTES
ECT    Treatment Number: 76  Procedure start time: 7/18/2019 7:43 AM  Timeout performed at: 7/18/2019 7:43 AM  Procedure end time: 7/18/2019 7:45 AM      Staffing  Authorizing Provider: Darren Olsen MD  Performing Provider: Ming Kirkpatrick MD    Preanesthetic Checklist  The following were completed as part of the preanesthetic checklist: patient identified, procedural consent, pre-op evaluation, timeout performed, risks and benefits discussed, monitors and equipment checked, anesthesia consent given, oxygen available, suction available, hand hygiene performed and patient being monitored.    Setup & Induction  Patient Monitoring: heart rate, cardiac monitor, continuous pulse ox, continuous capnometry, NIBP and gas analyzer  Patient preparation: bite block inserted, extremities padded, mandibular stabilization and patient hyperventilated  Electrode Placement: Bitemporal    The patient was moved to the ECT therapy room after being assessed and consented for ECT. After standard ASA monitors were applied and timeout performed, the patient was adequately preoxygenated. After induction of general anesthesia, adequate oxygenation and ventilation were confirmed with pulse oxymetry and end tidal CO2 monitoring via bag-mask ventilation. End tidal CO2 was monitored throughout the case and moderate hyperventilation was performed prior to beginning ECT treatment. All extremities were padded, biteblock was inserted, and mandibular stabilization was done prior to initiating ECT therapy.    Procedure  Stimulus Number 1: Setting Number = I; 573 millicoulombs; Seizure Duration = 50 seconds            Recovery  After adequate recovery from general anesthesia, the patient was transported to recovery.  Baseline Blood Pressure: 107/67  Peak Blood Pressure: 206/114  Time to Recovery of Respirations: 1 minutes    ECT Findings  ECT associated findings of: Moderate Hypertension (SBP >160 or DBP >100)

## 2019-07-18 NOTE — PLAN OF CARE
Pt and dad given dc instructions and script. Understanding verbalized. Pt with no pain, no nausea, no problems. Pt voided before dc.

## 2019-07-18 NOTE — DISCHARGE INSTRUCTIONS
Please do not eat or drink anything after midnight prior to procedure. Please do not drive on day of ECT.

## 2019-07-18 NOTE — TRANSFER OF CARE
"Anesthesia Transfer of Care Note    Patient: Allyssa Wright    Procedure(s) Performed: Procedure(s) (LRB):  ELECTROCONVULSIVE THERAPY (ECT) - SINGLE SEIZURE (N/A)    Patient location: [facility]    Anesthesia Type: general    Transport from OR: Transported from OR on 6-10 L/min O2 by face mask with adequate spontaneous ventilation    Post pain: adequate analgesia    Post assessment: no apparent anesthetic complications    Post vital signs: stable    Level of consciousness: awake    Nausea/Vomiting: no nausea/vomiting    Complications: none    Transfer of care protocol was followed      Last vitals:   Visit Vitals  /67 (BP Location: Left arm, Patient Position: Lying)   Pulse 83   Temp 36.6 °C (97.9 °F) (Skin)   Resp 18   Ht 5' 5" (1.651 m)   Wt 65.8 kg (145 lb)   SpO2 98%   Breastfeeding? No   BMI 24.13 kg/m²     "

## 2019-07-18 NOTE — DISCHARGE SUMMARY
Allyssa Wright  : 1980   MRN: [id number]  Date: 2019     Electroconvulsive Therapy  Discharge Summary    Admit Date: 2019  5:52 AM  Discharge Date: 2019    Attending Physician: Shoaib Boyce Jr., MD   Discharge Provider: Boaz Lo MD    History of Present Illness: Allyssa Wright is a 39 y.o. female with Major depressive disorder, recurrent, in partial remission presented for ECT #76. See H&P dated 2019 for full HPI. For further details, see Dr. Boyce's pre-ECT evaluation.    Hospital Course: The patient tolerated the ECT treatment well without complication. Patient was stable post-procedure. See OP note dated 2019 for more details.     Disposition: Home or Self Care    Medications:  Current Discharge Medication List      CONTINUE these medications which have CHANGED    Details   thyroid, pork, (ARMOUR THYROID) 30 mg Tab Take 1 tablet (30 mg total) by mouth every morning.  Qty: 30 tablet, Refills: 11    Associated Diagnoses: Major depressive disorder, recurrent episode, moderate degree         CONTINUE these medications which have NOT CHANGED    Details   clozapine (CLOZARIL) 50 MG tablet Take 50 mg by mouth once daily.       dapsone 7.5 % GlwP Apply topically once daily.      dextroamphetamine-amphetamine 30 mg Tab Take 30 mg by mouth 2 (two) times daily.     Associated Diagnoses: MDD (major depressive disorder), recurrent, in partial remission      famciclovir (FAMVIR) 500 MG tablet Take 1 tablet (500 mg total) by mouth 2 (two) times daily.  Qty: 30 tablet, Refills: 12    Associated Diagnoses: Major depressive disorder, recurrent episode, moderate degree      !! hydrOXYzine pamoate (VISTARIL) 25 MG Cap Take 2 capsules (50 mg total) by mouth nightly as needed (Take 25-50mg (1-2 caps) at night for anxiety prior to ECT).  Qty: 180 capsule, Refills: 0      !! hydrOXYzine pamoate (VISTARIL) 25 MG Cap Take 1 capsule (25 mg total) by mouth nightly as needed (anxiety/sleep). Take 1  to 2 pills as needed  Qty: 60 capsule, Refills: 2      mirtazapine (REMERON) 15 MG tablet Take 1 tablet (15 mg total) by mouth every evening.  Qty: 90 tablet, Refills: 0      spironolactone (ALDACTONE) 50 MG tablet 50 mg 2 (two) times daily. 50  mg qAM, 50 mg qHS.      trazodone (DESYREL) 100 MG tablet Take 200 mg by mouth every evening.       UNABLE TO FIND 2 (two) times daily. n-azetyl-cysteine      venlafaxine (EFFEXOR) 100 MG Tab Take 3 tablets (300 mg total) by mouth once daily.    Associated Diagnoses: MDD (major depressive disorder), recurrent, in partial remission      vortioxetine (TRINTELLIX) 5 mg Tab Take 2.5 mg by mouth once daily.      ZOVIRAX 5 % Crea Refills: 5       !! - Potential duplicate medications found. Please discuss with provider.        Status at Discharge: Alert and medically stable    Discharge Diagnoses:  Major depressive disorder, recurrent, in partial remission  Diet: Resume previous outpatient diet  Activity: Ambulate with assistance  Instructions: Please do not eat or drink anything after midnight prior to procedure. Please do not drive on day of ECT.    Med Changes:  None    Next ECT:  7/22/2019    Boaz Lo MD  [facility]  PGY-2

## 2019-07-19 DIAGNOSIS — F33.9 RECURRENT MAJOR DEPRESSIVE DISORDER, REMISSION STATUS UNSPECIFIED: Primary | ICD-10-CM

## 2019-07-21 ENCOUNTER — ANESTHESIA EVENT (OUTPATIENT)
Dept: ELECTROPHYSIOLOGY | Facility: HOSPITAL | Age: 39
End: 2019-07-21
Payer: COMMERCIAL

## 2019-07-22 ENCOUNTER — ANESTHESIA (OUTPATIENT)
Dept: ELECTROPHYSIOLOGY | Facility: HOSPITAL | Age: 39
End: 2019-07-22
Payer: COMMERCIAL

## 2019-07-22 ENCOUNTER — HOSPITAL ENCOUNTER (OUTPATIENT)
Facility: HOSPITAL | Age: 39
Discharge: HOME | End: 2019-07-22
Attending: PSYCHIATRY & NEUROLOGY | Admitting: PSYCHIATRY & NEUROLOGY
Payer: COMMERCIAL

## 2019-07-22 VITALS
OXYGEN SATURATION: 99 % | TEMPERATURE: 98 F | HEIGHT: 66 IN | DIASTOLIC BLOOD PRESSURE: 76 MMHG | SYSTOLIC BLOOD PRESSURE: 109 MMHG | HEART RATE: 81 BPM | RESPIRATION RATE: 20 BRPM | WEIGHT: 146 LBS | BODY MASS INDEX: 23.46 KG/M2

## 2019-07-22 DIAGNOSIS — F33.41 MDD (MAJOR DEPRESSIVE DISORDER), RECURRENT, IN PARTIAL REMISSION: ICD-10-CM

## 2019-07-22 DIAGNOSIS — F33.9 RECURRENT MAJOR DEPRESSIVE DISORDER, REMISSION STATUS UNSPECIFIED: Primary | ICD-10-CM

## 2019-07-22 DIAGNOSIS — F32.9 MDD (MAJOR DEPRESSIVE DISORDER): ICD-10-CM

## 2019-07-22 LAB
B-HCG UR QL: NEGATIVE
CTP QC/QA: YES

## 2019-07-22 PROCEDURE — D9220A PRA ANESTHESIA: ICD-10-PCS | Mod: ,,, | Performed by: ANESTHESIOLOGY

## 2019-07-22 PROCEDURE — 90870 ELECTROCONVULSIVE THERAPY: CPT | Performed by: UROLOGY

## 2019-07-22 PROCEDURE — 25000003 PHARM REV CODE 250: Performed by: PSYCHIATRY & NEUROLOGY

## 2019-07-22 PROCEDURE — 63600175 PHARM REV CODE 636 W HCPCS: Performed by: UROLOGY

## 2019-07-22 PROCEDURE — D9220A PRA ANESTHESIA: Mod: ,,, | Performed by: ANESTHESIOLOGY

## 2019-07-22 PROCEDURE — 81025 URINE PREGNANCY TEST: CPT | Performed by: PSYCHIATRY & NEUROLOGY

## 2019-07-22 PROCEDURE — 25000003 PHARM REV CODE 250: Performed by: UROLOGY

## 2019-07-22 PROCEDURE — 90870 ELECTROCONVULSIVE THERAPY: CPT | Mod: ,,, | Performed by: PSYCHIATRY & NEUROLOGY

## 2019-07-22 PROCEDURE — 90870 PR ELECTROCONVULSIVE THERAPY,1 SEIZ: ICD-10-PCS | Mod: ,,, | Performed by: PSYCHIATRY & NEUROLOGY

## 2019-07-22 RX ORDER — LIDOCAINE HYDROCHLORIDE 10 MG/ML
1 INJECTION, SOLUTION EPIDURAL; INFILTRATION; INTRACAUDAL; PERINEURAL ONCE
Status: COMPLETED | OUTPATIENT
Start: 2019-07-22 | End: 2019-07-22

## 2019-07-22 RX ORDER — SODIUM CHLORIDE 0.9 % (FLUSH) 0.9 %
10 SYRINGE (ML) INJECTION
Status: DISCONTINUED | OUTPATIENT
Start: 2019-07-22 | End: 2019-07-22 | Stop reason: HOSPADM

## 2019-07-22 RX ORDER — KETOROLAC TROMETHAMINE 30 MG/ML
INJECTION, SOLUTION INTRAMUSCULAR; INTRAVENOUS
Status: DISCONTINUED | OUTPATIENT
Start: 2019-07-22 | End: 2019-07-23

## 2019-07-22 RX ORDER — SODIUM CHLORIDE 9 MG/ML
INJECTION, SOLUTION INTRAVENOUS CONTINUOUS
Status: DISCONTINUED | OUTPATIENT
Start: 2019-07-23 | End: 2019-07-22 | Stop reason: HOSPADM

## 2019-07-22 RX ORDER — ONDANSETRON 2 MG/ML
INJECTION INTRAMUSCULAR; INTRAVENOUS
Status: COMPLETED
Start: 2019-07-22 | End: 2019-07-22

## 2019-07-22 RX ORDER — SUCCINYLCHOLINE CHLORIDE 20 MG/ML
INJECTION INTRAMUSCULAR; INTRAVENOUS
Status: DISCONTINUED | OUTPATIENT
Start: 2019-07-22 | End: 2019-07-23

## 2019-07-22 RX ORDER — SUCCINYLCHOLINE CHLORIDE 20 MG/ML
INJECTION INTRAMUSCULAR; INTRAVENOUS
Status: COMPLETED
Start: 2019-07-22 | End: 2019-07-22

## 2019-07-22 RX ORDER — LABETALOL HCL 20 MG/4 ML
SYRINGE (ML) INTRAVENOUS
Status: DISCONTINUED
Start: 2019-07-22 | End: 2019-07-22 | Stop reason: HOSPADM

## 2019-07-22 RX ORDER — KETOROLAC TROMETHAMINE 30 MG/ML
INJECTION, SOLUTION INTRAMUSCULAR; INTRAVENOUS
Status: COMPLETED
Start: 2019-07-22 | End: 2019-07-22

## 2019-07-22 RX ORDER — SODIUM CHLORIDE 9 MG/ML
INJECTION, SOLUTION INTRAVENOUS CONTINUOUS PRN
Status: DISCONTINUED | OUTPATIENT
Start: 2019-07-22 | End: 2019-07-23

## 2019-07-22 RX ORDER — ONDANSETRON 2 MG/ML
INJECTION INTRAMUSCULAR; INTRAVENOUS
Status: DISCONTINUED | OUTPATIENT
Start: 2019-07-22 | End: 2019-07-23

## 2019-07-22 RX ADMIN — SODIUM CHLORIDE: 0.9 INJECTION, SOLUTION INTRAVENOUS at 07:07

## 2019-07-22 RX ADMIN — METHOHEXITAL SODIUM 120 MG: 500 INJECTION, POWDER, LYOPHILIZED, FOR SOLUTION INTRAMUSCULAR; INTRAVENOUS; RECTAL at 07:07

## 2019-07-22 RX ADMIN — Medication 500 MG: at 06:07

## 2019-07-22 RX ADMIN — LIDOCAINE HYDROCHLORIDE 50 MG: 10 INJECTION, SOLUTION EPIDURAL; INFILTRATION; INTRACAUDAL; PERINEURAL at 06:07

## 2019-07-22 RX ADMIN — SUCCINYLCHOLINE CHLORIDE 120 MG: 20 INJECTION, SOLUTION INTRAMUSCULAR; INTRAVENOUS at 07:07

## 2019-07-22 RX ADMIN — KETOROLAC TROMETHAMINE 30 MG: 30 INJECTION, SOLUTION INTRAMUSCULAR at 07:07

## 2019-07-22 RX ADMIN — SODIUM CHLORIDE: 0.9 INJECTION, SOLUTION INTRAVENOUS at 06:07

## 2019-07-22 RX ADMIN — ONDANSETRON 4 MG: 2 INJECTION, SOLUTION INTRAMUSCULAR; INTRAVENOUS at 07:07

## 2019-07-22 NOTE — H&P
Allyssa Wright  : 1980   MRN: [id number]  Date: 2019     Electroconvulsive Therapy  History & Physical    Chief complaint: Major Depressive Disorder, recurrent, partial remission  Procedure: ECT #77    SUBJECTIVE:     HPI:   Allyssa Wright is a 39 y.o. female with Major Depressive Disorder, recurrent, in partial remission who presents for ECT. The patient complains of depression, which has been improving gradually.  No appreciable changed today since last procedure.  Strong appetite, NPO since midnight.  Adequate sleep.  Please see Dr. Boyce's full pre-ECT evaluation for further details. The patient does not need any prescriptions filled here and has not had any med changes since meeting with Dr. Boyce. The patient has not had anything by mouth since midnight and is ready for ECT.    Psychiatric Review of Systems:  Mood: improving, mildly depressed  Appetite: No problem  Psychomotor: No problem  Cognitive Impairment: Moderate  Insomnia: None  Psychosis: None  Diurnal Variation: None  Suicidal Ideation: Absent    Medical Review Of Systems:  Pertinent items are noted in HPI.    Current Medications:   Current Facility-Administered Medications on File Prior to Encounter   Medication Dose Route Frequency Provider Last Rate Last Dose    lidocaine (PF) 10 mg/ml (1%) injection 10 mg  1 mL Intradermal Once Homa Colbert MD        sodium chloride 0.9% flush 10 mL  10 mL Intra-Catheter PRN Homa Colbert MD         Current Outpatient Medications on File Prior to Encounter   Medication Sig Dispense Refill    clozapine (CLOZARIL) 50 MG tablet Take 50 mg by mouth once daily.       dapsone 7.5 % GlwP Apply topically once daily.      famciclovir (FAMVIR) 500 MG tablet Take 1 tablet (500 mg total) by mouth 2 (two) times daily. 30 tablet 12    mirtazapine (REMERON) 15 MG tablet Take 1 tablet (15 mg total) by mouth every evening. (Patient taking differently: Take 7.5 mg by mouth every evening. ) 90 tablet  0    spironolactone (ALDACTONE) 50 MG tablet 50 mg 2 (two) times daily. 50  mg qAM, 50 mg qHS.      thyroid, pork, (ARMOUR THYROID) 30 mg Tab Take 1 tablet (30 mg total) by mouth every morning. 30 tablet 11    trazodone (DESYREL) 100 MG tablet Take 200 mg by mouth every evening.       UNABLE TO FIND 2 (two) times daily. n-azetyl-cysteine      venlafaxine (EFFEXOR) 100 MG Tab Take 3 tablets (300 mg total) by mouth once daily. (Patient taking differently: Take 225 mg by mouth once daily. )      vortioxetine (TRINTELLIX) 5 mg Tab Take 2.5 mg by mouth once daily.      dextroamphetamine-amphetamine 30 mg Tab Take 30 mg by mouth 2 (two) times daily.       hydrOXYzine pamoate (VISTARIL) 25 MG Cap Take 2 capsules (50 mg total) by mouth nightly as needed (Take 25-50mg (1-2 caps) at night for anxiety prior to ECT). 180 capsule 0    hydrOXYzine pamoate (VISTARIL) 25 MG Cap Take 1 capsule (25 mg total) by mouth nightly as needed (anxiety/sleep). Take 1 to 2 pills as needed 60 capsule 2    ZOVIRAX 5 % Crea   5      Allergies:   Benzodiazepines; Ampicillin; Erythromycin; Levaquin [levofloxacin]; Penicillins; Pristiq [desvenlafaxine]; Sulfa (sulfonamide antibiotics); and Azithromycin    Past Medical/Surgical History:   Past Medical History:   Diagnosis Date    Anxiety     Depression     History of psychiatric hospitalization     HSV-1 (herpes simplex virus 1) infection     Hx of psychiatric care     Moderate depressed bipolar II disorder 06/13/2016    reports no history of bipolar    Obsessive-compulsive disorder     Psychiatric problem     Schizophrenia 4/3/2018    Self-harming behavior     Therapy      Past Surgical History:   Procedure Laterality Date    ANKLE SURGERY Right     BREAST augmentation      ELECTROCONVULSIVE THERAPY (ECT) - SINGLE SEIZURE N/A 12/6/2018    Performed by Shoaib Boyce Jr., MD at [facility]    ELECTROCONVULSIVE THERAPY (ECT) - SINGLE SEIZURE N/A 8/31/2018    Performed by  [facility]ib [facility]ce Jr., MD at [facility] ECT    ELECTROCONVULSIVE THERAPY (ECT) - SINGLE SEIZURE N/A 8/6/2018    Performed by Shoaib Boyce Jr., MD at [facility] ECT    ELECTROCONVULSIVE THERAPY (ECT) - SINGLE SEIZURE N/A 7/23/2018    Performed by Shoaib Boyce Jr., MD at [facility] ECT    ELECTROCONVULSIVE THERAPY (ECT) - SINGLE SEIZURE N/A 7/5/2018    Performed by Shoaib Boyce Jr., MD at [facility] ECT    ELECTROCONVULSIVE THERAPY (ECT) - SINGLE SEIZURE N/A 6/28/2018    Performed by Shoaib Boyce Jr., MD at [facility] ECT    ELECTROCONVULSIVE THERAPY (ECT) - SINGLE SEIZURE N/A 6/13/2018    Performed by Shoaib Boyce Jr., MD at [facility] ECT    ELECTROCONVULSIVE THERAPY (ECT) - SINGLE SEIZURE N/A 5/29/2018    Performed by Shoaib Boyce Jr., MD at [facility] ECT    ELECTROCONVULSIVE THERAPY (ECT) - SINGLE SEIZURE N/A 5/8/2018    Performed by Shoaib Boyce Jr., MD at [facility] ECT    ELECTROCONVULSIVE THERAPY (ECT) - SINGLE SEIZURE N/A 4/24/2018    Performed by Shoaib Boyce Jr., MD at [facility] ECT    ELECTROCONVULSIVE THERAPY (ECT) - SINGLE SEIZURE N/A 4/17/2018    Performed by Shoaib Boyce Jr., MD at [facility] ECT    ELECTROCONVULSIVE THERAPY (ECT) - SINGLE SEIZURE N/A 4/13/2018    Performed by Shoaib Boyce Jr., MD at [facility] ECT    ELECTROCONVULSIVE THERAPY (ECT) - SINGLE SEIZURE N/A 4/3/2018    Performed by Shoaib Boyce Jr., MD at [facility] ECT    ELECTROCONVULSIVE THERAPY (ECT) - SINGLE SEIZURE N/A 3/20/2018    Performed by Shoaib Boyce Jr., MD at [facility] ECT    ELECTROCONVULSIVE THERAPY (ECT) - SINGLE SEIZURE N/A 3/15/2018    Performed by Shoaib Boyce Jr., MD at [facility] ECT    ELECTROCONVULSIVE THERAPY (ECT) - SINGLE SEIZURE N/A 3/6/2018    Performed by Shoaib Boyce Jr., MD at [facility] ECT    ELECTROCONVULSIVE THERAPY (ECT) - SINGLE SEIZURE N/A 3/1/2018    Performed by Shoaib Boyce Jr., MD at [facility] ECT    ELECTROCONVULSIVE THERAPY (ECT) - SINGLE SEIZURE N/A 2/23/2018    Performed by Shoaib Boyce  MD [place] [facility]    ELECTROCONVULSIVE THERAPY (ECT) - SINGLE SEIZURE N/A 2/19/2018    Performed by Shoaib Boyce Jr., MD at [facility] ECT    ELECTROCONVULSIVE THERAPY (ECT) - SINGLE SEIZURE N/A 2/15/2018    Performed by Shoaib Boyce Jr., MD at [facility] ECT    ELECTROCONVULSIVE THERAPY (ECT) - SINGLE SEIZURE N/A 1/22/2018    Performed by Shoaib Boyce Jr., MD at [facility] ECT    ELECTROCONVULSIVE THERAPY (ECT) - SINGLE SEIZURE N/A 12/11/2017    Performed by Shoaib Boyce Jr., MD at [facility] E[facility]    ELECTROCONVULSIVE THERAPY (ECT) - SINGLE SEIZURE N/A 10/24/2017    Performed by Shoaib Boyce Jr., MD at [facility] ECT    ELECTROCONVULSIVE THERAPY (ECT) - SINGLE SEIZURE N/A 9/20/2017    Performed by Shoaib Boyce Jr., MD at [facility] ECT    ELECTROCONVULSIVE THERAPY (ECT) - SINGLE SEIZURE N/A 8/14/2017    Performed by Shoaib Boyce Jr., MD at [facility] ECT    ELECTROCONVULSIVE THERAPY (ECT) - SINGLE SEIZURE Bilateral 7/12/2017    Performed by Shoaib Boyce Jr., MD at [facility] ECT    ELECTROCONVULSIVE THERAPY (ECT) - SINGLE SEIZURE N/A 6/13/2017    Performed by Shoaib Boyce Jr., MD at [facility] E[facility]    ELECTROCONVULSIVE THERAPY (ECT) - SINGLE SEIZURE N/A 5/17/2017    Performed by Shoaib Boyce Jr., MD at [facility] ECT    ELECTROCONVULSIVE THERAPY (ECT) - SINGLE SEIZURE N/A 4/20/2017    Performed by Shoaib Boyce Jr., MD at [facility] ECT    ELECTROCONVULSIVE THERAPY (ECT) - SINGLE SEIZURE N/A 11/22/2016    Performed by Shoaib Boyce Jr., MD at [facility] ECT    ELECTROCONVULSIVE THERAPY (ECT) - SINGLE SEIZURE N/A 10/24/2016    Performed by Shoaib Boyce Jr., MD at [facility]    ELECTROCONVULSIVE THERAPY (ECT) - SINGLE SEIZURE N/A 9/22/2016    Performed by Shoaib Boyce Jr., MD at [facility]    ELECTROCONVULSIVE THERAPY (ECT) - SINGLE SEIZURE N/A 9/1/2016    Performed by Shoaib Boyce Jr., MD at [facility]    ELECTROCONVULSIVE THERAPY (ECT) - SINGLE SEIZURE N/A 8/15/2016    Performed by Shoaib Boyce Jr.,  MD at [facility] ECT    ELECTROCONVULSIVE THERAPY (ECT) - SINGLE SEIZURE N/A 8/1/2016    Performed by Shoaib Boyce Jr., MD at [facility] ECT    ELECTROCONVULSIVE THERAPY (ECT) - SINGLE SEIZURE N/A 7/22/2016    Performed by Shoaib Boyce Jr., MD at [facility] ECT    ELECTROCONVULSIVE THERAPY (ECT) - SINGLE SEIZURE N/A 7/14/2016    Performed by Shoaib Boyce Jr., MD at [facility] ECT    ELECTROCONVULSIVE THERAPY (ECT) - SINGLE SEIZURE N/A 7/8/2016    Performed by Shoaib Boyce Jr., MD at [facility] ECT    ELECTROCONVULSIVE THERAPY (ECT) - SINGLE SEIZURE N/A 7/5/2016    Performed by Shoaib Boyce Jr., MD at [facility] ECT    ELECTROCONVULSIVE THERAPY (ECT) - SINGLE SEIZURE N/A 6/27/2016    Performed by Shoaib Boyce Jr., MD at [facility] ECT    ELECTROCONVULSIVE THERAPY (ECT) - SINGLE SEIZURE N/A 6/23/2016    Performed by Shoaib Boyce Jr., MD at [facility] ECT    ELECTROCONVULSIVE THERAPY (ECT) - SINGLE SEIZURE N/A 6/20/2016    Performed by Shoaib Byoce Jr., MD at [facility] ECT    ELECTROCONVULSIVE THERAPY (ECT) - SINGLE SEIZURE N/A 6/16/2016    Performed by Shoaib Boyce Jr., MD at [facility] ECT    ELECTROCONVULSIVE THERAPY (ECT) - SINGLE SEIZURE N/A 6/15/2016    Performed by Shoaib Boyce Jr., MD at [facility] ECT    ELECTROCONVULSIVE THERAPY (ECT) - SINGLE SEIZURE N/A 6/14/2016    Performed by Shoaib Boyce Jr., MD at [facility] ECT    ELECTROCONVULSIVE THERAPY (ECT) - SINGLE SEIZURE N/A 6/13/2016    Performed by Shoaib Boyce Jr., MD at [facility] ECT    ELECTROCONVULSIVE THERAPY (ECT) - SINGLE SEIZURE N/A 1/4/2016    Performed by Shoaib Boyce Jr., MD at [facility] E[facility]    ELECTROCONVULSIVE THERAPY, CEREBRAL HEMISPHERE, UNILATERAL, 1 SEIZURE Bilateral 6/25/2018    Performed by Shoaib Boyce Jr., MD at [facility] E[facility]    OVARIAN CYST REMOVAL Bilateral       OBJECTIVE:     Vitals (pre-procedure):  There were no vitals filed for this visit.     Labs/Imaging/Studies:   No results found for this or any previous visit (from the past 48  hour(s)).   No results found for: PHENYTOIN, PHENOBARB, VALPROATE, CBMZ    Physical Exam:   Gen: AAOx4, NAD  HEENT: MMM, PERRL, EOMI, O/P clear  CV: RRR, S1/S2 nml, no M/R/G  Chest: CTAB, no R/R/W, unlabored breathing  Abd: S/NT/ND, +BS, no HSM  Ext: No C/C/E, pulse 2+ throughout  Neuro: CN II-XII grossly intact, no focal deficits    Mental Status Exam:   Appearance: unremarkable, age appropriate, neatly groomed  Behavior: normal, cooperative, eye contact normal  Speech: normal tone, normal rate, normal pitch, normal volume, spontaneous  Mood: depressed  Affect: full & reactive  Thought Process: normal and logical  Thought Content: normal, no suicidality, no homicidality, delusions, or paranoia  Cognition: grossly intact  Insight: fair  Judgment: good    ASSESSMENT/PLAN:     Allyssa Wright is a 39 y.o. female with Major Depressive Disorder, recurrent, in partial remission who presents for ECT.    Recommendations:   Proceed with ECT #77.      Benson Ott MD  [facility], PGY-2  Pager Number [phone]  [facility]  7/22/2019

## 2019-07-22 NOTE — ANESTHESIA PROCEDURE NOTES
ECT    Procedure start time: 7/22/2019 7:05 AM  Timeout performed at: 7/22/2019 7:05 AM  Procedure end time: 7/22/2019 7:30 AM      Staffing  Authorizing Provider: Puneet Medrano MD  Performing Provider: Puneet Medrano MD    Staffing  Other anesthesia staff: Renetta Carver MD    Setup & Induction  Patient Monitoring: heart rate, continuous pulse ox, continuous capnometry and NIBP  Patient preparation: bite block inserted, extremities padded, mandibular stabilization and patient hyperventilated  Electrode Placement: Bitemporal    The patient was moved to the ECT therapy room after being assessed and consented for ECT. After standard ASA monitors were applied and timeout performed, the patient was adequately preoxygenated. After induction of general anesthesia, adequate oxygenation and ventilation were confirmed with pulse oxymetry and end tidal CO2 monitoring via bag-mask ventilation. End tidal CO2 was monitored throughout the case and moderate hyperventilation was performed prior to beginning ECT treatment. All extremities were padded, biteblock was inserted, and mandibular stabilization was done prior to initiating ECT therapy.    Procedure  Stimulus Number 1: Setting Number = III; 576 millicoulombs; Seizure Duration = 56 seconds            Recovery  Baseline Blood Pressure: 118/70  Peak Blood Pressure: 218/110    ECT Findings  ECT associated findings of: None

## 2019-07-22 NOTE — ANESTHESIA PREPROCEDURE EVALUATION
[facility]  Anesthesia Pre-Operative Evaluation         Patient Name: Allyssa Wright  YOB: 1980  MRN: [id number]    SUBJECTIVE:     Pre-operative evaluation for Procedure(s) (LRB):  ELECTROCONVULSIVE THERAPY (ECT) - SINGLE SEIZURE (N/A)     07/22/2019    Allyssa Wright is a 39 y.o. female w/ a significant PMHx of depression and aspiration event on prior ECT.      Patient now presents for the above procedure: ECT #77     Previous ECT Anesthetic:  - Succinylcholine: 120 mg  - Methohexital: 120 mg  - Toradol: 30 mg  - Zofran: 4 mg  - Labetalol: 10 mg      Patient now presents for the above procedure(s).      LDA: None documented.       Prev airway: None documented.    Drips: None documented.      Patient Active Problem List   Diagnosis    MDD (major depressive disorder), recurrent, in partial remission    Other acne    Comfort measures only status    MDD (major depressive disorder)    Major depression    Major depressive disorder    MDD (major depressive disorder), severe    Depression       Review of patient's allergies indicates:   Allergen Reactions    Benzodiazepines Other (See Comments)     Contraindicated while taking Clozapine    Ampicillin      Mom says so    Erythromycin     Levaquin [levofloxacin] Other (See Comments)     Depression side effects    Penicillins      Mom says so    Pristiq [desvenlafaxine]      psycotic      Sulfa (sulfonamide antibiotics)      Rash      Azithromycin Anxiety       Current Inpatient Medications:      Current Facility-Administered Medications on File Prior to Visit   Medication Dose Route Frequency Provider Last Rate Last Dose    [START ON 7/23/2019] 0.9%  NaCl infusion   Intravenous Continuous Terrence Quinones MD 50 mL/hr at 07/22/19 0641      lidocaine (PF) 10 mg/ml (1%) injection 10 mg  1 mL Intradermal Once Homa Colbert MD        sodium  chloride 0.9% flush 10 mL  10 mL Intra-Catheter PRN Homa Colbert MD         Current Outpatient Medications on File Prior to Visit   Medication Sig Dispense Refill    clozapine (CLOZARIL) 50 MG tablet Take 50 mg by mouth once daily.       dapsone 7.5 % GlwP Apply topically once daily.      dextroamphetamine-amphetamine 30 mg Tab Take 30 mg by mouth 2 (two) times daily.       famciclovir (FAMVIR) 500 MG tablet Take 1 tablet (500 mg total) by mouth 2 (two) times daily. 30 tablet 12    hydrOXYzine pamoate (VISTARIL) 25 MG Cap Take 2 capsules (50 mg total) by mouth nightly as needed (Take 25-50mg (1-2 caps) at night for anxiety prior to ECT). 180 capsule 0    hydrOXYzine pamoate (VISTARIL) 25 MG Cap Take 1 capsule (25 mg total) by mouth nightly as needed (anxiety/sleep). Take 1 to 2 pills as needed 60 capsule 2    mirtazapine (REMERON) 15 MG tablet Take 1 tablet (15 mg total) by mouth every evening. (Patient taking differently: Take 7.5 mg by mouth every evening. ) 90 tablet 0    spironolactone (ALDACTONE) 50 MG tablet 50 mg 2 (two) times daily. 50  mg qAM, 50 mg qHS.      thyroid, pork, (ARMOUR THYROID) 30 mg Tab Take 1 tablet (30 mg total) by mouth every morning. 30 tablet 11    trazodone (DESYREL) 100 MG tablet Take 200 mg by mouth every evening.       UNABLE TO FIND 2 (two) times daily. n-azetyl-cysteine      venlafaxine (EFFEXOR) 100 MG Tab Take 3 tablets (300 mg total) by mouth once daily. (Patient taking differently: Take 225 mg by mouth once daily. )      vortioxetine (TRINTELLIX) 5 mg Tab Take 2.5 mg by mouth once daily.      ZOVIRAX 5 % Crea   5       Past Surgical History:   Procedure Laterality Date    ANKLE SURGERY Right     BREAST augmentation      ELECTROCONVULSIVE THERAPY (ECT) - SINGLE SEIZURE N/A 12/6/2018    Performed by Sh[facility]ib [facility]ce Jr., [facility] at [facility] ECT    ELECTROCONVULSIVE THERAPY (ECT) - SINGLE SEIZURE N/A 8/31/2018    Performed by [facility]ib [facility] Jr., MD at [facility] ECT     ELECTROCONVULSIVE THERAPY (ECT) - SINGLE SEIZURE N/A 8/6/2018    Performed by [facility]ib [facility] Jr., MD at [facility] ECT    ELECTROCONVULSIVE THERAPY (ECT) - SINGLE SEIZURE N/A 7/23/2018    Performed by Shoaib Boyce Jr., MD at [facility] ECT    ELECTROCONVULSIVE THERAPY (ECT) - SINGLE SEIZURE N/A 7/5/2018    Performed by Shoaib Boyce Jr., MD at [facility] ECT    ELECTROCONVULSIVE THERAPY (ECT) - SINGLE SEIZURE N/A 6/28/2018    Performed by Shoaib Boyce Jr., MD at [facility] ECT    ELECTROCONVULSIVE THERAPY (ECT) - SINGLE SEIZURE N/A 6/13/2018    Performed by Shoaib Boyce Jr., MD at [facility] ECT    ELECTROCONVULSIVE THERAPY (ECT) - SINGLE SEIZURE N/A 5/29/2018    Performed by Shoaib Boyce Jr., MD at [facility] ECT    ELECTROCONVULSIVE THERAPY (ECT) - SINGLE SEIZURE N/A 5/8/2018    Performed by Shoaib Boyce Jr., MD at [facility] ECT    ELECTROCONVULSIVE THERAPY (ECT) - SINGLE SEIZURE N/A 4/24/2018    Performed by Shoaib Boyce Jr., MD at [facility] ECT    ELECTROCONVULSIVE THERAPY (ECT) - SINGLE SEIZURE N/A 4/17/2018    Performed by Shoaib Boyce Jr., MD at [facility] ECT    ELECTROCONVULSIVE THERAPY (ECT) - SINGLE SEIZURE N/A 4/13/2018    Performed by Shoaib Boyce Jr., MD at [facility] ECT    ELECTROCONVULSIVE THERAPY (ECT) - SINGLE SEIZURE N/A 4/3/2018    Performed by Shoaib Boyce Jr., MD at [facility] ECT    ELECTROCONVULSIVE THERAPY (ECT) - SINGLE SEIZURE N/A 3/20/2018    Performed by Shoaib Boyce Jr., MD at [facility] ECT    ELECTROCONVULSIVE THERAPY (ECT) - SINGLE SEIZURE N/A 3/15/2018    Performed by Shoaib Boyce Jr., MD at [facility] ECT    ELECTROCONVULSIVE THERAPY (ECT) - SINGLE SEIZURE N/A 3/6/2018    Performed by Shoaib Boyce Jr., MD at [facility] ECT    ELECTROCONVULSIVE THERAPY (ECT) - SINGLE SEIZURE N/A 3/1/2018    Performed by Shoaib Boyce Jr., MD at [facility] ECT    ELECTROCONVULSIVE THERAPY (ECT) - SINGLE SEIZURE N/A 2/23/2018    Performed by Shoaib Boyce Jr., MD at [facility] ECT     ELECTROCONVULSIVE THERAPY (ECT) - SINGLE SEIZURE N/A 2/19/2018    Performed by [facility] Jr[facility] at [facility] ECT    ELECTROCONVULSIVE THERAPY (ECT) - SINGLE SEIZURE N/A 2/15/2018    Performed by Shoaib Boyce Jr., MD at [facility] ECT    ELECTROCONVULSIVE THERAPY (ECT) - SINGLE SEIZURE N/A 1/22/2018    Performed by Shoaib Boyce Jr., MD at [facility] ECT    ELECTROCONVULSIVE THERAPY (ECT) - SINGLE SEIZURE N/A 12/11/2017    Performed by Shoaib Boyce Jr., MD at [facility] ECT    ELECTROCONVULSIVE THERAPY (ECT) - SINGLE SEIZURE N/A 10/24/2017    Performed by Shoaib Boyce Jr., MD at [facility] ECT    ELECTROCONVULSIVE THERAPY (ECT) - SINGLE SEIZURE N/A 9/20/2017    Performed by Shoaib Boyce Jr., MD at [facility] ECT    ELECTROCONVULSIVE THERAPY (ECT) - SINGLE SEIZURE N/A 8/14/2017    Performed by Shoaib Boyce Jr., MD at [facility] ECT    ELECTROCONVULSIVE THERAPY (ECT) - SINGLE SEIZURE Bilateral 7/12/2017    Performed by Shoaib Boyce Jr., MD at [facility] ECT    ELECTROCONVULSIVE THERAPY (ECT) - SINGLE SEIZURE N/A 6/13/2017    Performed by Shoaib Boyce Jr., MD at [facility] ECT    ELECTROCONVULSIVE THERAPY (ECT) - SINGLE SEIZURE N/A 5/17/2017    Performed by Shoaib Boyce Jr., MD at [facility] ECT    ELECTROCONVULSIVE THERAPY (ECT) - SINGLE SEIZURE N/A 4/20/2017    Performed by Shoaib Boyce Jr., MD at [facility] ECT    ELECTROCONVULSIVE THERAPY (ECT) - SINGLE SEIZURE N/A 11/22/2016    Performed by Shoaib Boyce Jr., MD at [facility] ECT    ELECTROCONVULSIVE THERAPY (ECT) - SINGLE SEIZURE N/A 10/24/2016    Performed by Shoaib Boyce Jr., MD at [facility] ECT    ELECTROCONVULSIVE THERAPY (ECT) - SINGLE SEIZURE N/A 9/22/2016    Performed by Shoaib Boyce Jr., MD at [facility] ECT    ELECTROCONVULSIVE THERAPY (ECT) - SINGLE SEIZURE N/A 9/1/2016    Performed by Shoaib Boyce Jr., MD at [facility] ECT    ELECTROCONVULSIVE THERAPY (ECT) - SINGLE SEIZURE N/A 8/15/2016    Performed by hSoaib Boyce Jr., MD at [facility] ECT     ELECTROCONVULSIVE THERAPY (ECT) - SINGLE SEIZURE N/A 8/1/2016    Performed by Sh[facility] Jr., MD at [facility] ECT    ELECTROCONVULSIVE THERAPY (ECT) - SINGLE SEIZURE N/A 7/22/2016    Performed by Shoaib Boyce Jr., MD at [facility] ECT    ELECTROCONVULSIVE THERAPY (ECT) - SINGLE SEIZURE N/A 7/14/2016    Performed by Shoaib Boyce Jr., MD at [facility] ECT    ELECTROCONVULSIVE THERAPY (ECT) - SINGLE SEIZURE N/A 7/8/2016    Performed by Shoaib Boyce Jr., MD at [facility] ECT    ELECTROCONVULSIVE THERAPY (ECT) - SINGLE SEIZURE N/A 7/5/2016    Performed by Shoaib Boyce Jr., MD at [facility] ECT    ELECTROCONVULSIVE THERAPY (ECT) - SINGLE SEIZURE N/A 6/27/2016    Performed by Shoaib Boyce Jr., MD at [facility] ECT    ELECTROCONVULSIVE THERAPY (ECT) - SINGLE SEIZURE N/A 6/23/2016    Performed by Shoaib Boyce Jr., MD at [facility] ECT    ELECTROCONVULSIVE THERAPY (ECT) - SINGLE SEIZURE N/A 6/20/2016    Performed by Shoaib Boyce Jr., MD at [facility] ECT    ELECTROCONVULSIVE THERAPY (ECT) - SINGLE SEIZURE N/A 6/16/2016    Performed by Shoaib Boyce Jr., MD at [facility] ECT    ELECTROCONVULSIVE THERAPY (ECT) - SINGLE SEIZURE N/A 6/15/2016    Performed by Shoaib Boyce Jr., MD at [facility] ECT    ELECTROCONVULSIVE THERAPY (ECT) - SINGLE SEIZURE N/A 6/14/2016    Performed by Shoaib Boyce Jr., MD at [facility] ECT    ELECTROCONVULSIVE THERAPY (ECT) - SINGLE SEIZURE N/A 6/13/2016    Performed by Shoaib Boyce Jr., MD at [facility] ECT    ELECTROCONVULSIVE THERAPY (ECT) - SINGLE SEIZURE N/A 1/4/2016    Performed by Shoaib Boyce Jr., MD at [facility] ECT    ELECTROCONVULSIVE THERAPY, CEREBRAL HEMISPHERE, UNILATERAL, 1 SEIZURE Bilateral 6/25/2018    Performed by Sh[facility]ib Boyce Jr[facility] MD at [facility] ECT    OVARIAN CYST REMOVAL Bilateral        Social History     Socioeconomic History    Marital status: Single     Spouse name: Not on file    Number of children: Not on file    Years of education: Not on file    Highest education  level: Not on file   Occupational History    Occupation: unemployed   Social Needs    Financial resource strain: Not on file    Food insecurity:     Worry: Not on file     Inability: Not on file    Transportation needs:     Medical: Not on file     Non-medical: Not on file   Tobacco Use    Smoking status: Former Smoker     Last attempt to quit: 2011     Years since quittin.2    Smokeless tobacco: Never Used   Substance and Sexual Activity    Alcohol use: Yes     Comment: rarely    Drug use: No     Comment: Experimental use in high school    Sexual activity: Not on file   Lifestyle    Physical activity:     Days per week: Not on file     Minutes per session: Not on file    Stress: Not on file   Relationships    Social connections:     Talks on phone: Not on file     Gets together: Not on file     Attends Mormonism service: Not on file     Active member of club or organization: Not on file     Attends meetings of clubs or organizations: Not on file     Relationship status: Not on file   Other Topics Concern    Patient feels they ought to cut down on drinking/drug use Not Asked    Patient annoyed by others criticizing their drinking/drug use Not Asked    Patient has felt bad or guilty about drinking/drug use Not Asked    Patient has had a drink/used drugs as an eye opener in the AM Not Asked   Social History Narrative    Pt has 1 older brother from an intact family until the death of her mother in .  She completed 1 year of college, was never in the , and has never been employed.  She was engaged once, but never , has no children, and lives with her father plus 2 dogs.  She denies any hobbies and is spiritual but not Mormonism.  She does date and returns to college during rare periods when depression and anxiety [place].       OBJECTIVE:     Vital Signs Range (Last 24H):  Temp:  [36.2 °C (97.1 °F)]   Pulse:  [81]   Resp:  [18]   BP: (117)/(67)   SpO2:  [100 %]       Significant  Labs:  Lab Results   Component Value Date    WBC 6.83 06/08/2016    HGB 13.3 06/08/2016    HCT 40.3 06/08/2016     06/08/2016    ALT 14 06/08/2016    AST 20 06/08/2016     06/08/2016    K 3.8 06/08/2016     06/08/2016    CREATININE 0.7 06/08/2016    BUN 10 06/08/2016    CO2 28 06/08/2016    TSH 1.208 06/08/2016       Diagnostic Studies: No relevant studies.    EKG: No recent studies available.    2D ECHO:  No results found for this or any previous visit.      ASSESSMENT/PLAN:         Pre-op Assessment    I have reviewed the Patient Summary Reports.     I have reviewed the Nursing Notes.   I have reviewed the Medications.     Review of Systems  Anesthesia Hx:  No problems with previous Anesthesia  History of prior surgery of interest to airway management or planning: Denies Family Hx of Anesthesia complications.   Denies Personal Hx of Anesthesia complications.   Social:  Non-Smoker, No Alcohol Use    Hematology/Oncology:        Denies Current/Recent Cancer   EENT/Dental:   denies chronic allergic rhinitis   Cardiovascular:   Denies Pacemaker.  Denies Hypertension.  Denies Valvular problems/Murmurs.  Denies MI.  Denies CAD.    Denies CABG/stent.  Denies Dysrhythmias.             Pulmonary:   Denies COPD.  Denies Asthma.  Denies Recent URI.  Denies Sleep Apnea.    Renal/:   Denies Chronic Renal Disease.     Hepatic/GI:   Denies PUD. Denies GERD. Denies Liver Disease.    Neurological:   Denies TIA. Denies CVA. Seizures    Endocrine:   Denies Diabetes. Denies Hypothyroidism. Denies Hyperthyroidism.    Psych:   Psychiatric History          Physical Exam  General:  Well nourished    Airway/Jaw/Neck:  Airway Findings: Mouth Opening: Normal Tongue: Normal  General Airway Assessment: Adult  Mallampati: I  Improves to II with phonation.  TM Distance: Normal, at least 6 cm  Jaw/Neck Findings:  Neck ROM: Normal ROM     Eyes/Ears/Nose:  EYES/EARS/NOSE FINDINGS: Normal   Dental:  Dental Findings: In tact    Chest/Lungs:  Chest/Lungs Findings: Clear to auscultation     Heart/Vascular:  Heart Findings: Rate: Normal  Rhythm: Regular Rhythm  Sounds: Normal  Heart murmur: negative    Abdomen:  Abdomen Findings:  Normal, Soft, Nontender     Musculoskeletal:  Musculoskeletal Findings: Normal   Skin:  Skin Findings: Normal    Mental Status:  Mental Status Findings:  Cooperative, Alert and Oriented         Anesthesia Plan  Type of Anesthesia, risks & benefits discussed:  Anesthesia Type:  general  Patient's Preference: General   Intra-op Monitoring Plan: standard ASA monitors  Intra-op Monitoring Plan Comments:   Post Op Pain Control Plan: per primary service following discharge from PACU  Post Op Pain Control Plan Comments:   Induction:   IV  Beta Blocker:  Patient is not currently on a Beta-Blocker (No further documentation required).       Informed Consent: Patient understands risks and agrees with Anesthesia plan.  Questions answered. Anesthesia consent signed with patient.  ASA Score: 3     Day of Surgery Review of History & Physical:    H&P update referred to the provider.     Anesthesia Plan Notes: Previous aspiration event noted but has been done via natural airway since and went well.        Ready For Surgery From Anesthesia Perspective.

## 2019-07-22 NOTE — DISCHARGE SUMMARY
Allyssa Wright  : 1980   MRN: [id number]  Date: 2019     Electroconvulsive Therapy  Discharge Summary    Admit Date: 2019  6:02 AM  Discharge Date: 2019    Attending Physician: Shoaib Boyce Jr., MD   Discharge Provider: Boaz Lo MD    History of Present Illness: Allyssa Wright is a 39 y.o. female with Major depressive disorder, recurrent, in partial remission presented for ECT #77. See H&P dated 2019 for full HPI. For further details, see Dr. Boyce's pre-ECT evaluation.    Hospital Course: The patient tolerated the ECT treatment well without complication. Patient was stable post-procedure. See OP note dated 2019 for more details.     Disposition: Home or Self Care    Medications:  Current Discharge Medication List      CONTINUE these medications which have NOT CHANGED    Details   clozapine (CLOZARIL) 50 MG tablet Take 50 mg by mouth once daily.       dapsone 7.5 % GlwP Apply topically once daily.      famciclovir (FAMVIR) 500 MG tablet Take 1 tablet (500 mg total) by mouth 2 (two) times daily.  Qty: 30 tablet, Refills: 12    Associated Diagnoses: Major depressive disorder, recurrent episode, moderate degree      mirtazapine (REMERON) 15 MG tablet Take 1 tablet (15 mg total) by mouth every evening.  Qty: 90 tablet, Refills: 0      spironolactone (ALDACTONE) 50 MG tablet 50 mg 2 (two) times daily. 50  mg qAM, 50 mg qHS.      thyroid, pork, (ARMOUR THYROID) 30 mg Tab Take 1 tablet (30 mg total) by mouth every morning.  Qty: 30 tablet, Refills: 11    Associated Diagnoses: Major depressive disorder, recurrent episode, moderate degree      trazodone (DESYREL) 100 MG tablet Take 200 mg by mouth every evening.       UNABLE TO FIND 2 (two) times daily. n-azetyl-cysteine      venlafaxine (EFFEXOR) 100 MG Tab Take 3 tablets (300 mg total) by mouth once daily.    Associated Diagnoses: MDD (major depressive disorder), recurrent, in partial remission      vortioxetine (TRINTELLIX) 5 mg  Tab Take 2.5 mg by mouth once daily.      dextroamphetamine-amphetamine 30 mg Tab Take 30 mg by mouth 2 (two) times daily.     Associated Diagnoses: MDD (major depressive disorder), recurrent, in partial remission      !! hydrOXYzine pamoate (VISTARIL) 25 MG Cap Take 2 capsules (50 mg total) by mouth nightly as needed (Take 25-50mg (1-2 caps) at night for anxiety prior to ECT).  Qty: 180 capsule, Refills: 0      !! hydrOXYzine pamoate (VISTARIL) 25 MG Cap Take 1 capsule (25 mg total) by mouth nightly as needed (anxiety/sleep). Take 1 to 2 pills as needed  Qty: 60 capsule, Refills: 2      ZOVIRAX 5 % Crea Refills: 5       !! - Potential duplicate medications found. Please discuss with provider.        Status at Discharge: Alert and medically stable    Discharge Diagnoses:  Major depressive disorder, recurrent, in partial remission  Diet: Resume previous outpatient diet  Activity: Ambulate with assistance  Instructions: Please do not eat or drink anything after midnight prior to procedure. Please do not drive on day of ECT.    Med Changes:  None    Next ECT:  7/26/2019 or 7/29/2019, TBD by pt    Benson Ott MD  \[facility]\""-[facility], PGY-2  Pager Number [phone]  [facility]

## 2019-07-22 NOTE — DISCHARGE INSTRUCTIONS
Home Care Instructions  E.C.T.    ACTIVITY LEVEL:  You may feel sleepy for several hours. It is best to rest until you are more awake and then gradually resume your normal activities in one day. It is recommended, because of the possibility of memory loss and slight confusion post ECT, that you rest in the company of a responsible adult. This confusion and memory loss is expected and should diminish over time. It is also recommended that you do not drive or operate any electrical appliances or machinery during the ECT treatment series. Ask your Doctor, at the time of your treatment, any questions you may have about specific activities.    DIET:  You may wish to start with liquids after your ECT treatment and gradually resume your normal diet. Do not consume alcoholic beverages during the ECT treatment series.    BATHING:  You may shower or bathe as desired.    MEDICATIONS:  Resume all home medications starting with the AM doses not taken prior to ECT treatment. If you experience a headache, it is okay to take your usual pain reliever.    WHEN TO CALL THE DOCTOR:   Headache, memory loss or confusion that does not begin to resolve within 24 hours.    RETURN APPOINTMENT:  Report to Day Surgery ([facility]) on (date)Home Care Instructions  E.C.T.    ACTIVITY LEVEL:  You may feel sleepy for several hours. It is best to rest until you are more awake and then gradually resume your normal activities in one day. It is recommended, because of the possibility of memory loss and slight confusion post ECT, that you rest in the company of a responsible adult. This confusion and memory loss is expected and should diminish over time. It is also recommended that you do not drive or operate any electrical appliances or machinery during the ECT treatment series. Ask your Doctor, at the time of your treatment, any questions you may have about specific activities.    DIET:  You may wish to start with liquids after your ECT treatment and  gradually resume your normal diet. Do not consume alcoholic beverages during the ECT treatment series.    BATHING:  You may shower or bathe as desired.    MEDICATIONS:  Resume all home medications starting with the AM doses not taken prior to ECT treatment. If you experience a headache, it is okay to take your usual pain reliever.    WHEN TO CALL THE DOCTOR:   Headache, memory loss or confusion that does not begin to resolve within 24 hours.    RETURN APPOINTMENT:  Report to Day Surgery ([facility]) on 7/26/2019 for 5AM. Remember you may not eat or drink after midnight, but please take heart or high blood pressure medicines with a sip of water in the morning prior to leaving home.    FOR EMERGENCIES:  If any unusual problems or difficulties occur, contact the office [phone] Monday-Friday until 3:00 p.m. After hours, call the hospital  [phone] and ask for the Psychiatry Resident On-call.  Remember you may not eat or drink after midnight, but please take heart or high blood pressure medicines with a sip of water in the morning prior to leaving home.    FOR EMERGENCIES:  If any unusual problems or difficulties occur, contact the office [phone] Monday-Friday until 3:00 p.m. After hours, call the hospital  [phone] and ask for the Psychiatry Resident On-call.

## 2019-07-22 NOTE — OP NOTE
Allyssa Wright  : 1980   MRN: [id number]  Date: 2019    Electroconvulsive Therapy  Procedure Note    Date of Admission: 2019  6:02 AM    Site: [facility], [address]    Attending: Shoaib Boyce Jr., MD   Residents: Boaz Lo MD  Pre-procedure Diagnosis: Major depressive disorder, recurrent, partial remission   ECT Treatment Number: 77  Machine Type: Mecta 5000    Patient Status: Medically stable    Vitals (pre-procedure):  Vitals:    19 0634   BP: 117/67   Pulse: 81   Resp: 18   Temp: 97.1 °F (36.2 °C)       Electrode Placement: Bitemporal    Stimulus Number Charge (mC) Level Pulse Width (msec) Frequency  (Hz) Duration of Stimulus (sec) Current (mA) Duration of Seizure (sec)   1 576 3 1 60 6 800 57                                   Complications: Hypertension    Maximum Blood Pressure: 225/158    Medications Given:  Caffeine 500 mg  IV    Methohexital (Brevital).   120 mg  IV    Succinylcholine (Anectine).   120 mg  IV    Ondansetron (Zofran).   4 mg  IV    Labetalol (Trandate).   20mg  IV    Ketorolac 30 mg    Treatment Course:  Patient tolerated procedure well. After adequate recovery from general anesthesia, the patient was transported to recovery.    Post-op Diagnosis: Same as above    Recommended for next ECT:  19      Benson Ott MD  [facility], PGY-2  Pager Number [phone]  [facility]

## 2019-07-22 NOTE — TRANSFER OF CARE
"Anesthesia Transfer of Care Note    Patient: Allyssa Wright    Procedure(s) Performed: Procedure(s) (LRB):  ELECTROCONVULSIVE THERAPY (ECT) - SINGLE SEIZURE (N/A)    Patient location: PACU    Anesthesia Type: general    Transport from OR: Transported from OR on 6-10 L/min O2 by face mask with adequate spontaneous ventilation    Post pain: adequate analgesia    Post assessment: tolerated procedure well and no apparent anesthetic complications    Post vital signs: stable    Level of consciousness: awake, alert and oriented    Nausea/Vomiting: no nausea/vomiting    Complications: none    Transfer of care protocol was followed      Last vitals:   Visit Vitals  /76   Pulse 81   Temp 36.7 °C (98 °F) (Temporal)   Resp 20   Ht 5' 6" (1.676 m)   Wt 66.2 kg (146 lb)   SpO2 99%   Breastfeeding? No   BMI 23.57 kg/m²     "

## 2019-07-25 ENCOUNTER — ANESTHESIA EVENT (OUTPATIENT)
Dept: ELECTROPHYSIOLOGY | Facility: HOSPITAL | Age: 39
End: 2019-07-25
Payer: COMMERCIAL

## 2019-07-25 NOTE — ANESTHESIA PREPROCEDURE EVALUATION
[facility]  Anesthesia Pre-Operative Evaluation         Patient Name: Allyssa Wright  YOB: 1980  MRN: [id number]    SUBJECTIVE:     Pre-operative evaluation for Procedure(s) (LRB):  ELECTROCONVULSIVE THERAPY (ECT) - SINGLE SEIZURE (N/A)     07/25/2019    Allyssa Wright is a 39 y.o. female w/ a significant PMHx of depression and aspiration event on prior ECT.      Patient now presents for the above procedure: ECT #78     Previous ECT Anesthetic:  - Succinylcholine: 120 mg  - Methohexital: 150 mg  - Toradol: 30 mg  - Zofran: 4 mg  - Labetalol: 10 mg    Patient now presents for the above procedure(s).      LDA: None documented.       Prev airway: None documented.    Drips: None documented.      Patient Active Problem List   Diagnosis    MDD (major depressive disorder), recurrent, in partial remission    Other acne    Comfort measures only status    MDD (major depressive disorder)    Major depression    Major depressive disorder    MDD (major depressive disorder), severe    Depression       Review of patient's allergies indicates:   Allergen Reactions    Benzodiazepines Other (See Comments)     Contraindicated while taking Clozapine    Ampicillin      Mom says so    Erythromycin     Levaquin [levofloxacin] Other (See Comments)     Depression side effects    Penicillins      Mom says so    Pristiq [desvenlafaxine]      psycotic      Sulfa (sulfonamide antibiotics)      Rash      Azithromycin Anxiety       Current Inpatient Medications:      Current Outpatient Medications on File Prior to Visit   Medication Sig Dispense Refill    clozapine (CLOZARIL) 50 MG tablet Take 50 mg by mouth once daily.       dapsone 7.5 % GlwP Apply topically once daily.      dextroamphetamine-amphetamine 30 mg Tab Take 30 mg by mouth 2 (two) times daily.       famciclovir (FAMVIR) 500 MG tablet Take 1 tablet (500 mg  total) by mouth 2 (two) times daily. 30 tablet 12    hydrOXYzine pamoate (VISTARIL) 25 MG Cap Take 2 capsules (50 mg total) by mouth nightly as needed (Take 25-50mg (1-2 caps) at night for anxiety prior to ECT). 180 capsule 0    hydrOXYzine pamoate (VISTARIL) 25 MG Cap Take 1 capsule (25 mg total) by mouth nightly as needed (anxiety/sleep). Take 1 to 2 pills as needed 60 capsule 2    mirtazapine (REMERON) 15 MG tablet Take 1 tablet (15 mg total) by mouth every evening. (Patient taking differently: Take 7.5 mg by mouth every evening. ) 90 tablet 0    spironolactone (ALDACTONE) 50 MG tablet 50 mg 2 (two) times daily. 50  mg qAM, 50 mg qHS.      thyroid, pork, (ARMOUR THYROID) 30 mg Tab Take 1 tablet (30 mg total) by mouth every morning. 30 tablet 11    trazodone (DESYREL) 100 MG tablet Take 200 mg by mouth every evening.       UNABLE TO FIND 2 (two) times daily. n-azetyl-cysteine      venlafaxine (EFFEXOR) 100 MG Tab Take 3 tablets (300 mg total) by mouth once daily. (Patient taking differently: Take 225 mg by mouth once daily. )      vortioxetine (TRINTELLIX) 5 mg Tab Take 2.5 mg by mouth once daily.      ZOVIRAX 5 % Crea   5     Current Facility-Administered Medications on File Prior to Visit   Medication Dose Route Frequency Provider Last Rate Last Dose    lidocaine (PF) 10 mg/ml (1%) injection 10 mg  1 mL Intradermal Once Homa Colbert MD        sodium chloride 0.9% flush 10 mL  10 mL Intra-Catheter PRN Homa Colbert MD           Past Surgical History:   Procedure Laterality Date    ANKLE SURGERY Right     BREAST augmentation      ELECTROCONVULSIVE THERAPY (ECT) - SINGLE SEIZURE N/A 12/6/2018    Performed by Sh[facility]ib Boyce Jr., [facility]    ELECTROCONVULSIVE THERAPY (ECT) - SINGLE SEIZURE N/A 8/31/2018    Performed by [facility] Jr[facility] at [facility]    ELECTROCONVULSIVE THERAPY (ECT) - SINGLE SEIZURE N/A 8/6/2018    Performed by [facility] Jr[facility] at [facility]     ELECTROCONVULSIVE THERAPY (ECT) - SINGLE SEIZURE N/A 7/23/2018    Performed by [facility] at [facility] E[facility]    ELECTROCONVULSIVE THERAPY (ECT) - SINGLE SEIZURE N/A 7/5/2018    Performed by [facility]    ELECTROCONVULSIVE THERAPY (ECT) - SINGLE SEIZURE N/A 6/28/2018    Performed by [facility]    ELECTROCONVULSIVE THERAPY (ECT) - SINGLE SEIZURE N/A 6/13/2018    Performed by [facility]    ELECTROCONVULSIVE THERAPY (ECT) - SINGLE SEIZURE N/A 5/29/2018    Performed by [facility]    ELECTROCONVULSIVE THERAPY (ECT) - SINGLE SEIZURE N/A 5/8/2018    Performed by [facility]    ELECTROCONVULSIVE THERAPY (ECT) - SINGLE SEIZURE N/A 4/24/2018    Performed by [facility]    ELECTROCONVULSIVE THERAPY (ECT) - SINGLE SEIZURE N/A 4/17/2018    Performed by [facility]    ELECTROCONVULSIVE THERAPY (ECT) - SINGLE SEIZURE N/A 4/13/2018    Performed by [facility]    ELECTROCONVULSIVE THERAPY (ECT) - SINGLE SEIZURE N/A 4/3/2018    Performed by [facility]    ELECTROCONVULSIVE THERAPY (ECT) - SINGLE SEIZURE N/A 3/20/2018    Performed by [facility]    ELECTROCONVULSIVE THERAPY (ECT) - SINGLE SEIZURE N/A 3/15/2018    Performed by [facility]    ELECTROCONVULSIVE THERAPY (ECT) - SINGLE SEIZURE N/A 3/6/2018    Performed by [facility]    ELECTROCONVULSIVE THERAPY (ECT) - SINGLE SEIZURE N/A 3/1/2018    Performed by [facility]    ELECTROCONVULSIVE THERAPY (ECT) - SINGLE SEIZURE N/A 2/23/2018    Performed by [facility]    ELECTROCONVULSIVE THERAPY (ECT) - SINGLE SEIZURE N/A 2/19/2018    Performed by [facility]    ELECTROCONVULSIVE  THERAPY (ECT) - SINGLE SEIZURE N/A 2/15/2018    Performed by [facility]ib [facility] Jr[facility] at [facility] ECT    ELECTROCONVULSIVE THERAPY (ECT) - SINGLE SEIZURE N/A 1/22/2018    Performed by Sh[facility]ib [facility] Jr[facility] at [facility] ECT    ELECTROCONVULSIVE THERAPY (ECT) - SINGLE SEIZURE N/A 12/11/2017    Performed by Sh[facility]ib [facility] Jr[facility] at [facility] E[facility]    ELECTROCONVULSIVE THERAPY (ECT) - SINGLE SEIZURE N/A 10/24/2017    Performed by Sh[facility]ib [facility] Jr[facility] at [facility] E[facility]    ELECTROCONVULSIVE THERAPY (ECT) - SINGLE SEIZURE N/A 9/20/2017    Performed by Sh[facility]ib [facility] Jr[facility] at [facility] ECT    ELECTROCONVULSIVE THERAPY (ECT) - SINGLE SEIZURE N/A 8/14/2017    Performed by Sh[facility]ib [facility] Jr[facility] at [facility] ECT    ELECTROCONVULSIVE THERAPY (ECT) - SINGLE SEIZURE Bilateral 7/12/2017    Performed by Shoaib [facility] Jr[facility] at [facility] ECT    ELECTROCONVULSIVE THERAPY (ECT) - SINGLE SEIZURE N/A 6/13/2017    Performed by Shoaib Boy[facility] Jr[facility] at [facility] E[facility]    ELECTROCONVULSIVE THERAPY (ECT) - SINGLE SEIZURE N/A 5/17/2017    Performed by Shoaib [facility] Jr[facility] at [facility] ECT    ELECTROCONVULSIVE THERAPY (ECT) - SINGLE SEIZURE N/A 4/20/2017    Performed by Sh[facility]ib [facility] Jr[facility] at [facility] ECT    ELECTROCONVULSIVE THERAPY (ECT) - SINGLE SEIZURE N/A 11/22/2016    Performed by Sh[facility]ib [facility] Jr[facility] at [facility] E[facility]    ELECTROCONVULSIVE THERAPY (ECT) - SINGLE SEIZURE N/A 10/24/2016    Performed by Sh[facility]ib [facility] Jr[facility] at [facility] E[facility]    ELECTROCONVULSIVE THERAPY (ECT) - SINGLE SEIZURE N/A 9/22/2016    Performed by Shoaib [facility] Jr[facility] at [facility] E[facility]    ELECTROCONVULSIVE THERAPY (ECT) - SINGLE SEIZURE N/A 9/1/2016    Performed by Sh[facility]ib [facility] Jr[facility] at [facility] E[facility]    ELECTROCONVULSIVE THERAPY (ECT) - SINGLE SEIZURE N/A 8/15/2016    Performed by Shoaib [facility] Jr[facility] at [facility]    ELECTROCONVULSIVE THERAPY (ECT) - SINGLE SEIZURE N/A 8/1/2016    Performed by Sh[facility]ib [facility] Jr[facility] at [facility] E[facility]    ELECTROCONVULSIVE  THERAPY (ECT) - SINGLE SEIZURE N/A 7/22/2016    Performed by [facility] at [facility] E[facility]    ELECTROCONVULSIVE THERAPY (ECT) - SINGLE SEIZURE N/A 7/14/2016    Performed by [facility] at [facility]    ELECTROCONVULSIVE THERAPY (ECT) - SINGLE SEIZURE N/A 7/8/2016    Performed by Shoaib Bocye Jr., [facility] at [facility]    ELECTROCONVULSIVE THERAPY (ECT) - SINGLE SEIZURE N/A 7/5/2016    Performed by [facility] Jr[facility] at [facility]    ELECTROCONVULSIVE THERAPY (ECT) - SINGLE SEIZURE N/A 6/27/2016    Performed by [facility]    ELECTROCONVULSIVE THERAPY (ECT) - SINGLE SEIZURE N/A 6/23/2016    Performed by Sh[facility]ib [facility] Jr[facility]    ELECTROCONVULSIVE THERAPY (ECT) - SINGLE SEIZURE N/A 6/20/2016    Performed by Sh[facility] Jr[facility] at [facility]    ELECTROCONVULSIVE THERAPY (ECT) - SINGLE SEIZURE N/A 6/16/2016    Performed by [facility] at [facility]    ELECTROCONVULSIVE THERAPY (ECT) - SINGLE SEIZURE N/A 6/15/2016    Performed by [facility]    ELECTROCONVULSIVE THERAPY (ECT) - SINGLE SEIZURE N/A 6/14/2016    Performed by [facility]    ELECTROCONVULSIVE THERAPY (ECT) - SINGLE SEIZURE N/A 6/13/2016    Per[facility]    ELECTROCONVULSIVE THERAPY (ECT) - SINGLE SEIZURE N/A 1/4/2016    Per[facility] by [facility]    ELECTROCONVULSIVE THERAPY, CEREBRAL HEMISPHERE, UNILATERAL, 1 SEIZURE Bilateral 6/25/2018    Per[facility]    OVARIAN CYST REMOVAL Bilateral        Social History     Socioeconomic History    Marital status: Single     Spouse name: Not on file    Number of children: Not on file    Years of education: Not on file    Highest education level: Not on file   Occupational History    Occupation: unemployed   Social Needs    Financial resource strain: Not on file    Food  insecurity:     Worry: Not on file     Inability: Not on file    Transportation needs:     Medical: Not on file     Non-medical: Not on file   Tobacco Use    Smoking status: Former Smoker     Last attempt to quit: 2011     Years since quittin.3    Smokeless tobacco: Never Used   Substance and Sexual Activity    Alcohol use: Yes     Comment: rarely    Drug use: No     Comment: Experimental use in high school    Sexual activity: Not on file   Lifestyle    Physical activity:     Days per week: Not on file     Minutes per session: Not on file    Stress: Not on file   Relationships    Social connections:     Talks on phone: Not on file     Gets together: Not on file     Attends Restorationist service: Not on file     Active member of club or organization: Not on file     Attends meetings of clubs or organizations: Not on file     Relationship status: Not on file   Other Topics Concern    Patient feels they ought to cut down on drinking/drug use Not Asked    Patient annoyed by others criticizing their drinking/drug use Not Asked    Patient has felt bad or guilty about drinking/drug use Not Asked    Patient has had a drink/used drugs as an eye opener in the AM Not Asked   Social History Narrative    Pt has 1 older brother from an intact family until the death of her mother in .  She completed 1 year of college, was never in the , and has never been employed.  She was engaged once, but never , has no children, and lives with her father plus 2 dogs.  She denies any hobbies and is spiritual but not Restorationist.  She does date and returns to college during rare periods when depression and anxiety [place].       OBJECTIVE:     Vital Signs Range (Last 24H):  BP: ()/()   Arterial Line BP: ()/()       Significant Labs:  Lab Results   Component Value Date    WBC 6.83 2016    HGB 13.3 2016    HCT 40.3 2016     2016    ALT 14 2016    AST 20 2016      06/08/2016    K 3.8 06/08/2016     06/08/2016    CREATININE 0.7 06/08/2016    BUN 10 06/08/2016    CO2 28 06/08/2016    TSH 1.208 06/08/2016       Diagnostic Studies: No relevant studies.    EKG:   Normal sinus rhythm  Normal ECG  When compared with ECG of 03-DEC-2015 11:38,  Questionable change in The axis  T wave inversion no longer evident in Inferior leads  Confirmed by Flaco Sr MD ([id number]) on 6/9/2016 1:30:13 PM    2D ECHO:  No results found for this or any previous visit.      ASSESSMENT/PLAN:         Pre-op Assessment    I have reviewed the Patient Summary Reports.     I have reviewed the Nursing Notes.   I have reviewed the Medications.     Review of Systems  Anesthesia Hx:  No problems with previous Anesthesia  History of prior surgery of interest to airway management or planning: Previous anesthesia: General Denies Family Hx of Anesthesia complications.   Denies Personal Hx of Anesthesia complications.   Social:  Non-Smoker, No Alcohol Use    Hematology/Oncology:        Denies Current/Recent Cancer   EENT/Dental:   denies chronic allergic rhinitis   Cardiovascular:   Denies Pacemaker.  Denies Hypertension.  Denies Valvular problems/Murmurs.  Denies MI.  Denies CAD.    Denies CABG/stent.  Denies Dysrhythmias.             Pulmonary:   Denies COPD.  Denies Asthma.  Denies Recent URI.  Denies Sleep Apnea.    Renal/:   Denies Chronic Renal Disease.     Hepatic/GI:   Denies PUD. Denies GERD. Denies Liver Disease.    Neurological:   Denies TIA. Denies CVA. Seizures    Endocrine:   Denies Diabetes. Denies Hypothyroidism. Denies Hyperthyroidism.    Psych:   Psychiatric History          Physical Exam  General:  Well nourished    Airway/Jaw/Neck:  Airway Findings: Mouth Opening: Normal Tongue: Normal  General Airway Assessment: Adult  Mallampati: I  TM Distance: Normal, at least 6 cm  Jaw/Neck Findings:  Neck ROM: Normal ROM     Eyes/Ears/Nose:  EYES/EARS/NOSE FINDINGS: Normal   Dental:  Dental Findings: In  tact   Chest/Lungs:  Chest/Lungs Findings: Clear to auscultation     Heart/Vascular:  Heart Findings: Rate: Normal  Rhythm: Regular Rhythm  Sounds: Normal  Heart murmur: negative    Abdomen:  Abdomen Findings:  Normal, Soft, Nontender     Musculoskeletal:  Musculoskeletal Findings: Normal   Skin:  Skin Findings: Normal    Mental Status:  Mental Status Findings:  Cooperative, Alert and Oriented         Anesthesia Plan  Type of Anesthesia, risks & benefits discussed:  Anesthesia Type:  general  Patient's Preference: General   Intra-op Monitoring Plan: standard ASA monitors  Intra-op Monitoring Plan Comments:   Post Op Pain Control Plan: per primary service following discharge from PACU  Post Op Pain Control Plan Comments:   Induction:   IV  Beta Blocker:  Patient is not currently on a Beta-Blocker (No further documentation required).       Informed Consent: Patient understands risks and agrees with Anesthesia plan.  Questions answered. Anesthesia consent signed with patient.  ASA Score: 2     Day of Surgery Review of History & Physical:    H&P update referred to the provider.     Anesthesia Plan Notes: Previous aspiration event noted but has been done via natural airway since and went well.        Ready For Surgery From Anesthesia Perspective.

## 2019-07-26 ENCOUNTER — ANESTHESIA (OUTPATIENT)
Dept: ELECTROPHYSIOLOGY | Facility: HOSPITAL | Age: 39
End: 2019-07-26
Payer: COMMERCIAL

## 2019-07-26 PROCEDURE — 27100019 HC AMBU BAG ADULT/PED: Performed by: STUDENT IN AN ORGANIZED HEALTH CARE EDUCATION/TRAINING PROGRAM

## 2019-07-26 NOTE — ANESTHESIA PROCEDURE NOTES
ECT    Treatment Number: 78    Staffing  Authorizing Provider: Ramon Howe MD  Performing Provider: Ramon Howe MD    Preanesthetic Checklist  The following were completed as part of the preanesthetic checklist: patient identified, procedural consent, pre-op evaluation, timeout performed, risks and benefits discussed, monitors and equipment checked, anesthesia consent given, oxygen available, suction available, hand hygiene performed and patient being monitored.    Setup & Induction  Patient Monitoring: heart rate, cardiac monitor, continuous pulse ox, continuous capnometry, NIBP and gas analyzer  Patient preparation: bite block inserted, extremities padded, mandibular stabilization and patient hyperventilated  Electrode Placement: Bitemporal    The patient was moved to the ECT therapy room after being assessed and consented for ECT. After standard ASA monitors were applied and timeout performed, the patient was adequately preoxygenated. After induction of general anesthesia, adequate oxygenation and ventilation were confirmed with pulse oxymetry and end tidal CO2 monitoring via bag-mask ventilation. End tidal CO2 was monitored throughout the case and moderate hyperventilation was performed prior to beginning ECT treatment. All extremities were padded, biteblock was inserted, and mandibular stabilization was done prior to initiating ECT therapy.    Procedure  Stimulus Number 1:             Recovery  After adequate recovery from general anesthesia, the patient was transported to recovery.

## 2019-07-27 ENCOUNTER — ANESTHESIA EVENT (OUTPATIENT)
Dept: ELECTROPHYSIOLOGY | Facility: HOSPITAL | Age: 39
End: 2019-07-27
Payer: COMMERCIAL

## 2019-07-27 NOTE — ANESTHESIA PREPROCEDURE EVALUATION
07/27/2019  Allyssa Wright is a 39 y.o., female.  [facility]  Anesthesia Pre-Operative Evaluation         Patient Name: Allyssa Wright  YOB: 1980  MRN: [id number]    SUBJECTIVE:     Pre-operative evaluation for Procedure(s) (LRB):  ELECTROCONVULSIVE THERAPY (ECT) - SINGLE SEIZURE (N/A)     07/27/2019    Allyssa Wright is a 39 y.o. female w/ a significant PMHx of Major depressive disorder, recurrent, in partial remission presented for ECT #77    Previous ECT Anesthetic:  - Succinylcholine: 120 mg  - Methohexital: 150 mg  - Toradol: 30 mg  - Zofran: 4 mg  - Labetalol: 10 mg    Patient now presents for the above procedure(s).      LDA: None documented.       Prev airway: None documented.    Drips: None documented.      Patient Active Problem List   Diagnosis    MDD (major depressive disorder), recurrent, in partial remission    Other acne    Comfort measures only status    MDD (major depressive disorder)    Major depression    Major depressive disorder    MDD (major depressive disorder), severe    Depression       Review of patient's allergies indicates:   Allergen Reactions    Benzodiazepines Other (See Comments)     Contraindicated while taking Clozapine    Ampicillin      Mom says so    Erythromycin     Levaquin [levofloxacin] Other (See Comments)     Depression side effects    Penicillins      Mom says so    Pristiq [desvenlafaxine]      psycotic      Sulfa (sulfonamide antibiotics)      Rash      Azithromycin Anxiety       Current Inpatient Medications:      Current Facility-Administered Medications on File Prior to Encounter   Medication Dose Route Frequency Provider Last Rate Last Dose    lidocaine (PF) 10 mg/ml (1%) injection 10 mg  1 mL Intradermal Once Homa Colbert MD        sodium chloride 0.9% flush 10 mL  10 mL Intra-Catheter PRN Homa  MD Filemon         Current Outpatient Medications on File Prior to Encounter   Medication Sig Dispense Refill    clozapine (CLOZARIL) 50 MG tablet Take 50 mg by mouth once daily.       dapsone 7.5 % GlwP Apply topically once daily.      dextroamphetamine-amphetamine 30 mg Tab Take 30 mg by mouth 2 (two) times daily.       famciclovir (FAMVIR) 500 MG tablet Take 1 tablet (500 mg total) by mouth 2 (two) times daily. 30 tablet 12    hydrOXYzine pamoate (VISTARIL) 25 MG Cap Take 2 capsules (50 mg total) by mouth nightly as needed (Take 25-50mg (1-2 caps) at night for anxiety prior to ECT). 180 capsule 0    hydrOXYzine pamoate (VISTARIL) 25 MG Cap Take 1 capsule (25 mg total) by mouth nightly as needed (anxiety/sleep). Take 1 to 2 pills as needed 60 capsule 2    mirtazapine (REMERON) 15 MG tablet Take 1 tablet (15 mg total) by mouth every evening. (Patient taking differently: Take 7.5 mg by mouth every evening. ) 90 tablet 0    spironolactone (ALDACTONE) 50 MG tablet 50 mg 2 (two) times daily. 50  mg qAM, 50 mg qHS.      thyroid, pork, (ARMOUR THYROID) 30 mg Tab Take 1 tablet (30 mg total) by mouth every morning. 30 tablet 11    trazodone (DESYREL) 100 MG tablet Take 200 mg by mouth every evening.       UNABLE TO FIND 2 (two) times daily. n-azetyl-cysteine      venlafaxine (EFFEXOR) 100 MG Tab Take 3 tablets (300 mg total) by mouth once daily. (Patient taking differently: Take 225 mg by mouth once daily. )      vortioxetine (TRINTELLIX) 5 mg Tab Take 2.5 mg by mouth once daily.      ZOVIRAX 5 % Crea   5       Past Surgical History:   Procedure Laterality Date    ANKLE SURGERY Right     BREAST augmentation      ELECTROCONVULSIVE THERAPY (ECT) - SINGLE SEIZURE N/A 12/6/2018    Performed by Sh[facility]ib [facility]ce Jr[facility] E[facility]    ELECTROCONVULSIVE THERAPY (ECT) - SINGLE SEIZURE N/A 8/31/2018    Performed by Sh[facility]ib [facility] Jr[facility] at [facility] ECT    ELECTROCONVULSIVE THERAPY (ECT) - SINGLE SEIZURE N/A  8/6/2018    Performed by [facility] Jr[facility] at [facility] ECT    ELECTROCONVULSIVE THERAPY (ECT) - SINGLE SEIZURE N/A 7/23/2018    Performed by Sh[facility]ib [facility]ce Jr[facility] at [facility] ECT    ELECTROCONVULSIVE THERAPY (ECT) - SINGLE SEIZURE N/A 7/5/2018    Performed by Shoaib Boyce Jr., MD at [facility] ECT    ELECTROCONVULSIVE THERAPY (ECT) - SINGLE SEIZURE N/A 6/28/2018    Performed by Shoaib Boyce Jr[facility] MD at [facility] ECT    ELECTROCONVULSIVE THERAPY (ECT) - SINGLE SEIZURE N/A 6/13/2018    Performed by Shoaib Boyce Jr., MD at [facility] ECT    ELECTROCONVULSIVE THERAPY (ECT) - SINGLE SEIZURE N/A 5/29/2018    Performed by Shoaib Boyce Jr., MD at [facility] ECT    ELECTROCONVULSIVE THERAPY (ECT) - SINGLE SEIZURE N/A 5/8/2018    Performed by Shoaib Boyce Jr., MD at [facility] ECT    ELECTROCONVULSIVE THERAPY (ECT) - SINGLE SEIZURE N/A 4/24/2018    Performed by Shoaib Boyce Jr., MD at [facility] ECT    ELECTROCONVULSIVE THERAPY (ECT) - SINGLE SEIZURE N/A 4/17/2018    Performed by Shoaib Boyce Jr., MD at [facility] ECT    ELECTROCONVULSIVE THERAPY (ECT) - SINGLE SEIZURE N/A 4/13/2018    Performed by Shoaib Boyce Jr[facility] MD at [facility] ECT    ELECTROCONVULSIVE THERAPY (ECT) - SINGLE SEIZURE N/A 4/3/2018    Performed by Shoaib Boyce Jr., MD at [facility] ECT    ELECTROCONVULSIVE THERAPY (ECT) - SINGLE SEIZURE N/A 3/20/2018    Performed by Shoaib Boyce Jr., MD at [facility] ECT    ELECTROCONVULSIVE THERAPY (ECT) - SINGLE SEIZURE N/A 3/15/2018    Performed by Shoaib Boyce Jr., MD at [facility] ECT    ELECTROCONVULSIVE THERAPY (ECT) - SINGLE SEIZURE N/A 3/6/2018    Performed by Shoaib Boyce Jr., MD at [facility] ECT    ELECTROCONVULSIVE THERAPY (ECT) - SINGLE SEIZURE N/A 3/1/2018    Performed by Shoaib Boyce Jr., MD at [facility] ECT    ELECTROCONVULSIVE THERAPY (ECT) - SINGLE SEIZURE N/A 2/23/2018    Performed by Shoaib Boyce Jr., MD at [facility] ECT    ELECTROCONVULSIVE THERAPY (ECT) - SINGLE SEIZURE N/A 2/19/2018     Performed by [facility] at [facility] ECT    ELECTROCONVULSIVE THERAPY (ECT) - SINGLE SEIZURE N/A 2/15/2018    Performed by Shoaib Boyce Jr[facility] at [facility] ECT    ELECTROCONVULSIVE THERAPY (ECT) - SINGLE SEIZURE N/A 1/22/2018    Performed by Shoaib Boyce Jr[facility] at [facility] ECT    ELECTROCONVULSIVE THERAPY (ECT) - SINGLE SEIZURE N/A 12/11/2017    Performed by Shoaib Boyce Jr[facility] at [facility] ECT    ELECTROCONVULSIVE THERAPY (ECT) - SINGLE SEIZURE N/A 10/24/2017    Performed by Shoaib Boyce Jr[facility] at [facility] ECT    ELECTROCONVULSIVE THERAPY (ECT) - SINGLE SEIZURE N/A 9/20/2017    Performed by Shoaib Boyce Jr[facility] at [facility] ECT    ELECTROCONVULSIVE THERAPY (ECT) - SINGLE SEIZURE N/A 8/14/2017    Performed by Shoaib Boyce Jr[facility] at [facility] ECT    ELECTROCONVULSIVE THERAPY (ECT) - SINGLE SEIZURE Bilateral 7/12/2017    Performed by Shoaib Boyce Jr., [facility] at [facility] ECT    ELECTROCONVULSIVE THERAPY (ECT) - SINGLE SEIZURE N/A 6/13/2017    Performed by Shoaib Boyce Jr[facility] at [facility] ECT    ELECTROCONVULSIVE THERAPY (ECT) - SINGLE SEIZURE N/A 5/17/2017    Performed by Shoaib Boyce Jr., [facility] at [facility] ECT    ELECTROCONVULSIVE THERAPY (ECT) - SINGLE SEIZURE N/A 4/20/2017    Performed by Shoaib Boyce Jr., [facility] at [facility] ECT    ELECTROCONVULSIVE THERAPY (ECT) - SINGLE SEIZURE N/A 11/22/2016    Performed by Shoaib Boyce Jr., [facility] at [facility] ECT    ELECTROCONVULSIVE THERAPY (ECT) - SINGLE SEIZURE N/A 10/24/2016    Performed by Shoaib Boyce Jr., [facility] at [facility] ECT    ELECTROCONVULSIVE THERAPY (ECT) - SINGLE SEIZURE N/A 9/22/2016    Performed by Shoaib Boyce Jr., [facility] at [facility] ECT    ELECTROCONVULSIVE THERAPY (ECT) - SINGLE SEIZURE N/A 9/1/2016    Performed by Shoaib Boyce Jr., MD at [facility] ECT    ELECTROCONVULSIVE THERAPY (ECT) - SINGLE SEIZURE N/A 8/15/2016    Performed by Shoaib Boyce Jr., MD at [facility] ECT    ELECTROCONVULSIVE THERAPY (ECT) - SINGLE SEIZURE N/A 8/1/2016     Performed by Sh[facility]ib [facility] Jr[facility] at [facility] ECT    ELECTROCONVULSIVE THERAPY (ECT) - SINGLE SEIZURE N/A 7/22/2016    Performed by Shoaib Boyce Jr., MD at [facility] ECT    ELECTROCONVULSIVE THERAPY (ECT) - SINGLE SEIZURE N/A 7/14/2016    Performed by Shoaib Boyce Jr[facility] at [facility] ECT    ELECTROCONVULSIVE THERAPY (ECT) - SINGLE SEIZURE N/A 7/8/2016    Performed by Shoaib Boyce Jr[facility] MD at [facility] ECT    ELECTROCONVULSIVE THERAPY (ECT) - SINGLE SEIZURE N/A 7/5/2016    Performed by Shoaib Boyce Jr., MD at [facility] ECT    ELECTROCONVULSIVE THERAPY (ECT) - SINGLE SEIZURE N/A 6/27/2016    Performed by Shoaib Boyce Jr., MD at [facility] ECT    ELECTROCONVULSIVE THERAPY (ECT) - SINGLE SEIZURE N/A 6/23/2016    Performed by Shoaib Boyce Jr[facility] MD at [facility] ECT    ELECTROCONVULSIVE THERAPY (ECT) - SINGLE SEIZURE N/A 6/20/2016    Performed by Shoaib Boyce Jr[facility] MD at [facility] ECT    ELECTROCONVULSIVE THERAPY (ECT) - SINGLE SEIZURE N/A 6/16/2016    Performed by Shoaib Boyce Jr[facility] MD at [facility] ECT    ELECTROCONVULSIVE THERAPY (ECT) - SINGLE SEIZURE N/A 6/15/2016    Performed by Shoaib Boyce Jr[facility] MD at [facility] ECT    ELECTROCONVULSIVE THERAPY (ECT) - SINGLE SEIZURE N/A 6/14/2016    Performed by Shoaib Boyce Jr[facility] MD at [facility] ECT    ELECTROCONVULSIVE THERAPY (ECT) - SINGLE SEIZURE N/A 6/13/2016    Performed by Shoaib Boy[facility] Jr[facility] MD at [facility] ECT    ELECTROCONVULSIVE THERAPY (ECT) - SINGLE SEIZURE N/A 1/4/2016    Performed by Shoaib Boyce Jr[facility] MD at [facility] ECT    ELECTROCONVULSIVE THERAPY, CEREBRAL HEMISPHERE, UNILATERAL, 1 SEIZURE Bilateral 6/25/2018    Performed by Sh[facility]ib [facility] Jr[facility] MD at [facility] ECT    OVARIAN CYST REMOVAL Bilateral        Social History     Socioeconomic History    Marital status: Single     Spouse name: Not on file    Number of children: Not on file    Years of education: Not on file    Highest education level: Not on file   Occupational History    Occupation: unemployed    Social Needs    Financial resource strain: Not on file    Food insecurity:     Worry: Not on file     Inability: Not on file    Transportation needs:     Medical: Not on file     Non-medical: Not on file   Tobacco Use    Smoking status: Former Smoker     Last attempt to quit: 2011     Years since quittin.3    Smokeless tobacco: Never Used   Substance and Sexual Activity    Alcohol use: Yes     Comment: rarely    Drug use: No     Comment: Experimental use in high school    Sexual activity: Not on file   Lifestyle    Physical activity:     Days per week: Not on file     Minutes per session: Not on file    Stress: Not on file   Relationships    Social connections:     Talks on phone: Not on file     Gets together: Not on file     Attends Spiritism service: Not on file     Active member of club or organization: Not on file     Attends meetings of clubs or organizations: Not on file     Relationship status: Not on file   Other Topics Concern    Patient feels they ought to cut down on drinking/drug use Not Asked    Patient annoyed by others criticizing their drinking/drug use Not Asked    Patient has felt bad or guilty about drinking/drug use Not Asked    Patient has had a drink/used drugs as an eye opener in the AM Not Asked   Social History Narrative    Pt has 1 older brother from an intact family until the death of her mother in .  She completed 1 year of college, was never in the , and has never been employed.  She was engaged once, but never , has no children, and lives with her father plus 2 dogs.  She denies any hobbies and is spiritual but not Spiritism.  She does date and returns to college during rare periods when depression and anxiety [place].       OBJECTIVE:     Vital Signs Range (Last 24H):  BP: ()/()   Arterial Line BP: ()/()       Significant Labs:  Lab Results   Component Value Date    WBC 6.83 2016    HGB 13.3 2016    HCT 40.3 2016      06/08/2016    ALT 14 06/08/2016    AST 20 06/08/2016     06/08/2016    K 3.8 06/08/2016     06/08/2016    CREATININE 0.7 06/08/2016    BUN 10 06/08/2016    CO2 28 06/08/2016    TSH 1.208 06/08/2016       Diagnostic Studies: No relevant studies.    EKG:   Results for orders placed or performed in visit on 06/08/16   IN OFFICE EKG 12-LEAD (to [place])    Collection Time: 06/08/16  3:33 PM    Narrative    Test Reason : F33.9  Blood Pressure :  mmHG  Vent. Rate : 084 BPM     Atrial Rate : 084 BPM     P-R Int : 154 ms          QRS Dur : 086 ms      QT Int : 378 ms       P-R-T Axes : 067 066 055 degrees     QTc Int : 446 ms    Normal sinus rhythm  Normal ECG  When compared with ECG of 03-DEC-2015 11:38,  Questionable change in The axis  T wave inversion no longer evident in Inferior leads  Confirmed by Flaco Sr MD ([id number]) on 6/9/2016 1:30:13 PM    Referred By: SELF REFERRAL           Confirmed By:Flaco Sr MD         2D ECHO:  No results found for this or any previous visit.      ASSESSMENT/PLAN:       Anesthesia Evaluation    I have reviewed the Patient Summary Reports.     I have reviewed the Medications.     Review of Systems  Anesthesia Hx:  No problems with previous Anesthesia Denies Hx of Anesthetic complications  Neg history of prior surgery.   Hematology/Oncology:         -- Denies Cancer in past history:   Cardiovascular:   Denies CAD.    Denies Dysrhythmias.      no hyperlipidemia    Pulmonary:   Denies COPD.  Denies Asthma.    Hepatic/GI:   Denies GERD.    Endocrine:   Denies Diabetes.    Psych:   Psychiatric History depression          Physical Exam  General:  Well nourished    Airway/Jaw/Neck:  Airway Findings: Mouth Opening: Normal Tongue: Normal  General Airway Assessment: Adult  Mallampati: I  Improves to I with phonation.  TM Distance: Normal, at least 6 cm  Jaw/Neck Findings:  Micrognathia: Negative Neck ROM: Normal ROM  Neck Findings: Normal    Eyes/Ears/Nose:  EYES/EARS/NOSE FINDINGS: Normal    Dental:  Dental Findings: In tact   Chest/Lungs:  Chest/Lungs Findings: Clear to auscultation     Heart/Vascular:  Heart Findings: Rate: Normal  Rhythm: Regular Rhythm  Heart murmur: negative    Abdomen:  Abdomen Findings:  Normal       Mental Status:  Mental Status Findings: Normal        Anesthesia Plan  Type of Anesthesia, risks & benefits discussed:  Anesthesia Type:  general  Patient's Preference:   Intra-op Monitoring Plan: standard ASA monitors  Intra-op Monitoring Plan Comments:   Post Op Pain Control Plan: multimodal analgesia and IV/PO Opioids PRN  Post Op Pain Control Plan Comments:   Induction:   IV  Beta Blocker:  Patient is not currently on a Beta-Blocker (No further documentation required).       Informed Consent: Patient understands risks and agrees with Anesthesia plan.  Questions answered.   ASA Score: 1     Day of Surgery Review of History & Physical: I have interviewed and examined the patient. I have reviewed the patient's H&P dated:            Ready For Surgery From Anesthesia Perspective.

## 2019-07-29 ENCOUNTER — ANESTHESIA (OUTPATIENT)
Dept: ELECTROPHYSIOLOGY | Facility: HOSPITAL | Age: 39
End: 2019-07-29
Payer: COMMERCIAL

## 2019-07-29 ENCOUNTER — HOSPITAL ENCOUNTER (OUTPATIENT)
Facility: HOSPITAL | Age: 39
Discharge: HOME | End: 2019-07-29
Attending: PSYCHIATRY & NEUROLOGY | Admitting: PSYCHIATRY & NEUROLOGY
Payer: COMMERCIAL

## 2019-07-29 VITALS
DIASTOLIC BLOOD PRESSURE: 68 MMHG | HEIGHT: 65 IN | RESPIRATION RATE: 16 BRPM | SYSTOLIC BLOOD PRESSURE: 114 MMHG | HEART RATE: 80 BPM | WEIGHT: 145 LBS | OXYGEN SATURATION: 98 % | TEMPERATURE: 98 F | BODY MASS INDEX: 24.16 KG/M2

## 2019-07-29 DIAGNOSIS — F33.9 RECURRENT MAJOR DEPRESSIVE DISORDER, REMISSION STATUS UNSPECIFIED: Primary | ICD-10-CM

## 2019-07-29 DIAGNOSIS — F32.9 MDD (MAJOR DEPRESSIVE DISORDER): ICD-10-CM

## 2019-07-29 DIAGNOSIS — F33.41 MAJOR DEPRESSIVE DISORDER, RECURRENT EPISODE, IN PARTIAL REMISSION: ICD-10-CM

## 2019-07-29 PROCEDURE — 90870 ELECTROCONVULSIVE THERAPY: CPT | Mod: ,,, | Performed by: PSYCHIATRY & NEUROLOGY

## 2019-07-29 PROCEDURE — D9220A PRA ANESTHESIA: ICD-10-PCS | Mod: ,,, | Performed by: ANESTHESIOLOGY

## 2019-07-29 PROCEDURE — 90870 PR ELECTROCONVULSIVE THERAPY,1 SEIZ: ICD-10-PCS | Mod: ,,, | Performed by: PSYCHIATRY & NEUROLOGY

## 2019-07-29 PROCEDURE — 90870 ELECTROCONVULSIVE THERAPY: CPT | Performed by: STUDENT IN AN ORGANIZED HEALTH CARE EDUCATION/TRAINING PROGRAM

## 2019-07-29 PROCEDURE — 25000003 PHARM REV CODE 250: Performed by: STUDENT IN AN ORGANIZED HEALTH CARE EDUCATION/TRAINING PROGRAM

## 2019-07-29 PROCEDURE — 63600175 PHARM REV CODE 636 W HCPCS: Performed by: STUDENT IN AN ORGANIZED HEALTH CARE EDUCATION/TRAINING PROGRAM

## 2019-07-29 PROCEDURE — 25000003 PHARM REV CODE 250: Performed by: PSYCHIATRY & NEUROLOGY

## 2019-07-29 PROCEDURE — D9220A PRA ANESTHESIA: Mod: ,,, | Performed by: ANESTHESIOLOGY

## 2019-07-29 PROCEDURE — 25000003 PHARM REV CODE 250: Performed by: GENERAL PRACTICE

## 2019-07-29 RX ORDER — ONDANSETRON 2 MG/ML
INJECTION INTRAMUSCULAR; INTRAVENOUS
Status: DISCONTINUED
Start: 2019-07-29 | End: 2019-07-29 | Stop reason: HOSPADM

## 2019-07-29 RX ORDER — SUCCINYLCHOLINE CHLORIDE 20 MG/ML
INJECTION INTRAMUSCULAR; INTRAVENOUS
Status: DISCONTINUED | OUTPATIENT
Start: 2019-07-29 | End: 2019-07-29

## 2019-07-29 RX ORDER — ONDANSETRON 2 MG/ML
INJECTION INTRAMUSCULAR; INTRAVENOUS
Status: DISCONTINUED | OUTPATIENT
Start: 2019-07-29 | End: 2019-07-29

## 2019-07-29 RX ORDER — SODIUM CHLORIDE 9 MG/ML
INJECTION, SOLUTION INTRAVENOUS CONTINUOUS
Status: DISCONTINUED | OUTPATIENT
Start: 2019-07-29 | End: 2019-07-29 | Stop reason: HOSPADM

## 2019-07-29 RX ORDER — SODIUM CHLORIDE 9 MG/ML
INJECTION, SOLUTION INTRAVENOUS CONTINUOUS PRN
Status: DISCONTINUED | OUTPATIENT
Start: 2019-07-29 | End: 2019-07-29

## 2019-07-29 RX ORDER — SUCCINYLCHOLINE CHLORIDE 20 MG/ML
INJECTION INTRAMUSCULAR; INTRAVENOUS
Status: COMPLETED
Start: 2019-07-29 | End: 2019-07-29

## 2019-07-29 RX ORDER — KETOROLAC TROMETHAMINE 30 MG/ML
INJECTION, SOLUTION INTRAMUSCULAR; INTRAVENOUS
Status: DISCONTINUED | OUTPATIENT
Start: 2019-07-29 | End: 2019-07-29

## 2019-07-29 RX ORDER — KETOROLAC TROMETHAMINE 30 MG/ML
INJECTION, SOLUTION INTRAMUSCULAR; INTRAVENOUS
Status: DISCONTINUED
Start: 2019-07-29 | End: 2019-07-29 | Stop reason: HOSPADM

## 2019-07-29 RX ORDER — LABETALOL HCL 20 MG/4 ML
SYRINGE (ML) INTRAVENOUS
Status: DISCONTINUED
Start: 2019-07-29 | End: 2019-07-29 | Stop reason: HOSPADM

## 2019-07-29 RX ORDER — LABETALOL HYDROCHLORIDE 5 MG/ML
INJECTION, SOLUTION INTRAVENOUS
Status: DISCONTINUED | OUTPATIENT
Start: 2019-07-29 | End: 2019-07-29

## 2019-07-29 RX ORDER — LIDOCAINE HYDROCHLORIDE 10 MG/ML
1 INJECTION, SOLUTION EPIDURAL; INFILTRATION; INTRACAUDAL; PERINEURAL ONCE
Status: COMPLETED | OUTPATIENT
Start: 2019-07-29 | End: 2019-07-29

## 2019-07-29 RX ADMIN — SUCCINYLCHOLINE CHLORIDE 120 MG: 20 INJECTION, SOLUTION INTRAMUSCULAR; INTRAVENOUS at 07:07

## 2019-07-29 RX ADMIN — METHOHEXITAL SODIUM 120 MG: 500 INJECTION, POWDER, LYOPHILIZED, FOR SOLUTION INTRAMUSCULAR; INTRAVENOUS; RECTAL at 07:07

## 2019-07-29 RX ADMIN — Medication 500 MG: at 06:07

## 2019-07-29 RX ADMIN — SODIUM CHLORIDE: 0.9 INJECTION, SOLUTION INTRAVENOUS at 07:07

## 2019-07-29 RX ADMIN — LIDOCAINE HYDROCHLORIDE 0.1 MG: 10 INJECTION, SOLUTION EPIDURAL; INFILTRATION; INTRACAUDAL; PERINEURAL at 06:07

## 2019-07-29 RX ADMIN — KETOROLAC TROMETHAMINE 30 MG: 30 INJECTION, SOLUTION INTRAMUSCULAR at 07:07

## 2019-07-29 RX ADMIN — ONDANSETRON 4 MG: 2 INJECTION, SOLUTION INTRAMUSCULAR; INTRAVENOUS at 07:07

## 2019-07-29 RX ADMIN — LIDOCAINE HYDROCHLORIDE 50 MG: 10 INJECTION, SOLUTION EPIDURAL; INFILTRATION; INTRACAUDAL; PERINEURAL at 07:07

## 2019-07-29 RX ADMIN — LABETALOL HYDROCHLORIDE 10 MG: 5 INJECTION, SOLUTION INTRAVENOUS at 07:07

## 2019-07-29 NOTE — DISCHARGE SUMMARY
Allyssa Wright  : 1980   MRN: [id number]  Date: 2019     Electroconvulsive Therapy  Discharge Summary    Admit Date: 2019  6:01 AM  Discharge Date: 2019    Attending Physician: Shoaib Boyce Jr., MD   Discharge Provider: Boaz Lo MD    History of Present Illness: Allyssa Wright is a 39 y.o. female with Major depressive disorder, recurrent, in partial remission presented for ECT #78. See H&P dated 2019 for full HPI. For further details, see Dr. Boyce's pre-ECT evaluation.    Hospital Course: The patient tolerated the ECT treatment well without complication. Patient was stable post-procedure. See OP note dated 2019 for more details.     Disposition: Home or Self Care    Medications:  Current Discharge Medication List      CONTINUE these medications which have NOT CHANGED    Details   clozapine (CLOZARIL) 50 MG tablet Take 50 mg by mouth once daily.       dapsone 7.5 % GlwP Apply topically once daily.      famciclovir (FAMVIR) 500 MG tablet Take 1 tablet (500 mg total) by mouth 2 (two) times daily.  Qty: 30 tablet, Refills: 12    Associated Diagnoses: Major depressive disorder, recurrent episode, moderate degree      !! hydrOXYzine pamoate (VISTARIL) 25 MG Cap Take 2 capsules (50 mg total) by mouth nightly as needed (Take 25-50mg (1-2 caps) at night for anxiety prior to ECT).  Qty: 180 capsule, Refills: 0      !! hydrOXYzine pamoate (VISTARIL) 25 MG Cap Take 1 capsule (25 mg total) by mouth nightly as needed (anxiety/sleep). Take 1 to 2 pills as needed  Qty: 60 capsule, Refills: 2      mirtazapine (REMERON) 15 MG tablet Take 1 tablet (15 mg total) by mouth every evening.  Qty: 90 tablet, Refills: 0      spironolactone (ALDACTONE) 50 MG tablet 50 mg 2 (two) times daily. 50  mg qAM, 50 mg qHS.      thyroid, pork, (ARMOUR THYROID) 30 mg Tab Take 1 tablet (30 mg total) by mouth every morning.  Qty: 30 tablet, Refills: 11    Associated Diagnoses: Major depressive disorder,  recurrent episode, moderate degree      trazodone (DESYREL) 100 MG tablet Take 200 mg by mouth every evening.       venlafaxine (EFFEXOR) 100 MG Tab Take 3 tablets (300 mg total) by mouth once daily.    Associated Diagnoses: MDD (major depressive disorder), recurrent, in partial remission      vortioxetine (TRINTELLIX) 5 mg Tab Take 2.5 mg by mouth once daily.      dextroamphetamine-amphetamine 30 mg Tab Take 30 mg by mouth 2 (two) times daily.     Associated Diagnoses: MDD (major depressive disorder), recurrent, in partial remission      UNABLE TO FIND 2 (two) times daily. n-azetyl-cysteine      ZOVIRAX 5 % Crea Refills: 5       !! - Potential duplicate medications found. Please discuss with provider.        Status at Discharge: Alert and medically stable    Discharge Diagnoses:  Major depressive disorder, recurrent, in partial remission  Diet: Resume previous outpatient diet  Activity: Ambulate with assistance  Instructions: Please do not eat or drink anything after midnight prior to procedure. Please do not drive on day of ECT.    Med Changes:  None    Next ECT:   8/1/19    Benson Ott MD  [facility], PGY-2  Pager Number [phone]  [facility]

## 2019-07-29 NOTE — H&P
Allyssa Wright  : 1980   MRN: [id number]  Date: 2019     Electroconvulsive Therapy  History & Physical    Chief complaint: Major Depressive Disorder, recurrent, partial remission  Procedure: ECT #78    SUBJECTIVE:     HPI:   Allyssa Wright is a 39 y.o. female with Major Depressive Disorder, recurrent, in partial remission who presents for ECT.  This morning, pt maintains depressed mood that is unimproved since last tx.  States that she has started TMS w/outpatient provider Dr. Webb.  Had TMS sessions  and .  Pt expresses concerns over sx, feels that they return quickly after tx.  Strong appetite, NPO since midnight.  Adequate sleep.  Please see Dr. Boyce's full pre-ECT evaluation for further details. The patient does not need any prescriptions filled here and has not had any med changes since meeting with Dr. Boyce. The patient has not had anything by mouth since midnight and is ready for ECT.    Psychiatric Review of Systems:  Mood: improving, mildly depressed  Appetite: No problem  Psychomotor: No problem  Cognitive Impairment: Moderate  Insomnia: None  Psychosis: None  Diurnal Variation: None  Suicidal Ideation: Absent    Medical Review Of Systems:  Pertinent items are noted in HPI.    Current Medications:   Current Facility-Administered Medications on File Prior to Encounter   Medication Dose Route Frequency Provider Last Rate Last Dose    lidocaine (PF) 10 mg/ml (1%) injection 10 mg  1 mL Intradermal Once Homa Colbert MD        sodium chloride 0.9% flush 10 mL  10 mL Intra-Catheter PRN Homa Colbert MD         Current Outpatient Medications on File Prior to Encounter   Medication Sig Dispense Refill    clozapine (CLOZARIL) 50 MG tablet Take 50 mg by mouth once daily.       dapsone 7.5 % GlwP Apply topically once daily.      famciclovir (FAMVIR) 500 MG tablet Take 1 tablet (500 mg total) by mouth 2 (two) times daily. 30 tablet 12    hydrOXYzine pamoate (VISTARIL) 25 MG  Cap Take 2 capsules (50 mg total) by mouth nightly as needed (Take 25-50mg (1-2 caps) at night for anxiety prior to ECT). 180 capsule 0    hydrOXYzine pamoate (VISTARIL) 25 MG Cap Take 1 capsule (25 mg total) by mouth nightly as needed (anxiety/sleep). Take 1 to 2 pills as needed 60 capsule 2    mirtazapine (REMERON) 15 MG tablet Take 1 tablet (15 mg total) by mouth every evening. (Patient taking differently: Take 7.5 mg by mouth every evening. ) 90 tablet 0    spironolactone (ALDACTONE) 50 MG tablet 50 mg 2 (two) times daily. 50  mg qAM, 50 mg qHS.      thyroid, pork, (ARMOUR THYROID) 30 mg Tab Take 1 tablet (30 mg total) by mouth every morning. 30 tablet 11    trazodone (DESYREL) 100 MG tablet Take 200 mg by mouth every evening.       venlafaxine (EFFEXOR) 100 MG Tab Take 3 tablets (300 mg total) by mouth once daily. (Patient taking differently: Take 225 mg by mouth once daily. )      vortioxetine (TRINTELLIX) 5 mg Tab Take 2.5 mg by mouth once daily.      dextroamphetamine-amphetamine 30 mg Tab Take 30 mg by mouth 2 (two) times daily.       UNABLE TO FIND 2 (two) times daily. n-azetyl-cysteine      ZOVIRAX 5 % Crea   5      Allergies:   Benzodiazepines; Ampicillin; Erythromycin; Levaquin [levofloxacin]; Penicillins; Pristiq [desvenlafaxine]; Sulfa (sulfonamide antibiotics); and Azithromycin    Past Medical/Surgical History:   Past Medical History:   Diagnosis Date    Anxiety     Depression     History of psychiatric hospitalization     HSV-1 (herpes simplex virus 1) infection      of psychiatric care     Moderate depressed bipolar II disorder 06/13/2016    reports no history of bipolar    Obsessive-compulsive disorder     Psychiatric problem     Schizophrenia 4/3/2018    Self-harming behavior     Therapy      Past Surgical History:   Procedure Laterality Date    ANKLE SURGERY Right     BREAST augmentation      ELECTROCONVULSIVE THERAPY (ECT) - SINGLE SEIZURE N/A 12/6/2018    Performed by  Shoaib Boyce Jr., MD at [facility] ECT    ELECTROCONVULSIVE THERAPY (ECT) - SINGLE SEIZURE N/A 8/31/2018    Performed by Shoaib Boyce Jr., MD at [facility] ECT    ELECTROCONVULSIVE THERAPY (ECT) - SINGLE SEIZURE N/A 8/6/2018    Performed by Shoaib Boyce Jr., MD at [facility] ECT    ELECTROCONVULSIVE THERAPY (ECT) - SINGLE SEIZURE N/A 7/23/2018    Performed by Shoaib Boyce Jr., MD at [facility] ECT    ELECTROCONVULSIVE THERAPY (ECT) - SINGLE SEIZURE N/A 7/5/2018    Performed by Shoaib Boyce Jr., MD at [facility] ECT    ELECTROCONVULSIVE THERAPY (ECT) - SINGLE SEIZURE N/A 6/28/2018    Performed by Shoaib Boyce Jr., MD at [facility] ECT    ELECTROCONVULSIVE THERAPY (ECT) - SINGLE SEIZURE N/A 6/13/2018    Performed by Shoaib Boyce Jr., MD at [facility] ECT    ELECTROCONVULSIVE THERAPY (ECT) - SINGLE SEIZURE N/A 5/29/2018    Performed by Shoaib Boyce Jr., MD at [facility] ECT    ELECTROCONVULSIVE THERAPY (ECT) - SINGLE SEIZURE N/A 5/8/2018    Performed by Shoaib Boyce Jr., MD at [facility] ECT    ELECTROCONVULSIVE THERAPY (ECT) - SINGLE SEIZURE N/A 4/24/2018    Performed by Shoaib Boyce Jr., MD at [facility] ECT    ELECTROCONVULSIVE THERAPY (ECT) - SINGLE SEIZURE N/A 4/17/2018    Performed by Shoaib Boyce Jr., MD at [facility] ECT    ELECTROCONVULSIVE THERAPY (ECT) - SINGLE SEIZURE N/A 4/13/2018    Performed by Shoaib Boyce Jr., MD at [facility] ECT    ELECTROCONVULSIVE THERAPY (ECT) - SINGLE SEIZURE N/A 4/3/2018    Performed by Shoaib Boyce Jr., MD at [facility] ECT    ELECTROCONVULSIVE THERAPY (ECT) - SINGLE SEIZURE N/A 3/20/2018    Performed by Shoaib Boyce Jr., MD at [facility] ECT    ELECTROCONVULSIVE THERAPY (ECT) - SINGLE SEIZURE N/A 3/15/2018    Performed by Shoaib Boyce Jr., MD at [facility] ECT    ELECTROCONVULSIVE THERAPY (ECT) - SINGLE SEIZURE N/A 3/6/2018    Performed by Shoaib Boyce Jr., MD at [facility] ECT    ELECTROCONVULSIVE THERAPY (ECT) - SINGLE SEIZURE N/A 3/1/2018    Performed by Shoaib Boyce  MD [place] [facility] at [facility] ECT    ELECTROCONVULSIVE THERAPY (ECT) - SINGLE SEIZURE N/A 2/23/2018    Performed by Shoaib Boyce Jr., MD at [facility] ECT    ELECTROCONVULSIVE THERAPY (ECT) - SINGLE SEIZURE N/A 2/19/2018    Performed by Shoaib Boyce Jr., MD at [facility] ECT    ELECTROCONVULSIVE THERAPY (ECT) - SINGLE SEIZURE N/A 2/15/2018    Performed by Shoaib Boyce Jr., MD at [facility] ECT    ELECTROCONVULSIVE THERAPY (ECT) - SINGLE SEIZURE N/A 1/22/2018    Performed by Shoaib Boyce Jr., MD at [facility] ECT    ELECTROCONVULSIVE THERAPY (ECT) - SINGLE SEIZURE N/A 12/11/2017    Performed by Shoaib Boyce Jr., MD at [facility] ECT    ELECTROCONVULSIVE THERAPY (ECT) - SINGLE SEIZURE N/A 10/24/2017    Performed by Shoaib Boyce Jr., MD at [facility] ECT    ELECTROCONVULSIVE THERAPY (ECT) - SINGLE SEIZURE N/A 9/20/2017    Performed by Shoaib Boyce Jr., MD at [facility] ECT    ELECTROCONVULSIVE THERAPY (ECT) - SINGLE SEIZURE N/A 8/14/2017    Performed by Shoaib Boyce Jr., MD at [facility] ECT    ELECTROCONVULSIVE THERAPY (ECT) - SINGLE SEIZURE Bilateral 7/12/2017    Performed by Shoaib Boyce Jr., MD at [facility] ECT    ELECTROCONVULSIVE THERAPY (ECT) - SINGLE SEIZURE N/A 6/13/2017    Performed by Shoaib Boyce Jr., MD at [facility] ECT    ELECTROCONVULSIVE THERAPY (ECT) - SINGLE SEIZURE N/A 5/17/2017    Performed by Shoaib Boyce Jr., MD at [facility] ECT    ELECTROCONVULSIVE THERAPY (ECT) - SINGLE SEIZURE N/A 4/20/2017    Performed by Shoaib Boyce Jr., MD at [facility] ECT    ELECTROCONVULSIVE THERAPY (ECT) - SINGLE SEIZURE N/A 11/22/2016    Performed by Shoaib Boyce Jr., MD at [facility] ECT    ELECTROCONVULSIVE THERAPY (ECT) - SINGLE SEIZURE N/A 10/24/2016    Performed by Shoaib Boyce Jr., MD at [facility] ECT    ELECTROCONVULSIVE THERAPY (ECT) - SINGLE SEIZURE N/A 9/22/2016    Performed by Shoaib Boyce Jr., MD at [facility] ECT    ELECTROCONVULSIVE THERAPY (ECT) - SINGLE SEIZURE N/A 9/1/2016    Performed by Shoaib Boyce Jr.,  MD at [facility] ECT    ELECTROCONVULSIVE THERAPY (ECT) - SINGLE SEIZURE N/A 8/15/2016    Performed by Shoaib Boyce Jr., MD at [facility] ECT    ELECTROCONVULSIVE THERAPY (ECT) - SINGLE SEIZURE N/A 8/1/2016    Performed by Shoaib Boyce Jr., MD at [facility] ECT    ELECTROCONVULSIVE THERAPY (ECT) - SINGLE SEIZURE N/A 7/22/2016    Performed by Shoaib Boyce Jr., MD at [facility] ECT    ELECTROCONVULSIVE THERAPY (ECT) - SINGLE SEIZURE N/A 7/14/2016    Performed by Shoaib Boyce Jr., MD at [facility] ECT    ELECTROCONVULSIVE THERAPY (ECT) - SINGLE SEIZURE N/A 7/8/2016    Performed by Shoaib Boyce Jr., MD at [facility] ECT    ELECTROCONVULSIVE THERAPY (ECT) - SINGLE SEIZURE N/A 7/5/2016    Performed by Shoaib Boyce Jr., MD at [facility] ECT    ELECTROCONVULSIVE THERAPY (ECT) - SINGLE SEIZURE N/A 6/27/2016    Performed by Shoaib Boyce Jr., MD at [facility] ECT    ELECTROCONVULSIVE THERAPY (ECT) - SINGLE SEIZURE N/A 6/23/2016    Performed by Shoaib Boyce Jr., MD at [facility] ECT    ELECTROCONVULSIVE THERAPY (ECT) - SINGLE SEIZURE N/A 6/20/2016    Performed by Shoaib Boyce Jr., MD at [facility] ECT    ELECTROCONVULSIVE THERAPY (ECT) - SINGLE SEIZURE N/A 6/16/2016    Performed by Shoaib Boyce Jr., MD at [facility] ECT    ELECTROCONVULSIVE THERAPY (ECT) - SINGLE SEIZURE N/A 6/15/2016    Performed by Shoaib Boyce Jr., MD at [facility] ECT    ELECTROCONVULSIVE THERAPY (ECT) - SINGLE SEIZURE N/A 6/14/2016    Performed by Shoaib Boyce Jr., MD at [facility] ECT    ELECTROCONVULSIVE THERAPY (ECT) - SINGLE SEIZURE N/A 6/13/2016    Performed by Shoaib Boyce Jr., MD at [facility] ECT    ELECTROCONVULSIVE THERAPY (ECT) - SINGLE SEIZURE N/A 1/4/2016    Performed by Shoaib Boyce Jr., MD at [facility] ECT    ELECTROCONVULSIVE THERAPY, CEREBRAL HEMISPHERE, UNILATERAL, 1 SEIZURE Bilateral 6/25/2018    Performed by Shoaib Boyce Jr., MD at [facility] ECT    OVARIAN CYST REMOVAL Bilateral       OBJECTIVE:     Vitals (pre-procedure):  Vitals:     07/29/19 0627   BP: 127/74   Pulse: 75   Resp: 16   Temp: 98 °F (36.7 °C)        Labs/Imaging/Studies:   No results found for this or any previous visit (from the past 48 hour(s)).   No results found for: PHENYTOIN, PHENOBARB, VALPROATE, CBMZ    Physical Exam:   Gen: AAOx4, NAD  HEENT: MMM, PERRL, EOMI, O/P clear  CV: RRR, S1/S2 nml, no M/R/G  Chest: CTAB, no R/R/W, unlabored breathing  Abd: S/NT/ND, +BS, no HSM  Ext: No C/C/E, pulse 2+ throughout  Neuro: CN II-XII grossly intact, no focal deficits    Mental Status Exam:   Appearance: unremarkable, age appropriate, neatly groomed  Behavior: normal, cooperative, eye contact normal  Speech: normal tone, normal rate, normal pitch, normal volume, spontaneous  Mood: depressed  Affect: full & reactive  Thought Process: normal and logical  Thought Content: normal, no suicidality, no homicidality, delusions, or paranoia  Cognition: grossly intact  Insight: fair  Judgment: good    ASSESSMENT/PLAN:     Allyssa Wright is a 39 y.o. female with Major Depressive Disorder, recurrent, in partial remission who presents for ECT.    Recommendations:   Proceed with ECT #78.      Benson Ott MD  [facility], PGY-2  Pager Number [phone]  [facility]  7/29/2019

## 2019-07-29 NOTE — ANESTHESIA POSTPROCEDURE EVALUATION
Anesthesia Post Evaluation    Patient: Allyssa Wright    Procedure(s) Performed: Procedure(s) (LRB):  ELECTROCONVULSIVE THERAPY (ECT) - SINGLE SEIZURE (N/A)    Final Anesthesia Type: general  Patient location during evaluation: PACU  Patient participation: Yes- Able to Participate  Level of consciousness: awake and alert and oriented  Post-procedure vital signs: reviewed and stable  Pain management: adequate  Airway patency: patent  PONV status at discharge: No PONV  Anesthetic complications: no      Cardiovascular status: hemodynamically stable  Respiratory status: unassisted, spontaneous ventilation and room air  Hydration status: euvolemic  Follow-up not needed.          Vitals Value Taken Time   /70 7/29/2019  8:22 AM   Temp 36.8 °C (98.3 °F) 7/29/2019  8:15 AM   Pulse 80 7/29/2019  8:24 AM   Resp 16 7/29/2019  8:15 AM   SpO2 97 % 7/29/2019  8:24 AM   Vitals shown include unvalidated device data.      No case tracking events are documented in the log.      Pain/Roma Score: Roma Score: 10 (7/29/2019  8:04 AM)

## 2019-07-29 NOTE — ANESTHESIA POSTPROCEDURE EVALUATION
Anesthesia Post Evaluation    Patient: Allyssa Wright    Procedure(s) Performed: Procedure(s) (LRB):  ELECTROCONVULSIVE THERAPY (ECT) - SINGLE SEIZURE (N/A)    Final Anesthesia Type: general  Patient location during evaluation: PACU  Patient participation: Yes- Able to Participate  Level of consciousness: awake and alert and oriented  Post-procedure vital signs: reviewed and stable  Pain management: adequate  Airway patency: patent  PONV status at discharge: No PONV  Anesthetic complications: no      Cardiovascular status: hemodynamically stable  Respiratory status: unassisted and spontaneous ventilation  Hydration status: euvolemic  Follow-up not needed.

## 2019-07-29 NOTE — DISCHARGE INSTRUCTIONS
PATIENT INSTRUCTIONS  POST-ANESTHESIA    IMMEDIATELY FOLLOWING SURGERY:  Do not drive or operate machinery for the first twenty four hours after surgery.  Do not make any important decisions for twenty four hours after surgery or while taking narcotic pain medications or sedatives.  If you develop intractable nausea and vomiting or a severe headache please notify your doctor immediately.    FOLLOW-UP:  Please make an appointment with your surgeon as instructed. You do not need to follow up with anesthesia unless specifically instructed to do so.    WOUND CARE INSTRUCTIONS (if applicable):  Keep a dry clean dressing on the anesthesia/puncture wound site if there is drainage.  Once the wound has quit draining you may leave it open to air.  Generally you should leave the bandage intact for twenty four hours unless there is drainage.  If the epidural site drains for more than 36-48 hours please call the anesthesia department.    QUESTIONS?:  Please feel free to call your physician or the hospital  if you have any questions, and they will be happy to assist you.       [facility] Anesthesia Department  [address] [facility]  [phone]          Understanding Electroconvulsive Therapy  Electroconvulsive therapy (ECT) is sometimes called shock therapy. This may sound painful, but ECT doesnt hurt. Its often the safest and best treatment for severe depression. It can treat other mental disorders as well.  What is electroconvulsive therapy?  ECT is used to treat people who are very depressed. Its mainly used when other treatments, such as antidepressant medicines, have failed. Often it may relieve feelings of sadness and despair after 2 to 4 treatments.  Common symptoms of major depression  Symptoms of major depression include:  · Feeling a deep sadness that doesnt go away  · Losing all pleasure in life  · Feeling hopeless or helpless  · Feeling guilty  · Sleeping more or less than normal  · Eating  more or less than normal  · Having headaches or stomachaches, or other pains that dont go away  · Feeling nervous, empty, or worthless  · Crying a great deal  · Thinking or talking about suicide or death  How is ECT therapy done?  Before an ECT treatment, youll be given anesthesia to keep you pain-free. Youll also be given medicine to make you sleep, relax your muscles and control your heart rate. Your healthcare provider then places electrodes on your head. You may have one above each temple (bilateral ECT). Or you may have electrodes on one temple and on your forehead (unilateral ECT). While you are asleep, your brain is stimulated very briefly with an electric current. This causes a seizure, usually lasting less than a minute. Because you are under anesthesia, your body will not move even as your brain goes through great changes.  What are the risks?  When done properly, ECT is quite safe. Right after the treatment, you may be confused. This often lasts for less than half an hour. You may have a headache or stiff muscles. But these symptoms often go away quickly. A more serious possible side effect is memory loss. Commonly, people have short-term (temporary) trouble remembering information that they learned recently. And they may have little recall of the time when they received treatment. Less commonly, people may have long-lasting (permanent) spotty recall of major past events. In rare cases, there may be memory loss for larger blocks of time.  Looking to the future  In most cases, ECT doesnt cure depression. But it can improve symptoms for a period of time. You may need a series of ECT treatments to continue feeling the benefit. You may also need to take antidepressant medicines to help prevent symptoms from returning. But with ongoing treatment, you can have a full and healthy life.  Resources  · The National [place] of Mental Health  568.822.8505  www.nim.nih.gov  · Mental Health  [place]  218-500-5699  www.[facility].org     Date Last Reviewed: 5/1/2017  © 5601-7764 [employer]. [address] [place] [address]. All rights reserved. This information is not intended as a substitute for professional medical care. Always follow your healthcare professional's instructions.        Recovery After Procedural Sedation (Adult)  You have been given medicine by vein to make you sleep during your surgery. This may have included both a pain medicine and sleeping medicine. Most of the effects have worn off. But you may still have some drowsiness for the next 6 to 8 hours.  Home care  Follow these guidelines when you get home:  · For the next 8 hours, you should be watched by a responsible adult. This person should make sure your condition is not getting worse.  · Don't drink any alcohol for the next 24 hours.  · Don't drive, operate dangerous machinery, or make important business or personal decisions during the next 24 hours.  Note: Your healthcare provider may tell you not to take any medicine by mouth for pain or sleep in the next 4 hours. These medicines may react with the medicines you were given in the hospital. This could cause a much stronger response than usual.  Follow-up care  Follow up with your healthcare provider if you are not alert and back to your usual level of activity within 12 hours.  When to seek medical advice  Call your healthcare provider right away if any of these occur:  · Drowsiness gets worse  · Weakness or dizziness gets worse  · Repeated vomiting  · You can't be awakened   Date Last Reviewed: 10/18/2016  © 9480-8049 [employer]. [address] [place] [address]. All rights reserved. This information is not intended as a substitute for professional medical care. Always follow your healthcare professional's instructions.        Next Appointment: Thursday August 1st at 0600

## 2019-07-29 NOTE — TRANSFER OF CARE
"Anesthesia Transfer of Care Note    Patient: Allyssa Wright    Procedure(s) Performed: Procedure(s) (LRB):  ELECTROCONVULSIVE THERAPY (ECT) - SINGLE SEIZURE (N/A)    Patient location: PACU    Anesthesia Type: general    Transport from OR: Transported from OR on room air with adequate spontaneous ventilation    Post pain: adequate analgesia    Post assessment: no apparent anesthetic complications    Post vital signs: stable    Level of consciousness: awake, alert and oriented    Nausea/Vomiting: no nausea/vomiting    Complications: none    Transfer of care protocol was followed      Last vitals:   Visit Vitals  /74 (BP Location: Right arm, Patient Position: Lying)   Pulse 75   Temp 36.7 °C (98 °F) (Oral)   Resp 16   Ht 5' 5" (1.651 m)   Wt 65.8 kg (145 lb)   LMP  (LMP Unknown)   SpO2 98%   Breastfeeding? No   BMI 24.13 kg/m²     "

## 2019-07-29 NOTE — OP NOTE
Allyssa Wright  : 1980   MRN: [id number]  Date: 2019    Electroconvulsive Therapy  Procedure Note    Date of Admission: 2019  6:01 AM    Site: [facility] [address]    Attending: Shoaib Boyce Jr., MD   Residents: Boaz Lo MD  Pre-procedure Diagnosis: Major depressive disorder, recurrent, partial remission   ECT Treatment Number: 78  Machine Type: Mecta 5000    Patient Status: Medically stable    Vitals (pre-procedure):  Vitals:    19 0627   BP: 127/74   Pulse: 75   Resp: 16   Temp: 98 °F (36.7 °C)       Electrode Placement: Bitemporal    Stimulus Number Charge (mC) Level Pulse Width (msec) Frequency  (Hz) Duration of Stimulus (sec) Current (mA) Duration of Seizure (sec)   1 576 3 1 60 6 800 62                                   Complications: Hypertension    Maximum Blood Pressure: 185/93    Medications Given:  Caffeine 500 mg  IV    Methohexital (Brevital).   120 mg  IV    Succinylcholine (Anectine).   120 mg  IV    Ondansetron (Zofran).   4 mg  IV    Labetalol (Trandate).   20mg  IV    Ketorolac 30 mg    Treatment Course:  Patient tolerated procedure well. After adequate recovery from general anesthesia, the patient was transported to recovery.    Post-op Diagnosis: Same as above    Recommended for next ECT:  19      Benson Ott MD  [facility], PGY-2  Pager Number [phone]  [facility]

## 2019-07-29 NOTE — ANESTHESIA PROCEDURE NOTES
ECT    Procedure start time: 7/29/2019 7:36 AM  Timeout performed at: 7/29/2019 7:36 AM  Procedure end time: 7/29/2019 7:38 AM      Staffing  Authorizing Provider: Giovanni Wilkins MD  Performing Provider: Genia Ge MD    Preanesthetic Checklist  The following were completed as part of the preanesthetic checklist: patient identified, procedural consent, pre-op evaluation, timeout performed, risks and benefits discussed, monitors and equipment checked, anesthesia consent given, oxygen available, suction available, hand hygiene performed and patient being monitored.    Setup & Induction  Patient Monitoring: heart rate, cardiac monitor, NIBP and continuous capnometry  Patient preparation: bite block inserted, extremities padded, mandibular stabilization and patient hyperventilated  Electrode Placement: Bitemporal    The patient was moved to the ECT therapy room after being assessed and consented for ECT. After standard ASA monitors were applied and timeout performed, the patient was adequately preoxygenated. After induction of general anesthesia, adequate oxygenation and ventilation were confirmed with pulse oxymetry and end tidal CO2 monitoring via bag-mask ventilation. End tidal CO2 was monitored throughout the case and moderate hyperventilation was performed prior to beginning ECT treatment. All extremities were padded, biteblock was inserted, and mandibular stabilization was done prior to initiating ECT therapy.    Recovery  After adequate recovery from general anesthesia, the patient was transported to recovery.    ECT Findings  ECT associated findings of: Visible Seizure, Excessive Secretions, Tachycardia - Sinus (-119) and Moderate Hypertension (SBP >160 or DBP >100)

## 2019-07-29 NOTE — PLAN OF CARE
Patient and father state they are ready to be discharged. Instructions and prescription given to patient and family. Both verbalize understanding. Patient tolerating po liquids with no difficulty. Patient states pain is at a tolerable level for them. Anesthesia consent and surgical consent in chart upon patient's discharge from [facility].

## 2019-07-29 NOTE — ADDENDUM NOTE
Addendum  created 07/29/19 0902 by Genia Ge MD    Intraprocedure Flowsheets edited, Intraprocedure Meds edited

## 2019-07-31 ENCOUNTER — ANESTHESIA EVENT (OUTPATIENT)
Dept: ELECTROPHYSIOLOGY | Facility: HOSPITAL | Age: 39
End: 2019-07-31
Payer: COMMERCIAL

## 2019-07-31 NOTE — ANESTHESIA PREPROCEDURE EVALUATION
[facility]  Anesthesia Pre-Operative Evaluation         Patient Name: Allyssa Wright  YOB: 1980  MRN: [id number]    SUBJECTIVE:     Pre-operative evaluation for Procedure(s) (LRB):  ELECTROCONVULSIVE THERAPY (ECT) - SINGLE SEIZURE (N/A)     07/31/2019    Allyssa Wright is a 39 y.o. female w/ a significant PMHx of  Major depressive disorder, recurrent, in partial remission presented for ECT #78.    Patient now presents for the above procedure(s).    Medications     lidocaine (PF) 10 mg/ml (1%) injection 10 mg 50 mg   succinylcholine (ANECTINE) 20 mg/mL injection 120 mg   ondansetron HCl (PF) 4 mg/2 mL injection 4 mg   ketorolac (TORADOL) injection 30 mg 30 mg   labetalol (NORMODYNE,TRANDATE) 5 mg/mL injection 10 mg   methohexital (BREVITAL SODIUM) injection 120 mg   0.9%  NaCl            LDA: None documented.       Prev airway: None documented.    Drips: None documented.      Patient Active Problem List   Diagnosis    Major depressive disorder, recurrent episode, in partial remission    Other acne    Comfort measures only status    MDD (major depressive disorder)    Major depression    Major depressive disorder    MDD (major depressive disorder), severe    Depression       Review of patient's allergies indicates:   Allergen Reactions    Benzodiazepines Other (See Comments)     Contraindicated while taking Clozapine    Ampicillin      Mom says so    Erythromycin     Levaquin [levofloxacin] Other (See Comments)     Depression side effects    Penicillins      Mom says so    Pristiq [desvenlafaxine]      psycotic      Sulfa (sulfonamide antibiotics)      Rash      Azithromycin Anxiety       Current Inpatient Medications:      Current Facility-Administered Medications on File Prior to Encounter   Medication Dose Route Frequency Provider Last Rate Last Dose    sodium chloride 0.9% flush 10 mL  10 mL  Intra-Catheter PRN Homa Colbert MD         Current Outpatient Medications on File Prior to Encounter   Medication Sig Dispense Refill    clozapine (CLOZARIL) 50 MG tablet Take 50 mg by mouth once daily.       dapsone 7.5 % GlwP Apply topically once daily.      dextroamphetamine-amphetamine 30 mg Tab Take 30 mg by mouth 2 (two) times daily.       famciclovir (FAMVIR) 500 MG tablet Take 1 tablet (500 mg total) by mouth 2 (two) times daily. 30 tablet 12    hydrOXYzine pamoate (VISTARIL) 25 MG Cap Take 2 capsules (50 mg total) by mouth nightly as needed (Take 25-50mg (1-2 caps) at night for anxiety prior to ECT). 180 capsule 0    hydrOXYzine pamoate (VISTARIL) 25 MG Cap Take 1 capsule (25 mg total) by mouth nightly as needed (anxiety/sleep). Take 1 to 2 pills as needed 60 capsule 2    mirtazapine (REMERON) 15 MG tablet Take 1 tablet (15 mg total) by mouth every evening. (Patient taking differently: Take 7.5 mg by mouth every evening. ) 90 tablet 0    spironolactone (ALDACTONE) 50 MG tablet 50 mg 2 (two) times daily. 50  mg qAM, 50 mg qHS.      thyroid, pork, (ARMOUR THYROID) 30 mg Tab Take 1 tablet (30 mg total) by mouth every morning. 30 tablet 11    trazodone (DESYREL) 100 MG tablet Take 200 mg by mouth every evening.       UNABLE TO FIND 2 (two) times daily. n-azetyl-cysteine      venlafaxine (EFFEXOR) 100 MG Tab Take 3 tablets (300 mg total) by mouth once daily. (Patient taking differently: Take 225 mg by mouth once daily. )      vortioxetine (TRINTELLIX) 5 mg Tab Take 2.5 mg by mouth once daily.      ZOVIRAX 5 % Crea   5       Past Surgical History:   Procedure Laterality Date    ANKLE SURGERY Right     BREAST augmentation      ELECTROCONVULSIVE THERAPY (ECT) - SINGLE SEIZURE N/A 12/6/2018    Performed by Shoaib Boyce Jr., [facility] at [facility] ECT    ELECTROCONVULSIVE THERAPY (ECT) - SINGLE SEIZURE N/A 8/31/2018    Performed by Sh[facility]ib [facility]ce Jr., MD at [facility] ECT    ELECTROCONVULSIVE THERAPY  (ECT) - SINGLE SEIZURE N/A 8/6/2018    Performed by [facility]ib [facility] Jr[facility] at [facility] ECT    ELECTROCONVULSIVE THERAPY (ECT) - SINGLE SEIZURE N/A 7/23/2018    Performed by Shoaib Boyce Jr., MD at [facility] ECT    ELECTROCONVULSIVE THERAPY (ECT) - SINGLE SEIZURE N/A 7/5/2018    Performed by Shoaib Boyce Jr., MD at [facility] ECT    ELECTROCONVULSIVE THERAPY (ECT) - SINGLE SEIZURE N/A 6/28/2018    Performed by Shoaib Boyce Jr., MD at [facility] ECT    ELECTROCONVULSIVE THERAPY (ECT) - SINGLE SEIZURE N/A 6/13/2018    Performed by Shoaib Boyce Jr., MD at [facility] ECT    ELECTROCONVULSIVE THERAPY (ECT) - SINGLE SEIZURE N/A 5/29/2018    Performed by Shoaib Boyce Jr., MD at [facility] ECT    ELECTROCONVULSIVE THERAPY (ECT) - SINGLE SEIZURE N/A 5/8/2018    Performed by Shoaib Boyce Jr., MD at [facility] ECT    ELECTROCONVULSIVE THERAPY (ECT) - SINGLE SEIZURE N/A 4/24/2018    Performed by Shoaib Boyce Jr., MD at [facility] ECT    ELECTROCONVULSIVE THERAPY (ECT) - SINGLE SEIZURE N/A 4/17/2018    Performed by Shoaib Boyce Jr., MD at [facility] ECT    ELECTROCONVULSIVE THERAPY (ECT) - SINGLE SEIZURE N/A 4/13/2018    Performed by Shoaib Boyce Jr., MD at [facility] ECT    ELECTROCONVULSIVE THERAPY (ECT) - SINGLE SEIZURE N/A 4/3/2018    Performed by Shoaib Boyce Jr., MD at [facility] ECT    ELECTROCONVULSIVE THERAPY (ECT) - SINGLE SEIZURE N/A 3/20/2018    Performed by Shoaib Boyce Jr., MD at [facility] ECT    ELECTROCONVULSIVE THERAPY (ECT) - SINGLE SEIZURE N/A 3/15/2018    Performed by Shoaib Boyce Jr., MD at [facility] ECT    ELECTROCONVULSIVE THERAPY (ECT) - SINGLE SEIZURE N/A 3/6/2018    Performed by Shoaib Boyce Jr., MD at [facility] ECT    ELECTROCONVULSIVE THERAPY (ECT) - SINGLE SEIZURE N/A 3/1/2018    Performed by Shoaib Boyce Jr., MD at [facility] ECT    ELECTROCONVULSIVE THERAPY (ECT) - SINGLE SEIZURE N/A 2/23/2018    Performed by Shoaib Boyce Jr., MD at [facility] ECT    ELECTROCONVULSIVE THERAPY (ECT) - SINGLE  SEIZURE N/A 2/19/2018    Performed by Shoaib Boyce Jr., MD at [facility] ECT    ELECTROCONVULSIVE THERAPY (ECT) - SINGLE SEIZURE N/A 2/15/2018    Performed by Shoaib Boyce Jr., MD at [facility] ECT    ELECTROCONVULSIVE THERAPY (ECT) - SINGLE SEIZURE N/A 1/22/2018    Performed by Shoaib Boyce Jr., MD at [facility] ECT    ELECTROCONVULSIVE THERAPY (ECT) - SINGLE SEIZURE N/A 12/11/2017    Performed by Shoaib Boyce Jr., MD at [facility] ECT    ELECTROCONVULSIVE THERAPY (ECT) - SINGLE SEIZURE N/A 10/24/2017    Performed by Shoaib Boyce Jr., MD at [facility] ECT    ELECTROCONVULSIVE THERAPY (ECT) - SINGLE SEIZURE N/A 9/20/2017    Performed by Shoaib Boyce Jr., MD at [facility] ECT    ELECTROCONVULSIVE THERAPY (ECT) - SINGLE SEIZURE N/A 8/14/2017    Performed by Shoaib Boyce Jr., MD at [facility] ECT    ELECTROCONVULSIVE THERAPY (ECT) - SINGLE SEIZURE Bilateral 7/12/2017    Performed by Shoaib Boyce Jr., MD at [facility] ECT    ELECTROCONVULSIVE THERAPY (ECT) - SINGLE SEIZURE N/A 6/13/2017    Performed by Shoaib Boyce Jr., MD at [facility] ECT    ELECTROCONVULSIVE THERAPY (ECT) - SINGLE SEIZURE N/A 5/17/2017    Performed by Shoaib Boyce Jr., MD at [facility] ECT    ELECTROCONVULSIVE THERAPY (ECT) - SINGLE SEIZURE N/A 4/20/2017    Performed by Shoaib Boyce Jr., MD at [facility] ECT    ELECTROCONVULSIVE THERAPY (ECT) - SINGLE SEIZURE N/A 11/22/2016    Performed by Shoaib Boyce Jr., MD at [facility] ECT    ELECTROCONVULSIVE THERAPY (ECT) - SINGLE SEIZURE N/A 10/24/2016    Performed by Shoaib Boyce Jr., MD at [facility] ECT    ELECTROCONVULSIVE THERAPY (ECT) - SINGLE SEIZURE N/A 9/22/2016    Performed by Shoaib Boyce Jr., MD at [facility] ECT    ELECTROCONVULSIVE THERAPY (ECT) - SINGLE SEIZURE N/A 9/1/2016    Performed by Shoaib Boyce Jr., MD at [facility] ECT    ELECTROCONVULSIVE THERAPY (ECT) - SINGLE SEIZURE N/A 8/15/2016    Performed by Shoaib Boyce Jr., MD at [facility] ECT    ELECTROCONVULSIVE THERAPY (ECT) - SINGLE  SEIZURE N/A 8/1/2016    Performed by [facility] Jr[facility] at [facility] ECT    ELECTROCONVULSIVE THERAPY (ECT) - SINGLE SEIZURE N/A 7/22/2016    Performed by Shoaib Boyce Jr[facility] MD at [facility] ECT    ELECTROCONVULSIVE THERAPY (ECT) - SINGLE SEIZURE N/A 7/14/2016    Performed by Shoaib Boyce Jr[facility] MD at [facility] ECT    ELECTROCONVULSIVE THERAPY (ECT) - SINGLE SEIZURE N/A 7/8/2016    Performed by Shoaib Boyce Jr[facility] MD at [facility] ECT    ELECTROCONVULSIVE THERAPY (ECT) - SINGLE SEIZURE N/A 7/5/2016    Performed by Shoaib Boyce Jr., MD at [facility] ECT    ELECTROCONVULSIVE THERAPY (ECT) - SINGLE SEIZURE N/A 6/27/2016    Performed by Shoaib Boyce Jr., MD at [facility] ECT    ELECTROCONVULSIVE THERAPY (ECT) - SINGLE SEIZURE N/A 6/23/2016    Performed by Shoaib Boyce Jr[facility] MD at [facility] ECT    ELECTROCONVULSIVE THERAPY (ECT) - SINGLE SEIZURE N/A 6/20/2016    Performed by Shoaib Boyce Jr., MD at [facility] ECT    ELECTROCONVULSIVE THERAPY (ECT) - SINGLE SEIZURE N/A 6/16/2016    Performed by Shoaib Boyce Jr., MD at [facility] ECT    ELECTROCONVULSIVE THERAPY (ECT) - SINGLE SEIZURE N/A 6/15/2016    Performed by Shoaib Boyce Jr[facility] MD at [facility] ECT    ELECTROCONVULSIVE THERAPY (ECT) - SINGLE SEIZURE N/A 6/14/2016    Performed by Shoaib Boyce Jr[facility] MD at [facility] ECT    ELECTROCONVULSIVE THERAPY (ECT) - SINGLE SEIZURE N/A 6/13/2016    Performed by Sh[facility]ib [facility] Jr[facility] MD at [facility] ECT    ELECTROCONVULSIVE THERAPY (ECT) - SINGLE SEIZURE N/A 1/4/2016    Performed by Sh[facility]ib Boy[facility] Jr[facility] MD at [facility] ECT    ELECTROCONVULSIVE THERAPY, CEREBRAL HEMISPHERE, UNILATERAL, 1 SEIZURE Bilateral 6/25/2018    Performed by [facility]ib [facility] Jr[facility] at [facility] ECT    OVARIAN CYST REMOVAL Bilateral        Social History     Socioeconomic History    Marital status: Single     Spouse name: Not on file    Number of children: Not on file    Years of education: Not on file    Highest education level: Not on file   Occupational History     Occupation: unemployed   Social Needs    Financial resource strain: Not on file    Food insecurity:     Worry: Not on file     Inability: Not on file    Transportation needs:     Medical: Not on file     Non-medical: Not on file   Tobacco Use    Smoking status: Former Smoker     Last attempt to quit: 2011     Years since quittin.3    Smokeless tobacco: Never Used   Substance and Sexual Activity    Alcohol use: Yes     Comment: rarely    Drug use: No     Comment: Experimental use in high school    Sexual activity: Not on file   Lifestyle    Physical activity:     Days per week: Not on file     Minutes per session: Not on file    Stress: Not on file   Relationships    Social connections:     Talks on phone: Not on file     Gets together: Not on file     Attends [employer]oration service: Not on file     Active member of club or organization: Not on file     Attends meetings of clubs or organizations: Not on file     Relationship status: Not on file   Other Topics Concern    Patient feels they ought to cut down on drinking/drug use Not Asked    Patient annoyed by others criticizing their drinking/drug use Not Asked    Patient has felt bad or guilty about drinking/drug use Not Asked    Patient has had a drink/used drugs as an eye opener in the AM Not Asked   Social History Narrative    Pt has 1 older brother from an intact family until the death of her mother in .  She completed 1 year of college, was never in the , and has never been employed.  She was engaged once, but never , has no children, and lives with her father plus 2 dogs.  She denies any hobbies and is spiritual but not Restoration.  She does date and returns to college during rare periods when depression and anxiety [place].       OBJECTIVE:     Vital Signs Range (Last 24H):  BP: ()/()   Arterial Line BP: ()/()       Significant Labs:  Lab Results   Component Value Date    WBC 6.83 2016    HGB 13.3 2016    HCT  40.3 06/08/2016     06/08/2016    ALT 14 06/08/2016    AST 20 06/08/2016     06/08/2016    K 3.8 06/08/2016     06/08/2016    CREATININE 0.7 06/08/2016    BUN 10 06/08/2016    CO2 28 06/08/2016    TSH 1.208 06/08/2016       Diagnostic Studies: No relevant studies.    EKG: No recent studies available.    2D ECHO:  No results found for this or any previous visit.      ASSESSMENT/PLAN:         Anesthesia Evaluation    I have reviewed the Patient Summary Reports.    I have reviewed the Nursing Notes.   I have reviewed the Medications.     Review of Systems  Anesthesia Hx:  Denies Family Hx of Anesthesia complications.   Denies Personal Hx of Anesthesia complications.   Hematology/Oncology:  Hematology Normal   Oncology Normal     EENT/Dental:EENT/Dental Normal   Cardiovascular:  Cardiovascular Normal  ECG has been reviewed.    Pulmonary:  Pulmonary Normal    Renal/:  Renal/ Normal     Hepatic/GI:  Hepatic/GI Normal    Musculoskeletal:  Musculoskeletal Normal    Neurological:  Neurology Normal    Endocrine:  Endocrine Normal    Dermatological:  Skin Normal    Psych:   Psychiatric History             Anesthesia Plan  Type of Anesthesia, risks & benefits discussed:  Anesthesia Type:  general  Patient's Preference:   Intra-op Monitoring Plan: standard ASA monitors  Intra-op Monitoring Plan Comments:   Post Op Pain Control Plan: per primary service following discharge from PACU, IV/PO Opioids PRN and multimodal analgesia  Post Op Pain Control Plan Comments:   Induction:   IV  Beta Blocker:  Patient is not currently on a Beta-Blocker (No further documentation required).       Informed Consent: Patient understands risks and agrees with Anesthesia plan.  Questions answered. Anesthesia consent signed with patient.  ASA Score: 2     Day of Surgery Review of History & Physical:    H&P update referred to the provider.         Ready For Surgery From Anesthesia Perspective.

## 2019-08-01 ENCOUNTER — ANESTHESIA (OUTPATIENT)
Dept: ELECTROPHYSIOLOGY | Facility: HOSPITAL | Age: 39
End: 2019-08-01
Payer: COMMERCIAL

## 2019-08-01 ENCOUNTER — HOSPITAL ENCOUNTER (OUTPATIENT)
Facility: HOSPITAL | Age: 39
Discharge: HOME | End: 2019-08-01
Attending: PSYCHIATRY & NEUROLOGY | Admitting: PSYCHIATRY & NEUROLOGY
Payer: COMMERCIAL

## 2019-08-01 VITALS
WEIGHT: 145 LBS | BODY MASS INDEX: 24.16 KG/M2 | RESPIRATION RATE: 16 BRPM | SYSTOLIC BLOOD PRESSURE: 98 MMHG | DIASTOLIC BLOOD PRESSURE: 53 MMHG | OXYGEN SATURATION: 97 % | TEMPERATURE: 98 F | HEART RATE: 74 BPM | HEIGHT: 65 IN

## 2019-08-01 DIAGNOSIS — F33.41 MAJOR DEPRESSIVE DISORDER, RECURRENT EPISODE, IN PARTIAL REMISSION: ICD-10-CM

## 2019-08-01 DIAGNOSIS — F33.41 MAJOR DEPRESSIVE DISORDER, RECURRENT, IN PARTIAL REMISSION: ICD-10-CM

## 2019-08-01 LAB
B-HCG UR QL: NEGATIVE
CTP QC/QA: YES

## 2019-08-01 PROCEDURE — 90870 ELECTROCONVULSIVE THERAPY: CPT | Performed by: ANESTHESIOLOGY

## 2019-08-01 PROCEDURE — D9220A PRA ANESTHESIA: Mod: ,,, | Performed by: ANESTHESIOLOGY

## 2019-08-01 PROCEDURE — 25000003 PHARM REV CODE 250: Performed by: PSYCHIATRY & NEUROLOGY

## 2019-08-01 PROCEDURE — 63600175 PHARM REV CODE 636 W HCPCS: Performed by: PSYCHIATRY & NEUROLOGY

## 2019-08-01 PROCEDURE — 90870 PR ELECTROCONVULSIVE THERAPY,1 SEIZ: ICD-10-PCS | Mod: ,,, | Performed by: PSYCHIATRY & NEUROLOGY

## 2019-08-01 PROCEDURE — 90870 ELECTROCONVULSIVE THERAPY: CPT | Mod: ,,, | Performed by: PSYCHIATRY & NEUROLOGY

## 2019-08-01 PROCEDURE — 25000003 PHARM REV CODE 250: Performed by: STUDENT IN AN ORGANIZED HEALTH CARE EDUCATION/TRAINING PROGRAM

## 2019-08-01 PROCEDURE — D9220A PRA ANESTHESIA: ICD-10-PCS | Mod: ,,, | Performed by: ANESTHESIOLOGY

## 2019-08-01 PROCEDURE — 81025 URINE PREGNANCY TEST: CPT | Performed by: PSYCHIATRY & NEUROLOGY

## 2019-08-01 PROCEDURE — 63600175 PHARM REV CODE 636 W HCPCS: Performed by: STUDENT IN AN ORGANIZED HEALTH CARE EDUCATION/TRAINING PROGRAM

## 2019-08-01 RX ORDER — LABETALOL HCL 20 MG/4 ML
SYRINGE (ML) INTRAVENOUS
Status: DISCONTINUED
Start: 2019-08-01 | End: 2019-08-01 | Stop reason: HOSPADM

## 2019-08-01 RX ORDER — LABETALOL HCL 20 MG/4 ML
SYRINGE (ML) INTRAVENOUS
Status: COMPLETED
Start: 2019-08-01 | End: 2019-08-01

## 2019-08-01 RX ORDER — KETOROLAC TROMETHAMINE 30 MG/ML
INJECTION, SOLUTION INTRAMUSCULAR; INTRAVENOUS
Status: COMPLETED
Start: 2019-08-01 | End: 2019-08-01

## 2019-08-01 RX ORDER — SUCCINYLCHOLINE CHLORIDE 20 MG/ML
INJECTION INTRAMUSCULAR; INTRAVENOUS
Status: DISCONTINUED | OUTPATIENT
Start: 2019-08-01 | End: 2019-08-01

## 2019-08-01 RX ORDER — SUCCINYLCHOLINE CHLORIDE 20 MG/ML
INJECTION INTRAMUSCULAR; INTRAVENOUS
Status: COMPLETED
Start: 2019-08-01 | End: 2019-08-01

## 2019-08-01 RX ORDER — SODIUM CHLORIDE 0.9 % (FLUSH) 0.9 %
10 SYRINGE (ML) INJECTION
Status: DISCONTINUED | OUTPATIENT
Start: 2019-08-01 | End: 2019-08-01 | Stop reason: HOSPADM

## 2019-08-01 RX ORDER — HYDROMORPHONE HYDROCHLORIDE 1 MG/ML
0.2 INJECTION, SOLUTION INTRAMUSCULAR; INTRAVENOUS; SUBCUTANEOUS EVERY 5 MIN PRN
Status: DISCONTINUED | OUTPATIENT
Start: 2019-08-01 | End: 2019-08-01 | Stop reason: HOSPADM

## 2019-08-01 RX ORDER — SODIUM CHLORIDE 0.9 % (FLUSH) 0.9 %
3 SYRINGE (ML) INJECTION
Status: DISCONTINUED | OUTPATIENT
Start: 2019-08-01 | End: 2019-08-01 | Stop reason: HOSPADM

## 2019-08-01 RX ORDER — MEPERIDINE HYDROCHLORIDE 50 MG/ML
12.5 INJECTION INTRAMUSCULAR; INTRAVENOUS; SUBCUTANEOUS EVERY 10 MIN PRN
Status: DISCONTINUED | OUTPATIENT
Start: 2019-08-01 | End: 2019-08-01 | Stop reason: HOSPADM

## 2019-08-01 RX ORDER — LIDOCAINE HYDROCHLORIDE 10 MG/ML
1 INJECTION, SOLUTION EPIDURAL; INFILTRATION; INTRACAUDAL; PERINEURAL ONCE
Status: COMPLETED | OUTPATIENT
Start: 2019-08-01 | End: 2019-08-01

## 2019-08-01 RX ORDER — KETOROLAC TROMETHAMINE 30 MG/ML
INJECTION, SOLUTION INTRAMUSCULAR; INTRAVENOUS
Status: DISCONTINUED | OUTPATIENT
Start: 2019-08-01 | End: 2019-08-01

## 2019-08-01 RX ORDER — ONDANSETRON 2 MG/ML
INJECTION INTRAMUSCULAR; INTRAVENOUS
Status: COMPLETED
Start: 2019-08-01 | End: 2019-08-01

## 2019-08-01 RX ORDER — ONDANSETRON 2 MG/ML
4 INJECTION INTRAMUSCULAR; INTRAVENOUS ONCE AS NEEDED
Status: DISCONTINUED | OUTPATIENT
Start: 2019-08-01 | End: 2019-08-01 | Stop reason: HOSPADM

## 2019-08-01 RX ORDER — SODIUM CHLORIDE 9 MG/ML
INJECTION, SOLUTION INTRAVENOUS ONCE
Status: COMPLETED | OUTPATIENT
Start: 2019-08-01 | End: 2019-08-01

## 2019-08-01 RX ORDER — ONDANSETRON 2 MG/ML
INJECTION INTRAMUSCULAR; INTRAVENOUS
Status: DISCONTINUED | OUTPATIENT
Start: 2019-08-01 | End: 2019-08-01

## 2019-08-01 RX ORDER — LABETALOL HYDROCHLORIDE 5 MG/ML
INJECTION, SOLUTION INTRAVENOUS
Status: DISCONTINUED | OUTPATIENT
Start: 2019-08-01 | End: 2019-08-01

## 2019-08-01 RX ADMIN — SUCCINYLCHOLINE CHLORIDE 120 MG: 20 INJECTION, SOLUTION INTRAMUSCULAR; INTRAVENOUS at 07:08

## 2019-08-01 RX ADMIN — KETOROLAC TROMETHAMINE 30 MG: 30 INJECTION, SOLUTION INTRAMUSCULAR at 07:08

## 2019-08-01 RX ADMIN — Medication 500 MG: at 06:08

## 2019-08-01 RX ADMIN — METHOHEXITAL SODIUM 120 MG: 500 INJECTION, POWDER, LYOPHILIZED, FOR SOLUTION INTRAMUSCULAR; INTRAVENOUS; RECTAL at 07:08

## 2019-08-01 RX ADMIN — LABETALOL HYDROCHLORIDE 10 MG: 5 INJECTION, SOLUTION INTRAVENOUS at 07:08

## 2019-08-01 RX ADMIN — LIDOCAINE HYDROCHLORIDE 0.2 MG: 10 INJECTION, SOLUTION EPIDURAL; INFILTRATION; INTRACAUDAL; PERINEURAL at 06:08

## 2019-08-01 RX ADMIN — SODIUM CHLORIDE: 0.9 INJECTION, SOLUTION INTRAVENOUS at 06:08

## 2019-08-01 RX ADMIN — ONDANSETRON 4 MG: 2 INJECTION, SOLUTION INTRAMUSCULAR; INTRAVENOUS at 07:08

## 2019-08-01 NOTE — OP NOTE
Allyssa Wright  : 1980   MRN: [id number]  Date: 2019    Electroconvulsive Therapy  Procedure Note    Date of Admission: 2019  5:49 AM    Site: [facility] [address]    Attending: Shoaib Boyce Jr., MD   Residents: Mane Luevano MD  Pre-procedure Diagnosis: Major Depressive Disorder, recurrent, partial remission   ECT Treatment Number: ECT #79  Machine Type: Mecta 5000    Patient Status: Medically stable    Vitals (pre-procedure):  Vitals:    19 0633   BP: 110/67   Pulse: 87   Resp: 16   Temp: 98.1 °F (36.7 °C)       Electrode Placement: Bitemporal    Stimulus Number Charge (mC) Level Pulse Width (msec) Frequency  (Hz) Duration of Stimulus (sec) Current (mA) Duration of Seizure (sec)   1 576 3 1 60 6 800 27                                   Complications: None    Maximum Blood Pressure: 151/98    Medications Given:  Caffeine 500 mg  IV    Methohexital (Brevital).   120 mg  IV    Succinylcholine (Anectine).   120 mg  IV    Ondansetron (Zofran).   4 mg  IV    Labetalol (Trandate).   30mg  IV    Ketorolac 30 mg  Lidocaine 40mg    Treatment Course:  Patient tolerated procedure well. After adequate recovery from general anesthesia, the patient was transported to recovery.    Post-op Diagnosis: Same as above    Recommended for next ECT: 19        Mane Luevano MD  2019  Allyssa Wright  : 1980   MRN: [id number]  Date: 2019    Electroconvulsive Therapy  Procedure Note    Date of Admission: 2019  5:49 AM    Site: [facility] [address]    Attending: Shoaib Boyce Jr., MD   Residents: Boaz Lo MD  Pre-procedure Diagnosis: Major depressive disorder, recurrent, partial remission   ECT Treatment Number: 78  Machine Type: Mecta 5000    Patient Status: Medically stable    Vitals (pre-procedure):  Vitals:    19 0633   BP: 110/67   Pulse: 87   Resp: 16   Temp: 98.1 °F (36.7 °C)       Electrode Placement: Bitemporal    Stimulus Number Charge (mC)  Level Pulse Width (msec) Frequency  (Hz) Duration of Stimulus (sec) Current (mA) Duration of Seizure (sec)   1 576 3 1 60 6 800 62                                   Complications: Hypertension    Maximum Blood Pressure: 185/93    Medications Given:  Caffeine 500 mg  IV    Methohexital (Brevital).   120 mg  IV    Succinylcholine (Anectine).   120 mg  IV    Ondansetron (Zofran).   4 mg  IV    Labetalol (Trandate).   20mg  IV    Ketorolac 30 mg    Treatment Course:  Patient tolerated procedure well. After adequate recovery from general anesthesia, the patient was transported to recovery.    Post-op Diagnosis: Same as above    Recommended for next ECT: 8/6/19    Mane Luevano MD  [facility], PGY-2  [facility]

## 2019-08-01 NOTE — ANESTHESIA RELEASE NOTE
"Anesthesia Release from PACU Note    Patient: Allyssa Wright    Procedure(s) Performed: Procedure(s) (LRB):  ELECTROCONVULSIVE THERAPY (ECT) - SINGLE SEIZURE (N/A)    Anesthesia type: GEN    Post pain: Adequate analgesia reported    Post assessment: no apparent anesthetic complications, tolerated procedure well and no evidence of recall    Post vital signs: /60 (BP Location: Left arm, Patient Position: Lying)   Pulse 79   Temp 37.2 °C (99 °F) (Temporal)   Resp 16   Ht 5' 5" (1.651 m)   Wt 65.8 kg (145 lb)   LMP  (LMP Unknown)   SpO2 96%   Breastfeeding? No   BMI 24.13 kg/m²     Level of consciousness: awake, alert and oriented    Nausea/Vomiting: no nausea/no vomiting    Complications: none    Airway Patency: patent    Respiratory: unassisted, spontaneous ventilation, room air    Cardiovascular: stable and blood pressure at baseline    Hydration: euvolemic    "

## 2019-08-01 NOTE — ADDENDUM NOTE
Addendum  created 08/01/19 1442 by Melyssa Torres MD    Intraprocedure Devices edited, Patient device edited, Patient device removed

## 2019-08-01 NOTE — ADDENDUM NOTE
Addendum  created 08/01/19 1159 by Melyssa Torres MD    Intraprocedure Devices edited, Patient device added, Patient device edited, Patient device removed

## 2019-08-01 NOTE — DISCHARGE SUMMARY
Allyssa Wright  : 1980   MRN: [id number]  Date: 2019     Electroconvulsive Therapy  Discharge Summary    Admit Date: 2019  5:49 AM  Discharge Date: 2019    Attending Physician: Shoaib Boyce Jr., MD   Discharge Provider: Mane Luevano MD    History of Present Illness: Allyssa Wright is a 39 y.o. female with Major depressive disorder, recurrent, in partial remission presented for ECT #79. See H&P dated 2019 for full HPI. For further details, see Dr. Boyce's pre-ECT evaluation.    Hospital Course: The patient tolerated the ECT treatment well without complication. Patient was stable post-procedure. See OP note dated 2019 for more details.     Disposition: Home or Self Care    Medications:  Current Discharge Medication List      CONTINUE these medications which have NOT CHANGED    Details   clozapine (CLOZARIL) 50 MG tablet Take 50 mg by mouth once daily.       dapsone 7.5 % GlwP Apply topically once daily.      famciclovir (FAMVIR) 500 MG tablet Take 1 tablet (500 mg total) by mouth 2 (two) times daily.  Qty: 30 tablet, Refills: 12    Associated Diagnoses: Major depressive disorder, recurrent episode, moderate degree      !! hydrOXYzine pamoate (VISTARIL) 25 MG Cap Take 2 capsules (50 mg total) by mouth nightly as needed (Take 25-50mg (1-2 caps) at night for anxiety prior to ECT).  Qty: 180 capsule, Refills: 0      mirtazapine (REMERON) 15 MG tablet Take 1 tablet (15 mg total) by mouth every evening.  Qty: 90 tablet, Refills: 0      spironolactone (ALDACTONE) 50 MG tablet 50 mg 2 (two) times daily. 50  mg qAM, 50 mg qHS.      thyroid, pork, (ARMOUR THYROID) 30 mg Tab Take 1 tablet (30 mg total) by mouth every morning.  Qty: 30 tablet, Refills: 11    Associated Diagnoses: Major depressive disorder, recurrent episode, moderate degree      trazodone (DESYREL) 100 MG tablet Take 200 mg by mouth every evening.       UNABLE TO FIND 2 (two) times daily. n-azetyl-cysteine      venlafaxine  (EFFEXOR) 100 MG Tab Take 3 tablets (300 mg total) by mouth once daily.    Associated Diagnoses: MDD (major depressive disorder), recurrent, in partial remission      vortioxetine (TRINTELLIX) 5 mg Tab Take 2.5 mg by mouth once daily.      dextroamphetamine-amphetamine 30 mg Tab Take 30 mg by mouth 2 (two) times daily.     Associated Diagnoses: MDD (major depressive disorder), recurrent, in partial remission      !! hydrOXYzine pamoate (VISTARIL) 25 MG Cap Take 1 capsule (25 mg total) by mouth nightly as needed (anxiety/sleep). Take 1 to 2 pills as needed  Qty: 60 capsule, Refills: 2      ZOVIRAX 5 % Crea Refills: 5       !! - Potential duplicate medications found. Please discuss with provider.        Status at Discharge: Alert and medically stable    Discharge Diagnoses:  Major depressive disorder, recurrent, in partial remission  Diet: Resume previous outpatient diet  Activity: Ambulate with assistance  Instructions: Please do not eat or drink anything after midnight prior to procedure. Please do not drive on day of ECT.    Med Changes:  None    Next ECT:  8/6/19    Mane Luevano MD  [facility], PGY-2  [facility]

## 2019-08-01 NOTE — H&P
"Allyssa Wright  : 1980   MRN: [id number]  Date: 2019     Electroconvulsive Therapy  History & Physical    Chief complaint: Major Depressive Disorder, recurrent, partial remission  Procedure: ECT #79    SUBJECTIVE:     HPI:   Allyssa Wright is a 39 y.o. female with Major Depressive Disorder, recurrent, in partial remission who presents for ECT.  This morning, patient states her mood is "okay", about the same since last treatment but. She states that she has started TMS w/outpatient provider Dr. Webb, with TMS sessions  and  and . She reports adequate sleep and strong appetite. NPO since 7:30 PM the night prior.   Please see Dr. Boyce's full pre-ECT evaluation for further details. The patient does not need any prescriptions filled here and has not had any med changes since meeting with Dr. Boyce. The patient has not had anything by mouth since midnight and is ready for ECT.    Psychiatric Review of Systems:  Mood: improving, mildly depressed  Appetite: No problem  Psychomotor: No problem  Cognitive Impairment: Moderate  Insomnia: None  Psychosis: None  Diurnal Variation: None  Suicidal Ideation: Absent    Medical Review Of Systems:  Pertinent items are noted in HPI.    Current Medications:   Current Facility-Administered Medications on File Prior to Encounter   Medication Dose Route Frequency Provider Last Rate Last Dose    sodium chloride 0.9% flush 10 mL  10 mL Intra-Catheter PRN Homa Colbert MD         Current Outpatient Medications on File Prior to Encounter   Medication Sig Dispense Refill    clozapine (CLOZARIL) 50 MG tablet Take 50 mg by mouth once daily.       dapsone 7.5 % GlwP Apply topically once daily.      famciclovir (FAMVIR) 500 MG tablet Take 1 tablet (500 mg total) by mouth 2 (two) times daily. 30 tablet 12    hydrOXYzine pamoate (VISTARIL) 25 MG Cap Take 2 capsules (50 mg total) by mouth nightly as needed (Take 25-50mg (1-2 caps) at night for anxiety prior to " ECT). 180 capsule 0    mirtazapine (REMERON) 15 MG tablet Take 1 tablet (15 mg total) by mouth every evening. (Patient taking differently: Take 7.5 mg by mouth every evening. ) 90 tablet 0    spironolactone (ALDACTONE) 50 MG tablet 50 mg 2 (two) times daily. 50  mg qAM, 50 mg qHS.      thyroid, pork, (ARMOUR THYROID) 30 mg Tab Take 1 tablet (30 mg total) by mouth every morning. 30 tablet 11    trazodone (DESYREL) 100 MG tablet Take 200 mg by mouth every evening.       UNABLE TO FIND 2 (two) times daily. n-azetyl-cysteine      venlafaxine (EFFEXOR) 100 MG Tab Take 3 tablets (300 mg total) by mouth once daily. (Patient taking differently: Take 225 mg by mouth once daily. )      vortioxetine (TRINTELLIX) 5 mg Tab Take 2.5 mg by mouth once daily.      dextroamphetamine-amphetamine 30 mg Tab Take 30 mg by mouth 2 (two) times daily.       hydrOXYzine pamoate (VISTARIL) 25 MG Cap Take 1 capsule (25 mg total) by mouth nightly as needed (anxiety/sleep). Take 1 to 2 pills as needed 60 capsule 2    ZOVIRAX 5 % Crea   5      Allergies:   Benzodiazepines; Ampicillin; Erythromycin; Levaquin [levofloxacin]; Penicillins; Pristiq [desvenlafaxine]; Sulfa (sulfonamide antibiotics); and Azithromycin    Past Medical/Surgical History:   Past Medical History:   Diagnosis Date    Anxiety     Depression     History of psychiatric hospitalization     HSV-1 (herpes simplex virus 1) infection     Hx of psychiatric care     Moderate depressed bipolar II disorder 06/13/2016    reports no history of bipolar    Obsessive-compulsive disorder     Psychiatric problem     Schizophrenia 4/3/2018    Self-harming behavior     Therapy      Past Surgical History:   Procedure Laterality Date    ANKLE SURGERY Right     BREAST augmentation      ELECTROCONVULSIVE THERAPY (ECT) - SINGLE SEIZURE N/A 12/6/2018    Performed by Shoaib [facility]ce Jr., [facility] at [facility] ECT    ELECTROCONVULSIVE THERAPY (ECT) - SINGLE SEIZURE N/A 8/31/2018     Performed by [facility]ib [facility] Jr[facility] at [facility] ECT    ELECTROCONVULSIVE THERAPY (ECT) - SINGLE SEIZURE N/A 8/6/2018    Performed by Shoaib Boyce Jr., MD at [facility] ECT    ELECTROCONVULSIVE THERAPY (ECT) - SINGLE SEIZURE N/A 7/23/2018    Performed by Shoaib Boyce Jr., MD at [facility] ECT    ELECTROCONVULSIVE THERAPY (ECT) - SINGLE SEIZURE N/A 7/5/2018    Performed by Shoaib Boyce Jr[facility] MD at [facility] ECT    ELECTROCONVULSIVE THERAPY (ECT) - SINGLE SEIZURE N/A 6/28/2018    Performed by Shoaib Boyce Jr[facility] MD at [facility] ECT    ELECTROCONVULSIVE THERAPY (ECT) - SINGLE SEIZURE N/A 6/13/2018    Performed by Shoaib Boyce Jr., MD at [facility] ECT    ELECTROCONVULSIVE THERAPY (ECT) - SINGLE SEIZURE N/A 5/29/2018    Performed by Shoaib Boyce Jr., MD at [facility] ECT    ELECTROCONVULSIVE THERAPY (ECT) - SINGLE SEIZURE N/A 5/8/2018    Performed by Shoaib Boyce Jr., MD at [facility] ECT    ELECTROCONVULSIVE THERAPY (ECT) - SINGLE SEIZURE N/A 4/24/2018    Performed by Shoaib Boyce Jr., MD at [facility] ECT    ELECTROCONVULSIVE THERAPY (ECT) - SINGLE SEIZURE N/A 4/17/2018    Performed by Shoaib Boyce Jr., MD at [facility] ECT    ELECTROCONVULSIVE THERAPY (ECT) - SINGLE SEIZURE N/A 4/13/2018    Performed by Shoaib Boyce Jr., MD at [facility] ECT    ELECTROCONVULSIVE THERAPY (ECT) - SINGLE SEIZURE N/A 4/3/2018    Performed by Shoaib Boyce Jr., MD at [facility] ECT    ELECTROCONVULSIVE THERAPY (ECT) - SINGLE SEIZURE N/A 3/20/2018    Performed by Shoaib Boyce Jr., MD at [facility] ECT    ELECTROCONVULSIVE THERAPY (ECT) - SINGLE SEIZURE N/A 3/15/2018    Performed by Shoaib Boyce Jr., MD at [facility] ECT    ELECTROCONVULSIVE THERAPY (ECT) - SINGLE SEIZURE N/A 3/6/2018    Performed by Shoaib Boyce Jr., MD at [facility] ECT    ELECTROCONVULSIVE THERAPY (ECT) - SINGLE SEIZURE N/A 3/1/2018    Performed by Shoaib Boyce Jr., MD at [facility] ECT    ELECTROCONVULSIVE THERAPY (ECT) - SINGLE SEIZURE N/A 2/23/2018    Performed by Shoaib  ASHKAN Boyce Jr., MD at [facility] ECT    ELECTROCONVULSIVE THERAPY (ECT) - SINGLE SEIZURE N/A 2/19/2018    Performed by Shoaib Boyce Jr., MD at [facility] ECT    ELECTROCONVULSIVE THERAPY (ECT) - SINGLE SEIZURE N/A 2/15/2018    Performed by Shoaib Boyce Jr., MD at [facility] ECT    ELECTROCONVULSIVE THERAPY (ECT) - SINGLE SEIZURE N/A 1/22/2018    Performed by Shoaib Boyce Jr., MD at [facility] ECT    ELECTROCONVULSIVE THERAPY (ECT) - SINGLE SEIZURE N/A 12/11/2017    Performed by Shoaib Boyce Jr., MD at [facility] ECT    ELECTROCONVULSIVE THERAPY (ECT) - SINGLE SEIZURE N/A 10/24/2017    Performed by Shoaib Boyce Jr., MD at [facility] ECT    ELECTROCONVULSIVE THERAPY (ECT) - SINGLE SEIZURE N/A 9/20/2017    Performed by Shoaib Boyce Jr., MD at [facility] ECT    ELECTROCONVULSIVE THERAPY (ECT) - SINGLE SEIZURE N/A 8/14/2017    Performed by Shoaib Boyce Jr., MD at [facility] ECT    ELECTROCONVULSIVE THERAPY (ECT) - SINGLE SEIZURE Bilateral 7/12/2017    Performed by Shoaib Boyce Jr., MD at [facility] ECT    ELECTROCONVULSIVE THERAPY (ECT) - SINGLE SEIZURE N/A 6/13/2017    Performed by Shoaib Boyce Jr., MD at [facility] ECT    ELECTROCONVULSIVE THERAPY (ECT) - SINGLE SEIZURE N/A 5/17/2017    Performed by Shoaib Boyce Jr., MD at [facility] ECT    ELECTROCONVULSIVE THERAPY (ECT) - SINGLE SEIZURE N/A 4/20/2017    Performed by Shoaib Boyce Jr., MD at [facility] ECT    ELECTROCONVULSIVE THERAPY (ECT) - SINGLE SEIZURE N/A 11/22/2016    Performed by Shoaib Boyce Jr., MD at [facility] ECT    ELECTROCONVULSIVE THERAPY (ECT) - SINGLE SEIZURE N/A 10/24/2016    Performed by Shoaib Boyce Jr., MD at [facility] ECT    ELECTROCONVULSIVE THERAPY (ECT) - SINGLE SEIZURE N/A 9/22/2016    Performed by Shoaib Boyce Jr., MD at [facility] ECT    ELECTROCONVULSIVE THERAPY (ECT) - SINGLE SEIZURE N/A 9/1/2016    Performed by Shoaib Boyce Jr., MD at [facility] ECT    ELECTROCONVULSIVE THERAPY (ECT) - SINGLE SEIZURE N/A 8/15/2016    Performed by Shoaib LUTHER  Carli Coker MD at [facility] ECT    ELECTROCONVULSIVE THERAPY (ECT) - SINGLE SEIZURE N/A 8/1/2016    Performed by Shoaib Boyce Jr., MD at [facility] ECT    ELECTROCONVULSIVE THERAPY (ECT) - SINGLE SEIZURE N/A 7/22/2016    Performed by Shoaib Boyce Jr., MD at [facility] ECT    ELECTROCONVULSIVE THERAPY (ECT) - SINGLE SEIZURE N/A 7/14/2016    Performed by Shoaib Boyce Jr., MD at [facility] ECT    ELECTROCONVULSIVE THERAPY (ECT) - SINGLE SEIZURE N/A 7/8/2016    Performed by Shoaib Boyce Jr., MD at [facility] ECT    ELECTROCONVULSIVE THERAPY (ECT) - SINGLE SEIZURE N/A 7/5/2016    Performed by Shoaib Boyce Jr., MD at [facility] ECT    ELECTROCONVULSIVE THERAPY (ECT) - SINGLE SEIZURE N/A 6/27/2016    Performed by Shoaib Boyce Jr., MD at [facility] ECT    ELECTROCONVULSIVE THERAPY (ECT) - SINGLE SEIZURE N/A 6/23/2016    Performed by Shoaib Boyce Jr., MD at [facility] ECT    ELECTROCONVULSIVE THERAPY (ECT) - SINGLE SEIZURE N/A 6/20/2016    Performed by Shoaib Boyce Jr., MD at [facility] ECT    ELECTROCONVULSIVE THERAPY (ECT) - SINGLE SEIZURE N/A 6/16/2016    Performed by Shoaib Boyce Jr., MD at [facility] ECT    ELECTROCONVULSIVE THERAPY (ECT) - SINGLE SEIZURE N/A 6/15/2016    Performed by Shoaib Boyce Jr., MD at [facility] ECT    ELECTROCONVULSIVE THERAPY (ECT) - SINGLE SEIZURE N/A 6/14/2016    Performed by Shoaib Boyce Jr., MD at [facility] ECT    ELECTROCONVULSIVE THERAPY (ECT) - SINGLE SEIZURE N/A 6/13/2016    Performed by Shoaib Boyce Jr., MD at [facility] ECT    ELECTROCONVULSIVE THERAPY (ECT) - SINGLE SEIZURE N/A 1/4/2016    Performed by Shoaib Boyce Jr., MD at [facility] ECT    ELECTROCONVULSIVE THERAPY, CEREBRAL HEMISPHERE, UNILATERAL, 1 SEIZURE Bilateral 6/25/2018    Performed by Shoaib Boyce Jr., MD at [facility] ECT    OVARIAN CYST REMOVAL Bilateral       OBJECTIVE:     Vitals (pre-procedure):  Vitals:    08/01/19 0633   BP: 110/67   Pulse: 87   Resp: 16   Temp: 98.1 °F (36.7 °C)        Labs/Imaging/Studies:    Recent Results (from the past 48 hour(s))   POCT urine pregnancy    Collection Time: 08/01/19  6:32 AM   Result Value Ref Range    POC Preg Test, Ur Negative Negative     Acceptable Yes       No results found for: PHENYTOIN, PHENOBARB, VALPROATE, CBMZ    Physical Exam:   Gen: AAOx4, NAD  HEENT: MMM, PERRL, EOMI, O/P clear  CV: RRR, S1/S2 nml, no M/R/G  Chest: CTAB, no R/R/W, unlabored breathing  Abd: S/NT/ND, +BS, no HSM  Ext: No C/C/E, pulse 2+ throughout  Neuro: CN II-XII grossly intact, no focal deficits    Mental Status Exam:   Appearance: unremarkable, age appropriate, neatly groomed  Behavior: normal, cooperative, eye contact normal  Speech: normal tone, normal rate, normal pitch, normal volume, spontaneous  Mood: depressed  Affect: full & reactive  Thought Process: normal and logical  Thought Content: normal, no suicidality, no homicidality, delusions, or paranoia  Cognition: grossly intact  Insight: fair  Judgment: good       ASSESSMENT/PLAN:     Allyssa Wright is a 39 y.o. female with Major Depressive Disorder, recurrent, in partial remission who presents for ECT.    Recommendations:   Proceed with ECT #79.    Mane Luevano MD  8/1/2019

## 2019-08-01 NOTE — DISCHARGE INSTRUCTIONS
Understanding Electroconvulsive Therapy  Electroconvulsive therapy (ECT) is sometimes called shock therapy. This may sound painful, but ECT doesnt hurt. Its often the safest and best treatment for severe depression. It can treat other mental disorders as well.  What is electroconvulsive therapy?  ECT is used to treat people who are very depressed. Its mainly used when other treatments, such as antidepressant medicines, have failed. Often it may relieve feelings of sadness and despair after 2 to 4 treatments.  Common symptoms of major depression  Symptoms of major depression include:  · Feeling a deep sadness that doesnt go away  · Losing all pleasure in life  · Feeling hopeless or helpless  · Feeling guilty  · Sleeping more or less than normal  · Eating more or less than normal  · Having headaches or stomachaches, or other pains that dont go away  · Feeling nervous, empty, or worthless  · Crying a great deal  · Thinking or talking about suicide or death  How is ECT therapy done?  Before an ECT treatment, youll be given anesthesia to keep you pain-free. Youll also be given medicine to make you sleep, relax your muscles and control your heart rate. Your healthcare provider then places electrodes on your head. You may have one above each temple (bilateral ECT). Or you may have electrodes on one temple and on your forehead (unilateral ECT). While you are asleep, your brain is stimulated very briefly with an electric current. This causes a seizure, usually lasting less than a minute. Because you are under anesthesia, your body will not move even as your brain goes through great changes.  What are the risks?  When done properly, ECT is quite safe. Right after the treatment, you may be confused. This often lasts for less than half an hour. You may have a headache or stiff muscles. But these symptoms often go away quickly. A more serious possible side effect is memory loss. Commonly, people have short-term  (temporary) trouble remembering information that they learned recently. And they may have little recall of the time when they received treatment. Less commonly, people may have long-lasting (permanent) spotty recall of major past events. In rare cases, there may be memory loss for larger blocks of time.  Looking to the future  In most cases, ECT doesnt cure depression. But it can improve symptoms for a period of time. You may need a series of ECT treatments to continue feeling the benefit. You may also need to take antidepressant medicines to help prevent symptoms from returning. But with ongoing treatment, you can have a full and healthy life.  Resources  · The [employer] of [employer]  430.463.1139  www.[facility].nih.gov  · [facility]  420.785.3345  www.[facility].org     Date Last Reviewed: 5/1/2017  © 8749-1446 [employer]. [address] [place] [address]. All rights reserved. This information is not intended as a substitute for professional medical care. Always follow your healthcare professional's instructions.        Discharge Instructions: After Your Surgery  Youve just had surgery. During surgery, you were given medicine called anesthesia to keep you relaxed and free of pain. After surgery, you may have some pain or nausea. This is common. Here are some tips for feeling better and getting well after surgery.     Stay on schedule with your medicine.   Going home  Your healthcare provider will show you how to take care of yourself when you go home. He or she will also answer your questions. Have an adult family member or friend drive you home. For the first 24 hours after your surgery:  · Do not drive or use heavy equipment.  · Do not make important decisions or sign legal papers.  · Do not drink alcohol.  · Have someone stay with you, if needed. He or she can watch for problems and help keep you safe.  Be sure to go to all follow-up visits with your healthcare provider.  And rest after your surgery for as long as your healthcare provider tells you to.  Coping with pain  If you have pain after surgery, pain medicine will help you feel better. Take it as told, before pain becomes severe. Also, ask your healthcare provider or pharmacist about other ways to control pain. This might be with heat, ice, or relaxation. And follow any other instructions your surgeon or nurse gives you.  Tips for taking pain medicine  To get the best relief possible, remember these points:  · Pain medicines can upset your stomach. Taking them with a little food may help.  · Most pain relievers taken by mouth need at least 20 to 30 minutes to start to work.  · Taking medicine on a schedule can help you remember to take it. Try to time your medicine so that you can take it before starting an activity. This might be before you get dressed, go for a walk, or sit down for dinner.  · Constipation is a common side effect of pain medicines. Call your healthcare provider before taking any medicines such as laxatives or stool softeners to help ease constipation. Also ask if you should skip any foods. Drinking lots of fluids and eating foods such as fruits and vegetables that are high in fiber can also help. Remember, do not take laxatives unless your surgeon has prescribed them.  · Drinking alcohol and taking pain medicine can cause dizziness and slow your breathing. It can even be deadly. Do not drink alcohol while taking pain medicine.  · Pain medicine can make you react more slowly to things. Do not drive or run machinery while taking pain medicine.  Your healthcare provider may tell you to take acetaminophen to help ease your pain. Ask him or her how much you are supposed to take each day. Acetaminophen or other pain relievers may interact with your prescription medicines or other over-the-counter (OTC) medicines. Some prescription medicines have acetaminophen and other ingredients. Using both prescription and OTC  acetaminophen for pain can cause you to overdose. Read the labels on your OTC medicines with care. This will help you to clearly know the list of ingredients, how much to take, and any warnings. It may also help you not take too much acetaminophen. If you have questions or do not understand the information, ask your pharmacist or healthcare provider to explain it to you before you take the OTC medicine.  Managing nausea  Some people have an upset stomach after surgery. This is often because of anesthesia, pain, or pain medicine, or the stress of surgery. These tips will help you handle nausea and eat healthy foods as you get better. If you were on a special food plan before surgery, ask your healthcare provider if you should follow it while you get better. These tips may help:  · Do not push yourself to eat. Your body will tell you when to eat and how much.  · Start off with clear liquids and soup. They are easier to digest.  · Next try semi-solid foods, such as mashed potatoes, applesauce, and gelatin, as you feel ready.  · Slowly move to solid foods. Dont eat fatty, rich, or spicy foods at first.  · Do not force yourself to have 3 large meals a day. Instead eat smaller amounts more often.  · Take pain medicines with a small amount of solid food, such as crackers or toast, to avoid nausea.     Call your surgeon if  · You still have pain an hour after taking medicine. The medicine may not be strong enough.  · You feel too sleepy, dizzy, or groggy. The medicine may be too strong.  · You have side effects like nausea, vomiting, or skin changes, such as rash, itching, or hives.       If you have obstructive sleep apnea  You were given anesthesia medicine during surgery to keep you comfortable and free of pain. After surgery, you may have more apnea spells because of this medicine and other medicines you were given. The spells may last longer than usual.   At home:  · Keep using the continuous positive airway pressure  (CPAP) device when you sleep. Unless your healthcare provider tells you not to, use it when you sleep, day or night. CPAP is a common device used to treat obstructive sleep apnea.  · Talk with your provider before taking any pain medicine, muscle relaxants, or sedatives. Your provider will tell you about the possible dangers of taking these medicines.  Date Last Reviewed: 12/1/2016  © 4272-6989 [employer]. [address] [place] [address], [place] [address]. All rights reserved. This information is not intended as a substitute for professional medical care. Always follow your healthcare professional's instructions.

## 2019-08-01 NOTE — TRANSFER OF CARE
"Anesthesia Transfer of Care Note    Patient: Allyssa Wright    Procedure(s) Performed: Procedure(s) (LRB):  ELECTROCONVULSIVE THERAPY (ECT) - SINGLE SEIZURE (N/A)    Patient location: PACU    Anesthesia Type: general    Transport from OR: Transported from OR on room air with adequate spontaneous ventilation    Post pain: adequate analgesia    Post assessment: no apparent anesthetic complications    Post vital signs: stable    Level of consciousness: awake and alert    Nausea/Vomiting: no nausea/vomiting    Complications: none    Transfer of care protocol was followed      Last vitals:   Visit Vitals  /67 (BP Location: Left arm, Patient Position: Lying)   Pulse 87   Temp 36.7 °C (98.1 °F) (Oral)   Resp 16   Ht 5' 5" (1.651 m)   Wt 65.8 kg (145 lb)   LMP  (LMP Unknown)   SpO2 99%   Breastfeeding? No   BMI 24.13 kg/m²     "

## 2019-08-01 NOTE — ADDENDUM NOTE
Addendum  created 08/01/19 1211 by Melyssa Torres MD    Intraprocedure Flowsheets edited, Intraprocedure Meds edited

## 2019-08-01 NOTE — ANESTHESIA POSTPROCEDURE EVALUATION
Anesthesia Post Evaluation    Patient: Allyssa Wright    Procedure(s) Performed: Procedure(s) (LRB):  ELECTROCONVULSIVE THERAPY (ECT) - SINGLE SEIZURE (N/A)    Final Anesthesia Type: general  Patient location during evaluation: PACU  Patient participation: Yes- Able to Participate  Level of consciousness: awake and alert and oriented  Post-procedure vital signs: reviewed and stable  Pain management: adequate  Airway patency: patent  PONV status at discharge: No PONV  Anesthetic complications: no      Cardiovascular status: stable  Respiratory status: unassisted, spontaneous ventilation and room air  Hydration status: euvolemic  Follow-up not needed.          Vitals Value Taken Time   /55 8/1/2019  8:02 AM   Temp 37.2 °C (99 °F) 8/1/2019  7:36 AM   Pulse 75 8/1/2019  8:06 AM   Resp 28 8/1/2019  8:06 AM   SpO2 97 % 8/1/2019  8:06 AM   Vitals shown include unvalidated device data.      No case tracking events are documented in the log.      Pain/Roma Score: Roma Score: 10 (8/1/2019  7:36 AM)

## 2019-08-02 DIAGNOSIS — F33.9 RECURRENT MAJOR DEPRESSIVE DISORDER, REMISSION STATUS UNSPECIFIED: Primary | ICD-10-CM

## 2019-08-06 ENCOUNTER — ANESTHESIA EVENT (OUTPATIENT)
Dept: ELECTROPHYSIOLOGY | Facility: HOSPITAL | Age: 39
End: 2019-08-06
Payer: COMMERCIAL

## 2019-08-06 NOTE — ANESTHESIA PREPROCEDURE EVALUATION
[facility]  Anesthesia Pre-Operative Evaluation         Patient Name: Allyssa Wright  YOB: 1980  MRN: [id number]    SUBJECTIVE:     Pre-operative evaluation for Procedure(s) (LRB):  ELECTROCONVULSIVE THERAPY (ECT) - SINGLE SEIZURE (N/A)     08/06/2019    Allyssa Wright is a 39 y.o. female w/ a significant PMHx of  Major depressive disorder, recurrent, in partial remission presented for ECT #79.    Patient now presents for the above procedure(s).    Previous Medications   Succinylcholine 120mg   Methohexital 120mg   Labetalol 10mg  Ketorolac 30mg   Ondansetron 4mg       Patient Active Problem List   Diagnosis    Major depressive disorder, recurrent episode, in partial remission    Other acne    Comfort measures only status    MDD (major depressive disorder)    Major depression    Major depressive disorder    MDD (major depressive disorder), severe    Depression    Major depressive disorder, recurrent, in partial remission       Review of patient's allergies indicates:   Allergen Reactions    Benzodiazepines Other (See Comments)     Contraindicated while taking Clozapine    Ampicillin      Mom says so    Erythromycin     Levaquin [levofloxacin] Other (See Comments)     Depression side effects    Penicillins      Mom says so    Pristiq [desvenlafaxine]      psycotic      Sulfa (sulfonamide antibiotics)      Rash      Azithromycin Anxiety       Current Inpatient Medications:      Current Outpatient Medications on File Prior to Visit   Medication Sig Dispense Refill    clozapine (CLOZARIL) 50 MG tablet Take 50 mg by mouth once daily.       dapsone 7.5 % GlwP Apply topically once daily.      dextroamphetamine-amphetamine 30 mg Tab Take 30 mg by mouth 2 (two) times daily.       famciclovir (FAMVIR) 500 MG tablet Take 1 tablet (500 mg total) by mouth 2 (two) times daily. 30 tablet 12    hydrOXYzine  pamoate (VISTARIL) 25 MG Cap Take 2 capsules (50 mg total) by mouth nightly as needed (Take 25-50mg (1-2 caps) at night for anxiety prior to ECT). 180 capsule 0    hydrOXYzine pamoate (VISTARIL) 25 MG Cap Take 1 capsule (25 mg total) by mouth nightly as needed (anxiety/sleep). Take 1 to 2 pills as needed 60 capsule 2    mirtazapine (REMERON) 15 MG tablet Take 1 tablet (15 mg total) by mouth every evening. (Patient taking differently: Take 7.5 mg by mouth every evening. ) 90 tablet 0    spironolactone (ALDACTONE) 50 MG tablet 50 mg 2 (two) times daily. 50  mg qAM, 50 mg qHS.      thyroid, pork, (ARMOUR THYROID) 30 mg Tab Take 1 tablet (30 mg total) by mouth every morning. 30 tablet 11    trazodone (DESYREL) 100 MG tablet Take 200 mg by mouth every evening.       UNABLE TO FIND 2 (two) times daily. n-azetyl-cysteine      venlafaxine (EFFEXOR) 100 MG Tab Take 3 tablets (300 mg total) by mouth once daily. (Patient taking differently: Take 225 mg by mouth once daily. )      vortioxetine (TRINTELLIX) 5 mg Tab Take 2.5 mg by mouth once daily.      ZOVIRAX 5 % Crea   5     Current Facility-Administered Medications on File Prior to Visit   Medication Dose Route Frequency Provider Last Rate Last Dose    sodium chloride 0.9% flush 10 mL  10 mL Intra-Catheter PRN Homa Colbert MD           Past Surgical History:   Procedure Laterality Date    ANKLE SURGERY Right     BREAST augmentation      ELECTROCONVULSIVE THERAPY (ECT) - SINGLE SEIZURE N/A 12/6/2018    Performed by Shoaib Boyce Jr., [facility] at [facility] ECT    ELECTROCONVULSIVE THERAPY (ECT) - SINGLE SEIZURE N/A 8/31/2018    Performed by Shoaib Boyce Jr., MD at [facility] ECT    ELECTROCONVULSIVE THERAPY (ECT) - SINGLE SEIZURE N/A 8/6/2018    Performed by Shoaib Boyce Jr., [facility] at [facility] ECT    ELECTROCONVULSIVE THERAPY (ECT) - SINGLE SEIZURE N/A 7/23/2018    Performed by Shoaib Boyce Jr., MD at [facility] ECT    ELECTROCONVULSIVE THERAPY (ECT) - SINGLE  SEIZURE N/A 7/5/2018    Performed by [facility] Jr[facility] at [facility] ECT    ELECTROCONVULSIVE THERAPY (ECT) - SINGLE SEIZURE N/A 6/28/2018    Performed by Sh[facility]ib [facility] Jr[facility] at [facility] ECT    ELECTROCONVULSIVE THERAPY (ECT) - SINGLE SEIZURE N/A 6/13/2018    Performed by Sh[facility]ib [facility] Jr[facility] at [facility] ECT    ELECTROCONVULSIVE THERAPY (ECT) - SINGLE SEIZURE N/A 5/29/2018    Performed by Shoaib Boyce Jr[facility] at [facility] ECT    ELECTROCONVULSIVE THERAPY (ECT) - SINGLE SEIZURE N/A 5/8/2018    Performed by Sh[facility]ib [facility]ce Jr[facility] at [facility] ECT    ELECTROCONVULSIVE THERAPY (ECT) - SINGLE SEIZURE N/A 4/24/2018    Performed by Shoaib [facility] Jr[facility] at [facility] ECT    ELECTROCONVULSIVE THERAPY (ECT) - SINGLE SEIZURE N/A 4/17/2018    Performed by Shoaib Boyce Jr[facility] at [facility] ECT    ELECTROCONVULSIVE THERAPY (ECT) - SINGLE SEIZURE N/A 4/13/2018    Performed by Shoaib Boyce Jr[facility] at [facility] ECT    ELECTROCONVULSIVE THERAPY (ECT) - SINGLE SEIZURE N/A 4/3/2018    Performed by Shoaib Boyce Jr[facility] at [facility] ECT    ELECTROCONVULSIVE THERAPY (ECT) - SINGLE SEIZURE N/A 3/20/2018    Performed by Shoaib Boyce Jr[facility] at [facility] ECT    ELECTROCONVULSIVE THERAPY (ECT) - SINGLE SEIZURE N/A 3/15/2018    Performed by Shoaib Boyce Jr[facility] at [facility] ECT    ELECTROCONVULSIVE THERAPY (ECT) - SINGLE SEIZURE N/A 3/6/2018    Performed by Shoaib Boyce Jr[facility] at [facility] ECT    ELECTROCONVULSIVE THERAPY (ECT) - SINGLE SEIZURE N/A 3/1/2018    Performed by Shoaib Boyce Jr[facility] at [facility] ECT    ELECTROCONVULSIVE THERAPY (ECT) - SINGLE SEIZURE N/A 2/23/2018    Performed by Shoaib Boyce Jr[facility] at [facility] ECT    ELECTROCONVULSIVE THERAPY (ECT) - SINGLE SEIZURE N/A 2/19/2018    Performed by Shoaib Boyce Jr[facility] at [facility] ECT    ELECTROCONVULSIVE THERAPY (ECT) - SINGLE SEIZURE N/A 2/15/2018    Performed by Sh[facility]ib Boy[facility] Jr[facility] at [facility] ECT    ELECTROCONVULSIVE THERAPY (ECT) - SINGLE SEIZURE N/A  1/22/2018    Performed by [facility] at [facility] ECT    ELECTROCONVULSIVE THERAPY (ECT) - SINGLE SEIZURE N/A 12/11/2017    Performed by Shoaib Boyce Jr[facility] at [facility] ECT    ELECTROCONVULSIVE THERAPY (ECT) - SINGLE SEIZURE N/A 10/24/2017    Performed by Shoaib Boyce Jr[facility] at [facility] ECT    ELECTROCONVULSIVE THERAPY (ECT) - SINGLE SEIZURE N/A 9/20/2017    Performed by Shoaib Boyce Jr[facility] at [facility] ECT    ELECTROCONVULSIVE THERAPY (ECT) - SINGLE SEIZURE N/A 8/14/2017    Performed by Shoaib Boyce Jr[facility] at [facility] ECT    ELECTROCONVULSIVE THERAPY (ECT) - SINGLE SEIZURE Bilateral 7/12/2017    Performed by Shoaib Boyce Jr., [facility] at [facility] ECT    ELECTROCONVULSIVE THERAPY (ECT) - SINGLE SEIZURE N/A 6/13/2017    Performed by Shoaib Boyce Jr., [facility] at [facility] ECT    ELECTROCONVULSIVE THERAPY (ECT) - SINGLE SEIZURE N/A 5/17/2017    Performed by Shoaib Boyce Jr[facility] at [facility] ECT    ELECTROCONVULSIVE THERAPY (ECT) - SINGLE SEIZURE N/A 4/20/2017    Performed by Shoaib Boyce Jr[facility] at [facility] ECT    ELECTROCONVULSIVE THERAPY (ECT) - SINGLE SEIZURE N/A 11/22/2016    Performed by Shoaib Boyce Jr[facility] at [facility] ECT    ELECTROCONVULSIVE THERAPY (ECT) - SINGLE SEIZURE N/A 10/24/2016    Performed by Shoaib Boyce Jr[facility] at [facility] ECT    ELECTROCONVULSIVE THERAPY (ECT) - SINGLE SEIZURE N/A 9/22/2016    Performed by Shoaib Boyce Jr[facility] at [facility] ECT    ELECTROCONVULSIVE THERAPY (ECT) - SINGLE SEIZURE N/A 9/1/2016    Performed by Shoaib Boyce Jr., [facility] at [facility] ECT    ELECTROCONVULSIVE THERAPY (ECT) - SINGLE SEIZURE N/A 8/15/2016    Performed by Shoaib Boyce Jr., [facility] at [facility] ECT    ELECTROCONVULSIVE THERAPY (ECT) - SINGLE SEIZURE N/A 8/1/2016    Performed by Shoaib Boyce Jr., [facility] at [facility] ECT    ELECTROCONVULSIVE THERAPY (ECT) - SINGLE SEIZURE N/A 7/22/2016    Performed by Shoaib Boyce Jr[facility] at [facility] ECT    ELECTROCONVULSIVE THERAPY (ECT) - SINGLE SEIZURE N/A  7/14/2016    Performed by [facility]ib [facility] Jr[facility] at [facility] ECT    ELECTROCONVULSIVE THERAPY (ECT) - SINGLE SEIZURE N/A 7/8/2016    Performed by Shoaib Boyce Jr., MD at [facility] ECT    ELECTROCONVULSIVE THERAPY (ECT) - SINGLE SEIZURE N/A 7/5/2016    Performed by Shoaib Boyce Jr., MD at [facility] ECT    ELECTROCONVULSIVE THERAPY (ECT) - SINGLE SEIZURE N/A 6/27/2016    Performed by Shoaib Boyce Jr., MD at [facility] ECT    ELECTROCONVULSIVE THERAPY (ECT) - SINGLE SEIZURE N/A 6/23/2016    Performed by Shoaib Boyce Jr., MD at [facility] ECT    ELECTROCONVULSIVE THERAPY (ECT) - SINGLE SEIZURE N/A 6/20/2016    Performed by Shoaib Boyce Jr., MD at [facility] ECT    ELECTROCONVULSIVE THERAPY (ECT) - SINGLE SEIZURE N/A 6/16/2016    Performed by Shoaib Boyce Jr., MD at [facility] ECT    ELECTROCONVULSIVE THERAPY (ECT) - SINGLE SEIZURE N/A 6/15/2016    Performed by Shoaib Boyce Jr., MD at [facility] ECT    ELECTROCONVULSIVE THERAPY (ECT) - SINGLE SEIZURE N/A 6/14/2016    Performed by Shoaib Boyce Jr[facility] MD at [facility] ECT    ELECTROCONVULSIVE THERAPY (ECT) - SINGLE SEIZURE N/A 6/13/2016    Performed by Shoaib Boyce Jr[facility] MD at [facility] ECT    ELECTROCONVULSIVE THERAPY (ECT) - SINGLE SEIZURE N/A 1/4/2016    Performed by Shoaib Boyce Jr., MD at [facility] ECT    ELECTROCONVULSIVE THERAPY, CEREBRAL HEMISPHERE, UNILATERAL, 1 SEIZURE Bilateral 6/25/2018    Performed by Sh[facility]ib [facility]ce Jr[facility] MD at [facility] ECT    OVARIAN CYST REMOVAL Bilateral        Social History     Socioeconomic History    Marital status: Single     Spouse name: Not on file    Number of children: Not on file    Years of education: Not on file    Highest education level: Not on file   Occupational History    Occupation: unemployed   Social Needs    Financial resource strain: Not on file    Food insecurity:     Worry: Not on file     Inability: Not on file    Transportation needs:     Medical: Not on file     Non-medical: Not on file   Tobacco Use     Smoking status: Former Smoker     Last attempt to quit: 2011     Years since quittin.3    Smokeless tobacco: Never Used   Substance and Sexual Activity    Alcohol use: Yes     Comment: rarely    Drug use: No     Comment: Experimental use in high school    Sexual activity: Not on file   Lifestyle    Physical activity:     Days per week: Not on file     Minutes per session: Not on file    Stress: Not on file   Relationships    Social connections:     Talks on phone: Not on file     Gets together: Not on file     Attends Adventism service: Not on file     Active member of club or organization: Not on file     Attends meetings of clubs or organizations: Not on file     Relationship status: Not on file   Other Topics Concern    Patient feels they ought to cut down on drinking/drug use Not Asked    Patient annoyed by others criticizing their drinking/drug use Not Asked    Patient has felt bad or guilty about drinking/drug use Not Asked    Patient has had a drink/used drugs as an eye opener in the AM Not Asked   Social History Narrative    Pt has 1 older brother from an intact family until the death of her mother in .  She completed 1 year of college, was never in the , and has never been employed.  She was engaged once, but never , has no children, and lives with her father plus 2 dogs.  She denies any hobbies and is spiritual but not Adventism.  She does date and returns to college during rare periods when depression and anxiety [place].       OBJECTIVE:     Vital Signs Range (Last 24H):  BP: ()/()   Arterial Line BP: ()/()       Significant Labs:  Lab Results   Component Value Date    WBC 6.83 2016    HGB 13.3 2016    HCT 40.3 2016     2016    ALT 14 2016    AST 20 2016     2016    K 3.8 2016     2016    CREATININE 0.7 2016    BUN 10 2016    CO2 28 2016    TSH 1.208 2016       Diagnostic  Studies: No relevant studies.    EKG: No recent studies available.    2D ECHO:  No results found for this or any previous visit.      ASSESSMENT/PLAN:         Pre-op Assessment    I have reviewed the Patient Summary Reports.     I have reviewed the Nursing Notes.   I have reviewed the Medications.     Review of Systems  Anesthesia Hx:  Denies Family Hx of Anesthesia complications.   Denies Personal Hx of Anesthesia complications.   Hematology/Oncology:  Hematology Normal   Oncology Normal     EENT/Dental:EENT/Dental Normal   Cardiovascular:  Cardiovascular Normal  ECG has been reviewed.    Pulmonary:  Pulmonary Normal    Renal/:  Renal/ Normal     Hepatic/GI:  Hepatic/GI Normal    Musculoskeletal:  Musculoskeletal Normal    Neurological:  Neurology Normal    Endocrine:  Endocrine Normal    Dermatological:  Skin Normal    Psych:   Psychiatric History               Anesthesia Plan  Type of Anesthesia, risks & benefits discussed:  Anesthesia Type:  general  Patient's Preference:   Intra-op Monitoring Plan: standard ASA monitors  Intra-op Monitoring Plan Comments:   Post Op Pain Control Plan: per primary service following discharge from PACU, IV/PO Opioids PRN and multimodal analgesia  Post Op Pain Control Plan Comments:   Induction:   IV  Beta Blocker:  Patient is not currently on a Beta-Blocker (No further documentation required).       Informed Consent: Patient understands risks and agrees with Anesthesia plan.  Questions answered. Anesthesia consent signed with patient.  ASA Score: 2     Day of Surgery Review of History & Physical:    H&P update referred to the provider.         Ready For Surgery From Anesthesia Perspective.

## 2019-08-07 ENCOUNTER — HOSPITAL ENCOUNTER (OUTPATIENT)
Facility: HOSPITAL | Age: 39
Discharge: HOME | End: 2019-08-07
Attending: PSYCHIATRY & NEUROLOGY | Admitting: PSYCHIATRY & NEUROLOGY
Payer: COMMERCIAL

## 2019-08-07 ENCOUNTER — ANESTHESIA (OUTPATIENT)
Dept: ELECTROPHYSIOLOGY | Facility: HOSPITAL | Age: 39
End: 2019-08-07
Payer: COMMERCIAL

## 2019-08-07 VITALS
HEART RATE: 71 BPM | SYSTOLIC BLOOD PRESSURE: 120 MMHG | DIASTOLIC BLOOD PRESSURE: 51 MMHG | HEIGHT: 65 IN | TEMPERATURE: 99 F | RESPIRATION RATE: 18 BRPM | BODY MASS INDEX: 24.16 KG/M2 | WEIGHT: 145 LBS | OXYGEN SATURATION: 99 %

## 2019-08-07 DIAGNOSIS — F32.9 MAJOR DEPRESSION: ICD-10-CM

## 2019-08-07 DIAGNOSIS — F33.41 MAJOR DEPRESSIVE DISORDER, RECURRENT EPISODE, IN PARTIAL REMISSION: ICD-10-CM

## 2019-08-07 DIAGNOSIS — F33.9 RECURRENT MAJOR DEPRESSIVE DISORDER, REMISSION STATUS UNSPECIFIED: Primary | ICD-10-CM

## 2019-08-07 DIAGNOSIS — F33.41 MAJOR DEPRESSIVE DISORDER, RECURRENT, IN PARTIAL REMISSION: Primary | ICD-10-CM

## 2019-08-07 LAB
ALBUMIN SERPL BCP-MCNC: 4.4 G/DL (ref 3.5–5.2)
ALP SERPL-CCNC: 45 U/L (ref 55–135)
ALT SERPL W/O P-5'-P-CCNC: 22 U/L (ref 10–44)
ANION GAP SERPL CALC-SCNC: 9 MMOL/L (ref 8–16)
AST SERPL-CCNC: 22 U/L (ref 10–40)
B-HCG UR QL: NEGATIVE
BASOPHILS # BLD AUTO: 0.03 K/UL (ref 0–0.2)
BASOPHILS NFR BLD: 0.7 % (ref 0–1.9)
BILIRUB SERPL-MCNC: 0.4 MG/DL (ref 0.1–1)
BUN SERPL-MCNC: 10 MG/DL (ref 6–20)
CALCIUM SERPL-MCNC: 9.3 MG/DL (ref 8.7–10.5)
CHLORIDE SERPL-SCNC: 104 MMOL/L (ref 95–110)
CO2 SERPL-SCNC: 30 MMOL/L (ref 23–29)
CREAT SERPL-MCNC: 0.7 MG/DL (ref 0.5–1.4)
CTP QC/QA: YES
DIFFERENTIAL METHOD: ABNORMAL
EOSINOPHIL # BLD AUTO: 0 K/UL (ref 0–0.5)
EOSINOPHIL NFR BLD: 0.2 % (ref 0–8)
ERYTHROCYTE [DISTWIDTH] IN BLOOD BY AUTOMATED COUNT: 12.1 % (ref 11.5–14.5)
EST. GFR  (AFRICAN AMERICAN): >60 ML/MIN/1.73 M^2
EST. GFR  (NON AFRICAN AMERICAN): >60 ML/MIN/1.73 M^2
GLUCOSE SERPL-MCNC: 101 MG/DL (ref 70–110)
HCT VFR BLD AUTO: 42.9 % (ref 37–48.5)
HGB BLD-MCNC: 14 G/DL (ref 12–16)
IMM GRANULOCYTES # BLD AUTO: 0.01 K/UL (ref 0–0.04)
IMM GRANULOCYTES NFR BLD AUTO: 0.2 % (ref 0–0.5)
LYMPHOCYTES # BLD AUTO: 0.8 K/UL (ref 1–4.8)
LYMPHOCYTES NFR BLD: 17.8 % (ref 18–48)
MCH RBC QN AUTO: 30.2 PG (ref 27–31)
MCHC RBC AUTO-ENTMCNC: 32.6 G/DL (ref 32–36)
MCV RBC AUTO: 93 FL (ref 82–98)
MONOCYTES # BLD AUTO: 0.3 K/UL (ref 0.3–1)
MONOCYTES NFR BLD: 7.5 % (ref 4–15)
NEUTROPHILS # BLD AUTO: 3.1 K/UL (ref 1.8–7.7)
NEUTROPHILS NFR BLD: 73.6 % (ref 38–73)
NRBC BLD-RTO: 0 /100 WBC
PLATELET # BLD AUTO: 217 K/UL (ref 150–350)
PMV BLD AUTO: 10.4 FL (ref 9.2–12.9)
POTASSIUM SERPL-SCNC: 4.1 MMOL/L (ref 3.5–5.1)
PROT SERPL-MCNC: 7.1 G/DL (ref 6–8.4)
RBC # BLD AUTO: 4.63 M/UL (ref 4–5.4)
SODIUM SERPL-SCNC: 143 MMOL/L (ref 136–145)
T3 SERPL-MCNC: 77 NG/DL (ref 60–180)
T4 SERPL-MCNC: 5.7 UG/DL (ref 4.5–11.5)
TSH SERPL DL<=0.005 MIU/L-ACNC: 1.86 UIU/ML (ref 0.4–4)
WBC # BLD AUTO: 4.26 K/UL (ref 3.9–12.7)

## 2019-08-07 PROCEDURE — 80053 COMPREHEN METABOLIC PANEL: CPT

## 2019-08-07 PROCEDURE — 25000003 PHARM REV CODE 250: Performed by: STUDENT IN AN ORGANIZED HEALTH CARE EDUCATION/TRAINING PROGRAM

## 2019-08-07 PROCEDURE — 63600175 PHARM REV CODE 636 W HCPCS: Performed by: STUDENT IN AN ORGANIZED HEALTH CARE EDUCATION/TRAINING PROGRAM

## 2019-08-07 PROCEDURE — 84480 ASSAY TRIIODOTHYRONINE (T3): CPT

## 2019-08-07 PROCEDURE — 63600175 PHARM REV CODE 636 W HCPCS: Performed by: PSYCHIATRY & NEUROLOGY

## 2019-08-07 PROCEDURE — D9220A PRA ANESTHESIA: ICD-10-PCS | Mod: ,,, | Performed by: ANESTHESIOLOGY

## 2019-08-07 PROCEDURE — 81025 URINE PREGNANCY TEST: CPT | Performed by: PSYCHIATRY & NEUROLOGY

## 2019-08-07 PROCEDURE — D9220A PRA ANESTHESIA: Mod: ,,, | Performed by: ANESTHESIOLOGY

## 2019-08-07 PROCEDURE — 90870 ELECTROCONVULSIVE THERAPY: CPT | Performed by: STUDENT IN AN ORGANIZED HEALTH CARE EDUCATION/TRAINING PROGRAM

## 2019-08-07 PROCEDURE — 90870 PR ELECTROCONVULSIVE THERAPY,1 SEIZ: ICD-10-PCS | Mod: ,,, | Performed by: PSYCHIATRY & NEUROLOGY

## 2019-08-07 PROCEDURE — 84436 ASSAY OF TOTAL THYROXINE: CPT

## 2019-08-07 PROCEDURE — 25000003 PHARM REV CODE 250: Performed by: PSYCHIATRY & NEUROLOGY

## 2019-08-07 PROCEDURE — 85025 COMPLETE CBC W/AUTO DIFF WBC: CPT

## 2019-08-07 PROCEDURE — 36415 COLL VENOUS BLD VENIPUNCTURE: CPT

## 2019-08-07 PROCEDURE — 84443 ASSAY THYROID STIM HORMONE: CPT

## 2019-08-07 PROCEDURE — 90870 ELECTROCONVULSIVE THERAPY: CPT | Mod: ,,, | Performed by: PSYCHIATRY & NEUROLOGY

## 2019-08-07 RX ORDER — ESMOLOL HYDROCHLORIDE 10 MG/ML
INJECTION INTRAVENOUS
Status: DISCONTINUED | OUTPATIENT
Start: 2019-08-07 | End: 2019-08-07

## 2019-08-07 RX ORDER — ONDANSETRON 2 MG/ML
INJECTION INTRAMUSCULAR; INTRAVENOUS
Status: DISCONTINUED | OUTPATIENT
Start: 2019-08-07 | End: 2019-08-07

## 2019-08-07 RX ORDER — LIDOCAINE HYDROCHLORIDE 10 MG/ML
1 INJECTION, SOLUTION EPIDURAL; INFILTRATION; INTRACAUDAL; PERINEURAL ONCE
Status: COMPLETED | OUTPATIENT
Start: 2019-08-07 | End: 2019-08-07

## 2019-08-07 RX ORDER — KETOROLAC TROMETHAMINE 30 MG/ML
INJECTION, SOLUTION INTRAMUSCULAR; INTRAVENOUS
Status: COMPLETED
Start: 2019-08-07 | End: 2019-08-07

## 2019-08-07 RX ORDER — ESMOLOL HYDROCHLORIDE 10 MG/ML
INJECTION INTRAVENOUS
Status: COMPLETED
Start: 2019-08-07 | End: 2019-08-07

## 2019-08-07 RX ORDER — SUCCINYLCHOLINE CHLORIDE 20 MG/ML
INJECTION INTRAMUSCULAR; INTRAVENOUS
Status: COMPLETED
Start: 2019-08-07 | End: 2019-08-07

## 2019-08-07 RX ORDER — KETOROLAC TROMETHAMINE 30 MG/ML
INJECTION, SOLUTION INTRAMUSCULAR; INTRAVENOUS
Status: DISCONTINUED | OUTPATIENT
Start: 2019-08-07 | End: 2019-08-07

## 2019-08-07 RX ORDER — LABETALOL HCL 20 MG/4 ML
SYRINGE (ML) INTRAVENOUS
Status: COMPLETED
Start: 2019-08-07 | End: 2019-08-07

## 2019-08-07 RX ORDER — ONDANSETRON 2 MG/ML
INJECTION INTRAMUSCULAR; INTRAVENOUS
Status: COMPLETED
Start: 2019-08-07 | End: 2019-08-07

## 2019-08-07 RX ORDER — SUCCINYLCHOLINE CHLORIDE 20 MG/ML
INJECTION INTRAMUSCULAR; INTRAVENOUS
Status: DISCONTINUED | OUTPATIENT
Start: 2019-08-07 | End: 2019-08-07

## 2019-08-07 RX ORDER — SODIUM CHLORIDE 9 MG/ML
INJECTION, SOLUTION INTRAVENOUS CONTINUOUS
Status: DISCONTINUED | OUTPATIENT
Start: 2019-08-07 | End: 2019-08-07 | Stop reason: HOSPADM

## 2019-08-07 RX ORDER — SODIUM CHLORIDE 0.9 % (FLUSH) 0.9 %
10 SYRINGE (ML) INJECTION
Status: DISCONTINUED | OUTPATIENT
Start: 2019-08-07 | End: 2019-08-07 | Stop reason: HOSPADM

## 2019-08-07 RX ORDER — LABETALOL HYDROCHLORIDE 5 MG/ML
INJECTION, SOLUTION INTRAVENOUS
Status: DISCONTINUED | OUTPATIENT
Start: 2019-08-07 | End: 2019-08-07

## 2019-08-07 RX ADMIN — METHOHEXITAL SODIUM 120 MG: 500 INJECTION, POWDER, LYOPHILIZED, FOR SOLUTION INTRAMUSCULAR; INTRAVENOUS; RECTAL at 09:08

## 2019-08-07 RX ADMIN — KETOROLAC TROMETHAMINE 30 MG: 30 INJECTION, SOLUTION INTRAMUSCULAR at 09:08

## 2019-08-07 RX ADMIN — Medication 500 MG: at 08:08

## 2019-08-07 RX ADMIN — ESMOLOL HYDROCHLORIDE 10 MG: 10 INJECTION INTRAVENOUS at 09:08

## 2019-08-07 RX ADMIN — LABETALOL HYDROCHLORIDE 10 MG: 5 INJECTION, SOLUTION INTRAVENOUS at 09:08

## 2019-08-07 RX ADMIN — SUCCINYLCHOLINE CHLORIDE 180 MG: 20 INJECTION, SOLUTION INTRAMUSCULAR; INTRAVENOUS at 09:08

## 2019-08-07 RX ADMIN — LIDOCAINE HYDROCHLORIDE 10 MG: 10 INJECTION, SOLUTION EPIDURAL; INFILTRATION; INTRACAUDAL; PERINEURAL at 07:08

## 2019-08-07 RX ADMIN — ONDANSETRON 4 MG: 2 INJECTION, SOLUTION INTRAMUSCULAR; INTRAVENOUS at 09:08

## 2019-08-07 RX ADMIN — SODIUM CHLORIDE: 0.9 INJECTION, SOLUTION INTRAVENOUS at 07:08

## 2019-08-07 NOTE — OP NOTE
Allyssa Wright  : 1980   MRN: [id number]  Date: 2019    Electroconvulsive Therapy  Procedure Note    Date of Admission: 2019  6:55 AM    Site: [facility], [address]    Attending: Shoaib Boyce Jr., MD   Residents: Carmelo Núñez MD  Pre-procedure Diagnosis: Major Depressive Disorder, recurrent, partial remission   ECT Treatment Number: ECT #80  Machine Type: Mecta 5000    Patient Status: Medically stable    Vitals (pre-procedure):  Vitals:    19 0940   BP: 118/77   Pulse: 75   Resp: 15   Temp: 98.6 °F (37 °C)       Electrode Placement: Bitemporal    Stimulus Number Charge (mC) Level Pulse Width (msec) Frequency  (Hz) Duration of Stimulus (sec) Current (mA) Duration of Seizure (sec)   1 576 3 1 60 6 800 78                                   Complications: None    Maximum Blood Pressure: 169/92    Medications Given:  Caffeine 500 mg  PO before    Methohexital (Brevital).   120 mg  IV    Succinylcholine (Anectine).   120 mg  IV    Ondansetron (Zofran).   4 mg  IV    Labetalol (Trandate).   10 mg  IV    Esmolol 10 mg IV  Ketorolac 30 mg IV      Treatment Course:  Patient tolerated procedure well. After adequate recovery from general anesthesia, the patient was transported to recovery.    Post-op Diagnosis: Same as above    Recommended for next ECT: 19    Carmelo Núñez

## 2019-08-07 NOTE — PLAN OF CARE
Patient states they are ready to be discharged. Instructions and prescription given to patient and family. Both verbalize understanding. Patient tolerating po liquids with no difficulty. Patient states pain is at a tolerable level for them. Anesthesia consent and surgical consent in chart upon patient's discharge from [facility].

## 2019-08-07 NOTE — H&P
"Allyssa Wright  : 1980   MRN: [id number]  Date: 2019     Electroconvulsive Therapy  History & Physical    Chief complaint: Major Depressive Disorder, recurrent, partial remission  Procedure: ECT #80    SUBJECTIVE:     HPI:   Allyssa Wright is a 39 y.o. female with Major Depressive Disorder, recurrent, in partial remission who presents for ECT.  Last treatment done last week. She states she does not feel like she "went down" afterwards but "not really improved, not in remission." She inquires about ECT sooner next time. She reports she does not feel interested in "normal people," things, "don't feel like being social." She states her sleep and appetite are good however, and denies SI, HI, AVH. She is medication compliant. She states her memory is "okay." She is continuing TMS treatments as well. She confirms NPO status. She states she is ready for ECT today. Her father is at bedside.      Psychiatric Review of Systems:  Mood: depressed  Appetite: No problem  Psychomotor: No problem  Cognitive Impairment: none  Insomnia: None  Psychosis: None  Diurnal Variation: None  Suicidal Ideation: Absent    Medical Review Of Systems:  Pertinent items are noted in HPI.    Current Medications:   Current Facility-Administered Medications on File Prior to Encounter   Medication Dose Route Frequency Provider Last Rate Last Dose    sodium chloride 0.9% flush 10 mL  10 mL Intra-Catheter PRN Homa Colbert MD         Current Outpatient Medications on File Prior to Encounter   Medication Sig Dispense Refill    clozapine (CLOZARIL) 50 MG tablet Take 50 mg by mouth once daily.       dapsone 7.5 % GlwP Apply topically once daily.      famciclovir (FAMVIR) 500 MG tablet Take 1 tablet (500 mg total) by mouth 2 (two) times daily. 30 tablet 12    hydrOXYzine pamoate (VISTARIL) 25 MG Cap Take 2 capsules (50 mg total) by mouth nightly as needed (Take 25-50mg (1-2 caps) at night for anxiety prior to ECT). 180 capsule 0    " mirtazapine (REMERON) 15 MG tablet Take 1 tablet (15 mg total) by mouth every evening. (Patient taking differently: Take 7.5 mg by mouth every evening. ) 90 tablet 0    spironolactone (ALDACTONE) 50 MG tablet 50 mg 2 (two) times daily. 50  mg qAM, 50 mg qHS.      thyroid, pork, (ARMOUR THYROID) 30 mg Tab Take 1 tablet (30 mg total) by mouth every morning. 30 tablet 11    trazodone (DESYREL) 100 MG tablet Take 200 mg by mouth every evening.       UNABLE TO FIND 2 (two) times daily. n-azetyl-cysteine      venlafaxine (EFFEXOR) 100 MG Tab Take 3 tablets (300 mg total) by mouth once daily. (Patient taking differently: Take 225 mg by mouth once daily. )      vortioxetine (TRINTELLIX) 5 mg Tab Take 2.5 mg by mouth once daily.       dextroamphetamine-amphetamine 30 mg Tab Take 30 mg by mouth 2 (two) times daily.       hydrOXYzine pamoate (VISTARIL) 25 MG Cap Take 1 capsule (25 mg total) by mouth nightly as needed (anxiety/sleep). Take 1 to 2 pills as needed 60 capsule 2    ZOVIRAX 5 % Crea   5      Allergies:   Benzodiazepines; Ampicillin; Erythromycin; Levaquin [levofloxacin]; Penicillins; Pristiq [desvenlafaxine]; Sulfa (sulfonamide antibiotics); and Azithromycin    Past Medical/Surgical History:   Past Medical History:   Diagnosis Date    Anxiety     Depression     History of psychiatric hospitalization     HSV-1 (herpes simplex virus 1) infection     Hx of psychiatric care     Moderate depressed bipolar II disorder 06/13/2016    reports no history of bipolar    Obsessive-compulsive disorder     Psychiatric problem     Schizophrenia 4/3/2018    Self-harming behavior     Therapy      Past Surgical History:   Procedure Laterality Date    ANKLE SURGERY Right     BREAST augmentation      ELECTROCONVULSIVE THERAPY (ECT) - SINGLE SEIZURE N/A 12/6/2018    Performed by [facility]ib [facility]    ELECTROCONVULSIVE THERAPY (ECT) - SINGLE SEIZURE N/A 8/31/2018    Performed by Shoaib Boyce  MD [place] at [facility] ECT    ELECTROCONVULSIVE THERAPY (ECT) - SINGLE SEIZURE N/A 8/6/2018    Performed by Shoaib Boyce Jr., MD at [facility] ECT    ELECTROCONVULSIVE THERAPY (ECT) - SINGLE SEIZURE N/A 7/23/2018    Performed by Sh[facility]ib Boyce Jr., [facility] at [facility] ECT    ELECTROCONVULSIVE THERAPY (ECT) - SINGLE SEIZURE N/A 7/5/2018    Performed by Shoaib Boyce Jr., [facility] at [facility] ECT    ELECTROCONVULSIVE THERAPY (ECT) - SINGLE SEIZURE N/A 6/28/2018    Performed by Shoaib Boyce Jr., [facility] at [facility] ECT    ELECTROCONVULSIVE THERAPY (ECT) - SINGLE SEIZURE N/A 6/13/2018    Performed by Shoaib Boyce Jr[facility] at [facility] ECT    ELECTROCONVULSIVE THERAPY (ECT) - SINGLE SEIZURE N/A 5/29/2018    Performed by Shoaib Boyce Jr., [facility] at [facility] ECT    ELECTROCONVULSIVE THERAPY (ECT) - SINGLE SEIZURE N/A 5/8/2018    Performed by Shoaib Boyce Jr., [facility] at [facility] ECT    ELECTROCONVULSIVE THERAPY (ECT) - SINGLE SEIZURE N/A 4/24/2018    Performed by Shoaib Boyce Jr., [facility] at [facility] ECT    ELECTROCONVULSIVE THERAPY (ECT) - SINGLE SEIZURE N/A 4/17/2018    Performed by Shoaib Boyce Jr., [facility] at [facility] ECT    ELECTROCONVULSIVE THERAPY (ECT) - SINGLE SEIZURE N/A 4/13/2018    Performed by Shoaib Boyce Jr., [facility] at [facility] ECT    ELECTROCONVULSIVE THERAPY (ECT) - SINGLE SEIZURE N/A 4/3/2018    Performed by Shoaib Boyce Jr., [facility] at [facility] ECT    ELECTROCONVULSIVE THERAPY (ECT) - SINGLE SEIZURE N/A 3/20/2018    Performed by Shoaib Boyce Jr., [facility] at [facility] ECT    ELECTROCONVULSIVE THERAPY (ECT) - SINGLE SEIZURE N/A 3/15/2018    Performed by Shoaib Boyce Jr., [facility] at [facility] ECT    ELECTROCONVULSIVE THERAPY (ECT) - SINGLE SEIZURE N/A 3/6/2018    Performed by Shoaib Boyce Jr., [facility] at [facility] ECT    ELECTROCONVULSIVE THERAPY (ECT) - SINGLE SEIZURE N/A 3/1/2018    Performed by Shoaib Boyce Jr., [facility] at [facility] ECT    ELECTROCONVULSIVE THERAPY (ECT) - SINGLE SEIZURE N/A 2/23/2018    Performed by Shoaib Boyce Jr., [facility] at [facility] ECT     ELECTROCONVULSIVE THERAPY (ECT) - SINGLE SEIZURE N/A 2/19/2018    Performed by Shoaib Boyce Jr., [facility] at [facility] ECT    ELECTROCONVULSIVE THERAPY (ECT) - SINGLE SEIZURE N/A 2/15/2018    Performed by Shoaib Boyce Jr., MD at [facility] ECT    ELECTROCONVULSIVE THERAPY (ECT) - SINGLE SEIZURE N/A 1/22/2018    Performed by Shoaib Boyce Jr., [facility] at [facility] ECT    ELECTROCONVULSIVE THERAPY (ECT) - SINGLE SEIZURE N/A 12/11/2017    Performed by hSoaib Boyce Jr., MD at [facility] ECT    ELECTROCONVULSIVE THERAPY (ECT) - SINGLE SEIZURE N/A 10/24/2017    Performed by Shoaib Boyce Jr., [facility] at [facility] ECT    ELECTROCONVULSIVE THERAPY (ECT) - SINGLE SEIZURE N/A 9/20/2017    Performed by Shoaib Boyce Jr., [facility] at [facility] ECT    ELECTROCONVULSIVE THERAPY (ECT) - SINGLE SEIZURE N/A 8/14/2017    Performed by Shoaib Boyce Jr., [facility] at [facility] ECT    ELECTROCONVULSIVE THERAPY (ECT) - SINGLE SEIZURE Bilateral 7/12/2017    Performed by Shoaib Boyce Jr., MD at [facility] ECT    ELECTROCONVULSIVE THERAPY (ECT) - SINGLE SEIZURE N/A 6/13/2017    Performed by Shoaib Boyce Jr., MD at [facility] ECT    ELECTROCONVULSIVE THERAPY (ECT) - SINGLE SEIZURE N/A 5/17/2017    Performed by Shoaib Boyce Jr., MD at [facility] ECT    ELECTROCONVULSIVE THERAPY (ECT) - SINGLE SEIZURE N/A 4/20/2017    Performed by Shoaib Boyce Jr., MD at [facility] ECT    ELECTROCONVULSIVE THERAPY (ECT) - SINGLE SEIZURE N/A 11/22/2016    Performed by Shoaib Boyce Jr., MD at [facility] ECT    ELECTROCONVULSIVE THERAPY (ECT) - SINGLE SEIZURE N/A 10/24/2016    Performed by Shoaib Boyce Jr., MD at [facility] ECT    ELECTROCONVULSIVE THERAPY (ECT) - SINGLE SEIZURE N/A 9/22/2016    Performed by Shoaib Boyce Jr., MD at [facility] ECT    ELECTROCONVULSIVE THERAPY (ECT) - SINGLE SEIZURE N/A 9/1/2016    Performed by Shoaib Boyce Jr., MD at [facility] ECT    ELECTROCONVULSIVE THERAPY (ECT) - SINGLE SEIZURE N/A 8/15/2016    Performed by Shoaib Boyce Jr., MD at [facility] ECT     ELECTROCONVULSIVE THERAPY (ECT) - SINGLE SEIZURE N/A 8/1/2016    Performed by [facility] Jr[facility] at [facility] ECT    ELECTROCONVULSIVE THERAPY (ECT) - SINGLE SEIZURE N/A 7/22/2016    Performed by Shoaib Boyce Jr., MD at [facility] ECT    ELECTROCONVULSIVE THERAPY (ECT) - SINGLE SEIZURE N/A 7/14/2016    Performed by Shoaib Boyce Jr., [facility] at [facility] ECT    ELECTROCONVULSIVE THERAPY (ECT) - SINGLE SEIZURE N/A 7/8/2016    Performed by Shoaib Boyce Jr., [facility] at [facility] ECT    ELECTROCONVULSIVE THERAPY (ECT) - SINGLE SEIZURE N/A 7/5/2016    Performed by Shoaib Boyce Jr., [facility] at [facility] ECT    ELECTROCONVULSIVE THERAPY (ECT) - SINGLE SEIZURE N/A 6/27/2016    Performed by Shoaib Boyce Jr., [facility] at [facility] ECT    ELECTROCONVULSIVE THERAPY (ECT) - SINGLE SEIZURE N/A 6/23/2016    Performed by Shoaib Boyce Jr., [facility] at [facility] ECT    ELECTROCONVULSIVE THERAPY (ECT) - SINGLE SEIZURE N/A 6/20/2016    Performed by Shoaib Boyce Jr[facility] at [facility] ECT    ELECTROCONVULSIVE THERAPY (ECT) - SINGLE SEIZURE N/A 6/16/2016    Performed by Shoaib Boyce Jr., [facility] at [facility] ECT    ELECTROCONVULSIVE THERAPY (ECT) - SINGLE SEIZURE N/A 6/15/2016    Performed by Shoaib Boyce Jr[facility] at [facility] ECT    ELECTROCONVULSIVE THERAPY (ECT) - SINGLE SEIZURE N/A 6/14/2016    Performed by Shoaib Boyce Jr[facility] at [facility] ECT    ELECTROCONVULSIVE THERAPY (ECT) - SINGLE SEIZURE N/A 6/13/2016    Performed by Shoaib Boyce Jr[facility] at [facility] ECT    ELECTROCONVULSIVE THERAPY (ECT) - SINGLE SEIZURE N/A 1/4/2016    Performed by Shoaib Boyce Jr., [facility] at [facility] ECT    ELECTROCONVULSIVE THERAPY, CEREBRAL HEMISPHERE, UNILATERAL, 1 SEIZURE Bilateral 6/25/2018    Performed by Sh[facility]ib [facility]ce Jr., [facility] at [facility] ECT    OVARIAN CYST REMOVAL Bilateral       OBJECTIVE:     Vitals (pre-procedure):  Vitals:    08/07/19 0742   BP: 117/68   Pulse: 78   Resp: 18   Temp: 98.6 °F (37 °C)        Labs/Imaging/Studies:   Recent Results (from the past 48  hour(s))   POCT urine pregnancy    Collection Time: 08/07/19  7:40 AM   Result Value Ref Range    POC Preg Test, Ur Negative Negative     Acceptable Yes       No results found for: PHENYTOIN, PHENOBARB, VALPROATE, CBMZ    Physical Exam:   Gen: AAOx4, NAD  HEENT: MMM, PERRL, EOMI, O/P clear  CV: RRR, S1/S2 nml, no M/R/G  Chest: CTAB, no R/R/W, unlabored breathing  Abd: S/NT/ND, +BS, no HSM  Ext: No C/C/E, pulse 2+ throughout  Neuro: CN II-XII grossly intact, no focal deficits    Mental Status Exam:   Appearance: unremarkable, age appropriate, neatly groomed  Behavior: normal, cooperative, eye contact normal  Speech: normal tone, normal rate, normal pitch, normal volume, spontaneous  Mood: depressed  Affect: full & reactive  Thought Process: normal and logical  Thought Content: normal, no suicidality, no homicidality, delusions, or paranoia  Cognition: 3/3 immediate recall, HOUSE, ESUOH, 3/3 delayed recall, able to abstract, able to follow 2 step command  Insight: good  Judgment: good       ASSESSMENT/PLAN:     Allyssa Wright is a 39 y.o. female with Major Depressive Disorder, recurrent, in partial remission who presents for ECT.    Recommendations:   Proceed with ECT #80.    Carmelo Núñez MD  8/7/2019

## 2019-08-07 NOTE — DISCHARGE INSTRUCTIONS
Understanding Electroconvulsive Therapy  Electroconvulsive therapy (ECT) is sometimes called shock therapy. This may sound painful, but ECT doesnt hurt. Its often the safest and best treatment for severe depression. It can treat other mental disorders as well.  What is electroconvulsive therapy?  ECT is used to treat people who are very depressed. Its mainly used when other treatments, such as antidepressant medicines, have failed. Often it may relieve feelings of sadness and despair after 2 to 4 treatments.  Common symptoms of major depression  Symptoms of major depression include:  · Feeling a deep sadness that doesnt go away  · Losing all pleasure in life  · Feeling hopeless or helpless  · Feeling guilty  · Sleeping more or less than normal  · Eating more or less than normal  · Having headaches or stomachaches, or other pains that dont go away  · Feeling nervous, empty, or worthless  · Crying a great deal  · Thinking or talking about suicide or death  How is ECT therapy done?  Before an ECT treatment, youll be given anesthesia to keep you pain-free. Youll also be given medicine to make you sleep, relax your muscles and control your heart rate. Your healthcare provider then places electrodes on your head. You may have one above each temple (bilateral ECT). Or you may have electrodes on one temple and on your forehead (unilateral ECT). While you are asleep, your brain is stimulated very briefly with an electric current. This causes a seizure, usually lasting less than a minute. Because you are under anesthesia, your body will not move even as your brain goes through great changes.  What are the risks?  When done properly, ECT is quite safe. Right after the treatment, you may be confused. This often lasts for less than half an hour. You may have a headache or stiff muscles. But these symptoms often go away quickly. A more serious possible side effect is memory loss. Commonly, people have short-term  (temporary) trouble remembering information that they learned recently. And they may have little recall of the time when they received treatment. Less commonly, people may have long-lasting (permanent) spotty recall of major past events. In rare cases, there may be memory loss for larger blocks of time.  Looking to the future  In most cases, ECT doesnt cure depression. But it can improve symptoms for a period of time. You may need a series of ECT treatments to continue feeling the benefit. You may also need to take antidepressant medicines to help prevent symptoms from returning. But with ongoing treatment, you can have a full and healthy life.  Resources  · The National [place] of Mental Health  157.450.6746  www.[facility].nih.gov  · [facility]  458.774.4930  www.[facility].org     Date Last Reviewed: 5/1/2017 © 2000-2017 [employer]. [address] [place] [address]. All rights reserved. This information is not intended as a substitute for professional medical care. Always follow your healthcare professional's instructions.      PATIENT INSTRUCTIONS  POST-ANESTHESIA    IMMEDIATELY FOLLOWING SURGERY:  Do not drive or operate machinery for the first twenty four hours after surgery.  Do not make any important decisions for twenty four hours after surgery or while taking narcotic pain medications or sedatives.  If you develop intractable nausea and vomiting or a severe headache please notify your doctor immediately.    FOLLOW-UP:  Please make an appointment with your surgeon as instructed. You do not need to follow up with anesthesia unless specifically instructed to do so.    WOUND CARE INSTRUCTIONS (if applicable):  Keep a dry clean dressing on the anesthesia/puncture wound site if there is drainage.  Once the wound has quit draining you may leave it open to air.  Generally you should leave the bandage intact for twenty four hours unless there is drainage.  If the epidural site drains for  more than 36-48 hours please call the anesthesia department.    QUESTIONS?:  Please feel free to call your physician or the hospital  if you have any questions, and they will be happy to assist you.       [facility] Anesthesia Department  [address] [facility]  [phone]

## 2019-08-07 NOTE — ANESTHESIA PROCEDURE NOTES
ECT    Treatment Number: 79  Procedure start time: 8/7/2019 9:23 AM  Timeout performed at: 8/7/2019 9:22 AM  Procedure end time: 8/7/2019 9:35 AM      Staffing  Authorizing Provider: Armin Marquez MD  Performing Provider: Javier Lopez MD    Preanesthetic Checklist  The following were completed as part of the preanesthetic checklist: patient identified, procedural consent, pre-op evaluation, timeout performed, risks and benefits discussed, monitors and equipment checked, anesthesia consent given, oxygen available, suction available, hand hygiene performed and patient being monitored.    Setup & Induction  Patient Monitoring: heart rate, cardiac monitor, continuous pulse ox, continuous capnometry, NIBP and gas analyzer  Patient preparation: mandibular stabilization, extremities padded, bite block inserted and patient hyperventilated  Electrode Placement: Bitemporal    The patient was moved to the ECT therapy room after being assessed and consented for ECT. After standard ASA monitors were applied and timeout performed, the patient was adequately preoxygenated. After induction of general anesthesia, adequate oxygenation and ventilation were confirmed with pulse oxymetry and end tidal CO2 monitoring via bag-mask ventilation. End tidal CO2 was monitored throughout the case and moderate hyperventilation was performed prior to beginning ECT treatment. All extremities were padded, biteblock was inserted, and mandibular stabilization was done prior to initiating ECT therapy.    Procedure  Stimulus Number 1: Setting Number = III;             Recovery  After adequate recovery from general anesthesia, the patient was transported to recovery.    ECT Findings  ECT associated findings of: Moderate Hypertension (SBP >160 or DBP >100)

## 2019-08-07 NOTE — TRANSFER OF CARE
"Anesthesia Transfer of Care Note    Patient: Allyssa Wright    Procedure(s) Performed: Procedure(s) (LRB):  ELECTROCONVULSIVE THERAPY (ECT) - SINGLE SEIZURE (N/A)    Patient location: [facility]    Anesthesia Type: general    Transport from OR: Transported from OR on 6-10 L/min O2 by face mask with adequate spontaneous ventilation    Post pain: adequate analgesia    Post assessment: no apparent anesthetic complications    Post vital signs: stable    Level of consciousness: awake and confused    Nausea/Vomiting: no nausea/vomiting    Complications: none    Transfer of care protocol was followed      Last vitals:   Visit Vitals  /77 (BP Location: Right arm, Patient Position: Sitting)   Pulse 75   Temp 37 °C (98.6 °F) (Skin)   Resp 15   Ht 5' 5" (1.651 m)   Wt 65.8 kg (145 lb)   LMP  (LMP Unknown)   SpO2 96%   Breastfeeding? No   BMI 24.13 kg/m²     "

## 2019-08-07 NOTE — DISCHARGE SUMMARY
Allyssa Wright  : 1980   MRN: [id number]  Date: 2019     Electroconvulsive Therapy  Discharge Summary    Admit Date: 2019  6:55 AM  Discharge Date: 2019    Attending Physician: Shoaib Boyce Jr., MD   Discharge Provider: Mane Luevano MD    History of Present Illness: Allyssa Wright is a 39 y.o. female with Major depressive disorder, recurrent, in partial remission presented for ECT #80. See H&P dated 2019 for full HPI. For further details, see Dr. Boyce's pre-ECT evaluation.    Hospital Course: The patient tolerated the ECT treatment well without complication. Patient was stable post-procedure. See OP note dated 2019 for more details.     Disposition: Home or Self Care    Medications:  Current Discharge Medication List      CONTINUE these medications which have NOT CHANGED    Details   clozapine (CLOZARIL) 50 MG tablet Take 50 mg by mouth once daily.       dapsone 7.5 % GlwP Apply topically once daily.      famciclovir (FAMVIR) 500 MG tablet Take 1 tablet (500 mg total) by mouth 2 (two) times daily.  Qty: 30 tablet, Refills: 12    Associated Diagnoses: Major depressive disorder, recurrent episode, moderate degree      !! hydrOXYzine pamoate (VISTARIL) 25 MG Cap Take 2 capsules (50 mg total) by mouth nightly as needed (Take 25-50mg (1-2 caps) at night for anxiety prior to ECT).  Qty: 180 capsule, Refills: 0      mirtazapine (REMERON) 15 MG tablet Take 1 tablet (15 mg total) by mouth every evening.  Qty: 90 tablet, Refills: 0      spironolactone (ALDACTONE) 50 MG tablet 50 mg 2 (two) times daily. 50  mg qAM, 50 mg qHS.      thyroid, pork, (ARMOUR THYROID) 30 mg Tab Take 1 tablet (30 mg total) by mouth every morning.  Qty: 30 tablet, Refills: 11    Associated Diagnoses: Major depressive disorder, recurrent episode, moderate degree      trazodone (DESYREL) 100 MG tablet Take 200 mg by mouth every evening.       UNABLE TO FIND 2 (two) times daily. n-azetyl-cysteine      venlafaxine  (EFFEXOR) 100 MG Tab Take 3 tablets (300 mg total) by mouth once daily.    Associated Diagnoses: MDD (major depressive disorder), recurrent, in partial remission      vortioxetine (TRINTELLIX) 5 mg Tab Take 2.5 mg by mouth once daily.       dextroamphetamine-amphetamine 30 mg Tab Take 30 mg by mouth 2 (two) times daily.     Associated Diagnoses: MDD (major depressive disorder), recurrent, in partial remission      !! hydrOXYzine pamoate (VISTARIL) 25 MG Cap Take 1 capsule (25 mg total) by mouth nightly as needed (anxiety/sleep). Take 1 to 2 pills as needed  Qty: 60 capsule, Refills: 2      ZOVIRAX 5 % Crea Refills: 5       !! - Potential duplicate medications found. Please discuss with provider.        Status at Discharge: Alert and medically stable    Discharge Diagnoses:  Major depressive disorder, recurrent, in partial remission  Diet: Resume previous outpatient diet  Activity: Ambulate with assistance  Instructions: Please do not eat or drink anything after midnight prior to procedure. Please do not drive on day of ECT.    Med Changes:  None    Next ECT:  8/12/19    Carmelo Núñez

## 2019-08-08 NOTE — ANESTHESIA POSTPROCEDURE EVALUATION
Anesthesia Post Evaluation    Patient: Allyssa Wright    Procedure(s) Performed: Procedure(s) (LRB):  ELECTROCONVULSIVE THERAPY (ECT) - SINGLE SEIZURE (N/A)    Final Anesthesia Type: general  Patient location during evaluation: PACU  Patient participation: Yes- Able to Participate  Level of consciousness: awake and alert  Post-procedure vital signs: reviewed and stable  Pain management: adequate  Airway patency: patent  PONV status at discharge: No PONV  Anesthetic complications: no      Cardiovascular status: blood pressure returned to baseline  Respiratory status: unassisted  Hydration status: euvolemic  Follow-up not needed.          Vitals Value Taken Time   /51 8/7/2019 10:31 AM   Temp 37.1 °C (98.7 °F) 8/7/2019 10:30 AM   Pulse 72 8/7/2019 10:37 AM   Resp 23 8/7/2019 10:37 AM   SpO2 96 % 8/7/2019 10:37 AM   Vitals shown include unvalidated device data.      No case tracking events are documented in the log.      Pain/Roma Score: Roma Score: 10 (8/7/2019  9:45 AM)

## 2019-08-10 ENCOUNTER — ANESTHESIA EVENT (OUTPATIENT)
Dept: ELECTROPHYSIOLOGY | Facility: HOSPITAL | Age: 39
End: 2019-08-10
Payer: COMMERCIAL

## 2019-08-10 NOTE — ANESTHESIA PREPROCEDURE EVALUATION
Allyssa Wright is a 39 y.o. female Major depressive disorder, recurrent, in partial remission presented for ECT #80.      Previous ECT Meds: Peak   methohexital (BREVITAL SODIUM) injection 120 mg   ondansetron HCl (PF) 4 mg/2 mL injection 4 mg   esmolol (BREVIBLOC) injection 10 mg/mL 10 mg   labetalol (NORMODYNE,TRANDATE) 5 mg/mL injection 10 mg   ketorolac (TORADOL) injection 30 mg 30 mg   succinylcholine (ANECTINE) 20 mg/mL injection 180 mg       Wt Readings from Last 1 Encounters:   08/07/19 0742 65.8 kg (145 lb)       BP Readings from Last 3 Encounters:   08/07/19 (!) 120/51   08/01/19 (!) 98/53   07/29/19 114/68       Patient Active Problem List   Diagnosis    Major depressive disorder, recurrent episode, in partial remission    Other acne    Comfort measures only status    MDD (major depressive disorder)    Major depression    Major depressive disorder    MDD (major depressive disorder), severe    Depression    Major depressive disorder, recurrent, in partial remission       Past Surgical History:   Procedure Laterality Date    ANKLE SURGERY Right     BREAST augmentation      ELECTROCONVULSIVE THERAPY (ECT) - SINGLE SEIZURE N/A 12/6/2018    Performed by Sh[facility]ib [facility]ce Jr., MD at [facility] ECT    ELECTROCONVULSIVE THERAPY (ECT) - SINGLE SEIZURE N/A 8/31/2018    Performed by Shoaib Boyce Jr., MD at [facility] ECT    ELECTROCONVULSIVE THERAPY (ECT) - SINGLE SEIZURE N/A 8/6/2018    Performed by Shoaib Boyce Jr., MD at [facility] ECT    ELECTROCONVULSIVE THERAPY (ECT) - SINGLE SEIZURE N/A 7/23/2018    Performed by Shoaib Boyce Jr., MD at [facility] ECT    ELECTROCONVULSIVE THERAPY (ECT) - SINGLE SEIZURE N/A 7/5/2018    Performed by Shoaib Boyce Jr., MD at [facility] ECT    ELECTROCONVULSIVE THERAPY (ECT) - SINGLE SEIZURE N/A 6/28/2018    Performed by Shoaib Boyce Jr., MD at [facility] ECT    ELECTROCONVULSIVE THERAPY (ECT) - SINGLE SEIZURE N/A 6/13/2018    Performed by Shoaib Boyce Jr., MD at  [facility] ECT    ELECTROCONVULSIVE THERAPY (ECT) - SINGLE SEIZURE N/A 5/29/2018    Performed by Shoaib Boyce Jr., MD at [facility] ECT    ELECTROCONVULSIVE THERAPY (ECT) - SINGLE SEIZURE N/A 5/8/2018    Performed by Shoaib Boyce Jr., MD at [facility] ECT    ELECTROCONVULSIVE THERAPY (ECT) - SINGLE SEIZURE N/A 4/24/2018    Performed by Shoaib Boyce Jr., MD at [facility] ECT    ELECTROCONVULSIVE THERAPY (ECT) - SINGLE SEIZURE N/A 4/17/2018    Performed by Shoaib Boyce Jr., MD at [facility] ECT    ELECTROCONVULSIVE THERAPY (ECT) - SINGLE SEIZURE N/A 4/13/2018    Performed by Shoaib Boyce Jr., MD at [facility] ECT    ELECTROCONVULSIVE THERAPY (ECT) - SINGLE SEIZURE N/A 4/3/2018    Performed by Shoaib Boyce Jr., MD at [facility] ECT    ELECTROCONVULSIVE THERAPY (ECT) - SINGLE SEIZURE N/A 3/20/2018    Performed by Shoaib Boyce Jr., MD at [facility] ECT    ELECTROCONVULSIVE THERAPY (ECT) - SINGLE SEIZURE N/A 3/15/2018    Performed by Shoaib Boyce Jr., MD at [facility] ECT    ELECTROCONVULSIVE THERAPY (ECT) - SINGLE SEIZURE N/A 3/6/2018    Performed by Shoaib Boyce Jr., MD at [facility] ECT    ELECTROCONVULSIVE THERAPY (ECT) - SINGLE SEIZURE N/A 3/1/2018    Performed by Shoaib Boyce Jr., MD at [facility] ECT    ELECTROCONVULSIVE THERAPY (ECT) - SINGLE SEIZURE N/A 2/23/2018    Performed by Shoaib Boyce Jr., MD at [facility] ECT    ELECTROCONVULSIVE THERAPY (ECT) - SINGLE SEIZURE N/A 2/19/2018    Performed by Shoaib Boyec Jr., MD at [facility] ECT    ELECTROCONVULSIVE THERAPY (ECT) - SINGLE SEIZURE N/A 2/15/2018    Performed by Shoaib Boyce Jr., MD at [facility] ECT    ELECTROCONVULSIVE THERAPY (ECT) - SINGLE SEIZURE N/A 1/22/2018    Performed by Shoaib Boyce Jr., MD at [facility] ECT    ELECTROCONVULSIVE THERAPY (ECT) - SINGLE SEIZURE N/A 12/11/2017    Performed by Shoaib Boyce Jr., MD at [facility] ECT    ELECTROCONVULSIVE THERAPY (ECT) - SINGLE SEIZURE N/A 10/24/2017    Performed by Shoaib Boyce Jr., MD at [facility] ECT     ELECTROCONVULSIVE THERAPY (ECT) - SINGLE SEIZURE N/A 9/20/2017    Performed by Shoaib Boyce Jr., MD at [facility] ECT    ELECTROCONVULSIVE THERAPY (ECT) - SINGLE SEIZURE N/A 8/14/2017    Performed by Shoaib Boyce Jr., MD at [facility] ECT    ELECTROCONVULSIVE THERAPY (ECT) - SINGLE SEIZURE Bilateral 7/12/2017    Performed by Shoaib Boyce Jr., MD at [facility] ECT    ELECTROCONVULSIVE THERAPY (ECT) - SINGLE SEIZURE N/A 6/13/2017    Performed by Shoaib Boyce Jr., MD at [facility] ECT    ELECTROCONVULSIVE THERAPY (ECT) - SINGLE SEIZURE N/A 5/17/2017    Performed by Shoaib Boyce Jr., MD at [facility] ECT    ELECTROCONVULSIVE THERAPY (ECT) - SINGLE SEIZURE N/A 4/20/2017    Performed by Shoaib Boyce Jr., MD at [facility] ECT    ELECTROCONVULSIVE THERAPY (ECT) - SINGLE SEIZURE N/A 11/22/2016    Performed by Shoaib Boyce Jr., MD at [facility] ECT    ELECTROCONVULSIVE THERAPY (ECT) - SINGLE SEIZURE N/A 10/24/2016    Performed by Shoaib Boyce Jr., MD at [facility] ECT    ELECTROCONVULSIVE THERAPY (ECT) - SINGLE SEIZURE N/A 9/22/2016    Performed by Shoaib Boyce Jr., MD at [facility] ECT    ELECTROCONVULSIVE THERAPY (ECT) - SINGLE SEIZURE N/A 9/1/2016    Performed by Shoaib Boyce Jr., MD at [facility] ECT    ELECTROCONVULSIVE THERAPY (ECT) - SINGLE SEIZURE N/A 8/15/2016    Performed by Shoaib Boyce Jr., MD at [facility] ECT    ELECTROCONVULSIVE THERAPY (ECT) - SINGLE SEIZURE N/A 8/1/2016    Performed by Shoaib Boyce Jr., MD at [facility] ECT    ELECTROCONVULSIVE THERAPY (ECT) - SINGLE SEIZURE N/A 7/22/2016    Performed by Shoaib Boyce Jr., MD at [facility] ECT    ELECTROCONVULSIVE THERAPY (ECT) - SINGLE SEIZURE N/A 7/14/2016    Performed by Shoaib Boyce Jr., MD at [facility] ECT    ELECTROCONVULSIVE THERAPY (ECT) - SINGLE SEIZURE N/A 7/8/2016    Performed by Shoaib Boyce Jr., MD at [facility] ECT    ELECTROCONVULSIVE THERAPY (ECT) - SINGLE SEIZURE N/A 7/5/2016    Performed by Shoaib Boyce Jr., MD at [facility] ECT     ELECTROCONVULSIVE THERAPY (ECT) - SINGLE SEIZURE N/A 2016    Performed by [facility] Jr[facility] MD at [facility] ECT    ELECTROCONVULSIVE THERAPY (ECT) - SINGLE SEIZURE N/A 2016    Performed by Shoaib Boyce Jr., MD at [facility] ECT    ELECTROCONVULSIVE THERAPY (ECT) - SINGLE SEIZURE N/A 2016    Performed by Shoaib Boyce Jr., MD at [facility] ECT    ELECTROCONVULSIVE THERAPY (ECT) - SINGLE SEIZURE N/A 2016    Performed by Shoaib Boyce Jr., MD at [facility] ECT    ELECTROCONVULSIVE THERAPY (ECT) - SINGLE SEIZURE N/A 6/15/2016    Performed by Shoaib Boyce Jr., MD at [facility] ECT    ELECTROCONVULSIVE THERAPY (ECT) - SINGLE SEIZURE N/A 2016    Performed by Shoaib Boyce Jr., MD at [facility] ECT    ELECTROCONVULSIVE THERAPY (ECT) - SINGLE SEIZURE N/A 2016    Performed by Shoaib Boyce Jr., MD at [facility] ECT    ELECTROCONVULSIVE THERAPY (ECT) - SINGLE SEIZURE N/A 2016    Performed by Shoaib Boyce Jr., MD at [facility] ECT    ELECTROCONVULSIVE THERAPY, CEREBRAL HEMISPHERE, UNILATERAL, 1 SEIZURE Bilateral 2018    Performed by Shoaib Boyce Jr., MD at [facility] ECT    OVARIAN CYST REMOVAL Bilateral        Social History     Socioeconomic History    Marital status: Single     Spouse name: Not on file    Number of children: Not on file    Years of education: Not on file    Highest education level: Not on file   Occupational History    Occupation: unemployed   Social Needs    Financial resource strain: Not on file    Food insecurity:     Worry: Not on file     Inability: Not on file    Transportation needs:     Medical: Not on file     Non-medical: Not on file   Tobacco Use    Smoking status: Former Smoker     Last attempt to quit: 2011     Years since quittin.3    Smokeless tobacco: Never Used   Substance and Sexual Activity    Alcohol use: Yes     Comment: rarely    Drug use: No     Comment: Experimental use in high school    Sexual activity: Not on file   Lifestyle     Physical activity:     Days per week: Not on file     Minutes per session: Not on file    Stress: Not on file   Relationships    Social connections:     Talks on phone: Not on file     Gets together: Not on file     Attends Gnosticist service: Not on file     Active member of club or organization: Not on file     Attends meetings of clubs or organizations: Not on file     Relationship status: Not on file   Other Topics Concern    Patient feels they ought to cut down on drinking/drug use Not Asked    Patient annoyed by others criticizing their drinking/drug use Not Asked    Patient has felt bad or guilty about drinking/drug use Not Asked    Patient has had a drink/used drugs as an eye opener in the AM Not Asked   Social History Narrative    Pt has 1 older brother from an intact family until the death of her mother in 2012.  She completed 1 year of college, was never in the , and has never been employed.  She was engaged once, but never , has no children, and lives with her father plus 2 dogs.  She denies any hobbies and is spiritual but not Gnosticist.  She does date and returns to college during rare periods when depression and anxiety [place].         Chemistry        Component Value Date/Time     08/07/2019 0945    K 4.1 08/07/2019 0945     08/07/2019 0945    CO2 30 (H) 08/07/2019 0945    BUN 10 08/07/2019 0945    CREATININE 0.7 08/07/2019 0945    CREATININE 0.6 09/12/2012 1326     08/07/2019 0945        Component Value Date/Time    CALCIUM 9.3 08/07/2019 0945    CALCIUM 9.6 09/12/2012 1326    ALKPHOS 45 (L) 08/07/2019 0945    ALKPHOS 31 09/12/2012 1326    AST 22 08/07/2019 0945    AST 22 09/12/2012 1326    ALT 22 08/07/2019 0945    BILITOT 0.4 08/07/2019 0945    ESTGFRAFRICA >60.0 08/07/2019 0945    EGFRNONAA >60.0 08/07/2019 0945            Lab Results   Component Value Date    WBC 4.26 08/07/2019    HGB 14.0 08/07/2019    HCT 42.9 08/07/2019    MCV 93 08/07/2019    PLT  217 08/07/2019       No results for input(s): PT, INR, PROTIME, APTT in the last 72 hours.                  Anesthesia Evaluation    I have reviewed the Patient Summary Reports.    I have reviewed the Nursing Notes.   I have reviewed the Medications.     Review of Systems  Anesthesia Hx:  Denies Family Hx of Anesthesia complications.   Denies Personal Hx of Anesthesia complications.   Hematology/Oncology:  Hematology Normal   Oncology Normal     EENT/Dental:EENT/Dental Normal   Cardiovascular:  Cardiovascular Normal  ECG has been reviewed.    Pulmonary:  Pulmonary Normal    Renal/:  Renal/ Normal     Hepatic/GI:  Hepatic/GI Normal    Musculoskeletal:  Musculoskeletal Normal    Neurological:  Neurology Normal    Endocrine:  Endocrine Normal    Dermatological:  Skin Normal    Psych:   Psychiatric History          Physical Exam  General:  Well nourished    Airway/Jaw/Neck:  Airway Findings: Mouth Opening: Normal Tongue: Normal  General Airway Assessment: Adult, Good  Mallampati: II  Improves to I with phonation.  TM Distance: Normal, at least 6 cm  Jaw/Neck Findings:  Neck ROM: Normal ROM      Dental:  Dental Findings: In tact   Chest/Lungs:  Chest/Lungs Clear    Heart/Vascular:  Heart Findings: Normal Heart murmur: negative       Mental Status:  Mental Status Findings:  Cooperative, Alert and Oriented         Anesthesia Plan  Type of Anesthesia, risks & benefits discussed:  Anesthesia Type:  general  Patient's Preference:   Intra-op Monitoring Plan: standard ASA monitors  Intra-op Monitoring Plan Comments:   Post Op Pain Control Plan: per primary service following discharge from PACU, IV/PO Opioids PRN and multimodal analgesia  Post Op Pain Control Plan Comments:   Induction:   IV  Beta Blocker:         Informed Consent: Patient understands risks and agrees with Anesthesia plan.  Questions answered. Anesthesia consent signed with patient.  ASA Score: 2     Day of Surgery Review of History & Physical:             Ready For Surgery From Anesthesia Perspective.

## 2019-08-12 ENCOUNTER — ANESTHESIA (OUTPATIENT)
Dept: ELECTROPHYSIOLOGY | Facility: HOSPITAL | Age: 39
End: 2019-08-12
Payer: COMMERCIAL

## 2019-08-12 ENCOUNTER — HOSPITAL ENCOUNTER (OUTPATIENT)
Facility: HOSPITAL | Age: 39
Discharge: HOME | End: 2019-08-12
Attending: PSYCHIATRY & NEUROLOGY | Admitting: PSYCHIATRY & NEUROLOGY
Payer: COMMERCIAL

## 2019-08-12 VITALS
HEIGHT: 65 IN | TEMPERATURE: 99 F | SYSTOLIC BLOOD PRESSURE: 112 MMHG | RESPIRATION RATE: 61 BRPM | WEIGHT: 145 LBS | HEART RATE: 81 BPM | OXYGEN SATURATION: 89 % | DIASTOLIC BLOOD PRESSURE: 80 MMHG | BODY MASS INDEX: 24.16 KG/M2

## 2019-08-12 DIAGNOSIS — F33.9 RECURRENT MAJOR DEPRESSIVE DISORDER, REMISSION STATUS UNSPECIFIED: Primary | ICD-10-CM

## 2019-08-12 DIAGNOSIS — F33.41 MAJOR DEPRESSIVE DISORDER, RECURRENT EPISODE, IN PARTIAL REMISSION: ICD-10-CM

## 2019-08-12 LAB
B-HCG UR QL: NEGATIVE
CTP QC/QA: YES

## 2019-08-12 PROCEDURE — 63600175 PHARM REV CODE 636 W HCPCS: Performed by: STUDENT IN AN ORGANIZED HEALTH CARE EDUCATION/TRAINING PROGRAM

## 2019-08-12 PROCEDURE — D9220A PRA ANESTHESIA: ICD-10-PCS | Mod: ,,, | Performed by: ANESTHESIOLOGY

## 2019-08-12 PROCEDURE — 25000003 PHARM REV CODE 250: Performed by: PSYCHIATRY & NEUROLOGY

## 2019-08-12 PROCEDURE — 63600175 PHARM REV CODE 636 W HCPCS: Performed by: PSYCHIATRY & NEUROLOGY

## 2019-08-12 PROCEDURE — 90870 ELECTROCONVULSIVE THERAPY: CPT | Performed by: STUDENT IN AN ORGANIZED HEALTH CARE EDUCATION/TRAINING PROGRAM

## 2019-08-12 PROCEDURE — 81025 URINE PREGNANCY TEST: CPT | Performed by: PSYCHIATRY & NEUROLOGY

## 2019-08-12 PROCEDURE — 90870 PR ELECTROCONVULSIVE THERAPY,1 SEIZ: ICD-10-PCS | Mod: ,,, | Performed by: PSYCHIATRY & NEUROLOGY

## 2019-08-12 PROCEDURE — 25000003 PHARM REV CODE 250: Performed by: STUDENT IN AN ORGANIZED HEALTH CARE EDUCATION/TRAINING PROGRAM

## 2019-08-12 PROCEDURE — 90870 ELECTROCONVULSIVE THERAPY: CPT | Mod: ,,, | Performed by: PSYCHIATRY & NEUROLOGY

## 2019-08-12 PROCEDURE — D9220A PRA ANESTHESIA: Mod: ,,, | Performed by: ANESTHESIOLOGY

## 2019-08-12 RX ORDER — SODIUM CHLORIDE 9 MG/ML
INJECTION, SOLUTION INTRAVENOUS CONTINUOUS
Status: DISCONTINUED | OUTPATIENT
Start: 2019-08-12 | End: 2019-08-12 | Stop reason: HOSPADM

## 2019-08-12 RX ORDER — LABETALOL HCL 20 MG/4 ML
SYRINGE (ML) INTRAVENOUS
Status: DISCONTINUED
Start: 2019-08-12 | End: 2019-08-12 | Stop reason: HOSPADM

## 2019-08-12 RX ORDER — KETOROLAC TROMETHAMINE 30 MG/ML
INJECTION, SOLUTION INTRAMUSCULAR; INTRAVENOUS
Status: COMPLETED
Start: 2019-08-12 | End: 2019-08-12

## 2019-08-12 RX ORDER — KETOROLAC TROMETHAMINE 30 MG/ML
INJECTION, SOLUTION INTRAMUSCULAR; INTRAVENOUS
Status: DISCONTINUED | OUTPATIENT
Start: 2019-08-12 | End: 2019-08-12

## 2019-08-12 RX ORDER — ESMOLOL HYDROCHLORIDE 10 MG/ML
INJECTION INTRAVENOUS
Status: DISCONTINUED | OUTPATIENT
Start: 2019-08-12 | End: 2019-08-12

## 2019-08-12 RX ORDER — LIDOCAINE HYDROCHLORIDE 10 MG/ML
1 INJECTION, SOLUTION EPIDURAL; INFILTRATION; INTRACAUDAL; PERINEURAL ONCE
Status: COMPLETED | OUTPATIENT
Start: 2019-08-12 | End: 2019-08-12

## 2019-08-12 RX ORDER — SUCCINYLCHOLINE CHLORIDE 20 MG/ML
INJECTION INTRAMUSCULAR; INTRAVENOUS
Status: DISCONTINUED | OUTPATIENT
Start: 2019-08-12 | End: 2019-08-12

## 2019-08-12 RX ORDER — SODIUM CHLORIDE 0.9 % (FLUSH) 0.9 %
10 SYRINGE (ML) INJECTION
Status: DISCONTINUED | OUTPATIENT
Start: 2019-08-12 | End: 2019-08-12 | Stop reason: HOSPADM

## 2019-08-12 RX ORDER — ESMOLOL HYDROCHLORIDE 10 MG/ML
INJECTION INTRAVENOUS
Status: COMPLETED
Start: 2019-08-12 | End: 2019-08-12

## 2019-08-12 RX ORDER — ONDANSETRON 2 MG/ML
INJECTION INTRAMUSCULAR; INTRAVENOUS
Status: DISCONTINUED | OUTPATIENT
Start: 2019-08-12 | End: 2019-08-12

## 2019-08-12 RX ORDER — ONDANSETRON 2 MG/ML
INJECTION INTRAMUSCULAR; INTRAVENOUS
Status: COMPLETED
Start: 2019-08-12 | End: 2019-08-12

## 2019-08-12 RX ORDER — SUCCINYLCHOLINE CHLORIDE 20 MG/ML
INJECTION INTRAMUSCULAR; INTRAVENOUS
Status: COMPLETED
Start: 2019-08-12 | End: 2019-08-12

## 2019-08-12 RX ADMIN — ONDANSETRON 4 MG: 2 INJECTION, SOLUTION INTRAMUSCULAR; INTRAVENOUS at 07:08

## 2019-08-12 RX ADMIN — METHOHEXITAL SODIUM 120 MG: 500 INJECTION, POWDER, LYOPHILIZED, FOR SOLUTION INTRAMUSCULAR; INTRAVENOUS; RECTAL at 07:08

## 2019-08-12 RX ADMIN — SODIUM CHLORIDE: 0.9 INJECTION, SOLUTION INTRAVENOUS at 06:08

## 2019-08-12 RX ADMIN — KETOROLAC TROMETHAMINE 30 MG: 30 INJECTION, SOLUTION INTRAMUSCULAR at 07:08

## 2019-08-12 RX ADMIN — Medication 500 MG: at 07:08

## 2019-08-12 RX ADMIN — SUCCINYLCHOLINE CHLORIDE 130 MG: 20 INJECTION, SOLUTION INTRAMUSCULAR; INTRAVENOUS at 07:08

## 2019-08-12 RX ADMIN — ESMOLOL HYDROCHLORIDE 10 MG: 10 INJECTION INTRAVENOUS at 07:08

## 2019-08-12 RX ADMIN — LIDOCAINE HYDROCHLORIDE 0.2 MG: 10 INJECTION, SOLUTION EPIDURAL; INFILTRATION; INTRACAUDAL; PERINEURAL at 06:08

## 2019-08-12 NOTE — ANESTHESIA PROCEDURE NOTES
ECT    Procedure start time: 8/12/2019 7:41 AM  Timeout performed at: 8/12/2019 7:37 AM  Procedure end time: 8/12/2019 7:43 AM      Staffing  Authorizing Provider: Karin Swenson MD  Performing Provider: Karin Swenson MD    Preanesthetic Checklist  The following were completed as part of the preanesthetic checklist: patient identified, procedural consent, pre-op evaluation, timeout performed, risks and benefits discussed, monitors and equipment checked, anesthesia consent given, oxygen available, suction available, hand hygiene performed and patient being monitored.    Setup & Induction  Patient Monitoring: heart rate, cardiac monitor, continuous pulse ox, continuous capnometry and NIBP  Patient preparation: bite block inserted, extremities padded, mandibular stabilization and patient hyperventilated  Electrode Placement: Bitemporal    The patient was moved to the ECT therapy room after being assessed and consented for ECT. After standard ASA monitors were applied and timeout performed, the patient was adequately preoxygenated. After induction of general anesthesia, adequate oxygenation and ventilation were confirmed with pulse oxymetry and end tidal CO2 monitoring via bag-mask ventilation. End tidal CO2 was monitored throughout the case and moderate hyperventilation was performed prior to beginning ECT treatment. All extremities were padded, biteblock was inserted, and mandibular stabilization was done prior to initiating ECT therapy.    Procedure  Stimulus Number 1: Setting Number = III; Seizure Duration = 54 seconds            Recovery  After adequate recovery from general anesthesia, the patient was transported to recovery.  Baseline Blood Pressure: 105/74  Peak Blood Pressure: 216/128    ECT Findings  ECT associated findings of: Moderate Hypertension (SBP >160 or DBP >100)

## 2019-08-12 NOTE — PROGRESS NOTES
Plan of care reviewed with pt & father, both verbalized understanding, pt progressing with plan of care, denies nausea & pain, tolerating PO, reviewed all DC instructions, home meds, when to call MD, when to follow-up, answered questions.

## 2019-08-12 NOTE — DISCHARGE SUMMARY
Allyssa Wright  : 1980   MRN: [id number]  Date: 2019     Electroconvulsive Therapy  Discharge Summary    Admit Date: 2019  5:52 AM  Discharge Date: 2019    Attending Physician: Shoaib Boyce Jr., MD   Discharge Provider: Mane Luevano MD    History of Present Illness: Allyssa Wright is a 39 y.o. female with Major depressive disorder, recurrent, in partial remission presented for ECT #81. See H&P dated 2019 for full HPI. For further details, see Dr. Boyce's pre-ECT evaluation.    Hospital Course: The patient tolerated the ECT treatment well without complication. Patient was stable post-procedure. See OP note dated 2019 for more details.     Disposition: Home or Self Care    Medications:  Current Discharge Medication List      CONTINUE these medications which have NOT CHANGED    Details   clozapine (CLOZARIL) 50 MG tablet Take 50 mg by mouth once daily.       dapsone 7.5 % GlwP Apply topically once daily.      dextroamphetamine-amphetamine 30 mg Tab Take 30 mg by mouth 2 (two) times daily.     Associated Diagnoses: MDD (major depressive disorder), recurrent, in partial remission      famciclovir (FAMVIR) 500 MG tablet Take 1 tablet (500 mg total) by mouth 2 (two) times daily.  Qty: 30 tablet, Refills: 12    Associated Diagnoses: Major depressive disorder, recurrent episode, moderate degree      !! hydrOXYzine pamoate (VISTARIL) 25 MG Cap Take 2 capsules (50 mg total) by mouth nightly as needed (Take 25-50mg (1-2 caps) at night for anxiety prior to ECT).  Qty: 180 capsule, Refills: 0      !! hydrOXYzine pamoate (VISTARIL) 25 MG Cap Take 1 capsule (25 mg total) by mouth nightly as needed (anxiety/sleep). Take 1 to 2 pills as needed  Qty: 60 capsule, Refills: 2      mirtazapine (REMERON) 15 MG tablet Take 1 tablet (15 mg total) by mouth every evening.  Qty: 90 tablet, Refills: 0      spironolactone (ALDACTONE) 50 MG tablet 50 mg 2 (two) times daily. 50  mg qAM, 50 mg qHS.       thyroid, pork, (ARMOUR THYROID) 30 mg Tab Take 1 tablet (30 mg total) by mouth every morning.  Qty: 30 tablet, Refills: 11    Associated Diagnoses: Major depressive disorder, recurrent episode, moderate degree      trazodone (DESYREL) 100 MG tablet Take 200 mg by mouth every evening.       UNABLE TO FIND 2 (two) times daily. n-azetyl-cysteine      venlafaxine (EFFEXOR) 100 MG Tab Take 3 tablets (300 mg total) by mouth once daily.    Associated Diagnoses: MDD (major depressive disorder), recurrent, in partial remission      vortioxetine (TRINTELLIX) 5 mg Tab Take 2.5 mg by mouth once daily.       ZOVIRAX 5 % Crea Refills: 5       !! - Potential duplicate medications found. Please discuss with provider.        Status at Discharge: Alert and medically stable    Discharge Diagnoses:  Major depressive disorder, recurrent, in partial remission  Diet: Resume previous outpatient diet  Activity: Ambulate with assistance  Instructions: Please do not eat or drink anything after midnight prior to procedure. Please do not drive on day of ECT.    Med Changes:  None    Next ECT:  8/19/19    Mane Luevano MD  Psychiatry PGY-2

## 2019-08-12 NOTE — OP NOTE
Allyssa Wright  : 1980   MRN: [id number]  Date: 2019    Electroconvulsive Therapy  Procedure Note    Date of Admission: 2019  5:52 AM    Site: [facility], [address]    Attending: Shoaib Boyce Jr., MD   Residents: Mane Luevano MD  Pre-procedure Diagnosis: Major Depressive Disorder, recurrent, partial remission   ECT Treatment Number: ECT #81  Machine Type: Mecta 5000    Patient Status: Medically stable    Vitals (pre-procedure):  There were no vitals filed for this visit.    Electrode Placement: Bitemporal    Stimulus Number Charge (mC) Level Pulse Width (msec) Frequency  (Hz) Duration of Stimulus (sec) Current (mA) Duration of Seizure (sec)   1 576 3 1 60 6 800 56                                   Complications: None    Maximum Blood Pressure: 216/128    Medications Given:  Caffeine 500mg  PO  Methohexital (Brevital)   120mg  IV    Succinylcholine (Anectine)   130mg  IV    Ondansetron (Zofran)  4mg  IV      Ketorolac 30mg   Esmolol 10mg IV    Treatment Course:  Patient tolerated procedure well. After adequate recovery from general anesthesia, the patient was transported to recovery.    Post-op Diagnosis: Same as above    Recommended for next ECT: 19    Mane Luevano MD  Psychiatry PGY-2

## 2019-08-12 NOTE — H&P
"Allyssa Wright  : 1980   MRN: [id number]  Date: 2019     Electroconvulsive Therapy  History & Physical    Chief complaint: Major Depressive Disorder, recurrent, partial remission  Procedure: ECT #81    SUBJECTIVE:     HPI:   Allyssa Wright is a 39 y.o. female with Major Depressive Disorder, recurrent, in partial remission who presents for ECT.      Patient s/p last ECT treatment done 19. She states she is overall doing well, however she reports that her mood is still somewhat depressed. She states she thinks the ECT is working in that she believes she would be more depressed without it, and she inquires about getting ECT sooner next time. She states she is sleeping and eating well. She states her memory is "okay." She denies SI, HI, AVH.  She is medication compliant. She is continuing TMS treatments as well. She confirms NPO status. She states she is ready for ECT today. Her father is at bedside.    Psychiatric Review of Systems:  Mood: depressed  Appetite: No problem  Psychomotor: No problem  Cognitive Impairment: none  Insomnia: None  Psychosis: None  Diurnal Variation: None  Suicidal Ideation: Absent    Medical Review Of Systems:  Pertinent items are noted in HPI.    Current Medications:   Current Facility-Administered Medications on File Prior to Encounter   Medication Dose Route Frequency Provider Last Rate Last Dose    sodium chloride 0.9% flush 10 mL  10 mL Intra-Catheter PRN Homa Colbert MD         Current Outpatient Medications on File Prior to Encounter   Medication Sig Dispense Refill    clozapine (CLOZARIL) 50 MG tablet Take 50 mg by mouth once daily.       dapsone 7.5 % GlwP Apply topically once daily.      dextroamphetamine-amphetamine 30 mg Tab Take 30 mg by mouth 2 (two) times daily.       famciclovir (FAMVIR) 500 MG tablet Take 1 tablet (500 mg total) by mouth 2 (two) times daily. 30 tablet 12    hydrOXYzine pamoate (VISTARIL) 25 MG Cap Take 2 capsules (50 mg total) by " mouth nightly as needed (Take 25-50mg (1-2 caps) at night for anxiety prior to ECT). 180 capsule 0    hydrOXYzine pamoate (VISTARIL) 25 MG Cap Take 1 capsule (25 mg total) by mouth nightly as needed (anxiety/sleep). Take 1 to 2 pills as needed 60 capsule 2    mirtazapine (REMERON) 15 MG tablet Take 1 tablet (15 mg total) by mouth every evening. (Patient taking differently: Take 7.5 mg by mouth every evening. ) 90 tablet 0    spironolactone (ALDACTONE) 50 MG tablet 50 mg 2 (two) times daily. 50  mg qAM, 50 mg qHS.      thyroid, pork, (ARMOUR THYROID) 30 mg Tab Take 1 tablet (30 mg total) by mouth every morning. 30 tablet 11    trazodone (DESYREL) 100 MG tablet Take 200 mg by mouth every evening.       UNABLE TO FIND 2 (two) times daily. n-azetyl-cysteine      venlafaxine (EFFEXOR) 100 MG Tab Take 3 tablets (300 mg total) by mouth once daily. (Patient taking differently: Take 225 mg by mouth once daily. )      vortioxetine (TRINTELLIX) 5 mg Tab Take 2.5 mg by mouth once daily.       ZOVIRAX 5 % Crea   5      Allergies:   Benzodiazepines; Ampicillin; Erythromycin; Levaquin [levofloxacin]; Penicillins; Pristiq [desvenlafaxine]; Sulfa (sulfonamide antibiotics); and Azithromycin    Past Medical/Surgical History:   Past Medical History:   Diagnosis Date    Anxiety     Depression     History of psychiatric hospitalization     HSV-1 (herpes simplex virus 1) infection     Hx of psychiatric care     Moderate depressed bipolar II disorder 06/13/2016    reports no history of bipolar    Obsessive-compulsive disorder     Psychiatric problem     Schizophrenia 4/3/2018    Self-harming behavior     Therapy      Past Surgical History:   Procedure Laterality Date    ANKLE SURGERY Right     BREAST augmentation      ELECTROCONVULSIVE THERAPY (ECT) - SINGLE SEIZURE N/A 12/6/2018    Performed by [facility]ib [facility] Jr[facility] at [facility] ECT    ELECTROCONVULSIVE THERAPY (ECT) - SINGLE SEIZURE N/A 8/31/2018    Performed by  Shoaib Boyce Jr., MD at [facility] ECT    ELECTROCONVULSIVE THERAPY (ECT) - SINGLE SEIZURE N/A 8/6/2018    Performed by Shoaib Boyce Jr., MD at [facility] ECT    ELECTROCONVULSIVE THERAPY (ECT) - SINGLE SEIZURE N/A 7/23/2018    Performed by Shoaib Boyce Jr., MD at [facility] ECT    ELECTROCONVULSIVE THERAPY (ECT) - SINGLE SEIZURE N/A 7/5/2018    Performed by Shoaib Boyce Jr., MD at [facility] ECT    ELECTROCONVULSIVE THERAPY (ECT) - SINGLE SEIZURE N/A 6/28/2018    Performed by Shoaib Boyce Jr., MD at [facility] ECT    ELECTROCONVULSIVE THERAPY (ECT) - SINGLE SEIZURE N/A 6/13/2018    Performed by Shoaib Boyce Jr., MD at [facility] ECT    ELECTROCONVULSIVE THERAPY (ECT) - SINGLE SEIZURE N/A 5/29/2018    Performed by Shoaib Boyce Jr., MD at [facility] ECT    ELECTROCONVULSIVE THERAPY (ECT) - SINGLE SEIZURE N/A 5/8/2018    Performed by Shoaib Boyce Jr., MD at [facility] ECT    ELECTROCONVULSIVE THERAPY (ECT) - SINGLE SEIZURE N/A 4/24/2018    Performed by Shoaib Boyce Jr., MD at [facility] ECT    ELECTROCONVULSIVE THERAPY (ECT) - SINGLE SEIZURE N/A 4/17/2018    Performed by Shoaib Boyce Jr., MD at [facility] ECT    ELECTROCONVULSIVE THERAPY (ECT) - SINGLE SEIZURE N/A 4/13/2018    Performed by Shoaib Boyce Jr., MD at [facility] ECT    ELECTROCONVULSIVE THERAPY (ECT) - SINGLE SEIZURE N/A 4/3/2018    Performed by Shoaib Boyce Jr., MD at [facility] ECT    ELECTROCONVULSIVE THERAPY (ECT) - SINGLE SEIZURE N/A 3/20/2018    Performed by Shoaib Boyce Jr., MD at [facility] ECT    ELECTROCONVULSIVE THERAPY (ECT) - SINGLE SEIZURE N/A 3/15/2018    Performed by Shoaib Boyce Jr., MD at [facility] ECT    ELECTROCONVULSIVE THERAPY (ECT) - SINGLE SEIZURE N/A 3/6/2018    Performed by Shoaib Boyce Jr., MD at [facility] ECT    ELECTROCONVULSIVE THERAPY (ECT) - SINGLE SEIZURE N/A 3/1/2018    Performed by Shoaib Boyce Jr., MD at [facility] ECT    ELECTROCONVULSIVE THERAPY (ECT) - SINGLE SEIZURE N/A 2/23/2018    Performed by Shoaib Boyce  MD [place] at [facility] ECT    ELECTROCONVULSIVE THERAPY (ECT) - SINGLE SEIZURE N/A 2/19/2018    Performed by Shoaib Boyce Jr., MD at [facility] ECT    ELECTROCONVULSIVE THERAPY (ECT) - SINGLE SEIZURE N/A 2/15/2018    Performed by Shoaib Boyce Jr., MD at [facility] ECT    ELECTROCONVULSIVE THERAPY (ECT) - SINGLE SEIZURE N/A 1/22/2018    Performed by Shoaib Boyce Jr., MD at [facility] ECT    ELECTROCONVULSIVE THERAPY (ECT) - SINGLE SEIZURE N/A 12/11/2017    Performed by Shoaib Boyce Jr., MD at [facility] ECT    ELECTROCONVULSIVE THERAPY (ECT) - SINGLE SEIZURE N/A 10/24/2017    Performed by Shoaib Boyce Jr., MD at [facility] ECT    ELECTROCONVULSIVE THERAPY (ECT) - SINGLE SEIZURE N/A 9/20/2017    Performed by Shoaib Boyce Jr., MD at [facility] ECT    ELECTROCONVULSIVE THERAPY (ECT) - SINGLE SEIZURE N/A 8/14/2017    Performed by Shoaib Boyce Jr., MD at [facility] ECT    ELECTROCONVULSIVE THERAPY (ECT) - SINGLE SEIZURE Bilateral 7/12/2017    Performed by Shoaib Boyce Jr., MD at [facility] ECT    ELECTROCONVULSIVE THERAPY (ECT) - SINGLE SEIZURE N/A 6/13/2017    Performed by Shoaib Boyce Jr., MD at [facility] ECT    ELECTROCONVULSIVE THERAPY (ECT) - SINGLE SEIZURE N/A 5/17/2017    Performed by Shoaib Boyce Jr., MD at [facility] ECT    ELECTROCONVULSIVE THERAPY (ECT) - SINGLE SEIZURE N/A 4/20/2017    Performed by Shoaib Boyce Jr., MD at [facility] ECT    ELECTROCONVULSIVE THERAPY (ECT) - SINGLE SEIZURE N/A 11/22/2016    Performed by Shoaib Boyce Jr., MD at [facility] ECT    ELECTROCONVULSIVE THERAPY (ECT) - SINGLE SEIZURE N/A 10/24/2016    Performed by Shoaib Boyce Jr., MD at [facility] ECT    ELECTROCONVULSIVE THERAPY (ECT) - SINGLE SEIZURE N/A 9/22/2016    Performed by Shoaib Boyce Jr., MD at [facility] ECT    ELECTROCONVULSIVE THERAPY (ECT) - SINGLE SEIZURE N/A 9/1/2016    Performed by Shoaib Boyce Jr., MD at [facility] ECT    ELECTROCONVULSIVE THERAPY (ECT) - SINGLE SEIZURE N/A 8/15/2016    Performed by Shoaib Boyce Jr.,  MD at [facility] ECT    ELECTROCONVULSIVE THERAPY (ECT) - SINGLE SEIZURE N/A 8/1/2016    Performed by Shoaib Boyce Jr., MD at [facility] ECT    ELECTROCONVULSIVE THERAPY (ECT) - SINGLE SEIZURE N/A 7/22/2016    Performed by Shoaib Boyce Jr., MD at [facility] ECT    ELECTROCONVULSIVE THERAPY (ECT) - SINGLE SEIZURE N/A 7/14/2016    Performed by Shoaib Boyce Jr., MD at [facility] ECT    ELECTROCONVULSIVE THERAPY (ECT) - SINGLE SEIZURE N/A 7/8/2016    Performed by Shoaib Boyce Jr., MD at [facility] ECT    ELECTROCONVULSIVE THERAPY (ECT) - SINGLE SEIZURE N/A 7/5/2016    Performed by Shoaib Boyce Jr., MD at [facility] ECT    ELECTROCONVULSIVE THERAPY (ECT) - SINGLE SEIZURE N/A 6/27/2016    Performed by Shoaib Boyce Jr., MD at [facility] ECT    ELECTROCONVULSIVE THERAPY (ECT) - SINGLE SEIZURE N/A 6/23/2016    Performed by Shoaib Boyce Jr., MD at [facility] ECT    ELECTROCONVULSIVE THERAPY (ECT) - SINGLE SEIZURE N/A 6/20/2016    Performed by Shoaib Boyce Jr., MD at [facility] ECT    ELECTROCONVULSIVE THERAPY (ECT) - SINGLE SEIZURE N/A 6/16/2016    Performed by Shoaib Boyce Jr., MD at [facility] ECT    ELECTROCONVULSIVE THERAPY (ECT) - SINGLE SEIZURE N/A 6/15/2016    Performed by Shoaib Boyce Jr., MD at [facility] ECT    ELECTROCONVULSIVE THERAPY (ECT) - SINGLE SEIZURE N/A 6/14/2016    Performed by Shoaib Boyce Jr., MD at [facility] ECT    ELECTROCONVULSIVE THERAPY (ECT) - SINGLE SEIZURE N/A 6/13/2016    Performed by Shoaib Boyce Jr., MD at [facility] ECT    ELECTROCONVULSIVE THERAPY (ECT) - SINGLE SEIZURE N/A 1/4/2016    Performed by Shoaib Boyce Jr., MD at [facility] ECT    ELECTROCONVULSIVE THERAPY, CEREBRAL HEMISPHERE, UNILATERAL, 1 SEIZURE Bilateral 6/25/2018    Performed by Shoaib Boyce Jr., MD at [facility] ECT    OVARIAN CYST REMOVAL Bilateral       OBJECTIVE:     Vitals (pre-procedure):  There were no vitals filed for this visit.     Labs/Imaging/Studies:   Recent Results (from the past 48 hour(s))   POCT urine pregnancy     Collection Time: 08/12/19  6:23 AM   Result Value Ref Range    POC Preg Test, Ur Negative Negative     Acceptable Yes       No results found for: PHENYTOIN, PHENOBARB, VALPROATE, CBMZ    Physical Exam:   Gen: AAOx4, NAD  HEENT: MMM, PERRL, EOMI, O/P clear  CV: RRR, S1/S2 nml, no M/R/G  Chest: CTAB, no R/R/W, unlabored breathing  Abd: S/NT/ND, +BS, no HSM  Ext: No C/C/E, pulse 2+ throughout  Neuro: CN II-XII grossly intact, no focal deficits    Mental Status Exam:   Appearance: unremarkable, age appropriate, neatly groomed  Behavior: normal, cooperative, eye contact normal  Speech: normal tone, normal rate, normal pitch, normal volume, spontaneous  Mood: depressed  Affect: full & reactive  Thought Process: normal and logical  Thought Content: normal, no suicidality, no homicidality, delusions, or paranoia  Cognition: 3/3 immediate recall, HOUSE, ESUOH, 3/3 delayed recall, able to abstract, able to follow 2 step command  Insight: good  Judgment: good       ASSESSMENT/PLAN:     Allyssa Wright is a 39 y.o. female with Major Depressive Disorder, recurrent, in partial remission who presents for ECT.    Recommendations:   Proceed with ECT #81.    Mane Luevano MD  8/12/2019

## 2019-08-12 NOTE — TRANSFER OF CARE
"Anesthesia Transfer of Care Note    Patient: Allyssa Wright    Procedure(s) Performed: Procedure(s) (LRB):  ELECTROCONVULSIVE THERAPY (ECT) - SINGLE SEIZURE (N/A)    Patient location: [facility]    Anesthesia Type: general    Transport from OR: Transported from OR on room air with adequate spontaneous ventilation    Post pain: adequate analgesia    Post assessment: no apparent anesthetic complications    Post vital signs: stable    Level of consciousness: awake and alert    Nausea/Vomiting: no nausea/vomiting    Complications: none          Last vitals:   Visit Vitals  BP (!) 115/57 (BP Location: Left arm, Patient Position: Lying)   Pulse (P) 99   Temp (P) 36.7 °C (98 °F) (Temporal)   Resp (P) 20   Ht 5' 5" (1.651 m)   Wt 65.8 kg (145 lb)   LMP  (LMP Unknown)   SpO2 (P) 98%   Breastfeeding? No   BMI 24.13 kg/m²     "

## 2019-08-12 NOTE — DISCHARGE INSTRUCTIONS
Home Care Instructions E.C.T    ACTIVITY LEVEL:  You may feel sleepy for several hours. It is best to rest until you are more awake and then gradually resume your normal activities in one day. It is recommended, because of the possibility of memory loss and slight confusion post ECT, that you rest in the company of a responsible adult. This confusion and memory loss is expected and should diminish over time. It is also recommended that you do not drive or operate any electrical appliances or machinery during the ECT treatment series. Ask your Doctor, at the time of your treatment, anyquestions you may have about specific activities.    DIET:  You may wish to start with liquids after your ECT treatment and gradually resume your normal diet. Do not consume alcoholic beverages during the ECT treatment series.    BATHING:  You may shower or bathe as desired.    MEDICATIONS:  Resume all home medications starting with the AM doses not taken prior to ECT treatment. If you experience a headache, it is okay to take your usual pain reliever.    WHEN TO CALL THE DOCTOR:   Headache, memory loss or confusion that does not begin to resolve within 24 hours.    RETURN APPOINTMENT:  Report to Day Surgery ([facility]) on (date)__Monday, 8/19/19__ for (time)___6:00 am_. Remember you may not eat or drink after midnight, but please take heart or high blood pressure medicines with a sip of water in the morning prior to leaving home.    FOR EMERGENCIES:  If any unusual problems or difficulties occur, contact the office [phone] Monday-Friday until 3:00 p.m. After hours, call the hospital  [phone] and ask for the Psychiatry Resident On-call.

## 2019-08-12 NOTE — ANESTHESIA POSTPROCEDURE EVALUATION
Anesthesia Post Evaluation    Patient: Allyssa Wright    Procedure(s) Performed: Procedure(s) (LRB):  ELECTROCONVULSIVE THERAPY (ECT) - SINGLE SEIZURE (N/A)    Final Anesthesia Type: general  Patient location during evaluation: PACU  Patient participation: Yes- Able to Participate  Level of consciousness: awake and alert  Post-procedure vital signs: reviewed and stable  Pain management: adequate  Airway patency: patent  PONV status at discharge: No PONV  Anesthetic complications: no      Cardiovascular status: blood pressure returned to baseline and hemodynamically stable  Respiratory status: unassisted and spontaneous ventilation  Hydration status: euvolemic  Follow-up not needed.          Vitals Value Taken Time   /80 8/12/2019  8:30 AM   Temp 37.1 °C (98.7 °F) 8/12/2019  8:30 AM   Pulse 81 8/12/2019  8:30 AM   Resp 61 8/12/2019  8:30 AM   SpO2 89 % 8/12/2019  8:30 AM         No case tracking events are documented in the log.      Pain/Roma Score: Roma Score: 9 (8/12/2019  7:55 AM)

## 2019-08-19 ENCOUNTER — TELEPHONE (OUTPATIENT)
Dept: PSYCHIATRY | Facility: CLINIC | Age: 39
End: 2019-08-19

## 2019-08-19 DIAGNOSIS — F33.9 RECURRENT MAJOR DEPRESSIVE DISORDER, REMISSION STATUS UNSPECIFIED: Primary | ICD-10-CM

## 2019-08-19 NOTE — TELEPHONE ENCOUNTER
Received call from patient's father requesting for patient to be placed back on schedule for this Wednesday 8/21/19 for ECT. Patient was not feelign well this AM to make it in. Case request placed.

## 2019-08-20 ENCOUNTER — ANESTHESIA EVENT (OUTPATIENT)
Dept: ELECTROPHYSIOLOGY | Facility: HOSPITAL | Age: 39
End: 2019-08-20
Payer: COMMERCIAL

## 2019-08-20 NOTE — ANESTHESIA PREPROCEDURE EVALUATION
[facility]  Anesthesia Pre-Operative Evaluation         Patient Name: Allyssa Wright  YOB: 1980  MRN: [id number]    SUBJECTIVE:     Pre-operative evaluation for Procedure(s) (LRB):  ELECTROCONVULSIVE THERAPY (ECT) - SINGLE SEIZURE (N/A)     08/20/2019    Allyssa Wright is a 39 y.o. female w/ a significant PMHx of HSV-1, recurrent MDD who presents for ECT #81    Patient now presents for the above procedure(s).    Previous Medications:   Methohexital 120 mg  Succinylcholine 130 mg   Esmolol 10 mg  Zofran 4 mg  Toradol 30 mg      LDA: None documented.    Prev airway: None documented.    Drips: None documented.    Patient Active Problem List   Diagnosis    Major depressive disorder, recurrent episode, in partial remission    Other acne    Comfort measures only status    MDD (major depressive disorder)    Major depression    Major depressive disorder    MDD (major depressive disorder), severe    Depression    Major depressive disorder, recurrent, in partial remission       Review of patient's allergies indicates:   Allergen Reactions    Benzodiazepines Other (See Comments)     Contraindicated while taking Clozapine    Ampicillin      Mom says so    Erythromycin     Levaquin [levofloxacin] Other (See Comments)     Depression side effects    Penicillins      Mom says so    Pristiq [desvenlafaxine]      psycotic      Sulfa (sulfonamide antibiotics)      Rash      Azithromycin Anxiety       Current Outpatient Medications:  No current facility-administered medications for this encounter.     Current Outpatient Medications:     clozapine (CLOZARIL) 50 MG tablet, Take 50 mg by mouth once daily. , Disp: , Rfl:     dapsone 7.5 % GlwP, Apply topically once daily., Disp: , Rfl:     dextroamphetamine-amphetamine 30 mg Tab, Take 30 mg by mouth 2 (two) times daily. , Disp: ,  Rfl:     famciclovir (FAMVIR) 500 MG tablet, Take 1 tablet (500 mg total) by mouth 2 (two) times daily., Disp: 30 tablet, Rfl: 12    hydrOXYzine pamoate (VISTARIL) 25 MG Cap, Take 2 capsules (50 mg total) by mouth nightly as needed (Take 25-50mg (1-2 caps) at night for anxiety prior to ECT)., Disp: 180 capsule, Rfl: 0    hydrOXYzine pamoate (VISTARIL) 25 MG Cap, Take 1 capsule (25 mg total) by mouth nightly as needed (anxiety/sleep). Take 1 to 2 pills as needed, Disp: 60 capsule, Rfl: 2    mirtazapine (REMERON) 15 MG tablet, Take 1 tablet (15 mg total) by mouth every evening. (Patient taking differently: Take 7.5 mg by mouth every evening. ), Disp: 90 tablet, Rfl: 0    spironolactone (ALDACTONE) 50 MG tablet, 50 mg 2 (two) times daily. 50  mg qAM, 50 mg qHS., Disp: , Rfl:     thyroid, pork, (ARMOUR THYROID) 30 mg Tab, Take 1 tablet (30 mg total) by mouth every morning., Disp: 30 tablet, Rfl: 11    trazodone (DESYREL) 100 MG tablet, Take 200 mg by mouth every evening. , Disp: , Rfl:     UNABLE TO FIND, 2 (two) times daily. n-azetyl-cysteine, Disp: , Rfl:     venlafaxine (EFFEXOR) 100 MG Tab, Take 3 tablets (300 mg total) by mouth once daily. (Patient taking differently: Take 225 mg by mouth once daily. ), Disp: , Rfl:     vortioxetine (TRINTELLIX) 5 mg Tab, Take 2.5 mg by mouth once daily. , Disp: , Rfl:     ZOVIRAX 5 % Crea, , Disp: , Rfl: 5    Facility-Administered Medications Ordered in Other Encounters:     sodium chloride 0.9% flush 10 mL, 10 mL, Intra-Catheter, PRN, Homa Colbert MD    Past Surgical History:   Procedure Laterality Date    ANKLE SURGERY Right     BREAST augmentation      ELECTROCONVULSIVE THERAPY (ECT) - SINGLE SEIZURE N/A 12/6/2018    Performed by Sh[facility]ib Boyce Jr., [facility] at [facility] ECT    ELECTROCONVULSIVE THERAPY (ECT) - SINGLE SEIZURE N/A 8/31/2018    Performed by Sh[facility]ib [facility] Jr., MD at [facility] ECT    ELECTROCONVULSIVE THERAPY (ECT) - SINGLE SEIZURE N/A 8/6/2018     Performed by Sh[facility]ib [facility] Jr., MD at [facility] ECT    ELECTROCONVULSIVE THERAPY (ECT) - SINGLE SEIZURE N/A 7/23/2018    Performed by Sh[facility]ib Boyce Jr., MD at [facility] ECT    ELECTROCONVULSIVE THERAPY (ECT) - SINGLE SEIZURE N/A 7/5/2018    Performed by Shoaib Boyce Jr., MD at [facility] ECT    ELECTROCONVULSIVE THERAPY (ECT) - SINGLE SEIZURE N/A 6/28/2018    Performed by Shoaib Boyce Jr., MD at [facility] ECT    ELECTROCONVULSIVE THERAPY (ECT) - SINGLE SEIZURE N/A 6/13/2018    Performed by Shoaib Boyce Jr[facility] MD at [facility] ECT    ELECTROCONVULSIVE THERAPY (ECT) - SINGLE SEIZURE N/A 5/29/2018    Performed by Shoaib Boyce Jr., MD at [facility] ECT    ELECTROCONVULSIVE THERAPY (ECT) - SINGLE SEIZURE N/A 5/8/2018    Performed by Shoaib Boyce Jr., MD at [facility] ECT    ELECTROCONVULSIVE THERAPY (ECT) - SINGLE SEIZURE N/A 4/24/2018    Performed by Shoaib Boyce Jr., MD at [facility] ECT    ELECTROCONVULSIVE THERAPY (ECT) - SINGLE SEIZURE N/A 4/17/2018    Performed by Shoaib Boyce Jr., MD at [facility] ECT    ELECTROCONVULSIVE THERAPY (ECT) - SINGLE SEIZURE N/A 4/13/2018    Performed by Shoaib Boyce Jr., MD at [facility] ECT    ELECTROCONVULSIVE THERAPY (ECT) - SINGLE SEIZURE N/A 4/3/2018    Performed by Shoaib Boyce Jr., MD at [facility] ECT    ELECTROCONVULSIVE THERAPY (ECT) - SINGLE SEIZURE N/A 3/20/2018    Performed by Shoaib Boyce Jr., MD at [facility] ECT    ELECTROCONVULSIVE THERAPY (ECT) - SINGLE SEIZURE N/A 3/15/2018    Performed by Shoaib Boyce Jr., MD at [facility] ECT    ELECTROCONVULSIVE THERAPY (ECT) - SINGLE SEIZURE N/A 3/6/2018    Performed by Shoaib Boyce Jr., MD at [facility] ECT    ELECTROCONVULSIVE THERAPY (ECT) - SINGLE SEIZURE N/A 3/1/2018    Performed by Shoaib Boyce Jr., MD at [facility] ECT    ELECTROCONVULSIVE THERAPY (ECT) - SINGLE SEIZURE N/A 2/23/2018    Performed by Shoaib Boyce Jr[facility] MD at [facility] ECT    ELECTROCONVULSIVE THERAPY (ECT) - SINGLE SEIZURE N/A 2/19/2018    Performed by  Shoaib Boyce Jr., MD at [facility] ECT    ELECTROCONVULSIVE THERAPY (ECT) - SINGLE SEIZURE N/A 2/15/2018    Performed by Shoaib Boyce Jr., MD at [facility] ECT    ELECTROCONVULSIVE THERAPY (ECT) - SINGLE SEIZURE N/A 1/22/2018    Performed by Shoaib Boyce Jr., MD at [facility] ECT    ELECTROCONVULSIVE THERAPY (ECT) - SINGLE SEIZURE N/A 12/11/2017    Performed by Shoaib Boyce Jr., MD at [facility] ECT    ELECTROCONVULSIVE THERAPY (ECT) - SINGLE SEIZURE N/A 10/24/2017    Performed by Shoaib Boyce Jr., MD at [facility] ECT    ELECTROCONVULSIVE THERAPY (ECT) - SINGLE SEIZURE N/A 9/20/2017    Performed by Shoaib Boyce Jr., MD at [facility] ECT    ELECTROCONVULSIVE THERAPY (ECT) - SINGLE SEIZURE N/A 8/14/2017    Performed by Shoaib Boyce Jr., MD at [facility] ECT    ELECTROCONVULSIVE THERAPY (ECT) - SINGLE SEIZURE Bilateral 7/12/2017    Performed by Shoaib Boyce Jr., MD at [facility] ECT    ELECTROCONVULSIVE THERAPY (ECT) - SINGLE SEIZURE N/A 6/13/2017    Performed by Shoaib Boyce Jr., MD at [facility] ECT    ELECTROCONVULSIVE THERAPY (ECT) - SINGLE SEIZURE N/A 5/17/2017    Performed by Shoaib Boyce Jr., MD at [facility] ECT    ELECTROCONVULSIVE THERAPY (ECT) - SINGLE SEIZURE N/A 4/20/2017    Performed by Shoaib Boyce Jr., MD at [facility] ECT    ELECTROCONVULSIVE THERAPY (ECT) - SINGLE SEIZURE N/A 11/22/2016    Performed by Shoaib Boyce Jr., MD at [facility] ECT    ELECTROCONVULSIVE THERAPY (ECT) - SINGLE SEIZURE N/A 10/24/2016    Performed by Shoaib Boyce Jr., MD at [facility] ECT    ELECTROCONVULSIVE THERAPY (ECT) - SINGLE SEIZURE N/A 9/22/2016    Performed by Shoaib Boyce Jr., MD at [facility] ECT    ELECTROCONVULSIVE THERAPY (ECT) - SINGLE SEIZURE N/A 9/1/2016    Performed by Shoaib Boyce Jr., MD at [facility] ECT    ELECTROCONVULSIVE THERAPY (ECT) - SINGLE SEIZURE N/A 8/15/2016    Performed by Shoaib Boyce Jr., MD at [facility] ECT    ELECTROCONVULSIVE THERAPY (ECT) - SINGLE SEIZURE N/A 8/1/2016    Performed by  Shoaib Boyce Jr., MD at [facility] ECT    ELECTROCONVULSIVE THERAPY (ECT) - SINGLE SEIZURE N/A 7/22/2016    Performed by Shoaib Boyce Jr., MD at [facility] ECT    ELECTROCONVULSIVE THERAPY (ECT) - SINGLE SEIZURE N/A 7/14/2016    Performed by Shoaib Boyce Jr., MD at [facility] ECT    ELECTROCONVULSIVE THERAPY (ECT) - SINGLE SEIZURE N/A 7/8/2016    Performed by Shoaib Boyce Jr., MD at [facility] ECT    ELECTROCONVULSIVE THERAPY (ECT) - SINGLE SEIZURE N/A 7/5/2016    Performed by Shoaib Boyce Jr., MD at [facility] ECT    ELECTROCONVULSIVE THERAPY (ECT) - SINGLE SEIZURE N/A 6/27/2016    Performed by Shoaib Boyce Jr., MD at [facility] ECT    ELECTROCONVULSIVE THERAPY (ECT) - SINGLE SEIZURE N/A 6/23/2016    Performed by Shoaib Boyce Jr., MD at [facility] ECT    ELECTROCONVULSIVE THERAPY (ECT) - SINGLE SEIZURE N/A 6/20/2016    Performed by Shoaib Boyce Jr., MD at [facility] ECT    ELECTROCONVULSIVE THERAPY (ECT) - SINGLE SEIZURE N/A 6/16/2016    Performed by Shoaib Boyce Jr., MD at [facility] ECT    ELECTROCONVULSIVE THERAPY (ECT) - SINGLE SEIZURE N/A 6/15/2016    Performed by Shoaib Boyce Jr., MD at [facility] ECT    ELECTROCONVULSIVE THERAPY (ECT) - SINGLE SEIZURE N/A 6/14/2016    Performed by Shoaib Boyce Jr., MD at [facility] ECT    ELECTROCONVULSIVE THERAPY (ECT) - SINGLE SEIZURE N/A 6/13/2016    Performed by Shoaib Boyce Jr., MD at [facility] ECT    ELECTROCONVULSIVE THERAPY (ECT) - SINGLE SEIZURE N/A 1/4/2016    Performed by Shoaib Boyce Jr., MD at [facility] ECT    ELECTROCONVULSIVE THERAPY, CEREBRAL HEMISPHERE, UNILATERAL, 1 SEIZURE Bilateral 6/25/2018    Performed by Shoaib Boyce Jr., MD at [facility] ECT    OVARIAN CYST REMOVAL Bilateral        Social History     Socioeconomic History    Marital status: Single     Spouse name: Not on file    Number of children: Not on file    Years of education: Not on file    Highest education level: Not on file   Occupational History    Occupation: unemployed   Social  Needs    Financial resource strain: Not on file    Food insecurity:     Worry: Not on file     Inability: Not on file    Transportation needs:     Medical: Not on file     Non-medical: Not on file   Tobacco Use    Smoking status: Former Smoker     Last attempt to quit: 2011     Years since quittin.3    Smokeless tobacco: Never Used   Substance and Sexual Activity    Alcohol use: Yes     Comment: rarely    Drug use: No     Comment: Experimental use in high school    Sexual activity: Not on file   Lifestyle    Physical activity:     Days per week: Not on file     Minutes per session: Not on file    Stress: Not on file   Relationships    Social connections:     Talks on phone: Not on file     Gets together: Not on file     Attends Hinduism service: Not on file     Active member of club or organization: Not on file     Attends meetings of clubs or organizations: Not on file     Relationship status: Not on file   Other Topics Concern    Patient feels they ought to cut down on drinking/drug use Not Asked    Patient annoyed by others criticizing their drinking/drug use Not Asked    Patient has felt bad or guilty about drinking/drug use Not Asked    Patient has had a drink/used drugs as an eye opener in the AM Not Asked   Social History Narrative    Pt has 1 older brother from an intact family until the death of her mother in .  She completed 1 year of college, was never in the , and has never been employed.  She was engaged once, but never , has no children, and lives with her father plus 2 dogs.  She denies any hobbies and is spiritual but not Hinduism.  She does date and returns to college during rare periods when depression and anxiety [place].       OBJECTIVE:     Vital Signs Range (Last 24H):  BP: ()/()   Arterial Line BP: ()/()       Significant Labs:  Lab Results   Component Value Date    WBC 4.26 2019    HGB 14.0 2019    HCT 42.9 2019     2019     ALT 22 08/07/2019    AST 22 08/07/2019     08/07/2019    K 4.1 08/07/2019     08/07/2019    CREATININE 0.7 08/07/2019    BUN 10 08/07/2019    CO2 30 (H) 08/07/2019    TSH 1.864 08/07/2019       Diagnostic Studies: No relevant studies.    EKG:   Results for orders placed or performed in visit on 06/08/16   IN OFFICE EKG 12-LEAD (to [place])    Collection Time: 06/08/16  3:33 PM    Narrative    Test Reason : F33.9  Blood Pressure : mmHG  Vent. Rate : 084 BPM     Atrial Rate : 084 BPM     P-R Int : 154 ms          QRS Dur : 086 ms      QT Int : 378 ms       P-R-T Axes : 067 066 055 degrees     QTc Int : 446 ms    Normal sinus rhythm  Normal ECG  When compared with ECG of 03-DEC-2015 11:38,  Questionable change in The axis  T wave inversion no longer evident in Inferior leads  Confirmed by Flaco Sr MD ([id number]) on 6/9/2016 1:30:13 PM    Referred By: SELF REFERRAL           Confirmed By:Flaco Sr MD       2D ECHO:  TTE:  No results found for this or any previous visit.    HENRIQUE:  No results found for this or any previous visit.    ASSESSMENT/PLAN:       Anesthesia Evaluation    I have reviewed the Patient Summary Reports.    I have reviewed the Nursing Notes.   I have reviewed the Medications.     Review of Systems  Anesthesia Hx:  History of prior surgery of interest to airway management or planning: Denies Family Hx of Anesthesia complications.   Denies Personal Hx of Anesthesia complications.   Hematology/Oncology:  Hematology Normal   Oncology Normal     EENT/Dental:EENT/Dental Normal   Cardiovascular:  Cardiovascular Normal  ECG has been reviewed.    Pulmonary:  Pulmonary Normal    Renal/:  Renal/ Normal     Hepatic/GI:  Hepatic/GI Normal    Musculoskeletal:  Musculoskeletal Normal    Neurological:  Neurology Normal    Endocrine:  Endocrine Normal    Dermatological:  Skin Normal    Psych:   Psychiatric History          Physical Exam  General:  Well nourished    Airway/Jaw/Neck:  Airway Findings: Mouth  Opening: Normal Tongue: Normal  General Airway Assessment: Adult, Good  Mallampati: II  Improves to I with phonation.  TM Distance: Normal, at least 6 cm  Jaw/Neck Findings:  Neck ROM: Normal ROM      Dental:  Dental Findings: In tact   Chest/Lungs:  Chest/Lungs Clear    Heart/Vascular:  Heart Findings: Normal Heart murmur: negative       Mental Status:  Mental Status Findings:  Cooperative, Alert and Oriented         Anesthesia Plan  Type of Anesthesia, risks & benefits discussed:  Anesthesia Type:  general  Patient's Preference:   Intra-op Monitoring Plan: standard ASA monitors  Intra-op Monitoring Plan Comments:   Post Op Pain Control Plan: per primary service following discharge from PACU and multimodal analgesia  Post Op Pain Control Plan Comments:   Induction:   IV  Beta Blocker:  Patient is not currently on a Beta-Blocker (No further documentation required).       Informed Consent: Patient understands risks and agrees with Anesthesia plan.  Questions answered. Anesthesia consent signed with patient.  ASA Score: 2     Day of Surgery Review of History & Physical: I have interviewed and examined the patient. I have reviewed the patient's H&P dated:  There are no significant changes.  H&P update referred to the provider.         Ready For Surgery From Anesthesia Perspective.

## 2019-08-21 ENCOUNTER — HOSPITAL ENCOUNTER (OUTPATIENT)
Facility: HOSPITAL | Age: 39
Discharge: HOME | End: 2019-08-21
Attending: PSYCHIATRY & NEUROLOGY | Admitting: PSYCHIATRY & NEUROLOGY
Payer: COMMERCIAL

## 2019-08-21 ENCOUNTER — ANESTHESIA (OUTPATIENT)
Dept: ELECTROPHYSIOLOGY | Facility: HOSPITAL | Age: 39
End: 2019-08-21
Payer: COMMERCIAL

## 2019-08-21 VITALS
DIASTOLIC BLOOD PRESSURE: 55 MMHG | HEART RATE: 82 BPM | TEMPERATURE: 98 F | SYSTOLIC BLOOD PRESSURE: 107 MMHG | WEIGHT: 145 LBS | HEIGHT: 65 IN | BODY MASS INDEX: 24.16 KG/M2 | OXYGEN SATURATION: 96 % | RESPIRATION RATE: 18 BRPM

## 2019-08-21 DIAGNOSIS — F33.9 RECURRENT MAJOR DEPRESSIVE DISORDER, REMISSION STATUS UNSPECIFIED: Primary | ICD-10-CM

## 2019-08-21 DIAGNOSIS — F33.1 MAJOR DEPRESSIVE DISORDER, RECURRENT EPISODE, MODERATE DEGREE: Primary | ICD-10-CM

## 2019-08-21 DIAGNOSIS — F31.9 BIPOLAR DEPRESSION: ICD-10-CM

## 2019-08-21 LAB
B-HCG UR QL: NEGATIVE
CTP QC/QA: YES

## 2019-08-21 PROCEDURE — 25000003 PHARM REV CODE 250: Performed by: PSYCHIATRY & NEUROLOGY

## 2019-08-21 PROCEDURE — D9220A PRA ANESTHESIA: ICD-10-PCS | Mod: ,,, | Performed by: ANESTHESIOLOGY

## 2019-08-21 PROCEDURE — 90870 PR ELECTROCONVULSIVE THERAPY,1 SEIZ: ICD-10-PCS | Mod: ,,, | Performed by: PSYCHIATRY & NEUROLOGY

## 2019-08-21 PROCEDURE — D9220A PRA ANESTHESIA: Mod: ,,, | Performed by: ANESTHESIOLOGY

## 2019-08-21 PROCEDURE — 90870 ELECTROCONVULSIVE THERAPY: CPT | Mod: ,,, | Performed by: PSYCHIATRY & NEUROLOGY

## 2019-08-21 PROCEDURE — 81025 URINE PREGNANCY TEST: CPT | Performed by: PSYCHIATRY & NEUROLOGY

## 2019-08-21 PROCEDURE — 63600175 PHARM REV CODE 636 W HCPCS: Performed by: STUDENT IN AN ORGANIZED HEALTH CARE EDUCATION/TRAINING PROGRAM

## 2019-08-21 PROCEDURE — 63600175 PHARM REV CODE 636 W HCPCS: Performed by: PSYCHIATRY & NEUROLOGY

## 2019-08-21 PROCEDURE — 90870 ELECTROCONVULSIVE THERAPY: CPT | Performed by: ANESTHESIOLOGY

## 2019-08-21 PROCEDURE — 25000003 PHARM REV CODE 250: Performed by: STUDENT IN AN ORGANIZED HEALTH CARE EDUCATION/TRAINING PROGRAM

## 2019-08-21 RX ORDER — ONDANSETRON 2 MG/ML
INJECTION INTRAMUSCULAR; INTRAVENOUS
Status: COMPLETED
Start: 2019-08-21 | End: 2019-08-21

## 2019-08-21 RX ORDER — SODIUM CHLORIDE 0.9 % (FLUSH) 0.9 %
10 SYRINGE (ML) INJECTION
Status: DISCONTINUED | OUTPATIENT
Start: 2019-08-21 | End: 2019-08-21 | Stop reason: HOSPADM

## 2019-08-21 RX ORDER — LIDOCAINE HYDROCHLORIDE 10 MG/ML
1 INJECTION, SOLUTION EPIDURAL; INFILTRATION; INTRACAUDAL; PERINEURAL ONCE
Status: DISCONTINUED | OUTPATIENT
Start: 2019-08-21 | End: 2019-08-21 | Stop reason: HOSPADM

## 2019-08-21 RX ORDER — KETOROLAC TROMETHAMINE 30 MG/ML
INJECTION, SOLUTION INTRAMUSCULAR; INTRAVENOUS
Status: COMPLETED
Start: 2019-08-21 | End: 2019-08-21

## 2019-08-21 RX ORDER — SUCCINYLCHOLINE CHLORIDE 20 MG/ML
INJECTION INTRAMUSCULAR; INTRAVENOUS
Status: COMPLETED
Start: 2019-08-21 | End: 2019-08-21

## 2019-08-21 RX ORDER — ONDANSETRON 2 MG/ML
4 INJECTION INTRAMUSCULAR; INTRAVENOUS DAILY PRN
Status: DISCONTINUED | OUTPATIENT
Start: 2019-08-21 | End: 2019-08-21 | Stop reason: HOSPADM

## 2019-08-21 RX ORDER — ESMOLOL HYDROCHLORIDE 10 MG/ML
INJECTION INTRAVENOUS
Status: DISCONTINUED
Start: 2019-08-21 | End: 2019-08-21 | Stop reason: HOSPADM

## 2019-08-21 RX ORDER — KETOROLAC TROMETHAMINE 30 MG/ML
INJECTION, SOLUTION INTRAMUSCULAR; INTRAVENOUS
Status: DISCONTINUED | OUTPATIENT
Start: 2019-08-21 | End: 2019-08-21

## 2019-08-21 RX ORDER — SODIUM CHLORIDE 9 MG/ML
INJECTION, SOLUTION INTRAVENOUS CONTINUOUS
Status: DISCONTINUED | OUTPATIENT
Start: 2019-08-21 | End: 2019-08-21 | Stop reason: HOSPADM

## 2019-08-21 RX ORDER — SUCCINYLCHOLINE CHLORIDE 20 MG/ML
INJECTION INTRAMUSCULAR; INTRAVENOUS
Status: DISCONTINUED | OUTPATIENT
Start: 2019-08-21 | End: 2019-08-21

## 2019-08-21 RX ADMIN — Medication 500 MG: at 08:08

## 2019-08-21 RX ADMIN — Medication 20 MG: at 08:08

## 2019-08-21 RX ADMIN — ONDANSETRON 4 MG: 2 INJECTION INTRAMUSCULAR; INTRAVENOUS at 08:08

## 2019-08-21 RX ADMIN — SUCCINYLCHOLINE CHLORIDE 130 MG: 20 INJECTION, SOLUTION INTRAMUSCULAR; INTRAVENOUS at 08:08

## 2019-08-21 RX ADMIN — METHOHEXITAL SODIUM 120 MG: 500 INJECTION, POWDER, LYOPHILIZED, FOR SOLUTION INTRAMUSCULAR; INTRAVENOUS; RECTAL at 08:08

## 2019-08-21 RX ADMIN — SODIUM CHLORIDE: 0.9 INJECTION, SOLUTION INTRAVENOUS at 08:08

## 2019-08-21 RX ADMIN — KETOROLAC TROMETHAMINE 30 MG: 30 INJECTION, SOLUTION INTRAMUSCULAR at 08:08

## 2019-08-21 NOTE — OP NOTE
Allyssa Wright  : 1980   MRN: [id number]  Date: 2019    Electroconvulsive Therapy  Procedure Note    Date of Admission: No admission date for patient encounter.    Site: [facility] [address]    Attending: Shoaib Boyce Jr., MD   Residents: Mane Luevano MD  Pre-procedure Diagnosis: Major Depressive Disorder, recurrent, partial remission   ECT Treatment Number: ECT #82  Machine Type: Mecta 5000    Patient Status: Medically stable    Vitals (pre-procedure):  There were no vitals filed for this visit.    Electrode Placement: Bitemporal    Stimulus Number Charge (mC) Level Pulse Width (msec) Frequency  (Hz) Duration of Stimulus (sec) Current (mA) Duration of Seizure (sec)   1 576 3 1 60 6 800 57               Complications: HTN    Maximum Blood Pressure: 222/142    Medications Given:  Caffeine 500mg  PO  Methohexital (Brevital)   120mg  IV    Succinylcholine (Anectine)   130mg  IV    Ondansetron (Zofran)  4mg  IV      Ketorolac 30mg   Esmolol 10mg IV    Treatment Course:  Patient tolerated procedure well. After adequate recovery from general anesthesia, the patient was transported to recovery.    Post-op Diagnosis: Same as above    Recommended for next ECT: 19    Mane Luevano MD  Psychiatry PGY-2

## 2019-08-21 NOTE — TRANSFER OF CARE
"Anesthesia Transfer of Care Note    Patient: Allyssa Wright    Procedure(s) Performed: Procedure(s) (LRB):  ELECTROCONVULSIVE THERAPY (ECT) - SINGLE SEIZURE (N/A)    Patient location: [facility]    Anesthesia Type: general    Transport from OR: Transported from OR on 6-10 L/min O2 by face mask with adequate spontaneous ventilation    Post pain: adequate analgesia    Post assessment: no apparent anesthetic complications    Post vital signs: stable    Level of consciousness: awake and alert    Nausea/Vomiting: no nausea/vomiting    Complications: none    Transfer of care protocol was followed      Last vitals:   Visit Vitals  BP (!) 113/57 (BP Location: Left arm, Patient Position: Lying)   Pulse 104   Temp 37.2 °C (99 °F) (Temporal)   Resp 18   Ht 5' 5" (1.651 m)   Wt 65.8 kg (145 lb)   LMP  (LMP Unknown)   SpO2 100%   Breastfeeding? No   BMI 24.13 kg/m²     "

## 2019-08-21 NOTE — DISCHARGE INSTRUCTIONS
Home Care Instructions  E.C.T.    ACTIVITY LEVEL:  You may feel sleepy for several hours. It is best to rest until you are more awake and then gradually resume your normal activities in one day. It is recommended, because of the possibility of memory loss and slight confusion post ECT, that you rest in the company of a responsible adult. This confusion and memory loss is expected and should diminish over time. It is also recommended that you do not drive or operate any electrical appliances or machinery during the ECT treatment series. Ask your Doctor, at the time of your treatment, any questions you may have about specific activities.    DIET:  You may wish to start with liquids after your ECT treatment and gradually resume your normal diet. Do not consume alcoholic beverages during the ECT treatment series.    BATHING:  You may shower or bathe as desired.    MEDICATIONS:  Resume all home medications starting with the AM doses not taken prior to ECT treatment. If you experience a headache, it is okay to take your usual pain reliever.    WHEN TO CALL THE DOCTOR:   Headache, memory loss or confusion that does not begin to resolve within 24 hours.    RETURN APPOINTMENT:  Report to Day Surgery ([facility]) on Monday 8/26/2019 for 6AM. Remember you may not eat or drink after midnight, but please take heart or high blood pressure medicines with a sip of water in the morning prior to leaving home.    FOR EMERGENCIES:  If any unusual problems or difficulties occur, contact the office [phone] Monday-Friday until 3:00 p.m. After hours, call the hospital  [phone] and ask for the Psychiatry Resident On-call.

## 2019-08-21 NOTE — H&P
Allyssa Wright  : 1980   MRN: [id number]  Date: 2019     Electroconvulsive Therapy  History & Physical    Chief complaint: Major Depressive Disorder, recurrent, partial remission  Procedure: ECT #82    SUBJECTIVE:     HPI:   Allyssa Wright is a 39 y.o. female with Major Depressive Disorder, recurrent, in partial remission who presents for ECT.      Father at bedside.  S/p last ECT treatment done 19.   This ECT session was scheduled for 19, however patient cancelled after waking up nauseaous that morning - rescheduled for today.  Overall, patient reports that her mood was doing well initially after last ECT however she noticeably decompensated 3-4 days after. No identifiable stressors or precipitating factors.  Continuing TMS treatments. Last TMS yesterday.  Denies SI, HI, AVH.    Medication compliant.  Confirms NPO status.   Ready for ECT today.     Psychiatric Review of Systems:  Mood: depressed  Appetite: No problem  Psychomotor: No problem  Cognitive Impairment: none  Insomnia:  No problem  Psychosis: denies  Diurnal Variation: denies  Suicidal Ideation: denies    Medical Review Of Systems:  Pertinent items are noted in HPI.    Current Medications:   Current Facility-Administered Medications on File Prior to Encounter   Medication Dose Route Frequency Provider Last Rate Last Dose    sodium chloride 0.9% flush 10 mL  10 mL Intra-Catheter PRN Homa Colbert MD         Current Outpatient Medications on File Prior to Encounter   Medication Sig Dispense Refill    clozapine (CLOZARIL) 50 MG tablet Take 50 mg by mouth once daily.       dapsone 7.5 % GlwP Apply topically once daily.      dextroamphetamine-amphetamine 30 mg Tab Take 30 mg by mouth 2 (two) times daily.       famciclovir (FAMVIR) 500 MG tablet Take 1 tablet (500 mg total) by mouth 2 (two) times daily. 30 tablet 12    hydrOXYzine pamoate (VISTARIL) 25 MG Cap Take 2 capsules (50 mg total) by mouth nightly as needed (Take 25-50mg  (1-2 caps) at night for anxiety prior to ECT). 180 capsule 0    hydrOXYzine pamoate (VISTARIL) 25 MG Cap Take 1 capsule (25 mg total) by mouth nightly as needed (anxiety/sleep). Take 1 to 2 pills as needed 60 capsule 2    mirtazapine (REMERON) 15 MG tablet Take 1 tablet (15 mg total) by mouth every evening. (Patient taking differently: Take 7.5 mg by mouth every evening. ) 90 tablet 0    spironolactone (ALDACTONE) 50 MG tablet 50 mg 2 (two) times daily. 50  mg qAM, 50 mg qHS.      thyroid, pork, (ARMOUR THYROID) 30 mg Tab Take 1 tablet (30 mg total) by mouth every morning. 30 tablet 11    trazodone (DESYREL) 100 MG tablet Take 200 mg by mouth every evening.       UNABLE TO FIND 2 (two) times daily. n-azetyl-cysteine      venlafaxine (EFFEXOR) 100 MG Tab Take 3 tablets (300 mg total) by mouth once daily. (Patient taking differently: Take 225 mg by mouth once daily. )      vortioxetine (TRINTELLIX) 5 mg Tab Take 2.5 mg by mouth once daily.       ZOVIRAX 5 % Crea   5      Allergies:   Benzodiazepines; Ampicillin; Erythromycin; Levaquin [levofloxacin]; Penicillins; Pristiq [desvenlafaxine]; Sulfa (sulfonamide antibiotics); and Azithromycin    Past Medical/Surgical History:   Past Medical History:   Diagnosis Date    Anxiety     Depression     History of psychiatric hospitalization     HSV-1 (herpes simplex virus 1) infection     Hx of psychiatric care     Moderate depressed bipolar II disorder 06/13/2016    reports no history of bipolar    Obsessive-compulsive disorder     Psychiatric problem     Schizophrenia 4/3/2018    Self-harming behavior     Therapy      Past Surgical History:   Procedure Laterality Date    ANKLE SURGERY Right     BREAST augmentation      ELECTROCONVULSIVE THERAPY (ECT) - SINGLE SEIZURE N/A 12/6/2018    Performed by Shoaib Boyce Jr., [facility] at [facility]    ELECTROCONVULSIVE THERAPY (ECT) - SINGLE SEIZURE N/A 8/31/2018    Performed by [facility] Jr[facility] at [facility]CT     ELECTROCONVULSIVE THERAPY (ECT) - SINGLE SEIZURE N/A 8/6/2018    Performed by [facility] Jr[facility] at [facility] ECT    ELECTROCONVULSIVE THERAPY (ECT) - SINGLE SEIZURE N/A 7/23/2018    Performed by [facility]ib [facility] Jr[facility] at [facility] ECT    ELECTROCONVULSIVE THERAPY (ECT) - SINGLE SEIZURE N/A 7/5/2018    Performed by Sh[facility]ib Boy[facility] Jr[facility] at [facility] ECT    ELECTROCONVULSIVE THERAPY (ECT) - SINGLE SEIZURE N/A 6/28/2018    Performed by Sh[facility]ib Boy[facility] Jr[facility] at [facility] ECT    ELECTROCONVULSIVE THERAPY (ECT) - SINGLE SEIZURE N/A 6/13/2018    Performed by [facility]ib [facility] Jr[facility] at [facility] ECT    ELECTROCONVULSIVE THERAPY (ECT) - SINGLE SEIZURE N/A 5/29/2018    Performed by Shoaib Boyce Jr[facility] at [facility] ECT    ELECTROCONVULSIVE THERAPY (ECT) - SINGLE SEIZURE N/A 5/8/2018    Performed by Sh[facility]ib Boyce Jr[facility] at [facility] ECT    ELECTROCONVULSIVE THERAPY (ECT) - SINGLE SEIZURE N/A 4/24/2018    Performed by Sh[facility]ib Boy[facility] Jr[facility] at [facility] ECT    ELECTROCONVULSIVE THERAPY (ECT) - SINGLE SEIZURE N/A 4/17/2018    Performed by Sh[facility]ib Boyce Jr[facility] at [facility] ECT    ELECTROCONVULSIVE THERAPY (ECT) - SINGLE SEIZURE N/A 4/13/2018    Performed by Sh[facility]ib Boyce Jr[facility] at [facility] ECT    ELECTROCONVULSIVE THERAPY (ECT) - SINGLE SEIZURE N/A 4/3/2018    Performed by [facility]ib [facility] Jr[facility] at [facility] ECT    ELECTROCONVULSIVE THERAPY (ECT) - SINGLE SEIZURE N/A 3/20/2018    Performed by Sh[facility]ib Boyce Jr[facility] at [facility] ECT    ELECTROCONVULSIVE THERAPY (ECT) - SINGLE SEIZURE N/A 3/15/2018    Performed by Shoaib Boyce Jr[facility] at [facility] ECT    ELECTROCONVULSIVE THERAPY (ECT) - SINGLE SEIZURE N/A 3/6/2018    Performed by Sh[facility]ib Boy[facility] Jr[facility] at [facility] ECT    ELECTROCONVULSIVE THERAPY (ECT) - SINGLE SEIZURE N/A 3/1/2018    Performed by [facility]ib [facility] Jr[facility] at [facility] ECT    ELECTROCONVULSIVE THERAPY (ECT) - SINGLE SEIZURE N/A 2/23/2018    Performed by Sh[facility]ib Boy[facility] Jr[facility] at [facility] ECT     ELECTROCONVULSIVE THERAPY (ECT) - SINGLE SEIZURE N/A 2/19/2018    Performed by [facility] at [facility] ECT    ELECTROCONVULSIVE THERAPY (ECT) - SINGLE SEIZURE N/A 2/15/2018    Performed by [facility]ib [facility] Jr[facility] at [facility] ECT    ELECTROCONVULSIVE THERAPY (ECT) - SINGLE SEIZURE N/A 1/22/2018    Performed by Sh[facility]ib Boy[facility] Jr[facility] at [facility] ECT    ELECTROCONVULSIVE THERAPY (ECT) - SINGLE SEIZURE N/A 12/11/2017    Performed by Sh[facility]ib [facility] Jr[facility] at [facility] ECT    ELECTROCONVULSIVE THERAPY (ECT) - SINGLE SEIZURE N/A 10/24/2017    Performed by Sh[facility]ib [facility] Jr[facility] at [facility] ECT    ELECTROCONVULSIVE THERAPY (ECT) - SINGLE SEIZURE N/A 9/20/2017    Performed by Sh[facility]ib [facility] Jr[facility] at [facility] ECT    ELECTROCONVULSIVE THERAPY (ECT) - SINGLE SEIZURE N/A 8/14/2017    Performed by Sh[facility]ib Boy[facility] Jr[facility] at [facility] ECT    ELECTROCONVULSIVE THERAPY (ECT) - SINGLE SEIZURE Bilateral 7/12/2017    Performed by Shoaib Boy[facility] Jr[facility] at [facility] ECT    ELECTROCONVULSIVE THERAPY (ECT) - SINGLE SEIZURE N/A 6/13/2017    Performed by Shoaib Boy[facility] Jr[facility] at [facility] ECT    ELECTROCONVULSIVE THERAPY (ECT) - SINGLE SEIZURE N/A 5/17/2017    Performed by Shoaib Boy[facility] Jr[facility] at [facility] ECT    ELECTROCONVULSIVE THERAPY (ECT) - SINGLE SEIZURE N/A 4/20/2017    Performed by Shoaib Boy[facility] Jr[facility] at [facility] ECT    ELECTROCONVULSIVE THERAPY (ECT) - SINGLE SEIZURE N/A 11/22/2016    Performed by Shoaib Boy[facility] Jr[facility] at [facility] ECT    ELECTROCONVULSIVE THERAPY (ECT) - SINGLE SEIZURE N/A 10/24/2016    Performed by Shoaib Boyce Jr[facility] at [facility] ECT    ELECTROCONVULSIVE THERAPY (ECT) - SINGLE SEIZURE N/A 9/22/2016    Performed by Shoaib Boy[facility] Jr[facility] at [facility] ECT    ELECTROCONVULSIVE THERAPY (ECT) - SINGLE SEIZURE N/A 9/1/2016    Performed by Shoaib Boy[facility] Jr[facility] at [facility] ECT    ELECTROCONVULSIVE THERAPY (ECT) - SINGLE SEIZURE N/A 8/15/2016    Performed by Shoaib Boy[facility] Jr[facility] at [facility] ECT     ELECTROCONVULSIVE THERAPY (ECT) - SINGLE SEIZURE N/A 8/1/2016    Performed by [facility] at [facility] ECT    ELECTROCONVULSIVE THERAPY (ECT) - SINGLE SEIZURE N/A 7/22/2016    Performed by Shoaib Boy[facility] Jr[facility] at [facility] ECT    ELECTROCONVULSIVE THERAPY (ECT) - SINGLE SEIZURE N/A 7/14/2016    Performed by Shoaib [facility] Jr[facility] at [facility] ECT    ELECTROCONVULSIVE THERAPY (ECT) - SINGLE SEIZURE N/A 7/8/2016    Performed by Sh[facility]ib Boy[facility] Jr[facility] at [facility] ECT    ELECTROCONVULSIVE THERAPY (ECT) - SINGLE SEIZURE N/A 7/5/2016    Performed by Shoaib Boy[facility] Jr[facility] at [facility] ECT    ELECTROCONVULSIVE THERAPY (ECT) - SINGLE SEIZURE N/A 6/27/2016    Performed by Shoaib Boy[facility] Jr[facility] at [facility] ECT    ELECTROCONVULSIVE THERAPY (ECT) - SINGLE SEIZURE N/A 6/23/2016    Performed by Shoaib Boyce Jr[facility] at [facility] ECT    ELECTROCONVULSIVE THERAPY (ECT) - SINGLE SEIZURE N/A 6/20/2016    Performed by Sh[facility]ib [facility] Jr[facility] at [facility] ECT    ELECTROCONVULSIVE THERAPY (ECT) - SINGLE SEIZURE N/A 6/16/2016    Performed by Shoaib Boyce Jr[facility] at [facility] ECT    ELECTROCONVULSIVE THERAPY (ECT) - SINGLE SEIZURE N/A 6/15/2016    Performed by Shoaib Boy[facility] Jr[facility] at [facility] ECT    ELECTROCONVULSIVE THERAPY (ECT) - SINGLE SEIZURE N/A 6/14/2016    Performed by Sh[facility]ib [facility] Jr[facility] at [facility] ECT    ELECTROCONVULSIVE THERAPY (ECT) - SINGLE SEIZURE N/A 6/13/2016    Performed by [facility] Jr[facility] at [facility] ECT    ELECTROCONVULSIVE THERAPY (ECT) - SINGLE SEIZURE N/A 1/4/2016    Performed by [facility]ib [facility] Jr[facility] at [facility] ECT    ELECTROCONVULSIVE THERAPY, CEREBRAL HEMISPHERE, UNILATERAL, 1 SEIZURE Bilateral 6/25/2018    Performed by [facility]ib [facility] Jr[facility] at [facility] ECT    OVARIAN CYST REMOVAL Bilateral       OBJECTIVE:     Vitals (pre-procedure):  There were no vitals filed for this visit.     Labs/Imaging/Studies:   No results found for this or any previous visit (from the past 48 hour(s)).   No  results found for: PHENYTOIN, PHENOBARB, VALPROATE, CBMZ    Physical Exam:   Gen: AAOx4, NAD  HEENT: MMM, PERRL, EOMI, O/P clear  CV: RRR, S1/S2 nml, no M/R/G  Chest: CTAB, no R/R/W, unlabored breathing  Abd: S/NT/ND, +BS, no HSM  Ext: No C/C/E, pulse 2+ throughout  Neuro: CN II-XII grossly intact, no focal deficits    Mental Status Exam:   Appearance: unremarkable, age appropriate, neatly groomed  Behavior: normal, cooperative, eye contact normal  Speech: normal tone, normal rate, normal pitch, normal volume, spontaneous  Mood: depressed  Affect: full & reactive  Thought Process: normal and logical  Thought Content: normal, no suicidality, no homicidality, delusions, or paranoia  Cognition: 3/3 immediate recall, HOUSE, ESUOH, 3/3 delayed recall, able to abstract, able to follow 2 step command  Insight: good  Judgment: good       ASSESSMENT/PLAN:     Allyssa Wright is a 39 y.o. female with Major Depressive Disorder, recurrent, in partial remission who presents for ECT.    Recommendations:   Proceed with ECT #82.    Mane Luevano MD  Psychiatry PGY-2  8/21/2019

## 2019-08-21 NOTE — PROGRESS NOTES
Ok to take her shot   Pt discharged to home . Pt IV removed. Pt has all discharge instructions and personal belongings. Tolerating clear liquids well. No further questions. Adequate for discharge.

## 2019-08-21 NOTE — DISCHARGE SUMMARY
Allyssa Wright  : 1980   MRN: [id number]  Date: 2019     Electroconvulsive Therapy  Discharge Summary    Admit Date: No admission date for patient encounter.  Discharge Date: 2019    Attending Physician: Shoaib Boyce Jr., MD   Discharge Provider: Mane Luevano MD    History of Present Illness: Allyssa Wright is a 39 y.o. female with Major depressive disorder, recurrent, in partial remission presented for ECT #82. See H&P dated 2019 for full HPI. For further details, see Dr. Boyce's pre-ECT evaluation.    Hospital Course: The patient tolerated the ECT treatment well without complication. Patient was stable post-procedure. See OP note dated 2019 for more details.     Disposition: Home or Self Care    Medications:  Patient's Medications   New Prescriptions    No medications on file   Previous Medications    CLOZAPINE (CLOZARIL) 50 MG TABLET    Take 50 mg by mouth once daily.     DAPSONE 7.5 % GLWP    Apply topically once daily.    DEXTROAMPHETAMINE-AMPHETAMINE 30 MG TAB    Take 30 mg by mouth 2 (two) times daily.     FAMCICLOVIR (FAMVIR) 500 MG TABLET    Take 1 tablet (500 mg total) by mouth 2 (two) times daily.    HYDROXYZINE PAMOATE (VISTARIL) 25 MG CAP    Take 2 capsules (50 mg total) by mouth nightly as needed (Take 25-50mg (1-2 caps) at night for anxiety prior to ECT).    HYDROXYZINE PAMOATE (VISTARIL) 25 MG CAP    Take 1 capsule (25 mg total) by mouth nightly as needed (anxiety/sleep). Take 1 to 2 pills as needed    MIRTAZAPINE (REMERON) 15 MG TABLET    Take 1 tablet (15 mg total) by mouth every evening.    SPIRONOLACTONE (ALDACTONE) 50 MG TABLET    50 mg 2 (two) times daily. 50  mg qAM, 50 mg qHS.    THYROID, PORK, (ARMOUR THYROID) 30 MG TAB    Take 1 tablet (30 mg total) by mouth every morning.    TRAZODONE (DESYREL) 100 MG TABLET    Take 200 mg by mouth every evening.     UNABLE TO FIND    2 (two) times daily. n-azetyl-cysteine    VENLAFAXINE (EFFEXOR) 100 MG TAB    Take 3  tablets (300 mg total) by mouth once daily.    VORTIOXETINE (TRINTELLIX) 5 MG TAB    Take 2.5 mg by mouth once daily.     ZOVIRAX 5 % CREA       Modified Medications    No medications on file   Discontinued Medications    No medications on file     Status at Discharge: Alert and medically stable    Discharge Diagnoses:  Major depressive disorder, recurrent, in partial remission  Diet: Resume previous outpatient diet  Activity: Ambulate with assistance  Instructions: Please do not eat or drink anything after midnight prior to procedure. Please do not drive on day of ECT.    Med Changes:  None    Next ECT:  8/26/19    Mane Luevano MD  Psychiatry PGY-2

## 2019-08-21 NOTE — ANESTHESIA POSTPROCEDURE EVALUATION
Anesthesia Post Evaluation    Patient: Allyssa Wright    Procedure(s) Performed: Procedure(s) (LRB):  ELECTROCONVULSIVE THERAPY (ECT) - SINGLE SEIZURE (N/A)    Final Anesthesia Type: general  Patient location during evaluation: PACU  Patient participation: Yes- Able to Participate  Level of consciousness: awake and alert  Post-procedure vital signs: reviewed and stable  Pain management: adequate  Airway patency: patent  PONV status at discharge: No PONV  Anesthetic complications: no      Cardiovascular status: blood pressure returned to baseline and hemodynamically stable  Respiratory status: unassisted, spontaneous ventilation and room air  Hydration status: euvolemic  Follow-up not needed.          Vitals Value Taken Time   /55 8/21/2019  9:33 AM   Temp 36.6 °C (97.9 °F) 8/21/2019  9:30 AM   Pulse 83 8/21/2019  9:32 AM   Resp 18 8/21/2019  9:30 AM   SpO2 97 % 8/21/2019  9:32 AM   Vitals shown include unvalidated device data.      No case tracking events are documented in the log.      Pain/Roma Score: Roma Score: 10 (8/21/2019  9:30 AM)

## 2019-08-21 NOTE — ANESTHESIA POSTPROCEDURE EVALUATION
Anesthesia Post Evaluation    Patient: Allyssa Wright    Procedure(s) Performed: Procedure(s) (LRB):  ELECTROCONVULSIVE THERAPY (ECT) - SINGLE SEIZURE (N/A)    Final Anesthesia Type: general  Patient location during evaluation: [facility]  Patient participation: Yes- Able to Participate  Level of consciousness: awake and alert and oriented  Post-procedure vital signs: reviewed and stable  Pain management: adequate  Airway patency: patent  PONV status at discharge: No PONV  Anesthetic complications: no      Cardiovascular status: blood pressure returned to baseline and hemodynamically stable  Respiratory status: unassisted  Hydration status: euvolemic  Follow-up not needed.          Vitals Value Taken Time   /55 8/21/2019  9:33 AM   Temp 36.6 °C (97.9 °F) 8/21/2019  9:30 AM   Pulse 83 8/21/2019  9:32 AM   Resp 18 8/21/2019  9:30 AM   SpO2 97 % 8/21/2019  9:32 AM   Vitals shown include unvalidated device data.      No case tracking events are documented in the log.      Pain/Roma Score: No data recorded

## 2019-08-23 ENCOUNTER — ANESTHESIA EVENT (OUTPATIENT)
Dept: ELECTROPHYSIOLOGY | Facility: HOSPITAL | Age: 39
End: 2019-08-23
Payer: COMMERCIAL

## 2019-08-23 NOTE — ANESTHESIA PREPROCEDURE EVALUATION
[facility]  Anesthesia Pre-Operative Evaluation         Patient Name: Allyssa Wright  YOB: 1980  MRN: [id number]    SUBJECTIVE:     Pre-operative evaluation for Procedure(s) (LRB):  ELECTROCONVULSIVE THERAPY (ECT) - SINGLE SEIZURE (N/A)     08/23/2019    Allyssa Wright is a 39 y.o. female w/ a significant PMHx of HSV-1, recurrent MDD who presents for ECT #82    Previous Medications     methohexital (BREVITAL SODIUM) injection 120 mg   succinylcholine (ANECTINE) 20 mg/mL injection 130 mg   ondansetron injection 4 mg 4 mg   ketorolac (TORADOL) injection 30 mg 30 mg   esmolol (BREVIBLOC) bolus 40 mg   0.9%  NaCl infusion 500 mL         Patient now presents for the above procedure(s).      LDA: None documented.       Peripheral IV - Single Lumen 08/07/19 0752 20 G Left Hand (Active)   Number of days: 16       Prev airway: None documented.    Drips: None documented.      Patient Active Problem List   Diagnosis    Major depressive disorder, recurrent episode, in partial remission    Other acne    Comfort measures only status    MDD (major depressive disorder)    Major depression    Major depressive disorder    MDD (major depressive disorder), severe    Depression    Major depressive disorder, recurrent, in partial remission    Bipolar depression    Major depressive disorder, recurrent episode, moderate degree       Review of patient's allergies indicates:   Allergen Reactions    Benzodiazepines Other (See Comments)     Contraindicated while taking Clozapine    Ampicillin      Mom says so    Erythromycin     Levaquin [levofloxacin] Other (See Comments)     Depression side effects    Penicillins      Mom says so    Pristiq [desvenlafaxine]      psycotic      Sulfa (sulfonamide antibiotics)      Rash      Azithromycin Anxiety       Current Inpatient Medications:      Current Facility-Administered  Medications on File Prior to Encounter   Medication Dose Route Frequency Provider Last Rate Last Dose    sodium chloride 0.9% flush 10 mL  10 mL Intra-Catheter PRN Homa Colbert MD         Current Outpatient Medications on File Prior to Encounter   Medication Sig Dispense Refill    clozapine (CLOZARIL) 50 MG tablet Take 50 mg by mouth once daily.       dapsone 7.5 % GlwP Apply topically once daily.      dextroamphetamine-amphetamine 30 mg Tab Take 30 mg by mouth 2 (two) times daily.       famciclovir (FAMVIR) 500 MG tablet Take 1 tablet (500 mg total) by mouth 2 (two) times daily. 30 tablet 12    hydrOXYzine pamoate (VISTARIL) 25 MG Cap Take 2 capsules (50 mg total) by mouth nightly as needed (Take 25-50mg (1-2 caps) at night for anxiety prior to ECT). 180 capsule 0    hydrOXYzine pamoate (VISTARIL) 25 MG Cap Take 1 capsule (25 mg total) by mouth nightly as needed (anxiety/sleep). Take 1 to 2 pills as needed 60 capsule 2    mirtazapine (REMERON) 15 MG tablet Take 1 tablet (15 mg total) by mouth every evening. (Patient taking differently: Take 7.5 mg by mouth every evening. ) 90 tablet 0    spironolactone (ALDACTONE) 50 MG tablet 50 mg 2 (two) times daily. 50  mg qAM, 50 mg qHS.      thyroid, pork, (ARMOUR THYROID) 30 mg Tab Take 1 tablet (30 mg total) by mouth every morning. 30 tablet 11    trazodone (DESYREL) 100 MG tablet Take 200 mg by mouth every evening.       UNABLE TO FIND 2 (two) times daily. n-azetyl-cysteine      venlafaxine (EFFEXOR) 100 MG Tab Take 3 tablets (300 mg total) by mouth once daily. (Patient taking differently: Take 225 mg by mouth once daily. )      vortioxetine (TRINTELLIX) 5 mg Tab Take 2.5 mg by mouth once daily.       ZOVIRAX 5 % Crea   5       Past Surgical History:   Procedure Laterality Date    ANKLE SURGERY Right     BREAST augmentation      ELECTROCONVULSIVE THERAPY (ECT) - SINGLE SEIZURE N/A 12/6/2018    Performed by Shoaib Boyce Jr., MD at [facility] ECT     ELECTROCONVULSIVE THERAPY (ECT) - SINGLE SEIZURE N/A 8/31/2018    Performed by [facility] Jr[facility] at [facility] ECT    ELECTROCONVULSIVE THERAPY (ECT) - SINGLE SEIZURE N/A 8/6/2018    Performed by Shoaib Boyce Jr., MD at [facility] ECT    ELECTROCONVULSIVE THERAPY (ECT) - SINGLE SEIZURE N/A 7/23/2018    Performed by Shoaib Boyce Jr., MD at [facility] ECT    ELECTROCONVULSIVE THERAPY (ECT) - SINGLE SEIZURE N/A 7/5/2018    Performed by Shoaib Boyce Jr[facility] MD at [facility] ECT    ELECTROCONVULSIVE THERAPY (ECT) - SINGLE SEIZURE N/A 6/28/2018    Performed by Shoaib Boyce Jr[facility] MD at [facility] ECT    ELECTROCONVULSIVE THERAPY (ECT) - SINGLE SEIZURE N/A 6/13/2018    Performed by Shoaib Boyce Jr., MD at [facility] ECT    ELECTROCONVULSIVE THERAPY (ECT) - SINGLE SEIZURE N/A 5/29/2018    Performed by Shoaib Boyce Jr., MD at [facility] ECT    ELECTROCONVULSIVE THERAPY (ECT) - SINGLE SEIZURE N/A 5/8/2018    Performed by Shoaib Boyce Jr., MD at [facility] ECT    ELECTROCONVULSIVE THERAPY (ECT) - SINGLE SEIZURE N/A 4/24/2018    Performed by Shoaib Boyce Jr[facility] at [facility] ECT    ELECTROCONVULSIVE THERAPY (ECT) - SINGLE SEIZURE N/A 4/17/2018    Performed by Shoaib Boyce Jr., MD at [facility] ECT    ELECTROCONVULSIVE THERAPY (ECT) - SINGLE SEIZURE N/A 4/13/2018    Performed by Shoaib Boyce Jr., MD at [facility] ECT    ELECTROCONVULSIVE THERAPY (ECT) - SINGLE SEIZURE N/A 4/3/2018    Performed by Shoaib Boyce Jr., MD at [facility] ECT    ELECTROCONVULSIVE THERAPY (ECT) - SINGLE SEIZURE N/A 3/20/2018    Performed by Shoaib Boyce Jr., MD at [facility] ECT    ELECTROCONVULSIVE THERAPY (ECT) - SINGLE SEIZURE N/A 3/15/2018    Performed by Shoaib Boyce Jr., MD at [facility] ECT    ELECTROCONVULSIVE THERAPY (ECT) - SINGLE SEIZURE N/A 3/6/2018    Performed by Shoaib Boyce Jr., MD at [facility] ECT    ELECTROCONVULSIVE THERAPY (ECT) - SINGLE SEIZURE N/A 3/1/2018    Performed by Shoaib Boyce Jr., MD at [facility] ECT    ELECTROCONVULSIVE  THERAPY (ECT) - SINGLE SEIZURE N/A 2/23/2018    Performed by Sh[facility]ib [facility]ce Jr., [facility] at [facility] ECT    ELECTROCONVULSIVE THERAPY (ECT) - SINGLE SEIZURE N/A 2/19/2018    Performed by Shoaib Boyce Jr., MD at [facility] ECT    ELECTROCONVULSIVE THERAPY (ECT) - SINGLE SEIZURE N/A 2/15/2018    Performed by Shoaib Boyce Jr., MD at [facility] ECT    ELECTROCONVULSIVE THERAPY (ECT) - SINGLE SEIZURE N/A 1/22/2018    Performed by Shoaib Boyce Jr., MD at [facility] ECT    ELECTROCONVULSIVE THERAPY (ECT) - SINGLE SEIZURE N/A 12/11/2017    Performed by Shoaib Boyce Jr., MD at [facility] ECT    ELECTROCONVULSIVE THERAPY (ECT) - SINGLE SEIZURE N/A 10/24/2017    Performed by Shoaib Boyce Jr., MD at [facility] ECT    ELECTROCONVULSIVE THERAPY (ECT) - SINGLE SEIZURE N/A 9/20/2017    Performed by Shoaib Boyce Jr., MD at [facility] ECT    ELECTROCONVULSIVE THERAPY (ECT) - SINGLE SEIZURE N/A 8/14/2017    Performed by Shoaib Boyce Jr., MD at [facility] ECT    ELECTROCONVULSIVE THERAPY (ECT) - SINGLE SEIZURE Bilateral 7/12/2017    Performed by Shoaib Boyce Jr., MD at [facility] ECT    ELECTROCONVULSIVE THERAPY (ECT) - SINGLE SEIZURE N/A 6/13/2017    Performed by Shoaib Boyce Jr., MD at [facility] ECT    ELECTROCONVULSIVE THERAPY (ECT) - SINGLE SEIZURE N/A 5/17/2017    Performed by Shoaib Boyce Jr., MD at [facility] ECT    ELECTROCONVULSIVE THERAPY (ECT) - SINGLE SEIZURE N/A 4/20/2017    Performed by Shoaib Boyce Jr., MD at [facility] ECT    ELECTROCONVULSIVE THERAPY (ECT) - SINGLE SEIZURE N/A 11/22/2016    Performed by Shoaib Boyce Jr., MD at [facility] ECT    ELECTROCONVULSIVE THERAPY (ECT) - SINGLE SEIZURE N/A 10/24/2016    Performed by Shoaib Boyce Jr., MD at [facility] ECT    ELECTROCONVULSIVE THERAPY (ECT) - SINGLE SEIZURE N/A 9/22/2016    Performed by Shoaib Boyce Jr., MD at [facility] ECT    ELECTROCONVULSIVE THERAPY (ECT) - SINGLE SEIZURE N/A 9/1/2016    Performed by Shoaib Boyce Jr., MD at [facility] ECT    ELECTROCONVULSIVE  THERAPY (ECT) - SINGLE SEIZURE N/A 8/15/2016    Performed by [facility] Jr[facility] at [facility] ECT    ELECTROCONVULSIVE THERAPY (ECT) - SINGLE SEIZURE N/A 8/1/2016    Performed by Shoaib Boyce Jr., MD at [facility] ECT    ELECTROCONVULSIVE THERAPY (ECT) - SINGLE SEIZURE N/A 7/22/2016    Performed by Shoaib Boyce Jr., MD at [facility] ECT    ELECTROCONVULSIVE THERAPY (ECT) - SINGLE SEIZURE N/A 7/14/2016    Performed by Shoaib Boyce Jr., MD at [facility] ECT    ELECTROCONVULSIVE THERAPY (ECT) - SINGLE SEIZURE N/A 7/8/2016    Performed by Shoaib Boyce Jr., MD at [facility] ECT    ELECTROCONVULSIVE THERAPY (ECT) - SINGLE SEIZURE N/A 7/5/2016    Performed by Shoaib Boyce Jr., MD at [facility] ECT    ELECTROCONVULSIVE THERAPY (ECT) - SINGLE SEIZURE N/A 6/27/2016    Performed by Shoaib Boyce Jr., MD at [facility] ECT    ELECTROCONVULSIVE THERAPY (ECT) - SINGLE SEIZURE N/A 6/23/2016    Performed by Shoaib Boyce Jr., MD at [facility] ECT    ELECTROCONVULSIVE THERAPY (ECT) - SINGLE SEIZURE N/A 6/20/2016    Performed by Shoaib Boyce Jr., MD at [facility] ECT    ELECTROCONVULSIVE THERAPY (ECT) - SINGLE SEIZURE N/A 6/16/2016    Performed by Shoaib Boyce Jr., MD at [facility] ECT    ELECTROCONVULSIVE THERAPY (ECT) - SINGLE SEIZURE N/A 6/15/2016    Performed by Shoaib Boyce Jr., MD at [facility] ECT    ELECTROCONVULSIVE THERAPY (ECT) - SINGLE SEIZURE N/A 6/14/2016    Performed by Shoaib Boyce Jr., MD at [facility] ECT    ELECTROCONVULSIVE THERAPY (ECT) - SINGLE SEIZURE N/A 6/13/2016    Performed by Shoaib Boyce Jr., MD at [facility] ECT    ELECTROCONVULSIVE THERAPY (ECT) - SINGLE SEIZURE N/A 1/4/2016    Performed by Shoaib Boyce Jr., MD at [facility] ECT    ELECTROCONVULSIVE THERAPY, CEREBRAL HEMISPHERE, UNILATERAL, 1 SEIZURE Bilateral 6/25/2018    Performed by Sh[facility]ib [facility] Jr., MD at [facility] ECT    OVARIAN CYST REMOVAL Bilateral        Social History     Socioeconomic History    Marital status: Single     Spouse name: Not on  file    Number of children: Not on file    Years of education: Not on file    Highest education level: Not on file   Occupational History    Occupation: unemployed   Social Needs    Financial resource strain: Not on file    Food insecurity:     Worry: Not on file     Inability: Not on file    Transportation needs:     Medical: Not on file     Non-medical: Not on file   Tobacco Use    Smoking status: Former Smoker     Last attempt to quit: 2011     Years since quittin.3    Smokeless tobacco: Never Used   Substance and Sexual Activity    Alcohol use: Yes     Comment: rarely    Drug use: No     Comment: Experimental use in high school    Sexual activity: Not on file   Lifestyle    Physical activity:     Days per week: Not on file     Minutes per session: Not on file    Stress: Not on file   Relationships    Social connections:     Talks on phone: Not on file     Gets together: Not on file     Attends [employer]ish service: Not on file     Active member of club or organization: Not on file     Attends meetings of clubs or organizations: Not on file     Relationship status: Not on file   Other Topics Concern    Patient feels they ought to cut down on drinking/drug use Not Asked    Patient annoyed by others criticizing their drinking/drug use Not Asked    Patient has felt bad or guilty about drinking/drug use Not Asked    Patient has had a drink/used drugs as an eye opener in the AM Not Asked   Social History Narrative    Pt has 1 older brother from an intact family until the death of her mother in .  She completed 1 year of college, was never in the , and has never been employed.  She was engaged once, but never , has no children, and lives with her father plus 2 dogs.  She denies any hobbies and is spiritual but not Amish.  She does date and returns to college during rare periods when depression and anxiety [place].       OBJECTIVE:     Vital Signs Range (Last 24H):  BP: ()/()    Arterial Line BP: ()/()       Significant Labs:  Lab Results   Component Value Date    WBC 4.26 08/07/2019    HGB 14.0 08/07/2019    HCT 42.9 08/07/2019     08/07/2019    ALT 22 08/07/2019    AST 22 08/07/2019     08/07/2019    K 4.1 08/07/2019     08/07/2019    CREATININE 0.7 08/07/2019    BUN 10 08/07/2019    CO2 30 (H) 08/07/2019    TSH 1.864 08/07/2019       Diagnostic Studies: No relevant studies.    EKG:   Results for orders placed or performed in visit on 06/08/16   IN OFFICE EKG 12-LEAD (to [facility])    Collection Time: 06/08/16  3:33 PM    Narrative    Test Reason : F33.9  Vent. Rate : 084 BPM     Atrial Rate : 084 BPM     P-R Int : 154 ms          QRS Dur : 086 ms      QT Int : 378 ms       P-R-T Axes : 067 066 055 degrees     QTc Int : 446 ms    Normal sinus rhythm  Normal ECG  When compared with ECG of 03-DEC-2015 11:38,  Questionable change in The axis  T wave inversion no longer evident in Inferior leads  Confirmed by Flaco Sr MD ([id number]) on 6/9/2016 1:30:13 PM    Referred By: SELF REFERRAL           Confirmed By:Flaco Sr MD       2D ECHO:  TTE:  No results found for this or any previous visit.    HENRIQUE:  No results found for this or any previous visit.    ASSESSMENT/PLAN:         Anesthesia Evaluation    I have reviewed the Patient Summary Reports.    I have reviewed the Nursing Notes.   I have reviewed the Medications.     Review of Systems  Anesthesia Hx:  No problems with previous Anesthesia    Hematology/Oncology:        Denies Current/Recent Cancer   EENT/Dental:   denies chronic allergic rhinitis  Denies Otitis Media   Cardiovascular:   Denies Pacemaker.  Denies Hypertension.  Denies Valvular problems/Murmurs.  Denies MI.  Denies CAD.    Denies CABG/stent.              denies PVD    Pulmonary:   Denies Pneumonia Denies COPD.  Denies Asthma.  Denies Shortness of breath.  Denies Recent URI.  Denies Sleep Apnea.    Renal/:   Denies Chronic Renal Disease.     Hepatic/GI:   Denies  PUD. Denies GERD. Denies Liver Disease.    Neurological:   Denies TIA. Denies CVA. Denies Neuromuscular Disease.    Denies Peripheral Neuropathy    Endocrine:   Denies Diabetes. Denies Hypothyroidism. Denies Hyperthyroidism.    Psych:   Psychiatric History          Physical Exam  General:  Well nourished    Airway/Jaw/Neck:  Jaw/Neck Findings: Neck ROM: Normal ROM     Eyes/Ears/Nose:  EYES/EARS/NOSE FINDINGS: Normal    Chest/Lungs:  Chest/Lungs Findings: Clear to auscultation, Normal Respiratory Rate     Heart/Vascular:  Heart Findings: Rate: Normal  Rhythm: Regular Rhythm  Sounds: Normal  Vascular Findings:     Abdomen:  Abdomen Findings:  Normal, Nontender, Soft     Musculoskeletal:  Musculoskeletal Findings:    Skin:  Skin Findings:     Mental Status:  Mental Status Findings:  Cooperative, Alert and Oriented         Anesthesia Plan  Type of Anesthesia, risks & benefits discussed:  Anesthesia Type:  general  Patient's Preference:   Intra-op Monitoring Plan: standard ASA monitors  Intra-op Monitoring Plan Comments:   Post Op Pain Control Plan: multimodal analgesia and IV/PO Opioids PRN  Post Op Pain Control Plan Comments:   Induction:   IV  Beta Blocker:  Patient is not currently on a Beta-Blocker (No further documentation required).       Informed Consent: Patient understands risks and agrees with Anesthesia plan.  Questions answered. Anesthesia consent signed with patient.  ASA Score: 2     Day of Surgery Review of History & Physical:            Ready For Surgery From Anesthesia Perspective.

## 2019-08-26 ENCOUNTER — HOSPITAL ENCOUNTER (OUTPATIENT)
Facility: HOSPITAL | Age: 39
Discharge: HOME | End: 2019-08-26
Attending: PSYCHIATRY & NEUROLOGY | Admitting: PSYCHIATRY & NEUROLOGY
Payer: COMMERCIAL

## 2019-08-26 ENCOUNTER — ANESTHESIA (OUTPATIENT)
Dept: ELECTROPHYSIOLOGY | Facility: HOSPITAL | Age: 39
End: 2019-08-26
Payer: COMMERCIAL

## 2019-08-26 VITALS
WEIGHT: 145 LBS | BODY MASS INDEX: 24.16 KG/M2 | HEIGHT: 65 IN | DIASTOLIC BLOOD PRESSURE: 60 MMHG | SYSTOLIC BLOOD PRESSURE: 103 MMHG | HEART RATE: 99 BPM | OXYGEN SATURATION: 98 % | RESPIRATION RATE: 18 BRPM | TEMPERATURE: 99 F

## 2019-08-26 DIAGNOSIS — F33.9 RECURRENT MAJOR DEPRESSIVE DISORDER, REMISSION STATUS UNSPECIFIED: Primary | ICD-10-CM

## 2019-08-26 DIAGNOSIS — F33.41 MAJOR DEPRESSIVE DISORDER, RECURRENT EPISODE, IN PARTIAL REMISSION: ICD-10-CM

## 2019-08-26 LAB
B-HCG UR QL: NEGATIVE
CTP QC/QA: YES

## 2019-08-26 PROCEDURE — 63600175 PHARM REV CODE 636 W HCPCS: Performed by: PSYCHIATRY & NEUROLOGY

## 2019-08-26 PROCEDURE — D9220A PRA ANESTHESIA: Mod: ,,, | Performed by: ANESTHESIOLOGY

## 2019-08-26 PROCEDURE — D9220A PRA ANESTHESIA: ICD-10-PCS | Mod: ,,, | Performed by: ANESTHESIOLOGY

## 2019-08-26 PROCEDURE — 63600175 PHARM REV CODE 636 W HCPCS: Performed by: STUDENT IN AN ORGANIZED HEALTH CARE EDUCATION/TRAINING PROGRAM

## 2019-08-26 PROCEDURE — 90870 PR ELECTROCONVULSIVE THERAPY,1 SEIZ: ICD-10-PCS | Mod: ,,, | Performed by: PSYCHIATRY & NEUROLOGY

## 2019-08-26 PROCEDURE — 90870 ELECTROCONVULSIVE THERAPY: CPT | Mod: ,,, | Performed by: PSYCHIATRY & NEUROLOGY

## 2019-08-26 PROCEDURE — 81025 URINE PREGNANCY TEST: CPT | Performed by: PSYCHIATRY & NEUROLOGY

## 2019-08-26 PROCEDURE — 25000003 PHARM REV CODE 250: Performed by: PSYCHIATRY & NEUROLOGY

## 2019-08-26 PROCEDURE — 25000003 PHARM REV CODE 250: Performed by: STUDENT IN AN ORGANIZED HEALTH CARE EDUCATION/TRAINING PROGRAM

## 2019-08-26 PROCEDURE — 90870 ELECTROCONVULSIVE THERAPY: CPT | Performed by: ANESTHESIOLOGY

## 2019-08-26 RX ORDER — SUCCINYLCHOLINE CHLORIDE 20 MG/ML
INJECTION INTRAMUSCULAR; INTRAVENOUS
Status: DISCONTINUED | OUTPATIENT
Start: 2019-08-26 | End: 2019-08-26

## 2019-08-26 RX ORDER — SODIUM CHLORIDE 9 MG/ML
INJECTION, SOLUTION INTRAVENOUS CONTINUOUS
Status: DISCONTINUED | OUTPATIENT
Start: 2019-08-26 | End: 2019-08-26 | Stop reason: HOSPADM

## 2019-08-26 RX ORDER — ESMOLOL HYDROCHLORIDE 10 MG/ML
INJECTION INTRAVENOUS
Status: DISCONTINUED | OUTPATIENT
Start: 2019-08-26 | End: 2019-08-26

## 2019-08-26 RX ORDER — ONDANSETRON 2 MG/ML
4 INJECTION INTRAMUSCULAR; INTRAVENOUS DAILY PRN
Status: DISCONTINUED | OUTPATIENT
Start: 2019-08-26 | End: 2019-08-26 | Stop reason: HOSPADM

## 2019-08-26 RX ORDER — KETOROLAC TROMETHAMINE 30 MG/ML
INJECTION, SOLUTION INTRAMUSCULAR; INTRAVENOUS
Status: DISCONTINUED | OUTPATIENT
Start: 2019-08-26 | End: 2019-08-26

## 2019-08-26 RX ORDER — SODIUM CHLORIDE 0.9 % (FLUSH) 0.9 %
10 SYRINGE (ML) INJECTION
Status: DISCONTINUED | OUTPATIENT
Start: 2019-08-26 | End: 2019-08-26 | Stop reason: HOSPADM

## 2019-08-26 RX ORDER — LIDOCAINE HYDROCHLORIDE 10 MG/ML
1 INJECTION, SOLUTION EPIDURAL; INFILTRATION; INTRACAUDAL; PERINEURAL ONCE
Status: COMPLETED | OUTPATIENT
Start: 2019-08-26 | End: 2019-08-26

## 2019-08-26 RX ORDER — ONDANSETRON 2 MG/ML
INJECTION INTRAMUSCULAR; INTRAVENOUS
Status: DISCONTINUED | OUTPATIENT
Start: 2019-08-26 | End: 2019-08-26

## 2019-08-26 RX ADMIN — KETOROLAC TROMETHAMINE 30 MG: 30 INJECTION, SOLUTION INTRAMUSCULAR at 08:08

## 2019-08-26 RX ADMIN — SUCCINYLCHOLINE CHLORIDE 120 MG: 20 INJECTION, SOLUTION INTRAMUSCULAR; INTRAVENOUS at 08:08

## 2019-08-26 RX ADMIN — Medication 500 MG: at 08:08

## 2019-08-26 RX ADMIN — ESMOLOL HYDROCHLORIDE 40 MG: 10 INJECTION INTRAVENOUS at 08:08

## 2019-08-26 RX ADMIN — SODIUM CHLORIDE: 0.9 INJECTION, SOLUTION INTRAVENOUS at 08:08

## 2019-08-26 RX ADMIN — LIDOCAINE HYDROCHLORIDE 10 MG: 10 INJECTION, SOLUTION EPIDURAL; INFILTRATION; INTRACAUDAL; PERINEURAL at 06:08

## 2019-08-26 RX ADMIN — METHOHEXITAL SODIUM 150 MG: 500 INJECTION, POWDER, LYOPHILIZED, FOR SOLUTION INTRAMUSCULAR; INTRAVENOUS; RECTAL at 08:08

## 2019-08-26 RX ADMIN — ONDANSETRON 4 MG: 2 INJECTION, SOLUTION INTRAMUSCULAR; INTRAVENOUS at 08:08

## 2019-08-26 RX ADMIN — SODIUM CHLORIDE: 0.9 INJECTION, SOLUTION INTRAVENOUS at 06:08

## 2019-08-26 NOTE — H&P
Allyssa Wright  : 1980   MRN: [id number]  Date: 2019     Electroconvulsive Therapy  History & Physical    Chief complaint: Major Depressive Disorder, recurrent, partial remission  Procedure: ECT #83    SUBJECTIVE:     HPI:   Allyssa Wright is a 39 y.o. female with Major Depressive Disorder, recurrent, in partial remission who presents for ECT.      Father at bedside.  S/p last ECT treatment done 19.   Overall, patient reports that she is doing much better. She states she had some periods of depressed mood this weekend, but no identifiable stressors or precipitating factors.  Continuing TMS treatments. Last TMS 19.  Denies SI, HI, AVH.    Medication compliant.  Confirms NPO status.   Ready for ECT today.     Psychiatric Review of Systems:  Mood: depressed  Appetite: No problem  Psychomotor: No problem  Cognitive Impairment: none  Insomnia:  No problem  Psychosis: denies  Diurnal Variation: denies  Suicidal Ideation: denies    Medical Review Of Systems:  Pertinent items are noted in HPI.    Current Medications:   Current Facility-Administered Medications on File Prior to Encounter   Medication Dose Route Frequency Provider Last Rate Last Dose    sodium chloride 0.9% flush 10 mL  10 mL Intra-Catheter PRN Homa Colbert MD         Current Outpatient Medications on File Prior to Encounter   Medication Sig Dispense Refill    brexpiprazole (REXULTI ORAL) Take 0.5 mg by mouth once daily.      clozapine (CLOZARIL) 50 MG tablet Take 50 mg by mouth once daily.       dapsone 7.5 % GlwP Apply topically once daily.      famciclovir (FAMVIR) 500 MG tablet Take 1 tablet (500 mg total) by mouth 2 (two) times daily. 30 tablet 12    hydrOXYzine pamoate (VISTARIL) 25 MG Cap Take 2 capsules (50 mg total) by mouth nightly as needed (Take 25-50mg (1-2 caps) at night for anxiety prior to ECT). 180 capsule 0    mirtazapine (REMERON) 15 MG tablet Take 1 tablet (15 mg total) by mouth every evening.  (Patient taking differently: Take 7.5 mg by mouth every evening. ) 90 tablet 0    spironolactone (ALDACTONE) 50 MG tablet 50 mg 2 (two) times daily. 50  mg qAM, 50 mg qHS.      thyroid, pork, (ARMOUR THYROID) 30 mg Tab Take 1 tablet (30 mg total) by mouth every morning. 30 tablet 11    trazodone (DESYREL) 100 MG tablet Take 200 mg by mouth every evening.       venlafaxine (EFFEXOR) 100 MG Tab Take 3 tablets (300 mg total) by mouth once daily. (Patient taking differently: Take 225 mg by mouth once daily. )      vortioxetine (TRINTELLIX) 5 mg Tab Take 2.5 mg by mouth once daily.       dextroamphetamine-amphetamine 30 mg Tab Take 30 mg by mouth 2 (two) times daily.       hydrOXYzine pamoate (VISTARIL) 25 MG Cap Take 1 capsule (25 mg total) by mouth nightly as needed (anxiety/sleep). Take 1 to 2 pills as needed 60 capsule 2    UNABLE TO FIND 2 (two) times daily. n-azetyl-cysteine      ZOVIRAX 5 % Crea   5      Allergies:   Benzodiazepines; Ampicillin; Erythromycin; Levaquin [levofloxacin]; Penicillins; Pristiq [desvenlafaxine]; Sulfa (sulfonamide antibiotics); and Azithromycin    Past Medical/Surgical History:   Past Medical History:   Diagnosis Date    Anxiety     Depression     History of psychiatric hospitalization     HSV-1 (herpes simplex virus 1) infection     Hx of psychiatric care     Moderate depressed bipolar II disorder 06/13/2016    reports no history of bipolar    Obsessive-compulsive disorder     Psychiatric problem     Schizophrenia 4/3/2018    Self-harming behavior     Therapy      Past Surgical History:   Procedure Laterality Date    ANKLE SURGERY Right     BREAST augmentation      ELECTROCONVULSIVE THERAPY (ECT) - SINGLE SEIZURE N/A 12/6/2018    [facility]    ELECTROCONVULSIVE THERAPY (ECT) - SINGLE SEIZURE N/A 8/31/2018    Per[facility] at [facility] ECT    ELECTROCONVULSIVE THERAPY (ECT) - SINGLE SEIZURE N/A 8/6/2018     Performed by [facility] at [facility] ECT    ELECTROCONVULSIVE THERAPY (ECT) - SINGLE SEIZURE N/A 7/23/2018    Performed by [facility] Jr[facility] at [facility] ECT    ELECTROCONVULSIVE THERAPY (ECT) - SINGLE SEIZURE N/A 7/5/2018    Performed by Sh[facility] Jr[facility] at [facility] ECT    ELECTROCONVULSIVE THERAPY (ECT) - SINGLE SEIZURE N/A 6/28/2018    Performed by Sh[facility]ib [facility] Jr[facility] at [facility] ECT    ELECTROCONVULSIVE THERAPY (ECT) - SINGLE SEIZURE N/A 6/13/2018    Performed by Sh[facility]ib Boy[facility] Jr[facility] at [facility] ECT    ELECTROCONVULSIVE THERAPY (ECT) - SINGLE SEIZURE N/A 5/29/2018    Performed by Sh[facility]ib Boyce Jr[facility] at [facility] ECT    ELECTROCONVULSIVE THERAPY (ECT) - SINGLE SEIZURE N/A 5/8/2018    Performed by Shoaib Boyce Jr[facility] at [facility] ECT    ELECTROCONVULSIVE THERAPY (ECT) - SINGLE SEIZURE N/A 4/24/2018    Performed by Shoaib Boyce Jr[facility] at [facility] ECT    ELECTROCONVULSIVE THERAPY (ECT) - SINGLE SEIZURE N/A 4/17/2018    Performed by Shoaib Boy[facility] Jr[facility] at [facility] ECT    ELECTROCONVULSIVE THERAPY (ECT) - SINGLE SEIZURE N/A 4/13/2018    Performed by Shoaib Boyce Jr., [facility] at [facility] ECT    ELECTROCONVULSIVE THERAPY (ECT) - SINGLE SEIZURE N/A 4/3/2018    Performed by Shoaib Boyce Jr[facility] at [facility] ECT    ELECTROCONVULSIVE THERAPY (ECT) - SINGLE SEIZURE N/A 3/20/2018    Performed by Shoaib Boyce Jr., MD at [facility] ECT    ELECTROCONVULSIVE THERAPY (ECT) - SINGLE SEIZURE N/A 3/15/2018    Performed by Shoaib Boyce Jr., MD at [facility] ECT    ELECTROCONVULSIVE THERAPY (ECT) - SINGLE SEIZURE N/A 3/6/2018    Performed by Shoaib Boyce Jr., [facility] at [facility] ECT    ELECTROCONVULSIVE THERAPY (ECT) - SINGLE SEIZURE N/A 3/1/2018    Performed by Shoaib Boyce Jr[facility] at [facility] ECT    ELECTROCONVULSIVE THERAPY (ECT) - SINGLE SEIZURE N/A 2/23/2018    Performed by Sh[facility]ib [facility] Jr[facility] at [facility] ECT    ELECTROCONVULSIVE THERAPY (ECT) - SINGLE SEIZURE N/A 2/19/2018    Performed by  Shoaib Boyce Jr., MD at [facility] ECT    ELECTROCONVULSIVE THERAPY (ECT) - SINGLE SEIZURE N/A 2/15/2018    Performed by Shoaib Boyce Jr[facility] at [facility] ECT    ELECTROCONVULSIVE THERAPY (ECT) - SINGLE SEIZURE N/A 1/22/2018    Performed by Sh[facility]ib Boy[facility] Jr[facility] at [facility] ECT    ELECTROCONVULSIVE THERAPY (ECT) - SINGLE SEIZURE N/A 12/11/2017    Performed by Shoaib Byoce Jr., [facility] at [facility] ECT    ELECTROCONVULSIVE THERAPY (ECT) - SINGLE SEIZURE N/A 10/24/2017    Performed by Shoaib Boy[facility] Jr[facility] at [facility] ECT    ELECTROCONVULSIVE THERAPY (ECT) - SINGLE SEIZURE N/A 9/20/2017    Performed by Shoaib Boyce Jr[facility] at [facility] ECT    ELECTROCONVULSIVE THERAPY (ECT) - SINGLE SEIZURE N/A 8/14/2017    Performed by Sh[facility]ib Boy[facility] Jr[facility] at [facility] ECT    ELECTROCONVULSIVE THERAPY (ECT) - SINGLE SEIZURE Bilateral 7/12/2017    Performed by Shoaib Boyce Jr[facility] at [facility] ECT    ELECTROCONVULSIVE THERAPY (ECT) - SINGLE SEIZURE N/A 6/13/2017    Performed by Shoaib Boyce Jr[facility] at [facility] ECT    ELECTROCONVULSIVE THERAPY (ECT) - SINGLE SEIZURE N/A 5/17/2017    Performed by Shoaib Boyce Jr[facility] at [facility] ECT    ELECTROCONVULSIVE THERAPY (ECT) - SINGLE SEIZURE N/A 4/20/2017    Performed by Shoaib Boyce Jr[facility] at [facility] ECT    ELECTROCONVULSIVE THERAPY (ECT) - SINGLE SEIZURE N/A 11/22/2016    Performed by Shoaib Boyce Jr[facility] at [facility] ECT    ELECTROCONVULSIVE THERAPY (ECT) - SINGLE SEIZURE N/A 10/24/2016    Performed by Shoaib Boyce Jr., MD at [facility] ECT    ELECTROCONVULSIVE THERAPY (ECT) - SINGLE SEIZURE N/A 9/22/2016    Performed by Shoaib Boyce Jr[facility] at [facility] ECT    ELECTROCONVULSIVE THERAPY (ECT) - SINGLE SEIZURE N/A 9/1/2016    Performed by Shoaib Boyce Jr[facility] MD at [facility] ECT    ELECTROCONVULSIVE THERAPY (ECT) - SINGLE SEIZURE N/A 8/15/2016    Performed by Shoaib Boyce Jr[facility] MD at [facility] ECT    ELECTROCONVULSIVE THERAPY (ECT) - SINGLE SEIZURE N/A 8/1/2016    Performed by  Sho[facility]ib Boyce Jr., MD at [facility] ECT    ELECTROCONVULSIVE THERAPY (ECT) - SINGLE SEIZURE N/A 7/22/2016    Performed by Shoaib Boyce Jr., MD at [facility] ECT    ELECTROCONVULSIVE THERAPY (ECT) - SINGLE SEIZURE N/A 7/14/2016    Performed by Shoaib Boyce Jr., MD at [facility] ECT    ELECTROCONVULSIVE THERAPY (ECT) - SINGLE SEIZURE N/A 7/8/2016    Performed by Shoaib Boyce Jr., MD at [facility] ECT    ELECTROCONVULSIVE THERAPY (ECT) - SINGLE SEIZURE N/A 7/5/2016    Performed by Shoaib Boyce Jr., MD at [facility] ECT    ELECTROCONVULSIVE THERAPY (ECT) - SINGLE SEIZURE N/A 6/27/2016    Performed by Shoaib Boyce Jr., MD at [facility] ECT    ELECTROCONVULSIVE THERAPY (ECT) - SINGLE SEIZURE N/A 6/23/2016    Performed by Shoaib Boyce Jr., MD at [facility] ECT    ELECTROCONVULSIVE THERAPY (ECT) - SINGLE SEIZURE N/A 6/20/2016    Performed by Shoaib Boyce Jr., [facility] at [facility] ECT    ELECTROCONVULSIVE THERAPY (ECT) - SINGLE SEIZURE N/A 6/16/2016    Performed by Shoaib Boyce Jr., MD at [facility] ECT    ELECTROCONVULSIVE THERAPY (ECT) - SINGLE SEIZURE N/A 6/15/2016    Performed by Shoaib Boyce Jr., MD at [facility] ECT    ELECTROCONVULSIVE THERAPY (ECT) - SINGLE SEIZURE N/A 6/14/2016    Performed by Shoaib Boyce Jr., MD at [facility] ECT    ELECTROCONVULSIVE THERAPY (ECT) - SINGLE SEIZURE N/A 6/13/2016    Performed by Shoaib Boyce Jr., MD at [facility] ECT    ELECTROCONVULSIVE THERAPY (ECT) - SINGLE SEIZURE N/A 1/4/2016    Performed by Shoaib Boyce Jr., MD at [facility] ECT    ELECTROCONVULSIVE THERAPY, CEREBRAL HEMISPHERE, UNILATERAL, 1 SEIZURE Bilateral 6/25/2018    Performed by Sh[facility]ib Boyce Jr[facility] MD at [facility] ECT    OVARIAN CYST REMOVAL Bilateral       OBJECTIVE:     Vitals (pre-procedure):  Vitals:    08/26/19 0643   BP: 103/63   Pulse:    Resp:    Temp:         Labs/Imaging/Studies:   No results found for this or any previous visit (from the past 48 hour(s)).   No results found for: PHENYTOIN, PHENOBARB, VALPROATE,  [facility]    Physical Exam:   Gen: AAOx4, NAD  HEENT: MMM, PERRL, EOMI, O/P clear  CV: RRR, S1/S2 nml, no M/R/G  Chest: CTAB, no R/R/W, unlabored breathing  Abd: S/NT/ND, +BS, no HSM  Ext: No C/C/E, pulse 2+ throughout  Neuro: CN II-XII grossly intact, no focal deficits    Mental Status Exam:   Appearance: unremarkable, age appropriate, neatly groomed  Behavior: normal, cooperative, eye contact normal  Speech: normal tone, normal rate, normal pitch, normal volume, spontaneous  Mood: depressed  Affect: full & reactive  Thought Process: normal and logical  Thought Content: normal, no suicidality, no homicidality, delusions, or paranoia  Cognition: 3/3 immediate recall, HOUSE, ESUOH, 3/3 delayed recall, able to abstract, able to follow 2 step command  Insight: good  Judgment: good       ASSESSMENT/PLAN:     Allyssa Wright is a 39 y.o. female with Major Depressive Disorder, recurrent, in partial remission who presents for ECT.    Recommendations:   Proceed with ECT #83.    Mane Luevano MD  Psychiatry PGY-2  8/26/2019

## 2019-08-26 NOTE — ANESTHESIA POSTPROCEDURE EVALUATION
Anesthesia Post Evaluation    Patient: Allyssa Wright    Procedure(s) Performed: Procedure(s) (LRB):  ELECTROCONVULSIVE THERAPY (ECT) - SINGLE SEIZURE (N/A)    Final Anesthesia Type: general  Patient location during evaluation: PACU  Patient participation: Yes- Able to Participate  Level of consciousness: awake and alert, awake and oriented  Post-procedure vital signs: reviewed and stable  Pain management: adequate  Airway patency: patent  PONV status at discharge: No PONV  Anesthetic complications: no      Cardiovascular status: blood pressure returned to baseline, stable and hemodynamically stable  Respiratory status: unassisted, spontaneous ventilation and room air  Hydration status: euvolemic  Follow-up not needed.          Vitals Value Taken Time   /60 8/26/2019  9:15 AM   Temp 37.2 °C (99 °F) 8/26/2019  9:00 AM   Pulse 102 8/26/2019  9:15 AM   Resp 18 8/26/2019  9:15 AM   SpO2 97 % 8/26/2019  9:15 AM   Vitals shown include unvalidated device data.      No case tracking events are documented in the log.      Pain/Roma Score: Roma Score: 10 (8/26/2019  9:11 AM)

## 2019-08-26 NOTE — TRANSFER OF CARE
"Anesthesia Transfer of Care Note    Patient: Allyssa Wright    Procedure(s) Performed: Procedure(s) (LRB):  ELECTROCONVULSIVE THERAPY (ECT) - SINGLE SEIZURE (N/A)    Patient location: PACU    Anesthesia Type: general    Transport from OR: Transported from OR on room air with adequate spontaneous ventilation    Post pain: adequate analgesia    Post assessment: no apparent anesthetic complications    Post vital signs: stable    Level of consciousness: awake and alert    Nausea/Vomiting: no nausea/vomiting    Complications: none    Transfer of care protocol was followed      Last vitals:   Visit Vitals  /63   Pulse 77   Temp 36.8 °C (98.2 °F) (Oral)   Resp 16   Ht 5' 5" (1.651 m)   Wt 65.8 kg (145 lb)   LMP  (LMP Unknown)   SpO2 100%   Breastfeeding? No   BMI 24.13 kg/m²     "

## 2019-08-26 NOTE — OP NOTE
Allyssa Wright  : 1980   MRN: [id number]  Date: 2019    Electroconvulsive Therapy  Procedure Note    Date of Admission: 2019  5:50 AM    Site: [facility], [address]    Attending: Shoaib Boyce Jr., MD   Residents: Mane Luevano MD  Pre-procedure Diagnosis: Major Depressive Disorder, recurrent, partial remission   ECT Treatment Number: ECT #83  Machine Type: Mecta 5000    Patient Status: Medically stable    Vitals (pre-procedure):  Vitals:    19 0643   BP: 103/63   Pulse:    Resp:    Temp:        Electrode Placement: Bitemporal    Stimulus Number Charge (mC) Level Pulse Width (msec) Frequency  (Hz) Duration of Stimulus (sec) Current (mA) Duration of Seizure (sec)   1 576 3 1 60 6 800 73               Complications: HTN    Maximum Blood Pressure: 185/88    Medications Given:  Caffeine 500mg  PO  Methohexital (Brevital)   150mg  IV    Succinylcholine (Anectine)   120mg  IV    Ondansetron (Zofran)  4mg  IV      Ketorolac 30mg   Esmolol 40mg IV    Treatment Course:  Patient tolerated procedure well. After adequate recovery from general anesthesia, the patient was transported to recovery.    Post-op Diagnosis: Same as above    Recommended for next ECT: 19    Mane Luevano MD  Psychiatry PGY-2

## 2019-08-26 NOTE — PLAN OF CARE
Dc instructions given to pt and father. Pt to be back this Friday for treatment. No pain, no nausea. Pt awake alert and ready to go home.

## 2019-08-26 NOTE — DISCHARGE SUMMARY
Allyssa Wright  : 1980   MRN: [id number]  Date: 2019     Electroconvulsive Therapy  Discharge Summary    Admit Date: 2019  5:50 AM  Discharge Date: 2019    Attending Physician: Shoaib Boyce Jr., MD   Discharge Provider: Mane Luevano MD    History of Present Illness: Allyssa Wright is a 39 y.o. female with Major depressive disorder, recurrent, in partial remission presented for ECT #83. See H&P dated 2019 for full HPI. For further details, see Dr. Boyce's pre-ECT evaluation.    Hospital Course: The patient tolerated the ECT treatment well without complication. Patient was stable post-procedure. See OP note dated 2019 for more details.     Disposition: Home or Self Care    Medications:  Current Discharge Medication List      CONTINUE these medications which have NOT CHANGED    Details   brexpiprazole (REXULTI ORAL) Take 0.5 mg by mouth once daily.      clozapine (CLOZARIL) 50 MG tablet Take 50 mg by mouth once daily.       dapsone 7.5 % GlwP Apply topically once daily.      famciclovir (FAMVIR) 500 MG tablet Take 1 tablet (500 mg total) by mouth 2 (two) times daily.  Qty: 30 tablet, Refills: 12    Associated Diagnoses: Major depressive disorder, recurrent episode, moderate degree      !! hydrOXYzine pamoate (VISTARIL) 25 MG Cap Take 2 capsules (50 mg total) by mouth nightly as needed (Take 25-50mg (1-2 caps) at night for anxiety prior to ECT).  Qty: 180 capsule, Refills: 0      mirtazapine (REMERON) 15 MG tablet Take 1 tablet (15 mg total) by mouth every evening.  Qty: 90 tablet, Refills: 0      spironolactone (ALDACTONE) 50 MG tablet 50 mg 2 (two) times daily. 50  mg qAM, 50 mg qHS.      thyroid, pork, (ARMOUR THYROID) 30 mg Tab Take 1 tablet (30 mg total) by mouth every morning.  Qty: 30 tablet, Refills: 11    Associated Diagnoses: Major depressive disorder, recurrent episode, moderate degree      trazodone (DESYREL) 100 MG tablet Take 200 mg by mouth every evening.        venlafaxine (EFFEXOR) 100 MG Tab Take 3 tablets (300 mg total) by mouth once daily.    Associated Diagnoses: MDD (major depressive disorder), recurrent, in partial remission      vortioxetine (TRINTELLIX) 5 mg Tab Take 2.5 mg by mouth once daily.       dextroamphetamine-amphetamine 30 mg Tab Take 30 mg by mouth 2 (two) times daily.     Associated Diagnoses: MDD (major depressive disorder), recurrent, in partial remission      !! hydrOXYzine pamoate (VISTARIL) 25 MG Cap Take 1 capsule (25 mg total) by mouth nightly as needed (anxiety/sleep). Take 1 to 2 pills as needed  Qty: 60 capsule, Refills: 2      UNABLE TO FIND 2 (two) times daily. n-azetyl-cysteine      ZOVIRAX 5 % Crea Refills: 5       !! - Potential duplicate medications found. Please discuss with provider.        Status at Discharge: Alert and medically stable    Discharge Diagnoses:  Major depressive disorder, recurrent, in partial remission  Diet: Resume previous outpatient diet  Activity: Ambulate with assistance  Instructions: Please do not eat or drink anything after midnight prior to procedure. Please do not drive on day of ECT.    Med Changes:  None    Next ECT:  8/30/19    Mane Luevano MD  Psychiatry PGY-2

## 2019-08-29 ENCOUNTER — TELEPHONE (OUTPATIENT)
Dept: PSYCHIATRY | Facility: CLINIC | Age: 39
End: 2019-08-29

## 2019-08-29 NOTE — TELEPHONE ENCOUNTER
Received a call from patient's father requesting to move ECT procedure from 8/30 to 9/4. Surgery scheduling notified.

## 2019-09-03 ENCOUNTER — TELEPHONE (OUTPATIENT)
Dept: PSYCHIATRY | Facility: CLINIC | Age: 39
End: 2019-09-03

## 2019-09-03 ENCOUNTER — ANESTHESIA EVENT (OUTPATIENT)
Dept: ELECTROPHYSIOLOGY | Facility: HOSPITAL | Age: 39
End: 2019-09-03
Payer: COMMERCIAL

## 2019-09-04 ENCOUNTER — HOSPITAL ENCOUNTER (OUTPATIENT)
Facility: HOSPITAL | Age: 39
Discharge: HOME | End: 2019-09-04
Attending: PSYCHIATRY & NEUROLOGY | Admitting: PSYCHIATRY & NEUROLOGY
Payer: COMMERCIAL

## 2019-09-04 ENCOUNTER — ANESTHESIA (OUTPATIENT)
Dept: ELECTROPHYSIOLOGY | Facility: HOSPITAL | Age: 39
End: 2019-09-04
Payer: COMMERCIAL

## 2019-09-04 VITALS
OXYGEN SATURATION: 100 % | SYSTOLIC BLOOD PRESSURE: 117 MMHG | HEIGHT: 65 IN | RESPIRATION RATE: 18 BRPM | BODY MASS INDEX: 24.16 KG/M2 | DIASTOLIC BLOOD PRESSURE: 62 MMHG | HEART RATE: 82 BPM | TEMPERATURE: 98 F | WEIGHT: 145 LBS

## 2019-09-04 DIAGNOSIS — F33.1 MAJOR DEPRESSIVE DISORDER, RECURRENT EPISODE, MODERATE DEGREE: ICD-10-CM

## 2019-09-04 DIAGNOSIS — F32.9 MDD (MAJOR DEPRESSIVE DISORDER): ICD-10-CM

## 2019-09-04 DIAGNOSIS — F33.41 MAJOR DEPRESSIVE DISORDER, RECURRENT, IN PARTIAL REMISSION: Primary | ICD-10-CM

## 2019-09-04 LAB
B-HCG UR QL: NEGATIVE
CTP QC/QA: YES

## 2019-09-04 PROCEDURE — D9220A PRA ANESTHESIA: Mod: ,,, | Performed by: ANESTHESIOLOGY

## 2019-09-04 PROCEDURE — 25000003 PHARM REV CODE 250: Performed by: STUDENT IN AN ORGANIZED HEALTH CARE EDUCATION/TRAINING PROGRAM

## 2019-09-04 PROCEDURE — 63600175 PHARM REV CODE 636 W HCPCS: Performed by: PSYCHIATRY & NEUROLOGY

## 2019-09-04 PROCEDURE — 81025 URINE PREGNANCY TEST: CPT | Performed by: PSYCHIATRY & NEUROLOGY

## 2019-09-04 PROCEDURE — 63600175 PHARM REV CODE 636 W HCPCS: Performed by: STUDENT IN AN ORGANIZED HEALTH CARE EDUCATION/TRAINING PROGRAM

## 2019-09-04 PROCEDURE — 90870 PR ELECTROCONVULSIVE THERAPY,1 SEIZ: ICD-10-PCS | Mod: ,,, | Performed by: PSYCHIATRY & NEUROLOGY

## 2019-09-04 PROCEDURE — D9220A PRA ANESTHESIA: ICD-10-PCS | Mod: ,,, | Performed by: ANESTHESIOLOGY

## 2019-09-04 PROCEDURE — 90870 ELECTROCONVULSIVE THERAPY: CPT | Mod: ,,, | Performed by: PSYCHIATRY & NEUROLOGY

## 2019-09-04 PROCEDURE — 90870 ELECTROCONVULSIVE THERAPY: CPT | Performed by: STUDENT IN AN ORGANIZED HEALTH CARE EDUCATION/TRAINING PROGRAM

## 2019-09-04 RX ORDER — KETOROLAC TROMETHAMINE 30 MG/ML
INJECTION, SOLUTION INTRAMUSCULAR; INTRAVENOUS
Status: DISCONTINUED | OUTPATIENT
Start: 2019-09-04 | End: 2019-09-05

## 2019-09-04 RX ORDER — SUCCINYLCHOLINE CHLORIDE 20 MG/ML
INJECTION INTRAMUSCULAR; INTRAVENOUS
Status: COMPLETED
Start: 2019-09-04 | End: 2019-09-04

## 2019-09-04 RX ORDER — LIDOCAINE HYDROCHLORIDE 10 MG/ML
1 INJECTION, SOLUTION EPIDURAL; INFILTRATION; INTRACAUDAL; PERINEURAL ONCE
Status: DISCONTINUED | OUTPATIENT
Start: 2019-09-05 | End: 2019-09-04 | Stop reason: HOSPADM

## 2019-09-04 RX ORDER — SODIUM CHLORIDE 9 MG/ML
INJECTION, SOLUTION INTRAVENOUS CONTINUOUS
Status: DISCONTINUED | OUTPATIENT
Start: 2019-09-04 | End: 2019-09-04 | Stop reason: HOSPADM

## 2019-09-04 RX ORDER — LABETALOL HCL 20 MG/4 ML
SYRINGE (ML) INTRAVENOUS
Status: COMPLETED
Start: 2019-09-04 | End: 2019-09-04

## 2019-09-04 RX ORDER — ONDANSETRON 2 MG/ML
INJECTION INTRAMUSCULAR; INTRAVENOUS
Status: COMPLETED
Start: 2019-09-04 | End: 2019-09-04

## 2019-09-04 RX ORDER — KETOROLAC TROMETHAMINE 30 MG/ML
30 INJECTION, SOLUTION INTRAMUSCULAR; INTRAVENOUS ONCE
Status: DISCONTINUED | OUTPATIENT
Start: 2019-09-04 | End: 2019-09-04 | Stop reason: HOSPADM

## 2019-09-04 RX ORDER — SODIUM CHLORIDE 0.9 % (FLUSH) 0.9 %
10 SYRINGE (ML) INJECTION
Status: DISCONTINUED | OUTPATIENT
Start: 2019-09-04 | End: 2019-09-04 | Stop reason: HOSPADM

## 2019-09-04 RX ORDER — LABETALOL HYDROCHLORIDE 5 MG/ML
INJECTION, SOLUTION INTRAVENOUS
Status: DISCONTINUED | OUTPATIENT
Start: 2019-09-04 | End: 2019-09-05

## 2019-09-04 RX ORDER — ONDANSETRON 2 MG/ML
INJECTION INTRAMUSCULAR; INTRAVENOUS
Status: DISCONTINUED | OUTPATIENT
Start: 2019-09-04 | End: 2019-09-05

## 2019-09-04 RX ORDER — SUCCINYLCHOLINE CHLORIDE 20 MG/ML
INJECTION INTRAMUSCULAR; INTRAVENOUS
Status: DISCONTINUED | OUTPATIENT
Start: 2019-09-04 | End: 2019-09-05

## 2019-09-04 RX ORDER — KETOROLAC TROMETHAMINE 30 MG/ML
INJECTION, SOLUTION INTRAMUSCULAR; INTRAVENOUS
Status: COMPLETED
Start: 2019-09-04 | End: 2019-09-04

## 2019-09-04 RX ADMIN — ONDANSETRON 4 MG: 2 INJECTION, SOLUTION INTRAMUSCULAR; INTRAVENOUS at 09:09

## 2019-09-04 RX ADMIN — LABETALOL HYDROCHLORIDE 5 MG: 5 INJECTION, SOLUTION INTRAVENOUS at 08:09

## 2019-09-04 RX ADMIN — KETOROLAC TROMETHAMINE 30 MG: 30 INJECTION, SOLUTION INTRAMUSCULAR at 09:09

## 2019-09-04 RX ADMIN — Medication 500 MG: at 08:09

## 2019-09-04 RX ADMIN — METHOHEXITAL SODIUM 150 MG: 500 INJECTION, POWDER, LYOPHILIZED, FOR SOLUTION INTRAMUSCULAR; INTRAVENOUS; RECTAL at 08:09

## 2019-09-04 RX ADMIN — SODIUM CHLORIDE: 0.9 INJECTION, SOLUTION INTRAVENOUS at 07:09

## 2019-09-04 RX ADMIN — SUCCINYLCHOLINE CHLORIDE 120 MG: 20 INJECTION, SOLUTION INTRAMUSCULAR; INTRAVENOUS at 08:09

## 2019-09-04 NOTE — DISCHARGE SUMMARY
Allyssa Wright  : 1980   MRN: [id number]  Date: 2019     Electroconvulsive Therapy  Discharge Summary    Admit Date: 2019  6:57 AM  Discharge Date: 2019    Attending Physician: Shoaib Boyce Jr., MD   Discharge Provider: Lucy Lombardo MD    History of Present Illness: Allyssa Wright is a 39 y.o. female with Major depressive disorder, recurrent, in partial remission presented for ECT #83. See H&P dated 2019 for full HPI. For further details, see Dr. Boyce's pre-ECT evaluation.    Hospital Course: The patient tolerated the ECT treatment well without complication. Patient was stable post-procedure. See OP note dated 2019 for more details.     Disposition: Home or Self Care    Medications:  Current Discharge Medication List      CONTINUE these medications which have NOT CHANGED    Details   brexpiprazole (REXULTI ORAL) Take 0.5 mg by mouth once daily.      clozapine (CLOZARIL) 50 MG tablet Take 50 mg by mouth once daily.       dapsone 7.5 % GlwP Apply topically once daily.      famciclovir (FAMVIR) 500 MG tablet Take 1 tablet (500 mg total) by mouth 2 (two) times daily.  Qty: 30 tablet, Refills: 12    Associated Diagnoses: Major depressive disorder, recurrent episode, moderate degree      !! hydrOXYzine pamoate (VISTARIL) 25 MG Cap Take 2 capsules (50 mg total) by mouth nightly as needed (Take 25-50mg (1-2 caps) at night for anxiety prior to ECT).  Qty: 180 capsule, Refills: 0      !! hydrOXYzine pamoate (VISTARIL) 25 MG Cap Take 1 capsule (25 mg total) by mouth nightly as needed (anxiety/sleep). Take 1 to 2 pills as needed  Qty: 60 capsule, Refills: 2      mirtazapine (REMERON) 15 MG tablet Take 1 tablet (15 mg total) by mouth every evening.  Qty: 90 tablet, Refills: 0      spironolactone (ALDACTONE) 50 MG tablet 50 mg 2 (two) times daily. 50  mg qAM, 50 mg qHS.      thyroid, pork, (ARMOUR THYROID) 30 mg Tab Take 1 tablet (30 mg total) by mouth every morning.  Qty: 30  tablet, Refills: 11    Associated Diagnoses: Major depressive disorder, recurrent episode, moderate degree      trazodone (DESYREL) 100 MG tablet Take 200 mg by mouth every evening.       UNABLE TO FIND 2 (two) times daily. n-azetyl-cysteine      venlafaxine (EFFEXOR) 100 MG Tab Take 3 tablets (300 mg total) by mouth once daily.    Associated Diagnoses: MDD (major depressive disorder), recurrent, in partial remission      vortioxetine (TRINTELLIX) 5 mg Tab Take 2.5 mg by mouth once daily.       dextroamphetamine-amphetamine 30 mg Tab Take 30 mg by mouth 2 (two) times daily.     Associated Diagnoses: MDD (major depressive disorder), recurrent, in partial remission      ZOVIRAX 5 % Crea Refills: 5       !! - Potential duplicate medications found. Please discuss with provider.        Status at Discharge: Alert and medically stable    Discharge Diagnoses:  Major depressive disorder, recurrent, in partial remission  Diet: Resume previous outpatient diet  Activity: Ambulate with assistance  Instructions: Please do not eat or drink anything after midnight prior to procedure. Please do not drive on day of ECT.    Med Changes:  None    Next ECT:   9/9/19    Lucy Lombardo MD  [facility] Psychiatry PGY-2  09/04/2019

## 2019-09-04 NOTE — ANESTHESIA PREPROCEDURE EVALUATION
[facility]  Anesthesia Pre-Operative Evaluation         Patient Name: Allyssa Wright  YOB: 1980  MRN: [id number]    SUBJECTIVE:     Pre-operative evaluation for Procedure(s) (LRB):  ELECTROCONVULSIVE THERAPY (ECT) - SINGLE SEIZURE (N/A)     09/03/2019  Allyssa Wright is a 39 y.o. female w/ a significant PMHx of HSV-1, recurrent MDD who presents for ECT #83     Previous Medications      succinylcholine (ANECTINE) 20 mg/mL injection 120 mg   ondansetron HCl (PF) 4 mg/2 mL injection 4 mg   ketorolac (TORADOL) injection 30 mg 30 mg   methohexital (BREVITAL SODIUM) injection 150 mg   esmolol (BREVIBLOC) injection 10 mg/mL 40 mg   0.9%  NaCl infusion 350 mL         LDA:        Peripheral IV - Single Lumen 08/07/19 0752 20 G Left Hand (Active)   Number of days: 27       Prev airway: None documented.    Drips: None documented.      Patient Active Problem List   Diagnosis    Major depressive disorder, recurrent episode, in partial remission    Other acne    Comfort measures only status    MDD (major depressive disorder)    Major depression    Major depressive disorder    MDD (major depressive disorder), severe    Depression    Major depressive disorder, recurrent, in partial remission    Bipolar depression    Major depressive disorder, recurrent episode, moderate degree       Review of patient's allergies indicates:   Allergen Reactions    Benzodiazepines Other (See Comments)     Contraindicated while taking Clozapine    Ampicillin      Mom says so    Erythromycin     Levaquin [levofloxacin] Other (See Comments)     Depression side effects    Penicillins      Mom says so    Pristiq [desvenlafaxine]      psycotic      Sulfa (sulfonamide antibiotics)      Rash      Azithromycin Anxiety       Current Inpatient Medications:      Current Facility-Administered Medications on File Prior to Encounter   Medication Dose Route Frequency Provider Last Rate Last Dose    sodium  chloride 0.9% flush 10 mL  10 mL Intra-Catheter PRN Homa Colbert MD         Current Outpatient Medications on File Prior to Encounter   Medication Sig Dispense Refill    brexpiprazole (REXULTI ORAL) Take 0.5 mg by mouth once daily.      clozapine (CLOZARIL) 50 MG tablet Take 50 mg by mouth once daily.       dapsone 7.5 % GlwP Apply topically once daily.      dextroamphetamine-amphetamine 30 mg Tab Take 30 mg by mouth 2 (two) times daily.       famciclovir (FAMVIR) 500 MG tablet Take 1 tablet (500 mg total) by mouth 2 (two) times daily. 30 tablet 12    hydrOXYzine pamoate (VISTARIL) 25 MG Cap Take 2 capsules (50 mg total) by mouth nightly as needed (Take 25-50mg (1-2 caps) at night for anxiety prior to ECT). 180 capsule 0    hydrOXYzine pamoate (VISTARIL) 25 MG Cap Take 1 capsule (25 mg total) by mouth nightly as needed (anxiety/sleep). Take 1 to 2 pills as needed 60 capsule 2    mirtazapine (REMERON) 15 MG tablet Take 1 tablet (15 mg total) by mouth every evening. (Patient taking differently: Take 7.5 mg by mouth every evening. ) 90 tablet 0    spironolactone (ALDACTONE) 50 MG tablet 50 mg 2 (two) times daily. 50  mg qAM, 50 mg qHS.      thyroid, pork, (ARMOUR THYROID) 30 mg Tab Take 1 tablet (30 mg total) by mouth every morning. 30 tablet 11    trazodone (DESYREL) 100 MG tablet Take 200 mg by mouth every evening.       UNABLE TO FIND 2 (two) times daily. n-azetyl-cysteine      venlafaxine (EFFEXOR) 100 MG Tab Take 3 tablets (300 mg total) by mouth once daily. (Patient taking differently: Take 225 mg by mouth once daily. )      vortioxetine (TRINTELLIX) 5 mg Tab Take 2.5 mg by mouth once daily.       ZOVIRAX 5 % Crea   5       Past Surgical History:   Procedure Laterality Date    ANKLE SURGERY Right     BREAST augmentation      ELECTROCONVULSIVE THERAPY (ECT) - SINGLE SEIZURE N/A 12/6/2018    Performed by Sh[facility]ib [facility]ce Jr., [facility] at [facility] ECT    ELECTROCONVULSIVE THERAPY (ECT) - SINGLE  SEIZURE N/A 8/31/2018    Performed by [facility] Jr[facility] at [facility] ECT    ELECTROCONVULSIVE THERAPY (ECT) - SINGLE SEIZURE N/A 8/6/2018    Performed by [facility] Jr[facility] at [facility] ECT    ELECTROCONVULSIVE THERAPY (ECT) - SINGLE SEIZURE N/A 7/23/2018    Performed by [facility] Jr[facility] at [facility] ECT    ELECTROCONVULSIVE THERAPY (ECT) - SINGLE SEIZURE N/A 7/5/2018    Performed by [facility] Jr[facility] at [facility] ECT    ELECTROCONVULSIVE THERAPY (ECT) - SINGLE SEIZURE N/A 6/28/2018    Performed by [facility] Jr[facility] at [facility] ECT    ELECTROCONVULSIVE THERAPY (ECT) - SINGLE SEIZURE N/A 6/13/2018    Performed by [facility] Jr[facility] at [facility] ECT    ELECTROCONVULSIVE THERAPY (ECT) - SINGLE SEIZURE N/A 5/29/2018    Performed by [facility] Jr[facility] at [facility] ECT    ELECTROCONVULSIVE THERAPY (ECT) - SINGLE SEIZURE N/A 5/8/2018    Performed by [facility] Jr[facility] at [facility] ECT    ELECTROCONVULSIVE THERAPY (ECT) - SINGLE SEIZURE N/A 4/24/2018    Performed by Sh[facility]ib [facility] Jr[facility] at [facility] ECT    ELECTROCONVULSIVE THERAPY (ECT) - SINGLE SEIZURE N/A 4/17/2018    Performed by Sh[facility]ib [facility] Jr[facility] at [facility] ECT    ELECTROCONVULSIVE THERAPY (ECT) - SINGLE SEIZURE N/A 4/13/2018    Performed by Sh[facility]ib [facility] Jr[facility] at [facility] ECT    ELECTROCONVULSIVE THERAPY (ECT) - SINGLE SEIZURE N/A 4/3/2018    Performed by Sh[facility]ib [facility] Jr[facility] at [facility] ECT    ELECTROCONVULSIVE THERAPY (ECT) - SINGLE SEIZURE N/A 3/20/2018    Performed by Sh[facility]ib [facility] Jr[facility] at [facility] ECT    ELECTROCONVULSIVE THERAPY (ECT) - SINGLE SEIZURE N/A 3/15/2018    Performed by Sh[facility]ib Boy[facility] Jr[facility] at [facility] ECT    ELECTROCONVULSIVE THERAPY (ECT) - SINGLE SEIZURE N/A 3/6/2018    Performed by Sh[facility]ib Boyce Jr[facility] at [facility] ECT    ELECTROCONVULSIVE THERAPY (ECT) - SINGLE SEIZURE N/A 3/1/2018    Performed by Sh[facility]ib Boyce Jr[facility] at [facility] ECT    ELECTROCONVULSIVE THERAPY (ECT) - SINGLE SEIZURE N/A  2/23/2018    Performed by Sh[facility]ib [facility]ce Jr[facility] MD at [facility] ECT    ELECTROCONVULSIVE THERAPY (ECT) - SINGLE SEIZURE N/A 2/19/2018    Performed by Shoaib Boyce Jr[facility] at [facility] ECT    ELECTROCONVULSIVE THERAPY (ECT) - SINGLE SEIZURE N/A 2/15/2018    Performed by Sh[facility]ib Boyce Jr[facility] at [facility] ECT    ELECTROCONVULSIVE THERAPY (ECT) - SINGLE SEIZURE N/A 1/22/2018    Performed by Sh[facility]ib Boyce Jr[facility] at [facility] ECT    ELECTROCONVULSIVE THERAPY (ECT) - SINGLE SEIZURE N/A 12/11/2017    Performed by Shoaib Boyce Jr[facility] at [facility] ECT    ELECTROCONVULSIVE THERAPY (ECT) - SINGLE SEIZURE N/A 10/24/2017    Performed by Shoaib Boyce Jr[facility] at [facility] ECT    ELECTROCONVULSIVE THERAPY (ECT) - SINGLE SEIZURE N/A 9/20/2017    Performed by Sh[facility]ib Boyce Jr[facility] at [facility] ECT    ELECTROCONVULSIVE THERAPY (ECT) - SINGLE SEIZURE N/A 8/14/2017    Performed by Shoaib Boyce Jr[facility] at [facility] ECT    ELECTROCONVULSIVE THERAPY (ECT) - SINGLE SEIZURE Bilateral 7/12/2017    Performed by Shoaib Boyce Jr[facility] at [facility] ECT    ELECTROCONVULSIVE THERAPY (ECT) - SINGLE SEIZURE N/A 6/13/2017    Performed by Shoaib Boyce Jr[facility] at [facility] ECT    ELECTROCONVULSIVE THERAPY (ECT) - SINGLE SEIZURE N/A 5/17/2017    Performed by Shoaib Boyce Jr[facility] at [facility] ECT    ELECTROCONVULSIVE THERAPY (ECT) - SINGLE SEIZURE N/A 4/20/2017    Performed by Shoaib Boyce Jr., [facility] at [facility] ECT    ELECTROCONVULSIVE THERAPY (ECT) - SINGLE SEIZURE N/A 11/22/2016    Performed by Shoaib Boyce Jr., [facility] at [facility] ECT    ELECTROCONVULSIVE THERAPY (ECT) - SINGLE SEIZURE N/A 10/24/2016    Performed by Shoaib Boyce Jr., [facility] at [facility] ECT    ELECTROCONVULSIVE THERAPY (ECT) - SINGLE SEIZURE N/A 9/22/2016    Performed by Shoaib Boyce Jr[facility] at [facility] ECT    ELECTROCONVULSIVE THERAPY (ECT) - SINGLE SEIZURE N/A 9/1/2016    Performed by Shoaib Boyce Jr[facility] at [facility] ECT    ELECTROCONVULSIVE THERAPY (ECT) - SINGLE SEIZURE N/A  8/15/2016    Performed by [facility] Jr[facility] at [facility] ECT    ELECTROCONVULSIVE THERAPY (ECT) - SINGLE SEIZURE N/A 8/1/2016    Performed by Shoaib Boyce Jr[facility] at [facility] ECT    ELECTROCONVULSIVE THERAPY (ECT) - SINGLE SEIZURE N/A 7/22/2016    Performed by Shoaib Boyce Jr., [facility] at [facility] ECT    ELECTROCONVULSIVE THERAPY (ECT) - SINGLE SEIZURE N/A 7/14/2016    Performed by Shoaib Boyce Jr[facility] at [facility] ECT    ELECTROCONVULSIVE THERAPY (ECT) - SINGLE SEIZURE N/A 7/8/2016    Performed by Shoaib Boyce Jr., [facility] at [facility] ECT    ELECTROCONVULSIVE THERAPY (ECT) - SINGLE SEIZURE N/A 7/5/2016    Performed by Shoaib Boyce Jr., [facility] at [facility] ECT    ELECTROCONVULSIVE THERAPY (ECT) - SINGLE SEIZURE N/A 6/27/2016    Performed by Shoaib Boyce Jr., MD at [facility] ECT    ELECTROCONVULSIVE THERAPY (ECT) - SINGLE SEIZURE N/A 6/23/2016    Performed by Shoaib Boyce Jr[facility] at [facility] ECT    ELECTROCONVULSIVE THERAPY (ECT) - SINGLE SEIZURE N/A 6/20/2016    Performed by Shoaib Boyce Jr[facility] at [facility] ECT    ELECTROCONVULSIVE THERAPY (ECT) - SINGLE SEIZURE N/A 6/16/2016    Performed by Shoaib Boyce Jr[facility] at [facility] ECT    ELECTROCONVULSIVE THERAPY (ECT) - SINGLE SEIZURE N/A 6/15/2016    Performed by Shoaib Boyce Jr[facility] at [facility] ECT    ELECTROCONVULSIVE THERAPY (ECT) - SINGLE SEIZURE N/A 6/14/2016    Performed by Shoaib Boyce Jr[facility] at [facility] ECT    ELECTROCONVULSIVE THERAPY (ECT) - SINGLE SEIZURE N/A 6/13/2016    Performed by Shoaib [facility] Jr[facility] at [facility] ECT    ELECTROCONVULSIVE THERAPY (ECT) - SINGLE SEIZURE N/A 1/4/2016    Performed by Sh[facility]ib [facility] Jr[facility] MD at [facility] ECT    ELECTROCONVULSIVE THERAPY, CEREBRAL HEMISPHERE, UNILATERAL, 1 SEIZURE Bilateral 6/25/2018    Performed by [facility]ib [facility] Jr[facility] at [facility] ECT    OVARIAN CYST REMOVAL Bilateral        Social History     Socioeconomic History    Marital status: Single     Spouse name: Not on file    Number of children: Not  on file    Years of education: Not on file    Highest education level: Not on file   Occupational History    Occupation: unemployed   Social Needs    Financial resource strain: Not on file    Food insecurity:     Worry: Not on file     Inability: Not on file    Transportation needs:     Medical: Not on file     Non-medical: Not on file   Tobacco Use    Smoking status: Former Smoker     Last attempt to quit: 2011     Years since quittin.4    Smokeless tobacco: Never Used   Substance and Sexual Activity    Alcohol use: Yes     Comment: rarely    Drug use: No     Comment: Experimental use in high school    Sexual activity: Not on file   Lifestyle    Physical activity:     Days per week: Not on file     Minutes per session: Not on file    Stress: Not on file   Relationships    Social connections:     Talks on phone: Not on file     Gets together: Not on file     Attends [employer]oration service: Not on file     Active member of club or organization: Not on file     Attends meetings of clubs or organizations: Not on file     Relationship status: Not on file   Other Topics Concern    Patient feels they ought to cut down on drinking/drug use Not Asked    Patient annoyed by others criticizing their drinking/drug use Not Asked    Patient has felt bad or guilty about drinking/drug use Not Asked    Patient has had a drink/used drugs as an eye opener in the AM Not Asked   Social History Narrative    Pt has 1 older brother from an intact family until the death of her mother in .  She completed 1 year of college, was never in the , and has never been employed.  She was engaged once, but never , has no children, and lives with her father plus 2 dogs.  She denies any hobbies and is spiritual but not Restoration.  She does date and returns to college during rare periods when depression and anxiety [place].       OBJECTIVE:     Vital Signs Range (Last 24H):  BP: ()/()   Arterial Line BP: ()/()        Significant Labs:  Lab Results   Component Value Date    WBC 4.26 08/07/2019    HGB 14.0 08/07/2019    HCT 42.9 08/07/2019     08/07/2019    ALT 22 08/07/2019    AST 22 08/07/2019     08/07/2019    K 4.1 08/07/2019     08/07/2019    CREATININE 0.7 08/07/2019    BUN 10 08/07/2019    CO2 30 (H) 08/07/2019    TSH 1.864 08/07/2019       Diagnostic Studies: No relevant studies.    EKG:   Results for orders placed or performed in visit on 06/08/16   IN OFFICE EKG 12-LEAD (to [place])    Collection Time: 06/08/16  3:33 PM    Narrative    Test Reason : F33.9  Blood Pressure : **/**mmHG  Vent. Rate : 084 BPM     Atrial Rate : 084 BPM     P-R Int : 154 ms          QRS Dur : 086 ms      QT Int : 378 ms       P-R-T Axes : 067 066 055 degrees     QTc Int : 446 ms    Normal sinus rhythm  Normal ECG  When compared with ECG of 03-DEC-2015 11:38,  Questionable change in The axis  T wave inversion no longer evident in Inferior leads  Confirmed by Flaco Sr MD ([id number]) on 6/9/2016 1:30:13 PM    Referred By: SELF REFERRAL           Confirmed By:Flaco Sr MD       ECHOCARDIOGRAM:  TTE:  No results found for this or any previous visit.      ASSESSMENT/PLAN:                        Anesthesia Evaluation    I have reviewed the Patient Summary Reports.     I have reviewed the Medications.     Review of Systems  Anesthesia Hx:  No problems with previous Anesthesia    Hematology/Oncology:        Denies Current/Recent Cancer   EENT/Dental:   denies chronic allergic rhinitis  Denies Otitis Media   Cardiovascular:   Denies Pacemaker.  Denies Hypertension.  Denies Valvular problems/Murmurs.  Denies MI.  Denies CAD.    Denies CABG/stent.              denies PVD    Pulmonary:   Denies Pneumonia Denies COPD.  Denies Asthma.  Denies Shortness of breath.  Denies Recent URI.  Denies Sleep Apnea.    Renal/:   Denies Chronic Renal Disease.     Hepatic/GI:   Denies PUD. Denies GERD. Denies Liver Disease.    Neurological:   Denies TIA.  Denies CVA. Denies Neuromuscular Disease.    Denies Peripheral Neuropathy    Endocrine:   Denies Diabetes. Denies Hypothyroidism. Denies Hyperthyroidism.    Psych:   Psychiatric History          Physical Exam  General:  Well nourished    Airway/Jaw/Neck:  Airway Findings: Mouth Opening: Normal General Airway Assessment: Adult  Mallampati: I  TM Distance: Normal, at least 6 cm  Jaw/Neck Findings:     Neck ROM: Normal ROM      Dental:  Dental Findings: In tact   Chest/Lungs:  Chest/Lungs Findings: Clear to auscultation     Heart/Vascular:  Heart Findings: Rate: Normal  Rhythm: Regular Rhythm  Sounds: Normal        Mental Status:  Mental Status Findings:  Cooperative, Alert and Oriented         Anesthesia Plan  Type of Anesthesia, risks & benefits discussed:  Anesthesia Type:  general  Patient's Preference:   Intra-op Monitoring Plan: standard ASA monitors  Intra-op Monitoring Plan Comments:   Post Op Pain Control Plan: multimodal analgesia and IV/PO Opioids PRN  Post Op Pain Control Plan Comments:   Induction:   IV  Beta Blocker:  Patient is not currently on a Beta-Blocker (No further documentation required).       Informed Consent: Patient understands risks and agrees with Anesthesia plan.  Questions answered. Anesthesia consent signed with patient.  ASA Score: 2     Day of Surgery Review of History & Physical:            Ready For Surgery From Anesthesia Perspective.

## 2019-09-04 NOTE — ANESTHESIA PROCEDURE NOTES
ECT    Procedure start time: 9/4/2019 8:59 AM  Timeout performed at: 9/4/2019 8:54 AM  Procedure end time: 9/4/2019 9:00 AM      Staffing  Authorizing Provider: Raphael Anne MD  Performing Provider: Fady Booker MD    Preanesthetic Checklist  The following were completed as part of the preanesthetic checklist: patient identified, procedural consent, pre-op evaluation, timeout performed, risks and benefits discussed, monitors and equipment checked, anesthesia consent given, oxygen available, suction available, hand hygiene performed and patient being monitored.    Setup & Induction  Patient Monitoring: heart rate, cardiac monitor, continuous pulse ox, continuous capnometry, NIBP and gas analyzer  Patient preparation: bite block inserted, extremities padded, mandibular stabilization and patient hyperventilated  Electrode Placement: Bitemporal    The patient was moved to the ECT therapy room after being assessed and consented for ECT. After standard ASA monitors were applied and timeout performed, the patient was adequately preoxygenated. After induction of general anesthesia, adequate oxygenation and ventilation were confirmed with pulse oxymetry and end tidal CO2 monitoring via bag-mask ventilation. End tidal CO2 was monitored throughout the case and moderate hyperventilation was performed prior to beginning ECT treatment. All extremities were padded, biteblock was inserted, and mandibular stabilization was done prior to initiating ECT therapy.    ECT Findings  ECT associated findings of: Moderate Hypertension (SBP >160 or DBP >100)

## 2019-09-04 NOTE — OP NOTE
Allyssa Wright  : 1980   MRN: [id number]  Date: 2019    Electroconvulsive Therapy  Procedure Note    Date of Admission: 2019  6:57 AM    Site: [facility] [address]    Attending: Shoaib Boyce Jr., MD   Residents: Lucy Lombardo MD  Pre-procedure Diagnosis: Major Depressive Disorder, recurrent, partial remission   ECT Treatment Number: ECT #84  Machine Type: Mecta 5000    Patient Status: Medically stable    Vitals (pre-procedure):  Vitals:    19 0719   BP: (!) 122/58   Pulse: 74   Resp: 18   Temp: 97.7 °F (36.5 °C)       Electrode Placement: Bitemporal    Stimulus Number Charge (mC) Level Pulse Width (msec) Frequency  (Hz) Duration of Stimulus (sec) Current (mA) Duration of Seizure (sec)   1 576 3 1 60 6 800 0:30               Complications: HTN    Maximum Blood Pressure: 142/99    Medications Given:  Caffeine 500mg  PO  Methohexital (Brevital)   150mg  IV    Succinylcholine (Anectine)   120mg  IV    Ondansetron (Zofran)  4mg  IV      Ketorolac 30mg   Esmolol 40mg IV    Treatment Course:  Patient tolerated procedure well. After adequate recovery from general anesthesia, the patient was transported to recovery.    Post-op Diagnosis: Same as above    Recommended for next ECT: 19    Lucy Lombardo MD  [facility] PGY-2  2019

## 2019-09-04 NOTE — H&P
Allyssa Wright  : 1980   MRN: [id number]  Date: 2019     Electroconvulsive Therapy  History & Physical    Chief complaint: Major Depressive Disorder, recurrent, partial remission  Procedure: ECT #84    SUBJECTIVE:     HPI:   Allyssa Wright is a 39 y.o. female with Major Depressive Disorder, recurrent, in partial remission who presents for ECT.    Father at bedside. Patient states that she is not doing well. Since her psych meds were changed a couple of weeks ago. Her mood has decreased since Saturday, and she feels her treatments need to be more frequent. Continuing TMS treatments. Denies SI, HI, AVH. Medication compliant. Confirms NPO status. Ready for ECT today.     Psychiatric Review of Systems:  Mood: depressed  Appetite: No problem  Psychomotor: No problem  Cognitive Impairment: none  Insomnia:  No problem  Psychosis: denies  Diurnal Variation: denies  Suicidal Ideation: denies    Medical Review Of Systems:  Pertinent items are noted in HPI.    Current Medications:   Current Facility-Administered Medications on File Prior to Encounter   Medication Dose Route Frequency Provider Last Rate Last Dose    sodium chloride 0.9% flush 10 mL  10 mL Intra-Catheter PRN Homa Colbert MD         Current Outpatient Medications on File Prior to Encounter   Medication Sig Dispense Refill    brexpiprazole (REXULTI ORAL) Take 0.5 mg by mouth once daily.      clozapine (CLOZARIL) 50 MG tablet Take 50 mg by mouth once daily.       dapsone 7.5 % GlwP Apply topically once daily.      famciclovir (FAMVIR) 500 MG tablet Take 1 tablet (500 mg total) by mouth 2 (two) times daily. 30 tablet 12    hydrOXYzine pamoate (VISTARIL) 25 MG Cap Take 2 capsules (50 mg total) by mouth nightly as needed (Take 25-50mg (1-2 caps) at night for anxiety prior to ECT). 180 capsule 0    hydrOXYzine pamoate (VISTARIL) 25 MG Cap Take 1 capsule (25 mg total) by mouth nightly as needed (anxiety/sleep). Take 1 to 2 pills as needed 60  capsule 2    mirtazapine (REMERON) 15 MG tablet Take 1 tablet (15 mg total) by mouth every evening. (Patient taking differently: Take 7.5 mg by mouth every evening. ) 90 tablet 0    spironolactone (ALDACTONE) 50 MG tablet 50 mg 2 (two) times daily. 50  mg qAM, 50 mg qHS.      thyroid, pork, (ARMOUR THYROID) 30 mg Tab Take 1 tablet (30 mg total) by mouth every morning. 30 tablet 11    trazodone (DESYREL) 100 MG tablet Take 200 mg by mouth every evening.       UNABLE TO FIND 2 (two) times daily. n-azetyl-cysteine      venlafaxine (EFFEXOR) 100 MG Tab Take 3 tablets (300 mg total) by mouth once daily. (Patient taking differently: Take 225 mg by mouth once daily. )      vortioxetine (TRINTELLIX) 5 mg Tab Take 2.5 mg by mouth once daily.       dextroamphetamine-amphetamine 30 mg Tab Take 30 mg by mouth 2 (two) times daily.       ZOVIRAX 5 % Crea   5      Allergies:   Benzodiazepines; Ampicillin; Erythromycin; Levaquin [levofloxacin]; Penicillins; Pristiq [desvenlafaxine]; Sulfa (sulfonamide antibiotics); and Azithromycin    Past Medical/Surgical History:   Past Medical History:   Diagnosis Date    Anxiety     Depression     History of psychiatric hospitalization     HSV-1 (herpes simplex virus 1) infection     Hx of psychiatric care     Moderate depressed bipolar II disorder 06/13/2016    reports no history of bipolar    Obsessive-compulsive disorder     Psychiatric problem     Schizophrenia 4/3/2018    Self-harming behavior     Therapy      Past Surgical History:   Procedure Laterality Date    ANKLE SURGERY Right     BREAST augmentation      ELECTROCONVULSIVE THERAPY (ECT) - SINGLE SEIZURE N/A 12/6/2018    Performed by [facility]ib [facility] Jr[facility] at [facility]    ELECTROCONVULSIVE THERAPY (ECT) - SINGLE SEIZURE N/A 8/31/2018    Performed by [facility] Jr[facility] at [facility] ECT    ELECTROCONVULSIVE THERAPY (ECT) - SINGLE SEIZURE N/A 8/6/2018    Performed by [facility] Jr[facility] at [facility] ECT     ELECTROCONVULSIVE THERAPY (ECT) - SINGLE SEIZURE N/A 7/23/2018    [facility]    ELECTROCONVULSIVE THERAPY (ECT) - SINGLE SEIZURE N/A 7/5/2018    [facility]    ELECTROCONVULSIVE THERAPY (ECT) - SINGLE SEIZURE N/A 6/28/2018    [facility]    ELECTROCONVULSIVE THERAPY (ECT) - SINGLE SEIZURE N/A 6/13/2018    [facility]    ELECTROCONVULSIVE THERAPY (ECT) - SINGLE SEIZURE N/A 5/29/2018    [facility]    ELECTROCONVULSIVE THERAPY (ECT) - SINGLE SEIZURE N/A 5/8/2018    [facility]    ELECTROCONVULSIVE THERAPY (ECT) - SINGLE SEIZURE N/A 4/24/2018    [facility]    ELECTROCONVULSIVE THERAPY (ECT) - SINGLE SEIZURE N/A 4/17/2018    Per[facility]    ELECTROCONVULSIVE THERAPY (ECT) - SINGLE SEIZURE N/A 4/13/2018    Per[facility]    ELECTROCONVULSIVE THERAPY (ECT) - SINGLE SEIZURE N/A 4/3/2018    Per[facility] by [facility]ib [facility]    ELECTROCONVULSIVE THERAPY (ECT) - SINGLE SEIZURE N/A 3/20/2018    Per[facility]    ELECTROCONVULSIVE THERAPY (ECT) - SINGLE SEIZURE N/A 3/15/2018    Per[facility]    ELECTROCONVULSIVE THERAPY (ECT) - SINGLE SEIZURE N/A 3/6/2018    Per[facility]    ELECTROCONVULSIVE THERAPY (ECT) - SINGLE SEIZURE N/A 3/1/2018    Per[facility]    ELECTROCONVULSIVE THERAPY (ECT) - SINGLE SEIZURE N/A 2/23/2018    Per[facility] by [facility]ib [facility]    ELECTROCONVULSIVE THERAPY (ECT) - SINGLE SEIZURE N/A 2/19/2018    Per[facility]    ELECTROCONVULSIVE  THERAPY (ECT) - SINGLE SEIZURE N/A 2/15/2018    Performed by [facility] at [facility] ECT    ELECTROCONVULSIVE THERAPY (ECT) - SINGLE SEIZURE N/A 1/22/2018    Performed by Sh[facility]ib Boyce Jr[facility] at [facility] ECT    ELECTROCONVULSIVE THERAPY (ECT) - SINGLE SEIZURE N/A 12/11/2017    Performed by Sh[facility]ib Boyce Jr[facility] at [facility] ECT    ELECTROCONVULSIVE THERAPY (ECT) - SINGLE SEIZURE N/A 10/24/2017    Performed by Shoaib Boy[facility] Jr[facility] at [facility] ECT    ELECTROCONVULSIVE THERAPY (ECT) - SINGLE SEIZURE N/A 9/20/2017    Performed by Shoaib Boyce Jr[facility] at [facility] ECT    ELECTROCONVULSIVE THERAPY (ECT) - SINGLE SEIZURE N/A 8/14/2017    Performed by Shoaib Boyce Jr[facility] at [facility] ECT    ELECTROCONVULSIVE THERAPY (ECT) - SINGLE SEIZURE Bilateral 7/12/2017    Performed by Shoaib Boyce Jr[facility] at [facility] ECT    ELECTROCONVULSIVE THERAPY (ECT) - SINGLE SEIZURE N/A 6/13/2017    Performed by Shoaib Boyce Jr[facility] at [facility] ECT    ELECTROCONVULSIVE THERAPY (ECT) - SINGLE SEIZURE N/A 5/17/2017    Performed by Shoaib Boyce Jr., [facility] at [facility] ECT    ELECTROCONVULSIVE THERAPY (ECT) - SINGLE SEIZURE N/A 4/20/2017    Performed by Shoaib Boyce Jr[facility] at [facility] ECT    ELECTROCONVULSIVE THERAPY (ECT) - SINGLE SEIZURE N/A 11/22/2016    Performed by Shoaib Boyce Jr[facility] at [facility] ECT    ELECTROCONVULSIVE THERAPY (ECT) - SINGLE SEIZURE N/A 10/24/2016    Performed by Shoaib Boyce Jr[facility] at [facility] ECT    ELECTROCONVULSIVE THERAPY (ECT) - SINGLE SEIZURE N/A 9/22/2016    Performed by Shoaib Boyce Jr[facility] at [facility] ECT    ELECTROCONVULSIVE THERAPY (ECT) - SINGLE SEIZURE N/A 9/1/2016    Performed by Shoaib Boyce Jr[facility] at [facility] ECT    ELECTROCONVULSIVE THERAPY (ECT) - SINGLE SEIZURE N/A 8/15/2016    Performed by Shoaib Boyce Jr[facility] at [facility] ECT    ELECTROCONVULSIVE THERAPY (ECT) - SINGLE SEIZURE N/A 8/1/2016    Performed by Shoaib Boyce Jr[facility] at [facility] ECT    ELECTROCONVULSIVE  THERAPY (ECT) - SINGLE SEIZURE N/A 7/22/2016    Performed by Shoaib Boyce Jr[facility] MD at [facility] ECT    ELECTROCONVULSIVE THERAPY (ECT) - SINGLE SEIZURE N/A 7/14/2016    Performed by Shoaib Boyce Jr., MD at [facility] ECT    ELECTROCONVULSIVE THERAPY (ECT) - SINGLE SEIZURE N/A 7/8/2016    Performed by Shoaib Boyce Jr., MD at [facility] ECT    ELECTROCONVULSIVE THERAPY (ECT) - SINGLE SEIZURE N/A 7/5/2016    Performed by Shoaib Boyce Jr., MD at [facility] ECT    ELECTROCONVULSIVE THERAPY (ECT) - SINGLE SEIZURE N/A 6/27/2016    Performed by Shoaib Boyce Jr., MD at [facility] ECT    ELECTROCONVULSIVE THERAPY (ECT) - SINGLE SEIZURE N/A 6/23/2016    Performed by Shoaib Boyce Jr., MD at [facility] ECT    ELECTROCONVULSIVE THERAPY (ECT) - SINGLE SEIZURE N/A 6/20/2016    Performed by Shoaib Boyce Jr., MD at [facility]CT    ELECTROCONVULSIVE THERAPY (ECT) - SINGLE SEIZURE N/A 6/16/2016    Performed by Shoaib Boyce Jr., MD at [facility] ECT    ELECTROCONVULSIVE THERAPY (ECT) - SINGLE SEIZURE N/A 6/15/2016    Performed by Shoaib Boyce Jr., MD at [facility] ECT    ELECTROCONVULSIVE THERAPY (ECT) - SINGLE SEIZURE N/A 6/14/2016    Performed by Shoaib Boyce Jr., MD at [facility] ECT    ELECTROCONVULSIVE THERAPY (ECT) - SINGLE SEIZURE N/A 6/13/2016    Performed by Shoaib Boyce Jr., MD at [facility] ECT    ELECTROCONVULSIVE THERAPY (ECT) - SINGLE SEIZURE N/A 1/4/2016    Performed by Shoaib Boyce Jr., MD at [facility] ECT    ELECTROCONVULSIVE THERAPY, CEREBRAL HEMISPHERE, UNILATERAL, 1 SEIZURE Bilateral 6/25/2018    Performed by Shoaib Boyce Jr., MD at [facility] ECT    OVARIAN CYST REMOVAL Bilateral       OBJECTIVE:     Vitals (pre-procedure):  Vitals:    09/04/19 0719   BP: (!) 122/58   Pulse: 74   Resp: 18   Temp: 97.7 °F (36.5 °C)        Labs/Imaging/Studies:   Recent Results (from the past 48 hour(s))   POCT urine pregnancy    Collection Time: 09/04/19  7:14 AM   Result Value Ref Range    POC Preg Test, Ur Negative Negative      Acceptable Yes       No results found for: PHENYTOIN, PHENOBARB, VALPROATE, CBMZ    Physical Exam:   Gen: AAOx4, NAD  HEENT: MMM, PERRL, EOMI, O/P clear  CV: RRR, S1/S2 nml, no M/R/G  Chest: CTAB, no R/R/W, unlabored breathing  Abd: S/NT/ND, +BS, no HSM  Ext: No C/C/E, pulse 2+ throughout  Neuro: CN II-XII grossly intact, no focal deficits    Mental Status Exam:   Appearance: unremarkable, age appropriate, neatly groomed  Behavior: normal, cooperative, eye contact normal  Speech: normal tone, normal rate, normal pitch, normal volume, spontaneous  Mood: depressed  Affect: constricted  Thought Process: normal and logical  Thought Content: normal, no suicidality, no homicidality, delusions, or paranoia  Cognition: 3/3 immediate recall, HOUSE, ESUOH, 3/3 delayed recall, able to abstract, able to follow 2 step command  Insight: good  Judgment: good       ASSESSMENT/PLAN:     Allyssa Wright is a 39 y.o. female with Major Depressive Disorder, recurrent, in partial remission who presents for ECT.    Recommendations:   Proceed with ECT #84.    Lucy Lombardo MD  Psychiatry PGY-2  9/4/2019

## 2019-09-05 DIAGNOSIS — F33.9 RECURRENT MAJOR DEPRESSIVE DISORDER, REMISSION STATUS UNSPECIFIED: Primary | ICD-10-CM

## 2019-09-05 PROCEDURE — 25000003 PHARM REV CODE 250: Performed by: STUDENT IN AN ORGANIZED HEALTH CARE EDUCATION/TRAINING PROGRAM

## 2019-09-05 NOTE — TRANSFER OF CARE
"Anesthesia Transfer of Care Note    Patient: Allyssa Wright    Procedure(s) Performed: Procedure(s) (LRB):  ELECTROCONVULSIVE THERAPY (ECT) - SINGLE SEIZURE (N/A)    Patient location: PACU    Anesthesia Type: general    Transport from OR: Transported from OR on 6-10 L/min O2 by face mask with adequate spontaneous ventilation    Post vital signs: stable    Level of consciousness: awake    Nausea/Vomiting: no nausea/vomiting    Complications: none    Transfer of care protocol was followed      Last vitals:   Visit Vitals  /62 (BP Location: Left arm, Patient Position: Lying)   Pulse 82   Temp 36.5 °C (97.7 °F)   Resp 18   Ht 5' 5" (1.651 m)   Wt 65.8 kg (145 lb)   LMP  (LMP Unknown)   SpO2 100%   Breastfeeding? No   BMI 24.13 kg/m²     "

## 2019-09-05 NOTE — ANESTHESIA POSTPROCEDURE EVALUATION
Anesthesia Post Evaluation    Patient: Allyssa Wright    Procedure(s) Performed: Procedure(s) (LRB):  ELECTROCONVULSIVE THERAPY (ECT) - SINGLE SEIZURE (N/A)    Final Anesthesia Type: general  Patient location during evaluation: PACU  Patient participation: Yes- Able to Participate  Level of consciousness: awake and alert and oriented  Post-procedure vital signs: reviewed and stable  Pain management: adequate  Airway patency: patent  PONV status at discharge: No PONV  Anesthetic complications: no      Cardiovascular status: hemodynamically stable  Respiratory status: unassisted, spontaneous ventilation and room air  Hydration status: euvolemic  Follow-up not needed.          Vitals Value Taken Time   /56 9/4/2019  9:18 AM   Temp 36.5 °C (97.7 °F) 9/4/2019  9:30 AM   Pulse 82 9/4/2019  9:30 AM   Resp 18 9/4/2019  9:30 AM   SpO2 100 % 9/4/2019  9:30 AM   Vitals shown include unvalidated device data.      No case tracking events are documented in the log.      Pain/Roma Score: Roma Score: 10 (9/4/2019  9:10 AM)

## 2019-09-06 ENCOUNTER — TELEPHONE (OUTPATIENT)
Dept: PSYCHIATRY | Facility: CLINIC | Age: 39
End: 2019-09-06

## 2019-09-08 NOTE — DISCHARGE SUMMARY
Allyssa Wright  : 1980   MRN: [id number]  Date: 2019     Electroconvulsive Therapy  Discharge Summary    Admit Date: 2019  6:46 AM  Discharge Date: 2019    Attending Physician: Shoaib Boyce Jr., MD   Discharge Provider: Lucy Lombardo MD    History of Present Illness: Allyssa Wright is a 39 y.o. female with Major depressive disorder, recurrent, in partial remission presented for ECT #85. See H&P dated 2019 for full HPI. For further details, see Dr. Boyce's pre-ECT evaluation.    Hospital Course: The patient tolerated the ECT treatment well without complication. Patient was stable post-procedure. See OP note dated 2019 for more details.     Disposition: Home or Self Care    Medications:  Current Discharge Medication List      CONTINUE these medications which have NOT CHANGED    Details   brexpiprazole (REXULTI ORAL) Take 0.5 mg by mouth once daily.      clozapine (CLOZARIL) 50 MG tablet Take 50 mg by mouth once daily.       dapsone 7.5 % GlwP Apply topically once daily.      dextroamphetamine-amphetamine 30 mg Tab Take 30 mg by mouth 2 (two) times daily.     Associated Diagnoses: MDD (major depressive disorder), recurrent, in partial remission      famciclovir (FAMVIR) 500 MG tablet Take 1 tablet (500 mg total) by mouth 2 (two) times daily.  Qty: 30 tablet, Refills: 12    Associated Diagnoses: Major depressive disorder, recurrent episode, moderate degree      hydrOXYzine pamoate (VISTARIL) 25 MG Cap Take 2 capsules (50 mg total) by mouth nightly as needed (Take 25-50mg (1-2 caps) at night for anxiety prior to ECT).  Qty: 180 capsule, Refills: 0      mirtazapine (REMERON) 15 MG tablet Take 1 tablet (15 mg total) by mouth every evening.  Qty: 90 tablet, Refills: 0      spironolactone (ALDACTONE) 50 MG tablet 50 mg 2 (two) times daily. 50  mg qAM, 50 mg qHS.      thyroid, pork, (ARMOUR THYROID) 30 mg Tab Take 1 tablet (30 mg total) by mouth every morning.  Qty: 30  tablet, Refills: 11    Associated Diagnoses: Major depressive disorder, recurrent episode, moderate degree      trazodone (DESYREL) 100 MG tablet Take 200 mg by mouth every evening.       UNABLE TO FIND 2 (two) times daily. n-azetyl-cysteine      venlafaxine (EFFEXOR) 100 MG Tab Take 3 tablets (300 mg total) by mouth once daily.    Associated Diagnoses: MDD (major depressive disorder), recurrent, in partial remission      vortioxetine (TRINTELLIX) 5 mg Tab Take 2.5 mg by mouth once daily.       ZOVIRAX 5 % Crea Refills: 5           Status at Discharge: Alert and medically stable    Discharge Diagnoses:  Major depressive disorder, recurrent, in partial remission  Diet: Resume previous outpatient diet  Activity: Ambulate with assistance  Instructions: Please do not eat or drink anything after midnight prior to procedure. Please do not drive on day of ECT.    Med Changes:  None    Next ECT:   9/16/19    Lucy Lombardo MD  [facility] Psychiatry PGY-2  09/09/2019

## 2019-09-08 NOTE — H&P
Allyssa Wright  : 1980   MRN: [id number]  Date: 2019     Electroconvulsive Therapy  History & Physical    Chief complaint: Major Depressive Disorder, recurrent, partial remission  Procedure: ECT #85    SUBJECTIVE:     HPI:   Allyssa Wright is a 39 y.o. female with Major Depressive Disorder, recurrent, in partial remission who presents for ECT.  Father at bedside. Patient states that she is doing better from her last ECT session. She feels that they are better spaced out at a week. Denies changes in sleep or appetite. Continuing TMS treatments. Denies SI, HI, AVH. Medication compliant. Confirms NPO status. Ready for ECT today.     Psychiatric Review of Systems:  Mood: better  Appetite: No problem  Psychomotor: No problem  Cognitive Impairment: none  Insomnia:  No problem  Psychosis: denies  Diurnal Variation: denies  Suicidal Ideation: denies    Medical Review Of Systems:  Pertinent items are noted in HPI.    Current Medications:   Current Facility-Administered Medications on File Prior to Encounter   Medication Dose Route Frequency Provider Last Rate Last Dose    sodium chloride 0.9% flush 10 mL  10 mL Intra-Catheter PRN Homa Colbert MD         Current Outpatient Medications on File Prior to Encounter   Medication Sig Dispense Refill    brexpiprazole (REXULTI ORAL) Take 0.5 mg by mouth once daily.      clozapine (CLOZARIL) 50 MG tablet Take 50 mg by mouth once daily.       dapsone 7.5 % GlwP Apply topically once daily.      dextroamphetamine-amphetamine 30 mg Tab Take 30 mg by mouth 2 (two) times daily.       famciclovir (FAMVIR) 500 MG tablet Take 1 tablet (500 mg total) by mouth 2 (two) times daily. 30 tablet 12    hydrOXYzine pamoate (VISTARIL) 25 MG Cap Take 2 capsules (50 mg total) by mouth nightly as needed (Take 25-50mg (1-2 caps) at night for anxiety prior to ECT). 180 capsule 0    hydrOXYzine pamoate (VISTARIL) 25 MG Cap Take 1 capsule (25 mg total) by mouth nightly as needed  (anxiety/sleep). Take 1 to 2 pills as needed 60 capsule 2    mirtazapine (REMERON) 15 MG tablet Take 1 tablet (15 mg total) by mouth every evening. (Patient taking differently: Take 7.5 mg by mouth every evening. ) 90 tablet 0    spironolactone (ALDACTONE) 50 MG tablet 50 mg 2 (two) times daily. 50  mg qAM, 50 mg qHS.      thyroid, pork, (ARMOUR THYROID) 30 mg Tab Take 1 tablet (30 mg total) by mouth every morning. 30 tablet 11    trazodone (DESYREL) 100 MG tablet Take 200 mg by mouth every evening.       UNABLE TO FIND 2 (two) times daily. n-azetyl-cysteine      venlafaxine (EFFEXOR) 100 MG Tab Take 3 tablets (300 mg total) by mouth once daily. (Patient taking differently: Take 225 mg by mouth once daily. )      vortioxetine (TRINTELLIX) 5 mg Tab Take 2.5 mg by mouth once daily.       ZOVIRAX 5 % Crea   5      Allergies:   Benzodiazepines; Ampicillin; Erythromycin; Levaquin [levofloxacin]; Penicillins; Pristiq [desvenlafaxine]; Sulfa (sulfonamide antibiotics); and Azithromycin    Past Medical/Surgical History:   Past Medical History:   Diagnosis Date    Anxiety     Depression     History of psychiatric hospitalization     HSV-1 (herpes simplex virus 1) infection     Hx of psychiatric care     Moderate depressed bipolar II disorder 06/13/2016    reports no history of bipolar    Obsessive-compulsive disorder     Psychiatric problem     Schizophrenia 4/3/2018    Self-harming behavior     Therapy      Past Surgical History:   Procedure Laterality Date    ANKLE SURGERY Right     BREAST augmentation      ELECTROCONVULSIVE THERAPY (ECT) - SINGLE SEIZURE N/A 12/6/2018    Performed by [facility]ib [facility] Jr[facility] ECT    ELECTROCONVULSIVE THERAPY (ECT) - SINGLE SEIZURE N/A 8/31/2018    Performed by [facility] Jr[facility] at [facility] ECT    ELECTROCONVULSIVE THERAPY (ECT) - SINGLE SEIZURE N/A 8/6/2018    Performed by [facility] at [facility] ECT    ELECTROCONVULSIVE THERAPY (ECT) - SINGLE  SEIZURE N/A 7/23/2018    Performed by [facility] Jr[facility] at [facility] ECT    ELECTROCONVULSIVE THERAPY (ECT) - SINGLE SEIZURE N/A 7/5/2018    Performed by [facility]ib Byoce Jr., [facility] at [facility] ECT    ELECTROCONVULSIVE THERAPY (ECT) - SINGLE SEIZURE N/A 6/28/2018    Performed by [facility] at [facility] ECT    ELECTROCONVULSIVE THERAPY (ECT) - SINGLE SEIZURE N/A 6/13/2018    Performed by [facility] ECT    ELECTROCONVULSIVE THERAPY (ECT) - SINGLE SEIZURE N/A 5/29/2018    Performed by [facility] at [facility] ECT    ELECTROCONVULSIVE THERAPY (ECT) - SINGLE SEIZURE N/A 5/8/2018    Performed by [facility] at [facility] ECT    ELECTROCONVULSIVE THERAPY (ECT) - SINGLE SEIZURE N/A 4/24/2018    Performed by [facility] at [facility] ECT    ELECTROCONVULSIVE THERAPY (ECT) - SINGLE SEIZURE N/A 4/17/2018    Performed by [facility] at [facility] ECT    ELECTROCONVULSIVE THERAPY (ECT) - SINGLE SEIZURE N/A 4/13/2018    Performed by [facility] at [facility] ECT    ELECTROCONVULSIVE THERAPY (ECT) - SINGLE SEIZURE N/A 4/3/2018    Performed by [facility] ECT    ELECTROCONVULSIVE THERAPY (ECT) - SINGLE SEIZURE N/A 3/20/2018    Performed by [facility] ECT    ELECTROCONVULSIVE THERAPY (ECT) - SINGLE SEIZURE N/A 3/15/2018    Performed by [facility]ib [facility] Jr[facility] at [facility] ECT    ELECTROCONVULSIVE THERAPY (ECT) - SINGLE SEIZURE N/A 3/6/2018    Performed by [facility] at [facility] ECT    ELECTROCONVULSIVE THERAPY (ECT) - SINGLE SEIZURE N/A 3/1/2018    Performed by [facility] at [facility] ECT    ELECTROCONVULSIVE THERAPY (ECT) - SINGLE SEIZURE N/A 2/23/2018    Performed by [facility] at [facility] ECT    ELECTROCONVULSIVE THERAPY (ECT) - SINGLE SEIZURE N/A 2/19/2018    Performed by [facility] at [facility] ECT    ELECTROCONVULSIVE THERAPY (ECT) - SINGLE SEIZURE N/A  2/15/2018    Performed by [facility] at [facility] ECT    ELECTROCONVULSIVE THERAPY (ECT) - SINGLE SEIZURE N/A 1/22/2018    Performed by Sh[facility]ib [facility] Jr[facility] at [facility] ECT    ELECTROCONVULSIVE THERAPY (ECT) - SINGLE SEIZURE N/A 12/11/2017    Performed by Sh[facility]ib [facility] Jr[facility] at [facility] ECT    ELECTROCONVULSIVE THERAPY (ECT) - SINGLE SEIZURE N/A 10/24/2017    Performed by Sh[facility]ib Boy[facility] Jr[facility] at [facility] ECT    ELECTROCONVULSIVE THERAPY (ECT) - SINGLE SEIZURE N/A 9/20/2017    Performed by Shoaib Boy[facility] Jr[facility] at [facility] ECT    ELECTROCONVULSIVE THERAPY (ECT) - SINGLE SEIZURE N/A 8/14/2017    Performed by Shoaib Boyce Jr[facility] at [facility] ECT    ELECTROCONVULSIVE THERAPY (ECT) - SINGLE SEIZURE Bilateral 7/12/2017    Performed by Shoaib Boyce Jr[facility] at [facility] ECT    ELECTROCONVULSIVE THERAPY (ECT) - SINGLE SEIZURE N/A 6/13/2017    Performed by Shoaib Boyce Jr[facility] at [facility] ECT    ELECTROCONVULSIVE THERAPY (ECT) - SINGLE SEIZURE N/A 5/17/2017    Performed by Shoaib Boyce Jr[facility] at [facility] ECT    ELECTROCONVULSIVE THERAPY (ECT) - SINGLE SEIZURE N/A 4/20/2017    Performed by Shoaib Boyce Jr[facility] at [facility] ECT    ELECTROCONVULSIVE THERAPY (ECT) - SINGLE SEIZURE N/A 11/22/2016    Performed by Shoaib Boyce Jr[facility] at [facility] ECT    ELECTROCONVULSIVE THERAPY (ECT) - SINGLE SEIZURE N/A 10/24/2016    Performed by Shoaib Boyce Jr[facility] at [facility] ECT    ELECTROCONVULSIVE THERAPY (ECT) - SINGLE SEIZURE N/A 9/22/2016    Performed by Shoaib Boyce Jr[facility] at [facility] ECT    ELECTROCONVULSIVE THERAPY (ECT) - SINGLE SEIZURE N/A 9/1/2016    Performed by Shoaib Boyce Jr[facility] at [facility] ECT    ELECTROCONVULSIVE THERAPY (ECT) - SINGLE SEIZURE N/A 8/15/2016    Performed by Shoaib Boyce Jr[facility] at [facility] ECT    ELECTROCONVULSIVE THERAPY (ECT) - SINGLE SEIZURE N/A 8/1/2016    Performed by Sh[facility]ib [facility] Jr[facility] at [facility] ECT    ELECTROCONVULSIVE THERAPY (ECT) - SINGLE SEIZURE N/A  7/22/2016    Per[facility] by [facility] at [facility] ECT    ELECTROCONVULSIVE THERAPY (ECT) - SINGLE SEIZURE N/A 7/14/2016    Performed by Sh[facility]ib Boy[facility] Jr[facility] at [facility] ECT    ELECTROCONVULSIVE THERAPY (ECT) - SINGLE SEIZURE N/A 7/8/2016    Performed by Shoaib Boyce Jr[facility] at [facility] ECT    ELECTROCONVULSIVE THERAPY (ECT) - SINGLE SEIZURE N/A 7/5/2016    Performed by Shoaib Boyce Jr[facility] at [facility] ECT    ELECTROCONVULSIVE THERAPY (ECT) - SINGLE SEIZURE N/A 6/27/2016    Performed by Shoaib Boyce Jr[facility] at [facility] ECT    ELECTROCONVULSIVE THERAPY (ECT) - SINGLE SEIZURE N/A 6/23/2016    Performed by Shoaib Boyce Jr[facility] at [facility] ECT    ELECTROCONVULSIVE THERAPY (ECT) - SINGLE SEIZURE N/A 6/20/2016    Performed by Shoaib Boyce Jr[facility] at [facility] ECT    ELECTROCONVULSIVE THERAPY (ECT) - SINGLE SEIZURE N/A 6/16/2016    Performed by Shoaib Boyce Jr[facility] at [facility] ECT    ELECTROCONVULSIVE THERAPY (ECT) - SINGLE SEIZURE N/A 6/15/2016    Performed by Shoaib Boyce Jr[facility] at [facility] ECT    ELECTROCONVULSIVE THERAPY (ECT) - SINGLE SEIZURE N/A 6/14/2016    Performed by Shoaib Boy[facility] Jr[facility] at [facility] ECT    ELECTROCONVULSIVE THERAPY (ECT) - SINGLE SEIZURE N/A 6/13/2016    Performed by Sh[facility]ib [facility] Jr[facility] at [facility] ECT    ELECTROCONVULSIVE THERAPY (ECT) - SINGLE SEIZURE N/A 1/4/2016    Performed by Sh[facility]ib [facility] Jr[facility] at [facility] ECT    ELECTROCONVULSIVE THERAPY, CEREBRAL HEMISPHERE, UNILATERAL, 1 SEIZURE Bilateral 6/25/2018    Performed by [facility] Jr[facility] at [facility] ECT    OVARIAN CYST REMOVAL Bilateral       OBJECTIVE:     Vitals (pre-procedure):  Vitals:    09/09/19 0744   BP: 115/71   Pulse: 83   Resp: 17   Temp: 98.3 °F (36.8 °C)        Labs/Imaging/Studies:   Recent Results (from the past 48 hour(s))   POCT urine pregnancy    Collection Time: 09/09/19  8:00 AM   Result Value Ref Range    POC Preg Test, Ur Negative Negative     Acceptable Yes       No  results found for: PHENYTOIN, PHENOBARB, VALPROATE, CBMZ    Physical Exam:   Gen: AAOx4, NAD  HEENT: MMM, PERRL, EOMI, O/P clear  CV: RRR, S1/S2 nml, no M/R/G  Chest: CTAB, no R/R/W, unlabored breathing  Abd: S/NT/ND, +BS, no HSM  Ext: No C/C/E, pulse 2+ throughout  Neuro: CN II-XII grossly intact, no focal deficits    Mental Status Exam:   Appearance: unremarkable, age appropriate, neatly groomed  Behavior: normal, cooperative, eye contact normal  Speech: normal tone, normal rate, normal pitch, normal volume, spontaneous  Mood: better  Affect: full, appropriate  Thought Process: normal and logical  Thought Content: normal, no suicidality, no homicidality, delusions, or paranoia  Cognition: 3/3 immediate recall, 3/3 delayed recall, able to abstract, able to follow 2 step command  Insight: good  Judgment: good       ASSESSMENT/PLAN:     Allyssa Wright is a 39 y.o. female with Major Depressive Disorder, recurrent, in partial remission who presents for ECT.    Recommendations:   Proceed with ECT #85.    Lucy Lombardo MD  Psychiatry PGY-2  9/9/2019

## 2019-09-08 NOTE — OP NOTE
Allyssa Wright  : 1980   MRN: [id number]  Date: 2019    Electroconvulsive Therapy  Procedure Note    Date of Admission: 2019  6:46 AM    Site: [facility], [address]    Attending: Shoaib Boyce Jr., MD   Residents: Lucy Lombardo MD  Pre-procedure Diagnosis: Major Depressive Disorder, recurrent, partial remission   ECT Treatment Number: ECT #85  Machine Type: Mecta 5000    Patient Status: Medically stable    Vitals (pre-procedure):  Vitals:    19 0744   BP: 115/71   Pulse: 83   Resp: 17   Temp: 98.3 °F (36.8 °C)       Electrode Placement: Bitemporal    Stimulus Number Charge (mC) Level Pulse Width (msec) Frequency  (Hz) Duration of Stimulus (sec) Current (mA) Duration of Seizure (sec)   1 576 3 1 60 6 800 1:05               Complications: HTN    Maximum Blood Pressure: 183/120    Medications Given:  Caffeine 500mg  PO  Methohexital (Brevital)   150mg  IV    Succinylcholine (Anectine)   120mg  IV    Ondansetron (Zofran)  4mg  IV      Ketorolac 30mg   Esmolol 40mg IV    Treatment Course:  Patient tolerated procedure well. After adequate recovery from general anesthesia, the patient was transported to recovery.    Post-op Diagnosis: Same as above    Recommended for next ECT: 19    Lucy Lombardo MD  [facility] Psych[facility]ry PGY-2  2019

## 2019-09-09 ENCOUNTER — ANESTHESIA EVENT (OUTPATIENT)
Dept: ELECTROPHYSIOLOGY | Facility: HOSPITAL | Age: 39
End: 2019-09-09
Payer: COMMERCIAL

## 2019-09-09 ENCOUNTER — HOSPITAL ENCOUNTER (OUTPATIENT)
Facility: HOSPITAL | Age: 39
Discharge: HOME | End: 2019-09-09
Attending: PSYCHIATRY & NEUROLOGY | Admitting: PSYCHIATRY & NEUROLOGY
Payer: COMMERCIAL

## 2019-09-09 ENCOUNTER — ANESTHESIA (OUTPATIENT)
Dept: ELECTROPHYSIOLOGY | Facility: HOSPITAL | Age: 39
End: 2019-09-09
Payer: COMMERCIAL

## 2019-09-09 VITALS
OXYGEN SATURATION: 98 % | DIASTOLIC BLOOD PRESSURE: 58 MMHG | HEART RATE: 88 BPM | BODY MASS INDEX: 24.16 KG/M2 | SYSTOLIC BLOOD PRESSURE: 115 MMHG | WEIGHT: 145 LBS | RESPIRATION RATE: 20 BRPM | HEIGHT: 65 IN | TEMPERATURE: 99 F

## 2019-09-09 DIAGNOSIS — F33.9 RECURRENT MAJOR DEPRESSIVE DISORDER, REMISSION STATUS UNSPECIFIED: Primary | ICD-10-CM

## 2019-09-09 DIAGNOSIS — F33.41 MAJOR DEPRESSIVE DISORDER, RECURRENT EPISODE, IN PARTIAL REMISSION: Primary | ICD-10-CM

## 2019-09-09 DIAGNOSIS — F32.9 MDD (MAJOR DEPRESSIVE DISORDER): ICD-10-CM

## 2019-09-09 LAB
B-HCG UR QL: NEGATIVE
CTP QC/QA: YES

## 2019-09-09 PROCEDURE — 25000003 PHARM REV CODE 250: Performed by: STUDENT IN AN ORGANIZED HEALTH CARE EDUCATION/TRAINING PROGRAM

## 2019-09-09 PROCEDURE — 63600175 PHARM REV CODE 636 W HCPCS: Performed by: STUDENT IN AN ORGANIZED HEALTH CARE EDUCATION/TRAINING PROGRAM

## 2019-09-09 PROCEDURE — D9220A PRA ANESTHESIA: ICD-10-PCS | Mod: ,,, | Performed by: ANESTHESIOLOGY

## 2019-09-09 PROCEDURE — 90870 ELECTROCONVULSIVE THERAPY: CPT | Mod: ,,, | Performed by: PSYCHIATRY & NEUROLOGY

## 2019-09-09 PROCEDURE — 81025 URINE PREGNANCY TEST: CPT | Performed by: PSYCHIATRY & NEUROLOGY

## 2019-09-09 PROCEDURE — 90870 ELECTROCONVULSIVE THERAPY: CPT | Performed by: STUDENT IN AN ORGANIZED HEALTH CARE EDUCATION/TRAINING PROGRAM

## 2019-09-09 PROCEDURE — 90870 PR ELECTROCONVULSIVE THERAPY,1 SEIZ: ICD-10-PCS | Mod: ,,, | Performed by: PSYCHIATRY & NEUROLOGY

## 2019-09-09 PROCEDURE — D9220A PRA ANESTHESIA: Mod: ,,, | Performed by: ANESTHESIOLOGY

## 2019-09-09 RX ORDER — KETOROLAC TROMETHAMINE 30 MG/ML
INJECTION, SOLUTION INTRAMUSCULAR; INTRAVENOUS
Status: DISCONTINUED | OUTPATIENT
Start: 2019-09-09 | End: 2019-09-09

## 2019-09-09 RX ORDER — LABETALOL HCL 20 MG/4 ML
SYRINGE (ML) INTRAVENOUS
Status: DISCONTINUED
Start: 2019-09-09 | End: 2019-09-09 | Stop reason: HOSPADM

## 2019-09-09 RX ORDER — SODIUM CHLORIDE 0.9 % (FLUSH) 0.9 %
10 SYRINGE (ML) INJECTION
Status: DISCONTINUED | OUTPATIENT
Start: 2019-09-09 | End: 2019-09-09 | Stop reason: HOSPADM

## 2019-09-09 RX ORDER — SUCCINYLCHOLINE CHLORIDE 20 MG/ML
INJECTION INTRAMUSCULAR; INTRAVENOUS
Status: DISCONTINUED | OUTPATIENT
Start: 2019-09-09 | End: 2019-09-09

## 2019-09-09 RX ORDER — ONDANSETRON 2 MG/ML
INJECTION INTRAMUSCULAR; INTRAVENOUS
Status: DISCONTINUED | OUTPATIENT
Start: 2019-09-09 | End: 2019-09-09

## 2019-09-09 RX ORDER — LIDOCAINE HYDROCHLORIDE 10 MG/ML
1 INJECTION, SOLUTION EPIDURAL; INFILTRATION; INTRACAUDAL; PERINEURAL ONCE
Status: COMPLETED | OUTPATIENT
Start: 2019-09-10 | End: 2019-09-09

## 2019-09-09 RX ORDER — ONDANSETRON 2 MG/ML
INJECTION INTRAMUSCULAR; INTRAVENOUS
Status: COMPLETED
Start: 2019-09-09 | End: 2019-09-09

## 2019-09-09 RX ORDER — KETOROLAC TROMETHAMINE 30 MG/ML
INJECTION, SOLUTION INTRAMUSCULAR; INTRAVENOUS
Status: COMPLETED
Start: 2019-09-09 | End: 2019-09-09

## 2019-09-09 RX ORDER — SODIUM CHLORIDE 9 MG/ML
INJECTION, SOLUTION INTRAVENOUS CONTINUOUS
Status: DISCONTINUED | OUTPATIENT
Start: 2019-09-10 | End: 2019-09-09 | Stop reason: HOSPADM

## 2019-09-09 RX ORDER — SODIUM CHLORIDE 9 MG/ML
INJECTION, SOLUTION INTRAVENOUS CONTINUOUS PRN
Status: DISCONTINUED | OUTPATIENT
Start: 2019-09-09 | End: 2019-09-09

## 2019-09-09 RX ORDER — SUCCINYLCHOLINE CHLORIDE 20 MG/ML
INJECTION INTRAMUSCULAR; INTRAVENOUS
Status: COMPLETED
Start: 2019-09-09 | End: 2019-09-09

## 2019-09-09 RX ORDER — ESMOLOL HYDROCHLORIDE 10 MG/ML
INJECTION INTRAVENOUS
Status: DISCONTINUED
Start: 2019-09-09 | End: 2019-09-09 | Stop reason: HOSPADM

## 2019-09-09 RX ADMIN — SODIUM CHLORIDE: 9 INJECTION, SOLUTION INTRAVENOUS at 08:09

## 2019-09-09 RX ADMIN — SUCCINYLCHOLINE CHLORIDE 120 MG: 20 INJECTION, SOLUTION INTRAMUSCULAR; INTRAVENOUS at 08:09

## 2019-09-09 RX ADMIN — Medication 500 MG: at 08:09

## 2019-09-09 RX ADMIN — METHOHEXITAL SODIUM 150 MG: 500 INJECTION, POWDER, LYOPHILIZED, FOR SOLUTION INTRAMUSCULAR; INTRAVENOUS; RECTAL at 08:09

## 2019-09-09 RX ADMIN — KETOROLAC TROMETHAMINE 30 MG: 30 INJECTION, SOLUTION INTRAMUSCULAR at 09:09

## 2019-09-09 RX ADMIN — ONDANSETRON 4 MG: 2 INJECTION, SOLUTION INTRAMUSCULAR; INTRAVENOUS at 09:09

## 2019-09-09 RX ADMIN — SODIUM CHLORIDE: 0.9 INJECTION, SOLUTION INTRAVENOUS at 07:09

## 2019-09-09 RX ADMIN — LIDOCAINE HYDROCHLORIDE: 10 INJECTION, SOLUTION EPIDURAL; INFILTRATION; INTRACAUDAL; PERINEURAL at 07:09

## 2019-09-09 NOTE — ANESTHESIA PROCEDURE NOTES
ECT    Procedure start time: 9/9/2019 8:59 AM  Timeout performed at: 9/9/2019 8:54 AM  Procedure end time: 9/9/2019 9:01 AM      Staffing  Authorizing Provider: Shelley Caballero MD  Performing Provider: Fady Booker MD    Preanesthetic Checklist  The following were completed as part of the preanesthetic checklist: patient identified, procedural consent, pre-op evaluation, timeout performed, risks and benefits discussed, monitors and equipment checked, anesthesia consent given, oxygen available, suction available, hand hygiene performed and patient being monitored.    Setup & Induction  Patient Monitoring: heart rate, cardiac monitor, continuous pulse ox, continuous capnometry, NIBP and gas analyzer  Patient preparation: patient hyperventilated, bite block inserted, extremities padded and mandibular stabilization  Electrode Placement: Bitemporal    The patient was moved to the ECT therapy room after being assessed and consented for ECT. After standard ASA monitors were applied and timeout performed, the patient was adequately preoxygenated. After induction of general anesthesia, adequate oxygenation and ventilation were confirmed with pulse oxymetry and end tidal CO2 monitoring via bag-mask ventilation. End tidal CO2 was monitored throughout the case and moderate hyperventilation was performed prior to beginning ECT treatment. All extremities were padded, biteblock was inserted, and mandibular stabilization was done prior to initiating ECT therapy.    ECT Findings  ECT associated findings of: None

## 2019-09-09 NOTE — DISCHARGE INSTRUCTIONS
E.C.T. Home Care Instructions    ACTIVITY LEVEL:    You may feel sleepy for several hours. It is best to rest until you are more awake and then gradually resume your normal activities in one day. It is recommended, because of the possibility of memory loss and slight confusion post ECT, that you rest in the company of a responsible adult. This confusion and memory loss is expected and should diminish over time. It is also recommended that you do not drive or operate any electrical appliances or machinery during the ECT treatment series. Ask your Doctor, at the time of your treatment, any questions you may have about specific activities.    DIET:    You may wish to start with liquids after your ECT treatment and gradually resume your normal diet. Do not consume alcoholic beverages during the ECT treatment series.    BATHING:    You may shower or bathe as desired.    MEDICATIONS:    Resume all home medications starting with the AM doses not taken prior to ECT treatment. If you experience a headache, it is okay to take your usual pain reliever.    WHEN TO CALL THE DOCTOR:     Headache, memory loss or confusion that does not begin to resolve within 24 hours.    RETURN APPOINTMENT: Monday September 16th.  Office will call with time to arrive    Remember you may not eat or drink after midnight, but please take heart or high blood pressure medicines with a sip of water in the morning prior to leaving home.    FOR EMERGENCIES:    If any unusual problems or difficulties occur:    Contact the office [phone] Monday-Friday until 3:00 p.m. After hours, call the hospital  [phone] and ask for the Psychiatry Resident On-call.        Anesthesia: General Anesthesia     You are watched continuously during your procedure by your anesthesia provider.     Youre due to have surgery. During surgery, youll be given medicine called anesthesia or anesthetic. This will keep you comfortable and pain-free.  Your anesthesia provider will use general anesthesia.  What is general anesthesia?  General anesthesia puts you into a state like deep sleep. It goes into the bloodstream (IV anesthetics), into the lungs (gas anesthetics), or both. You feel nothing during the procedure. You will not remember it. During the procedure, the anesthesia provider monitors you continuously. He or she checks your heart rate and rhythm, blood pressure, breathing, and blood oxygen.  · IV anesthetics. IV anesthetics are given through an IV line in your arm. Theyre often given first. This is so you are asleep before a gas anesthetic is started. Some kinds of IV anesthetics relieve pain. Others relax you. Your doctor will decide which kind is best in your case.  · Gas anesthetics. Gas anesthetics are breathed into the lungs. They are often used to keep you asleep. They can be given through a facemask or a tube placed in your larynx or trachea (breathing tube).  ¨ If you have a facemask, your anesthesia provider will most likely place it over your nose and mouth while youre still awake. Youll breathe oxygen through the mask as your IV anesthetic is started. Gas anesthetic may be added through the mask.  ¨ If you have a tube in the larynx or trachea, it will be inserted into your throat after youre asleep.  Anesthesia tools and medicines  You will likely have:  · IV anesthetics. These are put into an IV line into your bloodstream.  · Gas anesthetics. You breathe these anesthetics into your lungs, where they pass into your bloodstream.  · Pulse oximeter. This is a small clip that is attached to the end of your finger. This measures your blood oxygen level.  · Electrocardiography leads (electrodes). These are small sticky pads that are placed on your chest. They record your heart rate and rhythm.  · Blood pressure cuff. This reads your blood pressure.  Risks and possible complications  General anesthesia has some risks. These  include:  · Breathing problems  · Nausea and vomiting  · Sore throat or hoarseness (usually temporary)  · Allergic reaction to the anesthetic  · Irregular heartbeat (rare)  · Cardiac arrest (rare)   Anesthesia safety  · Follow all instructions you are given for how long not to eat or drink before your procedure.  · Be sure your doctor knows what medicines and drugs you take. This includes over-the-counter medicines, herbs, supplements, alcohol or other drugs. You will be asked when those were last taken.  · Have an adult family member or friend drive you home after the procedure.  · For the first 24 hours after your surgery:  ¨ Do not drive or use heavy equipment.  ¨ Do not make important decisions or sign legal documents. If important decisions or signing legal documents is necessary during the first 24 hours after surgery, have a trusted family member or spouse act on your behalf.  ¨ Avoid alcohol.  ¨ Have a responsible adult stay with you. He or she can watch for problems and help keep you safe.  Date Last Reviewed: 12/1/2016 © 2000-2017 [employer]. [address] [place] [id number]. All rights reserved. This information is not intended as a substitute for professional medical care. Always follow your healthcare professional's instructions.

## 2019-09-09 NOTE — ANESTHESIA PREPROCEDURE EVALUATION
[facility]  Anesthesia Pre-Operative Evaluation         Patient Name: Allyssa Wright  YOB: 1980  MRN: [id number]    SUBJECTIVE:     Pre-operative evaluation for Procedure(s) (LRB):  ELECTROCONVULSIVE THERAPY (ECT) - SINGLE SEIZURE (N/A)     09/09/2019    Allyssa Wright is a 39 y.o. female w/ a significant PMHx of HSV-1, recurrent MDD who presents for ECT #84.     Previous Medications      succinylcholine (ANECTINE) 20 mg/mL injection 120 mg   ondansetron HCl (PF) 4 mg/2 mL injection 4 mg   ketorolac (TORADOL) injection 30 mg 30 mg   methohexital (BREVITAL SODIUM) injection 150 mg   esmolol (BREVIBLOC) injection 10 mg/mL 40 mg   0.9%  NaCl infusion          Patient Active Problem List   Diagnosis    Major depressive disorder, recurrent episode, in partial remission    Other acne    Comfort measures only status    MDD (major depressive disorder)    Major depression    Major depressive disorder    MDD (major depressive disorder), severe    Depression    Major depressive disorder, recurrent, in partial remission    Bipolar depression    Major depressive disorder, recurrent episode, moderate degree       Review of patient's allergies indicates:   Allergen Reactions    Benzodiazepines Other (See Comments)     Contraindicated while taking Clozapine    Ampicillin      Mom says so    Erythromycin     Levaquin [levofloxacin] Other (See Comments)     Depression side effects    Penicillins      Mom says so    Pristiq [desvenlafaxine]      psycotic      Sulfa (sulfonamide antibiotics)      Rash      Azithromycin Anxiety       Current Inpatient Medications:      Current Facility-Administered Medications on File Prior to Encounter   Medication Dose Route Frequency Provider Last Rate Last Dose    sodium chloride 0.9% flush 10 mL  10 mL Intra-Catheter PRN Homa Colbert MD         Current Outpatient Medications on File Prior to Encounter   Medication Sig Dispense Refill     brexpiprazole (REXULTI ORAL) Take 0.5 mg by mouth once daily.      clozapine (CLOZARIL) 50 MG tablet Take 50 mg by mouth once daily.       dapsone 7.5 % GlwP Apply topically once daily.      dextroamphetamine-amphetamine 30 mg Tab Take 30 mg by mouth 2 (two) times daily.       famciclovir (FAMVIR) 500 MG tablet Take 1 tablet (500 mg total) by mouth 2 (two) times daily. 30 tablet 12    hydrOXYzine pamoate (VISTARIL) 25 MG Cap Take 2 capsules (50 mg total) by mouth nightly as needed (Take 25-50mg (1-2 caps) at night for anxiety prior to ECT). 180 capsule 0    hydrOXYzine pamoate (VISTARIL) 25 MG Cap Take 1 capsule (25 mg total) by mouth nightly as needed (anxiety/sleep). Take 1 to 2 pills as needed 60 capsule 2    mirtazapine (REMERON) 15 MG tablet Take 1 tablet (15 mg total) by mouth every evening. (Patient taking differently: Take 7.5 mg by mouth every evening. ) 90 tablet 0    spironolactone (ALDACTONE) 50 MG tablet 50 mg 2 (two) times daily. 50  mg qAM, 50 mg qHS.      thyroid, pork, (ARMOUR THYROID) 30 mg Tab Take 1 tablet (30 mg total) by mouth every morning. 30 tablet 11    trazodone (DESYREL) 100 MG tablet Take 200 mg by mouth every evening.       UNABLE TO FIND 2 (two) times daily. n-azetyl-cysteine      venlafaxine (EFFEXOR) 100 MG Tab Take 3 tablets (300 mg total) by mouth once daily. (Patient taking differently: Take 225 mg by mouth once daily. )      vortioxetine (TRINTELLIX) 5 mg Tab Take 2.5 mg by mouth once daily.       ZOVIRAX 5 % Crea   5       Past Surgical History:   Procedure Laterality Date    ANKLE SURGERY Right     BREAST augmentation      ELECTROCONVULSIVE THERAPY (ECT) - SINGLE SEIZURE N/A 12/6/2018    Performed by Shoaib Boyce Jr., [facility] at [facility] ECT    ELECTROCONVULSIVE THERAPY (ECT) - SINGLE SEIZURE N/A 8/31/2018    Performed by Sh[facility]ib Boyce Jr., [facility] at [facility] ECT    ELECTROCONVULSIVE THERAPY (ECT) - SINGLE SEIZURE N/A 8/6/2018    Performed by Shoaib Boyce  MD [place] at [facility] ECT    ELECTROCONVULSIVE THERAPY (ECT) - SINGLE SEIZURE N/A 7/23/2018    Performed by Shoaib Boyce Jr., MD at [facility] ECT    ELECTROCONVULSIVE THERAPY (ECT) - SINGLE SEIZURE N/A 7/5/2018    Performed by Shoaib Boyce Jr., [facility] at [facility] ECT    ELECTROCONVULSIVE THERAPY (ECT) - SINGLE SEIZURE N/A 6/28/2018    Performed by Shoaib Boyce Jr., MD at [facility] ECT    ELECTROCONVULSIVE THERAPY (ECT) - SINGLE SEIZURE N/A 6/13/2018    Performed by Shoaib Boyce Jr., [facility] at [facility] ECT    ELECTROCONVULSIVE THERAPY (ECT) - SINGLE SEIZURE N/A 5/29/2018    Performed by Shoaib Boyce Jr., [facility] at [facility] ECT    ELECTROCONVULSIVE THERAPY (ECT) - SINGLE SEIZURE N/A 5/8/2018    Performed by Shoaib Boyce Jr., MD at [facility] ECT    ELECTROCONVULSIVE THERAPY (ECT) - SINGLE SEIZURE N/A 4/24/2018    Performed by Shoaib Boyce Jr., [facility] at [facility] ECT    ELECTROCONVULSIVE THERAPY (ECT) - SINGLE SEIZURE N/A 4/17/2018    Performed by Shoaib Boyce Jr., [facility] at [facility] ECT    ELECTROCONVULSIVE THERAPY (ECT) - SINGLE SEIZURE N/A 4/13/2018    Performed by Shoaib Boyce Jr., [facility] at [facility] ECT    ELECTROCONVULSIVE THERAPY (ECT) - SINGLE SEIZURE N/A 4/3/2018    Performed by Shoaib Boyce Jr., [facility] at [facility] ECT    ELECTROCONVULSIVE THERAPY (ECT) - SINGLE SEIZURE N/A 3/20/2018    Performed by Shoaib Boyce Jr., MD at [facility] ECT    ELECTROCONVULSIVE THERAPY (ECT) - SINGLE SEIZURE N/A 3/15/2018    Performed by Shoaib Boyce Jr., [facility] at [facility] ECT    ELECTROCONVULSIVE THERAPY (ECT) - SINGLE SEIZURE N/A 3/6/2018    Performed by Shoaib Boyce Jr., MD at [facility] ECT    ELECTROCONVULSIVE THERAPY (ECT) - SINGLE SEIZURE N/A 3/1/2018    Performed by Shoaib Boyce Jr., [facility] at [facility] ECT    ELECTROCONVULSIVE THERAPY (ECT) - SINGLE SEIZURE N/A 2/23/2018    Performed by Shoaib Boyce Jr., [facility] at [facility] ECT    ELECTROCONVULSIVE THERAPY (ECT) - SINGLE SEIZURE N/A 2/19/2018    Performed by Shoaib Boyce Jr., [facility] at [facility]  ECT    ELECTROCONVULSIVE THERAPY (ECT) - SINGLE SEIZURE N/A 2/15/2018    Performed by Sh[facility]ib Boyce Jr[facility] at [facility] ECT    ELECTROCONVULSIVE THERAPY (ECT) - SINGLE SEIZURE N/A 1/22/2018    Performed by Shoaib Boyce Jr., MD at [facility] ECT    ELECTROCONVULSIVE THERAPY (ECT) - SINGLE SEIZURE N/A 12/11/2017    Performed by Shoaib Boyce Jr., [facility] at [facility] ECT    ELECTROCONVULSIVE THERAPY (ECT) - SINGLE SEIZURE N/A 10/24/2017    Performed by Shoaib Boyce Jr[facility] at [facility] ECT    ELECTROCONVULSIVE THERAPY (ECT) - SINGLE SEIZURE N/A 9/20/2017    Performed by Shoaib Boyce Jr., [facility] at [facility] ECT    ELECTROCONVULSIVE THERAPY (ECT) - SINGLE SEIZURE N/A 8/14/2017    Performed by Shoaib Boyce Jr[facility] at [facility] ECT    ELECTROCONVULSIVE THERAPY (ECT) - SINGLE SEIZURE Bilateral 7/12/2017    Performed by Shoaib Boyce Jr., MD at [facility] ECT    ELECTROCONVULSIVE THERAPY (ECT) - SINGLE SEIZURE N/A 6/13/2017    Performed by Shoaib Boyce Jr., MD at [facility] ECT    ELECTROCONVULSIVE THERAPY (ECT) - SINGLE SEIZURE N/A 5/17/2017    Performed by Shoaib Boyce Jr., MD at [facility] ECT    ELECTROCONVULSIVE THERAPY (ECT) - SINGLE SEIZURE N/A 4/20/2017    Performed by Shoaib Boyce Jr., [facility] at [facility] ECT    ELECTROCONVULSIVE THERAPY (ECT) - SINGLE SEIZURE N/A 11/22/2016    Performed by Shoaib Boyce Jr., MD at [facility] ECT    ELECTROCONVULSIVE THERAPY (ECT) - SINGLE SEIZURE N/A 10/24/2016    Performed by Shoaib Boyce Jr., [facility] at [facility] ECT    ELECTROCONVULSIVE THERAPY (ECT) - SINGLE SEIZURE N/A 9/22/2016    Performed by Shoaib Boyce Jr., MD at [facility] ECT    ELECTROCONVULSIVE THERAPY (ECT) - SINGLE SEIZURE N/A 9/1/2016    Performed by Shoaib Boyce Jr., [facility] at [facility] ECT    ELECTROCONVULSIVE THERAPY (ECT) - SINGLE SEIZURE N/A 8/15/2016    Performed by Shoaib Boyce Jr., [facility] at [facility] ECT    ELECTROCONVULSIVE THERAPY (ECT) - SINGLE SEIZURE N/A 8/1/2016    Performed by Shoaib Boyce Jr[facility] at [facility] ECT     ELECTROCONVULSIVE THERAPY (ECT) - SINGLE SEIZURE N/A 7/22/2016    Performed by Shoaib Boyce Jr., MD at [facility] ECT    ELECTROCONVULSIVE THERAPY (ECT) - SINGLE SEIZURE N/A 7/14/2016    Performed by Shoaib Boyce Jr., MD at [facility] ECT    ELECTROCONVULSIVE THERAPY (ECT) - SINGLE SEIZURE N/A 7/8/2016    Performed by Shoaib Boyce Jr., [facility] at [facility] ECT    ELECTROCONVULSIVE THERAPY (ECT) - SINGLE SEIZURE N/A 7/5/2016    Performed by Shoaib Boyce Jr., MD at [facility] ECT    ELECTROCONVULSIVE THERAPY (ECT) - SINGLE SEIZURE N/A 6/27/2016    Performed by Shoaib Boyce Jr., MD at [facility] ECT    ELECTROCONVULSIVE THERAPY (ECT) - SINGLE SEIZURE N/A 6/23/2016    Performed by Shoaib Boyce Jr., [facility] at [facility] ECT    ELECTROCONVULSIVE THERAPY (ECT) - SINGLE SEIZURE N/A 6/20/2016    Performed by Shoaib Boyce Jr., [facility] at [facility] ECT    ELECTROCONVULSIVE THERAPY (ECT) - SINGLE SEIZURE N/A 6/16/2016    Performed by Shoaib Boyce Jr., [facility] at [facility] ECT    ELECTROCONVULSIVE THERAPY (ECT) - SINGLE SEIZURE N/A 6/15/2016    Performed by Shoaib Boyce Jr., [facility] at [facility] ECT    ELECTROCONVULSIVE THERAPY (ECT) - SINGLE SEIZURE N/A 6/14/2016    Performed by Shoaib Boyce Jr[facility] at [facility] ECT    ELECTROCONVULSIVE THERAPY (ECT) - SINGLE SEIZURE N/A 6/13/2016    Performed by Shoaib Boyce Jr[facility] at [facility] ECT    ELECTROCONVULSIVE THERAPY (ECT) - SINGLE SEIZURE N/A 1/4/2016    Performed by Shoaib Boyce Jr., MD at [facility] ECT    ELECTROCONVULSIVE THERAPY, CEREBRAL HEMISPHERE, UNILATERAL, 1 SEIZURE Bilateral 6/25/2018    Performed by [facility]ib [facility] Jr[facility] at [facility] ECT    OVARIAN CYST REMOVAL Bilateral        Social History     Socioeconomic History    Marital status: Single     Spouse name: Not on file    Number of children: Not on file    Years of education: Not on file    Highest education level: Not on file   Occupational History    Occupation: unemployed   Social Needs    Financial resource strain: Not  on file    Food insecurity:     Worry: Not on file     Inability: Not on file    Transportation needs:     Medical: Not on file     Non-medical: Not on file   Tobacco Use    Smoking status: Former Smoker     Last attempt to quit: 2011     Years since quittin.4    Smokeless tobacco: Never Used   Substance and Sexual Activity    Alcohol use: Yes     Comment: rarely    Drug use: No     Comment: Experimental use in high school    Sexual activity: Not on file   Lifestyle    Physical activity:     Days per week: Not on file     Minutes per session: Not on file    Stress: Not on file   Relationships    Social connections:     Talks on phone: Not on file     Gets together: Not on file     Attends Mandaen service: Not on file     Active member of club or organization: Not on file     Attends meetings of clubs or organizations: Not on file     Relationship status: Not on file   Other Topics Concern    Patient feels they ought to cut down on drinking/drug use Not Asked    Patient annoyed by others criticizing their drinking/drug use Not Asked    Patient has felt bad or guilty about drinking/drug use Not Asked    Patient has had a drink/used drugs as an eye opener in the AM Not Asked   Social History Narrative    Pt has 1 older brother from an intact family until the death of her mother in .  She completed 1 year of college, was never in the , and has never been employed.  She was engaged once, but never , has no children, and lives with her father plus 2 dogs.  She denies any hobbies and is spiritual but not Mandaen.  She does date and returns to college during rare periods when depression and anxiety [place].       OBJECTIVE:     Vital Signs Range (Last 24H):  BP: ()/()   Arterial Line BP: ()/()       Significant Labs:  Lab Results   Component Value Date    WBC 4.26 2019    HGB 14.0 2019    HCT 42.9 2019     2019    ALT 22 2019    AST 22  08/07/2019     08/07/2019    K 4.1 08/07/2019     08/07/2019    CREATININE 0.7 08/07/2019    BUN 10 08/07/2019    CO2 30 (H) 08/07/2019    TSH 1.864 08/07/2019       Diagnostic Studies: No relevant studies.    EKG:   Results for orders placed or performed in visit on 06/08/16   IN OFFICE EKG 12-LEAD (to [place])    Collection Time: 06/08/16  3:33 PM    Narrative    Test Reason : F33.9  Blood Pressure : **/**mmHG  Vent. Rate : 084 BPM     Atrial Rate : 084 BPM     P-R Int : 154 ms          QRS Dur : 086 ms      QT Int : 378 ms       P-R-T Axes : 067 066 055 degrees     QTc Int : 446 ms    Normal sinus rhythm  Normal ECG  When compared with ECG of 03-DEC-2015 11:38,  Questionable change in The axis  T wave inversion no longer evident in Inferior leads  Confirmed by Flaco Sr MD ([id number]) on 6/9/2016 1:30:13 PM    Referred By: SELF REFERRAL           Confirmed By:Flaco Sr MD       ECHOCARDIOGRAM:  TTE:  No results found for this or any previous visit.    ASSESSMENT/PLAN:                                   Anesthesia Evaluation    I have reviewed the Patient Summary Reports.     I have reviewed the Medications.     Review of Systems  Anesthesia Hx:  No problems with previous Anesthesia    Hematology/Oncology:        Denies Current/Recent Cancer   EENT/Dental:   denies chronic allergic rhinitis  Denies Otitis Media   Cardiovascular:   Denies Pacemaker.  Denies Hypertension.  Denies Valvular problems/Murmurs.  Denies MI.  Denies CAD.    Denies CABG/stent.              denies PVD    Pulmonary:   Denies Pneumonia Denies COPD.  Denies Asthma.  Denies Shortness of breath.  Denies Recent URI.  Denies Sleep Apnea.    Renal/:   Denies Chronic Renal Disease.     Hepatic/GI:   Denies PUD. Denies GERD. Denies Liver Disease.    Neurological:   Denies TIA. Denies CVA. Denies Neuromuscular Disease.    Denies Peripheral Neuropathy    Endocrine:   Denies Diabetes. Denies Hypothyroidism. Denies Hyperthyroidism.    Psych:    Psychiatric History          Physical Exam  General:  Well nourished    Airway/Jaw/Neck:  Airway Findings: Mouth Opening: Normal General Airway Assessment: Adult  Mallampati: III  TM Distance: Normal, at least 6 cm  Jaw/Neck Findings:     Neck ROM: Normal ROM      Dental:  Dental Findings: In tact   Chest/Lungs:  Chest/Lungs Findings: Clear to auscultation     Heart/Vascular:  Heart Findings: Rate: Normal  Rhythm: Regular Rhythm  Sounds: Normal        Mental Status:  Mental Status Findings:  Cooperative, Alert and Oriented         Anesthesia Plan  Type of Anesthesia, risks & benefits discussed:  Anesthesia Type:  general  Patient's Preference:   Intra-op Monitoring Plan: standard ASA monitors  Intra-op Monitoring Plan Comments:   Post Op Pain Control Plan: multimodal analgesia and IV/PO Opioids PRN  Post Op Pain Control Plan Comments:   Induction:   IV  Beta Blocker:  Patient is not currently on a Beta-Blocker (No further documentation required).       Informed Consent: Patient understands risks and agrees with Anesthesia plan.  Questions answered. Anesthesia consent signed with patient.  ASA Score: 2     Day of Surgery Review of History & Physical:    H&P update referred to the surgeon.         Ready For Surgery From Anesthesia Perspective.

## 2019-09-10 NOTE — ANESTHESIA POSTPROCEDURE EVALUATION
Anesthesia Post Evaluation    Patient: Allyssa Wright    Procedure(s) Performed: Procedure(s) (LRB):  ELECTROCONVULSIVE THERAPY (ECT) - SINGLE SEIZURE (N/A)    Final Anesthesia Type: general  Patient location during evaluation: PACU  Patient participation: Yes- Able to Participate  Level of consciousness: awake and alert  Post-procedure vital signs: reviewed and stable  Pain management: adequate  Airway patency: patent  PONV status at discharge: No PONV  Anesthetic complications: no      Cardiovascular status: blood pressure returned to baseline  Respiratory status: unassisted, spontaneous ventilation and room air  Hydration status: euvolemic  Follow-up not needed.          Vitals Value Taken Time   /58 9/9/2019  9:32 AM   Temp 37.1 °C (98.7 °F) 9/9/2019  9:30 AM   Pulse 84 9/9/2019  9:37 AM   Resp 49 9/9/2019  9:37 AM   SpO2 88 % 9/9/2019  9:37 AM   Vitals shown include unvalidated device data.      No case tracking events are documented in the log.      Pain/Roma Score: Roma Score: 10 (9/9/2019  9:30 AM)

## 2019-09-15 ENCOUNTER — TELEPHONE (OUTPATIENT)
Dept: PSYCHIATRY | Facility: CLINIC | Age: 39
End: 2019-09-15

## 2019-09-16 ENCOUNTER — TELEPHONE (OUTPATIENT)
Dept: PSYCHIATRY | Facility: CLINIC | Age: 39
End: 2019-09-16

## 2019-09-16 DIAGNOSIS — F33.9 RECURRENT MAJOR DEPRESSIVE DISORDER, REMISSION STATUS UNSPECIFIED: Primary | ICD-10-CM

## 2019-09-17 ENCOUNTER — ANESTHESIA EVENT (OUTPATIENT)
Dept: ELECTROPHYSIOLOGY | Facility: HOSPITAL | Age: 39
End: 2019-09-17
Payer: COMMERCIAL

## 2019-09-17 NOTE — ANESTHESIA PREPROCEDURE EVALUATION
[facility]  Anesthesia Pre-Operative Evaluation         Patient Name: Allyssa Wright  YOB: 1980  MRN: [id number]    SUBJECTIVE:     Pre-operative evaluation for Procedure(s) (LRB):  ELECTROCONVULSIVE THERAPY (ECT) - SINGLE SEIZURE (N/A)       Allyssa Wright is a 39 y.o. female w/ a significant PMHx of HSV-1, recurrent MDD who presents for ECT #86.     Previous Medications      succinylcholine (ANECTINE) 20 mg/mL injection 120 mg   ondansetron HCl (PF) 4 mg/2 mL injection 4 mg   ketorolac (TORADOL) injection 30 mg 30 mg   methohexital (BREVITAL SODIUM) injection 150 mg   esmolol (BREVIBLOC) injection 10 mg/mL 40 mg   0.9%  NaCl infusion          Patient Active Problem List   Diagnosis    Major depressive disorder, recurrent episode, in partial remission    Other acne    Comfort measures only status    MDD (major depressive disorder)    Major depression    Major depressive disorder    MDD (major depressive disorder), severe    Depression    Major depressive disorder, recurrent, in partial remission    Bipolar depression    Major depressive disorder, recurrent episode, moderate degree       Review of patient's allergies indicates:   Allergen Reactions    Benzodiazepines Other (See Comments)     Contraindicated while taking Clozapine    Ampicillin      Mom says so    Erythromycin     Levaquin [levofloxacin] Other (See Comments)     Depression side effects    Penicillins      Mom says so    Pristiq [desvenlafaxine]      psycotic      Sulfa (sulfonamide antibiotics)      Rash      Azithromycin Anxiety       Current Inpatient Medications:      Current Facility-Administered Medications on File Prior to Encounter   Medication Dose Route Frequency Provider Last Rate Last Dose    sodium chloride 0.9% flush 10 mL  10 mL Intra-Catheter PRN Homa Colbert MD         Current Outpatient Medications on File Prior to Encounter   Medication Sig Dispense Refill    brexpiprazole  (REXULTI ORAL) Take 0.5 mg by mouth once daily.      clozapine (CLOZARIL) 50 MG tablet Take 50 mg by mouth once daily.       dapsone 7.5 % GlwP Apply topically once daily.      dextroamphetamine-amphetamine 30 mg Tab Take 30 mg by mouth 2 (two) times daily.       famciclovir (FAMVIR) 500 MG tablet Take 1 tablet (500 mg total) by mouth 2 (two) times daily. 30 tablet 12    hydrOXYzine pamoate (VISTARIL) 25 MG Cap Take 2 capsules (50 mg total) by mouth nightly as needed (Take 25-50mg (1-2 caps) at night for anxiety prior to ECT). 180 capsule 0    mirtazapine (REMERON) 15 MG tablet Take 1 tablet (15 mg total) by mouth every evening. (Patient taking differently: Take 7.5 mg by mouth every evening. ) 90 tablet 0    spironolactone (ALDACTONE) 50 MG tablet 50 mg 2 (two) times daily. 50  mg qAM, 50 mg qHS.      thyroid, pork, (ARMOUR THYROID) 30 mg Tab Take 1 tablet (30 mg total) by mouth every morning. 30 tablet 11    trazodone (DESYREL) 100 MG tablet Take 200 mg by mouth every evening.       UNABLE TO FIND 2 (two) times daily. n-azetyl-cysteine      venlafaxine (EFFEXOR) 100 MG Tab Take 3 tablets (300 mg total) by mouth once daily. (Patient taking differently: Take 225 mg by mouth once daily. )      vortioxetine (TRINTELLIX) 5 mg Tab Take 2.5 mg by mouth once daily.       ZOVIRAX 5 % Crea   5       Past Surgical History:   Procedure Laterality Date    ANKLE SURGERY Right     BREAST augmentation      ELECTROCONVULSIVE THERAPY (ECT) - SINGLE SEIZURE N/A 12/6/2018    Performed by [facility]ib [facility] Jr[facility] ECT    ELECTROCONVULSIVE THERAPY (ECT) - SINGLE SEIZURE N/A 8/31/2018    Performed by [facility]ib [facility] Jr[facility] at [facility] ECT    ELECTROCONVULSIVE THERAPY (ECT) - SINGLE SEIZURE N/A 8/6/2018    Performed by [facility] Jr[facility] at [facility] ECT    ELECTROCONVULSIVE THERAPY (ECT) - SINGLE SEIZURE N/A 7/23/2018    Performed by [facility]ib [facility] Jr[facility] at [facility] ECT    ELECTROCONVULSIVE THERAPY (ECT) -  SINGLE SEIZURE N/A 7/5/2018    Performed by [facility] Jr[facility] at [facility] ECT    ELECTROCONVULSIVE THERAPY (ECT) - SINGLE SEIZURE N/A 6/28/2018    Performed by Shoaib [facility]ce Jr., MD at [facility] ECT    ELECTROCONVULSIVE THERAPY (ECT) - SINGLE SEIZURE N/A 6/13/2018    Performed by Sh[facility]ib [facility]ce Jr., MD at [facility] ECT    ELECTROCONVULSIVE THERAPY (ECT) - SINGLE SEIZURE N/A 5/29/2018    Performed by Shoaib Boyce Jr[facility] at [facility] ECT    ELECTROCONVULSIVE THERAPY (ECT) - SINGLE SEIZURE N/A 5/8/2018    Performed by Shoaib Boyce Jr., [facility] at [facility] ECT    ELECTROCONVULSIVE THERAPY (ECT) - SINGLE SEIZURE N/A 4/24/2018    Performed by Shoaib Boyce Jr., [facility] at [facility] ECT    ELECTROCONVULSIVE THERAPY (ECT) - SINGLE SEIZURE N/A 4/17/2018    Performed by Shoaib Boyce Jr., MD at [facility] ECT    ELECTROCONVULSIVE THERAPY (ECT) - SINGLE SEIZURE N/A 4/13/2018    Performed by Shoaib Boyce Jr., MD at [facility] ECT    ELECTROCONVULSIVE THERAPY (ECT) - SINGLE SEIZURE N/A 4/3/2018    Performed by Shoaib Boyce Jr., MD at [facility] ECT    ELECTROCONVULSIVE THERAPY (ECT) - SINGLE SEIZURE N/A 3/20/2018    Performed by Shoaib Boyce Jr., MD at [facility] ECT    ELECTROCONVULSIVE THERAPY (ECT) - SINGLE SEIZURE N/A 3/15/2018    Performed by Shoaib Boyce Jr., MD at [facility] ECT    ELECTROCONVULSIVE THERAPY (ECT) - SINGLE SEIZURE N/A 3/6/2018    Performed by Shoaib Boyce Jr., [facility] at [facility] ECT    ELECTROCONVULSIVE THERAPY (ECT) - SINGLE SEIZURE N/A 3/1/2018    Performed by Shoaib Boyce Jr., MD at [facility] ECT    ELECTROCONVULSIVE THERAPY (ECT) - SINGLE SEIZURE N/A 2/23/2018    Performed by Shoaib Boyce Jr., MD at [facility] ECT    ELECTROCONVULSIVE THERAPY (ECT) - SINGLE SEIZURE N/A 2/19/2018    Performed by Shoaib Boyce Jr., MD at [facility] ECT    ELECTROCONVULSIVE THERAPY (ECT) - SINGLE SEIZURE N/A 2/15/2018    Performed by Sh[facility]ib Boyce Jr[facility] at [facility] ECT    ELECTROCONVULSIVE THERAPY (ECT) - SINGLE SEIZURE N/A  1/22/2018    Performed by [facility] Jr[facility] at [facility] ECT    ELECTROCONVULSIVE THERAPY (ECT) - SINGLE SEIZURE N/A 12/11/2017    Performed by Shoaib Boyce Jr[facility] MD at [facility] ECT    ELECTROCONVULSIVE THERAPY (ECT) - SINGLE SEIZURE N/A 10/24/2017    Performed by Shoaib Boyce Jr[facility] MD at [facility] ECT    ELECTROCONVULSIVE THERAPY (ECT) - SINGLE SEIZURE N/A 9/20/2017    Performed by Shoaib Boyce Jr[facility] at [facility] ECT    ELECTROCONVULSIVE THERAPY (ECT) - SINGLE SEIZURE N/A 8/14/2017    Performed by Shoaib Boyce Jr[facility] at [facility] ECT    ELECTROCONVULSIVE THERAPY (ECT) - SINGLE SEIZURE Bilateral 7/12/2017    Performed by Shoaib Boyce Jr., MD at [facility] ECT    ELECTROCONVULSIVE THERAPY (ECT) - SINGLE SEIZURE N/A 6/13/2017    Performed by Shoaib Boyce Jr., MD at [facility] ECT    ELECTROCONVULSIVE THERAPY (ECT) - SINGLE SEIZURE N/A 5/17/2017    Performed by Shoaib Boyce Jr., MD at [facility] ECT    ELECTROCONVULSIVE THERAPY (ECT) - SINGLE SEIZURE N/A 4/20/2017    Performed by Shoaib Boyce Jr., MD at [facility] ECT    ELECTROCONVULSIVE THERAPY (ECT) - SINGLE SEIZURE N/A 11/22/2016    Performed by Shoaib Boyce Jr[facility] MD at [facility] ECT    ELECTROCONVULSIVE THERAPY (ECT) - SINGLE SEIZURE N/A 10/24/2016    Performed by Shoaib Boyce Jr., MD at [facility] ECT    ELECTROCONVULSIVE THERAPY (ECT) - SINGLE SEIZURE N/A 9/22/2016    Performed by Shoaib Boyce Jr[facility] MD at [facility] ECT    ELECTROCONVULSIVE THERAPY (ECT) - SINGLE SEIZURE N/A 9/1/2016    Performed by Shoaib Boyce Jr., MD at [facility] ECT    ELECTROCONVULSIVE THERAPY (ECT) - SINGLE SEIZURE N/A 8/15/2016    Performed by Shoaib Boyce Jr[facility] MD at [facility] ECT    ELECTROCONVULSIVE THERAPY (ECT) - SINGLE SEIZURE N/A 8/1/2016    Performed by Shoaib Boyce Jr[facility] MD at [facility] ECT    ELECTROCONVULSIVE THERAPY (ECT) - SINGLE SEIZURE N/A 7/22/2016    Performed by Shoaib Boyce Jr[facility] MD at [facility] ECT    ELECTROCONVULSIVE THERAPY (ECT) - SINGLE SEIZURE N/A  7/14/2016    Performed by Sh[facility]ib Boyce Jr., MD at [facility] ECT    ELECTROCONVULSIVE THERAPY (ECT) - SINGLE SEIZURE N/A 7/8/2016    Performed by Shoaib Boyce Jr., MD at [facility] ECT    ELECTROCONVULSIVE THERAPY (ECT) - SINGLE SEIZURE N/A 7/5/2016    Performed by Shoaib Boyce Jr., MD at [facility] ECT    ELECTROCONVULSIVE THERAPY (ECT) - SINGLE SEIZURE N/A 6/27/2016    Performed by Shoaib Boyce Jr., MD at [facility] ECT    ELECTROCONVULSIVE THERAPY (ECT) - SINGLE SEIZURE N/A 6/23/2016    Performed by Shoaib Boyce Jr., MD at [facility] ECT    ELECTROCONVULSIVE THERAPY (ECT) - SINGLE SEIZURE N/A 6/20/2016    Performed by Shoaib Boyce Jr., MD at [facility] ECT    ELECTROCONVULSIVE THERAPY (ECT) - SINGLE SEIZURE N/A 6/16/2016    Performed by Shoaib Boyce Jr[facility] MD at [facility] ECT    ELECTROCONVULSIVE THERAPY (ECT) - SINGLE SEIZURE N/A 6/15/2016    Performed by Shoaib Boyce Jr[facility] MD at [facility] ECT    ELECTROCONVULSIVE THERAPY (ECT) - SINGLE SEIZURE N/A 6/14/2016    Performed by Shoaib Boyce Jr[facility] MD at [facility] ECT    ELECTROCONVULSIVE THERAPY (ECT) - SINGLE SEIZURE N/A 6/13/2016    Performed by Shoaib Boyce Jr., MD at [facility] ECT    ELECTROCONVULSIVE THERAPY (ECT) - SINGLE SEIZURE N/A 1/4/2016    Performed by Shoaib Boyce Jr., MD at [facility] ECT    ELECTROCONVULSIVE THERAPY, CEREBRAL HEMISPHERE, UNILATERAL, 1 SEIZURE Bilateral 6/25/2018    Performed by Sh[facility]ib [facility] Jr[facility] MD at [facility] ECT    OVARIAN CYST REMOVAL Bilateral        Social History     Socioeconomic History    Marital status: Single     Spouse name: Not on file    Number of children: Not on file    Years of education: Not on file    Highest education level: Not on file   Occupational History    Occupation: unemployed   Social Needs    Financial resource strain: Not on file    Food insecurity:     Worry: Not on file     Inability: Not on file    Transportation needs:     Medical: Not on file     Non-medical: Not on file   Tobacco Use     Smoking status: Former Smoker     Last attempt to quit: 2011     Years since quittin.4    Smokeless tobacco: Never Used   Substance and Sexual Activity    Alcohol use: Yes     Comment: rarely    Drug use: No     Comment: Experimental use in high school    Sexual activity: Not on file   Lifestyle    Physical activity:     Days per week: Not on file     Minutes per session: Not on file    Stress: Not on file   Relationships    Social connections:     Talks on phone: Not on file     Gets together: Not on file     Attends Judaism service: Not on file     Active member of club or organization: Not on file     Attends meetings of clubs or organizations: Not on file     Relationship status: Not on file   Other Topics Concern    Patient feels they ought to cut down on drinking/drug use Not Asked    Patient annoyed by others criticizing their drinking/drug use Not Asked    Patient has felt bad or guilty about drinking/drug use Not Asked    Patient has had a drink/used drugs as an eye opener in the AM Not Asked   Social History Narrative    Pt has 1 older brother from an intact family until the death of her mother in .  She completed 1 year of college, was never in the , and has never been employed.  She was engaged once, but never , has no children, and lives with her father plus 2 dogs.  She denies any hobbies and is spiritual but not Judaism.  She does date and returns to college during rare periods when depression and anxiety [place].       OBJECTIVE:     Vital Signs Range (Last 24H):  BP: ()/()   Arterial Line BP: ()/()       Significant Labs:  Lab Results   Component Value Date    WBC 4.26 2019    HGB 14.0 2019    HCT 42.9 2019     2019    ALT 22 2019    AST 22 2019     2019    K 4.1 2019     2019    CREATININE 0.7 2019    BUN 10 2019    CO2 30 (H) 2019    TSH 1.864 2019        Diagnostic Studies: No relevant studies.    EKG:   Results for orders placed or performed in visit on 06/08/16   IN OFFICE EKG 12-LEAD (to Ama)    Collection Time: 06/08/16  3:33 PM    Narrative    Test Reason : F33.9  Blood Pressure  Vent. Rate : 084 BPM     Atrial Rate : 084 BPM     P-R Int : 154 ms          QRS Dur : 086 ms      QT Int : 378 ms       P-R-T Axes : 067 066 055 degrees     QTc Int : 446 ms    Normal sinus rhythm  Normal ECG  When compared with ECG of 03-DEC-2015 11:38,  Questionable change in The axis  T wave inversion no longer evident in Inferior leads  Confirmed by Flaco Sr MD ([id number]) on 6/9/2016 1:30:13 PM    Referred By: SELF REFERRAL           Confirmed By:Flaco Sr MD       2D ECHO:  TTE:  No results found for this or any previous visit.    HENRIQUE:  No results found for this or any previous visit.    ASSESSMENT/PLAN:                                                                                                                  09/17/2019  Allyssa Wright is a 39 y.o., female.    Anesthesia Evaluation    I have reviewed the Patient Summary Reports.        Review of Systems  Anesthesia Hx:  No problems with previous Anesthesia    Social:  Non-Smoker    Hematology/Oncology:  Hematology Normal   Oncology Normal     EENT/Dental:EENT/Dental Normal   Cardiovascular:  Cardiovascular Normal     Pulmonary:  Pulmonary Normal    Renal/:  Renal/ Normal     Hepatic/GI:  Hepatic/GI Normal    Musculoskeletal:  Musculoskeletal Normal    Neurological:  Neurology Normal    Endocrine:  Endocrine Normal    Dermatological:  Skin Normal    Psych:   Psychiatric History (Schizophrenia, OCD) anxiety depression          Physical Exam  General:  Well nourished    Airway/Jaw/Neck:  Airway Findings: Mouth Opening: Normal Tongue: Normal  General Airway Assessment: Adult  Mallampati: II  Improves to II with phonation.  TM Distance: Normal, at least 6 cm  Jaw/Neck Findings:  Neck ROM: Normal ROM      Dental:  Dental  Findings: In tact   Chest/Lungs:  Chest/Lungs Findings: Clear to auscultation, Normal Respiratory Rate     Heart/Vascular:  Heart Findings: Rate: Normal  Rhythm: Regular Rhythm  Sounds: Normal             Anesthesia Plan  Type of Anesthesia, risks & benefits discussed:  Anesthesia Type:  general  Patient's Preference: General  Intra-op Monitoring Plan:   Intra-op Monitoring Plan Comments:   Post Op Pain Control Plan:   Post Op Pain Control Plan Comments:   Induction:   IV  Beta Blocker:  Patient is not currently on a Beta-Blocker (No further documentation required).       Informed Consent: Patient understands risks and agrees with Anesthesia plan.  Questions answered. Anesthesia consent signed with patient.  ASA Score: 2     Day of Surgery Review of History & Physical: I have interviewed and examined the patient. I have reviewed the patient's H&P dated:  There are no significant changes.          Ready For Surgery From Anesthesia Perspective.

## 2019-09-18 ENCOUNTER — ANESTHESIA (OUTPATIENT)
Dept: ELECTROPHYSIOLOGY | Facility: HOSPITAL | Age: 39
End: 2019-09-18
Payer: COMMERCIAL

## 2019-09-18 ENCOUNTER — HOSPITAL ENCOUNTER (OUTPATIENT)
Facility: HOSPITAL | Age: 39
Discharge: HOME | End: 2019-09-18
Attending: PSYCHIATRY & NEUROLOGY | Admitting: PSYCHIATRY & NEUROLOGY
Payer: COMMERCIAL

## 2019-09-18 VITALS
RESPIRATION RATE: 15 BRPM | HEART RATE: 87 BPM | TEMPERATURE: 98 F | SYSTOLIC BLOOD PRESSURE: 116 MMHG | DIASTOLIC BLOOD PRESSURE: 62 MMHG | BODY MASS INDEX: 23.32 KG/M2 | OXYGEN SATURATION: 100 % | WEIGHT: 140 LBS | HEIGHT: 65 IN

## 2019-09-18 DIAGNOSIS — F33.41 MAJOR DEPRESSIVE DISORDER, RECURRENT EPISODE, IN PARTIAL REMISSION: Primary | ICD-10-CM

## 2019-09-18 DIAGNOSIS — F32.9 MDD (MAJOR DEPRESSIVE DISORDER): ICD-10-CM

## 2019-09-18 LAB — POCT GLUCOSE: 88 MG/DL (ref 70–110)

## 2019-09-18 PROCEDURE — 90870 ELECTROCONVULSIVE THERAPY: CPT | Mod: ,,, | Performed by: PSYCHIATRY & NEUROLOGY

## 2019-09-18 PROCEDURE — 25000003 PHARM REV CODE 250: Performed by: STUDENT IN AN ORGANIZED HEALTH CARE EDUCATION/TRAINING PROGRAM

## 2019-09-18 PROCEDURE — 63600175 PHARM REV CODE 636 W HCPCS: Performed by: STUDENT IN AN ORGANIZED HEALTH CARE EDUCATION/TRAINING PROGRAM

## 2019-09-18 PROCEDURE — 25000003 PHARM REV CODE 250: Performed by: GENERAL PRACTICE

## 2019-09-18 PROCEDURE — D9220A PRA ANESTHESIA: ICD-10-PCS | Mod: ,,, | Performed by: ANESTHESIOLOGY

## 2019-09-18 PROCEDURE — 90870 ELECTROCONVULSIVE THERAPY: CPT | Performed by: ANESTHESIOLOGY

## 2019-09-18 PROCEDURE — D9220A PRA ANESTHESIA: Mod: ,,, | Performed by: ANESTHESIOLOGY

## 2019-09-18 PROCEDURE — 90870 PR ELECTROCONVULSIVE THERAPY,1 SEIZ: ICD-10-PCS | Mod: ,,, | Performed by: PSYCHIATRY & NEUROLOGY

## 2019-09-18 PROCEDURE — 63600175 PHARM REV CODE 636 W HCPCS: Performed by: GENERAL PRACTICE

## 2019-09-18 RX ORDER — FENTANYL CITRATE 50 UG/ML
25 INJECTION, SOLUTION INTRAMUSCULAR; INTRAVENOUS EVERY 5 MIN PRN
Status: CANCELLED | OUTPATIENT
Start: 2019-09-18

## 2019-09-18 RX ORDER — SODIUM CHLORIDE 0.9 % (FLUSH) 0.9 %
10 SYRINGE (ML) INJECTION
Status: CANCELLED | OUTPATIENT
Start: 2019-09-18

## 2019-09-18 RX ORDER — ONDANSETRON 2 MG/ML
INJECTION INTRAMUSCULAR; INTRAVENOUS
Status: DISCONTINUED | OUTPATIENT
Start: 2019-09-18 | End: 2019-09-18

## 2019-09-18 RX ORDER — KETOROLAC TROMETHAMINE 30 MG/ML
INJECTION, SOLUTION INTRAMUSCULAR; INTRAVENOUS
Status: DISCONTINUED | OUTPATIENT
Start: 2019-09-18 | End: 2019-09-18

## 2019-09-18 RX ORDER — ESMOLOL HYDROCHLORIDE 10 MG/ML
INJECTION INTRAVENOUS
Status: DISCONTINUED | OUTPATIENT
Start: 2019-09-18 | End: 2019-09-18

## 2019-09-18 RX ORDER — HYDROMORPHONE HYDROCHLORIDE 1 MG/ML
0.2 INJECTION, SOLUTION INTRAMUSCULAR; INTRAVENOUS; SUBCUTANEOUS EVERY 5 MIN PRN
Status: DISCONTINUED | OUTPATIENT
Start: 2019-09-18 | End: 2019-09-18 | Stop reason: HOSPADM

## 2019-09-18 RX ORDER — ACETAMINOPHEN 325 MG/1
650 TABLET ORAL ONCE AS NEEDED
Status: DISCONTINUED | OUTPATIENT
Start: 2019-09-18 | End: 2019-09-18 | Stop reason: HOSPADM

## 2019-09-18 RX ORDER — SODIUM CHLORIDE 0.9 % (FLUSH) 0.9 %
3 SYRINGE (ML) INJECTION
Status: DISCONTINUED | OUTPATIENT
Start: 2019-09-18 | End: 2019-09-18 | Stop reason: HOSPADM

## 2019-09-18 RX ORDER — ONDANSETRON 2 MG/ML
4 INJECTION INTRAMUSCULAR; INTRAVENOUS DAILY PRN
Status: CANCELLED | OUTPATIENT
Start: 2019-09-18

## 2019-09-18 RX ORDER — SODIUM CHLORIDE 9 MG/ML
INJECTION, SOLUTION INTRAVENOUS CONTINUOUS
Status: DISCONTINUED | OUTPATIENT
Start: 2019-09-18 | End: 2019-09-18 | Stop reason: HOSPADM

## 2019-09-18 RX ORDER — LIDOCAINE HYDROCHLORIDE 10 MG/ML
1 INJECTION, SOLUTION EPIDURAL; INFILTRATION; INTRACAUDAL; PERINEURAL ONCE
Status: COMPLETED | OUTPATIENT
Start: 2019-09-18 | End: 2019-09-18

## 2019-09-18 RX ORDER — SUCCINYLCHOLINE CHLORIDE 20 MG/ML
INJECTION INTRAMUSCULAR; INTRAVENOUS
Status: DISCONTINUED | OUTPATIENT
Start: 2019-09-18 | End: 2019-09-18

## 2019-09-18 RX ADMIN — KETOROLAC TROMETHAMINE 30 MG: 30 INJECTION, SOLUTION INTRAMUSCULAR at 08:09

## 2019-09-18 RX ADMIN — SODIUM CHLORIDE 500 ML: 0.9 INJECTION, SOLUTION INTRAVENOUS at 07:09

## 2019-09-18 RX ADMIN — LIDOCAINE HYDROCHLORIDE 10 MG: 10 INJECTION, SOLUTION EPIDURAL; INFILTRATION; INTRACAUDAL; PERINEURAL at 07:09

## 2019-09-18 RX ADMIN — ONDANSETRON 4 MG: 2 INJECTION, SOLUTION INTRAMUSCULAR; INTRAVENOUS at 08:09

## 2019-09-18 RX ADMIN — Medication 500 MG: at 08:09

## 2019-09-18 RX ADMIN — ESMOLOL HYDROCHLORIDE 40 MG: 10 INJECTION INTRAVENOUS at 08:09

## 2019-09-18 RX ADMIN — SUCCINYLCHOLINE CHLORIDE 120 MG: 20 INJECTION, SOLUTION INTRAMUSCULAR; INTRAVENOUS at 08:09

## 2019-09-18 RX ADMIN — METHOHEXITAL SODIUM 150 MG: 500 INJECTION, POWDER, LYOPHILIZED, FOR SOLUTION INTRAMUSCULAR; INTRAVENOUS; RECTAL at 08:09

## 2019-09-18 NOTE — PLAN OF CARE
Pt and father given discharge instructions. Pt and father verbalize understanding of instructions. Father and pt verbalize understanding of returning on 9/25/19, and the office will call with arrival time. Vitals stable, pt having no pain.

## 2019-09-18 NOTE — TRANSFER OF CARE
"Anesthesia Transfer of Care Note    Patient: Allyssa Wright    Procedure(s) Performed: Procedure(s) (LRB):  ELECTROCONVULSIVE THERAPY (ECT) - SINGLE SEIZURE (N/A)    Patient location: [facility]    Anesthesia Type: general    Transport from OR: Transported from OR on 2-3 L/min O2 by NC with adequate spontaneous ventilation    Post pain: adequate analgesia    Post assessment: no apparent anesthetic complications    Post vital signs: stable    Level of consciousness: awake    Nausea/Vomiting: no nausea/vomiting    Complications: none    Transfer of care protocol was followed      Last vitals:   Visit Vitals  /64 (BP Location: Left arm, Patient Position: Lying)   Pulse 97   Temp 36.9 °C (98.4 °F) (Temporal)   Resp 17   Ht 5' 5" (1.651 m)   Wt 63.5 kg (140 lb)   LMP  (LMP Unknown)   SpO2 99%   Breastfeeding? No   BMI 23.30 kg/m²     "

## 2019-09-18 NOTE — DISCHARGE INSTRUCTIONS
Anesthesia: General Anesthesia     You are watched continuously during your procedure by your anesthesia provider.     Youre due to have surgery. During surgery, youll be given medicine called anesthesia or anesthetic. This will keep you comfortable and pain-free. Your anesthesia provider will use general anesthesia.  What is general anesthesia?  General anesthesia puts you into a state like deep sleep. It goes into the bloodstream (IV anesthetics), into the lungs (gas anesthetics), or both. You feel nothing during the procedure. You will not remember it. During the procedure, the anesthesia provider monitors you continuously. He or she checks your heart rate and rhythm, blood pressure, breathing, and blood oxygen.  · IV anesthetics. IV anesthetics are given through an IV line in your arm. Theyre often given first. This is so you are asleep before a gas anesthetic is started. Some kinds of IV anesthetics relieve pain. Others relax you. Your doctor will decide which kind is best in your case.  · Gas anesthetics. Gas anesthetics are breathed into the lungs. They are often used to keep you asleep. They can be given through a facemask or a tube placed in your larynx or trachea (breathing tube).  ¨ If you have a facemask, your anesthesia provider will most likely place it over your nose and mouth while youre still awake. Youll breathe oxygen through the mask as your IV anesthetic is started. Gas anesthetic may be added through the mask.  ¨ If you have a tube in the larynx or trachea, it will be inserted into your throat after youre asleep.  Anesthesia tools and medicines  You will likely have:  · IV anesthetics. These are put into an IV line into your bloodstream.  · Gas anesthetics. You breathe these anesthetics into your lungs, where they pass into your bloodstream.  · Pulse oximeter. This is a small clip that is attached to the end of your finger. This measures your blood oxygen level.  · Electrocardiography  leads (electrodes). These are small sticky pads that are placed on your chest. They record your heart rate and rhythm.  · Blood pressure cuff. This reads your blood pressure.  Risks and possible complications  General anesthesia has some risks. These include:  · Breathing problems  · Nausea and vomiting  · Sore throat or hoarseness (usually temporary)  · Allergic reaction to the anesthetic  · Irregular heartbeat (rare)  · Cardiac arrest (rare)   Anesthesia safety  · Follow all instructions you are given for how long not to eat or drink before your procedure.  · Be sure your doctor knows what medicines and drugs you take. This includes over-the-counter medicines, herbs, supplements, alcohol or other drugs. You will be asked when those were last taken.  · Have an adult family member or friend drive you home after the procedure.  · For the first 24 hours after your surgery:  ¨ Do not drive or use heavy equipment.  ¨ Do not make important decisions or sign legal documents. If important decisions or signing legal documents is necessary during the first 24 hours after surgery, have a trusted family member or spouse act on your behalf.  ¨ Avoid alcohol.  ¨ Have a responsible adult stay with you. He or she can watch for problems and help keep you safe.  Date Last Reviewed: 12/1/2016  © 7008-2907 [employer]. [address] [place] [address], [place] [address]. All rights reserved. This information is not intended as a substitute for professional medical care. Always follow your healthcare professional's instructions.        Understanding Electroconvulsive Therapy  Electroconvulsive therapy (ECT) is sometimes called shock therapy. This may sound painful, but ECT doesnt hurt. Its often the safest and best treatment for severe depression. It can treat other mental disorders as well.  What is electroconvulsive therapy?  ECT is used to treat people who are very depressed. Its mainly used when other treatments, such as  antidepressant medicines, have failed. Often it may relieve feelings of sadness and despair after 2 to 4 treatments.  Common symptoms of major depression  Symptoms of major depression include:  · Feeling a deep sadness that doesnt go away  · Losing all pleasure in life  · Feeling hopeless or helpless  · Feeling guilty  · Sleeping more or less than normal  · Eating more or less than normal  · Having headaches or stomachaches, or other pains that dont go away  · Feeling nervous, empty, or worthless  · Crying a great deal  · Thinking or talking about suicide or death  How is ECT therapy done?  Before an ECT treatment, youll be given anesthesia to keep you pain-free. Youll also be given medicine to make you sleep, relax your muscles and control your heart rate. Your healthcare provider then places electrodes on your head. You may have one above each temple (bilateral ECT). Or you may have electrodes on one temple and on your forehead (unilateral ECT). While you are asleep, your brain is stimulated very briefly with an electric current. This causes a seizure, usually lasting less than a minute. Because you are under anesthesia, your body will not move even as your brain goes through great changes.  What are the risks?  When done properly, ECT is quite safe. Right after the treatment, you may be confused. This often lasts for less than half an hour. You may have a headache or stiff muscles. But these symptoms often go away quickly. A more serious possible side effect is memory loss. Commonly, people have short-term (temporary) trouble remembering information that they learned recently. And they may have little recall of the time when they received treatment. Less commonly, people may have long-lasting (permanent) spotty recall of major past events. In rare cases, there may be memory loss for larger blocks of time.  Looking to the future  In most cases, ECT doesnt cure depression. But it can improve symptoms for a  period of time. You may need a series of ECT treatments to continue feeling the benefit. You may also need to take antidepressant medicines to help prevent symptoms from returning. But with ongoing treatment, you can have a full and healthy life.  Resources  · The National [place] [employer] [place] [employer]  238.505.5255  www.nim.nih.gov  · [facility]  203.986.5836  www.[facility].org     Date Last Reviewed: 5/1/2017 © 2000-2017 [employer]. [address] [place] [address], [place] [address]. All rights reserved. This information is not intended as a substitute for professional medical care. Always follow your healthcare professional's instructions.

## 2019-09-18 NOTE — OP NOTE
Allyssa Wright  : 1980   MRN: [id number]  Date: 2019    Electroconvulsive Therapy  Procedure Note    Date of Admission: 2019  6:56 AM    Site: [facility], [address]    Attending: Shoaib Boyce Jr., MD   Residents: Benson Ott MD  Pre-procedure Diagnosis: Major Depressive Disorder, recurrent, partial remission   ECT Treatment Number: ECT #86  Machine Type: Mecta 5000    Patient Status: Medically stable    Vitals (pre-procedure):  Vitals:    19 0728   BP: 106/63   Pulse: 79   Resp: 16   Temp: 98.2 °F (36.8 °C)       Electrode Placement: Bitemporal    Stimulus Number Charge (mC) Level Pulse Width (msec) Frequency  (Hz) Duration of Stimulus (sec) Current (mA) Duration of Seizure (sec)   1 576 3 1 60 6 800 11   2 576 3 1 60 6 800 43     Complications: HTN    Maximum Blood Pressure: 145/85    Medications Given:  Caffeine 500mg  PO  Methohexital (Brevital)   150mg  IV    Succinylcholine (Anectine)   120mg  IV    Ondansetron (Zofran)  4mg  IV      Ketorolac 30mg   Esmolol 40mg IV    Treatment Course:  Patient tolerated procedure well. After adequate recovery from general anesthesia, the patient was transported to recovery.    Post-op Diagnosis: Same as above    Recommended for next ECT: 10/25/19    Benson Ott MD  \[facility]\""- [facility] PGY-2  2019

## 2019-09-18 NOTE — ANESTHESIA POSTPROCEDURE EVALUATION
Anesthesia Post Evaluation    Patient: Allyssa Wright    Procedure(s) Performed: Procedure(s) (LRB):  ELECTROCONVULSIVE THERAPY (ECT) - SINGLE SEIZURE (N/A)    Final Anesthesia Type: general  Patient location during evaluation: PACU  Patient participation: Yes- Able to Participate  Level of consciousness: awake and alert  Post-procedure vital signs: reviewed and stable  Pain management: adequate  Airway patency: patent  PONV status at discharge: No PONV  Anesthetic complications: no      Cardiovascular status: blood pressure returned to baseline and stable  Respiratory status: unassisted  Hydration status: euvolemic  Follow-up not needed.          Vitals Value Taken Time   /64 9/18/2019  9:19 AM   Temp 36.9 °C (98.4 °F) 9/18/2019  9:14 AM   Pulse 97 9/18/2019  9:19 AM   Resp 17 9/18/2019  9:19 AM   SpO2 99 % 9/18/2019  9:19 AM         Event Time     Out of Recovery 08:55:00          Pain/Roma Score: Roma Score: 10 (9/18/2019  9:27 AM)

## 2019-09-18 NOTE — DISCHARGE SUMMARY
Allyssa Wright  : 1980   MRN: [id number]  Date: 2019     Electroconvulsive Therapy  Discharge Summary    Admit Date: 2019  6:56 AM  Discharge Date: 2019    Attending Physician: Shoaib Boyce Jr., MD   Discharge Provider: Benson Ott MD    History of Present Illness: Allyssa Wright is a 39 y.o. female with Major depressive disorder, recurrent, in partial remission presented for ECT #86. See H&P dated 2019 for full HPI. For further details, see Dr. Boyce's pre-ECT evaluation.    Hospital Course: The patient tolerated the ECT treatment well without complication. Patient was stable post-procedure. See OP note dated 2019 for more details.     Disposition: Home or Self Care    Medications:  Current Discharge Medication List      CONTINUE these medications which have NOT CHANGED    Details   brexpiprazole (REXULTI ORAL) Take 0.5 mg by mouth once daily.      clozapine (CLOZARIL) 50 MG tablet Take 50 mg by mouth once daily.       dapsone 7.5 % GlwP Apply topically once daily.      dextroamphetamine-amphetamine 30 mg Tab Take 30 mg by mouth 2 (two) times daily.     Associated Diagnoses: MDD (major depressive disorder), recurrent, in partial remission      famciclovir (FAMVIR) 500 MG tablet Take 1 tablet (500 mg total) by mouth 2 (two) times daily.  Qty: 30 tablet, Refills: 12    Associated Diagnoses: Major depressive disorder, recurrent episode, moderate degree      hydrOXYzine pamoate (VISTARIL) 25 MG Cap Take 2 capsules (50 mg total) by mouth nightly as needed (Take 25-50mg (1-2 caps) at night for anxiety prior to ECT).  Qty: 180 capsule, Refills: 0      mirtazapine (REMERON) 15 MG tablet Take 1 tablet (15 mg total) by mouth every evening.  Qty: 90 tablet, Refills: 0      spironolactone (ALDACTONE) 50 MG tablet 50 mg 2 (two) times daily. 50  mg qAM, 50 mg qHS.      thyroid, pork, (ARMOUR THYROID) 30 mg Tab Take 1 tablet (30 mg total) by mouth every morning.  Qty: 30 tablet,  Refills: 11    Associated Diagnoses: Major depressive disorder, recurrent episode, moderate degree      trazodone (DESYREL) 100 MG tablet Take 200 mg by mouth every evening.       UNABLE TO FIND 2 (two) times daily. n-azetyl-cysteine      venlafaxine (EFFEXOR) 100 MG Tab Take 3 tablets (300 mg total) by mouth once daily.    Associated Diagnoses: MDD (major depressive disorder), recurrent, in partial remission      vortioxetine (TRINTELLIX) 5 mg Tab Take 2.5 mg by mouth once daily.       ZOVIRAX 5 % Crea Refills: 5           Status at Discharge: Alert and medically stable    Discharge Diagnoses:  Major depressive disorder, recurrent, in partial remission  Diet: Resume previous outpatient diet  Activity: Ambulate with assistance  Instructions: Please do not eat or drink anything after midnight prior to procedure. Please do not drive on day of ECT.    Med Changes:  None    Next ECT:   9/25/19    Benson Ott MD  [facility] PGY-2  09/18/2019

## 2019-09-18 NOTE — H&P
"Allyssa Wright  : 1980   MRN: [id number]  Date: 2019     Electroconvulsive Therapy  History & Physical    Chief complaint: Major Depressive Disorder, recurrent, partial remission  Procedure: ECT #86    SUBJECTIVE:     HPI:   Allyssa Wright is a 39 y.o. female with Major Depressive Disorder, recurrent, in partial remission who presents for ECT.  Father at bedside. Patient states that she is doing better from her last ECT session. Says treatment last about 4-5 days, then sx start to return.  "It's not full-blown depression, but I can feel myself getting pulled down."  Good appetite and restful sleep.  Pt's has restarted low-dose Rexulti, currently 0.5mg every other day prescribed by outpt provider Dr. Webb.  Otherwise, denies changes in medications or health since last procedure. No longer doing TMS treatments, says they weren't helping. Denies SI, HI, AVH. Medication compliant. Confirms NPO status. Ready for ECT today.     Psychiatric Review of Systems:  Mood: better  Appetite: No problem  Psychomotor: No problem  Cognitive Impairment: none  Insomnia:  No problem  Psychosis: denies  Diurnal Variation: denies  Suicidal Ideation: denies    Medical Review Of Systems:  Pertinent items are noted in HPI.    Current Medications:   Current Facility-Administered Medications on File Prior to Encounter   Medication Dose Route Frequency Provider Last Rate Last Dose    sodium chloride 0.9% flush 10 mL  10 mL Intra-Catheter PRN Homa Colbert MD         Current Outpatient Medications on File Prior to Encounter   Medication Sig Dispense Refill    brexpiprazole (REXULTI ORAL) Take 0.5 mg by mouth once daily.      clozapine (CLOZARIL) 50 MG tablet Take 50 mg by mouth once daily.       dapsone 7.5 % GlwP Apply topically once daily.      dextroamphetamine-amphetamine 30 mg Tab Take 30 mg by mouth 2 (two) times daily.       famciclovir (FAMVIR) 500 MG tablet Take 1 tablet (500 mg total) by mouth 2 (two) times " daily. 30 tablet 12    hydrOXYzine pamoate (VISTARIL) 25 MG Cap Take 2 capsules (50 mg total) by mouth nightly as needed (Take 25-50mg (1-2 caps) at night for anxiety prior to ECT). 180 capsule 0    mirtazapine (REMERON) 15 MG tablet Take 1 tablet (15 mg total) by mouth every evening. (Patient taking differently: Take 7.5 mg by mouth every evening. ) 90 tablet 0    spironolactone (ALDACTONE) 50 MG tablet 50 mg 2 (two) times daily. 50  mg qAM, 50 mg qHS.      thyroid, pork, (ARMOUR THYROID) 30 mg Tab Take 1 tablet (30 mg total) by mouth every morning. 30 tablet 11    trazodone (DESYREL) 100 MG tablet Take 200 mg by mouth every evening.       UNABLE TO FIND 2 (two) times daily. n-azetyl-cysteine      venlafaxine (EFFEXOR) 100 MG Tab Take 3 tablets (300 mg total) by mouth once daily. (Patient taking differently: Take 225 mg by mouth once daily. )      vortioxetine (TRINTELLIX) 5 mg Tab Take 2.5 mg by mouth once daily.       ZOVIRAX 5 % Crea   5      Allergies:   Benzodiazepines; Ampicillin; Erythromycin; Levaquin [levofloxacin]; Penicillins; Pristiq [desvenlafaxine]; Sulfa (sulfonamide antibiotics); and Azithromycin    Past Medical/Surgical History:   Past Medical History:   Diagnosis Date    Anxiety     Depression     History of psychiatric hospitalization     HSV-1 (herpes simplex virus 1) infection     Hx of psychiatric care     Moderate depressed bipolar II disorder 06/13/2016    reports no history of bipolar    Obsessive-compulsive disorder     Psychiatric problem     Schizophrenia 4/3/2018    Self-harming behavior     Therapy      Past Surgical History:   Procedure Laterality Date    ANKLE SURGERY Right     BREAST augmentation      ELECTROCONVULSIVE THERAPY (ECT) - SINGLE SEIZURE N/A 12/6/2018    Performed by [facility]ib [facility] Jr[facility] at [facility] ECT    ELECTROCONVULSIVE THERAPY (ECT) - SINGLE SEIZURE N/A 8/31/2018    Performed by [facility]ib [facility] Jr., MD at [facility] ECT    ELECTROCONVULSIVE  THERAPY (ECT) - SINGLE SEIZURE N/A 8/6/2018    Performed by Shoaib Boyce Jr., MD at [facility] ECT    ELECTROCONVULSIVE THERAPY (ECT) - SINGLE SEIZURE N/A 7/23/2018    Performed by Shoaib Boyce Jr., MD at [facility] ECT    ELECTROCONVULSIVE THERAPY (ECT) - SINGLE SEIZURE N/A 7/5/2018    Performed by Shoaib Boyce Jr., MD at [facility] ECT    ELECTROCONVULSIVE THERAPY (ECT) - SINGLE SEIZURE N/A 6/28/2018    Performed by Shoaib Boyce Jr., MD at [facility] ECT    ELECTROCONVULSIVE THERAPY (ECT) - SINGLE SEIZURE N/A 6/13/2018    Performed by Shoaib Boyce Jr., MD at [facility] ECT    ELECTROCONVULSIVE THERAPY (ECT) - SINGLE SEIZURE N/A 5/29/2018    Performed by Shoaib Boyce Jr., MD at [facility] ECT    ELECTROCONVULSIVE THERAPY (ECT) - SINGLE SEIZURE N/A 5/8/2018    Performed by Shoaib Boyce Jr., MD at [facility] ECT    ELECTROCONVULSIVE THERAPY (ECT) - SINGLE SEIZURE N/A 4/24/2018    Performed by Shoaib Boyce Jr., MD at [facility] ECT    ELECTROCONVULSIVE THERAPY (ECT) - SINGLE SEIZURE N/A 4/17/2018    Performed by Shoaib Boyce Jr., MD at [facility] ECT    ELECTROCONVULSIVE THERAPY (ECT) - SINGLE SEIZURE N/A 4/13/2018    Performed by Shoaib Boyce Jr., MD at [facility] ECT    ELECTROCONVULSIVE THERAPY (ECT) - SINGLE SEIZURE N/A 4/3/2018    Performed by Shoaib Boyce Jr., MD at [facility] ECT    ELECTROCONVULSIVE THERAPY (ECT) - SINGLE SEIZURE N/A 3/20/2018    Performed by Shoaib Boyce Jr., MD at [facility] ECT    ELECTROCONVULSIVE THERAPY (ECT) - SINGLE SEIZURE N/A 3/15/2018    Performed by Shoaib Boyce Jr., MD at [facility] ECT    ELECTROCONVULSIVE THERAPY (ECT) - SINGLE SEIZURE N/A 3/6/2018    Performed by Shoaib Boyce Jr., MD at [facility] ECT    ELECTROCONVULSIVE THERAPY (ECT) - SINGLE SEIZURE N/A 3/1/2018    Performed by Shoaib Boyce Jr., MD at [facility] ECT    ELECTROCONVULSIVE THERAPY (ECT) - SINGLE SEIZURE N/A 2/23/2018    Performed by Shoaib Boyce Jr., MD at [facility] ECT    ELECTROCONVULSIVE THERAPY (ECT) -  SINGLE SEIZURE N/A 2/19/2018    Performed by Sh[facility]ib [facility]ce Jr., MD at [facility] ECT    ELECTROCONVULSIVE THERAPY (ECT) - SINGLE SEIZURE N/A 2/15/2018    Performed by Shoaib Boyce Jr., MD at [facility] ECT    ELECTROCONVULSIVE THERAPY (ECT) - SINGLE SEIZURE N/A 1/22/2018    Performed by Shoaib Boyce Jr., MD at [facility] ECT    ELECTROCONVULSIVE THERAPY (ECT) - SINGLE SEIZURE N/A 12/11/2017    Performed by Shoaib Boyce Jr., MD at [facility] ECT    ELECTROCONVULSIVE THERAPY (ECT) - SINGLE SEIZURE N/A 10/24/2017    Performed by Shoaib Boyce Jr., MD at [facility] ECT    ELECTROCONVULSIVE THERAPY (ECT) - SINGLE SEIZURE N/A 9/20/2017    Performed by Shoaib Boyce Jr., MD at [facility] ECT    ELECTROCONVULSIVE THERAPY (ECT) - SINGLE SEIZURE N/A 8/14/2017    Performed by Shoaib Boyce Jr., MD at [facility] ECT    ELECTROCONVULSIVE THERAPY (ECT) - SINGLE SEIZURE Bilateral 7/12/2017    Performed by Shoaib Boyce Jr., MD at [facility] ECT    ELECTROCONVULSIVE THERAPY (ECT) - SINGLE SEIZURE N/A 6/13/2017    Performed by Shoaib Boyce Jr., MD at [facility] ECT    ELECTROCONVULSIVE THERAPY (ECT) - SINGLE SEIZURE N/A 5/17/2017    Performed by Shoaib Boyce Jr., MD at [facility] ECT    ELECTROCONVULSIVE THERAPY (ECT) - SINGLE SEIZURE N/A 4/20/2017    Performed by Shoaib Boyce Jr., MD at [facility] ECT    ELECTROCONVULSIVE THERAPY (ECT) - SINGLE SEIZURE N/A 11/22/2016    Performed by Shoaib Boyce Jr., MD at [facility] ECT    ELECTROCONVULSIVE THERAPY (ECT) - SINGLE SEIZURE N/A 10/24/2016    Performed by Shoaib Boyce Jr., MD at [facility] ECT    ELECTROCONVULSIVE THERAPY (ECT) - SINGLE SEIZURE N/A 9/22/2016    Performed by Shoaib Boyce Jr., MD at [facility] ECT    ELECTROCONVULSIVE THERAPY (ECT) - SINGLE SEIZURE N/A 9/1/2016    Performed by Shoaib Boyce Jr., MD at [facility] ECT    ELECTROCONVULSIVE THERAPY (ECT) - SINGLE SEIZURE N/A 8/15/2016    Performed by Shoaib Boyce Jr., MD at [facility] ECT    ELECTROCONVULSIVE THERAPY (ECT) -  SINGLE SEIZURE N/A 8/1/2016    Performed by Shoaib Boyce Jr., MD at [facility] ECT    ELECTROCONVULSIVE THERAPY (ECT) - SINGLE SEIZURE N/A 7/22/2016    Performed by Shoaib Boyce Jr., MD at [facility] ECT    ELECTROCONVULSIVE THERAPY (ECT) - SINGLE SEIZURE N/A 7/14/2016    Performed by Shoaib Boyce Jr., MD at [facility] ECT    ELECTROCONVULSIVE THERAPY (ECT) - SINGLE SEIZURE N/A 7/8/2016    Performed by Shoaib Boyce Jr., MD at [facility] ECT    ELECTROCONVULSIVE THERAPY (ECT) - SINGLE SEIZURE N/A 7/5/2016    Performed by Shoaib Boyce Jr., MD at [facility] ECT    ELECTROCONVULSIVE THERAPY (ECT) - SINGLE SEIZURE N/A 6/27/2016    Performed by Shoaib Boyce Jr., MD at [facility] ECT    ELECTROCONVULSIVE THERAPY (ECT) - SINGLE SEIZURE N/A 6/23/2016    Performed by Shoaib Boyce Jr., MD at [facility] ECT    ELECTROCONVULSIVE THERAPY (ECT) - SINGLE SEIZURE N/A 6/20/2016    Performed by Shoaib Boyce Jr., MD at [facility] ECT    ELECTROCONVULSIVE THERAPY (ECT) - SINGLE SEIZURE N/A 6/16/2016    Performed by Shoaib Boyce Jr., MD at [facility] ECT    ELECTROCONVULSIVE THERAPY (ECT) - SINGLE SEIZURE N/A 6/15/2016    Performed by Shoaib Boyce Jr., MD at [facility] ECT    ELECTROCONVULSIVE THERAPY (ECT) - SINGLE SEIZURE N/A 6/14/2016    Performed by Shoaib Boyce Jr., MD at [facility] ECT    ELECTROCONVULSIVE THERAPY (ECT) - SINGLE SEIZURE N/A 6/13/2016    Performed by Shoaib Boyce Jr., MD at [facility] ECT    ELECTROCONVULSIVE THERAPY (ECT) - SINGLE SEIZURE N/A 1/4/2016    Performed by Shoaib Boyce Jr., MD at [facility] ECT    ELECTROCONVULSIVE THERAPY, CEREBRAL HEMISPHERE, UNILATERAL, 1 SEIZURE Bilateral 6/25/2018    Performed by Shoaib Boyce Jr., MD at [facility] ECT    OVARIAN CYST REMOVAL Bilateral       OBJECTIVE:     Vitals (pre-procedure):  Vitals:    09/18/19 0728   BP: 106/63   Pulse: 79   Resp: 16   Temp: 98.2 °F (36.8 °C)        Labs/Imaging/Studies:   Recent Results (from the past 48 hour(s))   POCT glucose    Collection  Time: 09/18/19  7:30 AM   Result Value Ref Range    POCT Glucose 88 70 - 110 mg/dL      No results found for: PHENYTOIN, PHENOBARB, VALPROATE, CBMZ    Physical Exam:   Gen: AAOx4, NAD  HEENT: MMM, PERRL, EOMI, O/P clear  CV: RRR, S1/S2 nml, no M/R/G  Chest: CTAB, no R/R/W, unlabored breathing  Abd: S/NT/ND, +BS, no HSM  Ext: No C/C/E, pulse 2+ throughout  Neuro: CN II-XII grossly intact, no focal deficits    Mental Status Exam:   Appearance: unremarkable, age appropriate, neatly groomed  Behavior: normal, cooperative, eye contact normal  Speech: normal tone, normal rate, normal pitch, normal volume, spontaneous  Mood: better  Affect: full, appropriate  Thought Process: normal and logical  Thought Content: normal, no suicidality, no homicidality, delusions, or paranoia  Cognition: 3/3 immediate recall, 3/3 delayed recall, able to abstract, able to follow 2 step command  Insight: good  Judgment: good       ASSESSMENT/PLAN:     Allyssa Wright is a 39 y.o. female with Major Depressive Disorder, recurrent, in partial remission who presents for ECT.    Recommendations:   Proceed with ECT #86.    Benson Ott MD  Psychiatry PGY-2  9/18/2019

## 2019-09-19 DIAGNOSIS — F33.9 RECURRENT MAJOR DEPRESSIVE DISORDER, REMISSION STATUS UNSPECIFIED: Primary | ICD-10-CM

## 2019-10-24 NOTE — DISCHARGE SUMMARY
Allyssa Wright  : 1980   MRN: [id number]  Date: 2018     Electroconvulsive Therapy  Discharge Summary    Admit Date: 2018  5:53 AM  Discharge Date: 2018    Attending Physician: Shoaib Boyce Jr., MD   Discharge Provider: Kale Bender MD    History of Present Illness: Allyssa Wright is a 37 y.o. female with MDD in partial remission presented for ECT #32. See H&P dated 2018 for full HPI. For further details, see Dr. Boyce's pre-ECT evaluation.    Hospital Course: The patient tolerated the ECT treatment well without complication. Patient was stable post-procedure. See OP note dated 2018 for more details.     Disposition: Home or Self Care    Medications:  Current Discharge Medication List      CONTINUE these medications which have CHANGED    Details   mirtazapine (REMERON) 15 MG tablet Take 1 tablet (15 mg total) by mouth every evening.  Qty: 90 tablet, Refills: 0         CONTINUE these medications which have NOT CHANGED    Details   clozapine (CLOZARIL) 50 MG tablet Take 100 mg by mouth once daily.       dapsone 7.5 % GlwP Apply topically once daily.      dextroamphetamine-amphetamine 30 mg Tab Take 30 mg by mouth 2 (two) times daily.     Associated Diagnoses: MDD (major depressive disorder), recurrent, in partial remission      famciclovir (FAMVIR) 500 MG tablet Take 1 tablet (500 mg total) by mouth 2 (two) times daily.  Qty: 30 tablet, Refills: 12    Associated Diagnoses: Major depressive disorder, recurrent episode, moderate degree      hydrOXYzine pamoate (VISTARIL) 25 MG Cap Take 1 capsule (25 mg total) by mouth every 8 (eight) hours as needed (anxiety).  Qty: 90 capsule, Refills: 1      naftifine (NAFTIN) 1 % cream Apply topically daily as needed. Apply to feet      spironolactone (ALDACTONE) 50 MG tablet 50 mg 2 (two) times daily. 50  mg qAM, 50 mg qHS.      thyroid (ARMOUR THYROID) 30 mg Tab Take 1 tablet (30 mg total) by mouth every morning.  Qty: 30 tablet,  Refills: 11    Associated Diagnoses: Major depressive disorder, recurrent episode, moderate degree      trazodone (DESYREL) 100 MG tablet Take 150 mg by mouth every evening.       UNABLE TO FIND 2 (two) times daily. n-azetyl-cysteine      venlafaxine (EFFEXOR) 100 MG Tab Take 300 mg by mouth once daily.    Associated Diagnoses: MDD (major depressive disorder), recurrent, in partial remission      ZOVIRAX 5 % Crea Refills: 5           Status at Discharge: Alert and medically stable    Discharge Diagnoses:  MDD (major depressive disorder), recurrent, in partial remission  Diet: Resume previous outpatient diet  Activity: Ambulate with assistance  Instructions: Please do not eat or drink anything after midnight prior to procedure. Please do not drive on day of ECT.    Med Changes:  Will f/u with Dr. Jc tomorrow for medication adjustments.     Next ECT:  Follow-up Information     [facility]. Go on 2/19/2018.    Specialty:  Emergency Medicine  Why:  ECT  Contact information:  [address] [place] [id number] [phone]                 Kale Bender MD  [facility]  01/22/2018 8:19 AM   Strong peripheral pulses

## 2020-06-03 NOTE — OP NOTE
[address] [facility]  consulted by Dr. Danie Hathaway for monitoring and adjustment. Indication for treatment: MRSA bacteremia   Goal trough: 15 mcg/mL     Pertinent Laboratory Values:   Temp Readings from Last 3 Encounters:   06/03/20 98.3 °F (36.8 °C) (Oral)   03/15/20 98.6 °F (37 °C) (Oral)   02/10/20 98.1 °F (36.7 °C) (Oral)     Recent Labs     06/01/20 0425 06/01/20 2116 06/03/20  1024   WBC 4.1 5.3 5.1     Recent Labs     06/01/20 0425 06/01/20 2116 06/03/20  1024   BUN 11 9 9   CREATININE 0.6 0.5* 0.6     Estimated Creatinine Clearance: 89 mL/min (based on SCr of 0.6 mg/dL). Intake/Output Summary (Last 24 hours) at 6/3/2020 1529  Last data filed at 6/3/2020 0524  Gross per 24 hour   Intake 360 ml   Output 1975 ml   Net -1615 ml       Pertinent Cultures:  Date    Source    Results  5/24   Blood    MRSA 2/2 5/24   MRSA nasal   Positive  5/24   Urine    Negative  5/24   Covid-19   Negative  5/25   Blood    MRSA 1/2    Vancomycin level:   TROUGH:    Recent Labs     06/01/20 2116 06/03/20  0920   VANCOTROUGH 15.6 19.5     RANDOM:  No results for input(s): VANCORANDOM in the last 72 hours. Assessment:  · WBC and temperature: WNL  · SCr, BUN, and urine output: WNL  · Day(s) of therapy: #11  · Vancomycin level:   · 5/26 - 9.2, subtherapeutic on vancomycin 750 mg q12h  · 5/28 - 15, therapeutic on vancomycin 1000 mg q12h  · 5/30 - 16.2, therapeutic on vancomycin 1000 mg q12h   · 6/01 - Inaccurate level. Patient missed morning dose on 06/01  · 6/03 - 19.5, therapeutic. Vancomycin 1000mg every 12 hours    Plan:  · Renal trends remain stable, WBC wnl. · Trough therapeutic @ 19.5.   · Will continue Vancomycin 1000 mg IVPB q12h  · Repeat trough on 06/05 @ 0930  · Pharmacy will continue to monitor patient and adjust therapy as indicated    Willi 6/05 @09:30    Thank you for the consult,  Ian Cagle RPh  6/3/2020 3:29 PM Allyssa Wright  : 1980   MRN: [id number]  Date: 2018    Electroconvulsive Therapy  Operative Note    Date of Admission: 2018  5:42 AM    Site: [facility] [address]    Attending: Ruel Benson MD  Residents: Sunita Angulo DO  Pre-op Diagnosis: MDD, recurrent, in partial remission    ECT Treatment Number: 53  Machine Type: Mecta 5000    Patient Status: Medically stable    Vitals (pre-procedure):  Vitals:    18 0617   BP: (!) 110/57   Pulse: 79   Resp: 16   Temp: 97.9 °F (36.6 °C)       Electrode Placement: Bitemporal    Stimulus Number Charge (mC) Level Pulse Width (msec) Frequency  (Hz) Duration of Stimulus (sec) Current (mA) Duration of Seizure (sec)   1 576 3 1 60 3 800 89                                   Complications: Hypertension    Maximum Blood Pressure: 181/88    Medications Given:  Caffeine 500 mg PO before  Methohexital (Brevital) 160 mg  IV before  Succinylcholine (Anectine) 120 mg  IV before  Labetalol 10mg IV before  Zofran 4 mg IV before  Toradol 30 mg IV before    Treatment Course:  Patient tolerated procedure well. After adequate recovery from general anesthesia, the patient was transported to recovery.    Post-op Diagnosis: Major Depressive Disorder, recurrent, in partial remission      Recommended for next ECT: Same as above.   Next treatment 18.       Sunita Angulo DO  PGY 2 [facility]   2018         STAFF NOTE:  I agree with above procedure note as stated.  Pt tolerated procedure well overall, save for mild brief hypertension post treatment well managed by Anesthesia.

## 2020-07-17 DIAGNOSIS — N95.0 POSTMENOPAUSAL BLEEDING: ICD-10-CM

## 2020-07-17 DIAGNOSIS — Z12.31 ENCOUNTER FOR SCREENING MAMMOGRAM FOR MALIGNANT NEOPLASM OF BREAST: Primary | ICD-10-CM

## 2020-08-14 ENCOUNTER — HOSPITAL ENCOUNTER (OUTPATIENT)
Dept: RADIOLOGY | Facility: HOSPITAL | Age: 40
Discharge: HOME | End: 2020-08-14
Attending: SPECIALIST
Payer: COMMERCIAL

## 2020-08-14 VITALS — WEIGHT: 140 LBS | HEIGHT: 65 IN | BODY MASS INDEX: 23.32 KG/M2

## 2020-08-14 DIAGNOSIS — Z12.31 ENCOUNTER FOR SCREENING MAMMOGRAM FOR MALIGNANT NEOPLASM OF BREAST: ICD-10-CM

## 2020-08-14 DIAGNOSIS — N95.0 POSTMENOPAUSAL BLEEDING: ICD-10-CM

## 2020-08-14 PROCEDURE — 76830 TRANSVAGINAL US NON-OB: CPT | Mod: TC,PO

## 2020-08-14 PROCEDURE — 77067 SCR MAMMO BI INCL CAD: CPT | Mod: TC,PO

## 2021-03-29 ENCOUNTER — IMMUNIZATION (OUTPATIENT)
Dept: PRIMARY CARE CLINIC | Facility: CLINIC | Age: 41
End: 2021-03-29
Payer: COMMERCIAL

## 2021-03-29 DIAGNOSIS — Z23 NEED FOR VACCINATION: Primary | ICD-10-CM

## 2021-03-29 PROCEDURE — 0001A COVID-19, MRNA, LNP-S, PF, 30 MCG/0.3 ML DOSE VACCINE: CPT | Mod: CV19,S$GLB,, | Performed by: FAMILY MEDICINE

## 2021-03-29 PROCEDURE — 0001A COVID-19, MRNA, LNP-S, PF, 30 MCG/0.3 ML DOSE VACCINE: ICD-10-PCS | Mod: CV19,S$GLB,, | Performed by: FAMILY MEDICINE

## 2021-03-29 PROCEDURE — 91300 COVID-19, MRNA, LNP-S, PF, 30 MCG/0.3 ML DOSE VACCINE: ICD-10-PCS | Mod: S$GLB,,, | Performed by: FAMILY MEDICINE

## 2021-03-29 PROCEDURE — 91300 COVID-19, MRNA, LNP-S, PF, 30 MCG/0.3 ML DOSE VACCINE: CPT | Mod: S$GLB,,, | Performed by: FAMILY MEDICINE

## 2021-04-19 ENCOUNTER — IMMUNIZATION (OUTPATIENT)
Dept: PRIMARY CARE CLINIC | Facility: CLINIC | Age: 41
End: 2021-04-19
Payer: COMMERCIAL

## 2021-04-19 DIAGNOSIS — Z23 NEED FOR VACCINATION: Primary | ICD-10-CM

## 2021-04-19 PROCEDURE — 91300 COVID-19, MRNA, LNP-S, PF, 30 MCG/0.3 ML DOSE VACCINE: ICD-10-PCS | Mod: S$GLB,,, | Performed by: FAMILY MEDICINE

## 2021-04-19 PROCEDURE — 0002A COVID-19, MRNA, LNP-S, PF, 30 MCG/0.3 ML DOSE VACCINE: CPT | Mod: CV19,S$GLB,, | Performed by: FAMILY MEDICINE

## 2021-04-19 PROCEDURE — 0002A COVID-19, MRNA, LNP-S, PF, 30 MCG/0.3 ML DOSE VACCINE: ICD-10-PCS | Mod: CV19,S$GLB,, | Performed by: FAMILY MEDICINE

## 2021-04-19 PROCEDURE — 91300 COVID-19, MRNA, LNP-S, PF, 30 MCG/0.3 ML DOSE VACCINE: CPT | Mod: S$GLB,,, | Performed by: FAMILY MEDICINE

## 2021-05-06 ENCOUNTER — OFFICE VISIT (OUTPATIENT)
Dept: PODIATRY | Facility: CLINIC | Age: 41
End: 2021-05-06
Payer: COMMERCIAL

## 2021-05-06 ENCOUNTER — HOSPITAL ENCOUNTER (OUTPATIENT)
Dept: RADIOLOGY | Facility: CLINIC | Age: 41
Discharge: HOME | End: 2021-05-06
Attending: PODIATRIST
Payer: COMMERCIAL

## 2021-05-06 VITALS
BODY MASS INDEX: 20.91 KG/M2 | DIASTOLIC BLOOD PRESSURE: 73 MMHG | HEART RATE: 73 BPM | HEIGHT: 65 IN | SYSTOLIC BLOOD PRESSURE: 108 MMHG | WEIGHT: 125.5 LBS

## 2021-05-06 DIAGNOSIS — S90.229A: ICD-10-CM

## 2021-05-06 DIAGNOSIS — M76.821 POSTERIOR TIBIAL TENDINITIS OF BOTH LOWER EXTREMITIES: ICD-10-CM

## 2021-05-06 DIAGNOSIS — M79.674 TOE PAIN, BILATERAL: ICD-10-CM

## 2021-05-06 DIAGNOSIS — M79.675 TOE PAIN, BILATERAL: ICD-10-CM

## 2021-05-06 DIAGNOSIS — M79.671 BILATERAL FOOT PAIN: Primary | ICD-10-CM

## 2021-05-06 DIAGNOSIS — M76.829 POSTERIOR TIBIAL TENDON DYSFUNCTION: ICD-10-CM

## 2021-05-06 DIAGNOSIS — M21.619 BUNION OF GREAT TOE: ICD-10-CM

## 2021-05-06 DIAGNOSIS — S90.229A SUBUNGUAL HEMATOMA OF FOOT, UNSPECIFIED LATERALITY, INITIAL ENCOUNTER: ICD-10-CM

## 2021-05-06 DIAGNOSIS — M79.672 BILATERAL FOOT PAIN: Primary | ICD-10-CM

## 2021-05-06 DIAGNOSIS — M76.822 POSTERIOR TIBIAL TENDINITIS OF BOTH LOWER EXTREMITIES: ICD-10-CM

## 2021-05-06 PROCEDURE — 3008F BODY MASS INDEX DOCD: CPT | Mod: CPTII,S$GLB,, | Performed by: PODIATRIST

## 2021-05-06 PROCEDURE — 1125F PR PAIN SEVERITY QUANTIFIED, PAIN PRESENT: ICD-10-PCS | Mod: S$GLB,,, | Performed by: PODIATRIST

## 2021-05-06 PROCEDURE — 99204 OFFICE O/P NEW MOD 45 MIN: CPT | Mod: S$GLB,,, | Performed by: PODIATRIST

## 2021-05-06 PROCEDURE — 73630 XR FOOT COMPLETE 3 VIEW BILATERAL: ICD-10-PCS | Mod: S$GLB,,, | Performed by: RADIOLOGY

## 2021-05-06 PROCEDURE — 1125F AMNT PAIN NOTED PAIN PRSNT: CPT | Mod: S$GLB,,, | Performed by: PODIATRIST

## 2021-05-06 PROCEDURE — 99204 PR OFFICE/OUTPT VISIT, NEW, LEVL IV, 45-59 MIN: ICD-10-PCS | Mod: S$GLB,,, | Performed by: PODIATRIST

## 2021-05-06 PROCEDURE — 3008F PR BODY MASS INDEX (BMI) DOCUMENTED: ICD-10-PCS | Mod: CPTII,S$GLB,, | Performed by: PODIATRIST

## 2021-05-06 PROCEDURE — 73630 X-RAY EXAM OF FOOT: CPT | Mod: S$GLB,,, | Performed by: RADIOLOGY

## 2021-05-06 RX ORDER — TRAZODONE HYDROCHLORIDE 50 MG/1
50 TABLET ORAL DAILY
COMMUNITY
Start: 2021-03-08

## 2021-05-06 RX ORDER — ALPRAZOLAM 1 MG/1
1 TABLET ORAL
COMMUNITY
Start: 2019-09-19

## 2021-05-06 RX ORDER — ESKETAMINE HYDROCHLORIDE 28 MG/.2ML
SOLUTION NASAL
COMMUNITY
Start: 2021-03-31

## 2022-01-01 NOTE — OP NOTE
Allyssa Wright  : 1980   MRN: [id number]  Date: 2019    Electroconvulsive Therapy  Procedure Note    Date of Admission: 2019  6:55 AM    Site: [facility], [address]    Attending: Shoaib Boyce Jr., MD   Residents: Gena Dasilva MD  Pre-procedure Diagnosis: MDD, recurrent, in partial remission  ECT Treatment Number: 68  Machine Type: Canpagesta 5000    Patient Status: Medically stable    Vitals (pre-procedure):  Vitals:    19 0735   BP: 119/65   Pulse: 66   Resp: 20   Temp: 97.2 °F (36.2 °C)       Electrode Placement: Bitemporal    Stimulus Number Charge (mC) Level Pulse Width (msec) Frequency  (Hz) Duration of Stimulus (sec) Current (mA) Duration of Seizure (sec)   1 576 3 1 60 6 800 94s                                   Complications: Hypertension    Maximum Blood Pressure: 195/127    Medications Given:  Caffeine 500 mg PO before  Methohexital (Brevital) 150 mg  IV before  Succinylcholine (Anectine) 140 mg  IV before  Zofran 4 mg IV before  Toradol 30 mg IV before    Treatment Course:  Patient tolerated procedure well. After adequate recovery from general anesthesia, the patient was transported to recovery.    Post-op Diagnosis: Same as above    Recommended for next ECT:  No med changes. Next treatment 2019    Gena Dasilva MD   Psychiatry PGY-2  8:09 AM        
[id number]

## 2022-01-07 NOTE — PROGRESS NOTES
Patient : Gonsalo Tomas Age: 47 Pt discharged to home . Pt IV removed. Pt has all discharge instructions and personal belongings. Tolerating clear liquids well. No further questions. Adequate for discharge.   Positive for chest pain.   Gastrointestinal: Negative for abdominal pain, constipation, diarrhea, nausea and vomiting.   Endocrine: Negative for polyphagia.   Genitourinary: Negative for dysuria, frequency, hematuria and urgency.   Skin: Negative for rash.   Neurological: Negative for dizziness and headaches.       Physical Exam     ED Triage Vitals [01/07/22 1242]   ED Triage Vitals Group      Temp 99 °F (37.2 °C)      Heart Rate (S) (!) 110      Resp 20      BP (!) 147/96      SpO2 95 %      EtCO2 mmHg       Height       Weight       Weight Scale Used       BMI (Calculated)       IBW/kg (Calculated)        Physical Exam  Vitals and nursing note reviewed.   Constitutional:       General: He is not in acute distress.     Appearance: He is well-developed. He is not diaphoretic.      Comments: Elevated BMI   HENT:      Head: Normocephalic and atraumatic.      Right Ear: External ear normal.      Left Ear: External ear normal.      Nose: Nose normal.      Mouth/Throat:      Pharynx: Uvula midline. No oropharyngeal exudate.   Eyes:      Conjunctiva/sclera: Conjunctivae normal.      Pupils: Pupils are equal, round, and reactive to light.   Cardiovascular:      Rate and Rhythm: Regular rhythm. Tachycardia present.   Pulmonary:      Effort: Pulmonary effort is normal. No accessory muscle usage or respiratory distress.      Breath sounds: Normal breath sounds. No stridor. No decreased breath sounds, wheezing or rales.   Chest:      Chest wall: No tenderness.   Abdominal:      General: Bowel sounds are normal. There is no distension.      Palpations: Abdomen is soft. Abdomen is not rigid. There is no shifting dullness or fluid wave.      Tenderness: There is no abdominal tenderness. There is no guarding or rebound. Negative signs include Ahn's sign and McBurney's sign.   Musculoskeletal:         General: Normal range of motion.      Cervical back: Normal range of motion. No edema.   Lymphadenopathy:      Cervical: No  cervical adenopathy.   Skin:     General: Skin is warm and dry.      Findings: No rash.   Neurological:      Mental Status: He is alert and oriented to person, place, and time.      Sensory: No sensory deficit.      Motor: No abnormal muscle tone.      Coordination: Coordination normal.   Psychiatric:         Behavior: Behavior normal.         ED Course     Procedures    Lab Results     Results for orders placed or performed during the hospital encounter of 01/07/22   Comprehensive Metabolic Panel   Result Value Ref Range    Fasting Status      Sodium 137 135 - 145 mmol/L    Potassium 3.3 (L) 3.4 - 5.1 mmol/L    Chloride 104 98 - 107 mmol/L    Carbon Dioxide 27 21 - 32 mmol/L    Anion Gap 9 (L) 10 - 20 mmol/L    Glucose 99 70 - 99 mg/dL    BUN 9 6 - 20 mg/dL    Creatinine 0.73 0.67 - 1.17 mg/dL    Glomerular Filtration Rate >90 >=60    BUN/ Creatinine Ratio 12 7 - 25    Calcium 8.5 8.4 - 10.2 mg/dL    Bilirubin, Total 0.5 0.2 - 1.0 mg/dL    GOT/AST 24 <=37 Units/L    GPT/ALT 35 <64 Units/L    Alkaline Phosphatase 87 45 - 117 Units/L    Albumin 3.3 (L) 3.6 - 5.1 g/dL    Protein, Total 7.4 6.4 - 8.2 g/dL    Globulin 4.1 (H) 2.0 - 4.0 g/dL    A/G Ratio 0.8 (L) 1.0 - 2.4   D Dimer, Quantitative   Result Value Ref Range    D Dimer, Quantitative 0.43 <0.57 mg/L (FEU)   CBC with Automated Differential (performable only)   Result Value Ref Range    WBC 5.2 4.2 - 11.0 K/mcL    RBC 5.50 4.50 - 5.90 mil/mcL    HGB 15.5 13.0 - 17.0 g/dL    HCT 47.2 39.0 - 51.0 %    MCV 85.8 78.0 - 100.0 fl    MCH 28.2 26.0 - 34.0 pg    MCHC 32.8 32.0 - 36.5 g/dL    RDW-CV 13.5 11.0 - 15.0 %    RDW-SD 41.8 39.0 - 50.0 fL     140 - 450 K/mcL    NRBC 0 <=0 /100 WBC    Neutrophil, Percent 66 %    Lymphocytes, Percent 25 %    Mono, Percent 9 %    Eosinophils, Percent 0 %    Basophils, Percent 0 %    Immature Granulocytes 0 %    Absolute Neutrophils 3.4 1.8 - 7.7 K/mcL    Absolute Lymphocytes 1.3 1.0 - 4.8 K/mcL    Absolute Monocytes 0.5 0.3 -  0.9 K/mcL    Absolute Eosinophils  0.0 0.0 - 0.5 K/mcL    Absolute Basophils 0.0 0.0 - 0.3 K/mcL    Absolute Immmature Granulocytes 0.0 0.0 - 0.2 K/mcL   ISTAT8 VENOUS  POINT OF CARE   Result Value Ref Range    BUN - POINT OF CARE 8 6 - 20 mg/dL    SODIUM - POINT OF CARE 139 135 - 145 mmol/L    POTASSIUM - POINT OF CARE 3.3 (L) 3.4 - 5.1 mmol/L    CHLORIDE - POINT OF CARE 100 98 - 107 mmol/L    TCO2 - POINT OF CARE 25 (H) 19 - 24 mmol/L    ANION GAP - POINT OF CARE 19 10 - 20 mmol/L    HEMATOCRIT - POINT OF CARE 47.0 39.0 - 51.0 %    HEMOGLOBIN - POINT OF CARE 16.0 13.0 - 17.0 g/dL    GLUCOSE - POINT OF CARE 101 (H) 70 - 99 mg/dL    CALCIUM, IONIZED - POINT OF CARE 1.11 (L) 1.15 - 1.29 mmol/L    Creatinine 0.80 0.67 - 1.17 mg/dL    Glomerular Filtration Rate >90 >=60   TROPONIN I  POINT OF CARE   Result Value Ref Range    TROPONIN I - POINT OF CARE <0.10 <0.10 ng/mL       EKG Results     EKG Interpretation  Rate: 104 BPM  Rhythm: sinus tachycardia   Abnormality: No significant change compared to 12/16/21    EKG tracing interpreted by ED physician    Radiology Results     Imaging Results          XR CHEST AP OR PA - PORTABLE (Final result)  Result time 01/07/22 17:33:49    Final result                 Impression:    IMPRESSION:     Stable chest radiograph with no acute cardiopulmonary findings.    I, Attending Radiologist Chace Brand MD, have reviewed the images and  report and concur with these findings interpreted by Resident Radiologist,  Italo Mclain MD.              Narrative:    XR CHEST AP OR PA    HISTORY:  Portable sob, cp    COMPARISON: Chest radiograph 12/16/2021.    TECHNIQUE:  Single, AP 75 degree semiupright view of the chest.    FINDINGS:    The cardiomediastinal contour is within the upper limits of normal and  stable. The pulmonary vasculature is normally distributed. The interstitial  markings are accentuated by overlying soft tissue. Stable right basilar  atelectasis/scarring. No focal  LIV LOAIZA               [facility]  Yeyo Brantley MD  [address] [address]  [phone]    Emma Gamez acting as a scribe for Adamaris Santana PA-C.    Adamaris Santana PA-C  Dictation # [id number]  Scribe:  Emma Gamez    Attending Physician: Dr. Roel Mars  Dictation # [id number]       Adamaris Santana PA-C  01/07/22 1929

## 2022-02-10 ENCOUNTER — IMMUNIZATION (OUTPATIENT)
Dept: PRIMARY CARE CLINIC | Facility: CLINIC | Age: 42
End: 2022-02-10
Payer: COMMERCIAL

## 2022-02-10 DIAGNOSIS — Z23 NEED FOR VACCINATION: Primary | ICD-10-CM

## 2022-02-10 PROCEDURE — 0004A COVID-19, MRNA, LNP-S, PF, 30 MCG/0.3 ML DOSE VACCINE: CPT | Mod: S$GLB,,, | Performed by: FAMILY MEDICINE

## 2022-02-10 PROCEDURE — 91300 COVID-19, MRNA, LNP-S, PF, 30 MCG/0.3 ML DOSE VACCINE: ICD-10-PCS | Mod: S$GLB,,, | Performed by: FAMILY MEDICINE

## 2022-02-10 PROCEDURE — 91300 COVID-19, MRNA, LNP-S, PF, 30 MCG/0.3 ML DOSE VACCINE: CPT | Mod: S$GLB,,, | Performed by: FAMILY MEDICINE

## 2022-02-10 PROCEDURE — 0004A COVID-19, MRNA, LNP-S, PF, 30 MCG/0.3 ML DOSE VACCINE: ICD-10-PCS | Mod: S$GLB,,, | Performed by: FAMILY MEDICINE

## 2022-07-19 PROBLEM — F33.41 MAJOR DEPRESSIVE DISORDER, RECURRENT EPISODE, IN PARTIAL REMISSION: Status: RESOLVED | Noted: 2017-08-14 | Resolved: 2022-07-19

## 2022-07-19 PROBLEM — F32.A DEPRESSION: Status: RESOLVED | Noted: 2019-07-08 | Resolved: 2022-07-19

## 2022-09-02 ENCOUNTER — TELEPHONE (OUTPATIENT)
Dept: OBSTETRICS AND GYNECOLOGY | Facility: CLINIC | Age: 42
End: 2022-09-02
Payer: COMMERCIAL

## 2022-09-02 NOTE — TELEPHONE ENCOUNTER
----- Message from Yanet Erickson sent at 9/2/2022  9:21 AM CDT -----  Contact: patient  Type:  Patient Call          Who Called:patient        Does the patient know what this is regarding?:requesting a call back about a upcoming appt ;please advise           Would the patient rather a call back or a response via [facility]? Call           Best Call Back Number:[phone]             Additional Information:

## 2022-09-02 NOTE — TELEPHONE ENCOUNTER
----- Message from Roselyn Childers sent at 9/2/2022  9:06 AM CDT -----  Contact: pt  Type: Needs Medical Advice    Who Called: pt  Best Call Back Number: [phone]    Inquiry/Question: pt has a wellness appt on 10/10/2022 for wellness and perimenopose symptoms. She wants to know if she should make two appts. One for wellness and one to talk about the hormones.       Thank you~

## 2022-09-02 NOTE — TELEPHONE ENCOUNTER
Pt has wellness visit on 10/10/2022. Wanted to know if she can discuss hormones at the visit. Informed patient yes.Keep appt and will change visit to hormone consult only.Patient verb understanding.

## 2022-09-08 ENCOUNTER — TELEPHONE (OUTPATIENT)
Dept: OBSTETRICS AND GYNECOLOGY | Facility: CLINIC | Age: 42
End: 2022-09-08
Payer: COMMERCIAL

## 2022-09-08 NOTE — TELEPHONE ENCOUNTER
----- Message from Jomar Curiel sent at 9/8/2022  3:47 PM CDT -----  Contact: pt at [phone]  Type: Needs Medical Advice  Who Called: pt   Best Call Back Number: [phone]    Additional Information: pt called in for an upcoming appointment for September 28,2022  she needs a call back number sent to her phone

## 2022-09-12 NOTE — PLAN OF CARE
Discharge instructions given, patient verbalized understanding. Consents in chart, Vitals stable, no complaints.     
Take over the counter pain medication

## 2023-01-12 DIAGNOSIS — Z12.31 ENCOUNTER FOR SCREENING MAMMOGRAM FOR MALIGNANT NEOPLASM OF BREAST: ICD-10-CM

## 2023-01-12 DIAGNOSIS — N93.9 HEMORRHAGE IN UTERUS: Primary | ICD-10-CM

## 2023-03-27 ENCOUNTER — HOSPITAL ENCOUNTER (OUTPATIENT)
Dept: RADIOLOGY | Facility: HOSPITAL | Age: 43
Discharge: HOME | End: 2023-03-27
Attending: OBSTETRICS & GYNECOLOGY
Payer: COMMERCIAL

## 2023-03-27 VITALS — WEIGHT: 125.44 LBS | BODY MASS INDEX: 20.9 KG/M2 | HEIGHT: 65 IN

## 2023-03-27 DIAGNOSIS — N93.9 HEMORRHAGE IN UTERUS: ICD-10-CM

## 2023-03-27 DIAGNOSIS — Z12.31 ENCOUNTER FOR SCREENING MAMMOGRAM FOR MALIGNANT NEOPLASM OF BREAST: ICD-10-CM

## 2023-03-27 PROCEDURE — 76856 US EXAM PELVIC COMPLETE: CPT | Mod: TC,PO

## 2023-03-27 PROCEDURE — 77067 SCR MAMMO BI INCL CAD: CPT | Mod: TC,PO

## 2023-06-07 ENCOUNTER — PATIENT MESSAGE (OUTPATIENT)
Dept: INFECTIOUS DISEASES | Facility: CLINIC | Age: 43
End: 2023-06-07
Payer: COMMERCIAL

## 2023-06-07 ENCOUNTER — TELEPHONE (OUTPATIENT)
Dept: INFECTIOUS DISEASES | Facility: CLINIC | Age: 43
End: 2023-06-07
Payer: COMMERCIAL

## 2023-06-07 NOTE — TELEPHONE ENCOUNTER
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

## 2023-10-02 ENCOUNTER — TELEPHONE (OUTPATIENT)
Dept: OPHTHALMOLOGY | Facility: CLINIC | Age: 43
End: 2023-10-02
Payer: COMMERCIAL

## 2023-10-02 NOTE — TELEPHONE ENCOUNTER
----- Message from Stacy Contreras sent at 10/2/2023  2:47 PM CDT -----  Contact: patient  Type:  Sooner Appointment Request    Caller is requesting a sooner appointment.  Caller declined first available appointment listed below.  Caller will not accept being placed on the waitlist and is requesting a message be sent to doctor.    Name of Caller:  patient  When is the first available appointment?  N/a  Symptoms:  thorough eye exam, possible cataracts  Best Call Back Number:  [phone] (home)   Additional Information:

## 2024-01-08 ENCOUNTER — TELEPHONE (OUTPATIENT)
Dept: DERMATOLOGY | Facility: CLINIC | Age: 44
End: 2024-01-08

## 2024-01-08 NOTE — TELEPHONE ENCOUNTER
----- Message from Hillary Cramer sent at 1/8/2024 12:42 PM CST -----  Contact: patient  Type:  Patient Returning Call    Who Called:patient    Who Left Message for Patient:    Does the patient know what this is regarding?: yes    Would the patient rather a call back or a response via [facility]? Call     Best Call Back Number:[phone] (home)      Additional Information:

## 2024-04-25 NOTE — DISCHARGE INSTRUCTIONS
Please do not eat or drink anything after midnight prior to procedure. Please do not drive on day of ECT.      Understanding Electroconvulsive Therapy  Electroconvulsive therapy (ECT) is sometimes called shock therapy. This may sound painful, but ECT doesnt hurt. Its often the safest and best treatment for severe depression. It can treat other mental disorders as well.  What is electroconvulsive therapy?  ECT is used to treat people who are very depressed. Its mainly used when other treatments, such as antidepressant medicines, have failed. Often it may relieve feelings of sadness and despair after 2 to 4 treatments.  Common symptoms of major depression  Symptoms of major depression include:  · Feeling a deep sadness that doesnt go away  · Losing all pleasure in life  · Feeling hopeless or helpless  · Feeling guilty  · Sleeping more or less than normal  · Eating more or less than normal  · Having headaches or stomachaches, or other pains that dont go away  · Feeling nervous, empty, or worthless  · Crying a great deal  · Thinking or talking about suicide or death  How is ECT therapy done?  Before an ECT treatment, youll be given anesthesia to keep you pain-free. Youll also be given medicine to make you sleep, relax your muscles and control your heart rate. Your healthcare provider then places electrodes on your head. You may have one above each temple (bilateral ECT). Or you may have electrodes on one temple and on your forehead (unilateral ECT). While you are asleep, your brain is stimulated very briefly with an electric current. This causes a seizure, usually lasting less than a minute. Because you are under anesthesia, your body will not move even as your brain goes through great changes.  What are the risks?  When done properly, ECT is quite safe. Right after the treatment, you may be confused. This often lasts for less than half an hour. You may have a headache or stiff muscles. But these symptoms often  Spoke with patient for a total of 30 minutes.  Updated chart to reflect up to date patient demographics.  Medication, pharmacy, and family history updated.  Patient was guided through expectations of OB/GYN care throughout history of pregnancy.  Pregnancy confirmation, dating u/s initial OB  scheduled for the pregnancy.     go away quickly. A more serious possible side effect is memory loss. Commonly, people have short-term (temporary) trouble remembering information that they learned recently. And they may have little recall of the time when they received treatment. Less commonly, people may have long-lasting (permanent) spotty recall of major past events. In rare cases, there may be memory loss for larger blocks of time.  Looking to the future  In most cases, ECT doesnt cure depression. But it can improve symptoms for a period of time. You may need a series of ECT treatments to continue feeling the benefit. You may also need to take antidepressant medicines to help prevent symptoms from returning. But with ongoing treatment, you can have a full and healthy life.  Resources  · The National [place] of Mental Health  212.255.7727  www.nimh.nih.gov  · [facility]  811.308.8729  www.[facility].org     Date Last Reviewed: 5/1/2017  © 5025-2926 [employer]. [address] [place] [address], [place] [address]. All rights reserved. This information is not intended as a substitute for professional medical care. Always follow your healthcare professional's instructions.

## 2024-07-28 NOTE — PROGRESS NOTES
Dr Boyce at bedside talking to pt and pt's father about procedure.     Patient is a 59y old  Male who presents with a chief complaint of Esophagectomy (28 Jul 2024 16:28)      SUBJECTIVE / OVERNIGHT EVENTS:    Events noted.  CONSTITUTIONAL: No fever,  or fatigue  RESPIRATORY: SOB on supp O2  CARDIOVASCULAR: No chest pain, palpitations  GASTROINTESTINAL: No abdominal or epigastric pain.       MEDICATIONS  (STANDING):  acetaminophen     Tablet .. 650 milliGRAM(s) Oral every 6 hours  dextrose 5% + sodium chloride 0.9% with potassium chloride 20 mEq/L 1000 milliLiter(s) (30 mL/Hr) IV Continuous <Continuous>  dextrose 5%. 1000 milliLiter(s) (100 mL/Hr) IV Continuous <Continuous>  dextrose 5%. 1000 milliLiter(s) (50 mL/Hr) IV Continuous <Continuous>  dextrose 50% Injectable 12.5 Gram(s) IV Push once  dextrose 50% Injectable 25 Gram(s) IV Push once  dextrose 50% Injectable 25 Gram(s) IV Push once  dextrose Oral Gel 15 Gram(s) Oral once  dornase malik Solution 2.5 milliGRAM(s) Inhalation daily  enoxaparin Injectable 40 milliGRAM(s) SubCutaneous every 24 hours  hydrALAZINE Injectable 10 milliGRAM(s) IV Push every 6 hours  insulin glargine Injectable (LANTUS) 5 Unit(s) SubCutaneous at bedtime  insulin lispro (ADMELOG) corrective regimen sliding scale   SubCutaneous three times a day before meals  insulin lispro (ADMELOG) corrective regimen sliding scale   SubCutaneous at bedtime  insulin lispro Injectable (ADMELOG) 2 Unit(s) SubCutaneous three times a day before meals  levalbuterol Inhalation 0.63 milliGRAM(s) Inhalation every 6 hours  lidocaine   4% Patch 1 Patch Transdermal at bedtime  metoprolol tartrate Injectable 5 milliGRAM(s) IV Push every 6 hours  pantoprazole  Injectable 40 milliGRAM(s) IV Push every 12 hours  piperacillin/tazobactam IVPB.. 3.375 Gram(s) IV Intermittent every 8 hours  sodium chloride 3%  Inhalation 4 milliLiter(s) Inhalation every 6 hours    MEDICATIONS  (PRN):  naloxone Injectable 0.1 milliGRAM(s) IV Push every 3 minutes PRN For ANY of the following changes in patient status:  A. RR LESS THAN 10 breaths per minute, B. Oxygen saturation LESS THAN 90%, C. Sedation score of 6  oxyCODONE    IR 2.5 milliGRAM(s) Oral every 4 hours PRN Moderate Pain (4 - 6)  oxyCODONE    IR 5 milliGRAM(s) Oral every 4 hours PRN Severe Pain (7 - 10)        CAPILLARY BLOOD GLUCOSE      POCT Blood Glucose.: 187 mg/dL (28 Jul 2024 12:21)  POCT Blood Glucose.: 142 mg/dL (28 Jul 2024 08:00)  POCT Blood Glucose.: 171 mg/dL (27 Jul 2024 21:04)  POCT Blood Glucose.: 177 mg/dL (27 Jul 2024 17:26)    I&O's Summary    27 Jul 2024 07:01  -  28 Jul 2024 07:00  --------------------------------------------------------  IN: 300 mL / OUT: 1072 mL / NET: -772 mL    28 Jul 2024 07:01  -  28 Jul 2024 16:49  --------------------------------------------------------  IN: 540 mL / OUT: 315 mL / NET: 225 mL        T(C): 36.9 (07-28-24 @ 16:02), Max: 37.4 (07-28-24 @ 13:25)  HR: 106 (07-28-24 @ 16:02) (97 - 125)  BP: 129/67 (07-28-24 @ 16:02) (122/69 - 160/80)  RR: 17 (07-28-24 @ 16:02) (17 - 32)  SpO2: 96% (07-28-24 @ 16:02) (88% - 100%)    PHYSICAL EXAM:    NECK: Supple, No JVD  CHEST/LUNG: Clear to auscultation bilaterally; No wheezing.  HEART: Regular rate and rhythm; No murmurs, rubs, or gallops  ABDOMEN: Soft, Nontender, Nondistended; Bowel sounds present  EXTREMITIES:   No edema  NEUROLOGY: AAO X 3      LABS:                        9.8    11.48 )-----------( 187      ( 28 Jul 2024 04:12 )             29.4     07-28    134<L>  |  102  |  7   ----------------------------<  138<H>  3.5   |  20<L>  |  0.83    Ca    7.1<L>      28 Jul 2024 04:12  Phos  2.6     07-28  Mg     1.90     07-28            Urinalysis Basic - ( 28 Jul 2024 04:12 )    Color: x / Appearance: x / SG: x / pH: x  Gluc: 138 mg/dL / Ketone: x  / Bili: x / Urobili: x   Blood: x / Protein: x / Nitrite: x   Leuk Esterase: x / RBC: x / WBC x   Sq Epi: x / Non Sq Epi: x / Bacteria: x      CAPILLARY BLOOD GLUCOSE      POCT Blood Glucose.: 187 mg/dL (28 Jul 2024 12:21)  POCT Blood Glucose.: 142 mg/dL (28 Jul 2024 08:00)  POCT Blood Glucose.: 171 mg/dL (27 Jul 2024 21:04)  POCT Blood Glucose.: 177 mg/dL (27 Jul 2024 17:26)        RADIOLOGY & ADDITIONAL TESTS:    Imaging Personally Reviewed:    Consultant(s) Notes Reviewed:      Care Discussed with Consultants/Other Providers:    Anish Goodrich MD, CMD, FACP    [address] [place] [address]  Office Tel: [phone]  Cell: [phone]

## 2024-12-08 ENCOUNTER — OFFICE VISIT (OUTPATIENT)
Dept: URGENT CARE | Facility: CLINIC | Age: 44
End: 2024-12-08
Payer: COMMERCIAL

## 2024-12-08 VITALS
WEIGHT: 150 LBS | HEART RATE: 93 BPM | BODY MASS INDEX: 29.45 KG/M2 | HEIGHT: 60 IN | DIASTOLIC BLOOD PRESSURE: 78 MMHG | TEMPERATURE: 100 F | OXYGEN SATURATION: 99 % | RESPIRATION RATE: 18 BRPM | SYSTOLIC BLOOD PRESSURE: 128 MMHG

## 2024-12-08 DIAGNOSIS — B35.4 TINEA CORPORIS: Primary | ICD-10-CM

## 2024-12-08 PROCEDURE — 99214 OFFICE O/P EST MOD 30 MIN: CPT | Mod: S$GLB,,, | Performed by: NURSE PRACTITIONER

## 2024-12-08 RX ORDER — CLOTRIMAZOLE AND BETAMETHASONE DIPROPIONATE 10; .64 MG/G; MG/G
CREAM TOPICAL EVERY 12 HOURS
Qty: 45 G | Refills: 0 | Status: SHIPPED | OUTPATIENT
Start: 2024-12-08 | End: 2024-12-22

## 2024-12-08 NOTE — PROGRESS NOTES
Subjective:      Patient ID: Allyssa Wright is a 44 y.o. female.    Vitals:  height is 5' (1.524 m) and weight is 68 kg (150 lb). Her oral temperature is 99.5 °F (37.5 °C). Her blood pressure is 128/78 and her pulse is 93. Her respiration is 18 and oxygen saturation is 99%.     Chief Complaint: Rash    45yo afebrile female with c/o rash x 3 weeks.    Rash  This is a new problem. Episode onset: x 2-3 weeks. The problem is unchanged. Location: Between breast and belly button. Past treatments include anti-itch cream.       Skin:  Positive for rash.      Objective:     Physical Exam   Constitutional: She is oriented to person, place, and time. normal  HENT:   Head: Normocephalic and atraumatic.   Nose: Nose normal.   Mouth/Throat: Mucous membranes are moist. Oropharynx is clear.   Eyes: Conjunctivae are normal. Pupils are equal, round, and reactive to light. Extraocular movement intact   Neck: Neck supple.   Cardiovascular: Normal rate, regular rhythm, normal heart sounds and normal pulses.   Pulmonary/Chest: Effort normal and breath sounds normal.   Abdominal: Normal appearance.   Musculoskeletal: Normal range of motion.         General: Normal range of motion.   Lymphadenopathy:     She has no cervical adenopathy.   Neurological: She is alert and oriented to person, place, and time.   Skin: Skin is warm, dry and rash.         Comments: Mildly erythematous, semi-annular rash with central clearing to center of chest, navel area, and R/L lower legs.   Psychiatric: Her behavior is normal. Mood normal.   Vitals reviewed.chaperone present (MARY Meadows MA)         Assessment:     1. Tinea corporis        Plan:       Tinea corporis  -     clotrimazole-betamethasone 1-0.05% (LOTRISONE) cream; Apply topically every 12 (twelve) hours. for 14 days  Dispense: 45 g; Refill: 0      INSTRUCTIONS  Medication as prescribed. Follow up with your Dermatologist on 12/15/24 as scheduled.               detailed exam color normal/warm and dry

## 2024-12-29 ENCOUNTER — OFFICE VISIT (OUTPATIENT)
Dept: URGENT CARE | Facility: CLINIC | Age: 44
End: 2024-12-29
Payer: COMMERCIAL

## 2024-12-29 VITALS
OXYGEN SATURATION: 98 % | WEIGHT: 150 LBS | TEMPERATURE: 99 F | HEART RATE: 111 BPM | BODY MASS INDEX: 29.45 KG/M2 | HEIGHT: 60 IN | RESPIRATION RATE: 18 BRPM | SYSTOLIC BLOOD PRESSURE: 116 MMHG | DIASTOLIC BLOOD PRESSURE: 74 MMHG

## 2024-12-29 DIAGNOSIS — L23.9 ALLERGIC DERMATITIS: Primary | ICD-10-CM

## 2024-12-29 DIAGNOSIS — L30.9 DERMATITIS: ICD-10-CM

## 2024-12-29 PROCEDURE — 99213 OFFICE O/P EST LOW 20 MIN: CPT | Mod: 25,S$GLB,, | Performed by: NURSE PRACTITIONER

## 2024-12-29 PROCEDURE — 96372 THER/PROPH/DIAG INJ SC/IM: CPT | Mod: S$GLB,,, | Performed by: NURSE PRACTITIONER

## 2024-12-29 RX ORDER — DEXTROMETHORPHAN HYDROBROMIDE, BUPROPION HYDROCHLORIDE 105; 45 MG/1; MG/1
1 TABLET, MULTILAYER, EXTENDED RELEASE ORAL 2 TIMES DAILY
COMMUNITY
Start: 2024-12-23

## 2024-12-29 RX ORDER — TRAZODONE HYDROCHLORIDE 100 MG/1
200 TABLET ORAL NIGHTLY
COMMUNITY
Start: 2024-12-01

## 2024-12-29 RX ORDER — FAMOTIDINE 20 MG/1
20 TABLET, FILM COATED ORAL 2 TIMES DAILY
Qty: 14 TABLET | Refills: 0 | Status: SHIPPED | OUTPATIENT
Start: 2024-12-29 | End: 2025-01-05

## 2024-12-29 RX ORDER — BUPROPION HYDROCHLORIDE 150 MG/1
150 TABLET, EXTENDED RELEASE ORAL
COMMUNITY
Start: 2024-11-22

## 2024-12-29 RX ORDER — DEXAMETHASONE SODIUM PHOSPHATE 4 MG/ML
8 INJECTION, SOLUTION INTRA-ARTICULAR; INTRALESIONAL; INTRAMUSCULAR; INTRAVENOUS; SOFT TISSUE
Status: COMPLETED | OUTPATIENT
Start: 2024-12-29 | End: 2024-12-29

## 2024-12-29 RX ORDER — CETIRIZINE HYDROCHLORIDE 10 MG/1
10 TABLET ORAL DAILY
Qty: 7 TABLET | Refills: 0 | Status: SHIPPED | OUTPATIENT
Start: 2024-12-29 | End: 2025-01-05

## 2024-12-29 RX ORDER — VENLAFAXINE HYDROCHLORIDE 150 MG/1
300 CAPSULE, EXTENDED RELEASE ORAL
COMMUNITY
Start: 2024-12-23

## 2024-12-29 RX ORDER — MIRTAZAPINE 15 MG/1
22.5 TABLET, FILM COATED ORAL NIGHTLY
COMMUNITY
Start: 2024-12-20

## 2024-12-29 RX ADMIN — DEXAMETHASONE SODIUM PHOSPHATE 8 MG: 4 INJECTION, SOLUTION INTRA-ARTICULAR; INTRALESIONAL; INTRAMUSCULAR; INTRAVENOUS; SOFT TISSUE at 04:12

## 2024-12-29 NOTE — PATIENT INSTRUCTIONS
Increase clear fluid intake  Take zyrtec and pepcid as prescribed     Use hypoallergenic soap such as dove or Aveeno until rash resolves     Follow-up with your PCP and Dermatology  Go to the ER for any feelings of choking, throat swelling, shortness of breath, chest pain or other emergent concerns  Return to this clinic for any new concerns

## 2024-12-29 NOTE — PROGRESS NOTES
Subjective:      Patient ID: Allyssa Wright is a 44 y.o. female.    Vitals:  height is 5' (1.524 m) and weight is 68 kg (150 lb). Her oral temperature is 99 °F (37.2 °C). Her blood pressure is 116/74 and her pulse is 111 (abnormal). Her respiration is 18 and oxygen saturation is 98%.     Chief Complaint: Rash    44-year-old female seen today for rash to anterior chest and lower legs.  She states symptoms began over a week ago.  She was seen in this clinic and prescribed Lotrisone.  She states due to her mental health history she has difficulty remembering to apply creams and ointments.  She reports slight itching of rash but no drainage.  She is requesting oral antifungals today.    Rash  This is a recurrent problem. The current episode started in the past 7 days. The problem is unchanged. The affected locations include the chest, abdomen, right lower leg and left lower leg. The rash is characterized by itchiness. She was exposed to nothing. Pertinent negatives include no congestion, fever, shortness of breath, sore throat or vomiting. Treatments tried: Prescibed Antifungal cream. The treatment provided mild relief.       Constitution: Negative for chills and fever.   HENT:  Negative for congestion and sore throat.    Cardiovascular:  Negative for chest pain, palpitations and sob on exertion.   Respiratory:  Negative for shortness of breath.    Gastrointestinal:  Negative for nausea and vomiting.   Skin:  Positive for rash.   Allergic/Immunologic: Positive for itching.   Neurological:  Negative for dizziness, light-headedness, passing out, disorientation and altered mental status.   Psychiatric/Behavioral:  Negative for altered mental status, disorientation and confusion.       Objective:     Physical Exam   Constitutional: She is oriented to person, place, and time. She appears well-developed. She is cooperative.  Non-toxic appearance. She does not appear ill. No distress.   HENT:   Head: Normocephalic and  atraumatic.   Ears:   Right Ear: External ear normal.   Left Ear: External ear normal.   Nose: Nose normal.   Mouth/Throat: Oropharynx is clear and moist and mucous membranes are normal. Mucous membranes are moist.   Eyes: Conjunctivae and lids are normal.   Neck: Trachea normal and phonation normal. Neck supple.   Cardiovascular: Normal rate, regular rhythm, normal heart sounds and normal pulses.   Pulmonary/Chest: Effort normal and breath sounds normal. No stridor. No respiratory distress.       Abdominal: Normal appearance. She exhibits no mass.   Musculoskeletal:         General: No deformity.   Neurological: no focal deficit. She is alert and oriented to person, place, and time. She has normal strength and normal reflexes. No sensory deficit.   Skin: Skin is warm, dry, intact and not diaphoretic. Capillary refill takes 2 to 3 seconds.        Psychiatric: Her speech is normal and behavior is normal. Judgment and thought content normal.   Nursing note and vitals reviewed.      Assessment:     1. Allergic dermatitis    2. Dermatitis        Plan:       Allergic dermatitis  -     dexAMETHasone injection 8 mg  -     famotidine (PEPCID) 20 MG tablet; Take 1 tablet (20 mg total) by mouth 2 (two) times daily. for 7 days  Dispense: 14 tablet; Refill: 0  -     cetirizine (ZYRTEC) 10 MG tablet; Take 1 tablet (10 mg total) by mouth once daily. for 7 days  Dispense: 7 tablet; Refill: 0  -     Ambulatory referral/consult to Dermatology    Dermatitis  -     Ambulatory referral/consult to Dermatology      The  physical exam findings were discussed with the patient and all questions answered.  I have low suspicion for fungal etiology.  I suspect this is more likely an allergic contact dermatitis.  We discussed symptom monitoring, conservative care methods, medication use, and follow up orders. she verbalized understanding and agreement with the plan of care.

## 2025-01-09 ENCOUNTER — TELEPHONE (OUTPATIENT)
Dept: DERMATOLOGY | Facility: CLINIC | Age: 45
End: 2025-01-09
Payer: COMMERCIAL

## 2025-01-09 NOTE — TELEPHONE ENCOUNTER
I left a voice mail stating the purpose of my call is to assist with scheduling an appointment.  I offered her an appointment with the acute dermatology. I suggested calling the clinic back or sending a message via the portal if that appointment date and time works for her.

## 2025-03-17 NOTE — PROGRESS NOTES
Dr Boyce at bedside talking to pt and father  
Health Decision Maker has been checked with the patient     Primary Decision Maker: Juan José Reynoso - Spouse - [phone]    Secondary Decision Maker: Tanya Velazquez - Child       AI form was not signed    Chief Complaint   Patient presents with    Weight Management     Wegovy     Medication Refill     Statin and Gabapentin        \"Have you been to the ER, urgent care clinic since your last visit?  Hospitalized since your last visit?\"    NO    “Have you seen or consulted any other health care providers outside of [facility] since your last visit?”    NO      There were no vitals filed for this visit.   Depression: Not at risk (2/4/2025)    PHQ-2     PHQ-2 Score: 0      Have you had a mammogram?”   NO    No breast cancer screening on file      “Have you had a pap smear?”    NO    No cervical cancer screening on file         “Have you had a colorectal cancer screening such as a colonoscopy/FIT/Cologuard?    NO    No colonoscopy on file  No cologuard on file  No FIT/FOBT on file   No flexible sigmoidoscopy on file         Click Here for Release of Records Request        Chart reviewed: immunizations are documented.     There is no immunization history on file for this patient.   
normal.         Behavior: Behavior normal.       No results found for this or any previous visit (from the past 12 hours).       Assessment / Plan     Cinthya was seen today for weight management and medication refill.    Diagnoses and all orders for this visit:    Chronic pain syndrome  -     Compliance Drug Analysis, Urine; Future  -     gabapentin (NEURONTIN) 400 MG capsule; 1 capsule by Per G Tube route 2 times daily for 30 days. Max Daily Amount: 800 mg    Cerebrovascular accident (CVA) due to embolism of middle cerebral artery, unspecified blood vessel laterality (HCC)  -     gabapentin (NEURONTIN) 400 MG capsule; 1 capsule by Per G Tube route 2 times daily for 30 days. Max Daily Amount: 800 mg    Obesity (BMI 30-39.9)  -     [facility] - Shameka Calles MD, Bariatrics (non Surgical)[place] (Stephanie Stokes)    Hyperlipidemia, unspecified hyperlipidemia type  -     rosuvastatin (CRESTOR) 40 MG tablet; 1 tablet by Per G Tube route nightly    Hemiplegia of left nondominant side as late effect of cerebrovascular disease, unspecified cerebrovascular disease type, unspecified hemiplegia type (HCC)        reviewed and appropriate.   Discussed needs to follow up within 30 days to collected compliance screen. Was not candice to complete today. Discussed she will need to follow up in 3 mo. Should schedule this today.   Controlled substance agreement signed.   Discussed GLP1 vs referral to weight loss management center. She would like referral to weight loss management.     No follow-up provider specified.    I have discussed the diagnosis with the patient and the intended plan as seen in the above orders. The patient has received an after-visit summary and questions were answered concerning future plans.  I have discussed medication side effects and warnings with the patient as well.    Pau Iqbal,

## 2025-06-18 NOTE — ANESTHESIA POSTPROCEDURE EVALUATION
Anesthesia Post Evaluation    Patient: Allyssa Wright    Procedure(s) Performed: Procedure(s) (LRB):  ELECTROCONVULSIVE THERAPY (ECT) - SINGLE SEIZURE (N/A)    Final Anesthesia Type: general  Patient location during evaluation: PACU  Patient participation: Yes- Able to Participate  Level of consciousness: awake and alert and oriented  Post-procedure vital signs: reviewed and stable  Pain management: adequate  Airway patency: patent  PONV status at discharge: No PONV  Anesthetic complications: no      Cardiovascular status: hemodynamically stable  Respiratory status: unassisted, spontaneous ventilation and room air  Hydration status: euvolemic  Follow-up not needed.          Vitals Value Taken Time   /69 4/10/2019 10:15 AM   Temp 36.9 °C (98.4 °F) 4/10/2019 10:15 AM   Pulse 90 4/10/2019 10:15 AM   Resp 20 4/10/2019 10:15 AM   SpO2 99 % 4/10/2019 10:15 AM         No case tracking events are documented in the log.      Pain/Roma Score: No data recorded       Please provide dx code and ICD-10 code for pa request     If this requires a response please respond to the pool ( P MHCX PSC MEDICATION PRE-AUTH).      Thank you please advise patient.

## 2025-07-02 ENCOUNTER — HOSPITAL ENCOUNTER (EMERGENCY)
Facility: HOSPITAL | Age: 45
Discharge: HOME | End: 2025-07-03
Attending: STUDENT IN AN ORGANIZED HEALTH CARE EDUCATION/TRAINING PROGRAM
Payer: COMMERCIAL

## 2025-07-02 DIAGNOSIS — T48.3X4A POISONING BY DEXTROMETHORPHAN, UNDETERMINED INTENT, INITIAL ENCOUNTER: Primary | ICD-10-CM

## 2025-07-02 DIAGNOSIS — T50.901A OVERDOSE: ICD-10-CM

## 2025-07-02 DIAGNOSIS — Z00.8 MEDICAL CLEARANCE FOR PSYCHIATRIC ADMISSION: ICD-10-CM

## 2025-07-02 LAB
ABSOLUTE EOSINOPHIL (SMH): 0.1 K/UL
ABSOLUTE MONOCYTE (SMH): 0.63 K/UL (ref 0.3–1)
ABSOLUTE NEUTROPHIL COUNT (SMH): 8.6 K/UL (ref 1.8–7.7)
BASOPHILS # BLD AUTO: 0.04 K/UL
BASOPHILS NFR BLD AUTO: 0.4 %
ERYTHROCYTE [DISTWIDTH] IN BLOOD BY AUTOMATED COUNT: 11.8 % (ref 11.5–14.5)
HCT VFR BLD AUTO: 37.3 % (ref 37–48.5)
HGB BLD-MCNC: 12.3 GM/DL (ref 12–16)
IMM GRANULOCYTES # BLD AUTO: 0.04 K/UL (ref 0–0.04)
IMM GRANULOCYTES NFR BLD AUTO: 0.4 % (ref 0–0.5)
LYMPHOCYTES # BLD AUTO: 0.78 K/UL (ref 1–4.8)
MCH RBC QN AUTO: 31.1 PG (ref 27–31)
MCHC RBC AUTO-ENTMCNC: 33 G/DL (ref 32–36)
MCV RBC AUTO: 94 FL (ref 82–98)
NUCLEATED RBC (/100WBC) (SMH): 0 /100 WBC
PLATELET # BLD AUTO: 213 K/UL (ref 150–450)
PMV BLD AUTO: 9.9 FL (ref 9.2–12.9)
RBC # BLD AUTO: 3.96 M/UL (ref 4–5.4)
RELATIVE EOSINOPHIL (SMH): 1 % (ref 0–8)
RELATIVE LYMPHOCYTE (SMH): 7.6 % (ref 18–48)
RELATIVE MONOCYTE (SMH): 6.2 % (ref 4–15)
RELATIVE NEUTROPHIL (SMH): 84.4 % (ref 38–73)
WBC # BLD AUTO: 10.22 K/UL (ref 3.9–12.7)

## 2025-07-02 PROCEDURE — 85610 PROTHROMBIN TIME: CPT | Performed by: STUDENT IN AN ORGANIZED HEALTH CARE EDUCATION/TRAINING PROGRAM

## 2025-07-02 PROCEDURE — 93010 ELECTROCARDIOGRAM REPORT: CPT | Mod: ,,, | Performed by: GENERAL PRACTICE

## 2025-07-02 PROCEDURE — 85025 COMPLETE CBC W/AUTO DIFF WBC: CPT | Performed by: STUDENT IN AN ORGANIZED HEALTH CARE EDUCATION/TRAINING PROGRAM

## 2025-07-02 PROCEDURE — 80053 COMPREHEN METABOLIC PANEL: CPT | Performed by: STUDENT IN AN ORGANIZED HEALTH CARE EDUCATION/TRAINING PROGRAM

## 2025-07-02 PROCEDURE — 80179 DRUG ASSAY SALICYLATE: CPT | Performed by: STUDENT IN AN ORGANIZED HEALTH CARE EDUCATION/TRAINING PROGRAM

## 2025-07-02 PROCEDURE — 99285 EMERGENCY DEPT VISIT HI MDM: CPT | Mod: 25

## 2025-07-02 PROCEDURE — 82077 ASSAY SPEC XCP UR&BREATH IA: CPT | Performed by: STUDENT IN AN ORGANIZED HEALTH CARE EDUCATION/TRAINING PROGRAM

## 2025-07-02 PROCEDURE — 84443 ASSAY THYROID STIM HORMONE: CPT | Performed by: STUDENT IN AN ORGANIZED HEALTH CARE EDUCATION/TRAINING PROGRAM

## 2025-07-02 PROCEDURE — 36415 COLL VENOUS BLD VENIPUNCTURE: CPT | Performed by: STUDENT IN AN ORGANIZED HEALTH CARE EDUCATION/TRAINING PROGRAM

## 2025-07-02 PROCEDURE — 82962 GLUCOSE BLOOD TEST: CPT

## 2025-07-02 PROCEDURE — 93005 ELECTROCARDIOGRAM TRACING: CPT

## 2025-07-02 PROCEDURE — 80143 DRUG ASSAY ACETAMINOPHEN: CPT | Performed by: STUDENT IN AN ORGANIZED HEALTH CARE EDUCATION/TRAINING PROGRAM

## 2025-07-02 PROCEDURE — 81025 URINE PREGNANCY TEST: CPT | Performed by: STUDENT IN AN ORGANIZED HEALTH CARE EDUCATION/TRAINING PROGRAM

## 2025-07-02 PROCEDURE — 84100 ASSAY OF PHOSPHORUS: CPT | Performed by: STUDENT IN AN ORGANIZED HEALTH CARE EDUCATION/TRAINING PROGRAM

## 2025-07-02 PROCEDURE — 80178 ASSAY OF LITHIUM: CPT | Performed by: STUDENT IN AN ORGANIZED HEALTH CARE EDUCATION/TRAINING PROGRAM

## 2025-07-02 RX ORDER — MAGNESIUM SULFATE HEPTAHYDRATE 40 MG/ML
2 INJECTION, SOLUTION INTRAVENOUS ONCE
Status: COMPLETED | OUTPATIENT
Start: 2025-07-03 | End: 2025-07-03

## 2025-07-03 VITALS
HEIGHT: 60 IN | HEART RATE: 78 BPM | DIASTOLIC BLOOD PRESSURE: 78 MMHG | WEIGHT: 150 LBS | BODY MASS INDEX: 29.45 KG/M2 | TEMPERATURE: 98 F | OXYGEN SATURATION: 100 % | RESPIRATION RATE: 16 BRPM | SYSTOLIC BLOOD PRESSURE: 120 MMHG

## 2025-07-03 LAB
ALBUMIN SERPL-MCNC: 4.2 G/DL (ref 3.5–5.2)
ALP SERPL-CCNC: 36 UNIT/L (ref 40–150)
ALT SERPL-CCNC: 24 UNIT/L (ref 10–44)
AMPHET UR QL SCN: ABNORMAL
ANION GAP (SMH): 9 MMOL/L (ref 8–16)
APAP SERPL-MCNC: <3 UG/ML (ref 10–20)
AST SERPL-CCNC: 23 UNIT/L (ref 11–45)
B-HCG UR QL: NEGATIVE
BARBITURATE SCN PRESENT UR: NEGATIVE
BENZODIAZ UR QL SCN: ABNORMAL
BILIRUB SERPL-MCNC: 0.4 MG/DL (ref 0.1–1)
BILIRUB UR QL STRIP.AUTO: NEGATIVE
BUN SERPL-MCNC: 5 MG/DL (ref 6–20)
CALCIUM SERPL-MCNC: 8.7 MG/DL (ref 8.7–10.5)
CANNABINOIDS UR QL SCN: NEGATIVE
CHLORIDE SERPL-SCNC: 97 MMOL/L (ref 95–110)
CLARITY UR: CLEAR
CO2 SERPL-SCNC: 28 MMOL/L (ref 23–29)
COCAINE UR QL SCN: NEGATIVE
COLOR UR AUTO: COLORLESS
CREAT SERPL-MCNC: 0.7 MG/DL (ref 0.5–1.4)
CREAT UR-MCNC: 26.5 MG/DL (ref 15–325)
CTP QC/QA: YES
ETHANOL SERPL-MCNC: <10 MG/DL
GFR SERPLBLD CREATININE-BSD FMLA CKD-EPI: >60 ML/MIN/1.73/M2
GLUCOSE SERPL-MCNC: 93 MG/DL (ref 70–110)
GLUCOSE UR QL STRIP: NEGATIVE
HGB UR QL STRIP: NEGATIVE
INR PPP: 1 (ref 0.8–1.2)
KETONES UR QL STRIP: NEGATIVE
LEUKOCYTE ESTERASE UR QL STRIP: NEGATIVE
LITHIUM SERPL-SCNC: <0.1 MMOL/L (ref 0.6–1.2)
METHADONE UR QL SCN: NEGATIVE
NITRITE UR QL STRIP: NEGATIVE
OPIATES UR QL SCN: NEGATIVE
PCP UR QL: NEGATIVE
PH UR STRIP: 7 [PH]
PHOSPHATE SERPL-MCNC: 3.2 MG/DL (ref 2.7–4.5)
POCT GLUCOSE: 91 MG/DL (ref 70–110)
POTASSIUM SERPL-SCNC: 3.5 MMOL/L (ref 3.5–5.1)
PROT SERPL-MCNC: 6.3 GM/DL (ref 6–8.4)
PROT UR QL STRIP: NEGATIVE
PROTHROMBIN TIME: 10.7 SECONDS (ref 9–12.5)
SALICYLATES SERPL-MCNC: <5 MG/DL (ref 15–30)
SODIUM SERPL-SCNC: 134 MMOL/L (ref 136–145)
SP GR UR STRIP: 1
TSH SERPL-ACNC: 1.79 UIU/ML (ref 0.4–4)
UROBILINOGEN UR STRIP-ACNC: NEGATIVE EU/DL

## 2025-07-03 PROCEDURE — 80307 DRUG TEST PRSMV CHEM ANLYZR: CPT | Performed by: STUDENT IN AN ORGANIZED HEALTH CARE EDUCATION/TRAINING PROGRAM

## 2025-07-03 PROCEDURE — 94760 N-INVAS EAR/PLS OXIMETRY 1: CPT

## 2025-07-03 PROCEDURE — 63600175 PHARM REV CODE 636 W HCPCS: Performed by: STUDENT IN AN ORGANIZED HEALTH CARE EDUCATION/TRAINING PROGRAM

## 2025-07-03 PROCEDURE — 96365 THER/PROPH/DIAG IV INF INIT: CPT

## 2025-07-03 PROCEDURE — 81003 URINALYSIS AUTO W/O SCOPE: CPT | Performed by: STUDENT IN AN ORGANIZED HEALTH CARE EDUCATION/TRAINING PROGRAM

## 2025-07-03 PROCEDURE — G0425 INPT/ED TELECONSULT30: HCPCS | Mod: GT,,, | Performed by: PSYCHIATRY & NEUROLOGY

## 2025-07-03 RX ADMIN — MAGNESIUM SULFATE HEPTAHYDRATE 2 G: 2 INJECTION, SOLUTION INTRAVENOUS at 12:07

## 2025-07-03 RX ADMIN — SODIUM CHLORIDE, POTASSIUM CHLORIDE, SODIUM LACTATE AND CALCIUM CHLORIDE 1000 ML: 600; 310; 30; 20 INJECTION, SOLUTION INTRAVENOUS at 12:07

## 2025-07-03 NOTE — ED PROVIDER NOTES
Encounter Date: 7/2/2025       History     Chief Complaint   Patient presents with    Drug Overdose     Dextromethorphan 30mg.  23 tablets over the past 3 hours.  Intention unclear.     HPI  45-year-old woman with a history of depression, obsessive-compulsive disorder, schizophrenia, self-harming behaviors presents for evaluation of overdose.  Reports between 7:00 p.m. and 10:00 p.m. she took 23 dextromethorphan 30 mg tablets.  There was no other active ingredients in the tablets.  She denies wanting to harm herself.  She said she does not know why she took them.  Her father says that he thinks that maybe she took them in an effort to feel better as it is a medicine that she had previously been prescribed by her psychiatrist for his activating effects.  She denies any acute complaints headache chest pain shortness of breath nausea or vomiting change in bowel or bladder habits.  She denies ever trying to harm herself before doing anything else to harm herself.  She can not tell me what psychiatric medicines she takes but she denies currently taking an MOAi  Review of patient's allergies indicates:   Allergen Reactions    Ampicillin      Mom says so    Desvenlafaxine      psycotic    psycotic    Erythromycin     Levaquin [levofloxacin] Other (See Comments)     Depression side effects    Penicillins      Mom says so  Mom says so  Mom says so      Sulfa (sulfonamide antibiotics)      Rash    Rash    Azithromycin Anxiety     Past Medical History:   Diagnosis Date    Anxiety     Depression     History of psychiatric hospitalization     HSV-1 (herpes simplex virus 1) infection     Hx of psychiatric care     Moderate depressed bipolar II disorder 06/13/2016    reports no history of bipolar    Obsessive-compulsive disorder     Psychiatric problem     Schizophrenia 4/3/2018    Self-harming behavior     Therapy      Past Surgical History:   Procedure Laterality Date    ANKLE SURGERY Right     AUGMENTATION OF BREAST      BREAST  augmentation      OVARIAN CYST REMOVAL Bilateral      Family History   Problem Relation Name Age of Onset    Dementia Mother      Depression Father      Alcohol abuse Father      Anxiety disorder Brother      Depression Brother      Alcohol abuse Paternal Grandfather      Suicide Neg Hx       Social History[1]  Review of Systems   All other systems reviewed and are negative.      Physical Exam     Initial Vitals [07/02/25 2223]   BP Pulse Resp Temp SpO2   121/72 101 20 98 °F (36.7 °C) 96 %      MAP       --         Physical Exam    Nursing note and vitals reviewed.  Constitutional: She appears well-developed. She is not diaphoretic.   HENT:   Head: Normocephalic and atraumatic.   Eyes: EOM are normal. Pupils are equal, round, and reactive to light. Right eye exhibits no discharge. Left eye exhibits no discharge.   Neck: No tracheal deviation present.   Cardiovascular:  Normal rate, regular rhythm and intact distal pulses.           Pulmonary/Chest: Breath sounds normal. No stridor. No respiratory distress.   Abdominal: Abdomen is soft. There is no abdominal tenderness. There is no rebound.   Musculoskeletal:         General: No tenderness.     Neurological: She is alert and oriented to person, place, and time. She has normal strength. No cranial nerve deficit.   Sleepy, appears clinically intoxicated but follows commands he is awake answers questions appropriately   Skin: Skin is warm and dry.         ED Course   Procedures  Labs Reviewed   COMPREHENSIVE METABOLIC PANEL - Abnormal       Result Value    Sodium 134 (*)     Potassium 3.5      Chloride 97      CO2 28      Glucose 93      BUN 5 (*)     Creatinine 0.7      Calcium 8.7      Protein Total 6.3      Albumin 4.2      Bilirubin Total 0.4      ALP 36 (*)     AST 23      ALT 24      Anion Gap 9      eGFR >60     URINALYSIS, REFLEX TO URINE CULTURE - Abnormal    Color, UA Colorless (*)     Appearance, UA Clear      Spec Grav UA 1.005      pH, UA 7.0      Protein,  "UA Negative      Glucose, UA Negative      Ketones, UA Negative      Blood, UA Negative      Bilirubin, UA Negative      Urobilinogen, UA Negative      Nitrites, UA Negative      Leukocyte Esterase, UA Negative     DRUG SCREEN PANEL, URINE EMERGENCY - Abnormal    Benzodiazepine, Urine Presumptive Positive (*)     Methadone, Urine Negative      Cocaine, Urine Negative      Opiates, Urine Negative      Barbituates, Urine Negative      Amphetamines, Urine Presumptive Positive (*)     THC Negative      Phencyclidine, Urine Negative      Urine Creatinine 26.5      Narrative:     This screen includes the following classes of drugs at the listed cut-off:     Benzodiazepines:        200 ng/ml   Methadone:              300 ng/ml   Cocaine metabolite:     300 ng/ml   Opiates:                300 ng/ml   Barbiturates:           200 ng/ml   Amphetamines:           1000 ng/ml   Marijuana metabs (THC): 50 ng/ml   Phencyclidine (PCP):    25 ng/ml     This is a screening test. If results do not correlate with clinical presentation, then a confirmatory send out test is advised.    This report is intended for use in clinical monitoring and management of patients. It is not intended for use in employment related drug testing."   ACETAMINOPHEN LEVEL - Abnormal    Acetaminophen Level <3.0 (*)    LITHIUM LEVEL - Abnormal    Lithium Level <0.1 (*)    SALICYLATE LEVEL - Abnormal    Salicylate Level <5.0 (*)    CBC WITH DIFFERENTIAL - Abnormal    WBC 10.22      RBC 3.96 (*)     Hgb 12.3      Hct 37.3      MCV 94      MCH 31.1 (*)     MCHC 33.0      RDW 11.8      Platelet Count 213      MPV 9.9      Nucleated RBC 0      Neut % 84.4 (*)     Lymph % 7.6 (*)     Mono % 6.2      Eos % 1.0      Basophil % 0.4      Imm Grans % 0.4      Neut # 8.6 (*)     Lymph # 0.78 (*)     Mono # 0.63      Eos # 0.10      Baso # 0.04      Imm Grans # 0.04     ALCOHOL,MEDICAL (ETHANOL) - Normal    Alcohol, Serum <10     TSH - Normal    TSH 1.787     PHOSPHORUS - " Normal    Phosphorus Level 3.2     PROTIME-INR - Normal    PT 10.7      INR 1.0     CBC W/ AUTO DIFFERENTIAL    Narrative:     The following orders were created for panel order CBC auto differential.  Procedure                               Abnormality         Status                     ---------                               -----------         ------                     CBC with Differential[[id number]]       Abnormal            Final result                 Please view results for these tests on the individual orders.   POCT URINE PREGNANCY    POC Preg Test, Ur Negative       Acceptable Yes     POCT GLUCOSE    POCT Glucose 91     POCT GLUCOSE MONITORING CONTINUOUS          Imaging Results    None          Medications   lactated ringers bolus 1,000 mL (1,000 mLs Intravenous New Bag 7/3/25 0032)   magnesium sulfate 2g in water 50mL IVPB (premix) (0 g Intravenous Stopped 7/3/25 0131)     Medical Decision Making  Overdose of dextromethorphan with unclear intent, normotensive afebrile satting well on room air heart rate 101, on exam she appears clinically intoxicated in his sleepy but he is awake she does not have a focal neurologic deficit.  She is protecting her airway.  She denies wanting to harm herself.  Her dad said he has not no concerns about her wanting to harm herself.  I explained to them that while this may be true I am concerned about the thought process behind taking such as an obviously aberrant amount of medicine.  I am not convinced she was trying to actually tried to hurt herself.  I will pec her for grave disability.  Final pec status pending psychiatry evaluation  I will also get a full tox workup give her fluids for her mild tachycardia and magnesium for mildly prolonged QTC.  We will monitor her.    Psych felt comfortable sending her home, I went back and re-evaluated her I am comfortable as well,    Amount and/or Complexity of Data Reviewed  Independent Historian: parent      Details: Says he is not concerned that she is going to harm herself nor does he think this was an attempt to harm herself  External Data Reviewed: notes.     Details: ECT note from 2018 she endorses passive suicidal ideation  Labs: ordered. Decision-making details documented in ED Course.  ECG/medicine tests: ordered and independent interpretation performed. Decision-making details documented in ED Course.    Risk  Prescription drug management.  Decision regarding hospitalization.               ED Course as of 07/03/25 0313   Wed Jul 02, 2025   2330 EKG on my independent interpretation 88 beats per minute normal axis no STEMI  [IC]   2337 I discussed with poison control, as she is already symptomatic suggest observation until she clinically clears, look out for QTC prolongation, sedation, seizures, tachycardia.  Treat with supportive care.  Expect to have symptoms around 7-8 mg per kg dosing, she is around 10 therefore make sense she is having symptoms [IC]   Thu Jul 03, 2025   0208 I discussed with psychiatrist Dr. Ferrer, she does not feel she is a danger to herself she is a reasonable to rescind the pec [IC]   0247 I re-evaluated the patient, she reports that she is feeling better, she is much more awake, again expressed that she does not wish to hurt herself.  I relayed my discussion with the psychiatrist to them.  I offered to monitor them for longer if they were uncomfortable being discharged home, they are ready to go and do not want to stay. we discussed what to watch for, we discussed seizure, they would like to go home, they have psychiatry follow up, I will discharge them. Return precautions/follow up instructions/treatment plan given, expressed agreement and understanding.    [IC]      ED Course User Index  [IC] Ramon Graf MD                           Clinical Impression:  Final diagnoses:  [Z00.8] Medical clearance for psychiatric admission  [T48.3X4A] Poisoning by dextromethorphan,  undetermined intent, initial encounter (Primary)  [T50.903R] Overdose          ED Disposition Condition    Discharge Stable          ED Prescriptions    None       Follow-up Information       Follow up With Specialties Details Why Contact Info Additional Information    [facility] Emergency Medicine Go to  As needed, If symptoms worsen [address] [place] [address] [facility] [phone] 1st floor                   [1]   Social History  Tobacco Use    Smoking status: Former     Current packs/day: 0.00     Types: Cigarettes     Quit date: 2011     Years since quittin.2    Smokeless tobacco: Never   Substance Use Topics    Alcohol use: Yes     Comment: rarely    Drug use: No     Comment: Experimental use in high school        LesiaRamon MD  25 [id number]

## 2025-07-03 NOTE — DISCHARGE INSTRUCTIONS
You were seen for an overdose of dextromethorphan.  Do not take more than prescribed a recommended amounts of any medicines as it can be detrimental to her health up to and including death.  Keep your psychiatry follow up appointment.  Return immediately for any new or worsening symptoms or any thoughts about wanting to harm yourself.    Please return to this facility or another ED as needed for any new or worsening symptoms including chest pain, shortness of breath, abdominal pain, nausea or vomiting, fever or chills. Please follow up with your primary care doctor in 3 days. Please take all medicines as prescribed.

## 2025-07-03 NOTE — CONSULTS
"[facility]   DEPARTMENT OF PSYCHIATRY    ENCOUNTER: initial    TELEPSYCHIATRY (AUDIOVISUAL): Each patient who is provided psychiatric services via telehealth is: (1) informed of the relationship between the psychiatric provider and patient, as well as the respective role of any other health care staff/providers with respect to management of the patient; and (2) notified that he or she may decline to receive psychiatric services by telehealth and may withdraw from such care at any time.  Risks of telehealth include the potential for security breaches (HIPPA compliant platforms notwithstanding) and technological failure, as well as the limitations to physical examination inherent to the modality. The patient was agreeable to the use of telehealth services.    START TIME: 7/3/2025 12:59 AM  STOP TIME: 7/3/2025 1:43 AM    -- PATIENT IDENTIFIERS: Allyssa Wright  [id number]  1980  45 y.o.  female  -- REQUESTING PROVIDER: Ramon Graf MD *  -- LOCATION OF PATIENT: ED    -- MODE OF ARRIVAL: self-presented  -- PRESENT WITH PATIENT DURING SESSION: ALONE  -- SOURCES OF INFORMATION: PATIENT, family/friend(s), EHR/chart  -- LOCATION OF ENCOUNTER PROVIDER: [place], [place]    -- ENCOUNTER PROVIDER: Homa Ferrer MD      Subjective:     History of Present Illness:  Allyssa Wright  is a 45 year old woman with history of depression, anxiety, agoraphobia, OCD. She presents to hospital for evaluation following unintentional overdose of dextromethorphan.     She is accompanied by her father, who provides collateral.     Patient states "I'm a mess." Her reliability is impacted by medication intoxication.  She states "It's hard to explain", is prescribed auvelity (dextromethorphan and wellbutrin). States she ordered dextromethorphan online to adjust her medication dose. Reports feeling depressed prior to taking medication but denies SI or behavior was suicide attempt. Patient's father states this is atypical " "behavior for her.     Psychiatric History:   Previous Psychiatric Hospitalizations: Yes as teen  Previous Medication Trials: Yes extensive med trials, also has had TMS and ECT  Previous Suicide Attempts: no   Outpatient psychiatrist (current & past): Dr. Kim, has appointment next week    Substance Abuse History:  Tobacco:No  Alcohol: No  Illicit Substances:No  Detox/Rehab: No        Family Psychiatric History: mother with dementia       Social History:  Developmental/Childhood:Achieved all developmental milestones timely  *Education:some college   Employment Status/Finances:Unemployed   Relationship Status/Sexual Orientation: Single  Children: 0  Housing Status: lives with father     history:  NO  Access to gun: YES: has firearm in car for protection          [facility] Toolkit ASQ Suicide Screening Tool:  In the past few weeks, have you wished you were dead? No  In the past few weeks, have you felt that you or your family would be better off if you were dead? No  In the past week, have you been having thoughts about killing yourself? No  Have you ever tried to kill yourself? No  Are you having thoughts of killing yourself right now? No    Psychiatric Mental Status Exam:  Arousal: alert  Sensorium/Orientation: oriented to person, place, situation  Behavior/Cooperation: cooperative   Speech: hyperverbal  Language: not tested  Mood: " depressed "   Affect: labile  Thought Process: illogical, perseverative  Thought Content:   Auditory hallucinations: NO  Visual hallucinations: NO  Paranoia: NO  Delusions:  NO  Suicidal ideation: NO  Homicidal ideation: NO  Attention/Concentration:  distracted  Memory:    Recent:  Decreased   Remote: Intact    Fund of Knowledge: Vocabulary appropriate      Insight: has awareness of illness  Judgment: limited      Past Medical History:   Past Medical History:   Diagnosis Date    Anxiety     Depression     History of psychiatric hospitalization     HSV-1 (herpes simplex virus 1) " infection     Hx of psychiatric care     Moderate depressed bipolar II disorder 06/13/2016    reports no history of bipolar    Obsessive-compulsive disorder     Psychiatric problem     Schizophrenia 4/3/2018    Self-harming behavior     Therapy       Laboratory Data:   Labs Reviewed   COMPREHENSIVE METABOLIC PANEL - Abnormal       Result Value    Sodium 134 (*)     Potassium 3.5      Chloride 97      CO2 28      Glucose 93      BUN 5 (*)     Creatinine 0.7      Calcium 8.7      Protein Total 6.3      Albumin 4.2      Bilirubin Total 0.4      ALP 36 (*)     AST 23      ALT 24      Anion Gap 9      eGFR >60     ACETAMINOPHEN LEVEL - Abnormal    Acetaminophen Level <3.0 (*)    LITHIUM LEVEL - Abnormal    Lithium Level <0.1 (*)    SALICYLATE LEVEL - Abnormal    Salicylate Level <5.0 (*)    CBC WITH DIFFERENTIAL - Abnormal    WBC 10.22      RBC 3.96 (*)     Hgb 12.3      Hct 37.3      MCV 94      MCH 31.1 (*)     MCHC 33.0      RDW 11.8      Platelet Count 213      MPV 9.9      Nucleated RBC 0      Neut % 84.4 (*)     Lymph % 7.6 (*)     Mono % 6.2      Eos % 1.0      Basophil % 0.4      Imm Grans % 0.4      Neut # 8.6 (*)     Lymph # 0.78 (*)     Mono # 0.63      Eos # 0.10      Baso # 0.04      Imm Grans # 0.04     ALCOHOL,MEDICAL (ETHANOL) - Normal    Alcohol, Serum <10     TSH - Normal    TSH 1.787     PHOSPHORUS - Normal    Phosphorus Level 3.2     PROTIME-INR - Normal    PT 10.7      INR 1.0     CBC W/ AUTO DIFFERENTIAL    Narrative:     The following orders were created for panel order CBC auto differential.  Procedure                               Abnormality         Status                     ---------                               -----------         ------                     CBC with Differential[[id number]]       Abnormal            Final result                 Please view results for these tests on the individual orders.   URINALYSIS, REFLEX TO URINE CULTURE   DRUG SCREEN PANEL, URINE EMERGENCY   POCT URINE  PREGNANCY   POCT GLUCOSE    POCT Glucose 91     POCT GLUCOSE MONITORING CONTINUOUS       Allergies:   Review of patient's allergies indicates:   Allergen Reactions    Ampicillin      Mom says so    Desvenlafaxine      psycotic    psycotic    Erythromycin     Levaquin [levofloxacin] Other (See Comments)     Depression side effects    Penicillins      Mom says so  Mom says so  Mom says so      Sulfa (sulfonamide antibiotics)      Rash    Rash    Azithromycin Anxiety       Medications in ER:   Medications   lactated ringers bolus 1,000 mL (1,000 mLs Intravenous New Bag 7/3/25 0032)   magnesium sulfate 2g in water 50mL IVPB (premix) (2 g Intravenous New Bag 7/3/25 0031)       Medications at home: reviewed some meds with patient, includes spravato, clozapine    No new subjective & objective note has been filed under this hospital service since the last note was generated.      Assessment - Diagnosis - Goals:     Diagnosis/Impression:   Dextromethorphan overdose  Depression per history  OCD per history  Agoraphobia per history     Do not feel patient's behaviors were act at self harm or suicide attempt. Discussed with patient that she should not adjust or add to her med regimen, as she is on high risk medications.     Do not feel she is an imminent danger to self or gravely disabled.     Has f/u with her psychiatrist scheduled for next week.     Protective factors: future orientation, stable housing, social supports, motivation for recovery, strong therapeutic relationships, denies SI      Rec:   D/c home with community f/u after medical w/u     Plan of Care communicated to: Dr. Lesia Ferrer MD   Psychiatry  [facility]    Inpatient consult to Telemedicine - Psychiatry  Consult performed by: Homa Ferrer MD  Consult ordered by: Ramon Graf MD        [facility] EMERGENCY DEPARTMENT

## 2025-07-04 LAB
OHS QRS DURATION: 82 MS
OHS QTC CALCULATION: 474 MS

## 2025-07-07 ENCOUNTER — NURSE TRIAGE (OUTPATIENT)
Dept: ADMINISTRATIVE | Facility: CLINIC | Age: 45
End: 2025-07-07
Payer: COMMERCIAL

## 2025-07-07 NOTE — TELEPHONE ENCOUNTER
Father, Cesar calling on behalf of pt. States that pt was admitted and discharged with poisoning. States that pt took a drug screen panel and calling to discuss results. Advise to speak with PCP when open. VU.Father requesting callback from Dr. Graf. States that he would rather callback from ED provider than PCP.  [facility]. Secure chat sent to Dr. Ramon Graf with contact information per protocol for callback. Encounter routed to provider.   Reason for Disposition   Caller requesting routine or non-urgent lab result    Protocols used: PCP Call - No Triage-A-